# Patient Record
Sex: MALE | Race: WHITE | NOT HISPANIC OR LATINO | Employment: OTHER | URBAN - METROPOLITAN AREA
[De-identification: names, ages, dates, MRNs, and addresses within clinical notes are randomized per-mention and may not be internally consistent; named-entity substitution may affect disease eponyms.]

---

## 2017-01-10 ENCOUNTER — ALLSCRIPTS OFFICE VISIT (OUTPATIENT)
Dept: OTHER | Facility: OTHER | Age: 57
End: 2017-01-10

## 2017-01-10 ENCOUNTER — TRANSCRIBE ORDERS (OUTPATIENT)
Dept: ADMINISTRATIVE | Facility: HOSPITAL | Age: 57
End: 2017-01-10

## 2017-01-10 DIAGNOSIS — C34.01 CANCER OF RIGHT MAIN BRONCHUS (HCC): Primary | ICD-10-CM

## 2017-01-23 ENCOUNTER — APPOINTMENT (OUTPATIENT)
Dept: RADIATION ONCOLOGY | Facility: HOSPITAL | Age: 57
End: 2017-01-23
Attending: RADIOLOGY
Payer: COMMERCIAL

## 2017-01-23 ENCOUNTER — HOSPITAL ENCOUNTER (OUTPATIENT)
Dept: CT IMAGING | Facility: HOSPITAL | Age: 57
Discharge: HOME | End: 2017-01-23
Attending: INTERNAL MEDICINE
Payer: COMMERCIAL

## 2017-01-23 DIAGNOSIS — C34.01 CANCER OF RIGHT MAIN BRONCHUS (HCC): ICD-10-CM

## 2017-01-23 PROCEDURE — 99214 OFFICE O/P EST MOD 30 MIN: CPT | Performed by: RADIOLOGY

## 2017-01-23 PROCEDURE — 71250 CT THORAX DX C-: CPT

## 2017-01-31 ENCOUNTER — HOSPITAL ENCOUNTER (OUTPATIENT)
Dept: INFUSION CENTER | Facility: CLINIC | Age: 57
Discharge: HOME | End: 2017-01-31
Payer: COMMERCIAL

## 2017-01-31 ENCOUNTER — TRANSCRIBE ORDERS (OUTPATIENT)
Dept: ADMINISTRATIVE | Facility: HOSPITAL | Age: 57
End: 2017-01-31

## 2017-01-31 ENCOUNTER — ALLSCRIPTS OFFICE VISIT (OUTPATIENT)
Dept: OTHER | Facility: OTHER | Age: 57
End: 2017-01-31

## 2017-01-31 DIAGNOSIS — C34.01 MALIGNANT NEOPLASM OF RIGHT MAIN BRONCHUS (HCC): ICD-10-CM

## 2017-01-31 DIAGNOSIS — R91.1 LUNG NODULE: Primary | ICD-10-CM

## 2017-02-06 ENCOUNTER — HOSPITAL ENCOUNTER (OUTPATIENT)
Dept: INFUSION CENTER | Facility: CLINIC | Age: 57
Discharge: HOME | End: 2017-02-06
Payer: COMMERCIAL

## 2017-02-06 PROCEDURE — 96523 IRRIG DRUG DELIVERY DEVICE: CPT

## 2017-02-06 RX ORDER — SUCRALFATE 1 G/1
1 TABLET ORAL 4 TIMES DAILY
COMMUNITY
End: 2018-02-19 | Stop reason: ALTCHOICE

## 2017-02-06 RX ORDER — SENNOSIDES 8.6 MG
650 CAPSULE ORAL EVERY 6 HOURS PRN
COMMUNITY

## 2017-02-06 RX ADMIN — HEPARIN 300 UNITS: 100 SYRINGE at 11:43

## 2017-03-06 ENCOUNTER — HOSPITAL ENCOUNTER (OUTPATIENT)
Dept: INFUSION CENTER | Facility: CLINIC | Age: 57
Discharge: HOME | End: 2017-03-06
Payer: COMMERCIAL

## 2017-03-06 PROCEDURE — 96523 IRRIG DRUG DELIVERY DEVICE: CPT

## 2017-03-06 RX ADMIN — HEPARIN 300 UNITS: 100 SYRINGE at 14:44

## 2017-03-20 DIAGNOSIS — R91.1 SOLITARY PULMONARY NODULE: ICD-10-CM

## 2017-03-20 DIAGNOSIS — C34.01 MALIGNANT NEOPLASM OF RIGHT MAIN BRONCHUS (HCC): ICD-10-CM

## 2017-03-31 ENCOUNTER — GENERIC CONVERSION - ENCOUNTER (OUTPATIENT)
Dept: OTHER | Facility: OTHER | Age: 57
End: 2017-03-31

## 2017-04-03 ENCOUNTER — HOSPITAL ENCOUNTER (OUTPATIENT)
Dept: INFUSION CENTER | Facility: CLINIC | Age: 57
Discharge: HOME | End: 2017-04-03
Payer: COMMERCIAL

## 2017-04-03 PROCEDURE — 96523 IRRIG DRUG DELIVERY DEVICE: CPT

## 2017-04-03 RX ADMIN — ALTEPLASE 2 MG: 2.2 INJECTION, POWDER, LYOPHILIZED, FOR SOLUTION INTRAVENOUS at 10:50

## 2017-04-03 RX ADMIN — Medication 300 UNITS: at 11:20

## 2017-04-03 NOTE — PROGRESS NOTES
TPA successful and brisk red blood return noted  Port flushed, heplocked and deaccessed without issue  Declined AVS  Pt aware of next appt

## 2017-04-03 NOTE — PLAN OF CARE
Problem: Potential for Falls  Goal: Patient will remain free of falls  INTERVENTIONS:  - Assess patient frequently for physical needs  - Identify cognitive and physical deficits and behaviors that affect risk of falls    - [place] fall precautions as indicated by assessment   - Educate patient/family on patient safety including physical limitations  - Instruct patient to call for assistance with activity based on assessment  - Modify environment to reduce risk of injury  - Consider OT/PT consult to assist with strengthening/mobility   Outcome: Progressing

## 2017-04-03 NOTE — PROGRESS NOTES
Pt resting with no complaints  Port accessed and flushed but no blood return noted  TPA instilled  Will recheck in 30 mins

## 2017-04-06 ENCOUNTER — HOSPITAL ENCOUNTER (OUTPATIENT)
Dept: CT IMAGING | Facility: HOSPITAL | Age: 57
Discharge: HOME | End: 2017-04-06
Attending: INTERNAL MEDICINE
Payer: COMMERCIAL

## 2017-04-06 DIAGNOSIS — R91.1 SOLITARY PULMONARY NODULE: ICD-10-CM

## 2017-04-06 PROCEDURE — 71250 CT THORAX DX C-: CPT

## 2017-04-10 ENCOUNTER — GENERIC CONVERSION - ENCOUNTER (OUTPATIENT)
Dept: OTHER | Facility: OTHER | Age: 57
End: 2017-04-10

## 2017-04-11 ENCOUNTER — ALLSCRIPTS OFFICE VISIT (OUTPATIENT)
Dept: OTHER | Facility: OTHER | Age: 57
End: 2017-04-11

## 2017-04-12 ENCOUNTER — ALLSCRIPTS OFFICE VISIT (OUTPATIENT)
Dept: OTHER | Facility: OTHER | Age: 57
End: 2017-04-12

## 2017-04-21 ENCOUNTER — HOSPITAL ENCOUNTER (OUTPATIENT)
Dept: RADIOLOGY | Age: 57
Discharge: HOME | End: 2017-04-21
Payer: COMMERCIAL

## 2017-04-21 DIAGNOSIS — C34.01 MALIGNANT NEOPLASM OF RIGHT MAIN BRONCHUS (HCC): ICD-10-CM

## 2017-04-21 DIAGNOSIS — C34.01: ICD-10-CM

## 2017-04-21 LAB — GLUCOSE SERPL-MCNC: 80 MG/DL (ref 65–140)

## 2017-04-21 PROCEDURE — 82948 REAGENT STRIP/BLOOD GLUCOSE: CPT

## 2017-04-21 PROCEDURE — A9552 F18 FDG: HCPCS

## 2017-04-21 PROCEDURE — 78815 PET IMAGE W/CT SKULL-THIGH: CPT

## 2017-04-21 RX ADMIN — HEPARIN SODIUM (PORCINE) LOCK FLUSH IV SOLN 100 UNIT/ML 300 UNITS: 100 SOLUTION at 08:28

## 2017-05-01 ENCOUNTER — GENERIC CONVERSION - ENCOUNTER (OUTPATIENT)
Dept: OTHER | Facility: OTHER | Age: 57
End: 2017-05-01

## 2017-05-09 ENCOUNTER — HOSPITAL ENCOUNTER (OUTPATIENT)
Dept: INFUSION CENTER | Facility: CLINIC | Age: 57
Discharge: HOME | End: 2017-05-09
Payer: COMMERCIAL

## 2017-05-09 PROCEDURE — 96523 IRRIG DRUG DELIVERY DEVICE: CPT

## 2017-05-09 RX ADMIN — HEPARIN 300 UNITS: 100 SYRINGE at 15:00

## 2017-05-09 NOTE — PROGRESS NOTES
Tolerated procedure without incident: No adverse reactions noted: Verified follow up appt with patient

## 2017-05-09 NOTE — PROGRESS NOTES
Patient to [facility] for Catheter maintenance: Offers no complaints at present time: Left PAC accessed without difficulty: Good blood return noted

## 2017-05-16 ENCOUNTER — APPOINTMENT (OUTPATIENT)
Dept: RADIATION ONCOLOGY | Facility: HOSPITAL | Age: 57
End: 2017-05-16
Attending: RADIOLOGY
Payer: COMMERCIAL

## 2017-05-16 ENCOUNTER — GENERIC CONVERSION - ENCOUNTER (OUTPATIENT)
Dept: OTHER | Facility: OTHER | Age: 57
End: 2017-05-16

## 2017-05-16 PROCEDURE — 99214 OFFICE O/P EST MOD 30 MIN: CPT | Performed by: RADIOLOGY

## 2017-11-13 ENCOUNTER — GENERIC CONVERSION - ENCOUNTER (OUTPATIENT)
Dept: OTHER | Facility: OTHER | Age: 57
End: 2017-11-13

## 2017-11-13 ENCOUNTER — APPOINTMENT (OUTPATIENT)
Dept: RADIATION ONCOLOGY | Facility: HOSPITAL | Age: 57
End: 2017-11-13
Attending: RADIOLOGY
Payer: COMMERCIAL

## 2017-11-13 ENCOUNTER — TRANSCRIBE ORDERS (OUTPATIENT)
Dept: ADMINISTRATIVE | Facility: HOSPITAL | Age: 57
End: 2017-11-13

## 2017-11-13 DIAGNOSIS — C34.01 MALIGNANT NEOPLASM OF RIGHT MAIN BRONCHUS (HCC): ICD-10-CM

## 2017-11-13 DIAGNOSIS — C34.01 MALIGNANT NEOPLASM OF RIGHT MAIN BRONCHUS (HCC): Primary | ICD-10-CM

## 2017-11-13 PROCEDURE — 99214 OFFICE O/P EST MOD 30 MIN: CPT | Performed by: RADIOLOGY

## 2017-11-20 ENCOUNTER — HOSPITAL ENCOUNTER (OUTPATIENT)
Dept: CT IMAGING | Facility: HOSPITAL | Age: 57
Discharge: HOME | End: 2017-11-20
Attending: RADIOLOGY
Payer: COMMERCIAL

## 2017-11-20 DIAGNOSIS — C34.01 MALIGNANT NEOPLASM OF RIGHT MAIN BRONCHUS (HCC): ICD-10-CM

## 2017-11-20 PROCEDURE — 71260 CT THORAX DX C+: CPT

## 2017-11-20 PROCEDURE — 74177 CT ABD & PELVIS W/CONTRAST: CPT

## 2017-11-20 RX ADMIN — IOHEXOL 100 ML: 350 INJECTION, SOLUTION INTRAVENOUS at 13:27

## 2017-11-28 ENCOUNTER — TRANSCRIBE ORDERS (OUTPATIENT)
Dept: ADMINISTRATIVE | Facility: HOSPITAL | Age: 57
End: 2017-11-28

## 2017-11-28 DIAGNOSIS — C34.01 MALIGNANT NEOPLASM OF RIGHT MAIN BRONCHUS (HCC): Primary | ICD-10-CM

## 2017-12-18 ENCOUNTER — TRANSCRIBE ORDERS (OUTPATIENT)
Dept: ADMINISTRATIVE | Facility: HOSPITAL | Age: 57
End: 2017-12-18

## 2017-12-18 ENCOUNTER — ALLSCRIPTS OFFICE VISIT (OUTPATIENT)
Dept: OTHER | Facility: OTHER | Age: 57
End: 2017-12-18

## 2017-12-18 DIAGNOSIS — C34.01 MALIGNANT NEOPLASM OF RIGHT MAIN BRONCHUS (HCC): Primary | ICD-10-CM

## 2017-12-18 DIAGNOSIS — C34.01 MALIGNANT NEOPLASM OF RIGHT MAIN BRONCHUS (HCC): ICD-10-CM

## 2017-12-19 ENCOUNTER — HOSPITAL ENCOUNTER (OUTPATIENT)
Dept: INFUSION CENTER | Facility: CLINIC | Age: 57
Discharge: HOME | End: 2017-12-21
Payer: COMMERCIAL

## 2017-12-19 ENCOUNTER — GENERIC CONVERSION - ENCOUNTER (OUTPATIENT)
Dept: OTHER | Facility: OTHER | Age: 57
End: 2017-12-19

## 2017-12-19 PROCEDURE — 36593 DECLOT VASCULAR DEVICE: CPT

## 2017-12-19 RX ADMIN — Medication 300 UNITS: at 14:45

## 2017-12-19 RX ADMIN — ALTEPLASE 2 MG: 2.2 INJECTION, POWDER, LYOPHILIZED, FOR SOLUTION INTRAVENOUS at 14:00

## 2017-12-19 NOTE — PROGRESS NOTES
Patient to [facility] for Catheter maintenance: Offers no complaints at present time: Left PAC accessed without difficulty: No blood return noted

## 2017-12-19 NOTE — PROGRESS NOTES
Assessment  1  Cancer of right main bronchus (162 2) (C34 01)   2  Lung nodule, solitary (793 11) (R91 1)    Plan  Cancer of right main bronchus    · (1) CEA; Status:Active; Requested for:[id number];    Perform:[facility]; Due:[id number]; Ordered; For:Cancer of right main bronchus; Ordered By:ProDarnell forbeshash;   · * NM PET CT SKULL BASE TO MID THIGH; Status:Need Information - FinancialAuthorization; Requested ATE:[id number] 11:00AM;    Perform:[facility]; Order Comments:PET-CT scan soon; GHN:[id number]; Last Updated By:Thomas Adan; 12/18/2017 2:03:05 PM;Ordered; For:Cancer of right main bronchus; Ordered By:Osmar Kuo;   · Follow-up visit in 3 weeks Evaluation and Treatment  Follow-up  Status: Complete  Done:12Jan2018 03:45PM   Ordered; For: Cancer of right main bronchus; Ordered By: Aurora Rockwell Performed:  Due: [id number]; Last Updated By: Deepti Escobedo; 12/18/2017 1:52:34 PM    Discussion/Summary  Discussion Summary:   Follow-up visit for 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  Patient had obstruction of right mainstem bronchus and pleural effusion but pleural effusion was negative for malignancy  Patient was given 7 weekly doses of carboplatin plus Taxol with concurrent radiation  Plan was him 2 more cycles of regular dose Taxol and carboplatin chemotherapy at three-week interval but patient made it very clear that he was done with treatments  His most recent CT scan shows increased fullness in the paramediastinal area  Patient has minimal nonproductive cough and minimal exertional dyspnea  He does not complain of chest pain  No hemoptysis  His appetite is good  He is maintaining his weight  He wants to smoke cigarettes  He has Port-A-Cath and that was supposed to be flushed once every 6â8 weeks but that is not happening  Physical examination and test results are as recorded and discussed  Patient will have PET/CT scan and CEA  Tissue diagnosis if needed but he does not want whole lot done  Arrangements are being made for him to have Port-A-Cath flushed as soon as possible and once a month  Condition and plan discussed with patient and explained  Questions answered  Patient has thoracic aortic aneurysm and he has seen cardiac surgeon  Counseling Documentation With Imm: The patient was counseled regarding diagnostic results,-- patient and family education,-- impressions  total time of encounter was 30 minutes-- and-- 20 minutes was spent counseling  Goals and Barriers: The patient has the current Goals: Prolongation of survival   The patent has the current Barriers: He continues to smoke cigarettes  Patient's Capacity to Self-Care: Patient is able to Self-Care  Medication SE Review and Pt Understands Tx: The treatment plan was reviewed with the patient/guardian  The patient/guardian understands and agrees with the treatment plan   Self Referrals:   Self Referrals: No   Understands and agrees with treatment plan: The treatment plan was reviewed with the patient/guardian  The patient/guardian understands and agrees with the treatment plan      Chief Complaint  Chief Complaint: Chief Complaint:  The patient presents to the office today with Lung cancer  History of Present Illness  HPI: Follow-up visit for 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  Patient had obstruction of right mainstem bronchus and pleural effusion but pleural effusion was negative for malignancy  Patient was given 7 weekly doses of carboplatin plus Taxol with concurrent radiation  Plan was him 2 more cycles of regular dose Taxol and carboplatin chemotherapy at three-week interval but patient made it very clear that he was done with treatments  His most recent CT scan shows increased fullness in the paramediastinal area  Patient has minimal nonproductive cough and minimal exertional dyspnea  He does not complain of chest pain   No hemoptysis  His appetite is good  He is maintaining his weight  He wants to smoke cigarettes  He has Port-A-Cath and that was supposed to be flushed once every 6â8 weeks but that is not happening  Review of Systems        No headaches, seizures, diplopia, dysphagia, hoarseness, angina pain, chest pain, palpitations,  hemoptysis, abdominal pain, melena, or hematuria  No fever, chills, bleeding, bone pains, skin rash, weight loss, nausea, vomiting and no change in bowel habits  Appetite is good  No night sweats  Patient has minor arthritic symptoms  No leg cramps  No swelling of the ankles  No swollen glands  Not unusually sensitive to heat or cold  No history of frequent infections  Reviewed 13 systems  Other symptoms as in history of present illness  ROS Reviewed:   ROS reviewed  Active Problems  1  A-fib (427 31) (I48 91)   2  Alcohol abuse (305 00) (F10 10)   3  Anxiety disorder (300 00) (F41 9)   4  Aortic root aneurysm (441 9) (I71 9)   5  Cancer of right main bronchus (162 2) (C34 01)   6  Cerebrovascular disease (437 9) (I67 9)   7  Collapsed lung (518 0) (J98 19)   8  COPD (chronic obstructive pulmonary disease) (496) (J44 9)   9  Delirium due to known physiological condition (293 0) (F05)   10  Depression (311) (F32 9)   11  Emphysema/COPD (492 8) (J43 9)   12  Epilepsy (345 90) (G40 909)   13  Hypertension (401 9) (I10)   14  Hypo-osmolality and hyponatremia (276 1) (E87 1)   15  Hypothyroid (244 9) (E03 9)   16  Lung nodule, solitary (793 11) (R91 1)   17  Nicotine dependence (305 1) (F17 200)   18  Pleural effusion (511 9) (J90)   19  Pneumonitis (486) (J18 9)   20  Radiation pneumonitis (508 0) (J70 0)   21  Restrictive lung disease (518 89) (J98 4)   22  Syncope and collapse (780 2) (R55)   Cancer of right main bronchus (162 2) (C34 01)     COPD (chronic obstructive pulmonary disease) (496) (J44 9)       Past Medical History  1  History of encephalopathy (V12 49) (Z86 69)   2   History of hypokalemia (V12 29) (Z86 39)   3  History of Lyme disease (V12 09) (Z86 19)      Reviewed   Surgical History  1  History of Bronchoscopy (Diagnostic)   2  History of Hand Surgery   3  History of Treatment Of Pelvic Fracture  Surgical History Reviewed: The surgical history was reviewed and updated today  Family History  Mother    1  Family history of diabetes mellitus (V18 0) (Z83 3)  Father    2  Family history of lung cancer (V16 1) (Z80 1)  Family History Reviewed: The family history was reviewed and updated today  Noncontributory      Social History   · Brother alive   · Brother    · Current smoker (305 1) (F17 200)   · Former consumption of alcohol (V11 3) (Z87 898)   · No drug use   · Sister alive  Social History Reviewed: The social history was reviewed and updated today  Patient continues to smoke cigarettes  Current Meds   1  Acetaminophen 325 MG CAPS; TAKE 2 CAPSULE Every 6 hours PRN; Therapy: (Recorded:2016) to Recorded   2  Advair Diskus 250-50 MCG/DOSE Inhalation Aerosol Powder Breath Activated; INHALE 1 PUFF TWICE DAILY; Therapy: (Recorded:2017) to Recorded   3  Albuterol Sulfate (2 5 MG/3ML) 0 083% Inhalation Nebulization Solution; USE 1 UNIT DOSE IN NEBULIZER EVERY 6 HOURS AS NEEDED; Therapy: (Recorded:2016) to Recorded   4  Bisacodyl 10 MG Rectal Suppository; Therapy: (Recorded:2017) to Recorded   5  Carafate 1 GM/10ML Oral Suspension; TAKE 10 ML 4 TIMES DAILY; Therapy: 12HYD2029 to (Evaluate:2017); Last Rx:[id number] Ordered   6  Daliresp 500 MCG Oral Tablet; Therapy: (Recorded:2017) to Recorded   7  Folic Acid 1 MG Oral Tablet; TAKE 1 TABLET DAILY; Therapy: (Recorded:2016) to Recorded   8  Gabapentin 400 MG Oral Capsule; TAKE 1 CAPSULE TWICE DAILY; Therapy: (Recorded:2016) to Recorded   9  Levothyroxine Sodium 200 MCG Oral Tablet; TAKE 1 TABLET DAILY; Therapy: (Recorded:2016) to Recorded   10   Lidocaine Viscous 2 % Mouth/Throat Solution; 5 ml PO prior to meals as needed; Therapy: 70CGY9150 to (Last Rx:01Vsr9161) Ordered   11  LORazepam 1 MG Oral Tablet; Take 1 tablet twice daily; Therapy: (Recorded:11Oct2016) to Recorded   12  MDL Milk of Magnesia SUSP; SWALLOW 30 ML Bedtime PRN; Therapy: (Recorded:11Oct2016) to Recorded   13  Metoprolol Succinate ER 25 MG Oral Tablet Extended Release 24 Hour; TAKE 1 TABLET  DAILY; Therapy: (Recorded:11Oct2016) to Recorded   14  Mirtazapine 30 MG Oral Tablet; TAKE 1 TABLET AT BEDTIME; Therapy: (Recorded:11Oct2016) to Recorded   15  Phenytoin Sodium Extended CAPS; TAKE 250 MG Bedtime; Therapy: (Recorded:11Oct2016) to Recorded   16  Potassium Chloride Sadia ER 10 MEQ Oral Tablet Extended Release; TAKE 3 TABLET  Daily; Therapy: (Recorded:11Oct2016) to Recorded   17  Sodium Chloride TABS; 4 gm  TID; Therapy: (Recorded:11Oct2016) to Recorded   18  Spiriva HandiHaler 18 MCG Inhalation Capsule; INHALE CONTENTS OF 1 CAPSULE  ONCE DAILY; Therapy: (Recorded:11Apr2017) to Recorded   19  Synthroid 200 MCG Oral Tablet; TAKE 1 TABLET DAILY AS DIRECTED; Therapy: (Recorded:11Apr2017) to Recorded   20  Thiamine HCl - 100 MG Oral Tablet; TAKE 1 TABLET DAILY; Therapy: (Recorded:11Oct2016) to Recorded   21  Tiotropium Bromide Monohydrate 18 MCG CAPS; INHALE CONTENTS OF 1 CAPSULE  ONCE DAILY; Therapy: (Recorded:11Oct2016) to Recorded   22  Vitamin D 1000 UNIT CAPS; TAKE AS DIRECTED; Therapy: (Recorded:11Apr2017) to Recorded    Allergies  1  No Known Drug Allergies      Reviewed  Vitals  Vital Signs    Recorded: 61ETT7291 01:02PM   Temperature 98 3 F   Heart Rate 97   Respiration 16   Systolic 317   Diastolic 62   Height 5 ft 9 in   Weight 150 lb    BMI Calculated 22 15   BSA Calculated 1 83   O2 Saturation 96       Reviewed  Physical Exam          Patient is awake and oriented  He is not in distress  Vital signs are stable  No icterus  No oral thrush  No palpable neck mass   Lung fields are clear to percussion and auscultation  Regular heart rate  Abdomen soft and nontender  No palpable abdominal mass  Liver is not palpable  No ascites  No edema of ankles  No calf tenderness  No focal neurological deficit  No skin rash  No palpable lymphadenopathy  Good arterial pulses  No clubbing  Anxious  Performance status one  ECOG 1       Results/Data  Results Form:   Results  Lab Results Hemoglobin 12 8  WBC 4400  Platelet 320247  Normal chemistry profile  * CT CHEST ABDOMEN PELVIS W CONTRAST 34IGB8929 12:54PM [place]     Test Name Result Flag Reference   CT CHEST ABDOMEN PELVIS W CONTRAST (Report)     CT CHEST, ABDOMEN AND PELVIS WITH IV CONTRAST   INDICATION: Malignant neoplasm of right main bronchus  COMPARISON: PET CT 4/21/2017, CT chest 4/6/2017  TECHNIQUE: CT examination of the chest, abdomen and pelvis was performed  Reformatted images were created in axial, sagittal, and coronal planes  Radiation dose length product (DLP) for this visit: 450 mGy-cm   This examination, like all CT scans performed in the [facility], was performed utilizing techniques to minimize radiation dose exposure, including the use of iterative   reconstruction and automated exposure control  IV Contrast: 100 mL of iohexol (OMNIPAQUE)     Enteric Contrast: Enteric contrast was administered  FINDINGS:   CHEST   LUNGS: Severe emphysematous changes are again noted  Stable 2 mm left upper lobe pulmonary nodule (series 3 image 23)  Stable left upper lobe nodular density measuring 6 x 8 mm (series 3 image 19)  Stable volume loss in the right lung with subpleural  scarring  Soft tissue thickening in the paramediastinal region on the right superiorly appears more prominent on prior exams measuring 2 4 x 3 3 cm (series 3 image 24)  PLEURA: Unremarkable     HEART/GREAT VESSELS: Stable enlargement of the proximal ascending aorta measuring 5 cm and unchanged from prior exam when given differences in technique  The heart is normal in size without pericardial mass or effusion  MEDIASTINUM AND ANDREW: There are scattered subcentimeter mediastinal and hilar lymph nodes similar to prior exam    CHEST WALL AND LOWER NECK:    There is a left-sided chest wall port with catheter tip in the SVC  ABDOMEN   LIVER/BILIARY TREE: One or more stable subcentimeter sharply circumscribed low-density hepatic lesion(s) are noted, too small to accurately characterize, but statistically most likely to represent subcentimeter hepatic cysts  No suspicious solid   hepatic lesion is identified  Hepatic contours are normal  No biliary dilatation  GALLBLADDER: There are gallstone(s) within the gallbladder, without pericholecystic inflammatory changes  SPLEEN: Unremarkable  PANCREAS: Unremarkable  ADRENAL GLANDS: Unremarkable  KIDNEYS/URETERS: 12 mm left midpole renal cyst  No hydronephrosis  STOMACH AND BOWEL: Moderate stool retention throughout the colon  APPENDIX: A normal appendix was visualized  ABDOMINOPELVIC CAVITY: No ascites or free intraperitoneal air  No lymphadenopathy  VESSELS: Unremarkable for patient's age  PELVIS   REPRODUCTIVE ORGANS: Unremarkable for patient's age  URINARY BLADDER: Unremarkable  ABDOMINAL WALL/INGUINAL REGIONS: Small fat-containing periumbilical hernia  OSSEOUS STRUCTURES: No acute fracture or destructive osseous lesion  There are postsurgical changes of the left femur  IMPRESSION:  1  Interval increased soft tissue attenuation material in the right paramediastinal region  While this could represent progressive scarring/post treatment change residual/recurrent mass cannot be excluded  Consider further evaluation with PET/CT to   evaluate for metabolic activity in this region  2  Stable left-sided pulmonary nodules unchanged from prior exam   3  Cholelithiasis without other evidence of acute cholecystitis  4  No evidence of metastatic disease within the abdomen or pelvis  Workstation performed: [id number]   Signed by:  Sofía Chaparro MD  11/25/17     * NM PET CT SKULL BASE TO MID THIGH 21Apr2017 07:23AM Shiela, [id number]     Test Name Result Flag Reference   NM PET CT SKULL BASE TO MID THIGH (Report)     PET/CT SCAN   INDICATION: Lung cancer, restaging postchemotherapy, radiation, abnormal CT, right upper lung groundglass opacity, slight enlargement of left upper lobe nodular density   MODIFIER:   PS    COMPARISON: CT 4/6/2017 and priors, including PET CT 10/10/2016   CELL TYPE: Squamous cell carcinoma, right mainstem bronchus biopsy 10/3/2016   TECHNIQUE:  8 6 mCi F-18-FDG administered IV  Multiplanar attenuation corrected and non attenuation corrected PET images are available for interpretation, and contiguous, low dose, axial CT sections were obtained from the vertex through the femurs  Intravenous contrast material was not utilized  Fasting serum glucose: 80 mg/dl   FINDINGS:    VISUALIZED BRAIN:  Previously seen nodular hypermetabolic focus in the right posterior vertex region is again noted, however no lesion was visualized on recent brain MRI  Consequently this is of doubtful clinical significance  No new hypermetabolic   lesions visualized in the brain  No acute abnormalities are seen  HEAD/NECK:  Interval increased bilateral thyroid activity, SUV 6 8, prior SUV 3 9  This is nonspecific but may be associated with underlying thyroiditis  There is a physiologic distribution of FDG  No FDG avid cervical adenopathy is seen  CT images: Stable  CHEST:    Since the prior PET CT, there has been significant improvement in right hilar and parenchymal hypermetabolic lesions, compatible with response to therapy  Right hilar activity has an SUV of 2 9, prior SUV 25 7  Previously seen focal hypermetabolic lesion in the right lung has resolved  There is significantly improved aeration of the right lung   There remains persistent groundglass and interstitial densities predominantly in the right upper lung, with mild hypermetabolism, SUV 3  This may represent posttreatment inflammatory changes  Previously seen irregular density in the left upper lung along the major fissure is again visualized, but does not demonstrate any significant hypermetabolism, favoring benign etiology such as scarring  Additional hypermetabolic left upper lung nodular   density seen on the prior PET CT has resolved  No new hypermetabolic hilar or mediastinal adenopathy  CT images: Small right pleural effusion, stable from the prior CT of 4/6/2017  Severe emphysema  Increased small pericardial fluid  Stable aneurysmal proximal ascending thoracic aorta measuring 5 cm  ABDOMEN:    No FDG avid soft tissue lesions are seen  CT images: Cholelithiasis  Small fat-containing umbilical hernia  PELVIS:   No FDG avid soft tissue lesions are seen  CT images: Stable  OSSEOUS STRUCTURES:  No FDG avid lesions are seen  CT images: Stable  IMPRESSION:  1  Significantly improved hypermetabolic right hilar and right lung lesions, compatible with response to therapy  Probable posttreatment inflammatory changes predominantly in the right upper lung and perihilar region  Continued follow-up recommended  2  No new hypermetabolic lesions that are concerning for new metastases  3  Increased thyroid FDG activity may be correlated clinically for etiologies such as thyroiditis          Workstation performed: [id number]   Signed by:  Gaston Mobley MD  4/21/17     Future Appointments    Date/Time Provider Specialty Site   01/12/2018 03:45 PM Tyree Kuo MD Hematology Oncology CANCER CARE [facility] MEDICAL ONCOLOGY     Signatures   Electronically signed by : Derk Dakin, MD; Dec 18 2017  4:35PM EST                       (Author)

## 2017-12-19 NOTE — PROGRESS NOTES
Left PAC with blood return: Flushes without difficulty:  Tolerated procedure without incident: No adverse reactions noted: Verified follow up appt with patient: Declined AVS

## 2018-01-03 ENCOUNTER — GENERIC CONVERSION - ENCOUNTER (OUTPATIENT)
Dept: OTHER | Facility: OTHER | Age: 58
End: 2018-01-03

## 2018-01-03 ENCOUNTER — HOSPITAL ENCOUNTER (OUTPATIENT)
Dept: RADIOLOGY | Age: 58
Discharge: HOME | End: 2018-01-03
Payer: COMMERCIAL

## 2018-01-03 DIAGNOSIS — C34.01 MALIGNANT NEOPLASM OF RIGHT MAIN BRONCHUS (HCC): ICD-10-CM

## 2018-01-03 LAB — GLUCOSE SERPL-MCNC: 79 MG/DL (ref 65–140)

## 2018-01-03 PROCEDURE — A9552 F18 FDG: HCPCS

## 2018-01-03 PROCEDURE — 82948 REAGENT STRIP/BLOOD GLUCOSE: CPT

## 2018-01-03 PROCEDURE — 78815 PET IMAGE W/CT SKULL-THIGH: CPT

## 2018-01-03 RX ADMIN — IOHEXOL 5 ML: 240 INJECTION, SOLUTION INTRATHECAL; INTRAVASCULAR; INTRAVENOUS; ORAL at 13:50

## 2018-01-12 ENCOUNTER — TRANSCRIBE ORDERS (OUTPATIENT)
Dept: ADMINISTRATIVE | Facility: HOSPITAL | Age: 58
End: 2018-01-12

## 2018-01-12 ENCOUNTER — GENERIC CONVERSION - ENCOUNTER (OUTPATIENT)
Dept: OTHER | Facility: OTHER | Age: 58
End: 2018-01-12

## 2018-01-12 VITALS
OXYGEN SATURATION: 92 % | WEIGHT: 153.31 LBS | HEIGHT: 69 IN | BODY MASS INDEX: 22.71 KG/M2 | DIASTOLIC BLOOD PRESSURE: 68 MMHG | TEMPERATURE: 98.6 F | RESPIRATION RATE: 16 BRPM | SYSTOLIC BLOOD PRESSURE: 110 MMHG | HEART RATE: 85 BPM

## 2018-01-12 VITALS
TEMPERATURE: 97.6 F | DIASTOLIC BLOOD PRESSURE: 50 MMHG | RESPIRATION RATE: 16 BRPM | SYSTOLIC BLOOD PRESSURE: 110 MMHG | WEIGHT: 147.38 LBS | OXYGEN SATURATION: 96 % | HEIGHT: 69 IN | HEART RATE: 76 BPM | BODY MASS INDEX: 21.83 KG/M2

## 2018-01-12 VITALS
HEART RATE: 70 BPM | HEIGHT: 70 IN | RESPIRATION RATE: 18 BRPM | SYSTOLIC BLOOD PRESSURE: 106 MMHG | TEMPERATURE: 97.3 F | DIASTOLIC BLOOD PRESSURE: 66 MMHG | BODY MASS INDEX: 20.76 KG/M2 | WEIGHT: 145 LBS | OXYGEN SATURATION: 94 %

## 2018-01-12 DIAGNOSIS — C34.01 MALIGNANT NEOPLASM OF RIGHT MAIN BRONCHUS (HCC): Primary | ICD-10-CM

## 2018-01-12 NOTE — MISCELLANEOUS
Message   Recorded as Task   Date: 11/10/2016 11:09 AM, Created By: Addie Keys   Task Name: Call Back   Assigned To: Olga Soto   Regarding Patient: Antony Berg, Status: Active   Comment:    Kimberly Em - 10 Nov 2016 11:09 AM     TASK CREATED  Tsering Zabala from [address] [place] [address] radiation called () pt was seen there today and asked when he was to start chemo  Pt left before they could inform him he is to start today and did not show up for his chemo  Spoke with Jay James in [place]  Pt found and went to chemo  Active Problems    1  A-fib (427 31) (I48 91)   2  Alcohol abuse (305 00) (F10 10)   3  Anxiety disorder (300 00) (F41 9)   4  Aortic root aneurysm (441 9) (I71 9)   5  Cancer of right main bronchus (162 2) (C34 01)   6  Cerebrovascular disease (437 9) (I67 9)   7  Collapsed lung (518 0) (J98 19)   8  COPD (chronic obstructive pulmonary disease) (496) (J44 9)   9  Delirium due to known physiological condition (293 0) (F05)   10  Depression (311) (F32 9)   11  Emphysema/COPD (492 8) (J43 9)   12  Epilepsy (345 90) (G40 909)   13  Hypertension (401 9) (I10)   14  Hypo-osmolality and hyponatremia (276 1) (E87 1)   15  Hypothyroid (244 9) (E03 9)   16  Nicotine dependence (305 1) (F17 200)   17  Pleural effusion (511 9) (J90)   18  Restrictive lung disease (518 89) (J98 4)   19  Syncope and collapse (780 2) (R55)    Current Meds   1  Acetaminophen 325 MG CAPS; TAKE 2 CAPSULE Every 6 hours PRN; Therapy: (Recorded:11Oct2016) to Recorded   2  Albuterol Sulfate (2 5 MG/3ML) 0 083% Inhalation Nebulization Solution; USE 1 UNIT   DOSE IN NEBULIZER EVERY 6 HOURS AS NEEDED; Therapy: (Recorded:11Oct2016) to Recorded   3  Breo Ellipta 200-25 MCG/INH Inhalation Aerosol Powder Breath Activated; INHALE 1   PUFFS Daily; Therapy: (Recorded:07Nov2016) to Recorded   4  Cholecalciferol 1000 UNIT CAPS; TAKE 2 CAPSULE Daily; Therapy: (Recorded:11Oct2016) to Recorded   5   Clarinex-D 24 Hour TB24; TAKE 1 TABLET DAILY; Therapy: (Recorded:07Nov2016) to Recorded   6  Dulcolax SUPP; INSERT 1 SUPP Daily PRN; Therapy: (Recorded:11Oct2016) to Recorded   7  Flonase 50 MCG/ACT SUSP (Fluticasone Propionate); USE 2 SPRAYS IN EACH   NOSTRIL ONCE DAILY; Therapy: (Recorded:07Nov2016) to Recorded   8  Folic Acid 1 MG Oral Tablet; TAKE 1 TABLET DAILY; Therapy: (Recorded:11Oct2016) to Recorded   9  Gabapentin 400 MG Oral Capsule; TAKE 1 CAPSULE TWICE DAILY; Therapy: (Recorded:11Oct2016) to Recorded   10  Levothyroxine Sodium 200 MCG Oral Tablet; TAKE 1 TABLET DAILY; Therapy: (Recorded:11Oct2016) to Recorded   11  LORazepam 1 MG Oral Tablet; Take 1 tablet twice daily; Therapy: (Recorded:11Oct2016) to Recorded   12  MDL Milk of Magnesia SUSP; SWALLOW 30 ML Bedtime PRN; Therapy: (Recorded:11Oct2016) to Recorded   13  Metoprolol Succinate ER 25 MG Oral Tablet Extended Release 24 Hour; TAKE 1 TABLET    DAILY; Therapy: (Recorded:11Oct2016) to Recorded   14  Mirtazapine 30 MG Oral Tablet; TAKE 1 TABLET AT BEDTIME; Therapy: (Recorded:11Oct2016) to Recorded   15  Phenytoin Sodium Extended CAPS; TAKE 250 MG Bedtime; Therapy: (Recorded:11Oct2016) to Recorded   16  Potassium Chloride Sadia ER 10 MEQ Oral Tablet Extended Release; TAKE 3 TABLET    Daily; Therapy: (Recorded:11Oct2016) to Recorded   17  Roflumilast 500 MCG TABS; TAKE 1 TABLET DAILY; Therapy: (Recorded:11Oct2016) to Recorded   18  Sodium Chloride TABS; 4 gm  TID; Therapy: (Recorded:11Oct2016) to Recorded   19  Thiamine HCl - 100 MG Oral Tablet; TAKE 1 TABLET DAILY; Therapy: (Recorded:11Oct2016) to Recorded   20  Tiotropium Bromide Monohydrate 18 MCG CAPS; INHALE CONTENTS OF 1 CAPSULE    ONCE DAILY; Therapy: (Recorded:11Oct2016) to Recorded    Allergies    1   No Known Drug Allergies    Signatures   Electronically signed by : Sulema Issa RN; Nov 10 2016 11:15AM EST                       (Author)

## 2018-01-12 NOTE — MISCELLANEOUS
Provider Comments  Provider Comments:   PT a no show for Dr Ratna Dodge   on 5/1/17      Signatures   Electronically signed by : Quintin Wood, ; May  1 2017 12:29PM EST                       (Author)

## 2018-01-13 VITALS
OXYGEN SATURATION: 94 % | RESPIRATION RATE: 16 BRPM | HEIGHT: 69 IN | BODY MASS INDEX: 21.48 KG/M2 | WEIGHT: 145 LBS | SYSTOLIC BLOOD PRESSURE: 108 MMHG | TEMPERATURE: 97.5 F | HEART RATE: 80 BPM | DIASTOLIC BLOOD PRESSURE: 60 MMHG

## 2018-01-13 VITALS
DIASTOLIC BLOOD PRESSURE: 68 MMHG | WEIGHT: 154 LBS | TEMPERATURE: 98.2 F | HEART RATE: 88 BPM | SYSTOLIC BLOOD PRESSURE: 140 MMHG | BODY MASS INDEX: 22.81 KG/M2 | RESPIRATION RATE: 14 BRPM | HEIGHT: 69 IN | OXYGEN SATURATION: 94 %

## 2018-01-13 NOTE — RESULT NOTES
Message  Pt with medical and radiation oncology follow up  Verified Results  (1) NON-GYNECOLOGIC CYTOLOGY 03Oct2016 04:39PM Ranell Clutter     Test Name Result Flag Reference   LAB AP CASE REPORT (Report)     Non-gynecologic Cytology             Case: [id number]                  Authorizing Provider: Naz Orona DO     Collected:      10/03/2016 1639        Ordering Location:   [facility]    Received:      10/03/2016 [address] [place] [address] Endoscopy                               Pathologist:      Kim Caicedo MD                               Specimens:  A) - Bronchial Wash, right mainstem #1                                 B) - Bronchial Wash, right mainstem #2                                 C) - Bronchial Brush, right mainstem                                  D) - Mass, right endobronchial                                     E) - Bronchial Wash, right mainstem #1, cell block                           F) - Bronchial Wash, right mainstem #2, cell block                           G) - Bronchial Brush, right mainstem, cell block                            H) - Mass, right endobronchial, cell block   LAB AP CYTO FINAL DIAGNOSIS (Report)     A - E  Bronchial Wash, right mainstem #1:(Thin prep and cell block   preparations)  Conclusive evidence of Malignancy  Keratinizing squamous cell carcinoma  Satisfactory for evaluation  B - F  Bronchial Wash, right mainstem #2: (Thin prep and cell block   preparations)  Conclusive evidence of Malignancy  Keratinizing squamous cell carcinoma  Satisfactory for evaluation  C - G  Bronchial Brush, right mainstem: (Thin prep and cell block   preparations)  Conclusive evidence of Malignancy  Keratinizing squamous cell carcinoma  Satisfactory for evaluation  D - H  Mass, right endobronchial: (Thin prep and cell block preparations)  Conclusive evidence of Malignancy  Keratinizing squamous cell carcinoma  Satisfactory for evaluation  Electronically signed by Abisai Carmona MD on 10/5/2016 at 3:53 PM   LAB AP GROSS DESCRIPTION (Report)     A  Bronchial Wash, right mainstem #1: 45ml  Bloody, turbid, received in   CytoLyt    B  Bronchial Wash, right mainstem #2: 20ml  Bloody, cloudy, received in   CytoLyt    C  Bronchial Brush, right mainstem: 20ml  Slightly bloody, cloudy,   received in CytoLyt    D  Mass, right endobronchial: 20ml  Slightly bloody, cloudy, received in   CytoLyt    E  Bronchial Wash, right mainstem #1, cell block: moderate cell button,   red/white    F  Bronchial Wash, right mainstem #2 cell block: moderate cell button, red    G  Bronchial Brush, right mainstem, cell block: minimal cell button, white    H  Mass, right endobronchial, cell block: minimal cell button, white   LAB AP NONGYN ADDITIONAL INFORMATION (Report)     [employer]'s FDA approved ,  and ThinPrep Imaging System are   utilized with strict adherence to the 's instruction manual to   prepare gynecologic and non-gynecologic cytology specimens for the   production of ThinPrep slides as well as for gynecologic ThinPrep imaging  These processes have been validated by our laboratory and/or by the     These tests were developed and their performance characteristics   determined by [employer] [address] [place] [address] or [facility]  They may not be cleared or approved by the [place]  Food and   Drug Administration  The FDA has determined that such clearance or   approval is not necessary  These tests are used for clinical purposes  They should not be regarded as investigational or for research  This   laboratory has been approved by [place] [address], designated as a high-complexity   laboratory and is qualified to perform these tests      Interpretation performed at [facility]   endotrachial mass r/o malignancy       Signatures   Electronically signed by : Mehnaz Taylor DO; Oct 10 2016 11:54AM EST                       (Author)

## 2018-01-13 NOTE — MISCELLANEOUS
Message   Recorded as Task   Date: 10/27/2016 03:49 PM, Created By: Mena Sauceda   Task Name: Hospital Call   Assigned To: Olga Soto   Regarding Patient: Trish Lutz, Status: Active   Comment:    Mira Potts - 27 Oct 2016 3:49 PM     TASK CREATED  Caller: Jessica Pace, Care Coordinator; Hospital Call; [phone]  DAYAN FROM [facility] RADIATION ONCOLOGY CALLED, 1) PT WAS NOT ABLE TO GET PORT, HE IS R/S FOR 11/3/16   2) PER DR HERNANDEZ - HE IS NOT A CANDIDATE FOR SURGERY  3) DR FAUSTIN IS CHECKING WHEN RADIATION SHOULD START - CONCURRENT WITH CHEMO? WHEN IS CHEMO STARTING IF PORT IS 11/3/16? DR URBAN AWARE - HE ASKED THAT A MESSAGE BE LEFT  PLEASE FOLLOW UP  Olga Soto - 28 Oct 2016 8:25 AM     TASK REPLIED TO: Previously Assigned To Olga Soto  Please find out time port is being placed  Ariella Rubio - 28 Oct 2016 10:05 AM     TASK REASSIGNED: Previously Assigned To Mira Potts  Please find out time port is being placed at [facility]  [facility] - 28 Oct 2016 10:28 AM     TASK EDITED   Brandon Angie - 28 Oct 2016 10:29 AM     TASK REPLIED TO: Previously Assigned To Ariella Rubio  Patient is scheduled for his phone consult today for his port and 11:30 arrival on 11/3 for port placement at [facility]  Spoke with [facility], [place] [place] and Gina Quan at [employer] then called [place] where pt is a resident and Spoke with Elsy Zaldivar  New schedule: port 11/3/16, RT start 11/7/16, chemo start 11/10/16  Active Problems    1  A-fib (427 31) (I48 91)   2  Alcohol abuse (305 00) (F10 10)   3  Anxiety disorder (300 00) (F41 9)   4  Aortic root aneurysm (441 9) (I71 9)   5  Cancer of right main bronchus (162 2) (C34 01)   6  Cerebrovascular disease (437 9) (I67 9)   7  Collapsed lung (518 0) (J98 19)   8  COPD (chronic obstructive pulmonary disease) (496) (J44 9)   9  Delirium due to known physiological condition (293 0) (F05)   10  Depression (311) (F32 9)   11   Emphysema/COPD (492 8) (J43 9)   12  Epilepsy (691 90) (G40 909)   13  Hypertension (401 9) (I10)   14  Hypo-osmolality and hyponatremia (276 1) (E87 1)   15  Hypothyroid (244 9) (E03 9)   16  Nicotine dependence (305 1) (F17 200)   17  Pleural effusion (511 9) (J90)   18  Syncope and collapse (780 2) (R55)    Current Meds   1  Acetaminophen 325 MG CAPS; TAKE 2 CAPSULE Every 6 hours PRN; Therapy: (Recorded:11Oct2016) to Recorded   2  Albuterol Sulfate (2 5 MG/3ML) 0 083% Inhalation Nebulization Solution; USE 1 UNIT   DOSE IN NEBULIZER EVERY 6 HOURS AS NEEDED; Therapy: (Recorded:11Oct2016) to Recorded   3  Cholecalciferol 1000 UNIT CAPS; TAKE 2 CAPSULE Daily; Therapy: (Recorded:11Oct2016) to Recorded   4  Dulcolax SUPP; INSERT 1 SUPP Daily PRN; Therapy: (Recorded:11Oct2016) to Recorded   5  Fluticasone Furoate 27 5 MCG/SPRAY SUSP; INHALE 1 PUFF DAILY; Therapy: (Recorded:11Oct2016) to Recorded   6  Folic Acid 1 MG Oral Tablet; TAKE 1 TABLET DAILY; Therapy: (Recorded:11Oct2016) to Recorded   7  Gabapentin 400 MG Oral Capsule; TAKE 1 CAPSULE TWICE DAILY; Therapy: (Recorded:11Oct2016) to Recorded   8  Levothyroxine Sodium 200 MCG Oral Tablet; TAKE 1 TABLET DAILY; Therapy: (Recorded:11Oct2016) to Recorded   9  LORazepam 1 MG Oral Tablet; Take 1 tablet twice daily; Therapy: (Recorded:11Oct2016) to Recorded   10  MDL Milk of Magnesia SUSP; SWALLOW 30 ML Bedtime PRN; Therapy: (Recorded:11Oct2016) to Recorded   11  Metoprolol Succinate ER 25 MG Oral Tablet Extended Release 24 Hour; TAKE 1 TABLET    DAILY; Therapy: (Recorded:11Oct2016) to Recorded   12  Mirtazapine 30 MG Oral Tablet; TAKE 1 TABLET AT BEDTIME; Therapy: (Recorded:11Oct2016) to Recorded   13  Phenytoin Sodium Extended CAPS; TAKE 250 MG Bedtime; Therapy: (Recorded:11Oct2016) to Recorded   14  Potassium Chloride Sadia ER 10 MEQ Oral Tablet Extended Release; TAKE 3 TABLET    Daily; Therapy: (Recorded:11Oct2016) to Recorded   15  Roflumilast 500 MCG TABS; TAKE 1 TABLET DAILY;     Therapy: (Recorded:11Oct2016) to Recorded   16  Sodium Chloride TABS; 4 gm  TID; Therapy: (Recorded:11Oct2016) to Recorded   17  Thiamine HCl - 100 MG Oral Tablet; TAKE 1 TABLET DAILY; Therapy: (Recorded:11Oct2016) to Recorded   18  Tiotropium Bromide Monohydrate 18 MCG CAPS; INHALE CONTENTS OF 1 CAPSULE    ONCE DAILY; Therapy: (Recorded:11Oct2016) to Recorded    Allergies    1   No Known Drug Allergies    Signatures   Electronically signed by : Tylor Jaimes RN; Oct 28 2016 11:20AM EST                       (Author)

## 2018-01-14 NOTE — RESULT NOTES
Message  Tests performed as inpatient, seen as outpatient by Dr Vincent Kayser, oncology, for further lung cancer treatment     Verified Results  (1) NON-GYNECOLOGIC CYTOLOGY 03Oct2016 04:39PM Margaret Wood     Test Name Result Flag Reference   LAB AP CASE REPORT (Report)     Non-gynecologic Cytology             Case: [id number]                  Authorizing Provider: Alexandra Parkinson DO     Collected:      10/03/2016 1639        Ordering Location:   [facility]    Received:      10/03/2016 [id number] Endoscopy                               Pathologist:      Medina Marinelli MD                               Specimens:  A) - Bronchial Wash, right mainstem #1                                 B) - Bronchial Wash, right mainstem #2                                 C) - Bronchial Brush, right mainstem                                  D) - Mass, right endobronchial                                     E) - Bronchial Wash, right mainstem #1, cell block                           F) - Bronchial Wash, right mainstem #2, cell block                           G) - Bronchial Brush, right mainstem, cell block                            H) - Mass, right endobronchial, cell block   LAB AP CYTO FINAL DIAGNOSIS (Report)     A - E  Bronchial Wash, right mainstem #1:(Thin prep and cell block   preparations)  Conclusive evidence of Malignancy  Keratinizing squamous cell carcinoma  Satisfactory for evaluation  B - F  Bronchial Wash, right mainstem #2: (Thin prep and cell block   preparations)  Conclusive evidence of Malignancy  Keratinizing squamous cell carcinoma  Satisfactory for evaluation  C - G  Bronchial Brush, right mainstem: (Thin prep and cell block   preparations)  Conclusive evidence of Malignancy  Keratinizing squamous cell carcinoma  Satisfactory for evaluation  D - H   Mass, right endobronchial: (Thin prep and cell block preparations)  Conclusive evidence of Malignancy  Keratinizing squamous cell carcinoma  Satisfactory for evaluation  Electronically signed by Medina Marinelli MD on 10/5/2016 at 3:53 PM   LAB AP GROSS DESCRIPTION (Report)     A  Bronchial Wash, right mainstem #1: 45ml  Bloody, turbid, received in   CytoLyt    B  Bronchial Wash, right mainstem #2: 20ml  Bloody, cloudy, received in   CytoLyt    C  Bronchial Brush, right mainstem: 20ml  Slightly bloody, cloudy,   received in CytoLyt    D  Mass, right endobronchial: 20ml  Slightly bloody, cloudy, received in   CytoLyt    E  Bronchial Wash, right mainstem #1, cell block: moderate cell button,   red/white    F  Bronchial Wash, right mainstem #2 cell block: moderate cell button, red    G  Bronchial Brush, right mainstem, cell block: minimal cell button, white    H  Mass, right endobronchial, cell block: minimal cell button, white   LAB AP NONGYN ADDITIONAL INFORMATION (Report)     [employer]'s FDA approved ,  and ThinPrep Imaging System are   utilized with strict adherence to the 's instruction manual to   prepare gynecologic and non-gynecologic cytology specimens for the   production of ThinPrep slides as well as for gynecologic ThinPrep imaging  These processes have been validated by our laboratory and/or by the     These tests were developed and their performance characteristics   determined by [facility] [address] [place] [address] Laboratory or [facility]  They may not be cleared or approved by the [place]  Food and   Drug Administration  The FDA has determined that such clearance or   approval is not necessary  These tests are used for clinical purposes  They should not be regarded as investigational or for research  This   laboratory has been approved by [facility], designated as a high-complexity   laboratory and is qualified to perform these tests      Interpretation performed at [facility], [address] [place] [address]   [address]   endotrachial mass r/o malignancy   LAB AP ADDENDUM 1 (Report)     At the request of Dr Shantanu Mendoza, paraffin block E1 containing the  patient's cancer cells was sent to [address] [place] [address] for ALK   mutational analysis utilizing the  FDA-cleared [employer] ALK Break Apart FISH Probe Kit, EGFR mutational analysis   and Ros1 analysis  Upon  completion of testing, [facility]'s laboratory report will be directly sent   to the requesting physician  An  electronic copy of this report will also be kept on file in the Medical   Records Department at [address] [place] [address]  Please note: The [facility]'s analysis and report is performed   independently of [address] [place] [address], and neither the Hospital nor the Pathology Department   screen, review or comment upon  [facility]'s report  Because the role of ALK, EGFR and Ros1 analysis   testing in cancer diagnosis and  management is subject to evolving development, usage and interpretation,   we strongly urge that the test  limitations described in the [address] [place] [address] Lab report be carefully read,   appropriately shared with the patient, and  critically considered when reaching treatment decisions  At the request of Dr Rosalinda Mendoza, unstained slides from paraffin block E1   containing the patient's cancer cells was sent to [facility] for   PD-L1 testing  Upon completion of testing, [facility] report   will be directly sent to the requesting physician  An electronic copy of   this report will also be kept on file in the Medical Records Department at   [address] [place] [address]  Please note: The [facility]'s analysis and report is performed   independently of [address] [place] [address], and neither the   Hospital nor the Pathology Department screen, review or comment upon   [facility]'s report   Because the role of PD-L1 testing in cancer   diagnosis and management is subject to evolving development, usage and   interpretation, we strongly urge that the test limitations described in   the report be carefully read, appropriately shared with the patient, and   critically considered when reaching treatment decisions      Addendum electronically signed by Isaiah Gomez MD on 10/19/2016 at 3:06 PM       Signatures   Electronically signed by : Kayla Camejo DO; Oct 24 2016  9:37AM EST                       (Author)

## 2018-01-15 ENCOUNTER — HOSPITAL ENCOUNTER (OUTPATIENT)
Dept: INFUSION CENTER | Facility: CLINIC | Age: 58
Discharge: HOME | End: 2018-01-15
Payer: COMMERCIAL

## 2018-01-15 PROCEDURE — 96523 IRRIG DRUG DELIVERY DEVICE: CPT

## 2018-01-15 RX ADMIN — HEPARIN 300 UNITS: 100 SYRINGE at 12:57

## 2018-01-15 NOTE — PROCEDURES
Procedures by Elizabeth Whitmore DO at 10/1/2016   2:51 PM      Author:  Elizabeth Whitmore DO Service:  Pulmonology Author Type:  Physician     Filed:  10/1/2016  2:54 PM Date of Service:  10/1/2016  2:51 PM Status:  Signed     :  Elizabeth Whitmore DO (Physician)         Procedure Orders:       1  Thoracentesis [[id number]] ordered by Elizabeth Whitmore DO at 10/01/16 1452                 Post-procedure Diagnoses:       1  Opacity of lung on imaging study [R91 8]                   Thoracentesis  Date/Time: 10/1/2016 2:52 PM  Performed by: Jennifer Vicente by: Yoselin Akbar     Patient location:  Bedside  Consent:     Consent obtained:  Written    Consent given by:  Patient    Risks discussed:  Bleeding and pneumothorax    Alternatives discussed:  No treatment  Universal protocol:     Procedure explained and questions answered to patient or proxy's satisfaction: yes      Relevant documents present and verified: yes      Test results available and properly labeled: yes       Imaging studies available: yes      Site/side marked: yes      Patient identity confirmed:  Verbally with patient  Indications:     Procedure Purpose: diagnostic and therapeutic      Indications: pleural effusion    Anesthesia (see MAR for exact dosages): Anesthesia method:  None  Procedure details:     Preparation: Patient was prepped and draped in usual sterile fashion      Standard thoracentesis cath kit used: Yes      Patient position:  Sitting    Laterality:  Right    Location:  Midscapular line    Intercostal space:  7th    Puncture method:  Over-the-needle catheter    Guidance: ultrasound      Reason for ultrasound: Identify fluid collection and guide cathetar placement  Number of attempts:  1    Drainage characteristics:  Clear  Post-procedure details:     Chest x-ray performed: yes      Patient tolerance of procedure:   Tolerated well, no immediate complications                     Received for:Debo Whittaker DO  Oct  1 2016  2:54PM [facility] Standard Time

## 2018-01-15 NOTE — PROGRESS NOTES
Pt to clinic for port flush on port a cath, pt offers no complaints at this time, aware of next appointment

## 2018-01-15 NOTE — MISCELLANEOUS
Message   Recorded as Task   Date: 12/15/2016 12:20 PM, Created By: Josephine Garcia   Task Name: Call Back   Assigned To: Olga Soto   Regarding Patient: Kemal Howard, Status: Active   Comment:    Lisa Merritt - 15 Dec 2016 12:20 PM     TASK CREATED  Caller: Maverick Carpenter; Other; [phone] (Work)  [facility]ANCHO NEEDS TO KNOW PT'S LAST CHEMO, LAST RADIATION AND WHEN PT SHOULD SEE DR URBAN AS HE MISSED HIS LAST APPT WITH HIM  PLEASE CALL, TXS   Spoke with Matilde Robinson and informed him of last chemo and next OV  Active Problems    1  A-fib (427 31) (I48 91)   2  Alcohol abuse (305 00) (F10 10)   3  Anxiety disorder (300 00) (F41 9)   4  Aortic root aneurysm (441 9) (I71 9)   5  Cancer of right main bronchus (162 2) (C34 01)   6  Cerebrovascular disease (437 9) (I67 9)   7  Collapsed lung (518 0) (J98 19)   8  COPD (chronic obstructive pulmonary disease) (496) (J44 9)   9  Delirium due to known physiological condition (293 0) (F05)   10  Depression (311) (F32 9)   11  Emphysema/COPD (492 8) (J43 9)   12  Epilepsy (345 90) (G40 909)   13  Hypertension (401 9) (I10)   14  Hypo-osmolality and hyponatremia (276 1) (E87 1)   15  Hypothyroid (244 9) (E03 9)   16  Nicotine dependence (305 1) (F17 200)   17  Pleural effusion (511 9) (J90)   18  Restrictive lung disease (518 89) (J98 4)   19  Syncope and collapse (780 2) (R55)    Current Meds   1  Acetaminophen 325 MG CAPS; TAKE 2 CAPSULE Every 6 hours PRN; Therapy: (Recorded:11Oct2016) to Recorded   2  Albuterol Sulfate (2 5 MG/3ML) 0 083% Inhalation Nebulization Solution; USE 1 UNIT   DOSE IN NEBULIZER EVERY 6 HOURS AS NEEDED; Therapy: (Recorded:11Oct2016) to Recorded   3  Breo Ellipta 200-25 MCG/INH Inhalation Aerosol Powder Breath Activated; INHALE 1   PUFFS Daily; Therapy: (Recorded:07Nov2016) to Recorded   4  Carafate 1 GM/10ML Oral Suspension; TAKE 10 ML 4 TIMES DAILY; Therapy: [id number] to (Evaluate:22Jan2017); Last Rx:[id number] Ordered   5   Cholecalciferol 1000 UNIT CAPS; TAKE 2 CAPSULE Daily; Therapy: (Recorded:11Oct2016) to Recorded   6  Clarinex-D 24 Hour TB24; TAKE 1 TABLET DAILY; Therapy: (Recorded:07Nov2016) to Recorded   7  Dulcolax SUPP; INSERT 1 SUPP Daily PRN; Therapy: (Recorded:11Oct2016) to Recorded   8  Flonase 50 MCG/ACT SUSP (Fluticasone Propionate); USE 2 SPRAYS IN EACH   NOSTRIL ONCE DAILY; Therapy: (Recorded:07Nov2016) to Recorded   9  Folic Acid 1 MG Oral Tablet; TAKE 1 TABLET DAILY; Therapy: (Recorded:11Oct2016) to Recorded   10  Gabapentin 400 MG Oral Capsule; TAKE 1 CAPSULE TWICE DAILY; Therapy: (Recorded:11Oct2016) to Recorded   11  Levothyroxine Sodium 200 MCG Oral Tablet; TAKE 1 TABLET DAILY; Therapy: (Recorded:11Oct2016) to Recorded   12  Lidocaine Viscous 2 % Mouth/Throat Solution; 5 ml PO prior to meals as needed; Therapy: [id number] to (Last Rx:[id number]) Ordered   13  LORazepam 1 MG Oral Tablet; Take 1 tablet twice daily; Therapy: (Recorded:11Oct2016) to Recorded   14  MDL Milk of Magnesia SUSP; SWALLOW 30 ML Bedtime PRN; Therapy: (Recorded:11Oct2016) to Recorded   15  Metoprolol Succinate ER 25 MG Oral Tablet Extended Release 24 Hour; TAKE 1 TABLET    DAILY; Therapy: (Recorded:11Oct2016) to Recorded   16  Mirtazapine 30 MG Oral Tablet; TAKE 1 TABLET AT BEDTIME; Therapy: (Recorded:11Oct2016) to Recorded   17  Phenytoin Sodium Extended CAPS; TAKE 250 MG Bedtime; Therapy: (Recorded:11Oct2016) to Recorded   18  Potassium Chloride Sadia ER 10 MEQ Oral Tablet Extended Release; TAKE 3 TABLET    Daily; Therapy: (Recorded:11Oct2016) to Recorded   19  Roflumilast 500 MCG TABS; TAKE 1 TABLET DAILY; Therapy: (Recorded:11Oct2016) to Recorded   20  Sodium Chloride TABS; 4 gm  TID; Therapy: (Recorded:11Oct2016) to Recorded   21  Thiamine HCl - 100 MG Oral Tablet; TAKE 1 TABLET DAILY; Therapy: (Recorded:11Oct2016) to Recorded   22   Tiotropium Bromide Monohydrate 18 MCG CAPS; INHALE CONTENTS OF 1 CAPSULE    ONCE DAILY; Therapy: (Recorded:11Oct2016) to Recorded    Allergies    1   No Known Drug Allergies    Signatures   Electronically signed by : Lang Nation RN; Dec 15 2016  1:18PM EST                       (Author)

## 2018-01-16 NOTE — PROGRESS NOTES
Discussion/Summary  Social Work-Discussion Summary [facility]: Patient is being seen for an ongoing assessment/follow up  LSW emailed pt's legal guardian Lala Andrewszenon on 10/18/2016  LSW contacted  at [facility], [address] [place] [address] phone number [phone] extension [phone]  LSW informed pt's that our office has been unsuccessful in reaching pt's legal guardian Lala Ornelas to consent to pt's oncology treatment  Evelioailin reported she will attempt to reach pt's legal guardian  LSW received follow up call from Lala Ornelas on 10/19/2016  Lena Bailey verified she is pt's legal guardian and medical decision maker  Lena Bailey reports she is available to consent to pt's oncology treatment  Tere's fax number is [phone]  LSW communicated with Gomez Ulrich, pt's RN about the above  LSW also provided Tere's information to rad onc staff          Signatures   Electronically signed by : Naomi Daniels; Oct 19 2016  9:03AM EST                       (Author)    Electronically signed by : FELICITY Daniels; Oct 24 2016 10:01AM EST                       (Author)

## 2018-01-18 NOTE — MISCELLANEOUS
Message  I spoke with patient's nurse at place of residence and according to her patient is not having any new symptom, no fever or shortness of breath  Has chronic mild cough  I called because of abnormal CT scan of chest  He might have radiation pneumonitis  Patient has appointment in our office tomorrow        Signatures   Electronically signed by : Tyesha Quezada MD; Apr 10 2017  5:36PM EST                       (Author)

## 2018-01-22 VITALS
BODY MASS INDEX: 22.7 KG/M2 | OXYGEN SATURATION: 96 % | RESPIRATION RATE: 16 BRPM | TEMPERATURE: 97.7 F | HEIGHT: 69 IN | WEIGHT: 153.25 LBS | DIASTOLIC BLOOD PRESSURE: 60 MMHG | HEART RATE: 76 BPM | SYSTOLIC BLOOD PRESSURE: 122 MMHG

## 2018-01-22 VITALS
HEIGHT: 69 IN | RESPIRATION RATE: 16 BRPM | SYSTOLIC BLOOD PRESSURE: 124 MMHG | BODY MASS INDEX: 22.22 KG/M2 | TEMPERATURE: 98.3 F | DIASTOLIC BLOOD PRESSURE: 62 MMHG | OXYGEN SATURATION: 96 % | HEART RATE: 97 BPM | WEIGHT: 150 LBS

## 2018-01-23 NOTE — MISCELLANEOUS
Message   Recorded as Task   Date: 12/19/2017 08:08 AM, Created By: Haider Whitmore   Task Name: Call Back   Assigned To: Olga Soto   Regarding Patient: Jose Francisco Saini, Status: Active   CommentKandee Many - 19 Dec 2017 8:08 AM     TASK CREATED  mars calling from gardens at [facility] 35 pt, she has questions about pts infusions and a questions about his PET scan      [address] [facility],[address] - 19 Dec 2017 8:42 AM     TASK REASSIGNED: Previously Assigned To Olga Soto  Please call Alfonso to reschedule PET  She can't take him as scheduled  Sb Adan - 19 Dec 2017 9:21 AM     TASK REPLIED TO: Previously Assigned To Jackman American with Alfonso which stated that she will call central scheduling to reschedule  She will try to keep it on the first wk of January  I will follow up on this  Sb Adan - 12 Dec 2017 12:59 PM     TASK REPLIED TO: Previously Assigned To Sb Adan  PT was r/s to 1/3/18  FYI        Active Problems    1  A-fib (427 31) (I48 91)   2  Alcohol abuse (305 00) (F10 10)   3  Anxiety disorder (300 00) (F41 9)   4  Aortic root aneurysm (441 9) (I71 9)   5  Cancer of right main bronchus (162 2) (C34 01)   6  Cerebrovascular disease (437 9) (I67 9)   7  Collapsed lung (518 0) (J98 19)   8  COPD (chronic obstructive pulmonary disease) (496) (J44 9)   9  Delirium due to known physiological condition (293 0) (F05)   10  Depression (311) (F32 9)   11  Emphysema/COPD (492 8) (J43 9)   12  Epilepsy (345 90) (G40 909)   13  Hypertension (401 9) (I10)   14  Hypo-osmolality and hyponatremia (276 1) (E87 1)   15  Hypothyroid (244 9) (E03 9)   16  Lung nodule, solitary (793 11) (R91 1)   17  Nicotine dependence (305 1) (F17 200)   18  Pleural effusion (511 9) (J90)   19  Pneumonitis (486) (J18 9)   20  Radiation pneumonitis (508 0) (J70 0)   21  Restrictive lung disease (518 89) (J98 4)   22   Syncope and collapse (780 2) (R55)    Current Meds   1  Acetaminophen 325 MG CAPS; TAKE 2 CAPSULE Every 6 hours PRN; Therapy: (Recorded:11Oct2016) to Recorded   2  Advair Diskus 250-50 MCG/DOSE Inhalation Aerosol Powder Breath Activated; INHALE   1 PUFF TWICE DAILY; Therapy: (Recorded:11Apr2017) to Recorded   3  Albuterol Sulfate (2 5 MG/3ML) 0 083% Inhalation Nebulization Solution; USE 1 UNIT   DOSE IN NEBULIZER EVERY 6 HOURS AS NEEDED; Therapy: (Recorded:11Oct2016) to Recorded   4  Bisacodyl 10 MG Rectal Suppository; Therapy: (Recorded:10Jan2017) to Recorded   5  Carafate 1 GM/10ML Oral Suspension; TAKE 10 ML 4 TIMES DAILY; Therapy: [id number] to (Evaluate:22Jan2017); Last Rx:[id number] Ordered   6  Daliresp 500 MCG Oral Tablet; Therapy: (Recorded:10Jan2017) to Recorded   7  Folic Acid 1 MG Oral Tablet; TAKE 1 TABLET DAILY; Therapy: (Recorded:11Oct2016) to Recorded   8  Gabapentin 400 MG Oral Capsule; TAKE 1 CAPSULE TWICE DAILY; Therapy: (Recorded:11Oct2016) to Recorded   9  Levothyroxine Sodium 200 MCG Oral Tablet; TAKE 1 TABLET DAILY; Therapy: (Recorded:11Oct2016) to Recorded   10  Lidocaine Viscous 2 % Mouth/Throat Solution; 5 ml PO prior to meals as needed; Therapy: [id number] to (Last Rx:[id number]) Ordered   11  LORazepam 1 MG Oral Tablet; Take 1 tablet twice daily; Therapy: (Recorded:11Oct2016) to Recorded   12  MDL Milk of Magnesia SUSP; SWALLOW 30 ML Bedtime PRN; Therapy: (Recorded:11Oct2016) to Recorded   13  Metoprolol Succinate ER 25 MG Oral Tablet Extended Release 24 Hour; TAKE 1 TABLET    DAILY; Therapy: (Recorded:11Oct2016) to Recorded   14  Mirtazapine 30 MG Oral Tablet; TAKE 1 TABLET AT BEDTIME; Therapy: (Recorded:11Oct2016) to Recorded   15  Phenytoin Sodium Extended CAPS; TAKE 250 MG Bedtime; Therapy: (Recorded:11Oct2016) to Recorded   16  Potassium Chloride Sadia ER 10 MEQ Oral Tablet Extended Release; TAKE 3 TABLET    Daily; Therapy: (Recorded:11Oct2016) to Recorded   17   Sodium Chloride TABS; 4 gm TID;    Therapy: (Recorded:11Oct2016) to Recorded   18  Spiriva HandiHaler 18 MCG Inhalation Capsule; INHALE CONTENTS OF 1 CAPSULE    ONCE DAILY; Therapy: (Recorded:11Apr2017) to Recorded   19  Synthroid 200 MCG Oral Tablet (Levothyroxine Sodium); TAKE 1 TABLET DAILY AS    DIRECTED; Therapy: (Recorded:11Apr2017) to Recorded   20  Thiamine HCl - 100 MG Oral Tablet; TAKE 1 TABLET DAILY; Therapy: (Recorded:11Oct2016) to Recorded   21  Tiotropium Bromide Monohydrate 18 MCG CAPS; INHALE CONTENTS OF 1 CAPSULE    ONCE DAILY; Therapy: (Recorded:11Oct2016) to Recorded   22  Vitamin D 1000 UNIT CAPS; TAKE AS DIRECTED; Therapy: (Recorded:11Apr2017) to Recorded    Allergies    1   No Known Drug Allergies    Signatures   Electronically signed by : Anna Max, ; Dec 19 2017  1:02PM EST                       (Author)

## 2018-01-23 NOTE — MISCELLANEOUS
Message   Recorded as Task   Date: 12/19/2017 08:08 AM, Created By: Judith Mora   Task Name: Call Back   Assigned To: Olga Soto   Regarding Patient: Domenica Calderon, Status: Active   CommentAbdiaziz Lori - 19 Dec 2017 8:08 AM     TASK CREATED  mars calling from gardens at [facility] 35 pt, she has questions about pts infusions and a questions about his PET scan      [address] [facility],[address] - 19 Dec 2017 8:42 AM     TASK REASSIGNED: Previously Assigned To Olga Soto  Please call Maricarmen Sender to reschedule PET  She can't take him as scheduled  Sb Adan - 19 Dec 2017 9:21 AM     TASK REPLIED TO: Previously Assigned To Jackman American with Maricarmen Sender which stated that she will call central scheduling to reschedule  She will try to keep it on the first wk of January  I will follow up on this  Sb Adan - 85 Dec 2017 12:59 PM     TASK REPLIED TO: Previously Assigned To Sb Adan  PT was r/s to 1/3/18  FYI   Noted  Active Problems    1  A-fib (427 31) (I48 91)   2  Alcohol abuse (305 00) (F10 10)   3  Anxiety disorder (300 00) (F41 9)   4  Aortic root aneurysm (441 9) (I71 9)   5  Cancer of right main bronchus (162 2) (C34 01)   6  Cerebrovascular disease (437 9) (I67 9)   7  Collapsed lung (518 0) (J98 19)   8  COPD (chronic obstructive pulmonary disease) (496) (J44 9)   9  Delirium due to known physiological condition (293 0) (F05)   10  Depression (311) (F32 9)   11  Emphysema/COPD (492 8) (J43 9)   12  Epilepsy (345 90) (G40 909)   13  Hypertension (401 9) (I10)   14  Hypo-osmolality and hyponatremia (276 1) (E87 1)   15  Hypothyroid (244 9) (E03 9)   16  Lung nodule, solitary (793 11) (R91 1)   17  Nicotine dependence (305 1) (F17 200)   18  Pleural effusion (511 9) (J90)   19  Pneumonitis (486) (J18 9)   20  Radiation pneumonitis (508 0) (J70 0)   21  Restrictive lung disease (518 89) (J98 4)   22   Syncope and collapse (780 2) (R55)    Current Meds   1  Acetaminophen 325 MG CAPS; TAKE 2 CAPSULE Every 6 hours PRN; Therapy: (Recorded:11Oct2016) to Recorded   2  Advair Diskus 250-50 MCG/DOSE Inhalation Aerosol Powder Breath Activated; INHALE   1 PUFF TWICE DAILY; Therapy: (Recorded:11Apr2017) to Recorded   3  Albuterol Sulfate (2 5 MG/3ML) 0 083% Inhalation Nebulization Solution; USE 1 UNIT   DOSE IN NEBULIZER EVERY 6 HOURS AS NEEDED; Therapy: (Recorded:11Oct2016) to Recorded   4  Bisacodyl 10 MG Rectal Suppository; Therapy: (Recorded:10Jan2017) to Recorded   5  Carafate 1 GM/10ML Oral Suspension; TAKE 10 ML 4 TIMES DAILY; Therapy: [id number] to (Evaluate:22Jan2017); Last Rx:[id number] Ordered   6  Daliresp 500 MCG Oral Tablet; Therapy: (Recorded:10Jan2017) to Recorded   7  Folic Acid 1 MG Oral Tablet; TAKE 1 TABLET DAILY; Therapy: (Recorded:11Oct2016) to Recorded   8  Gabapentin 400 MG Oral Capsule; TAKE 1 CAPSULE TWICE DAILY; Therapy: (Recorded:11Oct2016) to Recorded   9  Levothyroxine Sodium 200 MCG Oral Tablet; TAKE 1 TABLET DAILY; Therapy: (Recorded:11Oct2016) to Recorded   10  Lidocaine Viscous 2 % Mouth/Throat Solution; 5 ml PO prior to meals as needed; Therapy: 97BHM8434 to (Last Rx:[id number]) Ordered   11  LORazepam 1 MG Oral Tablet; Take 1 tablet twice daily; Therapy: (Recorded:11Oct2016) to Recorded   12  MDL Milk of Magnesia SUSP; SWALLOW 30 ML Bedtime PRN; Therapy: (Recorded:11Oct2016) to Recorded   13  Metoprolol Succinate ER 25 MG Oral Tablet Extended Release 24 Hour; TAKE 1 TABLET    DAILY; Therapy: (Recorded:11Oct2016) to Recorded   14  Mirtazapine 30 MG Oral Tablet; TAKE 1 TABLET AT BEDTIME; Therapy: (Recorded:11Oct2016) to Recorded   15  Phenytoin Sodium Extended CAPS; TAKE 250 MG Bedtime; Therapy: (Recorded:11Oct2016) to Recorded   16  Potassium Chloride Sadia ER 10 MEQ Oral Tablet Extended Release; TAKE 3 TABLET    Daily; Therapy: (Recorded:11Oct2016) to Recorded   17   Sodium Chloride TABS; 4 gm  TID; Therapy: (Recorded:11Oct2016) to Recorded   18  Spiriva HandiHaler 18 MCG Inhalation Capsule; INHALE CONTENTS OF 1 CAPSULE    ONCE DAILY; Therapy: (Recorded:11Apr2017) to Recorded   19  Synthroid 200 MCG Oral Tablet (Levothyroxine Sodium); TAKE 1 TABLET DAILY AS    DIRECTED; Therapy: (Recorded:11Apr2017) to Recorded   20  Thiamine HCl - 100 MG Oral Tablet; TAKE 1 TABLET DAILY; Therapy: (Recorded:11Oct2016) to Recorded   21  Tiotropium Bromide Monohydrate 18 MCG CAPS; INHALE CONTENTS OF 1 CAPSULE    ONCE DAILY; Therapy: (Recorded:11Oct2016) to Recorded   22  Vitamin D 1000 UNIT CAPS; TAKE AS DIRECTED; Therapy: (Recorded:11Apr2017) to Recorded    Allergies    1   No Known Drug Allergies    Signatures   Electronically signed by : Tiffanie Philippe RN; Dec 19 2017  1:09PM EST                       (Author)

## 2018-01-23 NOTE — MISCELLANEOUS
Message   Recorded as Task   Date: 12/19/2017 08:08 AM, Created By: Judith Mora   Task Name: Call Back   Assigned To: Olga Soto   Regarding Patient: Domenica Calderon, Status: Active   CommentAbdiaziz Sandoval - 19 Dec 2017 8:08 AM     TASK CREATED  mars calling from gardens at [facility] 35 pt, she has questions about pts infusions and a questions about his PET scan      [phone]   Spoke with Maricarmen Sender and informed pt not receiving an infusion just a port flush  Also needs to reschedule PET scan  Will ask Sb to follow up with Maricarmen Sender  Active Problems    1  A-fib (427 31) (I48 91)   2  Alcohol abuse (305 00) (F10 10)   3  Anxiety disorder (300 00) (F41 9)   4  Aortic root aneurysm (441 9) (I71 9)   5  Cancer of right main bronchus (162 2) (C34 01)   6  Cerebrovascular disease (437 9) (I67 9)   7  Collapsed lung (518 0) (J98 19)   8  COPD (chronic obstructive pulmonary disease) (496) (J44 9)   9  Delirium due to known physiological condition (293 0) (F05)   10  Depression (311) (F32 9)   11  Emphysema/COPD (492 8) (J43 9)   12  Epilepsy (345 90) (G40 909)   13  Hypertension (401 9) (I10)   14  Hypo-osmolality and hyponatremia (276 1) (E87 1)   15  Hypothyroid (244 9) (E03 9)   16  Lung nodule, solitary (793 11) (R91 1)   17  Nicotine dependence (305 1) (F17 200)   18  Pleural effusion (511 9) (J90)   19  Pneumonitis (486) (J18 9)   20  Radiation pneumonitis (508 0) (J70 0)   21  Restrictive lung disease (518 89) (J98 4)   22  Syncope and collapse (780 2) (R55)    Current Meds   1  Acetaminophen 325 MG CAPS; TAKE 2 CAPSULE Every 6 hours PRN; Therapy: (Recorded:11Oct2016) to Recorded   2  Advair Diskus 250-50 MCG/DOSE Inhalation Aerosol Powder Breath Activated; INHALE   1 PUFF TWICE DAILY; Therapy: (Recorded:08Pxx2444) to Recorded   3  Albuterol Sulfate (2 5 MG/3ML) 0 083% Inhalation Nebulization Solution; USE 1 UNIT   DOSE IN NEBULIZER EVERY 6 HOURS AS NEEDED;    Therapy: (Recorded:11Oct2016) to Recorded   4  Bisacodyl 10 MG Rectal Suppository; Therapy: (Recorded:10Jan2017) to Recorded   5  Carafate 1 GM/10ML Oral Suspension; TAKE 10 ML 4 TIMES DAILY; Therapy: 40IOT8211 to (Evaluate:22Jan2017); Last Rx:[id number] Ordered   6  Daliresp 500 MCG Oral Tablet; Therapy: (Recorded:10Jan2017) to Recorded   7  Folic Acid 1 MG Oral Tablet; TAKE 1 TABLET DAILY; Therapy: (Recorded:11Oct2016) to Recorded   8  Gabapentin 400 MG Oral Capsule; TAKE 1 CAPSULE TWICE DAILY; Therapy: (Recorded:11Oct2016) to Recorded   9  Levothyroxine Sodium 200 MCG Oral Tablet; TAKE 1 TABLET DAILY; Therapy: (Recorded:11Oct2016) to Recorded   10  Lidocaine Viscous 2 % Mouth/Throat Solution; 5 ml PO prior to meals as needed; Therapy: 75JXN8697 to (Last Rx:[id number]) Ordered   11  LORazepam 1 MG Oral Tablet; Take 1 tablet twice daily; Therapy: (Recorded:11Oct2016) to Recorded   12  MDL Milk of Magnesia SUSP; SWALLOW 30 ML Bedtime PRN; Therapy: (Recorded:11Oct2016) to Recorded   13  Metoprolol Succinate ER 25 MG Oral Tablet Extended Release 24 Hour; TAKE 1 TABLET    DAILY; Therapy: (Recorded:11Oct2016) to Recorded   14  Mirtazapine 30 MG Oral Tablet; TAKE 1 TABLET AT BEDTIME; Therapy: (Recorded:11Oct2016) to Recorded   15  Phenytoin Sodium Extended CAPS; TAKE 250 MG Bedtime; Therapy: (Recorded:11Oct2016) to Recorded   16  Potassium Chloride Sadia ER 10 MEQ Oral Tablet Extended Release; TAKE 3 TABLET    Daily; Therapy: (Recorded:11Oct2016) to Recorded   17  Sodium Chloride TABS; 4 gm  TID; Therapy: (Recorded:11Oct2016) to Recorded   18  Spiriva HandiHaler 18 MCG Inhalation Capsule; INHALE CONTENTS OF 1 CAPSULE    ONCE DAILY; Therapy: (Recorded:11Apr2017) to Recorded   19  Synthroid 200 MCG Oral Tablet (Levothyroxine Sodium); TAKE 1 TABLET DAILY AS    DIRECTED; Therapy: (Recorded:11Apr2017) to Recorded   20  Thiamine HCl - 100 MG Oral Tablet; TAKE 1 TABLET DAILY;     Therapy: (Recorded:11Oct2016) to Recorded 21  Tiotropium Bromide Monohydrate 18 MCG CAPS; INHALE CONTENTS OF 1 CAPSULE    ONCE DAILY; Therapy: (Recorded:11Oct2016) to Recorded   22  Vitamin D 1000 UNIT CAPS; TAKE AS DIRECTED; Therapy: (Recorded:09Nbk1598) to Recorded    Allergies    1   No Known Drug Allergies    Signatures   Electronically signed by : Tiffanie Philippe RN; Dec 19 2017  8:41AM EST                       (Author)

## 2018-01-24 VITALS
WEIGHT: 155.38 LBS | TEMPERATURE: 98.6 F | OXYGEN SATURATION: 92 % | BODY MASS INDEX: 23.01 KG/M2 | HEIGHT: 69 IN | HEART RATE: 85 BPM | SYSTOLIC BLOOD PRESSURE: 128 MMHG | RESPIRATION RATE: 15 BRPM | DIASTOLIC BLOOD PRESSURE: 54 MMHG

## 2018-02-19 ENCOUNTER — HOSPITAL ENCOUNTER (OUTPATIENT)
Dept: INFUSION CENTER | Facility: CLINIC | Age: 58
Discharge: HOME | End: 2018-02-19
Payer: COMMERCIAL

## 2018-02-19 PROCEDURE — 96523 IRRIG DRUG DELIVERY DEVICE: CPT

## 2018-02-19 RX ADMIN — HEPARIN 300 UNITS: 100 SYRINGE at 09:47

## 2018-02-19 NOTE — PROGRESS NOTES
Patient here for port flush and is doing well  He tolerated procedure without incident  He offers no c/o and will RTO 3/26/18 for next port flush

## 2018-02-19 NOTE — PLAN OF CARE
Problem: PAIN - ADULT  Goal: Verbalizes/displays adequate comfort level or baseline comfort level  Interventions:  - Encourage patient to monitor pain and request assistance  - Assess pain using appropriate pain scale  - Administer analgesics based on type and severity of pain and evaluate response  - Implement non-pharmacological measures as appropriate and evaluate response  - Consider cultural and social influences on pain and pain management  - Notify physician/advanced practitioner if interventions unsuccessful or patient reports new pain  Outcome: Progressing      Problem: INFECTION - ADULT  Goal: Absence or prevention of progression during hospitalization  INTERVENTIONS:  - Assess and monitor for signs and symptoms of infection  - Monitor lab/diagnostic results  - Monitor all insertion sites, i e  indwelling lines, tubes, and drains  - Monitor endotracheal (as able) and nasal secretions for changes in amount and color  - [place] appropriate cooling/warming therapies per order  - Administer medications as ordered  - Instruct and encourage patient and family to use good hand hygiene technique  - Identify and instruct in appropriate isolation precautions for identified infection/condition  Outcome: Progressing      Problem: SAFETY ADULT  Goal: Patient will remain free of falls  INTERVENTIONS:  - Assess patient frequently for physical needs  -  Identify cognitive and physical deficits and behaviors that affect risk of falls    -  [place] fall precautions as indicated by assessment   - Educate patient/family on patient safety including physical limitations  - Instruct patient to call for assistance with activity based on assessment  - Modify environment to reduce risk of injury  - Consider OT/PT consult to assist with strengthening/mobility  Outcome: Progressing

## 2018-03-09 ENCOUNTER — RADIATION ONCOLOGY FOLLOW-UP (OUTPATIENT)
Dept: RADIATION ONCOLOGY | Facility: HOSPITAL | Age: 58
End: 2018-03-09

## 2018-03-09 ENCOUNTER — APPOINTMENT (OUTPATIENT)
Dept: RADIATION ONCOLOGY | Facility: HOSPITAL | Age: 58
End: 2018-03-09
Attending: RADIOLOGY
Payer: COMMERCIAL

## 2018-03-09 VITALS
TEMPERATURE: 98.1 F | OXYGEN SATURATION: 96 % | DIASTOLIC BLOOD PRESSURE: 70 MMHG | HEART RATE: 94 BPM | HEIGHT: 68 IN | SYSTOLIC BLOOD PRESSURE: 120 MMHG | WEIGHT: 155 LBS | RESPIRATION RATE: 16 BRPM | BODY MASS INDEX: 23.49 KG/M2

## 2018-03-09 DIAGNOSIS — C34.01 CANCER OF RIGHT MAIN BRONCHUS (HCC): Primary | ICD-10-CM

## 2018-03-09 PROCEDURE — 99214 OFFICE O/P EST MOD 30 MIN: CPT | Performed by: RADIOLOGY

## 2018-03-09 NOTE — PROGRESS NOTES
Follow-Up - [address] [facility] 1960 62 y o  male [id number]      History of Present Illness   No matching staging information was found for the patient  HPI: Myla Jin returns today for routine follow-up approximately 1 1/2 years status post completion of treatment  Overall he feels well  He does have persistent cough occasionally productive  He denies any fevers, chills, hemoptysis, or new focal musculoskeletal aches or pains  No persistent headache  No difficulty swallowing  He continues to smoke regularly  1/3/18 PET  IMPRESSION:     1  Increased right paramediastinal consolidation but with low level overall FDG uptake not significantly changed  Findings may be related to post treatment changes  2   Mildly increased low level FDG uptake in the right infrahilar region, nonspecific  Recommend continued follow-up  3   Persistent diffuse FDG uptake in the thyroid gland which may be related to thyroiditis  Correlate clinically      Historical Information   Previous Oncology History:      Cancer of right main bronchus ([facility] )    10/3/2016 Initial Diagnosis     Cancer of right main bronchus ([facility] )         10/3/2016 Biopsy     Right endobronchial mass biopsy  Keratinizing squamous cell carcinoma         11/7/2016 - 12/19/2016 Radiation     Right lung/mediastinum to a total dose of 6000 cGy in 30 daily fractions to all gross disease  11/10/2016 - 12/22/2016 Chemotherapy      7 weekly doses of carboplatin plus Taxol with concurrent radiation    Dr Garrison Rashid              Past Medical History:   Diagnosis Date    Alcohol abuse     Anxiety     Atrial fibrillation ([facility] )     COPD (chronic obstructive pulmonary disease) (HCC)     Delirium     Encephalopathy     Epilepsy ([facility] )     History of chemotherapy     History of radiation therapy     Hypo-osmolality and hyponatremia     Hypokalemia     Hypothyroid     Lung cancer ([facility] )     Lyme disease     Major depression Past Surgical History:   Procedure Laterality Date    BRONCHOSCOPY N/A 10/3/2016    Procedure: BRONCHOSCOPY FLEXIBLE;  Surgeon: Chandu Dickey DO;  [facility];   Service:     HAND SURGERY      PELVIC FRACTURE SURGERY         Social History   History   Alcohol Use    14 4 oz/week    24 Cans of beer per week     Comment: former every day drinker     History   Drug Use    Types: Marijuana     Comment: few times a week     History   Smoking Status    Current Every Day Smoker    Types: Cigarettes   Smokeless Tobacco    Never Used     Comment: 3 cigarettes day         Meds/Allergies     Current Outpatient Prescriptions:     acetaminophen (TYLENOL) 650 mg CR tablet, Take 650 mg by mouth every 6 (six) hours as needed for mild pain  , Disp: , Rfl:     albuterol (2 5 mg/3 mL) 0 083 % nebulizer solution, Take 2 5 mg by nebulization every 4 (four) hours as needed for wheezing  , Disp: , Rfl:     bisacodyl (FLEET) 10 MG/30ML ENEM, Insert 10 mg into the rectum as needed for constipation, Disp: , Rfl:     fluticasone-salmeterol (ADVAIR) 250-50 mcg/dose inhaler, Inhale 1 puff every 12 (twelve) hours, Disp: , Rfl:     folic acid (FOLVITE) 1 mg tablet, Take 800 mcg by mouth daily  , Disp: , Rfl:     gabapentin (NEURONTIN) 400 mg capsule, Take 400 mg by mouth 2 (two) times a day  , Disp: , Rfl:     levothyroxine 200 mcg tablet, Take 212 mcg by mouth daily  , Disp: , Rfl:     Linaclotide (LINZESS) 290 MCG CAPS, Take 1 capsule by mouth daily as needed, Disp: , Rfl:     LORazepam (ATIVAN) 1 mg tablet, Take 1 mg by mouth 2 (two) times a day, Disp: , Rfl:     magnesium hydroxide (MILK OF MAGNESIA) 400 mg/5 mL oral suspension, Take 30 mL by mouth daily as needed for constipation  , Disp: , Rfl:     metoprolol succinate (TOPROL-XL) 25 mg 24 hr tablet, Take 25 mg by mouth daily, Disp: , Rfl:     mirtazapine (REMERON) 30 mg tablet, Take 30 mg by mouth daily at bedtime, Disp: , Rfl:     phenytoin extended (DILANTIN) 200 MG ER capsule, Take 100 mg by mouth 2 (two) times a day And 250mg at 5pm , Disp: , Rfl:     potassium chloride (K-DUR,KLOR-CON) 10 mEq tablet, Take 10 mEq by mouth 3 (three) times a day  , Disp: , Rfl:     sodium chloride 1 g tablet, Take 4 g by mouth 3 (three) times a day, Disp: , Rfl:     thiamine 100 MG tablet, Take 100 mg by mouth daily, Disp: , Rfl:     tiotropium (SPIRIVA) 18 mcg inhalation capsule, Place 18 mcg into inhaler and inhale daily, Disp: , Rfl:     Vitamin D, Cholecalciferol, 1000 UNITS CAPS, Take 1,000 Units by mouth daily  , Disp: , Rfl:   No Known Allergies      Review of Systems  Review of Systems   Constitutional: Negative  HENT: Negative  Eyes: Negative  Respiratory: Positive for cough (productive cough for yellow mucous  )  Cardiovascular: Negative  Gastrointestinal: Negative  Endocrine: Negative  Genitourinary:        Nocturia x 1  Musculoskeletal: Negative  Skin: Negative  Allergic/Immunologic: Negative  Neurological: Negative  Hematological: Negative  Psychiatric/Behavioral: Negative  Objective   /70 (BP Location: Left arm, Patient Position: Sitting, Cuff Size: Standard)   Pulse 94   Temp 98 1 °F (36 7 °C) (Temporal)   Resp 16   Ht 5' 8" (1 727 m)   Wt 70 3 kg (155 lb)   SpO2 96%   BMI 23 57 kg/m²    0  Karnofsky: 70 - Cares for self; unable to carry on normal activity or do normal work      Physical Exam  The patient presents today no apparent distress, sclera anicteric  No cervical or supraclavicular lymphadenopathy  Lungs clear to auscultation bilaterally although poor air movement throughout normal S1-S2 regular rate and rhythm  Normal speech  Normal affect  Normal gait  No results found for this or any previous visit (from the past 672 hour(s))  No results found          Assessment/Plan      Monique Ulloa returns today for routine follow-up, approximately 1 and half years status post completion of treatment without any obvious clinical evidence of progressive disease at this time  His most recent PET-CT performed in January continues to show right paramediastinal consolidation with low level FDG activity  Radiographically this appears to be more consistent with post treatment changes rather than persistent disease although continued follow-up is certainly necessary  He will have a repeat CT of the chest at the end of this month and follow up with Medical Oncology shortly thereafter  He will return to our department in 4 months

## 2018-03-09 NOTE — PROGRESS NOTES
Harrison Hernandez  1960   Mr Scar Marie is a 62 y o  male       Chief Complaint   Patient presents with    Follow-up       No matching staging information was found for the patient  Cancer of right main bronchus ([facility] [address] )    10/3/2016 Initial Diagnosis     Cancer of right main bronchus ([address] )         10/3/2016 Biopsy     Right endobronchial mass biopsy  Keratinizing squamous cell carcinoma         11/7/2016 - 12/19/2016 Radiation     Right lung/mediastinum to a total dose of 6000 cGy in 30 daily fractions to all gross disease  11/10/2016 - 12/22/2016 Chemotherapy      7 weekly doses of carboplatin plus Taxol with concurrent radiation  Dr Zelda Weiss            Interval History: Last seen in follow up on 11/13/17 11/20/17 CT chest, abdomen and pelvis  IMPRESSION:  1  Interval increased soft tissue attenuation material in the right paramediastinal region  While this could represent progressive scarring/post treatment change residual/recurrent mass cannot be excluded  Consider further evaluation with PET/CT to evaluate for metabolic activity in this region  2   Stable left-sided pulmonary nodules unchanged from prior exam   3   Cholelithiasis without other evidence of acute cholecystitis  4   No evidence of metastatic disease within the abdomen or pelvis  1/3/18 PET  IMPRESSION:     1  Increased right paramediastinal consolidation but with low level overall FDG uptake not significantly changed  Findings may be related to post treatment changes  2   Mildly increased low level FDG uptake in the right infrahilar region, nonspecific  Recommend continued follow-up  3   Persistent diffuse FDG uptake in the thyroid gland which may be related to thyroiditis  Correlate clinically    1/12/18 Follow up with Dr Zelda Weiss  CT scan and PET-CT scan show some activity in the radiated area not necessarily recurrent or residual cancer but requires follow-up       3/26/18 CT chest scheduled by Dr Zelad Weiss    4/9/18 Returns to see Dr Michelle Way Date Due    HIV SCREENING  1960    Hepatitis C Screening  1960    PNEUMOCOCCAL POLYSACCHARIDE VACCINE X 2 AGE 11-64 YEARS IMMUNOCOMPROMISED (1) 09/04/1971    Depression Screening PHQ-9  09/04/1972    DTaP,Tdap,and Td Vaccines (1 - Tdap) 09/04/1981    INFLUENZA VACCINE  09/01/2017       Patient Active Problem List   Diagnosis    COPD (chronic obstructive pulmonary disease) ([place] [address] )    Epilepsy ([place] [address] )    Hypothyroid    Lyme disease    Major depression    Alcohol abuse    Collapse of right lung    Pleural effusion    Lung mass    Cancer of right main bronchus ([place] [address] )     Past Medical History:   Diagnosis Date    Alcohol abuse     Anxiety     Atrial fibrillation (HCC)     COPD (chronic obstructive pulmonary disease) (HCC)     Delirium     Encephalopathy     Epilepsy ([place] [address] )     History of chemotherapy     History of radiation therapy     Hypo-osmolality and hyponatremia     Hypokalemia     Hypothyroid     Lung cancer ([place] [address] )     Lyme disease     Major depression      Past Surgical History:   Procedure Laterality Date    BRONCHOSCOPY N/A 10/3/2016    Procedure: BRONCHOSCOPY FLEXIBLE;  Surgeon: Galilea Figueroa DO;  [facility]; Service:     HAND SURGERY      PELVIC FRACTURE SURGERY       Family History   Problem Relation Age of Onset    Diabetes Mother     Lung cancer Father      Social History     Social History    Marital status:      Spouse name: N/A    Number of children: N/A    Years of education: N/A     Occupational History    Not on file       Social History Main Topics    Smoking status: Current Every Day Smoker     Types: Cigarettes    Smokeless tobacco: Never Used      Comment: 3 cigarettes day    Alcohol use 14 4 oz/week     24 Cans of beer per week      Comment: former every day drinker    Drug use: Yes     Types: Marijuana      Comment: few times a week    Sexual activity: Not on file Other Topics Concern    Not on file     Social History Narrative    No narrative on file       Current Outpatient Prescriptions:     acetaminophen (TYLENOL) 650 mg CR tablet, Take 650 mg by mouth every 6 (six) hours as needed for mild pain  , Disp: , Rfl:     albuterol (2 5 mg/3 mL) 0 083 % nebulizer solution, Take 2 5 mg by nebulization every 4 (four) hours as needed for wheezing  , Disp: , Rfl:     bisacodyl (FLEET) 10 MG/30ML ENEM, Insert 10 mg into the rectum as needed for constipation, Disp: , Rfl:     fluticasone-salmeterol (ADVAIR) 250-50 mcg/dose inhaler, Inhale 1 puff every 12 (twelve) hours, Disp: , Rfl:     folic acid (FOLVITE) 1 mg tablet, Take 800 mcg by mouth daily  , Disp: , Rfl:     gabapentin (NEURONTIN) 400 mg capsule, Take 400 mg by mouth 2 (two) times a day  , Disp: , Rfl:     levothyroxine 200 mcg tablet, Take 212 mcg by mouth daily  , Disp: , Rfl:     Linaclotide (LINZESS) 290 MCG CAPS, Take 1 capsule by mouth daily as needed, Disp: , Rfl:     LORazepam (ATIVAN) 1 mg tablet, Take 1 mg by mouth 2 (two) times a day, Disp: , Rfl:     magnesium hydroxide (MILK OF MAGNESIA) 400 mg/5 mL oral suspension, Take 30 mL by mouth daily as needed for constipation  , Disp: , Rfl:     metoprolol succinate (TOPROL-XL) 25 mg 24 hr tablet, Take 25 mg by mouth daily, Disp: , Rfl:     mirtazapine (REMERON) 30 mg tablet, Take 30 mg by mouth daily at bedtime, Disp: , Rfl:     phenytoin extended (DILANTIN) 200 MG ER capsule, Take 100 mg by mouth 2 (two) times a day And 250mg at 5pm , Disp: , Rfl:     potassium chloride (K-DUR,KLOR-CON) 10 mEq tablet, Take 10 mEq by mouth 3 (three) times a day  , Disp: , Rfl:     sodium chloride 1 g tablet, Take 4 g by mouth 3 (three) times a day, Disp: , Rfl:     thiamine 100 MG tablet, Take 100 mg by mouth daily, Disp: , Rfl:     tiotropium (SPIRIVA) 18 mcg inhalation capsule, Place 18 mcg into inhaler and inhale daily, Disp: , Rfl:     Vitamin D, Cholecalciferol, 1000 UNITS CAPS, Take 1,000 Units by mouth daily  , Disp: , Rfl:   No Known Allergies    Review of Systems:  Review of Systems   Constitutional: Negative  HENT: Negative  Eyes: Negative  Respiratory: Positive for cough (productive cough for yellow mucous  )  Cardiovascular: Negative  Gastrointestinal: Negative  Endocrine: Negative  Genitourinary:        Nocturia x 1  Musculoskeletal: Negative  Skin: Negative  Allergic/Immunologic: Negative  Neurological: Negative  Hematological: Negative  Psychiatric/Behavioral: Negative  Vitals:    03/09/18 1115   BP: 120/70   BP Location: Left arm   Patient Position: Sitting   Cuff Size: Standard   Pulse: 94   Resp: 16   Temp: 98 1 °F (36 7 °C)   TempSrc: Temporal   SpO2: 96%   Weight: 70 3 kg (155 lb)   Height: 5' 8" (1 727 m)       Imaging:No results found

## 2018-03-26 ENCOUNTER — HOSPITAL ENCOUNTER (OUTPATIENT)
Dept: CT IMAGING | Facility: HOSPITAL | Age: 58
Discharge: HOME | End: 2018-03-26
Attending: INTERNAL MEDICINE
Payer: COMMERCIAL

## 2018-03-26 ENCOUNTER — HOSPITAL ENCOUNTER (OUTPATIENT)
Dept: INFUSION CENTER | Facility: CLINIC | Age: 58
Discharge: HOME | End: 2018-03-26
Payer: COMMERCIAL

## 2018-03-26 DIAGNOSIS — C34.01 MALIGNANT NEOPLASM OF RIGHT MAIN BRONCHUS (HCC): ICD-10-CM

## 2018-03-26 PROCEDURE — 71250 CT THORAX DX C-: CPT

## 2018-03-26 PROCEDURE — 96523 IRRIG DRUG DELIVERY DEVICE: CPT

## 2018-03-26 RX ADMIN — Medication 300 UNITS: at 08:20

## 2018-03-26 NOTE — PROGRESS NOTES
Tolerated procedure without incident: No adverse reactions noted: Verified follow up appt with patient: Declined AVS

## 2018-04-02 DIAGNOSIS — C34.01 MALIGNANT NEOPLASM OF RIGHT MAIN BRONCHUS (HCC): ICD-10-CM

## 2018-04-10 ENCOUNTER — TELEPHONE (OUTPATIENT)
Dept: HEMATOLOGY ONCOLOGY | Facility: CLINIC | Age: 58
End: 2018-04-10

## 2018-04-12 ENCOUNTER — OFFICE VISIT (OUTPATIENT)
Dept: HEMATOLOGY ONCOLOGY | Facility: CLINIC | Age: 58
End: 2018-04-12
Payer: COMMERCIAL

## 2018-04-12 VITALS
BODY MASS INDEX: 24.44 KG/M2 | SYSTOLIC BLOOD PRESSURE: 128 MMHG | DIASTOLIC BLOOD PRESSURE: 56 MMHG | HEART RATE: 69 BPM | HEIGHT: 69 IN | RESPIRATION RATE: 18 BRPM | OXYGEN SATURATION: 94 % | TEMPERATURE: 97.9 F | WEIGHT: 165 LBS

## 2018-04-12 DIAGNOSIS — C34.11 MALIGNANT NEOPLASM OF UPPER LOBE OF RIGHT LUNG (HCC): Primary | ICD-10-CM

## 2018-04-12 DIAGNOSIS — I71.2 THORACIC AORTIC ANEURYSM WITHOUT RUPTURE (HCC): ICD-10-CM

## 2018-04-12 PROBLEM — I71.20 THORACIC AORTIC ANEURYSM WITHOUT RUPTURE: Status: ACTIVE | Noted: 2018-04-12

## 2018-04-12 PROCEDURE — 99214 OFFICE O/P EST MOD 30 MIN: CPT | Performed by: INTERNAL MEDICINE

## 2018-04-12 NOTE — PROGRESS NOTES
HPI: F/U visit for lung cancer   of right main bronchus (162 2) (C34 01)  S/p chemo +rad  Concurrently in 2016 with very good results   Jessica  is here with an attendant from his place of residence  In 2016 he was diagnosed to have 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 and pleural effusion but pleural effusion was negative for malignancy  Patient was given 7 weekly doses of carboplatin plus Taxol with concurrent radiation  Plan was offered 2 more cycles of regular dose Taxol and carboplatin chemotherapy at three-week interval but patient made it very clear that he was done with treatments  He had f/u CT scan and PET-CT scan and a f/u CT scan on 3/26/18  See report  No evidence of recurrent and metastatic disease  Patient has cough with mucoid expectoration and minimal exertional dyspnea  He does not complain of chest pain  No hemoptysis  His appetite is good  He is maintaining his weight  He wants to smoke cigarettes  He has Port-A-Cath and he will get that flushed every 4-6 weeks        Current Outpatient Prescriptions:     acetaminophen (TYLENOL) 650 mg CR tablet, Take 650 mg by mouth every 6 (six) hours as needed for mild pain  , Disp: , Rfl:     albuterol (2 5 mg/3 mL) 0 083 % nebulizer solution, Take 2 5 mg by nebulization every 4 (four) hours as needed for wheezing  , Disp: , Rfl:     bisacodyl (FLEET) 10 MG/30ML ENEM, Insert 10 mg into the rectum as needed for constipation, Disp: , Rfl:     fluticasone-salmeterol (ADVAIR) 250-50 mcg/dose inhaler, Inhale 1 puff every 12 (twelve) hours, Disp: , Rfl:     folic acid (FOLVITE) 1 mg tablet, Take 800 mcg by mouth daily  , Disp: , Rfl:     gabapentin (NEURONTIN) 400 mg capsule, Take 400 mg by mouth 2 (two) times a day  , Disp: , Rfl:     levothyroxine 200 mcg tablet, Take 212 mcg by mouth daily  , Disp: , Rfl:     Linaclotide (LINZESS) 290 MCG CAPS, Take 1 capsule by mouth daily as needed, Disp: , Rfl:   LORazepam (ATIVAN) 1 mg tablet, Take 1 mg by mouth 2 (two) times a day, Disp: , Rfl:     magnesium hydroxide (MILK OF MAGNESIA) 400 mg/5 mL oral suspension, Take 30 mL by mouth daily as needed for constipation  , Disp: , Rfl:     metoprolol succinate (TOPROL-XL) 25 mg 24 hr tablet, Take 25 mg by mouth daily, Disp: , Rfl:     mirtazapine (REMERON) 30 mg tablet, Take 30 mg by mouth daily at bedtime, Disp: , Rfl:     phenytoin extended (DILANTIN) 200 MG ER capsule, Take 100 mg by mouth 2 (two) times a day And 250mg at 5pm , Disp: , Rfl:     potassium chloride (K-DUR,KLOR-CON) 10 mEq tablet, Take 10 mEq by mouth 3 (three) times a day  , Disp: , Rfl:     sodium chloride 1 g tablet, Take 4 g by mouth 3 (three) times a day, Disp: , Rfl:     thiamine 100 MG tablet, Take 100 mg by mouth daily, Disp: , Rfl:     tiotropium (SPIRIVA) 18 mcg inhalation capsule, Place 18 mcg into inhaler and inhale daily, Disp: , Rfl:     Vitamin D, Cholecalciferol, 1000 UNITS CAPS, Take 1,000 Units by mouth daily  , Disp: , Rfl:     No Known Allergies    ROS:  Presently no headaches, seizures, dizziness, diplopia, dysphagia, hoarseness, chest pain, palpitations,  hemoptysis, abdominal pain, nausea, vomiting, change in bowel habits, melena, hematuria, fever, chills, bleeding, bone pains, skin rash, weight loss, arthritic symptoms, tiredness , numbness,  weakness, claudication and gait problem  No frequent infections  No hot flashes  Not unusually sensitive to heat or cold  No swelling of the ankles  No swollen glands  Patient is anxious   Other symptoms are in HPI        /56 (BP Location: Left arm, Cuff Size: Adult)   Pulse 69   Temp 97 9 °F (36 6 °C) (Tympanic)   Resp 18   Ht 5' 9" (1 753 m)   Wt 74 8 kg (165 lb)   SpO2 94%   BMI 24 37 kg/m²     Physical Exam:  Alert, oriented, not in distress, no icterus, no oral thrush, no palpable neck mass, clear lung fields, regular heart rate, abdomen  soft and non tender, no palpable abdominal mass, no ascites, no edema of ankles, no calf tenderness, no focal neurological deficit, no skin rash, no palpable lymphadenopathy, good arterial pulses, no clubbing  Patient is anxious  Performance status 1  IMAGING:  IMPRESSION:     1  Stable appearance of the chest with post treatment changes on the right and nodular densities on the left  No evidence of recurrent or metastatic disease      2  Unchanged severely enlarged aortic root measuring greater than 5 cm               Workstation performed: [id number]      Imaging     CT chest wo contrast (Order #[id number]) on 3/26/2018 - Imaging Information      LABS:  Results for orders placed or performed during the hospital encounter of 01/03/18   Fingerstick Glucose (POCT)   Result Value Ref Range    POC Glucose 79 65 - 140 mg/dl     Normal CBCD, CMP, CEA    Reviewed and discussed with patient  Assessment and plan:  F/U visit for lung cancer   of right main bronchus (162 2) (C34 01)  S/p chemo +rad  Concurrently in 2016 with very good results   Mee Sheppard is here with an attendant from his place of residence  In 2016 he was diagnosed to have 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 and pleural effusion but pleural effusion was negative for malignancy  Patient was given 7 weekly doses of carboplatin plus Taxol with concurrent radiation  Plan was offered 2 more cycles of regular dose Taxol and carboplatin chemotherapy at three-week interval but patient made it very clear that he was done with treatments  He had f/u CT scan and PET-CT scan and a f/u CT scan on 3/26/18  See report  No evidence of recurrent and metastatic disease  Patient has cough with mucoid expectoration and minimal exertional dyspnea  He does not complain of chest pain  No hemoptysis  His appetite is good  He is maintaining his weight  He wants to smoke cigarettes   He has Port-A-Cath and he will get that flushed every 4-6 weeks   P/E and test results are as reported and discussed  Lung cancer is in remission  Goal is cure from lung cancer  Again stressed to please quit smoking cig  He won't  Very abnormal thoracic aorta  Referring back to cardiac surgeon  All discussed in detail  Question answered  1  Malignant neoplasm of upper lobe of right lung (HCC)    - CBC and differential  - CEA  - Comprehensive metabolic panel  - CT chest wo contrast; Future    2  Thoracic aortic aneurysm without rupture [facility])    - Ambulatory referral to Cardiovascular Surgery; Future      Patient voiced understanding and agreement in the discussion  Counseling / Coordination of Care   Greater than 50% of total time was spent with the patient and / or family counseling and / or coordination of care

## 2018-04-12 NOTE — PATIENT INSTRUCTIONS
Please continue to get port flushed every month     Blood tests, CT scan and  Cardiac surgery referral as ordered

## 2018-06-14 ENCOUNTER — DOCUMENTATION (OUTPATIENT)
Dept: HEMATOLOGY ONCOLOGY | Facility: CLINIC | Age: 58
End: 2018-06-14

## 2018-06-14 NOTE — PROGRESS NOTES
Called Medicaid 6/14/2018 @ 1:26 ([phone]) to obtain auth for CT of chest without contrast(94834) dx C34 11  Spoke with Daisy  She took all of the clinical information  Daphney Chawla #[id number] is valid for [address] with a span date of 6/14/2018 through 8/12/2018    Please note that when this is billed the modifier TC must  Be used

## 2018-06-27 ENCOUNTER — HOSPITAL ENCOUNTER (OUTPATIENT)
Dept: CT IMAGING | Facility: HOSPITAL | Age: 58
Discharge: HOME | End: 2018-06-27
Attending: INTERNAL MEDICINE
Payer: COMMERCIAL

## 2018-06-27 DIAGNOSIS — C34.11 MALIGNANT NEOPLASM OF UPPER LOBE OF RIGHT LUNG (HCC): ICD-10-CM

## 2018-06-27 PROCEDURE — 71250 CT THORAX DX C-: CPT

## 2018-07-06 ENCOUNTER — OFFICE VISIT (OUTPATIENT)
Dept: CARDIAC SURGERY | Facility: CLINIC | Age: 58
End: 2018-07-06
Payer: COMMERCIAL

## 2018-07-06 VITALS
TEMPERATURE: 97.8 F | DIASTOLIC BLOOD PRESSURE: 58 MMHG | WEIGHT: 174 LBS | HEART RATE: 94 BPM | HEIGHT: 69 IN | BODY MASS INDEX: 25.77 KG/M2 | OXYGEN SATURATION: 96 % | RESPIRATION RATE: 14 BRPM | SYSTOLIC BLOOD PRESSURE: 106 MMHG

## 2018-07-06 DIAGNOSIS — I71.2 THORACIC AORTIC ANEURYSM WITHOUT RUPTURE (HCC): Primary | ICD-10-CM

## 2018-07-06 PROCEDURE — 99214 OFFICE O/P EST MOD 30 MIN: CPT | Performed by: PHYSICIAN ASSISTANT

## 2018-07-06 NOTE — PROGRESS NOTES
[facility]  Dianne Ochoa 62 y o  male MRN: [id number]    Physician Requesting Consult: Dr Colleen Guillen  Reason for Consult / Principal Problem: Aortic Root Anuerysm    History of Present Illness: Dianne Ochoa is a 62y o  year old male who presents today for ongoing surveillance of their aortic root aneurysm  This was initially identified in 2016, after a chest CT was performed to evaluate a mass in the mainstem bronchus  Mass was identified as squamous cell carcinoma  Dianne Ochoa was most recently evaluated in our office in 10/21/2016   At that time the maximal aortic root diameter was measureed at 50 mm  He was instructed to follow up after completion of lung cancer treatment  Patient received 7 weekly doses of carboplatin plus Taxol with concurrent radiation  Patient refused further treatment  Follow-up CT scan and PET-CT scan on 3/26/18 which showed no evidence of recurrent and metastatic disease  It also showed stable aortic root aneurysm measuring 52mm  Today Monae Lyle is present with a car giver from his nursing home  He denies any chest pain, back pain, SOB, TAYLOR, palpitations, N/V, change in bowel habits, numbness/tingling in extremities, presyncope/syncope  Past Medical History:  Past Medical History:   Diagnosis Date    Alcohol abuse     Anxiety     Atrial fibrillation (HCC)     COPD (chronic obstructive pulmonary disease) (HCC)     Delirium     Encephalopathy     Epilepsy ([facility] [address] )     History of chemotherapy     History of radiation therapy     Hypo-osmolality and hyponatremia     Hypokalemia     Hypothyroid     Lung cancer ([facility] [address] )     Lyme disease     Major depression        Past Surgical History:   Past Surgical History:   Procedure Laterality Date    BRONCHOSCOPY N/A 10/3/2016    Procedure: BRONCHOSCOPY FLEXIBLE;  Surgeon: Jono Malave DO;  [facility];   Service:     HAND SURGERY      PELVIC FRACTURE SURGERY         Family History:  Family History Problem Relation Age of Onset    Diabetes Mother     Lung cancer Father        Social History:  History   Alcohol Use    14 4 oz/week    24 Cans of beer per week     Comment: former every day drinker     History   Drug Use    Types: Marijuana     Comment: few times a week     History   Smoking Status    Current Every Day Smoker    Types: Cigarettes   Smokeless Tobacco    Never Used     Comment: 3 cigarettes day         Home Medications:   Prior to Admission medications    Medication Sig Start Date End Date Taking?  Authorizing Provider   acetaminophen (TYLENOL) 650 mg CR tablet Take 650 mg by mouth every 6 (six) hours as needed for mild pain      Historical Provider, MD   albuterol (2 5 mg/3 mL) 0 083 % nebulizer solution Take 2 5 mg by nebulization every 4 (four) hours as needed for wheezing      Historical Provider, MD   bisacodyl (FLEET) 10 MG/30ML ENEM Insert 10 mg into the rectum as needed for constipation    Historical Provider, MD   fluticasone-salmeterol (ADVAIR) 250-50 mcg/dose inhaler Inhale 1 puff every 12 (twelve) hours    Historical Provider, MD   folic acid (FOLVITE) 1 mg tablet Take 800 mcg by mouth daily      Historical Provider, MD   gabapentin (NEURONTIN) 400 mg capsule Take 400 mg by mouth 2 (two) times a day      Historical Provider, MD   levothyroxine 200 mcg tablet Take 212 mcg by mouth daily      Historical Provider, MD   Linaclotide (LINZESS) 290 MCG CAPS Take 1 capsule by mouth daily as needed    Historical Provider, MD   LORazepam (ATIVAN) 1 mg tablet Take 1 mg by mouth 2 (two) times a day    Historical Provider, MD   magnesium hydroxide (MILK OF MAGNESIA) 400 mg/5 mL oral suspension Take 30 mL by mouth daily as needed for constipation      Historical Provider, MD   metoprolol succinate (TOPROL-XL) 25 mg 24 hr tablet Take 25 mg by mouth daily    Historical Provider, MD   mirtazapine (REMERON) 30 mg tablet Take 30 mg by mouth daily at bedtime    Historical Provider, MD   phenytoin extended (DILANTIN) 200 MG ER capsule Take 100 mg by mouth 2 (two) times a day And 250mg at [facility] Provider, MD   potassium chloride (K-DUR,KLOR-CON) 10 mEq tablet Take 10 mEq by mouth 3 (three) times a day      Historical Provider, MD   sodium chloride 1 g tablet Take 4 g by mouth 3 (three) times a day    Historical Provider, MD   thiamine 100 MG tablet Take 100 mg by mouth daily    Historical Provider, MD   tiotropium (SPIRIVA) 18 mcg inhalation capsule Place 18 mcg into inhaler and inhale daily    Historical Provider, MD   Vitamin D, Cholecalciferol, 1000 UNITS CAPS Take 1,000 Units by mouth daily      Historical Provider, MD       Allergies:  No Known Allergies    Review of Systems:   Review of Systems - General ROS: negative for - chills or fever  Psychological ROS: positive for - concentration difficulties  negative for - disorientation or hallucinations  ENT ROS: negative  Hematological and Lymphatic ROS: negative  Endocrine ROS: negative  Respiratory ROS: positive for - cough  negative for - shortness of breath or wheezing  Cardiovascular ROS: no chest pain or dyspnea on exertion  negative for - edema  Gastrointestinal ROS: no abdominal pain, change in bowel habits, or black or bloody stools  Musculoskeletal ROS: negative  Neurological ROS: no TIA or stroke symptoms  Dermatological ROS: negative    Vital Signs:   Vitals:    07/06/18 0800 07/06/18 0832   BP: 122/56 106/58   BP Location: Left arm Right arm   Cuff Size: Adult    Pulse: 94    Resp: 14    Temp: 97 8 °F (36 6 °C)    TempSrc: Oral    SpO2: 96%    Weight: 78 9 kg (174 lb)    Height: 5' 9" (1 753 m)        Physical Exam:  General: AAOx3,   HEENT/NECK:  PERRLA  No jugular venous distention  Cardiac:Regular rate and rhythm, no Murmur  Carotid arteries: brisk upstroke, no bruits appreciated  Pulmonary:  Crackles in the bases bilateratlly  Abdomen:  Non-tender, Non-distended  Positive bowel sounds     Upper extremities: 2+ radial pulses; <2 second capillary refill  Lower extremities: Extremities warm/dry  PT/DP pulses 2+ bilaterally  No edema B/L  Neuro: Alert and oriented X 3  Sensation is grossly intact  No focal deficits  Musculoskeletal: No gross abnormalities  Skin: Warm/Dry, without rashes or lesions  Imaging Studies:     CT Chest:   IMPRESSION:  Stable right parahilar posttreatment related consolidation  Stable non-FDG avid 9 mm and 2 mm left upper lobe nodules  No definite recurrent, residual or metastatic malignancy within the chest   Stable aortic root aneurysm measuring approximately 5 2 cm  I have personally reviewed pertinent films in PACS    Assessment:  Patient Active Problem List    Diagnosis Date Noted    Malignant neoplasm of upper lobe of right lung ([facility] ) 04/12/2018    Thoracic aortic aneurysm without rupture ([facility] ) 04/12/2018    Cancer of right main bronchus ([facility] ) 10/04/2016    Pleural effusion 10/02/2016    Lung mass 10/02/2016    Collapse of right lung 09/30/2016    [facility]     Epilepsy ([facility] )     Hypothyroid     Lyme disease     Major depression     Alcohol abuse      Aortic root aneurysm; Ongoing ascending aortic surveillance     Plan:    CT imaging performed prior to this visit demonstrates the aortic root measuring 52 mm in size at its greatest diameter  These findings were confirmed and shared with the patient today  As they are asymptomatic, with no family history, follow-up monitoring is the treatment plan  Arrangements have been made for future surveillance to be completed with CTA chest and echocardiogram in 1 year  Omalley Parveen was comfortable with our recommendations, and their questions were answered to their satisfaction  Thank you for allowing us to participate in the care of this patient  Aortic Aneurysm Instructions were provided to the patient as follows:    1  No lifting more than 50 pounds   Regular aerobic exercise permitted and recommended  2  Maintain a controlled blood pressure with a goal of less than 140/80  3  Follow up in Aortic Clinic as recommended with radiology follow up as instructed  4  Report to the ER or call 911 immediately with the following signs / symptoms: sudden onset of back pain, chest pain or shortness of breath  5  Tobacco cessation discussed      SIGNATURE: Luna Crouch PA-C  DATE: July 6, 2018  TIME: 8:26 AM

## 2018-07-06 NOTE — LETTER
July 6, 2018     [facility] [address]  [place] [address]    Patient: Dat Fortune   YOB: 1960   Date of Visit: 7/6/2018       Dear Dr Alberto Ortiz:    Thank you for referring Carrie Salcido to me for evaluation  Below are my notes for this consultation  If you have questions, please do not hesitate to call me  I look forward to following your patient along with you  Sincerely,        Elieser Eddy MD        CC: MD Babar Green PA-C  7/6/2018  9:08 AM  Attested  [facility]  Dat Fortune 62 y o  male MRN: [id number]    Physician Requesting Consult: Dr Alberto Ortiz  Reason for Consult / Principal Problem: Aortic Root Anuerysm    History of Present Illness: Dat Fortune is a 62y o  year old male who presents today for ongoing surveillance of their aortic root aneurysm  This was initially identified in 2016, after a chest CT was performed to evaluate a mass in the mainstem bronchus  Mass was identified as squamous cell carcinoma  Dat Fortune was most recently evaluated in our office in 10/21/2016   At that time the maximal aortic root diameter was measureed at 50 mm  He was instructed to follow up after completion of lung cancer treatment  Patient received 7 weekly doses of carboplatin plus Taxol with concurrent radiation  Patient refused further treatment  Follow-up CT scan and PET-CT scan on 3/26/18 which showed no evidence of recurrent and metastatic disease  It also showed stable aortic root aneurysm measuring 52mm  Today Carrie Salcido is present with a car giver from his nursing home  He denies any chest pain, back pain, SOB, TAYLOR, palpitations, N/V, change in bowel habits, numbness/tingling in extremities, presyncope/syncope      Past Medical History:  Past Medical History:   Diagnosis Date    Alcohol abuse     Anxiety     Atrial fibrillation ([address] )     COPD (chronic obstructive pulmonary disease) (HCC)     Delirium  Encephalopathy     [facility]     History of chemotherapy     History of radiation therapy     Hypo-osmolality and hyponatremia     Hypokalemia     Hypothyroid     Lung cancer [facility]     Lyme disease     Major depression        Past Surgical History:   Past Surgical History:   Procedure Laterality Date    BRONCHOSCOPY N/A 10/3/2016    Procedure: BRONCHOSCOPY FLEXIBLE;  Surgeon: Murphy Herron DO;  [facility]; Service:     HAND SURGERY      PELVIC FRACTURE SURGERY         Family History:  Family History   Problem Relation Age of Onset    Diabetes Mother     Lung cancer Father        Social History:  History   Alcohol Use    14 4 oz/week    24 Cans of beer per week     Comment: former every day drinker     History   Drug Use    Types: Marijuana     Comment: few times a week     History   Smoking Status    Current Every Day Smoker    Types: Cigarettes   Smokeless Tobacco    Never Used     Comment: 3 cigarettes day         Home Medications:   Prior to Admission medications    Medication Sig Start Date End Date Taking?  Authorizing Provider   acetaminophen (TYLENOL) 650 mg CR tablet Take 650 mg by mouth every 6 (six) hours as needed for mild pain      Historical Provider, MD   albuterol (2 5 mg/3 mL) 0 083 % nebulizer solution Take 2 5 mg by nebulization every 4 (four) hours as needed for wheezing      Historical Provider, MD   bisacodyl (FLEET) 10 MG/30ML ENEM Insert 10 mg into the rectum as needed for constipation    Historical Provider, MD   fluticasone-salmeterol (ADVAIR) 250-50 mcg/dose inhaler Inhale 1 puff every 12 (twelve) hours    Historical Provider, MD   folic acid (FOLVITE) 1 mg tablet Take 800 mcg by mouth daily      Historical Provider, MD   gabapentin (NEURONTIN) 400 mg capsule Take 400 mg by mouth 2 (two) times a day      Historical Provider, MD   levothyroxine 200 mcg tablet Take 212 mcg by mouth daily      Historical Provider, MD   Linaclotide (LINZESS) [address] Take 1 capsule by mouth daily as needed    Historical Provider, MD   LORazepam (ATIVAN) 1 mg tablet Take 1 mg by mouth 2 (two) times a day    Historical Provider, MD   magnesium hydroxide (MILK OF MAGNESIA) 400 mg/5 mL oral suspension Take 30 mL by mouth daily as needed for constipation      Historical Provider, MD   metoprolol succinate (TOPROL-XL) 25 mg 24 hr tablet Take 25 mg by mouth daily    Historical Provider, MD   mirtazapine (REMERON) 30 mg tablet Take 30 mg by mouth daily at bedtime    Historical Provider, MD   phenytoin extended (DILANTIN) 200 MG ER capsule Take 100 mg by mouth 2 (two) times a day And 250mg at [facility] Provider, MD   potassium chloride (K-DUR,KLOR-CON) 10 mEq tablet Take 10 mEq by mouth 3 (three) times a day      Historical Provider, MD   sodium chloride 1 g tablet Take 4 g by mouth 3 (three) times a day    Historical Provider, MD   thiamine 100 MG tablet Take 100 mg by mouth daily    Historical Provider, MD   tiotropium (SPIRIVA) 18 mcg inhalation capsule Place 18 mcg into inhaler and inhale daily    Historical Provider, MD   Vitamin D, Cholecalciferol, 1000 UNITS CAPS Take 1,000 Units by mouth daily      Historical Provider, MD       Allergies:  No Known Allergies    Review of Systems:   Review of Systems - General ROS: negative for - chills or fever  Psychological ROS: positive for - concentration difficulties  negative for - disorientation or hallucinations  ENT ROS: negative  Hematological and Lymphatic ROS: negative  Endocrine ROS: negative  Respiratory ROS: positive for - cough  negative for - shortness of breath or wheezing  Cardiovascular ROS: no chest pain or dyspnea on exertion  negative for - edema  Gastrointestinal ROS: no abdominal pain, change in bowel habits, or black or bloody stools  Musculoskeletal ROS: negative  Neurological ROS: no TIA or stroke symptoms  Dermatological ROS: negative    Vital Signs:   Vitals:    07/06/18 0800 07/06/18 0832   BP: 122/56 106/58   BP Location: Left arm Right arm   Cuff Size: Adult    Pulse: 94    Resp: 14    Temp: 97 8 °F (36 6 °C)    TempSrc: Oral    SpO2: 96%    Weight: 78 9 kg (174 lb)    Height: 5' 9" (1 753 m)        Physical Exam:  General: AAOx3,   HEENT/NECK:  PERRLA  No jugular venous distention  Cardiac:Regular rate and rhythm, no Murmur  Carotid arteries: brisk upstroke, no bruits appreciated  Pulmonary:  Crackles in the bases bilateratlly  Abdomen:  Non-tender, Non-distended  Positive bowel sounds  Upper extremities: 2+ radial pulses; <2 second capillary refill  Lower extremities: Extremities warm/dry  PT/DP pulses 2+ bilaterally  No edema B/L  Neuro: Alert and oriented X 3  Sensation is grossly intact  No focal deficits  Musculoskeletal: No gross abnormalities  Skin: Warm/Dry, without rashes or lesions  Imaging Studies:     CT Chest:   IMPRESSION:  Stable right parahilar posttreatment related consolidation  Stable non-FDG avid 9 mm and 2 mm left upper lobe nodules  No definite recurrent, residual or metastatic malignancy within the chest   Stable aortic root aneurysm measuring approximately 5 2 cm  I have personally reviewed pertinent films in PACS    Assessment:  Patient Active Problem List    Diagnosis Date Noted    Malignant ne[facility] of upper lobe of right lung [facility] 04/12/2018    Thoracic aortic aneurysm without rupture [facility] ) 04/12/2018    Cancer of right main bronchus [facility] ) 10/04/2016    Pleural effusion 10/02/2016    Lung mass 10/02/2016    Collapse of right lung 09/30/2016    COPD (chronic obstructive pulmonary disease) ([address] )     Epilepsy ([address] )     Hypothyroid     Lyme disease     Major depression     Alcohol abuse      Aortic root aneurysm; Ongoing ascending aortic surveillance     Plan:    CT imaging performed prior to this visit demonstrates the aortic root measuring 52 mm in size at its greatest diameter  These findings were confirmed and shared with the patient today        As they are asymptomatic, with no family history, follow-up monitoring is the treatment plan  Arrangements have been made for future surveillance to be completed with CTA chest and echocardiogram in 1 year  Chase Grady was comfortable with our recommendations, and their questions were answered to their satisfaction  Thank you for allowing us to participate in the care of this patient  Aortic Aneurysm Instructions were provided to the patient as follows:    1  No lifting more than 50 pounds  Regular aerobic exercise permitted and recommended  2  Maintain a controlled blood pressure with a goal of less than 140/80  3  Follow up in Aortic Clinic as recommended with radiology follow up as instructed  4  Report to the ER or call 911 immediately with the following signs / symptoms: sudden onset of back pain, chest pain or shortness of breath  5  Tobacco cessation discussed  SIGNATURE: Quentin Castillo PA-C  DATE: July 6, 2018  TIME: 8:26 AM  Attestation signed by Kenneth Lake MD at 7/6/2018  9:16 AM:  Pt seen and examined with PA  I agree with the above assessment and plan  Marcin's aorta is stable in size and does not yet appear to meet criteria for replacement  In addition, surgical risk would currently outweigh benefit  I'd likely not recommend operation until his aorta was at least 6 cm due to his increased operative risk    I've therefore recommended 1 year follow up with repeat CTA and TTE     SIGNATURE: Magui Narayanan MD  DATE: July 6, 2018  TIME: 9:14 AM

## 2018-07-13 ENCOUNTER — RADIATION ONCOLOGY FOLLOW-UP (OUTPATIENT)
Dept: RADIATION ONCOLOGY | Facility: HOSPITAL | Age: 58
End: 2018-07-13
Attending: RADIOLOGY
Payer: COMMERCIAL

## 2018-07-13 ENCOUNTER — CLINICAL SUPPORT (OUTPATIENT)
Dept: RADIATION ONCOLOGY | Facility: HOSPITAL | Age: 58
End: 2018-07-13
Payer: COMMERCIAL

## 2018-07-13 VITALS
RESPIRATION RATE: 18 BRPM | HEART RATE: 76 BPM | BODY MASS INDEX: 26.17 KG/M2 | TEMPERATURE: 98.7 F | WEIGHT: 177.2 LBS | SYSTOLIC BLOOD PRESSURE: 110 MMHG | DIASTOLIC BLOOD PRESSURE: 64 MMHG

## 2018-07-13 DIAGNOSIS — C34.11 MALIGNANT NEOPLASM OF UPPER LOBE OF RIGHT LUNG (HCC): Primary | ICD-10-CM

## 2018-07-13 DIAGNOSIS — C34.01 CANCER OF RIGHT MAIN BRONCHUS (HCC): Primary | ICD-10-CM

## 2018-07-13 DIAGNOSIS — C34.11 MALIGNANT NEOPLASM OF UPPER LOBE OF RIGHT LUNG (HCC): ICD-10-CM

## 2018-07-13 PROCEDURE — 99214 OFFICE O/P EST MOD 30 MIN: CPT | Performed by: RADIOLOGY

## 2018-07-13 NOTE — PROGRESS NOTES
Rodrick Lanes  1960   Mr Tomy Leyva is a 62 y o  male       Chief Complaint   Patient presents with    Follow-up     Radiation Oncology       Cancer Staging  No matching staging information was found for the patient  Oncology History    62year old male returns for 4 month follow-up  He completed concurrent chemo/RT for lung cancer on 12/19/16  He was last seen on 3/9/18       3/26/18 CT chest  1  Stable appearance of the chest with post treatment changes on the right and nodular densities on the left  No evidence of recurrent or metastatic disease    2   Unchanged severely enlarged aortic root measuring greater than 5 cm     4/12/18 Dr Freeman Zamora cancer is in remission  Again stressed to please quit smoking cig  He won't  Very abnormal thoracic aorta  Referring back to cardiac surgeon    6/27/18 CT chest  Stable right parahilar posttreatment related consolidation    Stable non-FDG avid 9 mm and 2 mm left upper lobe nodules    No definite recurrent, residual or metastatic malignancy within the chest    Stable aortic root aneurysm measuring approximately 5 2 cm       7/6/18 Cardiothoracic surgeon, Parviz Ornelas MD  aorta is stable in size and does not yet appear to meet criteria for replacement  In addition, surgical risk would currently outweigh benefit  I'd likely not recommend operation until his aorta was at least 6 cm due to his increased operative risk  I've therefore recommended 1 year follow up with repeat CTA and TTE            Cancer of right main bronchus ([address] )    10/3/2016 Initial Diagnosis     Cancer of right main bronchus ([address] )         10/3/2016 Biopsy     Right endobronchial mass biopsy  Keratinizing squamous cell carcinoma         11/7/2016 - 12/19/2016 Radiation     Right lung/mediastinum to a total dose of 6000 cGy in 30 daily fractions to all gross disease  11/10/2016 - 12/22/2016 Chemotherapy      7 weekly doses of carboplatin plus Taxol with concurrent radiation     Proothi            Clinical Trial: No    Screening  Tobacco  Current tobacco user: yes  If yes, brief counseling provided: Yes    Hypertension  Hypertension screening performed: yes  Normotensive:  yes  If no, referred to PCP: no    Depression Screening  Screened for depression using PHQ-2: yes    Screened for depression using PHQ-9:  yes  Screening positive or negative:  negative  If score >4, was any of the following actions taken?    Additional evaluation for depression, suicide risk assesment, referral to PCP or psychiatry, medication started:  no    Advanced Care Planning for Patients >65 years  Advanced Care Planning Discussed:  yes  Patient named surrogate decision maker or care plan in chart: yes      Health Maintenance   Topic Date Due    HIV SCREENING  1960    Hepatitis C Screening  1960    CRC Screening: Colonoscopy  1960    DTaP,Tdap,and Td Vaccines (1 - Tdap) 09/04/1981    INFLUENZA VACCINE  09/01/2018       Patient Active Problem List   Diagnosis    [facility]    [facility]    Hypothyroid    Lyme disease    Major depression    Alcohol abuse    Collapse of right lung    Pleural effusion    Lung mass    Cancer of right main bronchus [facility]    Malignant neoplasm of upper lobe of right lung ([facility] )    Thoracic aortic aneurysm without rupture ([facility] )     Past Medical History:   Diagnosis Date    Alcohol abuse     Anxiety     Atrial fibrillation ([facility]     COPD (chronic obstructive pulmonary disease) ([facility] )     Delirium     Encephalopathy     Epilepsy ([facility]     History of chemotherapy     History of radiation therapy     Hypo-osmolality and hyponatremia     Hypokalemia     Hypothyroid     Lung cancer ([facility]     Lyme disease     Major depression      Past Surgical History:   Procedure Laterality Date    BRONCHOSCOPY N/A 10/3/2016    Procedure: BRONCHOSCOPY FLEXIBLE;  Surgeon: Gloria Christopher DO;  [facility]; Service:     HAND SURGERY      PELVIC FRACTURE SURGERY       Family History   Problem Relation Age of Onset    Diabetes Mother     Lung cancer Father      Social History     Social History    Marital status:      Spouse name: N/A    Number of children: N/A    Years of education: N/A     Occupational History    Not on file       Social History Main Topics    Smoking status: Current Every Day Smoker     Types: Cigarettes    Smokeless tobacco: Never Used      Comment: 3 cigarettes day    Alcohol use 14 4 oz/week     24 Cans of beer per week      Comment: former every day drinker, pt states he hasn't had a drink  since 2014    Drug use: Yes     Types: Marijuana      Comment: few times a week    Sexual activity: Not on file     Other Topics Concern    Not on file     Social History Narrative    No narrative on file       Current Outpatient Prescriptions:     acetaminophen (TYLENOL) 650 mg CR tablet, Take 650 mg by mouth every 6 (six) hours as needed for mild pain  , Disp: , Rfl:     albuterol (2 5 mg/3 mL) 0 083 % nebulizer solution, Take 2 5 mg by nebulization every 4 (four) hours as needed for wheezing  , Disp: , Rfl:     bisacodyl (FLEET) 10 MG/30ML ENEM, Insert 10 mg into the rectum as needed for constipation, Disp: , Rfl:     fluticasone-salmeterol (ADVAIR) 250-50 mcg/dose inhaler, Inhale 1 puff every 12 (twelve) hours, Disp: , Rfl:     folic acid (FOLVITE) 1 mg tablet, Take 800 mcg by mouth daily  , Disp: , Rfl:     gabapentin (NEURONTIN) 400 mg capsule, Take 400 mg by mouth 2 (two) times a day  , Disp: , Rfl:     levothyroxine 200 mcg tablet, Take 212 mcg by mouth daily  , Disp: , Rfl:     Linaclotide (LINZESS) 290 MCG CAPS, Take 1 capsule by mouth daily as needed, Disp: , Rfl:     LORazepam (ATIVAN) 1 mg tablet, Take 1 mg by mouth 2 (two) times a day, Disp: , Rfl:     magnesium hydroxide (MILK OF MAGNESIA) 400 mg/5 mL oral suspension, Take 30 mL by mouth daily as needed for constipation  , Disp: , Rfl:     metoprolol succinate (TOPROL-XL) 25 mg 24 hr tablet, Take 25 mg by mouth daily, Disp: , Rfl:     mirtazapine (REMERON) 30 mg tablet, Take 30 mg by mouth daily at bedtime, Disp: , Rfl:     phenytoin extended (DILANTIN) 200 MG ER capsule, Take 100 mg by mouth 2 (two) times a day And 250mg at 5pm , Disp: , Rfl:     potassium chloride (K-DUR,KLOR-CON) 10 mEq tablet, Take 10 mEq by mouth 3 (three) times a day  , Disp: , Rfl:     sodium chloride 1 g tablet, Take 4 g by mouth 3 (three) times a day, Disp: , Rfl:     thiamine 100 MG tablet, Take 100 mg by mouth daily, Disp: , Rfl:     tiotropium (SPIRIVA) 18 mcg inhalation capsule, Place 18 mcg into inhaler and inhale daily, Disp: , Rfl:     Vitamin D, Cholecalciferol, 1000 UNITS CAPS, Take 1,000 Units by mouth daily  , Disp: , Rfl:   No Known Allergies    Review of Systems:  Review of Systems   Constitutional: Negative  HENT: Negative  Eyes: Negative  Respiratory: Positive for cough (alot of clear/whitish phlegm mostly when lying down and at night) and shortness of breath  Patient smokes 3 cigarettes daily  That's all he's allowed at the home  States he will not quit  Cardiovascular: Negative  Gastrointestinal: Positive for constipation (occasional )  Endocrine: Negative  Genitourinary: Negative  Musculoskeletal: Negative  Skin: Negative  Allergic/Immunologic: Negative  Neurological: Negative  Hematological: Negative  Psychiatric/Behavioral: Negative  Vitals:    07/13/18 0900   BP: 110/64   BP Location: Right arm   Pulse: 76   Resp: 18   Temp: 98 7 °F (37 1 °C)   TempSrc: Temporal   Weight: 80 4 kg (177 lb 3 2 oz)            Imaging:Ct Chest Wo Contrast    Result Date: 6/29/2018  Narrative: CT CHEST WITHOUT IV CONTRAST INDICATION:   C34 11: Malignant neoplasm of upper lobe, right bronchus or lung    Excerpt from oncology note 4/12/2018:"F/U visit for lung cancer of right main bronchus ""S/p chemo +rad  Concurrently in 2016 with very good results""He had f/u CT scan and PET-CT scan and a f/u CT scan on 3/26/18  See report  No evidence of recurrent and metastatic disease" COMPARISON: CT chest 3/26/2018  PET CT 1/3/2018  TECHNIQUE: CT examination of the chest was performed without intravenous contrast   Axial, sagittal, and coronal 2D reformatted images were created from the source data and submitted for interpretation  Radiation dose length product (DLP) for this visit:  263 mGy-cm   This examination, like all CT scans performed in the [facility], was performed utilizing techniques to minimize radiation dose exposure, including the use of iterative reconstruction and automated exposure control  FINDINGS: LUNGS:  Extensive stable right parahilar/consolidation stable from the prior studies likely on the basis of posttreatment changes  Stable 9 mm left upper lobe nodule #3/18 also unchanged from the prior studies and not FDG avid on the most recent PET scan  Previously seen 2 mm left upper lobe nodule #3/21 is stable  Stable emphysematous changes  PLEURA:  Stable areas of right apical posttreatment thickening  HEART/GREAT VESSELS:  Severe dilation of the aortic root measuring approximately 52 mm stable since the prior studies  MEDIASTINUM AND ANDREW:  Unremarkable  CHEST WALL AND LOWER NECK:   Left anterior chest wall vascular port  VISUALIZED STRUCTURES IN THE UPPER ABDOMEN:  Unremarkable  OSSEOUS STRUCTURES:  No acute fracture or destructive osseous lesion  Impression: Stable right parahilar posttreatment related consolidation  Stable non-FDG avid 9 mm and 2 mm left upper lobe nodules  No definite recurrent, residual or metastatic malignancy within the chest  Stable aortic root aneurysm measuring approximately 5 2 cm   Workstation performed: [id number]

## 2018-07-13 NOTE — PROGRESS NOTES
Follow-up - Radiation Oncology   Sylvia Flower 1960 62 y o  male [id number]      History of Present Illness   Cancer Staging  No matching staging information was found for the patient  Sylvia Flower returns today for routine scheduled follow-up visit  Overall he feels well  He is essentially without complaints  He does have a stable nonproductive cough  No hemoptysis  No chest pain  No worsening shortness of breath  No persistent headache or focal musculoskeletal aches or pains  He continues to smoke  62year old male returns for 4 month follow-up  He completed concurrent chemo/RT for lung cancer on 12/19/16  He was last seen on 3/9/18       3/26/18 CT chest  1  Stable appearance of the chest with post treatment changes on the right and nodular densities on the left  No evidence of recurrent or metastatic disease    2   Unchanged severely enlarged aortic root measuring greater than 5 cm     4/12/18 Dr Jeanine Velasco cancer is in remission  Again stressed to please quit smoking cig  He won't  Very abnormal thoracic aorta  Referring back to cardiac surgeon    6/27/18 CT chest  Stable right parahilar posttreatment related consolidation    Stable non-FDG avid 9 mm and 2 mm left upper lobe nodules    No definite recurrent, residual or metastatic malignancy within the chest    Stable aortic root aneurysm measuring approximately 5 2 cm       7/6/18 Cardiothoracic surgeon, Supa Ahmadi MD  aorta is stable in size and does not yet appear to meet criteria for replacement  In addition, surgical risk would currently outweigh benefit  I'd likely not recommend operation until his aorta was at least 6 cm due to his increased operative risk  I've therefore recommended 1 year follow up with repeat CTA and TTE        Historical Information      Cancer of right main bronchus ([facility] [address] )    10/3/2016 Initial Diagnosis     Cancer of right main bronchus ([facility] [address] )         10/3/2016 Biopsy     Right endobronchial mass biopsy  Keratinizing squamous cell carcinoma         11/7/2016 - 12/19/2016 Radiation     Right lung/mediastinum to a total dose of 6000 cGy in 30 daily fractions to all gross disease  11/10/2016 - 12/22/2016 Chemotherapy      7 weekly doses of carboplatin plus Taxol with concurrent radiation  Dr Michelle Sheridan            Past Medical History:   Diagnosis Date    Alcohol abuse     Anxiety     [facility]     COPD (chronic obstructive pulmonary disease) (HCC)     Delirium     Encephalopathy     Epilepsy ([facility] )     History of chemotherapy     History of radiation therapy     Hypo-osmolality and hyponatremia     Hypokalemia     Hypothyroid     [facility]     Lyme disease     Major depression      Past Surgical History:   Procedure Laterality Date    BRONCHOSCOPY N/A 10/3/2016    Procedure: BRONCHOSCOPY FLEXIBLE;  Surgeon: Luzmaria Larios DO;  [facility];   Service:     HAND SURGERY      PELVIC FRACTURE SURGERY         Social History   History   Alcohol Use    14 4 oz/week    24 Cans of beer per week     Comment: former every day drinker, pt states he hasn't had a drink  since 2014     History   Drug Use    Types: Marijuana     Comment: few times a week     History   Smoking Status    Current Every Day Smoker    Types: Cigarettes   Smokeless Tobacco    Never Used     Comment: 3 cigarettes day         Meds/Allergies     Current Outpatient Prescriptions:     acetaminophen (TYLENOL) 650 mg CR tablet, Take 650 mg by mouth every 6 (six) hours as needed for mild pain  , Disp: , Rfl:     albuterol (2 5 mg/3 mL) 0 083 % nebulizer solution, Take 2 5 mg by nebulization every 4 (four) hours as needed for wheezing  , Disp: , Rfl:     bisacodyl (FLEET) 10 MG/30ML ENEM, Insert 10 mg into the rectum as needed for constipation, Disp: , Rfl:     fluticasone-salmeterol (ADVAIR) 250-50 mcg/dose inhaler, Inhale 1 puff every 12 (twelve) hours, Disp: , Rfl:     folic acid (Corinda Codding) 1 mg tablet, Take 800 mcg by mouth daily  , Disp: , Rfl:     gabapentin (NEURONTIN) 400 mg capsule, Take 400 mg by mouth 2 (two) times a day  , Disp: , Rfl:     levothyroxine 200 mcg tablet, Take 212 mcg by mouth daily  , Disp: , Rfl:     Linaclotide (LINZESS) 290 MCG CAPS, Take 1 capsule by mouth daily as needed, Disp: , Rfl:     LORazepam (ATIVAN) 1 mg tablet, Take 1 mg by mouth 2 (two) times a day, Disp: , Rfl:     magnesium hydroxide (MILK OF MAGNESIA) 400 mg/5 mL oral suspension, Take 30 mL by mouth daily as needed for constipation  , Disp: , Rfl:     metoprolol succinate (TOPROL-XL) 25 mg 24 hr tablet, Take 25 mg by mouth daily, Disp: , Rfl:     mirtazapine (REMERON) 30 mg tablet, Take 30 mg by mouth daily at bedtime, Disp: , Rfl:     phenytoin extended (DILANTIN) 200 MG ER capsule, Take 100 mg by mouth 2 (two) times a day And 250mg at 5pm , Disp: , Rfl:     potassium chloride (K-DUR,KLOR-CON) 10 mEq tablet, Take 10 mEq by mouth 3 (three) times a day  , Disp: , Rfl:     sodium chloride 1 g tablet, Take 4 g by mouth 3 (three) times a day, Disp: , Rfl:     thiamine 100 MG tablet, Take 100 mg by mouth daily, Disp: , Rfl:     tiotropium (SPIRIVA) 18 mcg inhalation capsule, Place 18 mcg into inhaler and inhale daily, Disp: , Rfl:     Vitamin D, Cholecalciferol, 1000 UNITS CAPS, Take 1,000 Units by mouth daily  , Disp: , Rfl:   No Known Allergies      Review of Systems   Review of Systems   Constitutional: Negative  HENT: Negative  Eyes: Negative  Respiratory: Positive for cough (alot of clear/whitish phlegm mostly when lying down and at night) and shortness of breath  Patient smokes 3 cigarettes daily  That's all he's allowed at the home  States he will not quit  Cardiovascular: Negative  Gastrointestinal: Positive for constipation (occasional )  Endocrine: Negative  Genitourinary: Negative  Musculoskeletal: Negative  Skin: Negative  Allergic/Immunologic: Negative  Neurological: Negative  Hematological: Negative  Psychiatric/Behavioral: Negative  Screening  Tobacco  Current tobacco user: yes  If yes, brief counseling provided: Yes    Hypertension  Hypertension screening performed: yes  Normotensive:  yes  If no, referred to PCP: no    Depression Screening  Screened for depression using PHQ-2: yes    Screened for depression using PHQ-9:  yes  Screening positive or negative:  negative  If score >4, was any of the following actions taken? Additional evaluation for depression, suicide risk assesment, referral to PCP or psychiatry, medication started:  no    Advanced Care Planning for Patients >65 years  Advanced Care Planning Discussed:  yes  Patient named surrogate decision maker or care plan in chart: yes            OBJECTIVE:   /64 (BP Location: Right arm)   Pulse 76   Temp 98 7 °F (37 1 °C) (Temporal)   Resp 18   Wt 80 4 kg (177 lb 3 2 oz)   BMI 26 17 kg/m²   Pain Assessment:  0  Karnofsky: 90 - Able to carry on normal activity; minor signs or symptoms of disease     Physical Exam   The patient presents today no apparent distress  Sclera anicteric  No cervical supraclavicular lymphadenopathy  Lungs clear to auscultation bilaterally with breath sounds on the right slightly decreased relative to the left  Normal speech  Normal affect  RESULTS    Lab Results: No results found for this or any previous visit (from the past 672 hour(s))  Imaging Studies:Ct Chest Wo Contrast    Result Date: 6/29/2018  Narrative: CT CHEST WITHOUT IV CONTRAST INDICATION:   C34 11: Malignant neoplasm of upper lobe, right bronchus or lung  Excerpt from oncology note 4/12/2018:"F/U visit for lung cancer of right main bronchus ""S/p chemo +rad  Concurrently in 2016 with very good results""He had f/u CT scan and PET-CT scan and a f/u CT scan on 3/26/18  See report  No evidence of recurrent and metastatic disease" COMPARISON: CT chest 3/26/2018  PET CT 1/3/2018  TECHNIQUE: CT examination of the chest was performed without intravenous contrast   Axial, sagittal, and coronal 2D reformatted images were created from the source data and submitted for interpretation  Radiation dose length product (DLP) for this visit:  263 mGy-cm   This examination, like all CT scans performed in the [facility], was performed utilizing techniques to minimize radiation dose exposure, including the use of iterative reconstruction and automated exposure control  FINDINGS: LUNGS:  Extensive stable right parahilar/consolidation stable from the prior studies likely on the basis of posttreatment changes  Stable 9 mm left upper lobe nodule #3/18 also unchanged from the prior studies and not FDG avid on the most recent PET scan  Previously seen 2 mm left upper lobe nodule #3/21 is stable  Stable emphysematous changes  PLEURA:  Stable areas of right apical posttreatment thickening  HEART/GREAT VESSELS:  Severe dilation of the aortic root measuring approximately 52 mm stable since the prior studies  MEDIASTINUM AND ANDREW:  Unremarkable  CHEST WALL AND LOWER NECK:   Left anterior chest wall vascular port  VISUALIZED STRUCTURES IN THE UPPER ABDOMEN:  Unremarkable  OSSEOUS STRUCTURES:  No acute fracture or destructive osseous lesion  Impression: Stable right parahilar posttreatment related consolidation  Stable non-FDG avid 9 mm and 2 mm left upper lobe nodules  No definite recurrent, residual or metastatic malignancy within the chest  Stable aortic root aneurysm measuring approximately 5 2 cm  Workstation performed: [id number]           Assessment/Plan:  No orders of the defined types were placed in this encounter  Dianne Ochoa has done well in follow-up and has no clinical evidence of disease at this time  He does require ongoing close follow-up and will have repeat imaging next month and follow up with Medical Oncology shortly thereafter    He will return to our department in 6 months for routine follow-up  Miryam Reyes MD  7/13/2018,11:23 AM    Portions of the record may have been created with voice recognition software   Occasional wrong word or "sound a like" substitutions may have occurred due to the inherent limitations of voice recognition software   Read the chart carefully and recognize, using context, where substitutions have occurred

## 2018-08-20 ENCOUNTER — TELEPHONE (OUTPATIENT)
Dept: HEMATOLOGY ONCOLOGY | Facility: CLINIC | Age: 58
End: 2018-08-20

## 2018-08-31 ENCOUNTER — OFFICE VISIT (OUTPATIENT)
Dept: HEMATOLOGY ONCOLOGY | Facility: CLINIC | Age: 58
End: 2018-08-31
Payer: COMMERCIAL

## 2018-08-31 VITALS
TEMPERATURE: 97.3 F | HEIGHT: 68 IN | RESPIRATION RATE: 16 BRPM | HEART RATE: 85 BPM | BODY MASS INDEX: 26.52 KG/M2 | OXYGEN SATURATION: 94 % | DIASTOLIC BLOOD PRESSURE: 62 MMHG | SYSTOLIC BLOOD PRESSURE: 126 MMHG | WEIGHT: 175 LBS

## 2018-08-31 DIAGNOSIS — C34.01 CANCER OF RIGHT MAIN BRONCHUS (HCC): Primary | ICD-10-CM

## 2018-08-31 DIAGNOSIS — C34.11 MALIGNANT NEOPLASM OF UPPER LOBE OF RIGHT LUNG (HCC): ICD-10-CM

## 2018-08-31 PROCEDURE — 99214 OFFICE O/P EST MOD 30 MIN: CPT | Performed by: INTERNAL MEDICINE

## 2018-08-31 NOTE — PROGRESS NOTES
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 and the there was obstruction of right mainstem bronchus and also he had nonmalignant pleural effusion  Patient was given 7 weekly doses of carboplatin plus Taxol with concurrent radiation  He was offered 2 more cycles of regular dose Taxol and carboplatin chemotherapy at 3 week interval but patient declined the offer  There has not been documented disease recurrence  He states he is cutting down on smoking cigarettes  He states he has less cough with small mucoid expectoration and less shortness of breath  No chest pain and no hemoptysis  He is maintaining his weight  He has Port-A-Cath but he is not regular with getting that flushed  He wants Port-A-Cath to be removed    He follows with cardiac surgeon for thoracic aortic aneurysm                Current Outpatient Prescriptions:     acetaminophen (TYLENOL) 650 mg CR tablet, Take 650 mg by mouth every 6 (six) hours as needed for mild pain  , Disp: , Rfl:     albuterol (2 5 mg/3 mL) 0 083 % nebulizer solution, Take 2 5 mg by nebulization every 4 (four) hours as needed for wheezing  , Disp: , Rfl:     bisacodyl (FLEET) 10 MG/30ML ENEM, Insert 10 mg into the rectum as needed for constipation, Disp: , Rfl:     fluticasone-salmeterol (ADVAIR) 250-50 mcg/dose inhaler, Inhale 1 puff every 12 (twelve) hours, Disp: , Rfl:     folic acid (FOLVITE) 1 mg tablet, Take 800 mcg by mouth daily  , Disp: , Rfl:     gabapentin (NEURONTIN) 400 mg capsule, Take 400 mg by mouth 2 (two) times a day  , Disp: , Rfl:     levothyroxine 200 mcg tablet, Take 212 mcg by mouth daily  , Disp: , Rfl:     Linaclotide (LINZESS) 290 MCG CAPS, Take 1 capsule by mouth daily as needed, Disp: , Rfl:     LORazepam (ATIVAN) 1 mg tablet, Take 1 mg by mouth 2 (two) times a day, Disp: , Rfl:     magnesium hydroxide (MILK OF MAGNESIA) 400 mg/5 mL oral suspension, Take 30 mL by mouth daily as needed for constipation  , Disp: , Rfl:     metoprolol succinate (TOPROL-XL) 25 mg 24 hr tablet, Take 25 mg by mouth daily, Disp: , Rfl:     mirtazapine (REMERON) 30 mg tablet, Take 30 mg by mouth daily at bedtime, Disp: , Rfl:     phenytoin extended (DILANTIN) 200 MG ER capsule, Take 100 mg by mouth 2 (two) times a day And 250mg at 5pm , Disp: , Rfl:     potassium chloride (K-DUR,KLOR-CON) 10 mEq tablet, Take 10 mEq by mouth 3 (three) times a day  , Disp: , Rfl:     sodium chloride 1 g tablet, Take 4 g by mouth 3 (three) times a day, Disp: , Rfl:     thiamine 100 MG tablet, Take 100 mg by mouth daily, Disp: , Rfl:     tiotropium (SPIRIVA) 18 mcg inhalation capsule, Place 18 mcg into inhaler and inhale daily, Disp: , Rfl:     Vitamin D, Cholecalciferol, 1000 UNITS CAPS, Take 1,000 Units by mouth daily  , Disp: , Rfl:     No Known Allergies       Cancer of right main bronchus ([address] )    10/3/2016 Initial Diagnosis     Cancer of right main bronchus ([facility] )         10/3/2016 Biopsy     Right endobronchial mass biopsy  Keratinizing squamous cell carcinoma         11/7/2016 - 12/19/2016 Radiation     Right lung/mediastinum to a total dose of 6000 cGy in 30 daily fractions to all gross disease  11/10/2016 - 12/22/2016 Chemotherapy      7 weekly doses of carboplatin plus Taxol with concurrent radiation  Dr Alberto Ortiz            ROS:  08/31/18 Reviewed 13 systems:  Presently no headaches, seizures, dizziness, diplopia, dysphagia, hoarseness, chest pain, palpitations,   hemoptysis, abdominal pain, nausea, vomiting, change in bowel habits, melena, hematuria, fever, chills, bleeding, bone pains, skin rash, weight loss, arthritic symptoms, tiredness ,  weakness, numbness,  claudication and gait problem  No frequent infections  Not unusually sensitive to heat or cold  No swelling of the ankles  No swollen glands  Patient is anxious   Other symptoms are in HPI        /62   Pulse 85   Temp (!) 97 3 °F (36 3 °C) (Tympanic)   Resp 16   Ht 5' 8" (1 727 m)   Wt 79 4 kg (175 lb)   SpO2 94%   BMI 26 61 kg/m²     Physical Exam:  Alert, oriented, not in distress, no icterus, no oral thrush, no palpable neck mass, clear lung fields, regular heart rate, abdomen  soft and non tender, no palpable abdominal mass, no ascites, no edema of ankles, no calf tenderness, no focal neurological deficit, no skin rash, no palpable lymphadenopathy, good arterial pulses, no clubbing  Patient is anxious  Performance status 1  He has Port-A-Cath    IMAGING:     IMPRESSION:     Stable right parahilar posttreatment related consolidation      Stable non-FDG avid 9 mm and 2 mm left upper lobe nodules      No definite recurrent, residual or metastatic malignancy within the chest      Stable aortic root aneurysm measuring approximately 5 2 cm         Workstation performed: [id number]      Imaging     CT chest wo contrast (Order #[id number]) on 6/27/2018 - Imaging Information       LABS:  Results for orders placed or performed during the hospital encounter of 01/03/18   Fingerstick Glucose (POCT)   Result Value Ref Range    POC Glucose 79 65 - 140 mg/dl     Labs, Imaging, & Other studies:  Hemoglobin 13 5  WBC 5700  Platelets [id number]  Normal chemistry profile  All pertinent labs and imaging studies were personally reviewed      Reviewed and discussed with patient  Assessment and plan: In 2016 patient was diagnosed to have 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 and the there was obstruction of right mainstem bronchus and also he had nonmalignant pleural effusion  Patient was given 7 weekly doses of carboplatin plus Taxol with concurrent radiation  He was offered 2 more cycles of regular dose Taxol and carboplatin chemotherapy at 3 week interval but patient declined the offer  There has not been documented disease recurrence  He states he is cutting down on smoking cigarettes    He states he has less cough with small mucoid expectoration and less shortness of breath  No chest pain and no hemoptysis  He is maintaining his weight  He has Port-A-Cath but he is not regular with getting that flushed  He wants Port-A-Cath to be removed  He follows with cardiac surgeon for thoracic aortic aneurysm  Sarah Tillman Physical examination and test results are as recorded and discussed  Lung cancer remains in remission  Goal is cure from lung cancer  Once again I have advised him to please quit smoking cigarettes  Condition discussed and explained  Questions answered  He will come back in 6 months with blood tests and CT scan  Arrangements are being made for him to have Port-A-Cath removed  All discussed in detail  Questions answered  Discussed the importance of eating healthy foods, staying active and health screening test   1  Cancer of right main bronchus (HCC)    - CBC and differential; Future  - CEA; Future  - Comprehensive metabolic panel; Future  - IR consult; Future  - CT chest wo contrast; Future    2  Malignant neoplasm of upper lobe of right lung (HCC)    - CBC and differential; Future  - CEA; Future  - Comprehensive metabolic panel; Future  - IR consult; Future            Patient voiced understanding and agreement in the discussion  Counseling / Coordination of Care   Greater than 50% of total time was spent with the patient and / or family counseling and / or coordination of care

## 2018-08-31 NOTE — LETTER
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 and the there was obstruction of right mainstem bronchus and also he had nonmalignant pleural effusion  Patient was given 7 weekly doses of carboplatin plus Taxol with concurrent radiation  He was offered 2 more cycles of regular dose Taxol and carboplatin chemotherapy at 3 week interval but patient declined the offer  There has not been documented disease recurrence  He states he is cutting down on smoking cigarettes  He states he has less cough with small mucoid expectoration and less shortness of breath  No chest pain and no hemoptysis  He is maintaining his weight  He has Port-A-Cath but he is not regular with getting that flushed  He wants Port-A-Cath to be removed    He follows with cardiac surgeon for thoracic aortic aneurysm                Current Outpatient Prescriptions:     acetaminophen (TYLENOL) 650 mg CR tablet, Take 650 mg by mouth every 6 (six) hours as needed for mild pain  , Disp: , Rfl:     albuterol (2 5 mg/3 mL) 0 083 % nebulizer solution, Take 2 5 mg by nebulization every 4 (four) hours as needed for wheezing  , Disp: , Rfl:     bisacodyl (FLEET) 10 MG/30ML ENEM, Insert 10 mg into the rectum as needed for constipation, Disp: , Rfl:     fluticasone-salmeterol (ADVAIR) 250-50 mcg/dose inhaler, Inhale 1 puff every 12 (twelve) hours, Disp: , Rfl:     folic acid (FOLVITE) 1 mg tablet, Take 800 mcg by mouth daily  , Disp: , Rfl:     gabapentin (NEURONTIN) 400 mg capsule, Take 400 mg by mouth 2 (two) times a day  , Disp: , Rfl:     levothyroxine 200 mcg tablet, Take 212 mcg by mouth daily  , Disp: , Rfl:     Linaclotide (LINZESS) 290 MCG CAPS, Take 1 capsule by mouth daily as needed, Disp: , Rfl:     LORazepam (ATIVAN) 1 mg tablet, Take 1 mg by mouth 2 (two) times a day, Disp: , Rfl:     magnesium hydroxide (MILK OF MAGNESIA) 400 mg/5 mL oral suspension, Take 30 mL by mouth daily as needed for constipation  , Disp: , Rfl:     metoprolol succinate (TOPROL-XL) 25 mg 24 hr tablet, Take 25 mg by mouth daily, Disp: , Rfl:     mirtazapine (REMERON) 30 mg tablet, Take 30 mg by mouth daily at bedtime, Disp: , Rfl:     phenytoin extended (DILANTIN) 200 MG ER capsule, Take 100 mg by mouth 2 (two) times a day And 250mg at 5pm , Disp: , Rfl:     potassium chloride (K-DUR,KLOR-CON) 10 mEq tablet, Take 10 mEq by mouth 3 (three) times a day  , Disp: , Rfl:     sodium chloride 1 g tablet, Take 4 g by mouth 3 (three) times a day, Disp: , Rfl:     thiamine 100 MG tablet, Take 100 mg by mouth daily, Disp: , Rfl:     tiotropium (SPIRIVA) 18 mcg inhalation capsule, Place 18 mcg into inhaler and inhale daily, Disp: , Rfl:     Vitamin D, Cholecalciferol, 1000 UNITS CAPS, Take 1,000 Units by mouth daily  , Disp: , Rfl:     No Known Allergies       Cancer of right main bronchus ([address] )    10/3/2016 Initial Diagnosis     Cancer of right main bronchus ([address] )         10/3/2016 Biopsy     Right endobronchial mass biopsy  Keratinizing squamous cell carcinoma         11/7/2016 - 12/19/2016 Radiation     Right lung/mediastinum to a total dose of 6000 cGy in 30 daily fractions to all gross disease  11/10/2016 - 12/22/2016 Chemotherapy      7 weekly doses of carboplatin plus Taxol with concurrent radiation    Dr John Bourne            ROS:  08/31/18 Reviewed 13 systems:  Presently no headaches, seizures, dizziness, diplopia, dysphagia, hoarseness, chest pain, palpitations,   hemoptysis, abdominal pain, nausea, vomiting, change in bowel habits, melena, hematuria, fever, chills, bleeding, bone pains, skin rash, weight loss, arthritic symptoms, tiredness ,  weakness, numbness,  claudication and gait problem  No frequent infections  Not unusually sensitive to heat or cold  No swelling of the ankles  No swollen glands  Patient is anxious  Other symptoms are in HPI        /62   Pulse 85   Temp (!) 97 3 °F (36 3 °C) (Tympanic)   Resp 16   Ht 5' 8" (1 727 m)   Wt 79 4 kg (175 lb)   SpO2 94%   BMI 26 61 kg/m²      Physical Exam:  Alert, oriented, not in distress, no icterus, no oral thrush, no palpable neck mass, clear lung fields, regular heart rate, abdomen  soft and non tender, no palpable abdominal mass, no ascites, no edema of ankles, no calf tenderness, no focal neurological deficit, no skin rash, no palpable lymphadenopathy, good arterial pulses, no clubbing  Patient is anxious  Performance status 1  He has Port-A-Cath    IMAGING:     IMPRESSION:     Stable right parahilar posttreatment related consolidation      Stable non-FDG avid 9 mm and 2 mm left upper lobe nodules      No definite recurrent, residual or metastatic malignancy within the chest      Stable aortic root aneurysm measuring approximately 5 2 cm         Workstation performed: [id number]      Imaging     CT chest wo contrast (Order #[id number]) on 6/27/2018 - Imaging Information       LABS:  Results for orders placed or performed during the hospital encounter of 01/03/18   Fingerstick Glucose (POCT)   Result Value Ref Range    POC Glucose 79 65 - 140 mg/dl     Labs, Imaging, & Other studies:  Hemoglobin 13 5  WBC 5700  Platelets [id number]  Normal chemistry profile  All pertinent labs and imaging studies were personally reviewed      Reviewed and discussed with patient  Assessment and plan:   In 2016 patient was diagnosed to have 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 and the there was obstruction of right mainstem bronchus and also he had nonmalignant pleural effusion  Patient was given 7 weekly doses of carboplatin plus Taxol with concurrent radiation  He was offered 2 more cycles of regular dose Taxol and carboplatin chemotherapy at 3 week interval but patient declined the offer  There has not been documented disease recurrence  He states he is cutting down on smoking cigarettes  He states he has less cough with small mucoid expectoration and less shortness of breath  No chest pain and no hemoptysis  He is maintaining his weight  He has Port-A-Cath but he is not regular with getting that flushed  He wants Port-A-Cath to be removed  He follows with cardiac surgeon for thoracic aortic aneurysm  Mariajose Queen Physical examination and test results are as recorded and discussed  Lung cancer remains in remission  Goal is cure from lung cancer  Once again I have advised him to please quit smoking cigarettes  Condition discussed and explained  Questions answered  He will come back in 6 months with blood tests and CT scan  Arrangements are being made for him to have Port-A-Cath removed  All discussed in detail  Questions answered  Discussed the importance of eating healthy foods, staying active and health screening test   1  Cancer of right main bronchus (HCC)    - CBC and differential; Future  - CEA; Future  - Comprehensive metabolic panel; Future  - IR consult; Future  - CT chest wo contrast; Future    2  Malignant neoplasm of upper lobe of right lung (HCC)    - CBC and differential; Future  - CEA; Future  - Comprehensive metabolic panel; Future  - IR consult; Future            Patient voiced understanding and agreement in the discussion  Counseling / Coordination of Care   Greater than 50% of total time was spent with the patient and / or family counseling and / or coordination of care

## 2018-09-04 ENCOUNTER — ANESTHESIA EVENT (OUTPATIENT)
Dept: PERIOP | Facility: AMBULARY SURGERY CENTER | Age: 58
End: 2018-09-04

## 2018-09-17 ENCOUNTER — TELEPHONE (OUTPATIENT)
Dept: HEMATOLOGY ONCOLOGY | Facility: CLINIC | Age: 58
End: 2018-09-17

## 2018-09-17 NOTE — TELEPHONE ENCOUNTER
Ayaz Frederick from IR called  Pt scheduled for port removal at [facility] tomorrow and looking for authorization  States they want to cancel procedure  Transferred to [facility]

## 2018-09-17 NOTE — TELEPHONE ENCOUNTER
Calls asking if prior auth was done for his port removal tomorrow  Email sent to oncology finance group to please call

## 2018-09-18 ENCOUNTER — HOSPITAL ENCOUNTER (OUTPATIENT)
Facility: AMBULARY SURGERY CENTER | Age: 58
Setting detail: OUTPATIENT SURGERY
Discharge: HOME | End: 2018-09-18
Attending: RADIOLOGY | Admitting: RADIOLOGY
Payer: COMMERCIAL

## 2018-09-18 ENCOUNTER — ANESTHESIA (OUTPATIENT)
Dept: PERIOP | Facility: AMBULARY SURGERY CENTER | Age: 58
End: 2018-09-18

## 2018-09-18 ENCOUNTER — ANESTHESIA EVENT (OUTPATIENT)
Dept: PERIOP | Facility: AMBULARY SURGERY CENTER | Age: 58
End: 2018-09-18
Payer: COMMERCIAL

## 2018-09-18 ENCOUNTER — ANESTHESIA (OUTPATIENT)
Dept: PERIOP | Facility: AMBULARY SURGERY CENTER | Age: 58
End: 2018-09-18
Payer: COMMERCIAL

## 2018-09-18 VITALS
WEIGHT: 172 LBS | DIASTOLIC BLOOD PRESSURE: 59 MMHG | HEIGHT: 69 IN | TEMPERATURE: 98.1 F | RESPIRATION RATE: 16 BRPM | BODY MASS INDEX: 25.48 KG/M2 | OXYGEN SATURATION: 94 % | SYSTOLIC BLOOD PRESSURE: 115 MMHG | HEART RATE: 63 BPM

## 2018-09-18 PROCEDURE — 36590 REMOVAL TUNNELED CV CATH: CPT | Performed by: RADIOLOGY

## 2018-09-18 RX ORDER — MAGNESIUM HYDROXIDE 1200 MG/15ML
LIQUID ORAL AS NEEDED
Status: DISCONTINUED | OUTPATIENT
Start: 2018-09-18 | End: 2018-09-18 | Stop reason: HOSPADM

## 2018-09-18 RX ORDER — MIDAZOLAM HYDROCHLORIDE 1 MG/ML
INJECTION INTRAMUSCULAR; INTRAVENOUS AS NEEDED
Status: DISCONTINUED | OUTPATIENT
Start: 2018-09-18 | End: 2018-09-18 | Stop reason: SURG

## 2018-09-18 RX ORDER — FENTANYL CITRATE 50 UG/ML
INJECTION, SOLUTION INTRAMUSCULAR; INTRAVENOUS AS NEEDED
Status: DISCONTINUED | OUTPATIENT
Start: 2018-09-18 | End: 2018-09-18 | Stop reason: SURG

## 2018-09-18 RX ORDER — LIDOCAINE HYDROCHLORIDE AND EPINEPHRINE 10; 10 MG/ML; UG/ML
INJECTION, SOLUTION INFILTRATION; PERINEURAL AS NEEDED
Status: DISCONTINUED | OUTPATIENT
Start: 2018-09-18 | End: 2018-09-18 | Stop reason: HOSPADM

## 2018-09-18 RX ORDER — SODIUM CHLORIDE 9 MG/ML
INJECTION, SOLUTION INTRAVENOUS CONTINUOUS PRN
Status: DISCONTINUED | OUTPATIENT
Start: 2018-09-18 | End: 2018-09-18 | Stop reason: SURG

## 2018-09-18 RX ADMIN — FENTANYL CITRATE 50 MCG: 50 INJECTION, SOLUTION INTRAMUSCULAR; INTRAVENOUS at 09:34

## 2018-09-18 RX ADMIN — MIDAZOLAM HYDROCHLORIDE 1 MG: 1 INJECTION, SOLUTION INTRAMUSCULAR; INTRAVENOUS at 09:31

## 2018-09-18 RX ADMIN — MIDAZOLAM HYDROCHLORIDE 1 MG: 1 INJECTION, SOLUTION INTRAMUSCULAR; INTRAVENOUS at 09:34

## 2018-09-18 RX ADMIN — FENTANYL CITRATE 50 MCG: 50 INJECTION, SOLUTION INTRAMUSCULAR; INTRAVENOUS at 09:31

## 2018-09-18 RX ADMIN — SODIUM CHLORIDE: 0.9 INJECTION, SOLUTION INTRAVENOUS at 09:31

## 2018-09-18 NOTE — INTERVAL H&P NOTE
H & P reviewed after examining the patient and I find no changes in the patient condition since the H & P has been written  Patient re-evaluated   Accept as history and physical     Inga Ndiaye, DO/September 18, 2018/9:18 AM

## 2018-09-18 NOTE — ANESTHESIA PREPROCEDURE EVALUATION
Review of Systems/Medical History  Patient summary reviewed        Cardiovascular  Exercise tolerance (METS): >4,     Pulmonary  Smoker cigarette smoker  , Tobacco cessation counseling given , COPD moderate- medication dependent ,   Comment: Lung cancer     GI/Hepatic  Negative GI/hepatic ROS          Negative  ROS        Endo/Other  History of thyroid disease , hypothyroidism,      GYN       Hematology  Negative hematology ROS      Musculoskeletal  Negative musculoskeletal ROS        Neurology  Negative neurology ROS      Psychology   Anxiety, Depression ,              Physical Exam    Airway    Mallampati score: II  TM Distance: >3 FB  Neck ROM: full     Dental   Comment: Very poor dental condition with multiple missing, broken teeth ,     Cardiovascular  Rhythm: regular, Rate: normal,     Pulmonary  Breath sounds clear to auscultation,     Other Findings        Anesthesia Plan  ASA Score- 3     Anesthesia Type- IV sedation with anesthesia with ASA Monitors  Additional Monitors:   Airway Plan:         Plan Factors- Patient instructed to abstain from smoking on day of procedure  Patient did not smoke on day of surgery  Induction- intravenous  Postoperative Plan-     Informed Consent- Anesthetic plan and risks discussed with patient  I personally reviewed this patient with the CRNA  Discussed and agreed on the Anesthesia Plan with the CRNA  Adwoa Llanes

## 2018-09-18 NOTE — PROGRESS NOTES
Patient arrived to [facility] for port removal     The procedure and risks were discussed with the patient  All questions were answered  Informed consent was obtained

## 2018-09-18 NOTE — OP NOTE
Left chest port removal 9/18/2018    History-    Lung carcinoma    Procedure-     Following obtaining informed consent, the patient was prepped and   draped in the usual sterile fashion  The region of the left anterior   chest port was anesthetized using 1% lidocaine  A incision was made   over the previously placed chest port with a 15 blade scalpel  Using   blunt and sharp dissection the port was removed in its entirety  The   port pocket was flushed with copious saline  The incision was   approximated using 3-0 Vicryl and Histoacryl  The patient tolerated   the procedure well without apparent immediate complication  The patient   left the IR department in unchanged condition  Dr Tessa Garcia performed and   directly supervised the entire procedure  Findings-       Fluoroscopic spot image of the previously placed left  chest port   demonstrates      The skin overlying the chest wall is nonerythematous  There is no   evidence of purulent exudate  The port was removed in its entirety   without complication  Impression-       Successful removal of left chest port

## 2018-09-18 NOTE — ANESTHESIA POSTPROCEDURE EVALUATION
Post-Op Assessment Note      CV Status:  Stable    Mental Status:  Alert    Hydration Status:  Euvolemic    PONV Controlled:  Controlled    Airway Patency:  Patent    Post Op Vitals Reviewed: Yes          Staff: CRNA           BP      Temp      Pulse     Resp      SpO2

## 2018-09-18 NOTE — DISCHARGE INSTRUCTIONS
Implanted Venous Access Port Removal    WHAT YOU NEED TO KNOW:   An implanted venous access port is a device used to give treatments and take blood  It may also be called a central venous access device (CVAD)  The port is a small container that is placed under your skin, usually in your upper chest  A port can also be placed in your arm or abdomen  The port is attached to a catheter that enters a large vein  DISCHARGE INSTRUCTIONS:   Resume your normal diet  Small sips of flat soda will help with mild nausea  Prevent an infection:     Wash your hands often  Use soap and water  Clean your hands before and  after you care for your incision  Check your skin for infection every day  Look for redness, swelling, or fluid oozing from the incision site  Dressing may come off in 24 hours  Medical glue will peel off on its own in 5 to 10 days  You may shower 24 hours after procedure  Follow up with your healthcare provider as directed  Write down your questions so you remember to ask them during your visits  Activity:  You may return to your daily activities when the area heals  Contact Interventional Radiology at [phone] [place] PATIENTS: Contact Interventional Radiology at [phone]) Keira Barton PATIENTS: Contact Interventional Radiology at [phone]) if:     You have a fever  You have persistent nausea  Your inciscion site is red, swollen, or draining pus  You have questions or concerns about your condition or care  Seek care immediately or call 911 if:  Blood soaks through your bandage  The skin over or around your incision breaks open  Your heart is jumping or fluttering  You have a headache, blurred vision, and feel confused  You have pain in your arm, neck, shoulder, or chest     You have trouble breathing that is getting worse over time      Fluticasone/Salmeterol (By breathing)   Fluticasone Propionate (ksmu-MGN-m-sone RMKO-eml-yh-sherwin), Salmeterol Xinafoate (sal-ME-ter-ol pcx-JMX-qv-ate)  Treats and prevents symptoms of asthma and chronic obstructive pulmonary disease (COPD)  This medicine contains a corticosteroid  Brand Name(s): Advair Diskus 100/50, Advair Diskus 250/50, Advair Diskus 500/50, Advair /21, Advair /21, Advair HFA 45/21   There may be other brand names for this medicine  When This Medicine Should Not Be Used: This medicine is not right for everyone  Do not use it if you had an allergic reaction to salmeterol, fluticasone, or milk proteins  Do not use this medicine to treat an asthma attack or a COPD flare-up  How to Use This Medicine:   Liquid Under Pressure, Powder Under Pressure, Disk  · Take your medicine as directed  Your dose may need to be changed several times to find what works best for you  Never use more medicine than your doctor prescribed  · This medicine should come with a Medication Guide  Ask your pharmacist for a copy if you do not have one  · Advair Diskus®:   ¨ The powder medicine is held in a foil blister package inside the Diskus® inhaler  The Diskus® pokes a hole in each blister one at a time when you push the lever  ¨ Do not use a spacer with the Diskus®  ¨ Keep the Diskus® inhaler closed when you are not using it  Keep the inhaler dry at all times  Do not blow into it  ¨ Before you inhale your dose, breathe out fully, trying to get as much air out of the lungs as possible  Hold the Diskus® level and away from your mouth  ¨ Open your mouth and breathe in quickly and deeply through the Diskus®  Do not breathe in through your nose  ¨ Remove the Diskus® from your mouth  Hold your breath for about 10 seconds or as long as possible, then breathe out slowly  ¨ Throw the inhaler away 1 month after you remove it from the foil pouch or when the dose indicator reaches 0   · Advair® HFA:   ¨ You will use this medicine with a device called a metered-dose inhaler   The inhaler fits on the medicine canister and turns the medicine into a fine spray that you breathe in through your mouth and to your lungs  You may be told to use a spacer, which is a tube that is placed between the inhaler and your mouth  Your caregiver will show you how to use your inhaler and the spacer (if needed)  ¨ Shake the inhaler well just before each use  Avoid spraying this medicine into your eyes  ¨ Prime the inhaler before you use it for the first time  Shake well, then spray into the air, away from your face  Shake and spray a total of 4 times  If the inhaler has been dropped or has not been used for more than 4 weeks, do 2 sprays into the air before use  ¨ To inhale this medicine, breathe out fully, trying to get as much air out of the lungs as possible  Put the mouthpiece just in front of your mouth with the canister upright  ¨ Open your mouth and breathe in slowly and deeply (like yawning), and at the same time firmly press down on the top of the canister once  ¨ Hold your breath for about 5 to 10 seconds, and then breathe out slowly  ¨ If you are supposed to use more than one puff, wait 1 to 2 minutes before inhaling the second puff  Repeat these steps for the next puff, starting with shaking the inhaler  ¨ Throw away the inhaler when the dose counter reaches 000  · Rinse your mouth out with water after you inhale your medicine  Do not swallow the water  · Airduo Respiclick®:   ¨ You will use this medicine with a device called a metered-dose inhaler  The inhaler fits on the medicine canister and turns the medicine into a fine spray that you breathe in through your mouth and to your lungs  You may be told to use a spacer, which is a tube that is placed between the inhaler and your mouth  Your caregiver will show you how to use your inhaler and the spacer (if needed)  ¨ Take the inhaler out of the pouch before you use it for the first time  ¨ Do not use the inhaler for this medicine with any other medicine    ¨ This medicine does not require priming  Do not use it with a spacer or volume holding chamber  ¨ Hold the inhaler upright and open the yellow cap all the way until it clicks  Do not open the yellow cap until you are ready to take a dose of this medicine  ¨ To inhale this medicine, breathe out fully, trying to get as much air out of the lungs as possible  Put the mouthpiece just in front of your mouth with the canister upright  ¨ Open your mouth and breathe in slowly and deeply (like yawning), and at the same time firmly press down on the top of the canister once  ¨ Hold your breath for about 5 to 10 seconds, and then breathe out slowly  ¨ Close the yellow cap after each inhalation  ¨ Rinse your mouth with water without swallowing after each inhalation  ¨ Keep the inhaler dry and clean at all times  Gently wipe the mouthpiece with a dry cloth or tissue as needed  ¨ The inhaler has a window that shows the number of doses remaining  This tells you when you are getting low on medicine  The doses counting down from 20 to 0 will show up in red to remind you to refill your prescription  Throw away the inhaler when the dose counter displays 0, 30 days after opening the pouch  · Missed dose: Take a dose as soon as you remember  If it is almost time for your next dose, wait until then and take a regular dose  Do not take extra medicine to make up for a missed dose  If you miss a dose of Airduo Respiclick®, skip the missed dose and go back to your regular dosing schedule  Do not double doses  · Keep the medicine in the foil pouch until you are ready to use it  Store at room temperature, away from heat and direct light  Do not freeze  · Store the canister at room temperature, away from heat and direct light  Do not freeze  Do not keep this medicine inside a car where it could be exposed to extreme heat or cold  Do not poke holes in the canister or throw it into a fire, even if the canister is empty    · Ask your pharmacist, doctor, or health caregiver about the best way to dispose of the used medicine container and any leftover medicine  You will also need to throw away old medicine after the expiration date has passed  Drugs and Foods to Avoid:   Ask your doctor or pharmacist before using any other medicine, including over-the-counter medicines, vitamins, and herbal products  · Some foods and medicines can affect how fluticasone/salmeterol works  Tell your doctor if you are using any of the following:  ¨ Nefazodone  ¨ Blood pressure medicine  ¨ Diuretic (water pill)  ¨ Medicine to treat depression or an MAO inhibitor within the past 2 weeks  ¨ Medicine to treat HIV or AIDS (including atazanavir, indinavir, nelfinavir, ritonavir, saquinavir)  ¨ Medicine to treat an infection (including clarithromycin, itraconazole, ketoconazole, telithromycin)  ¨ Seizure medicine  Warnings While Using This Medicine:   · Tell your doctor if you are pregnant or breastfeeding, or if you have liver disease, cataracts, diabetes, glaucoma, heart disease, high blood pressure, heart rhythm problems, thyroid problems, seizures, or osteoporosis  Tell your doctor about any immune system problems or infections, including herpes simplex in your eye, tuberculosis, or parasites  Tell your doctor if you have been exposed to chickenpox or measles  · This medicine may cause the following problems:  ¨ Higher risk of asthma-related hospital stays and death  ¨ Increased trouble breathing right after use (paradoxical bronchospasm)  ¨ Higher risk of pneumonia in people who have COPD  ¨ Higher risk of infection, including fungus infection in the mouth (thrush)  ¨ Low bone mineral density, which may lead to osteoporosis  ¨ Cataracts, glaucoma, or other vision problems  ¨ Slow growth in children  ¨ Problems with the adrenal glands  · This medicine will not stop an asthma attack that has already started   You should have another medicine to use in case of an acute asthma attack or for sudden COPD flare-ups  · If any of your asthma medicines do not seem to be working as well as usual, call your doctor right away  Do not change your doses or stop using your medicines without asking your doctor  · Tell any doctor or dentist who treats you that you are using this medicine  · Call your doctor if your symptoms do not improve or if they get worse  · Your doctor will check your progress and the effects of this medicine at regular visits  Keep all appointments  · Keep all medicine out of the reach of children  Never share your medicine with anyone  Possible Side Effects While Using This Medicine:   Call your doctor right away if you notice any of these side effects:  · Allergic reaction: Itching or hives, swelling in your face or hands, swelling or tingling in your mouth or throat, chest tightness, trouble breathing  · Chest pain, trouble breathing  · Dry mouth, increased thirst, muscle cramps, nausea or vomiting, uneven heartbeat  · Eye pain or trouble seeing  · Fast, pounding, or uneven heartbeat  · Fever, chills, cough, runny or stuffy nose, sore throat, body aches  · Tremors, nervousness, or shaking  · Unusual tiredness or weakness  · Worse breathing problems  If you notice these less serious side effects, talk with your doctor:   · Headache  · Hoarseness, voice changes, sore throat  · Runny or stuffy nose  · White patches inside the mouth or throat  If you notice other side effects that you think are caused by this medicine, tell your doctor  Call your doctor for medical advice about side effects  You may report side effects to FDA at 7-506-FDA-7043  © 2017 [employer] Information is for End User's use only and may not be sold, redistributed or otherwise used for commercial purposes  The above information is an  only  It is not intended as medical advice for individual conditions or treatments   Talk to your doctor, nurse or pharmacist before following any medical regimen to see if it is safe and effective for you

## 2018-09-18 NOTE — H&P (VIEW-ONLY)
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 and the there was obstruction of right mainstem bronchus and also he had nonmalignant pleural effusion  Patient was given 7 weekly doses of carboplatin plus Taxol with concurrent radiation  He was offered 2 more cycles of regular dose Taxol and carboplatin chemotherapy at 3 week interval but patient declined the offer  There has not been documented disease recurrence  He states he is cutting down on smoking cigarettes  He states he has less cough with small mucoid expectoration and less shortness of breath  No chest pain and no hemoptysis  He is maintaining his weight  He has Port-A-Cath but he is not regular with getting that flushed  He wants Port-A-Cath to be removed    He follows with cardiac surgeon for thoracic aortic aneurysm                Current Outpatient Prescriptions:     acetaminophen (TYLENOL) 650 mg CR tablet, Take 650 mg by mouth every 6 (six) hours as needed for mild pain  , Disp: , Rfl:     albuterol (2 5 mg/3 mL) 0 083 % nebulizer solution, Take 2 5 mg by nebulization every 4 (four) hours as needed for wheezing  , Disp: , Rfl:     bisacodyl (FLEET) 10 MG/30ML ENEM, Insert 10 mg into the rectum as needed for constipation, Disp: , Rfl:     fluticasone-salmeterol (ADVAIR) 250-50 mcg/dose inhaler, Inhale 1 puff every 12 (twelve) hours, Disp: , Rfl:     folic acid (FOLVITE) 1 mg tablet, Take 800 mcg by mouth daily  , Disp: , Rfl:     gabapentin (NEURONTIN) 400 mg capsule, Take 400 mg by mouth 2 (two) times a day  , Disp: , Rfl:     levothyroxine 200 mcg tablet, Take 212 mcg by mouth daily  , Disp: , Rfl:     Linaclotide (LINZESS) 290 MCG CAPS, Take 1 capsule by mouth daily as needed, Disp: , Rfl:     LORazepam (ATIVAN) 1 mg tablet, Take 1 mg by mouth 2 (two) times a day, Disp: , Rfl:     magnesium hydroxide (MILK OF MAGNESIA) 400 mg/5 mL oral suspension, Take 30 mL by mouth daily as needed for constipation  , Disp: , Rfl:     metoprolol succinate (TOPROL-XL) 25 mg 24 hr tablet, Take 25 mg by mouth daily, Disp: , Rfl:     mirtazapine (REMERON) 30 mg tablet, Take 30 mg by mouth daily at bedtime, Disp: , Rfl:     phenytoin extended (DILANTIN) 200 MG ER capsule, Take 100 mg by mouth 2 (two) times a day And 250mg at 5pm , Disp: , Rfl:     potassium chloride (K-DUR,KLOR-CON) 10 mEq tablet, Take 10 mEq by mouth 3 (three) times a day  , Disp: , Rfl:     sodium chloride 1 g tablet, Take 4 g by mouth 3 (three) times a day, Disp: , Rfl:     thiamine 100 MG tablet, Take 100 mg by mouth daily, Disp: , Rfl:     tiotropium (SPIRIVA) 18 mcg inhalation capsule, Place 18 mcg into inhaler and inhale daily, Disp: , Rfl:     Vitamin D, Cholecalciferol, 1000 UNITS CAPS, Take 1,000 Units by mouth daily  , Disp: , Rfl:     No Known Allergies       Cancer of right main bronchus ([address] )    10/3/2016 Initial Diagnosis     Cancer of right main [facility]us [facility] )         10/3/2016 Biopsy     Right endobronchial mass biopsy  Keratinizing squamous cell carcinoma         11/7/2016 - 12/19/2016 Radiation     Right lung/mediastinum to a total dose of 6000 cGy in 30 daily fractions to all gross disease  11/10/2016 - 12/22/2016 Chemotherapy      7 weekly doses of carboplatin plus Taxol with concurrent radiation  Dr Daphne Tanner            ROS:  08/31/18 Reviewed 13 systems:  Presently no headaches, seizures, dizziness, diplopia, dysphagia, hoarseness, chest pain, palpitations,   hemoptysis, abdominal pain, nausea, vomiting, change in bowel habits, melena, hematuria, fever, chills, bleeding, bone pains, skin rash, weight loss, arthritic symptoms, tiredness ,  weakness, numbness,  claudication and gait problem  No frequent infections  Not unusually sensitive to heat or cold  No swelling of the ankles  No swollen glands  Patient is anxious   Other symptoms are in HPI        /62   Pulse 85   Temp (!) 97 3 °F (36 3 °C) (Tympanic)   Resp 16   Ht 5' 8" (1 727 m)   Wt 79 4 kg (175 lb)   SpO2 94%   BMI 26 61 kg/m²     Physical Exam:  Alert, oriented, not in distress, no icterus, no oral thrush, no palpable neck mass, clear lung fields, regular heart rate, abdomen  soft and non tender, no palpable abdominal mass, no ascites, no edema of ankles, no calf tenderness, no focal neurological deficit, no skin rash, no palpable lymphadenopathy, good arterial pulses, no clubbing  Patient is anxious  Performance status 1  He has Port-A-Cath    IMAGING:     IMPRESSION:     Stable right parahilar posttreatment related consolidation      Stable non-FDG avid 9 mm and 2 mm left upper lobe nodules      No definite recurrent, residual or metastatic malignancy within the chest      Stable aortic root aneurysm measuring approximately 5 2 cm         Workstation performed: [id number]      Imaging     CT chest wo contrast (Order #[id number]) on 6/27/2018 - Imaging Information       LABS:  Results for orders placed or performed during the hospital encounter of 01/03/18   Fingerstick Glucose (POCT)   Result Value Ref Range    POC Glucose 79 65 - 140 mg/dl     Labs, Imaging, & Other studies:  Hemoglobin 13 5  WBC 5700  Platelets 691985  Normal chemistry profile  All pertinent labs and imaging studies were personally reviewed      Reviewed and discussed with patient  Assessment and plan: In 2016 patient was diagnosed to have 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 and the there was obstruction of right mainstem bronchus and also he had nonmalignant pleural effusion  Patient was given 7 weekly doses of carboplatin plus Taxol with concurrent radiation  He was offered 2 more cycles of regular dose Taxol and carboplatin chemotherapy at 3 week interval but patient declined the offer  There has not been documented disease recurrence  He states he is cutting down on smoking cigarettes    He states he has less cough with small mucoid expectoration and less shortness of breath  No chest pain and no hemoptysis  He is maintaining his weight  He has Port-A-Cath but he is not regular with getting that flushed  He wants Port-A-Cath to be removed  He follows with cardiac surgeon for thoracic aortic aneurysm  Fay Llamas Physical examination and test results are as recorded and discussed  Lung cancer remains in remission  Goal is cure from lung cancer  Once again I have advised him to please quit smoking cigarettes  Condition discussed and explained  Questions answered  He will come back in 6 months with blood tests and CT scan  Arrangements are being made for him to have Port-A-Cath removed  All discussed in detail  Questions answered  Discussed the importance of eating healthy foods, staying active and health screening test   1  Cancer of right main bronchus (HCC)    - CBC and differential; Future  - CEA; Future  - Comprehensive metabolic panel; Future  - IR consult; Future  - CT chest wo contrast; Future    2  Malignant neoplasm of upper lobe of right lung (HCC)    - CBC and differential; Future  - CEA; Future  - Comprehensive metabolic panel; Future  - IR consult; Future            Patient voiced understanding and agreement in the discussion  Counseling / Coordination of Care   Greater than 50% of total time was spent with the patient and / or family counseling and / or coordination of care

## 2019-01-07 ENCOUNTER — RADIATION ONCOLOGY FOLLOW-UP (OUTPATIENT)
Dept: RADIATION ONCOLOGY | Facility: HOSPITAL | Age: 59
End: 2019-01-07
Attending: RADIOLOGY
Payer: COMMERCIAL

## 2019-01-07 VITALS
SYSTOLIC BLOOD PRESSURE: 120 MMHG | HEIGHT: 69 IN | WEIGHT: 174.6 LBS | DIASTOLIC BLOOD PRESSURE: 54 MMHG | OXYGEN SATURATION: 91 % | HEART RATE: 68 BPM | BODY MASS INDEX: 25.86 KG/M2 | TEMPERATURE: 97.6 F

## 2019-01-07 DIAGNOSIS — C34.01 CANCER OF RIGHT MAIN BRONCHUS (HCC): Primary | ICD-10-CM

## 2019-01-07 PROCEDURE — 99214 OFFICE O/P EST MOD 30 MIN: CPT | Performed by: RADIOLOGY

## 2019-01-07 NOTE — PROGRESS NOTES
Follow-up - Radiation Oncology   Red Van 1960 62 y o  male [id number]      History of Present Illness   Cancer Staging  No matching staging information was found for the patient  Red Van returns today for routine scheduled follow-up visit approximately 2 years status post completion of treatment  Overall he feels well  He does have mild stable nonproductive cough and continues to smoke  He denies any hemoptysis or chest pain  He denies any persistent headache or new onset focal musculoskeletal aches or pains  Interval History  Last seen on 7/13/18 8/31/18 Dr Favio Xiong cancer remains in remission  9/18/18 Port removed    2/26/19 CT chest scheduled    3/5/19 Dr Kevin Martin of right main bronchus ([facility] )    10/3/2016 Initial Diagnosis     Cancer of right main bronchus ([facility] )         10/3/2016 Biopsy     Right endobronchial mass biopsy  Keratinizing squamous cell carcinoma         11/7/2016 - 12/19/2016 Radiation     Right lung/mediastinum to a total dose of 6000 cGy in 30 daily fractions to all gross disease  11/10/2016 - 12/22/2016 Chemotherapy      7 weekly doses of carboplatin plus Taxol with concurrent radiation  Dr Deondre Ballesteros            Past Medical History:   Diagnosis Date    Alcohol abuse     Anxiety     [facility]     COPD (chronic obstructive pulmonary disease) (HCC)     Delirium     Encephalopathy     Epile[facility]     History of chemotherapy     History of radiation therapy     Hypo-osmolality and hyponatremia     Hypokalemia     Hypothyroid     [facility]     Lyme disease     Major depression      Past Surgical History:   Procedure Laterality Date    BRONCHOSCOPY N/A 10/3/2016    Procedure: BRONCHOSCOPY FLEXIBLE;  Surgeon: Radha Tesfaye DO;  [facility];   Service:     HAND SURGERY      PELVIC FRACTURE SURGERY      REMOVAL VENOUS PORT (PORT-A-CATH) Left 9/18/2018 Procedure: REMOVAL VENOUS PORT (PORT-A-CATH)IR;  Surgeon: Paul Park DO;  [facility];  Service: Interventional Radiology       Social History   History   Alcohol Use No     History   Drug use: Unknown     History   Smoking Status    Current Every Day Smoker    Types: Cigarettes   Smokeless Tobacco    Never Used     Comment: 3 cigarettes day         Meds/Allergies     Current Outpatient Prescriptions:     acetaminophen (TYLENOL) 650 mg CR tablet, Take 650 mg by mouth every 6 (six) hours as needed for mild pain  , Disp: , Rfl:     fluticasone-salmeterol (ADVAIR) 250-50 mcg/dose inhaler, Inhale 1 puff every 12 (twelve) hours, Disp: , Rfl:     gabapentin (NEURONTIN) 400 mg capsule, Take 400 mg by mouth 2 (two) times a day  , Disp: , Rfl:     levothyroxine 200 mcg tablet, Take 212 mcg by mouth daily  , Disp: , Rfl:     magnesium hydroxide (MILK OF MAGNESIA) 400 mg/5 mL oral suspension, Take 30 mL by mouth daily as needed for constipation  , Disp: , Rfl:     metoprolol succinate (TOPROL-XL) 25 mg 24 hr tablet, Take 25 mg by mouth daily, Disp: , Rfl:     phenytoin extended (DILANTIN) 200 MG ER capsule, Take 100 mg by mouth 2 (two) times a day And 250mg at 5pm , Disp: , Rfl:     sodium chloride 1 g tablet, Take 4 g by mouth 3 (three) times a day, Disp: , Rfl:     tiotropium (SPIRIVA) 18 mcg inhalation capsule, Place 18 mcg into inhaler and inhale daily, Disp: , Rfl:     Vitamin D, Cholecalciferol, 1000 UNITS CAPS, Take 1,000 Units by mouth daily  , Disp: , Rfl:     folic acid (FOLVITE) 1 mg tablet, Take 800 mcg by mouth daily  , Disp: , Rfl:   No Known Allergies      Review of Systems   Review of Systems   Constitutional: Negative  HENT: Positive for hearing loss (Left side)  Eyes: Negative  Respiratory: Positive for cough (Chronic, productive for yellow phlegm) and shortness of breath ([place])  Cardiovascular: Negative  Gastrointestinal: Negative  Endocrine: Negative  Genitourinary: Negative  Musculoskeletal: Negative  Skin: Negative  Allergic/Immunologic: Negative  Neurological: Positive for seizures (history of epilepsy)  Hematological: Negative  Psychiatric/Behavioral: The patient is nervous/anxious  Screening  Tobacco  Current tobacco user: yes  If yes, brief counseling provided: No    Hypertension  Hypertension screening performed: yes  Normotensive:  yes  If no, referred to PCP: no    Depression Screening  Screened for depression using PHQ-2: yes    Screened for depression using PHQ-9:  yes, no  Screening positive or negative:  negative  If score >4, was any of the following actions taken? Additional evaluation for depression, suicide risk assesment, referral to PCP or psychiatry, medication started:  no    Advanced Care Planning for Patients >65 years  Advanced Care Planning Discussed:  n/a  Patient named surrogate decision maker or care plan in chart: n/a        OBJECTIVE:   /54 (BP Location: Left arm, Patient Position: Sitting, Cuff Size: Standard)   Pulse 68   Temp 97 6 °F (36 4 °C) (Oral)   Ht 5' 9" (1 753 m)   Wt 79 2 kg (174 lb 9 6 oz)   SpO2 91%   BMI 25 78 kg/m²   Pain Assessment:  0  Karnofsky: 90 - Able to carry on normal activity; minor signs or symptoms of disease     Physical Exam   The patient presents today no apparent distress  Sclera anicteric  No cervical or supraclavicular lymphadenopathy  Lungs clear to auscultation with the exception of decreased breath sounds on the right in the area of known lung fibrosis post radiation  Normal S1-S2 regular rate and rhythm  Normal speech  Normal affect  Normal gait  RESULTS    Lab Results: No results found for this or any previous visit (from the past 672 hour(s))  Imaging Studies:No results found  Assessment/Plan:  No orders of the defined types were placed in this encounter         Anya Fowler has done well in follow-up, now over 2 years out from completion of treatment, with no clinical evidence of progressive or recurrent disease  He will have repeat imaging next month and follow up with Dr Connolly Staff shortly thereafter  He will return to our department in 6 months for routine follow-up  Catarino Carroll MD  1/7/2019,11:41 AM    Portions of the record may have been created with voice recognition software   Occasional wrong word or "sound a like" substitutions may have occurred due to the inherent limitations of voice recognition software   Read the chart carefully and recognize, using context, where substitutions have occurred

## 2019-01-07 NOTE — PROGRESS NOTES
Escobar Steiner  1960   Mr Jordon Zheng is a 62 y o  male       Chief Complaint   Patient presents with    Follow-up   Cancer Staging  No matching staging information was found for the patient  [facility] main [facility] ([facility] )    10/3/2016 Initial Diagnosis     Cancer of right main bronchus ([facility] )         10/3/2016 Biopsy     Right endobronchial mass biopsy  Keratinizing squamous cell carcinoma         11/7/2016 - 12/19/2016 Radiation     Right lung/mediastinum to a total dose of 6000 cGy in 30 daily fractions to all gross disease  11/10/2016 - 12/22/2016 Chemotherapy      7 weekly doses of carboplatin plus Taxol with concurrent radiation  Dr Natividad Pedro Trial: no    Interval History  Last seen on 7/13/18 8/31/18 Dr Halima Acosta cancer remains in remission  9/18/18 Port removed    2/26/19 CT chest scheduled    3/5/19 Dr Brenda Gilmore    Screening  Tobacco  Current tobacco user: yes  If yes, brief counseling provided: No    Hypertension  Hypertension screening performed: yes  Normotensive:  yes  If no, referred to PCP: no    Depression Screening  Screened for depression using PHQ-2: yes    Screened for depression using PHQ-9:  yes, no  Screening positive or negative:  negative  If score >4, was any of the following actions taken?    Additional evaluation for depression, suicide risk assesment, referral to PCP or psychiatry, medication started:  no    Advanced Care Planning for Patients >65 years  Advanced Care Planning Discussed:  n/a  Patient named surrogate decision maker or care plan in chart: n/a      Health Maintenance   Topic Date Due    Hepatitis C Screening  1960    CRC Screening: Colonoscopy  1960    Pneumococcal PPSV23 Highest Risk Adult (1 of 3 - PCV13) 09/04/1979    DTaP,Tdap,and Td Vaccines (1 - Tdap) 09/04/1981    INFLUENZA VACCINE  07/01/2018       Patient Active Problem List   Diagnosis    [facility] disease) ([facility]    Epilepsy ([facility] )    Hypothyroid    Lyme disease    Major depression    Alcohol abuse    Collapse of right lung    Pleural effusion    Lung mass    Cancer of right main bronchus (HCC)    Malignant neoplasm of upper lobe of right lung (HCC)    Thoracic aortic aneurysm without rupture (HCC)     Past Medical History:   Diagnosis Date    Alcohol abuse     Anxiety     Atrial fibrillation (HCC)     COPD (chronic obstructive pulmonary disease) (HCC)     Delirium     Encephalopathy     Epilepsy ([facility] )     History of chemotherapy     History of radiation therapy     Hypo-osmolality and hyponatremia     Hypokalemia     Hypothyroid     Lung cancer ([facility] )     Lyme disease     Major depression      Past Surgical History:   Procedure Laterality Date    BRONCHOSCOPY N/A 10/3/2016    Procedure: BRONCHOSCOPY FLEXIBLE;  Surgeon: Melly Hidalgo DO;  [facility]; Service:     HAND SURGERY      PELVIC FRACTURE SURGERY      REMOVAL VENOUS PORT (PORT-A-CATH) Left 9/18/2018    Procedure: REMOVAL VENOUS PORT (PORT-A-CATH)IR;  Surgeon: Zhen Haskins DO;  [facility];  Service: Interventional Radiology     Family History   Problem Relation Age of Onset    Diabetes Mother     Lung cancer Father      Social History     Social History    Marital status:      Spouse name: N/A    Number of children: N/A    Years of education: N/A     Occupational History    Not on file       Social History Main Topics    Smoking status: Current Every Day Smoker     Types: Cigarettes    Smokeless tobacco: Never Used      Comment: 3 cigarettes day    Alcohol use No    Drug use: Unknown    Sexual activity: Not on file     Other Topics Concern    Not on file     Social History Narrative    No narrative on file       Current Outpatient Prescriptions:     acetaminophen (TYLENOL) 650 mg CR tablet, Take 650 mg by mouth every 6 (six) hours as needed for mild pain  , Disp: , Rfl:     fluticasone-salmeterol (ADVAIR) 250-50 mcg/dose inhaler, Inhale 1 puff every 12 (twelve) hours, Disp: , Rfl:     gabapentin (NEURONTIN) 400 mg capsule, Take 400 mg by mouth 2 (two) times a day  , Disp: , Rfl:     levothyroxine 200 mcg tablet, Take 212 mcg by mouth daily  , Disp: , Rfl:     magnesium hydroxide (MILK OF MAGNESIA) 400 mg/5 mL oral suspension, Take 30 mL by mouth daily as needed for constipation  , Disp: , Rfl:     metoprolol succinate (TOPROL-XL) 25 mg 24 hr tablet, Take 25 mg by mouth daily, Disp: , Rfl:     phenytoin extended (DILANTIN) 200 MG ER capsule, Take 100 mg by mouth 2 (two) times a day And 250mg at 5pm , Disp: , Rfl:     sodium chloride 1 g tablet, Take 4 g by mouth 3 (three) times a day, Disp: , Rfl:     tiotropium (SPIRIVA) 18 mcg inhalation capsule, Place 18 mcg into inhaler and inhale daily, Disp: , Rfl:     Vitamin D, Cholecalciferol, 1000 UNITS CAPS, Take 1,000 Units by mouth daily  , Disp: , Rfl:     folic acid (FOLVITE) 1 mg tablet, Take 800 mcg by mouth daily  , Disp: , Rfl:   No Known Allergies    Review of Systems:  Review of Systems   Constitutional: Negative  HENT: Positive for hearing loss (Left side)  Eyes: Negative  Respiratory: Positive for cough (Chronic, productive for yellow phlegm) and shortness of breath ([place])  Cardiovascular: Negative  Gastrointestinal: Negative  Endocrine: Negative  Genitourinary: Negative  Musculoskeletal: Negative  Skin: Negative  Allergic/Immunologic: Negative  Neurological: Positive for seizures (history of epilepsy)  Hematological: Negative  Psychiatric/Behavioral: The patient is nervous/anxious  Vitals:    01/07/19 1101   BP: 120/54   BP Location: Left arm   Patient Position: Sitting   Cuff Size: Standard   Pulse: 68   Temp: 97 6 °F (36 4 °C)   TempSrc: Oral   SpO2: 91%   Weight: 79 2 kg (174 lb 9 6 oz)   Height: 5' 9" (1 753 m)            Imaging:No results found

## 2019-02-21 ENCOUNTER — TELEPHONE (OUTPATIENT)
Dept: HEMATOLOGY ONCOLOGY | Facility: CLINIC | Age: 59
End: 2019-02-21

## 2019-02-21 NOTE — TELEPHONE ENCOUNTER
Left a message with Nisha Espinoza and informed her that Marcin's appt for 3/5/19 needs to be R/S  Nisha Espinoza stated she will give the message to the  and they will contact us to R/S  Patients appt has been cancelled for now

## 2019-02-26 ENCOUNTER — HOSPITAL ENCOUNTER (OUTPATIENT)
Dept: CT IMAGING | Facility: HOSPITAL | Age: 59
Discharge: HOME | End: 2019-02-26
Attending: INTERNAL MEDICINE
Payer: COMMERCIAL

## 2019-02-26 DIAGNOSIS — C34.01 CANCER OF RIGHT MAIN BRONCHUS (HCC): ICD-10-CM

## 2019-02-26 PROCEDURE — 71250 CT THORAX DX C-: CPT

## 2019-03-01 ENCOUNTER — TELEPHONE (OUTPATIENT)
Dept: HEMATOLOGY ONCOLOGY | Facility: MEDICAL CENTER | Age: 59
End: 2019-03-01

## 2019-03-12 ENCOUNTER — OFFICE VISIT (OUTPATIENT)
Dept: HEMATOLOGY ONCOLOGY | Facility: CLINIC | Age: 59
End: 2019-03-12
Payer: COMMERCIAL

## 2019-03-12 VITALS
TEMPERATURE: 98.3 F | BODY MASS INDEX: 26.51 KG/M2 | DIASTOLIC BLOOD PRESSURE: 50 MMHG | WEIGHT: 179 LBS | RESPIRATION RATE: 18 BRPM | SYSTOLIC BLOOD PRESSURE: 116 MMHG | HEIGHT: 69 IN | HEART RATE: 91 BPM

## 2019-03-12 DIAGNOSIS — C34.11 MALIGNANT NEOPLASM OF UPPER LOBE OF RIGHT LUNG (HCC): ICD-10-CM

## 2019-03-12 DIAGNOSIS — C34.01 CANCER OF RIGHT MAIN BRONCHUS (HCC): Primary | ICD-10-CM

## 2019-03-12 PROCEDURE — 99214 OFFICE O/P EST MOD 30 MIN: CPT | Performed by: PHYSICIAN ASSISTANT

## 2019-03-12 NOTE — PROGRESS NOTES
Hematology/Oncology Outpatient Follow-up  Anya Fowler 62 y o  male 1960 [id number]    Date:  3/12/2019      Assessment and Plan:   1  Cancer of right main bronchus ([facility] [address] ), 2  Malignant neoplasm of upper lobe of right lung [facility])  49-year-old male presents for follow-up regarding history of squamous cell carcinoma of the lung  He completed treatment with concurrent chemotherapy and radiation in December 2016  Patient had repeat imaging of the CT chest on 02/26/2019  This showed increased right parahilar/right upper lobe consolidation/scarring  This was noted to be possibly related to post treatment changes however recurrent malignancy was also suggested  PET/CT was  recommended  This has been arranged  CMP on 02/18/2019:  Sodium 136, potassium 3 9, BUN 12, creatinine 0 54, corrected calcium 8 9, total bilirubin 0 4, AST 17, ALT 13, alkaline phosphatase 74, albumin 3 7    CBC:  WBC 5 3, RBC 4 32, hemoglobin 13 5, MCV 94 0, platelet count 112,550    CBC and CMP are normal     Follow-up in 1 month to review  - NM PET CT skull base to mid thigh; Future    HPI:       Cancer of right main bronchus ([address] )    10/3/2016 Initial Diagnosis     Cancer of right main bronchus ([facility] [place] [address] )         10/3/2016 Biopsy     Right endobronchial mass biopsy  Keratinizing squamous cell carcinoma         11/7/2016 - 12/19/2016 Radiation     Right lung/mediastinum to a total dose of 6000 cGy in 30 daily fractions to all gross disease  11/10/2016 - 12/22/2016 Chemotherapy      7 weekly doses of carboplatin plus Taxol with concurrent radiation  Dr Sandy Raza          49-year-old male presents for follow-up regarding history of squamous cell carcinoma of the lung  In 2016 he was diagnosed with squamous cell carcinoma of the right mainstem bronchus  At this time there is obstruction of the right mainstem bronchus as well as non malignant pleural effusion    Patient was given 7 weekly doses of carboplatin and Taxol with concurrent radiation  He was offered 2 additional cycles of regular dose Taxol and carboplatin chemotherapy a week 3 interval but patient declined  Patient had Port-A-Cath removed  He states that he is presently smoking 3 cigarettes per day  ROS: Review of Systems   Constitutional: Negative for activity change, appetite change, chills, fatigue, fever and unexpected weight change  HENT: Negative for mouth sores and nosebleeds  Respiratory: Positive for cough (Occasional)  Negative for shortness of breath  Cardiovascular: Negative for chest pain, palpitations and leg swelling  Gastrointestinal: Negative for abdominal pain, blood in stool, constipation, diarrhea, nausea and vomiting  Genitourinary: Negative for difficulty urinating and dysuria  Musculoskeletal: Negative for arthralgias and back pain  Skin: Negative  Neurological: Negative for dizziness, weakness, light-headedness, numbness and headaches  Hematological: Negative  Psychiatric/Behavioral: Negative  Past Medical History:   Diagnosis Date    Alcohol abuse     Anxiety     Atrial fibrillation (HCC)     COPD (chronic obstructive pulmonary disease) (HCC)     Delirium     Encephalopathy     Epilepsy ([facility] [address] )     History of chemotherapy     History of radiation therapy     Hypo-osmolality and hyponatremia     Hypokalemia     Hypothyroid     Lung cancer ([facility] [address] )     Lyme disease     Major depression        Past Surgical History:   Procedure Laterality Date    BRONCHOSCOPY N/A 10/3/2016    Procedure: BRONCHOSCOPY FLEXIBLE;  Surgeon: Bertin Braxton DO;  [facility];   Service:     HAND SURGERY      PELVIC FRACTURE SURGERY      REMOVAL VENOUS PORT (PORT-A-CATH) Left 9/18/2018    Procedure: REMOVAL VENOUS PORT (PORT-A-CATH)IR;  Surgeon: Maddie Nicole DO;  [facility]: [facility];  Service: Interventional Radiology       Social History     Socioeconomic History    Marital status:      Spouse name: Not on file    Number of children: Not on file    Years of education: Not on file    Highest education level: Not on file   Occupational History    Not on file   Social Needs    Financial resource strain: Not on file    Food insecurity:     Worry: Not on file     Inability: Not on file    Transportation needs:     Medical: Not on file     Non-medical: Not on file   Tobacco Use    Smoking status: Current Every Day Smoker     Types: Cigarettes    Smokeless tobacco: Never Used    Tobacco comment: 3 cigarettes day   Substance and Sexual Activity    Alcohol use: No    Drug use: Not on file    Sexual activity: Not on file   Lifestyle    Physical activity:     Days per week: Not on file     Minutes per session: Not on file    Stress: Not on file   Relationships    Social connections:     Talks on phone: Not on file     Gets together: Not on file     Attends Congregational service: Not on file     Active member of club or organization: Not on file     Attends meetings of clubs or organizations: Not on file     Relationship status: Not on file    Intimate partner violence:     Fear of current or ex partner: Not on file     Emotionally abused: Not on file     Physically abused: Not on file     Forced sexual activity: Not on file   Other Topics Concern    Not on file   Social History Narrative    Not on file       Family History   Problem Relation Age of Onset    Diabetes Mother     Lung cancer Father        No Known Allergies      Current Outpatient Medications:     acetaminophen (TYLENOL) 650 mg CR tablet, Take 650 mg by mouth every 6 (six) hours as needed for mild pain  , Disp: , Rfl:     fluticasone-salmeterol (ADVAIR) 250-50 mcg/dose inhaler, Inhale 1 puff every 12 (twelve) hours, Disp: , Rfl:     folic acid (FOLVITE) 1 mg tablet, Take 800 mcg by mouth daily  , Disp: , Rfl:     gabapentin (NEURONTIN) 400 mg capsule, Take 400 mg by mouth 2 (two) times a day  , Disp: , Rfl:     levothyroxine 200 mcg tablet, Take 212 mcg by mouth daily  , Disp: , Rfl:     magnesium hydroxide (MILK OF MAGNESIA) 400 mg/5 mL oral suspension, Take 30 mL by mouth daily as needed for constipation  , Disp: , Rfl:     metoprolol succinate (TOPROL-XL) 25 mg 24 hr tablet, Take 25 mg by mouth daily, Disp: , Rfl:     phenytoin extended (DILANTIN) 200 MG ER capsule, Take 100 mg by mouth 2 (two) times a day And 250mg at 5pm , Disp: , Rfl:     sodium chloride 1 g tablet, Take 4 g by mouth 3 (three) times a day, Disp: , Rfl:     tiotropium (SPIRIVA) 18 mcg inhalation capsule, Place 18 mcg into inhaler and inhale daily, Disp: , Rfl:     Vitamin D, Cholecalciferol, 1000 UNITS CAPS, Take 1,000 Units by mouth daily  , Disp: , Rfl:       Physical Exam:  /50 (BP Location: Left arm)   Pulse 91   Temp 98 3 °F (36 8 °C) (Oral)   Resp 18   Ht 5' 9" (1 753 m)   Wt 81 2 kg (179 lb)   BMI 26 43 kg/m²     Physical Exam   Constitutional: He is oriented to person, place, and time  He appears well-developed and well-nourished  No distress  Appears older than stated age   HENT:   Head: Normocephalic and atraumatic  Eyes: Conjunctivae are normal  No scleral icterus  Neck: Normal range of motion  Neck supple  Cardiovascular: Normal rate, regular rhythm and normal heart sounds  No murmur heard  Pulmonary/Chest: Effort normal and breath sounds normal  No respiratory distress  Abdominal: Soft  There is no tenderness  Musculoskeletal: Normal range of motion  He exhibits no edema or tenderness  Lymphadenopathy:     He has no cervical adenopathy  He has no axillary adenopathy  Right: No supraclavicular adenopathy present  Left: No supraclavicular adenopathy present  Neurological: He is alert and oriented to person, place, and time  No cranial nerve deficit  Skin: Skin is warm and dry  Psychiatric: He has a normal mood and affect  Patient voiced understanding and agreement in the above discussion   Aware to contact our office with questions/symptoms in the interim

## 2019-04-08 ENCOUNTER — HOSPITAL ENCOUNTER (OUTPATIENT)
Dept: RADIOLOGY | Age: 59
Discharge: HOME | End: 2019-04-08
Payer: COMMERCIAL

## 2019-04-08 DIAGNOSIS — C34.01 CANCER OF RIGHT MAIN BRONCHUS (HCC): ICD-10-CM

## 2019-04-08 DIAGNOSIS — C34.11 MALIGNANT NEOPLASM OF UPPER LOBE OF RIGHT LUNG (HCC): ICD-10-CM

## 2019-04-08 LAB — GLUCOSE SERPL-MCNC: 94 MG/DL (ref 65–140)

## 2019-04-08 PROCEDURE — 78815 PET IMAGE W/CT SKULL-THIGH: CPT

## 2019-04-08 PROCEDURE — 82948 REAGENT STRIP/BLOOD GLUCOSE: CPT

## 2019-04-08 PROCEDURE — A9552 F18 FDG: HCPCS

## 2019-04-15 ENCOUNTER — TELEPHONE (OUTPATIENT)
Dept: HEMATOLOGY ONCOLOGY | Facility: CLINIC | Age: 59
End: 2019-04-15

## 2019-04-16 ENCOUNTER — OFFICE VISIT (OUTPATIENT)
Dept: HEMATOLOGY ONCOLOGY | Facility: CLINIC | Age: 59
End: 2019-04-16
Payer: COMMERCIAL

## 2019-04-16 VITALS
WEIGHT: 175 LBS | RESPIRATION RATE: 14 BRPM | HEART RATE: 91 BPM | BODY MASS INDEX: 25.92 KG/M2 | DIASTOLIC BLOOD PRESSURE: 60 MMHG | HEIGHT: 69 IN | TEMPERATURE: 98.7 F | SYSTOLIC BLOOD PRESSURE: 150 MMHG

## 2019-04-16 DIAGNOSIS — C34.11 MALIGNANT NEOPLASM OF UPPER LOBE OF RIGHT LUNG (HCC): ICD-10-CM

## 2019-04-16 DIAGNOSIS — C34.01 CANCER OF RIGHT MAIN BRONCHUS (HCC): Primary | ICD-10-CM

## 2019-04-16 PROCEDURE — 99214 OFFICE O/P EST MOD 30 MIN: CPT | Performed by: PHYSICIAN ASSISTANT

## 2019-07-08 ENCOUNTER — RADIATION ONCOLOGY FOLLOW-UP (OUTPATIENT)
Dept: RADIATION ONCOLOGY | Facility: HOSPITAL | Age: 59
End: 2019-07-08
Attending: RADIOLOGY
Payer: COMMERCIAL

## 2019-07-08 VITALS
RESPIRATION RATE: 16 BRPM | SYSTOLIC BLOOD PRESSURE: 110 MMHG | OXYGEN SATURATION: 93 % | BODY MASS INDEX: 26.13 KG/M2 | HEIGHT: 69 IN | DIASTOLIC BLOOD PRESSURE: 50 MMHG | TEMPERATURE: 97.9 F | HEART RATE: 84 BPM | WEIGHT: 176.4 LBS

## 2019-07-08 DIAGNOSIS — C34.11 MALIGNANT NEOPLASM OF UPPER LOBE OF RIGHT LUNG (HCC): Primary | ICD-10-CM

## 2019-07-08 DIAGNOSIS — C34.01 CANCER OF RIGHT MAIN BRONCHUS (HCC): Primary | ICD-10-CM

## 2019-07-08 PROCEDURE — 99211 OFF/OP EST MAY X REQ PHY/QHP: CPT | Performed by: RADIOLOGY

## 2019-07-08 NOTE — PROGRESS NOTES
Follow-up - Radiation Oncology   Lance Cook 1960 62 y o  male [id number]      History of Present Illness   Cancer Staging  No matching staging information was found for the patient  Lance Cook returns today for a routine scheduled follow-up visit approximately 2 and half years status post completion of treatment  Overall he feels well  He does have a chronic cough, unchanged, typically with clear phlegm  He continues to smoke  He denies any hemoptysis, chest pain, or persistent headache  Follow up visit   Patient returns today for routine scheduled follow-up after completing a course of radiation therapy to the right lung/mediastinum on 12/19/16  Last seen 1/7/19 2/26/19 CT of chest showed increased right parahilar/right upper lobe consolidation/scarring  4/8/19 PET/CT showed Increased right paramediastinal consolidation with mild diffuse FDG uptake  Overall level of FDG activity is stable  This is non-specific and may be related to post treatment changes, residual disease is not entirely excluded  Stable mild focus of FDG uptake in the right infrahilar region  Diffuse FDG uptake in the thyroid gland which again may be related to thyroiditis  4/16/19 F/U with JAMAL Gonzalez:  PET/CT showed stable disease  Repeat CT chest in 6 months with labs and follow-up  10/29/19 F/U with Dr Trent Stanton  Historical Information      Cancer of right main bronchus ([address] )    10/3/2016 Initial Diagnosis     Cancer of right main bronchus ([facility] [address] )      10/3/2016 Biopsy     Right endobronchial mass biopsy  Keratinizing squamous cell carcinoma      11/7/2016 - 12/19/2016 Radiation     Right lung/mediastinum to a total dose of 6000 cGy in 30 daily fractions to all gross disease  11/10/2016 - 12/22/2016 Chemotherapy      7 weekly doses of carboplatin plus Taxol with concurrent radiation    Dr Trent Stanton         Past Medical History:   Diagnosis Date    Alcohol abuse     Anxiety  Atrial fibrillation (HCC)     COPD (chronic obstructive pulmonary disease) (HCC)     Delirium     Encephalopathy     Epilepsy ([facility] [address] )     History of chemotherapy     History of radiation therapy     Hypo-osmolality and hyponatremia     Hypokalemia     Hypothyroid     Lung cancer ([facility] [address] )     Lyme disease     Major depression      Past Surgical History:   Procedure Laterality Date    BRONCHOSCOPY N/A 10/3/2016    Procedure: BRONCHOSCOPY FLEXIBLE;  Surgeon: Terry Hernandez DO;  [facility];   Service:     HAND SURGERY      PELVIC FRACTURE SURGERY      REMOVAL VENOUS PORT (PORT-A-CATH) Left 9/18/2018    Procedure: REMOVAL VENOUS PORT (PORT-A-CATH)IR;  Surgeon: Rowan Cameron DO;  [facility];  Service: Interventional Radiology       Social History   Social History     Substance and Sexual Activity   Alcohol Use No     Social History     Substance and Sexual Activity   Drug Use Not on file     Social History     Tobacco Use   Smoking Status Current Every Day Smoker    Types: Cigarettes   Smokeless Tobacco Never Used   Tobacco Comment    3 cigarettes day         Meds/Allergies     Current Outpatient Medications:     acetaminophen (TYLENOL) 650 mg CR tablet, Take 650 mg by mouth every 6 (six) hours as needed for mild pain  , Disp: , Rfl:     fluticasone-salmeterol (ADVAIR) 250-50 mcg/dose inhaler, Inhale 1 puff every 12 (twelve) hours, Disp: , Rfl:     gabapentin (NEURONTIN) 400 mg capsule, Take 400 mg by mouth 2 (two) times a day  , Disp: , Rfl:     levothyroxine 100 mcg tablet, Take 212 mcg by mouth daily , Disp: , Rfl:     magnesium hydroxide (MILK OF MAGNESIA) 400 mg/5 mL oral suspension, Take 30 mL by mouth daily as needed for constipation  , Disp: , Rfl:     metoprolol succinate (TOPROL-XL) 25 mg 24 hr tablet, Take 25 mg by mouth daily, Disp: , Rfl:     phenytoin (DILANTIN) 100 mg ER capsule, Take 100 mg by mouth 2 (two) times a day And 250mg at 5pm, Disp: , Rfl:     sodium chloride 1 g tablet, Take 4 g by mouth 3 (three) times a day, Disp: , Rfl:     tiotropium (SPIRIVA) 18 mcg inhalation capsule, Place 18 mcg into inhaler and inhale daily, Disp: , Rfl:     Vitamin D, Cholecalciferol, 1000 UNITS CAPS, Take 2 capsules by mouth daily , Disp: , Rfl:   No Known Allergies      Review of Systems   Review of Systems   Constitutional: Negative  HENT: Negative  Eyes: Negative  Respiratory: Positive for cough (Productive for yellow phlegm) and shortness of breath (TAYLOR)  Cardiovascular: Negative  Gastrointestinal: Negative  Endocrine: Negative  Genitourinary: Positive for difficulty urinating  Nocturia 2-3x/night   Musculoskeletal: Negative  Skin: Negative  Allergic/Immunologic: Negative  Neurological: Negative  Hematological: Negative  Psychiatric/Behavioral: Negative  OBJECTIVE:   /50 (BP Location: Left arm, Patient Position: Sitting, Cuff Size: Standard)   Pulse 84   Temp 97 9 °F (36 6 °C) (Oral)   Resp 16   Ht 5' 9" (1 753 m)   Wt 80 kg (176 lb 6 4 oz)   SpO2 93%   BMI 26 05 kg/m²   Pain Assessment:  0  Karnofsky: 90 - Able to carry on normal activity; minor signs or symptoms of disease     Physical Exam   The patient presents today no apparent distress  Sclera anicteric  No cervical or supraclavicular lymphadenopathy  Lungs clear to auscultation bilaterally  Normal S1-S2 regular rate and rhythm  Normal speech  Normal affect  RESULTS    Lab Results: No results found for this or any previous visit (from the past 672 hour(s))  Imaging Studies:No results found  Assessment/Plan:  No orders of the defined types were placed in this encounter  Rodrick Lanes has done well in follow-up and has no evidence of recurrent disease  He will continue to follow closely with Medical Oncology and will return to our department 6 months for routine follow-up    He was again advised regarding smoking cessation  Milli Cueto MD  7/8/2019,10:30 AM    Portions of the record may have been created with voice recognition software   Occasional wrong word or "sound a like" substitutions may have occurred due to the inherent limitations of voice recognition software   Read the chart carefully and recognize, using context, where substitutions have occurred

## 2019-07-08 NOTE — PROGRESS NOTES
Mariela Saini 1960 is a 62 y o  male     Follow up visit   Patient returns today for routine scheduled follow-up after completing a course of radiation therapy to the right lung/mediastinum on 12/19/16  Last seen 1/7/19 2/26/19 CT of chest showed increased right parahilar/right upper lobe consolidation/scarring  4/8/19 PET/CT showed Increased right paramediastinal consolidation with mild diffuse FDG uptake  Overall level of FDG activity is stable  This is non-specific and may be related to post treatment changes, residual disease is not entirely excluded  Stable mild focus of FDG uptake in the right infrahilar region  Diffuse FDG uptake in the thyroid gland which again may be related to thyroiditis  4/16/19 F/U with eJri Mera PA:  PET/CT showed stable disease  Repeat CT chest in 6 months with labs and follow-up  10/29/19 F/U with Dr Tarik Young  Cancer of right main bronchus ([facility] [address] )    10/3/2016 Initial Diagnosis     Cancer of right main bronchus ([facility] [address] )      10/3/2016 Biopsy     Right endobronchial mass biopsy  Keratinizing squamous cell carcinoma      11/7/2016 - 12/19/2016 Radiation     Right lung/mediastinum to a total dose of 6000 cGy in 30 daily fractions to all gross disease  11/10/2016 - 12/22/2016 Chemotherapy      7 weekly doses of carboplatin plus Taxol with concurrent radiation    Dr Lorenza Pike Trial: no      Imaging    Health Maintenance   Topic Date Due    Hepatitis C Screening  1960    CRC Screening: Colonoscopy  1960    Pneumococcal Vaccine: Pediatrics (0 to 5 Years) and At-Risk Patients (6 to 59 Years) (1 of 3 - PCV13) 09/04/1966    BMI: Followup Plan  09/04/1978    DTaP,Tdap,and Td Vaccines (1 - Tdap) 09/04/1981    INFLUENZA VACCINE  07/01/2019    BMI: Adult  04/16/2020    Pneumococcal Vaccine: 65+ Years (1 of 2 - PCV13) 09/04/2025    HEPATITIS B VACCINES  Aged Out       Patient Active Problem List   Diagnosis    COPD (chronic obstructive pulmonary disease) (HCC)    [facility]ile[facility] )    Hypothyroid    Lyme disease    Major depression    Alcohol abuse    Collapse of right lung    Pleural effusion    Lung mass    Cancer of right main bronchus (HCC)    Malignant neoplasm of upper lobe of right lung ([facility] )    Thoracic aortic aneurysm without rupture (HCC)     Past Medical History:   Diagnosis Date    Alcohol abuse     Anxiety     Atrial fibrillation (HCC)     COPD (chronic obstructive pulmonary disease) (HCC)     Delirium     Encephalopathy     Epilepsy ([facility] )     History of chemotherapy     History of radiation therapy     Hypo-osmolality and hyponatremia     Hypokalemia     Hypothyroid     Lung cancer ([facility] )     Lyme disease     Major depression      Past Surgical History:   Procedure Laterality Date    BRONCHOSCOPY N/A 10/3/2016    Procedure: BRONCHOSCOPY FLEXIBLE;  Surgeon: Luzmaria Larios DO;  [facility];   Service:     HAND SURGERY      PELVIC FRACTURE SURGERY      REMOVAL VENOUS PORT (PORT-A-CATH) Left 9/18/2018    Procedure: REMOVAL VENOUS PORT (PORT-A-CATH)IR;  Surgeon: Amado Prabhakar DO;  [facility];  Service: Interventional Radiology     Family History   Problem Relation Age of Onset    Diabetes Mother     Lung cancer Father      Social History     Socioeconomic History    Marital status:      Spouse name: Not on file    Number of children: Not on file    Years of education: Not on file    Highest education level: Not on file   Occupational History    Not on file   Social Needs    Financial resource strain: Not on file    Food insecurity:     Worry: Not on file     Inability: Not on file    Transportation needs:     Medical: Not on file     Non-medical: Not on file   Tobacco Use    Smoking status: Current Every Day Smoker     Types: Cigarettes    Smokeless tobacco: Never Used    Tobacco comment: 3 cigarettes day   Substance and Sexual Activity    Alcohol use: No    Drug use: Not on file    Sexual activity: Not on file   Lifestyle    Physical activity:     Days per week: Not on file     Minutes per session: Not on file    Stress: Not on file   Relationships    Social connections:     Talks on phone: Not on file     Gets together: Not on file     Attends Muslim service: Not on file     Active member of club or organization: Not on file     Attends meetings of clubs or organizations: Not on file     Relationship status: Not on file    Intimate partner violence:     Fear of current or ex partner: Not on file     Emotionally abused: Not on file     Physically abused: Not on file     Forced sexual activity: Not on file   Other Topics Concern    Not on file   Social History Narrative    Not on file       Current Outpatient Medications:     acetaminophen (TYLENOL) 650 mg CR tablet, Take 650 mg by mouth every 6 (six) hours as needed for mild pain  , Disp: , Rfl:     fluticasone-salmeterol (ADVAIR) 250-50 mcg/dose inhaler, Inhale 1 puff every 12 (twelve) hours, Disp: , Rfl:     gabapentin (NEURONTIN) 400 mg capsule, Take 400 mg by mouth 2 (two) times a day  , Disp: , Rfl:     levothyroxine 100 mcg tablet, Take 212 mcg by mouth daily , Disp: , Rfl:     magnesium hydroxide (MILK OF MAGNESIA) 400 mg/5 mL oral suspension, Take 30 mL by mouth daily as needed for constipation  , Disp: , Rfl:     metoprolol succinate (TOPROL-XL) 25 mg 24 hr tablet, Take 25 mg by mouth daily, Disp: , Rfl:     phenytoin (DILANTIN) 100 mg ER capsule, Take 100 mg by mouth 2 (two) times a day And 250mg at 5pm, Disp: , Rfl:     sodium chloride 1 g tablet, Take 4 g by mouth 3 (three) times a day, Disp: , Rfl:     tiotropium (SPIRIVA) 18 mcg inhalation capsule, Place 18 mcg into inhaler and inhale daily, Disp: , Rfl:     Vitamin D, Cholecalciferol, 1000 UNITS CAPS, Take 2 capsules by mouth daily , Disp: , Rfl:   No Known Allergies    Review of Systems:  Review of Systems   Constitutional: Negative  HENT: Negative  Eyes: Negative  Respiratory: Positive for cough (Productive for yellow phlegm) and shortness of breath (TAYLOR)  Cardiovascular: Negative  Gastrointestinal: Negative  Endocrine: Negative  Genitourinary: Positive for difficulty urinating  Nocturia 2-3x/night   Musculoskeletal: Negative  Skin: Negative  Allergic/Immunologic: Negative  Neurological: Negative  Hematological: Negative  Psychiatric/Behavioral: Negative  Vitals:    07/08/19 0913   BP: 110/50   BP Location: Left arm   Patient Position: Sitting   Cuff Size: Standard   Pulse: 84   Resp: 16   Temp: 97 9 °F (36 6 °C)   TempSrc: Oral   SpO2: 93%   Weight: 80 kg (176 lb 6 4 oz)   Height: 5' 9" (1 753 m)       Pain Score: 0-No pain    Imaging:No results found

## 2019-10-04 ENCOUNTER — TELEPHONE (OUTPATIENT)
Dept: HEMATOLOGY ONCOLOGY | Facility: CLINIC | Age: 59
End: 2019-10-04

## 2019-10-04 NOTE — TELEPHONE ENCOUNTER
Deepthi Hawkins from [address] [place] [address] called to confirm patient's 10/29 appt with Dr Tre Arthur  She also wanted to confirm the 10/14 CT chest wo contrast, I did advise that the scan was canceled 6/14 and the reason is noted as provider  She stated that the facility would like to know why  Deepthi Hawkins will be living at 11:00 am but, Bertin Hernandez can be provided with that info [phone]

## 2019-10-04 NOTE — TELEPHONE ENCOUNTER
Called and spoke to Deisy and informed her that there is no note in the chart as to why the appt was cancelled  I informed her that if they want to R/S they can call central scheduling  She voiced understanding

## 2019-10-22 ENCOUNTER — TELEPHONE (OUTPATIENT)
Dept: HEMATOLOGY ONCOLOGY | Facility: CLINIC | Age: 59
End: 2019-10-22

## 2019-10-22 NOTE — TELEPHONE ENCOUNTER
Called [facility] [place] and informed them that the patients appt on 10/29/19 needed to be R/S  Patient new appt is 11/5/19 at the [facility] location with JAMAL Holley at 2:00 pm  The schedule will call back to R/S if this appt time does not work for there schedule

## 2019-11-05 ENCOUNTER — TELEPHONE (OUTPATIENT)
Dept: HEMATOLOGY ONCOLOGY | Facility: CLINIC | Age: 59
End: 2019-11-05

## 2019-11-05 ENCOUNTER — OFFICE VISIT (OUTPATIENT)
Dept: HEMATOLOGY ONCOLOGY | Facility: CLINIC | Age: 59
End: 2019-11-05
Payer: COMMERCIAL

## 2019-11-05 VITALS
BODY MASS INDEX: 26.51 KG/M2 | SYSTOLIC BLOOD PRESSURE: 122 MMHG | TEMPERATURE: 97.8 F | DIASTOLIC BLOOD PRESSURE: 58 MMHG | HEIGHT: 69 IN | OXYGEN SATURATION: 94 % | WEIGHT: 179 LBS | RESPIRATION RATE: 18 BRPM | HEART RATE: 78 BPM

## 2019-11-05 DIAGNOSIS — C34.11 MALIGNANT NEOPLASM OF UPPER LOBE OF RIGHT LUNG (HCC): ICD-10-CM

## 2019-11-05 DIAGNOSIS — C34.01 CANCER OF RIGHT MAIN BRONCHUS (HCC): Primary | ICD-10-CM

## 2019-11-05 PROCEDURE — 99214 OFFICE O/P EST MOD 30 MIN: CPT | Performed by: PHYSICIAN ASSISTANT

## 2019-11-05 NOTE — PROGRESS NOTES
Hematology/Oncology Outpatient Follow-up  Tripp Ulloa 61 y o  male 1960 [id number]    Date:  11/5/2019      Assessment and Plan:  1  Cancer of right main bronchus ([facility] ), 2  Malignant neoplasm of upper lobe of right lung [facility])  44-year-old male presents for follow-up regarding history of lung cancer as below  A CT of the chest was were requested prior to today's visit was not yet completed  This will be completed within the next 1 month  CMP was completed which was unremarkable  CEA was normal, 2 7  CBC was not provided from patient's facility  Overall patient feels the same  His appetite and weight are the same  He continues to have chronic cough that is unchanged but also continues to smoke daily  He only smoking 3 cigarettes, as this is the maximum his facility will allow  Follow-up in 3 months with repeat labs and review CT scan  - CBC and differential; Future  - Comprehensive metabolic panel; Future  - CEA; Future    HPI:     Cancer of right main bronchus ([facility] )    10/3/2016 Initial Diagnosis     Cancer of right main bronchus ([facility] )      10/3/2016 Biopsy     Right endobronchial mass biopsy  Keratinizing squamous cell carcinoma      11/7/2016 - 12/19/2016 Radiation     Right lung/mediastinum to a total dose of 6000 cGy in 30 daily fractions to all gross disease  11/10/2016 - 12/22/2016 Chemotherapy      7 weekly doses of carboplatin plus Taxol with concurrent radiation    Dr Lisa Alston       44-year-old male presents for follow-up regarding history of squamous cell carcinoma of the lung   In 2016 he was diagnosed with squamous cell carcinoma of the right mainstem bronchus   At this time there is obstruction of the right mainstem bronchus as well as non malignant pleural effusion   Patient was given 7 weekly doses of carboplatin and Taxol with concurrent radiation   He was offered 2 additional cycles of regular dose Taxol and carboplatin chemotherapy a week 3 interval but patient declined      Patient had Port-A-Cath removed  PET/CT in April 2019 showed stable disease  Recommended repeat CT chest prior to today's visit but this was not completed  Interval history: no new complaints  Persistent cough x years  Continues to smoke 3 cigarettes per day  ROS: Review of Systems   Constitutional: Negative for appetite change, chills, fever and unexpected weight change  HENT: Negative for mouth sores and nosebleeds  Respiratory: Positive for cough (productive, denies blood, chronic/stable x years ) and shortness of breath (with exertion "when I walk outside to smoke")  Cardiovascular: Negative for chest pain, palpitations and leg swelling  Gastrointestinal: Negative for abdominal pain, blood in stool, constipation, diarrhea, nausea and vomiting  Genitourinary: Negative for difficulty urinating, dysuria and hematuria  Musculoskeletal: Negative for arthralgias  Skin: Negative  Neurological: Negative for dizziness, weakness, light-headedness, numbness and headaches  Hematological: Negative  Psychiatric/Behavioral: Negative  Past Medical History:   Diagnosis Date    Alcohol abuse     Anxiety     Atrial fibrillation (HCC)     COPD (chronic obstructive pulmonary disease) (HCC)     Delirium     Encephalopathy     [facility]     History of chemotherapy     History of radiation therapy     Hypo-osmolality and hyponatremia     Hypokalemia     Hypothyroid     Lung cancer [facility] )     Lyme disease     Major depression        Past Surgical History:   Procedure Laterality Date    BRONCHOSCOPY N/A 10/3/2016    Procedure: BRONCHOSCOPY FLEXIBLE;  Surgeon: Marianna Gilliland DO;  [facility];   Service:     HAND SURGERY      PELVIC FRACTURE SURGERY      REMOVAL VENOUS PORT (PORT-A-CATH) Left 9/18/2018    Procedure: REMOVAL VENOUS PORT (PORT-A-CATH)IR;  Surgeon: Sunny Roy DO;  [facility];  Service: Interventional Radiology       Social History     Socioeconomic History    Marital status:      Spouse name: None    Number of children: None    Years of education: None    Highest education level: None   Occupational History    None   Social Needs    Financial resource strain: None    Food insecurity:     Worry: None     Inability: None    Transportation needs:     Medical: None     Non-medical: None   Tobacco Use    Smoking status: Current Every Day Smoker     Types: Cigarettes    Smokeless tobacco: Never Used    Tobacco comment: 3 cigarettes day   Substance and Sexual Activity    Alcohol use: No    Drug use: None    Sexual activity: None   Lifestyle    Physical activity:     Days per week: None     Minutes per session: None    Stress: None   Relationships    Social connections:     Talks on phone: None     Gets together: None     Attends Anabaptist service: None     Active member of club or organization: None     Attends meetings of clubs or organizations: None     Relationship status: None    Intimate partner violence:     Fear of current or ex partner: None     Emotionally abused: None     Physically abused: None     Forced sexual activity: None   Other Topics Concern    None   Social History Narrative    None       Family History   Problem Relation Age of Onset    Diabetes Mother     Lung cancer Father        No Known Allergies      Current Outpatient Medications:     acetaminophen (TYLENOL) 650 mg CR tablet, Take 650 mg by mouth every 6 (six) hours as needed for mild pain  , Disp: , Rfl:     fluticasone-salmeterol (ADVAIR) 250-50 mcg/dose inhaler, Inhale 1 puff every 12 (twelve) hours, Disp: , Rfl:     gabapentin (NEURONTIN) 400 mg capsule, Take 400 mg by mouth 2 (two) times a day  , Disp: , Rfl:     levothyroxine 100 mcg tablet, Take 212 mcg by mouth daily , Disp: , Rfl:     magnesium hydroxide (MILK OF MAGNESIA) 400 mg/5 mL oral suspension, Take 30 mL by mouth daily as needed for constipation  , Disp: , Rfl:   metoprolol succinate (TOPROL-XL) 25 mg 24 hr tablet, Take 25 mg by mouth daily, Disp: , Rfl:     phenytoin (DILANTIN) 100 mg ER capsule, Take 100 mg by mouth 2 (two) times a day And 250mg at 5pm, Disp: , Rfl:     sodium chloride 1 g tablet, Take 4 g by mouth 3 (three) times a day, Disp: , Rfl:     tiotropium (SPIRIVA) 18 mcg inhalation capsule, Place 18 mcg into inhaler and inhale daily, Disp: , Rfl:     Vitamin D, Cholecalciferol, 1000 UNITS CAPS, Take 2 capsules by mouth daily , Disp: , Rfl:       Physical Exam:  /58 (BP Location: Right arm, Cuff Size: Adult)   Pulse 78   Temp 97 8 °F (36 6 °C) (Oral)   Resp 18   Ht 5' 9" (1 753 m)   Wt 81 2 kg (179 lb)   SpO2 94%   BMI 26 43 kg/m²     Physical Exam   Constitutional: He is oriented to person, place, and time  He appears well-developed and well-nourished  No distress  HENT:   Head: Normocephalic and atraumatic  Eyes: Conjunctivae are normal  No scleral icterus  Neck: Normal range of motion  Neck supple  Cardiovascular: Normal rate, regular rhythm and normal heart sounds  No murmur heard  Pulmonary/Chest: Effort normal  No respiratory distress  Diminished breath sounds throughout    Abdominal: Soft  There is no tenderness  Musculoskeletal: Normal range of motion  He exhibits no edema or tenderness  Lymphadenopathy:     He has no cervical adenopathy  Neurological: He is alert and oriented to person, place, and time  No cranial nerve deficit  Skin: Skin is warm and dry  Psychiatric: He has a normal mood and affect           Labs:  Lab Results   Component Value Date    WBC 8 63 10/02/2016    HGB 12 3 10/02/2016    HCT 37 1 10/02/2016    MCV 88 10/02/2016     10/02/2016     Lab Results   Component Value Date    K 3 9 10/02/2016    CL 98 (L) 10/02/2016    CO2 30 10/02/2016    BUN 4 (L) 10/11/2016    CREATININE 0 53 (L) 10/11/2016    CALCIUM 8 5 10/02/2016    EGFR >60 0 10/11/2016     Patient voiced understanding and agreement in the above discussion  Aware to contact our office with questions/symptoms in the interim  This note has been generated by voice recognition software system  Therefore, there may be spelling, grammar, and or syntax errors  Please contact if questions arise

## 2019-11-06 ENCOUNTER — TELEPHONE (OUTPATIENT)
Dept: HEMATOLOGY ONCOLOGY | Facility: CLINIC | Age: 59
End: 2019-11-06

## 2019-11-06 NOTE — TELEPHONE ENCOUNTER
Cecilia Warren from [place] [facility] called about patients cat scan as to when it is, said she would like a call back from provider office as to where the mix up was

## 2019-11-06 NOTE — TELEPHONE ENCOUNTER
Called and spoke to CLAIRE and went over the patient past appt dates and information  CLAIRE voiced understating of appt changes in the past and was instructed to call our office if she has any other questions

## 2020-01-06 ENCOUNTER — CLINICAL SUPPORT (OUTPATIENT)
Dept: RADIATION ONCOLOGY | Facility: HOSPITAL | Age: 60
End: 2020-01-06
Attending: RADIOLOGY
Payer: COMMERCIAL

## 2020-01-06 VITALS
BODY MASS INDEX: 26.31 KG/M2 | HEIGHT: 69 IN | SYSTOLIC BLOOD PRESSURE: 115 MMHG | DIASTOLIC BLOOD PRESSURE: 45 MMHG | WEIGHT: 177.6 LBS | TEMPERATURE: 98.2 F | HEART RATE: 75 BPM | RESPIRATION RATE: 22 BRPM | OXYGEN SATURATION: 92 %

## 2020-01-06 DIAGNOSIS — C34.01 CANCER OF RIGHT MAIN BRONCHUS (HCC): Primary | ICD-10-CM

## 2020-01-06 PROCEDURE — 99211 OFF/OP EST MAY X REQ PHY/QHP: CPT | Performed by: RADIOLOGY

## 2020-01-06 NOTE — PROGRESS NOTES
Axel Gregory 1960 is a 61 y o  male       Follow up visit    Patient presents for 6 month follow up for cancer of the right main bronchus  Completed radiation to the right lung and mediastinum on 12/19/16  Last seen on 7/8/19 11/5/19 Proothi/HemOnc: stable respiratory symptoms  CT not done prior to visit  1/28/19 CT scheduled    2/11/19 Proothi          Cancer of right main bronchus ([facility] )    10/3/2016 Initial Diagnosis     Cancer of right main bronchus ([facility] )      10/3/2016 Biopsy     Right endobronchial mass biopsy  Keratinizing squamous cell carcinoma      11/7/2016 - 12/19/2016 Radiation     Right lung/mediastinum to a total dose of 6000 cGy in 30 daily fractions to all gross disease  11/10/2016 - 12/22/2016 Chemotherapy      7 weekly doses of carboplatin plus Taxol with concurrent radiation    Dr Sophia [place] Trial: no      Imaging    Health Maintenance   Topic Date Due    Hepatitis C Screening  1960    CRC Screening: Colonoscopy  1960    Pneumococcal Vaccine: Pediatrics (0 to 5 Years) and At-Risk Patients (6 to 59 Years) (1 of 1 - PPSV23) 09/04/1966    DTaP,Tdap,and Td Vaccines (1 - Tdap) 09/04/1971    HIV Screening  09/04/1975    BMI: Followup Plan  09/04/1978    Influenza Vaccine  07/01/2019    BMI: Adult  11/05/2020    Pneumococcal Vaccine: 65+ Years (1 of 2 - PCV13) 09/04/2025    HIB Vaccine  Aged Out    Hepatitis B Vaccine  Aged Out    IPV Vaccine  Aged Out    Hepatitis A Vaccine  Aged Out    Meningococcal ACWY Vaccine  Aged Out    HPV Vaccine  Aged Out       Patient Active Problem List   Diagnosis    COPD [facility]chronic obstructive pulmonary disease) ([facility]    Epilepsy ([facility] )    Hypothyroid    Lyme disease    Major depression    Alcohol abuse    Collapse of right lung    Pleural effusion    Lung mass    Cancer of right main bronchus ([facility] )    Malignant neoplasm of upper lobe of right lung ([facility] )    Thoracic aortic aneurysm without rupture ([facility] ) Past Medical History:   Diagnosis Date    Alcohol abuse     Anxiety     Atrial fibrillation (HCC)     COPD (chronic obstructive pulmonary disease) (HCC)     Delirium     Encephalopathy     [facility]     History of chemotherapy     History of radiation therapy     Hypo-osmolality and hyponatremia     Hypokalemia     Hypothyroid     Lung cancer ([facility] )     Lyme disease     Major depression      Past Surgical History:   Procedure Laterality Date    BRONCHOSCOPY N/A 10/3/2016    Procedure: BRONCHOSCOPY FLEXIBLE;  Surgeon: Susi Valladares DO;  [facility];   Service:     HAND SURGERY      PELVIC FRACTURE SURGERY      REMOVAL VENOUS PORT (PORT-A-CATH) Left 9/18/2018    Procedure: REMOVAL VENOUS PORT (PORT-A-CATH)IR;  Surgeon: Mahsa Gallardo DO;  [facility];  Service: Interventional Radiology     Family History   Problem Relation Age of Onset    Diabetes Mother     Lung cancer Father      Social History     Socioeconomic History    Marital status:      Spouse name: Not on file    Number of children: Not on file    Years of education: Not on file    Highest education level: Not on file   Occupational History    Not on file   Social Needs    Financial resource strain: Not on file    Food insecurity:     Worry: Not on file     Inability: Not on file    Transportation needs:     Medical: Not on file     Non-medical: Not on file   Tobacco Use    Smoking status: Current Every Day Smoker     Types: Cigarettes    Smokeless tobacco: Never Used    Tobacco comment: 3 cigarettes day   Substance and Sexual Activity    Alcohol use: No    Drug use: Not on file    Sexual activity: Not on file   Lifestyle    Physical activity:     Days per week: Not on file     Minutes per session: Not on file    Stress: Not on file   Relationships    Social connections:     Talks on phone: Not on file     Gets together: Not on file     Attends Anabaptism service: Not on file     Active member of club or organization: Not on file     Attends meetings of clubs or organizations: Not on file     Relationship status: Not on file    Intimate partner violence:     Fear of current or ex partner: Not on file     Emotionally abused: Not on file     Physically abused: Not on file     Forced sexual activity: Not on file   Other Topics Concern    Not on file   Social History Narrative    Not on file       Current Outpatient Medications:     acetaminophen (TYLENOL) 650 mg CR tablet, Take 650 mg by mouth every 6 (six) hours as needed for mild pain  , Disp: , Rfl:     fluticasone-salmeterol (ADVAIR) 250-50 mcg/dose inhaler, Inhale 1 puff every 12 (twelve) hours, Disp: , Rfl:     gabapentin (NEURONTIN) 400 mg capsule, Take 400 mg by mouth 2 (two) times a day  , Disp: , Rfl:     levothyroxine 100 mcg tablet, Take 212 mcg by mouth daily , Disp: , Rfl:     magnesium hydroxide (MILK OF MAGNESIA) 400 mg/5 mL oral suspension, Take 30 mL by mouth daily as needed for constipation  , Disp: , Rfl:     metoprolol succinate (TOPROL-XL) 25 mg 24 hr tablet, Take 25 mg by mouth daily, Disp: , Rfl:     phenytoin (DILANTIN) 100 mg ER capsule, Take 100 mg by mouth 2 (two) times a day And 250mg at 5pm, Disp: , Rfl:     sodium chloride 1 g tablet, Take 4 g by mouth 3 (three) times a day, Disp: , Rfl:     tiotropium (SPIRIVA) 18 mcg inhalation capsule, Place 18 mcg into inhaler and inhale daily, Disp: , Rfl:     Vitamin D, Cholecalciferol, 1000 UNITS CAPS, Take 2 capsules by mouth daily , Disp: , Rfl:   No Known Allergies    Review of Systems:  Review of Systems   Constitutional: Positive for fatigue  Negative for chills and fever  HENT: Positive for hearing loss  Negative for congestion and sore throat  Eyes: Negative  Respiratory: Positive for cough and shortness of breath  Productive cough, usually yellow  States he coughed up blood Sunday and Monday mornings   Cardiovascular: Negative for chest pain  Gastrointestinal: Negative  Negative for nausea  Endocrine: Negative  Genitourinary: Negative  Musculoskeletal: Negative  Skin: Positive for pallor  Allergic/Immunologic: Negative  Neurological: Negative  Negative for dizziness and headaches  Hematological: Negative  Psychiatric/Behavioral: Negative  Vitals:    01/06/20 0831   BP: (!) 115/45   BP Location: Right arm   Pulse: 75   Resp: 22   Temp: 98 2 °F (36 8 °C)   TempSrc: Temporal   SpO2: 92%   Weight: 80 6 kg (177 lb 9 6 oz)   Height: 5' 9" (1 753 m)        Pain assessment:0           Imaging:No results found      Teachin

## 2020-01-06 NOTE — PROGRESS NOTES
Follow-up - Radiation Oncology   Dylan Talley 1960 61 y o  male [id number]      History of Present Illness   Cancer Staging  No matching staging information was found for the patient  Dylan Talley returns today for routine scheduled follow-up visit approximately 3 years status post completion of definitive chemoradiation therapy for his right mainstem non-small cell squamous cell carcinoma of the lung  He has done well in follow-up with no evidence of recurrent disease  His last imaging was approximately 8 months ago and was negative for obvious recurrence  He currently feels well and denies any worsening shortness of breath, cough, or dysphagia  However, he reports that both yesterday and this morning, 1st thing upon waking, he coughed up a small amount of clotted dark red blood  This is the 1st time this has occurred since diagnosis  Otherwise no new symptoms  He denies any fevers or chills  Patient presents for 6 month follow up for cancer of the right main bronchus  Completed radiation to the right lung and mediastinum on 12/19/16  Last seen on 7/8/19 11/5/19 Proothi/HemOnc: stable respiratory symptoms  CT not done prior to visit  1/28/19 CT scheduled    2/11/19 Proothi         Historical Information      Cancer of right main bronchus ([facility] )    10/3/2016 Initial Diagnosis     Cancer of right main bronchus ([facility] )      10/3/2016 Biopsy     Right endobronchial mass biopsy  Keratinizing squamous cell carcinoma      11/7/2016 - 12/19/2016 Radiation     Right lung/mediastinum to a total dose of 6000 cGy in 30 daily fractions to all gross disease  11/10/2016 - 12/22/2016 Chemotherapy      7 weekly doses of carboplatin plus Taxol with concurrent radiation    Dr Tiara Coombs         Past Medical History:   Diagnosis Date    Alcohol abuse     Anxiety     Atrial fibrillation ([facility]     COPD (chronic obstructive pulmonary disease) ([facility]     Delirium     Encephalopathy     Epilepsy ([facility] )  History of chemotherapy     History of radiation therapy     Hypo-osmolality and hyponatremia     Hypokalemia     Hypothyroid     [facility]     Lyme disease     Major depression      Past Surgical History:   Procedure Laterality Date    BRONCHOSCOPY N/A 10/3/2016    Procedure: BRONCHOSCOPY FLEXIBLE;  Surgeon: Jaime Plunkett DO;  [facility];   Service:     HAND SURGERY      PELVIC FRACTURE SURGERY      REMOVAL VENOUS PORT (PORT-A-CATH) Left 9/18/2018    Procedure: REMOVAL VENOUS PORT (PORT-A-CATH)IR;  Surgeon: Gordon Amaya DO;  [facility];  Service: Interventional Radiology       Social History   Social History     Substance and Sexual Activity   Alcohol Use No     Social History     Substance and Sexual Activity   Drug Use Not on file     Social History     Tobacco Use   Smoking Status Current Every Day Smoker    Types: Cigarettes   Smokeless Tobacco Never Used   Tobacco Comment    3 cigarettes day         Meds/Allergies     Current Outpatient Medications:     acetaminophen (TYLENOL) 650 mg CR tablet, Take 650 mg by mouth every 6 (six) hours as needed for mild pain  , Disp: , Rfl:     fluticasone-salmeterol (ADVAIR) 250-50 mcg/dose inhaler, Inhale 1 puff every 12 (twelve) hours, Disp: , Rfl:     gabapentin (NEURONTIN) 400 mg capsule, Take 400 mg by mouth 2 (two) times a day  , Disp: , Rfl:     levothyroxine 100 mcg tablet, Take 212 mcg by mouth daily , Disp: , Rfl:     magnesium hydroxide (MILK OF MAGNESIA) 400 mg/5 mL oral suspension, Take 30 mL by mouth daily as needed for constipation  , Disp: , Rfl:     metoprolol succinate (TOPROL-XL) 25 mg 24 hr tablet, Take 25 mg by mouth daily, Disp: , Rfl:     phenytoin (DILANTIN) 100 mg ER capsule, Take 100 mg by mouth 2 (two) times a day And 250mg at 5pm, Disp: , Rfl:     sodium chloride 1 g tablet, Take 4 g by mouth 3 (three) times a day, Disp: , Rfl:     tiotropium (SPIRIVA) 18 mcg inhalation capsule, Place 18 mcg into inhaler and inhale daily, Disp: , Rfl:     Vitamin D, Cholecalciferol, 1000 UNITS CAPS, Take 2 capsules by mouth daily , Disp: , Rfl:   No Known Allergies      Review of Systems   Review of Systems   Constitutional: Positive for fatigue  Negative for chills and fever  HENT: Positive for hearing loss  Negative for congestion and sore throat  Eyes: Negative  Respiratory: Positive for cough and shortness of breath  Productive cough, usually yellow  States he coughed up blood Sunday and Monday mornings   Cardiovascular: Negative for chest pain  Gastrointestinal: Negative  Negative for nausea  Endocrine: Negative  Genitourinary: Negative  Musculoskeletal: Negative  Skin: Positive for pallor  Allergic/Immunologic: Negative  Neurological: Negative  Negative for dizziness and headaches  Hematological: Negative  Psychiatric/Behavioral: Negative  OBJECTIVE:   BP (!) 115/45 (BP Location: Right arm)   Pulse 75   Temp 98 2 °F (36 8 °C) (Temporal)   Resp 22   Ht 5' 9" (1 753 m)   Wt 80 6 kg (177 lb 9 6 oz)   SpO2 92%   BMI 26 23 kg/m²   Pain Assessment:  0  Karnofsky: 70    Physical Exam   The patient presents today no apparent distress  Sclera anicteric  No palpable cervical or supraclavicular lymphadenopathy  Poor air movement throughout with relatively decreased breath sounds in the lower right lung  Normal speech  Normal affect  Responds appropriately to all questions  RESULTS    Lab Results: No results found for this or any previous visit (from the past 672 hour(s))  Imaging Studies:No results found  Assessment/Plan:  Orders Placed This Encounter   Procedures    Ambulatory referral to Pulmonology        Jaimee Evans returns today for routine follow-up approximately 3 years status post completion of chemoradiation therapy    He has been doing well in follow-up with no evidence of recurrent disease, but it has been 8 months since his last imaging  He presents today overall feeling well but reports 2 episodes of hemoptysis over the past 2 days, 1st thing upon waking in the morning  He describes the episodes as a small amounts of dark red clotted blood  No further episodes throughout the course of the day  No changes in his breathing  These are the 1st episodes of hemoptysis he has experienced since his diagnosis  No signs or symptoms of pneumonia  Of note, his primary disease did involve the right mainstem bronchus  He is currently scheduled for repeat CT of the chest on 1/28/20  Given the new onset hemoptysis, we will move the CT up to 1st available and also refer him back for to pulmonology for possible eventual bronchoscopy to inspect the airways further  He will follow up with Medical Oncology in February  We will follow-up on the results of his chest CT and pulmonology consultation and proceed accordingly  Otherwise he will return to our department in 6 months for routine follow-up  Lia Guzman MD  1/6/2020,9:04 AM    Portions of the record may have been created with voice recognition software   Occasional wrong word or "sound a like" substitutions may have occurred due to the inherent limitations of voice recognition software   Read the chart carefully and recognize, using context, where substitutions have occurred

## 2020-01-10 ENCOUNTER — HOSPITAL ENCOUNTER (OUTPATIENT)
Dept: RADIOLOGY | Age: 60
Discharge: HOME | End: 2020-01-10
Payer: COMMERCIAL

## 2020-01-10 DIAGNOSIS — C34.01 CANCER OF RIGHT MAIN BRONCHUS (HCC): ICD-10-CM

## 2020-01-10 DIAGNOSIS — C34.11 MALIGNANT NEOPLASM OF UPPER LOBE OF RIGHT LUNG (HCC): ICD-10-CM

## 2020-01-10 PROCEDURE — 71250 CT THORAX DX C-: CPT

## 2020-02-10 ENCOUNTER — TELEPHONE (OUTPATIENT)
Dept: HEMATOLOGY ONCOLOGY | Facility: CLINIC | Age: 60
End: 2020-02-10

## 2020-02-10 NOTE — TELEPHONE ENCOUNTER
Spoke to [employer] and she informed me that the patient has had his labs completed a copy of these labs will be sent with him to his appt on 2/11/20 with JAMAL Fragoso at the [facility] location

## 2020-02-11 ENCOUNTER — OFFICE VISIT (OUTPATIENT)
Dept: HEMATOLOGY ONCOLOGY | Facility: CLINIC | Age: 60
End: 2020-02-11
Payer: COMMERCIAL

## 2020-02-11 VITALS
HEART RATE: 76 BPM | OXYGEN SATURATION: 92 % | DIASTOLIC BLOOD PRESSURE: 78 MMHG | TEMPERATURE: 97.6 F | SYSTOLIC BLOOD PRESSURE: 166 MMHG | BODY MASS INDEX: 29.31 KG/M2 | WEIGHT: 198.5 LBS | RESPIRATION RATE: 20 BRPM

## 2020-02-11 DIAGNOSIS — C34.11 MALIGNANT NEOPLASM OF UPPER LOBE OF RIGHT LUNG (HCC): ICD-10-CM

## 2020-02-11 DIAGNOSIS — C34.01 CANCER OF RIGHT MAIN BRONCHUS (HCC): Primary | ICD-10-CM

## 2020-02-11 PROCEDURE — 99214 OFFICE O/P EST MOD 30 MIN: CPT | Performed by: PHYSICIAN ASSISTANT

## 2020-02-11 NOTE — PROGRESS NOTES
Hematology/Oncology Outpatient Follow-up  Pilar Delgado 61 y o  male 1960 [id number]    Date:  2/11/2020      Assessment and Plan:  1  Cancer of right main bronchus ([facility] [address] ), 2  Malignant neoplasm of upper lobe of right lung [facility])  61year old male presents for follow up for history of lung cancer as outlined below  He had repeat CT chest in Jan 2020 which was negative for recurrent disease  CBC, CMP, CEA  are all acceptable  Patient is stable in regards to his symptoms  He did have 1 day episode of coughing up a piece of dried blood  This was prior to his CT scan  It has not happened more than this 1 day  This facility will let us know if this were to reoccur  Follow-up in 6 months with labs and scan  - CT chest wo contrast; Future  - CBC and differential; Future  - Comprehensive metabolic panel; Future  - CEA; Future      HPI:     Cancer of right main bronchus ([facility] [address] )    10/3/2016 Initial Diagnosis     Cancer of right main bronchus ([facility] [address] )      10/3/2016 Biopsy     Right endobronchial mass biopsy  Keratinizing squamous cell carcinoma      11/7/2016 - 12/19/2016 Radiation     Right lung/mediastinum to a total dose of 6000 cGy in 30 daily fractions to all gross disease  11/10/2016 - 12/22/2016 Chemotherapy      7 weekly doses of carboplatin plus Taxol with concurrent radiation  Dr Orlena Litten       51-year-old male presents for follow-up regarding history of squamous cell carcinoma of the lung   In 2016 he was diagnosed with squamous cell carcinoma of the right mainstem bronchus   At this time there is obstruction of the right mainstem bronchus as well as non malignant pleural effusion   Patient was given 7 weekly doses of carboplatin and Taxol with concurrent radiation  Barb Dec was offered 2 additional cycles of regular dose Taxol and carboplatin chemotherapy a week 3 interval but patient declined      Patient had Port-A-Cath removed      PET/CT in April 2019 showed stable disease       Interval history:  In Dec 2019 or early Jan 2020 he had episode of one day of coughing up   Dried blood  This was 1 time in the morning  This has not recurred since  ROS: Review of Systems   Constitutional: Negative for appetite change, chills, fatigue, fever and unexpected weight change  Respiratory: Positive for cough (chronic, unchanged ) and shortness of breath (with exertion )  Cardiovascular: Negative for chest pain, palpitations and leg swelling  Gastrointestinal: Negative for abdominal pain, constipation, diarrhea, nausea and vomiting  Genitourinary: Negative for difficulty urinating and dysuria  Musculoskeletal: Negative for arthralgias  Skin: Negative  Neurological: Negative for dizziness, weakness, light-headedness, numbness and headaches  Hematological: Negative  Psychiatric/Behavioral: Negative  Past Medical History:   Diagnosis Date    Alcohol abuse     Anxiety     Atrial fibrillation (HCC)     COPD (chronic obstructive pulmonary disease) (HCC)     Delirium     Encephalopathy     Epilepsy ([facility] [address] )     History of chemotherapy     History of radiation therapy     Hypo-osmolality and hyponatremia     Hypokalemia     Hypothyroid     Lung cancer ([facility] [address] )     Lyme disease     Major depression        Past Surgical History:   Procedure Laterality Date    BRONCHOSCOPY N/A 10/3/2016    Procedure: BRONCHOSCOPY FLEXIBLE;  Surgeon: Annmarie Purvis DO;  [facility]: [facility];   Service:     HAND SURGERY      PELVIC FRACTURE SURGERY      REMOVAL VENOUS PORT (PORT-A-CATH) Left 9/18/2018    Procedure: REMOVAL VENOUS PORT (PORT-A-CATH)IR;  Surgeon: Zonia Ordoñez DO;  Location: [facility];  Service: Interventional Radiology       Social History     Socioeconomic History    Marital status:      Spouse name: None    Number of children: None    Years of education: None    Highest education level: None   Occupational History    None   Social Needs    Financial resource strain: None    Food insecurity:     Worry: None     Inability: None    Transportation needs:     Medical: None     Non-medical: None   Tobacco Use    Smoking status: Current Every Day Smoker     Types: Cigarettes    Smokeless tobacco: Never Used    Tobacco comment: 3 cigarettes day   Substance and Sexual Activity    Alcohol use: No    Drug use: None    Sexual activity: None   Lifestyle    Physical activity:     Days per week: None     Minutes per session: None    Stress: None   Relationships    Social connections:     Talks on phone: None     Gets together: None     Attends Taoism service: None     Active member of club or organization: None     Attends meetings of clubs or organizations: None     Relationship status: None    Intimate partner violence:     Fear of current or ex partner: None     Emotionally abused: None     Physically abused: None     Forced sexual activity: None   Other Topics Concern    None   Social History Narrative    None       Family History   Problem Relation Age of Onset    Diabetes Mother     Lung cancer Father        No Known Allergies      Current Outpatient Medications:     acetaminophen (TYLENOL) 650 mg CR tablet, Take 650 mg by mouth every 6 (six) hours as needed for mild pain  , Disp: , Rfl:     fluticasone-salmeterol (ADVAIR) 250-50 mcg/dose inhaler, Inhale 1 puff every 12 (twelve) hours, Disp: , Rfl:     gabapentin (NEURONTIN) 400 mg capsule, Take 400 mg by mouth 2 (two) times a day  , Disp: , Rfl:     levothyroxine 100 mcg tablet, Take 212 mcg by mouth daily , Disp: , Rfl:     magnesium hydroxide (MILK OF MAGNESIA) 400 mg/5 mL oral suspension, Take 30 mL by mouth daily as needed for constipation  , Disp: , Rfl:     metoprolol succinate (TOPROL-XL) 25 mg 24 hr tablet, Take 25 mg by mouth daily, Disp: , Rfl:     phenytoin (DILANTIN) 100 mg ER capsule, Take 100 mg by mouth 2 (two) times a day And 250mg at 5pm, Disp: , Rfl:     sodium chloride 1 g tablet, Take 4 g by mouth 3 (three) times a day, Disp: , Rfl:     tiotropium (SPIRIVA) 18 mcg inhalation capsule, Place 18 mcg into inhaler and inhale daily, Disp: , Rfl:     Vitamin D, Cholecalciferol, 1000 UNITS CAPS, Take 2 capsules by mouth daily , Disp: , Rfl:       Physical Exam:  /78 (BP Location: Left arm, Patient Position: Sitting, Cuff Size: Adult)   Pulse 76   Temp 97 6 °F (36 4 °C)   Resp 20   Wt 90 kg (198 lb 8 oz)   SpO2 92%   BMI 29 31 kg/m²     Physical Exam   Constitutional: He is oriented to person, place, and time  He appears well-developed and well-nourished  No distress  HENT:   Head: Normocephalic and atraumatic  Eyes: Conjunctivae are normal  No scleral icterus  Neck: Normal range of motion  Neck supple  Cardiovascular: Normal rate and regular rhythm  Murmur heard  Pulmonary/Chest: Effort normal and breath sounds normal  No respiratory distress  Abdominal: Soft  There is no tenderness  Musculoskeletal: Normal range of motion  He exhibits no edema or tenderness  Lymphadenopathy:     He has no cervical adenopathy  Neurological: He is alert and oriented to person, place, and time  No cranial nerve deficit  Skin: Skin is warm and dry  Psychiatric: He has a normal mood and affect  Labs:  CMP:  Sodium 136, potassium 3 9, BUN 10, total bilirubin 0 4, AST 19, ALT 19, alk-phos 69, albumin 3 7   CBC:  WBC 5 2, hemoglobin 14 0, MCV 93 7, platelet 519, normal differential   CEA 2 8      Patient voiced understanding and agreement in the above discussion  Aware to contact our office with questions/symptoms in the interim  This note has been generated by voice recognition software system  Therefore, there may be spelling, grammar, and or syntax errors  Please contact if questions arise

## 2020-02-25 ENCOUNTER — OFFICE VISIT (OUTPATIENT)
Dept: PULMONOLOGY | Facility: CLINIC | Age: 60
End: 2020-02-25
Payer: COMMERCIAL

## 2020-02-25 VITALS
BODY MASS INDEX: 26.36 KG/M2 | OXYGEN SATURATION: 99 % | HEIGHT: 69 IN | TEMPERATURE: 98.3 F | DIASTOLIC BLOOD PRESSURE: 74 MMHG | SYSTOLIC BLOOD PRESSURE: 164 MMHG | WEIGHT: 178 LBS | HEART RATE: 66 BPM

## 2020-02-25 DIAGNOSIS — C34.01 CANCER OF RIGHT MAIN BRONCHUS (HCC): ICD-10-CM

## 2020-02-25 DIAGNOSIS — J44.9 CHRONIC OBSTRUCTIVE PULMONARY DISEASE, UNSPECIFIED COPD TYPE (HCC): Primary | ICD-10-CM

## 2020-02-25 PROCEDURE — 99214 OFFICE O/P EST MOD 30 MIN: CPT | Performed by: INTERNAL MEDICINE

## 2020-02-25 RX ORDER — ALBUTEROL SULFATE 90 UG/1
2 AEROSOL, METERED RESPIRATORY (INHALATION) EVERY 6 HOURS PRN
Qty: 1 INHALER | Refills: 3 | Status: SHIPPED | OUTPATIENT
Start: 2020-02-25

## 2020-08-11 ENCOUNTER — HOSPITAL ENCOUNTER (OUTPATIENT)
Dept: CT IMAGING | Facility: HOSPITAL | Age: 60
Discharge: HOME | End: 2020-08-11
Payer: COMMERCIAL

## 2020-08-11 DIAGNOSIS — C34.01 CANCER OF RIGHT MAIN BRONCHUS (HCC): ICD-10-CM

## 2020-08-11 DIAGNOSIS — C34.11 MALIGNANT NEOPLASM OF UPPER LOBE OF RIGHT LUNG (HCC): ICD-10-CM

## 2020-08-11 PROCEDURE — 71250 CT THORAX DX C-: CPT

## 2020-08-21 ENCOUNTER — TELEPHONE (OUTPATIENT)
Dept: HEMATOLOGY ONCOLOGY | Facility: MEDICAL CENTER | Age: 60
End: 2020-08-21

## 2020-08-21 ENCOUNTER — OFFICE VISIT (OUTPATIENT)
Dept: HEMATOLOGY ONCOLOGY | Facility: CLINIC | Age: 60
End: 2020-08-21
Payer: COMMERCIAL

## 2020-08-21 VITALS
SYSTOLIC BLOOD PRESSURE: 146 MMHG | BODY MASS INDEX: 25.92 KG/M2 | DIASTOLIC BLOOD PRESSURE: 68 MMHG | TEMPERATURE: 98.4 F | OXYGEN SATURATION: 96 % | RESPIRATION RATE: 16 BRPM | WEIGHT: 175 LBS | HEART RATE: 66 BPM | HEIGHT: 69 IN

## 2020-08-21 DIAGNOSIS — C34.11 MALIGNANT NEOPLASM OF UPPER LOBE OF RIGHT LUNG (HCC): Primary | ICD-10-CM

## 2020-08-21 DIAGNOSIS — E03.9 ACQUIRED HYPOTHYROIDISM: ICD-10-CM

## 2020-08-21 DIAGNOSIS — J44.9 CHRONIC OBSTRUCTIVE PULMONARY DISEASE, UNSPECIFIED COPD TYPE (HCC): ICD-10-CM

## 2020-08-21 DIAGNOSIS — I71.2 THORACIC AORTIC ANEURYSM WITHOUT RUPTURE (HCC): ICD-10-CM

## 2020-08-21 PROCEDURE — 99214 OFFICE O/P EST MOD 30 MIN: CPT | Performed by: INTERNAL MEDICINE

## 2020-08-21 NOTE — TELEPHONE ENCOUNTER
Trent from [address] called in a copy of the CT scan of the chest fax [phone] a good call back number [phone]

## 2020-08-21 NOTE — PROGRESS NOTES
HPI:    Follow-up visit for previously treated squamous cell cancer of right lung and right mainstem bronchus in 2016  Right main stem bronchus was occluded and also there was non malignant pleural effusion   Patient was given 7 weekly doses of carboplatin and Taxol with concurrent radiation   He was offered 2 additional cycles of regular dose Taxol and carboplatin chemotherapy at  3 week interval but patient declined  He has remained under surveillance  He continues to smoke cigarettes and he states he will not quit  He has chronic mild nonproductive cough    Has some tiredness        Current Outpatient Medications:     acetaminophen (TYLENOL) 650 mg CR tablet, Take 650 mg by mouth every 6 (six) hours as needed for mild pain  , Disp: , Rfl:     albuterol (PROVENTIL HFA,VENTOLIN HFA) 90 mcg/act inhaler, Inhale 2 puffs every 6 (six) hours as needed for wheezing, Disp: 1 Inhaler, Rfl: 3    fluticasone-salmeterol (ADVAIR) 250-50 mcg/dose inhaler, Inhale 1 puff every 12 (twelve) hours, Disp: , Rfl:     gabapentin (NEURONTIN) 400 mg capsule, Take 400 mg by mouth 2 (two) times a day  , Disp: , Rfl:     levothyroxine 100 mcg tablet, Take 212 mcg by mouth daily , Disp: , Rfl:     magnesium hydroxide (MILK OF MAGNESIA) 400 mg/5 mL oral suspension, Take 30 mL by mouth daily as needed for constipation  , Disp: , Rfl:     metoprolol succinate (TOPROL-XL) 25 mg 24 hr tablet, Take 25 mg by mouth daily, Disp: , Rfl:     phenytoin (DILANTIN) 100 mg ER capsule, Take 100 mg by mouth 2 (two) times a day And 250mg at 5pm, Disp: , Rfl:     sodium chloride 1 g tablet, Take 4 g by mouth 3 (three) times a day, Disp: , Rfl:     tiotropium (SPIRIVA) 18 mcg inhalation capsule, Place 18 mcg into inhaler and inhale daily, Disp: , Rfl:     Vitamin D, Cholecalciferol, 1000 UNITS CAPS, Take 2 capsules by mouth daily , Disp: , Rfl:     No Known Allergies    Oncology History   Essie Santillan presents today for a routine follow up for cancer of the main bronchus  He completed Radiation treatments on 12/19/16  Last seen in our office on 1/6/20 2/11/20- Dr Nilay Arcos  Patient is stable in regards to his symptoms  He did have 1 day episode of coughing up a piece of dried blood  F/U w/ Ct scan in 6 months     2/25/20-Dr Suha Valdivia Pulmonary   -C/O  SOB with exertion but does not perform any exercise   -Continues to smoke daily   -F/U in 12 months     1/10/20- CT SCAN   IMPRESSIONS:   Minimal progressive gaseous resorption of small amount of trapped lung and scarring in the right pulmonary apex  Otherwise, stable chronic right lung scarring most severe in the right upper lobe and additional chronic findings as above  No other significant interval change  8/11/20 CT chest  IMPRESSION:  Appearance of predominantly right upper lobe apical and posterior paramediastinal consolidation, presumed to represent scarring and less likely underlying residual tumor, is unchanged in appearance from 1/10/2020  Findings associated with volume loss and rightward mediastinal shift  Extensive micronodularity throughout the left lung is similar in appearance to CT chest examinations dating to 6/27/2018  Findings are unlikely to represent disease spread given stability  Aneurysmal dilatation of ascending aorta up to 5 2 cm stable  8/21/20- Dr Link Lefty of right main bronchus ([facility] [address] )   10/3/2016 Initial Diagnosis    Cancer of right main bronchus ([facility] [address] )     10/3/2016 Biopsy    Right endobronchial mass biopsy  Keratinizing squamous cell carcinoma     11/7/2016 - 12/19/2016 Radiation    Right lung/mediastinum to a total dose of 6000 cGy in 30 daily fractions to all gross disease  11/10/2016 - 12/22/2016 Chemotherapy     7 weekly doses of carboplatin plus Taxol with concurrent radiation  Dr Nilay Arcos         ROS:  08/21/20 Reviewed 13 systems: See symptoms in HPI:  Cough    Tiredness  Presently no headaches, seizures, dizziness, diplopia, dysphagia, hoarseness, chest pain, palpitations, shortness of breath,  hemoptysis, abdominal pain, nausea, vomiting, change in bowel habits, melena, hematuria, fever, chills, bleeding, bone pains, skin rash, weight loss, arthritic symptoms, weakness, numbness,  claudication and gait problem  No frequent infections  Not unusually sensitive to heat or cold  No swelling of the ankles  No swollen glands  Patient is anxious  /68 (BP Location: Right arm, Patient Position: Sitting, Cuff Size: Adult)   Pulse 66   Temp 98 4 °F (36 9 °C)   Resp 16   Ht 5' 9" (1 753 m)   Wt 79 4 kg (175 lb)   SpO2 96%   BMI 25 84 kg/m²     Physical Exam:  Alert, oriented, not in distress, no icterus, no oral thrush, no palpable neck mass, clear lung fields, regular heart rate, abdomen  soft and non tender, no palpable abdominal mass, no ascites, no edema of ankles, no calf tenderness, no focal neurological deficit, no skin rash, no palpable lymphadenopathy, good arterial pulses, no clubbing  Patient is anxious  Performance status 1  IMAGING:  IMPRESSION:     Appearance of predominantly right upper lobe apical and posterior paramediastinal consolidation, presumed to represent scarring and less likely underlying residual tumor, is unchanged in appearance from 1/10/2020  Findings associated with volume loss and   rightward mediastinal shift      Extensive micronodularity throughout the left lung is similar in appearance to CT chest examinations dating to 6/27/2018   Findings are unlikely to represent disease spread given stability      Aneurysmal dilatation of ascending aorta up to 5 2 cm stable               Workstation performed: [id number]      Imaging     CT chest wo contrast (Order: [id number]) - 8/11/2020       LABS:  Results for orders placed or performed during the hospital encounter of 04/08/19   Fingerstick Glucose (POCT)   Result Value Ref Range    POC Glucose 94 65 - 140 mg/dl     Labs, Imaging, & Other studies:   All pertinent labs and imaging studies were personally reviewed    Lab Results   Component Value Date    K 3 9 10/02/2016    CL 98 (L) 10/02/2016    CO2 30 10/02/2016    BUN 4 (L) 10/11/2016    CREATININE 0 53 (L) 10/11/2016    CALCIUM 8 5 10/02/2016    EGFR >60 0 10/11/2016     Lab Results   Component Value Date    WBC 8 63 10/02/2016    HGB 12 3 10/02/2016    HCT 37 1 10/02/2016    MCV 88 10/02/2016     10/02/2016       Reviewed and discussed with patient  Assessment and plan: Follow-up visit for previously treated squamous cell cancer of right lung and right mainstem bronchus in 2016  Right main stem bronchus was occluded and also there was non malignant pleural effusion   Patient was given 7 weekly doses of carboplatin and Taxol with concurrent radiation   He was offered 2 additional cycles of regular dose Taxol and carboplatin chemotherapy at  3 week interval but patient declined  He has remained under surveillance  He continues to smoke cigarettes and he states he will not quit  He has chronic mild nonproductive cough  Has tiredness    Physical examination and test results are as recorded and discussed in detail  No change in CT scan  He has 5 2 centimeter thoracic aortic aneurysm and he is being referred back to cardiac thoracic Surgical  Department  I explained to him that he  must follow  with them and if that ruptures person could die  He will come back to our office in 6 months with blood tests  Once again I advised him to please quit smoking cigarettes  All discussed in detail  Questions answered  Discussed the importance of eating healthy foods, activities as tolerated and health screening tests   Patient is capable of self-care  Discussed precautions against coronavirus  Plan is surveillance  1  Malignant neoplasm of upper lobe of right lung (HCC)    - CBC and differential; Future  - Comprehensive metabolic panel; Future  - CEA; Future    2   Thoracic aortic aneurysm without rupture [facility])    - Ambulatory referral to Thoracic Surgery; Future    3  Acquired hypothyroidism      4  Chronic obstructive pulmonary disease, unspecified COPD type [facility])          Patient voiced understanding and agreement in the discussion  Counseling / Coordination of Care   Greater than 50% of total time was spent with the patient and / or family counseling and / or coordination of care

## 2020-08-27 ENCOUNTER — TELEPHONE (OUTPATIENT)
Dept: HEMATOLOGY ONCOLOGY | Facility: CLINIC | Age: 60
End: 2020-08-27

## 2020-08-27 NOTE — TELEPHONE ENCOUNTER
Earline Lomeli from [address] [facility] called requesting the 8/11/2020 & 1/10/2020 ct [facility] to them at Fax [phone]  Phone [phone]

## 2020-08-27 NOTE — TELEPHONE ENCOUNTER
Results faxed over to the fax number provided with instructions for the facility to call our office if they need any additional information

## 2020-09-08 DIAGNOSIS — I71.2 THORACIC AORTIC ANEURYSM WITHOUT RUPTURE (HCC): Primary | ICD-10-CM

## 2020-11-05 ENCOUNTER — HOSPITAL ENCOUNTER (OUTPATIENT)
Dept: CT IMAGING | Facility: HOSPITAL | Age: 60
Discharge: HOME | End: 2020-11-05
Payer: COMMERCIAL

## 2020-11-05 ENCOUNTER — HOSPITAL ENCOUNTER (OUTPATIENT)
Dept: NON INVASIVE DIAGNOSTICS | Facility: CLINIC | Age: 60
Discharge: HOME | End: 2020-11-05
Payer: COMMERCIAL

## 2020-11-05 DIAGNOSIS — I71.2 THORACIC AORTIC ANEURYSM WITHOUT RUPTURE (HCC): ICD-10-CM

## 2020-11-05 PROCEDURE — 93306 TTE W/DOPPLER COMPLETE: CPT | Performed by: INTERNAL MEDICINE

## 2020-11-05 PROCEDURE — G1004 CDSM NDSC: HCPCS

## 2020-11-05 PROCEDURE — 93306 TTE W/DOPPLER COMPLETE: CPT

## 2020-11-05 PROCEDURE — 71275 CT ANGIOGRAPHY CHEST: CPT

## 2020-11-05 RX ADMIN — IOHEXOL 85 ML: 350 INJECTION, SOLUTION INTRAVENOUS at 14:42

## 2020-12-18 ENCOUNTER — OFFICE VISIT (OUTPATIENT)
Dept: CARDIAC SURGERY | Facility: CLINIC | Age: 60
End: 2020-12-18
Payer: COMMERCIAL

## 2020-12-18 DIAGNOSIS — I71.2 THORACIC AORTIC ANEURYSM WITHOUT RUPTURE (HCC): ICD-10-CM

## 2020-12-18 DIAGNOSIS — J44.9 CHRONIC OBSTRUCTIVE PULMONARY DISEASE, UNSPECIFIED COPD TYPE (HCC): ICD-10-CM

## 2020-12-18 DIAGNOSIS — F10.10 ALCOHOL ABUSE: ICD-10-CM

## 2020-12-18 DIAGNOSIS — J98.11 COLLAPSE OF RIGHT LUNG: Primary | ICD-10-CM

## 2020-12-18 DIAGNOSIS — Z72.0 TOBACCO ABUSE: ICD-10-CM

## 2020-12-18 DIAGNOSIS — C34.11 MALIGNANT NEOPLASM OF UPPER LOBE OF RIGHT LUNG (HCC): ICD-10-CM

## 2020-12-18 PROBLEM — I35.1 AORTIC INSUFFICIENCY: Status: ACTIVE | Noted: 2020-12-18

## 2020-12-18 PROCEDURE — 99212 OFFICE O/P EST SF 10 MIN: CPT | Performed by: PHYSICIAN ASSISTANT

## 2021-02-22 ENCOUNTER — TELEPHONE (OUTPATIENT)
Dept: HEMATOLOGY ONCOLOGY | Facility: CLINIC | Age: 61
End: 2021-02-22

## 2021-02-22 NOTE — TELEPHONE ENCOUNTER
Appointment Confirmation     Appointment with  Kamila [id number]    Appointment date & time  2-26-21 @ 11:40am    Location [place]    Patient verbilized Understanding

## 2021-02-26 ENCOUNTER — OFFICE VISIT (OUTPATIENT)
Dept: HEMATOLOGY ONCOLOGY | Facility: CLINIC | Age: 61
End: 2021-02-26
Payer: COMMERCIAL

## 2021-02-26 VITALS
SYSTOLIC BLOOD PRESSURE: 128 MMHG | OXYGEN SATURATION: 94 % | DIASTOLIC BLOOD PRESSURE: 74 MMHG | BODY MASS INDEX: 25.03 KG/M2 | HEIGHT: 69 IN | HEART RATE: 76 BPM | RESPIRATION RATE: 18 BRPM | TEMPERATURE: 98.2 F | WEIGHT: 169 LBS

## 2021-02-26 DIAGNOSIS — I71.2 THORACIC AORTIC ANEURYSM WITHOUT RUPTURE (HCC): ICD-10-CM

## 2021-02-26 DIAGNOSIS — Z72.0 TOBACCO ABUSE: ICD-10-CM

## 2021-02-26 DIAGNOSIS — E03.9 ACQUIRED HYPOTHYROIDISM: ICD-10-CM

## 2021-02-26 DIAGNOSIS — J44.9 CHRONIC OBSTRUCTIVE PULMONARY DISEASE, UNSPECIFIED COPD TYPE (HCC): ICD-10-CM

## 2021-02-26 DIAGNOSIS — C34.11 MALIGNANT NEOPLASM OF UPPER LOBE OF RIGHT LUNG (HCC): Primary | ICD-10-CM

## 2021-02-26 PROCEDURE — 99214 OFFICE O/P EST MOD 30 MIN: CPT | Performed by: INTERNAL MEDICINE

## 2021-02-26 RX ORDER — LEVOTHYROXINE SODIUM 112 UG/1
112 TABLET ORAL DAILY
COMMUNITY

## 2021-02-26 NOTE — PROGRESS NOTES
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 that was occluded and patient also  there was non malignant right pleural effusion  Patient was treated with 7 weekly doses of carboplatin and Taxol  and concurrent radiation  Sera Velez was offered 2 additional cycles of regular doses of Taxol and carboplatin chemotherapy at  3 week interval but patient declined  He has remained under surveillance  He continues to smoke cigarettes and he states he will not quit  Audrea Ramming He states he smokes 3 cigarettes a day  He has chronic mild productive cough  with clear white phlegm and has exertional dyspnea  No chest pain and no hemoptysis  Has some tiredness  Is trying to maintain his weight    Patient has thoracic aneurysm and he follows with cardio thoracic surgery  He states he does not want surgery for that        Current Outpatient Medications:     albuterol (PROVENTIL HFA,VENTOLIN HFA) 90 mcg/act inhaler, Inhale 2 puffs every 6 (six) hours as needed for wheezing, Disp: 1 Inhaler, Rfl: 3    fluticasone-salmeterol (ADVAIR) 250-50 mcg/dose inhaler, Inhale 1 puff every 12 (twelve) hours, Disp: , Rfl:     gabapentin (NEURONTIN) 400 mg capsule, Take 400 mg by mouth 2 (two) times a day  , Disp: , Rfl:     levothyroxine (Synthroid) 112 mcg tablet, Take 112 mcg by mouth daily, Disp: , Rfl:     levothyroxine 100 mcg tablet, Take 212 mcg by mouth daily , Disp: , Rfl:     magnesium hydroxide (MILK OF MAGNESIA) 400 mg/5 mL oral suspension, Take 30 mL by mouth daily as needed for constipation  , Disp: , Rfl:     metoprolol succinate (TOPROL-XL) 25 mg 24 hr tablet, Take 25 mg by mouth daily, Disp: , Rfl:     phenytoin (DILANTIN) 100 mg ER capsule, Take 100 mg by mouth 2 (two) times a day And 250mg at 5pm, Disp: , Rfl:     sodium chloride 1 g tablet, Take 4 g by mouth 3 (three) times a day, Disp: , Rfl:     tiotropium (SPIRIVA) 18 mcg inhalation capsule, Place 18 mcg into inhaler and inhale daily, Disp: , Rfl:     Vitamin D, Cholecalciferol, 1000 UNITS CAPS, Take 2 capsules by mouth daily , Disp: , Rfl:     acetaminophen (TYLENOL) 650 mg CR tablet, Take 650 mg by mouth every 6 (six) hours as needed for mild pain  , Disp: , Rfl:     No Known Allergies    Oncology History Overview Note   Marco Ramírez presents today for a routine follow up for cancer of the main bronchus  He completed Radiation treatments on 12/19/16  Last seen in our office on 1/6/20 2/11/20- Dr Tre Arthur  Patient is stable in regards to his symptoms  He did have 1 day episode of coughing up a piece of dried blood  F/U w/ Ct scan in 6 months     2/25/20-Dr Becki Ivan Pulmonary   -C/O  SOB with exertion but does not perform any exercise   -Continues to smoke daily   -F/U in 12 months     1/10/20- CT SCAN   IMPRESSIONS:   Minimal progressive gaseous resorption of small amount of trapped lung and scarring in the right pulmonary apex  Otherwise, stable chronic right lung scarring most severe in the right upper lobe and additional chronic findings as above  No other significant interval change  8/11/20 CT chest  IMPRESSION:  Appearance of predominantly right upper lobe apical and posterior paramediastinal consolidation, presumed to represent scarring and less likely underlying residual tumor, is unchanged in appearance from 1/10/2020  Findings associated with volume loss and rightward mediastinal shift  Extensive micronodularity throughout the left lung is similar in appearance to CT chest examinations dating to 6/27/2018  Findings are unlikely to represent disease spread given stability  Aneurysmal dilatation of ascending aorta up to 5 2 cm stable        8/21/20- Dr Huy Guillen of right main bronchus ([facility] [address] )   10/3/2016 Initial Diagnosis    Cancer of right main bronchus ([facility] [address] )     10/3/2016 Biopsy    Right endobronchial mass biopsy  Keratinizing squamous cell carcinoma     11/7/2016 - 12/19/2016 Radiation    Right lung/mediastinum to a total dose of 6000 cGy in 30 daily fractions to all gross disease  11/10/2016 - 12/22/2016 Chemotherapy     7 weekly doses of carboplatin plus Taxol with concurrent radiation  Dr John Schultz:  02/26/21 Reviewed 13 systems: See symptoms in HPI:    Presently no other neurological, cardiac, pulmonary, GI and  symptoms other than listed in HPI  Other symptoms are in HPI  No  fever, chills, bleeding, bone pains, skin rash, weight loss, arthritic symptoms, weakness, numbness,  claudication and gait problem  No frequent infections  Not unusually sensitive to heat or cold  No swelling of the ankles  No swollen glands  Patient is anxious  /74 (BP Location: Left arm, Patient Position: Sitting, Cuff Size: Adult)   Pulse 76   Temp 98 2 °F (36 8 °C)   Resp 18   Ht 5' 9" (1 753 m)   Wt 76 7 kg (169 lb)   SpO2 94%   BMI 24 96 kg/m²     Physical Exam:  Alert, oriented, not in distress, stable vital signs, no icterus, no oral thrush, no palpable neck mass, decreased breath sounds right lung compared to left, no dullness,  regular heart rate, abdomen  soft and non tender, no palpable abdominal mass, no ascites, no edema of ankles, no calf tenderness, no focal neurological deficit, no skin rash, no palpable lymphadenopathy, has clubbing  Patient is anxious  Performance status 1  IMAGING:  IMPRESSION:     Appearance of predominantly right upper lobe apical and posterior paramediastinal consolidation, presumed to represent scarring and less likely underlying residual tumor, is unchanged in appearance from 1/10/2020  Findings associated with volume loss and   rightward mediastinal shift      Extensive micronodularity throughout the left lung is similar in appearance to CT chest examinations dating to 6/27/2018   Findings are unlikely to represent disease spread given stability      Aneurysmal dilatation of ascending aorta up to 5 2 cm stable               Workstation performed: [id number]      Imaging     CT chest wo contrast (Order: [id number]) - 8/11/2020   IMPRESSION:     1  Ascending thoracic aortic fusiform aneurysm measuring 5 2 cm involving the root is unchanged      2  Focal contained dissection along the posterior wall is unchanged from 11/20/2017 study  No thrombus      3  Chronic right upper lung changes with decreased right lung volume unchanged      The examination demonstrates a significant  finding and was documented as such in [facility] for liaison and referring practitioner immediate notification       Workstation performed: [id number]      Imaging    CTA chest wo w contrast (Order: [id number]) - 11/5/2020      LABS:    Results for orders placed or performed during the hospital encounter of 04/08/19   Fingerstick Glucose (POCT)   Result Value Ref Range    POC Glucose 94 65 - 140 mg/dl     Labs, Imaging, & Other studies:   All pertinent labs and imaging studies were personally reviewed    Lab Results   Component Value Date    K 3 9 10/02/2016    CL 98 (L) 10/02/2016    CO2 30 10/02/2016    BUN 4 (L) 10/11/2016    CREATININE 0 53 (L) 10/11/2016    CALCIUM 8 5 10/02/2016    EGFR >60 0 10/11/2016     Lab Results   Component Value Date    WBC 8 63 10/02/2016    HGB 12 3 10/02/2016    HCT 37 1 10/02/2016    MCV 88 10/02/2016     10/02/2016        On 02/23/2021 normal blood counts and hemoglobin 13 4, normal CMP and CEA    Reviewed CBC, CMP, CEA and CT chest and discussed with patient  Assessment and plan: In 2016 patient was diagnosed to have 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 that was occluded and patient also  there was non malignant right pleural effusion  Patient was treated with 7 weekly doses of carboplatin and Taxol  and concurrent radiation  Taryaneth Tanner was offered 2 additional cycles of regular doses of Taxol and carboplatin chemotherapy at  3 week interval but patient declined    He has remained under surveillance  He continues to smoke cigarettes and he states he will not quit  Irl Pizza He states he smokes 3 cigarettes a day  He has chronic mild productive cough  with clear white phlegm and has exertional dyspnea  No chest pain and no hemoptysis  Has some tiredness  Is trying to maintain his weight    Patient has thoracic aneurysm and he follows with cardio thoracic surgery  He states he does not want surgery for that     Physical examination and test results are as recorded and discussed in detail  Reviewed CBC, CMP, CEA and CT chest and discussed with patient  No change in CT scan  He has 5 2 centimeter thoracic aortic aneurysm and he  Follows with cardiac thoracic Surgical  Department     As mentioned above he does not want surgery  He understands that could rupture and he could die  He will come back to our office in 6 months with blood tests  And CT chest   Once again I advised him to please quit smoking cigarettes  All discussed in detail  Questions answered  Discussed the importance of eating healthy foods, activities as tolerated and health screening tests   Patient is capable of self-care  Discussed precautions against coronavirus  Plan is surveillance  1  Malignant neoplasm of upper lobe of right lung (HCC)    - CBC and differential; Future  - Comprehensive metabolic panel; Future  - CEA; Future  - CT chest wo contrast; Future    2  Chronic obstructive pulmonary disease, unspecified COPD type ([facility] [address] )    - CT chest wo contrast; Future    3  Thoracic aortic aneurysm without rupture ([facility] [address] )    - CT chest wo contrast; Future    4  Tobacco abuse      5  Acquired hypothyroidism    Blood tests and CT chest prior to next visit in 6 months      Counseling / Coordination of Care      Provided counseling and support

## 2021-07-02 ENCOUNTER — TELEPHONE (OUTPATIENT)
Dept: HEMATOLOGY ONCOLOGY | Facility: MEDICAL CENTER | Age: 61
End: 2021-07-02

## 2021-07-02 NOTE — TELEPHONE ENCOUNTER
Spoke with Paul Brandon r/s patient appt at the [facility] office   New appt is 8/27/21 at 1:20pm in [place] office with Yolande Leyva

## 2021-08-18 ENCOUNTER — HOSPITAL ENCOUNTER (OUTPATIENT)
Dept: CT IMAGING | Facility: HOSPITAL | Age: 61
Discharge: HOME | End: 2021-08-18
Attending: INTERNAL MEDICINE
Payer: COMMERCIAL

## 2021-08-18 DIAGNOSIS — C34.11 MALIGNANT NEOPLASM OF UPPER LOBE OF RIGHT LUNG (HCC): ICD-10-CM

## 2021-08-18 DIAGNOSIS — J44.9 CHRONIC OBSTRUCTIVE PULMONARY DISEASE, UNSPECIFIED COPD TYPE (HCC): ICD-10-CM

## 2021-08-18 DIAGNOSIS — I71.2 THORACIC AORTIC ANEURYSM WITHOUT RUPTURE (HCC): ICD-10-CM

## 2021-08-18 PROCEDURE — G1004 CDSM NDSC: HCPCS

## 2021-08-18 PROCEDURE — 71250 CT THORAX DX C-: CPT

## 2021-08-27 ENCOUNTER — OFFICE VISIT (OUTPATIENT)
Dept: HEMATOLOGY ONCOLOGY | Facility: CLINIC | Age: 61
End: 2021-08-27
Payer: COMMERCIAL

## 2021-08-27 VITALS
TEMPERATURE: 98.1 F | OXYGEN SATURATION: 97 % | RESPIRATION RATE: 18 BRPM | WEIGHT: 153 LBS | HEART RATE: 66 BPM | SYSTOLIC BLOOD PRESSURE: 128 MMHG | BODY MASS INDEX: 22.66 KG/M2 | HEIGHT: 69 IN | DIASTOLIC BLOOD PRESSURE: 70 MMHG

## 2021-08-27 DIAGNOSIS — Z72.0 TOBACCO ABUSE: ICD-10-CM

## 2021-08-27 DIAGNOSIS — J44.9 CHRONIC OBSTRUCTIVE PULMONARY DISEASE, UNSPECIFIED COPD TYPE (HCC): ICD-10-CM

## 2021-08-27 DIAGNOSIS — C34.11 MALIGNANT NEOPLASM OF UPPER LOBE OF RIGHT LUNG (HCC): Primary | ICD-10-CM

## 2021-08-27 DIAGNOSIS — R91.8 MULTIPLE LUNG NODULES ON CT: ICD-10-CM

## 2021-08-27 DIAGNOSIS — E03.9 ACQUIRED HYPOTHYROIDISM: ICD-10-CM

## 2021-08-27 DIAGNOSIS — I71.2 THORACIC AORTIC ANEURYSM WITHOUT RUPTURE (HCC): ICD-10-CM

## 2021-08-27 PROCEDURE — 99214 OFFICE O/P EST MOD 30 MIN: CPT | Performed by: INTERNAL MEDICINE

## 2021-08-27 NOTE — PROGRESS NOTES
HPI:   Follow-up visit for history of squamous cell cancer right main stem bronchus in  2016  Right mainstem bronchus was occluded  Patient was treated with 7 weekly doses of carboplatin and Taxol  and concurrent radiation  [facility] was offered 2 additional cycles of regular doses of Taxol and carboplatin chemotherapy at  3 week interval but patient declined  He has remained under surveillance  He continues to smoke cigarettes and he states he will not quit  Rodo Kayleigh He states he smokes 3 cigarettes a day  He has chronic mild productive cough  with clear white phlegm and has exertional dyspnea  No chest pain and no hemoptysis  Has some tiredness  Rodo Kayleigh He is trying to maintain his weight    Patient has thoracic aneurysm and he follows with cardio thoracic surgery  He states he does not want surgery for that     He has multiple lung nodules and 2 of the nodules are new    See CT scan report      Current Outpatient Medications:     acetaminophen (TYLENOL) 650 mg CR tablet, Take 650 mg by mouth every 6 (six) hours as needed for mild pain  , Disp: , Rfl:     albuterol (PROVENTIL HFA,VENTOLIN HFA) 90 mcg/act inhaler, Inhale 2 puffs every 6 (six) hours as needed for wheezing, Disp: 1 Inhaler, Rfl: 3    fluticasone-salmeterol (ADVAIR) 250-50 mcg/dose inhaler, Inhale 1 puff every 12 (twelve) hours, Disp: , Rfl:     gabapentin (NEURONTIN) 400 mg capsule, Take 400 mg by mouth 2 (two) times a day  , Disp: , Rfl:     levothyroxine (Synthroid) 112 mcg tablet, Take 112 mcg by mouth daily, Disp: , Rfl:     levothyroxine 100 mcg tablet, Take 212 mcg by mouth daily , Disp: , Rfl:     magnesium hydroxide (MILK OF MAGNESIA) 400 mg/5 mL oral suspension, Take 30 mL by mouth daily as needed for constipation  , Disp: , Rfl:     metoprolol succinate (TOPROL-XL) 25 mg 24 hr tablet, Take 25 mg by mouth daily, Disp: , Rfl:     phenytoin (DILANTIN) 100 mg ER capsule, Take 100 mg by mouth 2 (two) times a day And 250mg at 5pm, Disp: , Rfl:     sodium chloride 1 g tablet, Take 4 g by mouth 3 (three) times a day, Disp: , Rfl:     tiotropium (SPIRIVA) 18 mcg inhalation capsule, Place 18 mcg into inhaler and inhale daily, Disp: , Rfl:     Vitamin D, Cholecalciferol, 1000 UNITS CAPS, Take 2 capsules by mouth daily , Disp: , Rfl:     No Known Allergies    Oncology History Overview Note   Veronica Stovall presents today for a routine follow up for cancer of the main bronchus  He completed Radiation treatments on 12/19/16  Last seen in our office on 1/6/20 2/11/20- Dr George Tam  Patient is stable in regards to his symptoms  He did have 1 day episode of coughing up a piece of dried blood  F/U w/ Ct scan in 6 months     2/25/20-Dr Mindy Mcfadden Pulmonary   -C/O  SOB with exertion but does not perform any exercise   -Continues to smoke daily   -F/U in 12 months     1/10/20- CT SCAN   IMPRESSIONS:   Minimal progressive gaseous resorption of small amount of trapped lung and scarring in the right pulmonary apex  Otherwise, stable chronic right lung scarring most severe in the right upper lobe and additional chronic findings as above  No other significant interval change  8/11/20 CT chest  IMPRESSION:  Appearance of predominantly right upper lobe apical and posterior paramediastinal consolidation, presumed to represent scarring and less likely underlying residual tumor, is unchanged in appearance from 1/10/2020  Findings associated with volume loss and rightward mediastinal shift  Extensive micronodularity throughout the left lung is similar in appearance to CT chest examinations dating to 6/27/2018  Findings are unlikely to represent disease spread given stability  Aneurysmal dilatation of ascending aorta up to 5 2 cm stable        8/21/20- Dr Quentin Lincoln of right main bronchus ([facility] [place] [address] )   10/3/2016 Initial Diagnosis    Cancer of right main bronchus ([facility] [place] [address] )     10/3/2016 Biopsy    Right endobronchial mass biopsy  Keratinizing squamous cell carcinoma     11/7/2016 - 12/19/2016 Radiation    Right lung/mediastinum to a total dose of 6000 cGy in 30 daily fractions to all gross disease  11/10/2016 - 12/22/2016 Chemotherapy     7 weekly doses of carboplatin plus Taxol with concurrent radiation  Dr Josr Rodriguez         ROS:  08/27/21 Reviewed 12 systems: See symptoms in HPI:    Presently no other neurological, cardiac, pulmonary, GI and  symptoms other than listed in HPI  Other symptoms are in HPI  No  fever, chills, bleeding, bone pains, skin rash, weight loss, arthritic symptoms, weakness, numbness,  claudication and gait problem  No frequent infections  Not unusually sensitive to heat or cold  No swelling of the ankles  No swollen glands  Patient is anxious     No new symptoms since last visit       /70 (BP Location: Left arm, Patient Position: Sitting, Cuff Size: Adult)   Pulse 66   Temp 98 1 °F (36 7 °C)   Resp 18   Ht 5' 9" (1 753 m)   Wt 69 4 kg (153 lb)   SpO2 97%   BMI 22 59 kg/m²     Physical Exam:    Patient is alert and oriented  Patient is not in distress  Vital signs are as above  There is no icterus, no oral thrush, no palpable neck mass, decreased breath sounds right lung compared to left, no dullness,  regular heart rate, abdomen  soft and non tender, no palpable abdominal mass, no ascites, no edema of ankles, no calf tenderness, no focal neurological deficit, no skin rash, no palpable lymphadenopathy, has clubbing  Patient is anxious  Performance status 1  IMAGING:  IMPRESSION:     1   Paramediastinal fibrotic changes in the right upper lobe and to a much lesser extent in the medial right middle lobe and superior segment of the right lower lobe  Overall, this has been stable compared to the prior study from November 2020   2   Bilateral 3 mm to 9 mm lung nodules, 2 of which are new since the prior study and favoring inflammatory /infectious in etiology  Recommend continue follow-up in 3-6 months    3  Grossly stable (since November 2020) appearing 5 2 cm ascending thoracic aortic aneurysm  4   Partially visualized cholelithiasis              Workstation performed: [id number]      Imaging    CT chest wo contrast (Order: [id number]) - 8/18/2021        LABS:    Results for orders placed or performed during the hospital encounter of 04/08/19   Fingerstick Glucose (POCT)   Result Value Ref Range    POC Glucose 94 65 - 140 mg/dl     Labs, Imaging, & Other studies:   All pertinent labs and imaging studies were personally reviewed   CEA 4 with upper normal 6  Normal CBC D  Normal chemistry profile except alkaline phosphatase 127      Reviewed CBC, CMP, CEA and CT chest and discussed with patient  Assessment and plan: Follow-up visit for history of squamous cell cancer right main stem bronchus in  2016  Right mainstem bronchus was occluded  Patient was treated with 7 weekly doses of carboplatin and Taxol  and concurrent radiation  Evetta Pouch was offered 2 additional cycles of regular doses of Taxol and carboplatin chemotherapy at  3 week interval but patient declined  He has remained under surveillance  He continues to smoke cigarettes and he states he will not quit  Georgette Ormond He states he smokes 3 cigarettes a day  He has chronic mild productive cough  with clear white phlegm and has exertional dyspnea  No chest pain and no hemoptysis  Has some tiredness  Georgette Ormond He is trying to maintain his weight    Patient has thoracic aneurysm and he follows with cardio thoracic surgery  He states he does not want surgery for that     He has multiple lung nodules and 2 of the nodules are new  See CT scan report   Physical examination and test results are as recorded and discussed in detail  Patient is being referred to thoracic surgery for 2 new lung lesions  He does not want surgery for thoracic aneurysm  He understands that he could die if aneurysm ruptured   Once again I advised him to please quit smoking cigarettes    All discussed in detail  Questions answered  Discussed the importance of eating healthy foods, activities as tolerated and health screening tests   Patient is capable of self-care  Discussed precautions against coronavirus  Plan is to refer him to thoracic surgery for evaluation and monitoring of these lung nodules  Patient will continue to follow with his primary physician and other consultants       See diagnoses, orders and instructions    1  Malignant neoplasm of upper lobe of right lung [facility])    - Ambulatory referral to Thoracic Surgery; Future    2  Multiple lung nodules on CT    - Ambulatory referral to Thoracic Surgery; Future    3  Tobacco abuse      4  Chronic obstructive pulmonary disease, unspecified COPD type ([address] )      5  Acquired hypothyroidism      6  Thoracic aortic aneurysm without rupture [facility])    Referral to thoracic surgery  Follow-up in our office in 3 months  Counseling / Coordination of Care      Provided counseling and support

## 2021-09-30 ENCOUNTER — OFFICE VISIT (OUTPATIENT)
Dept: CARDIAC SURGERY | Facility: CLINIC | Age: 61
End: 2021-09-30
Payer: COMMERCIAL

## 2021-09-30 VITALS
WEIGHT: 151.9 LBS | RESPIRATION RATE: 16 BRPM | DIASTOLIC BLOOD PRESSURE: 53 MMHG | HEIGHT: 69 IN | TEMPERATURE: 97.1 F | HEART RATE: 70 BPM | OXYGEN SATURATION: 96 % | BODY MASS INDEX: 22.5 KG/M2 | SYSTOLIC BLOOD PRESSURE: 100 MMHG

## 2021-09-30 DIAGNOSIS — C34.11 MALIGNANT NEOPLASM OF UPPER LOBE OF RIGHT LUNG (HCC): ICD-10-CM

## 2021-09-30 DIAGNOSIS — R91.8 MULTIPLE LUNG NODULES ON CT: Primary | ICD-10-CM

## 2021-09-30 DIAGNOSIS — Z72.0 TOBACCO ABUSE: ICD-10-CM

## 2021-09-30 PROCEDURE — 99245 OFF/OP CONSLTJ NEW/EST HI 55: CPT | Performed by: PHYSICIAN ASSISTANT

## 2021-09-30 NOTE — PROGRESS NOTES
Thoracic Consult  Assessment/Plan:    Multiple lung nodules on CT  We had a discussion with Mr Bonita Dee regarding his most recent imaging  He has two sub-centimeter nodules, which are new from his November 2020 CT scan  At this point, their characteristics point to an infectious/ inflammatory etiology  We will have him return in 3 months with a new CT scan  He is in agreement with the plan  Tobacco abuse  We discussed tobacco cessation and resources if he is ever interested in quitting  Diagnoses and all orders for this visit:    Multiple lung nodules on CT  -     CT chest wo contrast; Future    Malignant neoplasm of upper lobe of right lung [facility])  -     Ambulatory referral to Thoracic Surgery    Tobacco abuse             Thoracic History   Diagnosis: Right sided new lung nodules   Procedures/Surgeries:    Pathology:    Adjuvant Therapy:           Patient ID: Sindhu Ryder is a 64 y o  male  ECOG 2     HPI     Mr Bonita Dee is a 63 yo gentleman with a history of tobacco abuse, atrial fibrillation, epilepsy, COPD, thoracic aortic aneurysm, aortic insufficiency, depression/anxiety, who was referred to our office for a right upper lobe lung nodule  He has a history of 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, which was occluded  He underwent chemotherapy with Carboplatin and Taxol with concurrent radiation therapy  He has been under surveillance since then  CT scan of the chest from 8/18/21 revealed multiple pulmonary nodules measuring 3-9 mm  The 6 mm nodule in the right middle lobe has surrounding tree-in-bud opacities is new from 11/20  There is also a new 3 mm right upper lobe nodule  His thoracic aortic aneurysm measures 5 2 cm, stable from 11/20  At this point, he was referred to our office for further evaluation  On discussion, he lives in a nursing home  He has a history of alcohol abuse   He was seen by cardiac surgery and is not a surgical candidate for repair of his thoracic aortic aneurysm secondary to his NSCLC recurrence, technically challenging anatomy and ongoing smoking and severe lung disease  He has lost 18 lbs since February  He is a current 79 ppy smoker, smoking 1 - 1 5 ppd  He had two episodes of red hemoptysis over the past week  The following portions of the patient's history were reviewed and updated as appropriate: allergies, current medications, past family history, past medical history, past social history, past surgical history and problem list     Past Medical History:   Diagnosis Date    Alcohol abuse     Anxiety     Aortic insufficiency 12/18/2020    [facility] fibrillation ([facility] )     Cancer ([facility] )     COPD (chronic obstructive pulmonary disease) ([facility] )     Delirium     Encephalopathy     Epilepsy ([place] [address] )     History of chemotherapy     History of radiation therapy     Hypo-osmolality and hyponatremia     Hypokalemia     Hypothyroid     Lung cancer ([place] [address] )     Lyme disease     Major depression       Past Surgical History:   Procedure Laterality Date    BRONCHOSCOPY N/A 10/3/2016    Procedure: BRONCHOSCOPY FLEXIBLE;  Surgeon: Rosalia Monge DO;  [facility]: [facility];   Service:     HAND SURGERY      PELVIC FRACTURE SURGERY      REMOVAL VENOUS PORT (PORT-A-CATH) Left 9/18/2018    Procedure: REMOVAL VENOUS PORT (PORT-A-CATH)IR;  Surgeon: Robert Cabrera DO;  Location: [facility];  Service: Interventional Radiology      Family History   Problem Relation Age of Onset    Diabetes Mother     Lung cancer Father       Social History     Socioeconomic History    Marital status:      Spouse name: Not on file    Number of children: Not on file    Years of education: Not on file    Highest education level: Not on file   Occupational History    Not on file   Tobacco Use    Smoking status: Current Every Day Smoker     Packs/day: 1 50     Years: 47 00     Pack years: 70 50     Types: Cigarettes    Smokeless tobacco: Never Used    Tobacco comment: smoking more than 1 ppd   Vaping Use    Vaping Use: Never used   Substance and Sexual Activity    Alcohol use: No    Drug use: Never    Sexual activity: Not on file   Other Topics Concern    Not on file   Social History Narrative    Not on file     Social Determinants of Health     Financial Resource Strain:     Difficulty of Paying Living Expenses:    Food Insecurity:     Worried About Running Out of Food in the Last Year:     [address] in the Last Year:    Transportation Needs:     Lack of Transportation (Medical):      Lack of Transportation (Non-Medical):    Physical Activity:     Days of Exercise per Week:     Minutes of Exercise per Session:    Stress:     Feeling of Stress :    Social Connections:     Frequency of Communication with Friends and Family:     Frequency of Social Gatherings with Friends and Family:     Attends [employer] Services:     Active Member of Clubs or Organizations:     Attends Club or Organization Meetings:     Marital Status:    Intimate Partner Violence:     Fear of Current or Ex-Partner:     Emotionally Abused:     Physically Abused:     Sexually Abused:         No Known Allergies  Current Outpatient Medications on File Prior to Visit   Medication Sig Dispense Refill    acetaminophen (TYLENOL) 650 mg CR tablet Take 650 mg by mouth every 6 (six) hours as needed for mild pain        albuterol (PROVENTIL HFA,VENTOLIN HFA) 90 mcg/act inhaler Inhale 2 puffs every 6 (six) hours as needed for wheezing 1 Inhaler 3    fluticasone-salmeterol (ADVAIR) 250-50 mcg/dose inhaler Inhale 1 puff every 12 (twelve) hours      gabapentin (NEURONTIN) 400 mg capsule Take 400 mg by mouth 2 (two) times a day        levothyroxine (Synthroid) 112 mcg tablet Take 112 mcg by mouth daily      levothyroxine 100 mcg tablet Take 212 mcg by mouth daily       magnesium hydroxide (MILK OF MAGNESIA) 400 mg/5 mL oral suspension Take 30 mL by mouth daily as needed for constipation        metoprolol succinate (TOPROL-XL) 25 mg 24 hr tablet Take 25 mg by mouth daily      phenytoin (DILANTIN) 100 mg ER capsule Take 100 mg by mouth 2 (two) times a day And 250mg at 5pm      sodium chloride 1 g tablet Take 4 g by mouth 3 (three) times a day      tiotropium (SPIRIVA) 18 mcg inhalation capsule Place 18 mcg into inhaler and inhale daily      Vitamin D, Cholecalciferol, 1000 UNITS CAPS Take 2 capsules by mouth daily        No current facility-administered medications on file prior to visit  Review of Systems   Constitutional: Positive for fatigue and unexpected weight change  Negative for appetite change, chills and fever  HENT: Negative for congestion, postnasal drip, sore throat, trouble swallowing and voice change  Respiratory: Positive for cough (white/yellow sputum production  )  Negative for shortness of breath  Cardiovascular: Negative for chest pain and palpitations  Gastrointestinal: Negative for abdominal pain, nausea and vomiting  Musculoskeletal: Negative for back pain and neck pain  Neurological: Positive for seizures (h/o seizures) and headaches  Negative for dizziness, syncope and light-headedness  Psychiatric/Behavioral: Negative for agitation, behavioral problems and confusion  All other systems reviewed and are negative  Objective:   Physical Exam  Vitals reviewed  Constitutional:       General: He is not in acute distress  Appearance: Normal appearance  HENT:      Head:      Comments: Hat on      Mouth/Throat:      Comments: Masked   Eyes:      Extraocular Movements: Extraocular movements intact  Cardiovascular:      Rate and Rhythm: Normal rate and regular rhythm  Heart sounds: Normal heart sounds  No murmur heard  Pulmonary:      Effort: Pulmonary effort is normal  No respiratory distress  Breath sounds: Normal breath sounds  Abdominal:      General: Abdomen is flat   Bowel sounds are normal  There is no distension  Palpations: Abdomen is soft  Musculoskeletal:      Cervical back: Normal range of motion and neck supple  Right lower leg: No edema  Left lower leg: No edema  Lymphadenopathy:      Cervical: No cervical adenopathy  Skin:     General: Skin is warm and dry  Neurological:      Mental Status: He is alert and oriented to person, place, and time  Gait: Gait normal    Psychiatric:         Mood and Affect: Mood normal          Behavior: Behavior normal      /53 (BP Location: Right arm, Patient Position: Sitting, Cuff Size: Standard)   Pulse 70   Temp (!) 97 1 °F (36 2 °C) (Temporal)   Resp 16   Ht 5' 9" (1 753 m)   Wt 68 9 kg (151 lb 14 4 oz)   SpO2 96%   BMI 22 43 kg/m²     CT chest wo contrast    Result Date: 8/25/2021  Narrative CT CHEST WITHOUT IV CONTRAST INDICATION:   C34 11: Malignant neoplasm of upper lobe, right bronchus or lung J44 9: Chronic obstructive pulmonary disease, unspecified I71 2: Thoracic aortic aneurysm, without rupture  COMPARISON:  CT dated chest dated 11/5/2020 and CT chest dated 1/10/2020 TECHNIQUE: CT examination of the chest was performed without intravenous contrast   Axial, sagittal, and coronal 2D reformatted images were created from the source data and submitted for interpretation  Radiation dose length product (DLP) for this visit:  180 mGy-cm   This examination, like all CT scans performed in the [facility], was performed utilizing techniques to minimize radiation dose exposure, including the use of iterative reconstruction and automated exposure control  FINDINGS: LUNGS:  Volume loss in the right upper lobe with air bronchogram   Central airways are otherwise patent  Lung nodules as follows (series 3): 6 mm right middle lobe nodule with surrounding tree-in-bud opacities, which is new since the previous study from November 2020 and favoring infectious/inflammatory etiology   3 mm right upper lobe nodule on image #58, new  9 mm nodular area along the wall of cystic/emphysematous changes in the posterior left upper lobe on image #33, stable in appearance compared to the previous study  Again seen is paramediastinal fibrotic changes in the right upper lobe and to a much lesser extent in the right middle lobe and the superior segment of the right lower lobe  Mild to moderate background emphysema  Biapical pleural parenchymal scarring  Additional subpleural fibrotic changes/scarring in the right lower lobe (image #91)  PLEURA:  Unremarkable  HEART/GREAT VESSELS:  Again seen is ascending thoracic aortic aneurysm measuring 5 2 cm, grossly stable compared to the previous study from November 2020  Stable cardiac size  No significant coronary artery calcification  MEDIASTINUM AND ANDREW:  Unremarkable  CHEST WALL AND LOWER NECK:   Unremarkable  VISUALIZED STRUCTURES IN THE UPPER ABDOMEN:  Partially visualized cholelithiasis  OSSEOUS STRUCTURES:  No acute fracture or destructive osseous lesion  Impression 1  Paramediastinal fibrotic changes in the right upper lobe and to a much lesser extent in the medial right middle lobe and superior segment of the right lower lobe  Overall, this has been stable compared to the prior study from November 2020  2   Bilateral 3 mm to 9 mm lung nodules, 2 of which are new since the prior study and favoring inflammatory /infectious in etiology  Recommend continue follow-up in 3-6 months  3   Grossly stable (since November 2020) appearing 5 2 cm ascending thoracic aortic aneurysm  4   Partially visualized cholelithiasis   Workstation performed: [id number]

## 2021-09-30 NOTE — ASSESSMENT & PLAN NOTE
We had a discussion with Mr Rosalina Nguyen regarding his most recent imaging  He has two sub-centimeter nodules, which are new from his November 2020 CT scan  At this point, their characteristics point to an infectious/ inflammatory etiology  We will have him return in 3 months with a new CT scan  He is in agreement with the plan

## 2021-12-03 ENCOUNTER — OFFICE VISIT (OUTPATIENT)
Dept: HEMATOLOGY ONCOLOGY | Facility: CLINIC | Age: 61
End: 2021-12-03
Payer: COMMERCIAL

## 2021-12-03 VITALS
SYSTOLIC BLOOD PRESSURE: 124 MMHG | WEIGHT: 152 LBS | TEMPERATURE: 97.2 F | HEART RATE: 76 BPM | BODY MASS INDEX: 22.51 KG/M2 | RESPIRATION RATE: 16 BRPM | OXYGEN SATURATION: 93 % | DIASTOLIC BLOOD PRESSURE: 68 MMHG | HEIGHT: 69 IN

## 2021-12-03 DIAGNOSIS — J44.9 CHRONIC OBSTRUCTIVE PULMONARY DISEASE, UNSPECIFIED COPD TYPE (HCC): ICD-10-CM

## 2021-12-03 DIAGNOSIS — E03.9 ACQUIRED HYPOTHYROIDISM: ICD-10-CM

## 2021-12-03 DIAGNOSIS — C34.11 MALIGNANT NEOPLASM OF UPPER LOBE OF RIGHT LUNG (HCC): Primary | ICD-10-CM

## 2021-12-03 DIAGNOSIS — I71.2 THORACIC AORTIC ANEURYSM WITHOUT RUPTURE (HCC): ICD-10-CM

## 2021-12-03 DIAGNOSIS — R91.8 MULTIPLE LUNG NODULES ON CT: ICD-10-CM

## 2021-12-03 PROCEDURE — 99214 OFFICE O/P EST MOD 30 MIN: CPT | Performed by: INTERNAL MEDICINE

## 2021-12-29 ENCOUNTER — HOSPITAL ENCOUNTER (OUTPATIENT)
Dept: CT IMAGING | Facility: HOSPITAL | Age: 61
Discharge: HOME | End: 2021-12-29
Payer: COMMERCIAL

## 2021-12-29 DIAGNOSIS — R91.8 MULTIPLE LUNG NODULES ON CT: ICD-10-CM

## 2021-12-29 PROCEDURE — G1004 CDSM NDSC: HCPCS

## 2021-12-29 PROCEDURE — 71250 CT THORAX DX C-: CPT

## 2022-01-06 ENCOUNTER — OFFICE VISIT (OUTPATIENT)
Dept: CARDIAC SURGERY | Facility: CLINIC | Age: 62
End: 2022-01-06
Payer: COMMERCIAL

## 2022-01-06 ENCOUNTER — TELEPHONE (OUTPATIENT)
Dept: HEMATOLOGY ONCOLOGY | Facility: MEDICAL CENTER | Age: 62
End: 2022-01-06

## 2022-01-06 VITALS
TEMPERATURE: 97.3 F | DIASTOLIC BLOOD PRESSURE: 55 MMHG | WEIGHT: 156.97 LBS | RESPIRATION RATE: 16 BRPM | HEIGHT: 69 IN | BODY MASS INDEX: 23.25 KG/M2 | SYSTOLIC BLOOD PRESSURE: 109 MMHG | HEART RATE: 74 BPM | OXYGEN SATURATION: 98 %

## 2022-01-06 DIAGNOSIS — R91.8 MULTIPLE LUNG NODULES ON CT: Primary | ICD-10-CM

## 2022-01-06 PROCEDURE — 99213 OFFICE O/P EST LOW 20 MIN: CPT | Performed by: PHYSICIAN ASSISTANT

## 2022-01-06 NOTE — ASSESSMENT & PLAN NOTE
Mr Delma Zazueta most recent CT scan is stable from a thoracic surgery standpoint  There has been no change in size or appearance of his bilateral pulmonary nodules  There are no new nodules  Therefore, we will extend follow up to 6 months with a new CT scan  He is in agreement with the plan

## 2022-01-06 NOTE — TELEPHONE ENCOUNTER
Patient's care facility called to inform the office that they have made several attempts to bathe the patient but he refused  They wanted to inform the office as he is being seen today by Adenike Wilkinson

## 2022-01-06 NOTE — PROGRESS NOTES
Thoracic Follow-Up  Assessment/Plan:    Multiple lung nodules on CT  Mr Ivan Miller most recent CT scan is stable from a thoracic surgery standpoint  There has been no change in size or appearance of his bilateral pulmonary nodules  There are no new nodules  Therefore, we will extend follow up to 6 months with a new CT scan  He is in agreement with the plan  Diagnoses and all orders for this visit:    Multiple lung nodules on CT  -     CT chest wo contrast; Future             Thoracic History      Diagnosis: Right pulmonary nodules         Patient ID: Josie Pan is a 64 y o  male  ECOG 2     HPI    Mr Isaias Gonsalez is a 63 yo gentleman with a history of tobacco abuse, atrial fibrillation, epilepsy, COPD, thoracic aortic aneurysm, AI, and depression/anxiety who was referred to our office in September for new right sided lung nodules  He has a history of right mainstem bronchus squamous cell carcinoma in 2016 s/p concurrent chemoradiation therapy  He has been under surveillance and a CT from 8/18/21 revealed multiple pulmonary nodules measuring 3-9 mm  Dr Jaxon Newman recommended a 3 month follow up CT scan and smoking cessation  He returns today with a CT from 12/29/21  This revealed a 6 mm right middle lobe nodule with tree-in-bud opacities and an unchanged 9 mm left upper lobe nodule along the major fissure  There are multiple 1-2 mm stable nodules bilaterally, stable  Also, mucoid debris in right lower lobe superior segment bronchus  He had COVID 19 in 12/20  On discussion, he is smoking 1/2 - 1ppd  He has a cough with excessive mucous, yellow/white in color  He denies hemoptysis, fever, chills, weight loss, headaches, or shortness of breath  His weight fluctuates  He has been COVID vaccinated       The following portions of the patient's history were reviewed and updated as appropriate: allergies, current medications, past family history, past medical history, past social history, past surgical history and problem list     Review of Systems      Objective:   Physical Exam  Vitals reviewed  Constitutional:       General: He is not in acute distress  Appearance: Normal appearance  HENT:      Head:      Comments: Hat on      Mouth/Throat:      Comments: Masked   Eyes:      Extraocular Movements: Extraocular movements intact  Cardiovascular:      Rate and Rhythm: Normal rate and regular rhythm  Pulmonary:      Effort: Pulmonary effort is normal  No respiratory distress  Breath sounds: No wheezing  Comments: Some minimal course breath sounds bilaterally  No wheezes  Musculoskeletal:      Cervical back: Normal range of motion and neck supple  Right lower leg: No edema  Left lower leg: No edema  Skin:     General: Skin is warm and dry  Neurological:      Mental Status: He is alert and oriented to person, place, and time  Psychiatric:         Mood and Affect: Mood normal          Behavior: Behavior normal      /55 (BP Location: Right arm, Patient Position: Sitting, Cuff Size: Standard)   Pulse 74   Temp (!) 97 3 °F (36 3 °C) (Temporal)   Resp 16   Ht 5' 9" (1 753 m)   Wt 71 2 kg (156 lb 15 5 oz)   SpO2 98%   BMI 23 18 kg/m²       CT chest wo contrast    Result Date: 1/3/2022  Narrative CT CHEST WITHOUT IV CONTRAST INDICATION:   R91 8: Other nonspecific abnormal finding of lung field  History of lung cancer status post chemotherapy and radiation therapy  COMPARISON:  August 18, 2021 TECHNIQUE: CT examination of the chest was performed without intravenous contrast   Axial, sagittal, and coronal 2D reformatted images were created from the source data and submitted for interpretation  Radiation dose length product (DLP) for this visit:  181 mGy-cm     This examination, like all CT scans performed in the [facility], was performed utilizing techniques to minimize radiation dose exposure, including the use of iterative reconstruction and automated exposure control  FINDINGS: LUNGS:  Post radiation changes to the medial right upper lobe with partial right upper lobe scarring and collapse  There is volume loss on the right with hyper aeration of the left lung and shift of the mediastinum towards the right  Moderate to severe  emphysema  The 6 mm right middle lobe nodule with tree-in-bud opacities process  The 9 mm nodularity along the left upper major fissure (series 3 image 39) is unchanged  Multiple 1-2 mm nodules are stable throughout the left lung and scattered throughout the right lung  No tracheal or endobronchial lesion  Mucoid debris is noted in the right lower lobe superior segment bronchus  PLEURA:  Unremarkable  HEART/GREAT VESSELS: Heart is unremarkable for patient's age  There is fusiform aneurysmal enlargement of the ascending thoracic aorta measuring up to 53 mm  Recommendation is for cardiothoracic surgery consultation  MEDIASTINUM AND ANDREW:  Unremarkable  CHEST WALL AND LOWER NECK:   Unremarkable  VISUALIZED STRUCTURES IN THE UPPER ABDOMEN:  Unremarkable  OSSEOUS STRUCTURES:  No acute fracture or destructive osseous lesion  Impression Right upper lobe paramediastinal postradiation changes with volume loss, hyperaeration of the left lung, and mediastinal shift unchanged from previous studies  Bilateral pulmonary nodules which are stable in appearance  53 mm stable ascending aortic aneurysm   Workstation performed: [id number]

## 2022-02-28 NOTE — LETTER
April 13, 2018     Zhen Herrmann, [address] [address]    Patient: Yaneli Martinez   YOB: 1960   Date of Visit: 4/12/2018       Dear Dr Rosalia Montana: Thank you for referring Maicol Plata to me for evaluation  Below are my notes for this consultation  If you have questions, please do not hesitate to call me  I look forward to following your patient along with you  Sincerely,        Marin Rock MD        CC: No Recipients  Marin Rock MD  4/13/2018 12:31 AM  Sign at close encounter    HPI: F/U visit for lung cancer   of right main bronchus (162 2) (C34 01)  S/p chemo +rad  Concurrently in 2016 with very good results   Shelby Lobo is here with an attendant from his place of residence  In 2016 he was diagnosed to have 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 and pleural effusion but pleural effusion was negative for malignancy  Patient was given 7 weekly doses of carboplatin plus Taxol with concurrent radiation  Plan was offered 2 more cycles of regular dose Taxol and carboplatin chemotherapy at three-week interval but patient made it very clear that he was done with treatments  He had f/u CT scan and PET-CT scan and a f/u CT scan on 3/26/18  See report  No evidence of recurrent and metastatic disease  Patient has cough with mucoid expectoration and minimal exertional dyspnea  He does not complain of chest pain  No hemoptysis  His appetite is good  He is maintaining his weight  He wants to smoke cigarettes  He has Port-A-Cath and he will get that flushed every 4-6 weeks        Current Outpatient Prescriptions:     acetaminophen (TYLENOL) 650 mg CR tablet, Take 650 mg by mouth every 6 (six) hours as needed for mild pain  , Disp: , Rfl:     albuterol (2 5 mg/3 mL) 0 083 % nebulizer solution, Take 2 5 mg by nebulization every 4 (four) hours as needed for wheezing  , Disp: , Rfl:     bisacodyl (FLEET) 10 MG/30ML ENEM, [FreeTextEntry1] : Patient with above hx \par \par atypical chest pain : possible breast pain  ( mammogram injury ? )  , possible due to GERD, doubt cardiac origin , negative troponin ,   advised the patient to take omeprazole 20 mg pod aily ,  will obtain echo , exercise stress test to rule out ischemia \par \par Abnormal ekg : sinus tachycardia , patient does have high normal range resting heart as per patient , will obtain holter monitor to assess heart rate variability , obtain TSH level \par \par HTN  controlled continue diet restriction . Patient was not taking prescribed medication  \par \par DM : encourage patient to follow diet restriction , follow up blood work   patient reluctant to take medication \par \par Hyperlipidemia Minimal elevated total cholesterol encourage patient to loose weight  , diet restriction , follow up repeat blood work  Insert 10 mg into the rectum as needed for constipation, Disp: , Rfl:     fluticasone-salmeterol (ADVAIR) 250-50 mcg/dose inhaler, Inhale 1 puff every 12 (twelve) hours, Disp: , Rfl:     folic acid (FOLVITE) 1 mg tablet, Take 800 mcg by mouth daily  , Disp: , Rfl:     gabapentin (NEURONTIN) 400 mg capsule, Take 400 mg by mouth 2 (two) times a day  , Disp: , Rfl:     levothyroxine 200 mcg tablet, Take 212 mcg by mouth daily  , Disp: , Rfl:     Linaclotide (LINZESS) 290 MCG CAPS, Take 1 capsule by mouth daily as needed, Disp: , Rfl:     LORazepam (ATIVAN) 1 mg tablet, Take 1 mg by mouth 2 (two) times a day, Disp: , Rfl:     magnesium hydroxide (MILK OF MAGNESIA) 400 mg/5 mL oral suspension, Take 30 mL by mouth daily as needed for constipation  , Disp: , Rfl:     metoprolol succinate (TOPROL-XL) 25 mg 24 hr tablet, Take 25 mg by mouth daily, Disp: , Rfl:     mirtazapine (REMERON) 30 mg tablet, Take 30 mg by mouth daily at bedtime, Disp: , Rfl:     phenytoin extended (DILANTIN) 200 MG ER capsule, Take 100 mg by mouth 2 (two) times a day And 250mg at 5pm , Disp: , Rfl:     potassium chloride (K-DUR,KLOR-CON) 10 mEq tablet, Take 10 mEq by mouth 3 (three) times a day  , Disp: , Rfl:     sodium chloride 1 g tablet, Take 4 g by mouth 3 (three) times a day, Disp: , Rfl:     thiamine 100 MG tablet, Take 100 mg by mouth daily, Disp: , Rfl:     tiotropium (SPIRIVA) 18 mcg inhalation capsule, Place 18 mcg into inhaler and inhale daily, Disp: , Rfl:     Vitamin D, Cholecalciferol, 1000 UNITS CAPS, Take 1,000 Units by mouth daily  , Disp: , Rfl:     No Known Allergies    ROS:  Presently no headaches, seizures, dizziness, diplopia, dysphagia, hoarseness, chest pain, palpitations,  hemoptysis, abdominal pain, nausea, vomiting, change in bowel habits, melena, hematuria, fever, chills, bleeding, bone pains, skin rash, weight loss, arthritic symptoms, tiredness , numbness,  weakness, claudication and gait problem   No frequent infections  No hot flashes  Not unusually sensitive to heat or cold  No swelling of the ankles  No swollen glands  Patient is anxious  Other symptoms are in HPI        /56 (BP Location: Left arm, Cuff Size: Adult)   Pulse 69   Temp 97 9 °F (36 6 °C) (Tympanic)   Resp 18   Ht 5' 9" (1 753 m)   Wt 74 8 kg (165 lb)   SpO2 94%   BMI 24 37 kg/m²      Physical Exam:  Alert, oriented, not in distress, no icterus, no oral thrush, no palpable neck mass, clear lung fields, regular heart rate, abdomen  soft and non tender, no palpable abdominal mass, no ascites, no edema of ankles, no calf tenderness, no focal neurological deficit, no skin rash, no palpable lymphadenopathy, good arterial pulses, no clubbing  Patient is anxious  Performance status 1  IMAGING:  IMPRESSION:     1  Stable appearance of the chest with post treatment changes on the right and nodular densities on the left  No evidence of recurrent or metastatic disease      2  Unchanged severely enlarged aortic root measuring greater than 5 cm               Workstation performed: [id number]      Imaging     CT chest wo contrast (Order #[id number]) on 3/26/2018 - Imaging Information      LABS:  Results for orders placed or performed during the hospital encounter of 01/03/18   Fingerstick Glucose (POCT)   Result Value Ref Range    POC Glucose 79 65 - 140 mg/dl     Normal CBCD, CMP, CEA    Reviewed and discussed with patient  Assessment and plan:  F/U visit for lung cancer   of right main bronchus (162 2) (C34 01)  S/p chemo +rad  Concurrently in 2016 with very good results   Kimani Lobo is here with an attendant from his place of residence  In 2016 he was diagnosed to have 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 and pleural effusion but pleural effusion was negative for malignancy  Patient was given 7 weekly doses of carboplatin plus Taxol with concurrent radiation   Plan was offered 2 more cycles of regular dose Taxol and carboplatin chemotherapy at three-week interval but patient made it very clear that he was done with treatments  He had f/u CT scan and PET-CT scan and a f/u CT scan on 3/26/18  See report  No evidence of recurrent and metastatic disease  Patient has cough with mucoid expectoration and minimal exertional dyspnea  He does not complain of chest pain  No hemoptysis  His appetite is good  He is maintaining his weight  He wants to smoke cigarettes  He has Port-A-Cath and he will get that flushed every 4-6 weeks  P/E and test results are as reported and discussed  Lung cancer is in remission  Goal is cure from lung cancer  Again stressed to please quit smoking cig  He won't  Very abnormal thoracic aorta  Referring back to cardiac surgeon  All discussed in detail  Question answered  1  Malignant neoplasm of upper lobe of right lung (HCC)    - CBC and differential  - CEA  - Comprehensive metabolic panel  - CT chest wo contrast; Future    2  Thoracic aortic aneurysm without rupture [facility])    - Ambulatory referral to Cardiovascular Surgery; Future      Patient voiced understanding and agreement in the discussion  Counseling / Coordination of Care   Greater than 50% of total time was spent with the patient and / or family counseling and / or coordination of care

## 2022-03-07 ENCOUNTER — OFFICE VISIT (OUTPATIENT)
Dept: HEMATOLOGY ONCOLOGY | Facility: CLINIC | Age: 62
End: 2022-03-07
Payer: COMMERCIAL

## 2022-03-07 VITALS
DIASTOLIC BLOOD PRESSURE: 72 MMHG | BODY MASS INDEX: 22.51 KG/M2 | SYSTOLIC BLOOD PRESSURE: 122 MMHG | HEIGHT: 69 IN | OXYGEN SATURATION: 93 % | WEIGHT: 152 LBS | HEART RATE: 62 BPM | TEMPERATURE: 97 F | RESPIRATION RATE: 16 BRPM

## 2022-03-07 DIAGNOSIS — R91.8 MULTIPLE LUNG NODULES ON CT: ICD-10-CM

## 2022-03-07 DIAGNOSIS — J44.9 CHRONIC OBSTRUCTIVE PULMONARY DISEASE, UNSPECIFIED COPD TYPE (HCC): ICD-10-CM

## 2022-03-07 DIAGNOSIS — E03.9 ACQUIRED HYPOTHYROIDISM: ICD-10-CM

## 2022-03-07 DIAGNOSIS — C34.11 MALIGNANT NEOPLASM OF UPPER LOBE OF RIGHT LUNG (HCC): Primary | ICD-10-CM

## 2022-03-07 DIAGNOSIS — I71.2 THORACIC AORTIC ANEURYSM WITHOUT RUPTURE (HCC): ICD-10-CM

## 2022-03-07 DIAGNOSIS — Z72.0 TOBACCO ABUSE: ICD-10-CM

## 2022-03-07 PROCEDURE — 99214 OFFICE O/P EST MOD 30 MIN: CPT | Performed by: INTERNAL MEDICINE

## 2022-03-07 NOTE — PROGRESS NOTES
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 in 2016 and bronchus was occluded  Patient received  concurrent chemotherapy and radiation, 7 weekly doses of carboplatin and Taxol  and concurrent radiation  Lilli Leary was offered 2 additional cycles of regular doses of Taxol and carboplatin chemotherapy at  3 week interval but patient declined  Patient's condition is being monitored  He also follows with thoracic surgery for lung nodules  He has not quit smoking cigarettes and says that he is not going to quit  Occasionally mild dry hacking cough and minimal exertional dyspnea  No chest pain and no hemoptysis  Has some tiredness  Kerry Drumright He is trying to maintain his weight    Patient has thoracic aneurysm and he follows with cardio thoracic surgery  He states he does not want surgery for that              Current Outpatient Medications:     acetaminophen (TYLENOL) 650 mg CR tablet, Take 650 mg by mouth every 6 (six) hours as needed for mild pain  , Disp: , Rfl:     albuterol (PROVENTIL HFA,VENTOLIN HFA) 90 mcg/act inhaler, Inhale 2 puffs every 6 (six) hours as needed for wheezing, Disp: 1 Inhaler, Rfl: 3    fluticasone-salmeterol (ADVAIR) 250-50 mcg/dose inhaler, Inhale 1 puff every 12 (twelve) hours, Disp: , Rfl:     gabapentin (NEURONTIN) 400 mg capsule, Take 400 mg by mouth 2 (two) times a day  , Disp: , Rfl:     levothyroxine (Synthroid) 112 mcg tablet, Take 112 mcg by mouth daily, Disp: , Rfl:     levothyroxine 100 mcg tablet, Take 212 mcg by mouth daily , Disp: , Rfl:     magnesium hydroxide (MILK OF MAGNESIA) 400 mg/5 mL oral suspension, Take 30 mL by mouth daily as needed for constipation  , Disp: , Rfl:     metoprolol succinate (TOPROL-XL) 25 mg 24 hr tablet, Take 25 mg by mouth daily, Disp: , Rfl:     phenytoin (DILANTIN) 100 mg ER capsule, Take 100 mg by mouth 2 (two) times a day And 250mg at 5pm, Disp: , Rfl:     sodium chloride 1 g tablet, Take 4 g by mouth 3 (three) times a day, Disp: , Rfl:     tiotropium (SPIRIVA) 18 mcg inhalation capsule, Place 18 mcg into inhaler and inhale daily, Disp: , Rfl:     Vitamin D, Cholecalciferol, 1000 UNITS CAPS, Take 2 capsules by mouth daily , Disp: , Rfl:     No Known Allergies    Oncology History Overview Note   Ryley Lindsay presents today for a routine follow up for cancer of the main bronchus  He completed Radiation treatments on 12/19/16  Last seen in our office on 1/6/20 2/11/20- Dr Minda Bautista  Patient is stable in regards to his symptoms  He did have 1 day episode of coughing up a piece of dried blood  F/U w/ Ct scan in 6 months     2/25/20-Dr [facility]   -C/O  SOB with exertion but does not perform any exercise   -Continues to smoke daily   -F/U in 12 months     1/10/20- CT SCAN   IMPRESSIONS:   Minimal progressive gaseous resorption of small amount of trapped lung and scarring in the right pulmonary apex  Otherwise, stable chronic right lung scarring most severe in the right upper lobe and additional chronic findings as above  No other significant interval change  8/11/20 CT chest  IMPRESSION:  Appearance of predominantly right upper lobe apical and posterior paramediastinal consolidation, presumed to represent scarring and less likely underlying residual tumor, is unchanged in appearance from 1/10/2020  Findings associated with volume loss and rightward mediastinal shift  Extensive micronodularity throughout the left lung is similar in appearance to CT chest examinations dating to 6/27/2018  Findings are unlikely to represent disease spread given stability  Aneurysmal dilatation of ascending aorta up to 5 2 cm stable        8/21/20- Dr Humberto Land of right main bronchus ([facility] [address] )   10/3/2016 Initial Diagnosis    Cancer of right main bronchus ([facility] [address] )     10/3/2016 Biopsy    Right endobronchial mass biopsy  Keratinizing squamous cell carcinoma     11/7/2016 - 12/19/2016 Radiation    Right lung/mediastinum to a total dose of 6000 cGy in 30 daily fractions to all gross disease  11/10/2016 - 12/22/2016 Chemotherapy     7 weekly doses of carboplatin plus Taxol with concurrent radiation  Dr Michael Nunez         ROS:  03/07/22 Reviewed 12 systems: See symptoms in HPI:    Presently no other neurological, cardiac, pulmonary, GI and  symptoms other than listed in HPI  Other symptoms are in HPI  No symptoms of  fever, chills, bleeding, bone pains, skin rash, weight loss, arthritic symptoms, weakness, numbness,  claudication and gait problem  No frequent infections  Not unusually sensitive to heat or cold  No swelling of the ankles  No swollen glands  Patient is anxious     No new symptoms since last visit      /72 (BP Location: Left arm, Patient Position: Sitting, Cuff Size: Adult)   Pulse 62   Temp (!) 97 °F (36 1 °C)   Resp 16   Ht 5' 9" (1 753 m)   Wt 68 9 kg (152 lb)   SpO2 93%   BMI 22 45 kg/m²     Physical Exam:    Patient is alert and oriented  Patient is not in distress  Vital signs are above  There is no icterus , no oral thrush, no palpable neck mass, decreased breath sounds right lung, no dullness , heart rate is regular, no palpable abdominal mass , no palpable abdominal mass, no ascites, no edema of ankles, no calf tenderness, no focal neurological deficit, no skin rash, no palpable lymphadenopathy, has clubbing  Patient is anxious  Performance status 1      IMAGING:        IMPRESSION:     Right upper lobe paramediastinal postradiation changes with volume loss, hyperaeration of the left lung, and mediastinal shift unchanged from previous studies      Bilateral pulmonary nodules which are stable in appearance      53 mm stable ascending aortic aneurysm               Workstation performed: [id number]          Imaging    CT chest wo contrast (Order: [id number]) - 12/29/2021      LABS:    Results for orders placed or performed during the hospital encounter of 04/08/19   Fingerstick Glucose (POCT)   Result Value Ref Range    POC Glucose 94 65 - 140 mg/dl   Hemoglobin 13 6  WBC 6400  Platelets [id number]  Normal chemistry profile  Normal TSH  Reviewed  Test results and discussed with patient  Assessment and plan:   Patient is here with Heron Esther from the place of his long-term residence     Follow-up visit for 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 in 2016 and bronchus was occluded  Patient received  concurrent chemotherapy and radiation, 7 weekly doses of carboplatin and Taxol  and concurrent radiation  Lobito Alegria was offered 2 additional cycles of regular doses of Taxol and carboplatin chemotherapy at  3 week interval but patient declined  Patient's condition is being monitored  He also follows with thoracic surgery for lung nodules  He has not quit smoking cigarettes and says that he is not going to quit  Occasionally mild dry hacking cough and minimal exertional dyspnea  No chest pain and no hemoptysis  Has some tiredness  Yadi Wilson He is trying to maintain his weight    Patient has thoracic aneurysm and he follows with cardio thoracic surgery  He states he does not want surgery for that  Physical examination and test results are as recorded and discussed in detail     Plan is as above and surveillance  Once again I advised him to please quit smoking cigarettes  All discussed in detail  Questions answered  Discussed the importance of eating healthy foods, activities as tolerated and health screening tests   Patient is capable of self-care  Discussed precautions against coronavirus  Patient will continue to follow with his primary physician and other consultants       See diagnoses, orders and instructions    1  Malignant neoplasm of upper lobe of right lung (HCC)    - CBC and differential; Future  - Comprehensive metabolic panel; Future    2  Multiple lung nodules on CT      3   Chronic obstructive pulmonary disease, unspecified COPD type ([facility] [address] )      4  Acquired hypothyroidism      5  Thoracic [facility] without rupture ([facility] )      6  Tobacco abuse          Blood work prior to next visit in 6 months            Counseling / Coordination of Care      Provided counseling and support

## 2022-07-01 ENCOUNTER — HOSPITAL ENCOUNTER (OUTPATIENT)
Dept: CT IMAGING | Facility: HOSPITAL | Age: 62
Discharge: HOME | End: 2022-07-01
Payer: COMMERCIAL

## 2022-07-01 DIAGNOSIS — R91.8 MULTIPLE LUNG NODULES ON CT: ICD-10-CM

## 2022-07-01 PROCEDURE — G1004 CDSM NDSC: HCPCS

## 2022-07-01 PROCEDURE — 71250 CT THORAX DX C-: CPT

## 2022-07-07 ENCOUNTER — OFFICE VISIT (OUTPATIENT)
Dept: CARDIAC SURGERY | Facility: CLINIC | Age: 62
End: 2022-07-07
Payer: COMMERCIAL

## 2022-07-07 VITALS
HEART RATE: 65 BPM | BODY MASS INDEX: 24.16 KG/M2 | WEIGHT: 163.14 LBS | SYSTOLIC BLOOD PRESSURE: 120 MMHG | HEIGHT: 69 IN | DIASTOLIC BLOOD PRESSURE: 62 MMHG | OXYGEN SATURATION: 94 % | TEMPERATURE: 97.3 F | RESPIRATION RATE: 17 BRPM

## 2022-07-07 DIAGNOSIS — C34.11 MALIGNANT NEOPLASM OF UPPER LOBE OF RIGHT LUNG (HCC): ICD-10-CM

## 2022-07-07 DIAGNOSIS — R59.0 MEDIASTINAL LYMPHADENOPATHY: Primary | ICD-10-CM

## 2022-07-07 PROCEDURE — 99213 OFFICE O/P EST LOW 20 MIN: CPT | Performed by: THORACIC SURGERY (CARDIOTHORACIC VASCULAR SURGERY)

## 2022-07-07 NOTE — ASSESSMENT & PLAN NOTE
Mr Adolfo Hernandez is a 77-year-old male who follows with me for pulmonary nodules  His last CT scan was 6 months ago  I have personally reviewed all of his imaging in PACS  His pulmonary nodules are stable in the setting of a previous right mainstem bronchus squamous cell carcinoma in 2016 status post concurrent chemoradiation therapy  However, on this most recent CT scan, he has a paratracheal right-sided mediastinal lymph node that possibly was not present on the previous CT scan  As per the radiologist, it is difficult to assess whether this lymph node was previously there  Today in the office, we discussed his most recent CT scan  I explained the pulmonary nodules are all stable however, he does have a right paratracheal mediastinal lymph node  At this time, I would like to repeat his CT scan in 3 months to assess the stability of this lymph node  The patient understands and he agrees with the plan  I have ordered the CT scan today he will see me in 3 months      Suresh Kelley MD  Thoracic Surgery  (Available on [employer])  Office: [phone]

## 2022-07-07 NOTE — PROGRESS NOTES
Thoracic Follow-Up  Assessment/Plan:    Malignant neoplasm of upper lobe of right lung [facility])  Mr Karl Olvera is a 80-year-old male who follows with me for pulmonary nodules  His last CT scan was 6 months ago  I have personally reviewed all of his imaging in PACS  His pulmonary nodules are stable in the setting of a previous right mainstem bronchus squamous cell carcinoma in 2016 status post concurrent chemoradiation therapy  However, on this most recent CT scan, he has a paratracheal right-sided mediastinal lymph node that possibly was not present on the previous CT scan  As per the radiologist, it is difficult to assess whether this lymph node was previously there  Today in the office, we discussed his most recent CT scan  I explained the pulmonary nodules are all stable however, he does have a right paratracheal mediastinal lymph node  At this time, I would like to repeat his CT scan in 3 months to assess the stability of this lymph node  The patient understands and he agrees with the plan  I have ordered the CT scan today he will see me in 3 months  Oswaldo Chavarria MD  Thoracic Surgery  (Available on [employer])  Office: [phone]         Diagnoses and all orders for this visit:    Mediastinal lymphadenopathy  -     CT chest wo contrast; Future    Malignant neoplasm of upper lobe of right lung [facility])          Thoracic History          Subjective:    Patient ID: Anya Fowler is a 64 y o  male  HPI  Mr Karl Olvera is a 80-year-old male who follows with me for pulmonary nodules  His last CT scan was 6 months ago  I have personally reviewed all of his imaging in PACS  His pulmonary nodules are stable in the setting of a previous right mainstem bronchus squamous cell carcinoma in 2016 status post concurrent chemoradiation therapy  However, on this most recent CT scan, he has a paratracheal right-sided mediastinal lymph node that possibly was not present on the previous CT scan    As per the radiologist, it is difficult to assess whether this lymph node was previously there  Today in the office, patient is doing okay  He denies any medical complaints or changes in his health  He is still smoking  [employer] He denies any significant shortness of breath or chest pain  He does report that he has a cough  This is stable and not getting worse  From previous HNP by Alin Espinoza PA-C on 1/6/22:  Mr Yamilet Houston is a 63 yo gentleman with a history of tobacco abuse, atrial fibrillation, epilepsy, COPD, thoracic aortic aneurysm, AI, and depression/anxiety who was referred to our office in September for new right sided lung nodules  He has a history of right mainstem bronchus squamous cell carcinoma in 2016 s/p concurrent chemoradiation therapy  He has been under surveillance and a CT from 8/18/21 revealed multiple pulmonary nodules measuring 3-9 mm  Dr Shilpi Marin recommended a 3 month follow up CT scan and smoking cessation       He returns today with a CT from 12/29/21  This revealed a 6 mm right middle lobe nodule with tree-in-bud opacities and an unchanged 9 mm left upper lobe nodule along the major fissure  There are multiple 1-2 mm stable nodules bilaterally, stable  Also, mucoid debris in right lower lobe superior segment bronchus  He had COVID 19 in 12/20       On discussion, he is smoking 1/2 - 1ppd  He has a cough with excessive mucous, yellow/white in color  He denies hemoptysis, fever, chills, weight loss, headaches, or shortness of breath  His weight fluctuates  He has been COVID vaccinated  The following portions of the patient's history were reviewed and updated as appropriate: allergies, current medications, past family history, past medical history, past social history, past surgical history and problem list     Review of Systems   Constitutional: Negative  Negative for activity change, chills, fatigue, fever and unexpected weight change     HENT: Negative for sore throat, trouble swallowing and voice change  Eyes: Negative  Negative for visual disturbance  Respiratory: Negative for cough, chest tightness, shortness of breath, wheezing and stridor  Cardiovascular: Negative for chest pain and leg swelling  Gastrointestinal: Negative  Endocrine: Negative  Genitourinary: Negative  Musculoskeletal: Negative  Skin: Negative  Allergic/Immunologic: Negative  Neurological: Negative for seizures, syncope, speech difficulty, weakness, light-headedness and headaches  Hematological: Negative for adenopathy  Psychiatric/Behavioral: Negative  Objective:   Physical Exam  Vitals and nursing note reviewed  Constitutional:       Appearance: He is well-developed  He is not diaphoretic  HENT:      Head: Normocephalic and atraumatic  Nose: Nose normal  No congestion  Mouth/Throat:      Mouth: Mucous membranes are moist       Pharynx: Oropharynx is clear  Eyes:      Extraocular Movements: Extraocular movements intact  Pupils: Pupils are equal, round, and reactive to light  Neck:      Trachea: No tracheal deviation  Cardiovascular:      Rate and Rhythm: Normal rate and regular rhythm  Heart sounds: Normal heart sounds  No murmur heard  Pulmonary:      Effort: Pulmonary effort is normal  No respiratory distress  Breath sounds: Normal breath sounds  No stridor  No wheezing or rales  Chest:      Chest wall: No tenderness  Abdominal:      General: Bowel sounds are normal  There is no distension  Palpations: Abdomen is soft  Tenderness: There is no abdominal tenderness  Musculoskeletal:         General: Normal range of motion  Cervical back: Normal range of motion  Lymphadenopathy:      Cervical: No cervical adenopathy  Skin:     General: Skin is warm and dry  Coloration: Skin is not pale  Findings: No erythema or rash  Neurological:      Mental Status: He is alert and oriented to person, place, and time  Psychiatric:         Behavior: Behavior normal          Thought Content: Thought content normal          Judgment: Judgment normal      /62   Pulse 65   Temp (!) 97 3 °F (36 3 °C)   Resp 17   Ht 5' 9" (1 753 m)   Wt 74 kg (163 lb 2 3 oz)   SpO2 94%   BMI 24 09 kg/m²     No Chest XR results available for this patient  CT chest wo contrast    Result Date: 7/6/2022  Narrative CT CHEST WITHOUT IV CONTRAST INDICATION:   R91 8: Other nonspecific abnormal finding of lung field  COMPARISON:  12/29/2021, 8/18/2021 TECHNIQUE: CT examination of the chest was performed without intravenous contrast  This examination was performed without intravenous contrast in the context of the critical nationwide Omnipaque shortage  Axial, sagittal, and coronal 2D reformatted images were created from the source data and submitted for interpretation  Radiation dose length product (DLP) for this visit:  195 mGy-cm   This examination, like all CT scans performed in the [facility], was performed utilizing techniques to minimize radiation dose exposure, including the use of iterative reconstruction and automated exposure control  FINDINGS: LUNGS:  Loss of volume and consolidative changes in the right upper lobe with few air bronchograms  Similar appearance as compared to the prior study  Shift of the mediastinum to the right  Emphysematous changes bilaterally  Hyperaeration of the left  lung  Subpleural bands in the right lower lobe with similar appearance to the previous study  Subpleural right middle lobe noncalcified nodule measuring 5 mm without significant change, 3/76  Nodularity along the left major fissure on 3/31 subtending  9 mm, not significantly changed  No tracheal or endobronchial lesion  Multiple scattered 1 to 2 mm pulmonary nodules bilaterally  PLEURA:  Unremarkable  HEART/GREAT VESSELS: Heart is unremarkable for patient's age   There is fusiform aneurysmal enlargement of the ascending thoracic aorta measuring up to 54 mm  Recommendation is for cardiothoracic surgery consultation  Mild pericardial effusion measuring up to 10 mm  MEDIASTINUM AND ANDREW:  Numerous lymph nodes are present, largest in the right peritracheal location measuring 1 2 x 1 6 cm, 2/15  CHEST WALL AND LOWER NECK:  Right axillary calcification may be due to a calcified lymph node  VISUALIZED STRUCTURES IN THE UPPER ABDOMEN:  Unremarkable  OSSEOUS STRUCTURES:  Spinal degenerative changes are noted  No acute fracture or destructive osseous lesion  Impression 1  Indeterminate right paratracheal/upper lobe lymph node measuring 1 2 x 1 6 cm  May have been present but less clearly defined on prior images without significant change  2   Loss of volume and consolidation predominantly within the right upper lobe felt to be compatible with post radiation changes  3   Marked emphysematous changes bilaterally  4   Numerous 1 to 2 mm pulmonary nodules and stable nodularity along the apical region of the left major fissure measuring 9 mm  Stable appearing 5 mm nodule the right middle lobe  5   Aneurysmal dilatation of the ascending aorta measuring 54 mm  Workstation performed: [id number]     CT chest wo contrast    Result Date: 1/3/2022  Narrative CT CHEST WITHOUT IV CONTRAST INDICATION:   R91 8: Other nonspecific abnormal finding of lung field  History of lung cancer status post chemotherapy and radiation therapy  COMPARISON:  August 18, 2021 TECHNIQUE: CT examination of the chest was performed without intravenous contrast   Axial, sagittal, and coronal 2D reformatted images were created from the source data and submitted for interpretation  Radiation dose length product (DLP) for this visit:  181 mGy-cm     This examination, like all CT scans performed in the [facility], was performed utilizing techniques to minimize radiation dose exposure, including the use of iterative reconstruction and automated exposure control  FINDINGS: LUNGS:  Post radiation changes to the medial right upper lobe with partial right upper lobe scarring and collapse  There is volume loss on the right with hyper aeration of the left lung and shift of the mediastinum towards the right  Moderate to severe  emphysema  The 6 mm right middle lobe nodule with tree-in-bud opacities process  The 9 mm nodularity along the left upper major fissure (series 3 image 39) is unchanged  Multiple 1-2 mm nodules are stable throughout the left lung and scattered throughout the right lung  No tracheal or endobronchial lesion  Mucoid debris is noted in the right lower lobe superior segment bronchus  PLEURA:  Unremarkable  HEART/GREAT VESSELS: Heart is unremarkable for patient's age  There is fusiform aneurysmal enlargement of the ascending thoracic aorta measuring up to 53 mm  Recommendation is for cardiothoracic surgery consultation  MEDIASTINUM AND ANDREW:  Unremarkable  CHEST WALL AND LOWER NECK:   Unremarkable  VISUALIZED STRUCTURES IN THE UPPER ABDOMEN:  Unremarkable  OSSEOUS STRUCTURES:  No acute fracture or destructive osseous lesion  Impression Right upper lobe paramediastinal postradiation changes with volume loss, hyperaeration of the left lung, and mediastinal shift unchanged from previous studies  Bilateral pulmonary nodules which are stable in appearance  53 mm stable ascending aortic aneurysm  Workstation performed: [id number]     CT chest wo contrast    Result Date: 8/25/2021  Narrative CT CHEST WITHOUT IV CONTRAST INDICATION:   C34 11: Malignant neoplasm of upper lobe, right bronchus or lung J44 9: Chronic obstructive pulmonary disease, unspecified I71 2: Thoracic aortic aneurysm, without rupture   COMPARISON:  CT dated chest dated 11/5/2020 and CT chest dated 1/10/2020 TECHNIQUE: CT examination of the chest was performed without intravenous contrast   Axial, sagittal, and coronal 2D reformatted images were created from the source data and submitted for interpretation  Radiation dose length product (DLP) for this visit:  180 mGy-cm   This examination, like all CT scans performed in the [facility], was performed utilizing techniques to minimize radiation dose exposure, including the use of iterative reconstruction and automated exposure control  FINDINGS: LUNGS:  Volume loss in the right upper lobe with air bronchogram   Central airways are otherwise patent  Lung nodules as follows (series 3): 6 mm right middle lobe nodule with surrounding tree-in-bud opacities, which is new since the previous study from November 2020 and favoring infectious/inflammatory etiology  3 mm right upper lobe nodule on image #58, new  9 mm nodular area along the wall of cystic/emphysematous changes in the posterior left upper lobe on image #33, stable in appearance compared to the previous study  Again seen is paramediastinal fibrotic changes in the right upper lobe and to a much lesser extent in the right middle lobe and the superior segment of the right lower lobe  Mild to moderate background emphysema  Biapical pleural parenchymal scarring  Additional subpleural fibrotic changes/scarring in the right lower lobe (image #91)  PLEURA:  Unremarkable  HEART/GREAT VESSELS:  Again seen is ascending thoracic aortic aneurysm measuring 5 2 cm, grossly stable compared to the previous study from November 2020  Stable cardiac size  No significant coronary artery calcification  MEDIASTINUM AND ANDREW:  Unremarkable  CHEST WALL AND LOWER NECK:   Unremarkable  VISUALIZED STRUCTURES IN THE UPPER ABDOMEN:  Partially visualized cholelithiasis  OSSEOUS STRUCTURES:  No acute fracture or destructive osseous lesion  Impression 1  Paramediastinal fibrotic changes in the right upper lobe and to a much lesser extent in the medial right middle lobe and superior segment of the right lower lobe  Overall, this has been stable compared to the prior study from November 2020   2  Bilateral 3 mm to 9 mm lung nodules, 2 of which are new since the prior study and favoring inflammatory /infectious in etiology  Recommend continue follow-up in 3-6 months  3   Grossly stable (since November 2020) appearing 5 2 cm ascending thoracic aortic aneurysm  4   Partially visualized cholelithiasis  Workstation performed: [id number]     No CT Chest,Abdomen,Pelvis results available for this patient  No NM PET CT results available for this patient  No Barium Swallow results available for this patient

## 2022-09-08 ENCOUNTER — TELEPHONE (OUTPATIENT)
Dept: SURGICAL ONCOLOGY | Facility: CLINIC | Age: 62
End: 2022-09-08

## 2022-09-08 NOTE — TELEPHONE ENCOUNTER
I spoke with Janeen today regarding Davidace Pi appointment today with Dr Mary Grace Bose  She stated she did not have the appointment written down in her appointment book  I reschedule his appointment to 9/29/2022

## 2022-09-29 ENCOUNTER — OFFICE VISIT (OUTPATIENT)
Dept: HEMATOLOGY ONCOLOGY | Facility: CLINIC | Age: 62
End: 2022-09-29
Payer: COMMERCIAL

## 2022-09-29 VITALS
RESPIRATION RATE: 17 BRPM | DIASTOLIC BLOOD PRESSURE: 82 MMHG | HEART RATE: 78 BPM | SYSTOLIC BLOOD PRESSURE: 140 MMHG | HEIGHT: 69 IN | BODY MASS INDEX: 23.4 KG/M2 | WEIGHT: 158 LBS | OXYGEN SATURATION: 95 %

## 2022-09-29 DIAGNOSIS — I71.20 THORACIC AORTIC ANEURYSM WITHOUT RUPTURE: ICD-10-CM

## 2022-09-29 DIAGNOSIS — J44.9 CHRONIC OBSTRUCTIVE PULMONARY DISEASE, UNSPECIFIED COPD TYPE (HCC): ICD-10-CM

## 2022-09-29 DIAGNOSIS — E03.9 ACQUIRED HYPOTHYROIDISM: ICD-10-CM

## 2022-09-29 DIAGNOSIS — C34.11 MALIGNANT NEOPLASM OF UPPER LOBE OF RIGHT LUNG (HCC): Primary | ICD-10-CM

## 2022-09-29 DIAGNOSIS — R91.8 MULTIPLE LUNG NODULES ON CT: ICD-10-CM

## 2022-09-29 DIAGNOSIS — Z72.0 TOBACCO ABUSE: ICD-10-CM

## 2022-09-29 PROBLEM — R60.0 EDEMA OF BOTH LEGS: Status: ACTIVE | Noted: 2022-09-29

## 2022-09-29 PROCEDURE — 99214 OFFICE O/P EST MOD 30 MIN: CPT | Performed by: INTERNAL MEDICINE

## 2022-09-29 RX ORDER — POTASSIUM CHLORIDE 20 MEQ/1
TABLET, EXTENDED RELEASE ORAL
COMMUNITY
Start: 2022-09-22

## 2022-09-29 RX ORDER — LEVOTHYROXINE SODIUM 0.2 MG/1
TABLET ORAL
COMMUNITY
Start: 2022-09-20

## 2022-09-29 RX ORDER — FUROSEMIDE 40 MG/1
TABLET ORAL
COMMUNITY
Start: 2022-09-22

## 2022-09-29 NOTE — PROGRESS NOTES
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  and bronchus was occluded  Patient received  concurrent chemotherapy and radiation, 7 weekly doses of carboplatin and Taxol  and concurrent radiation  Uday Mchugh was offered 2 additional cycles of regular doses of Taxol and carboplatin chemotherapy at  3 week interval but patient declined  Patient's condition is being monitored  Patient continues to smoke cigarettes at least 1 pack a day and he does not want to quit and he is sure about that  He states he knows smoking is bad for him but he does not want to quit  He has occasional dry cough and minimal exertional dyspnea  He has lung nodules on CT scan and he follows   with thoracic surgeon   He has not quit smoking cigarettes and says that he is not going to quit  No chest pain and no hemoptysis  Has some tiredness  Dara Soulier He is trying to maintain his weight    Patient has thoracic aneurysm and he follows with cardio thoracic surgery  He states he does not want surgery for that  He has swelling of lower legs            Current Outpatient Medications:     acetaminophen (TYLENOL) 650 mg CR tablet, Take 650 mg by mouth every 6 (six) hours as needed for mild pain  , Disp: , Rfl:     albuterol (PROVENTIL HFA,VENTOLIN HFA) 90 mcg/act inhaler, Inhale 2 puffs every 6 (six) hours as needed for wheezing, Disp: 1 Inhaler, Rfl: 3    fluticasone-salmeterol (ADVAIR) 250-50 mcg/dose inhaler, Inhale 1 puff every 12 (twelve) hours, Disp: , Rfl:     furosemide (LASIX) 40 mg tablet, , Disp: , Rfl:     gabapentin (NEURONTIN) 400 mg capsule, Take 400 mg by mouth 2 (two) times a day  , Disp: , Rfl:     levothyroxine 200 mcg tablet, , Disp: , Rfl:     metoprolol succinate (TOPROL-XL) 25 mg 24 hr tablet, Take 25 mg by mouth daily, Disp: , Rfl:     phenytoin (DILANTIN) 100 mg ER capsule, Take 100 mg by mouth 2 (two) times a day And 250mg at 5pm, Disp: , Rfl:     potassium chloride (K-DUR,KLOR-CON) 20 mEq tablet, , Disp: , Rfl:     sodium chloride 1 g tablet, Take 4 g by mouth 3 (three) times a day, Disp: , Rfl:     tiotropium (SPIRIVA) 18 mcg inhalation capsule, Place 18 mcg into inhaler and inhale daily, Disp: , Rfl:     Vitamin D, Cholecalciferol, 1000 UNITS CAPS, Take 2 capsules by mouth daily , Disp: , Rfl:     levothyroxine 100 mcg tablet, Take 212 mcg by mouth daily  (Patient not taking: Reported on 9/29/2022), Disp: , Rfl:     levothyroxine 112 mcg tablet, Take 112 mcg by mouth daily (Patient not taking: Reported on 9/29/2022), Disp: , Rfl:     magnesium hydroxide (MILK OF MAGNESIA) 400 mg/5 mL oral suspension, Take 30 mL by mouth daily as needed for constipation  , Disp: , Rfl:     No Known Allergies    Oncology History Overview Note   Escobar Steiner presents today for a routine follow up for cancer of the main bronchus  He completed Radiation treatments on 12/19/16  Last seen in our office on 1/6/20 2/11/20- Dr Brenda Gilmore  Patient is stable in regards to his symptoms  He did have 1 day episode of coughing up a piece of dried blood  F/U w/ Ct scan in 6 months     2/25/20-Dr Norma Machado Pulmonary   -C/O  SOB with exertion but does not perform any exercise   -Continues to smoke daily   -F/U in 12 months     1/10/20- CT SCAN   IMPRESSIONS:   Minimal progressive gaseous resorption of small amount of trapped lung and scarring in the right pulmonary apex  Otherwise, stable chronic right lung scarring most severe in the right upper lobe and additional chronic findings as above  No other significant interval change  8/11/20 CT chest  IMPRESSION:  Appearance of predominantly right upper lobe apical and posterior paramediastinal consolidation, presumed to represent scarring and less likely underlying residual tumor, is unchanged in appearance from 1/10/2020   Findings associated with volume loss and rightward mediastinal shift  Extensive micronodularity throughout the left lung is similar in appearance to CT chest examinations dating to 6/27/2018  Findings are unlikely to represent disease spread given stability  Aneurysmal dilatation of ascending aorta up to 5 2 cm stable  8/21/20- Dr Jennifer Marques of right main bronchus ([facility] [address] )   10/3/2016 Initial Diagnosis    Cancer of right main bronchus ([facility] )     10/3/2016 Biopsy    Right endobronchial mass biopsy  Keratinizing squamous cell carcinoma     11/7/2016 - 12/19/2016 Radiation    Right lung/mediastinum to a total dose of 6000 cGy in 30 daily fractions to all gross disease  11/10/2016 - 12/22/2016 Chemotherapy     7 weekly doses of carboplatin plus Taxol with concurrent radiation  Dr Alyssia Sal         ROS:  09/29/22 Reviewed 12 systems: See symptoms in HPI:    Presently no other neurological, cardiac, pulmonary, GI and  symptoms other than listed in HPI  Other symptoms are in HPI  No  fever, chills, bleeding, bone pains, skin rash, weight loss, arthritic symptoms, weakness, numbness,  claudication and gait problem  No frequent infections  Not unusually sensitive to heat or cold  No swollen glands  Patient is anxious  /82 (BP Location: Left arm, Patient Position: Sitting, Cuff Size: Adult)   Pulse 78   Resp 17   Ht 5' 9" (1 753 m)   Wt 71 7 kg (158 lb)   SpO2 95%   BMI 23 33 kg/m²     Physical Exam:  Alert and oriented and not in distress  Stable vitals  No icterus , no oral thrush, no palpable neck mass, decreased breath sounds right lung, no dullness , heart rate is regular, abdomen soft and non tender, no palpable abdominal mass ,  no ascites, 2+ edema of ankles, no calf tenderness, no focal neurological deficit, no skin rash, no palpable lymphadenopathy, has clubbing  Patient is anxious  Performance status 2      IMAGING:        IMPRESSION:     Right upper lobe paramediastinal postradiation changes with volume loss, hyperaeration of the left lung, and mediastinal shift unchanged from previous studies      Bilateral pulmonary nodules which are stable in appearance      53 mm stable ascending aortic aneurysm               Workstation performed: [id number]          Imaging    CT chest wo contrast (Order: [id number]) - 12/29/2021    IMPRESSION:     1  Indeterminate right paratracheal/upper lobe lymph node measuring 1 2 x 1 6 cm  May have been present but less clearly defined on prior images without significant change  2   Loss of volume and consolidation predominantly within the right upper lobe felt to be compatible with post radiation changes  3   Marked emphysematous changes bilaterally  4   Numerous 1 to 2 mm pulmonary nodules and stable nodularity along the apical region of the left major fissure measuring 9 mm  Stable appearing 5 mm nodule the right middle lobe  5   Aneurysmal dilatation of the ascending aorta measuring 54 mm               Workstation performed: [id number]          Imaging    CT chest wo contrast (Order: [id number]) - 7/1/2022      LABS:    Results for orders placed or performed during the hospital encounter of 04/08/19   Fingerstick Glucose (POCT)   Result Value Ref Range    POC Glucose 94 65 - 140 mg/dl     Normal chemistry profile except sodium 130 and alkaline phosphatase 132  Hemoglobin 15  WBC 5600  Platelets [id number]  Normal differential count  No immature cell  Reviewed  Test results and discussed with patient  Assessment and plan:     Patient is here with an attendant from [place] in [employer], patient's  long-term residence  In 2016 patient was diagnosed to have  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  and bronchus was occluded    Patient received  concurrent chemotherapy and radiation, 7 weekly doses of carboplatin and Taxol  and concurrent radiation  Uchesilverio Simeon was offered 2 additional cycles of regular doses of Taxol and carboplatin chemotherapy at  3 week interval but patient declined  Patient's condition is being monitored  Patient continues to smoke cigarettes at least 1 pack a day and he does not want to quit and he is sure about that  He states he knows smoking is bad for him but he does not want to quit  He has occasional dry cough and minimal exertional dyspnea  He has lung nodules on CT scan and he follows   with thoracic surgeon   He has not quit smoking cigarettes and says that he is not going to quit  No chest pain and no hemoptysis  Has some tiredness  Reshma Urbina He is trying to maintain his weight    Patient has thoracic aneurysm and he follows with cardio thoracic surgery  He states he does not want surgery for that  He has swelling of lower legs       Physical examination and test results are as recorded and discussed in detail     Plan is surveillance for lung cancer and he will follow with thoracic surgeon for abnormal CT chest and with cardiac surgeon for aneurysm as above  Once again I advised him to please quit smoking cigarettes but I could not convince him  Patient has swollen legs  I noticed that he has been taking 1 g of sodium tablet 4 times a day  He is also on Lasix and potassium  Attendant will bring the after summary Visit Sheet to the attention of nurse so that nurse could discuss with patient's primary physician to manage edema of legs  All discussed in detail  Questions answered  Discussed the importance of eating healthy foods, activities as tolerated and health screening tests   Patient is capable of self-care  Discussed precautions against coronavirus  Patient will continue to follow with his primary physician and other consultants       See diagnoses, orders and instructions    1  Malignant neoplasm of upper lobe of right lung (HCC)    - CBC and differential; Future  - Comprehensive metabolic panel; Future    2  Multiple lung nodules on CT      3   Chronic obstructive pulmonary disease, unspecified COPD type ([facility] [address] )      4  Acquired hypothyroidism      5  Thoracic aortic aneurysm without rupture ([facility] [address] )      6  Tobacco abuse      Blood work prior to next visit in 6 months  Please discuss edema legs, salt tablets and diuretic with patient's physician at the [facility]  Counseling / Coordination of Care      Provided counseling and support

## 2022-09-29 NOTE — PATIENT INSTRUCTIONS
Blood work prior to next visit in 6 months  Please discuss edema legs, salt tablets and diuretic with patient's physician at the [facility]

## 2022-10-04 ENCOUNTER — HOSPITAL ENCOUNTER (OUTPATIENT)
Dept: CT IMAGING | Facility: HOSPITAL | Age: 62
Discharge: HOME | End: 2022-10-04
Attending: THORACIC SURGERY (CARDIOTHORACIC VASCULAR SURGERY)
Payer: COMMERCIAL

## 2022-10-04 DIAGNOSIS — R59.0 MEDIASTINAL LYMPHADENOPATHY: ICD-10-CM

## 2022-10-04 PROCEDURE — G1004 CDSM NDSC: HCPCS

## 2022-10-04 PROCEDURE — 71250 CT THORAX DX C-: CPT

## 2022-10-10 ENCOUNTER — OFFICE VISIT (OUTPATIENT)
Dept: CARDIAC SURGERY | Facility: CLINIC | Age: 62
End: 2022-10-10
Payer: COMMERCIAL

## 2022-10-10 VITALS
HEART RATE: 62 BPM | BODY MASS INDEX: 23.4 KG/M2 | SYSTOLIC BLOOD PRESSURE: 102 MMHG | TEMPERATURE: 98.2 F | DIASTOLIC BLOOD PRESSURE: 71 MMHG | HEIGHT: 69 IN | WEIGHT: 158 LBS | OXYGEN SATURATION: 96 % | RESPIRATION RATE: 16 BRPM

## 2022-10-10 DIAGNOSIS — C34.11 MALIGNANT NEOPLASM OF UPPER LOBE OF RIGHT LUNG (HCC): Primary | ICD-10-CM

## 2022-10-10 PROCEDURE — 99213 OFFICE O/P EST LOW 20 MIN: CPT | Performed by: PHYSICIAN ASSISTANT

## 2022-10-10 NOTE — PROGRESS NOTES
Assessment/Plan:    Malignant neoplasm of upper lobe of right lung [facility])  Mr Mauro Gillis most recent CT scan was personally reviewed and he has some stable tiny nodules and some new left sided 7 mm nodules  Also, his right paratracheal node has enlarged somewhat over the past several years  Therefore, we will have him return in 3 months with a new CT Scan  He is in agreement with the plan  Diagnoses and all orders for this visit:    Malignant neoplasm of upper lobe of right lung ([address] )  -     CT chest wo contrast; Future          Thoracic History      Diagnosis: right endobronchial mass   Procedure: Bronchoscopy by Dr Kenny Marte 10/3/16  Pathology: squamous epithelial lined mucosa with severe dysplasia/in -situ squamous cell carcinoma  Separate freelying detached fragments of severely dysplastic squamous epithelium and fragments containing necrotic and dysplastic keratinizing squamous epithelial cells  Cancer Staging  No matching staging information was found for the patient  Oncology History Overview Note   Escobar Steiner presents today for a routine follow up for cancer of the main bronchus  He completed Radiation treatments on 12/19/16  Last seen in our office on 1/6/20 2/11/20- Dr Brenda Gilmore  Patient is stable in regards to his symptoms  He did have 1 day episode of coughing up a piece of dried blood  F/U w/ Ct scan in 6 months     2/25/20-Dr Norma Machado Pulmonary   -C/O  SOB with exertion but does not perform any exercise   -Continues to smoke daily   -F/U in 12 months     1/10/20- CT SCAN   IMPRESSIONS:   Minimal progressive gaseous resorption of small amount of trapped lung and scarring in the right pulmonary apex  Otherwise, stable chronic right lung scarring most severe in the right upper lobe and additional chronic findings as above  No other significant interval change      8/11/20 CT chest  IMPRESSION:  Appearance of predominantly right upper lobe apical and posterior paramediastinal consolidation, presumed to represent scarring and less likely underlying residual tumor, is unchanged in appearance from 1/10/2020  Findings associated with volume loss and rightward mediastinal shift  Extensive micronodularity throughout the left lung is similar in appearance to CT chest examinations dating to 6/27/2018  Findings are unlikely to represent disease spread given stability  Aneurysmal dilatation of ascending aorta up to 5 2 cm stable  8/21/20- Dr Ryan Irwin of right main bronchus ([facility] [address] )   10/3/2016 Initial Diagnosis    Cancer of right main bronchus ([facility] [address] )     10/3/2016 Biopsy    Right endobronchial mass biopsy  Keratinizing squamous cell carcinoma     11/7/2016 - 12/19/2016 Radiation    Right lung/mediastinum to a total dose of 6000 cGy in 30 daily fractions to all gross disease  11/10/2016 - 12/22/2016 Chemotherapy     7 weekly doses of carboplatin plus Taxol with concurrent radiation  Dr Carl Plunkett          Patient ID: Lance Cartagena is a 58 y o  male  ECOG 1    HPI     Mr Yamilet Houston is a patient of Dr So Ac who follows with her for pulmonary nodules  He was last seen on 7/7/22 at which point his nodules were stable, in the setting of a previous right mainstem bronchus squamous cell carcinoma in 2016 s/p concurrent chemoradiation therapy  Also, there was a right paratracheal node that was possibly not identifiable on the previous scan  Dr Shilpi Marin recommended a repeat CT scan in 3 months  He returns today with a scan from 10/4/22  This revealed a slight increase in size of this right paratracheal LN, now measuring 1 6 cm (previously 1 3 cm)  There are also several new left lung nodules, left 7 mm and smaller and multiple 1-2 mm b/l nodules, stable  On discussion, he smokes 1/2 ppd  He denies fever, chills, weight loss, hemoptysis, new bone pains, or shortness of breath  He does have some dyspnea on exertion  He is no longer homeless, has a room to stay in  The following portions of the patient's history were reviewed and updated as appropriate: allergies, current medications, past family history, past medical history, past social history, past surgical history and problem list     Review of Systems      Objective:   Physical Exam  Vitals reviewed  Constitutional:       General: He is not in acute distress  Appearance: Normal appearance  He is not toxic-appearing  HENT:      Head:      Comments: Hat on      Mouth/Throat:      Comments:    Eyes:      General: No scleral icterus  Extraocular Movements: Extraocular movements intact  Cardiovascular:      Rate and Rhythm: Normal rate and regular rhythm  Heart sounds: Normal heart sounds  No murmur heard  Pulmonary:      Effort: Pulmonary effort is normal  No respiratory distress  Breath sounds: No wheezing  Comments: Some coarse b/s bilaterally  No wheezes  Musculoskeletal:      Cervical back: Normal range of motion and neck supple  Right lower leg: Edema present  Left lower leg: Edema present  Skin:     General: Skin is warm and dry  Neurological:      Mental Status: He is alert and oriented to person, place, and time  Psychiatric:         Mood and Affect: Mood normal          Behavior: Behavior normal      /71   Pulse 62   Temp 98 2 °F (36 8 °C)   Resp 16   Ht 5' 9" (1 753 m)   Wt 71 7 kg (158 lb)   SpO2 96%   BMI 23 33 kg/m²     CT chest wo contrast    Result Date: 10/7/2022  Narrative CT CHEST WITHOUT IV CONTRAST INDICATION:   R59 0: Localized enlarged lymph nodes  History of right main bronchus squamous cell carcinoma in 2016 status post concurrent chemoradiation  Follow-up right paratracheal node possibly not present on previous CT  Follow-up lung nodules  COMPARISON:  Chest CT 7/1/2022, 7/29/2021, 1/11/2020, and 2/26/2019   TECHNIQUE: CT examination of the chest was performed without intravenous contrast  Axial, sagittal, and coronal 2D reformatted images were created from the source data and submitted for interpretation  Radiation dose length product (DLP) for this visit:  298 mGy-cm   This examination, like all CT scans performed in the [facility], was performed utilizing techniques to minimize radiation dose exposure, including the use of iterative reconstruction and automated exposure control  FINDINGS: LUNGS:  Several new left lung nodules, 7 mm and smaller  Multiple stable bilateral 1 to 2 mm lung nodules  Redemonstration of right paramediastinal radiation fibrosis with progressive consolidation over serial exams dating back to 2018  Cystic changes in the right apex are similar to July 2022, with increased aeration from December 2021, and decreased since August 2020 likely due to superimposed inflammatory changes  Moderate emphysema  PLEURA:  Unremarkable  HEART/GREAT VESSELS: Normal heart size  Persistent trace pericardial effusion  Redemonstration of 5 4 cm ascending aortic aneurysm  No thoracic aortic aneurysm  MEDIASTINUM AND ANDREW:  1 6 cm right paratracheal node is not new, but is slightly increased from 1 3 cm in January 2020 and February 2019  CHEST WALL AND LOWER NECK:  Unremarkable  UPPER ABDOMEN:  Unremarkable  OSSEOUS STRUCTURES:  Mild degenerative disease in the spine  Old right rib fractures  Impression Slightly enlarged right paratracheal nodule at 1 6 cm, not new, but slowly enlarging since 2019, question reactive or metastatic  Several new 7 mm and smaller left lung nodules  The rapid development over 3 months  suggests that they are inflammatory  22 Progressive consolidation in the right upper lung since 2018  As radiation fibrosis typically stabilizes one year after completion of therapy, tumor recurrence cannot be excluded  Recommend further evaluation with PET CT if clinically warranted  Redemonstration of 5 4 cm ascending aortic aneurysm   Workstation performed: [id number]

## 2022-10-10 NOTE — ASSESSMENT & PLAN NOTE
Mr Jasper Hylton most recent CT scan was personally reviewed and he has some stable tiny nodules and some new left sided 7 mm nodules  Also, his right paratracheal node has enlarged somewhat over the past several years  Therefore, we will have him return in 3 months with a new CT Scan  He is in agreement with the plan

## 2023-01-10 ENCOUNTER — HOSPITAL ENCOUNTER (OUTPATIENT)
Dept: CT IMAGING | Facility: HOSPITAL | Age: 63
Discharge: HOME | End: 2023-01-10

## 2023-01-10 DIAGNOSIS — C34.11 MALIGNANT NEOPLASM OF UPPER LOBE OF RIGHT LUNG (HCC): ICD-10-CM

## 2023-01-16 ENCOUNTER — OFFICE VISIT (OUTPATIENT)
Dept: CARDIAC SURGERY | Facility: CLINIC | Age: 63
End: 2023-01-16

## 2023-01-16 ENCOUNTER — TELEPHONE (OUTPATIENT)
Dept: GYNECOLOGIC ONCOLOGY | Facility: CLINIC | Age: 63
End: 2023-01-16

## 2023-01-16 VITALS
WEIGHT: 162.04 LBS | HEART RATE: 63 BPM | DIASTOLIC BLOOD PRESSURE: 68 MMHG | BODY MASS INDEX: 24 KG/M2 | SYSTOLIC BLOOD PRESSURE: 110 MMHG | OXYGEN SATURATION: 90 % | HEIGHT: 69 IN

## 2023-01-16 DIAGNOSIS — R91.8 MULTIPLE LUNG NODULES ON CT: ICD-10-CM

## 2023-01-16 DIAGNOSIS — C34.11 MALIGNANT NEOPLASM OF UPPER LOBE OF RIGHT LUNG (HCC): Primary | ICD-10-CM

## 2023-01-16 DIAGNOSIS — Z72.0 TOBACCO ABUSE: ICD-10-CM

## 2023-01-16 RX ORDER — FLUTICASONE PROPIONATE AND SALMETEROL XINAFOATE 230; 21 UG/1; UG/1
AEROSOL, METERED RESPIRATORY (INHALATION)
COMMUNITY
Start: 2022-12-19

## 2023-01-16 NOTE — TELEPHONE ENCOUNTER
Yolette Carrasco from the [facility] called into the office in need of clarification on the patient's AVS  Yolette Carrasco states that on the AVS it states to change how you take the following medication  Yolette Carrasco would like to know if the patient needs to take something else and if so if there are any instructions  Yolette Carrasco can be reached back @ [phone] or can be faxed instructions regarding this change   The fax number is [phone]    The medication/ prescription in question   Advair -21 MCG/ACT inhaler

## 2023-01-16 NOTE — ASSESSMENT & PLAN NOTE
Mr Krystal Paiz presents for follow up of a few small pulmonary nodules and an enlarged paratracheal node  Recent CT chest was personally reviewed in PACs and this demonstrates no new nodules or further enlargement of the lymph node  I will have him return in 6 months with a repeat chest CT for close observation of these  He is in agreement with this and all of his questions were addressed

## 2023-01-16 NOTE — PROGRESS NOTES
Assessment/Plan:    Tobacco abuse  Discussed nicotine replacement therapy and offered smoking cessation referral which he declined  Multiple lung nodules on CT  Mr Nai Marinelli presents for follow up of a few small pulmonary nodules and an enlarged paratracheal node  Recent CT chest was personally reviewed in PACs and this demonstrates no new nodules or further enlargement of the lymph node  I will have him return in 6 months with a repeat chest CT for close observation of these  He is in agreement with this and all of his questions were addressed  Diagnoses and all orders for this visit:    Malignant neoplasm of upper lobe of right lung ([address] )  -     CT chest wo contrast; Future    Tobacco abuse  -     CT chest wo contrast; Future    Multiple lung nodules on CT  -     CT chest wo contrast; Future    Other orders  -     Advair -21 MCG/ACT inhaler          Thoracic History   Cancer Staging   Malignant neoplasm of upper lobe of right lung (HCC)  Staging form: Lung, AJCC 8th Edition  - Clinical: No stage assigned - Unsigned    Oncology History Overview Note   Ricardo Hoang presents today for a routine follow up for cancer of the main bronchus  He completed Radiation treatments on 12/19/16  Last seen in our office on 1/6/20 2/11/20- Dr Garret Goff  Patient is stable in regards to his symptoms  He did have 1 day episode of coughing up a piece of dried blood  F/U w/ Ct scan in 6 months     2/25/20-Dr Nagi Duarte Pulmonary   -C/O  SOB with exertion but does not perform any exercise   -Continues to smoke daily   -F/U in 12 months     1/10/20- CT SCAN   IMPRESSIONS:   Minimal progressive gaseous resorption of small amount of trapped lung and scarring in the right pulmonary apex  Otherwise, stable chronic right lung scarring most severe in the right upper lobe and additional chronic findings as above  No other significant interval change      8/11/20 CT chest  IMPRESSION:  Appearance of predominantly right upper lobe apical and posterior paramediastinal consolidation, presumed to represent scarring and less likely underlying residual tumor, is unchanged in appearance from 1/10/2020  Findings associated with volume loss and rightward mediastinal shift  Extensive micronodularity throughout the left lung is similar in appearance to CT chest examinations dating to 6/27/2018  Findings are unlikely to represent disease spread given stability  Aneurysmal dilatation of ascending aorta up to 5 2 cm stable  8/21/20- Dr Citlali Garcia of right main bronchus ([facility] [address] )   10/3/2016 Initial Diagnosis    Cancer of right main bronchus ([facility] [address] )     10/3/2016 Biopsy    Right endobronchial mass biopsy  Keratinizing squamous cell carcinoma     11/7/2016 - 12/19/2016 Radiation    Right lung/mediastinum to a total dose of 6000 cGy in 30 daily fractions to all gross disease  11/10/2016 - 12/22/2016 Chemotherapy     7 weekly doses of carboplatin plus Taxol with concurrent radiation  Dr Thomas Morales              Subjective:    Patient ID: Alyssa Reddy is a 58 y o  male  HPI   Mr Albin Burr is a 58year old man who we follow for pulmonary nodules  He has a history of right squamous cell carcinoma and completed chemoradiation in 2016  Recent chest CT 1/10/2023 demonstrates chronic bronchiolitis changes in the bilateral upper lobes and left lower lobe with 4mm and smaller nodules  There are radiation fibrosis changes to the riht lung  There is a stable 1 2x1 6cm right upper paratracheal lymph node, since July 2022 but enlarged compared to earlier studies  On discussion, he is smoking 1ppd and has no interest in quitting  He has stable dyspnea on exertion  He denies fevers, weight loss, chest pain  He does have some bilateral lower extremity edema improved with diuretics  He denies lower leg pain or color changes       The following portions of the patient's history were reviewed and updated as appropriate: allergies, current medications, past family history, past medical history, past social history, past surgical history and problem list     Review of Systems   Constitutional: Negative  Eyes: Negative  Respiratory: Positive for cough (clear phlegm) and shortness of breath (TAYLOR)  Cardiovascular: Positive for leg swelling (bilateral)  Negative for chest pain  Gastrointestinal: Negative  Musculoskeletal: Negative  Skin: Negative  Neurological: Negative  Hematological: Negative  Psychiatric/Behavioral: Negative  Objective:   Physical Exam  Constitutional:       Appearance: Normal appearance  HENT:      Head: Normocephalic and atraumatic  Eyes:      General: No scleral icterus  Conjunctiva/sclera: Conjunctivae normal    Cardiovascular:      Rate and Rhythm: Normal rate and regular rhythm  Heart sounds: No murmur heard  Pulmonary:      Effort: Pulmonary effort is normal       Breath sounds: No wheezing, rhonchi or rales  Comments: Reduced breath sounds  Abdominal:      General: There is no distension  Palpations: Abdomen is soft  Musculoskeletal:      Cervical back: Neck supple  Right lower leg: Edema (+1pitting) present  Left lower leg: Edema (1pitting) present  Comments: Negative Ashlyn's sign, no tenderness to palpation of lower extremities, no skin changes other than some dry skin   Lymphadenopathy:      Cervical: No cervical adenopathy  Skin:     General: Skin is warm and dry  Neurological:      General: No focal deficit present  Mental Status: He is alert and oriented to person, place, and time  /68 (BP Location: Left arm, Patient Position: Sitting, Cuff Size: Adult)   Pulse 63   Ht 5' 9" (1 753 m)   Wt 73 5 kg (162 lb 0 6 oz)   SpO2 90%   BMI 23 93 kg/m²       CT chest wo contrast    Result Date: 1/13/2023  Narrative CT CHEST WITHOUT IV CONTRAST INDICATION:   C34 11: Malignant neoplasm of upper lobe, right bronchus or lung   COMPARISON: Multiple prior CTs recently from 10/4/2022 TECHNIQUE: CT examination of the chest was performed without intravenous contrast  Axial, sagittal, and coronal 2D reformatted images were created from the source data and submitted for interpretation  Radiation dose length product (DLP) for this visit:  179 mGy-cm   This examination, like all CT scans performed in the [facility], was performed utilizing techniques to minimize radiation dose exposure, including the use of iterative reconstruction and automated exposure control  FINDINGS: LUNGS:  There are multiple tubular and centrilobular nodules throughout the lungs bilaterally, particularly within the upper lobes and left lower lobe compatible with chronic bronchiolitis related changes  Again seen are chronic changes related to right upper perihilar radiation fibrosis, which is manifested by volume loss, lung distortion, and traction bronchiectasis  There are 2 stable apical bulla, as well as blebs and small bulla in the left apex  There is stable moderate background emphysema with upper lung zone predominance  PLEURA:  Unremarkable  HEART/GREAT VESSELS: Normal heart size  Stable trace pericardial effusion  Stable 5 4 cm aneurysm of the proximal ascending thoracic aorta  Atherosclerotic changes in the aorta  MEDIASTINUM AND ANDREW:  Stable 1 2 x 1 6 cm right upper paratracheal lymph node  CHEST WALL AND LOWER NECK:  Benign small calcified lymph node in the right axilla  VISUALIZED STRUCTURES IN THE UPPER ABDOMEN:  Unremarkable  OSSEOUS STRUCTURES:  No acute fracture or destructive osseous lesion  Multiple healing right anterior rib fractures  There are additional old bilateral rib fractures  Impression 1  Stable 5 4 cm ascending thoracic aortic aneurysm  2   Stable 1 2 x 1 6 cm right upper paratracheal lymph node, similar to studies dating back to 7/1/2022 but slightly enlarged from earlier studies    Recommend subsequent follow-up in 6 months to assess for interval change  3   Stable right upper perihilar radiation fibrosis  Scattered areas of bronchiolitis bilaterally with stable scattered sub-4 mm tubular and centrilobular nodules  No new or suspicious pulmonary nodules  Moderate emphysema with upper lung zone predominance  4   Multiple healing right anterior rib fractures  The study was marked for follow-up in Epic  Workstation performed: [id number]     CT chest wo contrast    Result Date: 10/7/2022  Narrative CT CHEST WITHOUT IV CONTRAST INDICATION:   R59 0: Localized enlarged lymph nodes  History of right main bronchus squamous cell carcinoma in 2016 status post concurrent chemoradiation  Follow-up right paratracheal node possibly not present on previous CT  Follow-up lung nodules  COMPARISON:  Chest CT 7/1/2022, 7/29/2021, 1/11/2020, and 2/26/2019  TECHNIQUE: CT examination of the chest was performed without intravenous contrast  Axial, sagittal, and coronal 2D reformatted images were created from the source data and submitted for interpretation  Radiation dose length product (DLP) for this visit:  298 mGy-cm   This examination, like all CT scans performed in the [facility], was performed utilizing techniques to minimize radiation dose exposure, including the use of iterative reconstruction and automated exposure control  FINDINGS: LUNGS:  Several new left lung nodules, 7 mm and smaller  Multiple stable bilateral 1 to 2 mm lung nodules  Redemonstration of right paramediastinal radiation fibrosis with progressive consolidation over serial exams dating back to 2018  Cystic changes in the right apex are similar to July 2022, with increased aeration from December 2021, and decreased since August 2020 likely due to superimposed inflammatory changes  Moderate emphysema  PLEURA:  Unremarkable  HEART/GREAT VESSELS: Normal heart size  Persistent trace pericardial effusion  Redemonstration of 5 4 cm ascending aortic aneurysm    No thoracic aortic aneurysm  MEDIASTINUM AND ANDREW:  1 6 cm right paratracheal node is not new, but is slightly increased from 1 3 cm in January 2020 and February 2019  CHEST WALL AND LOWER NECK:  Unremarkable  UPPER ABDOMEN:  Unremarkable  OSSEOUS STRUCTURES:  Mild degenerative disease in the spine  Old right rib fractures  Impression Slightly enlarged right paratracheal nodule at 1 6 cm, not new, but slowly enlarging since 2019, question reactive or metastatic  Several new 7 mm and smaller left lung nodules  The rapid development over 3 months  suggests that they are inflammatory  22 Progressive consolidation in the right upper lung since 2018  As radiation fibrosis typically stabilizes one year after completion of therapy, tumor recurrence cannot be excluded  Recommend further evaluation with PET CT if clinically warranted  Redemonstration of 5 4 cm ascending aortic aneurysm  Workstation performed: [id number]     CT chest wo contrast    Result Date: 7/6/2022  Narrative CT CHEST WITHOUT IV CONTRAST INDICATION:   R91 8: Other nonspecific abnormal finding of lung field  COMPARISON:  12/29/2021, 8/18/2021 TECHNIQUE: CT examination of the chest was performed without intravenous contrast  This examination was performed without intravenous contrast in the context of the critical nationwide Omnipaque shortage  Axial, sagittal, and coronal 2D reformatted images were created from the source data and submitted for interpretation  Radiation dose length product (DLP) for this visit:  195 mGy-cm   This examination, like all CT scans performed in the [facility], was performed utilizing techniques to minimize radiation dose exposure, including the use of iterative reconstruction and automated exposure control  FINDINGS: LUNGS:  Loss of volume and consolidative changes in the right upper lobe with few air bronchograms  Similar appearance as compared to the prior study  Shift of the mediastinum to the right  Emphysematous changes bilaterally  Hyperaeration of the left  lung  Subpleural bands in the right lower lobe with similar appearance to the previous study  Subpleural right middle lobe noncalcified nodule measuring 5 mm without significant change, 3/76  Nodularity along the left major fissure on 3/31 subtending  9 mm, not significantly changed  No tracheal or endobronchial lesion  Multiple scattered 1 to 2 mm pulmonary nodules bilaterally  PLEURA:  Unremarkable  HEART/GREAT VESSELS: Heart is unremarkable for patient's age  There is fusiform aneurysmal enlargement of the ascending thoracic aorta measuring up to 54 mm  Recommendation is for cardiothoracic surgery consultation  Mild pericardial effusion measuring up to 10 mm  MEDIASTINUM AND ANDREW:  Numerous lymph nodes are present, largest in the right peritracheal location measuring 1 2 x 1 6 cm, 2/15  CHEST WALL AND LOWER NECK:  Right axillary calcification may be due to a calcified lymph node  VISUALIZED STRUCTURES IN THE UPPER ABDOMEN:  Unremarkable  OSSEOUS STRUCTURES:  Spinal degenerative changes are noted  No acute fracture or destructive osseous lesion  Impression 1  Indeterminate right paratracheal/upper lobe lymph node measuring 1 2 x 1 6 cm  May have been present but less clearly defined on prior images without significant change  2   Loss of volume and consolidation predominantly within the right upper lobe felt to be compatible with post radiation changes  3   Marked emphysematous changes bilaterally  4   Numerous 1 to 2 mm pulmonary nodules and stable nodularity along the apical region of the left major fissure measuring 9 mm  Stable appearing 5 mm nodule the right middle lobe  5   Aneurysmal dilatation of the ascending aorta measuring 54 mm   Workstation performed: [id number]

## 2023-03-31 ENCOUNTER — OFFICE VISIT (OUTPATIENT)
Dept: HEMATOLOGY ONCOLOGY | Facility: CLINIC | Age: 63
End: 2023-03-31

## 2023-03-31 VITALS
HEART RATE: 68 BPM | WEIGHT: 155 LBS | SYSTOLIC BLOOD PRESSURE: 120 MMHG | HEIGHT: 69 IN | BODY MASS INDEX: 22.96 KG/M2 | OXYGEN SATURATION: 97 % | RESPIRATION RATE: 17 BRPM | DIASTOLIC BLOOD PRESSURE: 72 MMHG

## 2023-03-31 DIAGNOSIS — R91.8 MULTIPLE LUNG NODULES ON CT: ICD-10-CM

## 2023-03-31 DIAGNOSIS — I71.21 ANEURYSM OF ASCENDING AORTA WITHOUT RUPTURE (HCC): ICD-10-CM

## 2023-03-31 DIAGNOSIS — Z72.0 TOBACCO ABUSE: ICD-10-CM

## 2023-03-31 DIAGNOSIS — C34.11 MALIGNANT NEOPLASM OF UPPER LOBE OF RIGHT LUNG (HCC): Primary | ICD-10-CM

## 2023-03-31 DIAGNOSIS — E03.9 ACQUIRED HYPOTHYROIDISM: ICD-10-CM

## 2023-03-31 DIAGNOSIS — J44.9 CHRONIC OBSTRUCTIVE PULMONARY DISEASE, UNSPECIFIED COPD TYPE (HCC): ICD-10-CM

## 2023-03-31 NOTE — PROGRESS NOTES
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  The mainstem bronchus was occluded  Patient received  concurrent chemotherapy and radiation, 7 weekly doses of carboplatin and Taxol with radiation  Clara Ziegler was offered 2 additional cycles of regular doses of Taxol and carboplatin chemotherapy at  3 week interval but patient declined  There has not been documented recurrent or metastatic disease from lung cancer  Patient is at high risk for lung cancer or other smoking-related cancers because he continues to smoke cigarettes and does not want to quit  He knows the health hazards of smoking cigarettes that I explained to him once again  He has  dry cough and minimal exertional dyspnea  He has lung nodules on CT scan and he follows  with thoracic surgeon   No chest pain and no hemoptysis  Has some tiredness  Flonnie Drilling He is trying to maintain his weight    Patient has thoracic aneurysm and he follows with cardio thoracic surgery  He states he does not want surgery for that  He has less swelling of lower legs            Current Outpatient Medications:   •  acetaminophen (TYLENOL) 650 mg CR tablet, Take 650 mg by mouth every 6 (six) hours as needed for mild pain  , Disp: , Rfl:   •  Advair -21 MCG/ACT inhaler, , Disp: , Rfl:   •  albuterol (PROVENTIL HFA,VENTOLIN HFA) 90 mcg/act inhaler, Inhale 2 puffs every 6 (six) hours as needed for wheezing, Disp: 1 Inhaler, Rfl: 3  •  Ammonium Lactate 10 % CREA, Apply topically as needed for dry skin, Disp: , Rfl:   •  furosemide (LASIX) 40 mg tablet, , Disp: , Rfl:   •  gabapentin (NEURONTIN) 400 mg capsule, Take 400 mg by mouth 2 (two) times a day  , Disp: , Rfl:   •  levothyroxine 200 mcg tablet, 250 mcg, Disp: , Rfl:   •  magnesium hydroxide (MILK OF MAGNESIA) 400 mg/5 mL oral suspension, Take 30 mL by mouth daily as needed for constipation  , Disp: , Rfl:   •  metoprolol succinate (TOPROL-XL) 25 mg 24 hr tablet, Take 25 mg by mouth daily, Disp: , Rfl:   •  phenytoin (DILANTIN) 100 mg ER capsule, Take 100 mg by mouth 2 (two) times a day And 250mg at 5pm, Disp: , Rfl:   •  potassium chloride (K-DUR,KLOR-CON) 20 mEq tablet, , Disp: , Rfl:   •  sodium chloride 1 g tablet, Take 4 g by mouth 3 (three) times a day, Disp: , Rfl:   •  tiotropium (SPIRIVA) 18 mcg inhalation capsule, Place 18 mcg into inhaler and inhale daily, Disp: , Rfl:   •  Vitamin D, Cholecalciferol, 1000 UNITS CAPS, Take 2 capsules by mouth daily , Disp: , Rfl:   •  levothyroxine 100 mcg tablet, Take 212 mcg by mouth daily (Patient not taking: Reported on 3/31/2023), Disp: , Rfl:   •  levothyroxine 112 mcg tablet, Take 112 mcg by mouth daily (Patient not taking: Reported on 3/31/2023), Disp: , Rfl:     No Known Allergies    Oncology History Overview Note   Loren Appiah presents today for a routine follow up for cancer of the main bronchus  He completed Radiation treatments on 12/19/16  Last seen in our office on 1/6/20 2/11/20- Dr Rissa Dee  Patient is stable in regards to his symptoms  He did have 1 day episode of coughing up a piece of dried blood  F/U w/ Ct scan in 6 months     2/25/20-Dr Areli Tony Pulmonary   -C/O  SOB with exertion but does not perform any exercise   -Continues to smoke daily   -F/U in 12 months     1/10/20- CT SCAN   IMPRESSIONS:   Minimal progressive gaseous resorption of small amount of trapped lung and scarring in the right pulmonary apex  Otherwise, stable chronic right lung scarring most severe in the right upper lobe and additional chronic findings as above  No other significant interval change      8/11/20 CT chest  IMPRESSION:  Appearance of predominantly right upper lobe apical and posterior paramediastinal consolidation, presumed to represent scarring and less likely underlying residual tumor, is unchanged in appearance "from 1/10/2020  Findings associated with volume loss and rightward mediastinal shift  Extensive micronodularity throughout the left lung is similar in appearance to CT chest examinations dating to 6/27/2018  Findings are unlikely to represent disease spread given stability  Aneurysmal dilatation of ascending aorta up to 5 2 cm stable  8/21/20- Dr Jules Perales of right main bronchus ([facility] [address] )   10/3/2016 Initial Diagnosis    Cancer of right main bronchus ([facility] [address] )     10/3/2016 Biopsy    Right endobronchial mass biopsy  Keratinizing squamous cell carcinoma     11/7/2016 - 12/19/2016 Radiation    Right lung/mediastinum to a total dose of 6000 cGy in 30 daily fractions to all gross disease  11/10/2016 - 12/22/2016 Chemotherapy     7 weekly doses of carboplatin plus Taxol with concurrent radiation  Dr Jackee Dubin         ROS:  03/31/23 Reviewed 12 systems:     Presently no other neurological, cardiac, pulmonary, GI and  symptoms other than listed in HPI  Other symptoms are in HPI  No symptoms like fever, chills, bleeding, bone pains, skin rash, weight loss, night sweats, arthritic symptoms, weakness, numbness,  claudication and gait problem  No frequent infections  Not unusually sensitive to heat or cold  No swollen glands  Patient is anxious  /72 (BP Location: Left arm, Patient Position: Sitting, Cuff Size: Adult)   Pulse 68   Resp 17   Ht 5' 9\" (1 753 m)   Wt 70 3 kg (155 lb)   SpO2 97%   BMI 22 89 kg/m²     Physical Exam:    Patient is alert and oriented  Patient is not in distress  Vital signs as above  There is no icterus  No oral thrush  No palpable neck mass  He has decreased breath sounds in right lung, no dullness , regular heart rate , abdomen is soft and non tender, there is no palpable abdominal mass ,  no ascites, trace edema of ankles, no calf tenderness, no focal neurological deficit, no skin rash, no palpable lymphadenopathy, has clubbing      Patient is " anxious  Performance status 2  IMAGING:        IMPRESSION:        1  Stable 5 4 cm ascending thoracic aortic aneurysm  2   Stable 1 2 x 1 6 cm right upper paratracheal lymph node, similar to studies dating back to 7/1/2022 but slightly enlarged from earlier studies  Recommend subsequent follow-up in 6 months to assess for interval change  3   Stable right upper perihilar radiation fibrosis  Scattered areas of bronchiolitis bilaterally with stable scattered sub-4 mm tubular and centrilobular nodules  No new or suspicious pulmonary nodules  Moderate emphysema with upper lung zone   predominance  4   Multiple healing right anterior rib fractures      The study was marked for follow-up in Epic         Workstation performed: [id number]        Imaging    CT chest wo contrast (Order: [id number]) - 1/10/2023          LABS:    Results for orders placed or performed during the hospital encounter of 04/08/19   Fingerstick Glucose (POCT)   Result Value Ref Range    POC Glucose 94 65 - 140 mg/dl     On 2/8/2022 patient had normal CBCD, CMP and TSH      Reviewed  Test results and discussed with patient  Assessment and plan:    Patient is here with an attendant from [facility] in [place]  Patient is a  long-term resident at that place  Follow-up visit for 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  The mainstem bronchus was occluded  Patient received  concurrent chemotherapy and radiation, 7 weekly doses of carboplatin and Taxol with radiation  Mike Thomason was offered 2 additional cycles of regular doses of Taxol and carboplatin chemotherapy at  3 week interval but patient declined  There has not been documented recurrent or metastatic disease from lung cancer  Patient is at high risk for lung cancer or other smoking-related cancers because he continues to smoke cigarettes and does not want to quit  He knows the health hazards of smoking cigarettes that I explained to him once again   He has dry cough and minimal exertional dyspnea  He has lung nodules on CT scan and he follows  with thoracic surgeon   No chest pain and no hemoptysis  Has some tiredness  Ekaterina Khoury He is trying to maintain his weight    Patient has thoracic aneurysm and he follows with cardio thoracic surgery  He states he does not want surgery for that  He has less swelling of lower legs    Physical examination and test results are as recorded and discussed in detail     Plan is surveillance for lung cancer and he will follow with thoracic surgeon for abnormal CT chest and with cardiac surgeon for aneurysm as above  Once again I advised him to please quit smoking cigarettes but I could not convince him  No recent blood work  Attendant will bring the after summary Visit Sheet to the attention of nurse so that patient could have blood work prior to next visit  All discussed in detail  Questions answered  Discussed the importance of eating healthy foods, activities as tolerated and health screening tests   Patient is capable of self-care  Discussed precautions against coronavirus and other infections  Patient will continue to follow with his primary physician and other consultants  See diagnoses, orders and instructions    1  Malignant neoplasm of upper lobe of right lung (HCC)    - CBC and differential; Future  - Comprehensive metabolic panel; Future    2  Multiple lung nodules on CT      3  Chronic obstructive pulmonary disease, unspecified COPD type ([facility] [address] )      4  Aneurysm of ascending aorta without rupture      5  Acquired hypothyroidism      6  Tobacco abuse      Blood work prior to next visit in 6 months          Counseling / Coordination of Care      Provided counseling and support

## 2023-07-26 ENCOUNTER — HOSPITAL ENCOUNTER (OUTPATIENT)
Dept: CT IMAGING | Facility: HOSPITAL | Age: 63
Discharge: HOME | End: 2023-07-26
Payer: COMMERCIAL

## 2023-07-26 DIAGNOSIS — R91.8 MULTIPLE LUNG NODULES ON CT: ICD-10-CM

## 2023-07-26 DIAGNOSIS — Z72.0 TOBACCO ABUSE: ICD-10-CM

## 2023-07-26 DIAGNOSIS — C34.11 MALIGNANT NEOPLASM OF UPPER LOBE OF RIGHT LUNG (HCC): ICD-10-CM

## 2023-07-26 PROCEDURE — G1004 CDSM NDSC: HCPCS

## 2023-07-26 PROCEDURE — 71250 CT THORAX DX C-: CPT

## 2023-07-31 ENCOUNTER — OFFICE VISIT (OUTPATIENT)
Dept: CARDIAC SURGERY | Facility: CLINIC | Age: 63
End: 2023-07-31
Payer: COMMERCIAL

## 2023-07-31 VITALS
OXYGEN SATURATION: 93 % | DIASTOLIC BLOOD PRESSURE: 62 MMHG | SYSTOLIC BLOOD PRESSURE: 138 MMHG | BODY MASS INDEX: 22.2 KG/M2 | HEIGHT: 69 IN | WEIGHT: 149.91 LBS | TEMPERATURE: 98.4 F | RESPIRATION RATE: 16 BRPM | HEART RATE: 82 BPM

## 2023-07-31 DIAGNOSIS — C34.01 CANCER OF RIGHT MAIN BRONCHUS (HCC): Primary | ICD-10-CM

## 2023-07-31 DIAGNOSIS — R91.8 MULTIPLE LUNG NODULES ON CT: ICD-10-CM

## 2023-07-31 PROCEDURE — 99213 OFFICE O/P EST LOW 20 MIN: CPT | Performed by: PHYSICIAN ASSISTANT

## 2023-07-31 RX ORDER — LEVOTHYROXINE SODIUM 0.12 MG/1
TABLET ORAL
COMMUNITY
Start: 2023-07-14

## 2023-07-31 NOTE — ASSESSMENT & PLAN NOTE
Mr. Heather Reilly has a history of squamous cell carcinoma of the right mainstem status post chemoradiation in 2016. He has multiple small lung nodules and an enlarged right paratracheal node. These have been stable for one year. He is feeling mostly well with a persistent productive cough and TAYLOR. He is smoking and not interested in quitting. I offered counseling and nicotine replacment. He declined. He will return in 6 months with a repeat CT scan for continued surveillance. He is in agreement.

## 2023-07-31 NOTE — PROGRESS NOTES
Assessment/Plan:    Multiple lung nodules on CT  Mr. 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 He has multiple small lung nodules and an enlarged right paratracheal node. These have been stable for one year. He is feeling mostly well with a persistent productive cough and TAYLOR. He is smoking and not interested in quitting. I offered counseling and nicotine replacment. He declined. He will return in 6 months with a repeat CT scan for continued surveillance. He is in agreement. Diagnoses and all orders for this visit:    Cancer of right main bronchus ([address])  -     CT chest wo contrast; Future    Multiple lung nodules on CT    Other orders  -     levothyroxine 125 mcg tablet          Thoracic History   Cancer Staging   Malignant neoplasm of upper lobe of right lung (HCC)  Staging form: Lung, AJCC 8th Edition  - Clinical: No stage assigned - Unsigned    Oncology History Overview Note   Kendall Ash presents today for a routine follow up for cancer of the main bronchus. He completed Radiation treatments on 12/19/16. Last seen in our office on 1/6/20.     2/11/20- Dr Deborah Mahmood  Patient is stable in regards to his symptoms. He did have 1 day episode of coughing up a piece of dried blood. F/U w/ Ct scan in 6 months     2/25/20-Dr. [facility]   -C/O  SOB with exertion but does not perform any exercise.  -Continues to smoke daily   -F/U in 12 months     1/10/20- CT SCAN   IMPRESSIONS:   Minimal progressive gaseous resorption of small amount of trapped lung and scarring in the right pulmonary apex. Otherwise, stable chronic right lung scarring most severe in the right upper lobe and additional chronic findings as above. No other significant interval change.     8/11/20 CT chest  IMPRESSION:  Appearance of predominantly right upper lobe apical and posterior paramediastinal consolidation, presumed to represent scarring and less likely underlying residual tumor, is unchanged in appearance from 1/10/2020. Findings associated with volume loss and rightward mediastinal shift. Extensive micronodularity throughout the left lung is similar in appearance to CT chest examinations dating to 6/27/2018. Findings are unlikely to represent disease spread given stability. Aneurysmal dilatation of ascending aorta up to 5.2 cm stable. 8/21/20- Dr. Ade Cotton of right main bronchus ([address])   10/3/2016 Initial Diagnosis    Cancer of right main bronchus ([address])     10/3/2016 Biopsy    Right endobronchial mass biopsy  Keratinizing squamous cell carcinoma     11/7/2016 - 12/19/2016 Radiation    Right lung/mediastinum to a total dose of 6000 cGy in 30 daily fractions to all gross disease. 11/10/2016 - 12/22/2016 Chemotherapy     7 weekly doses of carboplatin plus Taxol with concurrent radiation. Dr Krystal Godfrey     Diagnosis: right endobronchial mass   Procedure: Bronchoscopy by Dr. Marlys Leahy 10/3/16  Pathology: squamous epithelial lined mucosa with severe dysplasia/in -situ squamous cell carcinoma. Separate freelying detached fragments of severely dysplastic squamous epithelium and fragments containing necrotic and dysplastic keratinizing squamous epithelial cells. Subjective:    Patient ID: Kassidy Baires is a 58 y.o. male. ecog 1    HPI   Mr. Shara Romero is a 58year old man with a history who presents for surveillance of pulmonary nodules. He has a history of squaous cell carcinoma of the right mainstem in 2016 and completed concurrent chemoradiation. He was last seen in January at which time a paratracheal node remained enlarged but with no further enlargement. A returns after undergoing a 6 month interval CT scan. This was performed on 7/26/23 and personally reviewed in PACs. It demonstrates stab;e pulmonary nodules throughout the lungs. The right paratracheal node 1.2x2. 1x1.5cm remains stable back to July 2022.    On discussion, he is feeling fine. He does have SOB on exertion that is stable and a cough for phlegm. No hemoptysis, fever or chills. He is smoking 1ppd and is not interested in quitting. The following portions of the patient's history were reviewed and updated as appropriate: allergies, current medications, past family history, past medical history, past social history, past surgical history and problem list.    Review of Systems   Respiratory: Positive for cough and shortness of breath. All other systems reviewed and are negative. Objective:   Physical Exam  Vitals reviewed. Constitutional:       General: He is not in acute distress. Appearance: Normal appearance. He is well-developed. He is not diaphoretic. HENT:      Head: Normocephalic and atraumatic. Nose: Nose normal.   Eyes:      General: No scleral icterus. Extraocular Movements: Extraocular movements intact. Neck:      Trachea: No tracheal deviation. Cardiovascular:      Rate and Rhythm: Normal rate and regular rhythm. Pulses: Normal pulses. Heart sounds: Normal heart sounds. No murmur heard. Pulmonary:      Effort: Pulmonary effort is normal. No respiratory distress. Breath sounds: Normal breath sounds. No wheezing. Abdominal:      General: Bowel sounds are normal. There is no distension. Palpations: Abdomen is soft. Musculoskeletal:         General: Normal range of motion. Cervical back: Normal range of motion and neck supple. Right lower leg: No edema. Left lower leg: No edema. Lymphadenopathy:      Cervical: No cervical adenopathy. Skin:     General: Skin is warm and dry. Neurological:      Mental Status: He is alert and oriented to person, place, and time. Cranial Nerves: No cranial nerve deficit. Psychiatric:         Mood and Affect: Mood normal.         Behavior: Behavior normal.         Thought Content:  Thought content normal.     /62 (BP Location: Left arm, Patient Position: Sitting, Cuff Size: Standard)   Pulse 82   Temp 98.4 °F (36.9 °C) (Temporal)   Resp 16   Ht 5' 9" (1.753 m)   Wt 68 kg (149 lb 14.6 oz)   SpO2 93%   BMI 22.14 kg/m²       CT chest wo contrast    Result Date: 7/27/2023  Narrative CT CHEST WITHOUT IV CONTRAST INDICATION:   C34.11: Malignant neoplasm of upper lobe, right bronchus or lung Z72.0: Tobacco use R91.8: Other nonspecific abnormal finding of lung field. COMPARISON: Many priors, recently CT chest dated January 10, 2023. TECHNIQUE: CT examination of the chest was performed without intravenous contrast. Multiplanar 2D reformatted images were created from the source data. This examination, like all CT scans performed in the [facility], was performed utilizing techniques to minimize radiation dose exposure, including the use of iterative reconstruction and automated exposure control. Radiation dose length product (DLP) for this visit:  205.6 mGy-cm FINDINGS: LUNGS: Diffuse bronchial wall thickening. Stable scattered sub-4 mm ovoid/tubular nodules, in keeping with chronic bronchiolitis. No new or enlarging pulmonary nodules. No suspicious tracheal or endobronchial lesion. There are few areas of mucus plugging  in the right lower lobe. Stable right upper perihilar fibrosis manifested by volume loss, lung distortion, and traction bronchiectasis. Stable curvilinear scarring in the periphery of the right lower lobe with mild traction bronchiolectasis. No honeycombing. Moderate background emphysema with upper lung zone predominance. PLEURA:  Unremarkable. HEART/GREAT VESSELS: Heart is unremarkable for patient's age. There is stable fusiform aneurysmal enlargement of the proximal ascending thoracic aorta measuring up to 54 mm; if not previously obtained in this regard, cardiothoracic surgery consultation is recommended.  MEDIASTINUM AND ANDREW: 1.2 x 2.1 x 1.5 cm (AP, TRV, CC) right upper paratracheal lymph node when remeasured using multiplanar reformats (series 203, image 102; series 201, image 38). This is unchanged from multiple studies dating back to July 2022 when remeasured in a similar fashion. The remainder of the mediastinal lymph nodes measure less than 1 cm in short axis and are similar to the prior study. CHEST WALL AND LOWER NECK: Stable small calcified lymph node in the right axilla. VISUALIZED STRUCTURES IN THE UPPER ABDOMEN:  Unremarkable. OSSEOUS STRUCTURES:  No acute fracture or destructive osseous lesion. Old healed sternal and rib fracture deformities. Impression 1. Stable right upper paratracheal lymph node compared to multiple studies dating back to July 2022. No new or enlarging lymph nodes. 2. Stable right upper perihilar radiation fibrosis, and findings of chronic bronchitis/bronchiolitis. 3. Stable 5.4 cm ascending thoracic aortic aneurysm. For all of these findings, continued follow-up is recommended. Workstation performed: [id number]     CT chest wo contrast    Result Date: 1/13/2023  Narrative CT CHEST WITHOUT IV CONTRAST INDICATION:   C34.11: Malignant neoplasm of upper lobe, right bronchus or lung. COMPARISON:  Multiple prior CTs recently from 10/4/2022 TECHNIQUE: CT examination of the chest was performed without intravenous contrast. Axial, sagittal, and coronal 2D reformatted images were created from the source data and submitted for interpretation. Radiation dose length product (DLP) for this visit:  179 mGy-cm . This examination, like all CT scans performed in the [facility], was performed utilizing techniques to minimize radiation dose exposure, including the use of iterative reconstruction and automated exposure control. FINDINGS: LUNGS:  There are multiple tubular and centrilobular nodules throughout the lungs bilaterally, particularly within the upper lobes and left lower lobe compatible with chronic bronchiolitis related changes.  Again seen are chronic changes related to right upper perihilar radiation fibrosis, which is manifested by volume loss, lung distortion, and traction bronchiectasis. There are 2 stable apical bulla, as well as blebs and small bulla in the left apex. There is stable moderate background emphysema with upper lung zone predominance. PLEURA:  Unremarkable. HEART/GREAT VESSELS: Normal heart size. Stable trace pericardial effusion. Stable 5.4 cm aneurysm of the proximal ascending thoracic aorta. Atherosclerotic changes in the aorta. MEDIASTINUM AND ANDREW:  Stable 1.2 x 1.6 cm right upper paratracheal lymph node. CHEST WALL AND LOWER NECK:  Benign small calcified lymph node in the right axilla. VISUALIZED STRUCTURES IN THE UPPER ABDOMEN:  Unremarkable. OSSEOUS STRUCTURES:  No acute fracture or destructive osseous lesion. Multiple healing right anterior rib fractures. There are additional old bilateral rib fractures. Impression 1. Stable 5.4 cm ascending thoracic aortic aneurysm. 2.  Stable 1.2 x 1.6 cm right upper paratracheal lymph node, similar to studies dating back to 7/1/2022 but slightly enlarged from earlier studies. Recommend subsequent follow-up in 6 months to assess for interval change. 3.  Stable right upper perihilar radiation fibrosis. Scattered areas of bronchiolitis bilaterally with stable scattered sub-4 mm tubular and centrilobular nodules. No new or suspicious pulmonary nodules. Moderate emphysema with upper lung zone predominance. 4.  Multiple healing right anterior rib fractures. The study was marked for follow-up in Epic. Workstation performed: [id number]     CT chest wo contrast    Result Date: 10/7/2022  Narrative CT CHEST WITHOUT IV CONTRAST INDICATION:   R59.0: Localized enlarged lymph nodes. History of right main bronchus squamous cell carcinoma in 2016 status post concurrent chemoradiation. Follow-up right paratracheal node possibly not present on previous CT. Follow-up lung nodules.  COMPARISON:  Chest CT 7/1/2022, 7/29/2021, 1/11/2020, and 2/26/2019. TECHNIQUE: CT examination of the chest was performed without intravenous contrast. Axial, sagittal, and coronal 2D reformatted images were created from the source data and submitted for interpretation. Radiation dose length product (DLP) for this visit:  298 mGy-cm . This examination, like all CT scans performed in the [facility], was performed utilizing techniques to minimize radiation dose exposure, including the use of iterative reconstruction and automated exposure control. FINDINGS: LUNGS:  Several new left lung nodules, 7 mm and smaller. Multiple stable bilateral 1 to 2 mm lung nodules. Redemonstration of right paramediastinal radiation fibrosis with progressive consolidation over serial exams dating back to 2018. Cystic changes in the right apex are similar to July 2022, with increased aeration from December 2021, and decreased since August 2020 likely due to superimposed inflammatory changes. Moderate emphysema. PLEURA:  Unremarkable. HEART/GREAT VESSELS: Normal heart size. Persistent trace pericardial effusion. Redemonstration of 5.4 cm ascending aortic aneurysm. No thoracic aortic aneurysm. MEDIASTINUM AND ANDREW:  1.6 cm right paratracheal node is not new, but is slightly increased from 1.3 cm in January 2020 and February 2019. CHEST WALL AND LOWER NECK:  Unremarkable. UPPER ABDOMEN:  Unremarkable. OSSEOUS STRUCTURES:  Mild degenerative disease in the spine. Old right rib fractures. Impression Slightly enlarged right paratracheal nodule at 1.6 cm, not new, but slowly enlarging since 2019, question reactive or metastatic. Several new 7 mm and smaller left lung nodules. The rapid development over 3 months  suggests that they are inflammatory. 22 Progressive consolidation in the right upper lung since 2018. As radiation fibrosis typically stabilizes one year after completion of therapy, tumor recurrence cannot be excluded.   Recommend further evaluation with PET CT if clinically warranted. Redemonstration of 5.4 cm ascending aortic aneurysm.  Workstation performed: [id number]

## 2023-09-28 ENCOUNTER — TELEPHONE (OUTPATIENT)
Dept: HEMATOLOGY ONCOLOGY | Facility: CLINIC | Age: 63
End: 2023-09-28

## 2023-10-02 ENCOUNTER — OFFICE VISIT (OUTPATIENT)
Dept: HEMATOLOGY ONCOLOGY | Facility: CLINIC | Age: 63
End: 2023-10-02
Payer: COMMERCIAL

## 2023-10-02 VITALS
HEIGHT: 69 IN | SYSTOLIC BLOOD PRESSURE: 114 MMHG | OXYGEN SATURATION: 94 % | RESPIRATION RATE: 17 BRPM | TEMPERATURE: 97.9 F | DIASTOLIC BLOOD PRESSURE: 50 MMHG | BODY MASS INDEX: 21.84 KG/M2 | HEART RATE: 72 BPM | WEIGHT: 147.5 LBS

## 2023-10-02 DIAGNOSIS — C34.11 MALIGNANT NEOPLASM OF UPPER LOBE OF RIGHT LUNG (HCC): Primary | ICD-10-CM

## 2023-10-02 PROCEDURE — 99214 OFFICE O/P EST MOD 30 MIN: CPT | Performed by: INTERNAL MEDICINE

## 2023-10-02 NOTE — PROGRESS NOTES
Hematology/Oncology Outpatient Follow- up Note  Ignacio Kurtz 61 y.o. male MRN: @ Encounter: [id number]        Date:  10/1/2023      HPI:    Ignacio Kurtz is 60 yo M, resident at [facility] in [place] ([place]), presents to f/u on his Squamous Cell Cancer of Rt mainstem bronchus diagnosed 2016     S/p Chemoradiation with Carbo/Taxol x 7 weekly doses but refused additional 2 cycles. Continues smoking, in precontemplation despite hx of malignancy and high risk of recurrence. Interval History:    7/26/23 CT Chest: Stable Rt upper tracheal LN & multiple sub-4 mm nodules, since 7/2022 & Stable Ascending Thx Aorta Aneurysm 5.4 cm    Continues to follow up with thoracic surgery, last seen by Dr. Elodia Huff team on 7/31/23    Subjective  Presents today for follow-up as above, denies any new symptoms or concern, continues to have chronic/stable intermittent cough, breath sounds relatively decreased but otherwise clear to auscultation bilaterally, patient denies any B symptoms, still smokes and in precontemplation refusing and not considering cessation noting he would not give up smoking despite understanding the high risk of recurrence and other health complications/respiratory problems. Assessment / Plan:    1. Malignant neoplasm of upper lobe of right lung ([address])  2. Multiple sub cm lung nodules   3. Enlarged Rt upper tracheal LN  4. Currently everyday smoker in precontemplation   5. Ascending Thx Aorta Aneurysm      Has CT chest scheduled end of January per thoracic surgery, repeat blood work and reevaluate in 6 months .  - CBC and differential; Future  - Comprehensive metabolic panel;  Future        Cancer Staging:  Cancer Staging   Malignant neoplasm of upper lobe of right lung ([address])  Staging form: Lung, AJCC 8th Edition  - Clinical: No stage assigned - Unsigned      Molecular Testing:     Previous Hematologic/ Oncologic History:    Oncology History Overview Note   Ignacio Kurtz presents today for a routine follow up for cancer of the main bronchus. He completed Radiation treatments on 12/19/16. Last seen in our office on 1/6/20.     2/11/20- Dr Moy Pradhan  Patient is stable in regards to his symptoms. He did have 1 day episode of coughing up a piece of dried blood. F/U w/ Ct scan in 6 months     2/25/20-Dr. A[facility]   -C/O  SOB with exertion but does not perform any exercise.  -Continues to smoke daily   -F/U in 12 months     1/10/20- CT SCAN   IMPRESSIONS:   Minimal progressive gaseous resorption of small amount of trapped lung and scarring in the right pulmonary apex. Otherwise, stable chronic right lung scarring most severe in the right upper lobe and additional chronic findings as above. No other significant interval change. 8/11/20 CT chest  IMPRESSION:  Appearance of predominantly right upper lobe apical and posterior paramediastinal consolidation, presumed to represent scarring and less likely underlying residual tumor, is unchanged in appearance from 1/10/2020. Findings associated with volume loss and rightward mediastinal shift. Extensive micronodularity throughout the left lung is similar in appearance to CT chest examinations dating to 6/27/2018. Findings are unlikely to represent disease spread given stability. Aneurysmal dilatation of ascending aorta up to 5.2 cm stable. 8/21/20- Dr. Faith Saunders of right main bronchus ([address])   10/3/2016 Initial Diagnosis    Cancer of right main bronchus ([address])     10/3/2016 Biopsy    Right endobronchial mass biopsy  Keratinizing squamous cell carcinoma     11/7/2016 - 12/19/2016 Radiation    Right lung/mediastinum to a total dose of 6000 cGy in 30 daily fractions to all gross disease. 11/10/2016 - 12/22/2016 Chemotherapy     7 weekly doses of carboplatin plus Taxol with concurrent radiation. Dr Moy Pradhan         Current Hematologic/ Oncologic Treatment:       Cycle 1         Test Results:    Imaging: No results found.           Labs: Lab Results   Component Value Date    WBC 8.63 10/02/2016    HGB 12.3 10/02/2016    HCT 37.1 10/02/2016    MCV 88 10/02/2016     10/02/2016     Lab Results   Component Value Date    K 3.9 10/02/2016    CL 98 (L) 10/02/2016    CO2 30 10/02/2016    BUN 4 (L) 10/11/2016    CREATININE 0.53 (L) 10/11/2016    CALCIUM 8.5 10/02/2016    EGFR >60.0 10/11/2016         No results found for: "SPEP", "UPEP"    No results found for: "PSA"    No results found for: "CEA"    No results found for: ""    No results found for: "AFP"    No results found for: "IRON", "TIBC", "FERRITIN"    No results found for: "[id number]"      ROS: Review of Systems  - GENERAL: Negative for any nausea, vomiting, fevers, chills, or weight loss. - HEENT: Negative for any head/Neck trauma, pain, double/blurry vision, sinusitis, rhinitis, nose bleeding.  - CARDIAC: Negative for any chest pain, palpitation, Dyspnea on exertion, peripheral edema. - PULMONARY: Chronic/baseline intermittent dry cough, negative for any SOB or wheezing.   - GASTROINTESTINAL: Negative for any abdominal pain, N/V/D/C, blood in stool.   - GENITOURINARY: Negative for any dysuria, hematuria, incontinence.  - NEUROLOGIC: Negative for any muscle weakness, numbness/tingling, memory changes. - MUSCULOSKELETAL: Negative for any joint pains/swelling, limited ROM. - INTEGUMENTARY: Negative for any rashes, cuts/ lesions.  - HEMATOLOGIC: Negative for any abnormal bruising, frequent infections or bleeding. Current Medications: Reviewed  Allergies: Reviewed  PMH/FH/SH:  Reviewed      Physical Exam:    There is no height or weight on file to calculate BSA.     Wt Readings from Last 3 Encounters:   07/31/23 68 kg (149 lb 14.6 oz)   03/31/23 70.3 kg (155 lb)   01/16/23 73.5 kg (162 lb 0.6 oz)        Temp Readings from Last 3 Encounters:   07/31/23 98.4 °F (36.9 °C) (Temporal)   10/10/22 98.2 °F (36.8 °C)   07/07/22 (!) 97.3 °F (36.3 °C)        BP Readings from Last 3 Encounters:   07/31/23 138/62   03/31/23 120/72   01/16/23 110/68         Pulse Readings from Last 3 Encounters:   07/31/23 82   03/31/23 68   01/16/23 63     @LASTSAO2(3)@      Physical Exam  - GEN: Appears well, alert and oriented x 3, pleasant and cooperative, in no acute distress  - HEENT: Anicteric, mucous membranes moist, PERRL and EOMI   - NECK: No lymphadenopathy, JVD or carotid bruits   - HEART: RRR, normal S1 and S2, no murmurs, clicks, gallops or rubs   - LUNGS: Bilaterally decreased breath sounds but otherwise clear to auscultation ; no wheezes, rales, or rhonchi  - ABDOMEN: Normal bowel sounds, soft, no tenderness, no distention, no organomegaly or masses felt on exam.   - EXTREMITIES: Peripheral pulses normal; no clubbing, cyanosis, or edema  - NEURO: No focal findings, CN II-XII are grossly intact. - Musculoskeletal: 5/5 strength, normal ROM, no swollen or erythematous joints. - SKIN: Normal without suspicious lesions on exposed skin      Goals and Barriers:  Current Goal: Prolong Survival from Cancer. Barriers: None. Patient's Capacity to Self Care:  Patient is able to self care.     Grady Saleem, DO   Hematology and Medical Oncology - PGY [place]

## 2024-01-13 ENCOUNTER — HOSPITAL ENCOUNTER (EMERGENCY)
Facility: HOSPITAL | Age: 64
Discharge: HOME | End: 2024-01-13
Attending: EMERGENCY MEDICINE
Payer: COMMERCIAL

## 2024-01-13 ENCOUNTER — APPOINTMENT (EMERGENCY)
Dept: RADIOLOGY | Facility: HOSPITAL | Age: 64
End: 2024-01-13
Payer: COMMERCIAL

## 2024-01-13 ENCOUNTER — APPOINTMENT (EMERGENCY)
Dept: CT IMAGING | Facility: HOSPITAL | Age: 64
End: 2024-01-13
Payer: COMMERCIAL

## 2024-01-13 VITALS
HEART RATE: 66 BPM | SYSTOLIC BLOOD PRESSURE: 131 MMHG | RESPIRATION RATE: 16 BRPM | OXYGEN SATURATION: 91 % | TEMPERATURE: 97.9 F | DIASTOLIC BLOOD PRESSURE: 60 MMHG

## 2024-01-13 DIAGNOSIS — E83.42 HYPOMAGNESEMIA: ICD-10-CM
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
## 2024-01-13 LAB
2HR DELTA HS TROPONIN: -1 NG/L
ALBUMIN SERPL BCP-MCNC: 3.8 G/DL (ref 3.5–5)
ALP SERPL-CCNC: 108 U/L (ref 34–104)
ALT SERPL W P-5'-P-CCNC: 14 U/L (ref 7–52)
AMMONIA PLAS-SCNC: 18 UMOL/L (ref 18–72)
ANION GAP SERPL CALCULATED.3IONS-SCNC: 6 MMOL/L
AST SERPL W P-5'-P-CCNC: 19 U/L (ref 13–39)
ATRIAL RATE: 66 BPM
BASE EX.OXY STD BLDV CALC-SCNC: 40.4 % (ref 60–80)
BASE EXCESS BLDV CALC-SCNC: 3.2 MMOL/L
BASOPHILS # BLD AUTO: 0.09 THOUSANDS/ÂΜL (ref 0–0.1)
BASOPHILS NFR BLD AUTO: 1 % (ref 0–1)
BILIRUB SERPL-MCNC: 0.5 MG/DL (ref 0.2–1)
BILIRUB UR QL STRIP: NEGATIVE
BUN SERPL-MCNC: 19 MG/DL (ref 5–25)
CALCIUM SERPL-MCNC: 9.1 MG/DL (ref 8.4–10.2)
CARDIAC TROPONIN I PNL SERPL HS: 8 NG/L
CARDIAC TROPONIN I PNL SERPL HS: 9 NG/L
CHLORIDE SERPL-SCNC: 96 MMOL/L (ref 96–108)
CK SERPL-CCNC: 94 U/L (ref 39–308)
CLARITY UR: CLEAR
CO2 SERPL-SCNC: 32 MMOL/L (ref 21–32)
COLOR UR: YELLOW
CREAT SERPL-MCNC: 0.61 MG/DL (ref 0.6–1.3)
D DIMER PPP FEU-MCNC: 0.32 UG/ML FEU
EOSINOPHIL # BLD AUTO: 0.2 THOUSAND/ÂΜL (ref 0–0.61)
EOSINOPHIL NFR BLD AUTO: 3 % (ref 0–6)
ERYTHROCYTE [DISTWIDTH] IN BLOOD BY AUTOMATED COUNT: 12.9 % (ref 11.6–15.1)
FLUAV RNA RESP QL NAA+PROBE: NEGATIVE
FLUBV RNA RESP QL NAA+PROBE: NEGATIVE
GFR SERPL CREATININE-BSD FRML MDRD: 106 ML/MIN/1.73SQ M
GLUCOSE SERPL-MCNC: 88 MG/DL (ref 65–140)
GLUCOSE SERPL-MCNC: 90 MG/DL (ref 65–140)
GLUCOSE UR STRIP-MCNC: NEGATIVE MG/DL
HCO3 BLDV-SCNC: 30.3 MMOL/L (ref 24–30)
HCT VFR BLD AUTO: 38.9 % (ref 36.5–49.3)
HGB BLD-MCNC: 13 G/DL (ref 12–17)
HGB UR QL STRIP.AUTO: NEGATIVE
IMM GRANULOCYTES # BLD AUTO: 0.02 THOUSAND/UL (ref 0–0.2)
IMM GRANULOCYTES NFR BLD AUTO: 0 % (ref 0–2)
KETONES UR STRIP-MCNC: NEGATIVE MG/DL
LACTATE SERPL-SCNC: 0.7 MMOL/L (ref 0.5–2)
LEUKOCYTE ESTERASE UR QL STRIP: NEGATIVE
LYMPHOCYTES # BLD AUTO: 1.01 THOUSANDS/ÂΜL (ref 0.6–4.47)
LYMPHOCYTES NFR BLD AUTO: 15 % (ref 14–44)
MAGNESIUM SERPL-MCNC: 1.7 MG/DL (ref 1.9–2.7)
MCH RBC QN AUTO: 31.9 PG (ref 26.8–34.3)
MCHC RBC AUTO-ENTMCNC: 33.4 G/DL (ref 31.4–37.4)
MCV RBC AUTO: 95 FL (ref 82–98)
MONOCYTES # BLD AUTO: 0.91 THOUSAND/ÂΜL (ref 0.17–1.22)
MONOCYTES NFR BLD AUTO: 13 % (ref 4–12)
NEUTROPHILS # BLD AUTO: 4.69 THOUSANDS/ÂΜL (ref 1.85–7.62)
NEUTS SEG NFR BLD AUTO: 68 % (ref 43–75)
NITRITE UR QL STRIP: NEGATIVE
NRBC BLD AUTO-RTO: 0 /100 WBCS
O2 CT BLDV-SCNC: 7.8 ML/DL
P AXIS: 75 DEGREES
PCO2 BLDV: 57 MM HG (ref 42–50)
PH BLDV: 7.34 [PH] (ref 7.3–7.4)
PH UR STRIP.AUTO: 6.5 [PH]
PHENYTOIN SERPL-MCNC: 19.4 UG/ML (ref 10–20)
PLATELET # BLD AUTO: 222 THOUSANDS/UL (ref 149–390)
PMV BLD AUTO: 8.5 FL (ref 8.9–12.7)
PO2 BLDV: 21.9 MM HG (ref 35–45)
POTASSIUM SERPL-SCNC: 4.2 MMOL/L (ref 3.5–5.3)
PR INTERVAL: 238 MS
PROT SERPL-MCNC: 7.1 G/DL (ref 6.4–8.4)
PROT UR STRIP-MCNC: NEGATIVE MG/DL
QRS AXIS: -60 DEGREES
QRSD INTERVAL: 116 MS
QT INTERVAL: 432 MS
QTC INTERVAL: 452 MS
RBC # BLD AUTO: 4.08 MILLION/UL (ref 3.88–5.62)
RSV RNA RESP QL NAA+PROBE: NEGATIVE
SARS-COV-2 RNA RESP QL NAA+PROBE: NEGATIVE
SODIUM SERPL-SCNC: 134 MMOL/L (ref 135–147)
SP GR UR STRIP.AUTO: 1.02 (ref 1–1.03)
T WAVE AXIS: -19 DEGREES
UROBILINOGEN UR STRIP-ACNC: <2 MG/DL
VENTRICULAR RATE: 66 BPM
WBC # BLD AUTO: 6.92 THOUSAND/UL (ref 4.31–10.16)

## 2024-01-13 PROCEDURE — 83605 ASSAY OF LACTIC ACID: CPT

## 2024-01-13 PROCEDURE — 82140 ASSAY OF AMMONIA: CPT

## 2024-01-13 PROCEDURE — 82948 REAGENT STRIP/BLOOD GLUCOSE: CPT

## 2024-01-13 PROCEDURE — 12002 RPR S/N/AX/GEN/TRNK2.6-7.5CM: CPT | Performed by: EMERGENCY MEDICINE

## 2024-01-13 PROCEDURE — G1004 CDSM NDSC: HCPCS

## 2024-01-13 PROCEDURE — 96365 THER/PROPH/DIAG IV INF INIT: CPT

## 2024-01-13 PROCEDURE — 36415 COLL VENOUS BLD VENIPUNCTURE: CPT

## 2024-01-13 PROCEDURE — 82550 ASSAY OF CK (CPK): CPT

## 2024-01-13 PROCEDURE — 81003 URINALYSIS AUTO W/O SCOPE: CPT

## 2024-01-13 PROCEDURE — 90471 IMMUNIZATION ADMIN: CPT

## 2024-01-13 PROCEDURE — 82805 BLOOD GASES W/O2 SATURATION: CPT

## 2024-01-13 PROCEDURE — 71045 X-RAY EXAM CHEST 1 VIEW: CPT

## 2024-01-13 PROCEDURE — 99284 EMERGENCY DEPT VISIT MOD MDM: CPT | Performed by: EMERGENCY MEDICINE

## 2024-01-13 PROCEDURE — 85379 FIBRIN DEGRADATION QUANT: CPT

## 2024-01-13 PROCEDURE — 83735 ASSAY OF MAGNESIUM: CPT

## 2024-01-13 PROCEDURE — 80185 ASSAY OF PHENYTOIN TOTAL: CPT

## 2024-01-13 PROCEDURE — 99285 EMERGENCY DEPT VISIT HI MDM: CPT

## 2024-01-13 PROCEDURE — 90715 TDAP VACCINE 7 YRS/> IM: CPT

## 2024-01-13 PROCEDURE — 94640 AIRWAY INHALATION TREATMENT: CPT

## 2024-01-13 PROCEDURE — 93005 ELECTROCARDIOGRAM TRACING: CPT

## 2024-01-13 PROCEDURE — 72125 CT NECK SPINE W/O DYE: CPT

## 2024-01-13 PROCEDURE — 80053 COMPREHEN METABOLIC PANEL: CPT

## 2024-01-13 PROCEDURE — 0241U HB NFCT DS VIR RESP RNA 4 TRGT: CPT

## 2024-01-13 PROCEDURE — 84484 ASSAY OF TROPONIN QUANT: CPT

## 2024-01-13 PROCEDURE — 70450 CT HEAD/BRAIN W/O DYE: CPT

## 2024-01-13 PROCEDURE — 85025 COMPLETE CBC W/AUTO DIFF WBC: CPT

## 2024-01-13 RX ORDER — LIDOCAINE HYDROCHLORIDE 10 MG/ML
5 INJECTION, SOLUTION EPIDURAL; INFILTRATION; INTRACAUDAL; PERINEURAL ONCE
Status: COMPLETED | OUTPATIENT
Start: 2024-01-13 | End: 2024-01-13

## 2024-01-13 RX ORDER — MAGNESIUM SULFATE HEPTAHYDRATE 40 MG/ML
2 INJECTION, SOLUTION INTRAVENOUS ONCE
Status: COMPLETED | OUTPATIENT
Start: 2024-01-13 | End: 2024-01-13

## 2024-01-13 RX ORDER — IPRATROPIUM BROMIDE AND ALBUTEROL SULFATE 2.5; .5 MG/3ML; MG/3ML
3 SOLUTION RESPIRATORY (INHALATION) ONCE
Status: COMPLETED | OUTPATIENT
Start: 2024-01-13 | End: 2024-01-13

## 2024-01-13 RX ADMIN — TETANUS TOXOID, REDUCED DIPHTHERIA TOXOID AND ACELLULAR PERTUSSIS VACCINE, ADSORBED 0.5 ML: 5; 2.5; 8; 8; 2.5 SUSPENSION INTRAMUSCULAR at 04:38

## 2024-01-13 RX ADMIN — LIDOCAINE HYDROCHLORIDE 5 ML: 10 INJECTION, SOLUTION EPIDURAL; INFILTRATION; INTRACAUDAL at 04:38

## 2024-01-13 RX ADMIN — IPRATROPIUM BROMIDE AND ALBUTEROL SULFATE 3 ML: .5; 3 SOLUTION RESPIRATORY (INHALATION) at 02:40

## 2024-01-13 RX ADMIN — MAGNESIUM SULFATE HEPTAHYDRATE 2 G: 40 INJECTION, SOLUTION INTRAVENOUS at 06:44

## 2024-01-13 NOTE — ED PROVIDER NOTES
Emergency Department Trauma Note  Marcin Sánchez 63 y.o. male MRN: [id number]  Unit/Bed#: ED-35/ED-35 Encounter: [id number]      Trauma Alert: Trauma Acuity: C  Model of Arrival:   via    Trauma Team: Current Providers  Attending Provider: Jenn Sanchez MD  Resident: Chris Maya MD  Registered Nurse: Afua Hampton RN  ED Technician: Alfonso Mai  Consultants:     None      History of Present Illness     Chief Complaint:   Chief Complaint   Patient presents with    Fall     Pt from SNF, fell and hit head on a table leg, GCS 14 (baseline) +ASA     HPI:  Marcin Sánchez is a 63 y.o. male who presents with fall.  Mechanism:Details of Incident: fall on aspirin +HS          62yo M w/ h/o COPD, epilepsy, and Afib presenting for fall. Earlier this evening while Pt was asleep in his chair he states he fell out of the chair and struck his head against a table leg. Denies HA, LOC, CP, SOB, N/V, confusion, seizure, neck pain. Is currently taking ASA.      History provided by:  Patient    Review of Systems   Constitutional: Negative.    HENT: Negative.     Eyes: Negative.    Respiratory: Negative.     Cardiovascular: Negative.    Gastrointestinal: Negative.    Genitourinary: Negative.    Musculoskeletal: Negative.    Skin:  Positive for wound.   Neurological: Negative.    Psychiatric/Behavioral: Negative.         Historical Information     Immunizations:   Immunization History   Administered Date(s) Administered    Influenza, seasonal, injectable 01/01/2016    Tdap 01/13/2024       Past Medical History:   Diagnosis Date    Alcohol abuse     Anxiety     Aortic insufficiency 12/18/2020    Atrial fibrillation (HCC)     Cancer (HCC)     COPD (chronic obstructive pulmonary disease) (HCC)     Delirium     Encephalopathy     Epilepsy (HCC)     History of chemotherapy     History of radiation therapy     Hypo-osmolality and hyponatremia     Hypokalemia     Hypothyroid     Lung cancer (HCC)     Lyme disease     Major depression         Family History   Problem Relation Age of Onset    Diabetes Mother     Lung cancer Father      Past Surgical History:   Procedure Laterality Date    BRONCHOSCOPY N/A 10/3/2016    Procedure: BRONCHOSCOPY FLEXIBLE;  Surgeon: John Brunner DO;  Location: [facility];  Service:     HAND SURGERY      PELVIC FRACTURE SURGERY      REMOVAL VENOUS PORT (PORT-A-CATH) Left 9/18/2018    Procedure: REMOVAL VENOUS PORT (PORT-A-CATH)IR;  Surgeon: Randy Lopez DO;  Location: [facility];  Service: Interventional Radiology     Social History     Tobacco Use    Smoking status: Every Day     Current packs/day: 1.50     Average packs/day: 1.5 packs/day for 47.0 years (70.5 ttl pk-yrs)     Types: Cigarettes    Smokeless tobacco: Never    Tobacco comments:     smoking more than 1 ppd   Vaping Use    Vaping status: Never Used   Substance Use Topics    Alcohol use: No    Drug use: Never     E-Cigarette/Vaping    E-Cigarette Use Never User      E-Cigarette/Vaping Substances    Nicotine No     THC No     CBD No     Flavoring No     Other No     Unknown No        Family History: non-contributory    Meds/Allergies   Prior to Admission Medications   Prescriptions Last Dose Informant Patient Reported? Taking?   Advair -21 MCG/ACT inhaler   Yes No   Ammonium Lactate 10 % CREA   Yes No   Sig: Apply topically as needed for dry skin   Vitamin D, Cholecalciferol, 1000 UNITS CAPS  Outside Facility (Specify) Yes No   Sig: Take 2 capsules by mouth daily    acetaminophen (TYLENOL) 650 mg CR tablet  Outside Facility (Specify) Yes No   Sig: Take 650 mg by mouth every 6 (six) hours as needed for mild pain     albuterol (PROVENTIL HFA,VENTOLIN HFA) 90 mcg/act inhaler  Outside Facility (Specify) No No   Sig: Inhale 2 puffs every 6 (six) hours as needed for wheezing   furosemide (LASIX) 40 mg tablet   Yes No   gabapentin (NEURONTIN) 400 mg capsule  Outside Facility (Specify) Yes No   Sig: Take 400 mg by mouth 2 (two) times a day      levothyroxine 100 mcg tablet   Yes No   Sig: Take 212 mcg by mouth daily   levothyroxine 112 mcg tablet   Yes No   Sig: Take 112 mcg by mouth daily   levothyroxine 125 mcg tablet   Yes No   Patient not taking: Reported on 10/2/2023   levothyroxine 200 mcg tablet   Yes No   Si mcg   magnesium hydroxide (MILK OF MAGNESIA) 400 mg/5 mL oral suspension  Outside Facility (Specify) Yes No   Sig: Take 30 mL by mouth daily as needed for constipation     Patient not taking: Reported on 10/2/2023   metoprolol succinate (TOPROL-XL) 25 mg 24 hr tablet  Outside Facility (Specify) Yes No   Sig: Take 25 mg by mouth daily   phenytoin (DILANTIN) 100 mg ER capsule  Outside Facility (Specify) Yes No   Sig: Take 100 mg by mouth 2 (two) times a day And 250mg at 5pm   potassium chloride (K-DUR,KLOR-CON) 20 mEq tablet   Yes No   sodium chloride 1 g tablet  Outside Facility (Specify) Yes No   Sig: Take 4 g by mouth 3 (three) times a day   tiotropium (SPIRIVA) 18 mcg inhalation capsule  Outside Facility (Specify) Yes No   Sig: Place 18 mcg into inhaler and inhale daily      Facility-Administered Medications: None       No Known Allergies    PHYSICAL EXAM    PE limited by: N/A    Objective   Vitals:   First set: Temperature: 97.9 °F (36.6 °C) (24)  Pulse: 68 (24)  Respirations: 18 (24)  Blood Pressure: 119/51 (24)  SpO2: 92 % (24)    Primary Survey:   (A) Airway: Intact  (B) Breathing: CTAB  (C) Circulation: Pulses:   pedal  4/4 and radial  4/4  (D) Disabliity:  GCS Total:  14  (E) Expose:  Completed    Secondary Survey: (Click on Physical Exam tab above)  Physical Exam  Constitutional:       Appearance: Normal appearance.   HENT:      Head: Normocephalic and atraumatic.      Comments: 3cm scalp laceration     Right Ear: External ear normal.      Left Ear: External ear normal.      Nose: Nose normal. No rhinorrhea.      Mouth/Throat:      Mouth: Mucous membranes are moist.       Pharynx: Oropharynx is clear.   Eyes:      Extraocular Movements: Extraocular movements intact.      Conjunctiva/sclera: Conjunctivae normal.      Pupils: Pupils are equal, round, and reactive to light.   Cardiovascular:      Rate and Rhythm: Normal rate and regular rhythm.      Pulses: Normal pulses.      Heart sounds: Normal heart sounds.   Pulmonary:      Effort: Pulmonary effort is normal.      Breath sounds: Normal breath sounds. Decreased air movement present.   Abdominal:      General: Abdomen is flat.      Palpations: Abdomen is soft.   Skin:     General: Skin is warm and dry.      Capillary Refill: Capillary refill takes less than 2 seconds.   Neurological:      General: No focal deficit present.      Mental Status: He is alert and oriented to person, place, and time.      Cranial Nerves: No cranial nerve deficit.      Sensory: No sensory deficit.      Motor: No weakness.      Coordination: Coordination normal.   Psychiatric:         Mood and Affect: Mood normal.         Behavior: Behavior normal.         Cervical spine cleared by clinical criteria? No (imaging required)      Invasive Devices       Peripheral Intravenous Line  Duration             Peripheral IV 01/13/24 Left Antecubital <1 day    Peripheral IV 01/13/24 Left;Ventral (anterior) Forearm <1 day                    Lab Results:   Results Reviewed       Procedure Component Value Units Date/Time    HS Troponin I 2hr [[id number]]  (Normal) Collected: 01/13/24 0721    Lab Status: Final result Specimen: Blood from Arm, Left Updated: 01/13/24 0755     hs TnI 2hr 8 ng/L      Delta 2hr hsTnI -1 ng/L     UA w Reflex to Microscopic w Reflex to Culture [[id number]] Collected: 01/13/24 0645    Lab Status: Final result Specimen: Urine, Clean Catch Updated: 01/13/24 0653     Color, UA Yellow     Clarity, UA Clear     Specific Gravity, UA 1.016     pH, UA 6.5     Leukocytes, UA Negative     Nitrite, UA Negative     Protein, UA Negative mg/dl      Glucose, UA  Negative mg/dl      Ketones, UA Negative mg/dl      Urobilinogen, UA <2.0 mg/dl      Bilirubin, UA Negative     Occult Blood, UA Negative    HS Troponin I 4hr [[id number]]     Lab Status: No result Specimen: Blood     Magnesium [[id number]]  (Abnormal) Collected: 01/13/24 0240    Lab Status: Final result Specimen: Blood from Arm, Left Updated: 01/13/24 0535     Magnesium 1.7 mg/dL     FLU/RSV/COVID - if FLU/RSV clinically relevant [[id number]]  (Normal) Collected: 01/13/24 0447    Lab Status: Final result Specimen: Nares from Nose Updated: 01/13/24 0534     SARS-CoV-2 Negative     INFLUENZA A PCR Negative     INFLUENZA B PCR Negative     RSV PCR Negative    Narrative:      FOR PEDIATRIC PATIENTS - copy/paste COVID Guidelines URL to browser: [email or web address]    SARS-CoV-2 assay is a Nucleic Acid Amplification assay intended for the  qualitative detection of nucleic acid from SARS-CoV-2 in nasopharyngeal  swabs. Results are for the presumptive identification of SARS-CoV-2 RNA.    Positive results are indicative of infection with SARS-CoV-2, the virus  causing COVID-19, but do not rule out bacterial infection or co-infection  with other viruses. Laboratories within the [place] and its  territories are required to report all positive results to the appropriate  public health authorities. Negative results do not preclude SARS-CoV-2  infection and should not be used as the sole basis for treatment or other  patient management decisions. Negative results must be combined with  clinical observations, patient history, and epidemiological information.  This test has not been FDA cleared or approved.    This test has been authorized by FDA under an Emergency Use Authorization  (EUA). This test is only authorized for the duration of time the  declaration that circumstances exist justifying the authorization of the  emergency use of an in vitro diagnostic tests for  detection of SARS-CoV-2  virus and/or diagnosis of COVID-19 infection under section 564(b)(1) of  the Act, 21 U.S.C. 360bbb-3(b)(1), unless the authorization is terminated  or revoked sooner. The test has been validated but independent review by FDA  and CLIA is pending.    Test performed using [employer] GeneXpert: This RT-PCR assay targets N2,  a region unique to SARS-CoV-2. A conserved region in the E-gene was chosen  for pan-Sarbecovirus detection which includes SARS-CoV-2.    According to CMS-2020-01-R, this platform meets the definition of high-throughput technology.    HS Troponin 0hr (reflex protocol) [[id number]]  (Normal) Collected: 01/13/24 0447    Lab Status: Final result Specimen: Blood from Arm, Left Updated: 01/13/24 0530     hs TnI 0hr 9 ng/L     Ammonia [[id number]]  (Normal) Collected: 01/13/24 0447    Lab Status: Final result Specimen: Blood from Arm, Left Updated: 01/13/24 0524     Ammonia 18 umol/L     CK [[id number]]  (Normal) Collected: 01/13/24 0447    Lab Status: Final result Specimen: Blood from Arm, Left Updated: 01/13/24 0523     Total CK 94 U/L     Lactic acid, plasma (w/reflex if result > 2.0) [[id number]]  (Normal) Collected: 01/13/24 0447    Lab Status: Final result Specimen: Blood from Arm, Left Updated: 01/13/24 0522     LACTIC ACID 0.7 mmol/L     Narrative:      Result may be elevated if tourniquet was used during collection.    Comprehensive metabolic panel [[id number]]  (Abnormal) Collected: 01/13/24 0240    Lab Status: Final result Specimen: Blood from Arm, Left Updated: 01/13/24 0517     Sodium 134 mmol/L      Potassium 4.2 mmol/L      Chloride 96 mmol/L      CO2 32 mmol/L      ANION GAP 6 mmol/L      BUN 19 mg/dL      Creatinine 0.61 mg/dL      Glucose 88 mg/dL      Calcium 9.1 mg/dL      AST 19 U/L      ALT 14 U/L      Alkaline Phosphatase 108 U/L      Total Protein 7.1 g/dL      Albumin 3.8 g/dL      Total Bilirubin 0.50 mg/dL      eGFR 106 ml/min/1.73sq m     Narrative:       National Kidney Disease Foundation guidelines for Chronic Kidney Disease (CKD):     Stage 1 with normal or high GFR (GFR > 90 mL/min/1.73 square meters)    Stage 2 Mild CKD (GFR = 60-89 mL/min/1.73 square meters)    Stage 3A Moderate CKD (GFR = 45-59 mL/min/1.73 square meters)    Stage 3B Moderate CKD (GFR = 30-44 mL/min/1.73 square meters)    Stage 4 Severe CKD (GFR = 15-29 mL/min/1.73 square meters)    Stage 5 End Stage CKD (GFR <15 mL/min/1.73 square meters)  Note: GFR calculation is accurate only with a steady state creatinine    D-Dimer [[id number]]  (Normal) Collected: 01/13/24 0447    Lab Status: Final result Specimen: Blood from Arm, Left Updated: 01/13/24 0516     D-Dimer, Quant 0.32 ug/ml FEU     Narrative:      In the evaluation for possible pulmonary embolism, in the appropriate (Well's Score of 4 or less) patient, the age adjusted d-dimer cutoff for this patient can be calculated as:    Age x 0.01 (in ug/mL) for Age-adjusted D-dimer exclusion threshold for a patient over 50 years.    Blood gas, venous [[id number]]  (Abnormal) Collected: 01/13/24 0447    Lab Status: Final result Specimen: Blood from Arm, Left Updated: 01/13/24 0456     pH, Bryan 7.343     pCO2, Bryan 57.0 mm Hg      pO2, Bryan 21.9 mm Hg      HCO3, Bryan 30.3 mmol/L      Base Excess, Bryan 3.2 mmol/L      O2 Content, Bryan 7.8 ml/dL      O2 HGB, VENOUS 40.4 %     CBC and differential [[id number]]  (Abnormal) Collected: 01/13/24 0447    Lab Status: Final result Specimen: Blood from Arm, Left Updated: 01/13/24 0456     WBC 6.92 Thousand/uL      RBC 4.08 Million/uL      Hemoglobin 13.0 g/dL      Hematocrit 38.9 %      MCV 95 fL      MCH 31.9 pg      MCHC 33.4 g/dL      RDW 12.9 %      MPV 8.5 fL      Platelets 222 Thousands/uL      nRBC 0 /100 WBCs      Neutrophils Relative 68 %      Immat GRANS % 0 %      Lymphocytes Relative 15 %      Monocytes Relative 13 %      Eosinophils Relative 3 %      Basophils Relative 1 %      Neutrophils Absolute 4.69  Thousands/µL      Immature Grans Absolute 0.02 Thousand/uL      Lymphocytes Absolute 1.01 Thousands/µL      Monocytes Absolute 0.91 Thousand/µL      Eosinophils Absolute 0.20 Thousand/µL      Basophils Absolute 0.09 Thousands/µL     Fingerstick Glucose (POCT) [[id number]]  (Normal) Collected: 01/13/24 0435    Lab Status: Final result Updated: 01/13/24 0436     POC Glucose 90 mg/dl     Phenytoin level, total [[id number]]  (Normal) Collected: 01/13/24 0240    Lab Status: Final result Specimen: Blood from Arm, Left Updated: 01/13/24 0310     Phenytoin Lvl 19.4 ug/mL                    Imaging Studies:   Direct to CT: Yes  CT head without contrast   Final Result by Raphael Mcgregor MD (01/13 0322)      No acute intracranial abnormality.                  Workstation performed: [id number]         CT cervical spine without contrast   Final Result by Raphael Mcgregor MD (01/13 0326)      No cervical spine fracture or traumatic malalignment.                  Workstation performed: [id number]         XR chest 1 view portable   ED Interpretation by Chris Maya MD (01/13 0314)   No evidence of acute cardiopulmonary pathology.      Final Result by Juan Lamb MD (01/13 0647)      No acute cardiopulmonary disease.                  Workstation performed: [id number]               Procedures  ECG 12 Lead Documentation Only    Date/Time: 1/13/2024 3:20 AM    Performed by: Chris Maya MD  Authorized by: Chris Maya MD    ECG reviewed by me, the ED Provider: yes    Patient location:  ED  Interpretation:     Interpretation: abnormal    Rate:     ECG rate:  66    ECG rate assessment: normal    Rhythm:     Rhythm: sinus rhythm and A-V block    Ectopy:     Ectopy: none    QRS:     QRS axis:  Left    QRS intervals:  Wide  Conduction:     Conduction: abnormal      Abnormal conduction: 1st degree    ST segments:     ST segments:  Normal  T waves:     T waves: inverted      Inverted:  V5, V6, II, III and aVF  Universal  "Protocol:  Consent: Verbal consent obtained.  Risks and benefits: risks, benefits and alternatives were discussed  Consent given by: patient  Time out: Immediately prior to procedure a \"time out\" was called to verify the correct patient, procedure, equipment, support staff and site/side marked as required.  Patient understanding: patient states understanding of the procedure being performed  Patient consent: the patient's understanding of the procedure matches consent given  Procedure consent: procedure consent matches procedure scheduled  Relevant documents: relevant documents present and verified  Test results: test results available and properly labeled  Site marked: the operative site was marked  Radiology Images displayed and confirmed. If images not available, report reviewed: imaging studies available  Required items: required blood products, implants, devices, and special equipment available  Patient identity confirmed: verbally with patient and arm band  Laceration repair    Date/Time: 1/13/2024 5:48 AM    Performed by: Chris Maya MD  Authorized by: Chris Maya MD  Body area: head/neck  Location details: scalp  Laceration length: 4 cm  Foreign bodies: no foreign bodies  Tendon involvement: none  Nerve involvement: none  Vascular damage: no  Anesthesia: local infiltration    Anesthesia:  Local Anesthetic: lidocaine 1% without epinephrine  Anesthetic total: 1 mL    Wound Dehiscence:  Superficial Wound Dehiscence: simple closure      Procedure Details:  Preparation: Patient was prepped and draped in the usual sterile fashion.  Irrigation solution: saline  Wound skin closure material used: Staples.  Number of sutures: 5 staples.  Approximation: close               ED Course  ED Course as of 01/13/24 0755   Sat Jan 13, 2024   0328 CT cervical spine without contrast     IMPRESSION:     No cervical spine fracture or traumatic malalignment.        0328 CT head without contrast     IMPRESSION:     No acute " intracranial abnormality.     0634 Ammonia: 18   0634 Total CK: 94           Medical Decision Making  Pt presenting s/p fall likely mechanical. However, given his history of seizure and his questionable history, Pt was worked up for medical cause of his fall. Labs unremarkable, phenytoin level therapeutic. Imaging without evidence of acute traumatic injury. Hypomagnesemia was addressed with IV Mg supplementation. Pt was able to ambulate safely while in the department. Strict return precautions discussed.    Amount and/or Complexity of Data Reviewed  Labs: ordered. Decision-making details documented in ED Course.  Radiology: ordered and independent interpretation performed. Decision-making details documented in ED Course.    Risk  Prescription drug management.                Disposition  [facility] Transfer: No  Final diagnoses:   Fall, initial encounter   Hypomagnesemia     Time reflects when diagnosis was documented in both MDM as applicable and the Disposition within this note       Time User Action Codes Description Comment    1/13/2024  4:01 AM Chris Maya [W19.XXXA] Fall, initial encounter     1/13/2024  6:20 AM Chris Maya [E83.42] Hypomagnesemia           ED Disposition       ED Disposition   Discharge    Condition   Stable    Date/Time   Sat Jan 13, 2024  3:14 AM    Comment   Marcin Sánchez discharge to home/self care.                   Follow-up Information       Follow up With Specialties Details Why Contact Info Additional Information    [facility] Emergency Medicine Go to  If symptoms worsen [address] [facility] [address] [phone] [facility], [facility] [place], [place], [address]          Patient's Medications   Discharge Prescriptions    No medications on file     No discharge procedures on file.    PDMP Review       None            ED Provider  Electronically  Signed by           Chris Maya MD  01/13/24 0757

## 2024-01-13 NOTE — DISCHARGE INSTRUCTIONS
You may have your staples removed in 7 days (1/20)    Return to the ER if you develop:    Severe headache, nausea/vomiting, confusion, weakness, numbness.

## 2024-01-15 NOTE — ED ATTENDING ATTESTATION
1/13/2024  I, Jenn Sanchez MD, saw and evaluated the patient. I have discussed the patient with the resident/non-physician practitioner and agree with the resident's/non-physician practitioner's findings, Plan of Care, and MDM as documented in the resident's/non-physician practitioner's note, except where noted. All available labs and Radiology studies were reviewed.  I was present for key portions of any procedure(s) performed by the resident/non-physician practitioner and I was immediately available to provide assistance.       At this point I agree with the current assessment done in the Emergency Department.  I have conducted an independent evaluation of this patient a history and physical is as follows:    ED Course  ED Course as of 01/15/24 1245   Sat Jan 13, 2024   [id number] Marcin is a 63-year-old gentleman presenting to the emergency department from [facility] after a fall.  His past medical history is significant for COPD, seizure disorder on phenytoin, atrial fibrillation, currently on aspirin.  The patient reports that he was asleep in his chair when he fell out and struck his head against a table leg.  He remembers the entire event and denies headache, loss of consciousness, chest pain, shortness of breath, nausea, vomiting, dizziness, weakness, neck pain, abdominal pain, urinary symptoms, changes in stool, leg pain or leg swelling.    His vital signs signs on arrival are within normal limits.    On the resident's evaluation, reportedly, patient was completely normal, alert and oriented, GCS 15 with no focal deficits.  Approximately    On my evaluation 30 to 45 minutes later, on his physical exam, patient is sleeping, mumbling, difficult to arouse, nods yes and no to some questions, does not engage with examiner.  Unclear if organic or behavioral.  No respiratory distress.  Heart, lungs, abdomen otherwise unremarkable.    Altered mental status workup was pursued.    Clinical presentation puts  patient at risk for the following differential diagnoses:    Intracranial bleed, intracranial mass, seizure, electrolyte abnormality, metabolic disturbance, infection/sepsis.  Lab work and imaging was ordered.    CT cervical spine without contrast  No cervical spine fracture or traumatic malalignment.   0419 CT head without contrast  No acute intracranial abnormality.     On multiple reevaluations, patient appears to have improved and his mentation and is back to baseline.  Patient has no new complaints on these reevaluations.    Upon re-assessment, patient feels improved.  Patient remained hemodynamically and clinically stable in the ED.  Strict return precautions given.  Patient verbalized understanding and will follow up as outpatient with PMD.  Upon discharge, patient was AO4, GCS15, and without further complaints.      Results Reviewed       Procedure Component Value Units Date/Time    HS Troponin I 2hr [[id number]]  (Normal) Collected: 01/13/24 0721    Lab Status: Final result Specimen: Blood from Arm, Left Updated: 01/13/24 0755     hs TnI 2hr 8 ng/L      Delta 2hr hsTnI -1 ng/L     UA w Reflex to Microscopic w Reflex to Culture [[id number]] Collected: 01/13/24 0645    Lab Status: Final result Specimen: Urine, Clean Catch Updated: 01/13/24 0653     Color, UA Yellow     Clarity, UA Clear     Specific Gravity, UA 1.016     pH, UA 6.5     Leukocytes, UA Negative     Nitrite, UA Negative     Protein, UA Negative mg/dl      Glucose, UA Negative mg/dl      Ketones, UA Negative mg/dl      Urobilinogen, UA <2.0 mg/dl      Bilirubin, UA Negative     Occult Blood, UA Negative    Magnesium [[id number]]  (Abnormal) Collected: 01/13/24 0240    Lab Status: Final result Specimen: Blood from Arm, Left Updated: 01/13/24 0535     Magnesium 1.7 mg/dL     FLU/RSV/COVID - if FLU/RSV clinically relevant [[id number]]  (Normal) Collected: 01/13/24 0447    Lab Status: Final result Specimen: Nares from Nose Updated: 01/13/24 0573      SARS-CoV-2 Negative     INFLUENZA A PCR Negative     INFLUENZA B PCR Negative     RSV PCR Negative    Narrative:      FOR PEDIATRIC PATIENTS - copy/paste COVID Guidelines URL to browser: [email or web address]    SARS-CoV-2 assay is a Nucleic Acid Amplification assay intended for the  qualitative detection of nucleic acid from SARS-CoV-2 in nasopharyngeal  swabs. Results are for the presumptive identification of SARS-CoV-2 RNA.    Positive results are indicative of infection with SARS-CoV-2, the virus  causing COVID-19, but do not rule out bacterial infection or co-infection  with other viruses. Laboratories within the [place] and its  territories are required to report all positive results to the appropriate  public health authorities. Negative results do not preclude SARS-CoV-2  infection and should not be used as the sole basis for treatment or other  patient management decisions. Negative results must be combined with  clinical observations, patient history, and epidemiological information.  This test has not been FDA cleared or approved.    This test has been authorized by FDA under an Emergency Use Authorization  (EUA). This test is only authorized for the duration of time the  declaration that circumstances exist justifying the authorization of the  emergency use of an in vitro diagnostic tests for detection of SARS-CoV-2  virus and/or diagnosis of COVID-19 infection under section 564(b)(1) of  the Act, 21 U.S.C. 360bbb-3(b)(1), unless the authorization is terminated  or revoked sooner. The test has been validated but independent review by FDA  and CLIA is pending.    Test performed using [employer]: This RT-PCR assay targets N2,  a region unique to SARS-CoV-2. A conserved region in the E-gene was chosen  for pan-Sarbecovirus detection which includes SARS-CoV-2.    According to CMS-2020-01-R, this platform meets the definition of high-throughput  technology.    HS Troponin 0hr (reflex protocol) [[id number]]  (Normal) Collected: 01/13/24 0447    Lab Status: Final result Specimen: Blood from Arm, Left Updated: 01/13/24 0530     hs TnI 0hr 9 ng/L     Ammonia [[id number]]  (Normal) Collected: 01/13/24 0447    Lab Status: Final result Specimen: Blood from Arm, Left Updated: 01/13/24 0524     Ammonia 18 umol/L     CK [[id number]]  (Normal) Collected: 01/13/24 0447    Lab Status: Final result Specimen: Blood from Arm, Left Updated: 01/13/24 0523     Total CK 94 U/L     Lactic acid, plasma (w/reflex if result > 2.0) [[id number]]  (Normal) Collected: 01/13/24 0447    Lab Status: Final result Specimen: Blood from Arm, Left Updated: 01/13/24 0522     LACTIC ACID 0.7 mmol/L     Narrative:      Result may be elevated if tourniquet was used during collection.    Comprehensive metabolic panel [[id number]]  (Abnormal) Collected: 01/13/24 0240    Lab Status: Final result Specimen: Blood from Arm, Left Updated: 01/13/24 0517     Sodium 134 mmol/L      Potassium 4.2 mmol/L      Chloride 96 mmol/L      CO2 32 mmol/L      ANION GAP 6 mmol/L      BUN 19 mg/dL      Creatinine 0.61 mg/dL      Glucose 88 mg/dL      Calcium 9.1 mg/dL      AST 19 U/L      ALT 14 U/L      Alkaline Phosphatase 108 U/L      Total Protein 7.1 g/dL      Albumin 3.8 g/dL      Total Bilirubin 0.50 mg/dL      eGFR 106 ml/min/1.73sq m     Narrative:      National Kidney Disease Foundation guidelines for Chronic Kidney Disease (CKD):     Stage 1 with normal or high GFR (GFR > 90 mL/min/1.73 square meters)    Stage 2 Mild CKD (GFR = 60-89 mL/min/1.73 square meters)    Stage 3A Moderate CKD (GFR = 45-59 mL/min/1.73 square meters)    Stage 3B Moderate CKD (GFR = 30-44 mL/min/1.73 square meters)    Stage 4 Severe CKD (GFR = 15-29 mL/min/1.73 square meters)    Stage 5 End Stage CKD (GFR <15 mL/min/1.73 square meters)  Note: GFR calculation is accurate only with a steady state creatinine    D-Dimer [[id number]]  (Normal)  Collected: 01/13/24 0447    Lab Status: Final result Specimen: Blood from Arm, Left Updated: 01/13/24 0516     D-Dimer, Quant 0.32 ug/ml FEU     Narrative:      In the evaluation for possible pulmonary embolism, in the appropriate (Well's Score of 4 or less) patient, the age adjusted d-dimer cutoff for this patient can be calculated as:    Age x 0.01 (in ug/mL) for Age-adjusted D-dimer exclusion threshold for a patient over 50 years.    Blood gas, venous [[id number]]  (Abnormal) Collected: 01/13/24 0447    Lab Status: Final result Specimen: Blood from Arm, Left Updated: 01/13/24 0456     pH, Bryan 7.343     pCO2, Bryan 57.0 mm Hg      pO2, Bryan 21.9 mm Hg      HCO3, Bryan 30.3 mmol/L      Base Excess, Bryan 3.2 mmol/L      O2 Content, Bryan 7.8 ml/dL      O2 HGB, VENOUS 40.4 %     CBC and differential [[id number]]  (Abnormal) Collected: 01/13/24 0447    Lab Status: Final result Specimen: Blood from Arm, Left Updated: 01/13/24 0456     WBC 6.92 Thousand/uL      RBC 4.08 Million/uL      Hemoglobin 13.0 g/dL      Hematocrit 38.9 %      MCV 95 fL      MCH 31.9 pg      MCHC 33.4 g/dL      RDW 12.9 %      MPV 8.5 fL      Platelets 222 Thousands/uL      nRBC 0 /100 WBCs      Neutrophils Relative 68 %      Immat GRANS % 0 %      Lymphocytes Relative 15 %      Monocytes Relative 13 %      Eosinophils Relative 3 %      Basophils Relative 1 %      Neutrophils Absolute 4.69 Thousands/µL      Immature Grans Absolute 0.02 Thousand/uL      Lymphocytes Absolute 1.01 Thousands/µL      Monocytes Absolute 0.91 Thousand/µL      Eosinophils Absolute 0.20 Thousand/µL      Basophils Absolute 0.09 Thousands/µL     Fingerstick Glucose (POCT) [[id number]]  (Normal) Collected: 01/13/24 0435    Lab Status: Final result Updated: 01/13/24 0436     POC Glucose 90 mg/dl     Phenytoin level, total [[id number]]  (Normal) Collected: 01/13/24 0240    Lab Status: Final result Specimen: Blood from Arm, Left Updated: 01/13/24 0310     Phenytoin Lvl 19.4 ug/mL            Lab work is grossly unremarkable aside from slight hyponatremia.  Phenytoin appears to be within therapeutic level.  No infection.    Patient was monitored for 6 hours and 36 minutes in the department without any recurrence of his one-time episode of sleepiness.  Reportedly, patient was evaluated by the resident immediately afterward and patient was normal at that time.    Upon re-assessment, patient feels improved.  Patient remained hemodynamically and clinically stable in the ED.  Strict return precautions given.  Patient verbalized understanding and will follow up as outpatient with PMD.  Upon discharge, patient was AO4, GCS15, and fully ambulatory.      Critical Care Time  Procedures

## 2024-02-09 ENCOUNTER — HOSPITAL ENCOUNTER (OUTPATIENT)
Dept: CT IMAGING | Facility: HOSPITAL | Age: 64
End: 2024-02-09
Payer: COMMERCIAL

## 2024-02-09 DIAGNOSIS — C34.01 CANCER OF RIGHT MAIN BRONCHUS (HCC): ICD-10-CM

## 2024-02-09 PROCEDURE — G1004 CDSM NDSC: HCPCS

## 2024-02-09 PROCEDURE — 71250 CT THORAX DX C-: CPT

## 2024-02-15 ENCOUNTER — OFFICE VISIT (OUTPATIENT)
Dept: CARDIAC SURGERY | Facility: CLINIC | Age: 64
End: 2024-02-15
Payer: COMMERCIAL

## 2024-02-15 ENCOUNTER — DOCUMENTATION (OUTPATIENT)
Dept: CARDIAC SURGERY | Facility: CLINIC | Age: 64
End: 2024-02-15

## 2024-02-15 VITALS
TEMPERATURE: 98.6 F | HEART RATE: 68 BPM | BODY MASS INDEX: 21.78 KG/M2 | SYSTOLIC BLOOD PRESSURE: 140 MMHG | HEIGHT: 69 IN | OXYGEN SATURATION: 93 % | DIASTOLIC BLOOD PRESSURE: 60 MMHG | RESPIRATION RATE: 14 BRPM

## 2024-02-15 DIAGNOSIS — C34.11 MALIGNANT NEOPLASM OF UPPER LOBE OF RIGHT LUNG (HCC): Primary | ICD-10-CM

## 2024-02-15 DIAGNOSIS — R91.8 MULTIPLE LUNG NODULES ON CT: ICD-10-CM

## 2024-02-15 PROCEDURE — 99213 OFFICE O/P EST LOW 20 MIN: CPT | Performed by: THORACIC SURGERY (CARDIOTHORACIC VASCULAR SURGERY)

## 2024-02-15 NOTE — ASSESSMENT & PLAN NOTE
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 We are following him for an enlarged paratracheal lymph node. ON most recent CT chest this showed stability and there are no new or enlarging lung nodules. He does have a new tree-in-bud opacities in the left lower lobe consistent with endobronchial infection/impaction. I will follow this with a repeat CT chest in 6 months. He also has a stable 5.4cm ascending thoracic aortic aneurysm, this was seen by CT surgery and not recommended to discuss surgical intervention until it reaches 6cm. We will see him back in 6 months for continued follow-up.

## 2024-02-15 NOTE — PROGRESS NOTES
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 We are following him for an enlarged paratracheal lymph node. ON most recent CT chest this showed stability and there are no new or enlarging lung nodules. He does have a new tree-in-bud opacities in the left lower lobe consistent with endobronchial infection/impaction. I will follow this with a repeat CT chest in 6 months. He also has a stable 5.4cm ascending thoracic aortic aneurysm, this was seen by CT surgery and not recommended to discuss surgical intervention until it reaches 6cm. We will see him back in 6 months for continued follow-up.        Diagnoses and all orders for this visit:    Malignant neoplasm of upper lobe of right lung (HCC)  -     CT chest wo contrast; Future    Multiple lung nodules on CT  -     CT chest wo contrast; Future          Thoracic History      Cancer Staging   Cancer of right main bronchus (HCC)  Staging form: Lung, AJCC 8th Edition  - Clinical stage from 10/3/2023: Stage Unknown (cT3, cNX, cM0) - Signed by Osmar Kuo MD on 10/3/2023  Stage prefix: Initial diagnosis  Histologic grade (G): GX  Histologic grading system: 4 grade system    Malignant neoplasm of upper lobe of right lung (HCC)  Staging form: Lung, AJCC 8th Edition  - Clinical: No stage assigned - Unsigned  - Pathologic stage from 10/3/2023: Stage Unknown (pT3, pNX, cM0) - Signed by Osmar Kuo MD on 10/3/2023  Stage prefix: Initial diagnosis  Histologic grade (G): GX  Histologic grading system: 4 grade system  Oncology History Overview Note   Marcin Sánchez presents today for a routine follow up for cancer of the main bronchus. He completed Radiation treatments on 12/19/16. Last seen in our office on 1/6/20.     2/11/20- Dr Kuo  Patient is stable in regards to his symptoms.  He did have 1 day episode of coughing up a  piece of dried blood.  F/U w/ Ct scan in 6 months     2/25/20-Dr. Lee [facility]   -C/O  SOB with exertion but does not perform any exercise.  -Continues to smoke daily   -F/U in 12 months     1/10/20- CT SCAN   IMPRESSIONS:   Minimal progressive gaseous resorption of small amount of trapped lung and scarring in the right pulmonary apex.  Otherwise, stable chronic right lung scarring most severe in the right upper lobe and additional chronic findings as above.  No other significant interval change.    8/11/20 CT chest  IMPRESSION:  Appearance of predominantly right upper lobe apical and posterior paramediastinal consolidation, presumed to represent scarring and less likely underlying residual tumor, is unchanged in appearance from 1/10/2020. Findings associated with volume loss and rightward mediastinal shift.     Extensive micronodularity throughout the left lung is similar in appearance to CT chest examinations dating to 6/27/2018. Findings are unlikely to represent disease spread given stability.     Aneurysmal dilatation of ascending aorta up to 5.2 cm stable.      8/21/20- Dr. Kuo           Cancer of right main bronchus (HCC)   10/3/2016 Initial Diagnosis    Cancer of right main bronchus (HCC)     10/3/2016 Biopsy    Right endobronchial mass biopsy  Keratinizing squamous cell carcinoma     11/7/2016 - 12/19/2016 Radiation    Right lung/mediastinum to a total dose of 6000 cGy in 30 daily fractions to all gross disease.      11/10/2016 - 12/22/2016 Chemotherapy     7 weekly doses of carboplatin plus Taxol with concurrent radiation.  Dr Kuo     10/3/2023 -  Cancer Staged    Staging form: Lung, AJCC 8th Edition  - Clinical stage from 10/3/2023: Stage Unknown (cT3, cNX, cM0) - Signed by Osmar Kuo MD on 10/3/2023  Stage prefix: Initial diagnosis  Histologic grade (G): GX  Histologic grading system: 4 grade system       Malignant neoplasm of upper lobe of right lung (HCC)   4/12/2018 Initial Diagnosis     Malignant neoplasm of upper lobe of right lung (HCC)     10/3/2023 -  Cancer Staged    Staging form: Lung, AJCC 8th Edition  - Pathologic stage from 10/3/2023: Stage Unknown (pT3, pNX, cM0) - Signed by Osmar Kuo MD on 10/3/2023  Stage prefix: Initial diagnosis  Histologic grade (G): GX  Histologic grading system: 4 grade system              Subjective:    Patient ID: Marcin Sánchez is a 63 y.o. male.    HPI  Mr Sánchez is a 63 ye old male with histoyr of squamous cell carcinoma of the right mainstem in 2016 s/p chemoradiation. We are following for an enalrged paratracheal lymph node/     CT chest on 2/9/24 was personally reviewed by myself in PACS and showed no new or enlarging pulmonary mass. Stable right upper paratracheal lymph node measuring 1.6cm. New tree-in-bud opacitties in the left lower lobe consistent with endobronchial impaction/infection. Stable 5.4cm ascending thoracic aortic aneurysm.     On discussion he continued to smoke daily and is not interested in cessation. He has chronic SOB and cough, but no progression. Denies hemoptysis. Denies fevers/chills or recent illness.     The following portions of the patient's history were reviewed and updated as appropriate: allergies, current medications, past family history, past medical history, past social history, past surgical history, and problem list.    Review of Systems   Constitutional:  Negative for chills, diaphoresis and unexpected weight change.   HENT: Negative.     Eyes: Negative.    Respiratory:  Negative for cough, shortness of breath and wheezing.    Cardiovascular:  Negative for chest pain and leg swelling.   Gastrointestinal:  Negative for abdominal pain, diarrhea and nausea.   Endocrine: Negative.    Genitourinary: Negative.    Musculoskeletal: Negative.    Skin: Negative.    Allergic/Immunologic: Negative.    Neurological: Negative.    Hematological:  Negative for adenopathy. Does not bruise/bleed easily.  "  Psychiatric/Behavioral: Negative.     All other systems reviewed and are negative.        Objective:   Physical Exam  Vitals reviewed.   Constitutional:       General: He is not in acute distress.     Appearance: Normal appearance. He is not ill-appearing.      Comments: Disheveled   In wheel chair  frail   HENT:      Head: Normocephalic.      Nose: Nose normal.      Mouth/Throat:      Mouth: Mucous membranes are moist.   Eyes:      Pupils: Pupils are equal, round, and reactive to light.   Cardiovascular:      Rate and Rhythm: Normal rate and regular rhythm.      Heart sounds: Normal heart sounds.   Pulmonary:      Effort: Pulmonary effort is normal.      Breath sounds: Normal breath sounds. No wheezing, rhonchi or rales.   Abdominal:      Palpations: Abdomen is soft.   Musculoskeletal:         General: No swelling. Normal range of motion.   Lymphadenopathy:      Cervical: No cervical adenopathy.   Skin:     General: Skin is warm.   Neurological:      General: No focal deficit present.      Mental Status: He is alert and oriented to person, place, and time.   Psychiatric:         Mood and Affect: Mood normal.     /60 (BP Location: Left arm, Patient Position: Sitting, Cuff Size: Standard)   Pulse 68   Temp 98.6 °F (37 °C) (Temporal)   Resp 14   Ht 5' 9\" (1.753 m)   SpO2 93%   BMI 21.78 kg/m²     No Chest XR results available for this patient.   CT chest wo contrast    Result Date: 2/13/2024  Narrative CT CHEST WITHOUT IV CONTRAST INDICATION: C34.01: Malignant neoplasm of right main bronchus. COMPARISON: CT chest dated 7/26/2023. TECHNIQUE: CT examination of the chest was performed without intravenous contrast. Multiplanar 2D reformatted images were created from the source data. This examination, like all CT scans performed in the [facility], was performed utilizing techniques to minimize radiation dose exposure, including the use of iterative reconstruction and automated exposure " control. Radiation dose length product (DLP) for this visit: 234.95 mGy-cm FINDINGS: LUNGS: Moderate to severe paraseptal and centrilobular emphysematous changes. There are new tree-in-bud opacities in the left lower lobe consistent with endobronchial impaction/infection. There is no new or enlarging pulmonary mass. Diffuse bronchial wall thickening is redemonstrated. Again demonstrated are chronic changes related to right upper perihilar radiation fibrosis as evidenced by volume loss, lung distortion, and traction bronchiectasis. There is no tracheal or endobronchial lesion. PLEURA: Unremarkable. HEART/GREAT VESSELS: Heart is unremarkable for patient's age. Stable 5.4 cm ascending thoracic aortic aneurysm. MEDIASTINUM AND ANDREW: A 1.6 x 1.5 cm right upper paratracheal lymph node (image 28, series 301) is not significantly changed. Other smaller mediastinal lymph nodes are also not significantly changed. CHEST WALL AND LOWER NECK: Unremarkable. VISUALIZED STRUCTURES IN THE UPPER ABDOMEN: Unremarkable. OSSEOUS STRUCTURES: No acute fracture or destructive osseous lesion.     Impression No new or enlarging pulmonary mass identified. Stable right upper perihilar radiation fibrosis. Stable right upper paratracheal lymph node measuring up to 1.6 cm. New tree-in-bud opacities in the left lower lobe consistent with endobronchial impaction/infection. Stable 5.4 cm ascending thoracic aortic aneurysm. Workstation performed: [id number]     CT chest wo contrast    Result Date: 7/27/2023  Narrative CT CHEST WITHOUT IV CONTRAST INDICATION:   C34.11: Malignant neoplasm of upper lobe, right bronchus or lung Z72.0: Tobacco use R91.8: Other nonspecific abnormal finding of lung field. COMPARISON: Many priors, recently CT chest dated January 10, 2023. TECHNIQUE: CT examination of the chest was performed without intravenous contrast. Multiplanar 2D reformatted images were created from the source data. This examination, like all CT scans  performed in the [facility], was performed utilizing techniques to minimize radiation dose exposure, including the use of iterative reconstruction and automated exposure control. Radiation dose length product (DLP) for this visit:  205.6 mGy-cm FINDINGS: LUNGS: Diffuse bronchial wall thickening. Stable scattered sub-4 mm ovoid/tubular nodules, in keeping with chronic bronchiolitis. No new or enlarging pulmonary nodules. No suspicious tracheal or endobronchial lesion. There are few areas of mucus plugging  in the right lower lobe. Stable right upper perihilar fibrosis manifested by volume loss, lung distortion, and traction bronchiectasis. Stable curvilinear scarring in the periphery of the right lower lobe with mild traction bronchiolectasis. No honeycombing. Moderate background emphysema with upper lung zone predominance. PLEURA:  Unremarkable. HEART/GREAT VESSELS: Heart is unremarkable for patient's age. There is stable fusiform aneurysmal enlargement of the proximal ascending thoracic aorta measuring up to 54 mm; if not previously obtained in this regard, cardiothoracic surgery consultation is recommended. MEDIASTINUM AND ANDREW: 1.2 x 2.1 x 1.5 cm (AP, TRV, CC) right upper paratracheal lymph node when remeasured using multiplanar reformats (series 203, image 102; series 201, image 38). This is unchanged from multiple studies dating back to July 2022 when remeasured in a similar fashion. The remainder of the mediastinal lymph nodes measure less than 1 cm in short axis and are similar to the prior study. CHEST WALL AND LOWER NECK: Stable small calcified lymph node in the right axilla. VISUALIZED STRUCTURES IN THE UPPER ABDOMEN:  Unremarkable. OSSEOUS STRUCTURES:  No acute fracture or destructive osseous lesion. Old healed sternal and rib fracture deformities.     Impression 1. Stable right upper paratracheal lymph node compared to multiple studies dating back to July 2022. No new or enlarging lymph  nodes. 2. Stable right upper perihilar radiation fibrosis, and findings of chronic bronchitis/bronchiolitis. 3. Stable 5.4 cm ascending thoracic aortic aneurysm. For all of these findings, continued follow-up is recommended. Workstation performed: [id number]     No CT Chest,Abdomen,Pelvis results available for this patient.   No NM PET CT results available for this patient.   No Barium Swallow results available for this patient.

## 2024-04-01 ENCOUNTER — OFFICE VISIT (OUTPATIENT)
Dept: HEMATOLOGY ONCOLOGY | Facility: CLINIC | Age: 64
End: 2024-04-01
Payer: COMMERCIAL

## 2024-04-01 VITALS
BODY MASS INDEX: 21.03 KG/M2 | SYSTOLIC BLOOD PRESSURE: 116 MMHG | DIASTOLIC BLOOD PRESSURE: 58 MMHG | WEIGHT: 142 LBS | TEMPERATURE: 97.8 F | HEART RATE: 64 BPM | HEIGHT: 69 IN | RESPIRATION RATE: 18 BRPM

## 2024-04-01 DIAGNOSIS — C34.11 MALIGNANT NEOPLASM OF UPPER LOBE OF RIGHT LUNG (HCC): Primary | ICD-10-CM

## 2024-04-01 DIAGNOSIS — I71.21 ANEURYSM OF ASCENDING AORTA WITHOUT RUPTURE (HCC): ICD-10-CM

## 2024-04-01 DIAGNOSIS — J44.9 CHRONIC OBSTRUCTIVE PULMONARY DISEASE, UNSPECIFIED COPD TYPE (HCC): ICD-10-CM

## 2024-04-01 DIAGNOSIS — R91.8 MULTIPLE LUNG NODULES ON CT: ICD-10-CM

## 2024-04-01 DIAGNOSIS — E03.9 ACQUIRED HYPOTHYROIDISM: ICD-10-CM

## 2024-04-01 DIAGNOSIS — Z72.0 TOBACCO ABUSE: ICD-10-CM

## 2024-04-01 PROCEDURE — 99214 OFFICE O/P EST MOD 30 MIN: CPT | Performed by: INTERNAL MEDICINE

## 2024-04-01 NOTE — PROGRESS NOTES
HPI: Continuation of care.  Patient is here with an attendant from [facility].  Patient is a  long-term resident at that place.  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 The mainstem bronchus was occluded.  Patient received  concurrent chemotherapy and radiation, 7 weekly doses of carboplatin and Taxol with radiation.  He was offered 2 additional cycles of regular doses of Taxol and carboplatin chemotherapy at  3 week interval but patient declined.  There has not been documented recurrent or metastatic disease from lung cancer.  Patient is at high risk for lung cancer or other smoking-related cancers because he continues to smoke cigarettes and does not want to quit.  He knows the health hazards of smoking cigarettes that I explained to him once again.   He has  dry cough and minimal exertional dyspnea.  He has lung nodules on CT scan and he follows  with thoracic surgeon . No chest pain and no hemoptysis.  Has some tiredness..  He is trying to maintain his weight  .  Patient has thoracic aneurysm and he follows with cardio thoracic surgery.  He states he does not want surgery for that.    No more swelling of the legs    Current Outpatient Medications:   •  Advair -21 MCG/ACT inhaler, , Disp: , Rfl:   •  albuterol (PROVENTIL HFA,VENTOLIN HFA) 90 mcg/act inhaler, Inhale 2 puffs every 6 (six) hours as needed for wheezing, Disp: 1 Inhaler, Rfl: 3  •  Ammonium Lactate 10 % CREA, Apply topically as needed for dry skin, Disp: , Rfl:   •  furosemide (LASIX) 40 mg tablet, , Disp: , Rfl:   •  gabapentin (NEURONTIN) 400 mg capsule, Take 400 mg by mouth 2 (two) times a day  , Disp: , Rfl:   •  levothyroxine 200 mcg tablet, 250 mcg, Disp: , Rfl:   •  metoprolol succinate (TOPROL-XL) 25 mg 24 hr tablet, Take 25 mg by mouth daily, Disp: , Rfl:   •  phenytoin (DILANTIN) 100 mg ER capsule, Take 100 mg by mouth 2 (two) times a day And 250mg at 5pm, Disp: , Rfl:   •  potassium  chloride (K-DUR,KLOR-CON) 20 mEq tablet, , Disp: , Rfl:   •  sodium chloride 1 g tablet, Take 4 g by mouth 3 (three) times a day, Disp: , Rfl:   •  tiotropium (SPIRIVA) 18 mcg inhalation capsule, Place 18 mcg into inhaler and inhale daily, Disp: , Rfl:   •  Vitamin D, Cholecalciferol, 1000 UNITS CAPS, Take 2 capsules by mouth daily , Disp: , Rfl:   •  acetaminophen (TYLENOL) 650 mg CR tablet, Take 650 mg by mouth every 6 (six) hours as needed for mild pain  , Disp: , Rfl:   •  levothyroxine 100 mcg tablet, Take 212 mcg by mouth daily (Patient not taking: Reported on 4/1/2024), Disp: , Rfl:   •  levothyroxine 112 mcg tablet, Take 112 mcg by mouth daily (Patient not taking: Reported on 4/1/2024), Disp: , Rfl:   •  levothyroxine 125 mcg tablet, , Disp: , Rfl:   •  magnesium hydroxide (MILK OF MAGNESIA) 400 mg/5 mL oral suspension, Take 30 mL by mouth daily as needed for constipation   (Patient not taking: Reported on 10/2/2023), Disp: , Rfl:     No Known Allergies    Oncology History Overview Note   Marcin Sánchez presents today for a routine follow up for cancer of the main bronchus. He completed Radiation treatments on 12/19/16. Last seen in our office on 1/6/20.     2/11/20- Dr Kuo  Patient is stable in regards to his symptoms.  He did have 1 day episode of coughing up a piece of dried blood.  F/U w/ Ct scan in 6 months     2/25/20-Dr. Lee [facility]   -C/O  SOB with exertion but does not perform any exercise.  -Continues to smoke daily   -F/U in 12 months     1/10/20- CT SCAN   IMPRESSIONS:   Minimal progressive gaseous resorption of small amount of trapped lung and scarring in the right pulmonary apex.  Otherwise, stable chronic right lung scarring most severe in the right upper lobe and additional chronic findings as above.  No other significant interval change.    8/11/20 CT chest  IMPRESSION:  Appearance of predominantly right upper lobe apical and posterior paramediastinal consolidation, presumed to  represent scarring and less likely underlying residual tumor, is unchanged in appearance from 1/10/2020. Findings associated with volume loss and rightward mediastinal shift.     Extensive micronodularity throughout the left lung is similar in appearance to CT chest examinations dating to 6/27/2018. Findings are unlikely to represent disease spread given stability.     Aneurysmal dilatation of ascending aorta up to 5.2 cm stable.      8/21/20- Dr. Kuo           Cancer of right main bronchus (HCC)   10/3/2016 Initial Diagnosis    Cancer of right main bronchus (HCC)     10/3/2016 Biopsy    Right endobronchial mass biopsy  Keratinizing squamous cell carcinoma     11/7/2016 - 12/19/2016 Radiation    Right lung/mediastinum to a total dose of 6000 cGy in 30 daily fractions to all gross disease.      11/10/2016 - 12/22/2016 Chemotherapy     7 weekly doses of carboplatin plus Taxol with concurrent radiation.  Dr Kuo     10/3/2023 -  Cancer Staged    Staging form: Lung, AJCC 8th Edition  - Clinical stage from 10/3/2023: Stage Unknown (cT3, cNX, cM0) - Signed by Osmar Kuo MD on 10/3/2023  Stage prefix: Initial diagnosis  Histologic grade (G): GX  Histologic grading system: 4 grade system       Malignant neoplasm of upper lobe of right lung (HCC)   4/12/2018 Initial Diagnosis    Malignant neoplasm of upper lobe of right lung (HCC)     10/3/2023 -  Cancer Staged    Staging form: Lung, AJCC 8th Edition  - Pathologic stage from 10/3/2023: Stage Unknown (pT3, pNX, cM0) - Signed by Osmar Kuo MD on 10/3/2023  Stage prefix: Initial diagnosis  Histologic grade (G): GX  Histologic grading system: 4 grade system           ROS:  04/01/24 Reviewed 12 systems:     Presently no other neurological, cardiac, pulmonary, GI and  symptoms other than listed in HPI.  Other symptoms are in HPI.  No  fever, chills, bleeding, bone pains, skin rash, weight loss, night sweats, arthritic symptoms, weakness, numbness,   "claudication and gait problem. No frequent infections.  Not unusually sensitive to heat or cold.  No swollen glands.  Patient is anxious.      /58 (BP Location: Left arm, Patient Position: Sitting, Cuff Size: Adult)   Pulse 64   Temp 97.8 °F (36.6 °C) (Temporal)   Resp 18   Ht 5' 9\" (1.753 m)   Wt 64.4 kg (142 lb)   BMI 20.97 kg/m²     Physical Exam:  Alert and oriented and not in distress.  Vitals are above.  No icterus.  There is no oral thrush.  There is no palpable neck mass.  Decreased breath sounds right lung.  No dullness.  Heart rate is regular.  Soft and nontender abdomen.  There is no palpable abdominal mass.  There is no ascites.  There is no edema of the ankles.  There is no calf tenderness.  There is no focal neurological deficit, no skin rash, no palpable lymphadenopathy, has clubbing.    Patient is anxious.  Performance status 2.    IMAGING:        IMPRESSION:        1.  Stable 5.4 cm ascending thoracic aortic aneurysm.  2.  Stable 1.2 x 1.6 cm right upper paratracheal lymph node, similar to studies dating back to 7/1/2022 but slightly enlarged from earlier studies.  Recommend subsequent follow-up in 6 months to assess for interval change.  3.  Stable right upper perihilar radiation fibrosis.  Scattered areas of bronchiolitis bilaterally with stable scattered sub-4 mm tubular and centrilobular nodules.  No new or suspicious pulmonary nodules.  Moderate emphysema with upper lung zone   predominance.  4.  Multiple healing right anterior rib fractures.     The study was marked for follow-up in Epic.        Workstation performed: [id number]        Imaging    CT chest wo contrast (Order: [id number]) - 1/10/2023    MPRESSION:     No new or enlarging pulmonary mass identified.     Stable right upper perihilar radiation fibrosis.     Stable right upper paratracheal lymph node measuring up to 1.6 cm.     New tree-in-bud opacities in the left lower lobe consistent with endobronchial " impaction/infection.     Stable 5.4 cm ascending thoracic aortic aneurysm.              Workstation performed: [id number]        Imaging    CT chest wo contrast (Order: [id number]) - 2/9/2024      LABS:    3/29/2024:      Hemoglobin 12.8.  WBC 5100 with normal differential and platelet 263,000.  Normal chemistry profile except sodium 133.  Results for orders placed or performed during the hospital encounter of 01/13/24   FLU/RSV/COVID - if FLU/RSV clinically relevant    Specimen: Nose; Nares   Result Value Ref Range    SARS-CoV-2 Negative Negative    INFLUENZA A PCR Negative Negative    INFLUENZA B PCR Negative Negative    RSV PCR Negative Negative   Phenytoin level, total   Result Value Ref Range    Phenytoin Lvl 19.4 10.0 - 20.0 ug/mL   Comprehensive metabolic panel   Result Value Ref Range    Sodium 134 (L) 135 - 147 mmol/L    Potassium 4.2 3.5 - 5.3 mmol/L    Chloride 96 96 - 108 mmol/L    CO2 32 21 - 32 mmol/L    ANION GAP 6 mmol/L    BUN 19 5 - 25 mg/dL    Creatinine 0.61 0.60 - 1.30 mg/dL    Glucose 88 65 - 140 mg/dL    Calcium 9.1 8.4 - 10.2 mg/dL    AST 19 13 - 39 U/L    ALT 14 7 - 52 U/L    Alkaline Phosphatase 108 (H) 34 - 104 U/L    Total Protein 7.1 6.4 - 8.4 g/dL    Albumin 3.8 3.5 - 5.0 g/dL    Total Bilirubin 0.50 0.20 - 1.00 mg/dL    eGFR 106 ml/min/1.73sq m   CBC and differential   Result Value Ref Range    WBC 6.92 4.31 - 10.16 Thousand/uL    RBC 4.08 3.88 - 5.62 Million/uL    Hemoglobin 13.0 12.0 - 17.0 g/dL    Hematocrit 38.9 36.5 - 49.3 %    MCV 95 82 - 98 fL    MCH 31.9 26.8 - 34.3 pg    MCHC 33.4 31.4 - 37.4 g/dL    RDW 12.9 11.6 - 15.1 %    MPV 8.5 (L) 8.9 - 12.7 fL    Platelets 222 149 - 390 Thousands/uL    nRBC 0 /100 WBCs    Neutrophils Relative 68 43 - 75 %    Immature Grans % 0 0 - 2 %    Lymphocytes Relative 15 14 - 44 %    Monocytes Relative 13 (H) 4 - 12 %    Eosinophils Relative 3 0 - 6 %    Basophils Relative 1 0 - 1 %    Neutrophils Absolute 4.69 1.85 - 7.62 Thousands/µL    Absolute  "Immature Grans 0.02 0.00 - 0.20 Thousand/uL    Absolute Lymphocytes 1.01 0.60 - 4.47 Thousands/µL    Absolute Monocytes 0.91 0.17 - 1.22 Thousand/µL    Eosinophils Absolute 0.20 0.00 - 0.61 Thousand/µL    Basophils Absolute 0.09 0.00 - 0.10 Thousands/µL   UA w Reflex to Microscopic w Reflex to Culture    Specimen: Urine, Clean Catch   Result Value Ref Range    Color, UA Yellow     Clarity, UA Clear     Specific Gravity, UA 1.016 1.003 - 1.030    pH, UA 6.5 4.5, 5.0, 5.5, 6.0, 6.5, 7.0, 7.5, 8.0    Leukocytes, UA Negative Negative    Nitrite, UA Negative Negative    Protein, UA Negative Negative mg/dl    Glucose, UA Negative Negative mg/dl    Ketones, UA Negative Negative mg/dl    Urobilinogen, UA <2.0 <2.0 mg/dl mg/dl    Bilirubin, UA Negative Negative    Occult Blood, UA Negative Negative   Magnesium   Result Value Ref Range    Magnesium 1.7 (L) 1.9 - 2.7 mg/dL   HS Troponin 0hr (reflex protocol)   Result Value Ref Range    hs TnI 0hr 9 \"Refer to ACS Flowchart\"- see link ng/L   D-Dimer   Result Value Ref Range    D-Dimer, Quant 0.32 <0.50 ug/ml FEU   Ammonia   Result Value Ref Range    Ammonia 18 18 - 72 umol/L   CK   Result Value Ref Range    Total CK 94 39 - 308 U/L   Lactic acid, plasma (w/reflex if result > 2.0)   Result Value Ref Range    LACTIC ACID 0.7 0.5 - 2.0 mmol/L   Blood gas, venous   Result Value Ref Range    pH, Bryan 7.343 7.300 - 7.400    pCO2, Bryan 57.0 (H) 42.0 - 50.0 mm Hg    pO2, Bryan 21.9 (L) 35.0 - 45.0 mm Hg    HCO3, Bryan 30.3 (H) 24 - 30 mmol/L    Base Excess, Bryan 3.2 mmol/L    O2 Content, Bryan 7.8 ml/dL    O2 HGB, VENOUS 40.4 (L) 60.0 - 80.0 %   HS Troponin I 2hr   Result Value Ref Range    hs TnI 2hr 8 \"Refer to ACS Flowchart\"- see link ng/L    Delta 2hr hsTnI -1 <20 ng/L   ECG 12 lead   Result Value Ref Range    Ventricular Rate 66 BPM    Atrial Rate 66 BPM    NV Interval 238 ms    QRSD Interval 116 ms    QT Interval 432 ms    QTC Interval 452 ms    P Axis 75 degrees    QRS Axis -60 degrees    " T Wave Axis -19 degrees   Fingerstick Glucose (POCT)   Result Value Ref Range    POC Glucose 90 65 - 140 mg/dl     On 2/8/2022 patient had normal CBCD, CMP and TSH      Reviewed  Test results and discussed with patient.    Assessment and plan:    Continuation of care.  Patient is here with an attendant from [facility].  Patient is a  long-term resident at that place.  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 The mainstem bronchus was occluded.  Patient received  concurrent chemotherapy and radiation, 7 weekly doses of carboplatin and Taxol with radiation.  He was offered 2 additional cycles of regular doses of Taxol and carboplatin chemotherapy at  3 week interval but patient declined.  There has not been documented recurrent or metastatic disease from lung cancer.  Patient is at high risk for lung cancer or other smoking-related cancers because he continues to smoke cigarettes and does not want to quit.  He knows the health hazards of smoking cigarettes that I explained to him once again.   He has  dry cough and minimal exertional dyspnea.  He has lung nodules on CT scan and he follows  with thoracic surgeon . No chest pain and no hemoptysis.  Has some tiredness..  He is trying to maintain his weight  .  Patient has thoracic aneurysm and he follows with cardio thoracic surgery.  He states he does not want surgery for that.    No more swelling of the legs  Physical examination and test results are as recorded and discussed in detail..  Plan is surveillance for lung cancer and he will follow with thoracic surgeon for abnormal CT chest and with cardiac surgeon for aneurysm as above. Once again I advised him to please quit smoking cigarettes but I could not convince him.  No recent blood work.  Attendant will bring the after summary Visit Sheet to the attention of nurse . All discussed in detail.  Questions answered.  Discussed the importance of eating healthy foods,  activities as tolerated and health screening tests .  Patient is capable of self-care.  Discussed precautions against coronavirus and other infections.    Patient will continue to follow with his primary physician and other consultants.     See diagnoses, orders and instructions    1. Malignant neoplasm of upper lobe of right lung (HCC)    - CBC and differential; Future  - Comprehensive metabolic panel; Future    2. Multiple lung nodules on CT    - CBC and differential; Future  - Comprehensive metabolic panel; Future    3. Chronic obstructive pulmonary disease, unspecified COPD type (HCC)      4. Aneurysm of ascending aorta without rupture (HCC)      5. Acquired hypothyroidism      6. Tobacco abuse      Blood work prior to next visit in 6 months.  I have noticed that patient is already scheduled for CT chest in August 2024.  Once again I have advised patient to please quit smoking cigarettes        Counseling / Coordination of Care  .  Provided counseling and support

## 2024-04-01 NOTE — PATIENT INSTRUCTIONS
Blood work prior to next visit in 6 months.  I have noticed that patient is already scheduled for CT chest in August 2024.  Once again I have advised patient to please quit smoking cigarettes

## 2024-04-24 NOTE — ED NOTES
Breakfast ordered. Roundtrip scheduled.      Afua Hampton RN  01/13/24 0831     Patient stopped in office and is requesting refills for meds    Last Appt. 12/20/2023   Next Appt. Visit date not found   RX Pended

## 2024-08-08 ENCOUNTER — HOSPITAL ENCOUNTER (OUTPATIENT)
Dept: CT IMAGING | Facility: HOSPITAL | Age: 64
End: 2024-08-08
Payer: COMMERCIAL

## 2024-08-08 DIAGNOSIS — R91.8 MULTIPLE LUNG NODULES ON CT: ICD-10-CM

## 2024-08-08 DIAGNOSIS — C34.11 MALIGNANT NEOPLASM OF UPPER LOBE OF RIGHT LUNG (HCC): ICD-10-CM

## 2024-08-08 PROCEDURE — 71250 CT THORAX DX C-: CPT

## 2024-08-08 PROCEDURE — G1004 CDSM NDSC: HCPCS

## 2024-08-28 ENCOUNTER — APPOINTMENT (EMERGENCY)
Dept: CT IMAGING | Facility: HOSPITAL | Age: 64
DRG: 201 | End: 2024-08-28
Payer: COMMERCIAL

## 2024-08-28 ENCOUNTER — APPOINTMENT (EMERGENCY)
Dept: RADIOLOGY | Facility: HOSPITAL | Age: 64
DRG: 201 | End: 2024-08-28
Payer: COMMERCIAL

## 2024-08-28 ENCOUNTER — HOSPITAL ENCOUNTER (INPATIENT)
Facility: HOSPITAL | Age: 64
LOS: 2 days | Discharge: NURSING, REHAB OR LONG-TERM CARE | DRG: 201 | End: 2024-08-30
Attending: EMERGENCY MEDICINE | Admitting: INTERNAL MEDICINE
Payer: COMMERCIAL

## 2024-08-28 DIAGNOSIS — I48.91 ATRIAL FIBRILLATION WITH RVR (HCC): Primary | ICD-10-CM

## 2024-08-28 DIAGNOSIS — R06.02 SHORTNESS OF BREATH: ICD-10-CM

## 2024-08-28 DIAGNOSIS — I50.42 CHRONIC COMBINED SYSTOLIC AND DIASTOLIC CHF (CONGESTIVE HEART FAILURE) (HCC): ICD-10-CM

## 2024-08-28 DIAGNOSIS — E87.1 HYPONATREMIA: ICD-10-CM

## 2024-08-28 DIAGNOSIS — I27.82 CHRONIC PULMONARY EMBOLISM (HCC): ICD-10-CM

## 2024-08-28 DIAGNOSIS — J44.9 CHRONIC OBSTRUCTIVE PULMONARY DISEASE, UNSPECIFIED COPD TYPE (HCC): ICD-10-CM

## 2024-08-28 DIAGNOSIS — C34.01 CANCER OF RIGHT MAIN BRONCHUS (HCC): ICD-10-CM

## 2024-08-28 PROBLEM — E43 SEVERE PROTEIN-CALORIE MALNUTRITION (HCC): Status: ACTIVE | Noted: 2024-08-28

## 2024-08-28 PROBLEM — J84.10 PULMONARY FIBROSIS (HCC): Status: ACTIVE | Noted: 2024-08-28

## 2024-08-28 PROBLEM — C34.90 SQUAMOUS CELL LUNG CANCER (HCC): Status: ACTIVE | Noted: 2024-08-28

## 2024-08-28 LAB
2HR DELTA HS TROPONIN: 1 NG/L
4HR DELTA HS TROPONIN: 2 NG/L
ALBUMIN SERPL BCG-MCNC: 3.7 G/DL (ref 3.5–5)
ALP SERPL-CCNC: 98 U/L (ref 34–104)
ALT SERPL W P-5'-P-CCNC: 14 U/L (ref 7–52)
ANION GAP SERPL CALCULATED.3IONS-SCNC: 7 MMOL/L (ref 4–13)
APTT PPP: 40 SECONDS (ref 23–34)
APTT PPP: 61 SECONDS (ref 23–34)
AST SERPL W P-5'-P-CCNC: 19 U/L (ref 13–39)
BASOPHILS # BLD AUTO: 0.07 THOUSANDS/ÂΜL (ref 0–0.1)
BASOPHILS NFR BLD AUTO: 1 % (ref 0–1)
BILIRUB SERPL-MCNC: 0.59 MG/DL (ref 0.2–1)
BNP SERPL-MCNC: 388 PG/ML (ref 0–100)
BUN SERPL-MCNC: 11 MG/DL (ref 5–25)
CALCIUM SERPL-MCNC: 9.1 MG/DL (ref 8.4–10.2)
CARDIAC TROPONIN I PNL SERPL HS: 10 NG/L
CARDIAC TROPONIN I PNL SERPL HS: 8 NG/L
CARDIAC TROPONIN I PNL SERPL HS: 9 NG/L
CHLORIDE SERPL-SCNC: 89 MMOL/L (ref 96–108)
CO2 SERPL-SCNC: 33 MMOL/L (ref 21–32)
CREAT SERPL-MCNC: 0.58 MG/DL (ref 0.6–1.3)
D DIMER PPP FEU-MCNC: 1.85 UG/ML FEU
EOSINOPHIL # BLD AUTO: 0.06 THOUSAND/ÂΜL (ref 0–0.61)
EOSINOPHIL NFR BLD AUTO: 1 % (ref 0–6)
ERYTHROCYTE [DISTWIDTH] IN BLOOD BY AUTOMATED COUNT: 12.1 % (ref 11.6–15.1)
FLUAV RNA RESP QL NAA+PROBE: NEGATIVE
FLUBV RNA RESP QL NAA+PROBE: NEGATIVE
GFR SERPL CREATININE-BSD FRML MDRD: 108 ML/MIN/1.73SQ M
GLUCOSE SERPL-MCNC: 126 MG/DL (ref 65–140)
HCT VFR BLD AUTO: 38.3 % (ref 36.5–49.3)
HGB BLD-MCNC: 13 G/DL (ref 12–17)
IMM GRANULOCYTES # BLD AUTO: 0.02 THOUSAND/UL (ref 0–0.2)
IMM GRANULOCYTES NFR BLD AUTO: 0 % (ref 0–2)
INR PPP: 1.17 (ref 0.85–1.19)
LYMPHOCYTES # BLD AUTO: 0.68 THOUSANDS/ÂΜL (ref 0.6–4.47)
LYMPHOCYTES NFR BLD AUTO: 12 % (ref 14–44)
MCH RBC QN AUTO: 32.1 PG (ref 26.8–34.3)
MCHC RBC AUTO-ENTMCNC: 33.9 G/DL (ref 31.4–37.4)
MCV RBC AUTO: 95 FL (ref 82–98)
MONOCYTES # BLD AUTO: 1.07 THOUSAND/ÂΜL (ref 0.17–1.22)
MONOCYTES NFR BLD AUTO: 20 % (ref 4–12)
NEUTROPHILS # BLD AUTO: 3.6 THOUSANDS/ÂΜL (ref 1.85–7.62)
NEUTS SEG NFR BLD AUTO: 66 % (ref 43–75)
NRBC BLD AUTO-RTO: 0 /100 WBCS
PLATELET # BLD AUTO: 242 THOUSANDS/UL (ref 149–390)
PMV BLD AUTO: 8.6 FL (ref 8.9–12.7)
POTASSIUM SERPL-SCNC: 3.6 MMOL/L (ref 3.5–5.3)
PROT SERPL-MCNC: 7.4 G/DL (ref 6.4–8.4)
PROTHROMBIN TIME: 15.3 SECONDS (ref 12.3–15)
RBC # BLD AUTO: 4.05 MILLION/UL (ref 3.88–5.62)
RSV RNA RESP QL NAA+PROBE: NEGATIVE
SARS-COV-2 RNA RESP QL NAA+PROBE: NEGATIVE
SODIUM SERPL-SCNC: 129 MMOL/L (ref 135–147)
TSH SERPL DL<=0.05 MIU/L-ACNC: 0.49 UIU/ML (ref 0.45–4.5)
WBC # BLD AUTO: 5.5 THOUSAND/UL (ref 4.31–10.16)

## 2024-08-28 PROCEDURE — 87081 CULTURE SCREEN ONLY: CPT | Performed by: INTERNAL MEDICINE

## 2024-08-28 PROCEDURE — 84443 ASSAY THYROID STIM HORMONE: CPT | Performed by: INTERNAL MEDICINE

## 2024-08-28 PROCEDURE — 94664 DEMO&/EVAL PT USE INHALER: CPT

## 2024-08-28 PROCEDURE — 99291 CRITICAL CARE FIRST HOUR: CPT

## 2024-08-28 PROCEDURE — 85730 THROMBOPLASTIN TIME PARTIAL: CPT | Performed by: EMERGENCY MEDICINE

## 2024-08-28 PROCEDURE — 99223 1ST HOSP IP/OBS HIGH 75: CPT | Performed by: INTERNAL MEDICINE

## 2024-08-28 PROCEDURE — 80053 COMPREHEN METABOLIC PANEL: CPT

## 2024-08-28 PROCEDURE — 96366 THER/PROPH/DIAG IV INF ADDON: CPT

## 2024-08-28 PROCEDURE — 85025 COMPLETE CBC W/AUTO DIFF WBC: CPT

## 2024-08-28 PROCEDURE — 96365 THER/PROPH/DIAG IV INF INIT: CPT

## 2024-08-28 PROCEDURE — 99291 CRITICAL CARE FIRST HOUR: CPT | Performed by: EMERGENCY MEDICINE

## 2024-08-28 PROCEDURE — 85610 PROTHROMBIN TIME: CPT | Performed by: EMERGENCY MEDICINE

## 2024-08-28 PROCEDURE — 84484 ASSAY OF TROPONIN QUANT: CPT

## 2024-08-28 PROCEDURE — 93005 ELECTROCARDIOGRAM TRACING: CPT

## 2024-08-28 PROCEDURE — 85730 THROMBOPLASTIN TIME PARTIAL: CPT | Performed by: INTERNAL MEDICINE

## 2024-08-28 PROCEDURE — 83880 ASSAY OF NATRIURETIC PEPTIDE: CPT | Performed by: EMERGENCY MEDICINE

## 2024-08-28 PROCEDURE — 71045 X-RAY EXAM CHEST 1 VIEW: CPT

## 2024-08-28 PROCEDURE — 94760 N-INVAS EAR/PLS OXIMETRY 1: CPT

## 2024-08-28 PROCEDURE — 85379 FIBRIN DEGRADATION QUANT: CPT | Performed by: EMERGENCY MEDICINE

## 2024-08-28 PROCEDURE — 36415 COLL VENOUS BLD VENIPUNCTURE: CPT

## 2024-08-28 PROCEDURE — 71275 CT ANGIOGRAPHY CHEST: CPT

## 2024-08-28 PROCEDURE — 0241U HB NFCT DS VIR RESP RNA 4 TRGT: CPT | Performed by: EMERGENCY MEDICINE

## 2024-08-28 RX ORDER — HEPARIN SODIUM 1000 [USP'U]/ML
3900 INJECTION, SOLUTION INTRAVENOUS; SUBCUTANEOUS ONCE
Status: COMPLETED | OUTPATIENT
Start: 2024-08-28 | End: 2024-08-28

## 2024-08-28 RX ORDER — SODIUM CHLORIDE 1 G/1
2 TABLET ORAL 3 TIMES DAILY
Status: DISCONTINUED | OUTPATIENT
Start: 2024-08-28 | End: 2024-08-28

## 2024-08-28 RX ORDER — HEPARIN SODIUM 1000 [USP'U]/ML
3900 INJECTION, SOLUTION INTRAVENOUS; SUBCUTANEOUS EVERY 6 HOURS PRN
Status: DISCONTINUED | OUTPATIENT
Start: 2024-08-28 | End: 2024-08-30

## 2024-08-28 RX ORDER — LEVALBUTEROL INHALATION SOLUTION 0.63 MG/3ML
0.63 SOLUTION RESPIRATORY (INHALATION) EVERY 8 HOURS PRN
Status: DISCONTINUED | OUTPATIENT
Start: 2024-08-28 | End: 2024-08-30 | Stop reason: HOSPADM

## 2024-08-28 RX ORDER — FLUTICASONE FUROATE AND VILANTEROL 100; 25 UG/1; UG/1
1 POWDER RESPIRATORY (INHALATION)
Status: DISCONTINUED | OUTPATIENT
Start: 2024-08-29 | End: 2024-08-30 | Stop reason: HOSPADM

## 2024-08-28 RX ORDER — GABAPENTIN 400 MG/1
400 CAPSULE ORAL 2 TIMES DAILY
Status: DISCONTINUED | OUTPATIENT
Start: 2024-08-28 | End: 2024-08-30 | Stop reason: HOSPADM

## 2024-08-28 RX ORDER — POTASSIUM CHLORIDE 1500 MG/1
20 TABLET, EXTENDED RELEASE ORAL DAILY
Status: DISCONTINUED | OUTPATIENT
Start: 2024-08-29 | End: 2024-08-29

## 2024-08-28 RX ORDER — METOPROLOL SUCCINATE 25 MG/1
25 TABLET, EXTENDED RELEASE ORAL DAILY
Status: DISCONTINUED | OUTPATIENT
Start: 2024-08-29 | End: 2024-08-29

## 2024-08-28 RX ORDER — HEPARIN SODIUM 1000 [USP'U]/ML
1950 INJECTION, SOLUTION INTRAVENOUS; SUBCUTANEOUS EVERY 6 HOURS PRN
Status: DISCONTINUED | OUTPATIENT
Start: 2024-08-28 | End: 2024-08-30

## 2024-08-28 RX ORDER — FUROSEMIDE 40 MG
40 TABLET ORAL DAILY
Status: DISCONTINUED | OUTPATIENT
Start: 2024-08-29 | End: 2024-08-29

## 2024-08-28 RX ORDER — SODIUM CHLORIDE 1 G/1
1 TABLET ORAL 3 TIMES DAILY
Status: DISCONTINUED | OUTPATIENT
Start: 2024-08-28 | End: 2024-08-28

## 2024-08-28 RX ORDER — DILTIAZEM HYDROCHLORIDE 5 MG/ML
0.25 INJECTION INTRAVENOUS ONCE
Status: COMPLETED | OUTPATIENT
Start: 2024-08-28 | End: 2024-08-28

## 2024-08-28 RX ORDER — SODIUM CHLORIDE 1 G/1
1 TABLET ORAL 2 TIMES DAILY WITH MEALS
Status: DISCONTINUED | OUTPATIENT
Start: 2024-08-28 | End: 2024-08-30 | Stop reason: HOSPADM

## 2024-08-28 RX ORDER — GUAIFENESIN/DEXTROMETHORPHAN 100-10MG/5
10 SYRUP ORAL EVERY 4 HOURS PRN
Status: DISCONTINUED | OUTPATIENT
Start: 2024-08-28 | End: 2024-08-30 | Stop reason: HOSPADM

## 2024-08-28 RX ORDER — HEPARIN SODIUM 10000 [USP'U]/100ML
3-20 INJECTION, SOLUTION INTRAVENOUS
Status: DISCONTINUED | OUTPATIENT
Start: 2024-08-28 | End: 2024-08-30

## 2024-08-28 RX ORDER — NICOTINE 21 MG/24HR
14 PATCH, TRANSDERMAL 24 HOURS TRANSDERMAL DAILY
Status: DISCONTINUED | OUTPATIENT
Start: 2024-08-29 | End: 2024-08-30 | Stop reason: HOSPADM

## 2024-08-28 RX ORDER — PHENYTOIN SODIUM 100 MG/1
100 CAPSULE, EXTENDED RELEASE ORAL 2 TIMES DAILY
Status: DISCONTINUED | OUTPATIENT
Start: 2024-08-28 | End: 2024-08-30 | Stop reason: HOSPADM

## 2024-08-28 RX ADMIN — GABAPENTIN 400 MG: 400 CAPSULE ORAL at 17:51

## 2024-08-28 RX ADMIN — HEPARIN SODIUM 3900 UNITS: 1000 INJECTION INTRAVENOUS; SUBCUTANEOUS at 15:50

## 2024-08-28 RX ADMIN — DILTIAZEM HYDROCHLORIDE 15 MG: 5 INJECTION, SOLUTION INTRAVENOUS at 12:13

## 2024-08-28 RX ADMIN — Medication 1 G: at 17:51

## 2024-08-28 RX ADMIN — HEPARIN SODIUM 12 UNITS/KG/HR: 10000 INJECTION, SOLUTION INTRAVENOUS at 15:51

## 2024-08-28 RX ADMIN — DILTIAZEM HYDROCHLORIDE 7.5 MG/HR: 5 INJECTION INTRAVENOUS at 12:15

## 2024-08-28 RX ADMIN — PHENYTOIN SODIUM 100 MG: 100 CAPSULE ORAL at 17:51

## 2024-08-28 RX ADMIN — DILTIAZEM HYDROCHLORIDE 5 MG/HR: 5 INJECTION INTRAVENOUS at 12:03

## 2024-08-28 RX ADMIN — IOHEXOL 75 ML: 350 INJECTION, SOLUTION INTRAVENOUS at 12:31

## 2024-08-28 NOTE — ED NOTES
Patient assisted to stand and use urinal. Patient with labored breathing and assisted back to bed. Pulse ox 85% on RA. Oxygen applied.      Yesika Rogers RN  08/28/24 [id number]

## 2024-08-28 NOTE — ASSESSMENT & PLAN NOTE
Presenting heart rates in the 140s to 160s -> initiated on a Cardizem drip with better control, currently in the 70s to 80s  Initiated on a heparin drip for anticoagulation  Check echocardiogram  Monitor/replete electrolyte deficiencies of present  Check updated thyroid panel  Further recommendations per cardiology

## 2024-08-28 NOTE — ASSESSMENT & PLAN NOTE
CT imaging suggestive of a possible small left upper lobe chronic pulmonary embolism  Discussed with pulmonology who is unsure of the validity -> D-dimer elevated, hence, will proceed with lower extremity venous duplex study  Currently on a heparin drip for rapid fibrillation

## 2024-08-28 NOTE — ED PROVIDER NOTES
History  Chief Complaint   Patient presents with    Shortness of Breath     Pt presents with SOB, hx of emphysema. Breathing treatment given at [facility] prior to EMS arrival. Concern for possible AFIB d/t elevated HR.      This is a 63-year-old male presents to the emergency department complaining of shortness of breath.  The patient states today he began having shortness of breath.  As per EMS the patient was found tachycardic and tachypneic.  He denies fevers or chills.  He states he has a chronic cough that is different than his usual.  No new sputum production compared to prior.  He denies chest pain.  He denies current difficulty breathing.  He states he did feel his heart racing earlier this morning.        Prior to Admission Medications   Prescriptions Last Dose Informant Patient Reported? Taking?   Advair -21 MCG/ACT inhaler   Yes No   Ammonium Lactate 10 % CREA   Yes No   Sig: Apply topically as needed for dry skin   Vitamin D, Cholecalciferol, 1000 UNITS CAPS  Outside Facility (Specify) Yes No   Sig: Take 2 capsules by mouth daily    acetaminophen (TYLENOL) 650 mg CR tablet  Outside Facility (Specify) Yes No   Sig: Take 650 mg by mouth every 6 (six) hours as needed for mild pain     albuterol (PROVENTIL HFA,VENTOLIN HFA) 90 mcg/act inhaler  Outside Facility (Specify) No No   Sig: Inhale 2 puffs every 6 (six) hours as needed for wheezing   furosemide (LASIX) 40 mg tablet   Yes No   gabapentin (NEURONTIN) 400 mg capsule  Outside Facility (Specify) Yes No   Sig: Take 400 mg by mouth 2 (two) times a day     levothyroxine 100 mcg tablet   Yes No   Sig: Take 212 mcg by mouth daily   Patient not taking: Reported on 2024   levothyroxine 112 mcg tablet   Yes No   Sig: Take 112 mcg by mouth daily   Patient not taking: Reported on 2024   levothyroxine 125 mcg tablet   Yes No   Patient not taking: Reported on 10/2/2023   levothyroxine 200 mcg tablet   Yes No   Si mcg   magnesium hydroxide (MILK OF  MAGNESIA) 400 mg/5 mL oral suspension  Outside Facility (Specify) Yes No   Sig: Take 30 mL by mouth daily as needed for constipation     Patient not taking: Reported on 10/2/2023   metoprolol succinate (TOPROL-XL) 25 mg 24 hr tablet  Outside Facility (Specify) Yes No   Sig: Take 25 mg by mouth daily   phenytoin (DILANTIN) 100 mg ER capsule  Outside Facility (Specify) Yes No   Sig: Take 100 mg by mouth 2 (two) times a day And 250mg at 5pm   potassium chloride (K-DUR,KLOR-CON) 20 mEq tablet   Yes No   sodium chloride 1 g tablet  Outside Facility (Specify) Yes No   Sig: Take 4 g by mouth 3 (three) times a day   tiotropium (SPIRIVA) 18 mcg inhalation capsule  Outside Facility (Specify) Yes No   Sig: Place 18 mcg into inhaler and inhale daily      Facility-Administered Medications: None       Past Medical History:   Diagnosis Date    Alcohol abuse     Anxiety     Aortic insufficiency 12/18/2020    Atrial fibrillation (HCC)     Cancer (HCC)     COPD (chronic obstructive pulmonary disease) (HCC)     Delirium     Encephalopathy     Epilepsy (HCC)     History of chemotherapy     History of radiation therapy     Hypo-osmolality and hyponatremia     Hypokalemia     Hypothyroid     Lung cancer (HCC)     Lyme disease     Major depression        Past Surgical History:   Procedure Laterality Date    BRONCHOSCOPY N/A 10/3/2016    Procedure: BRONCHOSCOPY FLEXIBLE;  Surgeon: John Brunner DO;  Location: [facility];  Service:     HAND SURGERY      PELVIC FRACTURE SURGERY      REMOVAL VENOUS PORT (PORT-A-CATH) Left 9/18/2018    Procedure: REMOVAL VENOUS PORT (PORT-A-CATH)IR;  Surgeon: Randy Lopez DO;  Location: [facility];  Service: Interventional Radiology       Family History   Problem Relation Age of Onset    Diabetes Mother     Lung cancer Father      I have reviewed and agree with the history as documented.    E-Cigarette/Vaping    E-Cigarette Use Never User      E-Cigarette/Vaping Substances    Nicotine No     THC No      CBD No     Flavoring No     Other No     Unknown No      Social History     Tobacco Use    Smoking status: Every Day     Current packs/day: 1.50     Average packs/day: 1.5 packs/day for 47.0 years (70.5 ttl pk-yrs)     Types: Cigarettes    Smokeless tobacco: Never    Tobacco comments:     smoking more than 1 ppd   Vaping Use    Vaping status: Never Used   Substance Use Topics    Alcohol use: No    Drug use: Never       Review of Systems   All other systems reviewed and are negative.      Physical Exam  Physical Exam  Constitutional:  Vital signs reviewed, patient appears nontoxic, appears uncomfortable  Eyes: Pupils equal round reactive to light and accommodation, extraocular muscles intact  HEENT: trachea midline, no JVD, moist mucous membranes  Respiratory: lung sounds decreased throughout, rhonchorous throughout mild 1+  Cardiovascular: Irregular rate, regular rhythm, no murmurs or rubs  Abdomen: soft, nontender, nondistended, no rebound or guarding  Back: no CVA tenderness, normal inspection  Extremities: Mild 1+ lower extremity edema, pulses equal in all 4 extremities  Neuro: awake, alert, oriented, no focal weakness  Skin: warm, dry, intact, no rashes noted    Vital Signs  ED Triage Vitals [08/28/24 1129]   Temperature Pulse Respirations Blood Pressure SpO2   97.6 °F (36.4 °C) (!) 148 22 95/61 95 %      Temp Source Heart Rate Source Patient Position - Orthostatic VS BP Location FiO2 (%)   Oral Monitor Lying Right arm --      Pain Score       No Pain           Vitals:    08/28/24 1400 08/28/24 1430 08/28/24 1500 08/28/24 1610   BP: 112/69 113/54 110/56 130/54   Pulse: 87 82 81 67   Patient Position - Orthostatic VS:    Lying         Visual Acuity      ED Medications  Medications   diltiazem (CARDIZEM) 125 mg in sodium chloride 0.9 % 125 mL infusion (7.5 mg/hr Intravenous New Bag 8/28/24 1215)   heparin (porcine) 25,000 units in 0.45% NaCl 250 mL infusion (premix) (12 Units/kg/hr × 65 kg (Order-Specific)  Intravenous New Bag 8/28/24 1551)   heparin (porcine) injection 3,900 Units (has no administration in time range)   heparin (porcine) injection 1,950 Units (has no administration in time range)   diltiazem (CARDIZEM) injection 15 mg (15 mg Intravenous Given 8/28/24 1213)   iohexol (OMNIPAQUE) 350 MG/ML injection (SINGLE-DOSE) 100 mL (75 mL Intravenous Given 8/28/24 1231)   heparin (porcine) injection 3,900 Units (3,900 Units Intravenous Given 8/28/24 1550)       Diagnostic Studies  Results Reviewed       Procedure Component Value Units Date/Time    APTT six (6) hours after Heparin bolus/drip initiation or dosing change [[id number]]  (Abnormal) Collected: 08/28/24 1546    Lab Status: Final result Specimen: Blood from Arm, Left Updated: 08/28/24 1610     PTT 40 seconds     Protime-INR [[id number]]  (Abnormal) Collected: 08/28/24 1546    Lab Status: Final result Specimen: Blood from Arm, Left Updated: 08/28/24 1610     Protime 15.3 seconds      INR 1.17    Narrative:      INR Therapeutic Range    Indication                                             INR Range      Atrial Fibrillation                                               2.0-3.0  Hypercoagulable State                                    2.0.2.3  Left Ventricular Asist Device                            2.0-3.0  Mechanical Heart Valve                                  -    Aortic(with afib, MI, embolism, HF, LA enlargement,    and/or coagulopathy)                                     2.0-3.0 (2.5-3.5)     Mitral                                                             2.5-3.5  Prosthetic/Bioprosthetic Heart Valve               2.0-3.0  Venous thromboembolism (VTE: VT, PE        2.0-3.0    HS Troponin I 2hr [[id number]]  (Normal) Collected: 08/28/24 1350    Lab Status: Final result Specimen: Blood from Arm, Left Updated: 08/28/24 1419     hs TnI 2hr 9 ng/L      Delta 2hr hsTnI 1 ng/L     B-Type Natriuretic Peptide(BNP) [[id number]]  (Abnormal) Collected: 08/28/24  1142    Lab Status: Final result Specimen: Blood from Arm, Left Updated: 08/28/24 1356      pg/mL     FLU/RSV/COVID - if FLU/RSV clinically relevant [[id number]]  (Normal) Collected: 08/28/24 1149    Lab Status: Final result Specimen: Nares from Nose Updated: 08/28/24 1259     SARS-CoV-2 Negative     INFLUENZA A PCR Negative     INFLUENZA B PCR Negative     RSV PCR Negative    Narrative:      This test has been performed using the CoV-2/Flu/RSV plus assay on the [employer] platform. This test has been validated by the  and verified by the performing laboratory.     This test is designed to amplify and detect the following: nucleocapsid (N), envelope (E), and RNA-dependent RNA polymerase (RdRP) genes of the SARS-CoV-2 genome; matrix (M), basic polymerase (PB2), and acidic protein (PA) segments of the influenza A genome; matrix (M) and non-structural protein (NS) segments of the influenza B genome, and the nucleocapsid genes of RSV A and RSV B.     Positive results are indicative of the presence of Flu A, Flu B, RSV, and/or SARS-CoV-2 RNA. Positive results for SARS-CoV-2 or suspected novel influenza should be reported to state, local, or federal health departments according to local reporting requirements.      All results should be assessed in conjunction with clinical presentation and other laboratory markers for clinical management.     FOR PEDIATRIC PATIENTS - copy/paste COVID Guidelines URL to browser: [email or web address]       HS Troponin 0hr (reflex protocol) [[id number]]  (Normal) Collected: 08/28/24 1142    Lab Status: Final result Specimen: Blood from Arm, Left Updated: 08/28/24 1224     hs TnI 0hr 8 ng/L     HS Troponin I 4hr [[id number]]     Lab Status: No result Specimen: Blood     Comprehensive metabolic panel [[id number]]  (Abnormal) Collected: 08/28/24 1142    Lab Status: Final result Specimen: Blood from Arm, Left Updated:  08/28/24 1220     Sodium 129 mmol/L      Potassium 3.6 mmol/L      Chloride 89 mmol/L      CO2 33 mmol/L      ANION GAP 7 mmol/L      BUN 11 mg/dL      Creatinine 0.58 mg/dL      Glucose 126 mg/dL      Calcium 9.1 mg/dL      AST 19 U/L      ALT 14 U/L      Alkaline Phosphatase 98 U/L      Total Protein 7.4 g/dL      Albumin 3.7 g/dL      Total Bilirubin 0.59 mg/dL      eGFR 108 ml/min/1.73sq m     Narrative:      National Kidney Disease Foundation guidelines for Chronic Kidney Disease (CKD):     Stage 1 with normal or high GFR (GFR > 90 mL/min/1.73 square meters)    Stage 2 Mild CKD (GFR = 60-89 mL/min/1.73 square meters)    Stage 3A Moderate CKD (GFR = 45-59 mL/min/1.73 square meters)    Stage 3B Moderate CKD (GFR = 30-44 mL/min/1.73 square meters)    Stage 4 Severe CKD (GFR = 15-29 mL/min/1.73 square meters)    Stage 5 End Stage CKD (GFR <15 mL/min/1.73 square meters)  Note: GFR calculation is accurate only with a steady state creatinine    D-Dimer [[id number]]  (Abnormal) Collected: 08/28/24 1146    Lab Status: Final result Specimen: Blood from Arm, Left Updated: 08/28/24 1210     D-Dimer, Quant 1.85 ug/ml FEU     Narrative:      In the evaluation for possible pulmonary embolism, in the appropriate (Well's Score of 4 or less) patient, the age adjusted d-dimer cutoff for this patient can be calculated as:    Age x 0.01 (in ug/mL) for Age-adjusted D-dimer exclusion threshold for a patient over 50 years.    CBC and differential [[id number]]  (Abnormal) Collected: 08/28/24 1142    Lab Status: Final result Specimen: Blood from Arm, Left Updated: 08/28/24 1150     WBC 5.50 Thousand/uL      RBC 4.05 Million/uL      Hemoglobin 13.0 g/dL      Hematocrit 38.3 %      MCV 95 fL      MCH 32.1 pg      MCHC 33.9 g/dL      RDW 12.1 %      MPV 8.6 fL      Platelets 242 Thousands/uL      nRBC 0 /100 WBCs      Segmented % 66 %      Immature Grans % 0 %      Lymphocytes % 12 %      Monocytes % 20 %      Eosinophils Relative 1 %       Basophils Relative 1 %      Absolute Neutrophils 3.60 Thousands/µL      Absolute Immature Grans 0.02 Thousand/uL      Absolute Lymphocytes 0.68 Thousands/µL      Absolute Monocytes 1.07 Thousand/µL      Eosinophils Absolute 0.06 Thousand/µL      Basophils Absolute 0.07 Thousands/µL                    CTA ED chest PE study   Final Result by Rodrigo Wilder DO (08/28 0849)      1. No definite acute pulmonary emboli.      2. New onset occlusion or markedly diminished flow of the right upper lobe pulmonary artery compared to contrast-enhanced study from November 5, 2020. This is probably related to the chronic right upper lobe pulmonary fibrosis. Small chronic left upper    lobe pulmonary embolus suggested.      3. New, moderate pericardial effusion.      4. Stable appearance of RUL/right lung paramediastinal fibrosis. Apparent occlusion of the right middle lobe bronchi near their origin compared to the recent study. Follow-up CT chest in 3 months recommended to reassess this finding and exclude subtle    neoplasm recurrence.      5. Unchanged appearance of 5.4 cm ascending aortic aneurysm.      The study was marked in EPIC for follow-up.      Workstation performed: [id number]         XR chest 1 view portable   Final Result by Lester Conner MD (08/28 0392)      Pericardial effusion is better evident on concurrent CT.   Small right pleural effusion.   Unchanged posttreatment change in the right upper lung.         Workstation performed: [id number]                    Procedures  ECG 12 Lead Documentation Only    Date/Time: 8/28/2024 11:48 AM    Performed by: Checo Arthur DO  Authorized by: Checo Arthur DO    ECG reviewed by me, the ED Provider: yes    Patient location:  ED  Comments:      A-fib with RVR, rate 146, irregular [place], normal QTc, no STEMI, acute change compared to EKG dated 1/13/2024, EKG independently interpreted by me  CriticalCare Time    Date/Time: 8/28/2024 11:48 AM    Performed by:  Checo Arthur DO  Authorized by: Checo Arthur DO    Critical care provider statement:     Critical care time (minutes):  30    Critical care time was exclusive of:  Separately billable procedures and treating other patients and teaching time    Critical care was necessary to treat or prevent imminent or life-threatening deterioration of the following conditions:  Cardiac failure    Critical care was time spent personally by me on the following activities:  Obtaining history from patient or surrogate, development of treatment plan with patient or surrogate, discussions with consultants, evaluation of patient's response to treatment, examination of patient, ordering and performing treatments and interventions, ordering and review of laboratory studies, ordering and review of radiographic studies and re-evaluation of patient's condition  Comments:      A-fib with RVR requiring IV Cardizem bolus followed by IV drip.  ECG 12 Lead Documentation Only    Date/Time: 8/28/2024 4:25 PM    Performed by: Checo Arthur DO  Authorized by: Checo Arthur DO    ECG reviewed by me, the ED Provider: yes    Patient location:  ED  Comments:      EKG #2, sinus rhythm, rate of 84, normal [place], normal QTc, no STEMI, acute change compared to A-fib with RVR with a rate of 146 done earlier in the day.           ED Course  ED Course as of 08/28/24 1626   Wed Aug 28, 2024   1241 The patient is now in a rate controled rhythm.  The patient had a chest x-ray that was independently interpreted by me that shows no pneumonia or pneumothorax, compared to previous x-ray that shows right upper lobe white out     1352 The patient had lab work that is significant for a elevated d dimer. He had a trop of 8 and a negative viral panel. He has a  CTA of the chest that shows a pericardial effusion, chronic PE of the right, no acute PE, and other chronic changes.  I discussed the patient with pulmonary and cardiology.  They both feel that heparin is  appropriate for anticoagulation for A-fib with RVR.  They request an echo and Dopplers.  The patient will be admitted for further care and evaluation.                                 SBIRT 20yo+      Flowsheet Row Most Recent Value   Initial Alcohol Screen: US AUDIT-C     1. How often do you have a drink containing alcohol? 0 Filed at: 08/28/2024 1133   2. How many drinks containing alcohol do you have on a typical day you are drinking?  0 Filed at: 08/28/2024 1133   3a. Male UNDER 65: How often do you have five or more drinks on one occasion? 0 Filed at: 08/28/2024 1133   Audit-C Score 0 Filed at: 08/28/2024 1133   SHALA: How many times in the past year have you...    Used an illegal drug or used a prescription medication for non-medical reasons? Never Filed at: 08/28/2024 1133            Wells' Criteria for PE      Flowsheet Row Most Recent Value   Wells' Criteria for PE    Clinical signs and symptoms of DVT 0 Filed at: 08/28/2024 1213   PE is primary diagnosis or equally likely 3 Filed at: 08/28/2024 1213   HR >100 1.5 Filed at: 08/28/2024 1213   Immobilization at least 3 days or Surgery in the previous 4 weeks 0 Filed at: 08/28/2024 1213   Previous, objectively diagnosed PE or DVT 0 Filed at: 08/28/2024 1213   Hemoptysis 0 Filed at: 08/28/2024 1213   Malignancy with treatment within 6 months or palliative 1 Filed at: 08/28/2024 1213   Wells' Criteria Total 5.5 Filed at: 08/28/2024 1213                  Medical Decision Making  This is a 63-year-old male who presents to the emergency department complaining of shortness of breath.  I considered ACS, pneumonia, pneumothorax, A-fib with RVR, viral illness. These and other diagnoses were considered.         Amount and/or Complexity of Data Reviewed  Labs: ordered. Decision-making details documented in ED Course.  Radiology: ordered.    Risk  Prescription drug management.  Decision regarding hospitalization.                 Disposition  Final diagnoses:   Atrial  fibrillation with RVR (HCC)   Shortness of breath     Time reflects when diagnosis was documented in both MDM as applicable and the Disposition within this note       Time User Action Codes Description Comment    8/28/2024  3:27 PM Checo Arthur Add [I48.91] Atrial fibrillation with RVR (HCC)     8/28/2024  3:27 PM Checo Arthur Add [R06.02] Shortness of breath           ED Disposition       ED Disposition   Admit    Condition   Stable    Date/Time   Wed Aug 28, 2024 6237    Comment   Case was discussed with Dr Donis and the patient's admission status was agreed to be Admission Status: inpatient status to the service of Dr. Donis .               Follow-up Information    None         Current Discharge Medication List        CONTINUE these medications which have NOT CHANGED    Details   acetaminophen (TYLENOL) 650 mg CR tablet Take 650 mg by mouth every 6 (six) hours as needed for mild pain        Advair -21 MCG/ACT inhaler       albuterol (PROVENTIL HFA,VENTOLIN HFA) 90 mcg/act inhaler Inhale 2 puffs every 6 (six) hours as needed for wheezing  Qty: 1 Inhaler, Refills: 3    Associated Diagnoses: Chronic obstructive pulmonary disease, unspecified COPD type ([facility])      Ammonium Lactate 10 % CREA Apply topically as needed for dry skin      furosemide (LASIX) 40 mg tablet       gabapentin (NEURONTIN) 400 mg capsule Take 400 mg by mouth 2 (two) times a day        !! levothyroxine 100 mcg tablet Take 212 mcg by mouth daily      !! levothyroxine 112 mcg tablet Take 112 mcg by mouth daily      !! levothyroxine 125 mcg tablet       !! levothyroxine 200 mcg tablet 250 mcg      magnesium hydroxide (MILK OF MAGNESIA) 400 mg/5 mL oral suspension Take 30 mL by mouth daily as needed for constipation        metoprolol succinate (TOPROL-XL) 25 mg 24 hr tablet Take 25 mg by mouth daily      phenytoin (DILANTIN) 100 mg ER capsule Take 100 mg by mouth 2 (two) times a day And 250mg at 5pm      potassium chloride  (K-DARRON,KLOR-CON) 20 mEq tablet       sodium chloride 1 g tablet Take 4 g by mouth 3 (three) times a day      tiotropium (SPIRIVA) 18 mcg inhalation capsule Place 18 mcg into inhaler and inhale daily      Vitamin D, Cholecalciferol, 1000 UNITS CAPS Take 2 capsules by mouth daily        !! - Potential duplicate medications found. Please discuss with provider.          No discharge procedures on file.    PDMP Review       None            ED Provider  Electronically Signed by             Checo Arthur DO  08/28/24 9085

## 2024-08-28 NOTE — RESPIRATORY THERAPY NOTE
RT Protocol Note  Marcin Sánchez 63 y.o. male MRN: [id number]  Unit/Bed#: -01 Encounter: [id number]    Assessment    Principal Problem:    Rapid atrial fibrillation (HCC)  Active Problems:    COPD (chronic obstructive pulmonary disease) (HCC)    Epilepsy (HCC)    Tobacco abuse    Pulmonary fibrosis (HCC)    Suspected chronic pulmonary embolism (HCC)    Squamous cell lung cancer (HCC)    Hyponatremia    Severe protein-calorie malnutrition (HCC)      Home Pulmonary Medications:  Albuterol    Home Devices/Therapy: Home O2    Past Medical History:   Diagnosis Date    Alcohol abuse     Anxiety     Aortic insufficiency 12/18/2020    Atrial fibrillation (HCC)     Cancer (HCC)     COPD (chronic obstructive pulmonary disease) (HCC)     Delirium     Encephalopathy     Epilepsy (HCC)     History of chemotherapy     History of radiation therapy     Hypo-osmolality and hyponatremia     Hypokalemia     Hypothyroid     Lung cancer (HCC)     Lyme disease     Major depression      Social History     Socioeconomic History    Marital status:      Spouse name: None    Number of children: None    Years of education: None    Highest education level: None   Occupational History    None   Tobacco Use    Smoking status: Every Day     Current packs/day: 1.50     Average packs/day: 1.5 packs/day for 47.0 years (70.5 ttl pk-yrs)     Types: Cigarettes    Smokeless tobacco: Never    Tobacco comments:     smoking more than 1 ppd   Vaping Use    Vaping status: Never Used   Substance and Sexual Activity    Alcohol use: Never    Drug use: Never    Sexual activity: None   Other Topics Concern    None   Social History Narrative    None     Social Determinants of Health     Financial Resource Strain: Not on file   Food Insecurity: Not on file   Transportation Needs: Not on file   Physical Activity: Not on file   Stress: Not on file   Social Connections: Not on file   Intimate Partner Violence: Not on file   Housing Stability: Not on  "file       Subjective         Objective    Physical Exam:   Assessment Type: Assess only  General Appearance: Alert, Awake  Respiratory Pattern: Dyspnea at rest  Chest Assessment: Chest expansion symmetrical  Bilateral Breath Sounds: Coarse, Inspiratory wheezes    Vitals:  Blood pressure 130/54, pulse 88, temperature 97.7 °F (36.5 °C), temperature source Oral, resp. rate 16, height 5' 9\" (1.753 m), weight 67.8 kg (149 lb 7.6 oz), SpO2 99%.          Imaging and other studies:           Plan    Respiratory Plan: Home Bronchodilator Patient pathway        Resp Comments: Assessed pt per protocol. Pt has lung cancer. Pt states it's hard for him to breathe. Pt has coughing fits which increases his WOB. Pt takes inhalers as needed. Currently on 3 liters and saturating well. Will keep him on PRN treatments and daily DPI treatments. Respiratory to follow.   "

## 2024-08-28 NOTE — ASSESSMENT & PLAN NOTE
BMI of 22.07 in the setting of chronic tobacco abuse coupled with malignancy (lung cancer), decreased appetite/oral intake, and evidence of muscle wasting/atrophy and exam  Initiate nutritional supplementation with meals

## 2024-08-28 NOTE — ASSESSMENT & PLAN NOTE
Progressive COPD unfortunately secondary to ongoing tobacco abuse  Continue Trelegy equivalent w.  Additional as needed nebulization  Maintain oxygenation as necessary  Tobacco smoking cessation counseling, although unlikely to comply

## 2024-08-28 NOTE — H&P
[facility]  H&P  Name: Marcin Sánchez 63 y.o. male I MRN: [id number]  Unit/Bed#: -01 I Date of Admission: 8/28/2024   Date of Service: 8/28/2024 I Hospital Day: 0      Assessment & Plan:    * Rapid atrial fibrillation (HCC)  Assessment & Plan  Presenting heart rates in the 140s to 160s -> initiated on a Cardizem drip with better control, currently in the 70s to 80s  Initiated on a heparin drip for anticoagulation  Check echocardiogram  Monitor/replete electrolyte deficiencies of present  Check updated thyroid panel  Further recommendations per cardiology    Pulmonary fibrosis (HCC)  Assessment & Plan  As suspected on CT imaging in the setting of progressive COPD, ongoing tobacco abuse, and lung cancer history  Maintain oxygenation as necessary currently requiring 1-2 L  Long-term prognosis poor due to comorbidities  Pulmonology to follow    Suspected chronic pulmonary embolism (HCC)  Assessment & Plan  CT imaging suggestive of a possible small left upper lobe chronic pulmonary embolism  Discussed with pulmonology who is unsure of the validity -> D-dimer elevated, hence, will proceed with lower extremity venous duplex study  Currently on a heparin drip for rapid fibrillation    Squamous cell lung cancer (HCC)  Assessment & Plan  Status post prior chemotherapy and radiation  Follows outpatient oncology (Dr. Kuo)    Tobacco abuse  Assessment & Plan  Cessation counseling, although unlikely to comply  Transdermal nicotine patch on board    Hyponatremia  Assessment & Plan  Suspect a SIADH type etiology in the setting of malignancy  Trial fluid restriction  Monitor serum sodium    Severe protein-calorie malnutrition (HCC)  Assessment & Plan  BMI of 22.07 in the setting of chronic tobacco abuse coupled with malignancy (lung cancer), decreased appetite/oral intake, and evidence of muscle wasting/atrophy and exam  Initiate nutritional supplementation with meals    COPD (chronic obstructive  pulmonary disease) ([facility])  Assessment & Plan  Progressive COPD unfortunately secondary to ongoing tobacco abuse  Continue Trelegy equivalent w.  Additional as needed nebulization  Maintain oxygenation as necessary  Tobacco smoking cessation counseling, although unlikely to comply    Epilepsy ([facility])  Assessment & Plan  Continue Dilantin/Neurontin      DVT Prophylaxis:  Heparin drip    Code Status:  Full code    Discussion with:  Patient at bedside    Anticipated Length of Stay:  Patient will be admitted on an Inpatient basis with an anticipated length of stay of greater than 2 midnights.   Justification for Hospital Stay: Rapid atrial fibrillation requiring intravenous rate control along with IV heparin for anticoagulation, along with chronic shortness of breath due to a multitude of pulmonary issues as above.    Total Time for Visit, including Counseling / Coordination of Care: 75 minutes. More than 50% of total time spent on counseling and coordination of care, on one or more of the following: performing physical exam; counseling and coordination of care; obtaining or reviewing history; documenting in the medical record; reviewing/ordering tests, medications or procedures; communicating with other healthcare professionals and discussing with patient's family/caregivers.      Chief Complaint: Shortness of breath      History of Present Illness:    Marcin Sánchez is a 63 y.o. male who presents with complaints of progressive worsening shortness of breath, found to be in rapid fibrillation in the ED.  He was initiated on a Cardizem infusion for rate control with improvement of heart rates from the 1  range to the 70-80 range.  He is also initiated on a heparin drip for anticoagulation.  Of note he has an underlying history of squamous lung cancer status post chemotherapy and radiation, and pulmonary fibrosis as noted on CT imaging.  There was also notation of a possible chronic left sided pulmonary embolism on  imaging as well.  At the time of my encounter, he sitting upright in bed still complaining of some shortness of breath on supplemental oxygen.  Continues to acknowledge ongoing tobacco smoking.  He complains of some associated weakness/fatigue but otherwise denies new complaints.      Review of Systems:    Review of Systems - A thorough 12 point review systems was conducted.  Pertinent positives and negatives are mentioned in the history of present illness.      Past Medical and Surgical History:     Past Medical History:   Diagnosis Date    Alcohol abuse     Anxiety     Aortic insufficiency 12/18/2020    Atrial fibrillation (HCC)     Cancer (HCC)     COPD (chronic obstructive pulmonary disease) (HCC)     Delirium     Encephalopathy     Epilepsy (HCC)     History of chemotherapy     History of radiation therapy     Hypo-osmolality and hyponatremia     Hypokalemia     Hypothyroid     Lung cancer (HCC)     Lyme disease     Major depression        Past Surgical History:   Procedure Laterality Date    BRONCHOSCOPY N/A 10/3/2016    Procedure: BRONCHOSCOPY FLEXIBLE;  Surgeon: John Brunner DO;  Location: [facility];  Service:     HAND SURGERY      PELVIC FRACTURE SURGERY      REMOVAL VENOUS PORT (PORT-A-CATH) Left 9/18/2018    Procedure: REMOVAL VENOUS PORT (PORT-A-CATH)IR;  Surgeon: Randy Lopez DO;  Location: [facility];  Service: Interventional Radiology         Medications & Allergies:    Prior to Admission medications    Medication Sig Start Date End Date Taking? Authorizing Provider   acetaminophen (TYLENOL) 650 mg CR tablet Take 650 mg by mouth every 6 (six) hours as needed for mild pain      Historical Provider, MD   Advair -21 MCG/ACT inhaler  12/19/22   Historical Provider, MD   albuterol (PROVENTIL HFA,VENTOLIN HFA) 90 mcg/act inhaler Inhale 2 puffs every 6 (six) hours as needed for wheezing 2/25/20   Bienvenido Lee MD   Ammonium Lactate 10 % CREA Apply topically as needed for dry skin     Historical Provider, MD   furosemide (LASIX) 40 mg tablet  9/22/22   Historical Provider, MD   gabapentin (NEURONTIN) 400 mg capsule Take 400 mg by mouth 2 (two) times a day      Historical Provider, MD   levothyroxine 100 mcg tablet Take 212 mcg by mouth daily  Patient not taking: Reported on 4/1/2024    Historical Provider, MD   levothyroxine 112 mcg tablet Take 112 mcg by mouth daily  Patient not taking: Reported on 4/1/2024    Historical Provider, MD   levothyroxine 125 mcg tablet  7/14/23   Historical Provider, MD   levothyroxine 200 mcg tablet 250 mcg 9/20/22   Historical Provider, MD   magnesium hydroxide (MILK OF MAGNESIA) 400 mg/5 mL oral suspension Take 30 mL by mouth daily as needed for constipation    Patient not taking: Reported on 10/2/2023    Historical Provider, MD   metoprolol succinate (TOPROL-XL) 25 mg 24 hr tablet Take 25 mg by mouth daily    Historical Provider, MD   phenytoin (DILANTIN) 100 mg ER capsule Take 100 mg by mouth 2 (two) times a day And 250mg at 5pm    Historical Provider, MD   potassium chloride (K-DUR,KLOR-CON) 20 mEq tablet  9/22/22   Historical Provider, MD   sodium chloride 1 g tablet Take 4 g by mouth 3 (three) times a day    Historical Provider, MD   tiotropium (SPIRIVA) 18 mcg inhalation capsule Place 18 mcg into inhaler and inhale daily    Historical Provider, MD   Vitamin D, Cholecalciferol, 1000 UNITS CAPS Take 2 capsules by mouth daily     Historical Provider, MD         Allergies: No Known Allergies      Social History:    Substance Use History:   Social History     Substance and Sexual Activity   Alcohol Use Never     Social History     Tobacco Use   Smoking Status Every Day    Current packs/day: 1.50    Average packs/day: 1.5 packs/day for 47.0 years (70.5 ttl pk-yrs)    Types: Cigarettes   Smokeless Tobacco Never   Tobacco Comments    smoking more than 1 ppd     Social History     Substance and Sexual Activity   Drug Use Never         Family History:    Family  "History   Problem Relation Age of Onset    Diabetes Mother     Lung cancer Father          Physical Exam:     Vitals:   Blood Pressure: 130/54 (08/28/24 1610)  Pulse: 67 (08/28/24 1610)  Temperature: 97.7 °F (36.5 °C) (08/28/24 1610)  Temp Source: Oral (08/28/24 1610)  Respirations: 16 (08/28/24 1610)  Height: 5' 9\" (175.3 cm) (08/28/24 1610)  Weight - Scale: 67.8 kg (149 lb 7.6 oz) (08/28/24 1610)  SpO2: 96 % (08/28/24 1610)      GENERAL Frail/cachectic   HEAD   Normocephalic - atraumatic - temporal wasting present   EYES   PERRL - EOMI    MOUTH   Mucosa moist   NECK   Supple - full range of motion - clavicular wasting present   CARDIAC Rate controlled   PULMONARY    Diminished bilateral breath sounds with diminished air movement   ABDOMEN Nontender/nondistended   MUSCULOSKELETAL   Motor strength/range of motion intact   NEUROLOGIC   Alert/oriented at baseline   SKIN   Chronic wrinkles/blemishes    PSYCHIATRIC   Mood/affect mildly anxious         Additional Data:     Labs & Recent Cultures:    Results from last 7 days   Lab Units 08/28/24  1142   WBC Thousand/uL 5.50   HEMOGLOBIN g/dL 13.0   HEMATOCRIT % 38.3   PLATELETS Thousands/uL 242   SEGS PCT % 66   LYMPHO PCT % 12*   MONO PCT % 20*   EOS PCT % 1     Results from last 7 days   Lab Units 08/28/24  1142   SODIUM mmol/L 129*   POTASSIUM mmol/L 3.6   CHLORIDE mmol/L 89*   CO2 mmol/L 33*   BUN mg/dL 11   CREATININE mg/dL 0.58*   ANION GAP mmol/L 7   CALCIUM mg/dL 9.1   ALBUMIN g/dL 3.7   TOTAL BILIRUBIN mg/dL 0.59   ALK PHOS U/L 98   ALT U/L 14   AST U/L 19   GLUCOSE RANDOM mg/dL 126     Results from last 7 days   Lab Units 08/28/24  1546   INR  1.17                             Lines/Drains:  Invasive Devices       Peripheral Intravenous Line  Duration             Peripheral IV 08/28/24 Left Antecubital <1 day    Peripheral IV 08/28/24 Right Antecubital <1 day                      Imaging:     XR chest 1 view portable    Result Date: 8/28/2024  Narrative: XR CHEST " PORTABLE INDICATION: short of breath. COMPARISON: Chest radiograph January 13, 2024 FINDINGS: Enlarged cardiomediastinal silhouette. Unchanged opacification and volume loss of the right upper lung. Severe emphysema. Unchanged blunting of the right costophrenic angle.     Impression: Pericardial effusion is better evident on concurrent CT. Small right pleural effusion. Unchanged posttreatment change in the right upper lung. Workstation performed: [id number]     CTA ED chest PE study    Result Date: 8/28/2024  Narrative: CTA - CHEST WITH IV CONTRAST - PULMONARY ANGIOGRAM INDICATION: Shortness of breath. History of lung cancer. COMPARISON: CT chest 8/8/2024, CTA chest 11/5/2020 TECHNIQUE: CTA examination of the chest was performed using angiographic technique according to a protocol specifically tailored to evaluate for pulmonary embolism. Multiplanar 2D reformatted images were created from the source data. In addition, coronal  3D MIP postprocessing was performed on the acquisition scanner. Radiation dose length product (DLP) for this visit: 222.6 mGy-cm. This examination, like all CT scans performed in the [facility], was performed utilizing techniques to minimize radiation dose exposure, including the use of iterative reconstruction and automated exposure control. IV Contrast: 75 mL of iohexol (OMNIPAQUE) FINDINGS: PULMONARY ARTERIAL TREE: There is new onset occlusion or markedly diminished flow of the right upper lobe pulmonary artery compared to contrast-enhanced study from November 5, 2020. This is probably related to the ongoing right upper lobe pulmonary fibrosis. There is a small filling defect within the left upper lobe segmental artery (series 501/77) which appears peripherally located and linear on coronal imaging, favoring a small chronic embolus. No definite acute pulmonary embolus. LUNGS: Stable right lung perihilar/paramediastinal radiation fibrosis mostly involving the right upper  lobe. Volume loss, consolidation and traction bronchiectasis again noted. Severe emphysematous changes. Mild left apical pleural-parenchymal scarring and mild irregularity noted along the medial left upper major fissure. Improved but mild persistent consolidative opacity noted in the posterior left upper lobe (501/102). 2 mm left upper lobe nodule (501/70), stable. 5 mm left lower lobe nodule (501/80), stable. There is a small amount of layering mucoid debris noted within the trachea. There is occlusion of the right middle lobe bronchi proximally compared to the previous exam. The corresponding corresponding RML bronchi are segmentally visualized just distal to their origin in the andrew. Mild thickening of the right lower lobe bronchi again evident. PLEURA: Trace pleural effusions. HEART/GREAT VESSELS: Heart is unremarkable for patient's age. Unchanged 5.4 cm ascending thoracic aortic aneurysm. Moderate pericardial effusion. MEDIASTINUM AND ANDREW: Previously described right upper paratracheal lymph node may be obscured by dense contrast within the SVC. Mediastinum and andrew appear grossly stable accounting for differences in study technique. CHEST WALL AND LOWER NECK: Unremarkable. VISUALIZED STRUCTURES IN THE UPPER ABDOMEN: Unremarkable. OSSEOUS STRUCTURES: No acute fracture or destructive osseous lesion. Old, healed bilateral rib fractures.     Impression: 1. No definite acute pulmonary emboli. 2. New onset occlusion or markedly diminished flow of the right upper lobe pulmonary artery compared to contrast-enhanced study from November 5, 2020. This is probably related to the chronic right upper lobe pulmonary fibrosis. Small chronic left upper lobe pulmonary embolus suggested. 3. New, moderate pericardial effusion. 4. Stable appearance of RUL/right lung paramediastinal fibrosis. Apparent occlusion of the right middle lobe bronchi near their origin compared to the recent study. Follow-up CT chest in 3 months  recommended to reassess this finding and exclude subtle neoplasm recurrence. 5. Unchanged appearance of 5.4 cm ascending aortic aneurysm.                 CASI DEL ROSARIO MD   Hospitalist - [facility]          ** Please Note: This note is constructed using a voice recognition dictation system.  An occasional wrong word/phrase or “sound-a-like” substitution may have been picked up by dictation device due to the inherent limitations of voice recognition software.  Read the chart carefully and recognize, using reasonable context, where substitutions may have occurred.**

## 2024-08-28 NOTE — ASSESSMENT & PLAN NOTE
Suspect a SIADH type etiology in the setting of malignancy  Trial fluid restriction  Monitor serum sodium

## 2024-08-28 NOTE — ED NOTES
Patient is resting, appears to be sleeping      Yesika Rogers RN  08/28/24 [id number]       Yesika Rogers RN  08/28/24 [id number]

## 2024-08-28 NOTE — PLAN OF CARE
Problem: PAIN - ADULT  Goal: Verbalizes/displays adequate comfort level or baseline comfort level  Description: Interventions:  - Encourage patient to monitor pain and request assistance  - Assess pain using appropriate pain scale  - Administer analgesics based on type and severity of pain and evaluate response  - Implement non-pharmacological measures as appropriate and evaluate response  - Consider cultural and social influences on pain and pain management  - Notify physician/advanced practitioner if interventions unsuccessful or patient reports new pain  Outcome: Progressing     Problem: INFECTION - ADULT  Goal: Absence or prevention of progression during hospitalization  Description: INTERVENTIONS:  - Assess and monitor for signs and symptoms of infection  - Monitor lab/diagnostic results  - Monitor all insertion sites, i.e. indwelling lines, tubes, and drains  - Monitor endotracheal if appropriate and nasal secretions for changes in amount and color  - [place] appropriate cooling/warming therapies per order  - Administer medications as ordered  - Instruct and encourage patient and family to use good hand hygiene technique  - Identify and instruct in appropriate isolation precautions for identified infection/condition  Outcome: Progressing  Goal: Absence of fever/infection during neutropenic period  Description: INTERVENTIONS:  - Monitor WBC    Outcome: Progressing     Problem: SAFETY ADULT  Goal: Patient will remain free of falls  Description: INTERVENTIONS:  - Educate patient/family on patient safety including physical limitations  - Instruct patient to call for assistance with activity   - Consult OT/PT to assist with strengthening/mobility   - Keep Call bell within reach  - Keep bed low and locked with side rails adjusted as appropriate  - Keep care items and personal belongings within reach  - Initiate and maintain comfort rounds  - Make Fall Risk Sign visible to staff  - Offer Toileting every  Hours,  in advance of need  - Initiate/Maintain alarm  - Obtain necessary fall risk management equipment:   - Apply yellow socks and bracelet for high fall risk patients  - Consider moving patient to room near nurses station  Outcome: Progressing  Goal: Maintain or return to baseline ADL function  Description: INTERVENTIONS:  -  Assess patient's ability to carry out ADLs; assess patient's baseline for ADL function and identify physical deficits which impact ability to perform ADLs (bathing, care of mouth/teeth, toileting, grooming, dressing, etc.)  - Assess/evaluate cause of self-care deficits   - Assess range of motion  - Assess patient's mobility; develop plan if impaired  - Assess patient's need for assistive devices and provide as appropriate  - Encourage maximum independence but intervene and supervise when necessary  - Involve family in performance of ADLs  - Assess for home care needs following discharge   - Consider OT consult to assist with ADL evaluation and planning for discharge  - Provide patient education as appropriate  Outcome: Progressing  Goal: Maintains/Returns to pre admission functional level  Description: INTERVENTIONS:  - Perform AM-PAC 6 Click Basic Mobility/ Daily Activity assessment daily.  - Set and communicate daily mobility goal to care team and patient/family/caregiver.   - Collaborate with rehabilitation services on mobility goals if consulted  - Perform Range of Motion  times a day.  - Reposition patient every hours.  - Dangle patient times a day  - Stand patient  times a day  - Ambulate patient  times a day  - Out of bed to chair  times a day   - Out of bed for meals  times a day  - Out of bed for toileting  - Record patient progress and toleration of activity level   Outcome: Progressing     Problem: DISCHARGE PLANNING  Goal: Discharge to home or other facility with appropriate resources  Description: INTERVENTIONS:  - Identify barriers to discharge w/patient and caregiver  - Arrange for  needed discharge resources and transportation as appropriate  - Identify discharge learning needs (meds, wound care, etc.)  - Arrange for interpretive services to assist at discharge as needed  - Refer to Case Management Department for coordinating discharge planning if the patient needs post-hospital services based on physician/advanced practitioner order or complex needs related to functional status, cognitive ability, or social support system  Outcome: Progressing     Problem: HEMATOLOGIC - ADULT  Goal: Maintains hematologic stability  Description: INTERVENTIONS  - Assess for signs and symptoms of bleeding or hemorrhage  - Monitor labs  - Administer supportive blood products/factors as ordered and appropriate  Outcome: Progressing     Problem: MUSCULOSKELETAL - ADULT  Goal: Maintain or return mobility to safest level of function  Description: INTERVENTIONS:  - Assess patient's ability to carry out ADLs; assess patient's baseline for ADL function and identify physical deficits which impact ability to perform ADLs (bathing, care of mouth/teeth, toileting, grooming, dressing, etc.)  - Assess/evaluate cause of self-care deficits   - Assess range of motion  - Assess patient's mobility  - Assess patient's need for assistive devices and provide as appropriate  - Encourage maximum independence but intervene and supervise when necessary  - Involve family in performance of ADLs  - Assess for home care needs following discharge   - Consider OT consult to assist with ADL evaluation and planning for discharge  - Provide patient education as appropriate  Outcome: Progressing  Goal: Maintain proper alignment of affected body part  Description: INTERVENTIONS:  - Support, maintain and protect limb and body alignment  - Provide patient/ family with appropriate education  Outcome: Progressing

## 2024-08-28 NOTE — ASSESSMENT & PLAN NOTE
As suspected on CT imaging in the setting of progressive COPD, ongoing tobacco abuse, and lung cancer history  Maintain oxygenation as necessary currently requiring 1-2 L  Long-term prognosis poor due to comorbidities  Pulmonology to follow

## 2024-08-29 ENCOUNTER — APPOINTMENT (INPATIENT)
Dept: VASCULAR ULTRASOUND | Facility: HOSPITAL | Age: 64
DRG: 201 | End: 2024-08-29
Payer: COMMERCIAL

## 2024-08-29 ENCOUNTER — APPOINTMENT (INPATIENT)
Dept: NON INVASIVE DIAGNOSTICS | Facility: HOSPITAL | Age: 64
DRG: 201 | End: 2024-08-29
Payer: COMMERCIAL

## 2024-08-29 PROBLEM — I50.42 CHRONIC COMBINED SYSTOLIC AND DIASTOLIC CHF (CONGESTIVE HEART FAILURE) (HCC): Status: ACTIVE | Noted: 2024-08-29

## 2024-08-29 LAB
ANION GAP SERPL CALCULATED.3IONS-SCNC: 7 MMOL/L (ref 4–13)
AORTIC ROOT: 4.1 CM
APICAL FOUR CHAMBER EJECTION FRACTION: 37 %
APTT PPP: 59 SECONDS (ref 23–34)
APTT PPP: 59 SECONDS (ref 23–34)
APTT PPP: 65 SECONDS (ref 23–34)
ASCENDING AORTA: 5.4 CM
ATRIAL RATE: 144 BPM
ATRIAL RATE: 84 BPM
AV REGURGITATION PRESSURE HALF TIME: 321 MS
BASOPHILS # BLD AUTO: 0.08 THOUSANDS/ÂΜL (ref 0–0.1)
BASOPHILS NFR BLD AUTO: 2 % (ref 0–1)
BSA FOR ECHO PROCEDURE: 1.82 M2
BUN SERPL-MCNC: 12 MG/DL (ref 5–25)
CALCIUM SERPL-MCNC: 9.1 MG/DL (ref 8.4–10.2)
CHLORIDE SERPL-SCNC: 93 MMOL/L (ref 96–108)
CO2 SERPL-SCNC: 31 MMOL/L (ref 21–32)
CREAT SERPL-MCNC: 0.51 MG/DL (ref 0.6–1.3)
DOP CALC LVOT AREA: 4.15 CM2
DOP CALC LVOT DIAMETER: 2.3 CM
E WAVE DECELERATION TIME: 254 MS
E/A RATIO: 1.32
EOSINOPHIL # BLD AUTO: 0.08 THOUSAND/ÂΜL (ref 0–0.61)
EOSINOPHIL NFR BLD AUTO: 2 % (ref 0–6)
ERYTHROCYTE [DISTWIDTH] IN BLOOD BY AUTOMATED COUNT: 12.3 % (ref 11.6–15.1)
FRACTIONAL SHORTENING: 13 (ref 28–44)
GFR SERPL CREATININE-BSD FRML MDRD: 114 ML/MIN/1.73SQ M
GLUCOSE SERPL-MCNC: 99 MG/DL (ref 65–140)
HCT VFR BLD AUTO: 35.8 % (ref 36.5–49.3)
HGB BLD-MCNC: 12 G/DL (ref 12–17)
IMM GRANULOCYTES # BLD AUTO: 0.01 THOUSAND/UL (ref 0–0.2)
IMM GRANULOCYTES NFR BLD AUTO: 0 % (ref 0–2)
INTERVENTRICULAR SEPTUM IN DIASTOLE (PARASTERNAL SHORT AXIS VIEW): 1.4 CM
INTERVENTRICULAR SEPTUM: 1.4 CM (ref 0.6–1.1)
LAAS-AP2: 23.8 CM2
LAAS-AP4: 21.3 CM2
LEFT ATRIUM SIZE: 3.6 CM
LEFT ATRIUM VOLUME (MOD BIPLANE): 75 ML
LEFT ATRIUM VOLUME INDEX (MOD BIPLANE): 41 ML/M2
LEFT INTERNAL DIMENSION IN SYSTOLE: 4.8 CM (ref 2.1–4)
LEFT VENTRICULAR INTERNAL DIMENSION IN DIASTOLE: 5.5 CM (ref 3.5–6)
LEFT VENTRICULAR POSTERIOR WALL IN END DIASTOLE: 1.4 CM
LEFT VENTRICULAR STROKE VOLUME: 41 ML
LVSV (TEICH): 41 ML
LYMPHOCYTES # BLD AUTO: 0.82 THOUSANDS/ÂΜL (ref 0.6–4.47)
LYMPHOCYTES NFR BLD AUTO: 15 % (ref 14–44)
MAGNESIUM SERPL-MCNC: 2.1 MG/DL (ref 1.9–2.7)
MCH RBC QN AUTO: 32.3 PG (ref 26.8–34.3)
MCHC RBC AUTO-ENTMCNC: 33.5 G/DL (ref 31.4–37.4)
MCV RBC AUTO: 97 FL (ref 82–98)
MONOCYTES # BLD AUTO: 0.98 THOUSAND/ÂΜL (ref 0.17–1.22)
MONOCYTES NFR BLD AUTO: 18 % (ref 4–12)
MV PEAK A VEL: 0.53 M/S
MV PEAK E VEL: 70 CM/S
MV STENOSIS PRESSURE HALF TIME: 74 MS
MV VALVE AREA P 1/2 METHOD: 2.97
NEUTROPHILS # BLD AUTO: 3.42 THOUSANDS/ÂΜL (ref 1.85–7.62)
NEUTS SEG NFR BLD AUTO: 63 % (ref 43–75)
NRBC BLD AUTO-RTO: 0 /100 WBCS
P AXIS: 90 DEGREES
PLATELET # BLD AUTO: 237 THOUSANDS/UL (ref 149–390)
PMV BLD AUTO: 8.6 FL (ref 8.9–12.7)
POTASSIUM SERPL-SCNC: 3.9 MMOL/L (ref 3.5–5.3)
PR INTERVAL: 190 MS
QRS AXIS: -55 DEGREES
QRS AXIS: 0 DEGREES
QRSD INTERVAL: 110 MS
QRSD INTERVAL: 112 MS
QT INTERVAL: 304 MS
QT INTERVAL: 380 MS
QTC INTERVAL: 449 MS
QTC INTERVAL: 473 MS
RBC # BLD AUTO: 3.71 MILLION/UL (ref 3.88–5.62)
RIGHT ATRIAL 2D VOLUME: 41 ML
RIGHT ATRIUM AREA SYSTOLE A4C: 15.7 CM2
RIGHT VENTRICLE ID DIMENSION: 3.6 CM
SINOTUBULAR JUNCTION: 4.3 CM
SL CV AV DECELERATION TIME RETROGRADE: 1107 MS
SL CV AV PEAK GRADIENT RETROGRADE: 70 MMHG
SL CV LEFT ATRIUM LENGTH A2C: 5.6 CM
SL CV LV EF: 35
SL CV PED ECHO LEFT VENTRICLE DIASTOLIC VOLUME (MOD BIPLANE) 2D: 150 ML
SL CV PED ECHO LEFT VENTRICLE SYSTOLIC VOLUME (MOD BIPLANE) 2D: 108 ML
SL CV SINUS OF VALSALVA 2D: 4.2 CM
SODIUM SERPL-SCNC: 131 MMOL/L (ref 135–147)
STJ: 4.3 CM
T WAVE AXIS: 102 DEGREES
T WAVE AXIS: 116 DEGREES
TR MAX PG: 20 MMHG
TR PEAK VELOCITY: 2.2 M/S
TRICUSPID ANNULAR PLANE SYSTOLIC EXCURSION: 2 CM
TRICUSPID VALVE PEAK REGURGITATION VELOCITY: 2.22 M/S
TSH SERPL DL<=0.05 MIU/L-ACNC: 0.51 UIU/ML (ref 0.45–4.5)
VENTRICULAR RATE: 146 BPM
VENTRICULAR RATE: 84 BPM
WBC # BLD AUTO: 5.39 THOUSAND/UL (ref 4.31–10.16)

## 2024-08-29 PROCEDURE — 84443 ASSAY THYROID STIM HORMONE: CPT | Performed by: PHYSICIAN ASSISTANT

## 2024-08-29 PROCEDURE — 85730 THROMBOPLASTIN TIME PARTIAL: CPT | Performed by: INTERNAL MEDICINE

## 2024-08-29 PROCEDURE — 80048 BASIC METABOLIC PNL TOTAL CA: CPT | Performed by: PHYSICIAN ASSISTANT

## 2024-08-29 PROCEDURE — 99255 IP/OBS CONSLTJ NEW/EST HI 80: CPT | Performed by: INTERNAL MEDICINE

## 2024-08-29 PROCEDURE — 83735 ASSAY OF MAGNESIUM: CPT | Performed by: PHYSICIAN ASSISTANT

## 2024-08-29 PROCEDURE — 94640 AIRWAY INHALATION TREATMENT: CPT

## 2024-08-29 PROCEDURE — 99232 SBSQ HOSP IP/OBS MODERATE 35: CPT | Performed by: INTERNAL MEDICINE

## 2024-08-29 PROCEDURE — 85025 COMPLETE CBC W/AUTO DIFF WBC: CPT | Performed by: PHYSICIAN ASSISTANT

## 2024-08-29 PROCEDURE — 93306 TTE W/DOPPLER COMPLETE: CPT

## 2024-08-29 PROCEDURE — 93306 TTE W/DOPPLER COMPLETE: CPT | Performed by: INTERNAL MEDICINE

## 2024-08-29 PROCEDURE — 94760 N-INVAS EAR/PLS OXIMETRY 1: CPT

## 2024-08-29 PROCEDURE — 99223 1ST HOSP IP/OBS HIGH 75: CPT | Performed by: INTERNAL MEDICINE

## 2024-08-29 PROCEDURE — 93010 ELECTROCARDIOGRAM REPORT: CPT | Performed by: INTERNAL MEDICINE

## 2024-08-29 RX ORDER — METOPROLOL SUCCINATE 25 MG/1
25 TABLET, EXTENDED RELEASE ORAL 2 TIMES DAILY
Status: DISCONTINUED | OUTPATIENT
Start: 2024-08-29 | End: 2024-08-30 | Stop reason: HOSPADM

## 2024-08-29 RX ORDER — POTASSIUM CHLORIDE 20MEQ/15ML
40 LIQUID (ML) ORAL ONCE
Status: DISCONTINUED | OUTPATIENT
Start: 2024-08-29 | End: 2024-08-30 | Stop reason: HOSPADM

## 2024-08-29 RX ORDER — FUROSEMIDE 10 MG/ML
40 INJECTION INTRAMUSCULAR; INTRAVENOUS ONCE
Status: COMPLETED | OUTPATIENT
Start: 2024-08-29 | End: 2024-08-29

## 2024-08-29 RX ADMIN — GUAIFENESIN AND DEXTROMETHORPHAN 10 ML: 100; 10 SYRUP ORAL at 08:48

## 2024-08-29 RX ADMIN — POTASSIUM CHLORIDE 20 MEQ: 1500 TABLET, EXTENDED RELEASE ORAL at 08:42

## 2024-08-29 RX ADMIN — METOPROLOL SUCCINATE 25 MG: 25 TABLET, EXTENDED RELEASE ORAL at 17:31

## 2024-08-29 RX ADMIN — FLUTICASONE FUROATE AND VILANTEROL TRIFENATATE 1 PUFF: 100; 25 POWDER RESPIRATORY (INHALATION) at 08:27

## 2024-08-29 RX ADMIN — PHENYTOIN SODIUM 100 MG: 100 CAPSULE ORAL at 08:41

## 2024-08-29 RX ADMIN — UMECLIDINIUM 1 PUFF: 62.5 AEROSOL, POWDER ORAL at 08:27

## 2024-08-29 RX ADMIN — PHENYTOIN SODIUM 100 MG: 100 CAPSULE ORAL at 17:31

## 2024-08-29 RX ADMIN — DILTIAZEM HYDROCHLORIDE 7.5 MG/HR: 5 INJECTION INTRAVENOUS at 01:05

## 2024-08-29 RX ADMIN — Medication 1 G: at 17:31

## 2024-08-29 RX ADMIN — HEPARIN SODIUM 1950 UNITS: 1000 INJECTION INTRAVENOUS; SUBCUTANEOUS at 05:12

## 2024-08-29 RX ADMIN — HEPARIN SODIUM 16 UNITS/KG/HR: 10000 INJECTION, SOLUTION INTRAVENOUS at 18:31

## 2024-08-29 RX ADMIN — METOPROLOL SUCCINATE 25 MG: 25 TABLET, EXTENDED RELEASE ORAL at 08:42

## 2024-08-29 RX ADMIN — FUROSEMIDE 40 MG: 10 INJECTION, SOLUTION INTRAMUSCULAR; INTRAVENOUS at 12:31

## 2024-08-29 RX ADMIN — FUROSEMIDE 40 MG: 40 TABLET ORAL at 08:42

## 2024-08-29 RX ADMIN — GUAIFENESIN AND DEXTROMETHORPHAN 10 ML: 100; 10 SYRUP ORAL at 01:11

## 2024-08-29 RX ADMIN — Medication 1 G: at 08:41

## 2024-08-29 RX ADMIN — Medication 1000 UNITS: at 08:41

## 2024-08-29 RX ADMIN — GABAPENTIN 400 MG: 400 CAPSULE ORAL at 08:41

## 2024-08-29 RX ADMIN — HEPARIN SODIUM 1950 UNITS: 1000 INJECTION INTRAVENOUS; SUBCUTANEOUS at 18:29

## 2024-08-29 RX ADMIN — GABAPENTIN 400 MG: 400 CAPSULE ORAL at 17:31

## 2024-08-29 NOTE — CONSULTS
Consultation - Pulmonary Medicine   Marcin ERNIE Sánchez 63 y.o. male MRN: [id number]  Unit/Bed#: -[facility] Encounter: [id number]      Assessment and Plan:   1.  Acute hypoxic respiratory failure: Currently he is needing up to 3 L/min of oxygen supplementation.  He is saturating well on this level of supplementation.  Continue to titrate down FiO2 as tolerated.  Home O2 evaluation prior to discharge.    2.  Suspected pulmonary embolism: His CT angiogram did not show any obvious pulmonary embolism.  However the radiologist raised the possibility of small chronic embolism in the left segmental artery.  It is not very convincing.  His D-dimer has been elevated in the setting of cancer.  Venous Doppler is pending at this time.  He is on IV heparin and is tolerating this well so far.    3. New onset atrial fibrillation with rapid ventricular rate: He has new onset atrial fibrillation and was on Cardizem infusion.  He is also on systemic anticoagulation with IV heparin.  The echocardiogram has been ordered and cardiology has been consulted.    4.  COPD: He has history of COPD emphysema from his smoking.  He is currently on treatment with Breo 100 and Incruse.  He is also on levalbuterol as needed.  No recent PFT.  His CT scan today evidence of emphysema.  He is not in acute exacerbation.    5.  Tobacco dependence: He has been a smoker for a long time and continues to smoke 1.5 packs/day.  I counseled him to quit smoking.  He is currently on nicotine patch.    6.  History of lung cancer: He has history of previous left lung cancer and is status post chemoradiation.  He is CT scan showed evidence of postradiation fibrosis.    I had a long discussion with him and have answered all his questions.    I discussed the case with Dr. Donis the patient's hospitalist..    Thank you for allowing me to participate in the care of the patient.      History of Present Illness     Physician Requesting Consult: Malia Donis MD    Reason for  Consult / Principal Problem: Suspected pulmonary embolism; COPD  Hx and PE limited by: None    HPI: Marcin Sánchez is a 63 y.o. 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  He has been started on Cardizem infusion and systemic anticoagulation.  He had a CT angiogram and this raised the possibility of a small chronic thromboembolism in the segmental artery in the left upper lobe.  The filling defect did not look very convincing to me.  His D-dimer subsequently checked was elevated and a venous Doppler has been ordered.  He is already on systemic anticoagulation with IV heparin.  He states that he has had COPD for some time and uses some inhalers which he cannot remember.  According to his list of medications he is using albuterol 4-5 times a day.  He used to smoke 1-1/2 packs a day.  He had squamous cell carcinoma of the lung on the left side and received radiation as well as chemotherapy.  He denied any hemoptysis or anorexia.  No fever or chills.  He denied any chest pain or palpitations.  He also has pulmonary fibrosis following radiation therapy.  He also has history of seizure disorder and has been on phenytoin.  His usual exercise tolerance is less than half a block.  He is not on any oxygen supplementation at home.  Currently is on 3 L of nasal cannula oxygen and is saturating well.    Inpatient consult to Pulmonology  Consult performed by: Pablo Link MD  Consult ordered by: Malia Donis MD          Review of Systems   Constitutional:  Positive for activity change. Negative for appetite change, chills, fever and unexpected weight change.   HENT:  Negative for hearing loss, rhinorrhea, sneezing, sore throat and trouble swallowing.    Respiratory:  Positive for  cough and shortness of breath. Negative for chest tightness and wheezing.    Cardiovascular:  Positive for leg swelling. Negative for chest pain and palpitations.   Gastrointestinal:  Negative for abdominal pain, constipation, diarrhea, nausea and vomiting.   Genitourinary:  Negative for dysuria, frequency and urgency.   Musculoskeletal:  Negative for arthralgias and back pain.   Skin:  Negative for rash.   Allergic/Immunologic: Positive for environmental allergies.   Neurological:  Positive for seizures. Negative for dizziness, syncope, light-headedness and headaches.   Psychiatric/Behavioral:  Positive for sleep disturbance. Negative for agitation and confusion. The patient is nervous/anxious.        Historical Information   Past Medical History:   Diagnosis Date    Alcohol abuse     Anxiety     Aortic insufficiency 12/18/2020    Atrial fibrillation (HCC)     Cancer (HCC)     COPD (chronic obstructive pulmonary disease) (HCC)     Delirium     Encephalopathy     Epilepsy (HCC)     History of chemotherapy     History of radiation therapy     Hypo-osmolality and hyponatremia     Hypokalemia     Hypothyroid     Lung cancer (HCC)     Lyme disease     Major depression      Past Surgical History:   Procedure Laterality Date    BRONCHOSCOPY N/A 10/3/2016    Procedure: BRONCHOSCOPY FLEXIBLE;  Surgeon: John Brunner DO;  Location: [facility];  Service:     HAND SURGERY      PELVIC FRACTURE SURGERY      REMOVAL VENOUS PORT (PORT-A-CATH) Left 9/18/2018    Procedure: REMOVAL VENOUS PORT (PORT-A-CATH)IR;  Surgeon: Randy Lopez DO;  Location: [facility];  Service: Interventional Radiology     Social History   Social History     Substance and Sexual Activity   Alcohol Use Never     Social History     Substance and Sexual Activity   Drug Use Never     E-Cigarette/Vaping    E-Cigarette Use Never User      E-Cigarette/Vaping Substances    Nicotine No     THC No     CBD No     Flavoring No     Other No     Unknown No      Social  "History     Tobacco Use   Smoking Status Every Day    Current packs/day: 1.50    Average packs/day: 1.5 packs/day for 47.0 years (70.5 ttl pk-yrs)    Types: Cigarettes   Smokeless Tobacco Never   Tobacco Comments    smoking more than 1 ppd     Occupational History: Noncontributory.  No history of exposure to asbestos or chemicals    Family History: non-contributory    Meds/Allergies   all current active meds have been reviewed    No Known Allergies    Objective   Vitals: Blood pressure (!) 127/46, pulse 69, temperature 98 °F (36.7 °C), temperature source Tympanic, resp. rate 22, height 5' 9\" (1.753 m), weight 67.6 kg (149 lb), SpO2 97%.,Body mass index is 22 kg/m².    Intake/Output Summary (Last 24 hours) at 8/29/2024 1050  Last data filed at 8/29/2024 0601  Gross per 24 hour   Intake 10 ml   Output 800 ml   Net -790 ml     Invasive Devices       Peripheral Intravenous Line  Duration             Peripheral IV 08/28/24 Left Antecubital <1 day    Peripheral IV 08/28/24 Right Antecubital <1 day                    Physical Exam  Vitals reviewed.   Constitutional:       General: He is not in acute distress.     Appearance: He is ill-appearing. He is not toxic-appearing or diaphoretic.      Interventions: He is not intubated.  HENT:      Head: Normocephalic.      Mouth/Throat:      Mouth: Mucous membranes are moist.   Neck:      Vascular: No JVD.   Cardiovascular:      Rate and Rhythm: Normal rate.      Heart sounds: Normal heart sounds. No murmur heard.  Pulmonary:      Effort: Pulmonary effort is normal. No accessory muscle usage or respiratory distress. He is not intubated.      Breath sounds: No stridor. Examination of the right-middle field reveals decreased breath sounds and rhonchi. Examination of the left-middle field reveals decreased breath sounds and rhonchi. Examination of the right-lower field reveals rales. Examination of the left-lower field reveals rales. Decreased breath sounds, rhonchi and rales present. " No wheezing.   Chest:      Chest wall: No tenderness.   Abdominal:      General: Bowel sounds are normal.      Palpations: Abdomen is soft.      Tenderness: There is no abdominal tenderness. There is no guarding.   Musculoskeletal:      Right lower leg: Edema present.      Left lower leg: Edema present.   Lymphadenopathy:      Cervical: No cervical adenopathy.   Skin:     Coloration: Skin is not cyanotic or pale.      Findings: No rash.      Nails: There is no clubbing.   Neurological:      Mental Status: He is alert and oriented to person, place, and time.   Psychiatric:         Mood and Affect: Mood is anxious.         Behavior: Behavior normal. Behavior is not agitated.         Lab Results: I have personally reviewed pertinent lab results. white cell count 5.39 hemoglobin 12 platelet 237 sodium 131 potassium 3.9 chloride 93 bicarbonate 31 creatinine 0.51 magnesium 2.1  D-dimer 1.85.  COVID flu and RSV negative.    Imaging Studies: I have personally reviewed pertinent reports.   and I have personally reviewed pertinent films in PACS no convincing acute pulmonary embolism.  A small filling defect was reported in the left upper lobe segmental artery which is not convincing.  Evidence of fibrosis.  Lung nodules up to 5 mm in size.  Severe emphysema.    EKG, Pathology, and Other Studies: I have personally reviewed pertinent reports.    VTE Prophylaxis: Heparin    Code Status: Level 1 - Full Code  Advance Directive and Living Will: Yes    Power of :    POLST:      None

## 2024-08-29 NOTE — ASSESSMENT & PLAN NOTE
Presenting heart rates in the 140s to 160s -> initiated on a Cardizem drip with better control, currently in the 60s to 80s -> Cardizem drip now discontinued per cardiology -> transition to oral beta-blockade (Toprol-XL)   Initiated on a heparin drip for anticoagulation -> can transition to oral anticoagulation on discharge  See plan for CHF below regarding echocardiogram findings  Monitor/replete electrolyte deficiencies of present  TSH normal  Further recommendations per cardiology

## 2024-08-29 NOTE — ASSESSMENT & PLAN NOTE
Suspect a SIADH type etiology in the setting of malignancy  Trial fluid restriction - c/w salt tablet supplementation (suspect noncompliance)  Monitor serum sodium

## 2024-08-29 NOTE — UTILIZATION REVIEW
NOTIFICATION OF INPATIENT ADMISSION   AUTHORIZATION REQUEST   SERVICING FACILITY:   [place] [address]  Tax ID: [id number]  NPI: [id number] ATTENDING PROVIDER:  Attending Name and NPI#: Malia Donis Md [[id number]]  Address: [address] [place] [address]  Phone:  [phone]     ADMISSION INFORMATION:  Place of Service: Inpatient [facility]  Place of Service Code: 21  Inpatient Admission Date/Time: 8/28/24  3:28 PM  Discharge Date/Time: No discharge date for patient encounter.  Admitting Diagnosis Code/Description:  Shortness of breath [R06.02]  SOB (shortness of breath) [R06.02]  Atrial fibrillation with RVR (HCC) [I48.91]     UTILIZATION REVIEW CONTACT:  Shanice Crane, Utilization   Network Utilization Review Department  Phone: [phone]  Fax: [phone]  Email: [email or web address]  Contact for approvals/pending authorizations, clinical reviews, and discharge.     PHYSICIAN ADVISORY SERVICES:  Medical Necessity Denial & Dqgg-rw-Jupk Review  Phone: [phone]  Fax: [phone]  Email: [email or web address]     DISCHARGE SUPPORT TEAM:  For Patients Discharge Needs & Updates  Phone: [phone] opt. 2 Fax: [phone]  Email: [email or web address]

## 2024-08-29 NOTE — UTILIZATION REVIEW
Initial Clinical Review    Admission: Date/Time/Statement:   Admission Orders (From admission, onward)       Ordered        08/28/24 1528  INPATIENT ADMISSION  Once                          Orders Placed This Encounter   Procedures    INPATIENT ADMISSION     Standing Status:   Standing     Number of Occurrences:   1     Order Specific Question:   Level of Care     Answer:   Level 2 Stepdown / HOT [14]     Order Specific Question:   Estimated length of stay     Answer:   More than 2 Midnights     Order Specific Question:   Certification     Answer:   I certify that inpatient services are medically necessary for this patient for a duration of greater than two midnights. See H&P and MD Progress Notes for additional information about the patient's course of treatment.     ED Arrival Information       Expected   -    Arrival   8/28/2024 11:28    Acuity   Emergent              Means of arrival   Ambulance    Escorted by   [facility]    Service   Hospitalist    Admission type   Emergency              Arrival complaint   -             Chief Complaint   Patient presents with    Shortness of Breath     Pt presents with SOB, hx of emphysema. Breathing treatment given at SNF prior to EMS arrival. Concern for possible AFIB d/t elevated HR.        Initial Presentation: 63 y.o. male who presented by EMS to [facility] ED. Admitted as Inpatient for evaluation and treatment of Rapid AFIB, Suspected chronic PE.     PMHx:  has a past medical history of Alcohol abuse, Anxiety, Aortic insufficiency (12/18/2020), Atrial fibrillation (HCC), Cancer (HCC), COPD (chronic obstructive pulmonary disease) (HCC), Delirium, Encephalopathy, Epilepsy (HCC), History of chemotherapy, History of radiation therapy, Hypo-osmolality and hyponatremia, Hypokalemia, Hypothyroid, Lung cancer (HCC), Lyme disease, and Major depression.      Presented w/ worsening shortness of breath, found to be in rapid fibrillation in the ED. He was  initiated on a Cardizem infusion for rate control with improvement of heart rates from the 140-160 range to the 70-80 range. He is also initiated on a heparin drip for anticoagulation. . On exam, Mild 1+ edema to LE B/L.  Labs D-dimer 1.85, , Creat 0.58 Na 129. Imaging CT Chest:  New onset occlusion or markedly diminished flow of the right upper lobe pulmonary artery compared to contrast-enhanced study from November 5, 2020. This is probably related to the chronic right upper lobe pulmonary fibrosis. Small chronic left upper lobe pulmonary embolus suggested. New moderate pericardial effusion.    Plan: Continue Heparin and Cardizem gtt, Echo,Thyroid panel and Doppler LE. Pulmonary and Cardiology consulted.      Anticipated Length of Stay/Certification Statement: Patient will be admitted on an Inpatient basis with an anticipated length of stay of greater than 2 midnights.   Justification for Hospital Stay: Rapid atrial fibrillation requiring intravenous rate control along with IV heparin for anticoagulation, along with chronic shortness of breath due to a multitude of pulmonary issues as above.     Date: 8/29/24   Day 2:     Pulmonary Consult:Acute hypoxic resp failure. Suspected PE.  His CT angiogram did not show any obvious pulmonary embolism.  However the radiologist raised the possibility of small chronic embolism in the left segmental artery.  It is not very convincing.  His D-dimer has been elevated in the setting of cancer. Venous doppler LE pending. Pt does not use oxygen at home and currently on O2 2L.  Plan: Continue to tritrate O2. Home O2 eval prior to discharge. Continue IV heparin.     Cardiology Consult: Rapid AFIB. Moderate Pericardial effusion. Presents with shortness of breath and found to be in rapid atrial fibrillation. CT imaging also showed moderate pericardial effusion which is confirmed on an echocardiogram. Also found to have moderate to severe aortic regurg which was also noted on Echo  in 2020 but LVEF has now decreased to 35-40%. Pt is back in sinus rhythm.  Reports feeling better and wants to leave so he can smoke. Plan: Discontinue Cardizem gtt. Increase Metoprolol to BID. Heparin gtt transition to oral anticoagulation. Give dose of IV lasix today, re-evaluate in am.     ED Triage Vitals [08/28/24 1129]   Temperature Pulse Respirations Blood Pressure SpO2 Pain Score   97.6 °F (36.4 °C) (!) 148 22 95/61 95 % No Pain     Weight (last 2 days)       Date/Time Weight    08/28/24 1610 67.8 (149.47)    08/28/24 1518 67.8 (149.47)            Vital Signs (last 3 days)       Date/Time Temp Pulse Resp BP MAP (mmHg) SpO2 Calculated FIO2 (%) - Nasal Cannula Nasal Cannula O2 Flow Rate (L/min) O2 Device Patient Position - Orthostatic VS Pain    08/29/24 0834 -- -- -- -- -- 97 % 32 3 L/min Nasal cannula -- --    08/29/24 0830 -- -- -- -- -- 95 % 32 3 L/min Nasal cannula -- --    08/29/24 0734 -- -- 22 127/46 -- -- -- -- -- Sitting --    08/29/24 0731 98 °F (36.7 °C) -- -- -- -- 94 % 32 3 L/min Nasal cannula -- --    08/29/24 0302 -- 69 20 116/69 83 99 % 32 3 L/min Nasal cannula Sitting --    08/29/24 0009 -- 81 20 108/71 80 95 % 32 3 L/min Nasal cannula Sitting --    08/28/24 2255 97.6 °F (36.4 °C) 78 20 124/57 -- 99 % 32 3 L/min Nasal cannula Sitting --    08/28/24 2000 -- -- -- -- -- -- 32 3 L/min Nasal cannula -- No Pain    08/28/24 1854 97.7 °F (36.5 °C) 73 16 130/61 -- 94 % 32 3 L/min Nasal cannula Sitting --    08/28/24 1831 -- 88 -- -- -- 99 % 32 3 L/min Nasal cannula -- --    08/28/24 1637 -- -- -- -- -- -- -- -- -- -- No Pain    08/28/24 1610 97.7 °F (36.5 °C) 67 16 130/54 75 96 % 28 2 L/min Nasal cannula Lying --    08/28/24 1500 -- 81 22 110/56 80 97 % -- -- -- -- --    08/28/24 1430 -- 82 20 113/54 78 98 % -- -- -- -- --    08/28/24 1411 -- -- -- -- -- 98 % 24 1 L/min Nasal cannula -- --    08/28/24 1400 -- 87 26 112/69 86 92 % -- -- -- -- --    08/28/24 1330 -- 76 21 106/53 77 93 % -- -- -- -- No  Pain    08/28/24 1300 -- 79 21 126/59 85 93 % -- -- None (Room air) -- --    08/28/24 1245 -- 85 22 109/59 80 92 % -- -- None (Room air) -- --    08/28/24 1230 -- 93 21 125/56 80 94 % -- -- None (Room air) Lying --    08/28/24 1215 -- 160 -- 111/67 -- -- -- -- -- -- --    08/28/24 1203 -- 156 -- 122/73 -- -- -- -- -- -- --    08/28/24 1145 -- -- -- -- -- -- -- -- None (Room air) -- --    08/28/24 1135 -- 150 22 107/65 -- 95 % -- -- None (Room air) Sitting No Pain    08/28/24 1129 97.6 °F (36.4 °C) 148 22 95/61 -- 95 % -- -- None (Room air) Lying No Pain              Pertinent Labs/Diagnostic Test Results:   Radiology:  CTA ED chest PE study   Final Interpretation by Rodrigo Wilder DO (08/28 1424)      1. No definite acute pulmonary emboli.      2. New onset occlusion or markedly diminished flow of the right upper lobe pulmonary artery compared to contrast-enhanced study from November 5, 2020. This is probably related to the chronic right upper lobe pulmonary fibrosis. Small chronic left upper    lobe pulmonary embolus suggested.      3. New, moderate pericardial effusion.      4. Stable appearance of RUL/right lung paramediastinal fibrosis. Apparent occlusion of the right middle lobe bronchi near their origin compared to the recent study. Follow-up CT chest in 3 months recommended to reassess this finding and exclude subtle    neoplasm recurrence.      5. Unchanged appearance of 5.4 cm ascending aortic aneurysm.      The study was marked in EPIC for follow-up.      Workstation performed: [id number]         XR chest 1 view portable   Final Interpretation by Lester Conner MD (08/28 1752)      Pericardial effusion is better evident on concurrent CT.   Small right pleural effusion.   Unchanged posttreatment change in the right upper lung.         Workstation performed: [id number]          VAS VENOUS DUPLEX - LOWER LIMB BILATERAL    (Results Pending)     Cardiology:  ECG 12 lead   Final Result by Roshan Hutson,  MD (08/29 0757)   Sinus rhythm with Fusion complexes and Premature atrial complexes   Minimal voltage criteria for LVH, may be normal variant   T wave abnormality, consider lateral ischemia   Abnormal ECG      Confirmed by Roshan Hutson ([id number]) on 8/29/2024 7:57:26 AM      ECG 12 lead   Final Result by Roshan Hutson MD (08/29 0757)   Atrial fibrillation with rapid ventricular response with premature    ventricular or aberrantly conducted complexes   Left axis deviation   Incomplete left bundle branch block   Minimal voltage criteria for LVH, may be normal variant   ST & T wave abnormality, consider lateral ischemia   Abnormal ECG      Confirmed by Roshan Hutson ([id number]) on 8/29/2024 7:57:12 AM            Results from last 7 days   Lab Units 08/28/24  1149   SARS-COV-2  Negative     Results from last 7 days   Lab Units 08/29/24  0412 08/28/24  1142   WBC Thousand/uL 5.39 5.50   HEMOGLOBIN g/dL 12.0 13.0   HEMATOCRIT % 35.8* 38.3   PLATELETS Thousands/uL 237 242   TOTAL NEUT ABS Thousands/µL 3.42 3.60         Results from last 7 days   Lab Units 08/29/24  0412 08/28/24  1142   SODIUM mmol/L 131* 129*   POTASSIUM mmol/L 3.9 3.6   CHLORIDE mmol/L 93* 89*   CO2 mmol/L 31 33*   ANION GAP mmol/L 7 7   BUN mg/dL 12 11   CREATININE mg/dL 0.51* 0.58*   EGFR ml/min/1.73sq m 114 108   CALCIUM mg/dL 9.1 9.1   MAGNESIUM mg/dL 2.1  --      Results from last 7 days   Lab Units 08/28/24  1142   AST U/L 19   ALT U/L 14   ALK PHOS U/L 98   TOTAL PROTEIN g/dL 7.4   ALBUMIN g/dL 3.7   TOTAL BILIRUBIN mg/dL 0.59         Results from last 7 days   Lab Units 08/29/24  0412 08/28/24  1142   GLUCOSE RANDOM mg/dL 99 126     Results from last 7 days   Lab Units 08/28/24  1635 08/28/24  1350 08/28/24  1142   HS TNI 0HR ng/L  --   --  8   HS TNI 2HR ng/L  --  9  --    HSTNI D2 ng/L  --  1  --    HS TNI 4HR ng/L 10  --   --    HSTNI D4 ng/L 2  --   --      Results from last 7 days   Lab Units 08/28/24  1146   D-DIMER QUANTITATIVE ug/ml FEU 1.85*      Results from last 7 days   Lab Units 08/29/24  0412 08/28/24  2123 08/28/24  1546   PROTIME seconds  --   --  15.3*   INR   --   --  1.17   PTT seconds 59* 61* 40*     Results from last 7 days   Lab Units 08/29/24  0412 08/28/24  1142   TSH 3RD GENERATON uIU/mL 0.506 0.495         Results from last 7 days   Lab Units 08/28/24  1142   BNP pg/mL 388*     Results from last 7 days   Lab Units 08/28/24  1149   INFLUENZA A PCR  Negative   INFLUENZA B PCR  Negative   RSV PCR  Negative     ED Treatment-Medication Administration from 08/28/2024 1128 to 08/28/2024 1604         Date/Time Order Dose Route Action     08/28/2024 1203 diltiazem (CARDIZEM) 125 mg in sodium chloride 0.9 % 125 mL infusion 5 mg/hr Intravenous New Bag     08/28/2024 1215 diltiazem (CARDIZEM) 125 mg in sodium chloride 0.9 % 125 mL infusion 7.5 mg/hr Intravenous New Bag     08/28/2024 1213 diltiazem (CARDIZEM) injection 15 mg 15 mg Intravenous Given     08/28/2024 1231 iohexol (OMNIPAQUE) 350 MG/ML injection (SINGLE-DOSE) 100 mL 75 mL Intravenous Given     08/28/2024 1550 heparin (porcine) injection 3,900 Units 3,900 Units Intravenous Given     08/28/2024 1551 heparin (porcine) 25,000 units in 0.45% NaCl 250 mL infusion (premix) 12 Units/kg/hr Intravenous New Bag            Past Medical History:   Diagnosis Date    Alcohol abuse     Anxiety     Aortic insufficiency 12/18/2020    Atrial fibrillation (HCC)     Cancer (HCC)     COPD (chronic obstructive pulmonary disease) (HCC)     Delirium     Encephalopathy     Epilepsy (HCC)     History of chemotherapy     History of radiation therapy     Hypo-osmolality and hyponatremia     Hypokalemia     Hypothyroid     Lung cancer (HCC)     Lyme disease     Major depression      Present on Admission:   Tobacco abuse   COPD (chronic obstructive pulmonary disease) (HCC)   Epilepsy (HCC)      Admitting Diagnosis: Shortness of breath [R06.02]  SOB (shortness of breath) [R06.02]  Atrial fibrillation with RVR (HCC)  [I48.91]  Age/Sex: 63 y.o. male  Admission Orders:  Scheduled Medications:  cholecalciferol, 1,000 Units, Oral, Daily  Fluticasone Furoate-Vilanterol, 1 puff, Inhalation, Daily  furosemide, 40 mg, Oral, Daily  gabapentin, 400 mg, Oral, BID  metoprolol succinate, 25 mg, Oral, BID  nicotine, 14 mg, Transdermal, Daily  phenytoin, 100 mg, Oral, BID  sodium chloride, 1 g, Oral, BID With Meals  umeclidinium, 1 puff, Inhalation, Daily  potassium chloride (Klor-Con M20) CR tablet 20 mEq  Dose: 20 mEq  Freq: Daily Route: PO  Start: 08/29/24 0900 End: 08/29/24 0856 - given 8/29 x1   Furosemide (LASIX) injection 40 mg  Dose: 40 mg  Freq: Once Route: IV  Start: 08/29/24 1130 End: 08/29/24 1231 given X1 8/29@ 1231.  furosemide (LASIX) tablet 40 mg  Dose: 40 mg  Freq: Daily Route: PO  Start: 08/29/24 0900 End: 08/29/24 1122 givenX1 8/29 @ 0842.   Admin Instructions:     Continuous IV Infusions:    heparin (porcine), 3-20 Units/kg/hr (Order-Specific), Intravenous, Titrated    diltiazem (CARDIZEM) 125 mg in sodium chloride 0.9 % 125 mL infusion  Rate: 1-15 mL/hr Dose: 1-15 mg/hr  Freq: Titrated Route: IV  Last Dose: 7.5 mg/hr (08/29/24 0105)  Start: 08/28/24 1200 End: 08/29/24 0857    PRN Meds:  dextromethorphan-guaiFENesin, 10 mL, Oral, Q4H PRN - given x2 8/29  heparin (porcine), 1,950 Units, Intravenous, Q6H PRN - given x1 8/29  heparin (porcine), 3,900 Units, Intravenous, Q6H PRN  levalbuterol, 0.63 mg, Nebulization, Q8H PRN  trimethobenzamide, 200 mg, Intramuscular, Q6H PRN        IP CONSULT TO CARDIOLOGY  IP CONSULT TO PULMONOLOGY    Network Utilization Review Department  ATTENTION: Please call with any questions or concerns to [phone] and carefully listen to the prompts so that you are directed to the right person. All voicemails are confidential.   For Discharge needs, contact Care Management DC Support Team at [phone] opt. [phone]  Send all requests for admission clinical reviews, approved or denied determinations and  any other requests to dedicated fax number below belonging to the campus where the patient is receiving treatment. List of dedicated fax numbers for the Facilities:  FACILITY NAME UR FAX NUMBER   ADMISSION DENIALS (Administrative/Medical Necessity) [phone]   DISCHARGE SUPPORT TEAM (NETWORK) [phone]   PARENT CHILD HEALTH (Maternity/NICU/Pediatrics) [phone]   [facility] [phone]   [facility] [phone]   [facility] [phone]   [facility] [phone]   [facility] [phone]   [facility] [phone]   [facility] [phone]   [facility] [phone]   [facility] [phone]   [facility] [phone]   [facility] [phone]   [facility] [phone]

## 2024-08-29 NOTE — PROGRESS NOTES
Pt extremely irritable all night, Supervisor Christine made aware and she intervened at times, pt refusing care, refused skin check, refused to lay in bed, refused to sleep in recliner, sat in regular chair all night, refused to sign refusal of care form, pt at times taking his oxygen off and refusing to put it back on, on-call [facility]lim Provider made aware. Agreed to blood work after a lengthy talk with Sup.

## 2024-08-29 NOTE — CASE MANAGEMENT
Case Management Assessment & Discharge Planning Note    Patient name Marcin Sánchez  Location /-01 MRN [id number]  : 1960 Date 2024       Current Admission Date: 2024  Current Admission Diagnosis:Rapid atrial fibrillation (HCC)   Patient Active Problem List    Diagnosis Date Noted Date Diagnosed    Chronic combined systolic and diastolic CHF (congestive heart failure) (HCC) 2024     Rapid atrial fibrillation (HCC) 2024     Pulmonary fibrosis (HCC) 2024     Suspected chronic pulmonary embolism (HCC) 2024     Squamous cell lung cancer (HCC) 2024     Hyponatremia 2024     Severe protein-calorie malnutrition (HCC) 2024     Edema of both legs 2022     Tobacco abuse 2020     Nonrheumatic aortic valve insufficiency 2020     Malignant neoplasm of upper lobe of right lung (HCC) 2018     Thoracic aortic aneurysm without rupture (HCC) 2018     Cancer of right main bronchus (HCC) 10/04/2016     Pleural effusion 10/02/2016     Multiple lung nodules on CT 10/02/2016     Collapse of right lung 2016     COPD (chronic obstructive pulmonary disease) (HCC)      Epilepsy (HCC)      Lyme disease      Major depression      Alcohol abuse        LOS (days): 1  Geometric Mean LOS (GMLOS) (days):   Days to GMLOS:     OBJECTIVE:    Risk of Unplanned Readmission Score: 13.55     Current admission status: Inpatient    Preferred Pharmacy:   [address],  [address] [place] [address]  Phone: [phone] Fax: [phone]    [address] [address]  Phone: [phone] Fax: [phone]    Primary Care Provider: Brandt Godinez MD    Primary Insurance: [employer] [facility]  Secondary Insurance:     ASSESSMENT:  Active Health Care Proxies    There are no active Health Care  Proxies on file.       Readmission Root Cause  30 Day Readmission: No    Patient Information  Admitted from:: Facility  Mental Status: Alert  During Assessment patient was accompanied by: Not accompanied during assessment  Assessment information provided by:: Patient  Primary Caregiver: Self  Support Systems: Family members (Facility Staff)  County of Residence: [place]  What [facility] do you live in?: [place]  Home entry access options. Select all that apply.: No steps to enter home  Type of Current Residence: Facility  Upon entering residence, is there a bedroom on the main floor (no further steps)?: Yes  Upon entering residence, is there a bathroom on the main floor (no further steps)?: Yes  Living Arrangements: Other (Comment) ([facility]ives at [facility])    Activities of Daily Living Prior to Admission  Functional Status: Assistance  Completes ADLs independently?: Yes  Ambulates independently?: No  Level of ambulatory dependence: Assistance  Does patient use assisted devices?: Yes  Does patient currently own DME?: Yes  Does patient have a history of Outpatient Therapy (PT/OT)?: No  Does the patient have a history of Short-Term Rehab?: Yes ([facility] acute hospital)  Does patient have a history of HHC?: No  Does patient currently have HHC?: No    Patient Information Continued  Income Source: SSI/SSD  Does patient have prescription coverage?: Yes  Does patient receive dialysis treatments?: No  Does patient have a history of substance abuse?: Yes  Historical substance use preference: Alcohol/ETOH  History of Withdrawal Symptoms: Denies past symptoms  Is patient currently in treatment for substance abuse?: N/A - sober  Does patient have a history of Mental Health Diagnosis?: No    Means of Transportation  Means of Transport to Appts:: Other (Comment) (Facility/Staff transport)      Social Determinants of Health (SDOH)      Flowsheet Row Most Recent Value   Housing Stability    In the last 12 months, was there a time when  you were not able to pay the mortgage or rent on time? N   In the past 12 months, how many times have you moved where you were living? 0   At any time in the past 12 months, were you homeless or living in a shelter (including now)? N   Transportation Needs    In the past 12 months, has lack of transportation kept you from medical appointments or from getting medications? no   In the past 12 months, has lack of transportation kept you from meetings, work, or from getting things needed for daily living? No   Food Insecurity    Within the past 12 months, you worried that your food would run out before you got the money to buy more. Never true   Within the past 12 months, the food you bought just didn't last and you didn't have money to get more. Never true   Utilities    In the past 12 months has the electric, gas, oil, or water company threatened to shut off services in your home? No          DISCHARGE DETAILS:    Discharge planning discussed with:: Patient  Freedom of Choice: Yes     CM contacted family/caregiver?: No- see comments (Patient declined)  Were Treatment Team discharge recommendations reviewed with patient/caregiver?: Yes  Did patient/caregiver verbalize understanding of patient care needs?: Yes  Were patient/caregiver advised of the risks associated with not following Treatment Team discharge recommendations?: Yes    Contacts  Patient Contacts: Patient declined    Requested Home Health Care         Is the patient interested in HHC at discharge?: No    DME Referral Provided  Referral made for DME?: No    Other Referral/Resources/Interventions Provided:  Interventions: None Indicated    Would you like to participate in our [facility] service program?  : No - Declined    Treatment Team Recommendation: Facility Return  Discharge Destination Plan:: Facility Return  Transport at Discharge : Wheelchair van     Accepting Facility Name, City & State : [facility]; [address] [place] [address]  [place], [id number]  Receiving Facility/Agency Phone Number: [phone]  Facility/Agency Fax Number: [phone]    LORIE met with the patient bedside. Patient irritable, asking to leave right away and return to the [facility]. CM sent message to [facility] asking for patient return once medically cleared.

## 2024-08-29 NOTE — ASSESSMENT & PLAN NOTE
As suspected on CT imaging in the setting of progressive COPD, ongoing tobacco abuse, and lung cancer history  Maintain oxygenation as necessary currently requiring up to 2-3 L via nasal cannula  Long-term prognosis poor due to comorbidities  Pulmonology input appreciated

## 2024-08-29 NOTE — PROGRESS NOTES
[facility]  Progress Note  Name: aMrcin Sánchez I  MRN: [id number]  Unit/Bed#: -01 I Date of Admission: 8/28/2024   Date of Service: 8/29/2024 I Hospital Day: 1      Assessment & Plan:    * Rapid atrial fibrillation (HCC)  Assessment & Plan  Presenting heart rates in the 140s to 160s -> initiated on a Cardizem drip with better control, currently in the 60s to 80s -> Cardizem drip now discontinued per cardiology -> transition to oral beta-blockade (Toprol-XL)   Initiated on a heparin drip for anticoagulation -> can transition to oral anticoagulation on discharge  See plan for CHF below regarding echocardiogram findings  Monitor/replete electrolyte deficiencies of present  TSH normal  Further recommendations per cardiology    Pulmonary fibrosis (HCC)  Assessment & Plan  As suspected on CT imaging in the setting of progressive COPD, ongoing tobacco abuse, and lung cancer history  Maintain oxygenation as necessary currently requiring up to 2-3 L via nasal cannula  Long-term prognosis poor due to comorbidities  Pulmonology input appreciated    Suspected chronic pulmonary embolism (HCC)  Assessment & Plan  CT imaging suggestive of a possible small left upper lobe chronic pulmonary embolism  Discussed with pulmonology who is unsure of the validity -> D-dimer elevated, hence, will proceed with lower extremity venous duplex study  Currently on a heparin drip for rapid fibrillation    Squamous cell lung cancer (HCC)  Assessment & Plan  Status post prior chemotherapy and radiation  Follows outpatient oncology (Dr. Kuo)    Tobacco abuse  Assessment & Plan  Cessation counseling, although unlikely to comply  Transdermal nicotine patch on board    Hyponatremia  Assessment & Plan  Suspect a SIADH type etiology in the setting of malignancy  Trial fluid restriction - c/w salt tablet supplementation (suspect noncompliance)  Monitor serum sodium    Severe protein-calorie malnutrition  ([facility])  Assessment & Plan  BMI of 22.00 in the setting of chronic tobacco abuse coupled with malignancy (lung cancer), decreased appetite/oral intake, and evidence of muscle wasting/atrophy and exam  Initiate nutritional supplementation with meals    COPD (chronic obstructive pulmonary disease) ([facility])  Assessment & Plan  Progressive COPD unfortunately secondary to ongoing tobacco abuse  Continue Trelegy equivalent w/ additional as needed nebulization  Maintain oxygenation as necessary  Tobacco smoking cessation counseling, although unlikely to comply    Epilepsy ([facility])  Assessment & Plan  Continue Dilantin/Neurontin    Chronic combined systolic and diastolic CHF (congestive heart failure) ([facility])  Assessment & Plan  Updated echocardiogram noting an EF of 35-40% with moderate global LV hypokinesis, mild LA dilatation, moderate-severe AR, and a moderate pericardial effusion without evidence of cardiac tamponade  Cardiology following -> administered a dose of IV Lasix today with plan for reassessment tomorrow - continue beta-blockade Toprol-XL  Monitor/replete electrolyte deficiencies of present  Sodium/fluid restriction      DVT Prophylaxis:  Heparin drip       AM-PAC Basic Mobility:  Basic Mobility Inpatient Raw Score: 23  JH-HLM Achieved: 6: Walk 10 steps or more  JH-HLM Goal: 7: Walk 25 feet or more    Patient Centered Rounds:  I have performed bedside rounds and discussed plan of care with nursing today.  Discussions with Specialists or Other Care Team Provider:  see above assessments if applicable    Education and Discussions with Family / Patient:  Patient at bedside, who denied need to update other contacts    Time Spent for Care:  35 minutes. More than 50% of total time spent on counseling and coordination of care, on one or more of the following: performing physical exam; counseling and coordination of care, obtaining or reviewing history, documenting in the medical record, reviewing/ordering  "tests/medications/procedures, and communicating with other healthcare professionals.    Current Length of Stay: 1 day(s)  Current Patient Status: Inpatient   Certification Statement:  Patient will continue to require additional hospital stay due to assessments as noted above.    Code Status: Level 1 - Full Code        Subjective:     Encountered earlier in the day.  Remains weak and fatigued.  Honestly states that he \"wants to smoke\".  Notes that his shortness of breath has somewhat improved today.        Objective:     Vitals:   Temp (24hrs), Av.7 °F (36.5 °C), Min:97.6 °F (36.4 °C), Max:98 °F (36.7 °C)    Temp:  [97.6 °F (36.4 °C)-98 °F (36.7 °C)] 97.6 °F (36.4 °C)  HR:  [67-88] 70  Resp:  [16-22] 20  BP: (104-130)/(46-71) 107/58  SpO2:  [94 %-100 %] 100 %  Body mass index is 22 kg/m².     Input and Output Summary (last 24 hours):       Intake/Output Summary (Last 24 hours) at 2024 1535  Last data filed at 2024 1301  Gross per 24 hour   Intake 130 ml   Output 950 ml   Net -820 ml       Physical Exam:     GENERAL Weak/fatigued and frail/cachectic   HEAD   Normocephalic - atraumatic   EYES Nonicteric   MOUTH   Mucosa moist   NECK   Supple - full range of motion   CARDIAC Rate controlled   PULMONARY Diminished more prominently at the bases   ABDOMEN Nontender/nondistended   MUSCULOSKELETAL   Motor strength/range of motion intact   NEUROLOGIC   Alert/oriented at baseline   SKIN   Chronic wrinkles/blemishes    PSYCHIATRIC   Mood/affect less anxious         Additional Data:     Labs & Recent Cultures:    Results from last 7 days   Lab Units 24  0412   WBC Thousand/uL 5.39   HEMOGLOBIN g/dL 12.0   HEMATOCRIT % 35.8*   PLATELETS Thousands/uL 237   SEGS PCT % 63   LYMPHO PCT % 15   MONO PCT % 18*   EOS PCT % 2     Results from last 7 days   Lab Units 24  0412 24  1142   POTASSIUM mmol/L 3.9 3.6   CHLORIDE mmol/L 93* 89*   CO2 mmol/L 31 33*   BUN mg/dL 12 11   CREATININE mg/dL 0.51* 0.58* "   CALCIUM mg/dL 9.1 9.1   ALK PHOS U/L  --  98   ALT U/L  --  14   AST U/L  --  19     Results from last 7 days   Lab Units 08/28/24  1546   INR  1.17                             Lines/Drains:  Invasive Devices       Peripheral Intravenous Line  Duration             Peripheral IV 08/28/24 Left Antecubital 1 day    Peripheral IV 08/28/24 Right Antecubital 1 day                      Last 24 Hours Medication List:   Current Facility-Administered Medications   Medication Dose Route Frequency Provider Last Rate    cholecalciferol  1,000 Units Oral Daily Malia Donis MD      dextromethorphan-guaiFENesin  10 mL Oral Q4H PRN Malia Donis MD      Fluticasone Furoate-Vilanterol  1 puff Inhalation Daily Malia Donis MD      gabapentin  400 mg Oral BID Malia Donis MD      heparin (porcine)  3-20 Units/kg/hr (Order-Specific) Intravenous Titrated Checo Arthur DO 14 Units/kg/hr (08/29/24 0701)    heparin (porcine)  1,950 Units Intravenous Q6H PRN Checo Arthur, DO      heparin (porcine)  3,900 Units Intravenous Q6H PRN Checo Arthur,       levalbuterol  0.63 mg Nebulization Q8H PRN Malia Donis MD      metoprolol succinate  25 mg Oral BID Roshan Hutson MD      nicotine  14 mg Transdermal Daily Malia Donis MD      phenytoin  100 mg Oral BID Malia Donis MD      potassium chloride  40 mEq Oral Once Malia Donis MD      sodium chloride  1 g Oral BID With Meals Malia Donis MD      trimethobenzamide  200 mg Intramuscular Q6H PRN Malia Donis MD      umeclidinium  1 puff Inhalation Daily MD MALIA Diaz MD   Hospitalist - [facility]        ** Please Note: This note is constructed using a voice recognition dictation system.  An occasional wrong word/phrase or “sound-a-like” substitution may have been picked up by dictation device due to the inherent limitations of voice recognition software.  Read the chart carefully and recognize, using reasonable context, where  substitutions may have occurred.**

## 2024-08-29 NOTE — CONSULTS
Consultation - Cardiology   Marcin ERNIE Sánchez 63 y.o. male MRN: [id number]  Unit/Bed#: -01 Encounter: [id number]    Assessment:  Principal Problem:    Rapid atrial fibrillation (HCC)  Active Problems:    COPD (chronic obstructive pulmonary disease) (HCC)    Epilepsy (HCC)    Tobacco abuse    Nonrheumatic aortic valve insufficiency    Pulmonary fibrosis (HCC)    Suspected chronic pulmonary embolism (HCC)    Squamous cell lung cancer (HCC)    Hyponatremia    Severe protein-calorie malnutrition (HCC)    Chronic combined systolic and diastolic CHF (congestive heart failure) (HCC)    63-year-old man with known large ascending thoracic aortic aneurysm, COPD, history of lung cancer, pulmonary fibrosis. Presents with shortness of breath and found to be in rapid atrial fibrillation. CT imaging also showed moderate pericardial effusion which is confirmed on an echocardiogram.    He is also found to have moderate to severe aortic regurgitation which was also noted on echo in 2020, but LVEF has now decreased to 35-40%.    Plan:  Paroxysmal atrial fibrillation:  He is back in sinus rhythm. Discontinue diltiazem. Increase metoprolol. Currently on heparin infusion. Transition to oral anticoagulation per primary team.    Aortic regurgitation:  Previously noted in 2020. Has not followed with cardiology before. Has been maintained on 40 mg of Lasix daily and 25 mg of Toprol-XL. Given the hypoxia, we will give him a dose of IV Lasix today and reevaluate tomorrow.    Combined CHF:  New finding of a drop in ejection fraction to the range of 35 to 40%. Likely secondary to the AI, although the LV is not significantly dilated. Volume management as above. Increase beta-blocker as noted above.    Pericardial effusion:  Moderate effusion confirmed on echocardiogram. No evidence of tamponade. Caution with diuresis as I recommended above. No indication currently for any drainage      History of Present Illness   Physician Requesting  Consult: Malia Donis MD  Reason for Consult / Principal Problem: Afib  HPI: Marcin Sánchez is a 63 y.o. year old male who presents with complaints of shortness of breath.    [facility] He has COPD, is an active smoker. He has a history of lung cancer status post chemo and radiation. He has pulmonary fibrosis noted on his imaging. The patient denies any cardiac history to me, but he does have a known large thoracic aortic aneurysm and he has seen cardiothoracic surgery in the past, last in 2020 at which time he was deemed not an operative candidate.    Patient currently tells me he feels better and wants to leave, particularly so that he can smoke.    He was in rapid atrial fibrillation in the emergency room. He was put on Cardizem and did convert back to sinus rhythm. He is also found to have a pericardial effusion on his CT scan. There was a question of a pulmonary embolism. He would need anticoagulation, regardless.    Overall he is denying any symptoms to me currently.      His echocardiogram was performed this morning and I reviewed it. His EF is decreased in the range of 35 to 40%. He has moderate to severe aortic regurgitation secondary to the dilated aorta. The moderate pericardial effusion is confirmed but there is no evidence of tamponade.    Last echocardiogram in the system is from November 2020 at which time he had preserved EF, but did have moderate to severe AI at that time, also.    Inpatient consult to Cardiology  Consult performed by: Roshan Hutson MD  Consult ordered by: Malia Donis MD          Review of Systems:  Shortness of breath. Palpitations,  Otherwise, patient denies    Historical Information   Past Medical History:   Diagnosis Date    Alcohol abuse     Anxiety     Aortic insufficiency 12/18/2020    Atrial fibrillation (HCC)     Cancer (HCC)     COPD (chronic obstructive pulmonary disease) (HCC)     Delirium     Encephalopathy     Epilepsy (HCC)     History of  chemotherapy     History of radiation therapy     Hypo-osmolality and hyponatremia     Hypokalemia     Hypothyroid     Lung cancer (HCC)     Lyme disease     Major depression      Past Surgical History:   Procedure Laterality Date    BRONCHOSCOPY N/A 10/3/2016    Procedure: BRONCHOSCOPY FLEXIBLE;  Surgeon: John Brunner DO;  Location: [facility];  Service:     HAND SURGERY      PELVIC FRACTURE SURGERY      REMOVAL VENOUS PORT (PORT-A-CATH) Left 9/18/2018    Procedure: REMOVAL VENOUS PORT (PORT-A-CATH)IR;  Surgeon: Randy Lopez DO;  Location: [facility];  Service: Interventional Radiology     Social History     Substance and Sexual Activity   Alcohol Use Never     Social History     Substance and Sexual Activity   Drug Use Never     Social History     Tobacco Use   Smoking Status Every Day    Current packs/day: 1.50    Average packs/day: 1.5 packs/day for 47.0 years (70.5 ttl pk-yrs)    Types: Cigarettes   Smokeless Tobacco Never   Tobacco Comments    smoking more than 1 ppd     Family History: non-contributory    Meds/Allergies     Current Facility-Administered Medications:     Cholecalciferol (VITAMIN D3) tablet 1,000 Units, 1,000 Units, Oral, Daily, Malia Donis MD, 1,000 Units at 08/29/24 0841    dextromethorphan-guaiFENesin (ROBITUSSIN DM) oral syrup 10 mL, 10 mL, Oral, Q4H PRN, Malia Donis MD, 10 mL at 08/29/24 0848    Fluticasone Furoate-Vilanterol 100-25 mcg/actuation 1 puff, 1 puff, Inhalation, Daily, Malia Donis MD, 1 puff at 08/29/24 0827    furosemide (LASIX) tablet 40 mg, 40 mg, Oral, Daily, Malia Donis MD, 40 mg at 08/29/24 0842    gabapentin (NEURONTIN) capsule 400 mg, 400 mg, Oral, BID, Malia Donis MD, 400 mg at 08/29/24 0841    heparin (porcine) 25,000 units in 0.45% NaCl 250 mL infusion (premix), 3-20 Units/kg/hr (Order-Specific), Intravenous, Titrated, Checo Arthur DO, Last Rate: 9.1 mL/hr at 08/29/24 0701, 14 Units/kg/hr at 08/29/24 0701    heparin (porcine) injection 1,950  "Units, 1,950 Units, Intravenous, Q6H PRN, Checo Arthur DO, 1,950 Units at 08/29/24 0512    heparin (porcine) injection 3,900 Units, 3,900 Units, Intravenous, Q6H PRN, Checo Arthur DO    levalbuterol (XOPENEX) inhalation solution 0.63 mg, 0.63 mg, Nebulization, Q8H PRN, Malia Donis MD    metoprolol succinate (TOPROL-XL) 24 hr tablet 25 mg, 25 mg, Oral, BID, Roshan Hutson MD    nicotine (NICODERM CQ) 14 mg/24hr TD 24 hr patch 14 mg, 14 mg, Transdermal, Daily, Malia Donis MD    phenytoin (DILANTIN) ER capsule 100 mg, 100 mg, Oral, BID, Malia Donis MD, 100 mg at 08/29/24 0841    potassium chloride oral solution 40 mEq, 40 mEq, Oral, Once, Malia Donis MD    sodium chloride tablet 1 g, 1 g, Oral, BID With Meals, Malia Donis MD, 1 g at 08/29/24 0841    trimethobenzamide (TIGAN) IM injection 200 mg, 200 mg, Intramuscular, Q6H PRN, Malia Donis MD    umeclidinium 62.5 mcg/actuation inhaler AEPB 1 puff, 1 puff, Inhalation, Daily, Malia Donis MD, 1 puff at 08/29/24 0827  No Known Allergies    Objective   Vitals: Blood pressure (!) 127/46, pulse 69, temperature 98 °F (36.7 °C), temperature source Tympanic, resp. rate 22, height 5' 9\" (1.753 m), weight 67.6 kg (149 lb), SpO2 97%., Body mass index is 22 kg/m².,   Orthostatic Blood Pressures      Flowsheet Row Most Recent Value   Blood Pressure 127/46 filed at 08/29/2024 1011   Patient Position - Orthostatic VS Sitting filed at 08/29/2024 0734              Intake/Output Summary (Last 24 hours) at 8/29/2024 1118  Last data filed at 8/29/2024 0601  Gross per 24 hour   Intake 10 ml   Output 800 ml   Net -790 ml       Invasive Devices       Peripheral Intravenous Line  Duration             Peripheral IV 08/28/24 Left Antecubital <1 day    Peripheral IV 08/28/24 Right Antecubital <1 day                    Physical Exam:  GEN: Marcin Sánchez chronically ill appearing man.  HEENT: pupils equal, round, and reactive to light; extraocular muscles intact  NECK: supple, " no carotid bruits   HEART: regular Soft heart sounds.  LUNGS: decreased air entry  ABDOMEN: normal bowel sounds, soft, no tenderness, no distention  EXTREMITIES: no edema  NEURO: no focal findings   SKIN: normal without suspicious lesions on exposed skin    Lab Results:         Results from last 7 days   Lab Units 08/29/24  0412 08/28/24  1142   WBC Thousand/uL 5.39 5.50   HEMOGLOBIN g/dL 12.0 13.0   HEMATOCRIT % 35.8* 38.3   PLATELETS Thousands/uL 237 242         Results from last 7 days   Lab Units 08/29/24  0412 08/28/24  1142   POTASSIUM mmol/L 3.9 3.6   CHLORIDE mmol/L 93* 89*   CO2 mmol/L 31 33*   BUN mg/dL 12 11   CREATININE mg/dL 0.51* 0.58*   CALCIUM mg/dL 9.1 9.1   ALK PHOS U/L  --  98   ALT U/L  --  14   AST U/L  --  19     Results from last 7 days   Lab Units 08/29/24  0412 08/28/24  2123 08/28/24  1546   INR   --   --  1.17   PTT seconds 59* 61* 40*     Results from last 7 days   Lab Units 08/29/24  0412   MAGNESIUM mg/dL 2.1       Imaging: I have personally reviewed pertinent films in PACS  XR chest 1 view portable    Result Date: 8/28/2024  Narrative: XR CHEST PORTABLE INDICATION: short of breath. COMPARISON: Chest radiograph January 13, 2024 FINDINGS: Enlarged cardiomediastinal silhouette. Unchanged opacification and volume loss of the right upper lung. Severe emphysema. Unchanged blunting of the right costophrenic angle.     Impression: Pericardial effusion is better evident on concurrent CT. Small right pleural effusion. Unchanged posttreatment change in the right upper lung. Workstation performed: [id number]     CTA ED chest PE study    Result Date: 8/28/2024  Narrative: CTA - CHEST WITH IV CONTRAST - PULMONARY ANGIOGRAM INDICATION: Shortness of breath. History of lung cancer. COMPARISON: CT chest 8/8/2024, CTA chest 11/5/2020 TECHNIQUE: CTA examination of the chest was performed using angiographic technique according to a protocol specifically tailored to evaluate for pulmonary embolism.  Multiplanar 2D reformatted images were created from the source data. In addition, coronal  3D MIP postprocessing was performed on the acquisition scanner. Radiation dose length product (DLP) for this visit: 222.6 mGy-cm. This examination, like all CT scans performed in the [facility], was performed utilizing techniques to minimize radiation dose exposure, including the use of iterative reconstruction and automated exposure control. IV Contrast: 75 mL of iohexol (OMNIPAQUE) FINDINGS: PULMONARY ARTERIAL TREE: There is new onset occlusion or markedly diminished flow of the right upper lobe pulmonary artery compared to contrast-enhanced study from November 5, 2020. This is probably related to the ongoing right upper lobe pulmonary fibrosis. There is a small filling defect within the left upper lobe segmental artery (series 501/77) which appears peripherally located and linear on coronal imaging, favoring a small chronic embolus. No definite acute pulmonary embolus. LUNGS: Stable right lung perihilar/paramediastinal radiation fibrosis mostly involving the right upper lobe. Volume loss, consolidation and traction bronchiectasis again noted. Severe emphysematous changes. Mild left apical pleural-parenchymal scarring and mild irregularity noted along the medial left upper major fissure. Improved but mild persistent consolidative opacity noted in the posterior left upper lobe (501/102). 2 mm left upper lobe nodule (501/70), stable. 5 mm left lower lobe nodule (501/80), stable. There is a small amount of layering mucoid debris noted within the trachea. There is occlusion of the right middle lobe bronchi proximally compared to the previous exam. The corresponding corresponding RML bronchi are segmentally visualized just distal to their origin in the jeimy. Mild thickening of the right lower lobe bronchi again evident. PLEURA: Trace pleural effusions. HEART/GREAT VESSELS: Heart is unremarkable for patient's age.  Unchanged 5.4 cm ascending thoracic aortic aneurysm. Moderate pericardial effusion. MEDIASTINUM AND ANDREW: Previously described right upper paratracheal lymph node may be obscured by dense contrast within the SVC. Mediastinum and andrew appear grossly stable accounting for differences in study technique. CHEST WALL AND LOWER NECK: Unremarkable. VISUALIZED STRUCTURES IN THE UPPER ABDOMEN: Unremarkable. OSSEOUS STRUCTURES: No acute fracture or destructive osseous lesion. Old, healed bilateral rib fractures.     Impression: 1. No definite acute pulmonary emboli. 2. New onset occlusion or markedly diminished flow of the right upper lobe pulmonary artery compared to contrast-enhanced study from November 5, 2020. This is probably related to the chronic right upper lobe pulmonary fibrosis. Small chronic left upper lobe pulmonary embolus suggested. 3. New, moderate pericardial effusion. 4. Stable appearance of RUL/right lung paramediastinal fibrosis. Apparent occlusion of the right middle lobe bronchi near their origin compared to the recent study. Follow-up CT chest in 3 months recommended to reassess this finding and exclude subtle neoplasm recurrence. 5. Unchanged appearance of 5.4 cm ascending aortic aneurysm. The study was marked in EPIC for follow-up. Workstation performed: [id number]     CT chest wo contrast    Result Date: 8/10/2024  Narrative: CT CHEST WITHOUT IV CONTRAST INDICATION: C34.11: Malignant neoplasm of upper lobe, right bronchus or lung R91.8: Other nonspecific abnormal finding of lung field. COMPARISON: CT chest 2/9/2024, 3/4/2022 TECHNIQUE: CT examination of the chest was performed without intravenous contrast. Multiplanar 2D reformatted images were created from the source data. This examination, like all CT scans performed in the [facility], was performed utilizing techniques to minimize radiation dose exposure, including the use of iterative reconstruction and automated exposure  control. Radiation dose length product (DLP) for this visit: 217.6 mGy-cm FINDINGS: LUNGS: Stable chronic changes of right upper perihilar radiation fibrosis with volume loss, consolidation, and traction bronchiectasis. Left apical pleural-parenchymal scarring. New tree-in-bud nodular opacities of right middle lobe (series 302 image 179). New tree-in-bud nodular opacities and consolidations of basilar left upper lobe. Interval resolution of previously seen tree-in-bud opacities of left lower lobe. Emphysematous changes of lungs. There is no tracheal or endobronchial lesion. PLEURA: Unremarkable. HEART/GREAT VESSELS: Heart is unremarkable for patient's age. Ascending thoracic aorta measures 5.4 cm. MEDIASTINUM AND ANDREW: Stable 1.6 x 1.5 cm right upper paratracheal lymph node (series 301 image 22). CHEST WALL AND LOWER NECK: Unremarkable. VISUALIZED STRUCTURES IN THE UPPER ABDOMEN: Unremarkable. OSSEOUS STRUCTURES: No acute fracture or destructive osseous lesion. Old healed bilateral rib fractures.     Impression: 1. New tree-in-bud nodular opacities of right middle lobe and consolidations in the basilar left upper lobe likely represent infectious/inflammatory process. 2. Interval resolution of previously seen tree-in-bud opacities of left lower lobe. 3. Stable right upper lung perihilar radiation fibrosis. 4. Stable 5.4 cm ascending thoracic aortic aneurysm. Workstation performed: [id number]       EKG:   A-fib with RVR has converted to sinus rhythm with PVC, PAC, LVH.    Telemetry: In sinus rhythm. PSVT.

## 2024-08-29 NOTE — PLAN OF CARE
Problem: PAIN - ADULT  Goal: Verbalizes/displays adequate comfort level or baseline comfort level  Description: Interventions:  - Encourage patient to monitor pain and request assistance  - Assess pain using appropriate pain scale  - Administer analgesics based on type and severity of pain and evaluate response  - Implement non-pharmacological measures as appropriate and evaluate response  - Consider cultural and social influences on pain and pain management  - Notify physician/advanced practitioner if interventions unsuccessful or patient reports new pain  Outcome: Progressing     Problem: INFECTION - ADULT  Goal: Absence or prevention of progression during hospitalization  Description: INTERVENTIONS:  - Assess and monitor for signs and symptoms of infection  - Monitor lab/diagnostic results  - Monitor all insertion sites, i.e. indwelling lines, tubes, and drains  - Monitor endotracheal if appropriate and nasal secretions for changes in amount and color  - [place] appropriate cooling/warming therapies per order  - Administer medications as ordered  - Instruct and encourage patient and family to use good hand hygiene technique  - Identify and instruct in appropriate isolation precautions for identified infection/condition  Outcome: Progressing     Problem: SAFETY ADULT  Goal: Patient will remain free of falls  Description: INTERVENTIONS:  - Educate patient/family on patient safety including physical limitations  - Instruct patient to call for assistance with activity   - Consult OT/PT to assist with strengthening/mobility   - Keep Call bell within reach  - Keep bed low and locked with side rails adjusted as appropriate  - Keep care items and personal belongings within reach  - Initiate and maintain comfort rounds  - Make Fall Risk Sign visible to staff  - Offer Toileting every 2 Hours, in advance of need  - Apply yellow socks and bracelet for high fall risk patients  - Consider moving patient to room near nurses  station  Outcome: Progressing

## 2024-08-29 NOTE — ASSESSMENT & PLAN NOTE
Updated echocardiogram noting an EF of 35-40% with moderate global LV hypokinesis, mild LA dilatation, moderate-severe AR, and a moderate pericardial effusion without evidence of cardiac tamponade  Cardiology following -> administered a dose of IV Lasix today with plan for reassessment tomorrow - continue beta-blockade Toprol-XL  Monitor/replete electrolyte deficiencies of present  Sodium/fluid restriction

## 2024-08-29 NOTE — PLAN OF CARE
Problem: RESPIRATORY - ADULT  Goal: Achieves optimal ventilation and oxygenation  Description: INTERVENTIONS:  - Assess for changes in respiratory status  - Assess for changes in mentation and behavior  - Position to facilitate oxygenation and minimize respiratory effort  - Oxygen administered by appropriate delivery if ordered  - Initiate smoking cessation education as indicated  - Encourage broncho-pulmonary hygiene including cough, deep breathe, Incentive Spirometry  - Assess the need for suctioning and aspirate as needed  - Assess and instruct to report SOB or any respiratory difficulty  - Respiratory Therapy support as indicated  Outcome: Progressing     Problem: CARDIOVASCULAR - ADULT  Goal: Maintains optimal cardiac output and hemodynamic stability  Description: INTERVENTIONS:  - Monitor I/O, vital signs and rhythm  - Monitor for S/S and trends of decreased cardiac output  - Administer and titrate ordered vasoactive medications to optimize hemodynamic stability  - Assess quality of pulses, skin color and temperature  - Assess for signs of decreased coronary artery perfusion  - Instruct patient to report change in severity of symptoms  Outcome: Progressing

## 2024-08-29 NOTE — ASSESSMENT & PLAN NOTE
Progressive COPD unfortunately secondary to ongoing tobacco abuse  Continue Trelegy equivalent w/ additional as needed nebulization  Maintain oxygenation as necessary  Tobacco smoking cessation counseling, although unlikely to comply

## 2024-08-29 NOTE — ASSESSMENT & PLAN NOTE
BMI of 22.00 in the setting of chronic tobacco abuse coupled with malignancy (lung cancer), decreased appetite/oral intake, and evidence of muscle wasting/atrophy and exam  Initiate nutritional supplementation with meals

## 2024-08-30 ENCOUNTER — APPOINTMENT (INPATIENT)
Dept: VASCULAR ULTRASOUND | Facility: HOSPITAL | Age: 64
DRG: 201 | End: 2024-08-30
Payer: COMMERCIAL

## 2024-08-30 VITALS
SYSTOLIC BLOOD PRESSURE: 103 MMHG | DIASTOLIC BLOOD PRESSURE: 62 MMHG | BODY MASS INDEX: 22.07 KG/M2 | HEIGHT: 69 IN | TEMPERATURE: 97.9 F | HEART RATE: 81 BPM | OXYGEN SATURATION: 97 % | RESPIRATION RATE: 16 BRPM | WEIGHT: 149 LBS

## 2024-08-30 LAB
ANION GAP SERPL CALCULATED.3IONS-SCNC: 7 MMOL/L (ref 4–13)
APTT PPP: 77 SECONDS (ref 23–34)
APTT PPP: 83 SECONDS (ref 23–34)
BASOPHILS # BLD AUTO: 0.08 THOUSANDS/ÂΜL (ref 0–0.1)
BASOPHILS NFR BLD AUTO: 2 % (ref 0–1)
BUN SERPL-MCNC: 18 MG/DL (ref 5–25)
CALCIUM SERPL-MCNC: 8.9 MG/DL (ref 8.4–10.2)
CHLORIDE SERPL-SCNC: 93 MMOL/L (ref 96–108)
CO2 SERPL-SCNC: 32 MMOL/L (ref 21–32)
CREAT SERPL-MCNC: 0.54 MG/DL (ref 0.6–1.3)
EOSINOPHIL # BLD AUTO: 0.14 THOUSAND/ÂΜL (ref 0–0.61)
EOSINOPHIL NFR BLD AUTO: 3 % (ref 0–6)
ERYTHROCYTE [DISTWIDTH] IN BLOOD BY AUTOMATED COUNT: 12.3 % (ref 11.6–15.1)
GFR SERPL CREATININE-BSD FRML MDRD: 111 ML/MIN/1.73SQ M
GLUCOSE SERPL-MCNC: 88 MG/DL (ref 65–140)
HCT VFR BLD AUTO: 34.8 % (ref 36.5–49.3)
HGB BLD-MCNC: 11.2 G/DL (ref 12–17)
IMM GRANULOCYTES # BLD AUTO: 0.01 THOUSAND/UL (ref 0–0.2)
IMM GRANULOCYTES NFR BLD AUTO: 0 % (ref 0–2)
LYMPHOCYTES # BLD AUTO: 0.71 THOUSANDS/ÂΜL (ref 0.6–4.47)
LYMPHOCYTES NFR BLD AUTO: 15 % (ref 14–44)
MAGNESIUM SERPL-MCNC: 1.9 MG/DL (ref 1.9–2.7)
MCH RBC QN AUTO: 32.2 PG (ref 26.8–34.3)
MCHC RBC AUTO-ENTMCNC: 32.2 G/DL (ref 31.4–37.4)
MCV RBC AUTO: 100 FL (ref 82–98)
MONOCYTES # BLD AUTO: 0.76 THOUSAND/ÂΜL (ref 0.17–1.22)
MONOCYTES NFR BLD AUTO: 16 % (ref 4–12)
MRSA NOSE QL CULT: NORMAL
NEUTROPHILS # BLD AUTO: 3.16 THOUSANDS/ÂΜL (ref 1.85–7.62)
NEUTS SEG NFR BLD AUTO: 64 % (ref 43–75)
NRBC BLD AUTO-RTO: 0 /100 WBCS
PLATELET # BLD AUTO: 237 THOUSANDS/UL (ref 149–390)
PMV BLD AUTO: 8.8 FL (ref 8.9–12.7)
POTASSIUM SERPL-SCNC: 4.4 MMOL/L (ref 3.5–5.3)
RBC # BLD AUTO: 3.48 MILLION/UL (ref 3.88–5.62)
SODIUM SERPL-SCNC: 132 MMOL/L (ref 135–147)
WBC # BLD AUTO: 4.86 THOUSAND/UL (ref 4.31–10.16)

## 2024-08-30 PROCEDURE — 99232 SBSQ HOSP IP/OBS MODERATE 35: CPT | Performed by: INTERNAL MEDICINE

## 2024-08-30 PROCEDURE — 85025 COMPLETE CBC W/AUTO DIFF WBC: CPT | Performed by: PHYSICIAN ASSISTANT

## 2024-08-30 PROCEDURE — 99239 HOSP IP/OBS DSCHRG MGMT >30: CPT | Performed by: INTERNAL MEDICINE

## 2024-08-30 PROCEDURE — 94640 AIRWAY INHALATION TREATMENT: CPT

## 2024-08-30 PROCEDURE — 85730 THROMBOPLASTIN TIME PARTIAL: CPT | Performed by: INTERNAL MEDICINE

## 2024-08-30 PROCEDURE — 94760 N-INVAS EAR/PLS OXIMETRY 1: CPT

## 2024-08-30 PROCEDURE — 83735 ASSAY OF MAGNESIUM: CPT | Performed by: PHYSICIAN ASSISTANT

## 2024-08-30 PROCEDURE — 80048 BASIC METABOLIC PNL TOTAL CA: CPT | Performed by: PHYSICIAN ASSISTANT

## 2024-08-30 RX ORDER — FUROSEMIDE 40 MG
40 TABLET ORAL DAILY
Status: DISCONTINUED | OUTPATIENT
Start: 2024-08-30 | End: 2024-08-30 | Stop reason: HOSPADM

## 2024-08-30 RX ORDER — SODIUM CHLORIDE 1 G/1
1 TABLET ORAL 2 TIMES DAILY WITH MEALS
Start: 2024-08-30

## 2024-08-30 RX ORDER — LEVALBUTEROL INHALATION SOLUTION 0.63 MG/3ML
0.63 SOLUTION RESPIRATORY (INHALATION) EVERY 8 HOURS PRN
Start: 2024-08-30

## 2024-08-30 RX ORDER — FUROSEMIDE 40 MG
40 TABLET ORAL DAILY
Start: 2024-08-30

## 2024-08-30 RX ORDER — POTASSIUM CHLORIDE 1500 MG/1
20 TABLET, EXTENDED RELEASE ORAL DAILY
Start: 2024-08-30

## 2024-08-30 RX ADMIN — PHENYTOIN SODIUM 100 MG: 100 CAPSULE ORAL at 10:00

## 2024-08-30 RX ADMIN — Medication 1000 UNITS: at 10:00

## 2024-08-30 RX ADMIN — FLUTICASONE FUROATE AND VILANTEROL TRIFENATATE 1 PUFF: 100; 25 POWDER RESPIRATORY (INHALATION) at 07:45

## 2024-08-30 RX ADMIN — GABAPENTIN 400 MG: 400 CAPSULE ORAL at 10:00

## 2024-08-30 RX ADMIN — Medication 1 G: at 10:00

## 2024-08-30 RX ADMIN — METOPROLOL SUCCINATE 25 MG: 25 TABLET, EXTENDED RELEASE ORAL at 10:00

## 2024-08-30 RX ADMIN — APIXABAN 5 MG: 5 TABLET, FILM COATED ORAL at 11:58

## 2024-08-30 RX ADMIN — UMECLIDINIUM 1 PUFF: 62.5 AEROSOL, POWDER ORAL at 07:45

## 2024-08-30 NOTE — CASE MANAGEMENT
Case Management Discharge Planning Note    Patient name Marcin Sánchez  Location /-01 MRN [id number]  : 1960 Date 2024       Current Admission Date: 2024  Current Admission Diagnosis:Rapid atrial fibrillation (HCC)   Patient Active Problem List    Diagnosis Date Noted Date Diagnosed    Chronic combined systolic and diastolic CHF (congestive heart failure) (HCC) 2024     Rapid atrial fibrillation (HCC) 2024     Pulmonary fibrosis (HCC) 2024     Suspected chronic pulmonary embolism (HCC) 2024     Squamous cell lung cancer (HCC) 2024     Hyponatremia 2024     Severe protein-calorie malnutrition (HCC) 2024     Edema of both legs 2022     Tobacco abuse 2020     Nonrheumatic aortic valve insufficiency 2020     Malignant neoplasm of upper lobe of right lung (HCC) 2018     Thoracic aortic aneurysm without rupture (HCC) 2018     Cancer of right main bronchus (HCC) 10/04/2016     Pleural effusion 10/02/2016     Multiple lung nodules on CT 10/02/2016     Collapse of right lung 2016     COPD (chronic obstructive pulmonary disease) (HCC)      Epilepsy (HCC)      Lyme disease      Major depression      Alcohol abuse        LOS (days): 2  Geometric Mean LOS (GMLOS) (days): 3.5  Days to GMLOS:1.7     OBJECTIVE:  Risk of Unplanned Readmission Score: 15         Current admission status: Inpatient   Preferred Pharmacy:   [facility] [address],  [address] [place] [address]  Phone: [phone] Fax: [phone]    [address] [address]  Phone: [phone] Fax: [phone]    Primary Care Provider: Brandt Godinez MD    Primary Insurance: [facility]  Secondary Insurance:     DISCHARGE DETAILS:    Discharge planning discussed with:: Patient  Freedom of Choice:  Yes  Comments - Freedom of Choice: Confirmed choice is to return to [facility] at discharge.  CM contacted family/caregiver?: Yes (VM left for sister and for brother - no answer at either number)  Were Treatment Team discharge recommendations reviewed with patient/caregiver?: Yes  Did patient/caregiver verbalize understanding of patient care needs?: N/A- going to facility  Were patient/caregiver advised of the risks associated with not following Treatment Team discharge recommendations?: Yes    Requested Home Health Care         Is the patient interested in HHC at discharge?: No    DME Referral Provided  Referral made for DME?: No    Other Referral/Resources/Interventions Provided:  Interventions: Facility Return    Would you like to participate in our [facility] service program?  : No - Declined    Treatment Team Recommendation: Facility Return, SNF  Discharge Destination Plan:: Facility Return, SNF  Transport at Discharge : [facility]  Dispatcher Contacted: Yes  Number/Name of Dispatcher: ROUNDTRIP  Transported by (Company and Unit #): PENDING  ETA of Transport (Date): 08/30/24  ETA of Transport (Time):  (PENDING)    Accepting Facility Name, City & State : [place], [place]  Receiving Facility/Agency Phone Number: [phone]  Facility/Agency Fax Number: [phone]  Facility Insurance Auth Number: [id number]

## 2024-08-30 NOTE — PLAN OF CARE
Problem: PAIN - ADULT  Goal: Verbalizes/displays adequate comfort level or baseline comfort level  Description: Interventions:  - Encourage patient to monitor pain and request assistance  - Assess pain using appropriate pain scale  - Administer analgesics based on type and severity of pain and evaluate response  - Implement non-pharmacological measures as appropriate and evaluate response  - Consider cultural and social influences on pain and pain management  - Notify physician/advanced practitioner if interventions unsuccessful or patient reports new pain  Outcome: Progressing     Problem: INFECTION - ADULT  Goal: Absence or prevention of progression during hospitalization  Description: INTERVENTIONS:  - Assess and monitor for signs and symptoms of infection  - Monitor lab/diagnostic results  - Monitor all insertion sites, i.e. indwelling lines, tubes, and drains  - Monitor endotracheal if appropriate and nasal secretions for changes in amount and color  - [place] appropriate cooling/warming therapies per order  - Administer medications as ordered  - Instruct and encourage patient and family to use good hand hygiene technique  - Identify and instruct in appropriate isolation precautions for identified infection/condition  Outcome: Progressing     Problem: SAFETY ADULT  Goal: Patient will remain free of falls  Description: INTERVENTIONS:  - Educate patient/family on patient safety including physical limitations  - Instruct patient to call for assistance with activity   - Consult OT/PT to assist with strengthening/mobility   - Keep Call bell within reach  - Keep bed low and locked with side rails adjusted as appropriate  - Keep care items and personal belongings within reach  - Initiate and maintain comfort rounds  - Make Fall Risk Sign visible to staff  - Offer Toileting every 2 Hours, in advance of need  - Initiate/Maintain alarm  - Obtain necessary fall risk management equipment:   - Apply yellow socks and  bracelet for high fall risk patients  - Consider moving patient to room near nurses station  Outcome: Progressing  Goal: Maintain or return to baseline ADL function  Description: INTERVENTIONS:  -  Assess patient's ability to carry out ADLs; assess patient's baseline for ADL function and identify physical deficits which impact ability to perform ADLs (bathing, care of mouth/teeth, toileting, grooming, dressing, etc.)  - Assess/evaluate cause of self-care deficits   - Assess range of motion  - Assess patient's mobility; develop plan if impaired  - Assess patient's need for assistive devices and provide as appropriate  - Encourage maximum independence but intervene and supervise when necessary  - Involve family in performance of ADLs  - Assess for home care needs following discharge   - Consider OT consult to assist with ADL evaluation and planning for discharge  - Provide patient education as appropriate  Outcome: Progressing  Goal: Maintains/Returns to pre admission functional level  Description: INTERVENTIONS:  - Perform AM-PAC 6 Click Basic Mobility/ Daily Activity assessment daily.  - Set and communicate daily mobility goal to care team and patient/family/caregiver.   - Collaborate with rehabilitation services on mobility goals if consulted  - Perform Range of Motion 4 times a day.  - Reposition patient every 2 hours.  - Dangle patient 3 times a day  - Stand patient 3 times a day  - Ambulate patient 3 times a day  - Out of bed to chair 3 times a day   - Out of bed for meals 3 times a day  - Out of bed for toileting  - Record patient progress and toleration of activity level   Outcome: Progressing     Problem: DISCHARGE PLANNING  Goal: Discharge to home or other facility with appropriate resources  Description: INTERVENTIONS:  - Identify barriers to discharge w/patient and caregiver  - Arrange for needed discharge resources and transportation as appropriate  - Identify discharge learning needs (meds, wound care,  etc.)  - Arrange for interpretive services to assist at discharge as needed  - Refer to Case Management Department for coordinating discharge planning if the patient needs post-hospital services based on physician/advanced practitioner order or complex needs related to functional status, cognitive ability, or social support system  Outcome: Progressing     Problem: Knowledge Deficit  Goal: Patient/family/caregiver demonstrates understanding of disease process, treatment plan, medications, and discharge instructions  Description: Complete learning assessment and assess knowledge base.  Interventions:  - Provide teaching at level of understanding  - Provide teaching via preferred learning methods  Outcome: Progressing     Problem: RESPIRATORY - ADULT  Goal: Achieves optimal ventilation and oxygenation  Description: INTERVENTIONS:  - Assess for changes in respiratory status  - Assess for changes in mentation and behavior  - Position to facilitate oxygenation and minimize respiratory effort  - Oxygen administered by appropriate delivery if ordered  - Initiate smoking cessation education as indicated  - Encourage broncho-pulmonary hygiene including cough, deep breathe, Incentive Spirometry  - Assess the need for suctioning and aspirate as needed  - Assess and instruct to report SOB or any respiratory difficulty  - Respiratory Therapy support as indicated  Outcome: Progressing     Problem: HEMATOLOGIC - ADULT  Goal: Maintains hematologic stability  Description: INTERVENTIONS  - Assess for signs and symptoms of bleeding or hemorrhage  - Monitor labs  - Administer supportive blood products/factors as ordered and appropriate  Outcome: Progressing     Problem: MUSCULOSKELETAL - ADULT  Goal: Maintain or return mobility to safest level of function  Description: INTERVENTIONS:  - Assess patient's ability to carry out ADLs; assess patient's baseline for ADL function and identify physical deficits which impact ability to perform  ADLs (bathing, care of mouth/teeth, toileting, grooming, dressing, etc.)  - Assess/evaluate cause of self-care deficits   - Assess range of motion  - Assess patient's mobility  - Assess patient's need for assistive devices and provide as appropriate  - Encourage maximum independence but intervene and supervise when necessary  - Involve family in performance of ADLs  - Assess for home care needs following discharge   - Consider OT consult to assist with ADL evaluation and planning for discharge  - Provide patient education as appropriate  Outcome: Progressing  Goal: Maintain proper alignment of affected body part  Description: INTERVENTIONS:  - Support, maintain and protect limb and body alignment  - Provide patient/ family with appropriate education  Outcome: Progressing     Problem: Prexisting or High Potential for Compromised Skin Integrity  Goal: Skin integrity is maintained or improved  Description: INTERVENTIONS:  - Identify patients at risk for skin breakdown  - Assess and monitor skin integrity  - Assess and monitor nutrition and hydration status  - Monitor labs   - Assess for incontinence   - Turn and reposition patient  - Assist with mobility/ambulation  - Relieve pressure over bony prominences  - Avoid friction and shearing  - Provide appropriate hygiene as needed including keeping skin clean and dry  - Evaluate need for skin moisturizer/barrier cream  - Collaborate with interdisciplinary team   - Patient/family teaching  - Consider wound care consult   Outcome: Progressing     Problem: CARDIOVASCULAR - ADULT  Goal: Maintains optimal cardiac output and hemodynamic stability  Description: INTERVENTIONS:  - Monitor I/O, vital signs and rhythm  - Monitor for S/S and trends of decreased cardiac output  - Administer and titrate ordered vasoactive medications to optimize hemodynamic stability  - Assess quality of pulses, skin color and temperature  - Assess for signs of decreased coronary artery perfusion  -  Instruct patient to report change in severity of symptoms  Outcome: Progressing  Goal: Absence of cardiac dysrhythmias or at baseline rhythm  Description: INTERVENTIONS:  - Continuous cardiac monitoring, vital signs, obtain 12 lead EKG if ordered  - Administer antiarrhythmic and heart rate control medications as ordered  - Monitor electrolytes and administer replacement therapy as ordered  Outcome: Progressing

## 2024-08-30 NOTE — PROGRESS NOTES
Progress Note - Cardiology   Marcin Sánchez 63 y.o. male MRN: [id number]  Unit/Bed#: -01 Encounter: [id number]    Assessment:  Principal Problem:    Rapid atrial fibrillation (HCC)  Active Problems:    COPD (chronic obstructive pulmonary disease) (HCC)    Epilepsy (HCC)    Tobacco abuse    Nonrheumatic aortic valve insufficiency    Pulmonary fibrosis (HCC)    Suspected chronic pulmonary embolism (HCC)    Squamous cell lung cancer (HCC)    Hyponatremia    Severe protein-calorie malnutrition (HCC)    Chronic combined systolic and diastolic CHF (congestive heart failure) (HCC)    63-year-old man with known large ascending thoracic aortic aneurysm, COPD, history of lung cancer, pulmonary fibrosis. Presents with shortness of breath and found to be in rapid atrial fibrillation. CT imaging also showed moderate pericardial effusion which is confirmed on an echocardiogram.     He is also found to have moderate to severe aortic regurgitation which was also noted on echo in 2020, but LVEF has now decreased to 35-40%.     Plan:  Paroxysmal atrial fibrillation:  He is back in sinus rhythm. Continue metoprolol at the higher dose. Transition to oral anticoagulation, he is to start Eliquis today.     Aortic regurgitation:  Previously noted in 2020. Has not followed with cardiology before. Has been maintained on 40 mg of Lasix daily and 25 mg of Toprol-XL. Received 1 dose of IV Lasix yesterday. Transition back to his 40 mg p.o. daily dose at home. I discussed with him, he is not interested nor a candidate for valve replacement     Combined CHF:  New finding of a drop in ejection fraction to the range of 35 to 40%. Likely secondary to the AI, although the LV is not significantly dilated. Volume management as above. Increase beta-blocker as noted above. Blood pressure is borderline today. I will hold off on adding a ARB, but can follow-up with him in the office and consider addition of that in the future.     Pericardial  "effusion:  Moderate effusion confirmed on echocardiogram. No evidence of tamponade. No indication for any drainage. Likely chronic effusion. He is compensating well from a hemodynamic standpoint. Eventual surveillance in the future with an echo few months down the line.      Okay for discharge from cardiac standpoint. Can follow-up in the office in a few weeks. Etiology for hospitalization was not acute heart failure      Subjective/Objective     Subjective:  Reviewed his echo findings with him.   Acknowledges, but I am not sure he is completely understanding. He did recall seeing the cardiothoracic surgeon previously and noting that he was not a candidate for, nor was he interested in surgery for his aorta.    Overall, he denies any complaints and is [facility].    Objective:    Vitals: BP (!) 112/48 (BP Location: Right arm)   Pulse 79   Temp 98.5 °F (36.9 °C) (Tympanic)   Resp 15   Ht 5' 9\" (1.753 m)   Wt 67.6 kg (149 lb)   SpO2 92%   BMI 22.00 kg/m²   Vitals:    08/28/24 1610 08/29/24 1011   Weight: 67.8 kg (149 lb 7.6 oz) 67.6 kg (149 lb)     Orthostatic Blood Pressures      Flowsheet Row Most Recent Value   Blood Pressure 112/48 filed at 08/30/2024 0745   Patient Position - Orthostatic VS Sitting filed at 08/30/2024 0745              Intake/Output Summary (Last 24 hours) at 8/30/2024 1037  Last data filed at 8/30/2024 0601  Gross per 24 hour   Intake 295 ml   Output 1375 ml   Net -1080 ml     Physical Exam:   General appearance: disheveled  Head: Normocephalic, without obvious abnormality, atraumatic  Neck: no carotid bruit, no JVD and supple, symmetrical, trachea midline  Lungs: decreased air entry..  Heart: S1, S2 regular  Abdomen: soft, non-tender; bowel sounds normal; no masses,  no organomegaly  Extremities: trace edema  Pulses: symmetric bilaterally  Skin: Skin color, texture, turgor normal. No rashes or lesions  Neurologic: Grossly normal. Alert and " oriented.    Medications:    Current Facility-Administered Medications:     apixaban (ELIQUIS) tablet 5 mg, 5 mg, Oral, BID, Malia Donis MD    Cholecalciferol (VITAMIN D3) tablet 1,000 Units, 1,000 Units, Oral, Daily, Malia Donis MD, 1,000 Units at 08/30/24 1000    dextromethorphan-guaiFENesin (ROBITUSSIN DM) oral syrup 10 mL, 10 mL, Oral, Q4H PRN, Malia Donis MD, 10 mL at 08/29/24 0848    Fluticasone Furoate-Vilanterol 100-25 mcg/actuation 1 puff, 1 puff, Inhalation, Daily, Malia Donis MD, 1 puff at 08/30/24 0745    gabapentin (NEURONTIN) capsule 400 mg, 400 mg, Oral, BID, Malia Donis MD, 400 mg at 08/30/24 1000    levalbuterol (XOPENEX) inhalation solution 0.63 mg, 0.63 mg, Nebulization, Q8H PRN, Malia Donis MD    metoprolol succinate (TOPROL-XL) 24 hr tablet 25 mg, 25 mg, Oral, BID, Roshan Hutson MD, 25 mg at 08/30/24 1000    nicotine (NICODERM CQ) 14 mg/24hr TD 24 hr patch 14 mg, 14 mg, Transdermal, Daily, Malia Donis MD    phenytoin (DILANTIN) ER capsule 100 mg, 100 mg, Oral, BID, Malia Donis MD, 100 mg at 08/30/24 1000    potassium chloride oral solution 40 mEq, 40 mEq, Oral, Once, Malia Donis MD    sodium chloride tablet 1 g, 1 g, Oral, BID With Meals, Malia Donis MD, 1 g at 08/30/24 1000    trimethobenzamide (TIGAN) IM injection 200 mg, 200 mg, Intramuscular, Q6H PRN, Malia Donis MD    umeclidinium 62.5 mcg/actuation inhaler AEPB 1 puff, 1 puff, Inhalation, Daily, Malia Donis MD, 1 puff at 08/30/24 0745    Lab Results:      Results from last 7 days   Lab Units 08/30/24  0637 08/29/24  0412 08/28/24  1142   WBC Thousand/uL 4.86 5.39 5.50   HEMOGLOBIN g/dL 11.2* 12.0 13.0   HEMATOCRIT % 34.8* 35.8* 38.3   PLATELETS Thousands/uL 237 237 242         Results from last 7 days   Lab Units 08/30/24  0637 08/29/24  0412 08/28/24  1142   SODIUM mmol/L 132* 131* 129*   POTASSIUM mmol/L 4.4 3.9 3.6   CHLORIDE mmol/L 93* 93* 89*   CO2 mmol/L 32 31 33*   BUN mg/dL 18 12 11   CREATININE mg/dL 0.54*  0.51* 0.58*   CALCIUM mg/dL 8.9 9.1 9.1   ALK PHOS U/L  --   --  98   ALT U/L  --   --  14   AST U/L  --   --  19     Results from last 7 days   Lab Units 08/30/24  0637 08/29/24  2353 08/29/24  1738 08/28/24  2123 08/28/24  1546   INR   --   --   --   --  1.17   PTT seconds 77* 83* 59*   < > 40*    < > = values in this interval not displayed.     Results from last 7 days   Lab Units 08/30/24  0637 08/29/24  0412   MAGNESIUM mg/dL 1.9 2.1       Telemetry: No significant arrhythmias    Echo:  Left Ventricle: Left ventricular cavity size is normal. Wall thickness is normal. The left ventricular ejection fraction is 35-40%. Systolic function is moderately reduced. There is moderate global hypokinesis.    Left Atrium: The atrium is mildly dilated.    Aortic Valve: There is moderate to severe regurgitation.    Aorta: The aortic root is mildly dilated. The ascending aorta is severely dilated. The aortic root is 4.10 cm. The ascending aorta is 5.4 cm.    Pericardium: There is a moderate pericardial effusion circumferential to the heart. The fluid exhibits no internal echoes. There is no echocardiographic evidence of tamponade.

## 2024-08-30 NOTE — PLAN OF CARE
Problem: PAIN - ADULT  Goal: Verbalizes/displays adequate comfort level or baseline comfort level  Description: Interventions:  - Encourage patient to monitor pain and request assistance  - Assess pain using appropriate pain scale  - Administer analgesics based on type and severity of pain and evaluate response  - Implement non-pharmacological measures as appropriate and evaluate response  - Consider cultural and social influences on pain and pain management  - Notify physician/advanced practitioner if interventions unsuccessful or patient reports new pain  Outcome: Progressing     Problem: INFECTION - ADULT  Goal: Absence or prevention of progression during hospitalization  Description: INTERVENTIONS:  - Assess and monitor for signs and symptoms of infection  - Monitor lab/diagnostic results  - Monitor all insertion sites, i.e. indwelling lines, tubes, and drains  - Monitor endotracheal if appropriate and nasal secretions for changes in amount and color  - [place] appropriate cooling/warming therapies per order  - Administer medications as ordered  - Instruct and encourage patient and family to use good hand hygiene technique  - Identify and instruct in appropriate isolation precautions for identified infection/condition  Outcome: Progressing     Problem: DISCHARGE PLANNING  Goal: Discharge to home or other facility with appropriate resources  Description: INTERVENTIONS:  - Identify barriers to discharge w/patient and caregiver  - Arrange for needed discharge resources and transportation as appropriate  - Identify discharge learning needs (meds, wound care, etc.)  - Arrange for interpretive services to assist at discharge as needed  - Refer to Case Management Department for coordinating discharge planning if the patient needs post-hospital services based on physician/advanced practitioner order or complex needs related to functional status, cognitive ability, or social support system  Outcome: Progressing      Problem: Knowledge Deficit  Goal: Patient/family/caregiver demonstrates understanding of disease process, treatment plan, medications, and discharge instructions  Description: Complete learning assessment and assess knowledge base.  Interventions:  - Provide teaching at level of understanding  - Provide teaching via preferred learning methods  Outcome: Progressing     Problem: RESPIRATORY - ADULT  Goal: Achieves optimal ventilation and oxygenation  Description: INTERVENTIONS:  - Assess for changes in respiratory status  - Assess for changes in mentation and behavior  - Position to facilitate oxygenation and minimize respiratory effort  - Oxygen administered by appropriate delivery if ordered  - Initiate smoking cessation education as indicated  - Encourage broncho-pulmonary hygiene including cough, deep breathe, Incentive Spirometry  - Assess the need for suctioning and aspirate as needed  - Assess and instruct to report SOB or any respiratory difficulty  - Respiratory Therapy support as indicated  Outcome: Progressing     Problem: HEMATOLOGIC - ADULT  Goal: Maintains hematologic stability  Description: INTERVENTIONS  - Assess for signs and symptoms of bleeding or hemorrhage  - Monitor labs  - Administer supportive blood products/factors as ordered and appropriate  Outcome: Progressing     Problem: MUSCULOSKELETAL - ADULT  Goal: Maintain or return mobility to safest level of function  Description: INTERVENTIONS:  - Assess patient's ability to carry out ADLs; assess patient's baseline for ADL function and identify physical deficits which impact ability to perform ADLs (bathing, care of mouth/teeth, toileting, grooming, dressing, etc.)  - Assess/evaluate cause of self-care deficits   - Assess range of motion  - Assess patient's mobility  - Assess patient's need for assistive devices and provide as appropriate  - Encourage maximum independence but intervene and supervise when necessary  - Involve family in  performance of ADLs  - Assess for home care needs following discharge   - Consider OT consult to assist with ADL evaluation and planning for discharge  - Provide patient education as appropriate  Outcome: Progressing     Problem: CARDIOVASCULAR - ADULT  Goal: Maintains optimal cardiac output and hemodynamic stability  Description: INTERVENTIONS:  - Monitor I/O, vital signs and rhythm  - Monitor for S/S and trends of decreased cardiac output  - Administer and titrate ordered vasoactive medications to optimize hemodynamic stability  - Assess quality of pulses, skin color and temperature  - Assess for signs of decreased coronary artery perfusion  - Instruct patient to report change in severity of symptoms  Outcome: Progressing

## 2024-08-30 NOTE — CASE MANAGEMENT
Case Management Progress Note    Patient name Marcin Sánchez  Location /-01 MRN [id number]  : 1960 Date 2024       LOS (days): 2  Geometric Mean LOS (GMLOS) (days): 3.5  Days to GMLOS:1.7        OBJECTIVE:        Current admission status: Inpatient  [facility]erred Pharmacy:   [facility] [place] [place] [facility] [address] [facility] [facility],  [address] [place] [id number]  Phone: [phone] Fax: [phone]    [address] [address]  Phone: [phone] Fax: [phone]    Primary Care Provider: Brandt Godinez MD    [facility] [employer] [facility]  Secondary Insurance:     PROGRESS NOTE:    1230 transport confirmed with [facility].

## 2024-08-30 NOTE — DISCHARGE SUMMARY
Discharge Summary - [facility] Internal Medicine  Patient: Marcin Sánchez 63 y.o. male   MRN: [id number]  PCP: Brandt Godinez MD  Unit/Bed#: -[place] Encounter: [id number]            Discharging Physician / Practitioner: Malia Donis MD  PCP: Brandt Godinez MD  Admission Date:   Admission Orders (From admission, onward)       Ordered        08/28/24 1528  INPATIENT ADMISSION  Once                          Discharge Date: 08/30/24      Reason for Admission:  Shortness of breath      Discharge Diagnoses:     Principal Problem:    Rapid atrial fibrillation (HCC)    Active Problems:    Pulmonary fibrosis (HCC)    Suspected chronic pulmonary embolism (HCC)    Squamous cell lung cancer (HCC)    Tobacco abuse    Hyponatremia    Severe protein-calorie malnutrition (HCC)    COPD (chronic obstructive pulmonary disease) (HCC)    Epilepsy (HCC)    Chronic combined systolic and diastolic CHF (congestive heart failure) (HCC)      Consultations During Hospital Stay:  Cardiology  Pulmonology      Hospital Course:     * Rapid atrial fibrillation (HCC)  Assessment & Plan  Presenting heart rates in the 140s to 160s -> initiated on a Cardizem drip with better control, currently in the 60s to 80s -> Cardizem drip now discontinued per cardiology -> transition to oral beta-blockade (Toprol-XL)   Initiated on a heparin drip for anticoagulation -> transition to oral anticoagulation (Eliquis) on discharge  See plan for CHF below regarding echocardiogram findings  Monitored/repleted electrolyte deficiencies of present  TSH normal  Further recommendations per cardiology     Pulmonary fibrosis (HCC)  Assessment & Plan  As suspected on CT imaging in the setting of progressive COPD, ongoing tobacco abuse, and lung cancer history  Maintain oxygenation as necessary currently requiring up to 2-3 L via nasal cannula  Long-term prognosis poor due to comorbidities  Pulmonology input appreciated     Suspected chronic pulmonary  embolism ([facility])  Assessment & Plan  CT imaging suggestive of a possible small left upper lobe chronic pulmonary embolism  Discussed with pulmonology who is unsure of the validity -> D-dimer elevated, hence, ordered a lower extremity venous duplex study, however, patient refused  Heparin drip transitioned to Eliquis on discharge     Squamous cell lung cancer (HCC)  Assessment & Plan  Status post prior chemotherapy and radiation  Follows outpatient oncology (Dr. Kuo)     Tobacco abuse  Assessment & Plan  Cessation counseling, although unlikely to comply  Transdermal nicotine patch on board during hospital course    Hyponatremia  Assessment & Plan  Suspect a SIADH type etiology in the setting of malignancy  Trialed fluid restriction - c/w salt tablet supplementation (suspect noncompliance)  Serum sodium stable at 132 on day of discharge     Severe protein-calorie malnutrition ([facility])  Assessment & Plan  BMI of 22.00 in the setting of chronic tobacco abuse coupled with malignancy (lung cancer), decreased appetite/oral intake, and evidence of muscle wasting/atrophy and exam  Initiated nutritional supplementation with meals     COPD (chronic obstructive pulmonary disease) ([facility])  Assessment & Plan  Progressive COPD unfortunately secondary to ongoing tobacco abuse  Continue Trelegy equivalent w/ additional as needed nebulization  Maintain oxygenation as necessary  Tobacco smoking cessation counseling, although unlikely to comply     Epilepsy ([facility])  Assessment & Plan  Continue Dilantin/Neurontin     Chronic combined systolic and diastolic CHF (congestive heart failure) ([facility])  Assessment & Plan  Updated echocardiogram noting an EF of 35-40% with moderate global LV hypokinesis, mild LA dilatation, moderate-severe AR, and a moderate pericardial effusion without evidence of cardiac tamponade  Cardiology following -> administered a dose of IV Lasix yesterday with plan to resume oral daily Lasix from today onwards - continue  "beta-blockade Toprol-XL  Monitored/repleted electrolyte deficiencies if present  Sodium/fluid restriction encouraged       Condition at Discharge:  Fair      Discharge Day Visit / Exam:     Vitals:  Blood Pressure: 103/62 (08/30/24 1103)  Pulse: 81 (08/30/24 1103)  Temperature: 97.9 °F (36.6 °C) (08/30/24 1103)  Temp Source: Tympanic (08/30/24 1103)  Respirations: 16 (08/30/24 1103)  Height: 5' 9\" (175.3 cm) (08/29/24 1011)  Weight - Scale: 67.6 kg (149 lb) (08/29/24 1011)  SpO2: 97 % (08/30/24 1103)      Physical exam:  I had a face-to-face encounter with the patient on day of discharge.      Discussion with Patient and/or Family:  The patient has been advised to return to the ER immediately if any symptoms recur or worsen.       Discharge instructions/Information to Patient and/or Family:   See after visit summary for information provided to patient and/or family.        Provisions for Follow-Up Care:  See after visit summary for information related to follow-up care and any pertinent home health orders.        Disposition:   Skilled rehab facility      Discharge Medications:  See after visit summary for reconciled discharge medications provided to patient and/or family.        Discharge Statement:  I spent 38 minutes discharging the patient. This time was spent on the day of discharge. I had direct contact with the patient on the day of discharge. Greater than 50% of the total time was spent examining patient, answering all patient questions, arranging and discussing plan of care with patient as well as directly providing post-discharge instructions.  Additional time then spent on discharge activities.           CASI DEL ROSARIO MD   Hospitalist - [facility] Internal Medicine        ** Please Note: This note is constructed using a voice recognition dictation system.  An occasional wrong word/phrase or “sound-a-like” substitution may have been picked up by dictation device due to the inherent limitations of voice " recognition software.  Read the chart carefully and recognize, using reasonable context, where substitutions may have occurred.**

## 2024-09-03 NOTE — UTILIZATION REVIEW
NOTIFICATION OF ADMISSION DISCHARGE   This is a Notification of Discharge from [facility]. Please be advised that this patient has been discharge from our facility. Below you will find the admission and discharge date and time including the patient’s disposition.   UTILIZATION REVIEW CONTACT:  Shanice Crane  Utilization   Network Utilization Review Department  Phone: [phone] x carefully listen to the prompts. All voicemails are confidential.  Email: [email or web address]     ADMISSION INFORMATION  PRESENTATION DATE: 8/28/2024 11:28 AM  OBERVATION ADMISSION DATE: N/A  INPATIENT ADMISSION DATE: 8/28/24  3:28 PM   DISCHARGE DATE: 8/30/2024  2:22 PM   DISPOSITION:Discharged/Transferred to Long Term Care/Personal Care Home/Assisted Living    Network Utilization Review Department  ATTENTION: Please call with any questions or concerns to [phone] and carefully listen to the prompts so that you are directed to the right person. All voicemails are confidential.   For Discharge needs, contact Care Management DC Support Team at [phone] opt. 2  Send all requests for admission clinical reviews, approved or denied determinations and any other requests to dedicated fax number below belonging to the campus where the patient is receiving treatment. List of dedicated fax numbers for the Facilities:  FACILITY NAME UR FAX NUMBER   ADMISSION DENIALS (Administrative/Medical Necessity) [phone]   DISCHARGE SUPPORT TEAM ([facility]) [phone]   PARENT CHILD HEALTH (Maternity/NICU/Pediatrics) [phone]   [facility] [phone]   [facility] [phone]   [facility] [phone]   [facility] [phone]   [facility] [phone]   [facility] [phone]   [facility]  [phone]   [facility] [phone]   [facility] [phone]   [facility] [phone]   [facility] [phone]   [facility] [phone]

## 2024-09-05 ENCOUNTER — HOSPITAL ENCOUNTER (EMERGENCY)
Facility: HOSPITAL | Age: 64
End: 2024-09-06
Attending: EMERGENCY MEDICINE
Payer: COMMERCIAL

## 2024-09-05 ENCOUNTER — APPOINTMENT (EMERGENCY)
Dept: CT IMAGING | Facility: HOSPITAL | Age: 64
End: 2024-09-05
Payer: COMMERCIAL

## 2024-09-05 ENCOUNTER — APPOINTMENT (EMERGENCY)
Dept: RADIOLOGY | Facility: HOSPITAL | Age: 64
End: 2024-09-05
Payer: COMMERCIAL

## 2024-09-05 DIAGNOSIS — J44.1 COPD EXACERBATION (HCC): ICD-10-CM

## 2024-09-05 DIAGNOSIS — J96.01 ACUTE RESPIRATORY FAILURE WITH HYPOXIA AND HYPERCARBIA (HCC): Primary | ICD-10-CM

## 2024-09-05 DIAGNOSIS — R09.02 HYPOXIA: ICD-10-CM

## 2024-09-05 DIAGNOSIS — R06.00 DYSPNEA: ICD-10-CM

## 2024-09-05 DIAGNOSIS — J96.02 ACUTE RESPIRATORY FAILURE WITH HYPOXIA AND HYPERCARBIA (HCC): Primary | ICD-10-CM

## 2024-09-05 DIAGNOSIS — R41.82 ALTERED MENTAL STATUS: ICD-10-CM

## 2024-09-05 LAB
ALBUMIN SERPL BCG-MCNC: 3.7 G/DL (ref 3.5–5)
ALP SERPL-CCNC: 91 U/L (ref 34–104)
ALT SERPL W P-5'-P-CCNC: 13 U/L (ref 7–52)
AMMONIA PLAS-SCNC: 46 UMOL/L (ref 18–72)
ANION GAP SERPL CALCULATED.3IONS-SCNC: 5 MMOL/L (ref 4–13)
APAP SERPL-MCNC: <2 UG/ML (ref 10–20)
APTT PPP: 46 SECONDS (ref 23–34)
AST SERPL W P-5'-P-CCNC: 20 U/L (ref 13–39)
BASE EX.OXY STD BLDV CALC-SCNC: 33.6 % (ref 60–80)
BASE EX.OXY STD BLDV CALC-SCNC: 87.6 % (ref 60–80)
BASE EXCESS BLDV CALC-SCNC: 8.5 MMOL/L
BASE EXCESS BLDV CALC-SCNC: 9.5 MMOL/L
BASOPHILS # BLD AUTO: 0.07 THOUSANDS/ÂΜL (ref 0–0.1)
BASOPHILS NFR BLD AUTO: 1 % (ref 0–1)
BILIRUB SERPL-MCNC: 0.75 MG/DL (ref 0.2–1)
BNP SERPL-MCNC: 404 PG/ML (ref 0–100)
BUN SERPL-MCNC: 22 MG/DL (ref 5–25)
CALCIUM SERPL-MCNC: 9.1 MG/DL (ref 8.4–10.2)
CHLORIDE SERPL-SCNC: 94 MMOL/L (ref 96–108)
CO2 SERPL-SCNC: 39 MMOL/L (ref 21–32)
CREAT SERPL-MCNC: 0.59 MG/DL (ref 0.6–1.3)
EOSINOPHIL # BLD AUTO: 0.07 THOUSAND/ÂΜL (ref 0–0.61)
EOSINOPHIL NFR BLD AUTO: 1 % (ref 0–6)
ERYTHROCYTE [DISTWIDTH] IN BLOOD BY AUTOMATED COUNT: 12.2 % (ref 11.6–15.1)
ETHANOL SERPL-MCNC: <10 MG/DL
FLUAV RNA RESP QL NAA+PROBE: NEGATIVE
FLUBV RNA RESP QL NAA+PROBE: NEGATIVE
GFR SERPL CREATININE-BSD FRML MDRD: 106 ML/MIN/1.73SQ M
GLUCOSE SERPL-MCNC: 164 MG/DL (ref 65–140)
HCO3 BLDV-SCNC: 37.8 MMOL/L (ref 24–30)
HCO3 BLDV-SCNC: 39.5 MMOL/L (ref 24–30)
HCT VFR BLD AUTO: 35.7 % (ref 36.5–49.3)
HGB BLD-MCNC: 11.5 G/DL (ref 12–17)
IMM GRANULOCYTES # BLD AUTO: 0.02 THOUSAND/UL (ref 0–0.2)
IMM GRANULOCYTES NFR BLD AUTO: 0 % (ref 0–2)
INR PPP: 1.38 (ref 0.85–1.19)
LACTATE SERPL-SCNC: 1.1 MMOL/L (ref 0.5–2)
LYMPHOCYTES # BLD AUTO: 0.86 THOUSANDS/ÂΜL (ref 0.6–4.47)
LYMPHOCYTES NFR BLD AUTO: 14 % (ref 14–44)
MAGNESIUM SERPL-MCNC: 1.9 MG/DL (ref 1.9–2.7)
MCH RBC QN AUTO: 32 PG (ref 26.8–34.3)
MCHC RBC AUTO-ENTMCNC: 32.2 G/DL (ref 31.4–37.4)
MCV RBC AUTO: 99 FL (ref 82–98)
MONOCYTES # BLD AUTO: 1.13 THOUSAND/ÂΜL (ref 0.17–1.22)
MONOCYTES NFR BLD AUTO: 18 % (ref 4–12)
NEUTROPHILS # BLD AUTO: 4.21 THOUSANDS/ÂΜL (ref 1.85–7.62)
NEUTS SEG NFR BLD AUTO: 66 % (ref 43–75)
NRBC BLD AUTO-RTO: 0 /100 WBCS
O2 CT BLDV-SCNC: 15.8 ML/DL
O2 CT BLDV-SCNC: 5.4 ML/DL
PCO2 BLDV: 82.3 MM HG (ref 42–50)
PCO2 BLDV: 85.6 MM HG (ref 42–50)
PH BLDV: 7.28 [PH] (ref 7.3–7.4)
PH BLDV: 7.28 [PH] (ref 7.3–7.4)
PLATELET # BLD AUTO: 307 THOUSANDS/UL (ref 149–390)
PMV BLD AUTO: 8.8 FL (ref 8.9–12.7)
PO2 BLDV: 22.1 MM HG (ref 35–45)
PO2 BLDV: 62 MM HG (ref 35–45)
POTASSIUM SERPL-SCNC: 3.6 MMOL/L (ref 3.5–5.3)
PROCALCITONIN SERPL-MCNC: <0.05 NG/ML
PROT SERPL-MCNC: 7.6 G/DL (ref 6.4–8.4)
PROTHROMBIN TIME: 17.3 SECONDS (ref 12.3–15)
RBC # BLD AUTO: 3.59 MILLION/UL (ref 3.88–5.62)
RSV RNA RESP QL NAA+PROBE: NEGATIVE
SALICYLATES SERPL-MCNC: <5 MG/DL (ref 3–20)
SARS-COV-2 RNA RESP QL NAA+PROBE: NEGATIVE
SODIUM SERPL-SCNC: 138 MMOL/L (ref 135–147)
TSH SERPL DL<=0.05 MIU/L-ACNC: 0.59 UIU/ML (ref 0.45–4.5)
WBC # BLD AUTO: 6.36 THOUSAND/UL (ref 4.31–10.16)

## 2024-09-05 PROCEDURE — 93005 ELECTROCARDIOGRAM TRACING: CPT

## 2024-09-05 PROCEDURE — 82077 ASSAY SPEC XCP UR&BREATH IA: CPT | Performed by: EMERGENCY MEDICINE

## 2024-09-05 PROCEDURE — 87040 BLOOD CULTURE FOR BACTERIA: CPT | Performed by: EMERGENCY MEDICINE

## 2024-09-05 PROCEDURE — 80053 COMPREHEN METABOLIC PANEL: CPT | Performed by: EMERGENCY MEDICINE

## 2024-09-05 PROCEDURE — 71045 X-RAY EXAM CHEST 1 VIEW: CPT

## 2024-09-05 PROCEDURE — 82805 BLOOD GASES W/O2 SATURATION: CPT | Performed by: EMERGENCY MEDICINE

## 2024-09-05 PROCEDURE — 0241U HB NFCT DS VIR RESP RNA 4 TRGT: CPT | Performed by: EMERGENCY MEDICINE

## 2024-09-05 PROCEDURE — 85730 THROMBOPLASTIN TIME PARTIAL: CPT | Performed by: EMERGENCY MEDICINE

## 2024-09-05 PROCEDURE — 83880 ASSAY OF NATRIURETIC PEPTIDE: CPT | Performed by: EMERGENCY MEDICINE

## 2024-09-05 PROCEDURE — 36415 COLL VENOUS BLD VENIPUNCTURE: CPT | Performed by: EMERGENCY MEDICINE

## 2024-09-05 PROCEDURE — 83605 ASSAY OF LACTIC ACID: CPT | Performed by: EMERGENCY MEDICINE

## 2024-09-05 PROCEDURE — 99291 CRITICAL CARE FIRST HOUR: CPT | Performed by: EMERGENCY MEDICINE

## 2024-09-05 PROCEDURE — 85610 PROTHROMBIN TIME: CPT | Performed by: EMERGENCY MEDICINE

## 2024-09-05 PROCEDURE — 96375 TX/PRO/DX INJ NEW DRUG ADDON: CPT

## 2024-09-05 PROCEDURE — 82140 ASSAY OF AMMONIA: CPT | Performed by: EMERGENCY MEDICINE

## 2024-09-05 PROCEDURE — 94002 VENT MGMT INPAT INIT DAY: CPT

## 2024-09-05 PROCEDURE — 85025 COMPLETE CBC W/AUTO DIFF WBC: CPT | Performed by: EMERGENCY MEDICINE

## 2024-09-05 PROCEDURE — 80179 DRUG ASSAY SALICYLATE: CPT | Performed by: EMERGENCY MEDICINE

## 2024-09-05 PROCEDURE — 84443 ASSAY THYROID STIM HORMONE: CPT | Performed by: EMERGENCY MEDICINE

## 2024-09-05 PROCEDURE — 94760 N-INVAS EAR/PLS OXIMETRY 1: CPT

## 2024-09-05 PROCEDURE — 80143 DRUG ASSAY ACETAMINOPHEN: CPT | Performed by: EMERGENCY MEDICINE

## 2024-09-05 PROCEDURE — 99285 EMERGENCY DEPT VISIT HI MDM: CPT

## 2024-09-05 PROCEDURE — 94640 AIRWAY INHALATION TREATMENT: CPT

## 2024-09-05 PROCEDURE — 83735 ASSAY OF MAGNESIUM: CPT | Performed by: EMERGENCY MEDICINE

## 2024-09-05 PROCEDURE — 70450 CT HEAD/BRAIN W/O DYE: CPT

## 2024-09-05 PROCEDURE — 84145 PROCALCITONIN (PCT): CPT | Performed by: EMERGENCY MEDICINE

## 2024-09-05 PROCEDURE — 96365 THER/PROPH/DIAG IV INF INIT: CPT

## 2024-09-05 RX ORDER — ALBUTEROL SULFATE 2.5 MG/3ML
1 SOLUTION RESPIRATORY (INHALATION) ONCE
Status: COMPLETED | OUTPATIENT
Start: 2024-09-05 | End: 2024-09-05

## 2024-09-05 RX ORDER — MAGNESIUM SULFATE HEPTAHYDRATE 40 MG/ML
2 INJECTION, SOLUTION INTRAVENOUS ONCE
Status: COMPLETED | OUTPATIENT
Start: 2024-09-05 | End: 2024-09-05

## 2024-09-05 RX ORDER — METHYLPREDNISOLONE SODIUM SUCCINATE 125 MG/2ML
125 INJECTION, POWDER, LYOPHILIZED, FOR SOLUTION INTRAMUSCULAR; INTRAVENOUS ONCE
Status: COMPLETED | OUTPATIENT
Start: 2024-09-05 | End: 2024-09-05

## 2024-09-05 RX ORDER — IPRATROPIUM BROMIDE AND ALBUTEROL SULFATE 2.5; .5 MG/3ML; MG/3ML
3 SOLUTION RESPIRATORY (INHALATION) ONCE
Status: COMPLETED | OUTPATIENT
Start: 2024-09-05 | End: 2024-09-05

## 2024-09-05 RX ADMIN — METHYLPREDNISOLONE SODIUM SUCCINATE 125 MG: 125 INJECTION, POWDER, FOR SOLUTION INTRAMUSCULAR; INTRAVENOUS at 21:47

## 2024-09-05 RX ADMIN — MAGNESIUM SULFATE HEPTAHYDRATE 2 G: 40 INJECTION, SOLUTION INTRAVENOUS at 21:46

## 2024-09-05 RX ADMIN — IPRATROPIUM BROMIDE AND ALBUTEROL SULFATE 3 ML: 2.5; .5 SOLUTION RESPIRATORY (INHALATION) at 21:45

## 2024-09-06 ENCOUNTER — HOSPITAL ENCOUNTER (INPATIENT)
Facility: HOSPITAL | Age: 64
LOS: 2 days | DRG: 133 | End: 2024-09-08
Attending: INTERNAL MEDICINE | Admitting: INTERNAL MEDICINE
Payer: COMMERCIAL

## 2024-09-06 VITALS
HEART RATE: 72 BPM | SYSTOLIC BLOOD PRESSURE: 128 MMHG | RESPIRATION RATE: 22 BRPM | TEMPERATURE: 99 F | DIASTOLIC BLOOD PRESSURE: 62 MMHG | OXYGEN SATURATION: 97 %

## 2024-09-06 DIAGNOSIS — J44.1 COPD WITH ACUTE EXACERBATION (HCC): ICD-10-CM

## 2024-09-06 DIAGNOSIS — I31.39 PERICARDIAL EFFUSION: ICD-10-CM

## 2024-09-06 DIAGNOSIS — G93.41 ACUTE METABOLIC ENCEPHALOPATHY: ICD-10-CM

## 2024-09-06 DIAGNOSIS — I46.9 CARDIAC ARREST (HCC): ICD-10-CM

## 2024-09-06 DIAGNOSIS — J96.21 ACUTE ON CHRONIC RESPIRATORY FAILURE WITH HYPOXIA AND HYPERCAPNIA (HCC): Primary | ICD-10-CM

## 2024-09-06 DIAGNOSIS — I50.42 CHRONIC COMBINED SYSTOLIC AND DIASTOLIC CHF (CONGESTIVE HEART FAILURE) (HCC): ICD-10-CM

## 2024-09-06 DIAGNOSIS — J96.22 ACUTE ON CHRONIC RESPIRATORY FAILURE WITH HYPOXIA AND HYPERCAPNIA (HCC): Primary | ICD-10-CM

## 2024-09-06 LAB
ALBUMIN SERPL BCG-MCNC: 3.7 G/DL (ref 3.5–5)
ALP SERPL-CCNC: 99 U/L (ref 34–104)
ALT SERPL W P-5'-P-CCNC: 9 U/L (ref 7–52)
ANION GAP SERPL CALCULATED.3IONS-SCNC: 3 MMOL/L (ref 4–13)
AST SERPL W P-5'-P-CCNC: 25 U/L (ref 13–39)
ATRIAL RATE: 84 BPM
BASE EX.OXY STD BLDV CALC-SCNC: 70.7 % (ref 60–80)
BASE EX.OXY STD BLDV CALC-SCNC: 90.1 % (ref 60–80)
BASE EXCESS BLDA CALC-SCNC: 11 MMOL/L (ref -2–3)
BASE EXCESS BLDV CALC-SCNC: 11.3 MMOL/L
BASE EXCESS BLDV CALC-SCNC: 3.5 MMOL/L
BASOPHILS # BLD AUTO: 0.05 THOUSANDS/ÂΜL (ref 0–0.1)
BASOPHILS NFR BLD AUTO: 1 % (ref 0–1)
BILIRUB DIRECT SERPL-MCNC: 0.23 MG/DL (ref 0–0.2)
BILIRUB SERPL-MCNC: 0.91 MG/DL (ref 0.2–1)
BUN SERPL-MCNC: 23 MG/DL (ref 5–25)
CA-I BLD-SCNC: 1.29 MMOL/L (ref 1.12–1.32)
CALCIUM SERPL-MCNC: 9 MG/DL (ref 8.4–10.2)
CHLORIDE SERPL-SCNC: 95 MMOL/L (ref 96–108)
CO2 SERPL-SCNC: 41 MMOL/L (ref 21–32)
CREAT SERPL-MCNC: 0.59 MG/DL (ref 0.6–1.3)
EOSINOPHIL # BLD AUTO: 0 THOUSAND/ÂΜL (ref 0–0.61)
EOSINOPHIL NFR BLD AUTO: 0 % (ref 0–6)
ERYTHROCYTE [DISTWIDTH] IN BLOOD BY AUTOMATED COUNT: 12.4 % (ref 11.6–15.1)
GFR SERPL CREATININE-BSD FRML MDRD: 106 ML/MIN/1.73SQ M
GLUCOSE SERPL-MCNC: 114 MG/DL (ref 65–140)
GLUCOSE SERPL-MCNC: 133 MG/DL (ref 65–140)
GLUCOSE SERPL-MCNC: 150 MG/DL (ref 65–140)
HCO3 BLDA-SCNC: 42.3 MMOL/L (ref 22–28)
HCO3 BLDV-SCNC: 35 MMOL/L (ref 24–30)
HCO3 BLDV-SCNC: 39.9 MMOL/L (ref 24–30)
HCT VFR BLD AUTO: 33.2 % (ref 36.5–49.3)
HCT VFR BLD CALC: 36 % (ref 36.5–49.3)
HGB BLD-MCNC: 10.8 G/DL (ref 12–17)
HGB BLDA-MCNC: 12.2 G/DL (ref 12–17)
IMM GRANULOCYTES # BLD AUTO: 0.03 THOUSAND/UL (ref 0–0.2)
IMM GRANULOCYTES NFR BLD AUTO: 1 % (ref 0–2)
LYMPHOCYTES # BLD AUTO: 0.62 THOUSANDS/ÂΜL (ref 0.6–4.47)
LYMPHOCYTES NFR BLD AUTO: 10 % (ref 14–44)
MAGNESIUM SERPL-MCNC: 2.3 MG/DL (ref 1.9–2.7)
MCH RBC QN AUTO: 32 PG (ref 26.8–34.3)
MCHC RBC AUTO-ENTMCNC: 32.5 G/DL (ref 31.4–37.4)
MCV RBC AUTO: 99 FL (ref 82–98)
MONOCYTES # BLD AUTO: 0.6 THOUSAND/ÂΜL (ref 0.17–1.22)
MONOCYTES NFR BLD AUTO: 10 % (ref 4–12)
NEUTROPHILS # BLD AUTO: 4.74 THOUSANDS/ÂΜL (ref 1.85–7.62)
NEUTS SEG NFR BLD AUTO: 78 % (ref 43–75)
NRBC BLD AUTO-RTO: 0 /100 WBCS
O2 CT BLDV-SCNC: 11.7 ML/DL
O2 CT BLDV-SCNC: 16.5 ML/DL
P AXIS: 73 DEGREES
PCO2 BLD: 100.5 MM HG (ref 36–44)
PCO2 BLD: >45 MMOL/L (ref 21–32)
PCO2 BLDV: 76.5 MM HG (ref 42–50)
PCO2 BLDV: 97.2 MM HG (ref 42–50)
PH BLD: 7.23 [PH] (ref 7.35–7.45)
PH BLDV: 7.17 [PH] (ref 7.3–7.4)
PH BLDV: 7.33 [PH] (ref 7.3–7.4)
PHENYTOIN SERPL-MCNC: 34.5 UG/ML (ref 10–20)
PLATELET # BLD AUTO: 242 THOUSANDS/UL (ref 149–390)
PMV BLD AUTO: 9.1 FL (ref 8.9–12.7)
PO2 BLD: 109 MM HG (ref 75–129)
PO2 BLDV: 38.9 MM HG (ref 35–45)
PO2 BLDV: 75 MM HG (ref 35–45)
POTASSIUM BLD-SCNC: 4.4 MMOL/L (ref 3.5–5.3)
POTASSIUM SERPL-SCNC: 4.2 MMOL/L (ref 3.5–5.3)
PR INTERVAL: 266 MS
PROT SERPL-MCNC: 7.6 G/DL (ref 6.4–8.4)
QRS AXIS: 37 DEGREES
QRSD INTERVAL: 110 MS
QT INTERVAL: 364 MS
QTC INTERVAL: 430 MS
RBC # BLD AUTO: 3.37 MILLION/UL (ref 3.88–5.62)
SAO2 % BLD FROM PO2: 97 % (ref 60–85)
SODIUM BLD-SCNC: 138 MMOL/L (ref 136–145)
SODIUM SERPL-SCNC: 139 MMOL/L (ref 135–147)
SPECIMEN SOURCE: ABNORMAL
T WAVE AXIS: 100 DEGREES
VENTRICULAR RATE: 84 BPM
WBC # BLD AUTO: 6.04 THOUSAND/UL (ref 4.31–10.16)

## 2024-09-06 PROCEDURE — 87205 SMEAR GRAM STAIN: CPT | Performed by: NURSE PRACTITIONER

## 2024-09-06 PROCEDURE — 94760 N-INVAS EAR/PLS OXIMETRY 1: CPT

## 2024-09-06 PROCEDURE — 94002 VENT MGMT INPAT INIT DAY: CPT

## 2024-09-06 PROCEDURE — 87077 CULTURE AEROBIC IDENTIFY: CPT | Performed by: NURSE PRACTITIONER

## 2024-09-06 PROCEDURE — 83735 ASSAY OF MAGNESIUM: CPT

## 2024-09-06 PROCEDURE — 84295 ASSAY OF SERUM SODIUM: CPT

## 2024-09-06 PROCEDURE — 82947 ASSAY GLUCOSE BLOOD QUANT: CPT

## 2024-09-06 PROCEDURE — 82330 ASSAY OF CALCIUM: CPT

## 2024-09-06 PROCEDURE — 82805 BLOOD GASES W/O2 SATURATION: CPT | Performed by: PHYSICIAN ASSISTANT

## 2024-09-06 PROCEDURE — 85025 COMPLETE CBC W/AUTO DIFF WBC: CPT

## 2024-09-06 PROCEDURE — NC001 PR NO CHARGE

## 2024-09-06 PROCEDURE — 87186 SC STD MICRODIL/AGAR DIL: CPT | Performed by: NURSE PRACTITIONER

## 2024-09-06 PROCEDURE — 80048 BASIC METABOLIC PNL TOTAL CA: CPT

## 2024-09-06 PROCEDURE — 84132 ASSAY OF SERUM POTASSIUM: CPT

## 2024-09-06 PROCEDURE — 82803 BLOOD GASES ANY COMBINATION: CPT

## 2024-09-06 PROCEDURE — 87185 SC STD ENZYME DETCJ PER NZM: CPT | Performed by: NURSE PRACTITIONER

## 2024-09-06 PROCEDURE — 80076 HEPATIC FUNCTION PANEL: CPT

## 2024-09-06 PROCEDURE — 82805 BLOOD GASES W/O2 SATURATION: CPT

## 2024-09-06 PROCEDURE — 94640 AIRWAY INHALATION TREATMENT: CPT

## 2024-09-06 PROCEDURE — 85014 HEMATOCRIT: CPT

## 2024-09-06 PROCEDURE — 93010 ELECTROCARDIOGRAM REPORT: CPT | Performed by: INTERNAL MEDICINE

## 2024-09-06 PROCEDURE — 94664 DEMO&/EVAL PT USE INHALER: CPT

## 2024-09-06 PROCEDURE — 99223 1ST HOSP IP/OBS HIGH 75: CPT | Performed by: INTERNAL MEDICINE

## 2024-09-06 PROCEDURE — 99255 IP/OBS CONSLTJ NEW/EST HI 80: CPT | Performed by: INTERNAL MEDICINE

## 2024-09-06 PROCEDURE — 87070 CULTURE OTHR SPECIMN AEROBIC: CPT | Performed by: NURSE PRACTITIONER

## 2024-09-06 PROCEDURE — 82948 REAGENT STRIP/BLOOD GLUCOSE: CPT

## 2024-09-06 PROCEDURE — 80185 ASSAY OF PHENYTOIN TOTAL: CPT | Performed by: PHYSICIAN ASSISTANT

## 2024-09-06 RX ORDER — AZITHROMYCIN 250 MG/1
500 TABLET, FILM COATED ORAL EVERY 24 HOURS
Status: DISCONTINUED | OUTPATIENT
Start: 2024-09-06 | End: 2024-09-07

## 2024-09-06 RX ORDER — SODIUM CHLORIDE FOR INHALATION 3 %
4 VIAL, NEBULIZER (ML) INHALATION
Status: DISCONTINUED | OUTPATIENT
Start: 2024-09-06 | End: 2024-09-08 | Stop reason: HOSPADM

## 2024-09-06 RX ORDER — FUROSEMIDE 40 MG
40 TABLET ORAL DAILY
Status: DISCONTINUED | OUTPATIENT
Start: 2024-09-06 | End: 2024-09-08

## 2024-09-06 RX ORDER — GUAIFENESIN/DEXTROMETHORPHAN 100-10MG/5
10 SYRUP ORAL EVERY 4 HOURS PRN
Status: DISCONTINUED | OUTPATIENT
Start: 2024-09-06 | End: 2024-09-08 | Stop reason: HOSPADM

## 2024-09-06 RX ORDER — SODIUM CHLORIDE 1 G/1
1 TABLET ORAL 2 TIMES DAILY WITH MEALS
Status: DISCONTINUED | OUTPATIENT
Start: 2024-09-06 | End: 2024-09-08

## 2024-09-06 RX ORDER — GABAPENTIN 400 MG/1
400 CAPSULE ORAL 2 TIMES DAILY
Status: DISCONTINUED | OUTPATIENT
Start: 2024-09-06 | End: 2024-09-07

## 2024-09-06 RX ORDER — ACETAMINOPHEN 325 MG/1
650 TABLET ORAL EVERY 6 HOURS PRN
Status: DISCONTINUED | OUTPATIENT
Start: 2024-09-06 | End: 2024-09-08 | Stop reason: HOSPADM

## 2024-09-06 RX ORDER — BUDESONIDE 0.5 MG/2ML
0.5 INHALANT ORAL
Status: DISCONTINUED | OUTPATIENT
Start: 2024-09-06 | End: 2024-09-08 | Stop reason: HOSPADM

## 2024-09-06 RX ORDER — PHENYTOIN SODIUM 100 MG/1
100 CAPSULE, EXTENDED RELEASE ORAL 2 TIMES DAILY
Status: DISCONTINUED | OUTPATIENT
Start: 2024-09-06 | End: 2024-09-06

## 2024-09-06 RX ORDER — SODIUM CHLORIDE FOR INHALATION 0.9 %
3 VIAL, NEBULIZER (ML) INHALATION
Status: DISCONTINUED | OUTPATIENT
Start: 2024-09-06 | End: 2024-09-06

## 2024-09-06 RX ORDER — METHYLPREDNISOLONE SODIUM SUCCINATE 40 MG/ML
40 INJECTION, POWDER, LYOPHILIZED, FOR SOLUTION INTRAMUSCULAR; INTRAVENOUS EVERY 8 HOURS
Status: DISCONTINUED | OUTPATIENT
Start: 2024-09-06 | End: 2024-09-06

## 2024-09-06 RX ORDER — NICOTINE 21 MG/24HR
1 PATCH, TRANSDERMAL 24 HOURS TRANSDERMAL DAILY
Status: DISCONTINUED | OUTPATIENT
Start: 2024-09-06 | End: 2024-09-08 | Stop reason: HOSPADM

## 2024-09-06 RX ORDER — METOPROLOL SUCCINATE 25 MG/1
25 TABLET, EXTENDED RELEASE ORAL DAILY
Status: DISCONTINUED | OUTPATIENT
Start: 2024-09-06 | End: 2024-09-08

## 2024-09-06 RX ORDER — SODIUM CHLORIDE FOR INHALATION 3 %
4 VIAL, NEBULIZER (ML) INHALATION 3 TIMES DAILY
Status: DISCONTINUED | OUTPATIENT
Start: 2024-09-06 | End: 2024-09-06

## 2024-09-06 RX ORDER — LORAZEPAM 2 MG/ML
INJECTION INTRAMUSCULAR
Status: DISCONTINUED
Start: 2024-09-06 | End: 2024-09-06 | Stop reason: WASHOUT

## 2024-09-06 RX ORDER — PHENYTOIN SODIUM 100 MG/1
100 CAPSULE, EXTENDED RELEASE ORAL
Status: DISCONTINUED | OUTPATIENT
Start: 2024-09-06 | End: 2024-09-07

## 2024-09-06 RX ORDER — LEVALBUTEROL INHALATION SOLUTION 1.25 MG/3ML
1.25 SOLUTION RESPIRATORY (INHALATION)
Status: DISCONTINUED | OUTPATIENT
Start: 2024-09-06 | End: 2024-09-06

## 2024-09-06 RX ORDER — IPRATROPIUM BROMIDE AND ALBUTEROL SULFATE 2.5; .5 MG/3ML; MG/3ML
3 SOLUTION RESPIRATORY (INHALATION)
Status: DISCONTINUED | OUTPATIENT
Start: 2024-09-06 | End: 2024-09-08 | Stop reason: HOSPADM

## 2024-09-06 RX ORDER — METHYLPREDNISOLONE SODIUM SUCCINATE 40 MG/ML
40 INJECTION, POWDER, LYOPHILIZED, FOR SOLUTION INTRAMUSCULAR; INTRAVENOUS EVERY 12 HOURS SCHEDULED
Status: DISCONTINUED | OUTPATIENT
Start: 2024-09-06 | End: 2024-09-07

## 2024-09-06 RX ADMIN — APIXABAN 5 MG: 5 TABLET, FILM COATED ORAL at 09:23

## 2024-09-06 RX ADMIN — METHYLPREDNISOLONE SODIUM SUCCINATE 40 MG: 40 INJECTION, POWDER, FOR SOLUTION INTRAMUSCULAR; INTRAVENOUS at 05:25

## 2024-09-06 RX ADMIN — IPRATROPIUM BROMIDE AND ALBUTEROL SULFATE 3 ML: 2.5; .5 SOLUTION RESPIRATORY (INHALATION) at 13:50

## 2024-09-06 RX ADMIN — UMECLIDINIUM 1 PUFF: 62.5 AEROSOL, POWDER ORAL at 09:26

## 2024-09-06 RX ADMIN — SODIUM CHLORIDE SOLN NEBU 3% 4 ML: 3 NEBU SOLN at 19:48

## 2024-09-06 RX ADMIN — GABAPENTIN 400 MG: 400 CAPSULE ORAL at 17:06

## 2024-09-06 RX ADMIN — METOPROLOL SUCCINATE 25 MG: 25 TABLET, EXTENDED RELEASE ORAL at 09:23

## 2024-09-06 RX ADMIN — AZITHROMYCIN DIHYDRATE 500 MG: 250 TABLET ORAL at 10:52

## 2024-09-06 RX ADMIN — IPRATROPIUM BROMIDE AND ALBUTEROL SULFATE 3 ML: 2.5; .5 SOLUTION RESPIRATORY (INHALATION) at 19:48

## 2024-09-06 RX ADMIN — GABAPENTIN 400 MG: 400 CAPSULE ORAL at 09:23

## 2024-09-06 RX ADMIN — PHENYTOIN SODIUM 100 MG: 100 CAPSULE, EXTENDED RELEASE ORAL at 13:27

## 2024-09-06 RX ADMIN — APIXABAN 5 MG: 5 TABLET, FILM COATED ORAL at 17:06

## 2024-09-06 RX ADMIN — METHYLPREDNISOLONE SODIUM SUCCINATE 40 MG: 40 INJECTION, POWDER, FOR SOLUTION INTRAMUSCULAR; INTRAVENOUS at 21:35

## 2024-09-06 RX ADMIN — FUROSEMIDE 40 MG: 40 TABLET ORAL at 09:23

## 2024-09-06 RX ADMIN — LEVALBUTEROL HYDROCHLORIDE 1.25 MG: 1.25 SOLUTION RESPIRATORY (INHALATION) at 07:26

## 2024-09-06 RX ADMIN — SODIUM CHLORIDE SOLN NEBU 3% 4 ML: 3 NEBU SOLN at 13:50

## 2024-09-06 RX ADMIN — GUAIFENESIN AND DEXTROMETHORPHAN 10 ML: 20; 200 SYRUP ORAL at 11:08

## 2024-09-06 RX ADMIN — BUDESONIDE 0.5 MG: 0.5 INHALANT ORAL at 19:48

## 2024-09-06 RX ADMIN — PHENYTOIN SODIUM 100 MG: 100 CAPSULE, EXTENDED RELEASE ORAL at 09:23

## 2024-09-06 RX ADMIN — Medication 1 PATCH: at 09:23

## 2024-09-06 RX ADMIN — IPRATROPIUM BROMIDE 0.5 MG: 0.5 SOLUTION RESPIRATORY (INHALATION) at 07:26

## 2024-09-06 RX ADMIN — SODIUM CHLORIDE 1 G: 1 TABLET ORAL at 09:23

## 2024-09-06 RX ADMIN — PHENYTOIN SODIUM 250 MG: 100 CAPSULE, EXTENDED RELEASE ORAL at 17:07

## 2024-09-06 NOTE — ED NOTES
Pickup time:0130  Transport: [facility].  Room: [place] [address].   Number for report: [phone].   Accepting: Dr. Yung accepting     Lester Nuñez RN  09/06/24 0131       Lester Nuñez RN  09/06/24 0142

## 2024-09-06 NOTE — ASSESSMENT & PLAN NOTE
In ED, did question whether patient may be postictal   No reported seizure-activity from facility, no evidence of trauma or tongue bite  Given history, suspect confusion more likely to be secondary to hypercarbia  Neuro checks, seizure precautions for now   Continue 100mg phenytoin bid and 250mg phenytoin at 5pm with 400mg gabapentin bid

## 2024-09-06 NOTE — ASSESSMENT & PLAN NOTE
Pt presented to the [place] ED in respiratory distress with wheezing after standing from smoking a cigarette  Pt typically maintained on 2-3L secondary to COPD, lung cancer, tobacco abuse, and pulm fibrosis  Now requiring BiPAP therapy  Suspect COPD exacerbation  Plan as above

## 2024-09-06 NOTE — ASSESSMENT & PLAN NOTE
"Wt Readings from Last 3 Encounters:   08/29/24 67.6 kg (149 lb)   04/01/24 64.4 kg (142 lb)   10/02/23 66.9 kg (147 lb 8 oz)     Pt appears euvolemic on exam    Last ECHO 8/2024 demonstrating \"The left ventricular ejection fraction is 35-40%. Systolic function is moderately reduced. There is moderate global hypokinesis.\"  Daily weights, I/O  Continue 40 mg daily p.o. lasix        "

## 2024-09-06 NOTE — ASSESSMENT & PLAN NOTE
In the setting of progressive COPD, ongoing tobacco abuse, and lung cancer history  Maintain oxygenation as necessary currently requiring up to 2-3 L via nasal cannula  Long-term prognosis poor due to comorbidities

## 2024-09-06 NOTE — H&P
[facility]  H&P  Name: Marcin Sánchez 64 y.o. male I MRN: [id number]  Unit/Bed#: E4 -01 I Date of Admission: 9/6/2024   Date of Service: 9/6/2024 I Hospital Day: 0      Assessment & Plan   * Acute on chronic respiratory failure with hypoxia and hypercapnia (HCC)  Assessment & Plan  Pt with PMHx of pulm fibrosis, COPD, lung cancer, tobacco abuse, epilepsy, afib anticoagulated on eliquis, suspected chronic PE, and CHF presented to the [facility] ED from his facility [facility] after developing respiratory distress  Facility staff noted patient appeared confused after standing to go inside after smoking a cigarette. They then noted him to be hypoxic at that time as well.   Pt typically maintained on 2-3 L NC. Currently receiving BiPAP therapy  Case initially discussed with CC who deemed patient appropriate for the SLIM service  CXR appears similar to previous, no acute abnormalities noted, official read pending  VBG with hypercarbia pCO2 85.6->82.3, pH 7.282->7.280  Continue to trend  WBC 6.36, afebrile,   Procalc <0.05, lactic 1.1  , appears to be baseline, pt appears euvolemic  Suspect acute resp failure likely secondary to COPD exacerbation  Resp protocol, BiPAP for now hs and prn, ween oxygen as tolerated  Scheduled nebs with home inhaler regimen  IV solumedrol  Pulm consult pending    COPD with acute exacerbation (HCC)  Assessment & Plan  Pt presented to the [facility] ED in respiratory distress with wheezing after standing from smoking a cigarette  Pt typically maintained on 2-3L secondary to COPD, lung cancer, tobacco abuse, and pulm fibrosis  Now requiring BiPAP therapy  Suspect COPD exacerbation  Plan as above    Acute metabolic encephalopathy  Assessment & Plan  Pt noted to be confused at his facility and initially confused/ lethargic while in the ED  Pt has improved while on the BiPAP, now able to participate in conversation, talking over the BiPAP  AAOx1 to  "person, but not place, time, or situation  CT head demonstrating \"No acute intracranial abnormality. Old left basal ganglia lacunar infarct.\"  [place] ED initially questioned whether patient post-ictal on presentation due to history of epilepsy, however not witness seizure activity/trauma  Suspect AMS likely secondary to hypercarbia  Neuro checks  Monitor for return to baseline mental status    Epilepsy (HCC)  Assessment & Plan  In ED, did question whether patient may be postictal   No reported seizure-activity from facility, no evidence of trauma or tongue bite  Given history, suspect confusion more likely to be secondary to hypercarbia  Neuro checks, seizure precautions for now   Continue 100mg phenytoin bid and 250mg phenytoin at 5pm with 400mg gabapentin bid    Pulmonary fibrosis (HCC)  Assessment & Plan  In the setting of progressive COPD, ongoing tobacco abuse, and lung cancer history  Maintain oxygenation as necessary currently requiring up to 2-3 L via nasal cannula  Long-term prognosis poor due to comorbidities    Chronic combined systolic and diastolic CHF (congestive heart failure) (HCC)  Assessment & Plan  Wt Readings from Last 3 Encounters:   08/29/24 67.6 kg (149 lb)   04/01/24 64.4 kg (142 lb)   10/02/23 66.9 kg (147 lb 8 oz)     Pt appears euvolemic on exam    Last ECHO 8/2024 demonstrating \"The left ventricular ejection fraction is 35-40%. Systolic function is moderately reduced. There is moderate global hypokinesis.\"  Daily weights, I/O  Continue 40 mg daily p.o. lasix          Squamous cell lung cancer (HCC)  Assessment & Plan  S/p chemo/radiation  Followed outpatient by oncology, last seen 4/2024    Atrial fibrillation (HCC)  Assessment & Plan  Anticoagulated on Eliquis  Continue metoprolol    Tobacco abuse  Assessment & Plan  Patient smokes 1.5 packs of cigarettes a day  Nicotine patch supplementation  Continue to encourage cessation           VTE Pharmacologic Prophylaxis: VTE Score: 5 " "High Risk (Score >/= 5) - Pharmacological DVT Prophylaxis Ordered: apixaban (Eliquis). Sequential Compression Devices Ordered.  Code Status: Level 1 - Full Code   Discussion with family: Update in AM    Anticipated Length of Stay: Patient will be admitted on an inpatient basis with an anticipated length of stay of greater than 2 midnights secondary to acute resp failure with hypercarbia and hypoxia, secondary to COPD exacerbation.    Total Time Spent on Date of Encounter in care of patient: 75 mins. This time was spent on one or more of the following: performing physical exam; counseling and coordination of care; obtaining or reviewing history; documenting in the medical record; reviewing/ordering tests, medications or procedures; communicating with other healthcare professionals and discussing with patient's family/caregivers.    Chief Complaint: \"I hate this mask\"    History of Present Illness:  Marcin Sánchez is a 64 y.o. male who presents with acute resp failure secondary to COPD exacerbation. Pt presented to the [place] ED secondary to respiratory distress from his facility [facility]. Pt was noted to be smoking a cigarette outside, when he stood up, pt appeared confused. Staff noted he appeared SOB and found him to be hypoxic prompting them to call EMS. Upon arrival to the ED, pt lethargic and confused. Initially there was concern patient may have been postictal given history of epilepsy. However, pt found to be hypercarbic and patient mentation began to improve on BiPAP therapy and breathing treatments in the ED. No witnessed seizure-like activity, no tongue bite. Pt mentation improved further, he now talks over the BiPAP to have conversation, does not appear lethargic. He currently is only oriented to person, not place, time or situation.     Review of Systems:  Review of Systems   Unable to perform ROS: Mental status change       Past Medical and Surgical History:   Past Medical History: "   Diagnosis Date    Alcohol abuse     Anxiety     Aortic insufficiency 12/18/2020    Atrial fibrillation (HCC)     Cancer (HCC)     COPD (chronic obstructive pulmonary disease) (HCC)     Delirium     Encephalopathy     Epilepsy (HCC)     History of chemotherapy     History of radiation therapy     Hypo-osmolality and hyponatremia     Hypokalemia     Hypothyroid     Lung cancer (HCC)     Lyme disease     Major depression        Past Surgical History:   Procedure Laterality Date    BRONCHOSCOPY N/A 10/3/2016    Procedure: BRONCHOSCOPY FLEXIBLE;  Surgeon: John Brunner DO;  Location: [facility];  Service:     HAND SURGERY      PELVIC FRACTURE SURGERY      REMOVAL VENOUS PORT (PORT-A-CATH) Left 9/18/2018    Procedure: REMOVAL VENOUS PORT (PORT-A-CATH)IR;  Surgeon: Randy Lopez DO;  Location: [facility];  Service: Interventional Radiology       Meds/Allergies:  Prior to Admission medications    Medication Sig Start Date End Date Taking? Authorizing Provider   Advair -21 MCG/ACT inhaler  12/19/22   Historical Provider, MD   albuterol (PROVENTIL HFA,VENTOLIN HFA) 90 mcg/act inhaler Inhale 2 puffs every 6 (six) hours as needed for wheezing 2/25/20   Bienvenido Lee MD   apixaban (ELIQUIS) 5 mg Take 1 tablet (5 mg total) by mouth 2 (two) times a day 8/30/24   Malia Donis MD   furosemide (LASIX) 40 mg tablet Take 1 tablet (40 mg total) by mouth daily 8/30/24   Malia Donis MD   gabapentin (NEURONTIN) 400 mg capsule Take 400 mg by mouth 2 (two) times a day      Historical Provider, MD   levalbuterol (XOPENEX) 0.63 mg/3 mL nebulizer solution Take 3 mL (0.63 mg total) by nebulization every 8 (eight) hours as needed for wheezing 8/30/24   Malia Donis MD   metoprolol succinate (TOPROL-XL) 25 mg 24 hr tablet Take 25 mg by mouth daily    Historical Provider, MD   phenytoin (DILANTIN) 100 mg ER capsule Take 100 mg by mouth 2 (two) times a day And 250mg at 5pm    Historical Provider, MD   potassium chloride  (Klor-Con M20) 20 mEq tablet Take 1 tablet (20 mEq total) by mouth daily 8/30/24   Malia Donis MD   sodium chloride 1 g tablet Take 1 tablet (1 g total) by mouth 2 (two) times a day with meals 8/30/24   Malia Donis MD   tiotropium (SPIRIVA) 18 mcg inhalation capsule Place 18 mcg into inhaler and inhale daily    Historical Provider, MD   Vitamin D, Cholecalciferol, 1000 UNITS CAPS Take 2 capsules by mouth daily     Historical Provider, MD     I have reviewed home medications using recent Epic encounter.    Allergies: No Known Allergies    Social History:  Marital Status:    Patient Pre-hospital Living Situation: Skilled Nursing Facility: [facility]  Patient Pre-hospital Level of Mobility: walks  Patient Pre-hospital Diet Restrictions: cardiac  Substance Use History:   Social History     Substance and Sexual Activity   Alcohol Use Never     Social History     Tobacco Use   Smoking Status Every Day    Current packs/day: 1.50    Average packs/day: 1.5 packs/day for 47.0 years (70.5 ttl pk-yrs)    Types: Cigarettes   Smokeless Tobacco Never   Tobacco Comments    smoking more than 1 ppd     Social History     Substance and Sexual Activity   Drug Use Never       Family History:  Family History   Problem Relation Age of Onset    Diabetes Mother     Lung cancer Father        Physical Exam:     Vitals:   Blood Pressure: 132/60 (09/06/24 0230)  Pulse: 76 (09/06/24 0230)  Temperature: 97.8 °F (36.6 °C) (09/06/24 0230)  Temp Source: Temporal (09/06/24 0230)  Respirations: 22 (09/06/24 0230)  SpO2: 98 % (09/06/24 0232)    Physical Exam  Vitals and nursing note reviewed.   Constitutional:       General: He is not in acute distress.     Appearance: He is well-developed. He is ill-appearing.   HENT:      Head: Normocephalic and atraumatic.      Nose: Nose normal. No congestion.      Mouth/Throat:      Mouth: Mucous membranes are moist.      Pharynx: Oropharynx is clear.   Eyes:      Conjunctiva/sclera:  "Conjunctivae normal.   Cardiovascular:      Rate and Rhythm: Normal rate and regular rhythm.      Heart sounds: Normal heart sounds. No murmur heard.     No friction rub. No gallop.   Pulmonary:      Effort: Pulmonary effort is normal. No respiratory distress.      Breath sounds: Wheezing (diffuse expiratory wheezing) present. No rhonchi or rales.      Comments: On BiPAP  Abdominal:      General: Bowel sounds are normal. There is no distension.      Palpations: Abdomen is soft.      Tenderness: There is no abdominal tenderness.   Musculoskeletal:      Cervical back: Neck supple.      Right lower leg: No edema.      Left lower leg: Edema (trace) present.   Skin:     General: Skin is warm and dry.      Capillary Refill: Capillary refill takes less than 2 seconds.   Neurological:      General: No focal deficit present.      Mental Status: He is alert and oriented to person, place, and time. Mental status is at baseline.   Psychiatric:         Mood and Affect: Mood normal.         Behavior: Behavior normal.         Additional Data:     Lab Results:  Results from last 7 days   Lab Units 09/05/24 2139   WBC Thousand/uL 6.36   HEMOGLOBIN g/dL 11.5*   HEMATOCRIT % 35.7*   PLATELETS Thousands/uL 307   SEGS PCT % 66   LYMPHO PCT % 14   MONO PCT % 18*   EOS PCT % 1     Results from last 7 days   Lab Units 09/05/24  2139   SODIUM mmol/L 138   POTASSIUM mmol/L 3.6   CHLORIDE mmol/L 94*   CO2 mmol/L 39*   BUN mg/dL 22   CREATININE mg/dL 0.59*   ANION GAP mmol/L 5   CALCIUM mg/dL 9.1   ALBUMIN g/dL 3.7   TOTAL BILIRUBIN mg/dL 0.75   ALK PHOS U/L 91   ALT U/L 13   AST U/L 20   GLUCOSE RANDOM mg/dL 164*     Results from last 7 days   Lab Units 09/05/24  2139   INR  1.38*         No results found for: \"HGBA1C\"  Results from last 7 days   Lab Units 09/05/24 2139   LACTIC ACID mmol/L 1.1   PROCALCITONIN ng/ml <0.05       Lines/Drains:  Invasive Devices       Peripheral Intravenous Line  Duration             Peripheral IV 09/05/24 " Right Antecubital <1 day                        Imaging: Reviewed radiology reports from this admission including: CT head and Personally reviewed the following imaging: chest xray  No orders to display       EKG and Other Studies Reviewed on Admission:   EKG: NSR. HR 84.    ** Please Note: This note has been constructed using a voice recognition system. **

## 2024-09-06 NOTE — RAPID RESPONSE
Rapid Response Note    Marcin Sánchez 64 y.o. male MRN: [id number]  Unit/Bed#: E4 -01 Encounter: [id number]    Rapid Response Notification(s):   Response called date/time:  9/6/2024 7:35 PM  Response team arrival date/time:  9/6/2024 7:37 PM  Level of care:  Stepdown 2  Rapid response location:  Stepdown unit  Primary reason for rapid response call:  Acute change in neuro status    Rapid Response Intervention(s):        Assessment:   Rapid response was called for altered mental status  Patient is admitted with metabolic encephalopathy, but was conversational during the day, patient was awoken from a nap and was found to be altered, nonverbal, jerking extremities  Upon my arrival to the room, patient would spontaneously open eyes, was moving all 4 extremities, jerking but nonrhythmic, not appear to be seizure-like in nature, patient also had tongue protrusion  Patient's blood sugar was normal, normal vital signs  I-STAT revealing pH of 7.2, hypercarbic  Patient placed back on BiPAP    Plan:   Continue BiPAP, repeat VBG in approximately an hour, patient metabolic derangements possibly causing his encephalopathy  Change BiPAP ordered to at night and with naps, patient likely got hypercarbic while napping and was altered due to this  Some of the patient's movements appeared to be tardive dyskinesia, send Dilantin level  Closely monitor the patient's neurostatus, if any focal deficits, or if patient's metabolic indices improved but his mental status does not, consider EEG, head CT    Slim at bedside during rapid response, plan discussed, please reach out to critical care again if needed     Rapid Response Outcome:   Transfer:  Remain on floor  Code Status: Level 1 (Full Code)           Background/Situation:     Marcin Sánchez is a 64 y.o. male who is admitted with acute on chronic hypoxic and hypercapnic respiratory failure, COPD exacerbation, acute metabolic encephalopathy.  Patient also has a history of  epilepsy, pulmonary fibrosis, squamous cell lung cancer, A-fib.    Review of Systems   Unable to perform ROS: Mental status change       Objective:   Vitals:    09/06/24 1350 09/06/24 1527 09/06/24 1930 09/06/24 1949   BP:  132/58 (!) 197/85    BP Location:  Left arm Left arm    Pulse:  77 92    Resp:  20 22    Temp:  97.5 °F (36.4 °C)     TempSrc:  Temporal     SpO2: 99% 100% 99% 97%     Physical Exam  Vitals reviewed.   Constitutional:       General: He is awake.      Appearance: He is not toxic-appearing or diaphoretic.   HENT:      Head: Normocephalic and atraumatic.   Eyes:      General: Gaze aligned appropriately.      Extraocular Movements:      Right eye: No nystagmus.      Left eye: No nystagmus.      Comments: Equal Pupils   Cardiovascular:      Rate and Rhythm: Normal rate.   Pulmonary:      Effort: No tachypnea or respiratory distress.      Breath sounds: No wheezing.   Abdominal:      General: There is no distension.   Musculoskeletal:      Cervical back: Normal range of motion.   Neurological:      Comments: Patient appears to open eyes to voice, also opens eyes spontaneously, gaze aligned, no nystagmus noted    Patient not speaking, occasional tongue protrusion    Patient moving all 4 extremities, patient withdraws in all 4 extremities

## 2024-09-06 NOTE — ASSESSMENT & PLAN NOTE
Patient smokes 1.5 packs of cigarettes a day  Nicotine patch supplementation  Continue to encourage cessation

## 2024-09-06 NOTE — EMTALA/ACUTE CARE TRANSFER
[facility] EMERGENCY DEPARTMENT  [address] [place] [id number]  Dept: [phone]      EMTALA TRANSFER CONSENT    NAME Marcin Sánchez                                         1960                              MRN [id number]    I have been informed of my rights regarding examination, treatment, and transfer   by Dr. Stormy Sylvester DO    Benefits: Specialized equipment and/or services available at the receiving facility (Include comment)________________________    Risks: Potential for delay in receiving treatment      Consent for Transfer:  I acknowledge that my medical condition has been evaluated and explained to me by the emergency department physician or other qualified medical person and/or my attending physician, who has recommended that I be transferred to the service of  Accepting Physician: felicia at Accepting Facility Name, City & State : [place]. The above potential benefits of such transfer, the potential risks associated with such transfer, and the probable risks of not being transferred have been explained to me, and I fully understand them.  The doctor has explained that, in my case, the benefits of transfer outweigh the risks.  I agree to be transferred.    I authorize the performance of emergency medical procedures and treatments upon me in both transit and upon arrival at the receiving facility.  Additionally, I authorize the release of any and all medical records to the receiving facility and request they be transported with me, if possible.  I understand that the safest mode of transportation during a medical emergency is an ambulance and that the Hospital advocates the use of this mode of transport. Risks of traveling to the receiving facility by car, including absence of medical control, life sustaining equipment, such as oxygen, and medical personnel has been explained to me and I fully understand them.    (OBED CORRECT BOX BELOW)  [  ]  I consent to  the stated transfer and to be transported by ambulance/helicopter.  [  ]  I consent to the stated transfer, but refuse transportation by ambulance and accept full responsibility for my transportation by car.  I understand the risks of non-ambulance transfers and I exonerate the Hospital and its staff from any deterioration in my condition that results from this refusal.    X___________________________________________    DATE  24  TIME________  Signature of patient or legally responsible individual signing on patient behalf           RELATIONSHIP TO PATIENT_________________________          Provider Certification    NAME Marcin Sánchez                                         1960                              MRN [id number]    A medical screening exam was performed on the above named patient.  Based on the examination:    Condition Necessitating Transfer The primary encounter diagnosis was Acute respiratory failure with hypoxia and hypercarbia (HCC). Diagnoses of Dyspnea, Hypoxia, and COPD exacerbation (HCC) were also pertinent to this visit.    Patient Condition: The patient has been stabilized such that within reasonable medical probability, no material deterioration of the patient condition or the condition of the unborn child(cindy) is likely to result from the transfer    Reason for Transfer: Level of Care needed not available at this facility    Transfer Requirements: Facility [facility]   Space available and qualified personnel available for treatment as acknowledged by lori  Agreed to accept transfer and to provide appropriate medical treatment as acknowledged by       felicia  Appropriate medical records of the examination and treatment of the patient are provided at the time of transfer   STAFF INITIAL WHEN COMPLETED _______  Transfer will be performed by qualified personnel from    and appropriate transfer equipment as required, including the use of necessary and appropriate life support  measures.    Provider Certification: I have examined the patient and explained the following risks and benefits of being transferred/refusing transfer to the patient/family:  General risk, such as traffic hazards, adverse weather conditions, rough terrain or turbulence, possible failure of equipment (including vehicle or aircraft), or consequences of actions of persons outside the control of the transport personnel, Unanticipated needs of medical equipment and personnel during transport, Risk of worsening condition      Based on these reasonable risks and benefits to the patient and/or the unborn child(cindy), and based upon the information available at the time of the patient’s examination, I certify that the medical benefits reasonably to be expected from the provision of appropriate medical treatments at another medical facility outweigh the increasing risks, if any, to the individual’s medical condition, and in the case of labor to the unborn child, from effecting the transfer.    X____________________________________________ DATE 09/06/24        TIME_______      ORIGINAL - SEND TO MEDICAL RECORDS   COPY - SEND WITH PATIENT DURING TRANSFER

## 2024-09-06 NOTE — CONSULTS
Consultation - Pulmonary Medicine   Marcin KLEIN Gonzalo 64 y.o. male MRN: [id number]  Unit/Bed#: E4 -01 Encounter: [id number]      Assessment/Plan:    1.  Acute hypoxic/hypercapnic respiratory failure likely multifaceted as listed below        -Currently on 8 L 94%, does not wear home O2        -Maintain saturations greater than 88%        -Pulmonary toileting: Deep breathing cough out of bed as tolerated incentive spirometry        -Will likely need BiPAP upon discharge    2.  Likely severe COPD with acute exacerbation        -2017-FEV1 51%        -Inpatient: Decrease IV Solu-Medrol 40 mg Q 8 times daily, budesonide twice daily, Xopenex/3% tid        -Home regimen: Advair -21 mcg 2 puffs twice daily, Spiriva 18 mcg 1 puff daily, Proventil 2 puffs every 6 as needed, Xopenex nebulizer every 6 as needed        -Will start azithromycin    3.  Pulmonary fibrosis        -Diminished RUL PA arterial blood flow        -Right upper lobe scarring likely secondary to chemotherapy/radiation        -Imaging appears to be chronic and stable        -No indication for intervention at this time    4.  New moderate pericardial effusion w/ PAF and severe AS        -8/29/2024-EF 35 to 40%   BNP  404        -Noted moderate pericardial effusion also on this echo w/ no tamponade        -Continued on Eliquis, metoprolol, and Lasix    5.  Squamous cell carcinoma- 2016        -s/p chemo radiation therapy        -Monitored by oncology    6.  Active tobacco abuse        -5-10 cigarettes a day        -Appears in precontemplation stage        -Encourage and educated on tobacco cessation        -NRT per primary team    7.  Chronic  RUL PE w/ multiple pulmonary nodule       -Left upper lobe pulmonary nodule       -Continue annual surveillance       -Will continue Eliquis    8.  Altered mental status        -Initially thought hypercapnia however patient is now compensated this a.m.        -Will continue supportive care        -Etiology  "somewhat unknown at this time        History of Present Illness   Physician Requesting Consult: Kelli Mahmood MD  Reason for Consult / Principal Problem: COPD  Hx and PE limited by: Mental status  Chief Complaint: \" I have a bad heart\"  HPI: Marcin Sánchez is a 64 y.o.  male who presented to [facility] with complaints of shortness of breath.  Patient has past medical history of alcohol abuse, anxiety, COPD, delirium, and Lyme disease.  Patient presented to [place] ED secondary to respiratory distress from this facility the [facility].  Patient reports he was smoking a cigarette outside and when he stood up he was significantly confused.  Seem to be more short of breath and nursing facility noted the patient to be hypoxic.  Upon ED admission patient was noted to be hypercapnic with mental status change and required BiPAP.     Pulmonary was consulted for COPD exacerbation.  Today upon examination patient was disoriented 2-3.  Patient had significant cough with audible rhonchi and wheezing.  Patient was unable to tell me the year or president.  He had difficulty identifying specific objects.     From a pulmonary standpoint, patient follows Dr. Lee for his moderate COPD.  Patient is still an active every day half a pack a day smoker.  Patient reports he started smoking around the age of 18.  Patient had an office spirometry in 2017 which showed moderate obstruction with an FEV1 of 51%.  Patient is currently maintained on Home regimen: Advair -21 mcg 2 puffs twice daily, Spiriva 18 mcg 1 puff daily, Proventil 2 puffs every 6 as needed, Xopenex nebulizer every 6 as needed.  Patient denies any symptoms of SONY seasonal allergies postnasal drip or GERD.    Inpatient consult to Pulmonology  Consult performed by: LANDNO Fernandez  Consult ordered by: Spike Gore PA-C          Review of Systems   Constitutional:  Negative for chills and fever.   HENT:  Negative for ear pain and sore " throat.    Eyes:  Negative for pain and visual disturbance.   Respiratory:  Positive for cough and shortness of breath. Negative for apnea, choking, chest tightness, wheezing and stridor.    Cardiovascular:  Negative for chest pain and palpitations.   Gastrointestinal:  Negative for abdominal pain and vomiting.   Genitourinary:  Negative for dysuria and hematuria.   Musculoskeletal:  Negative for arthralgias and back pain.   Skin:  Negative for color change and rash.   Neurological:  Negative for seizures and syncope.   Psychiatric/Behavioral:  Negative for agitation and behavioral problems.    All other systems reviewed and are negative.      Historical Information   Past Medical History:   Diagnosis Date    Alcohol abuse     Anxiety     Aortic insufficiency 12/18/2020    Atrial fibrillation (HCC)     Cancer (HCC)     COPD (chronic obstructive pulmonary disease) (HCC)     Delirium     Encephalopathy     Epilepsy (HCC)     History of chemotherapy     History of radiation therapy     Hypo-osmolality and hyponatremia     Hypokalemia     Hypothyroid     Lung cancer (HCC)     Lyme disease     Major depression      Past Surgical History:   Procedure Laterality Date    BRONCHOSCOPY N/A 10/3/2016    Procedure: BRONCHOSCOPY FLEXIBLE;  Surgeon: John Brunner DO;  Location: [facility];  Service:     HAND SURGERY      PELVIC FRACTURE SURGERY      REMOVAL VENOUS PORT (PORT-A-CATH) Left 9/18/2018    Procedure: REMOVAL VENOUS PORT (PORT-A-CATH)IR;  Surgeon: Randy Lopez DO;  Location: [facility];  Service: Interventional Radiology     Social History   Social History     Substance and Sexual Activity   Alcohol Use Never     Social History     Substance and Sexual Activity   Drug Use Never     Social History     Tobacco Use   Smoking Status Every Day    Current packs/day: 1.50    Average packs/day: 1.5 packs/day for 47.0 years (70.5 ttl pk-yrs)    Types: Cigarettes   Smokeless Tobacco Never   Tobacco Comments    smoking more  than 1 ppd     E-Cigarette/Vaping    E-Cigarette Use Never User      E-Cigarette/Vaping Substances    Nicotine No     THC No     CBD No     Flavoring No     Other No     Unknown No      Occupational History: na    Family History:   Family History   Problem Relation Age of Onset    Diabetes Mother     Lung cancer Father        Meds/Allergies   pertinent pulmonary meds have been reviewed    No Known Allergies    Objective   Vitals: Blood pressure 119/57, pulse 68, temperature (!) 97.3 °F (36.3 °C), temperature source Temporal, resp. rate 22, SpO2 94%.,There is no height or weight on file to calculate BMI.    Intake/Output Summary (Last 24 hours) at 9/6/2024 0946  Last data filed at 9/6/2024 0900  Gross per 24 hour   Intake 180 ml   Output 100 ml   Net 80 ml     Invasive Devices       Peripheral Intravenous Line  Duration             Peripheral IV 09/05/24 Right Antecubital <1 day    Peripheral IV 09/06/24 Right;Ventral (anterior) Forearm <1 day                    Physical Exam  Constitutional:       General: He is not in acute distress.     Appearance: Normal appearance. He is normal weight. He is not ill-appearing.   HENT:      Head: Normocephalic and atraumatic.      Nose: Nose normal. No congestion or rhinorrhea.      Mouth/Throat:      Mouth: Mucous membranes are dry.      Pharynx: Oropharynx is clear. No oropharyngeal exudate or posterior oropharyngeal erythema.   Cardiovascular:      Rate and Rhythm: Normal rate and regular rhythm.      Pulses: Normal pulses.      Heart sounds: Normal heart sounds.   Pulmonary:      Effort: Pulmonary effort is normal. No respiratory distress.      Breath sounds: No stridor. Wheezing and rhonchi present. No rales.      Comments: Scattered wheezing and rhonchi  Chest:      Chest wall: No tenderness.   Abdominal:      General: Abdomen is flat. Bowel sounds are normal. There is no distension.      Palpations: Abdomen is soft. There is no mass.   Musculoskeletal:         General: No  "swelling or tenderness. Normal range of motion.      Cervical back: Normal range of motion. No rigidity or tenderness.   Skin:     General: Skin is warm and dry.      Coloration: Skin is not jaundiced or pale.   Neurological:      General: No focal deficit present.      Mental Status: He is alert. Mental status is at baseline. He is disoriented.   Psychiatric:         Mood and Affect: Mood normal.         Behavior: Behavior normal.         Lab Results: I have personally reviewed pertinent lab results., ABG: No results found for: \"PHART\", \"GUZ6YVO\", \"PO2ART\", \"QGF0TXY\", \"Z8AYIXTE\", \"BEART\", \"SOURCE\", BNP:   Lab Results   Component Value Date     (H) 09/05/2024   , CBC:   Lab Results   Component Value Date    WBC 6.04 09/06/2024    HGB 10.8 (L) 09/06/2024    HCT 33.2 (L) 09/06/2024    MCV 99 (H) 09/06/2024     09/06/2024    RBC 3.37 (L) 09/06/2024    MCH 32.0 09/06/2024    MCHC 32.5 09/06/2024    RDW 12.4 09/06/2024    MPV 9.1 09/06/2024    NRBC 0 09/06/2024   , CMP:   Lab Results   Component Value Date    SODIUM 139 09/06/2024    K 4.2 09/06/2024    CL 95 (L) 09/06/2024    CO2 41 (H) 09/06/2024    BUN 23 09/06/2024    CREATININE 0.59 (L) 09/06/2024    CALCIUM 9.0 09/06/2024    AST 20 09/05/2024    ALT 13 09/05/2024    ALKPHOS 91 09/05/2024    EGFR 106 09/06/2024           Imaging Studies: I have personally reviewed pertinent films in PACS     Chest CT-occluded right upper lobe pulmonary artery-moderate pericardial effusion-stable right upper lobe paramediastinal fibrosis    EKG, Pathology, and Other Studies: I have personally reviewed pertinent films in PACS       Left Ventricle: Left ventricular cavity size is normal. Wall thickness is normal. The left ventricular ejection fraction is 35-40%. Systolic function is moderately reduced. There is moderate global hypokinesis.    Left Atrium: The atrium is mildly dilated.    Aortic Valve: There is moderate to severe regurgitation.    Aorta: The aortic root is " mildly dilated. The ascending aorta is severely dilated. The aortic root is 4.10 cm. The ascending aorta is 5.4 cm.    Pericardium: There is a moderate pericardial effusion circumferential to the heart. The fluid exhibits no internal echoes. There is no echocardiographic evidence of tamponade.     Findings    Left Ventricle Left ventricular cavity size is normal. Wall thickness is normal. The left ventricular ejection fraction is 35-40%. Systolic function is moderately reduced. There is moderate global hypokinesis. Unable to assess diastolic function.   Right Ventricle Right ventricular cavity size is normal. Systolic function is normal.   Left Atrium The atrium is mildly dilated.   Right Atrium The atrium is normal in size.   Aortic Valve The aortic valve is trileaflet. The leaflets are not thickened. The leaflets exhibit normal mobility. There is moderate to severe regurgitation. The aortic valve has no significant stenosis.   Mitral Valve Mitral valve structure is normal.  There is trace regurgitation. There is no evidence of stenosis.   Tricuspid Valve Tricuspid valve structure is normal. There is trace regurgitation. There is no evidence of stenosis. The right ventricular systolic pressure is normal.   Pulmonic Valve Pulmonic valve structure is normal. There is mild regurgitation. There is no evidence of stenosis.   Ascending Aorta The aortic root is mildly dilated. The ascending aorta is severely dilated. The aortic root is 4.10 cm. The ascending aorta is 5.4 cm.   IVC/SVC The inferior vena cava is dilated. Respirophasic changes were normal.   Pericardium There is a moderate pericardial effusion circumferential to the heart. The fluid exhibits no internal echoes. There is no echocardiographic evidence of tamponade       Pulmonary Results (PFTs, PSG): I have personally reviewed pertinent films in PACS       No PFTs recorded    Code Status: Level 1 - Full Code    Portions of the record may have been created with  "voice recognition software.  Occasional wrong word or \"sound a like\" substitutions may have occurred due to the inherent limitations of voice recognition software.  Read the chart carefully and recognize, using context, where substitutions have occurred.  "

## 2024-09-06 NOTE — ASSESSMENT & PLAN NOTE
Pt with PMHx of pulm fibrosis, COPD, lung cancer, tobacco abuse, epilepsy, afib anticoagulated on eliquis, suspected chronic PE, and CHF presented to the [place] ED from his facility [facility] after developing respiratory distress  Facility staff noted patient appeared confused after standing to go inside after smoking a cigarette. They then noted him to be hypoxic at that time as well.   Pt typically maintained on 2-3 L NC. Currently receiving BiPAP therapy  Case initially discussed with CC who deemed patient appropriate for the SLIM service  CXR appears similar to previous, no acute abnormalities noted, official read pending  VBG with hypercarbia pCO2 85.6->82.3, pH 7.282->7.280  Continue to trend  WBC 6.36, afebrile,   Procalc <0.05, lactic 1.1  , appears to be baseline, pt appears euvolemic  Suspect acute resp failure likely secondary to COPD exacerbation  Resp protocol, BiPAP for now hs and prn, ween oxygen as tolerated  Scheduled nebs with home inhaler regimen  IV solumedrol  Pulm consult pending

## 2024-09-06 NOTE — ED NOTES
Spoke with sister Katalina to provide update on patient transfer status, acknowledged understanding and will notfiy patient's brother as well.     Angela Emerson RN  09/06/24 0135

## 2024-09-06 NOTE — CASE MANAGEMENT
Case Management Assessment & Discharge Planning Note    Patient name Marcin Sánchez  Location [facility] /E4 -* MRN [id number]  : 1960 Date 2024       Current Admission Date: 2024  Current Admission Diagnosis:Acute on chronic respiratory failure with hypoxia and hypercapnia (HCC)   Patient Active Problem List    Diagnosis Date Noted Date Diagnosed    Acute on chronic respiratory failure with hypoxia and hypercapnia (HCC) 2024     Acute metabolic encephalopathy 2024     Chronic combined systolic and diastolic CHF (congestive heart failure) (HCC) 2024     Atrial fibrillation (HCC) 2024     Pulmonary fibrosis (HCC) 2024     Suspected chronic pulmonary embolism (HCC) 2024     Squamous cell lung cancer (HCC) 2024     Hyponatremia 2024     Severe protein-calorie malnutrition (HCC) 2024     Edema of both legs 2022     Tobacco abuse 2020     Nonrheumatic aortic valve insufficiency 2020     Malignant neoplasm of upper lobe of right lung (HCC) 2018     Thoracic aortic aneurysm without rupture (HCC) 2018     Cancer of right main bronchus (HCC) 10/04/2016     Pleural effusion 10/02/2016     Multiple lung nodules on CT 10/02/2016     Collapse of right lung 2016     COPD with acute exacerbation (HCC)      Epilepsy (HCC)      Lyme disease      Major depression      Alcohol abuse        LOS (days): 0  Geometric Mean LOS (GMLOS) (days): 3.6  Days to GMLOS:3.1     OBJECTIVE:  PATIENT READMITTED TO HOSPITAL  Risk of Unplanned Readmission Score: 21.11         Current admission status: Inpatient       Preferred Pharmacy:   [facility]  [facility] [place] - [address] [facility],  [address] [place] [address]  Phone: [phone] Fax: [phone]    [place], [place] - [address] [place] [address]  Phone: [phone] Fax: [phone]    Primary Care  Provider: Brandt Godinez MD    [facility] [employer] [facility]  Secondary Insurance:     ASSESSMENT:  Active Health Care Proxies    There are no active Health Care Proxies on file.       Readmission Root Cause  30 Day Readmission: Yes  Who directed you to return to the hospital?: Other (comment) (SNF STAFF)  Did you understand whom to contact if you had questions or problems?: Yes  Did you get your prescriptions before you left the hospital?: No  Reason::  (discharged to LTC facility)  Were you able to get your prescriptions filled when you left the hospital?: Yes  Did you take your medications as prescribed?: Yes  During previous admission, was a post-acute recommendation made?: Yes  What post-acute resources were offered?: Other (LTC in SNF)  Patient was readmitted due to: acute on chronic respiratory failure w/ hypoxia and hypercapnia  Action Plan: pulmonology consult    Patient Information  Admitted from:: Facility (LTC at [facility])  Mental Status: Alert  During Assessment patient was accompanied by: Not accompanied during assessment  Assessment information provided by:: Other - please comment, Patient (Chart Review)  Primary Caregiver: Other (Comment)  Caregiver's Name:: [facility] Care Team  Caregiver's Relationship to Patient:: Other (Specify) (Facility Staff)  Caregiver's Telephone Number:: [phone] (Main)  Support Systems: /, Family members, Other (Comment) (Facility Staff/Care Team)  County of Residence: [place]  What city do you live in?: [facility] entry access options. Select all that apply.: No steps to enter home  Type of Current Residence: Facility  Living Arrangements: Other (Comment) (Lives with other residents and 24 hr rotating staff)    Activities of Daily Living Prior to Admission  Functional Status: Assistance  Completes ADLs independently?: Yes  Ambulates independently?: No  Level of ambulatory dependence:  Assistance  Does patient use assisted devices?: Yes  Does patient currently own DME?: Yes  Does patient have a history of Outpatient Therapy (PT/OT)?: No  Does the patient have a history of Short-Term Rehab?: Yes ([facility])  Does patient have a history of HHC?: No  Does patient currently have HHC?: No    Patient Information Continued  Income Source: SSI/SSD  Does patient have prescription coverage?: Yes  Does patient receive dialysis treatments?: No  Does patient have a history of substance abuse?: Yes  Historical substance use preference: Alcohol/ETOH  History of Withdrawal Symptoms: Denies past symptoms  Is patient currently in treatment for substance abuse?: N/A - sober  Does patient have a history of Mental Health Diagnosis?: No    Means of Transportation  Means of Transport to Appts:: Other (Comment) ([facility])      Social Determinants of Health (SDOH)      Flowsheet Row Most Recent Value   Housing Stability    In the last 12 months, was there a time when you were not able to pay the mortgage or rent on time? N   In the past 12 months, how many times have you moved where you were living? 0   At any time in the past 12 months, were you homeless or living in a shelter (including now)? N   Transportation Needs    In the past 12 months, has lack of transportation kept you from medical appointments or from getting medications? no   In the past 12 months, has lack of transportation kept you from meetings, work, or from getting things needed for daily living? No   Food Insecurity    Within the past 12 months, you worried that your food would run out before you got the money to buy more. Never true   Within the past 12 months, the food you bought just didn't last and you didn't have money to get more. Never true   Utilities    In the past 12 months has the electric, gas, oil, or water company threatened to shut off services in your home? No            DISCHARGE DETAILS:    Discharge  planning discussed with:: Patient  Freedom of Choice: Yes  Comments - Freedom of Choice: Pt will return to [facility] for [facility]TC    Were Treatment Team discharge recommendations reviewed with patient/caregiver?: Yes  Did patient/caregiver verbalize understanding of patient care needs?: Yes  Were patient/caregiver advised of the risks associated with not following Treatment Team discharge recommendations?: Yes    Contacts  Patient Contacts: Katalina Blake (Sister) [phone]  Relationship to Patient:: Family  Contact Method: Phone  Phone Number: [phone]  Reason/Outcome: Emergency Contact    Requested Home Health Care         Is the patient interested in HHC at discharge?: No  DME Referral Provided  Referral made for DME?: Yes  DME referral completed for the following items:: BiPAP  DME Supplier Name:: Other (Add supplier name in comments) (Facility will order)    Treatment Team Recommendation: Facility Return, SNF  Discharge Destination Plan:: Facility Return, SNF    Additional Comments: CM completed assessment and discussed discharge with Pt and providers. Pt will return to [facility] to resume LTC. Facility made aware that Pt will likely discharge with a Bipap. Faciltiy will order (if they do not have one available in-house). CM to provide facility with settings and script from Pul. No other identified needs. CM following through discharge.

## 2024-09-06 NOTE — ASSESSMENT & PLAN NOTE
"Pt noted to be confused at his facility and initially confused/ lethargic while in the ED  Pt has improved while on the BiPAP, now able to participate in conversation, talking over the BiPAP  AAOx1 to person, but not place, time, or situation  CT head demonstrating \"No acute intracranial abnormality. Old left basal ganglia lacunar infarct.\"  [place] ED initially questioned whether patient post-ictal on presentation due to history of epilepsy, however not witness seizure activity/trauma  Suspect AMS likely secondary to hypercarbia  Neuro checks  Monitor for return to baseline mental status  "

## 2024-09-06 NOTE — RESTORATIVE TECHNICIAN NOTE
Restorative Technician Note      Patient Name: Marcin Sánchez     Restorative Tech Visit Date: 09/06/24  Note Type: Mobility  Patient Position Upon Consult: Supine  Activity Performed: Ambulated  Assistive Device: -- (hand hold)  Patient Position at End of Consult: Bedside chair; All needs within reach; Bed/Chair alarm activated            ns

## 2024-09-06 NOTE — PLAN OF CARE
Problem: INFECTION - ADULT  Goal: Absence or prevention of progression during hospitalization  Description: INTERVENTIONS:  - Assess and monitor for signs and symptoms of infection  - Monitor lab/diagnostic results  - Monitor all insertion sites, i.e. indwelling lines, tubes, and drains  - Monitor endotracheal if appropriate and nasal secretions for changes in amount and color  - Eleva appropriate cooling/warming therapies per order  - Administer medications as ordered  - Instruct and encourage patient and family to use good hand hygiene technique  - Identify and instruct in appropriate isolation precautions for identified infection/condition  Outcome: Progressing     Problem: SAFETY ADULT  Goal: Patient will remain free of falls  Description: INTERVENTIONS:  - Educate patient/family on patient safety including physical limitations  - Instruct patient to call for assistance with activity   - Consult OT/PT to assist with strengthening/mobility   - Keep Call bell within reach  - Keep bed low and locked with side rails adjusted as appropriate  - Keep care items and personal belongings within reach  - Initiate and maintain comfort rounds  - Make Fall Risk Sign visible to staff  - Offer Toileting every 3 Hours, in advance of need  - Initiate/Maintain bed alarm  - Obtain necessary fall risk management equipment:   - Apply yellow socks and bracelet for high fall risk patients  - Consider moving patient to room near nurses station  Outcome: Progressing  Goal: Maintain or return to baseline ADL function  Description: INTERVENTIONS:  -  Assess patient's ability to carry out ADLs; assess patient's baseline for ADL function and identify physical deficits which impact ability to perform ADLs (bathing, care of mouth/teeth, toileting, grooming, dressing, etc.)  - Assess/evaluate cause of self-care deficits   - Assess range of motion  - Assess patient's mobility; develop plan if impaired  - Assess patient's need for assistive  devices and provide as appropriate  - Encourage maximum independence but intervene and supervise when necessary  - Involve family in performance of ADLs  - Assess for home care needs following discharge   - Consider OT consult to assist with ADL evaluation and planning for discharge  - Provide patient education as appropriate  Outcome: Progressing  Goal: Maintains/Returns to pre admission functional level  Description: INTERVENTIONS:  - Perform AM-PAC 6 Click Basic Mobility/ Daily Activity assessment daily.  - Set and communicate daily mobility goal to care team and patient/family/caregiver.   - Collaborate with rehabilitation services on mobility goals if consulted  - Perform Range of Motion 3 times a day.  - Reposition patient every 2 hours.  - Dangle patient 3 times a day  - Stand patient 3 times a day  - Ambulate patient 3 times a day  - Out of bed to chair 3 times a day   - Out of bed for meals 3 times a day  - Out of bed for toileting  - Record patient progress and toleration of activity level   Outcome: Progressing     Problem: DISCHARGE PLANNING  Goal: Discharge to home or other facility with appropriate resources  Description: INTERVENTIONS:  - Identify barriers to discharge w/patient and caregiver  - Arrange for needed discharge resources and transportation as appropriate  - Identify discharge learning needs (meds, wound care, etc.)  - Arrange for interpretive services to assist at discharge as needed  - Refer to Case Management Department for coordinating discharge planning if the patient needs post-hospital services based on physician/advanced practitioner order or complex needs related to functional status, cognitive ability, or social support system  Outcome: Progressing     Problem: Knowledge Deficit  Goal: Patient/family/caregiver demonstrates understanding of disease process, treatment plan, medications, and discharge instructions  Description: Complete learning assessment and assess knowledge  base.  Interventions:  - Provide teaching at level of understanding  - Provide teaching via preferred learning methods  Outcome: Progressing     Problem: RESPIRATORY - ADULT  Goal: Achieves optimal ventilation and oxygenation  Description: INTERVENTIONS:  - Assess for changes in respiratory status  - Assess for changes in mentation and behavior  - Position to facilitate oxygenation and minimize respiratory effort  - Oxygen administered by appropriate delivery if ordered  - Initiate smoking cessation education as indicated  - Encourage broncho-pulmonary hygiene including cough, deep breathe, Incentive Spirometry  - Assess the need for suctioning and aspirate as needed  - Assess and instruct to report SOB or any respiratory difficulty  - Respiratory Therapy support as indicated  Outcome: Progressing     Problem: SKIN/TISSUE INTEGRITY - ADULT  Goal: Skin Integrity remains intact(Skin Breakdown Prevention)  Description: Assess:  -Perform Barber assessment every shift  -Clean and moisturize skin   -Inspect skin when repositioning, toileting, and assisting with ADLS  -Assess under medical devices   -Assess extremities for adequate circulation and sensation     Bed Management:  -Have minimal linens on bed & keep smooth, unwrinkled  -Change linens as needed when moist or perspiring  -Avoid sitting or lying in one position for more than 2 hours while in bed  -Keep HOB at 45 degrees     Toileting:  -Offer bedside commode  -Assess for incontinence   -Use incontinent care products after each incontinent episode     Activity:  -Mobilize patient 3 times a day  -Encourage activity and walks on unit  -Encourage or provide ROM exercises   -Turn and reposition patient every 2 Hours  -Use appropriate equipment to lift or move patient in bed  -Instruct/ Assist with weight shifting  when out of bed in chair  -Consider limitation of chair time 3 hour intervals    Skin Care:  -Avoid use of baby powder, tape, friction and shearing, hot  water or constrictive clothing  -Relieve pressure over bony prominences   -Do not massage red bony areas    Next Steps:  -Teach patient strategies to minimize risks    -Consider consults to  interdisciplinary teams  Outcome: Progressing  Goal: Incision(s), wounds(s) or drain site(s) healing without S/S of infection  Description: INTERVENTIONS  - Assess and document dressing, incision, wound bed, drain sites and surrounding tissue  - Provide patient and family education  - Perform skin care/dressing changes   Outcome: Progressing  Goal: Pressure injury heals and does not worsen  Description: Interventions:  - Implement low air loss mattress or specialty surface (Criteria met)  - Apply silicone foam dressing  - Instruct/assist with weight shifting every 30 minutes when in chair   - Limit chair time to 3 hour intervals  - Use special pressure reducing interventions when in chair   - Apply fecal or urinary incontinence containment device   - Perform passive or active ROM   - Turn and reposition patient & offload bony prominences every 2 hours   - Utilize friction reducing device or surface for transfers   - Consider consults to  interdisciplinary teams   - Use incontinent care products after each incontinent episode  - Consider nutrition services referral as needed  Outcome: Progressing     Problem: Prexisting or High Potential for Compromised Skin Integrity  Goal: Skin integrity is maintained or improved  Description: INTERVENTIONS:  - Identify patients at risk for skin breakdown  - Assess and monitor skin integrity  - Assess and monitor nutrition and hydration status  - Monitor labs   - Assess for incontinence   - Turn and reposition patient  - Assist with mobility/ambulation  - Relieve pressure over bony prominences  - Avoid friction and shearing  - Provide appropriate hygiene as needed including keeping skin clean and dry  - Evaluate need for skin moisturizer/barrier cream  - Collaborate with interdisciplinary team    - Patient/family teaching  - Consider wound care consult   Outcome: Progressing

## 2024-09-06 NOTE — ED PROVIDER NOTES
"History  Chief Complaint   Patient presents with    Shortness of Breath     Room air sat 80s, shortness of breath, significant lung history, cough, given 1 albuterol PH     64-year-old male brought in for altered mental status and shortness of breath.  Patient unable to give me any history.  EMS reports that they were called for shortness of breath and that he was hypoxic on arrival and unable to give him any history.  Patient placed on a nonrebreather sats are now in the high 90s but still cannot give much history.  Review of patient notes shows recent admission on 828 for rapid A-fib.  From that chart \"CT imaging in the setting of progressive COPD, ongoing tobacco abuse, and lung cancer history Maintain oxygenation as necessary currently requiring up to 2-3 L via nasal cannula Long-term prognosis poor due to comorbidities\" also \" EF of 35-40% with moderate global LV hypokinesis, mild LA dilatation, moderate-severe AR, and a moderate pericardial effusion without evidence of cardiac tamponade\".  Review of patient's chart shows the patient is often noncompliant with meds.  There is concern for possible PE however he refused a DVT study and also may not be taking his Eliquis.  Patient is able to follow simple commands.  Patient had a seizure disorder he may also have had a seizure and is postictal.  There is no no outward signs of trauma and no report of trauma      History provided by:  EMS personnel and medical records  History limited by:  Mental status change   used: No    Shortness of Breath  Severity:  Severe  Onset quality:  Unable to specify  Timing:  Unable to specify  Progression:  Improving  Relieved by:  Oxygen  Associated symptoms: no fever and no vomiting    Risk factors: tobacco use        Prior to Admission Medications   Prescriptions Last Dose Informant Patient Reported? Taking?   Advair -21 MCG/ACT inhaler   Yes No   Vitamin D, Cholecalciferol, 1000 UNITS CAPS  Outside " Facility (Specify) Yes No   Sig: Take 2 capsules by mouth daily    albuterol (PROVENTIL HFA,VENTOLIN HFA) 90 mcg/act inhaler  Outside Facility (Specify) No No   Sig: Inhale 2 puffs every 6 (six) hours as needed for wheezing   apixaban (ELIQUIS) 5 mg   No No   Sig: Take 1 tablet (5 mg total) by mouth 2 (two) times a day   furosemide (LASIX) 40 mg tablet   No No   Sig: Take 1 tablet (40 mg total) by mouth daily   gabapentin (NEURONTIN) 400 mg capsule  Outside Facility (Specify) Yes No   Sig: Take 400 mg by mouth 2 (two) times a day     levalbuterol (XOPENEX) 0.63 mg/3 mL nebulizer solution   No No   Sig: Take 3 mL (0.63 mg total) by nebulization every 8 (eight) hours as needed for wheezing   metoprolol succinate (TOPROL-XL) 25 mg 24 hr tablet  Outside Facility (Specify) Yes No   Sig: Take 25 mg by mouth daily   phenytoin (DILANTIN) 100 mg ER capsule  Outside Facility (Specify) Yes No   Sig: Take 100 mg by mouth 2 (two) times a day And 250mg at 5pm   potassium chloride (Klor-Con M20) 20 mEq tablet   No No   Sig: Take 1 tablet (20 mEq total) by mouth daily   sodium chloride 1 g tablet   No No   Sig: Take 1 tablet (1 g total) by mouth 2 (two) times a day with meals   tiotropium (SPIRIVA) 18 mcg inhalation capsule  Outside Facility (Specify) Yes No   Sig: Place 18 mcg into inhaler and inhale daily      Facility-Administered Medications: None       Past Medical History:   Diagnosis Date    Alcohol abuse     Anxiety     Aortic insufficiency 12/18/2020    Atrial fibrillation (HCC)     Cancer (HCC)     COPD (chronic obstructive pulmonary disease) (HCC)     Delirium     Encephalopathy     Epilepsy (HCC)     History of chemotherapy     History of radiation therapy     Hypo-osmolality and hyponatremia     Hypokalemia     Hypothyroid     Lung cancer (HCC)     Lyme disease     Major depression        Past Surgical History:   Procedure Laterality Date    BRONCHOSCOPY N/A 10/3/2016    Procedure: BRONCHOSCOPY FLEXIBLE;  Surgeon:  John Brunner DO;  Location: [facility];  Service:     HAND SURGERY      PELVIC FRACTURE SURGERY      REMOVAL VENOUS PORT (PORT-A-CATH) Left 9/18/2018    Procedure: REMOVAL VENOUS PORT (PORT-A-CATH)IR;  Surgeon: Randy Lopez DO;  Location: [facility];  Service: Interventional Radiology       Family History   Problem Relation Age of Onset    Diabetes Mother     Lung cancer Father      I have reviewed and agree with the history as documented.    E-Cigarette/Vaping    E-Cigarette Use Never User      E-Cigarette/Vaping Substances    Nicotine No     THC No     CBD No     Flavoring No     Other No     Unknown No      Social History     Tobacco Use    Smoking status: Every Day     Current packs/day: 1.50     Average packs/day: 1.5 packs/day for 47.0 years (70.5 ttl pk-yrs)     Types: Cigarettes    Smokeless tobacco: Never    Tobacco comments:     smoking more than 1 ppd   Vaping Use    Vaping status: Never Used   Substance Use Topics    Alcohol use: Never    Drug use: Never       Review of Systems   Unable to perform ROS: Mental status change   Constitutional:  Negative for fever.   Respiratory:  Positive for shortness of breath.    Gastrointestinal:  Negative for vomiting.       Physical Exam  Physical Exam  Constitutional:       General: He is in acute distress.      Appearance: He is ill-appearing.   HENT:      Head: Normocephalic and atraumatic.   Eyes:      Pupils: Pupils are equal, round, and reactive to light.   Cardiovascular:      Rate and Rhythm: Normal rate.      Pulses: Normal pulses.   Pulmonary:      Effort: Tachypnea, accessory muscle usage and respiratory distress present.      Breath sounds: Wheezing present.   Abdominal:      General: Abdomen is flat. Bowel sounds are normal.      Palpations: Abdomen is soft.   Musculoskeletal:      Cervical back: Normal range of motion and neck supple.   Neurological:      Mental Status: He is disoriented.      GCS: GCS eye subscore is 4. GCS verbal subscore is 4.  GCS motor subscore is 5.      Cranial Nerves: No cranial nerve deficit.      Motor: Motor function is intact.         Vital Signs  ED Triage Vitals   Temperature Pulse Respirations Blood Pressure SpO2   09/05/24 2137 09/05/24 2129 09/05/24 2129 09/05/24 2131 09/05/24 2129   99 °F (37.2 °C) 93 (!) 30 142/65 100 %      Temp Source Heart Rate Source Patient Position - Orthostatic VS BP Location FiO2 (%)   09/05/24 2137 09/05/24 2129 09/06/24 0128 09/06/24 0128 09/05/24 2325   Tympanic Monitor Lying Left arm 40      Pain Score       --                  Vitals:    09/05/24 2129 09/05/24 2131 09/06/24 0128   BP:  142/65 128/62   Pulse: 93  72   Patient Position - Orthostatic VS:   Lying         Visual Acuity      ED Medications  Medications   ipratropium-albuterol (DUO-NEB) 0.5-2.5 mg/3 mL inhalation solution 3 mL (3 mL Nebulization Given 9/5/24 2145)   magnesium sulfate 2 g/50 mL IVPB (premix) 2 g (0 g Intravenous Stopped 9/5/24 2250)   methylPREDNISolone sodium succinate (Solu-MEDROL) injection 125 mg (125 mg Intravenous Given 9/5/24 2147)   albuterol (FOR EMS ONLY) (2.5 mg/3 mL) 0.083 % inhalation solution 2.5 mg (0 mg Does not apply Given to EMS 9/5/24 2147)       Diagnostic Studies  Results Reviewed       Procedure Component Value Units Date/Time    Blood culture #1 [[id number]] Collected: 09/05/24 2139    Lab Status: Final result Specimen: Blood from Arm, Right Updated: 09/11/24 1001     Blood Culture No Growth After 5 Days.    Blood culture #2 [[id number]] Collected: 09/05/24 2139    Lab Status: Final result Specimen: Blood from Arm, Right Updated: 09/11/24 1001     Blood Culture No Growth After 5 Days.    Blood gas, venous [[id number]]  (Abnormal) Collected: 09/05/24 2333    Lab Status: Final result Specimen: Blood from Arm, Right Updated: 09/05/24 2343     pH, Bryan 7.280     pCO2, Bryan 82.3 mm Hg      pO2, Bryan 22.1 mm Hg      HCO3, Bryan 37.8 mmol/L      Base Excess, Bryan 8.5 mmol/L      O2 Content, Bryan 5.4 ml/dL      O2  HGB, VENOUS 33.6 %     Ammonia [[id number]]  (Normal) Collected: 09/05/24 2247    Lab Status: Final result Specimen: Blood from Arm, Right Updated: 09/05/24 2307     Ammonia 46 umol/L     FLU/RSV/COVID - if FLU/RSV clinically relevant [[id number]]  (Normal) Collected: 09/05/24 2139    Lab Status: Final result Specimen: Nares from Nose Updated: 09/05/24 2240     SARS-CoV-2 Negative     INFLUENZA A PCR Negative     INFLUENZA B PCR Negative     RSV PCR Negative    Narrative:      This test has been performed using the CoV-2/Flu/RSV plus assay on the [employer] GeneXpert platform. This test has been validated by the  and verified by the performing laboratory.     This test is designed to amplify and detect the following: nucleocapsid (N), envelope (E), and RNA-dependent RNA polymerase (RdRP) genes of the SARS-CoV-2 genome; matrix (M), basic polymerase (PB2), and acidic protein (PA) segments of the influenza A genome; matrix (M) and non-structural protein (NS) segments of the influenza B genome, and the nucleocapsid genes of RSV A and RSV B.     Positive results are indicative of the presence of Flu A, Flu B, RSV, and/or SARS-CoV-2 RNA. Positive results for SARS-CoV-2 or suspected novel influenza should be reported to state, local, or federal health departments according to local reporting requirements.      All results should be assessed in conjunction with clinical presentation and other laboratory markers for clinical management.     FOR PEDIATRIC PATIENTS - copy/paste COVID Guidelines URL to browser: [email or web address]       TSH, 3rd generation with Free T4 reflex [[id number]]  (Normal) Collected: 09/05/24 2139    Lab Status: Final result Specimen: Blood from Arm, Right Updated: 09/05/24 2226     TSH 3RD GENERATON 0.591 uIU/mL     B-Type Natriuretic Peptide(BNP) [[id number]]  (Abnormal) Collected: 09/05/24 2139    Lab Status: Final result Specimen: Blood  from Arm, Right Updated: 09/05/24 2223      pg/mL     Blood gas, Venous [[id number]]  (Abnormal) Collected: 09/05/24 2139    Lab Status: Final result Specimen: Blood from Arm, Right Updated: 09/05/24 2221     pH, Bryan 7.282     pCO2, Bryan 85.6 mm Hg      pO2, Bryan 62.0 mm Hg      HCO3, Bryan 39.5 mmol/L      Base Excess, Bryan 9.5 mmol/L      O2 Content, Bryan 15.8 ml/dL      O2 HGB, VENOUS 87.6 %     Procalcitonin [[id number]]  (Normal) Collected: 09/05/24 2139    Lab Status: Final result Specimen: Blood from Arm, Right Updated: 09/05/24 2220     Procalcitonin <0.05 ng/ml     Protime-INR [[id number]]  (Abnormal) Collected: 09/05/24 2139    Lab Status: Final result Specimen: Blood from Arm, Right Updated: 09/05/24 2214     Protime 17.3 seconds      INR 1.38    Narrative:      INR Therapeutic Range    Indication                                             INR Range      Atrial Fibrillation                                               2.0-3.0  Hypercoagulable State                                    2.0.2.3  Left Ventricular Asist Device                            2.0-3.0  Mechanical Heart Valve                                  -    Aortic(with afib, MI, embolism, HF, LA enlargement,    and/or coagulopathy)                                     2.0-3.0 (2.5-3.5)     Mitral                                                             2.5-3.5  Prosthetic/Bioprosthetic Heart Valve               2.0-3.0  Venous thromboembolism (VTE: VT, PE        2.0-3.0    APTT [[id number]]  (Abnormal) Collected: 09/05/24 2139    Lab Status: Final result Specimen: Blood from Arm, Right Updated: 09/05/24 2214     PTT 46 seconds     Salicylate level [[id number]]  (Normal) Collected: 09/05/24 2139    Lab Status: Final result Specimen: Blood from Arm, Right Updated: 09/05/24 2212     Salicylate Lvl <5 mg/dL     Acetaminophen level-If concentration is detectable, please discuss with medical  on call. [[id number]]  (Abnormal) Collected:  09/05/24 2139    Lab Status: Final result Specimen: Blood from Arm, Right Updated: 09/05/24 2212     Acetaminophen Level <2 ug/mL     Lactic acid [[id number]]  (Normal) Collected: 09/05/24 2139    Lab Status: Final result Specimen: Blood from Arm, Right Updated: 09/05/24 2212     LACTIC ACID 1.1 mmol/L     Narrative:      Result may be elevated if tourniquet was used during collection.    Ethanol [[id number]]  (Normal) Collected: 09/05/24 2139    Lab Status: Final result Specimen: Blood from Arm, Right Updated: 09/05/24 2212     Ethanol Lvl <10 mg/dL     Comprehensive metabolic panel [[id number]]  (Abnormal) Collected: 09/05/24 2139    Lab Status: Final result Specimen: Blood from Arm, Right Updated: 09/05/24 2212     Sodium 138 mmol/L      Potassium 3.6 mmol/L      Chloride 94 mmol/L      CO2 39 mmol/L      ANION GAP 5 mmol/L      BUN 22 mg/dL      Creatinine 0.59 mg/dL      Glucose 164 mg/dL      Calcium 9.1 mg/dL      AST 20 U/L      ALT 13 U/L      Alkaline Phosphatase 91 U/L      Total Protein 7.6 g/dL      Albumin 3.7 g/dL      Total Bilirubin 0.75 mg/dL      eGFR 106 ml/min/1.73sq m     Narrative:      National Kidney Disease Foundation guidelines for Chronic Kidney Disease (CKD):     Stage 1 with normal or high GFR (GFR > 90 mL/min/1.73 square meters)    Stage 2 Mild CKD (GFR = 60-89 mL/min/1.73 square meters)    Stage 3A Moderate CKD (GFR = 45-59 mL/min/1.73 square meters)    Stage 3B Moderate CKD (GFR = 30-44 mL/min/1.73 square meters)    Stage 4 Severe CKD (GFR = 15-29 mL/min/1.73 square meters)    Stage 5 End Stage CKD (GFR <15 mL/min/1.73 square meters)  Note: GFR calculation is accurate only with a steady state creatinine    Magnesium [[id number]]  (Normal) Collected: 09/05/24 2139    Lab Status: Final result Specimen: Blood from Arm, Right Updated: 09/05/24 2212     Magnesium 1.9 mg/dL     CBC and differential [[id number]]  (Abnormal) Collected: 09/05/24 2139    Lab Status: Final result Specimen: Blood  from Arm, Right Updated: 09/05/24 2151     WBC 6.36 Thousand/uL      RBC 3.59 Million/uL      Hemoglobin 11.5 g/dL      Hematocrit 35.7 %      MCV 99 fL      MCH 32.0 pg      MCHC 32.2 g/dL      RDW 12.2 %      MPV 8.8 fL      Platelets 307 Thousands/uL      nRBC 0 /100 WBCs      Segmented % 66 %      Immature Grans % 0 %      Lymphocytes % 14 %      Monocytes % 18 %      Eosinophils Relative 1 %      Basophils Relative 1 %      Absolute Neutrophils 4.21 Thousands/µL      Absolute Immature Grans 0.02 Thousand/uL      Absolute Lymphocytes 0.86 Thousands/µL      Absolute Monocytes 1.13 Thousand/µL      Eosinophils Absolute 0.07 Thousand/µL      Basophils Absolute 0.07 Thousands/µL                    CT head without contrast   Final Result by Martin Bautista MD (09/05 2255)      No acute intracranial abnormality.      Old left basal ganglia lacunar infarct.            Workstation performed: [id number]         XR chest 1 view portable   Final Result by Acosta Cortez MD (09/06 0804)   Stable chest with persistent right apex pleural thickening and opacification. Right basilar small pleural effusion also noted. Prominent interstitial changes both lung fields. Also refer to recent CT chest.            Workstation performed: [id number]                    Procedures  ECG 12 Lead Documentation Only    Date/Time: 9/5/2024 9:44 PM    Performed by: Stormy Sylvester DO  Authorized by: Stormy Sylvester DO    Rate:     ECG rate:  84  Rhythm:     Rhythm: sinus rhythm    CriticalCare Time    Date/Time: 9/8/2024 7:26 AM    Performed by: Stormy Sylvester DO  Authorized by: Stormy Sylvester DO    Critical care provider statement:     Critical care time (minutes):  45    Critical care time was exclusive of:  Separately billable procedures and treating other patients and teaching time    Critical care was necessary to treat or prevent imminent or life-threatening deterioration of the following conditions:  Respiratory  failure    Critical care was time spent personally by me on the following activities:  Blood draw for specimens, obtaining history from patient or surrogate, development of treatment plan with patient or surrogate, discussions with consultants, evaluation of patient's response to treatment, examination of patient, review of old charts, re-evaluation of patient's condition, ordering and review of radiographic studies, ordering and review of laboratory studies, ordering and performing treatments and interventions and interpretation of cardiac output measurements           ED Course  ED Course as of 09/18/24 1455   Thu Sep 05, 2024   2310 Patient now more awake and able to have simple conversation.  He is on BiPAP mask.  Brother and sister are here as well.  Patient's carbon dioxide is elevated he seems to be on proving on the BiPAP mask.   9626 Discussed with internal medicine at this campus.  They are uncomfortable keeping patient here because of his respiratory issues.  They would prefer he be transferred out to a [facility] with stepdown level 1 ICU and pulmonary family specifically requesting [facility] for transfer.                                               Medical Decision Making  Differential diagnosis includes but is not limited to COPD exacerbation, acute respiratory failure with hypoxia and hypercarbia, dyspnea, hypoxia, pneumonia, volume overload, anemia, delirium, electrolyte abnormality, ACS, sepsis,    Problems Addressed:  Acute respiratory failure with hypoxia and hypercarbia (HCC): acute illness or injury that poses a threat to life or bodily functions  COPD exacerbation (HCC): acute illness or injury  Hypoxia: acute illness or injury    Amount and/or Complexity of Data Reviewed  Labs: ordered.  Radiology: ordered and independent interpretation performed. Decision-making details documented in ED Course.     Details: No acute findings in chest however chronic changes including small pleural  effusion on the right as well as right apex pleural thickening  ECG/medicine tests: ordered and independent interpretation performed. Decision-making details documented in ED Course.    Risk  Prescription drug management.  Decision regarding hospitalization.  Risk Details: Discussed with internal medicine at this [facility] they felt because patient was continuously on BiPAP they were uncomfortable keeping him in at this facility.  I then discussed with critical care at [facility].  They understood the need for him having access to higher level of care but did not think he had criteria for stepdown or ICU therefore he was sent to [facility] to the [facility] for further evaluation and treatment.                 Disposition  Final diagnoses:   Dyspnea   Hypoxia   COPD exacerbation (HCC)   Acute respiratory failure with hypoxia and hypercarbia (HCC)   Altered mental status     Time reflects when diagnosis was documented in both MDM as applicable and the Disposition within this note       Time User Action Codes Description Comment    9/6/2024 12:24 AM Stormy Sylvester Add [R06.00] Dyspnea     9/6/2024 12:24 AM Stormy Sylvester Add [R09.02] Hypoxia     9/6/2024 12:24 AM Stomry Sylvester Add [J44.1] COPD exacerbation (HCC)     9/6/2024 12:24 AM Stormy Sylvester Add [J96.01,  J96.02] Acute respiratory failure with hypoxia and hypercarbia (HCC)     9/6/2024 12:24 AM Stormy Sylvester Modify [R06.00] Dyspnea     9/6/2024 12:24 AM Stormy Sylvester Modify [J96.01,  J96.02] Acute respiratory failure with hypoxia and hypercarbia (HCC)     9/8/2024  7:35 AM Stormy Sylvester Add [R41.82] Altered mental status           ED Disposition       ED Disposition   Transfer to Another Facility-In Network    Condition   --    Date/Time   Fri Sep 6, 2024 12:00 AM    Comment   Marcin Sánchez should be transferred out to .               MD Documentation      Flowsheet Row Most Recent Value   Patient Condition The patient  has been stabilized such that within reasonable medical probability, no material deterioration of the patient condition or the condition of the unborn child(cindy) is likely to result from the transfer   Reason for Transfer Level of Care needed not available at this facility   Benefits of Transfer Specialized equipment and/or services available at the receiving facility (Include comment)________________________   Risks of Transfer Potential for delay in receiving treatment   Accepting Physician [email or web address]   Accepting Facility Name, [facility]    (Name & Tel number) lori   Sending MD sabillon   Provider Certification General risk, such as traffic hazards, adverse weather conditions, rough terrain or turbulence, possible failure of equipment (including vehicle or aircraft), or consequences of actions of persons outside the control of the transport personnel, Unanticipated needs of medical equipment and personnel during transport, Risk of worsening condition          RN Documentation      Flowsheet Row Most Recent Value   Accepting Facility Name, [facility]    (Name & Tel number) lori          Follow-up Information    None         Discharge Medication List as of 9/6/2024  1:37 AM        CONTINUE these medications which have NOT CHANGED    Details   Advair -21 MCG/ACT inhaler Starting Mon 12/19/2022, Historical Med      albuterol (PROVENTIL HFA,VENTOLIN HFA) 90 mcg/act inhaler Inhale 2 puffs every 6 (six) hours as needed for wheezing, Starting Tue 2/25/2020, Print      apixaban (ELIQUIS) 5 mg Take 1 tablet (5 mg total) by mouth 2 (two) times a day, Starting Fri 8/30/2024, No Print      furosemide (LASIX) 40 mg tablet Take 1 tablet (40 mg total) by mouth daily, Starting Fri 8/30/2024, No Print      gabapentin (NEURONTIN) 400 mg capsule Take 400 mg by mouth 2 (two) times a day  , Historical Med      levalbuterol (XOPENEX) 0.63 mg/3 mL nebulizer solution  Take 3 mL (0.63 mg total) by nebulization every 8 (eight) hours as needed for wheezing, Starting Fri 8/30/2024, No Print      metoprolol succinate (TOPROL-XL) 25 mg 24 hr tablet Take 25 mg by mouth daily, Historical Med      phenytoin (DILANTIN) 100 mg ER capsule Take 100 mg by mouth 2 (two) times a day And 250mg at 5pm, Historical Med      potassium chloride (Klor-Con M20) 20 mEq tablet Take 1 tablet (20 mEq total) by mouth daily, Starting Fri 8/30/2024, No Print      sodium chloride 1 g tablet Take 1 tablet (1 g total) by mouth 2 (two) times a day with meals, Starting Fri 8/30/2024, No Print      tiotropium (SPIRIVA) 18 mcg inhalation capsule Place 18 mcg into inhaler and inhale daily, Historical Med      Vitamin D, Cholecalciferol, 1000 UNITS CAPS Take 2 capsules by mouth daily , Historical Med             No discharge procedures on file.    PDMP Review       None            ED Provider  Electronically Signed by             Stormy Sylvester DO  09/08/24 1251       Stormy Sylvester,   09/18/24 6161

## 2024-09-07 ENCOUNTER — APPOINTMENT (INPATIENT)
Dept: RADIOLOGY | Facility: HOSPITAL | Age: 64
DRG: 133 | End: 2024-09-07
Payer: COMMERCIAL

## 2024-09-07 LAB
AMMONIA PLAS-SCNC: 44 UMOL/L (ref 18–72)
ANION GAP SERPL CALCULATED.3IONS-SCNC: 5 MMOL/L (ref 4–13)
ANION GAP SERPL CALCULATED.3IONS-SCNC: 7 MMOL/L (ref 4–13)
ARTERIAL PATENCY WRIST A: YES
BASE EX.OXY STD BLDV CALC-SCNC: 81.4 % (ref 60–80)
BASE EX.OXY STD BLDV CALC-SCNC: 81.7 % (ref 60–80)
BASE EX.OXY STD BLDV CALC-SCNC: 96.3 % (ref 60–80)
BASE EXCESS BLDV CALC-SCNC: 4.7 MMOL/L
BASE EXCESS BLDV CALC-SCNC: 5.6 MMOL/L
BASE EXCESS BLDV CALC-SCNC: 7.3 MMOL/L
BASOPHILS # BLD AUTO: 0.02 THOUSANDS/ÂΜL (ref 0–0.1)
BASOPHILS NFR BLD AUTO: 0 % (ref 0–1)
BUN SERPL-MCNC: 27 MG/DL (ref 5–25)
BUN SERPL-MCNC: 32 MG/DL (ref 5–25)
CALCIUM SERPL-MCNC: 9 MG/DL (ref 8.4–10.2)
CALCIUM SERPL-MCNC: 9.3 MG/DL (ref 8.4–10.2)
CHLORIDE SERPL-SCNC: 95 MMOL/L (ref 96–108)
CHLORIDE SERPL-SCNC: 96 MMOL/L (ref 96–108)
CO2 SERPL-SCNC: 35 MMOL/L (ref 21–32)
CO2 SERPL-SCNC: 39 MMOL/L (ref 21–32)
CREAT SERPL-MCNC: 0.9 MG/DL (ref 0.6–1.3)
CREAT SERPL-MCNC: 0.99 MG/DL (ref 0.6–1.3)
EOSINOPHIL # BLD AUTO: 0 THOUSAND/ÂΜL (ref 0–0.61)
EOSINOPHIL NFR BLD AUTO: 0 % (ref 0–6)
ERYTHROCYTE [DISTWIDTH] IN BLOOD BY AUTOMATED COUNT: 12.5 % (ref 11.6–15.1)
GFR SERPL CREATININE-BSD FRML MDRD: 80 ML/MIN/1.73SQ M
GFR SERPL CREATININE-BSD FRML MDRD: 89 ML/MIN/1.73SQ M
GLUCOSE SERPL-MCNC: 136 MG/DL (ref 65–140)
GLUCOSE SERPL-MCNC: 141 MG/DL (ref 65–140)
GLUCOSE SERPL-MCNC: 166 MG/DL (ref 65–140)
HCO3 BLDV-SCNC: 33.6 MMOL/L (ref 24–30)
HCO3 BLDV-SCNC: 35.5 MMOL/L (ref 24–30)
HCO3 BLDV-SCNC: 35.8 MMOL/L (ref 24–30)
HCT VFR BLD AUTO: 35.1 % (ref 36.5–49.3)
HGB BLD-MCNC: 11.2 G/DL (ref 12–17)
IMM GRANULOCYTES # BLD AUTO: 0.04 THOUSAND/UL (ref 0–0.2)
IMM GRANULOCYTES NFR BLD AUTO: 0 % (ref 0–2)
IPAP: 20
LACTATE SERPL-SCNC: 2.2 MMOL/L (ref 0.5–2)
LACTATE SERPL-SCNC: 2.4 MMOL/L (ref 0.5–2)
LYMPHOCYTES # BLD AUTO: 0.42 THOUSANDS/ÂΜL (ref 0.6–4.47)
LYMPHOCYTES NFR BLD AUTO: 4 % (ref 14–44)
MCH RBC QN AUTO: 32.7 PG (ref 26.8–34.3)
MCHC RBC AUTO-ENTMCNC: 31.9 G/DL (ref 31.4–37.4)
MCV RBC AUTO: 103 FL (ref 82–98)
MONOCYTES # BLD AUTO: 1.33 THOUSAND/ÂΜL (ref 0.17–1.22)
MONOCYTES NFR BLD AUTO: 11 % (ref 4–12)
NEUTROPHILS # BLD AUTO: 9.83 THOUSANDS/ÂΜL (ref 1.85–7.62)
NEUTS SEG NFR BLD AUTO: 85 % (ref 43–75)
NON VENT- BIPAP: ABNORMAL
NRBC BLD AUTO-RTO: 0 /100 WBCS
O2 CT BLDV-SCNC: 14 ML/DL
O2 CT BLDV-SCNC: 14.8 ML/DL
O2 CT BLDV-SCNC: 18.2 ML/DL
PCO2 BLDV: 70 MM HG (ref 42–50)
PCO2 BLDV: 72.6 MM HG (ref 42–50)
PCO2 BLDV: 84.3 MM HG (ref 42–50)
PEEP MAX SETTING VENT: 6 CM[H2O]
PH BLDV: 7.25 [PH] (ref 7.3–7.4)
PH BLDV: 7.28 [PH] (ref 7.3–7.4)
PH BLDV: 7.32 [PH] (ref 7.3–7.4)
PHOSPHATE SERPL-MCNC: 6.1 MG/DL (ref 2.3–4.1)
PLATELET # BLD AUTO: 261 THOUSANDS/UL (ref 149–390)
PMV BLD AUTO: 9.4 FL (ref 8.9–12.7)
PO2 BLDV: 157.5 MM HG (ref 35–45)
PO2 BLDV: 47.4 MM HG (ref 35–45)
PO2 BLDV: 51.5 MM HG (ref 35–45)
POTASSIUM SERPL-SCNC: 4.6 MMOL/L (ref 3.5–5.3)
POTASSIUM SERPL-SCNC: 4.9 MMOL/L (ref 3.5–5.3)
PROCALCITONIN SERPL-MCNC: 0.75 NG/ML
RBC # BLD AUTO: 3.42 MILLION/UL (ref 3.88–5.62)
SODIUM SERPL-SCNC: 138 MMOL/L (ref 135–147)
SODIUM SERPL-SCNC: 139 MMOL/L (ref 135–147)
VENT BIPAP FIO2: 50 %
WBC # BLD AUTO: 11.64 THOUSAND/UL (ref 4.31–10.16)

## 2024-09-07 PROCEDURE — 94760 N-INVAS EAR/PLS OXIMETRY 1: CPT

## 2024-09-07 PROCEDURE — NC001 PR NO CHARGE

## 2024-09-07 PROCEDURE — 82805 BLOOD GASES W/O2 SATURATION: CPT | Performed by: INTERNAL MEDICINE

## 2024-09-07 PROCEDURE — 94002 VENT MGMT INPAT INIT DAY: CPT

## 2024-09-07 PROCEDURE — NC001 PR NO CHARGE: Performed by: PHYSICIAN ASSISTANT

## 2024-09-07 PROCEDURE — 71045 X-RAY EXAM CHEST 1 VIEW: CPT

## 2024-09-07 PROCEDURE — 5A1935Z RESPIRATORY VENTILATION, LESS THAN 24 CONSECUTIVE HOURS: ICD-10-PCS | Performed by: INTERNAL MEDICINE

## 2024-09-07 PROCEDURE — 99233 SBSQ HOSP IP/OBS HIGH 50: CPT | Performed by: INTERNAL MEDICINE

## 2024-09-07 PROCEDURE — 80048 BASIC METABOLIC PNL TOTAL CA: CPT | Performed by: INTERNAL MEDICINE

## 2024-09-07 PROCEDURE — 99223 1ST HOSP IP/OBS HIGH 75: CPT | Performed by: INTERNAL MEDICINE

## 2024-09-07 PROCEDURE — 31500 INSERT EMERGENCY AIRWAY: CPT

## 2024-09-07 PROCEDURE — 94640 AIRWAY INHALATION TREATMENT: CPT

## 2024-09-07 PROCEDURE — 83605 ASSAY OF LACTIC ACID: CPT | Performed by: INTERNAL MEDICINE

## 2024-09-07 PROCEDURE — NC001 PR NO CHARGE: Performed by: INTERNAL MEDICINE

## 2024-09-07 PROCEDURE — 85025 COMPLETE CBC W/AUTO DIFF WBC: CPT | Performed by: INTERNAL MEDICINE

## 2024-09-07 PROCEDURE — 82948 REAGENT STRIP/BLOOD GLUCOSE: CPT

## 2024-09-07 PROCEDURE — 84146 ASSAY OF PROLACTIN: CPT | Performed by: INTERNAL MEDICINE

## 2024-09-07 PROCEDURE — 82805 BLOOD GASES W/O2 SATURATION: CPT | Performed by: PHYSICIAN ASSISTANT

## 2024-09-07 PROCEDURE — 84100 ASSAY OF PHOSPHORUS: CPT | Performed by: INTERNAL MEDICINE

## 2024-09-07 PROCEDURE — 80048 BASIC METABOLIC PNL TOTAL CA: CPT | Performed by: PHYSICIAN ASSISTANT

## 2024-09-07 PROCEDURE — 94669 MECHANICAL CHEST WALL OSCILL: CPT

## 2024-09-07 PROCEDURE — 84145 PROCALCITONIN (PCT): CPT

## 2024-09-07 PROCEDURE — 0BH17EZ INSERTION OF ENDOTRACHEAL AIRWAY INTO TRACHEA, VIA NATURAL OR ARTIFICIAL OPENING: ICD-10-PCS | Performed by: INTERNAL MEDICINE

## 2024-09-07 PROCEDURE — 94762 N-INVAS EAR/PLS OXIMTRY CONT: CPT

## 2024-09-07 PROCEDURE — 5A12012 PERFORMANCE OF CARDIAC OUTPUT, SINGLE, MANUAL: ICD-10-PCS | Performed by: INTERNAL MEDICINE

## 2024-09-07 PROCEDURE — 82140 ASSAY OF AMMONIA: CPT | Performed by: INTERNAL MEDICINE

## 2024-09-07 PROCEDURE — 5A2204Z RESTORATION OF CARDIAC RHYTHM, SINGLE: ICD-10-PCS | Performed by: INTERNAL MEDICINE

## 2024-09-07 RX ORDER — METHYLPREDNISOLONE SODIUM SUCCINATE 40 MG/ML
20 INJECTION, POWDER, LYOPHILIZED, FOR SOLUTION INTRAMUSCULAR; INTRAVENOUS EVERY 12 HOURS SCHEDULED
Status: DISCONTINUED | OUTPATIENT
Start: 2024-09-07 | End: 2024-09-08

## 2024-09-07 RX ORDER — DEXMEDETOMIDINE HYDROCHLORIDE 4 UG/ML
.1-1.2 INJECTION, SOLUTION INTRAVENOUS
Status: DISCONTINUED | OUTPATIENT
Start: 2024-09-07 | End: 2024-09-08

## 2024-09-07 RX ORDER — OLANZAPINE 10 MG/2ML
5 INJECTION, POWDER, FOR SOLUTION INTRAMUSCULAR ONCE
Status: DISCONTINUED | OUTPATIENT
Start: 2024-09-07 | End: 2024-09-07

## 2024-09-07 RX ORDER — PHENYTOIN SODIUM 50 MG/ML
100 INJECTION INTRAMUSCULAR; INTRAVENOUS EVERY 12 HOURS SCHEDULED
Status: DISCONTINUED | OUTPATIENT
Start: 2024-09-07 | End: 2024-09-07

## 2024-09-07 RX ORDER — ALBUTEROL SULFATE 0.83 MG/ML
SOLUTION RESPIRATORY (INHALATION)
Status: COMPLETED
Start: 2024-09-07 | End: 2024-09-08

## 2024-09-07 RX ORDER — LORAZEPAM 2 MG/ML
0.5 INJECTION INTRAMUSCULAR ONCE
Status: COMPLETED | OUTPATIENT
Start: 2024-09-07 | End: 2024-09-07

## 2024-09-07 RX ORDER — PHENYTOIN SODIUM 50 MG/ML
100 INJECTION INTRAMUSCULAR; INTRAVENOUS EVERY 8 HOURS SCHEDULED
Status: CANCELLED | OUTPATIENT
Start: 2024-09-07

## 2024-09-07 RX ORDER — PHENYTOIN SODIUM 50 MG/ML
250 INJECTION INTRAMUSCULAR; INTRAVENOUS DAILY
Status: DISCONTINUED | OUTPATIENT
Start: 2024-09-07 | End: 2024-09-07

## 2024-09-07 RX ORDER — LEVALBUTEROL INHALATION SOLUTION 1.25 MG/3ML
1.25 SOLUTION RESPIRATORY (INHALATION) EVERY 4 HOURS PRN
Status: DISCONTINUED | OUTPATIENT
Start: 2024-09-07 | End: 2024-09-08 | Stop reason: HOSPADM

## 2024-09-07 RX ORDER — LANOLIN ALCOHOL/MO/W.PET/CERES
3 CREAM (GRAM) TOPICAL
Status: DISCONTINUED | OUTPATIENT
Start: 2024-09-07 | End: 2024-09-07

## 2024-09-07 RX ORDER — PHENYTOIN SODIUM 50 MG/ML
100 INJECTION INTRAMUSCULAR; INTRAVENOUS EVERY 12 HOURS SCHEDULED
Status: DISCONTINUED | OUTPATIENT
Start: 2024-09-08 | End: 2024-09-07

## 2024-09-07 RX ADMIN — DEXMEDETOMIDINE HYDROCHLORIDE 0.9 MCG/KG/HR: 4 INJECTION, SOLUTION INTRAVENOUS at 23:36

## 2024-09-07 RX ADMIN — IPRATROPIUM BROMIDE AND ALBUTEROL SULFATE 3 ML: 2.5; .5 SOLUTION RESPIRATORY (INHALATION) at 13:07

## 2024-09-07 RX ADMIN — METHYLPREDNISOLONE SODIUM SUCCINATE 40 MG: 40 INJECTION, POWDER, FOR SOLUTION INTRAMUSCULAR; INTRAVENOUS at 08:42

## 2024-09-07 RX ADMIN — METOPROLOL SUCCINATE 25 MG: 25 TABLET, EXTENDED RELEASE ORAL at 11:40

## 2024-09-07 RX ADMIN — BUDESONIDE 0.5 MG: 0.5 INHALANT ORAL at 07:13

## 2024-09-07 RX ADMIN — ACETAMINOPHEN 650 MG: 325 TABLET ORAL at 11:40

## 2024-09-07 RX ADMIN — FUROSEMIDE 40 MG: 40 TABLET ORAL at 11:44

## 2024-09-07 RX ADMIN — BUDESONIDE 0.5 MG: 0.5 INHALANT ORAL at 19:36

## 2024-09-07 RX ADMIN — IPRATROPIUM BROMIDE AND ALBUTEROL SULFATE 3 ML: 2.5; .5 SOLUTION RESPIRATORY (INHALATION) at 19:36

## 2024-09-07 RX ADMIN — DEXMEDETOMIDINE HYDROCHLORIDE 0.1 MCG/KG/HR: 4 INJECTION, SOLUTION INTRAVENOUS at 14:43

## 2024-09-07 RX ADMIN — EPINEPHRINE 1 MG: 0.1 INJECTION INTRAVENOUS at 23:51

## 2024-09-07 RX ADMIN — LORAZEPAM 0.5 MG: 2 INJECTION INTRAMUSCULAR; INTRAVENOUS at 16:26

## 2024-09-07 RX ADMIN — EPINEPHRINE 1 MG: 0.1 INJECTION INTRAVENOUS at 23:48

## 2024-09-07 RX ADMIN — APIXABAN 5 MG: 5 TABLET, FILM COATED ORAL at 11:44

## 2024-09-07 RX ADMIN — SODIUM CHLORIDE SOLN NEBU 3% 4 ML: 3 NEBU SOLN at 19:36

## 2024-09-07 RX ADMIN — AZITHROMYCIN 500 MG: 500 INJECTION, POWDER, LYOPHILIZED, FOR SOLUTION INTRAVENOUS at 13:36

## 2024-09-07 RX ADMIN — SODIUM BICARBONATE 50 MEQ: 84 INJECTION INTRAVENOUS at 23:49

## 2024-09-07 RX ADMIN — METHYLPREDNISOLONE SODIUM SUCCINATE 20 MG: 40 INJECTION, POWDER, FOR SOLUTION INTRAMUSCULAR; INTRAVENOUS at 20:42

## 2024-09-07 RX ADMIN — CALCIUM CHLORIDE 1 G: 100 INJECTION INTRAVENOUS; INTRAVENTRICULAR at 23:50

## 2024-09-07 RX ADMIN — Medication 1 PATCH: at 08:43

## 2024-09-07 RX ADMIN — SODIUM CHLORIDE SOLN NEBU 3% 4 ML: 3 NEBU SOLN at 07:13

## 2024-09-07 RX ADMIN — IPRATROPIUM BROMIDE AND ALBUTEROL SULFATE 3 ML: 2.5; .5 SOLUTION RESPIRATORY (INHALATION) at 07:12

## 2024-09-07 RX ADMIN — SODIUM CHLORIDE SOLN NEBU 3% 4 ML: 3 NEBU SOLN at 13:07

## 2024-09-07 NOTE — ASSESSMENT & PLAN NOTE
"Pt noted to be confused at his facility and initially confused/ lethargic while in the ED  Pt has improved while on the BiPAP, now able to participate in conversation, talking over the BiPAP  AAOx1 to person, but not place, time, or situation  CT head demonstrating \"No acute intracranial abnormality. Old left basal ganglia lacunar infarct.\"  [facility] initially questioned whether patient post-ictal on presentation due to history of epilepsy, however not witness seizure activity/trauma  Suspect AMS likely secondary to hypercarbia  Neuro checks  Monitor for return to baseline mental status  "

## 2024-09-07 NOTE — PROGRESS NOTES
"[facility]  Progress Note  Name: Marcin Sánchez I  MRN: [id number]  Unit/Bed#: E4 -01 I Date of Admission: 9/6/2024   Date of Service: 9/7/2024 I Hospital Day: 1    Assessment & Plan   * Acute on chronic respiratory failure with hypoxia and hypercapnia (HCC)  Assessment & Plan  Pt with PMHx of pulm fibrosis, COPD, lung cancer, tobacco abuse, epilepsy, afib anticoagulated on eliquis, suspected chronic PE, and CHF presented to the [place] ED from his facility [facility] after developing respiratory distress  Facility staff noted patient appeared confused after standing to go inside after smoking a cigarette. They then noted him to be hypoxic at that time as well.   Pt typically maintained on 2-3 L NC. Currently receiving BiPAP therapy    CXR on admission revealed stable with persistent right apex pleural thickening and opacification, right basilar small pleural effusion  , appears to be baseline, pt appears euvolemic  Suspect acute resp failure likely secondary to COPD exacerbation  Scheduled nebs with home inhaler regimen  IV solumedrol  Azithromycin  Pulm input appreciated  Patient had rapid response for worsening respiratory status and lethargy  Repeat chest x-ray, patient placed back on BiPAP  Patient made to transfer patient to ICU for closer monitoring    Acute metabolic encephalopathy  Assessment & Plan    CT head demonstrating \"No acute intracranial abnormality. Old left basal ganglia lacunar infarct.\"  Patient is lethargic likely combination of hypercarbia possible postictal??  Versus and/or combination of delirium  Monitor neurologic exam    Chronic combined systolic and diastolic CHF (congestive heart failure) (HCC)  Assessment & Plan  Wt Readings from Last 3 Encounters:   08/29/24 67.6 kg (149 lb)   04/01/24 64.4 kg (142 lb)   10/02/23 66.9 kg (147 lb 8 oz)     Pt appears euvolemic on exam    Last ECHO 8/2024 demonstrating \"The left ventricular " "ejection fraction is 35-40%. Systolic function is moderately reduced. There is moderate global hypokinesis.\"  Daily weights, I/O  Continue 40 mg daily p.o. lasix      Squamous cell lung cancer (HCC)  Assessment & Plan  S/p chemo/radiation  Followed outpatient by oncology, last seen 4/2024    Pulmonary fibrosis (HCC)  Assessment & Plan  In the setting of progressive COPD, ongoing tobacco abuse, and lung cancer history  Maintain oxygenation as necessary currently requiring up to 2-3 L via nasal cannula  Long-term prognosis poor due to comorbidities    Atrial fibrillation (HCC)  Assessment & Plan  Anticoagulated on Eliquis  Continue metoprolol    Tobacco abuse  Assessment & Plan  Patient smokes 1.5 packs of cigarettes a day  Nicotine patch supplementation  Continue to encourage cessation    Epilepsy (HCC)  Assessment & Plan  In ED, did question whether patient may be postictal   No reported seizure-activity from facility, no evidence of trauma or tongue bite  Given history, suspect confusion more likely to be secondary to hypercarbia    Prior to discharge patient is on 100mg phenytoin bid and 250mg phenytoin at 5pm   Given patient is lethargic we will discuss with pharmacy and change medication to IV form  Phenytoin level elevated yesterday at 34.5    COPD with acute exacerbation (HCC)  Assessment & Plan  Pt presented to the [place] ED in respiratory distress with wheezing after standing from smoking a cigarette  Pt typically maintained on 2-3L secondary to COPD, lung cancer, tobacco abuse, and pulm fibrosis  Now requiring BiPAP therapy continuous  Pulmonary input appreciated         Mobility:  Basic Mobility Inpatient Raw Score: 22  JH-HLM Goal: 7: Walk 25 feet or more  JH-HLM Achieved: 6: Walk 10 steps or more  JH-HLM Goal NOT achieved. Continue with multidisciplinary rounding and encourage appropriate mobility to improve upon JH-HLM goals.    VTE Pharmacologic Prophylaxis:   Pharmacologic: eliquis     Patient " Centered Rounds: I have performed bedside rounds with nursing staff today.    Discussions with Specialists or Other Care Team Provider: crtitical care    Education and Discussions with Family / Patient: updated sister    Time Spent for Care:   More than 50% of total time spent on counseling and coordination of care as described above.    Current Length of Stay: 1 day(s)    Current Patient Status: Inpatient   Certification Statement: The patient will continue to require additional inpatient hospital stay due to acute on chronic hypoxic and hypercapnic respiratory failure, altered mental status    Discharge Plan / Estimated Discharge Date: TBD    Code Status: Level 1 - Full Code      Subjective:   Patient seen and examined at bedside, lethargic, minimally arousable    Objective:     Vitals:   Temp (24hrs), Av.3 °F (36.3 °C), Min:96.6 °F (35.9 °C), Max:97.8 °F (36.6 °C)    Temp:  [96.6 °F (35.9 °C)-97.8 °F (36.6 °C)] 97.8 °F (36.6 °C)  HR:  [61-92] 69  Resp:  [16-22] 18  BP: (105-197)/(55-85) 113/55  SpO2:  [90 %-100 %] 99 %  There is no height or weight on file to calculate BMI.     Input and Output Summary (last 24 hours):       Intake/Output Summary (Last 24 hours) at 2024 0833  Last data filed at 2024 1801  Gross per 24 hour   Intake 298 ml   Output 325 ml   Net -27 ml       Physical Exam:    Constitutional: Patient is in moderate respiratory distress requiring BiPAP  HEENT:  Normocephalic, atraumatic  Cardiovascular: Normal S1S2, RRR, No murmurs/rubs/gallops appreciated.  Pulmonary:  Bilateral air entry, scattered rhonchi   Abdominal: Soft, Bowel sounds present, Non-tender, Non-distended  Extremities:  No cyanosis, clubbing or edema.   Neurological: Lethargic, very minimally arousable does open eyes, not following commands  Skin:  Warm, dry    Additional Data:     Labs:    Results from last 7 days   Lab Units 24  1938 24  0559   WBC Thousand/uL  --  6.04   HEMOGLOBIN g/dL  --  10.8*   I  STAT HEMOGLOBIN g/dl 12.2  --    HEMATOCRIT %  --  33.2*   HEMATOCRIT, ISTAT % 36*  --    PLATELETS Thousands/uL  --  242   SEGS PCT %  --  78*   LYMPHO PCT %  --  10*   MONO PCT %  --  10   EOS PCT %  --  0     Results from last 7 days   Lab Units 09/07/24  0040 09/06/24  2138 09/06/24  1938   POTASSIUM mmol/L 4.6  --   --    CHLORIDE mmol/L 96  --   --    CO2 mmol/L 35*  --   --    CO2, I-STAT mmol/L  --   --  >45*   BUN mg/dL 27*  --   --    CREATININE mg/dL 0.90  --   --    CALCIUM mg/dL 9.3  --   --    ALK PHOS U/L  --  99  --    ALT U/L  --  9  --    AST U/L  --  25  --    GLUCOSE, ISTAT mg/dl  --   --  133     Results from last 7 days   Lab Units 09/05/24  2139   INR  1.38*        I Have Reviewed All Lab Data Listed Above.    Invasive Devices       Peripheral Intravenous Line  Duration             Peripheral IV 09/06/24 Right;Ventral (anterior) Forearm 1 day                         Recent Cultures (last 7 days):     Results from last 7 days   Lab Units 09/05/24 2139   BLOOD CULTURE  Received in Microbiology Lab. Culture in Progress.  Received in Microbiology Lab. Culture in Progress.       Last 24 Hours Medication List:   Current Facility-Administered Medications   Medication Dose Route Frequency Provider Last Rate    acetaminophen  650 mg Oral Q6H PRN Spike Gore PA-C      apixaban  5 mg Oral BID Spike Gore PA-C      azithromycin  500 mg Oral Q24H LANDON Fernandez      budesonide  0.5 mg Nebulization Q12H LANDON Fernandez      dextromethorphan-guaiFENesin  10 mL Oral Q4H PRN Kelli Mahmood MD      furosemide  40 mg Oral Daily Spike Gore PA-C      gabapentin  400 mg Oral BID Spike Gore PA-C      ipratropium-albuterol  3 mL Nebulization TID Kelli Mahmood MD      methylPREDNISolone sodium succinate  40 mg Intravenous Q12H [facility] LANDON Fernandez      metoprolol succinate  25 mg Oral Daily Spike Gore PA-C      nicotine  1 patch Transdermal Daily Spike Hernandez  [facility]      phenytoin  100 mg Oral 2 times per day Spike oGre PA-C      phenytoin  250 mg Oral Daily Spike Gore PA-C      sodium chloride  4 mL Nebulization TID Kelli Mahmood MD      sodium chloride  1 g Oral BID With Meals Spike Gore PA-C          Today, Patient Was Seen By: Kelli Mahmood MD

## 2024-09-07 NOTE — PLAN OF CARE
Problem: INFECTION - ADULT  Goal: Absence or prevention of progression during hospitalization  Description: INTERVENTIONS:  - Assess and monitor for signs and symptoms of infection  - Monitor lab/diagnostic results  - Monitor all insertion sites, i.e. indwelling lines, tubes, and drains  - Monitor endotracheal if appropriate and nasal secretions for changes in amount and color  - [place] appropriate cooling/warming therapies per order  - Administer medications as ordered  - Instruct and encourage patient and family to use good hand hygiene technique  - Identify and instruct in appropriate isolation precautions for identified infection/condition  Outcome: Progressing     Problem: SAFETY ADULT  Goal: Patient will remain free of falls  Description: INTERVENTIONS:  - Educate patient/family on patient safety including physical limitations  - Instruct patient to call for assistance with activity   - Consult OT/PT to assist with strengthening/mobility   - Keep Call bell within reach  - Keep bed low and locked with side rails adjusted as appropriate  - Keep care items and personal belongings within reach  - Initiate and maintain comfort rounds  - Make Fall Risk Sign visible to staff  - Offer Toileting every  Hours, in advance of need  - Initiate/Maintain alarm  - Obtain necessary fall risk management equipment:   - Apply yellow socks and bracelet for high fall risk patients  - Consider moving patient to room near nurses station  Outcome: Progressing  Goal: Maintain or return to baseline ADL function  Description: INTERVENTIONS:  -  Assess patient's ability to carry out ADLs; assess patient's baseline for ADL function and identify physical deficits which impact ability to perform ADLs (bathing, care of mouth/teeth, toileting, grooming, dressing, etc.)  - Assess/evaluate cause of self-care deficits   - Assess range of motion  - Assess patient's mobility; develop plan if impaired  - Assess patient's need for assistive  devices and provide as appropriate  - Encourage maximum independence but intervene and supervise when necessary  - Involve family in performance of ADLs  - Assess for home care needs following discharge   - Consider OT consult to assist with ADL evaluation and planning for discharge  - Provide patient education as appropriate  Outcome: Progressing  Goal: Maintains/Returns to pre admission functional level  Description: INTERVENTIONS:  - Perform AM-PAC 6 Click Basic Mobility/ Daily Activity assessment daily.  - Set and communicate daily mobility goal to care team and patient/family/caregiver.   - Collaborate with rehabilitation services on mobility goals if consulted  - Perform Range of Motion  times a day.  - Reposition patient every  hours.  - Dangle patient  times a day  - Stand patient  times a day  - Ambulate patient  times a day  - Out of bed to chair  times a day   - Out of bed for meals  times a day  - Out of bed for toileting  - Record patient progress and toleration of activity level   Outcome: Progressing     Problem: DISCHARGE PLANNING  Goal: Discharge to home or other facility with appropriate resources  Description: INTERVENTIONS:  - Identify barriers to discharge w/patient and caregiver  - Arrange for needed discharge resources and transportation as appropriate  - Identify discharge learning needs (meds, wound care, etc.)  - Arrange for interpretive services to assist at discharge as needed  - Refer to Case Management Department for coordinating discharge planning if the patient needs post-hospital services based on physician/advanced practitioner order or complex needs related to functional status, cognitive ability, or social support system  Outcome: Progressing     Problem: Knowledge Deficit  Goal: Patient/family/caregiver demonstrates understanding of disease process, treatment plan, medications, and discharge instructions  Description: Complete learning assessment and assess knowledge  base.  Interventions:  - Provide teaching at level of understanding  - Provide teaching via preferred learning methods  Outcome: Progressing     Problem: RESPIRATORY - ADULT  Goal: Achieves optimal ventilation and oxygenation  Description: INTERVENTIONS:  - Assess for changes in respiratory status  - Assess for changes in mentation and behavior  - Position to facilitate oxygenation and minimize respiratory effort  - Oxygen administered by appropriate delivery if ordered  - Initiate smoking cessation education as indicated  - Encourage broncho-pulmonary hygiene including cough, deep breathe, Incentive Spirometry  - Assess the need for suctioning and aspirate as needed  - Assess and instruct to report SOB or any respiratory difficulty  - Respiratory Therapy support as indicated  Outcome: Progressing     Problem: SKIN/TISSUE INTEGRITY - ADULT  Goal: Skin Integrity remains intact(Skin Breakdown Prevention)  Description: Assess:  -Perform Barber assessment every   -Clean and moisturize skin every   -Inspect skin when repositioning, toileting, and assisting with ADLS  -Assess under medical devices such as  every   -Assess extremities for adequate circulation and sensation     Bed Management:  -Have minimal linens on bed & keep smooth, unwrinkled  -Change linens as needed when moist or perspiring  -Avoid sitting or lying in one position for more than  hours while in bed  -Keep HOB at degrees     Toileting:  -Offer bedside commode  -Assess for incontinence every   -Use incontinent care products after each incontinent episode such as     Activity:  -Mobilize patient  times a day  -Encourage activity and walks on unit  -Encourage or provide ROM exercises   -Turn and reposition patient every  Hours  -Use appropriate equipment to lift or move patient in bed  -Instruct/ Assist with weight shifting every  when out of bed in chair  -Consider limitation of chair time  hour intervals    Skin Care:  -Avoid use of baby powder, tape,  friction and shearing, hot water or constrictive clothing  -Relieve pressure over bony prominences using   -Do not massage red bony areas    Next Steps:  -Teach patient strategies to minimize risks such as    -Consider consults to  interdisciplinary teams such as   Outcome: Progressing  Goal: Incision(s), wounds(s) or drain site(s) healing without S/S of infection  Description: INTERVENTIONS  - Assess and document dressing, incision, wound bed, drain sites and surrounding tissue  - Provide patient and family education  - Perform skin care/dressing changes every   Outcome: Progressing  Goal: Pressure injury heals and does not worsen  Description: Interventions:  - Implement low air loss mattress or specialty surface (Criteria met)  - Apply silicone foam dressing  - Instruct/assist with weight shifting every  minutes when in chair   - Limit chair time to  hour intervals  - Use special pressure reducing interventions such as  when in chair   - Apply fecal or urinary incontinence containment device   - Perform passive or active ROM every   - Turn and reposition patient & offload bony prominences every  hours   - Utilize friction reducing device or surface for transfers   - Consider consults to  interdisciplinary teams such as   - Use incontinent care products after each incontinent episode such as   - Consider nutrition services referral as needed  Outcome: Progressing     Problem: Prexisting or High Potential for Compromised Skin Integrity  Goal: Skin integrity is maintained or improved  Description: INTERVENTIONS:  - Identify patients at risk for skin breakdown  - Assess and monitor skin integrity  - Assess and monitor nutrition and hydration status  - Monitor labs   - Assess for incontinence   - Turn and reposition patient  - Assist with mobility/ambulation  - Relieve pressure over bony prominences  - Avoid friction and shearing  - Provide appropriate hygiene as needed including keeping skin clean and dry  - Evaluate  need for skin moisturizer/barrier cream  - Collaborate with interdisciplinary team   - Patient/family teaching  - Consider wound care consult   Outcome: Progressing

## 2024-09-07 NOTE — ASSESSMENT & PLAN NOTE
Recent Labs     09/07/24  0040 09/07/24  0946 09/07/24  1314   PHVEN 7.283* 7.323 7.246*   LFZ3NOB 72.6* 70.0* 84.3*   PO2VEN 51.5* 47.4* 157.5*   HYR2CBU 33.6* 35.5* 35.8*     Recent Labs     09/05/24  2139 09/07/24  0616   PROCALCITONI <0.05 0.75*     Pt with PMHx of pulm fibrosis secondary to radiation, COPD, lung cancer, tobacco abuse, epilepsy, afib anticoagulated on eliquis, suspected chronic PE, and CHF presented to the [place] ED from his facility [facility] after developing respiratory distress  Facility staff noted patient appeared confused after standing to go inside after smoking a cigarette. They then noted him to be hypoxic at that time as well.   Pt typically maintained on 2-3 L NC. Currently receiving BiPAP therapy  Case initially discussed with CC who deemed patient appropriate for the SLIM service  CXR appears similar to previous, no acute abnormalities noted, official read pending  VBG with hypercarbia pCO2 70->84, pH 7.323->7.246  Continue to trend  , appears to be baseline, pt appears euvolemic  Suspect acute resp failure likely secondary to COPD exacerbation  Continue BiPAP, wean off to oxygen as tolerated  Scheduled nebs with Pulmicort  IV solumedrol 20 mg every 12H

## 2024-09-07 NOTE — NURSING NOTE
"Patient moaning holding his head \"my head hurts.\" Bipap removed, 10 L high flow 100%. Tylenol given. Patient asking \"what am I doing here, what is happening.\"  I was able to orient the patient and explain what is going on. Patient relaxed.  "

## 2024-09-07 NOTE — ASSESSMENT & PLAN NOTE
Pt presented to the [place] ED in respiratory distress with wheezing after standing from smoking a cigarette  Pt typically maintained on 2-3L secondary to COPD, lung cancer, tobacco abuse, and pulm fibrosis  Now requiring BiPAP therapy  Suspect COPD exacerbation  Plan as above

## 2024-09-07 NOTE — UTILIZATION REVIEW
Initial Clinical Review    Admission: Date/Time/Statement:   Admission Orders (From admission, onward)       Ordered        09/06/24 0220  INPATIENT ADMISSION  Once                          Orders Placed This Encounter   Procedures    INPATIENT ADMISSION     Standing Status:   Standing     Number of Occurrences:   1     Order Specific Question:   Level of Care     Answer:   Level 2 Stepdown / HOT [14]     Order Specific Question:   Estimated length of stay     Answer:   More than 2 Midnights     Order Specific Question:   Certification     Answer:   I certify that inpatient services are medically necessary for this patient for a duration of greater than two midnights. See H&P and MD Progress Notes for additional information about the patient's course of treatment.       Initial Presentation: 64 y.o. male who presented as a transfer from [facility] ED to [facility] as a direct admission. Admitted as Inpatient  Stepdown Level of Care for evaluation and treatment of Acute on chronic hypoxic and hypercarpnic resp failure, COPD exacerbation, Acute Metabolic Encephalopathy.      PMHx:  has a past medical history of Alcohol abuse, Anxiety, Aortic insufficiency (12/18/2020), Atrial fibrillation (HCC), Cancer (HCC), COPD (chronic obstructive pulmonary disease) (HCC), Delirium, Encephalopathy, Epilepsy (HCC), History of chemotherapy, History of radiation therapy, Hypo-osmolality and hyponatremia, Hypokalemia, Hypothyroid, Lung cancer (HCC), Lyme disease, and Major depression.      Presented w/ respiratory distress from his facility [facility]. Pt was noted to be smoking a cigarette outside, when he stood up, pt appeared confused. Staff noted he appeared SOB and found him to be hypoxic prompting them to call EMS. Pt's baseline O2 2-3L. Upon arrival to the ED at [facility], pt lethargic and confused. Initially there was concern patient may have been postictal given history of epilepsy. However, pt found to be  hypercarbic and patient mentation began to improve on BiPAP therapy and breathing treatments in the ED. No witnessed seizure-like activity, no tongue bite. Pt was transferred to [facility] for higher level of care.  On exam, at [facility], pt with diffuse expiratory wheezing. Still on Bipap but able to talk and does not appear lethargic. Labs PH caitlin: PO2 76.5, HCO3 39.9, Creat 0.59, H/H 10.8/33.2.  (appears at pt's baseline)  Change in mental status likely due to hypercarbia.     Plan: IV solumedrol, Scheduled nebs with home inhaler regimen, Wean O2 as tolerated, Bipap QHS and PRN, Continue to trend VBG. 3 days of azithromycin . Neuro checks. Seizure precautions. Continue Phenytoin 100 mg BID and 250 mg Phenytoin at 5pm, Gabapentin BID. Lasix 40 mg PO daily, Daily weights.  Pulm consulted.    Pulmonary Consult: Acute hypoxic and hypercapnic respiratory failure probably due to COPD exacerbation.  Although still hypercapnic, pH is normalizing and unlikely to be the sole cause of his altered mental status. Plan Continue Bipap nightly, repeat VBG in am. Weant O2 for SpO2 greater than 88%. Decrease steroids in the setting of delirium, Continue Pulmicort BID, Duonebs TID. Stop Incruse to avoid duplication of anticholinergics.     Anticipated Length of Stay/Certification Statement: : Patient will be admitted on an inpatient basis with an anticipated length of stay of greater than 2 midnights secondary to acute resp failure with hypercarbia and hypoxia, secondary to COPD exacerbation.     Date: 9/7/24   Day 2: A rapid response was called last night on pt for altered mental status, was nonverbal with jerking extremities. This morning, pt lethargic on Bipap. Lungs, with diffuse wheezing and mild rhonchi, not using accessory muscles. Acute metabolic encephalopathy likely due to hypercapnia/delirium. VBG shows improvement in PH and pCO2. Labs: Venous BG PH 7.32, PCO2 70.0, pO2 47.4, HCO3 35.5, O2 HCG 81.7.   Plan: Keep pt on Bipap whenever sleeping due to poor respiratory reserve. Maintain steroids at same dosing. Continue Pulmicort BID, Duonebs TID. Azithromycin.  Neuro checks. Seizure precautions. Continue Phenytoin 100 mg BID and 250 mg Phenytoin at 5pm, Gabapentin BID. Lasix 40 mg PO daily, Daily weights. Continue trending Venous BG.    ED Triage Vitals [09/06/24 0230]   Temperature Pulse Respirations Blood Pressure SpO2 Pain Score   97.8 °F (36.6 °C) 76 22 132/60 97 % No Pain     Weight (last 2 days)       None            Vital Signs (last 3 days)       Date/Time Temp Pulse Resp BP MAP (mmHg) SpO2 O2 Flow Rate (L/min) O2 Device O2 Interface Device Patient Position - Orthostatic VS Alecia Coma Scale Score Pain    09/07/24 0722 97.8 °F (36.6 °C) 69 18 113/55 -- 99 % -- BiPAP -- Lying -- --    09/07/24 0716 -- -- -- -- -- 100 % -- -- Full face mask -- -- --    09/07/24 0600 -- -- 18 -- -- 100 % -- BiPAP -- -- 11 --    09/07/24 0250 96.6 °F (35.9 °C) 74 16 105/55 79 100 % -- BiPAP -- Lying 11 --    09/07/24 0212 -- -- -- -- -- -- -- BiPAP -- -- -- --    09/07/24 0134 -- -- -- -- -- 97 % 8 L/min Mid flow nasal cannula -- -- 12 --    09/07/24 0045 -- -- -- -- -- 100 % -- BiPAP -- -- 8 --    09/06/24 2100 96.8 °F (36 °C) 76 18 131/65 94 90 % -- BiPAP -- Lying -- --    09/06/24 1957 -- 61 22 163/78 112 96 % -- BiPAP -- Lying 8 --    09/06/24 1949 -- -- -- -- -- 97 % -- BiPAP -- -- -- --    09/06/24 1946 -- -- -- -- -- 92 % -- -- Full face mask -- -- --    09/06/24 1930 -- 92 22 197/85 122 99 % 8 L/min Mid flow nasal cannula -- Lying -- --    09/06/24 1645 -- -- -- -- -- -- -- -- -- -- 14 No Pain    09/06/24 1527 97.5 °F (36.4 °C) 77 20 132/58 83 100 % 4.5 L/min Mid flow nasal cannula -- Sitting -- --    09/06/24 1350 -- -- -- -- -- 99 % 7 L/min Mid flow nasal cannula -- -- -- --    09/06/24 1245 -- -- -- -- -- -- -- -- -- -- 15 --    09/06/24 1045 97.7 °F (36.5 °C) 77 20 113/56 74 98 % 7 L/min Mid flow nasal cannula --  Sitting -- --    09/06/24 0845 -- -- -- -- -- -- -- -- -- -- 15 No Pain    09/06/24 0811 -- -- -- -- -- 94 % 8 L/min Mid flow nasal cannula -- -- -- --    09/06/24 0739 97.3 °F (36.3 °C) 68 22 119/57 82 100 % -- BiPAP -- Lying -- --    09/06/24 0726 -- -- -- -- -- 100 % -- -- Full face mask -- -- --    09/06/24 0630 -- -- -- -- -- -- -- -- -- -- 14 --    09/06/24 0232 -- -- -- -- -- 98 % -- -- Full face mask -- -- --    09/06/24 0230 97.8 °F (36.6 °C) 76 22 132/60 87 97 % -- -- -- Lying 14 No Pain              Pertinent Labs/Diagnostic Test Results:   Radiology:  No orders to display   CTA chest:     IMPRESSION:     1. No definite acute pulmonary emboli.     2. New onset occlusion or markedly diminished flow of the right upper lobe pulmonary artery compared to contrast-enhanced study from November 5, 2020. This is probably related to the chronic right upper lobe pulmonary fibrosis. Small chronic left upper   lobe pulmonary embolus suggested.     3. New, moderate pericardial effusion.     4. Stable appearance of RUL/right lung paramediastinal fibrosis. Apparent occlusion of the right middle lobe bronchi near their origin compared to the recent study. Follow-up CT chest in 3 months recommended to reassess this finding and exclude subtle   neoplasm recurrence.     5. Unchanged appearance of 5.4 cm ascending aortic aneurysm.       Results from last 7 days   Lab Units 09/05/24 2139   SARS-COV-2  Negative     Results from last 7 days   Lab Units 09/07/24  0616 09/06/24  1938 09/06/24  0559 09/05/24 2139   WBC Thousand/uL 11.64*  --  6.04 6.36   HEMOGLOBIN g/dL 11.2*  --  10.8* 11.5*   I STAT HEMOGLOBIN g/dl  --  12.2  --   --    HEMATOCRIT % 35.1*  --  33.2* 35.7*   HEMATOCRIT, ISTAT %  --  36*  --   --    PLATELETS Thousands/uL 261  --  242 307   TOTAL NEUT ABS Thousands/µL 9.83*  --  4.74 4.21         Results from last 7 days   Lab Units 09/07/24  0040 09/06/24  1938 09/06/24  0559 09/05/24  2139   SODIUM mmol/L 138   --  139 138   POTASSIUM mmol/L 4.6  --  4.2 3.6   CHLORIDE mmol/L 96  --  95* 94*   CO2 mmol/L 35*  --  41* 39*   CO2, I-STAT mmol/L  --  >45*  --   --    ANION GAP mmol/L 7  --  3* 5   BUN mg/dL 27*  --  23 22   CREATININE mg/dL 0.90  --  0.59* 0.59*   EGFR ml/min/1.73sq m 89  --  106 106   CALCIUM mg/dL 9.3  --  9.0 9.1   CALCIUM, IONIZED, ISTAT mmol/L  --  1.29  --   --    MAGNESIUM mg/dL  --   --  2.3 1.9     Results from last 7 days   Lab Units 09/06/24 2138 09/05/24 2247 09/05/24 2139   AST U/L 25  --  20   ALT U/L 9  --  13   ALK PHOS U/L 99  --  91   TOTAL PROTEIN g/dL 7.6  --  7.6   ALBUMIN g/dL 3.7  --  3.7   TOTAL BILIRUBIN mg/dL 0.91  --  0.75   BILIRUBIN DIRECT mg/dL 0.23*  --   --    AMMONIA umol/L  --  46  --      Results from last 7 days   Lab Units 09/06/24 1933   POC GLUCOSE mg/dl 150*     Results from last 7 days   Lab Units 09/07/24 0040 09/06/24  0559 09/05/24  2139   GLUCOSE RANDOM mg/dL 166* 114 164*     Results from last 7 days   Lab Units 09/07/24 0040 09/06/24 2230 09/06/24  0804   PH RADHA  7.283* 7.174* 7.335   PCO2 RADHA mm Hg 72.6* 97.2* 76.5*   PO2 RADHA mm Hg 51.5* 75.0* 38.9   HCO3 RADHA mmol/L 33.6* 35.0* 39.9*   BASE EXC RADHA mmol/L 4.7 3.5 11.3   O2 CONTENT RADHA ml/dL 14.8 16.5 11.7   O2 HGB, VENOUS % 81.4* 90.1* 70.7     Results from last 7 days   Lab Units 09/06/24 1938   I STAT BASE EXC mmol/L 11*   I STAT O2 SAT % 97*   ISTAT PH ART  7.232*   I STAT ART PCO2 mm .5*   I STAT ART PO2 mm .0   I STAT ART HCO3 mmol/L 42.3*       Results from last 7 days   Lab Units 09/05/24  2139   PROTIME seconds 17.3*   INR  1.38*   PTT seconds 46*     Results from last 7 days   Lab Units 09/05/24  2139   TSH 3RD GENERATON uIU/mL 0.591     Results from last 7 days   Lab Units 09/07/24  0616 09/05/24  2139   PROCALCITONIN ng/ml 0.75* <0.05     Results from last 7 days   Lab Units 09/05/24  2139   LACTIC ACID mmol/L 1.1     Results from last 7 days   Lab Units 09/05/24  2139   BNP pg/mL  404*       Results from last 7 days   Lab Units 09/05/24 2139   INFLUENZA A PCR  Negative   INFLUENZA B PCR  Negative   RSV PCR  Negative     Results from last 7 days   Lab Units 09/05/24 2139   ETHANOL LVL mg/dL <10   ACETAMINOPHEN LVL ug/mL <2*   SALICYLATE LVL mg/dL <5     Results from last 7 days   Lab Units 09/05/24 2139   BLOOD CULTURE  Received in Microbiology Lab. Culture in Progress.  Received in Microbiology Lab. Culture in Progress.       ED Treatment-Medication Administration - No Administrations Displayed (No Start Event Found)       None            Past Medical History:   Diagnosis Date    Alcohol abuse     Anxiety     Aortic insufficiency 12/18/2020    Atrial fibrillation (HCC)     Cancer (HCC)     COPD (chronic obstructive pulmonary disease) (HCC)     Delirium     Encephalopathy     Epilepsy (HCC)     History of chemotherapy     History of radiation therapy     Hypo-osmolality and hyponatremia     Hypokalemia     Hypothyroid     Lung cancer (HCC)     Lyme disease     Major depression      Present on Admission:   Tobacco abuse   Squamous cell lung cancer (HCC)   Atrial fibrillation (HCC)   Epilepsy (HCC)   Pulmonary fibrosis (HCC)   Chronic combined systolic and diastolic CHF (congestive heart failure) (HCC)   COPD with acute exacerbation (HCC)      Admitting Diagnosis: Altered mental status [R41.82]  Age/Sex: 64 y.o. male  Admission Orders:  Scheduled Medications:  apixaban, 5 mg, Oral, BID  azithromycin, 500 mg, Oral, Q24H  budesonide, 0.5 mg, Nebulization, Q12H  furosemide, 40 mg, Oral, Daily  gabapentin, 400 mg, Oral, BID  ipratropium-albuterol, 3 mL, Nebulization, TID  methylPREDNISolone sodium succinate, 40 mg, Intravenous, Q12H AMERICA  metoprolol succinate, 25 mg, Oral, Daily  nicotine, 1 patch, Transdermal, Daily  phenytoin, 100 mg, Oral, 2 times per day  phenytoin, 250 mg, Oral, Daily  sodium chloride, 4 mL, Nebulization, TID  sodium chloride, 1 g, Oral, BID With Meals      Continuous IV  Infusions:NONE      PRN Meds:  acetaminophen, 650 mg, Oral, Q6H PRN  dextromethorphan-guaiFENesin, 10 mL, Oral, Q4H PRN        IP CONSULT TO PULMONOLOGY    Network Utilization Review Department  ATTENTION: Please call with any questions or concerns to [phone] and carefully listen to the prompts so that you are directed to the right person. All voicemails are confidential.   For Discharge needs, contact Care Management DC Support Team at [phone] opt. [phone]  Send all requests for admission clinical reviews, approved or denied determinations and any other requests to dedicated fax number below belonging to the campus where the patient is receiving treatment. List of dedicated fax numbers for the Facilities:  FACILITY NAME UR FAX NUMBER   ADMISSION DENIALS (Administrative/Medical Necessity) [phone]   DISCHARGE SUPPORT TEAM (NETWORK) [phone]   PARENT CHILD HEALTH (Maternity/NICU/Pediatrics) [phone]   [facility] [phone]   [facility] [phone]   [facility] [phone]   [facility] [phone]   [facility] [phone]   [facility] [phone]   [facility] [phone]   [facility] [phone]   [facility] [phone]   [facility] [phone]   [facility] [phone]   [facility] [phone]

## 2024-09-07 NOTE — ASSESSMENT & PLAN NOTE
Maintained on Dilantin, per pharmacy discrepancy in dose charted in epic versus filled by patient at pharmacy  Rapid response was called secondary to AMS earlier this morning, patient was having twitching/tongue fasciculation suspecting tardive dyskinesia  Phenytoin level: 34(elevated)  Likely due to hypercarbia versus due to elevated phenytoin level(less likely but cannot rule out)  Currently hold phenytoin  Seizure precaution  Scheduled neurochecks

## 2024-09-07 NOTE — NURSING NOTE
Patient remains of bipap, lethargic, opened his eyes to his name called. Unable to follow commands. PO meds on hold. Received message from lab for redraw of VBG because sent by tube system. Redraw pending.

## 2024-09-07 NOTE — ASSESSMENT & PLAN NOTE
In the setting of progressive COPD, ongoing tobacco abuse, and lung cancer history s/p chemoradiation  Maintain oxygenation as necessary currently requiring up to 2-3 L via nasal cannula  Long-term prognosis poor due to comorbidities

## 2024-09-07 NOTE — NURSING NOTE
Patient becoming more lethargic as bipap off, breathing shallow. Bipap reapplied, RRT,  at bedside. Patient transferred to ICU.

## 2024-09-07 NOTE — H&P
"[facility] ICU Accept Note  H&P  Name: Marcin Sánchez 64 y.o. male I MRN: [id number]  Unit/Bed#: ICU 06 I Date of Admission: 9/6/2024   Date of Service: 9/7/2024 I Hospital Day: 1      Assessment & Plan   Acute metabolic encephalopathy  Assessment & Plan  Pt noted to be confused at his facility and initially confused/ lethargic while in the ED, Pt has improved while on the BiPAP, now able to participate in conversation, talking over the BiPAP, AAOx1 to person, but not place, time, or situation, Initially suspect AMS likely secondary to hypercarbia  CT head demonstrating \"No acute intracranial abnormality. Old left basal ganglia lacunar infarct.\"  Rapid response was called on 9/7 for acute change in mental status, upon evaluation at bedside patient was alert and opening eyes on verbal command but was significantly confused and unable to respond to any commands and withdraw to pain, was put on BiPAP again and transferred to the ICU with drastic improvement in his mental status  Etiology: Most likely due to hypercapnia since VBG showed pCO2 of 84 worse from last recent, but cannot rule out possible seizure/postictal considering his history    Plan:  Continue BiPAP  Neurochecks   Follow-up on labs including Pro-Mirza/lactic acid/phosphorus/magnesium  Monitor mental status    * Acute on chronic respiratory failure with hypoxia and hypercapnia (HCC)  Assessment & Plan  Recent Labs     09/07/24  0040 09/07/24  0946 09/07/24  1314   PHVEN 7.283* 7.323 7.246*   YVC1FUZ 72.6* 70.0* 84.3*   PO2VEN 51.5* 47.4* 157.5*   HDX7UXV 33.6* 35.5* 35.8*     Recent Labs     09/05/24  2139 09/07/24  0616   PROCALCITONI <0.05 0.75*     Pt with PMHx of 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 They then noted him to be hypoxic at that time as well.   Pt typically maintained on 2-3 L NC. Currently receiving BiPAP therapy  Case initially discussed with CC who deemed patient appropriate for the SLIM service  CXR appears similar to previous, no acute abnormalities noted, official read pending  VBG with hypercarbia pCO2 70->84, pH 7.323->7.246  Continue to trend  , appears to be baseline, pt appears euvolemic  Suspect acute resp failure likely secondary to COPD exacerbation  Continue BiPAP, wean off to oxygen as tolerated  Scheduled nebs with Pulmicort  IV solumedrol 20 mg every 12H    COPD with acute exacerbation (HCC)  Assessment & Plan  Pt presented to the [facility] ED in respiratory distress with wheezing after standing from smoking a cigarette  Pt typically maintained on 2-3L secondary to COPD, lung cancer, tobacco abuse, and pulm fibrosis  Now requiring BiPAP therapy  Suspect COPD exacerbation  Plan as above    Chronic combined systolic and diastolic CHF (congestive heart failure) (HCC)  Assessment & Plan  Wt Readings from Last 3 Encounters:   09/07/24 62.9 kg (138 lb 9.6 oz)   08/29/24 67.6 kg (149 lb)   04/01/24 64.4 kg (142 lb)     Pt appears euvolemic on exam    Last ECHO 8/2024 demonstrating \"The left ventricular ejection fraction is 35-40%. Systolic function is moderately reduced. There is moderate global hypokinesis.\"  Daily weights, I/O  Continue 40 mg daily p.o. lasix        Squamous cell lung cancer (HCC)  Assessment & Plan  S/p chemo/radiation  Followed outpatient by oncology, last seen 4/2024    Pulmonary fibrosis (HCC)  Assessment & Plan  In the setting of progressive COPD, ongoing tobacco abuse, and lung cancer history s/p chemoradiation  Maintain oxygenation as necessary currently requiring up to 2-3 L via nasal cannula  Long-term prognosis poor due to comorbidities    Atrial fibrillation " ([facility])  Assessment & Plan  Anticoagulated on Eliquis  Continue metoprolol    Tobacco abuse  Assessment & Plan  Patient smokes 1.5 packs of cigarettes a day  Nicotine patch supplementation  Continue to encourage cessation    Epilepsy (HCC)  Assessment & Plan  Maintained on Dilantin, per pharmacy discrepancy in dose charted in epic versus filled by patient at pharmacy  Rapid response was called secondary to AMS earlier this morning, patient was having twitching/tongue fasciculation suspecting tardive dyskinesia  Phenytoin level: 34(elevated)  Likely due to hypercarbia versus due to elevated phenytoin level(less likely but cannot rule out)  Currently hold phenytoin  Seizure precaution  Scheduled neurochecks           History of Present Illness     HPI: Marcin Sánchez is a 64 y.o. w/ a history of history of squamous cell carcinoma of the right lung status post chemo and radiation, right upper lobe fibrosis from radiation, moderate COPD, nicotine dependence, epilepsy, combined systolic and diastolic heart failure EF 35%, atrial fibrillation, possible pulmonary embolism on Eliquis     Recent admission for rapid A-fib discharged on 8/30/2024    He was at [facility], his nursing home, was smoking a cigarette outside and patient appeared delirious when he stood up.  He was short of breath and hypoxic and was brought to the ED.  Patient continued to be confused.  He was hypoxic and required up to 8 L of oxygen and had severe shortness of breath requiring BiPAP.  We are consulted for shortness of breath.  He has been treated for COPD exacerbation     He was disoriented on admission.  Admitted to the floors initially and was placed on BiPAP.  On blood gases appear to have compensated respiratory acidosis.  On evaluation by pulmonary on 9/6 he was following commands but alert and disoriented appearing.  He was tolerating BiPAP.    RRT was called on 9/6 at 1935 where patient was nonverbal after waking up from a nap.   "I-STAT showed pH of 7.2 with hypercapnia and patient was placed back on BiPAP.  Patient had improving blood gases and was awake this morning.  The nurse documents at noon that the patient reported a headache and asked \"what am I doing here what is happening\".  Patient was apparently reoriented and relaxed.    Shortly after a rapid response was called because the patient was unresponsive.  He was placed back on BiPAP and brought to the ICU.  He woke up after admission to the ICU and was removed off of BiPAP and appeared to be alert but disoriented    History obtained from chart review and unobtainable from patient due to mental status.  Review of Systems: See HPI for Review of Systems  Disposition: Stepdown Level 1  Historical Information   Past Medical History:  No date: Alcohol abuse  No date: Anxiety  12/18/2020: Aortic insufficiency  No date: Atrial fibrillation (HCC)  No date: Cancer (HCC)  No date: COPD (chronic obstructive pulmonary disease) (HCC)  No date: Delirium  No date: Encephalopathy  No date: Epilepsy (HCC)  No date: History of chemotherapy  No date: History of radiation therapy  No date: Hypo-osmolality and hyponatremia  No date: Hypokalemia  No date: Hypothyroid  No date: Lung cancer (HCC)  No date: Lyme disease  No date: Major depression Past Surgical History:  10/3/2016: BRONCHOSCOPY; N/A      Comment:  Procedure: BRONCHOSCOPY FLEXIBLE;  Surgeon: John Brunner DO;  Location: [facility];  Service:   No date: HAND SURGERY  No date: PELVIC FRACTURE SURGERY  9/18/2018: REMOVAL VENOUS PORT (PORT-A-CATH); Left      Comment:  Procedure: REMOVAL VENOUS PORT (PORT-A-CATH)IR;                 Surgeon: Randy Lopez DO;  Location: [facility];                 Service: Interventional Radiology   Current Outpatient Medications   Medication Instructions    Advair -21 MCG/ACT inhaler No dose, route, or frequency recorded.    albuterol (PROVENTIL HFA,VENTOLIN HFA) 90 mcg/act inhaler 2 " puffs, Inhalation, Every 6 hours PRN    apixaban (ELIQUIS) 5 mg, Oral, 2 times daily    furosemide (LASIX) 40 mg, Oral, Daily    gabapentin (NEURONTIN) 400 mg, Oral, 2 times daily    levalbuterol (XOPENEX) 0.63 mg, Nebulization, Every 8 hours PRN    metoprolol succinate (TOPROL-XL) 25 mg, Oral, Daily    phenytoin (DILANTIN) 100 mg, Oral, 2 times daily, And 250mg at 5pm    potassium chloride (Klor-Con M20) 20 mEq tablet 20 mEq, Oral, Daily    sodium chloride 1 g, Oral, 2 times daily with meals    tiotropium (SPIRIVA) 18 mcg, Inhalation, Daily    Vitamin D, Cholecalciferol, 1000 UNITS CAPS 2 capsules, Oral, Daily    No Known Allergies   Social History     Tobacco Use    Smoking status: Every Day     Current packs/day: 1.50     Average packs/day: 1.5 packs/day for 47.0 years (70.5 ttl pk-yrs)     Types: Cigarettes    Smokeless tobacco: Never    Tobacco comments:     smoking more than 1 ppd   Vaping Use    Vaping status: Never Used   Substance Use Topics    Alcohol use: Never    Drug use: Never    Family History   Problem Relation Age of Onset    Diabetes Mother     Lung cancer Father           Objective                            Vitals I/O      Most Recent Min/Max in 24hrs   Temp 97.5 °F (36.4 °C) Temp  Min: 96.6 °F (35.9 °C)  Max: 97.8 °F (36.6 °C)   Pulse 85 Pulse  Min: 61  Max: 92   Resp 22 Resp  Min: 16  Max: 22   /67 BP  Min: 105/55  Max: 197/85   O2 Sat 96 % SpO2  Min: 90 %  Max: 100 %      Intake/Output Summary (Last 24 hours) at 9/7/2024 1422  Last data filed at 9/7/2024 1138  Gross per 24 hour   Intake 118 ml   Output 50 ml   Net 68 ml       Diet Cardiovascular; Cardiac; Sodium 2 GM, Fluid Restriction 1800 ML    Invasive Monitoring   none        Physical Exam   Physical Exam  Eyes:      General: No scleral icterus.     Extraocular Movements: Extraocular movements intact.      Pupils: Pupils are equal, round, and reactive to light.   Skin:     General: Skin is warm.   HENT:      Head: Normocephalic and  atraumatic.      Mouth/Throat:      Mouth: Mucous membranes are dry.   Cardiovascular:      Rate and Rhythm: Normal rate and regular rhythm.   Musculoskeletal:         General: No swelling. Normal range of motion.      Right lower leg: No edema.      Left lower leg: No edema.   Abdominal:      Palpations: Abdomen is soft.      Tenderness: There is no abdominal tenderness. There is no guarding.   Constitutional:       Comments: Disheveled appearing   Pulmonary:      Effort: Pulmonary effort is normal.      Breath sounds: Normal breath sounds.      Comments: Diminished breath sounds throughout  Neurological:      Mental Status: He is alert. He is calm, disoriented to place, disoriented to time and disoriented to situation.      Motor: Strength full and intact in all extremities.      Comments: Appears disoriented.  Nonsensical speech.  He does follow commands            Diagnostic Studies      EK/5 EKG -QTc 430.  Sinus rhythm with first-degree AV block.  UT interval 266.  .  Imaging:  I have personally reviewed pertinent films in PACS   chest x-ray-stable chronic findings.  Right upper lobe fibrosis.  Hyperinflated lungs.  Emphysema   CT head without contrast-no acute intracranial abnormality.  Left basal ganglia lacunar infarct     Medications:  Scheduled PRN   apixaban, 5 mg, BID  azithromycin, 500 mg, Q24H  budesonide, 0.5 mg, Q12H  furosemide, 40 mg, Daily  ipratropium-albuterol, 3 mL, TID  melatonin, 3 mg, HS  methylPREDNISolone sodium succinate, 20 mg, Q12H AMERICA  metoprolol succinate, 25 mg, Daily  nicotine, 1 patch, Daily  sodium chloride, 4 mL, TID  sodium chloride, 1 g, BID With Meals      acetaminophen, 650 mg, Q6H PRN  dextromethorphan-guaiFENesin, 10 mL, Q4H PRN       Continuous          Labs:    CBC    Recent Labs     24  0559 24  1938 24  0616   WBC 6.04  --  11.64*   HGB 10.8* 12.2 11.2*   HCT 33.2* 36* 35.1*     --  261     BMP    Recent Labs     24  0040  09/07/24  0616   SODIUM 138 139   K 4.6 4.9   CL 96 95*   CO2 35* 39*   AGAP 7 5   BUN 27* 32*   CREATININE 0.90 0.99   CALCIUM 9.3 9.0       Coags    Recent Labs     09/05/24 2139   INR 1.38*   PTT 46*        Additional Electrolytes  Recent Labs     09/05/24 2139 09/06/24 0559 09/06/24  1938   MG 1.9 2.3  --    CAIONIZED  --   --  1.29          Blood Gas    No recent results  Recent Labs     09/07/24  1314   PHVEN 7.246*   [id number] 84.3*   PO2VEN 157.5*   [id number] 35.8*   BEVEN 5.6   [id number] 96.3*    LFTs  Recent Labs     09/05/24 2139 09/06/24 2138   ALT 13 9   AST 20 25   ALKPHOS 91 99   ALB 3.7 3.7   TBILI 0.75 0.91       Infectious  Recent Labs     09/05/24 2139 09/07/24  0616   PROCALCITONI <0.05 0.75*     Glucose  Recent Labs     09/05/24 2139 09/06/24 0559 09/07/24  0040 09/07/24  0616   GLUC 164* 114 166* 136             Anticipated Length of Stay is > 2 midnights  Sd Guerra MD

## 2024-09-07 NOTE — PLAN OF CARE
Problem: INFECTION - ADULT  Goal: Absence or prevention of progression during hospitalization  Description: INTERVENTIONS:  - Assess and monitor for signs and symptoms of infection  - Monitor lab/diagnostic results  - Monitor all insertion sites, i.e. indwelling lines, tubes, and drains  - Monitor endotracheal if appropriate and nasal secretions for changes in amount and color  - [place] appropriate cooling/warming therapies per order  - Administer medications as ordered  - Instruct and encourage patient and family to use good hand hygiene technique  - Identify and instruct in appropriate isolation precautions for identified infection/condition  9/7/2024 1933 by Adrienne Gibson RN  Outcome: Progressing  9/7/2024 1932 by Adrienne Gibson RN  Outcome: Progressing     Problem: SAFETY ADULT  Goal: Patient will remain free of falls  Description: INTERVENTIONS:  - Educate patient/family on patient safety including physical limitations  - Instruct patient to call for assistance with activity   - Consult OT/PT to assist with strengthening/mobility   - Keep Call bell within reach  - Keep bed low and locked with side rails adjusted as appropriate  - Keep care items and personal belongings within reach  - Initiate and maintain comfort rounds  - Make Fall Risk Sign visible to staff  - Offer Toileting every 2 Hours, in advance of need  - Initiate/Maintain bed/chairalarm  - Obtain necessary fall risk management equipment:   - Apply yellow socks and bracelet for high fall risk patients  - Consider moving patient to room near nurses station  9/7/2024 1933 by Adrienne Gibson RN  Outcome: Progressing  9/7/2024 1932 by Adrienne Gibson RN  Outcome: Progressing  Goal: Maintain or return to baseline ADL function  Description: INTERVENTIONS:  -  Assess patient's ability to carry out ADLs; assess patient's baseline for ADL function and identify physical deficits which impact ability to perform ADLs  (bathing, care of mouth/teeth, toileting, grooming, dressing, etc.)  - Assess/evaluate cause of self-care deficits   - Assess range of motion  - Assess patient's mobility; develop plan if impaired  - Assess patient's need for assistive devices and provide as appropriate  - Encourage maximum independence but intervene and supervise when necessary  - Involve family in performance of ADLs  - Assess for home care needs following discharge   - Consider OT consult to assist with ADL evaluation and planning for discharge  - Provide patient education as appropriate  9/7/2024 1933 by Adrienne Gibson RN  Outcome: Progressing  9/7/2024 1932 by Adrienne Gibson RN  Outcome: Progressing  Goal: Maintains/Returns to pre admission functional level  Description: INTERVENTIONS:  - Perform AM-PAC 6 Click Basic Mobility/ Daily Activity assessment daily.  - Set and communicate daily mobility goal to care team and patient/family/caregiver.   - Collaborate with rehabilitation services on mobility goals if consulted  - Out of bed for toileting  - Record patient progress and toleration of activity level   9/7/2024 1933 by Adrienne Gibson RN  Outcome: Progressing  9/7/2024 1932 by Adrienne Gibson RN  Outcome: Progressing     Problem: DISCHARGE PLANNING  Goal: Discharge to home or other facility with appropriate resources  Description: INTERVENTIONS:  - Identify barriers to discharge w/patient and caregiver  - Arrange for needed discharge resources and transportation as appropriate  - Identify discharge learning needs (meds, wound care, etc.)  - Arrange for interpretive services to assist at discharge as needed  - Refer to Case Management Department for coordinating discharge planning if the patient needs post-hospital services based on physician/advanced practitioner order or complex needs related to functional status, cognitive ability, or social support system  9/7/2024 1933 by Adrienne Gibson  RN  Outcome: Progressing  9/7/2024 1932 by Adrienne Gibson RN  Outcome: Progressing     Problem: Knowledge Deficit  Goal: Patient/family/caregiver demonstrates understanding of disease process, treatment plan, medications, and discharge instructions  Description: Complete learning assessment and assess knowledge base.  Interventions:  - Provide teaching at level of understanding  - Provide teaching via preferred learning methods  9/7/2024 1933 by Adrienne Gibson RN  Outcome: Progressing  9/7/2024 1932 by Adrienne Gibson RN  Outcome: Progressing     Problem: RESPIRATORY - ADULT  Goal: Achieves optimal ventilation and oxygenation  Description: INTERVENTIONS:  - Assess for changes in respiratory status  - Assess for changes in mentation and behavior  - Position to facilitate oxygenation and minimize respiratory effort  - Oxygen administered by appropriate delivery if ordered  - Initiate smoking cessation education as indicated  - Encourage broncho-pulmonary hygiene including cough, deep breathe, Incentive Spirometry  - Assess the need for suctioning and aspirate as needed  - Assess and instruct to report SOB or any respiratory difficulty  - Respiratory Therapy support as indicated  9/7/2024 1933 by Adrienne Gibson RN  Outcome: Progressing  9/7/2024 1932 by Adrienne Gibson RN  Outcome: Progressing     Problem: SKIN/TISSUE INTEGRITY - ADULT  Goal: Skin Integrity remains intact(Skin Breakdown Prevention)  Description: Assess:  -Perform Barber assessment every shift  -Clean and moisturize skin every day  -Inspect skin when repositioning, toileting, and assisting with ADLS  -Assess under medical devices -Assess extremities for adequate circulation and sensation     Bed Management:  -Have minimal linens on bed & keep smooth, unwrinkled  -Change linens as needed when moist or perspiring  -Keep HOB at 45 degrees     Toileting:  -Offer bedside commode  -Assess for incontinence every 2  hours  -Use incontinent care products after each incontinent episode   Activity:  -Mobilize patient  as tolerated-Encourage activity and walks on unit  -Encourage or provide ROM exercises   -Turn and reposition patient every 2 Hours  -Use appropriate equipment to lift or move patient in bed  -Instruct/ Assist with weight shifting    -Consider limitation of chair time  Skin Care:  -Avoid use of baby powder, tape, friction and shearing, hot water or constrictive clothing  -Relieve pressure over bony prominences pillows/wedges  -Do not massage red bony areas    Next Steps:  -Teach patient strategies to minimize risks   -Consider consults to  interdisciplinary teams   Outcome: Progressing  Goal: Incision(s), wounds(s) or drain site(s) healing without S/S of infection  Description: INTERVENTIONS  - Assess and document dressing, incision, wound bed, drain sites and surrounding tissue  - Provide patient and family education  - Perform skin care/dressing changes every   Outcome: Progressing  Goal: Pressure injury heals and does not worsen  Description: Interventions:  - Implement low air loss mattress or specialty surface (Criteria met)  - Apply silicone foam dressing  - Instruct/assist with weight shifting every 15-30 minutes when in chair   - Consider nutrition services referral as needed  Outcome: Progressing     Problem: NEUROSENSORY - ADULT  Goal: Achieves stable or improved neurological status  Description: INTERVENTIONS  - Monitor and report changes in neurological status  - Monitor vital signs such as temperature, blood pressure, glucose, and any other labs ordered   - Initiate measures to prevent increased intracranial pressure  - Monitor for seizure activity and implement precautions if appropriate      Outcome: Progressing  Goal: Achieves maximal functionality and self care  Description: INTERVENTIONS  - Monitor swallowing and airway patency with patient fatigue and changes in neurological status  - Encourage and  assist patient to increase activity and self care.   - Encourage visually impaired, hearing impaired and aphasic patients to use assistive/communication devices  Outcome: Progressing     Problem: CARDIOVASCULAR - ADULT  Goal: Maintains optimal cardiac output and hemodynamic stability  Description: INTERVENTIONS:  - Monitor I/O, vital signs and rhythm  - Monitor for S/S and trends of decreased cardiac output  - Administer and titrate ordered vasoactive medications to optimize hemodynamic stability  - Assess quality of pulses, skin color and temperature  - Assess for signs of decreased coronary artery perfusion  - Instruct patient to report change in severity of symptoms  Outcome: Progressing  Goal: Absence of cardiac dysrhythmias or at baseline rhythm  Description: INTERVENTIONS:  - Continuous cardiac monitoring, vital signs, obtain 12 lead EKG if ordered  - Administer antiarrhythmic and heart rate control medications as ordered  - Monitor electrolytes and administer replacement therapy as ordered  Outcome: Progressing     Problem: GASTROINTESTINAL - ADULT  Goal: Minimal or absence of nausea and/or vomiting  Description: INTERVENTIONS:  - Administer IV fluids if ordered to ensure adequate hydration  - Maintain NPO status until nausea and vomiting are resolved  - Nasogastric tube if ordered  - Administer ordered antiemetic medications as needed  - Provide nonpharmacologic comfort measures as appropriate  - Advance diet as tolerated, if ordered  - Consider nutrition services referral to assist patient with adequate nutrition and appropriate food choices  Outcome: Progressing  Goal: Maintains or returns to baseline bowel function  Description: INTERVENTIONS:  - Assess bowel function  - Encourage oral fluids to ensure adequate hydration  - Administer IV fluids if ordered to ensure adequate hydration  - Administer ordered medications as needed  - Encourage mobilization and activity  - Consider nutritional services  referral to assist patient with adequate nutrition and appropriate food choices  Outcome: Progressing  Goal: Maintains adequate nutritional intake  Description: INTERVENTIONS:  - Monitor percentage of each meal consumed  - Identify factors contributing to decreased intake, treat as appropriate  - Assist with meals as needed  - Monitor I&O, weight, and lab values if indicated  - Obtain nutrition services referral as needed  Outcome: Progressing  Goal: Oral mucous membranes remain intact  Description: INTERVENTIONS  - Assess oral mucosa and hygiene practices  - Implement preventative oral hygiene regimen  - Implement oral medicated treatments as ordered  - Initiate Nutrition services referral as needed  Outcome: Progressing     Problem: GENITOURINARY - ADULT  Goal: Maintains or returns to baseline urinary function  Description: INTERVENTIONS:  - Assess urinary function  - Encourage oral fluids to ensure adequate hydration if ordered  - Administer IV fluids as ordered to ensure adequate hydration  - Administer ordered medications as needed  - Offer frequent toileting  - Follow urinary retention protocol if ordered  Outcome: Progressing  Goal: Absence of urinary retention  Description: INTERVENTIONS:  - Assess patient’s ability to void and empty bladder  - Monitor I/O  - Bladder scan as needed  - Discuss with physician/AP medications to alleviate retention as needed  - Discuss catheterization for long term situations as appropriate  Outcome: Progressing     Problem: METABOLIC, FLUID AND ELECTROLYTES - ADULT  Goal: Electrolytes maintained within normal limits  Description: INTERVENTIONS:  - Monitor labs and assess patient for signs and symptoms of electrolyte imbalances  - Administer electrolyte replacement as ordered  - Monitor response to electrolyte replacements, including repeat lab results as appropriate  - Instruct patient on fluid and nutrition as appropriate  Outcome: Progressing  Goal: Fluid balance  maintained  Description: INTERVENTIONS:  - Monitor labs   - Monitor I/O and WT  - Instruct patient on fluid and nutrition as appropriate  - Assess for signs & symptoms of volume excess or deficit  Outcome: Progressing     Problem: HEMATOLOGIC - ADULT  Goal: Maintains hematologic stability  Description: INTERVENTIONS  - Assess for signs and symptoms of bleeding or hemorrhage  - Monitor labs  - Administer supportive blood products/factors as ordered and appropriate  Outcome: Progressing     Problem: MUSCULOSKELETAL - ADULT  Goal: Maintain or return mobility to safest level of function  Description: INTERVENTIONS:  - Assess patient's ability to carry out ADLs; assess patient's baseline for ADL function and identify physical deficits which impact ability to perform ADLs (bathing, care of mouth/teeth, toileting, grooming, dressing, etc.)  - Assess/evaluate cause of self-care deficits   - Assess range of motion  - Assess patient's mobility  - Assess patient's need for assistive devices and provide as appropriate  - Encourage maximum independence but intervene and supervise when necessary  - Involve family in performance of ADLs  - Assess for home care needs following discharge   - Consider OT consult to assist with ADL evaluation and planning for discharge  - Provide patient education as appropriate  Outcome: Progressing  Goal: Maintain proper alignment of affected body part  Description: INTERVENTIONS:  - Support, maintain and protect limb and body alignment  - Provide patient/ family with appropriate education  Outcome: Progressing     Problem: Prexisting or High Potential for Compromised Skin Integrity  Goal: Skin integrity is maintained or improved  Description: INTERVENTIONS:  - Identify patients at risk for skin breakdown  - Assess and monitor skin integrity  - Assess and monitor nutrition and hydration status  - Monitor labs   - Assess for incontinence   - Turn and reposition patient  - Assist with  mobility/ambulation  - Relieve pressure over bony prominences  - Avoid friction and shearing  - Provide appropriate hygiene as needed including keeping skin clean and dry  - Evaluate need for skin moisturizer/barrier cream  - Collaborate with interdisciplinary team   - Patient/family teaching  - Consider wound care consult   Outcome: Progressing

## 2024-09-07 NOTE — ASSESSMENT & PLAN NOTE
Pt with PMHx of pulm fibrosis, COPD, lung cancer, tobacco abuse, epilepsy, afib anticoagulated on eliquis, suspected chronic PE, and CHF presented to the [place] [facility] from his facility [facility] after developing respiratory distress  Facility staff noted patient appeared confused after standing to go inside after smoking a cigarette. They then noted him to be hypoxic at that time as well.   Pt typically maintained on 2-3 L NC. Currently receiving BiPAP therapy  Case initially discussed with CC who deemed patient appropriate for the SLIM service  CXR appears similar to previous, no acute abnormalities noted, official read pending  VBG with hypercarbia pCO2 85.6->82.3, pH 7.282->7.280  Continue to trend  WBC 6.36, afebrile,   Procalc <0.05, lactic 1.1  , appears to be baseline, pt appears euvolemic  Suspect acute resp failure likely secondary to COPD exacerbation  Resp protocol, BiPAP for now hs and prn, ween oxygen as tolerated  Scheduled nebs with home inhaler regimen  IV solumedrol  Pulm consult pending

## 2024-09-07 NOTE — ASSESSMENT & PLAN NOTE
"Wt Readings from Last 3 Encounters:   09/07/24 62.9 kg (138 lb 9.6 oz)   08/29/24 67.6 kg (149 lb)   04/01/24 64.4 kg (142 lb)     Pt appears euvolemic on exam    Last ECHO 8/2024 demonstrating \"The left ventricular ejection fraction is 35-40%. Systolic function is moderately reduced. There is moderate global hypokinesis.\"  Daily weights, I/O  Continue 40 mg daily p.o. lasix      "

## 2024-09-07 NOTE — ASSESSMENT & PLAN NOTE
"Pt noted to be confused at his facility and initially confused/ lethargic while in the ED, Pt has improved while on the BiPAP, now able to participate in conversation, talking over the BiPAP, AAOx1 to person, but not place, time, or situation, Initially suspect AMS likely secondary to hypercarbia  CT head demonstrating \"No acute intracranial abnormality. Old left basal ganglia lacunar infarct.\"  Rapid response was called on 9/7 for acute change in mental status, upon evaluation at bedside patient was alert and opening eyes on verbal command but was significantly confused and unable to respond to any commands and withdraw to pain, was put on BiPAP again and transferred to the ICU with drastic improvement in his mental status  Etiology: Most likely due to hypercapnia since VBG showed pCO2 of 84 worse from last recent, but cannot rule out possible seizure/postictal considering his history    Plan:  Continue BiPAP  Neurochecks   Follow-up on labs including Pro-Mirza/lactic acid/phosphorus/magnesium  Monitor mental status  "

## 2024-09-07 NOTE — RAPID RESPONSE
Rapid Response Note  Marcin Sánchez 64 y.o. male MRN: [id number]  Unit/Bed#: ICU 06 Encounter: [id number]    Rapid Response Notification(s):   Response called date/time:  9/7/2024 12:40 PM  Response team arrival date/time:  9/7/2024 12:41 PM  Response end date/time:  9/7/2024 1:00 PM  Level of care:  Stepdown 2  Rapid response location:  Stepdown unit  Primary reason for rapid response call:  Acute change in neuro status    Rapid Response Intervention(s):   Airway:  None  Breathing:  Other (comment) (bipap applied)  Circulation:  None  Fluids administered:  None  Medications administered:  None       Assessment:   Acute hypoxic/hypercapnic resp failure   Severe copd with acute exacerbation  Acute metabolic encephalopathy  PAF  Severe AS  Right upper lobe pulm fibrosis and PE hx   Hx of seizures  Elevated dilantin level with possibly tardive dyskinesia    Plan:   Restart bipap  Transfer to SD1  Hold dilantin - cont seizure precautions  Recheck labs/vbg  T/c ct head if no ms improvement with bipap           -no focal deficits  Cont steroids  Cont eloquis  Cont neuro checks  Cont tele  Gabapentin on hold       Rapid Response Outcome:   [facility]  Primary service notified of transfer: Yes    Code Status: Level 1 (Full Code)      Family notified: Primary team to notify Family Member       Background/Situation:   Marcin Sánchez is a 64 y.o. male who was admitted for confusing/lethargy and noted to be hypoxica and hypercarbic and placed on bipap for copd exacerbation. Pt was a rapid response last pm for decreased mentation and hypercapniea. Pt improved. Pt was taken off bipap 1 hr prior to this rapid response. Pt with minimal responsiveness and placed back on bipap. Vbg showed worsening hypercarbia and pt improved quickly upon arrival to icu. Pt able to state bday and name and follows commands but still confused. Labs and cont w/u pending.     Review of Systems   Unable to perform ROS: Mental  status change       Objective:   Vitals:    09/07/24 0722 09/07/24 1138 09/07/24 1218 09/07/24 1307   BP: 113/55 124/54     BP Location: Left arm Left arm     Pulse: 69 85     Resp: 18 22     Temp: 97.8 °F (36.6 °C) 97.5 °F (36.4 °C)     TempSrc: Tympanic Temporal     SpO2: 99% 100% 96% 94%   Weight:         Physical Exam  Vitals and nursing note reviewed.   Constitutional:       General: He is not in acute distress.     Appearance: He is ill-appearing.   HENT:      Head: Normocephalic and atraumatic.      Mouth/Throat:      Mouth: Mucous membranes are dry.   Eyes:      Pupils: Pupils are equal, round, and reactive to light.   Cardiovascular:      Rate and Rhythm: Normal rate and regular rhythm.      Pulses: Normal pulses.   Pulmonary:      Comments: Decreased t/o. No breath sound right upper lobe. Bipap applied. Distant t/o.  Abdominal:      General: Abdomen is flat.      Palpations: Abdomen is soft.   Musculoskeletal:         General: Normal range of motion.   Skin:     General: Skin is warm and dry.   Neurological:      General: No focal deficit present.      Mental Status: He is disoriented.

## 2024-09-07 NOTE — NURSING NOTE
During change of shift at about 1920, went in Pt room, Pt seem to be sleeping in recliner on 8L midflow NC; SpO2 99%. Woke Pt to introduce self, however Pt observed to be unable to follow command, agitated. Per report he was confused earlier during the day but became more alert/wake and followed commands.  Pt observed to sound congested, unable to cough on request. Oral suction attempted, Pt was noncompliance, with small output. Pt assisted to bed. RRT called at 1935 and team arrived at bedside. Order for blood gas and continue BIPAP given. Pt was put on BIPAP, he became more lethargy. SLIM and critical care NP made aware of condition. At about 0130 Pt became awake, agitated and removed BIPAP and tele, and tried to get out of bed. This writer help Pt back to bed, try to put BIPAP back on but he was fighting and removing BIPAP. 8L midflow NC applied, SpO2 97%. At 0212 BIPAP reapplied. Continue to monitor.

## 2024-09-07 NOTE — PROGRESS NOTES
Pulmonary Progress Note  Marcin Sánchez 64 y.o. 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  I would keep him on BIPAP whenever he is sleeping.  He has poor respiratory reserve.   -Continue to wean supplemental oxygen for SpO2 goal greater than 88%  -Maintain steroids at same dosing  -Continue Pulmicort twice daily  -Okay with 3 days of azithromycin.  Pro-Mirza negative  -Okay to continue DuoNebs 3 times daily.  Will stop Incruse to avoid duplication of anticholinergics  -We will reassess him later    Subjective: RRT last night for altered mental status and was nonverbal and jerking extremities.  This morning lethargic on BIPAP  BIPAP tidal volumes 500s and he is tolerating it well.      Objective:  Vitals: Vitals personally reviewed  Vitals:    09/07/24 0250 09/07/24 0600 09/07/24 0716 09/07/24 0722   BP: 105/55   113/55   BP Location: Left arm   Left arm   Pulse: 74   69   Resp: 16 18  18   Temp: (!) 96.6 °F (35.9 °C)   97.8 °F (36.6 °C)   TempSrc: Temporal   Tympanic   SpO2: 100% 100% 100% 99%      There is no height or weight on file to calculate BMI.    Intake/Output Summary (Last 24 hours) at 9/7/2024 0917  Last data filed at 9/6/2024 1801  Gross per 24 hour   Intake 118 ml   Output 225 ml   Net -107 ml     Invasive Devices       Peripheral Intravenous Line  Duration             Peripheral IV 09/06/24 Right;Ventral (anterior) Forearm 1 day                     Physical Exam  General:  lethargic, wearing BIPAP, sleeping  HEET: head NC/AT, neck supple    Cardiovascular:  Regular rhythm, +S1 S2  Respiratory: Diffuse wheezing and mild rhonchi, not using accessory muscles  Abdomen: Soft, non distended  Extremities: No cyanosis.  no edema  Neuro: lethargic, arouse able with stimuli.  Move extremities  Skin: Warm, dry      Labs: I have personally reviewed pertinent lab results.  Laboratory and Diagnostics  Results from last 7 days   Lab Units 09/07/24  0616 09/06/24 1938 09/06/24 0559 09/05/24 2139   WBC Thousand/uL 11.64*  --  6.04 6.36   HEMOGLOBIN g/dL 11.2*  --  10.8* 11.5*   I STAT HEMOGLOBIN g/dl  --  12.2  --   --    HEMATOCRIT % 35.1*  --  33.2* 35.7*   HEMATOCRIT, ISTAT %  --  36*  --   --    PLATELETS Thousands/uL 261  --  242 307   SEGS PCT % 85*  --  78* 66   MONO PCT % 11  --  10 18*   EOS PCT % 0  --  0 1     Results from last 7 days   Lab Units 09/07/24  0040 09/06/24 2138 09/06/24 1938 09/06/24 0559 09/05/24 2139   SODIUM mmol/L 138  --   --  139 138   POTASSIUM mmol/L 4.6  --   --  4.2 3.6   CHLORIDE mmol/L 96  --   --  95* 94*   CO2 mmol/L 35*  --   --  41* 39*   CO2, I-STAT mmol/L  --   --  >45*  --   --    ANION GAP mmol/L 7  --   --  3* 5   BUN mg/dL 27*  --   --  23 22   CREATININE mg/dL 0.90  --   --  0.59* 0.59*   CALCIUM mg/dL 9.3  --   --  9.0 9.1   GLUCOSE RANDOM mg/dL 166*  --   --  114 164*   ALT U/L  --  9  --   --  13   AST U/L  --  25  --   --  20   ALK PHOS U/L  --  99  --   --  91   ALBUMIN g/dL  --  3.7  --   --  3.7   TOTAL BILIRUBIN mg/dL  --  0.91  --   --  0.75     Results from last 7 days   Lab Units 09/06/24  0559 09/05/24  2139   MAGNESIUM mg/dL 2.3 1.9      Results from last 7 days   Lab Units 09/05/24  2139   LACTIC ACID mmol/L 1.1             Results from last 7 days   Lab Units 09/07/24  0616 09/05/24  2139   PROCALCITONIN ng/ml 0.75* <0.05       Imaging and other studies: I have personally reviewed pertinent films  "in PACS  CT head 9/5  No acute intracranial abnormality.  Old left basal ganglia lacunar infarct.      Echocardiogram:   8/29/24    Left Ventricle: Left ventricular cavity size is normal. Wall thickness is normal. The left ventricular ejection fraction is 35-40%. Systolic function is moderately reduced. There is moderate global hypokinesis.    Left Atrium: The atrium is mildly dilated.    Aortic Valve: There is moderate to severe regurgitation.    Aorta: The aortic root is mildly dilated. The ascending aorta is severely dilated. The aortic root is 4.10 cm. The ascending aorta is 5.4 cm.    Pericardium: There is a moderate pericardial effusion circumferential to the heart. The fluid exhibits no internal echoes. There is no echocardiographic evidence of tamponade.    Code Status: Level 1 - Full Code    Sd Guerra MD  Pulmonary, Critical Care and Sleep Medicine  [facility]     Portions of the record may have been created with voice recognition software. Occasional wrong word or \"sound a like\" substitutions may have occurred due to the inherent limitations of voice recognition software. Please read the chart carefully and recognize, using context, where substitutions have occurred.    "

## 2024-09-07 NOTE — ASSESSMENT & PLAN NOTE
Pt presented to the [facility] ED in respiratory distress with wheezing after standing from smoking a cigarette  Pt typically maintained on 2-3L secondary to COPD, lung cancer, tobacco abuse, and pulm fibrosis  Now requiring BiPAP therapy  Suspect COPD exacerbation  Plan as above

## 2024-09-08 ENCOUNTER — APPOINTMENT (INPATIENT)
Dept: RADIOLOGY | Facility: HOSPITAL | Age: 64
DRG: 133 | End: 2024-09-08
Payer: COMMERCIAL

## 2024-09-08 ENCOUNTER — APPOINTMENT (INPATIENT)
Dept: NON INVASIVE DIAGNOSTICS | Facility: HOSPITAL | Age: 64
DRG: 133 | End: 2024-09-08
Payer: COMMERCIAL

## 2024-09-08 ENCOUNTER — APPOINTMENT (INPATIENT)
Dept: CT IMAGING | Facility: HOSPITAL | Age: 64
DRG: 133 | End: 2024-09-08
Payer: COMMERCIAL

## 2024-09-08 ENCOUNTER — HOSPITAL ENCOUNTER (INPATIENT)
Facility: HOSPITAL | Age: 64
DRG: 005 | End: 2024-09-08
Attending: INTERNAL MEDICINE | Admitting: INTERNAL MEDICINE
Payer: COMMERCIAL

## 2024-09-08 ENCOUNTER — APPOINTMENT (INPATIENT)
Dept: RADIOLOGY | Facility: HOSPITAL | Age: 64
DRG: 005 | End: 2024-09-08
Payer: COMMERCIAL

## 2024-09-08 VITALS
HEIGHT: 69 IN | TEMPERATURE: 98.5 F | SYSTOLIC BLOOD PRESSURE: 161 MMHG | HEART RATE: 100 BPM | WEIGHT: 134 LBS | BODY MASS INDEX: 19.85 KG/M2 | RESPIRATION RATE: 48 BRPM | DIASTOLIC BLOOD PRESSURE: 71 MMHG | OXYGEN SATURATION: 98 %

## 2024-09-08 DIAGNOSIS — I31.39 PERICARDIAL EFFUSION: ICD-10-CM

## 2024-09-08 DIAGNOSIS — T17.500A MUCUS PLUGGING OF BRONCHI: ICD-10-CM

## 2024-09-08 DIAGNOSIS — J96.21 ACUTE ON CHRONIC RESPIRATORY FAILURE WITH HYPOXIA AND HYPERCAPNIA (HCC): Primary | ICD-10-CM

## 2024-09-08 DIAGNOSIS — I50.42 CHRONIC COMBINED SYSTOLIC AND DIASTOLIC CHF (CONGESTIVE HEART FAILURE) (HCC): ICD-10-CM

## 2024-09-08 DIAGNOSIS — J96.01 ACUTE HYPOXIC RESPIRATORY FAILURE (HCC): ICD-10-CM

## 2024-09-08 DIAGNOSIS — R57.9 SHOCK (HCC): ICD-10-CM

## 2024-09-08 DIAGNOSIS — Z93.0 TRACHEOSTOMY DEPENDENCE (HCC): ICD-10-CM

## 2024-09-08 DIAGNOSIS — I46.9 CARDIAC ARREST (HCC): ICD-10-CM

## 2024-09-08 DIAGNOSIS — J18.9 PNEUMONIA: ICD-10-CM

## 2024-09-08 DIAGNOSIS — J96.22 ACUTE ON CHRONIC RESPIRATORY FAILURE WITH HYPOXIA AND HYPERCAPNIA (HCC): Primary | ICD-10-CM

## 2024-09-08 DIAGNOSIS — R56.9 SEIZURE (HCC): ICD-10-CM

## 2024-09-08 DIAGNOSIS — I48.91 ATRIAL FIBRILLATION WITH RVR (HCC): ICD-10-CM

## 2024-09-08 PROBLEM — R74.01 TRANSAMINITIS: Status: ACTIVE | Noted: 2024-09-08

## 2024-09-08 LAB
ALBUMIN SERPL BCG-MCNC: 2.9 G/DL (ref 3.5–5)
ALBUMIN SERPL BCG-MCNC: 3.3 G/DL (ref 3.5–5)
ALBUMIN SERPL BCG-MCNC: 3.5 G/DL (ref 3.5–5)
ALP SERPL-CCNC: 80 U/L (ref 34–104)
ALP SERPL-CCNC: 82 U/L (ref 34–104)
ALP SERPL-CCNC: 92 U/L (ref 34–104)
ALT SERPL W P-5'-P-CCNC: 428 U/L (ref 7–52)
ALT SERPL W P-5'-P-CCNC: 430 U/L (ref 7–52)
ALT SERPL W P-5'-P-CCNC: 438 U/L (ref 7–52)
ANION GAP SERPL CALCULATED.3IONS-SCNC: 10 MMOL/L (ref 4–13)
ANION GAP SERPL CALCULATED.3IONS-SCNC: 8 MMOL/L (ref 4–13)
ANION GAP SERPL CALCULATED.3IONS-SCNC: 8 MMOL/L (ref 4–13)
AORTIC ROOT: 4.2 CM
AORTIC VALVE MEAN VELOCITY: 13.1 M/S
APICAL FOUR CHAMBER EJECTION FRACTION: 63 %
APTT PPP: 45 SECONDS (ref 23–34)
APTT PPP: 47 SECONDS (ref 23–34)
ARTERIAL PATENCY WRIST A: YES
ARTERIAL PATENCY WRIST A: YES
ASCENDING AORTA: 5.6 CM
AST SERPL W P-5'-P-CCNC: 613 U/L (ref 13–39)
AST SERPL W P-5'-P-CCNC: 864 U/L (ref 13–39)
AST SERPL W P-5'-P-CCNC: 946 U/L (ref 13–39)
AV AREA BY CONTINUOUS VTI: 2.6 CM2
AV AREA PEAK VELOCITY: 2.9 CM2
AV LVOT MEAN GRADIENT: 4 MMHG
AV LVOT PEAK GRADIENT: 7 MMHG
AV MEAN GRADIENT: 8 MMHG
AV PEAK GRADIENT: 15 MMHG
AV REGURGITATION PRESSURE HALF TIME: 333 MS
AV VALVE AREA: 2.57 CM2
AV VELOCITY RATIO: 0.7
BASE EX.OXY STD BLDV CALC-SCNC: 30 % (ref 60–80)
BASE EX.OXY STD BLDV CALC-SCNC: 46.7 % (ref 60–80)
BASE EX.OXY STD BLDV CALC-SCNC: 76.7 % (ref 60–80)
BASE EXCESS BLDA CALC-SCNC: 10 MMOL/L (ref -2–3)
BASE EXCESS BLDA CALC-SCNC: 6.2 MMOL/L
BASE EXCESS BLDA CALC-SCNC: 6.3 MMOL/L
BASE EXCESS BLDA CALC-SCNC: 9.5 MMOL/L
BASE EXCESS BLDV CALC-SCNC: -0.2 MMOL/L
BASE EXCESS BLDV CALC-SCNC: 6.4 MMOL/L
BASE EXCESS BLDV CALC-SCNC: 6.7 MMOL/L
BASOPHILS # BLD AUTO: 0.03 THOUSANDS/ÂΜL (ref 0–0.1)
BASOPHILS NFR BLD AUTO: 0 % (ref 0–1)
BILIRUB DIRECT SERPL-MCNC: 0.37 MG/DL (ref 0–0.2)
BILIRUB SERPL-MCNC: 0.98 MG/DL (ref 0.2–1)
BILIRUB SERPL-MCNC: 1 MG/DL (ref 0.2–1)
BILIRUB SERPL-MCNC: 1.04 MG/DL (ref 0.2–1)
BSA FOR ECHO PROCEDURE: 1.74 M2
BUN SERPL-MCNC: 44 MG/DL (ref 5–25)
BUN SERPL-MCNC: 45 MG/DL (ref 5–25)
BUN SERPL-MCNC: 45 MG/DL (ref 5–25)
CA-I BLD-SCNC: 1.05 MMOL/L (ref 1.12–1.32)
CA-I BLD-SCNC: 1.45 MMOL/L (ref 1.12–1.32)
CALCIUM ALBUM COR SERPL-MCNC: 10.2 MG/DL (ref 8.3–10.1)
CALCIUM ALBUM COR SERPL-MCNC: 9.4 MG/DL (ref 8.3–10.1)
CALCIUM SERPL-MCNC: 10.1 MG/DL (ref 8.4–10.2)
CALCIUM SERPL-MCNC: 8.5 MG/DL (ref 8.4–10.2)
CALCIUM SERPL-MCNC: 9.6 MG/DL (ref 8.4–10.2)
CHLORIDE SERPL-SCNC: 96 MMOL/L (ref 96–108)
CHLORIDE SERPL-SCNC: 97 MMOL/L (ref 96–108)
CHLORIDE SERPL-SCNC: 98 MMOL/L (ref 96–108)
CO2 SERPL-SCNC: 35 MMOL/L (ref 21–32)
CO2 SERPL-SCNC: 36 MMOL/L (ref 21–32)
CO2 SERPL-SCNC: 37 MMOL/L (ref 21–32)
CREAT SERPL-MCNC: 0.96 MG/DL (ref 0.6–1.3)
CREAT SERPL-MCNC: 1.13 MG/DL (ref 0.6–1.3)
CREAT SERPL-MCNC: 1.2 MG/DL (ref 0.6–1.3)
DOP CALC AO PEAK VEL: 1.93 M/S
DOP CALC AO VTI: 35.24 CM
DOP CALC LVOT AREA: 4.15 CM2
DOP CALC LVOT CARDIAC INDEX: 4.04 L/MIN/M2
DOP CALC LVOT CARDIAC OUTPUT: 7.02 L/MIN
DOP CALC LVOT DIAMETER: 2.3 CM
DOP CALC LVOT PEAK VEL VTI: 21.82 CM
DOP CALC LVOT PEAK VEL: 1.36 M/S
DOP CALC LVOT STROKE INDEX: 50 ML/M2
DOP CALC LVOT STROKE VOLUME: 90.61
EOSINOPHIL # BLD AUTO: 0 THOUSAND/ÂΜL (ref 0–0.61)
EOSINOPHIL NFR BLD AUTO: 0 % (ref 0–6)
ERYTHROCYTE [DISTWIDTH] IN BLOOD BY AUTOMATED COUNT: 12.4 % (ref 11.6–15.1)
ERYTHROCYTE [DISTWIDTH] IN BLOOD BY AUTOMATED COUNT: 12.6 % (ref 11.6–15.1)
ERYTHROCYTE [DISTWIDTH] IN BLOOD BY AUTOMATED COUNT: 12.9 % (ref 11.6–15.1)
ERYTHROCYTE [DISTWIDTH] IN BLOOD BY AUTOMATED COUNT: 13.2 % (ref 11.6–15.1)
FIO2 GAS DIL.REBREATH: 100 L
GFR SERPL CREATININE-BSD FRML MDRD: 63 ML/MIN/1.73SQ M
GFR SERPL CREATININE-BSD FRML MDRD: 68 ML/MIN/1.73SQ M
GFR SERPL CREATININE-BSD FRML MDRD: 83 ML/MIN/1.73SQ M
GLUCOSE SERPL-MCNC: 137 MG/DL (ref 65–140)
GLUCOSE SERPL-MCNC: 142 MG/DL (ref 65–140)
GLUCOSE SERPL-MCNC: 148 MG/DL (ref 65–140)
GLUCOSE SERPL-MCNC: 160 MG/DL (ref 65–140)
GLUCOSE SERPL-MCNC: 161 MG/DL (ref 65–140)
GLUCOSE SERPL-MCNC: 177 MG/DL (ref 65–140)
HCO3 BLDA-SCNC: 31.2 MMOL/L (ref 22–28)
HCO3 BLDA-SCNC: 32 MMOL/L (ref 22–28)
HCO3 BLDA-SCNC: 33.5 MMOL/L (ref 22–28)
HCO3 BLDA-SCNC: 40.3 MMOL/L (ref 22–28)
HCO3 BLDV-SCNC: 28.8 MMOL/L (ref 24–30)
HCO3 BLDV-SCNC: 30 MMOL/L (ref 24–30)
HCO3 BLDV-SCNC: 33.9 MMOL/L (ref 24–30)
HCT VFR BLD AUTO: 31.1 % (ref 36.5–49.3)
HCT VFR BLD AUTO: 33.5 % (ref 36.5–49.3)
HCT VFR BLD AUTO: 36.2 % (ref 36.5–49.3)
HCT VFR BLD AUTO: 36.6 % (ref 36.5–49.3)
HCT VFR BLD CALC: 35 % (ref 36.5–49.3)
HGB BLD-MCNC: 10.6 G/DL (ref 12–17)
HGB BLD-MCNC: 11.2 G/DL (ref 12–17)
HGB BLD-MCNC: 11.6 G/DL (ref 12–17)
HGB BLD-MCNC: 12 G/DL (ref 12–17)
HGB BLDA-MCNC: 11.9 G/DL (ref 12–17)
IMM GRANULOCYTES # BLD AUTO: 0.09 THOUSAND/UL (ref 0–0.2)
IMM GRANULOCYTES NFR BLD AUTO: 1 % (ref 0–2)
INR PPP: 2.14 (ref 0.85–1.19)
LACTATE SERPL-SCNC: 1.6 MMOL/L (ref 0.5–2)
LACTATE SERPL-SCNC: 2.9 MMOL/L (ref 0.5–2)
LACTATE SERPL-SCNC: 3.6 MMOL/L (ref 0.5–2)
LACTATE SERPL-SCNC: 4 MMOL/L (ref 0.5–2)
LEFT VENTRICLE DIASTOLIC VOLUME (MOD BIPLANE): 204 ML
LEFT VENTRICLE DIASTOLIC VOLUME INDEX (MOD BIPLANE): 117.2 ML/M2
LEFT VENTRICLE SYSTOLIC VOLUME (MOD BIPLANE): 73 ML
LEFT VENTRICLE SYSTOLIC VOLUME INDEX (MOD BIPLANE): 42 ML/M2
LV EF: 64 %
LYMPHOCYTES # BLD AUTO: 0.67 THOUSANDS/ÂΜL (ref 0.6–4.47)
LYMPHOCYTES NFR BLD AUTO: 6 % (ref 14–44)
MAGNESIUM SERPL-MCNC: 2 MG/DL (ref 1.9–2.7)
MAGNESIUM SERPL-MCNC: 2.2 MG/DL (ref 1.9–2.7)
MAGNESIUM SERPL-MCNC: 2.2 MG/DL (ref 1.9–2.7)
MCH RBC QN AUTO: 31.9 PG (ref 26.8–34.3)
MCH RBC QN AUTO: 31.9 PG (ref 26.8–34.3)
MCH RBC QN AUTO: 32.3 PG (ref 26.8–34.3)
MCH RBC QN AUTO: 32.6 PG (ref 26.8–34.3)
MCHC RBC AUTO-ENTMCNC: 32 G/DL (ref 31.4–37.4)
MCHC RBC AUTO-ENTMCNC: 32.8 G/DL (ref 31.4–37.4)
MCHC RBC AUTO-ENTMCNC: 33.4 G/DL (ref 31.4–37.4)
MCHC RBC AUTO-ENTMCNC: 34.1 G/DL (ref 31.4–37.4)
MCV RBC AUTO: 101 FL (ref 82–98)
MCV RBC AUTO: 94 FL (ref 82–98)
MCV RBC AUTO: 97 FL (ref 82–98)
MCV RBC AUTO: 97 FL (ref 82–98)
MONOCYTES # BLD AUTO: 1.09 THOUSAND/ÂΜL (ref 0.17–1.22)
MONOCYTES NFR BLD AUTO: 9 % (ref 4–12)
NEUTROPHILS # BLD AUTO: 9.72 THOUSANDS/ÂΜL (ref 1.85–7.62)
NEUTS SEG NFR BLD AUTO: 84 % (ref 43–75)
NRBC BLD AUTO-RTO: 0 /100 WBCS
O2 CT BLDA-SCNC: 16.2 ML/DL (ref 16–23)
O2 CT BLDA-SCNC: 16.2 ML/DL (ref 16–23)
O2 CT BLDA-SCNC: 16.9 ML/DL (ref 16–23)
O2 CT BLDV-SCNC: 13.8 ML/DL
O2 CT BLDV-SCNC: 4.1 ML/DL
O2 CT BLDV-SCNC: 8.4 ML/DL
OXYHGB MFR BLDA: 97.3 % (ref 94–97)
OXYHGB MFR BLDA: 97.5 % (ref 94–97)
OXYHGB MFR BLDA: 97.9 % (ref 94–97)
PCO2 BLD: 43 MMOL/L (ref 21–32)
PCO2 BLD: 89.8 MM HG (ref 36–44)
PCO2 BLDA: 43.1 MM HG (ref 36–44)
PCO2 BLDA: 46.5 MM HG (ref 36–44)
PCO2 BLDA: 51 MM HG (ref 36–44)
PCO2 BLDV: 38.4 MM HG (ref 42–50)
PCO2 BLDV: 63.1 MM HG (ref 42–50)
PCO2 BLDV: 74.1 MM HG (ref 42–50)
PH BLD: 7.26 [PH] (ref 7.35–7.45)
PH BLDA: 7.42 [PH] (ref 7.35–7.45)
PH BLDA: 7.44 [PH] (ref 7.35–7.45)
PH BLDA: 7.51 [PH] (ref 7.35–7.45)
PH BLDV: 7.21 [PH] (ref 7.3–7.4)
PH BLDV: 7.35 [PH] (ref 7.3–7.4)
PH BLDV: 7.51 [PH] (ref 7.3–7.4)
PHENYTOIN SERPL-MCNC: 35.8 UG/ML (ref 10–20)
PHENYTOIN SERPL-MCNC: 39.1 UG/ML (ref 10–20)
PHOSPHATE SERPL-MCNC: 3 MG/DL (ref 2.3–4.1)
PHOSPHATE SERPL-MCNC: 3.4 MG/DL (ref 2.3–4.1)
PHOSPHATE SERPL-MCNC: 5.7 MG/DL (ref 2.3–4.1)
PLATELET # BLD AUTO: 247 THOUSANDS/UL (ref 149–390)
PLATELET # BLD AUTO: 272 THOUSANDS/UL (ref 149–390)
PLATELET # BLD AUTO: 284 THOUSANDS/UL (ref 149–390)
PLATELET # BLD AUTO: 323 THOUSANDS/UL (ref 149–390)
PMV BLD AUTO: 8.8 FL (ref 8.9–12.7)
PMV BLD AUTO: 9.2 FL (ref 8.9–12.7)
PMV BLD AUTO: 9.3 FL (ref 8.9–12.7)
PMV BLD AUTO: 9.4 FL (ref 8.9–12.7)
PO2 BLD: 285 MM HG (ref 75–129)
PO2 BLDA: 112.3 MM HG (ref 75–129)
PO2 BLDA: 122.5 MM HG (ref 75–129)
PO2 BLDA: 134.5 MM HG (ref 75–129)
PO2 BLDV: 23.6 MM HG (ref 35–45)
PO2 BLDV: 27.9 MM HG (ref 35–45)
PO2 BLDV: 39.5 MM HG (ref 35–45)
POTASSIUM BLD-SCNC: 4.8 MMOL/L (ref 3.5–5.3)
POTASSIUM SERPL-SCNC: 3.7 MMOL/L (ref 3.5–5.3)
POTASSIUM SERPL-SCNC: 4.2 MMOL/L (ref 3.5–5.3)
POTASSIUM SERPL-SCNC: 4.7 MMOL/L (ref 3.5–5.3)
PROCALCITONIN SERPL-MCNC: 1.56 NG/ML
PROLACTIN SERPL-MCNC: 9.52 NG/ML
PROT SERPL-MCNC: 6 G/DL (ref 6.4–8.4)
PROT SERPL-MCNC: 6.9 G/DL (ref 6.4–8.4)
PROT SERPL-MCNC: 7.2 G/DL (ref 6.4–8.4)
PROTHROMBIN TIME: 23.8 SECONDS (ref 12.3–15)
RBC # BLD AUTO: 3.32 MILLION/UL (ref 3.88–5.62)
RBC # BLD AUTO: 3.44 MILLION/UL (ref 3.88–5.62)
RBC # BLD AUTO: 3.59 MILLION/UL (ref 3.88–5.62)
RBC # BLD AUTO: 3.76 MILLION/UL (ref 3.88–5.62)
SAO2 % BLD FROM PO2: 100 % (ref 60–85)
SIMV VENT INSPIRED AIR FIO2: 50
SIMV VENT INSPIRED AIR FIO2: 50
SIMV VENT PEEP: 6
SIMV VENT PEEP: 6
SIMV VENT TIDAL VOLUME: 350
SIMV VENT TIDAL VOLUME: 450
SIMV VENT: ABNORMAL
SIMV VENT: ABNORMAL
SINOTUBULAR JUNCTION: 4.1 CM
SL CV AV DECELERATION TIME RETROGRADE: 1148 MS
SL CV AV PEAK GRADIENT RETROGRADE: 88 MMHG
SL CV LV EF: 55
SL CV SINUS OF VALSALVA 2D: 4 CM
SODIUM BLD-SCNC: 139 MMOL/L (ref 136–145)
SODIUM SERPL-SCNC: 141 MMOL/L (ref 135–147)
SODIUM SERPL-SCNC: 142 MMOL/L (ref 135–147)
SODIUM SERPL-SCNC: 142 MMOL/L (ref 135–147)
SPECIMEN SOURCE: ABNORMAL
STJ: 4.1 CM
VENT SIMV: 14
VENT SIMV: 14
WBC # BLD AUTO: 11.6 THOUSAND/UL (ref 4.31–10.16)
WBC # BLD AUTO: 14.35 THOUSAND/UL (ref 4.31–10.16)
WBC # BLD AUTO: 14.77 THOUSAND/UL (ref 4.31–10.16)
WBC # BLD AUTO: 15.08 THOUSAND/UL (ref 4.31–10.16)

## 2024-09-08 PROCEDURE — 84100 ASSAY OF PHOSPHORUS: CPT

## 2024-09-08 PROCEDURE — 85027 COMPLETE CBC AUTOMATED: CPT | Performed by: INTERNAL MEDICINE

## 2024-09-08 PROCEDURE — 31500 INSERT EMERGENCY AIRWAY: CPT

## 2024-09-08 PROCEDURE — 83735 ASSAY OF MAGNESIUM: CPT | Performed by: INTERNAL MEDICINE

## 2024-09-08 PROCEDURE — 85025 COMPLETE CBC W/AUTO DIFF WBC: CPT

## 2024-09-08 PROCEDURE — 85730 THROMBOPLASTIN TIME PARTIAL: CPT

## 2024-09-08 PROCEDURE — 71045 X-RAY EXAM CHEST 1 VIEW: CPT

## 2024-09-08 PROCEDURE — 71250 CT THORAX DX C-: CPT

## 2024-09-08 PROCEDURE — 80185 ASSAY OF PHENYTOIN TOTAL: CPT | Performed by: INTERNAL MEDICINE

## 2024-09-08 PROCEDURE — 82947 ASSAY GLUCOSE BLOOD QUANT: CPT

## 2024-09-08 PROCEDURE — 93325 DOPPLER ECHO COLOR FLOW MAPG: CPT

## 2024-09-08 PROCEDURE — 84132 ASSAY OF SERUM POTASSIUM: CPT

## 2024-09-08 PROCEDURE — 74176 CT ABD & PELVIS W/O CONTRAST: CPT

## 2024-09-08 PROCEDURE — 84100 ASSAY OF PHOSPHORUS: CPT | Performed by: INTERNAL MEDICINE

## 2024-09-08 PROCEDURE — 82803 BLOOD GASES ANY COMBINATION: CPT

## 2024-09-08 PROCEDURE — 94640 AIRWAY INHALATION TREATMENT: CPT

## 2024-09-08 PROCEDURE — 82805 BLOOD GASES W/O2 SATURATION: CPT

## 2024-09-08 PROCEDURE — 82948 REAGENT STRIP/BLOOD GLUCOSE: CPT

## 2024-09-08 PROCEDURE — 94669 MECHANICAL CHEST WALL OSCILL: CPT

## 2024-09-08 PROCEDURE — 85610 PROTHROMBIN TIME: CPT

## 2024-09-08 PROCEDURE — 93321 DOPPLER ECHO F-UP/LMTD STD: CPT

## 2024-09-08 PROCEDURE — 82805 BLOOD GASES W/O2 SATURATION: CPT | Performed by: INTERNAL MEDICINE

## 2024-09-08 PROCEDURE — 92950 HEART/LUNG RESUSCITATION CPR: CPT

## 2024-09-08 PROCEDURE — 80048 BASIC METABOLIC PNL TOTAL CA: CPT

## 2024-09-08 PROCEDURE — 85027 COMPLETE CBC AUTOMATED: CPT

## 2024-09-08 PROCEDURE — 5A1955Z RESPIRATORY VENTILATION, GREATER THAN 96 CONSECUTIVE HOURS: ICD-10-PCS | Performed by: ANESTHESIOLOGY

## 2024-09-08 PROCEDURE — 99291 CRITICAL CARE FIRST HOUR: CPT | Performed by: INTERNAL MEDICINE

## 2024-09-08 PROCEDURE — 84295 ASSAY OF SERUM SODIUM: CPT

## 2024-09-08 PROCEDURE — 87081 CULTURE SCREEN ONLY: CPT

## 2024-09-08 PROCEDURE — 93325 DOPPLER ECHO COLOR FLOW MAPG: CPT | Performed by: INTERNAL MEDICINE

## 2024-09-08 PROCEDURE — 80076 HEPATIC FUNCTION PANEL: CPT

## 2024-09-08 PROCEDURE — 83605 ASSAY OF LACTIC ACID: CPT

## 2024-09-08 PROCEDURE — NC001 PR NO CHARGE: Performed by: INTERNAL MEDICINE

## 2024-09-08 PROCEDURE — NC001 PR NO CHARGE

## 2024-09-08 PROCEDURE — 83735 ASSAY OF MAGNESIUM: CPT

## 2024-09-08 PROCEDURE — 85014 HEMATOCRIT: CPT

## 2024-09-08 PROCEDURE — 70450 CT HEAD/BRAIN W/O DYE: CPT

## 2024-09-08 PROCEDURE — 94760 N-INVAS EAR/PLS OXIMETRY 1: CPT

## 2024-09-08 PROCEDURE — 36620 INSERTION CATHETER ARTERY: CPT | Performed by: INTERNAL MEDICINE

## 2024-09-08 PROCEDURE — 82330 ASSAY OF CALCIUM: CPT

## 2024-09-08 PROCEDURE — 93321 DOPPLER ECHO F-UP/LMTD STD: CPT | Performed by: INTERNAL MEDICINE

## 2024-09-08 PROCEDURE — 99291 CRITICAL CARE FIRST HOUR: CPT | Performed by: ANESTHESIOLOGY

## 2024-09-08 PROCEDURE — 36600 WITHDRAWAL OF ARTERIAL BLOOD: CPT

## 2024-09-08 PROCEDURE — 02HV33Z INSERTION OF INFUSION DEVICE INTO SUPERIOR VENA CAVA, PERCUTANEOUS APPROACH: ICD-10-PCS | Performed by: INTERNAL MEDICINE

## 2024-09-08 PROCEDURE — 84145 PROCALCITONIN (PCT): CPT | Performed by: INTERNAL MEDICINE

## 2024-09-08 PROCEDURE — 93308 TTE F-UP OR LMTD: CPT | Performed by: INTERNAL MEDICINE

## 2024-09-08 PROCEDURE — 4A133J1 MONITORING OF ARTERIAL PULSE, PERIPHERAL, PERCUTANEOUS APPROACH: ICD-10-PCS | Performed by: INTERNAL MEDICINE

## 2024-09-08 PROCEDURE — 94150 VITAL CAPACITY TEST: CPT

## 2024-09-08 PROCEDURE — 4A133B1 MONITORING OF ARTERIAL PRESSURE, PERIPHERAL, PERCUTANEOUS APPROACH: ICD-10-PCS | Performed by: INTERNAL MEDICINE

## 2024-09-08 PROCEDURE — 80185 ASSAY OF PHENYTOIN TOTAL: CPT

## 2024-09-08 PROCEDURE — 93005 ELECTROCARDIOGRAM TRACING: CPT

## 2024-09-08 PROCEDURE — 80053 COMPREHEN METABOLIC PANEL: CPT | Performed by: INTERNAL MEDICINE

## 2024-09-08 PROCEDURE — 93308 TTE F-UP OR LMTD: CPT

## 2024-09-08 PROCEDURE — 94002 VENT MGMT INPAT INIT DAY: CPT

## 2024-09-08 PROCEDURE — 82805 BLOOD GASES W/O2 SATURATION: CPT | Performed by: NURSE PRACTITIONER

## 2024-09-08 PROCEDURE — 03HY32Z INSERTION OF MONITORING DEVICE INTO UPPER ARTERY, PERCUTANEOUS APPROACH: ICD-10-PCS | Performed by: INTERNAL MEDICINE

## 2024-09-08 PROCEDURE — 36556 INSERT NON-TUNNEL CV CATH: CPT | Performed by: INTERNAL MEDICINE

## 2024-09-08 RX ORDER — MIDAZOLAM HYDROCHLORIDE 2 MG/2ML
2 INJECTION, SOLUTION INTRAMUSCULAR; INTRAVENOUS ONCE
Status: COMPLETED | OUTPATIENT
Start: 2024-09-08 | End: 2024-09-08

## 2024-09-08 RX ORDER — LEVALBUTEROL INHALATION SOLUTION 1.25 MG/3ML
1.25 SOLUTION RESPIRATORY (INHALATION) EVERY 4 HOURS PRN
Status: DISCONTINUED | OUTPATIENT
Start: 2024-09-08 | End: 2024-09-08

## 2024-09-08 RX ORDER — PANTOPRAZOLE SODIUM 40 MG/10ML
40 INJECTION, POWDER, LYOPHILIZED, FOR SOLUTION INTRAVENOUS
Status: DISCONTINUED | OUTPATIENT
Start: 2024-09-08 | End: 2024-09-08 | Stop reason: HOSPADM

## 2024-09-08 RX ORDER — SODIUM CHLORIDE, SODIUM GLUCONATE, SODIUM ACETATE, POTASSIUM CHLORIDE, MAGNESIUM CHLORIDE, SODIUM PHOSPHATE, DIBASIC, AND POTASSIUM PHOSPHATE .53; .5; .37; .037; .03; .012; .00082 G/100ML; G/100ML; G/100ML; G/100ML; G/100ML; G/100ML; G/100ML
500 INJECTION, SOLUTION INTRAVENOUS ONCE
Status: COMPLETED | OUTPATIENT
Start: 2024-09-08 | End: 2024-09-08

## 2024-09-08 RX ORDER — FENTANYL CITRATE 50 UG/ML
50 INJECTION, SOLUTION INTRAMUSCULAR; INTRAVENOUS EVERY 2 HOUR PRN
Status: DISCONTINUED | OUTPATIENT
Start: 2024-09-08 | End: 2024-09-08 | Stop reason: HOSPADM

## 2024-09-08 RX ORDER — SODIUM CHLORIDE, SODIUM GLUCONATE, SODIUM ACETATE, POTASSIUM CHLORIDE, MAGNESIUM CHLORIDE, SODIUM PHOSPHATE, DIBASIC, AND POTASSIUM PHOSPHATE .53; .5; .37; .037; .03; .012; .00082 G/100ML; G/100ML; G/100ML; G/100ML; G/100ML; G/100ML; G/100ML
250 INJECTION, SOLUTION INTRAVENOUS ONCE
Status: COMPLETED | OUTPATIENT
Start: 2024-09-08 | End: 2024-09-08

## 2024-09-08 RX ORDER — SODIUM BICARBONATE 84 MG/ML
INJECTION, SOLUTION INTRAVENOUS CODE/TRAUMA/SEDATION MEDICATION
Status: COMPLETED | OUTPATIENT
Start: 2024-09-07 | End: 2024-09-07

## 2024-09-08 RX ORDER — SODIUM CHLORIDE FOR INHALATION 3 %
4 VIAL, NEBULIZER (ML) INHALATION
Status: DISCONTINUED | OUTPATIENT
Start: 2024-09-08 | End: 2024-09-09

## 2024-09-08 RX ORDER — CHLORHEXIDINE GLUCONATE ORAL RINSE 1.2 MG/ML
15 SOLUTION DENTAL EVERY 12 HOURS SCHEDULED
Status: DISCONTINUED | OUTPATIENT
Start: 2024-09-08 | End: 2024-09-08 | Stop reason: HOSPADM

## 2024-09-08 RX ORDER — HEPARIN SODIUM 1000 [USP'U]/ML
3600 INJECTION, SOLUTION INTRAVENOUS; SUBCUTANEOUS EVERY 6 HOURS PRN
Status: DISCONTINUED | OUTPATIENT
Start: 2024-09-08 | End: 2024-09-09

## 2024-09-08 RX ORDER — ACETAMINOPHEN 325 MG/1
650 TABLET ORAL EVERY 6 HOURS PRN
Status: CANCELLED | OUTPATIENT
Start: 2024-09-08

## 2024-09-08 RX ORDER — PROPOFOL 10 MG/ML
5-50 INJECTION, EMULSION INTRAVENOUS
Status: DISCONTINUED | OUTPATIENT
Start: 2024-09-08 | End: 2024-09-09

## 2024-09-08 RX ORDER — CALCIUM CHLORIDE 100 MG/ML
SYRINGE (ML) INTRAVENOUS CODE/TRAUMA/SEDATION MEDICATION
Status: COMPLETED | OUTPATIENT
Start: 2024-09-07 | End: 2024-09-07

## 2024-09-08 RX ORDER — SODIUM CHLORIDE FOR INHALATION 3 %
4 VIAL, NEBULIZER (ML) INHALATION
Status: CANCELLED | OUTPATIENT
Start: 2024-09-08

## 2024-09-08 RX ORDER — PROPOFOL 10 MG/ML
5-50 INJECTION, EMULSION INTRAVENOUS
Status: DISCONTINUED | OUTPATIENT
Start: 2024-09-08 | End: 2024-09-08 | Stop reason: HOSPADM

## 2024-09-08 RX ORDER — BUSPIRONE HYDROCHLORIDE 10 MG/1
10 TABLET ORAL 2 TIMES DAILY
Status: DISCONTINUED | OUTPATIENT
Start: 2024-09-08 | End: 2024-09-08

## 2024-09-08 RX ORDER — IPRATROPIUM BROMIDE AND ALBUTEROL SULFATE 2.5; .5 MG/3ML; MG/3ML
3 SOLUTION RESPIRATORY (INHALATION)
Status: CANCELLED | OUTPATIENT
Start: 2024-09-08

## 2024-09-08 RX ORDER — EPINEPHRINE 0.1 MG/ML
INJECTION INTRAVENOUS CODE/TRAUMA/SEDATION MEDICATION
Status: COMPLETED | OUTPATIENT
Start: 2024-09-07 | End: 2024-09-07

## 2024-09-08 RX ORDER — PROPOFOL 10 MG/ML
INJECTION, EMULSION INTRAVENOUS
Status: COMPLETED
Start: 2024-09-08 | End: 2024-09-08

## 2024-09-08 RX ORDER — ATROPINE SULFATE 1 MG/ML
1 INJECTION, SOLUTION INTRAVENOUS ONCE
Status: COMPLETED | OUTPATIENT
Start: 2024-09-08 | End: 2024-09-08

## 2024-09-08 RX ORDER — FENTANYL CITRATE 50 UG/ML
INJECTION, SOLUTION INTRAMUSCULAR; INTRAVENOUS
Status: COMPLETED
Start: 2024-09-08 | End: 2024-09-08

## 2024-09-08 RX ORDER — NICOTINE 21 MG/24HR
1 PATCH, TRANSDERMAL 24 HOURS TRANSDERMAL DAILY
Status: CANCELLED | OUTPATIENT
Start: 2024-09-09

## 2024-09-08 RX ORDER — FENTANYL CITRATE 50 UG/ML
50 INJECTION, SOLUTION INTRAMUSCULAR; INTRAVENOUS EVERY 2 HOUR PRN
Status: CANCELLED | OUTPATIENT
Start: 2024-09-08

## 2024-09-08 RX ORDER — CHLORHEXIDINE GLUCONATE ORAL RINSE 1.2 MG/ML
15 SOLUTION DENTAL EVERY 12 HOURS SCHEDULED
Status: DISCONTINUED | OUTPATIENT
Start: 2024-09-08 | End: 2024-09-09

## 2024-09-08 RX ORDER — BUDESONIDE 0.5 MG/2ML
0.5 INHALANT ORAL
Status: CANCELLED | OUTPATIENT
Start: 2024-09-08

## 2024-09-08 RX ORDER — PROPOFOL 10 MG/ML
5-50 INJECTION, EMULSION INTRAVENOUS
Status: CANCELLED | OUTPATIENT
Start: 2024-09-08

## 2024-09-08 RX ORDER — FENTANYL CITRATE 50 UG/ML
50 INJECTION, SOLUTION INTRAMUSCULAR; INTRAVENOUS ONCE
Status: COMPLETED | OUTPATIENT
Start: 2024-09-08 | End: 2024-09-08

## 2024-09-08 RX ORDER — HEPARIN SODIUM 10000 [USP'U]/100ML
3-20 INJECTION, SOLUTION INTRAVENOUS
Status: CANCELLED | OUTPATIENT
Start: 2024-09-08

## 2024-09-08 RX ORDER — ACETAMINOPHEN 325 MG/1
650 TABLET ORAL EVERY 6 HOURS PRN
Status: DISCONTINUED | OUTPATIENT
Start: 2024-09-08 | End: 2024-09-25

## 2024-09-08 RX ORDER — IPRATROPIUM BROMIDE AND ALBUTEROL SULFATE 2.5; .5 MG/3ML; MG/3ML
3 SOLUTION RESPIRATORY (INHALATION) ONCE
Status: COMPLETED | OUTPATIENT
Start: 2024-09-08 | End: 2024-09-08

## 2024-09-08 RX ORDER — DEXMEDETOMIDINE HYDROCHLORIDE 4 UG/ML
.1-1.2 INJECTION, SOLUTION INTRAVENOUS
Status: DISCONTINUED | OUTPATIENT
Start: 2024-09-08 | End: 2024-09-21

## 2024-09-08 RX ORDER — ALBUTEROL SULFATE 0.83 MG/ML
2.5 SOLUTION RESPIRATORY (INHALATION) EVERY 6 HOURS PRN
Status: DISCONTINUED | OUTPATIENT
Start: 2024-09-08 | End: 2024-10-11

## 2024-09-08 RX ORDER — HEPARIN SODIUM 1000 [USP'U]/ML
1800 INJECTION, SOLUTION INTRAVENOUS; SUBCUTANEOUS EVERY 6 HOURS PRN
Status: DISCONTINUED | OUTPATIENT
Start: 2024-09-08 | End: 2024-09-09

## 2024-09-08 RX ORDER — LEVALBUTEROL INHALATION SOLUTION 1.25 MG/3ML
1.25 SOLUTION RESPIRATORY (INHALATION) EVERY 4 HOURS PRN
Status: CANCELLED | OUTPATIENT
Start: 2024-09-08

## 2024-09-08 RX ORDER — METHYLPREDNISOLONE SODIUM SUCCINATE 40 MG/ML
40 INJECTION, POWDER, LYOPHILIZED, FOR SOLUTION INTRAMUSCULAR; INTRAVENOUS EVERY 12 HOURS SCHEDULED
Status: DISCONTINUED | OUTPATIENT
Start: 2024-09-08 | End: 2024-09-12

## 2024-09-08 RX ORDER — IPRATROPIUM BROMIDE AND ALBUTEROL SULFATE 2.5; .5 MG/3ML; MG/3ML
3 SOLUTION RESPIRATORY (INHALATION)
Status: DISCONTINUED | OUTPATIENT
Start: 2024-09-08 | End: 2024-09-08

## 2024-09-08 RX ORDER — HEPARIN SODIUM 10000 [USP'U]/100ML
3-20 INJECTION, SOLUTION INTRAVENOUS
Status: DISCONTINUED | OUTPATIENT
Start: 2024-09-08 | End: 2024-09-09

## 2024-09-08 RX ORDER — LEVALBUTEROL INHALATION SOLUTION 1.25 MG/3ML
1.25 SOLUTION RESPIRATORY (INHALATION)
Status: DISCONTINUED | OUTPATIENT
Start: 2024-09-08 | End: 2024-09-16

## 2024-09-08 RX ORDER — FENTANYL CITRATE 50 UG/ML
50 INJECTION, SOLUTION INTRAMUSCULAR; INTRAVENOUS EVERY 2 HOUR PRN
Status: DISCONTINUED | OUTPATIENT
Start: 2024-09-08 | End: 2024-09-13

## 2024-09-08 RX ORDER — HEPARIN SODIUM 10000 [USP'U]/100ML
3-20 INJECTION, SOLUTION INTRAVENOUS
Status: DISCONTINUED | OUTPATIENT
Start: 2024-09-08 | End: 2024-09-08 | Stop reason: HOSPADM

## 2024-09-08 RX ORDER — SODIUM CHLORIDE, SODIUM GLUCONATE, SODIUM ACETATE, POTASSIUM CHLORIDE, MAGNESIUM CHLORIDE, SODIUM PHOSPHATE, DIBASIC, AND POTASSIUM PHOSPHATE .53; .5; .37; .037; .03; .012; .00082 G/100ML; G/100ML; G/100ML; G/100ML; G/100ML; G/100ML; G/100ML
75 INJECTION, SOLUTION INTRAVENOUS CONTINUOUS
Status: DISCONTINUED | OUTPATIENT
Start: 2024-09-08 | End: 2024-09-08

## 2024-09-08 RX ORDER — ALBUTEROL SULFATE 0.83 MG/ML
7.5 SOLUTION RESPIRATORY (INHALATION) ONCE
Status: COMPLETED | OUTPATIENT
Start: 2024-09-08 | End: 2024-09-08

## 2024-09-08 RX ORDER — HEPARIN SODIUM 1000 [USP'U]/ML
1800 INJECTION, SOLUTION INTRAVENOUS; SUBCUTANEOUS EVERY 6 HOURS PRN
Status: CANCELLED | OUTPATIENT
Start: 2024-09-08

## 2024-09-08 RX ORDER — HEPARIN SODIUM 1000 [USP'U]/ML
3600 INJECTION, SOLUTION INTRAVENOUS; SUBCUTANEOUS EVERY 6 HOURS PRN
Status: DISCONTINUED | OUTPATIENT
Start: 2024-09-08 | End: 2024-09-08 | Stop reason: HOSPADM

## 2024-09-08 RX ORDER — MIDAZOLAM HYDROCHLORIDE 2 MG/2ML
INJECTION, SOLUTION INTRAMUSCULAR; INTRAVENOUS
Status: COMPLETED
Start: 2024-09-08 | End: 2024-09-08

## 2024-09-08 RX ORDER — PANTOPRAZOLE SODIUM 40 MG/10ML
40 INJECTION, POWDER, LYOPHILIZED, FOR SOLUTION INTRAVENOUS
Status: CANCELLED | OUTPATIENT
Start: 2024-09-09

## 2024-09-08 RX ORDER — BUDESONIDE 0.5 MG/2ML
0.5 INHALANT ORAL
Status: DISCONTINUED | OUTPATIENT
Start: 2024-09-08 | End: 2024-10-15

## 2024-09-08 RX ORDER — SODIUM CHLORIDE, SODIUM GLUCONATE, SODIUM ACETATE, POTASSIUM CHLORIDE, MAGNESIUM CHLORIDE, SODIUM PHOSPHATE, DIBASIC, AND POTASSIUM PHOSPHATE .53; .5; .37; .037; .03; .012; .00082 G/100ML; G/100ML; G/100ML; G/100ML; G/100ML; G/100ML; G/100ML
75 INJECTION, SOLUTION INTRAVENOUS CONTINUOUS
Status: DISCONTINUED | OUTPATIENT
Start: 2024-09-08 | End: 2024-09-08 | Stop reason: HOSPADM

## 2024-09-08 RX ORDER — HEPARIN SODIUM 1000 [USP'U]/ML
3600 INJECTION, SOLUTION INTRAVENOUS; SUBCUTANEOUS EVERY 6 HOURS PRN
Status: CANCELLED | OUTPATIENT
Start: 2024-09-08

## 2024-09-08 RX ORDER — HEPARIN SODIUM 1000 [USP'U]/ML
1800 INJECTION, SOLUTION INTRAVENOUS; SUBCUTANEOUS EVERY 6 HOURS PRN
Status: DISCONTINUED | OUTPATIENT
Start: 2024-09-08 | End: 2024-09-08 | Stop reason: HOSPADM

## 2024-09-08 RX ORDER — SODIUM CHLORIDE, SODIUM GLUCONATE, SODIUM ACETATE, POTASSIUM CHLORIDE, MAGNESIUM CHLORIDE, SODIUM PHOSPHATE, DIBASIC, AND POTASSIUM PHOSPHATE .53; .5; .37; .037; .03; .012; .00082 G/100ML; G/100ML; G/100ML; G/100ML; G/100ML; G/100ML; G/100ML
75 INJECTION, SOLUTION INTRAVENOUS CONTINUOUS
Status: CANCELLED | OUTPATIENT
Start: 2024-09-08

## 2024-09-08 RX ORDER — FENTANYL CITRATE-0.9 % NACL/PF 10 MCG/ML
25 PLASTIC BAG, INJECTION (ML) INTRAVENOUS CONTINUOUS
Status: DISCONTINUED | OUTPATIENT
Start: 2024-09-08 | End: 2024-09-10

## 2024-09-08 RX ORDER — CHLORHEXIDINE GLUCONATE ORAL RINSE 1.2 MG/ML
15 SOLUTION DENTAL EVERY 12 HOURS SCHEDULED
Status: CANCELLED | OUTPATIENT
Start: 2024-09-08

## 2024-09-08 RX ORDER — PANTOPRAZOLE SODIUM 40 MG/10ML
40 INJECTION, POWDER, LYOPHILIZED, FOR SOLUTION INTRAVENOUS
Status: DISCONTINUED | OUTPATIENT
Start: 2024-09-09 | End: 2024-09-16

## 2024-09-08 RX ORDER — METHYLPREDNISOLONE SODIUM SUCCINATE 40 MG/ML
40 INJECTION, POWDER, LYOPHILIZED, FOR SOLUTION INTRAMUSCULAR; INTRAVENOUS EVERY 12 HOURS SCHEDULED
Status: DISCONTINUED | OUTPATIENT
Start: 2024-09-08 | End: 2024-09-08 | Stop reason: HOSPADM

## 2024-09-08 RX ORDER — NICOTINE 21 MG/24HR
1 PATCH, TRANSDERMAL 24 HOURS TRANSDERMAL DAILY
Status: DISCONTINUED | OUTPATIENT
Start: 2024-09-09 | End: 2024-10-18 | Stop reason: HOSPADM

## 2024-09-08 RX ORDER — METHYLPREDNISOLONE SODIUM SUCCINATE 40 MG/ML
40 INJECTION, POWDER, LYOPHILIZED, FOR SOLUTION INTRAMUSCULAR; INTRAVENOUS EVERY 12 HOURS SCHEDULED
Status: CANCELLED | OUTPATIENT
Start: 2024-09-08

## 2024-09-08 RX ADMIN — ATROPINE SULFATE 1 MG: 1 INJECTION, SOLUTION INTRAVENOUS at 00:47

## 2024-09-08 RX ADMIN — MIDAZOLAM 2 MG: 1 INJECTION INTRAMUSCULAR; INTRAVENOUS at 00:45

## 2024-09-08 RX ADMIN — DEXMEDETOMIDINE HYDROCHLORIDE 0.1 MCG/KG/HR: 4 INJECTION, SOLUTION INTRAVENOUS at 22:49

## 2024-09-08 RX ADMIN — MIDAZOLAM 2 MG: 1 INJECTION INTRAMUSCULAR; INTRAVENOUS at 00:30

## 2024-09-08 RX ADMIN — LEVALBUTEROL HYDROCHLORIDE 1.25 MG: 1.25 SOLUTION RESPIRATORY (INHALATION) at 20:38

## 2024-09-08 RX ADMIN — IPRATROPIUM BROMIDE 0.5 MG: 0.5 SOLUTION RESPIRATORY (INHALATION) at 20:38

## 2024-09-08 RX ADMIN — CHLORHEXIDINE GLUCONATE 15 ML: 1.2 RINSE ORAL at 09:18

## 2024-09-08 RX ADMIN — METHYLPREDNISOLONE SODIUM SUCCINATE 40 MG: 40 INJECTION, POWDER, FOR SOLUTION INTRAMUSCULAR; INTRAVENOUS at 21:18

## 2024-09-08 RX ADMIN — FENTANYL CITRATE 50 MCG: 50 INJECTION INTRAMUSCULAR; INTRAVENOUS at 01:42

## 2024-09-08 RX ADMIN — FENTANYL CITRATE 50 MCG: 50 INJECTION INTRAMUSCULAR; INTRAVENOUS at 19:09

## 2024-09-08 RX ADMIN — SODIUM CHLORIDE, SODIUM GLUCONATE, SODIUM ACETATE, POTASSIUM CHLORIDE, MAGNESIUM CHLORIDE, SODIUM PHOSPHATE, DIBASIC, AND POTASSIUM PHOSPHATE 250 ML: .53; .5; .37; .037; .03; .012; .00082 INJECTION, SOLUTION INTRAVENOUS at 22:43

## 2024-09-08 RX ADMIN — NOREPINEPHRINE BITARTRATE 9 MCG/MIN: 1 INJECTION, SOLUTION, CONCENTRATE INTRAVENOUS at 20:41

## 2024-09-08 RX ADMIN — SODIUM CHLORIDE, SODIUM GLUCONATE, SODIUM ACETATE, POTASSIUM CHLORIDE, MAGNESIUM CHLORIDE, SODIUM PHOSPHATE, DIBASIC, AND POTASSIUM PHOSPHATE 500 ML: .53; .5; .37; .037; .03; .012; .00082 INJECTION, SOLUTION INTRAVENOUS at 04:09

## 2024-09-08 RX ADMIN — PROPOFOL 20 MCG/KG/MIN: 10 INJECTION, EMULSION INTRAVENOUS at 01:23

## 2024-09-08 RX ADMIN — FENTANYL CITRATE 50 MCG: 50 INJECTION INTRAMUSCULAR; INTRAVENOUS at 20:38

## 2024-09-08 RX ADMIN — HEPARIN SODIUM 14 UNITS/KG/HR: 10000 INJECTION, SOLUTION INTRAVENOUS at 22:02

## 2024-09-08 RX ADMIN — SODIUM CHLORIDE SOLN NEBU 3% 4 ML: 3 NEBU SOLN at 07:49

## 2024-09-08 RX ADMIN — SODIUM CHLORIDE, SODIUM GLUCONATE, SODIUM ACETATE, POTASSIUM CHLORIDE, MAGNESIUM CHLORIDE, SODIUM PHOSPHATE, DIBASIC, AND POTASSIUM PHOSPHATE 75 ML/HR: .53; .5; .37; .037; .03; .012; .00082 INJECTION, SOLUTION INTRAVENOUS at 06:19

## 2024-09-08 RX ADMIN — APIXABAN 5 MG: 5 TABLET, FILM COATED ORAL at 08:16

## 2024-09-08 RX ADMIN — BUDESONIDE 0.5 MG: 0.5 INHALANT RESPIRATORY (INHALATION) at 20:38

## 2024-09-08 RX ADMIN — IPRATROPIUM BROMIDE AND ALBUTEROL SULFATE 3 ML: 2.5; .5 SOLUTION RESPIRATORY (INHALATION) at 13:49

## 2024-09-08 RX ADMIN — SODIUM CHLORIDE SOLN NEBU 3% 4 ML: 3 NEBU SOLN at 20:44

## 2024-09-08 RX ADMIN — IPRATROPIUM BROMIDE AND ALBUTEROL SULFATE 3 ML: 2.5; .5 SOLUTION RESPIRATORY (INHALATION) at 07:49

## 2024-09-08 RX ADMIN — PROPOFOL 40 MCG/KG/MIN: 10 INJECTION, EMULSION INTRAVENOUS at 19:07

## 2024-09-08 RX ADMIN — NOREPINEPHRINE BITARTRATE 10 MCG/MIN: 1 INJECTION INTRAVENOUS at 05:47

## 2024-09-08 RX ADMIN — METHYLPREDNISOLONE SODIUM SUCCINATE 20 MG: 40 INJECTION, POWDER, FOR SOLUTION INTRAMUSCULAR; INTRAVENOUS at 08:02

## 2024-09-08 RX ADMIN — FENTANYL CITRATE 50 MCG: 50 INJECTION INTRAMUSCULAR; INTRAVENOUS at 00:09

## 2024-09-08 RX ADMIN — IPRATROPIUM BROMIDE AND ALBUTEROL SULFATE 3 ML: 2.5; .5 SOLUTION RESPIRATORY (INHALATION) at 00:13

## 2024-09-08 RX ADMIN — PROPOFOL 20 MCG/KG/MIN: 10 INJECTION, EMULSION INTRAVENOUS at 08:13

## 2024-09-08 RX ADMIN — SODIUM CHLORIDE SOLN NEBU 3% 4 ML: 3 NEBU SOLN at 13:49

## 2024-09-08 RX ADMIN — Medication 50 MCG/HR: at 21:19

## 2024-09-08 RX ADMIN — FENTANYL CITRATE 50 MCG: 50 INJECTION INTRAMUSCULAR; INTRAVENOUS at 23:36

## 2024-09-08 RX ADMIN — HEPARIN SODIUM 12 UNITS/KG/HR: 10000 INJECTION, SOLUTION INTRAVENOUS at 14:00

## 2024-09-08 RX ADMIN — CHLORHEXIDINE GLUCONATE 0.12% ORAL RINSE 15 ML: 1.2 LIQUID ORAL at 21:18

## 2024-09-08 RX ADMIN — AZITHROMYCIN 500 MG: 500 INJECTION, POWDER, LYOPHILIZED, FOR SOLUTION INTRAVENOUS at 13:30

## 2024-09-08 RX ADMIN — DEXTROSE 1000 MG: 50 INJECTION, SOLUTION INTRAVENOUS at 09:18

## 2024-09-08 RX ADMIN — NOREPINEPHRINE BITARTRATE 6 MCG/MIN: 1 INJECTION INTRAVENOUS at 13:04

## 2024-09-08 RX ADMIN — ALBUTEROL SULFATE 7.5 MG: 0.83 SOLUTION RESPIRATORY (INHALATION) at 00:13

## 2024-09-08 RX ADMIN — HEPARIN SODIUM 1800 UNITS: 1000 INJECTION INTRAVENOUS; SUBCUTANEOUS at 22:19

## 2024-09-08 RX ADMIN — NOREPINEPHRINE BITARTRATE 10 MCG/MIN: 1 INJECTION INTRAVENOUS at 01:17

## 2024-09-08 RX ADMIN — PANTOPRAZOLE SODIUM 40 MG: 40 INJECTION, POWDER, FOR SOLUTION INTRAVENOUS at 15:32

## 2024-09-08 RX ADMIN — DEXMEDETOMIDINE HYDROCHLORIDE 0.7 MCG/KG/HR: 4 INJECTION, SOLUTION INTRAVENOUS at 08:07

## 2024-09-08 RX ADMIN — MIDAZOLAM HYDROCHLORIDE 2 MG: 2 INJECTION, SOLUTION INTRAMUSCULAR; INTRAVENOUS at 00:30

## 2024-09-08 RX ADMIN — BUDESONIDE 0.5 MG: 0.5 INHALANT ORAL at 07:49

## 2024-09-08 RX ADMIN — ALBUTEROL SULFATE 7.5 MG: 2.5 SOLUTION RESPIRATORY (INHALATION) at 00:13

## 2024-09-08 RX ADMIN — FENTANYL CITRATE 50 MCG: 50 INJECTION INTRAMUSCULAR; INTRAVENOUS at 00:30

## 2024-09-08 RX ADMIN — SODIUM CHLORIDE, SODIUM GLUCONATE, SODIUM ACETATE, POTASSIUM CHLORIDE, MAGNESIUM CHLORIDE, SODIUM PHOSPHATE, DIBASIC, AND POTASSIUM PHOSPHATE 500 ML: .53; .5; .37; .037; .03; .012; .00082 INJECTION, SOLUTION INTRAVENOUS at 01:25

## 2024-09-08 RX ADMIN — PROPOFOL 45 MCG/KG/MIN: 10 INJECTION, EMULSION INTRAVENOUS at 20:52

## 2024-09-08 RX ADMIN — Medication 1 PATCH: at 08:05

## 2024-09-08 RX ADMIN — FENTANYL CITRATE 50 MCG: 50 INJECTION INTRAMUSCULAR; INTRAVENOUS at 02:56

## 2024-09-08 RX ADMIN — DEXMEDETOMIDINE HYDROCHLORIDE 0.5 MCG/KG/HR: 4 INJECTION, SOLUTION INTRAVENOUS at 16:04

## 2024-09-08 RX ADMIN — SODIUM CHLORIDE, SODIUM GLUCONATE, SODIUM ACETATE, POTASSIUM CHLORIDE, MAGNESIUM CHLORIDE, SODIUM PHOSPHATE, DIBASIC, AND POTASSIUM PHOSPHATE 500 ML: .53; .5; .37; .037; .03; .012; .00082 INJECTION, SOLUTION INTRAVENOUS at 13:03

## 2024-09-08 NOTE — ASSESSMENT & PLAN NOTE
"Wt Readings from Last 3 Encounters:   09/08/24 60.9 kg (134 lb 4.2 oz)   08/29/24 67.6 kg (149 lb)   04/01/24 64.4 kg (142 lb)     Pt appears euvolemic on exam    Last ECHO 8/2024 demonstrating \"The left ventricular ejection fraction is 35-40%. Systolic function is moderately reduced. There is moderate global hypokinesis.\"  Daily weights, I/O  hold 40 mg daily p.o. lasix  ECHO pending      "

## 2024-09-08 NOTE — ASSESSMENT & PLAN NOTE
"Pt noted to be confused at his facility and initially confused/ lethargic while in the ED, Pt has improved while on the BiPAP, now able to participate in conversation, talking over the BiPAP, AAOx1 to person, but not place, time, or situation, Initially suspect AMS likely secondary to hypercarbia  CT head demonstrating \"No acute intracranial abnormality. Old left basal ganglia lacunar infarct.\"  Rapid response was called on 9/7 for acute change in mental status, upon evaluation at bedside patient was alert and opening eyes on verbal command but was significantly confused and unable to respond to any commands and withdraw to pain, was put on BiPAP again and transferred to the ICU with drastic improvement in his mental status  Etiology: Most likely due to hypercapnia since VBG showed pCO2 of 84 worse from last recent, but cannot rule out possible seizure/postictal considering his history  This morning VBG shows pH 7.511, pCO2 38.4 with mechanical ventilator    Plan:  Continue mechanical ventilation started at 9/7/2024  Neurochecks   Follow-up on labs including Pro-Mirza/lactic acid/phosphorus/magnesium as needed  Monitor mental status  Started ceftriaxone 1 g daily  Azythromycin 500 mg day 2  CT head to rule out acute change    "

## 2024-09-08 NOTE — ASSESSMENT & PLAN NOTE
Pt presented to the [facility] ED in respiratory distress with wheezing after standing from smoking a cigarette  Pt typically maintained on 2-3L secondary to COPD, lung cancer, tobacco abuse, and pulm fibrosis  Now requiring BiPAP therapy  Suspect COPD exacerbation  Plan as above

## 2024-09-08 NOTE — PROCEDURES
Central Line Insertion    Date/Time: 9/8/2024 3:05 PM    Performed by: Elizabeth Swanson MD  Authorized by: Elizabeth Swanson MD    Patient location:  Bedside and ICU  Other Assisting Provider: Yes (comment)    Consent:     Consent obtained:  Emergent situation    Risks discussed:  Bleeding, infection, pneumothorax and arterial puncture  Universal protocol:     Relevant documents present and verified: yes      Radiology Images displayed and confirmed.  If images not available, report reviewed: yes      Site/side marked: yes      Patient identity confirmed:  Hospital-assigned identification number and arm band  Pre-procedure details:     Hand hygiene: Hand hygiene performed prior to insertion      Sterile barrier technique: All elements of maximal sterile technique followed      Skin preparation:  2% chlorhexidine    Skin preparation agent: Skin preparation agent completely dried prior to procedure    Indications:     Central line indications: medications requiring central line and hemodynamic monitoring    Sedation:     Sedation type: deeply sedated.  Anesthesia (see MAR for exact dosages):     Anesthesia method:  Local infiltration    Local anesthetic:  Lidocaine 1% w/o epi  Procedure details:     Location:  Right internal jugular    Vessel type: vein      Laterality:  Right    Approach: percutaneous technique used      Patient position:  Trendelenburg    Catheter type:  Triple lumen    Landmarks identified: yes      Ultrasound guidance: yes      Ultrasound image availability:  Not saved    Sterile ultrasound techniques: Sterile gel and sterile probe covers were used      Manometry confirmation: yes      Number of attempts:  1    Successful placement: yes      Catheter tip vessel location: atriocaval junction    Post-procedure details:     Post-procedure:  Line sutured    Assessment:  Blood return through all ports, no pneumothorax on x-ray and placement verified by x-ray    Post-procedure complications: none      Patient  tolerance of procedure:  Tolerated with difficulty    Observer: Yes      Observer name:  LANDON Gutierrez

## 2024-09-08 NOTE — ASSESSMENT & PLAN NOTE
"Wt Readings from Last 3 Encounters:   09/08/24 60.8 kg (134 lb)   08/29/24 67.6 kg (149 lb)   04/01/24 64.4 kg (142 lb)     Pt appears euvolemic on exam    Last ECHO 8/2024 demonstrating \"The left ventricular ejection fraction is 35-40%. Systolic function is moderately reduced. There is moderate global hypokinesis.\"  Daily weights, I/O  hold 40 mg daily p.o. lasix  ECHO shows LVEF 55% with normal systolic function. There is a moderate pericardial effusion circumferential to the heart, appearing large adjacent to the apical segments of the left ventricle and right ventricular free wall. Unable to rule out early tamponade, as the right ventricle appears underfilled in some images, although off-axis imaging and patient immobility (on mechanical ventilator) limit this study. There is a left pleural effusion.       "

## 2024-09-08 NOTE — ASSESSMENT & PLAN NOTE
"Pt noted to be confused at his facility and initially confused/ lethargic while in the ED, Pt has improved while on the BiPAP, now able to participate in conversation, talking over the BiPAP, AAOx1 to person, but not place, time, or situation, Initially suspect AMS likely secondary to hypercarbia  CT head demonstrating \"No acute intracranial abnormality. Old left basal ganglia lacunar infarct.\"  Rapid response was called on 9/7 for acute change in mental status, upon evaluation at bedside patient was alert and opening eyes on verbal command but was significantly confused and unable to respond to any commands and withdraw to pain, was put on BiPAP again and transferred to the ICU with drastic improvement in his mental status  Etiology: Most likely due to hypercapnia since VBG showed pCO2 of 84 worse from last recent, but cannot rule out possible seizure/postictal considering his history  This morning VBG shows pH 7.511, pCO2 38.4 with mechanical ventilator    Plan:  Continue mechanical ventilation started at 9/7/2024  Neurochecks   Follow-up on labs including Pro-Mirza/lactic acid/phosphorus/magnesium as needed  Monitor mental status  Started ceftriaxone 1 g daily  Azythromycin 500 mg day 2  "

## 2024-09-08 NOTE — PROGRESS NOTES
[facility]  Interval Progress Note: Critical Care    Name: Marcin Sánchez I  MRN: [id number]  Unit/Bed#: ICU 06 I Date of Admission: 9/6/2024   Date of Service: 9/8/2024 I Hospital Day: 2    Interval Events:    Around 1130 p.m., patient had acute hypoxic event.  Prior to this, patient was tolerating his BiPAP well.  Patient desatted to the 80s and quickly progressed down to a saturation of 24.  I immediately increased his pressure on the BiPAP and turned his FiO2 up to 100%, while preparing to intubate the patient.  Patient was unresponsive, gray, being bagged.    No RSI was needed as patient was already unresponsive.  Patient was intubated with 8 oh tube, was bradycardic, given atropine, no pulse was felt and CPR was started.  Please see CPR documentation for further information.    2 rounds of CPR was given, patient was in V. tach on second pulse check was shocked with 200 J.  Shortly after, pulse was palpated and CPR was stopped.  Patient quickly began to move all 4 extremities.  Patient was tachycardic, SVT.  Patient was given sedation with Versed, fentanyl, propofol.  Patient was subsequently needed to start on Levophed likely secondary to sedation.    Patient had increased peak pressures on vent, given heart neb.  With improvement in air movement, tidal volume.     Intubation chest x-ray at baseline.    Postcardiac arrest labs pending.    Patient likely bronchospasm versus mucous plug.  Upon my initial bedside assessment when he was hypoxic, patient had minimal breath sounds.  This likely caused his significant hypoxia and subsequent cardiac arrest.    Billing Level:  Critical Care Time Statement: Upon my evaluation, this patient had a high probability of imminent or life-threatening deterioration due to respiratory arrest, which required my direct attention, intervention, and personal management.  I spent a total of 23 minutes directly providing critical care services, including  evaluating for the presence of life-threatening injuries or illnesses, complex medical decision making (to support/prevent further life-threatening deterioration)., interpretation of hemodynamic data, and ventilator management. This time is exclusive of procedures, teaching, family meetings, and any prior time recorded by providers other than myself.      SIGNATURE: LANDON Desai

## 2024-09-08 NOTE — PROGRESS NOTES
"Formerly [facility]  Progress Note  Name: Marcin Sánchez I  MRN: [id number]  Unit/Bed#: ICU 06 I Date of Admission: 9/6/2024   Date of Service: 9/8/2024 I Hospital Day: 2    Assessment & Plan   Cardiac arrest (HCC)  Assessment & Plan  9/7/24 night he was hypoxic with saturation within 80s and then decreased to 24%  He was intubated at that time  Then found to be pulseless and required 2 rounds of CPR  Then developed VT and given 200 joule shock and regained consciousness with movement of 4 extremities\  Cardiac arrest most likely 2/2 to hypoxia from acute hypoxic respiratory failure due to COPD exacerbation and pneumonia  This morning he was sedated deeply  Has some muscle twitching on his right arm  On levophed will titrate and wean off as able to maintain MAP >65  Treatment of Acute hypoxic respiratory failure  Maintain normothermia  Monitor VBG to avoid acid base disorder    Transaminitis  Assessment & Plan  > 864, > 438, ALP 82> 92  Bilirubin 0.98> 1.04  Ammonia 44  Most likely secondary to phenytoin  Will hold phenytoin  Trend LFTs    Acute metabolic encephalopathy  Assessment & Plan  Pt noted to be confused at his facility and initially confused/ lethargic while in the ED, Pt has improved while on the BiPAP, now able to participate in conversation, talking over the BiPAP, AAOx1 to person, but not place, time, or situation, Initially suspect AMS likely secondary to hypercarbia  CT head demonstrating \"No acute intracranial abnormality. Old left basal ganglia lacunar infarct.\"  Rapid response was called on 9/7 for acute change in mental status, upon evaluation at bedside patient was alert and opening eyes on verbal command but was significantly confused and unable to respond to any commands and withdraw to pain, was put on BiPAP again and transferred to the ICU with drastic improvement in his mental status  Etiology: Most likely due to hypercapnia since VBG showed pCO2 of 84 " "worse from last recent, but cannot rule out possible seizure/postictal considering his history  This morning VBG shows pH 7.511, pCO2 38.4 with mechanical ventilator    Plan:  Continue mechanical ventilation started at 9/7/2024  Neurochecks   Follow-up on labs including Pro-Mirza/lactic acid/phosphorus/magnesium as needed  Monitor mental status  Started ceftriaxone 1 g daily  Azythromycin 500 mg day 2    Chronic combined systolic and diastolic CHF (congestive heart failure) (HCC)  Assessment & Plan  Wt Readings from Last 3 Encounters:   09/08/24 60.9 kg (134 lb 4.2 oz)   08/29/24 67.6 kg (149 lb)   04/01/24 64.4 kg (142 lb)     Pt appears euvolemic on exam    Last ECHO 8/2024 demonstrating \"The left ventricular ejection fraction is 35-40%. Systolic function is moderately reduced. There is moderate global hypokinesis.\"  Daily weights, I/O  hold 40 mg daily p.o. lasix  ECHO pending        Squamous cell lung cancer (HCC)  Assessment & Plan  S/p chemo/radiation  Followed outpatient by oncology, last seen 4/2024    Pulmonary fibrosis (HCC)  Assessment & Plan  In the setting of progressive COPD, ongoing tobacco abuse, and lung cancer history s/p chemoradiation  Maintain oxygenation as necessary currently requiring up to 2-3 L via nasal cannula  Long-term prognosis poor due to comorbidities    Atrial fibrillation (HCC)  Assessment & Plan  Anticoagulated on Eliquis  Hold metoprolol    Tobacco abuse  Assessment & Plan  Patient smokes 1.5 packs of cigarettes a day  Nicotine patch supplementation  Continue to encourage cessation    Epilepsy (HCC)  Assessment & Plan  Maintained on Dilantin, per pharmacy discrepancy in dose charted in epic versus filled by patient at pharmacy  Rapid response was called secondary to AMS earlier this morning, patient was having twitching/tongue fasciculation suspecting tardive dyskinesia  Phenytoin level: 34(elevated)  Likely due to hypercarbia versus due to elevated phenytoin level(less " likely but cannot rule out)  Currently hold phenytoin  Seizure precaution  Scheduled neurochecks    COPD with acute exacerbation (HCC)  Assessment & Plan  Pt presented to the [place] ED in respiratory distress with wheezing after standing from smoking a cigarette  Pt typically maintained on 2-3L secondary to COPD, lung cancer, tobacco abuse, and pulm fibrosis  Now requiring BiPAP therapy  Suspect COPD exacerbation  Plan as above    * Acute on chronic respiratory failure with hypoxia and hypercapnia (HCC)  Assessment & Plan  Recent Labs     09/08/24  0057 09/08/24  0309 09/08/24  0638   PHVEN 7.207* 7.348 7.511*   QTY2LMM 74.1* 63.1* 38.4*   PO2VEN 23.6* 27.9* 39.5   PCX8PXJ 28.8 33.9* 30.0       Recent Labs     09/05/24  2139 09/07/24  0616 09/08/24  0609   PROCALCITONI <0.05 0.75* 1.56*       Pt with PMHx of pulm fibrosis secondary to radiation, COPD, lung cancer, tobacco abuse, epilepsy, afib anticoagulated on eliquis, suspected chronic PE, and CHF presented to the [place] ED from his facility [facility] after developing respiratory distress  Facility staff noted patient appeared confused after standing to go inside after smoking a cigarette. They then noted him to be hypoxic at that time as well.   Pt typically maintained on 2-3 L NC. Currently receiving BiPAP therapy  Case initially discussed with CC who deemed patient appropriate for the SLIM service  CXR appears similar to previous, no acute abnormalities noted, official read pending  VBG with hypercarbia pCO2 70->84, pH 7.323->7.246  Continue to trend  , appears to be baseline, pt appears euvolemic  Suspect acute resp failure likely secondary to COPD exacerbation  Continue mechanical ventilator   Monitor VBG  Scheduled nebs with Pulmicort  IV solumedrol 20 mg every 12H  Motning VBG shows PH 7.511, pco2 38.4  Changed mechanical ventilator setting 14/450/6/50% will adjust according to VBG             Disposition: Critical care    ICU Core  Measures     Vented Patient  VAP Bundle  VAP bundle ordered     A: Assess, Prevent, and Manage Pain Has pain been assessed? Yes  Need for changes to pain regimen? No   B: Both Spontaneous Awakening Trials (SATs) and Spontaneous Breathing Trials (SBTs) Plan to perform spontaneous awakening trial today? Yes   Plan to perform spontaneous breathing trial today? No secondary to severe hypoxia/hypocapnia   Obvious barriers to extubation? Yes   C: Choice of Sedation RASS Goal: -1 Drowsy or 0 Alert and Calm  Need for changes to sedation or analgesia regimen? No   D: Delirium CAM-ICU: Negative   E: Early Mobility  Plan for early mobility? Yes   F: Family Engagement Plan for family engagement today? Yes       Antibiotic Review: Awaiting culture results.     Review of Invasive Devices:    [place] Plan: Continue for accurate I/O monitoring for 48 hours        Prophylaxis:  VTE VTE covered by:  apixaban, Oral, 5 mg at 09/08/24 0816       Stress Ulcer  not ordered         Significant 24hr Events     24hr events: Patient had hypoxic episode with saturation 24% and got intubated. After that he developed cardiac arrest and recovered with 2 round of CPR and required 200 Joule shock for VT. Started on levophed for hypotension.     Subjective   This morning patient was seen and examined at bedside. He is deeply sedated and intubated.    Review of Systems: Review of Systems   Unable to perform ROS: Intubated        Objective                            Vitals I/O      Most Recent Min/Max in 24hrs   Temp 98.1 °F (36.7 °C) Temp  Min: 97.5 °F (36.4 °C)  Max: 98.5 °F (36.9 °C)   Pulse 77 Pulse  Min: 60  Max: 133   Resp (!) 7 Resp  Min: 7  Max: 110   /60 BP  Min: 79/42  Max: 173/79   O2 Sat 100 % SpO2  Min: 45 %  Max: 100 %      Intake/Output Summary (Last 24 hours) at 9/8/2024 0933  Last data filed at 9/8/2024 0615  Gross per 24 hour   Intake 1465.91 ml   Output 1210 ml   Net 255.91 ml       Diet Enteral/Parenteral; Tube Feeding No Oral  Diet; Jevity 1.2 Mirza; Continuous; 60; Prosource Protein Liquid - One Packet; BID; 100; Water; Every 6 hours    Invasive Monitoring           Physical Exam   Physical Exam  Vitals and nursing note reviewed.   Eyes:      General: No scleral icterus.     Extraocular Movements: Extraocular movements intact.      Pupils: Pupils are equal, round, and reactive to light.   Skin:     General: Skin is warm.      Capillary Refill: Capillary refill takes less than 2 seconds.   HENT:      Head: Normocephalic and atraumatic.      Nose: Nasogastric tube present.      Mouth/Throat:      Mouth: Mucous membranes are dry.   Cardiovascular:      Rate and Rhythm: Normal rate and regular rhythm.      Comments: Distant heart sound  Musculoskeletal:         General: No swelling. Normal range of motion.      Right lower leg: No edema.      Left lower leg: No edema.      Comments: Muscle twitching   Abdominal:      Palpations: Abdomen is soft.      Tenderness: There is no abdominal tenderness. There is no guarding.   Constitutional:       Appearance: He is ill-appearing.      Interventions: He is sedated, intubated and restrained.      Comments: Disheveled appearing   Pulmonary:      Effort: Pulmonary effort is normal. He is intubated.      Breath sounds: Wheezing and rhonchi present.      Comments: Diminished breath sounds throughout  Neurological:      Mental Status: He is unresponsive.        Cough reflex.      Comments: Appeared disoriented, now in deep sedation and Muscle twitches in right upper arm   Genitourinary/Anorectal:  Haynes present.          Diagnostic Studies      EKG: on 9/5/24 84 rate sinus rhythm wuth 1st degree A-V block  Imaging:   XR chest portable ICU   Final Result by Nieves Burks MD (09/08 0806)      ET tube 5 cm above the chery.      Chronic volume loss in the right hemithorax with persistent opacity in the upper right hemithorax and small right effusion.      Moderate pulmonary edema. Pneumonia not excluded  in the appropriate clinical setting.            Workstation performed: [id number]         XR chest portable   Final Result by Nieves Burks MD (09/08 0803)      Moderate pulmonary edema.      Persistent small right pleural effusion.            Workstation performed: [id number]                 Medications:  Scheduled PRN   apixaban, 5 mg, BID  azithromycin, 500 mg, Q24H  budesonide, 0.5 mg, Q12H  cefTRIAXone, 1,000 mg, Q24H  chlorhexidine, 15 mL, Q12H AMERICA  ipratropium-albuterol, 3 mL, TID  methylPREDNISolone sodium succinate, 40 mg, Q12H AMERICA  nicotine, 1 patch, Daily  sodium chloride, 4 mL, TID  sodium chloride, 1 g, BID With Meals      acetaminophen, 650 mg, Q6H PRN  dextromethorphan-guaiFENesin, 10 mL, Q4H PRN  fentaNYL, 50 mcg, Q2H PRN  levalbuterol, 1.25 mg, Q4H PRN       Continuous    dexmedetomidine, 0.1-1.2 mcg/kg/hr, Last Rate: Stopped (09/08/24 2345)  multi-electrolyte, 75 mL/hr, Last Rate: 75 mL/hr (09/08/24 0619)  norepinephrine, 1-30 mcg/min, Last Rate: 8 mcg/min (09/08/24 0822)  propofol, 5-50 mcg/kg/min, Last Rate: 20 mcg/kg/min (09/08/24 0813)         Labs:    CBC    Recent Labs     09/08/24  0049 09/08/24  0545   WBC 11.60* 15.08*   HGB 11.6* 12.0   HCT 36.2* 36.6    323     BMP    Recent Labs     09/08/24  0049 09/08/24  0545   SODIUM 142 142   K 4.2 4.7   CL 97 96   CO2 37* 36*   AGAP 8 10   BUN 44* 45*   CREATININE 1.20 1.13   CALCIUM 10.1 9.6       Coags    No recent results     Additional Electrolytes  Recent Labs     09/08/24  0008 09/08/24  0049 09/08/24  0545   MG  --  2.2 2.2   PHOS  --  5.7* 3.4   CAIONIZED 1.45* 1.05*  --           Blood Gas    No recent results  Recent Labs     09/08/24  0638   PHVEN 7.511*   EVB5PZI 38.4*   PO2VEN 39.5   WKO6TSY 30.0   BEVEN 6.7   [id number] 76.7    LFTs  Recent Labs     09/08/24  0049 09/08/24  0545   * 438*   * 864*   ALKPHOS 82 92   ALB 3.5 3.3*   TBILI 0.98 1.04*       Infectious  Recent Labs     09/07/24  0616 09/08/24  0609    PROCALCITONI 0.75* 1.56*     Glucose  Recent Labs     09/07/24  0040 09/07/24  0616 09/08/24  0049 09/08/24  0545   GLUC 166* 136 137 142*               Elizabeth Swanson MD

## 2024-09-08 NOTE — ASSESSMENT & PLAN NOTE
Hold eliquis  Hold metoprolol  Started Heparin as patient will be transferred to [facility] for management of pericardial effusion

## 2024-09-08 NOTE — RESPIRATORY THERAPY NOTE
09/08/24 0803   Respiratory Assessment   Resp Comments Patient recieved on current settings   ETT  Oral 8 mm   Placement Date/Time: 09/08/24 0000   Mask Ventilation: Ventilated by mask (1)  Preoxygenated: Yes  Type: Oral  Tube Size: 8 mm  Location: Oral  Insertion attempts: 1  Placement Verification: Auscultation;Chest x-ray  Secured at (cm): 25  Placed By: Ad...   Secured at (cm) 25   Measured from Lips   Secured Location Right   Repositioned Center to Right   Secured by Commercial tube marinelli   Site Condition Dry   Cuff Pressure (cm H2O)   (MLT)   HI-LO Suction  Intermittent suction   HI-LO Secretions Moderate   HI-LO Intervention Patent   Additional Assessments   SpO2 98 %   $ Vital Capacity Mech/Peak Flow Yes     RT Ventilator Management Note  Marcin Sánchez 64 y.o. male MRN: [id number]  Unit/Bed#: ICU 06 Encounter: [id number]      Daily Screen         9/8/2024  0000             Patient safety screen outcome:: Failed    Not Ready for Weaning due to:: Underline problem not resolved              Physical Exam:   Assessment Type: Assess only  General Appearance: Sedated  Respiratory Pattern: Assisted  Chest Assessment: Chest expansion symmetrical      Resp Comments: Patient recieved on current settings

## 2024-09-08 NOTE — ASSESSMENT & PLAN NOTE
> 864, > 438, ALP 82> 92  Bilirubin 0.98> 1.04  Ammonia 44  Most likely secondary to phenytoin  Will hold phenytoin  Trend LFTs

## 2024-09-08 NOTE — ASSESSMENT & PLAN NOTE
9/7/24 night he was hypoxic with saturation within 80s and then decreased to 24%  He was intubated at that time  Then found to be pulseless and required 2 rounds of CPR  Then developed VT and given 200 joule shock and regained consciousness with movement of 4 extremities\  Cardiac arrest most likely 2/2 to hypoxia from acute hypoxic respiratory failure due to COPD exacerbation and pneumonia  This morning he was sedated deeply  Has some muscle twitching on his right arm  On levophed will titrate and wean off as able to maintain MAP >65  Treatment of Acute hypoxic respiratory failure  Maintain normothermia  Monitor VBG to avoid acid base disorder

## 2024-09-08 NOTE — ASSESSMENT & PLAN NOTE
9/7/24 night he was hypoxic with saturation within 80s and then decreased to 24%  He was intubated at that time  Then found to be pulseless and required 2 rounds of CPR  Then developed VT and given 200 joule shock and regained consciousness with movement of 4 extremities\  Cardiac arrest most likely 2/2 to pericardial effusion which impairing perfusion vs hypoxia from acute hypoxic respiratory failure due to COPD exacerbation and pneumonia  This morning he was sedated deeply  Has some muscle twitching on his right arm  On levophed will titrate and wean off as able to maintain MAP >65  Treatment of Acute hypoxic respiratory failure  Maintain normothermia  Monitor VBG to avoid acid base disorder  Will be transferred to [facility] for the management of pericardial effusion

## 2024-09-08 NOTE — RESPIRATORY THERAPY NOTE
09/08/24 0803   Respiratory Assessment   Resp Comments Patient recieved on current settings   Vent Information   Vent ID [id number]   Vent type Morris G5   Morris Vent Mode (S)CMV   $ Vital Capacity Mech/Peak Flow Yes   $ Pulse Oximetry Spot Check Charge Completed   SpO2 98 %   (S)CMV Settings   Resp Rate (BPM) (S)  14 BPM   VT (mL) 450 mL   FIO2 (%) 50 %   PEEP (cmH2O) (S)  6 cmH2O   I:E Ratio 1:3.3   Insp Time (%) 1 %   Flow Trigger (LPM) 2   Humidification Heater   Heater Temperature (Set) 98.6 °F (37 °C)   Pause Time (%) 0 %   (S)CMV Actuals   Resp Rate (BPM) 14 BPM   VT (mL) 443   MV 6.3   MAP (cmH2O) 11 cmH2O   Peak Pressure (cmH2O) 29 cmH2O   I:E Ratio (Obs) 1:3.3   Insp Resistance 24   Heater Temperature (Obs) 98.6 °F (37 °C)   Static Compliance (mL/cmH20) 24.8 mL/cmH2O   Plateau Pressure (cm H2O) 21.5 cm H2O   (S)CMV Alarms   High Peak Pressure (cmH2O) 50   Low Pressure (cmH2O) 5 cm H2O   High Resp Rate (BPM) 50 BPM   Low Resp Rate (BPM) 5 BPM   High MV (L/min) 18 L/min   Low MV (L/min) 3 L/min   High VT (mL) 1000 mL   Low VT (mL) 200 mL   Leak (%) 0 %   Apnea Time (s) 20 S   Maintenance   Alarm (pink) cable attached No   Resuscitation bag with peep valve at bedside Yes   Water bag changed No   Circuit changed No   IHI Ventilator Associated Pneumonia Bundle   Daily Assessment of Readiness to Extubate Yes   ETT  Oral 8 mm   Placement Date/Time: 09/08/24 0000   Mask Ventilation: Ventilated by mask (1)  Preoxygenated: Yes  Type: Oral  Tube Size: 8 mm  Location: Oral  Insertion attempts: 1  Placement Verification: Auscultation;Chest x-ray  Secured at (cm): 25  Placed By: Ad...   Secured at (cm) 25   Measured from Lips   Secured Location Right   Repositioned Center to Right   Secured by Commercial tube marinelli   Site Condition Dry   Cuff Pressure (cm H2O)   (MLT)   HI-LO Suction  Intermittent suction   HI-LO Secretions Moderate   HI-LO Intervention Patent     RT Ventilator Management Note  Marcin Sánchez  64 y.o. male MRN: [id number]  Unit/Bed#: ICU 06 Encounter: [id number]      Daily Screen         9/8/2024  0000             Patient safety screen outcome:: Failed    Not Ready for Weaning due to:: Underline problem not resolved              Physical Exam:   Assessment Type: Assess only  General Appearance: Sedated  Respiratory Pattern: Assisted  Chest Assessment: Chest expansion symmetrical      Resp Comments: Patient recieved on current settings

## 2024-09-08 NOTE — PROCEDURES
Arterial Line Insertion    Date/Time: 9/8/2024 1:47 PM    Performed by: Sd Guerra MD  Authorized by: Sd Guerra MD    Patient location:  ICU  Consent:     Consent obtained:  Verbal    Consent given by:  Healthcare agent (sister Katalina)    Risks discussed:  Bleeding, ischemia and pain  Universal protocol:     Patient identity confirmed:  Arm band  Indications:     Indications: hemodynamic monitoring and multiple ABGs    Pre-procedure details:     Skin preparation:  Chlorhexidine    Preparation: Patient was prepped and draped in sterile fashion    Procedure details:     Location / Tip of Catheter:  Radial    Laterality:  Left    Abner's test performed: yes      Abner's test abnormal: yes      Placement technique:  Ultrasound guided    Ultrasound image availability:  Not obtained due to urgency    Number of attempts:  2    Successful placement: yes      Transducer: waveform confirmed    Post-procedure details:     Post-procedure:  Biopatch applied    Patient tolerance of procedure:  Tolerated well, no immediate complications

## 2024-09-08 NOTE — RESPIRATORY THERAPY NOTE
"Pt s/p cardiac arrest. Pt placed on documented vent settings per AP. Stat CXR confirmed placement and ET tube was advanced 1 cm per AP. Stat hour long aerosol treatment ordered by AP and given. Scheduled aerosol treatments ordered.  Will continue current vent settings as ordered and will continue to claudette pt.          09/08/24 0000   Respiratory Assessment   Assessment Type Assess only   General Appearance Sedated   Respiratory Pattern Assisted   Chest Assessment Chest expansion symmetrical   Vent Information   Vent ID [id number]   Vent type Morris G5   Morris Vent Mode (S)CMV   $ Vent Charge-INITIAL Yes   Ventilator Start Yes   $ Pulse Oximetry Spot Check Charge Completed   SpO2 100 %   (S)CMV Settings   Resp Rate (BPM) 20 BPM   VT (mL) 400 mL   FIO2 (%) 100 %   PEEP (cmH2O) 10 cmH2O   Insp Time (%) 1 %   Flow Trigger (LPM) 2   Humidification Heater   Heater Temperature (Set) 98.6 °F (37 °C)   Pause Time (%) 0 %   (S)CMV Actuals   Resp Rate (BPM) 20 BPM   VT (mL) 393   MV 7.7   MAP (cmH2O) 18 cmH2O   Peak Pressure (cmH2O) 34 cmH2O   I:E Ratio (Obs) 1:2.0   Insp Resistance 26   Heater Temperature (Obs) 98.6 °F (37 °C)   Static Compliance (mL/cmH20) 19.7 mL/cmH2O   (S)CMV Alarms   High Peak Pressure (cmH2O) 55   Low Pressure (cmH2O) 5 cm H2O   High Resp Rate (BPM) 40 BPM   Low Resp Rate (BPM) 5 BPM   High MV (L/min) 18 L/min   Low MV (L/min) 3 L/min   High VT (mL) 1000 mL   Low VT (mL) 200 mL   Leak (%) 0 %   Apnea Time (s) 20 S   Maintenance   Alarm (pink) cable attached No   Resuscitation bag with peep valve at bedside Yes   Water bag changed No   Circuit changed No   Daily Screen   Patient safety screen outcome: Failed   Not Ready for Weaning due to: Underline problem not resolved   IHI Ventilator Associated Pneumonia Bundle   Daily Assessment of Readiness to Extubate Not applicable (Comment)   Head of Bed Elevated HOB 30   Adult IBW/VT Calculations   Height 5' 9.02\" (1.753 m)   IBW (Ideal Body Weight) 70.74 " kg   Range VT 6 ML/Kg 424.44 mL/kg   Range VT 8 ML/Kg 565.92 mL/kg   ETT  Oral 8 mm   Placement Date/Time: 09/08/24 0000   Mask Ventilation: Ventilated by mask (1)  Preoxygenated: Yes  Type: Oral  Tube Size: 8 mm  Location: Oral  Insertion attempts: 1  Placement Verification: Auscultation;Chest x-ray  Secured at (cm): 25  Placed By: Ad...   Secured at (cm) 25   Measured from Lips   Secured Location Right   Secured by Commercial tube marinelli   Site Condition Dry   Cuff Pressure (cm H2O)   (MLT)   HI-LO Suction  Capped   Respiratory Charges    $ Arterial Puncture ABG  Yes   $ Intubation/Intubation Assist Yes   $ Resuscitation CPR Yes

## 2024-09-08 NOTE — ASSESSMENT & PLAN NOTE
Recent Labs     09/08/24  0057 09/08/24  0309 09/08/24  0638   PHVEN 7.207* 7.348 7.511*   MTV1BPY 74.1* 63.1* 38.4*   PO2VEN 23.6* 27.9* 39.5   VAQ1CVJ 28.8 33.9* 30.0       Recent Labs     09/05/24  2139 09/07/24  0616 09/08/24  0609   PROCALCITONI <0.05 0.75* 1.56*       Pt with PMHx of pulm fibrosis secondary to radiation, COPD, lung cancer, tobacco abuse, epilepsy, afib anticoagulated on eliquis, suspected chronic PE, and CHF presented to the [place] ED from his facility [facility] after developing respiratory distress  Facility staff noted patient appeared confused after standing to go inside after smoking a cigarette. They then noted him to be hypoxic at that time as well.   Pt typically maintained on 2-3 L NC. Currently receiving BiPAP therapy  Case initially discussed with CC who deemed patient appropriate for the SLIM service  CXR appears similar to previous, no acute abnormalities noted, official read pending  VBG with hypercarbia pCO2 70->84, pH 7.323->7.246  Continue to trend  , appears to be baseline, pt appears euvolemic  Suspect acute resp failure likely secondary to COPD exacerbation  Continue mechanical ventilator   Monitor VBG  Scheduled nebs with Pulmicort  IV solumedrol 20 mg every 12H  Motning VBG shows PH 7.511, pco2 38.4  Changed mechanical ventilator setting 14/450/6/50% will adjust according to VBG

## 2024-09-08 NOTE — DISCHARGE SUMMARY
[facility]  Discharge- Marcin Sánchez 1960, 64 y.o. male MRN: [id number]  Unit/Bed#: ICU 06 Encounter: [id number]  Primary Care Provider: Brandt Godinez MD   Date and time admitted to hospital: 9/6/2024  2:08 AM    Pericardial effusion  Assessment & Plan  ECHO shows LVEF 55% with normal systolic function. There is a moderate pericardial effusion circumferential to the heart, appearing large adjacent to the apical segments of the left ventricle and right ventricular free wall. Unable to rule out early tamponade, as the right ventricle appears underfilled in some images, although off-axis imaging and patient immobility (on mechanical ventilator) limit this study. There is a left pleural effusion.   Has H/O Pericardial effusion previously but right sided heart function was not compromised.  In the setting of cardiac arrest most likely 2/2 to moderate pericardial effusion needs urgent evaluation and intervention.  Contacted with [facility] team for transferring him there for next step of management of his pericardial effusion  Transferring to B for further management         Cardiac arrest (HCC)  Assessment & Plan  9/7/24 night he was hypoxic with saturation within 80s and then decreased to 24%  He was intubated at that time  Then found to be pulseless and required 2 rounds of CPR  Then developed VT and given 200 joule shock and regained consciousness with movement of 4 extremities\  Cardiac arrest most likely 2/2 to pericardial effusion which impairing perfusion vs hypoxia from acute hypoxic respiratory failure due to COPD exacerbation and pneumonia  This morning he was sedated deeply  Has some muscle twitching on his right arm  On levophed will titrate and wean off as able to maintain MAP >65  Treatment of Acute hypoxic respiratory failure  Maintain normothermia  Monitor VBG to avoid acid base disorder  Will be transferred to B for the management of pericardial  "effusion    Transaminitis  Assessment & Plan  > 864, > 438, ALP 82> 92  Bilirubin 0.98> 1.04  Ammonia 44  Most likely secondary to cardiac arrest  Will hold phenytoin  Trend LFTs    Acute metabolic encephalopathy  Assessment & Plan  Pt noted to be confused at his facility and initially confused/ lethargic while in the ED, Pt has improved while on the BiPAP, now able to participate in conversation, talking over the BiPAP, AAOx1 to person, but not place, time, or situation, Initially suspect AMS likely secondary to hypercarbia  CT head demonstrating \"No acute intracranial abnormality. Old left basal ganglia lacunar infarct.\"  Rapid response was called on 9/7 for acute change in mental status, upon evaluation at bedside patient was alert and opening eyes on verbal command but was significantly confused and unable to respond to any commands and withdraw to pain, was put on BiPAP again and transferred to the ICU with drastic improvement in his mental status  Etiology: Most likely due to hypercapnia since VBG showed pCO2 of 84 worse from last recent, but cannot rule out possible seizure/postictal considering his history  This morning VBG shows pH 7.511, pCO2 38.4 with mechanical ventilator    Plan:  Continue mechanical ventilation started at 9/7/2024  Neurochecks   Follow-up on labs including Pro-Mirza/lactic acid/phosphorus/magnesium as needed  Monitor mental status  Started ceftriaxone 1 g daily  Azythromycin 500 mg day 2  CT head to rule out acute change      Chronic combined systolic and diastolic CHF (congestive heart failure) (HCC)  Assessment & Plan  Wt Readings from Last 3 Encounters:   09/08/24 60.8 kg (134 lb)   08/29/24 67.6 kg (149 lb)   04/01/24 64.4 kg (142 lb)     Pt appears euvolemic on exam    Last ECHO 8/2024 demonstrating \"The left ventricular ejection fraction is 35-40%. Systolic function is moderately reduced. There is moderate global hypokinesis.\"  Daily weights, I/O  hold 40 mg " daily p.o. lasix  ECHO shows LVEF 55% with normal systolic function. There is a moderate pericardial effusion circumferential to the heart, appearing large adjacent to the apical segments of the left ventricle and right ventricular free wall. Unable to rule out early tamponade, as the right ventricle appears underfilled in some images, although off-axis imaging and patient immobility (on mechanical ventilator) limit this study. There is a left pleural effusion.         Squamous cell lung cancer (HCC)  Assessment & Plan  S/p chemo/radiation  Followed outpatient by oncology, last seen 4/2024    Pulmonary fibrosis (HCC)  Assessment & Plan  In the setting of progressive COPD, ongoing tobacco abuse, and lung cancer history s/p chemoradiation  Maintain oxygenation as necessary currently requiring up to 2-3 L via nasal cannula  Long-term prognosis poor due to comorbidities    Atrial fibrillation (HCC)  Assessment & Plan  Hold eliquis  Hold metoprolol  Started Heparin as patient will be transferred to [facility] for management of pericardial effusion    Tobacco abuse  Assessment & Plan  Patient smokes 1.5 packs of cigarettes a day  Nicotine patch supplementation  Continue to encourage cessation    Epilepsy (HCC)  Assessment & Plan  Maintained on Dilantin, per pharmacy discrepancy in dose charted in epic versus filled by patient at pharmacy  Rapid response was called secondary to AMS earlier this morning, patient was having twitching/tongue fasciculation suspecting tardive dyskinesia  Phenytoin level: 34(elevated)  Likely due to hypercarbia versus due to elevated phenytoin level(less likely but cannot rule out)  Currently hold phenytoin  Seizure precaution  Scheduled neurochecks    COPD with acute exacerbation (HCC)  Assessment & Plan  Pt presented to the [place] ED in respiratory distress with wheezing after standing from smoking a cigarette  Pt typically maintained on 2-3L secondary to COPD, lung cancer, tobacco abuse, and pulm  fibrosis  Now requiring BiPAP therapy  Suspect COPD exacerbation  Plan as above    * Acute on chronic respiratory failure with hypoxia and hypercapnia (HCC)  Assessment & Plan  Recent Labs     09/08/24  0057 09/08/24  0309 09/08/24  0638   PHVEN 7.207* 7.348 7.511*   [id number] 74.1* 63.1* 38.4*   PO2VEN 23.6* 27.9* 39.5   [id number] 28.8 33.9* 30.0       Recent Labs     09/05/24  2139 09/07/24  0616 09/08/24  0609   PROCALCITONI <0.05 0.75* 1.56*       Pt with PMHx of pulm fibrosis secondary to radiation, COPD, lung cancer, tobacco abuse, epilepsy, afib anticoagulated on eliquis, suspected chronic PE, and CHF presented to the [place] ED from his facility [facility] after developing respiratory distress  Facility staff noted patient appeared confused after standing to go inside after smoking a cigarette. They then noted him to be hypoxic at that time as well.   Pt typically maintained on 2-3 L NC. Currently receiving BiPAP therapy  Case initially discussed with CC who deemed patient appropriate for the [facility] service  CXR appears similar to previous, no acute abnormalities noted, official read pending  VBG with hypercarbia pCO2 70->84, pH 7.323->7.246  Continue to trend  , appears to be baseline, pt appears euvolemic  Suspect acute resp failure likely secondary to COPD exacerbation  Continue mechanical ventilator   Monitor VBG  Scheduled nebs with Pulmicort  IV solumedrol 20 mg every 12H  Motning VBG shows PH 7.511, pco2 38.4  Changed mechanical ventilator setting 14/450/6/50% will adjust according to VBG              Medical Problems       Resolved Problems  Date Reviewed: 9/8/2024   None         Admission Date:   Admission Orders (From admission, onward)       Ordered        09/06/24 0220  INPATIENT ADMISSION  Once                            Admitting Diagnosis: Altered mental status [R41.82]    HPI: Marcin Sánchez is a 64 y.o. male who presents with acute resp failure secondary to COPD exacerbation. Pt  presented to the [place] ED secondary to respiratory distress from his facility [facility]. Pt was noted to be smoking a cigarette outside, when he stood up, pt appeared confused. Staff noted he appeared SOB and found him to be hypoxic prompting them to call EMS. Upon arrival to the ED, pt lethargic and confused. Initially there was concern patient may have been postictal given history of epilepsy. However, pt found to be hypercarbic and patient mentation began to improve on BiPAP therapy and breathing treatments in the ED. No witnessed seizure-like activity, no tongue bite. Pt mentation improved further, he now talks over the BiPAP to have conversation, does not appear lethargic. He currently is only oriented to person, not place, time or situation.     Procedures Performed:   Orders Placed This Encounter   Procedures    Central Line    Intubation       Summary of Hospital Course:   During this admission he was hypoxic and required upto 8 L of oxygen and had severe shortness of breath requiring BiPAP. He has been treated for COPD exacerbation.He was disoriented on admission and placed on BiPAP. On blood gas appear to have compensated respiratory acidosis. He was following commands but disoriented appearing. RRT was called on 9/6/24 where patient was nonverbal after waking up from a nap. I-STAT showed pH of 7.2 with hypercapnia and patient was placed back on BiPAP. Patient had improving blood gases. Shortly after RRT was called as patient was unresponsive. He was placed back on BiPAP and brought to the ICU. He woke up after admission to the ICU and was removed off BiPAP and  appeared to be alert but disoriented. After admission to the ICU at night he was hypoxic at 80s and then 24% saturation. He was on high pressure in BiPAP with 100% FiO2 but still was unresponsive. Then he got intubated. After intubation he was pulseless and given 2 rounds of CPR. Then he developed VT required 200 Joule of shock.  Started limbs movement. His morning VBG shows pH 7.511, PCO2 38.4 and adjusted ventilator settings as per VBG. His CT head has no acute changes, CT CAP shows bilateral pleural effusion with increased bibasilar consolidation. Echo shows moderate pericardial effusion with right heart function compromise. Reached out to [facility] if they can take him for further management of pericardial effusion. He is now being transferred to [facility].    Significant Findings, Care, Treatment and Services Provided:   CT chest abdomen pelvis wo contrast   Final Result by E. Alec Schoenberger, MD (09/08 1334)   Enlarging small bilateral pleural effusions with increased bibasilar consolidation that may be due to atelectasis post-rest correlation for pneumonia. Stable pericardial effusion   Stable right perihilar postradiation fibrosis. Stable enlarged right paratracheal lymph node.   Cholelithiasis with nonspecific pericholecystic edema. Recommend clinical correlation for cholecystitis biliary scintigraphy if warranted.      Workstation performed: [id number]         CT head wo contrast   Final Result by E. Alec Schoenberger, MD (09/08 1308)      No acute intracranial abnormality. Stable chronic microangiopathy and chronic left basal ganglia lacunar infarct.                  Workstation performed: [id number]         XR chest portable ICU   Final Result by Nieves Burks MD (09/08 0806)      ET tube 5 cm above the chery.      Chronic volume loss in the right hemithorax with persistent opacity in the upper right hemithorax and small right effusion.      Moderate pulmonary edema. Pneumonia not excluded in the appropriate clinical setting.            Workstation performed: [id number]         XR chest portable   Final Result by Nieves Burks MD (09/08 0803)      Moderate pulmonary edema.      Persistent small right pleural effusion.            Workstation performed: [id number]         XR chest portable ICU    (Results Pending)          Complications: Cardiac arrest    Condition at Discharge: critical         Discharge instructions/Information to patient and family:   See after visit summary for information provided to patient and family.      Provisions for Follow-Up Care:  See after visit summary for information related to follow-up care and any pertinent home health orders.      PCP: Brandt Godinez MD    Disposition:  [facility]    Planned Readmission: No    Discharge Statement   I spent 30 minutes discharging the patient. This time was spent on the day of discharge. I had direct contact with the patient on the day of discharge. Additional documentation is required if more than 30 minutes were spent on discharge.     Discharge Medications:  See after visit summary for reconciled discharge medications provided to patient and family.

## 2024-09-08 NOTE — ASSESSMENT & PLAN NOTE
> 864, > 438, ALP 82> 92  Bilirubin 0.98> 1.04  Ammonia 44  Most likely secondary to cardiac arrest  Will hold phenytoin  Trend LFTs

## 2024-09-08 NOTE — PROCEDURES
Arterial Line Insertion    Date/Time: 9/8/2024 1:50 PM    Performed by: Elizabeth Swanson MD  Authorized by: Elizabeth Swanson MD    Patient location:  ICU and bedside  Other Assisting Provider: Yes (comment)    Consent:     Consent obtained:  Emergent situation  Universal protocol:     Relevant documents present and verified: yes      Patient identity confirmed:  Hospital-assigned identification number  Indications:     Indications: hemodynamic monitoring, multiple ABGs, continuous blood pressure monitoring and frequent labs / infusion    Pre-procedure details:     Skin preparation:  Chlorhexidine    Preparation: Patient was prepped and draped in sterile fashion    Sedation:     Sedation type: deeply sedated.  Anesthesia (see MAR for exact dosages):     Anesthesia method:  None  Procedure details:     Location / Tip of Catheter:  Radial    Laterality:  Left    Abner's test performed: no      Placement technique:  Percutaneous and ultrasound guided    Ultrasound image availability:  Not saved    Sterile ultrasound techniques: Sterile gel and sterile probe covers were used      Number of attempts:  3    Successful placement: no    Comments:      Unsuccessful attempts without any complications to place A-line

## 2024-09-08 NOTE — QUICK NOTE
Called and updated patients sister on his status with intuabtion and cardiac arrest. All questions answered.

## 2024-09-08 NOTE — PROCEDURES
Intubation    Date/Time: 9/7/2024 11:45 PM    Performed by: LANDON Desai  Authorized by: LANDON Desai    Patient location:  Bedside  Consent:     Consent obtained:  Emergent situation  Pre-procedure details:     Patient status:  Unresponsive    Pretreatment medications:  None    Paralytics:  None  Indications:     Indications for intubation: respiratory distress, respiratory failure, airway protection, hypoxemia and hypercapnia    Procedure details:     Preoxygenation:  Bag valve mask (BiPAP)    CPR in progress: no      Intubation method:  Oral    Oral intubation technique:  Direct    Laryngoscope blade:  Mac 3    Tube size (mm):  8.0    Tube type:  Cuffed    Number of attempts:  1  Placement assessment:     ETT to lip:  25    Tube secured with:  ETT marinelli    Breath sounds:  Equal    Placement verification: chest rise      CXR findings:  ETT in proper place  Post-procedure details:     Patient tolerance of procedure:  Tolerated well, no immediate complications

## 2024-09-08 NOTE — ASSESSMENT & PLAN NOTE
ECHO shows LVEF 55% with normal systolic function. There is a moderate pericardial effusion circumferential to the heart, appearing large adjacent to the apical segments of the left ventricle and right ventricular free wall. Unable to rule out early tamponade, as the right ventricle appears underfilled in some images, although off-axis imaging and patient immobility (on mechanical ventilator) limit this study. There is a left pleural effusion.   Has H/O Pericardial effusion previously but right sided heart function was not compromised.  In the setting of cardiac arrest most likely 2/2 to moderate pericardial effusion needs urgent evaluation and intervention.  Contacted with [facility] team for transferring him there for next step of management of his pericardial effusion  Transferring to [facility] for further management

## 2024-09-08 NOTE — ASSESSMENT & PLAN NOTE
Recent Labs     09/08/24  0057 09/08/24  0309 09/08/24  0638   PHVEN 7.207* 7.348 7.511*   [id number] 74.1* 63.1* 38.4*   PO2VEN 23.6* 27.9* 39.5   [id number] 28.8 33.9* 30.0       Recent Labs     09/05/24  2139 09/07/24  0616 09/08/24  0609   PROCALCITONI <0.05 0.75* 1.56*       Pt with PMHx of pulm fibrosis secondary to radiation, COPD, lung cancer, tobacco abuse, epilepsy, afib anticoagulated on eliquis, suspected chronic PE, and CHF presented to the [place] ED from his facility [facility] after developing respiratory distress  Facility staff noted patient appeared confused after standing to go inside after smoking a cigarette. They then noted him to be hypoxic at that time as well.   Pt typically maintained on 2-3 L NC. Currently receiving BiPAP therapy  Case initially discussed with CC who deemed patient appropriate for the [facility] service  CXR appears similar to previous, no acute abnormalities noted, official read pending  VBG with hypercarbia pCO2 70->84, pH 7.323->7.246  Continue to trend  , appears to be baseline, pt appears euvolemic  Suspect acute resp failure likely secondary to COPD exacerbation  Continue mechanical ventilator   Monitor VBG  Scheduled nebs with Pulmicort  IV solumedrol 20 mg every 12H  Motning VBG shows PH 7.511, pco2 38.4  Changed mechanical ventilator setting 14/450/6/50% will adjust according to VBG

## 2024-09-08 NOTE — ASSESSMENT & PLAN NOTE
Pt presented to the [place] ED in respiratory distress with wheezing after standing from smoking a cigarette  Pt typically maintained on 2-3L secondary to COPD, lung cancer, tobacco abuse, and pulm fibrosis  Now requiring BiPAP therapy  Suspect COPD exacerbation  Plan as above

## 2024-09-09 ENCOUNTER — APPOINTMENT (INPATIENT)
Dept: NON INVASIVE DIAGNOSTICS | Facility: HOSPITAL | Age: 64
DRG: 005 | End: 2024-09-09
Payer: COMMERCIAL

## 2024-09-09 LAB
ALBUMIN SERPL BCG-MCNC: 2.6 G/DL (ref 3.5–5)
ALP SERPL-CCNC: 70 U/L (ref 34–104)
ALT SERPL W P-5'-P-CCNC: 359 U/L (ref 7–52)
ANION GAP SERPL CALCULATED.3IONS-SCNC: 8 MMOL/L (ref 4–13)
AORTIC VALVE MEAN VELOCITY: 10.1 M/S
APICAL FOUR CHAMBER EJECTION FRACTION: 47 %
APTT PPP: 40 SECONDS (ref 23–34)
APTT PPP: 46 SECONDS (ref 23–34)
APTT PPP: 63 SECONDS (ref 23–34)
AST SERPL W P-5'-P-CCNC: 412 U/L (ref 13–39)
ATRIAL RATE: 91 BPM
AV LVOT MEAN GRADIENT: 4 MMHG
AV LVOT PEAK GRADIENT: 8 MMHG
AV MEAN GRADIENT: 5 MMHG
AV PEAK GRADIENT: 10 MMHG
AV REGURGITATION PRESSURE HALF TIME: 331 MS
AV VELOCITY RATIO: 0.9
BASE EXCESS BLDA CALC-SCNC: 7.9 MMOL/L
BILIRUB SERPL-MCNC: 0.94 MG/DL (ref 0.2–1)
BSA FOR ECHO PROCEDURE: 1.79 M2
BUN SERPL-MCNC: 41 MG/DL (ref 5–25)
CA-I BLD-SCNC: 1.08 MMOL/L (ref 1.12–1.32)
CALCIUM ALBUM COR SERPL-MCNC: 9.2 MG/DL (ref 8.3–10.1)
CALCIUM SERPL-MCNC: 8.1 MG/DL (ref 8.4–10.2)
CHLORIDE SERPL-SCNC: 100 MMOL/L (ref 96–108)
CO2 SERPL-SCNC: 35 MMOL/L (ref 21–32)
CREAT SERPL-MCNC: 0.79 MG/DL (ref 0.6–1.3)
DOP CALC AO PEAK VEL: 1.59 M/S
DOP CALC AO VTI: 28.1 CM
DOP CALC LVOT PEAK VEL VTI: 27.68 CM
DOP CALC LVOT PEAK VEL: 1.43 M/S
E WAVE DECELERATION TIME: 177 MS
E/A RATIO: 1.36
ERYTHROCYTE [DISTWIDTH] IN BLOOD BY AUTOMATED COUNT: 13.3 % (ref 11.6–15.1)
GFR SERPL CREATININE-BSD FRML MDRD: 94 ML/MIN/1.73SQ M
GLUCOSE SERPL-MCNC: 109 MG/DL (ref 65–140)
GLUCOSE SERPL-MCNC: 130 MG/DL (ref 65–140)
GLUCOSE SERPL-MCNC: 131 MG/DL (ref 65–140)
GLUCOSE SERPL-MCNC: 144 MG/DL (ref 65–140)
GLUCOSE SERPL-MCNC: 156 MG/DL (ref 65–140)
HAV IGM SER QL: ABNORMAL
HBV CORE IGM SER QL: ABNORMAL
HBV SURFACE AG SER QL: ABNORMAL
HCO3 BLDA-SCNC: 33.9 MMOL/L (ref 22–28)
HCT VFR BLD AUTO: 31.1 % (ref 36.5–49.3)
HCV AB SER QL: REACTIVE
HGB BLD-MCNC: 10.1 G/DL (ref 12–17)
HOROWITZ INDEX BLDA+IHG-RTO: 40 MM[HG]
MAGNESIUM SERPL-MCNC: 2.1 MG/DL (ref 1.9–2.7)
MCH RBC QN AUTO: 32.1 PG (ref 26.8–34.3)
MCHC RBC AUTO-ENTMCNC: 32.5 G/DL (ref 31.4–37.4)
MCV RBC AUTO: 99 FL (ref 82–98)
MV E'TISSUE VEL-SEP: 8 CM/S
MV PEAK A VEL: 0.7 M/S
MV PEAK E VEL: 95 CM/S
MV STENOSIS PRESSURE HALF TIME: 51 MS
MV VALVE AREA P 1/2 METHOD: 4.31
O2 CT BLDA-SCNC: 14.7 ML/DL (ref 16–23)
OXYHGB MFR BLDA: 94.6 % (ref 94–97)
P AXIS: 79 DEGREES
PCO2 BLDA: 54.7 MM HG (ref 36–44)
PEEP RESPIRATORY: 6 CM[H2O]
PH BLDA: 7.41 [PH] (ref 7.35–7.45)
PHENYTOIN SERPL-MCNC: 29.4 UG/ML (ref 10–20)
PLATELET # BLD AUTO: 225 THOUSANDS/UL (ref 149–390)
PLATELET # BLD AUTO: 235 THOUSANDS/UL (ref 149–390)
PMV BLD AUTO: 9 FL (ref 8.9–12.7)
PMV BLD AUTO: 9.2 FL (ref 8.9–12.7)
PO2 BLDA: 82.9 MM HG (ref 75–129)
POTASSIUM SERPL-SCNC: 3.8 MMOL/L (ref 3.5–5.3)
PR INTERVAL: 220 MS
PRESSURE CONTROL: 12
PROT SERPL-MCNC: 5.5 G/DL (ref 6.4–8.4)
QRS AXIS: 36 DEGREES
QRSD INTERVAL: 106 MS
QT INTERVAL: 338 MS
QTC INTERVAL: 415 MS
RBC # BLD AUTO: 3.15 MILLION/UL (ref 3.88–5.62)
SL CV AV DECELERATION TIME RETROGRADE: 1140 MS
SL CV AV PEAK GRADIENT RETROGRADE: 65 MMHG
SL CV LV EF: 50
SODIUM SERPL-SCNC: 143 MMOL/L (ref 135–147)
SPECIMEN SOURCE: ABNORMAL
T WAVE AXIS: 260 DEGREES
TRICUSPID ANNULAR PLANE SYSTOLIC EXCURSION: 1.7 CM
VENT AC: 14
VENT- AC: AC
VENTRICULAR RATE: 91 BPM
WBC # BLD AUTO: 15.01 THOUSAND/UL (ref 4.31–10.16)

## 2024-09-09 PROCEDURE — 93321 DOPPLER ECHO F-UP/LMTD STD: CPT

## 2024-09-09 PROCEDURE — 82948 REAGENT STRIP/BLOOD GLUCOSE: CPT

## 2024-09-09 PROCEDURE — 80053 COMPREHEN METABOLIC PANEL: CPT

## 2024-09-09 PROCEDURE — 93010 ELECTROCARDIOGRAM REPORT: CPT | Performed by: INTERNAL MEDICINE

## 2024-09-09 PROCEDURE — 93325 DOPPLER ECHO COLOR FLOW MAPG: CPT

## 2024-09-09 PROCEDURE — 94669 MECHANICAL CHEST WALL OSCILL: CPT

## 2024-09-09 PROCEDURE — 85730 THROMBOPLASTIN TIME PARTIAL: CPT

## 2024-09-09 PROCEDURE — 94003 VENT MGMT INPAT SUBQ DAY: CPT

## 2024-09-09 PROCEDURE — 99223 1ST HOSP IP/OBS HIGH 75: CPT | Performed by: INTERNAL MEDICINE

## 2024-09-09 PROCEDURE — 85049 AUTOMATED PLATELET COUNT: CPT

## 2024-09-09 PROCEDURE — 80074 ACUTE HEPATITIS PANEL: CPT

## 2024-09-09 PROCEDURE — NC001 PR NO CHARGE: Performed by: ANESTHESIOLOGY

## 2024-09-09 PROCEDURE — 85730 THROMBOPLASTIN TIME PARTIAL: CPT | Performed by: INTERNAL MEDICINE

## 2024-09-09 PROCEDURE — 94664 DEMO&/EVAL PT USE INHALER: CPT

## 2024-09-09 PROCEDURE — 99255 IP/OBS CONSLTJ NEW/EST HI 80: CPT | Performed by: THORACIC SURGERY (CARDIOTHORACIC VASCULAR SURGERY)

## 2024-09-09 PROCEDURE — 99291 CRITICAL CARE FIRST HOUR: CPT | Performed by: INTERNAL MEDICINE

## 2024-09-09 PROCEDURE — 94760 N-INVAS EAR/PLS OXIMETRY 1: CPT

## 2024-09-09 PROCEDURE — 82330 ASSAY OF CALCIUM: CPT

## 2024-09-09 PROCEDURE — 94640 AIRWAY INHALATION TREATMENT: CPT

## 2024-09-09 PROCEDURE — 85027 COMPLETE CBC AUTOMATED: CPT

## 2024-09-09 PROCEDURE — 93308 TTE F-UP OR LMTD: CPT

## 2024-09-09 PROCEDURE — 93321 DOPPLER ECHO F-UP/LMTD STD: CPT | Performed by: INTERNAL MEDICINE

## 2024-09-09 PROCEDURE — 80185 ASSAY OF PHENYTOIN TOTAL: CPT

## 2024-09-09 PROCEDURE — 83735 ASSAY OF MAGNESIUM: CPT

## 2024-09-09 PROCEDURE — 93325 DOPPLER ECHO COLOR FLOW MAPG: CPT | Performed by: INTERNAL MEDICINE

## 2024-09-09 PROCEDURE — 82805 BLOOD GASES W/O2 SATURATION: CPT

## 2024-09-09 PROCEDURE — 93308 TTE F-UP OR LMTD: CPT | Performed by: INTERNAL MEDICINE

## 2024-09-09 RX ORDER — POLYETHYLENE GLYCOL 3350 17 G/17G
17 POWDER, FOR SOLUTION ORAL DAILY
Status: DISCONTINUED | OUTPATIENT
Start: 2024-09-09 | End: 2024-09-13

## 2024-09-09 RX ORDER — MIDAZOLAM HYDROCHLORIDE 2 MG/2ML
1 INJECTION, SOLUTION INTRAMUSCULAR; INTRAVENOUS EVERY 4 HOURS PRN
Status: DISCONTINUED | OUTPATIENT
Start: 2024-09-09 | End: 2024-09-13

## 2024-09-09 RX ORDER — HEPARIN SODIUM 10000 [USP'U]/100ML
3-22 INJECTION, SOLUTION INTRAVENOUS
Status: DISPENSED | OUTPATIENT
Start: 2024-09-09 | End: 2024-09-17

## 2024-09-09 RX ORDER — FOLIC ACID 1 MG/1
1 TABLET ORAL DAILY
Status: DISCONTINUED | OUTPATIENT
Start: 2024-09-09 | End: 2024-10-06

## 2024-09-09 RX ORDER — BISACODYL 10 MG
10 SUPPOSITORY, RECTAL RECTAL DAILY PRN
Status: DISCONTINUED | OUTPATIENT
Start: 2024-09-09 | End: 2024-10-15

## 2024-09-09 RX ORDER — SENNOSIDES 8.6 MG
1 TABLET ORAL
Status: DISCONTINUED | OUTPATIENT
Start: 2024-09-09 | End: 2024-09-13

## 2024-09-09 RX ORDER — FORMOTEROL FUMARATE DIHYDRATE 20 UG/2ML
20 SOLUTION RESPIRATORY (INHALATION)
Status: DISCONTINUED | OUTPATIENT
Start: 2024-09-09 | End: 2024-09-16

## 2024-09-09 RX ORDER — HEPARIN SODIUM 1000 [USP'U]/ML
3600 INJECTION, SOLUTION INTRAVENOUS; SUBCUTANEOUS EVERY 6 HOURS PRN
Status: DISCONTINUED | OUTPATIENT
Start: 2024-09-09 | End: 2024-09-20

## 2024-09-09 RX ORDER — HEPARIN SODIUM 1000 [USP'U]/ML
1800 INJECTION, SOLUTION INTRAVENOUS; SUBCUTANEOUS EVERY 6 HOURS PRN
Status: DISCONTINUED | OUTPATIENT
Start: 2024-09-09 | End: 2024-09-20

## 2024-09-09 RX ORDER — FORMOTEROL FUMARATE DIHYDRATE 20 UG/2ML
20 SOLUTION RESPIRATORY (INHALATION)
Status: CANCELLED | OUTPATIENT
Start: 2024-09-09

## 2024-09-09 RX ORDER — CHLORHEXIDINE GLUCONATE ORAL RINSE 1.2 MG/ML
15 SOLUTION DENTAL EVERY 12 HOURS SCHEDULED
Status: DISCONTINUED | OUTPATIENT
Start: 2024-09-09 | End: 2024-09-10

## 2024-09-09 RX ADMIN — BUDESONIDE 0.5 MG: 0.5 INHALANT RESPIRATORY (INHALATION) at 19:21

## 2024-09-09 RX ADMIN — Medication 100 MCG: at 16:25

## 2024-09-09 RX ADMIN — NICOTINE 1 PATCH: 21 PATCH, EXTENDED RELEASE TRANSDERMAL at 08:18

## 2024-09-09 RX ADMIN — CHLORHEXIDINE GLUCONATE 0.12% ORAL RINSE 15 ML: 1.2 LIQUID ORAL at 21:12

## 2024-09-09 RX ADMIN — LEVALBUTEROL HYDROCHLORIDE 1.25 MG: 1.25 SOLUTION RESPIRATORY (INHALATION) at 19:21

## 2024-09-09 RX ADMIN — FORMOTEROL FUMARATE DIHYDRATE 20 MCG: 20 SOLUTION RESPIRATORY (INHALATION) at 19:22

## 2024-09-09 RX ADMIN — Medication 50 MCG/HR: at 13:21

## 2024-09-09 RX ADMIN — POLYETHYLENE GLYCOL 3350 17 G: 17 POWDER, FOR SOLUTION ORAL at 12:09

## 2024-09-09 RX ADMIN — NOREPINEPHRINE BITARTRATE 7 MCG/MIN: 1 INJECTION, SOLUTION, CONCENTRATE INTRAVENOUS at 16:04

## 2024-09-09 RX ADMIN — FENTANYL CITRATE 50 MCG: 50 INJECTION INTRAMUSCULAR; INTRAVENOUS at 18:18

## 2024-09-09 RX ADMIN — PANTOPRAZOLE SODIUM 40 MG: 40 INJECTION, POWDER, FOR SOLUTION INTRAVENOUS at 08:17

## 2024-09-09 RX ADMIN — FOLIC ACID 1 MG: 1 TABLET ORAL at 16:25

## 2024-09-09 RX ADMIN — AZITHROMYCIN 500 MG: 500 INJECTION, POWDER, LYOPHILIZED, FOR SOLUTION INTRAVENOUS at 11:08

## 2024-09-09 RX ADMIN — METHYLPREDNISOLONE SODIUM SUCCINATE 40 MG: 40 INJECTION, POWDER, FOR SOLUTION INTRAMUSCULAR; INTRAVENOUS at 21:12

## 2024-09-09 RX ADMIN — LEVALBUTEROL HYDROCHLORIDE 1.25 MG: 1.25 SOLUTION RESPIRATORY (INHALATION) at 13:18

## 2024-09-09 RX ADMIN — IPRATROPIUM BROMIDE 0.5 MG: 0.5 SOLUTION RESPIRATORY (INHALATION) at 13:18

## 2024-09-09 RX ADMIN — CEFEPIME 2000 MG: 2 INJECTION, POWDER, FOR SOLUTION INTRAVENOUS at 12:35

## 2024-09-09 RX ADMIN — NOREPINEPHRINE BITARTRATE 7 MCG/MIN: 1 INJECTION, SOLUTION, CONCENTRATE INTRAVENOUS at 04:23

## 2024-09-09 RX ADMIN — CEFEPIME 2000 MG: 2 INJECTION, POWDER, FOR SOLUTION INTRAVENOUS at 19:55

## 2024-09-09 RX ADMIN — HEPARIN SODIUM 1800 UNITS: 1000 INJECTION INTRAVENOUS; SUBCUTANEOUS at 19:21

## 2024-09-09 RX ADMIN — IPRATROPIUM BROMIDE 0.5 MG: 0.5 SOLUTION RESPIRATORY (INHALATION) at 07:32

## 2024-09-09 RX ADMIN — LEVALBUTEROL HYDROCHLORIDE 1.25 MG: 1.25 SOLUTION RESPIRATORY (INHALATION) at 07:32

## 2024-09-09 RX ADMIN — CEFTRIAXONE SODIUM 1000 MG: 10 INJECTION, POWDER, FOR SOLUTION INTRAVENOUS at 08:18

## 2024-09-09 RX ADMIN — IPRATROPIUM BROMIDE 0.5 MG: 0.5 SOLUTION RESPIRATORY (INHALATION) at 19:21

## 2024-09-09 RX ADMIN — BUDESONIDE 0.5 MG: 0.5 INHALANT RESPIRATORY (INHALATION) at 07:32

## 2024-09-09 RX ADMIN — FENTANYL CITRATE 50 MCG: 50 INJECTION INTRAMUSCULAR; INTRAVENOUS at 09:34

## 2024-09-09 RX ADMIN — FENTANYL CITRATE 50 MCG: 50 INJECTION INTRAMUSCULAR; INTRAVENOUS at 13:39

## 2024-09-09 RX ADMIN — SODIUM CHLORIDE SOLN NEBU 3% 4 ML: 3 NEBU SOLN at 07:32

## 2024-09-09 RX ADMIN — SENNOSIDES 8.6 MG: 8.6 TABLET, FILM COATED ORAL at 21:12

## 2024-09-09 RX ADMIN — FENTANYL CITRATE 50 MCG: 50 INJECTION INTRAMUSCULAR; INTRAVENOUS at 22:09

## 2024-09-09 RX ADMIN — PROPOFOL 45 MCG/KG/MIN: 10 INJECTION, EMULSION INTRAVENOUS at 01:31

## 2024-09-09 RX ADMIN — DEXMEDETOMIDINE HYDROCHLORIDE 0.5 MCG/KG/HR: 4 INJECTION, SOLUTION INTRAVENOUS at 09:30

## 2024-09-09 RX ADMIN — METHYLPREDNISOLONE SODIUM SUCCINATE 40 MG: 40 INJECTION, POWDER, FOR SOLUTION INTRAMUSCULAR; INTRAVENOUS at 08:17

## 2024-09-09 RX ADMIN — DEXMEDETOMIDINE HYDROCHLORIDE 0.7 MCG/KG/HR: 4 INJECTION, SOLUTION INTRAVENOUS at 16:56

## 2024-09-09 RX ADMIN — CHLORHEXIDINE GLUCONATE 0.12% ORAL RINSE 15 ML: 1.2 LIQUID ORAL at 08:18

## 2024-09-09 NOTE — PROGRESS NOTES
[facility]  Progress Note: Critical Care  Name: Marcin Sánchez 64 y.o. male I MRN: [id number]  Unit/Bed#: MICU 10 I Date of Admission: 9/8/2024   Date of Service: 9/9/2024 I Hospital Day: 1    Assessment & Plan   Neuro:   Diagnosis:Epilepsy  Phenytoin held for hypercarbia  Dilantin lvl : 35.8  Seizure precautions  Scheduled Neuro checks   Transition to precedex and fentanyl gtt. Wean off propofol   Diagnosis: Acute Metabolic encephalopathy  CT head: No acute intracranial abnormality with chronic old left basal ganglia lacunar infarct  Hypercapnia during icu admission during rapid response 9/7  CO2 84 -> bipap -> intubation     CV:   Diagnosis: Pericardial Effusion  Previous echo EF55%  Transferred to [facility] for repeat echo today  Possible pericardial window   Diagnosis: Cardiac Arrest 9/7  Following intubation for copd/ hypercarbia  Pea with rosc after cpr  VT following rosc -> 200 joule shock -> sinus rhythm  Diagnosis: A Fib  Hold eliquis, heparin gtt  Hold metoprolol  Diagnosis: Chronic CHF  Plan: hold lasix  Last bnp 404  Remain intubated  Repeat echo in the morning     Pulm:  Diagnosis: Squamous Cell Lung Cancer 2016  Active tobacco user 10 cigarettes daily  S/p chemo radiation   Last seen oncology April 2024  Diagnosis: ARDS  In setting of pulmonary fibrosis/ scc, copd, and chronic PE  Continue mechanical ventilation  Scheduled nebs  Iv solumedrol   Daily P/F ratio   Diagnosis: COPD  Admitted to hospital for copd exacerbation  Home oxygen 2-3L   Plan: maintain intubation  Nicotine patch  Duoneb TID  Solumedrol 40mg iv BID  Levalbuterol 1.25 prn q4     GI:   Diagnosis: Transaminitis  Plan: trend AST/ALT Biliruibin and Ammonia  Suspected to be due to cardiac arrest  Hold phenytoin     :   No active issues     F/E/N:    F: isolyte 250 bolus  E: replete as needed  N: NPO     Heme/Onc:   Diagnosis: chronic PE  Plan: Eliquis held. Heparin gtt      Endo:   No active issues      ID:   Diagnosis: Pneumonia  Plan: ceftriaxone 1g daily. Azithromycin 500mg bid     MSK/Skin:   No active issues     Disposition: Critical care    ICU Core Measures     Vented Patient  VAP Bundle  VAP bundle ordered     A: Assess, Prevent, and Manage Pain Has pain been assessed? Yes  Need for changes to pain regimen? No   B: Both Spontaneous Awakening Trials (SATs) and Spontaneous Breathing Trials (SBTs) Plan to perform spontaneous awakening trial today? Yes   Plan to perform spontaneous breathing trial today? Yes   Obvious barriers to extubation? NA   C: Choice of Sedation RASS Goal: -1 Drowsy or 0 Alert and Calm  Need for changes to sedation or analgesia regimen? No   D: Delirium CAM-ICU: Negative   E: Early Mobility  Plan for early mobility? NA   F: Family Engagement Plan for family engagement today? Yes       Antibiotic Review: Patient on appropriate coverage based on culture data.     Review of Invasive Devices:    [place] Plan:   Central access plan:   [place] Plan: Keep arterial line for hemodynamic monitoring    Prophylaxis:  VTE VTE covered by:  heparin (porcine), Intravenous, 14 Units/kg/hr at 09/08/24 2202  heparin (porcine), Intravenous, 1,800 Units at 09/08/24 2219  heparin (porcine), Intravenous       Stress Ulcer  covered bypantoprazole (PROTONIX) injection 40 mg [[id number]]        Significant 24hr Events     24hr events: no acute events overnight. Patient sedation medications changed to fentanyl and precedex      Subjective     Review of Systems: Review of Systems   Reason unable to perform ROS: intubated.        Objective                            Vitals I/O      Most Recent Min/Max in 24hrs   Temp   Temp  Min: 98.1 °F (36.7 °C)  Max: 98.5 °F (36.9 °C)   Pulse   Pulse  Min: 66  Max: 100   Resp   Resp  Min: 4  Max: 48   BP   BP  Min: 98/52  Max: 161/71   O2 Sat   SpO2  Min: 90 %  Max: 100 %    No intake or output data in the 24 hours ending 09/09/24 0144    Diet NPO    Invasive Monitoring            Physical Exam   Physical Exam  Eyes:      General: Neglect  Skin:     General: Skin is cool.      Coloration: Skin is not jaundiced or pale.   Cardiovascular:      Rate and Rhythm: Normal rate. Rhythm irregular.      Comments: Muffled heart sounds  Abdominal:      Palpations: Abdomen is soft.      Tenderness: There is no abdominal tenderness.   Constitutional:       Interventions: He is sedated and intubated.   Pulmonary:      Effort: He is intubated.      Breath sounds: No stridor. Wheezing present.   Psychiatric:         Speech: Speech is not no receptive aphasia or no expressive aphasia.   Neurological:      Mental Status: He is calm.      Cranial Nerves: No dysarthria or facial asymmetry.      Sensory: No sensory deficit.      Comments: Fasciculations               Diagnostic Studies      EKG: no change  Imaging:   XR chest portable    (Results Pending)           Medications:  Scheduled PRN   azithromycin, 500 mg, Q24H  budesonide, 0.5 mg, Q12H  cefTRIAXone, 1,000 mg, Q24H  chlorhexidine, 15 mL, Q12H [place]  ipratropium, 0.5 mg, TID  levalbuterol, 1.25 mg, TID  methylPREDNISolone sodium succinate, 40 mg, Q12H [place]  nicotine, 1 patch, Daily  pantoprazole, 40 mg, Q24H [place]  sodium chloride, 4 mL, TID      acetaminophen, 650 mg, Q6H PRN  albuterol, 2.5 mg, Q6H PRN  fentaNYL, 50 mcg, Q2H PRN  heparin (porcine), 1,800 Units, Q6H PRN  heparin (porcine), 3,600 Units, Q6H PRN       Continuous    dexmedetomidine, 0.1-0.7 mcg/kg/hr, Last Rate: 0.1 mcg/kg/hr (09/08/24 2249)  fentaNYL, 50 mcg/hr, Last Rate: 50 mcg/hr (09/08/24 2119)  heparin (porcine), 3-20 Units/kg/hr (Order-Specific), Last Rate: 14 Units/kg/hr (09/08/24 2202)  norepinephrine, 1-30 mcg/min, Last Rate: 8 mcg/min (09/09/24 0012)  propofol, 5-50 mcg/kg/min, Last Rate: 45 mcg/kg/min (09/09/24 0131)         Labs:    CBC    Recent Labs     09/08/24  1348 09/08/24 2021   WBC 14.35* 14.77*   HGB 10.6* 11.2*   HCT 31.1* 33.5*    272     BMP    Recent Labs      09/08/24  0545 09/08/24 2021   SODIUM 142 141   K 4.7 3.7   CL 96 98   CO2 36* 35*   AGAP 10 8   BUN 45* 45*   CREATININE 1.13 0.96   CALCIUM 9.6 8.5       Coags    Recent Labs     09/08/24  1348 09/08/24 2021   INR 2.14*  --    PTT 45* 47*        Additional Electrolytes  Recent Labs     09/07/24  1350 09/08/24  0008 09/08/24  0049 09/08/24  0545 09/08/24 2021   MG   < >  --  2.2 2.2 2.0   PHOS  --   --  5.7* 3.4 3.0   CAIONIZED  --  1.45* 1.05*  --   --     < > = values in this interval not displayed.          Blood Gas    Recent Labs     09/08/24 2030   PHART 7.416   BFQ4CQV 51.0*   PO2ART 112.3   XSC0FPE 32.0*   BEART 6.3   SOURCE Line, Arterial     Recent Labs     09/08/24  0638 09/08/24  1536 09/08/24 2030   PHVEN 7.511*  --   --    [id number] 38.4*  --   --    PO2VEN 39.5  --   --    RPJ0BMU 30.0  --   --    BEVEN 6.7  --   --    A0OTTHO 76.7  --   --    SOURCE  --    < > Line, Arterial    < > = values in this interval not displayed.    LFTs  Recent Labs     09/08/24  0545 09/08/24 2021   * 430*   * 613*   ALKPHOS 92 80   ALB 3.3* 2.9*   TBILI 1.04* 1.00       Infectious  Recent Labs     09/07/24  0616 09/08/24  0609   PROCALCITONI 0.75* 1.56*     Glucose  Recent Labs     09/07/24  0616 09/08/24  0049 09/08/24  0545 09/08/24 2021   GLUC 136 137 142* 160*               Enrique Salas MD

## 2024-09-09 NOTE — PROGRESS NOTES
Critical Care Attending Note    64 year old man with ETOH abuse, NSCLCa - Squamous post chemo/XRT, prior PE, seizure disorder, afib on eliquis, COPD, tobacco abuse, hypothyroidism, mixed systolic/diastolic CHF.  Presented [facility] with CHF/COPD exacerbation - failed biPAP and required intubation.  Had VT cardiac arrest with ROSC x 2 rounds CPR and shock x 1.   Transferred to [facility] for pericardial effusion evaluation.      24hr events - remained intubated, changed sedation to fentanyl and precedex, remained on NE - now weaning    VS AF, HR 60-80's, MAPs 's, SpO2 97% 0.4/6P, I/Os +350cc  Exam  GEN older man in bed, intubated, on sedation but arouses, follows commands  HEENT Temporal wasting - mild, MMM, no thrush  NECK no accessory muscle use, JVD not elevated, RIJ TLC w/o purulence  CV reg, single s1/2, no m/r. MMM, no thrush  Pulm mechanical BS, no wheeze, mild rhonchi improved with suction, tan secretions  ABD +BS soft NTND, no rebound  EXT warm, brisk cap refill, no sig edema   wong in place, yellow urine    Laboratory and Diagnostics  Results from last 7 days   Lab Units 09/09/24  0802 09/09/24  0459 09/08/24  2021 09/08/24  1348 09/08/24  0545 09/08/24  0049 09/08/24  0008 09/07/24  0616 09/06/24  1938 09/06/24  0559 09/05/24  2139   WBC Thousand/uL  --  15.01* 14.77* 14.35* 15.08* 11.60*  --  11.64*  --  6.04 6.36   HEMOGLOBIN g/dL  --  10.1* 11.2* 10.6* 12.0 11.6*  --  11.2*  --  10.8* 11.5*   I STAT HEMOGLOBIN g/dl  --   --   --   --   --   --  11.9*  --    < >  --   --    HEMATOCRIT %  --  31.1* 33.5* 31.1* 36.6 36.2*  --  35.1*  --  33.2* 35.7*   HEMATOCRIT, ISTAT %  --   --   --   --   --   --  35*  --    < >  --   --    PLATELETS Thousands/uL 225 235 272 247 323 284  --  261  --  242 307   SEGS PCT %  --   --   --   --   --  84*  --  85*  --  78* 66   MONO PCT %  --   --   --   --   --  9  --  11  --  10 18*   EOS PCT %  --   --   --   --   --  0  --  0  --  0 1    < > = values in this interval not  displayed.     Results from last 7 days   Lab Units 09/09/24  0459 09/08/24 2021 09/08/24  0545 09/08/24  0049 09/08/24  0008 09/07/24  0616 09/07/24  0040 09/06/24 2138 09/06/24  1938 09/06/24 0559 09/05/24 2139   SODIUM mmol/L 143 141 142 142  --  139 138  --   --  139 138   POTASSIUM mmol/L 3.8 3.7 4.7 4.2  --  4.9 4.6  --   --  4.2 3.6   CHLORIDE mmol/L 100 98 96 97  --  95* 96  --   --  95* 94*   CO2 mmol/L 35* 35* 36* 37*  --  39* 35*  --   --  41* 39*   CO2, I-STAT mmol/L  --   --   --   --  43*  --   --   --    < >  --   --    ANION GAP mmol/L 8 8 10 8  --  5 7  --   --  3* 5   BUN mg/dL 41* 45* 45* 44*  --  32* 27*  --   --  23 22   CREATININE mg/dL 0.79 0.96 1.13 1.20  --  0.99 0.90  --   --  0.59* 0.59*   CALCIUM mg/dL 8.1* 8.5 9.6 10.1  --  9.0 9.3  --   --  9.0 9.1   GLUCOSE RANDOM mg/dL 144* 160* 142* 137  --  136 166*  --   --  114 164*   ALT U/L 359* 430* 438* 428*  --   --   --  9  --   --  13   AST U/L 412* 613* 864* 946*  --   --   --  25  --   --  20   ALK PHOS U/L 70 80 92 82  --   --   --  99  --   --  91   ALBUMIN g/dL 2.6* 2.9* 3.3* 3.5  --   --   --  3.7  --   --  3.7   TOTAL BILIRUBIN mg/dL 0.94 1.00 1.04* 0.98  --   --   --  0.91  --   --  0.75    < > = values in this interval not displayed.     Results from last 7 days   Lab Units 09/09/24  0459 09/08/24 2021 09/08/24  0545 09/08/24  0049 09/07/24  1350 09/06/24  0559 09/05/24  2139   MAGNESIUM mg/dL 2.1 2.0 2.2 2.2  --  2.3 1.9   PHOSPHORUS mg/dL  --  3.0 3.4 5.7* 6.1*  --   --       Results from last 7 days   Lab Units 09/09/24  0459 09/08/24 2021 09/08/24  1348 09/05/24  2139   INR   --   --  2.14* 1.38*   PTT seconds 63* 47* 45* 46*          Results from last 7 days   Lab Units 09/08/24  0933 09/08/24  0545 09/08/24  0309 09/08/24  0049 09/07/24  1658 09/07/24  1347 09/05/24  2139   LACTIC ACID mmol/L 1.6 3.6* 4.0* 2.9* 2.2* 2.4* 1.1                     Results from last 7 days   Lab Units 09/08/24  0609 09/07/24  0616  09/05/24  2139   PROCALCITONIN ng/ml 1.56* 0.75* <0.05       ABG:   Results from last 7 days   Lab Units 09/09/24  0459   PH ART  7.410   PCO2 ART mm Hg 54.7*   PO2 ART mm Hg 82.9   HCO3 ART mmol/L 33.9*   BASE EXC ART mmol/L 7.9   ABG SOURCE  Line, Arterial       MICRO  MRSA pending  Sputum 9/6 - 4+ Acinetobacter, few Moraxella   Bld Cx 9/5 NGTD  FLU/RSV/COVID neg    RADIOGRAPHS - New images and reports personally reviewed  CXR 9/8 - T.L good position, RUL mass lesion noted, right sided volume loss, chronic, blunted CP angles, no dense left sided consolidation    CT C/A/P 9/8 post RUL XRT changes, emphysema, scattered pulnoary nodules, basilar atelectasis vs infiltrates, small effusions    TTE 9/9 - pending review    TTE 9/8  - EF 55%, moderate pericardial effusion, RV underfilled but unable to r/o tamponade physiology, severe AR    Assessment  Shock - suspected vasodilatory vs obstructive shock   Large pericardial effusion - unclear tamponade component  VT cardiac arrest post intubation  Acute on chronic hypoxic respiratory failure  Suspected Severe COPD with possible exacerbation  Acute/chronic CHF with severe aortic regurgitation  Abnormal CT chest possible pneumonia/aspiration event - Acinetobacter on culture  Abnormal LFT suspected hepatic congestion/shock liver  Atrial fibrillation on eliquis  History of PE  NSLCa post chemo/XRT  ETOH and Tobacco abuse  Seizure disorder on dilantin with elevated level     PLAN  NEURO - goal RASS -2 to 0 - continue precedex and fentanyl for now, attempt avoid propofol given mild HD instability, prn versed if needed, monitor for ETOH w/d, add thiamine/folate, holding dilantin for now, repeat level today and readjust with pharmacy.    CARDIAC - goal MAP >65, continue NE for now, repeat TTE pending - d/w thoracic surgery window if needed, holding UFH gtt for now  PULM - remains intubated, continue trial of PSV 15/6, continue as tolerated, continue xopenex/atrovent TID, pulmicort  BID, add perforomist BID, stop 3% saline nebs, continue solumedrol 40mg q12hrs for now  GI - NPO for now, trend LFTs, bowel regimn  NEPHRO - follow UOP, replete electrlytes  ID - Adjust abx - start cefepime, stop CTX, continue azithromycin, follow up further cultures  HEME - off UFH gtt for now   ENDO - no active issues  MISC - SCDS, resume UFH once surgical plan establish, CHG, HOB >30DEG, PPI  CODE STATUS - Full Code  CC team to update family later today    Upon my evaluation, this patient has a high probability of imminent or life-threatening deterioration due to shock state, possible cardiac tamponade, respiratory failure with COPD exacerbation and acinetobacter pneumonia which required my direct attention, intervention, and personal management.     I have personally provided 41 minutes of critical care time, exclusive of procedures, teaching, and any prior time recorded by providers other than myself. Time includes review of laboratory data, radiology results, discussion with consultants, and monitoring for potential decompensation.    John Brunner, DO

## 2024-09-09 NOTE — UTILIZATION REVIEW
NOTIFICATION OF INPATIENT ADMISSION   AUTHORIZATION REQUEST   SERVICING FACILITY:   [place] [address]  Tax ID: [id number]  NPI: [id number] ATTENDING PROVIDER:  Attending Name and NPI#: Sd Guerra Md [[id number]]  Address: [address] [place] [address]  Phone: [phone]     ADMISSION INFORMATION:  Place of Service: Inpatient [facility]  Place of Service Code: 21  Inpatient Admission Date/Time: 9/6/24  2:08 AM  Discharge Date/Time: 9/8/2024  7:14 PM  Admitting Diagnosis Code/Description:  Altered mental status [R41.82]     UTILIZATION REVIEW CONTACT:  Shruti Brandt Utilization   Network Utilization Review Department  Phone: [phone]  Fax [phone]  Email: [email or web address]  Contact for approvals/pending authorizations, clinical reviews, and discharge.     PHYSICIAN ADVISORY SERVICES:  Medical Necessity Denial & [place] Review  Phone: [phone]  Fax: [phone]  Email: [email or web address]     DISCHARGE SUPPORT TEAM:  For Patients Discharge Needs & Updates  Phone: [phone] opt. 2 Fax: [phone]  Email: [email or web address]

## 2024-09-09 NOTE — CASE MANAGEMENT
Case Management Assessment & Discharge Planning Note    Patient name Marcin Sánchez  [facility] [id number]  : 1960 Date 2024       Current Admission Date: 2024  Current Admission Diagnosis:Pericardial effusion  Patient Active Problem List    Diagnosis Date Noted Date Diagnosed    Transaminitis 2024     Cardiac arrest (HCC) 2024     Pericardial effusion 2024     Acute on chronic respiratory failure with hypoxia and hypercapnia (HCC) 2024     Acute metabolic encephalopathy 2024     Chronic combined systolic and diastolic CHF (congestive heart failure) (HCC) 2024     Atrial fibrillation (HCC) 2024     Pulmonary fibrosis (HCC) 2024     Suspected chronic pulmonary embolism (HCC) 2024     Squamous cell lung cancer (HCC) 2024     Hyponatremia 2024     Severe protein-calorie malnutrition (HCC) 2024     Edema of both legs 2022     Tobacco abuse 2020     Nonrheumatic aortic valve insufficiency 2020     Malignant neoplasm of upper lobe of right lung (HCC) 2018     Thoracic aortic aneurysm without rupture (HCC) 2018     Cancer of right main bronchus (HCC) 10/04/2016     Pleural effusion 10/02/2016     Multiple lung nodules on CT 10/02/2016     Collapse of right lung 2016     COPD with acute exacerbation (HCC)      Epilepsy (HCC)      Lyme disease      Major depression      Alcohol abuse        LOS (days): 1  Geometric Mean LOS (GMLOS) (days):   Days to GMLOS:     OBJECTIVE:  PATIENT READMITTED TO HOSPITAL  Risk of Unplanned Readmission Score: 34.38         Current admission status: Inpatient       Preferred Pharmacy:   [facility] -   [facility]AL Clifford - [address] [facility],  [address] [place] [address]  Phone: [phone] Fax: [phone]    [address] [address]  Phone: [phone] Fax:  [phone]    Primary Care Provider: Brandt Godinez MD    Primary Insurance: [facility]  Secondary Insurance:     ASSESSMENT:  Active Health Care Proxies    There are no active Health Care Proxies on file.                      Patient Information  Mental Status: Intubated                DISCHARGE DETAILS:                Other Referral/Resources/Interventions Provided:  Referral Comments: Per chart review: pt LTC resident at [facility]. Pt is a 15 day bed hold.  Pt transfer from [facility] for OR for pericardial window.  Pt intubated/sedated.  See open completed 9/6/2024 @ 5247 by MAULIK Hughes.  Message sent in [facility] to [facility] notifying of transfer to [facility].

## 2024-09-09 NOTE — RESPIRATORY THERAPY NOTE
RT Ventilator Management Note  Marcin Sánchez 64 y.o. male MRN: [id number]  Unit/Bed#: [facility] 10 Encounter: [id number]      Daily Screen         9/8/2024  1116 9/9/2024  0732          Patient safety screen outcome:: Failed Passed      Not Ready for Weaning due to:: Underline problem not resolved --                Physical Exam:   Assessment Type: Assess only  General Appearance: Sleeping  Respiratory Pattern: Assisted  Chest Assessment: Chest expansion symmetrical  Bilateral Breath Sounds: Diminished, Clear      Resp Comments: (P) Pt placed on SPONT 10/6 per protocol at this time. Pt appear to be comfortable on the current vent setting with no issues, will continue to monitor.

## 2024-09-09 NOTE — CONSULTS
Consultation - General Cardiology Team 2  Marcin Sánchez 64 y.o. male MRN: [id number]  Unit/Bed#: MICU 10 Encounter: [id number]        History of Present Illness   Physician Requesting Consult: John Brunner DO  Reason for Consult / Principal Problem: Pericardial effusion    HPI: Marcin Sánchez is a 64 y.o. year old male who presented to [facility] 9/5 with acute respiratory failure secondary COPD exacerbation.  He has past medical history significant for atrial fibrillation, COPD, chronic combined systolic and diastolic CHF, epilepsy, pulmonary fibrosis.  Radiation, history of squamous cell lung cancer, current tobacco user.  Smoking outside his living facility (the [facility]) when he was noted to become acutely confused.  He also.  Short of breath was noted to be hypoxemic prompting call to EMS.  Upon arrival he was lethargic and confused which is thought to be secondary to hypercarbic respiratory failure in the setting of COPD exacerbation and was initiated on BiPAP.  On 9 7 patient had acute hypoxic event resulting in bradycardia and subsequently cardiac arrest 2 rounds of CPR and VT noted during a pulse check.  Toradol shot was administered after which ROSC was achieved and he was moving all extremities.  She was subsequently intubated.  Echocardiogram rest showed LVEF 55% with normal systolic function with moderate pleural effusion.  He was transferred to [facility] for thoracic surgery oncology evaluation.      Review of Systems  Review of system was conducted and was negative except for as stated in the HPI.      Historical Information   Past Medical History:   Diagnosis Date    Alcohol abuse     Anxiety     Aortic insufficiency 12/18/2020    Atrial fibrillation (HCC)     Cancer (HCC)     COPD (chronic obstructive pulmonary disease) (HCC)     Delirium     Encephalopathy     Epilepsy (HCC)     History of chemotherapy     History of radiation therapy     Hypo-osmolality and hyponatremia      Hypokalemia     Hypothyroid     Lung cancer (HCC)     Lyme disease     Major depression      Past Surgical History:   Procedure Laterality Date    BRONCHOSCOPY N/A 10/3/2016    Procedure: BRONCHOSCOPY FLEXIBLE;  Surgeon: John Brunner DO;  Location: [facility];  Service:     HAND SURGERY      PELVIC FRACTURE SURGERY      REMOVAL VENOUS PORT (PORT-A-CATH) Left 9/18/2018    Procedure: REMOVAL VENOUS PORT (PORT-A-CATH)IR;  Surgeon: Randy Lopez DO;  Location: [facility];  Service: Interventional Radiology     Social History     Substance and Sexual Activity   Alcohol Use Never     Social History     Substance and Sexual Activity   Drug Use Never     Social History     Tobacco Use   Smoking Status Every Day    Current packs/day: 1.50    Average packs/day: 1.5 packs/day for 47.0 years (70.5 ttl pk-yrs)    Types: Cigarettes   Smokeless Tobacco Never   Tobacco Comments    smoking more than 1 ppd     Family History: non-contributory    Meds/Allergies   Hospital Medications:   Current Facility-Administered Medications   Medication Dose Route Frequency    acetaminophen (TYLENOL) tablet 650 mg  650 mg Oral Q6H PRN    albuterol inhalation solution 2.5 mg  2.5 mg Nebulization Q6H PRN    bisacodyl (DULCOLAX) rectal suppository 10 mg  10 mg Rectal Daily PRN    budesonide (PULMICORT) inhalation solution 0.5 mg  0.5 mg Nebulization Q12H    ceFEPime (MAXIPIME) 2,000 mg in dextrose 5 % 50 mL IVPB  2,000 mg Intravenous Q8H    chlorhexidine (PERIDEX) 0.12 % oral rinse 15 mL  15 mL Mouth/Throat Q12H AMERICA    cyanocobalamin (VITAMIN B-12) tablet 100 mcg  100 mcg Oral Daily    dexmedeTOMIDine (Precedex) 400 mcg in sodium chloride 0.9% 100 mL  0.1-0.7 mcg/kg/hr Intravenous Titrated    fentaNYL 1000 mcg in sodium chloride 0.9% 100mL infusion  50 mcg/hr Intravenous Continuous    fentaNYL injection 50 mcg  50 mcg Intravenous Q2H PRN    folic acid (FOLVITE) tablet 1 mg  1 mg Oral Daily    formoterol (PERFOROMIST) nebulizer solution 20 mcg   "20 mcg Nebulization Q12H    heparin (porcine) 25,000 units in 0.45% NaCl 250 mL infusion (premix)  3-20 Units/kg/hr (Order-Specific) Intravenous Titrated    heparin (porcine) injection 1,800 Units  1,800 Units Intravenous Q6H PRN    heparin (porcine) injection 3,600 Units  3,600 Units Intravenous Q6H PRN    ipratropium (ATROVENT) 0.02 % inhalation solution 0.5 mg  0.5 mg Nebulization TID    levalbuterol (XOPENEX) inhalation solution 1.25 mg  1.25 mg Nebulization TID    methylPREDNISolone sodium succinate (Solu-MEDROL) injection 40 mg  40 mg Intravenous Q12H AMERICA    midazolam (VERSED) injection 1 mg  1 mg Intravenous Q4H PRN    nicotine (NICODERM CQ) 21 mg/24 hr TD 24 hr patch 1 patch  1 patch Transdermal Daily    norepinephrine (LEVOPHED) 4 mg (STANDARD CONCENTRATION) IV in sodium chloride 0.9% 250 mL  1-30 mcg/min Intravenous Titrated    pantoprazole (PROTONIX) injection 40 mg  40 mg Intravenous Q24H AMERICA    polyethylene glycol (MIRALAX) packet 17 g  17 g Oral Daily    senna (SENOKOT) tablet 8.6 mg  1 tablet Oral HS     Home Medications:   Medications Prior to Admission:     Advair -21 MCG/ACT inhaler    albuterol (PROVENTIL HFA,VENTOLIN HFA) 90 mcg/act inhaler    apixaban (ELIQUIS) 5 mg    furosemide (LASIX) 40 mg tablet    gabapentin (NEURONTIN) 400 mg capsule    levalbuterol (XOPENEX) 0.63 mg/3 mL nebulizer solution    metoprolol succinate (TOPROL-XL) 25 mg 24 hr tablet    phenytoin (DILANTIN) 100 mg ER capsule    potassium chloride (Klor-Con M20) 20 mEq tablet    sodium chloride 1 g tablet    tiotropium (SPIRIVA) 18 mcg inhalation capsule    Vitamin D, Cholecalciferol, 1000 UNITS CAPS    No Known Allergies    Objective   Vitals: Blood pressure 136/61, pulse 79, temperature 98.5 °F (36.9 °C), temperature source Axillary, resp. rate (!) 23, height 5' 9\" (1.753 m), weight 64.4 kg (142 lb), SpO2 96%.  Orthostatic Blood Pressures      Flowsheet Row Most Recent Value   Blood Pressure 136/61 filed at 09/09/2024 " 1450              Invasive Devices       Central Venous Catheter Line  Duration             CVC Central Lines 09/08/24 1 day              Peripheral Intravenous Line  Duration             Peripheral IV 09/08/24 Distal;Right;Upper;Ventral (anterior) Arm 1 day    Peripheral IV 09/08/24 Left;Proximal;Ventral (anterior) Forearm 1 day              Arterial Line  Duration             Arterial Line 09/08/24 Radial 1 day              Drain  Duration             NG/OG/Enteral Tube Orogastric Center mouth 1 day    Urethral Catheter 16 Fr. 1 day              Airway  Duration             ETT  Oral 8 mm 1 day                    Physical Exam  GEN: Marcin Sánchez appears and sedated  HEENT:  Normocephalic, atraumatic, anicteric, moist mucous membranes  NECK: No JVD or carotid bruits   HEART: Regular rhythm, regular rate, normal S1 and S2, no murmurs, clicks, gallops or rubs   LUNGS: Clear ventilated, wheezing bilaterally, no rales, or rhonchi  ABDOMEN:  Normoactive bowel sounds, soft, no tenderness, no distention  EXTREMITIES: peripheral pulses palpable; no edema  NEURO: no gross focal findings; cranial nerves grossly intact   SKIN:  Dry, intact, warm to touch    Lab Results: I have personally reviewed pertinent lab results.            Results from last 7 days   Lab Units 09/09/24  0459 09/08/24 2021 09/08/24  0545   POTASSIUM mmol/L 3.8 3.7 4.7   CO2 mmol/L 35* 35* 36*   CHLORIDE mmol/L 100 98 96   BUN mg/dL 41* 45* 45*   CREATININE mg/dL 0.79 0.96 1.13     Results from last 7 days   Lab Units 09/09/24  0802 09/09/24  0459 09/08/24 2021 09/08/24  1348   HEMOGLOBIN g/dL  --  10.1* 11.2* 10.6*   HEMATOCRIT %  --  31.1* 33.5* 31.1*   PLATELETS Thousands/uL 225 235 272 247             Imaging: I have personally reviewed pertinent reports.        Telemetry:   Normal sinus rhythm heart rate in 80s.    EKG:   Date: 9/8/2024  Interpretation: Normal sinus rhythm    ECHO:  Results for orders placed during the hospital encounter of  20    Echo complete with contrast if indicated    [facility]  [address], [place] [address] [phone]    Transthoracic Echocardiogram  2D, M-mode, Doppler, and Color Doppler    Study date:  2020    Patient: NILSA FOY  MR number: [id number]  Account number: [id number]  : 1960  Age: 60 years  Gender: Male  Status: Outpatient  Location: [facility] [place]  Height: 69 in  Weight: 174.7 lb  BP: 149/ 68 mmHg    Indications: Thoracic Aortic Aneurysm    Diagnoses: I71.9 - Aortic aneurysm of unspecified site, without rupture    Sonographer:  RED Duggan  Primary Physician:  Brandt Godinez MD  Referring Physician:  LANDNO Poe  Group:  [facility]  Interpreting Physician:  Jatinder Ag MD    IMPRESSIONS:  The examination was technically difficult.  This study is not of adequate qulaity to accurately assess the ascending aortic aneurysm size.  Recommend CT chest.    SUMMARY    LEFT VENTRICLE:  Systolic function was normal. Ejection fraction was estimated to be 60 %.  Although no diagnostic regional wall motion abnormality was identified, this possibility cannot be completely excluded on the basis of this study.    MITRAL VALVE:  There was mild regurgitation.    AORTIC VALVE:  There was moderate to severe regurgitation.  The aortic regurgitant diastolic pressure half-time was 314 ms.  The valve was not well visualized.    AORTA:  There was at least moderate dilatation of the ascending aorta, but measured in the subcostal view as parasternal views were technically difficult. (48mm)    HISTORY: PRIOR HISTORY: COPD, collapse of R lung, pleural effusion, alcohol abuse, lung cancer    PROCEDURE: The study was performed in the [facility] [place]. This was a routine study. The transthoracic approach was used. The study included complete 2D imaging, M-mode, complete spectral Doppler, and color  Doppler. The  heart rate was 64 bpm, at the start of the study. Images were obtained from the parasternal, apical, subcostal, and suprasternal notch acoustic windows. Echocardiographic views were limited due to poor patient compliance, poor acoustic  window availability, decreased penetration, and lung interference. This was a technically difficult study.    LEFT VENTRICLE: Size was normal. Systolic function was normal. Ejection fraction was estimated to be 60 %. Although no diagnostic regional wall motion abnormality was identified, this possibility cannot be completely excluded on the basis  of this study. Wall thickness was normal. DOPPLER: Left ventricular diastolic function parameters were normal.    RIGHT VENTRICLE: The size was normal. Systolic function was normal. Wall thickness was normal.    LEFT ATRIUM: Size was normal.    RIGHT ATRIUM: Size was normal.    MITRAL VALVE: Valve structure was normal. There was normal leaflet separation. DOPPLER: The transmitral velocity was within the normal range. There was no evidence for stenosis. There was mild regurgitation.    AORTIC VALVE: The valve was not well visualized. DOPPLER: Transaortic velocity was within the normal range. There was no evidence for stenosis. There was moderate to severe regurgitation.    TRICUSPID VALVE: The valve structure was normal. There was normal leaflet separation. DOPPLER: The transtricuspid velocity was within the normal range. There was no evidence for stenosis. There was no significant regurgitation.    PULMONIC VALVE: Leaflets exhibited normal thickness, no calcification, and normal cuspal separation. DOPPLER: The transpulmonic velocity was within the normal range. There was no significant regurgitation.    PERICARDIUM: There was no pericardial effusion. The pericardium was normal in appearance.    AORTA: The root exhibited normal size. There was at least moderate dilatation of the ascending aorta, but measured in the subcostal  view as parasternal views were technically difficult. (48mm)    SYSTEMIC VEINS: IVC: The inferior vena cava was normal in size.    MEASUREMENT TABLES    DOPPLER MEASUREMENTS  Aortic valve   (Reference normals)  Regurg PHT   314 ms   (--)    SYSTEM MEASUREMENT TABLES    2D  LA Area: 14.49 cm2  LVEDV MOD A4C: 115.18 ml  LVEF MOD A4C: 66.25 %  LVESV MOD A4C: 38.87 ml  LVLd A4C: 8.27 cm  LVLs A4C: 7.17 cm  RA Area: 12.63 cm2  RVIDd: 4.3 cm  SV MOD A4C: 76.31 ml    CW  AR Dec Loving: 3.71 m/s2  AR Dec Time: 1188.05 ms  AR PHT: 344.53 ms  AR Vmax: 4.38 m/s  AR maxP.7 mmHg  AV MaxP.15 mmHg  AV Vmax: 1.67 m/s    MM  TAPSE: 2.29 cm    PW  E' Sept: 0.05 m/s  E/E' Sept: 19.13  MV A Tello: 0.6 m/s  MV Dec Loving: 4.08 m/s2  MV DecT: 231.54 ms  MV E Tello: 0.95 m/s  MV E/A Ratio: 1.59  MV PHT: 67.15 ms  MVA By PHT: 3.28 cm2    [facility] Accredited Echocardiography Laboratory    Prepared and electronically signed by    Jatinder Ag MD  Signed 2020 14:48:25    Results for orders placed during the hospital encounter of 24    Echo complete w/ contrast if indicated    Interpretation Summary    Left Ventricle: Left ventricular cavity size is normal. Wall thickness is normal. The left ventricular ejection fraction is 35-40%. Systolic function is moderately reduced. There is moderate global hypokinesis.    Left Atrium: The atrium is mildly dilated.    Aortic Valve: There is moderate to severe regurgitation.    Aorta: The aortic root is mildly dilated. The ascending aorta is severely dilated. The aortic root is 4.10 cm. The ascending aorta is 5.4 cm.    Pericardium: There is a moderate pericardial effusion circumferential to the heart. The fluid exhibits no internal echoes. There is no echocardiographic evidence of tamponade.      Assessment & Plan     Pericardial effusion  Moderate Sized pericardial effusion noted on echocardiogram. Currenlt yon exma no clinical or echocardiographic signs of tamponade. He  is intubated and sedated for Acute respiratry failure. Requiring low dose pressors since code which have been downtrending. Interval echo without increase in side of echolucent effusion. May be more chronic effusion.  Plan:  - no evidence of tamponade, no indication at this time for pericardiocentesis  - Interventional cardiology made aware, discussed however not a good target or candidate for pericardiocentesis given location of effusion.  - If there is acute decompensation recommend fluids and repeat echo to evaluate effusion, if evidence of tamponade then would proceed with pericardial window vs pericardiocentesis  - recommend interval echocardiogram on 9/11/24    Cardiac arrest  Cardiac arrest appears to be in the setting of hypoxemia. Initial rhythm reported as PEA with ROSC then VT arrest. Possibly transient ischemia during PEA leating to VT. After intubation does not appear to have any further ectopy. Echocardiogram with LVEF 50-55%.  - continue treatment for COPD exacerbation as per primary team  - still requiring low dose pressors post cardiac arrest, weaning quickly    Chronic combined systolic and diastolic heart failure  Appears euvolemic on exam. Currently requireing pressors. Chronically on Lasix 40mg daily and toprol 25mg daily/  - holding BB after cardiac arrest and with effusion  - will need to restart lasix soon for volume maintenace, appears euvolemic today, consider restart tomorrow    Paroxysmal atrial fibrillation  Current yin sinus rhythm. Rates are controlled.  - holding betablocker while requiring pressors  - continue AC with heparin currently, once no furhter procedures required transition back to [facility].    Acute Hypoxemic Respiratory failure- intubets and sedated, wean as able as per primary team  COPD- steroids and inhalers as per primary.  Pulmonary fibrosis  Current Tobacco use- tobacco cessation recomended      Case discussed and reviewed with Dr. Silva who agrees with my assessment  and plan.    Thank you for involving us in the care of your patient.      Adam Nunez, DO  Cardiology Fellow   PGY-5    ==========================================================================================    Epic/ Allscripts/Care Everywhere records reviewed    ** Please Note: Fluency DirectDictation voice to text software may have been used in the creation of this document. **

## 2024-09-09 NOTE — DISCHARGE INSTR - OTHER ORDERS
Skin Care Plan:  1-Mid Sacro-Buttocks Wound: Cleanse sacro-buttocks with soap and water. Apply melgisorb ag to wound bed and cover with Silicone Bordered Foam Dressing(Mepilex) to area. Ramírez with T for Treatment. Change every other day or PRN.  2-Turn/reposition q2h or when medically stable for pressure re-distribution on skin .  3-Elevate heels to offload pressure.  4-Moisturize skin daily with skin nourishing cream  5-Ehob cushion in chair when out of bed.  6-Cleanse B/L Heels with soap and water. Pat dry. Apply Silicone Border Foam (Mepilex) to areas. Ramírez with P for Prevention and change every 3 days or PRN soilage/displacement. Peel back and inspect Q-shift.

## 2024-09-09 NOTE — CONSULTS
Consultation - Thoracic Surgery  Marcin Sánchez 64 y.o. male MRN: [id number]  Unit/Bed#: MICU 10 Encounter: [id number]        Assessment & Plan     Assessment:  63 yo male with pericardial effusion concerning for early tamponade    Plan:  Keep NPO  Hold heparin drip  Last dose of Eliquis 9/8-continue to hold Eliquis  Continue ABX, solumedrol, protonix  PRN pain   PRN anti nausea meds  Appreciate pulm recs  Rest of care per ICU  Possible OR today for pericardial window pending final results of today's ECHO  Also consider possible thoracentesis    History of Present Illness     HPI:  Marcin Sánchez is a 64 y.o. male who presents as a transfer from [place] for further evaluation of pericardial effusion, concerning for cardiac tamponade. History obtained from the patient's chart and from his sister as patient is currently intubated, sedated. The patient was at his living facility, went to smoke a cigarette, become short of breath and unconscious. Patient was brought to [place] ED for further evaluation. Concern for mixed picture of CHF exacerbation/COPD exacerbation/septic shock. 9/7 CT CAP with contrast concerning for: Small bilateral pleural effusions. Increased bibasilar consolidation. Right perihilar postradiation fibrosis. Patient underwent echo yesterday which was concerning for EF 55%. Moderate pericardial effusion. RV underfilled. Unable to rule out early tamponade.    PMHX is notable for alcohol abuse, anxiety, squamous cell lung cancer s/p chemorads, last seen by oncology on 4/2024, Afib on Eliquis which was last given 9/8, COPD, hypothyroidism, depression. No significant past surgical history. Patient transferred to [place] for further evaluation by thoracic surgery.     Patient is currently afebrile with stable vitals. On SPONT settings pressure support of 10, Peep of 6, FiO2 of 40%.  cc. OG output 0 cc. Drips include propofol at 20, levo at 7, heparin drip which is now held, fentanyl at 50,  "and precedex. WBC 15 from 14.7. Hgb 10.1 from 11.2. Cr 0.79 from 0.96. LFTS mildly elevated. ABG 7.41/54/33/+7.9. See above for most recent imaging. See above for assessment and plan:    Review of Systems  See above, otherwise negative.     Historical Information   Past Medical History:   Diagnosis Date    Alcohol abuse     Anxiety     Aortic insufficiency 12/18/2020    Atrial fibrillation (HCC)     Cancer (HCC)     COPD (chronic obstructive pulmonary disease) (HCC)     Delirium     Encephalopathy     Epilepsy (HCC)     History of chemotherapy     History of radiation therapy     Hypo-osmolality and hyponatremia     Hypokalemia     Hypothyroid     Lung cancer (HCC)     Lyme disease     Major depression      Past Surgical History:   Procedure Laterality Date    BRONCHOSCOPY N/A 10/3/2016    Procedure: BRONCHOSCOPY FLEXIBLE;  Surgeon: John Brunner DO;  Location: [facility];  Service:     HAND SURGERY      PELVIC FRACTURE SURGERY      REMOVAL VENOUS PORT (PORT-A-CATH) Left 9/18/2018    Procedure: REMOVAL VENOUS PORT (PORT-A-CATH)IR;  Surgeon: Randy Lopez DO;  Location: [facility];  Service: Interventional Radiology     Social History   Social History     Substance and Sexual Activity   Alcohol Use Never     Social History     Substance and Sexual Activity   Drug Use Never     Social History     Tobacco Use   Smoking Status Every Day    Current packs/day: 1.50    Average packs/day: 1.5 packs/day for 47.0 years (70.5 ttl pk-yrs)    Types: Cigarettes   Smokeless Tobacco Never   Tobacco Comments    smoking more than 1 ppd     Family History: non-contributory    Meds/Allergies   all medications and allergies reviewed  No Known Allergies    Objective   First Vitals:   Blood Pressure: 150/67 (09/08/24 2200)  Pulse: 96 (09/08/24 2000)  Temperature: 97.9 °F (36.6 °C) (09/08/24 2100)  Temp Source: Axillary (09/08/24 2100)  Respirations: 20 (09/08/24 2000)  Height: 5' 9\" (175.3 cm) (09/09/24 0800)  Weight - Scale: 64.5 kg " "(142 lb 3.2 oz) (09/09/24 0540)  SpO2: 97 % (09/08/24 2000)    Current Vitals:   Blood Pressure: 111/53 (09/09/24 0800)  Pulse: 69 (09/09/24 0800)  Temperature: 97.7 °F (36.5 °C) (09/09/24 0800)  Temp Source: Axillary (09/09/24 0800)  Respirations: (!) 23 (09/09/24 0800)  Height: 5' 9\" (175.3 cm) (09/09/24 0800)  Weight - Scale: 64.4 kg (142 lb) (09/09/24 0800)  SpO2: 100 % (09/09/24 0800)      Intake/Output Summary (Last 24 hours) at 9/9/2024 0843  Last data filed at 9/9/2024 0600  Gross per 24 hour   Intake 807.66 ml   Output 450 ml   Net 357.66 ml       Invasive Devices       Central Venous Catheter Line  Duration             CVC Central Lines 09/08/24 <1 day              Peripheral Intravenous Line  Duration             Peripheral IV 09/08/24 Distal;Right;Upper;Ventral (anterior) Arm 1 day    Peripheral IV 09/08/24 Left;Proximal;Ventral (anterior) Forearm 1 day              Arterial Line  Duration             Arterial Line 09/08/24 Radial <1 day              Drain  Duration             NG/OG/Enteral Tube Orogastric Center mouth 1 day    Urethral Catheter 16 Fr. 1 day              Airway  Duration             ETT  Oral 8 mm 1 day                    Physical Exam:  General: No acute distress. Comfortable on ventilator  Neuro: Intubated and sedated, could not assess  Eyes: Extraocular movements intact  CV: Well perfused, regular rate and rhythm  Lungs: Normal work of breathing, no increased respiratory effort  Abdomen: Soft, non-tender, non-distended.  Extremities: No edema, clubbing or cyanosis  Skin: Warm, dry  Lymph: No palpable lymph nodes  Psych: Sedated      Lab Results: I have personally reviewed pertinent lab results.    Imaging: I have personally reviewed pertinent reports.    EKG, Pathology, and Other Studies: I have personally reviewed pertinent reports.      Code Status: Level 1 - Full Code  Advance Directive and Living Will: Yes    Power of :    POLST:      Elio Goncalves MD  General Surgery " Resident

## 2024-09-09 NOTE — SEPSIS NOTE
Sepsis Note   Marcin Sánchez 64 y.o. male MRN: [id number]  Unit/Bed#: MICU 10 Encounter: [id number]       Initial Sepsis Screening       Row Name 09/09/24 0827                Is the patient's history suggestive of a new or worsening infection? No  -GG                  User Key  (r) = Recorded By, (t) = Taken By, (c) = Cosigned By      Initials Name Provider Type    GG Tobi Hook MD Fellow                        Body mass index is 21 kg/m².  Wt Readings from Last 1 Encounters:   09/09/24 64.5 kg (142 lb 3.2 oz)     IBW (Ideal Body Weight): 70.7 kg    Ideal body weight: 70.7 kg (155 lb 13.8 oz)

## 2024-09-09 NOTE — RESPIRATORY THERAPY NOTE
RT Ventilator Management Note  Marcin Sánchez 64 y.o. male MRN: [id number]  Unit/Bed#: [facility] 10 Encounter: [id number]      Daily Screen         9/8/2024  1116 9/9/2024  0732          Patient safety screen outcome:: Failed Passed      Not Ready for Weaning due to:: Underline problem not resolved --                Physical Exam:   Assessment Type: Assess only  General Appearance: Sleeping  Respiratory Pattern: Assisted  Chest Assessment: Chest expansion symmetrical  Bilateral Breath Sounds: Diminished, Clear      Resp Comments: (P) Pt transitiioned from SPONT to control mode at this time per protocol. Pt is resting on PC12/14/40%/+6. will continue to monitor.

## 2024-09-09 NOTE — H&P
[facility]  H&P: Critical Care  Name: Marcin Sánchez 64 y.o. male I MRN: [id number]  Unit/Bed#: MICU 10 I Date of Admission: 9/8/2024   Date of Service: 9/8/2024 I Hospital Day: 0      Assessment & Plan   Neuro:   Diagnosis:Epilepsy  Phenytoin held for hypercarbia  Dilantin held: get level  Seizure precautions  Scheduled Neuro checks   Diagnosis: Acute Metabolic encephalopathy  CT head: No acute intracranial abnormality with chronic old left basal ganglia lacunar infarct  Hypercapnia during icu admission during rapid response 9/7  CO2 84 -> bipap -> intubation    CV:   Diagnosis: Pericardial Effusion  Previous echo EF55%  Transferred to [facility] for repeat echo tomorrow  Possible pericardial window   Diagnosis: Cardiac Arrest 9/7  Following intubation for copd/ hypercarbia  Pea with rosc after cpr  VT following rosc -> 200 joule shock -> sinus rhythm  Diagnosis: A Fib  Hold eliquis, heparin gtt  Hold metoprolol  Diagnosis: Chronic CHF  Plan: hold lasix  Last bnp 404  Remain intubated  Repeat echo in the morning    Pulm:  Diagnosis: Squamous Cell Lung Cancer 2016  Active tobacco user 10 cigarettes daily  S/p chemo radiation   Last seen oncology April 2024  Diagnosis: ARDS  In setting of pulmonary fibrosis/ scc, copd, and chronic PE  Continue mechanical ventilation  Scheduled nebs  Iv solumedrol   Daily P/F ratio   Diagnosis: COPD  Admitted to hospital for copd exacerbation  Home oxygen 2-3L   Plan: maintain intubation  Duoneb TID  Solumedrol 40mg iv BID  Levalbuterol 1.25 prn q4    GI:   Diagnosis: Transaminitis  Plan: trend AST/ALT Biliruibin and Ammonia  Suspected to be due to cardiac arrest  Hold phenytoin    :   No active issues    F/E/N:    F: replete as needed  E: replete as needed  N: NPO    Heme/Onc:   Diagnosis: chronic PE  Plan: Eliquis held. Heparin gtt     Endo:   No active issues    ID:   Diagnosis: Pneumonia  Plan: ceftriaxone 1g daily. Azithromycin 500mg  bid    MSK/Skin:   No active issues    Disposition: Critical care       History of Present Illness     HPI: Marcin Sánchez is a 64 y.o. who presents with past medical history for acute respiratory failure following COPD exacerbation who required BiPAP and intubation for hypercarbia resulting in PEA and spontaneous ROSC after CPR.  The patient was found to have a chronic pericardial effusion which appeared to be larger than previously reported.  The patient had possible tamponade physiology and was sent over to [facility] for monitoring, repeat echo and possible pericardial window placement    History obtained from chart review.  Review of Systems: Review of Systems   Unable to perform ROS: Intubated       Historical Information   Past Medical History:  No date: Alcohol abuse  No date: Anxiety  12/18/2020: Aortic insufficiency  No date: Atrial fibrillation (HCC)  No date: Cancer (HCC)  No date: COPD (chronic obstructive pulmonary disease) (HCC)  No date: Delirium  No date: Encephalopathy  No date: Epilepsy (HCC)  No date: History of chemotherapy  No date: History of radiation therapy  No date: Hypo-osmolality and hyponatremia  No date: Hypokalemia  No date: Hypothyroid  No date: Lung cancer (HCC)  No date: Lyme disease  No date: Major depression Past Surgical History:  10/3/2016: BRONCHOSCOPY; N/A      Comment:  Procedure: BRONCHOSCOPY FLEXIBLE;  Surgeon: John Brunner DO;  Location: [facility];  Service:   No date: HAND SURGERY  No date: PELVIC FRACTURE SURGERY  9/18/2018: REMOVAL VENOUS PORT (PORT-A-CATH); Left      Comment:  Procedure: REMOVAL VENOUS PORT (PORT-A-CATH)IR;                 Surgeon: Randy Lopez DO;  Location: [facility];                 Service: Interventional Radiology   Current Outpatient Medications   Medication Instructions    Advair -21 MCG/ACT inhaler No dose, route, or frequency recorded.    albuterol (PROVENTIL HFA,VENTOLIN HFA) 90 mcg/act inhaler 2  puffs, Inhalation, Every 6 hours PRN    apixaban (ELIQUIS) 5 mg, Oral, 2 times daily    furosemide (LASIX) 40 mg, Oral, Daily    gabapentin (NEURONTIN) 400 mg, Oral, 2 times daily    levalbuterol (XOPENEX) 0.63 mg, Nebulization, Every 8 hours PRN    metoprolol succinate (TOPROL-XL) 25 mg, Oral, Daily    phenytoin (DILANTIN) 100 mg, Oral, 2 times daily, And 250mg at 5pm    potassium chloride (Klor-Con M20) 20 mEq tablet 20 mEq, Oral, Daily    sodium chloride 1 g, Oral, 2 times daily with meals    tiotropium (SPIRIVA) 18 mcg, Inhalation, Daily    Vitamin D, Cholecalciferol, 1000 UNITS CAPS 2 capsules, Oral, Daily    No Known Allergies   Social History     Tobacco Use    Smoking status: Every Day     Current packs/day: 1.50     Average packs/day: 1.5 packs/day for 47.0 years (70.5 ttl pk-yrs)     Types: Cigarettes    Smokeless tobacco: Never    Tobacco comments:     smoking more than 1 ppd   Vaping Use    Vaping status: Never Used   Substance Use Topics    Alcohol use: Never    Drug use: Never    Family History   Problem Relation Age of Onset    Diabetes Mother     Lung cancer Father           Objective                            Vitals I/O      Most Recent Min/Max in 24hrs   Temp   Temp  Min: 98.1 °F (36.7 °C)  Max: 98.5 °F (36.9 °C)   Pulse   Pulse  Min: 60  Max: 133   Resp   Resp  Min: 4  Max: 110   BP   BP  Min: 93/53  Max: 173/79   O2 Sat   SpO2  Min: 45 %  Max: 100 %    No intake or output data in the 24 hours ending 09/08/24 2039    Diet NPO    Invasive Monitoring           Physical Exam   Physical Exam  Eyes:      Extraocular Movements: Extraocular movements intact.      Pupils: Pupils are equal, round, and reactive to light.   Skin:     General: Skin is cool.      Coloration: Skin is not jaundiced.      Findings: No wound.   Cardiovascular:      Rate and Rhythm: Normal rate.      Pulses: Normal pulses.      Comments: Muffled heart sounds  Abdominal:      Palpations: Abdomen is soft.      Tenderness: There is  no abdominal tenderness.   Constitutional:       Appearance: He is well-developed.      Interventions: He is sedated, intubated and restrained.   Pulmonary:      Effort: No accessory muscle usage or accessory muscle usage. He is intubated.      Breath sounds: No stridor. Wheezing present.   Secretions are normal.         Diagnostic Studies      EKG: Sinus with 1st degree AV block  Imaging:   XR chest portable    (Results Pending)           Medications:  Scheduled PRN   [START ON 9/9/2024] azithromycin, 500 mg, Q24H  budesonide, 0.5 mg, Q12H  [START ON 9/9/2024] cefTRIAXone, 1,000 mg, Q24H  chlorhexidine, 15 mL, Q12H [place]  ipratropium, 0.5 mg, TID  levalbuterol, 1.25 mg, TID  methylPREDNISolone sodium succinate, 40 mg, Q12H [place]  [START ON 9/9/2024] nicotine, 1 patch, Daily  [START ON 9/9/2024] pantoprazole, 40 mg, Q24H [place]  sodium chloride, 4 mL, TID      acetaminophen, 650 mg, Q6H PRN  albuterol, 2.5 mg, Q6H PRN  fentaNYL, 50 mcg, Q2H PRN  heparin (porcine), 1,800 Units, Q6H PRN  heparin (porcine), 3,600 Units, Q6H PRN       Continuous    fentaNYL, 50 mcg/hr  heparin (porcine), 3-20 Units/kg/hr (Order-Specific)  norepinephrine, 1-30 mcg/min  propofol, 5-50 mcg/kg/min         Labs:    CBC    Recent Labs     09/08/24  1348 09/08/24 2021   WBC 14.35* 14.77*   HGB 10.6* 11.2*   HCT 31.1* 33.5*    272     BMP    Recent Labs     09/08/24  0049 09/08/24  0545   SODIUM 142 142   K 4.2 4.7   CL 97 96   CO2 37* 36*   AGAP 8 10   BUN 44* 45*   CREATININE 1.20 1.13   CALCIUM 10.1 9.6       Coags    Recent Labs     09/08/24  1348   INR 2.14*   PTT 45*        Additional Electrolytes  Recent Labs     09/08/24  0008 09/08/24  0049 09/08/24  0545   MG  --  2.2 2.2   PHOS  --  5.7* 3.4   CAIONIZED 1.45* 1.05*  --           Blood Gas    Recent Labs     09/08/24 2030   PHART 7.416   LEL9ZYA 51.0*   PO2ART 112.3   DPK8ROY 32.0*   BEART 6.3   SOURCE Line, Arterial     Recent Labs     09/08/24  0638 09/08/24  1536 09/08/24 2030    PHVEN 7.511*  --   --    DPK0VTC 38.4*  --   --    PO2VEN 39.5  --   --    YWJ4REO 30.0  --   --    BEVEN 6.7  --   --    I7VQIFD 76.7  --   --    SOURCE  --    < > Line, Arterial    < > = values in this interval not displayed.    LFTs  Recent Labs     09/08/24 0049 09/08/24  0545   * 438*   * 864*   ALKPHOS 82 92   ALB 3.5 3.3*   TBILI 0.98 1.04*       Infectious  Recent Labs     09/07/24  0616 09/08/24  0609   PROCALCITONI 0.75* 1.56*     Glucose  Recent Labs     09/07/24  0040 09/07/24  0616 09/08/24  0049 09/08/24  0545   GLUC 166* 136 137 142*               Enrique Salas MD

## 2024-09-09 NOTE — RESPIRATORY THERAPY NOTE
RT Protocol Note  Marcin Sánchez 64 y.o. male MRN: [id number]  Unit/Bed#: [facility] 10 Encounter: [id number]    Assessment    Active Problems:  There are no active Hospital Problems.      Home Pulmonary Medications:         Past Medical History:   Diagnosis Date    Alcohol abuse     Anxiety     Aortic insufficiency 12/18/2020    Atrial fibrillation (HCC)     Cancer (HCC)     COPD (chronic obstructive pulmonary disease) (HCC)     Delirium     Encephalopathy     Epilepsy (HCC)     History of chemotherapy     History of radiation therapy     Hypo-osmolality and hyponatremia     Hypokalemia     Hypothyroid     Lung cancer (HCC)     Lyme disease     Major depression      Social History     Socioeconomic History    Marital status:      Spouse name: Not on file    Number of children: Not on file    Years of education: Not on file    Highest education level: Not on file   Occupational History    Not on file   Tobacco Use    Smoking status: Every Day     Current packs/day: 1.50     Average packs/day: 1.5 packs/day for 47.0 years (70.5 ttl pk-yrs)     Types: Cigarettes    Smokeless tobacco: Never    Tobacco comments:     smoking more than 1 ppd   Vaping Use    Vaping status: Never Used   Substance and Sexual Activity    Alcohol use: Never    Drug use: Never    Sexual activity: Not on file   Other Topics Concern    Not on file   Social History Narrative    Not on file     Social Determinants of Health     Financial Resource Strain: Not on file   Food Insecurity: No Food Insecurity (9/6/2024)    Hunger Vital Sign     Worried About Running Out of Food in the Last Year: Never true     Ran Out of Food in the Last Year: Never true   Transportation Needs: No Transportation Needs (9/6/2024)    PRAPARE - Transportation     Lack of Transportation (Medical): No     Lack of Transportation (Non-Medical): No   Physical Activity: Not on file   Stress: Not on file   Social Connections: Not on file   Intimate Partner Violence: Not on  file   Housing Stability: Low Risk  (9/6/2024)    Housing Stability Vital Sign     Unable to Pay for Housing in the Last Year: No     Number of Times Moved in the Last Year: 0     Homeless in the Last Year: No       Subjective         Objective    Physical Exam:   Assessment Type: Assess only  General Appearance: Sedated  Respiratory Pattern: Assisted  Chest Assessment: Chest expansion symmetrical  Bilateral Breath Sounds: Coarse    Vitals:  There were no vitals taken for this visit.    Results from last 7 days   Lab Units 09/08/24 2030 09/08/24  1828   PH ART  7.416 7.444   PCO2 ART mm Hg 51.0* 46.5*   PO2 ART mm Hg 112.3 134.5*   HCO3 ART mmol/L 32.0* 31.2*   BASE EXC ART mmol/L 6.3 6.2   O2 CONTENT ART mL/dL 16.9 16.2   O2 HGB, ARTERIAL % 97.3* 97.9*   ABG SOURCE  Line, Arterial Line, Arterial   AUGUST TEST   --  Yes       Imaging and other studies: I have personally reviewed pertinent reports.            Plan    Respiratory Plan: Vent/NIV/HFNC        Resp Comments: (P) Pt received from [facility] on vent and placed on listed settings 12/6 14 50% and tolerating well at this time. Pt had reported to [place] ED in COPD exacerbation, while admitted pt had a cardiac arrest. Pt is here for possible pericardial window. Pt takes is ordered pulmicort, xopenex atovent tid, albuterol PRN at this time. Pt also ordered vest therapy and hypertonic for increased sputum production. Will continue to monitor per protocol.        09/08/24 2020   Respiratory Assessment   Assessment Type Assess only   General Appearance Sedated   Respiratory Pattern Assisted   Chest Assessment Chest expansion symmetrical   Bilateral Breath Sounds Coarse   Resp Comments Pt received from [facility] on vent and placed on listed settings 12/6 14 50% and tolerating well at this time. Pt had reported to [place] ED in COPD exacerbation, while admitted pt had a cardiac arrest. Pt is here for possible pericardial window. Pt takes is ordered  pulmicort, xopenex atovent tid, albuterol PRN at this time. Pt also ordered vest therapy and hypertonic for increased sputum production. Will continue to monitor per protocol.   Vent Information   Vent ID [id number]   Vent type Morris G5   Morris Vent Mode P-CMV/PCV+   $ Vent Charge-INITIAL Yes   Ventilator Start Yes   $ Pulse Oximetry Spot Check Charge Completed   P-CMV/PCV+ Settings   Resp Rate (BPM) 14 BPM   Pressure Control/Pinsp (cmH20) 12 cmH20   Insp Time (S) 1 sec   FiO2 (%) 50 %   PEEP (cmH2O) 6 cmH2O   I:E Ratio 1/3.3   Insp Resistance 12   P-ramp (ms) 50 (ms)   Flow Trigger (LPM) 5 LPM   Humidification Heater   Heater Temp 87.8 °F (31 °C)   P-CMV/PCV+ Actuals   Resp Rate (BPM) 17 BPM   VT (mL) 367 mL   MV 6.2   MAP (cmH2O) 9.3 cmH2O   Peak Pressure (cmH2O) 19 cmH2O   I:E Ratio (Obs) 1/3.2   Static Compliance (mL/cmH2O) 19.4 mL/cmH2O   Plateau Pressure (cmH2O) 19 cmH2O   Heater Temperature (Obs) 88 °F (31.1 °C)   P-CMV/PCV+ Alarms    High Peak Pressure (cmH2O) 40 cmH2O   Low Pressure (cmH2O) 5 cm H2O   High Resp Rate (BPM) 40 BPM   Low Resp Rate (BPM) 8 BPM   High MV (L/min) 20 L/min   Low MV (L/min) 4 L/min   High Spont VTE (mL) 1000 mL   Low Spont VTE (mL) 250 mL   Maintenance   Alarm (pink) cable attached No   Resuscitation bag with peep valve at bedside Yes   Water bag changed No   Circuit changed No   IHI Ventilator Associated Pneumonia Bundle   Head of Bed Elevated HOB 30

## 2024-09-09 NOTE — UTILIZATION REVIEW
Initial Clinical Review    Admission: Date/Time/Statement:   Admission Orders (From admission, onward)       Ordered        09/06/24 0220  INPATIENT ADMISSION  Once                          Orders Placed This Encounter   Procedures    INPATIENT ADMISSION     Standing Status:   Standing     Number of Occurrences:   1     Order Specific Question:   Level of Care     Answer:   Level 2 Stepdown / HOT [14]     Order Specific Question:   Estimated length of stay     Answer:   More than 2 Midnights     Order Specific Question:   Certification     Answer:   I certify that inpatient services are medically necessary for this patient for a duration of greater than two midnights. See H&P and MD Progress Notes for additional information about the patient's course of treatment.       Initial Presentation: 64 y.o. male who presented as a transfer from [facility] ED to [facility] as a direct admission. Admitted as Inpatient  Stepdown Level of Care for evaluation and treatment of Acute on chronic hypoxic and hypercarpnic resp failure, COPD exacerbation, Acute Metabolic Encephalopathy.      PMHx:  has a past medical history of Alcohol abuse, Anxiety, Aortic insufficiency (12/18/2020), Atrial fibrillation (HCC), Cancer (HCC), COPD (chronic obstructive pulmonary disease) (HCC), Delirium, Encephalopathy, Epilepsy (HCC), History of chemotherapy, History of radiation therapy, Hypo-osmolality and hyponatremia, Hypokalemia, Hypothyroid, Lung cancer (HCC), Lyme disease, and Major depression.      Presented w/ respiratory distress from his facility [facility]. Pt was noted to be smoking a cigarette outside, when he stood up, pt appeared confused. Staff noted he appeared SOB and found him to be hypoxic prompting them to call EMS. Pt's baseline O2 2-3L. Upon arrival to the ED at [facility]), pt lethargic and confused. Initially there was concern patient may have been postictal given history of epilepsy. However, pt found to be  hypercarbic and patient mentation began to improve on BiPAP therapy and breathing treatments in the ED. No witnessed seizure-like activity, no tongue bite. Pt was transferred to [facility] for higher level of care.  On exam, at [facility], pt with diffuse expiratory wheezing. Still on Bipap but able to talk and does not appear lethargic. Labs PH caitlin: PO2 76.5, HCO3 39.9, Creat 0.59, H/H 10.8/33.2.  (appears at pt's baseline)  Change in mental status likely due to hypercarbia.     Plan: IV solumedrol, Scheduled nebs with home inhaler regimen, Wean O2 as tolerated, Bipap QHS and PRN, Continue to trend VBG. 3 days of azithromycin . Neuro checks. Seizure precautions. Continue Phenytoin 100 mg BID and 250 mg Phenytoin at 5pm, Gabapentin BID. Lasix 40 mg PO daily, Daily weights.  Pulm consulted.    Pulmonary Consult: Acute hypoxic and hypercapnic respiratory failure probably due to COPD exacerbation.  Although still hypercapnic, pH is normalizing and unlikely to be the sole cause of his altered mental status. Plan Continue Bipap nightly, repeat VBG in am. Weant O2 for SpO2 greater than 88%. Decrease steroids in the setting of delirium, Continue Pulmicort BID, Duonebs TID. Stop Incruse to avoid duplication of anticholinergics.     Anticipated Length of Stay/Certification Statement: : Patient will be admitted on an inpatient basis with an anticipated length of stay of greater than 2 midnights secondary to acute resp failure with hypercarbia and hypoxia, secondary to COPD exacerbation.     Date: 9/7/24   Day 2: A rapid response was called last night on pt for altered mental status, was nonverbal with jerking extremities. This morning, pt lethargic on Bipap. Lungs, with diffuse wheezing and mild rhonchi, not using accessory muscles. Acute metabolic encephalopathy likely due to hypercapnia/delirium. VBG shows improvement in PH and pCO2. Labs: Venous BG PH 7.32, PCO2 70.0, pO2 47.4, HCO3 35.5, O2 HCG 81.7.   Plan: Keep pt on Bipap whenever sleeping due to poor respiratory reserve. Maintain steroids at same dosing. Continue Pulmicort BID, Duonebs TID. Azithromycin.  Neuro checks. Seizure precautions. Continue Phenytoin 100 mg BID and 250 mg Phenytoin at 5pm, Gabapentin BID. Lasix 40 mg PO daily, Daily weights. Continue trending Venous BG.    ED Triage Vitals [09/06/24 0230]   Temperature Pulse Respirations Blood Pressure SpO2 Pain Score   97.8 °F (36.6 °C) 76 22 132/60 97 % No Pain     Weight (last 2 days) before discharge       Date/Time Weight    09/08/24 1000 60.8 (134)    09/08/24 0458 60.9 (134.26)    09/07/24 0600 62.9 (138.6)            Vital Signs (last 3 days) before discharge       Date/Time Temp Pulse Resp BP MAP (mmHg) Arterial Line BP MAP SpO2 FiO2 (%) O2 Flow Rate (L/min) O2 Device O2 Interface Device Patient Position - Orthostatic VS Alecia Coma Scale Score Pain    09/08/24 1909 -- -- -- -- -- -- -- -- -- -- -- -- -- -- Med Not Given for Pain - for MAR use only    09/08/24 1825 -- 100 48 161/71 102 139/48 79 mmHg 98 % -- -- -- -- -- -- --    09/08/24 1815 -- 94 44 -- -- 125/42 70 mmHg 98 % -- -- -- -- -- -- --    09/08/24 1727 -- 83 16 -- -- 114/35 61 mmHg 98 % -- -- -- -- -- -- --    09/08/24 1718 -- 82 16 127/59 85 111/35 60 mmHg 98 % -- -- -- -- -- -- --    09/08/24 1623 -- 83 17 127/61 88 115/36 62 mmHg 99 % -- -- -- -- -- -- --    09/08/24 1615 -- -- -- -- -- -- -- -- -- -- -- -- -- 7 --    09/08/24 1600 -- 82 14 -- -- 118/37 64 mmHg 99 % -- -- -- -- -- -- --    09/08/24 1537 -- 82 14 -- -- 122/39 68 mmHg 100 % -- -- -- -- -- -- --    09/08/24 1528 -- 82 14 113/55 79 113/39 65 mmHg 100 % -- -- -- -- -- -- --    09/08/24 1510 -- 82 18 -- -- 105/43 65 mmHg 99 % -- -- -- -- -- -- --    09/08/24 1445 -- 79 11 -- -- 105/48 68 mmHg 98 % -- -- -- -- -- -- --    09/08/24 1430 -- 78 14 -- -- 102/46 65 mmHg 99 % -- -- -- -- -- -- --    09/08/24 1349 -- -- -- -- -- -- -- 97 % -- -- -- -- -- -- --    09/08/24  1345 -- 74 14 -- -- 125/54 78 mmHg 98 % -- -- -- -- -- -- --    09/08/24 1300 -- 77 8 118/57 82 -- -- 97 % -- -- -- -- -- -- --    09/08/24 1245 -- 79 13 98/52 70 -- -- 99 % -- -- -- -- -- -- --    09/08/24 1230 -- 95 33 111/74 87 -- -- 90 % -- -- -- -- -- 7 --    09/08/24 1200 98.5 °F (36.9 °C) 78 11 113/59 82 -- -- 98 % -- -- -- -- -- -- --    09/08/24 1145 -- 79 8 111/57 82 -- -- 98 % -- -- -- -- -- -- --    09/08/24 1130 -- 95 27 122/59 85 -- -- 96 % -- -- -- -- -- -- --    09/08/24 1116 -- -- -- -- -- -- -- 98 % -- -- -- -- -- -- --    09/08/24 1115 -- 77 11 109/56 79 -- -- 98 % -- -- -- -- -- -- --    09/08/24 1100 -- 78 14 110/57 80 -- -- 98 % -- -- -- -- -- -- --    09/08/24 1045 -- 78 9 124/56 81 -- -- 97 % -- -- -- -- -- -- --    09/08/24 1030 -- 78 9 99/58 76 -- -- 97 % -- -- -- -- -- -- --    09/08/24 1015 -- 80 7 98/57 75 -- -- 97 % -- -- -- -- -- -- --    09/08/24 1000 -- 80 13 100/57 73 -- -- 97 % -- -- Ventilator -- -- -- --    09/08/24 0945 -- 81 11 99/58 75 -- -- 96 % -- -- -- -- -- -- --    09/08/24 0930 -- 81 4 114/59 83 -- -- 96 % -- -- -- -- -- -- --    09/08/24 0915 -- 80 -- 111/56 81 -- -- 100 % -- -- -- -- -- -- --    09/08/24 0900 -- 80 -- 110/53 77 -- -- 100 % -- -- -- -- -- 7 --    09/08/24 0830 -- 79 7 131/54 78 -- -- 100 % -- -- -- -- -- -- --    09/08/24 0815 -- 77 7 128/60 86 -- -- 100 % -- -- -- -- -- -- --    09/08/24 0803 -- -- -- -- -- -- -- 98 % -- -- -- -- -- -- --    09/08/24 0802 -- -- -- -- -- -- -- 91 % -- -- -- -- -- -- --    09/08/24 0749 -- 77 10 106/46 66 -- -- 99 % -- -- -- -- -- -- --    09/08/24 0744 98.1 °F (36.7 °C) -- -- -- -- -- -- -- -- -- -- -- -- -- --    09/08/24 0736 -- 77 -- 128/60 86 -- -- -- -- -- -- -- -- -- --    09/08/24 0700 -- 75 10 134/58 84 -- -- 100 % -- -- Ventilator -- -- -- --    09/08/24 0630 -- 75 20 125/60 86 -- -- 99 % -- -- -- -- -- -- --    09/08/24 0530 -- 75 22 150/63 91 -- -- 99 % -- -- Ventilator -- -- -- --    09/08/24 0500 -- 75 18  106/62 75 -- -- 97 % -- -- -- -- -- 8 --    09/08/24 0446 -- -- -- -- -- -- -- 100 % -- -- -- -- -- -- --    09/08/24 0400 98.5 °F (36.9 °C) 72 22 100/51 73 -- -- 98 % -- -- Ventilator -- -- -- --    09/08/24 0310 -- -- -- -- -- -- -- 93 % -- -- -- -- -- -- --    09/08/24 0300 -- 69 22 113/59 79 -- -- 99 % -- -- -- -- -- -- --    09/08/24 0256 -- -- -- -- -- -- -- -- -- -- -- -- -- -- Med Not Given for Pain - for MAR use only    09/08/24 0255 -- -- -- -- -- -- -- -- -- -- -- -- -- -- Med Not Given for Pain - for MAR use only    09/08/24 0200 -- 66 22 115/57 82 -- -- 100 % -- -- -- -- -- -- --    09/08/24 0142 -- -- -- -- -- -- -- -- -- -- -- -- -- -- Med Not Given for Pain - for MAR use only    09/08/24 0100 -- 133 20 137/73 99 -- -- -- -- -- -- -- -- 8 --    09/08/24 0030 -- -- -- -- -- -- -- -- -- -- -- -- -- -- Med Not Given for Pain - for MAR use only    09/08/24 0009 -- -- -- -- -- -- -- -- -- -- -- -- -- -- Med Not Given for Pain - for MAR use only    09/08/24 0000 -- -- -- -- -- -- -- 100 % -- -- Ventilator -- -- -- --    09/07/24 2354 -- 119 25 173/79 -- -- -- 77 % -- -- -- -- -- -- --    09/07/24 2352 -- -- -- 136/88 -- -- -- -- -- -- -- -- -- -- --    09/07/24 2351 -- 118 107 -- -- -- -- 64 % -- -- -- -- -- -- --    09/07/24 2348 -- 65 110 -- -- -- -- 45 % -- -- -- -- -- -- --    09/07/24 2300 -- 60 26 93/53 71 -- -- 97 % 50 -- BiPAP -- -- -- --    09/07/24 2242 -- -- -- -- -- -- -- 95 % -- -- -- Full face mask -- -- --    09/07/24 2200 -- 72 36 148/64 92 -- -- 96 % 50 -- BiPAP -- -- -- --    09/07/24 2100 -- 75 37 157/71 102 -- -- 95 % -- -- -- -- -- -- --    09/07/24 2059 -- -- -- -- -- -- -- -- -- -- -- -- -- 13 --    09/07/24 2041 -- 74 20 140/67 75 -- -- 97 % -- -- BiPAP -- Lying -- --    09/07/24 2002 -- 68 23 79/42 55 -- -- 100 % -- -- -- -- -- -- --    09/07/24 2000 -- 69 22 80/42 59 -- -- 100 % -- -- -- -- -- -- --    09/07/24 1936 -- -- -- -- -- -- -- 95 % -- -- BiPAP Full face mask -- -- --     09/07/24 1900 -- 77 42 161/71 102 -- -- 94 % -- -- BiPAP -- -- -- --    09/07/24 1846 97.9 °F (36.6 °C) -- -- -- -- -- -- -- -- -- -- -- -- -- --    09/07/24 1827 -- 76 35 96/50 71 -- -- 98 % -- -- BiPAP -- -- -- --    09/07/24 1800 -- 72 23 92/49 68 -- -- 100 % -- -- -- -- -- -- --    09/07/24 1752 -- -- -- -- -- -- -- 99 % -- -- -- Full face mask -- -- --    09/07/24 1700 -- 77 44 156/66 95 -- -- 89 % -- -- -- -- -- -- --    09/07/24 1636 -- -- -- -- -- -- -- -- 50 35 L/min Other (comment) -- -- -- --    09/07/24 1600 -- 82 44 109/69 86 -- -- 97 % -- -- BiPAP LILIANNC katinags -- 13 --    09/07/24 1532 -- -- -- -- -- -- -- -- -- -- -- -- -- -- No Pain    09/07/24 1516 97.7 °F (36.5 °C) 75 26 -- -- -- -- 100 % -- -- -- -- -- -- --    09/07/24 1500 -- 79 30 103/54 71 -- -- 100 % -- -- BiPAP -- -- -- --    09/07/24 1445 -- 76 30 105/56 75 -- -- 92 % -- -- -- -- -- -- --    09/07/24 1422 -- -- -- -- -- -- -- 96 % -- -- -- Full face mask -- -- --    09/07/24 1414 -- -- -- 151/67 -- -- -- -- -- -- -- -- -- -- --    09/07/24 1307 -- -- -- -- -- -- -- 94 % -- -- -- -- -- -- --    09/07/24 1300 -- -- -- -- -- -- -- -- -- -- -- -- -- 11 No Pain    09/07/24 1218 -- -- -- -- -- -- -- 96 % -- 10 L/min High flow nasal cannula -- -- -- --    09/07/24 1138 97.5 °F (36.4 °C) 85 22 124/54 -- -- -- 100 % -- -- BiPAP -- Lying -- --    09/07/24 1100 -- -- -- -- -- -- -- -- -- -- -- -- -- 11 No Pain    09/07/24 0722 97.8 °F (36.6 °C) 69 18 113/55 -- -- -- 99 % -- -- BiPAP -- Lying 11 --    09/07/24 0716 -- -- -- -- -- -- -- 100 % -- -- -- Full face mask -- -- --    09/07/24 0600 -- -- 18 -- -- -- -- 100 % -- -- BiPAP -- -- 11 --    09/07/24 0250 96.6 °F (35.9 °C) 74 16 105/55 79 -- -- 100 % -- -- BiPAP -- Lying 11 --    09/07/24 0212 -- -- -- -- -- -- -- -- -- -- BiPAP -- -- -- --    09/07/24 0134 -- -- -- -- -- -- -- 97 % -- 8 L/min Mid flow nasal cannula -- -- 12 --    09/07/24 0045 -- -- -- -- -- -- -- 100 % -- -- BiPAP -- -- 8 --     09/06/24 2100 96.8 °F (36 °C) 76 18 131/65 94 -- -- 90 % -- -- BiPAP -- Lying -- --    09/06/24 1957 -- 61 22 163/78 112 -- -- 96 % -- -- BiPAP -- Lying 8 --    09/06/24 1949 -- -- -- -- -- -- -- 97 % -- -- BiPAP -- -- -- --    09/06/24 1946 -- -- -- -- -- -- -- 92 % -- -- -- Full face mask -- -- --    09/06/24 1930 -- 92 22 197/85 122 -- -- 99 % -- 8 L/min Mid flow nasal cannula -- Lying -- --    09/06/24 1645 -- -- -- -- -- -- -- -- -- -- -- -- -- 14 No Pain    09/06/24 1527 97.5 °F (36.4 °C) 77 20 132/58 83 -- -- 100 % -- 4.5 L/min Mid flow nasal cannula -- Sitting -- --    09/06/24 1350 -- -- -- -- -- -- -- 99 % -- 7 L/min Mid flow nasal cannula -- -- -- --    09/06/24 1245 -- -- -- -- -- -- -- -- -- -- -- -- -- 15 --    09/06/24 1045 97.7 °F (36.5 °C) 77 20 113/56 74 -- -- 98 % -- 7 L/min Mid flow nasal cannula -- Sitting -- --    09/06/24 0845 -- -- -- -- -- -- -- -- -- -- -- -- -- 15 No Pain    09/06/24 0811 -- -- -- -- -- -- -- 94 % -- 8 L/min Mid flow nasal cannula -- -- -- --    09/06/24 0739 97.3 °F (36.3 °C) 68 22 119/57 82 -- -- 100 % -- -- BiPAP -- Lying -- --    09/06/24 0726 -- -- -- -- -- -- -- 100 % -- -- -- Full face mask -- -- --    09/06/24 0630 -- -- -- -- -- -- -- -- -- -- -- -- -- 14 --    09/06/24 0232 -- -- -- -- -- -- -- 98 % -- -- -- Full face mask -- -- --    09/06/24 0230 97.8 °F (36.6 °C) 76 22 132/60 87 -- -- 97 % -- -- -- -- Lying 14 No Pain              Pertinent Labs/Diagnostic Test Results:   Radiology:  CT chest abdomen pelvis wo contrast   Final Interpretation by E. Alec Schoenberger, MD (09/08 4116)   Enlarging small bilateral pleural effusions with increased bibasilar consolidation that may be due to atelectasis post-rest correlation for pneumonia. Stable pericardial effusion   Stable right perihilar postradiation fibrosis. Stable enlarged right paratracheal lymph node.   Cholelithiasis with nonspecific pericholecystic edema. Recommend clinical correlation for cholecystitis  biliary scintigraphy if warranted.      Workstation performed: [id number]         CT head wo contrast   Final Interpretation by E. Alec Schoenberger, MD (09/08 1308)      No acute intracranial abnormality. Stable chronic microangiopathy and chronic left basal ganglia lacunar infarct.                  Workstation performed: [id number]         XR chest portable ICU   Final Interpretation by Nieves Burks MD (09/08 0806)      ET tube 5 cm above the chery.      Chronic volume loss in the right hemithorax with persistent opacity in the upper right hemithorax and small right effusion.      Moderate pulmonary edema. Pneumonia not excluded in the appropriate clinical setting.            Workstation performed: [id number]         XR chest portable   Final Interpretation by Nieves Burks MD (09/08 0803)      Moderate pulmonary edema.      Persistent small right pleural effusion.            Workstation performed: [id number]         XR chest portable ICU    (Results Pending)   CTA chest:     IMPRESSION:     1. No definite acute pulmonary emboli.     2. New onset occlusion or markedly diminished flow of the right upper lobe pulmonary artery compared to contrast-enhanced study from November 5, 2020. This is probably related to the chronic right upper lobe pulmonary fibrosis. Small chronic left upper   lobe pulmonary embolus suggested.     3. New, moderate pericardial effusion.     4. Stable appearance of RUL/right lung paramediastinal fibrosis. Apparent occlusion of the right middle lobe bronchi near their origin compared to the recent study. Follow-up CT chest in 3 months recommended to reassess this finding and exclude subtle   neoplasm recurrence.     5. Unchanged appearance of 5.4 cm ascending aortic aneurysm.       Results from last 7 days   Lab Units 09/05/24  2139   SARS-COV-2  Negative     Results from last 7 days   Lab Units 09/09/24  0459 09/08/24  2021 09/08/24  1348 09/08/24  0545 09/08/24  0049  09/08/24  0008 09/07/24 0616 09/06/24 1938 09/06/24  0559   WBC Thousand/uL 15.01* 14.77* 14.35* 15.08* 11.60*  --  11.64*  --  6.04   HEMOGLOBIN g/dL 10.1* 11.2* 10.6* 12.0 11.6*  --  11.2*  --  10.8*   I STAT HEMOGLOBIN   --   --   --   --   --    < >  --    < >  --    HEMATOCRIT % 31.1* 33.5* 31.1* 36.6 36.2*  --  35.1*  --  33.2*   HEMATOCRIT, ISTAT   --   --   --   --   --    < >  --    < >  --    PLATELETS Thousands/uL 235 272 247 323 284  --  261  --  242   TOTAL NEUT ABS Thousands/µL  --   --   --   --  9.72*  --  9.83*  --  4.74    < > = values in this interval not displayed.         Results from last 7 days   Lab Units 09/09/24  0459 09/08/24 2021 09/08/24  0545 09/08/24  0049 09/08/24  0008 09/07/24  1350 09/07/24 0616 09/07/24 0040 09/06/24 1938 09/06/24  0559   0000   SODIUM mmol/L 143 141 142 142  --   --  139   < >  --  139  --    POTASSIUM mmol/L 3.8 3.7 4.7 4.2  --   --  4.9   < >  --  4.2  --    CHLORIDE mmol/L 100 98 96 97  --   --  95*   < >  --  95*  --    CO2 mmol/L 35* 35* 36* 37*  --   --  39*   < >  --  41*  --    CO2, I-STAT mmol/L  --   --   --   --  43*  --   --   --  >45*  --    < >   ANION GAP mmol/L 8 8 10 8  --   --  5   < >  --  3*  --    BUN mg/dL 41* 45* 45* 44*  --   --  32*   < >  --  23  --    CREATININE mg/dL 0.79 0.96 1.13 1.20  --   --  0.99   < >  --  0.59*  --    EGFR ml/min/1.73sq m 94 83 68 63  --   --  80   < >  --  106  --    CALCIUM mg/dL 8.1* 8.5 9.6 10.1  --   --  9.0   < >  --  9.0  --    CALCIUM, IONIZED mmol/L 1.08*  --   --  1.05*  --   --   --   --   --   --   --    CALCIUM, IONIZED, ISTAT mmol/L  --   --   --   --  1.45*  --   --   --  1.29  --   --    MAGNESIUM mg/dL 2.1 2.0 2.2 2.2  --   --   --   --   --  2.3  --    PHOSPHORUS mg/dL  --  3.0 3.4 5.7*  --  6.1*  --   --   --   --   --     < > = values in this interval not displayed.     Results from last 7 days   Lab Units 09/09/24  0459 09/08/24 2021 09/08/24  0545 09/08/24  0049 09/07/24  1347  09/06/24 2138 09/05/24  2247   AST U/L 412* 613* 864* 946*  --  25  --    ALT U/L 359* 430* 438* 428*  --  9  --    ALK PHOS U/L 70 80 92 82  --  99  --    TOTAL PROTEIN g/dL 5.5* 6.0* 6.9 7.2  --  7.6  --    ALBUMIN g/dL 2.6* 2.9* 3.3* 3.5  --  3.7  --    TOTAL BILIRUBIN mg/dL 0.94 1.00 1.04* 0.98  --  0.91  --    BILIRUBIN DIRECT mg/dL  --   --   --  0.37*  --  0.23*  --    AMMONIA umol/L  --   --   --   --  44  --  46     Results from last 7 days   Lab Units 09/09/24  0459 09/09/24  0007 09/08/24  1750 09/08/24  1243 09/07/24  1238 09/06/24  1933   POC GLUCOSE mg/dl 131 109 177* 161* 141* 150*     Results from last 7 days   Lab Units 09/09/24  0459 09/08/24  2021 09/08/24  0545 09/08/24  0049 09/07/24  0616 09/07/24  0040 09/06/24  0559 09/05/24  2139   GLUCOSE RANDOM mg/dL 144* 160* 142* 137 136 166* 114 164*     Results from last 7 days   Lab Units 09/08/24  0638 09/08/24  0309 09/08/24  0057   PH RADHA  7.511* 7.348 7.207*   PCO2 RADHA mm Hg 38.4* 63.1* 74.1*   PO2 RADHA mm Hg 39.5 27.9* 23.6*   HCO3 RADHA mmol/L 30.0 33.9* 28.8   BASE EXC RADHA mmol/L 6.7 6.4 -0.2   O2 CONTENT RADHA ml/dL 13.8 8.4 4.1   O2 HGB, VENOUS % 76.7 46.7* 30.0*     Results from last 7 days   Lab Units 09/08/24  0008 09/06/24  1938   I STAT BASE EXC mmol/L 10* 11*   I STAT O2 SAT % 100* 97*   ISTAT PH ART  7.260* 7.232*   I STAT ART PCO2 mm HG 89.8* 100.5*   I STAT ART PO2 mm .0* 109.0   I STAT ART HCO3 mmol/L 40.3* 42.3*       Results from last 7 days   Lab Units 09/09/24  0459 09/08/24  2021 09/08/24  1348 09/05/24  2139   PROTIME seconds  --   --  23.8* 17.3*   INR   --   --  2.14* 1.38*   PTT seconds 63* 47* 45* 46*     Results from last 7 days   Lab Units 09/05/24  2139   TSH 3RD GENERATON uIU/mL 0.591     Results from last 7 days   Lab Units 09/08/24  0609 09/07/24  0616 09/05/24  2139   PROCALCITONIN ng/ml 1.56* 0.75* <0.05     Results from last 7 days   Lab Units 09/08/24  0933 09/08/24  0545 09/08/24  0309 09/08/24  0049  09/07/24  1658 09/07/24  1347 09/05/24 2139   LACTIC ACID mmol/L 1.6 3.6* 4.0* 2.9* 2.2* 2.4* 1.1     Results from last 7 days   Lab Units 09/05/24 2139   BNP pg/mL 404*       Results from last 7 days   Lab Units 09/05/24 2139   INFLUENZA A PCR  Negative   INFLUENZA B PCR  Negative   RSV PCR  Negative     Results from last 7 days   Lab Units 09/05/24 2139   ETHANOL LVL mg/dL <10   ACETAMINOPHEN LVL ug/mL <2*   SALICYLATE LVL mg/dL <5     Results from last 7 days   Lab Units 09/06/24  1054 09/05/24 2139   BLOOD CULTURE   --  No Growth at 48 hrs.  No Growth at 48 hrs.   SPUTUM CULTURE  4+ Growth of Acinetobacter baumannii*  --    GRAM STAIN RESULT  2+ Polys*  1+ Gram negative rods*  1+ Gram positive cocci in pairs and chains*  No Epithelial cells seen*  --        ED Treatment-Medication Administration - No Administrations Displayed (No Start Event Found)       None            Past Medical History:   Diagnosis Date    Alcohol abuse     Anxiety     Aortic insufficiency 12/18/2020    Atrial fibrillation (HCC)     Cancer (HCC)     COPD (chronic obstructive pulmonary disease) (HCC)     Delirium     Encephalopathy     Epilepsy (HCC)     History of chemotherapy     History of radiation therapy     Hypo-osmolality and hyponatremia     Hypokalemia     Hypothyroid     Lung cancer (HCC)     Lyme disease     Major depression      Present on Admission:   Tobacco abuse   Squamous cell lung cancer (HCC)   Atrial fibrillation (HCC)   Epilepsy (HCC)   Pulmonary fibrosis (HCC)   Chronic combined systolic and diastolic CHF (congestive heart failure) (HCC)   COPD with acute exacerbation (HCC)      Admitting Diagnosis: Altered mental status [R41.82]  Age/Sex: 64 y.o. male  Admission Orders:  Scheduled Medications:  apixaban, 5 mg, Oral, BID  azithromycin, 500 mg, Oral, Q24H  budesonide, 0.5 mg, Nebulization, Q12H  furosemide, 40 mg, Oral, Daily  gabapentin, 400 mg, Oral, BID  ipratropium-albuterol, 3 mL, Nebulization,  TID  methylPREDNISolone sodium succinate, 40 mg, Intravenous, Q12H [place]  metoprolol succinate, 25 mg, Oral, Daily  nicotine, 1 patch, Transdermal, Daily  phenytoin, 100 mg, Oral, 2 times per day  phenytoin, 250 mg, Oral, Daily  sodium chloride, 4 mL, Nebulization, TID  sodium chloride, 1 g, Oral, BID With Meals      Continuous IV Infusions:NONE   No current facility-administered medications for this encounter.    PRN Meds:  acetaminophen, 650 mg, Oral, Q6H PRN  dextromethorphan-guaiFENesin, 10 mL, Oral, Q4H PRN        IP CONSULT TO PULMONOLOGY    Network Utilization Review Department  ATTENTION: Please call with any questions or concerns to [phone] and carefully listen to the prompts so that you are directed to the right person. All voicemails are confidential.   For Discharge needs, contact Care Management DC Support Team at [phone] opt. [phone]  Send all requests for admission clinical reviews, approved or denied determinations and any other requests to dedicated fax number below belonging to the [place] where the patient is receiving treatment. List of dedicated fax numbers for the Facilities:  FACILITY NAME UR FAX NUMBER   ADMISSION DENIALS (Administrative/Medical Necessity) [phone]   DISCHARGE SUPPORT TEAM (NETWORK) [phone]   PARENT CHILD HEALTH (Maternity/NICU/Pediatrics) [phone]   [facility] [phone]   [facility] [phone]   [facility] [phone]   [facility] [phone]   Critical access hospital [phone]   [facility] [phone]   [facility] [phone]   [facility] [phone]   [facility] [phone]   [facility] [phone]   [facility] [phone]   [facility] [phone]

## 2024-09-09 NOTE — WOUND OSTOMY CARE
Consult Note - Wound   Marcin Sánchez 64 y.o. male MRN: [id number]  Unit/Bed#: MICU 10 Encounter: [id number]        History and Present Illness:  Patient is a 65 yo male that was admitted to [facility] for treatment of pericardial effusion. Patient has a PMH of alcohol abuse, cancer, COPD, A-fib. Patient is a mod assist for turning and repositioning. Patient is incontinent of bowel and bladder is managed via internal urinary catheter. Patient is bony in appearance. On assessment, patient is seen lying on critical care low air loss mattress. B/l wrists in soft wrist restraints.      Wound Care was consulted for sacral wound     Assessment Findings:   B/L heels are dry intact and sohan with no skin loss or wounds present. Recommend preventative foam dressings and proper offloading/ repositioning.        POA Mid Sacro-Buttocks DTPI: 2 irregular in shape areas of intact dark purple non-blanchable tissue. No skin loss or drainage noted. Oksana-wound is intact. Recommend foam dressing to area due to patient being bony. Deep Tissue Pressure Injury wounds have the potential to evolve into unstageable, stage III or stage IV wounds.      No induration, fluctuance, odor, warmth/temperature differences, redness, or purulence noted to the above noted wounds and skin areas assessed. New dressings applied per orders listed below. Patient tolerated well- no s/s of non-verbal pain or discomfort observed during the encounter. Bedside nurse aware of plan of care. See flow sheets for more detailed assessment findings.      Orders listed below and wound care will continue to follow, call or Secure Chat Message with questions.     Skin Care Plan:  1-Mid Sacro-Buttocks Wound: Cleanse sacro-buttocks with soap and water. Apply a Silicone Bordered Foam Dressing(Mepilex) to area. Ramírez with T for Treatment. Change every other day or PRN.   2-Turn/reposition q2h or when medically stable for pressure re-distribution on skin .  3-Elevate heels  to offload pressure.  4-Moisturize skin daily with skin nourishing cream  5-Ehob cushion in chair when out of bed.  6-Cleanse B/L Heels with soap and water. Pat dry. Apply Silicone Border Foam (Mepilex) to areas. Ramírez with P for Prevention and change every 3 days or PRN soilage/displacement. Peel back and inspect Q-shift.      Wounds:  Wound 09/09/24 Pressure Injury Buttocks (Active)   Wound Image   09/09/24 1127   Wound Description Non-blanchable erythema;Light purple 09/09/24 1127   Pressure Injury Stage DTPI 09/09/24 1127   Oksana-wound Assessment Intact 09/09/24 1127   Wound Length (cm) 4 cm 09/09/24 1127   Wound Width (cm) 4.5 cm 09/09/24 1127   Wound Depth (cm) 0 cm 09/09/24 1127   Wound Surface Area (cm^2) 18 cm^2 09/09/24 1127   Wound Volume (cm^3) 0 cm^3 09/09/24 1127   Calculated Wound Volume (cm^3) 0 cm^3 09/09/24 1127   Drainage Amount None 09/09/24 1127   Non-staged Wound Description Not applicable 09/09/24 1127   Treatments Cleansed;Site care 09/09/24 1127   Dressing Foam, Silicon (eg. Allevyn, etc) 09/09/24 1127   Dressing Changed New 09/09/24 1127   Patient Tolerance Tolerated well 09/09/24 1127   Dressing Status Intact;Dry;Clean 09/09/24 1127               Barb Rowan RN, BSN, CWOCN

## 2024-09-10 ENCOUNTER — APPOINTMENT (INPATIENT)
Dept: RADIOLOGY | Facility: HOSPITAL | Age: 64
DRG: 005 | End: 2024-09-10
Payer: COMMERCIAL

## 2024-09-10 LAB
ALBUMIN SERPL BCG-MCNC: 2.8 G/DL (ref 3.5–5)
ALP SERPL-CCNC: 68 U/L (ref 34–104)
ALT SERPL W P-5'-P-CCNC: 249 U/L (ref 7–52)
ANION GAP SERPL CALCULATED.3IONS-SCNC: 6 MMOL/L (ref 4–13)
ANISOCYTOSIS BLD QL SMEAR: PRESENT
APTT PPP: 55 SECONDS (ref 23–34)
APTT PPP: 59 SECONDS (ref 23–34)
APTT PPP: 71 SECONDS (ref 23–34)
APTT PPP: 73 SECONDS (ref 23–34)
ARTERIAL PATENCY WRIST A: YES
AST SERPL W P-5'-P-CCNC: 166 U/L (ref 13–39)
ATRIAL RATE: 90 BPM
BACTERIA SPT RESP CULT: ABNORMAL
BASE EXCESS BLDA CALC-SCNC: 1.5 MMOL/L
BASE EXCESS BLDA CALC-SCNC: 2.7 MMOL/L
BASE EXCESS BLDA CALC-SCNC: 3.6 MMOL/L
BASE EXCESS BLDA CALC-SCNC: 5 MMOL/L
BASOPHILS # BLD MANUAL: 0 THOUSAND/UL (ref 0–0.1)
BASOPHILS NFR MAR MANUAL: 0 % (ref 0–1)
BILIRUB DIRECT SERPL-MCNC: 0.68 MG/DL (ref 0–0.2)
BILIRUB SERPL-MCNC: 1.21 MG/DL (ref 0.2–1)
BODY TEMPERATURE: 99.5 DEGREES FEHRENHEIT
BUN SERPL-MCNC: 39 MG/DL (ref 5–25)
CA-I BLD-SCNC: 1.11 MMOL/L (ref 1.12–1.32)
CALCIUM SERPL-MCNC: 8.4 MG/DL (ref 8.4–10.2)
CHLORIDE SERPL-SCNC: 102 MMOL/L (ref 96–108)
CO2 SERPL-SCNC: 34 MMOL/L (ref 21–32)
CREAT SERPL-MCNC: 0.75 MG/DL (ref 0.6–1.3)
EOSINOPHIL # BLD MANUAL: 0 THOUSAND/UL (ref 0–0.4)
EOSINOPHIL NFR BLD MANUAL: 0 % (ref 0–6)
ERYTHROCYTE [DISTWIDTH] IN BLOOD BY AUTOMATED COUNT: 13.5 % (ref 11.6–15.1)
GFR SERPL CREATININE-BSD FRML MDRD: 96 ML/MIN/1.73SQ M
GLUCOSE SERPL-MCNC: 115 MG/DL (ref 65–140)
GLUCOSE SERPL-MCNC: 126 MG/DL (ref 65–140)
GLUCOSE SERPL-MCNC: 128 MG/DL (ref 65–140)
GLUCOSE SERPL-MCNC: 131 MG/DL (ref 65–140)
GLUCOSE SERPL-MCNC: 131 MG/DL (ref 65–140)
GLUCOSE SERPL-MCNC: 144 MG/DL (ref 65–140)
GRAM STN SPEC: ABNORMAL
HCO3 BLDA-SCNC: 27.5 MMOL/L (ref 22–28)
HCO3 BLDA-SCNC: 29.9 MMOL/L (ref 22–28)
HCO3 BLDA-SCNC: 32.2 MMOL/L (ref 22–28)
HCO3 BLDA-SCNC: 32.7 MMOL/L (ref 22–28)
HCT VFR BLD AUTO: 31 % (ref 36.5–49.3)
HGB BLD-MCNC: 10.1 G/DL (ref 12–17)
HOROWITZ INDEX BLDA+IHG-RTO: 40 MM[HG]
IPAP: 12
LYMPHOCYTES # BLD AUTO: 0.24 THOUSAND/UL (ref 0.6–4.47)
LYMPHOCYTES # BLD AUTO: 2 % (ref 14–44)
MACROCYTES BLD QL AUTO: PRESENT
MAGNESIUM SERPL-MCNC: 2.1 MG/DL (ref 1.9–2.7)
MCH RBC QN AUTO: 32.1 PG (ref 26.8–34.3)
MCHC RBC AUTO-ENTMCNC: 32.6 G/DL (ref 31.4–37.4)
MCV RBC AUTO: 98 FL (ref 82–98)
MONOCYTES # BLD AUTO: 0 THOUSAND/UL (ref 0–1.22)
MONOCYTES NFR BLD: 0 % (ref 4–12)
MRSA NOSE QL CULT: NORMAL
NEUTROPHILS # BLD MANUAL: 11.95 THOUSAND/UL (ref 1.85–7.62)
NEUTS SEG NFR BLD AUTO: 98 % (ref 43–75)
NON VENT- BIPAP: ABNORMAL
NON VENT- BIPAP: ABNORMAL
O2 CT BLDA-SCNC: 12.6 ML/DL (ref 16–23)
O2 CT BLDA-SCNC: 14.4 ML/DL (ref 16–23)
O2 CT BLDA-SCNC: 14.7 ML/DL (ref 16–23)
O2 CT BLDA-SCNC: 15.2 ML/DL (ref 16–23)
OXYHGB MFR BLDA: 89.2 % (ref 94–97)
OXYHGB MFR BLDA: 89.6 % (ref 94–97)
OXYHGB MFR BLDA: 94.2 % (ref 94–97)
OXYHGB MFR BLDA: 95.2 % (ref 94–97)
P AXIS: 73 DEGREES
PCO2 BLDA: 43.3 MM HG (ref 36–44)
PCO2 BLDA: 61.6 MM HG (ref 36–44)
PCO2 BLDA: 67.7 MM HG (ref 36–44)
PCO2 BLDA: 75 MM HG (ref 36–44)
PCO2 TEMP ADJ BLDA: 44.3 MM HG (ref 36–44)
PEEP MAX SETTING VENT: 6 CM[H2O]
PEEP RESPIRATORY: 6 CM[H2O]
PH BLD: 7.41 [PH] (ref 7.35–7.45)
PH BLDA: 7.26 [PH] (ref 7.35–7.45)
PH BLDA: 7.26 [PH] (ref 7.35–7.45)
PH BLDA: 7.34 [PH] (ref 7.35–7.45)
PH BLDA: 7.42 [PH] (ref 7.35–7.45)
PHENYTOIN SERPL-MCNC: 32.2 UG/ML (ref 10–20)
PHENYTOIN SERPL-MCNC: 32.3 UG/ML (ref 10–20)
PHOSPHATE SERPL-MCNC: 3.9 MG/DL (ref 2.3–4.1)
PLATELET # BLD AUTO: 209 THOUSANDS/UL (ref 149–390)
PLATELET BLD QL SMEAR: ADEQUATE
PMV BLD AUTO: 9.6 FL (ref 8.9–12.7)
PO2 BLD: 84.1 MM HG (ref 75–129)
PO2 BLDA: 70.5 MM HG (ref 75–129)
PO2 BLDA: 70.5 MM HG (ref 75–129)
PO2 BLDA: 81.4 MM HG (ref 75–129)
PO2 BLDA: 92.1 MM HG (ref 75–129)
POTASSIUM SERPL-SCNC: 4.1 MMOL/L (ref 3.5–5.3)
PR INTERVAL: 226 MS
PROT SERPL-MCNC: 6 G/DL (ref 6.4–8.4)
QRS AXIS: 39 DEGREES
QRSD INTERVAL: 114 MS
QT INTERVAL: 358 MS
QTC INTERVAL: 437 MS
RBC # BLD AUTO: 3.15 MILLION/UL (ref 3.88–5.62)
RBC MORPH BLD: PRESENT
SODIUM SERPL-SCNC: 142 MMOL/L (ref 135–147)
SPECIMEN SOURCE: ABNORMAL
T WAVE AXIS: 153 DEGREES
VENT AC: 18
VENT BIPAP FIO2: 40 %
VENT- AC: AC
VENTRICULAR RATE: 90 BPM
VT SETTING VENT: 400 ML
WBC # BLD AUTO: 12.19 THOUSAND/UL (ref 4.31–10.16)

## 2024-09-10 PROCEDURE — 82805 BLOOD GASES W/O2 SATURATION: CPT

## 2024-09-10 PROCEDURE — 82330 ASSAY OF CALCIUM: CPT

## 2024-09-10 PROCEDURE — 94002 VENT MGMT INPAT INIT DAY: CPT

## 2024-09-10 PROCEDURE — 80076 HEPATIC FUNCTION PANEL: CPT

## 2024-09-10 PROCEDURE — 99291 CRITICAL CARE FIRST HOUR: CPT | Performed by: INTERNAL MEDICINE

## 2024-09-10 PROCEDURE — 94664 DEMO&/EVAL PT USE INHALER: CPT

## 2024-09-10 PROCEDURE — 85027 COMPLETE CBC AUTOMATED: CPT

## 2024-09-10 PROCEDURE — 31500 INSERT EMERGENCY AIRWAY: CPT | Performed by: INTERNAL MEDICINE

## 2024-09-10 PROCEDURE — 85730 THROMBOPLASTIN TIME PARTIAL: CPT | Performed by: INTERNAL MEDICINE

## 2024-09-10 PROCEDURE — 94760 N-INVAS EAR/PLS OXIMETRY 1: CPT

## 2024-09-10 PROCEDURE — 80185 ASSAY OF PHENYTOIN TOTAL: CPT

## 2024-09-10 PROCEDURE — 85007 BL SMEAR W/DIFF WBC COUNT: CPT

## 2024-09-10 PROCEDURE — 82948 REAGENT STRIP/BLOOD GLUCOSE: CPT

## 2024-09-10 PROCEDURE — 71045 X-RAY EXAM CHEST 1 VIEW: CPT

## 2024-09-10 PROCEDURE — 94003 VENT MGMT INPAT SUBQ DAY: CPT

## 2024-09-10 PROCEDURE — 94640 AIRWAY INHALATION TREATMENT: CPT

## 2024-09-10 PROCEDURE — NC001 PR NO CHARGE: Performed by: INTERNAL MEDICINE

## 2024-09-10 PROCEDURE — 83735 ASSAY OF MAGNESIUM: CPT

## 2024-09-10 PROCEDURE — 93005 ELECTROCARDIOGRAM TRACING: CPT

## 2024-09-10 PROCEDURE — 99233 SBSQ HOSP IP/OBS HIGH 50: CPT | Performed by: INTERNAL MEDICINE

## 2024-09-10 PROCEDURE — 93010 ELECTROCARDIOGRAM REPORT: CPT | Performed by: STUDENT IN AN ORGANIZED HEALTH CARE EDUCATION/TRAINING PROGRAM

## 2024-09-10 PROCEDURE — 80048 BASIC METABOLIC PNL TOTAL CA: CPT

## 2024-09-10 PROCEDURE — 94669 MECHANICAL CHEST WALL OSCILL: CPT

## 2024-09-10 PROCEDURE — 31500 INSERT EMERGENCY AIRWAY: CPT

## 2024-09-10 PROCEDURE — 84100 ASSAY OF PHOSPHORUS: CPT

## 2024-09-10 RX ORDER — PROPOFOL 10 MG/ML
5-50 INJECTION, EMULSION INTRAVENOUS
Status: DISCONTINUED | OUTPATIENT
Start: 2024-09-10 | End: 2024-09-11

## 2024-09-10 RX ORDER — LORAZEPAM 2 MG/ML
INJECTION INTRAMUSCULAR
Status: COMPLETED
Start: 2024-09-10 | End: 2024-09-10

## 2024-09-10 RX ORDER — MAGNESIUM SULFATE HEPTAHYDRATE 40 MG/ML
4 INJECTION, SOLUTION INTRAVENOUS ONCE
Status: COMPLETED | OUTPATIENT
Start: 2024-09-10 | End: 2024-09-10

## 2024-09-10 RX ORDER — CALCIUM GLUCONATE 20 MG/ML
2 INJECTION, SOLUTION INTRAVENOUS ONCE
Status: COMPLETED | OUTPATIENT
Start: 2024-09-10 | End: 2024-09-10

## 2024-09-10 RX ORDER — FENTANYL CITRATE-0.9 % NACL/PF 10 MCG/ML
75 PLASTIC BAG, INJECTION (ML) INTRAVENOUS CONTINUOUS
Status: DISCONTINUED | OUTPATIENT
Start: 2024-09-10 | End: 2024-09-13

## 2024-09-10 RX ORDER — WATER 10 ML/10ML
INJECTION INTRAMUSCULAR; INTRAVENOUS; SUBCUTANEOUS
Status: DISPENSED
Start: 2024-09-10 | End: 2024-09-11

## 2024-09-10 RX ORDER — ALBUMIN, HUMAN INJ 5% 5 %
12.5 SOLUTION INTRAVENOUS ONCE
Status: COMPLETED | OUTPATIENT
Start: 2024-09-10 | End: 2024-09-10

## 2024-09-10 RX ORDER — LORAZEPAM 2 MG/ML
1 INJECTION INTRAMUSCULAR ONCE
Status: COMPLETED | OUTPATIENT
Start: 2024-09-10 | End: 2024-09-10

## 2024-09-10 RX ORDER — CHLORHEXIDINE GLUCONATE ORAL RINSE 1.2 MG/ML
15 SOLUTION DENTAL EVERY 12 HOURS SCHEDULED
Status: DISCONTINUED | OUTPATIENT
Start: 2024-09-10 | End: 2024-10-18 | Stop reason: HOSPADM

## 2024-09-10 RX ADMIN — POLYETHYLENE GLYCOL 3350 17 G: 17 POWDER, FOR SOLUTION ORAL at 08:32

## 2024-09-10 RX ADMIN — CEFEPIME 2000 MG: 2 INJECTION, POWDER, FOR SOLUTION INTRAVENOUS at 03:57

## 2024-09-10 RX ADMIN — FENTANYL CITRATE 50 MCG: 50 INJECTION INTRAMUSCULAR; INTRAVENOUS at 06:19

## 2024-09-10 RX ADMIN — CHLORHEXIDINE GLUCONATE 0.12% ORAL RINSE 15 ML: 1.2 LIQUID ORAL at 08:32

## 2024-09-10 RX ADMIN — Medication 50 MCG/HR: at 15:54

## 2024-09-10 RX ADMIN — DEXMEDETOMIDINE HYDROCHLORIDE 0.7 MCG/KG/HR: 4 INJECTION, SOLUTION INTRAVENOUS at 03:09

## 2024-09-10 RX ADMIN — ALBUTEROL SULFATE 2.5 MG: 2.5 SOLUTION RESPIRATORY (INHALATION) at 12:06

## 2024-09-10 RX ADMIN — PROPOFOL 10 MCG/KG/MIN: 10 INJECTION, EMULSION INTRAVENOUS at 15:43

## 2024-09-10 RX ADMIN — LEVALBUTEROL HYDROCHLORIDE 1.25 MG: 1.25 SOLUTION RESPIRATORY (INHALATION) at 20:17

## 2024-09-10 RX ADMIN — AZITHROMYCIN 500 MG: 500 INJECTION, POWDER, LYOPHILIZED, FOR SOLUTION INTRAVENOUS at 13:45

## 2024-09-10 RX ADMIN — METHYLPREDNISOLONE SODIUM SUCCINATE 40 MG: 40 INJECTION, POWDER, FOR SOLUTION INTRAMUSCULAR; INTRAVENOUS at 08:32

## 2024-09-10 RX ADMIN — LEVALBUTEROL HYDROCHLORIDE 1.25 MG: 1.25 SOLUTION RESPIRATORY (INHALATION) at 07:09

## 2024-09-10 RX ADMIN — IPRATROPIUM BROMIDE 0.5 MG: 0.5 SOLUTION RESPIRATORY (INHALATION) at 20:17

## 2024-09-10 RX ADMIN — DEXMEDETOMIDINE HYDROCHLORIDE 0.7 MCG/KG/HR: 4 INJECTION, SOLUTION INTRAVENOUS at 22:26

## 2024-09-10 RX ADMIN — Medication 100 MCG: at 08:33

## 2024-09-10 RX ADMIN — BUDESONIDE 0.5 MG: 0.5 INHALANT RESPIRATORY (INHALATION) at 07:09

## 2024-09-10 RX ADMIN — FOLIC ACID 1 MG: 1 TABLET ORAL at 08:32

## 2024-09-10 RX ADMIN — LEVALBUTEROL HYDROCHLORIDE 1.25 MG: 1.25 SOLUTION RESPIRATORY (INHALATION) at 13:25

## 2024-09-10 RX ADMIN — SENNOSIDES 8.6 MG: 8.6 TABLET, FILM COATED ORAL at 21:32

## 2024-09-10 RX ADMIN — ALBUMIN (HUMAN) 12.5 G: 12.5 INJECTION, SOLUTION INTRAVENOUS at 18:08

## 2024-09-10 RX ADMIN — FORMOTEROL FUMARATE DIHYDRATE 20 MCG: 20 SOLUTION RESPIRATORY (INHALATION) at 20:17

## 2024-09-10 RX ADMIN — FORMOTEROL FUMARATE DIHYDRATE 20 MCG: 20 SOLUTION RESPIRATORY (INHALATION) at 07:09

## 2024-09-10 RX ADMIN — ALBUMIN (HUMAN) 12.5 G: 12.5 INJECTION, SOLUTION INTRAVENOUS at 15:47

## 2024-09-10 RX ADMIN — FENTANYL CITRATE 50 MCG: 50 INJECTION INTRAMUSCULAR; INTRAVENOUS at 22:03

## 2024-09-10 RX ADMIN — PANTOPRAZOLE SODIUM 40 MG: 40 INJECTION, POWDER, FOR SOLUTION INTRAVENOUS at 08:32

## 2024-09-10 RX ADMIN — MAGNESIUM SULFATE HEPTAHYDRATE 4 G: 40 INJECTION, SOLUTION INTRAVENOUS at 12:20

## 2024-09-10 RX ADMIN — FENTANYL CITRATE 50 MCG: 50 INJECTION INTRAMUSCULAR; INTRAVENOUS at 09:39

## 2024-09-10 RX ADMIN — HEPARIN SODIUM 1800 UNITS: 1000 INJECTION INTRAVENOUS; SUBCUTANEOUS at 02:01

## 2024-09-10 RX ADMIN — LORAZEPAM 1 MG: 2 INJECTION INTRAMUSCULAR; INTRAVENOUS at 12:20

## 2024-09-10 RX ADMIN — IPRATROPIUM BROMIDE 0.5 MG: 0.5 SOLUTION RESPIRATORY (INHALATION) at 07:09

## 2024-09-10 RX ADMIN — IPRATROPIUM BROMIDE 0.5 MG: 0.5 SOLUTION RESPIRATORY (INHALATION) at 13:45

## 2024-09-10 RX ADMIN — CALCIUM GLUCONATE 2 G: 20 INJECTION, SOLUTION INTRAVENOUS at 11:40

## 2024-09-10 RX ADMIN — CHLORHEXIDINE GLUCONATE 0.12% ORAL RINSE 15 ML: 1.2 LIQUID ORAL at 21:32

## 2024-09-10 RX ADMIN — BUDESONIDE 0.5 MG: 0.5 INHALANT RESPIRATORY (INHALATION) at 20:17

## 2024-09-10 RX ADMIN — HEPARIN SODIUM 1800 UNITS: 1000 INJECTION INTRAVENOUS; SUBCUTANEOUS at 15:47

## 2024-09-10 RX ADMIN — NOREPINEPHRINE BITARTRATE 4 MCG/MIN: 1 INJECTION, SOLUTION, CONCENTRATE INTRAVENOUS at 15:20

## 2024-09-10 RX ADMIN — NICOTINE 1 PATCH: 21 PATCH, EXTENDED RELEASE TRANSDERMAL at 08:32

## 2024-09-10 RX ADMIN — HEPARIN SODIUM 18 UNITS/KG/HR: 10000 INJECTION, SOLUTION INTRAVENOUS at 05:52

## 2024-09-10 RX ADMIN — FENTANYL CITRATE 50 MCG: 50 INJECTION INTRAMUSCULAR; INTRAVENOUS at 01:59

## 2024-09-10 RX ADMIN — METHYLPREDNISOLONE SODIUM SUCCINATE 40 MG: 40 INJECTION, POWDER, FOR SOLUTION INTRAMUSCULAR; INTRAVENOUS at 21:32

## 2024-09-10 RX ADMIN — LORAZEPAM 1 MG: 2 INJECTION INTRAMUSCULAR at 12:20

## 2024-09-10 RX ADMIN — Medication 75 MCG/HR: at 03:57

## 2024-09-10 RX ADMIN — NOREPINEPHRINE BITARTRATE 2 MCG/MIN: 1 INJECTION, SOLUTION, CONCENTRATE INTRAVENOUS at 22:56

## 2024-09-10 NOTE — PROGRESS NOTES
Progress Note - Critical Care/ICU   Name: Marcin Sánchez 64 y.o. male I MRN: [id number]  Unit/Bed#: MICU 10 I Date of Admission: 9/8/2024   Date of Service: 9/10/2024 I Hospital Day: 2       Critical Care Attending Note    64 year old man with ETOH abuse, NSCLCa - Squamous post chemo/XRT, prior PE, seizure disorder, afib on eliquis, COPD, tobacco abuse, hypothyroidism, mixed systolic/diastolic CHF.  Presented [facility] with CHF/COPD exacerbation - failed biPAP and required intubation.  Had VT cardiac arrest with ROSC x 2 rounds CPR and shock x 1.   Transferred to hospitals for pericardial effusion evaluation.       24hr events - TTE not consistent with tamponade.   Remained intubated - weaned to PSV, vasopressors weaned off.      VS AF, HR 70-90's, MAPS 60-90's, SpO2 99% 0.4/6P, I/Os +720cc  Exam  GEN older man in bed, awake, follows simple commands all extremities  HEENT AT, MMM, no thrush, temporal wasting  NECK RIJ TLC in place w/o purulence, no accessory muscle use  CV reg, single s1/2, no m/r, good clear HS  Pulm mechanical BS, no wheeze or rales, some generalized diminished BS  ABD +BS soft NTND, no rebound  EXT warm, brisk cap refill, no sig LE edema, trace upper ext hand edema, restraints in place   wong in place yellow urine    Laboratory and Diagnostics  Results from last 7 days   Lab Units 09/10/24  0432 09/09/24  0802 09/09/24  0459 09/08/24  2021 09/08/24  1348 09/08/24  0545 09/08/24  0049 09/08/24  0008 09/07/24  0616 09/06/24  1938 09/06/24  0559 09/05/24  2139   WBC Thousand/uL 12.19*  --  15.01* 14.77* 14.35* 15.08* 11.60*  --  11.64*  --  6.04 6.36   HEMOGLOBIN g/dL 10.1*  --  10.1* 11.2* 10.6* 12.0 11.6*  --  11.2*  --  10.8* 11.5*   I STAT HEMOGLOBIN g/dl  --   --   --   --   --   --   --  11.9*  --    < >  --   --    HEMATOCRIT % 31.0*  --  31.1* 33.5* 31.1* 36.6 36.2*  --  35.1*  --  33.2* 35.7*   HEMATOCRIT, ISTAT %  --   --   --   --   --   --   --  35*  --    < >  --   --    PLATELETS  Thousands/uL 209 225 235 272 247 323 284  --  261  --  242 307   SEGS PCT %  --   --   --   --   --   --  84*  --  85*  --  78* 66   MONO PCT % 0*  --   --   --   --   --  9  --  11  --  10 18*   EOS PCT % 0  --   --   --   --   --  0  --  0  --  0 1    < > = values in this interval not displayed.     Results from last 7 days   Lab Units 09/10/24  0432 09/09/24  0459 09/08/24 2021 09/08/24  0545 09/08/24  0049 09/08/24  0008 09/07/24  0616 09/07/24  0040 09/06/24 2138 09/06/24 0559 09/05/24 2139   SODIUM mmol/L 142 143 141 142 142  --  139 138  --    < > 138   POTASSIUM mmol/L 4.1 3.8 3.7 4.7 4.2  --  4.9 4.6  --    < > 3.6   CHLORIDE mmol/L 102 100 98 96 97  --  95* 96  --    < > 94*   CO2 mmol/L 34* 35* 35* 36* 37*  --  39* 35*  --    < > 39*   CO2, I-STAT mmol/L  --   --   --   --   --  43*  --   --   --    < >  --    ANION GAP mmol/L 6 8 8 10 8  --  5 7  --    < > 5   BUN mg/dL 39* 41* 45* 45* 44*  --  32* 27*  --    < > 22   CREATININE mg/dL 0.75 0.79 0.96 1.13 1.20  --  0.99 0.90  --    < > 0.59*   CALCIUM mg/dL 8.4 8.1* 8.5 9.6 10.1  --  9.0 9.3  --    < > 9.1   GLUCOSE RANDOM mg/dL 144* 144* 160* 142* 137  --  136 166*  --    < > 164*   ALT U/L  --  359* 430* 438* 428*  --   --   --  9  --  13   AST U/L  --  412* 613* 864* 946*  --   --   --  25  --  20   ALK PHOS U/L  --  70 80 92 82  --   --   --  99  --  91   ALBUMIN g/dL  --  2.6* 2.9* 3.3* 3.5  --   --   --  3.7  --  3.7   TOTAL BILIRUBIN mg/dL  --  0.94 1.00 1.04* 0.98  --   --   --  0.91  --  0.75    < > = values in this interval not displayed.     Results from last 7 days   Lab Units 09/10/24  0432 09/09/24  0459 09/08/24 2021 09/08/24  0545 09/08/24  0049 09/07/24  1350 09/06/24  0559 09/05/24  2139   MAGNESIUM mg/dL 2.1 2.1 2.0 2.2 2.2  --  2.3 1.9   PHOSPHORUS mg/dL 3.9  --  3.0 3.4 5.7* 6.1*  --   --       Results from last 7 days   Lab Units 09/10/24  0130 09/09/24  1835 09/09/24  1318 09/09/24  0459 09/08/24 2021 09/08/24  1348  09/05/24  2139   INR   --   --   --   --   --  2.14* 1.38*   PTT seconds 55* 46* 40* 63* 47* 45* 46*          Results from last 7 days   Lab Units 09/08/24  0933 09/08/24  0545 09/08/24  0309 09/08/24  0049 09/07/24  1658 09/07/24  1347 09/05/24  2139   LACTIC ACID mmol/L 1.6 3.6* 4.0* 2.9* 2.2* 2.4* 1.1                     Results from last 7 days   Lab Units 09/08/24  0609 09/07/24  0616 09/05/24  2139   PROCALCITONIN ng/ml 1.56* 0.75* <0.05       ABG:   Results from last 7 days   Lab Units 09/09/24  0459   PH ART  7.410   PCO2 ART mm Hg 54.7*   PO2 ART mm Hg 82.9   HCO3 ART mmol/L 33.9*   BASE EXC ART mmol/L 7.9   ABG SOURCE  Line, Arterial     Phenytoin Level  9/9 - 29--->9/10 - 32    MICRO  MRSA pending  Sputum 9/6 - 4+ Acinetobacter, few Moraxella   Bld Cx 9/5 NGTD  FLU/RSV/COVID neg     RADIOGRAPHS - New images and reports personally reviewed  CXR 9/8 - T.L good position, RUL mass lesion noted, right sided volume loss, chronic, blunted CP angles, no dense left sided consolidation     CT C/A/P 9/8 post RUL XRT changes, emphysema, scattered pulnoary nodules, basilar atelectasis vs infiltrates, small effusions     TTE 9/9 - EF 50%, normal RV size/function, mod-sev AR, no echo evidence of tamponade - mod-large pericardial effusion     TTE 9/8  - EF 55%, moderate pericardial effusion, RV underfilled but unable to r/o tamponade physiology, severe AR     Assessment  Shock - suspected vasodilatory - now improved  Large pericardial effusion - without tamponade component per TTE  VT cardiac arrest post intubation  Acute on chronic hypoxic respiratory failure  Suspected Severe COPD with possible exacerbation  Acute/chronic CHF with severe aortic regurgitation  Abnormal CT chest possible pneumonia/aspiration event - Acinetobacter on culture - suspected colonizer  Abnormal LFT suspected hepatic congestion/shock liver  Atrial fibrillation on eliquis  History of PE  NSLCa post chemo/XRT  ETOH and Tobacco abuse  Seizure  disorder on dilantin with elevated level      PLAN  NEURO - reduce sedation - off fentanyl gtt, continue precedex gtt for RASS 0 for now for SBT, cont to hold dilantin, recheck level and albumin in AM, recheck LFTs  CARDIAC - goal MAP >65, off vasopressors for now, no plans for emergent pericardial drainage, continue UFH gtt  PULM - Currently on SBT 5/6cmH2O - RSBI  currently will plan trial of extubation to BiPAP support with close monitoring, continue xopenex/atrovent TID, pulmicort BID, perforomist BID, continue solumedrol 40mg q12hrs for now  GI - NPO for now pending extubation, bowel regimen  NEPHRO - follow UOP, replete electrlytes, d/c wong catheter   ID - Adjust abx - complete azithro today, stop cefepime given resistance patterns and monitor  HEME - cont  UFH gtt for now resume DOAC when taking PO  ENDO - no active issues  MISC - SCDS, resume UFH once surgical plan establish, CHG, HOB >30DEG, PPI  D/C jessica, D/C wong, continue CVC pending stability post extubation  CODE STATUS - Full Code  CC team to update family later today    Addendum  Patient extubated to BiPAP and tolerated well for approximately 35-40 minutes but became increasingly agitated.  Exam w/o stridor but noted tachypnea and diffuse wheeze. Given added albuterol neb and increased precedex.  Some improvement in tachypnea and tachycardia with 1mg ativan - Vt on BiPAP remained 450-500cc.  Serial ABGs with noted increased hypercarbia and approximately 2 hours later progressive respiratory distress - decision made for reintubation.  See procedure note.  Post intubation noted hypotension and resumed NE gtt now weaning down and responding to albumin bolus.  Sedation initially with propofol/fentanyl/precedex, now wean off propofol and continue fentanyl/precedex.  Repeat ABG with improved pH, weaning RR, weaning FiO2.  Remainder of plan as above.    CC team made multiple attempts to update sister today via phone.    Upon my evaluation, this  patient has a high probability of imminent or life-threatening deterioration due to recurrent hypercarbic/hypoxic respiratory failure, COPD with AE, failed extubation trial, pericardial effusion, and hypotension which required my direct attention, intervention, and personal management.     I have personally provided 80 minutes of critical care time, exclusive of procedures, teaching, and any prior time recorded by providers other than myself. Time includes review of laboratory data, radiology results, discussion with consultants, and monitoring for potential decompensation.    John Brunner, DO

## 2024-09-10 NOTE — RESPIRATORY THERAPY NOTE
RT Ventilator Management Note  Marcin Sánchez 64 y.o. male MRN: [id number]  Unit/Bed#: [facility] 10 Encounter: [id number]      Daily Screen         9/10/2024  0709 9/10/2024  1539          Patient safety screen outcome:: Passed --      Not Ready for Weaning due to:: -- Underline problem not resolved (P)                 Physical Exam:   Assessment Type: (P) Assess only  General Appearance: (P) Sedated  Respiratory Pattern: (P) Assisted  Chest Assessment: (P) Chest expansion symmetrical  Bilateral Breath Sounds: (P) Coarse      Resp Comments: (P) Pt reintubated at this time with no complications and placed on the vent with AC/VC settings. ETT secured at the 26 from the lip, bilateral BS heard, possitive color change noted on ETCO2. Pt tolerating vent settings, SpO2 WNL. Will cont to monitor per resp protocol.

## 2024-09-10 NOTE — RESPIRATORY THERAPY NOTE
RT Ventilator Management Note  Marcin Sánchez 64 y.o. male MRN: [id number]  [facility] En[facility]: [id number]      Daily Screen         9/8/2024  1116 9/9/2024  0732          Patient safety screen outcome:: Failed Passed      Not Ready for Weaning due to:: Underline problem not resolved --                Physical Exam:   O2 Device: vent      Resp Comments: pt remained on listed settings through the night with no changes     09/10/24 0446   Respiratory Assessment   Resp Comments pt remained on listed settings through the night with no changes   Vent Information   Vent type Morris G5   Morris Vent Mode P-CMV/PCV+   $ Vent Daily Charge-Subsequent Yes   $ Pulse Oximetry Spot Check Charge Completed   P-CMV/PCV+ Settings   Resp Rate (BPM) 14 BPM   Pressure Control/Pinsp (cmH20) 12 cmH20   Insp Time (S) 1 sec   FiO2 (%) 40 %   PEEP (cmH2O) 6 cmH2O   I:E Ratio 1/3.3   P-ramp (ms) 50 (ms)   Flow Trigger (LPM) 5 LPM   Humidification Heater   Heater Temp 95 °F (35 °C)   P-CMV/PCV+ Actuals   Resp Rate (BPM) 19 BPM   VT (mL) 388 mL   MV 7   MAP (cmH2O) 10 cmH2O   Peak Pressure (cmH2O) 19 cmH2O   I:E Ratio (Obs) 1/2.7   Heater Temperature (Obs) 95 °F (35 °C)   P-CMV/PCV+ Alarms    High Peak Pressure (cmH2O) 40 cmH2O   Low Pressure (cmH2O) 5 cm H2O   High Resp Rate (BPM) 40 BPM   Low Resp Rate (BPM) 8 BPM   High MV (L/min) 20 L/min   Low MV (L/min) 4 L/min   High Spont VTE (mL) 1000 mL   Low Spont VTE (mL) 250 mL   ETT  Oral 8 mm   Placement Date/Time: 09/08/24 0000   Mask Ventilation: Ventilated by mask (1)  Preoxygenated: Yes  Type: Oral  Tube Size: 8 mm  Location: Oral  Insertion attempts: 1  Placement Verification: Auscultation;Chest x-ray  Secured at (cm): 25  Placed By: Ad...   Secured at (cm) 25   Measured from Lips   Secured Location Right

## 2024-09-10 NOTE — PROGRESS NOTES
"[facility]  Progress Note: Critical Care  Name: Marcin Sánchez 64 y.o. male I MRN: [id number]  Unit/Bed#: MICU 10 I Date of Admission: 9/8/2024   Date of Service: 9/10/2024 I Hospital Day: 2    Problems:  Shock - suspected vasodilatory vs. Obstructive shock  Large pericardial effusion - less likely tamponade   VT cardiac arrest post intubation  Acute on chronic hypoxic respiratory failure  Severe COPD  Acute/Chronic CHF with severe Aortic insufficiency  Abnormal CT chest with possible pneumonia - Acinetobacter seen on culture  Transaminitis 2/2 shock liver due to ischemia?  Afib on eliquis  History of pulmonary embolism  Squamous cell lung carcinoma s/p chemo/radiation  Alcohol / Tobacco abuse  Seizure disorder on dilantin     Assessment & Plan   Neuro:   Diagnosis: encephalopathy  Goal RASS 0   Patient was weaned off sedation this AM   Continues to be disoriented, only responding to commands at times  Epilepsy:  Patient continues to have supratherapeutic levels of dilantin (today 32.9)  Holding dilantin  Ordered hepatic panel      CV:   Diagnosis: Pericardial effusion  Echocardiogram on 9/9 read as \"moderate to large pericardial effusion circumferential to the heart measuring largest along the RV free wall at 2.3 cm. The fluid exhibits no internal echoes. There is no echocardiographic evidence of tamponade\"  Plan:   Continue to monitor clinical exam for signs of tamponade.    Pulm:  Diagnosis: Intubated  Plan:   Patient currently on pressure support  Attempt SBT and extubation today  Continue xopenex atrovent TID, Pulmicort BID, perforomist BID  Continue solumedrol 40 q12h  Will not change during day of extubation      GI:   Diagnosis: Transaminitis  Plan: Continue to trend   Likely secondary to shock liver    :   No active issues    F/E/N:   F: No fluids at this time  E: Replete lytes  N: NPO    Heme/Onc:   No active issues  restart heparin drip at this time     Endo: "   No active issues    ID:   Diagnosis: Abnormal CT concerning for potential pneumonia  Sputum cultures grew moraxella and acinetobacter  Plan: Continue Cefepime, Azithromycin  Trend WBCs, fever curve    MSK/Skin:   No active issues    Disposition: Critical care    ICU Core Measures     Vented Patient  VAP Bundle  VAP bundle ordered     A: Assess, Prevent, and Manage Pain Has pain been assessed? Yes  Need for changes to pain regimen? No   B: Both Spontaneous Awakening Trials (SATs) and Spontaneous Breathing Trials (SBTs) Plan to perform spontaneous awakening trial today? Yes   Plan to perform spontaneous breathing trial today? Yes   Obvious barriers to extubation? No   C: Choice of Sedation RASS Goal: -2 Light Sedation, -1 Drowsy, or 0 Alert and Calm  Need for changes to sedation or analgesia regimen? No   D: Delirium CAM-ICU: Negative   E: Early Mobility  Plan for early mobility? Yes   F: Family Engagement Plan for family engagement today? Yes       Antibiotic Review: Patient on appropriate coverage based on culture data.     Review of Invasive Devices:    [place] Plan: Continue for accurate I/O monitoring for 48 hours  Central access plan: Medications requiring central line Hemodynamic monitoring  [place] Plan: Keep arterial line for hemodynamic monitoring    Prophylaxis:  VTE VTE covered by:  heparin (porcine), Intravenous, 18 Units/kg/hr at 09/10/24 0552  heparin (porcine), Intravenous, 1,800 Units at 09/10/24 0201  heparin (porcine), Intravenous       Stress Ulcer  covered bypantoprazole (PROTONIX) injection 40 mg [[id number]]        Significant 24hr Events     24hr events: The patient was weaned off sedation and has been on pressure support ventilation. There is a goal for extubation today.      Subjective   Patient was seen and evaluated at bedside. The patient was in no acute distress and is currently on ventilator at pressure support. The patient is alert but only responds to commands at times, concern that the  patient is globally weak.   Review of Systems: See HPI for Review of Systems     Objective                            Vitals I/O      Most Recent Min/Max in 24hrs   Temp 98.2 °F (36.8 °C) Temp  Min: 97.7 °F (36.5 °C)  Max: 99 °F (37.2 °C)   Pulse 77 Pulse  Min: 64  Max: 98   Resp 22 Resp  Min: 10  Max: 30   /61 BP  Min: 93/44  Max: 195/79   O2 Sat 99 % SpO2  Min: 96 %  Max: 100 %      Intake/Output Summary (Last 24 hours) at 9/10/2024 0709  Last data filed at 9/10/2024 0601  Gross per 24 hour   Intake 1595.07 ml   Output 875 ml   Net 720.07 ml       Diet NPO    Invasive Monitoring   Arterial Line  [place] /36  Arterial Line BP  Min: 69/24  Max: 159/53   MAP (!) 64 mmHg  Arterial Line MAP (mmHg)  Min: 41 mmHg  Max: 98 mmHg           Physical Exam   Physical Exam  Cardiovascular:      Rate and Rhythm: Normal rate and regular rhythm.      Pulses: Normal pulses.      Comments: No friction rub or pericardial knock appreciated  Abdominal: General: Bowel sounds are normal.      Palpations: Abdomen is soft.      Tenderness: There is no abdominal tenderness. There is no guarding.   Pulmonary:      Effort: Pulmonary effort is normal. No respiratory distress.      Breath sounds: Normal breath sounds. No wheezing or rales.      Comments: Mechanical breath sounds  Neurological:      Mental Status: He is alert.      Comments: Patient is alert and responsive to some commands, and is able to nod his head to answer yes and no questions.             Diagnostic Studies      EKG: reviewed  Imaging:  I have personally reviewed pertinent reports.       Medications:  Scheduled PRN   budesonide, 0.5 mg, Q12H  cefepime, 2,000 mg, Q8H  chlorhexidine, 15 mL, Q12H [place]  vitamin B-12, 100 mcg, Daily  folic acid, 1 mg, Daily  formoterol, 20 mcg, Q12H  ipratropium, 0.5 mg, TID  levalbuterol, 1.25 mg, TID  methylPREDNISolone sodium succinate, 40 mg, Q12H [place]  nicotine, 1 patch, Daily  pantoprazole, 40 mg, Q24H [place]  polyethylene glycol, 17  g, Daily  senna, 1 tablet, HS      acetaminophen, 650 mg, Q6H PRN  albuterol, 2.5 mg, Q6H PRN  bisacodyl, 10 mg, Daily PRN  fentaNYL, 50 mcg, Q2H PRN  heparin (porcine), 1,800 Units, Q6H PRN  heparin (porcine), 3,600 Units, Q6H PRN  midazolam, 1 mg, Q4H PRN       Continuous    dexmedetomidine, 0.1-0.7 mcg/kg/hr, Last Rate: 0.7 mcg/kg/hr (09/10/24 0309)  fentaNYL, 75 mcg/hr, Last Rate: 75 mcg/hr (09/10/24 0357)  heparin (porcine), 3-20 Units/kg/hr (Order-Specific), Last Rate: 18 Units/kg/hr (09/10/24 0552)  norepinephrine, 1-30 mcg/min, Last Rate: Stopped (09/10/24 0534)         Labs:    CBC    Recent Labs     09/09/24  0459 09/09/24  0802 09/10/24  0432   WBC 15.01*  --  12.19*   HGB 10.1*  --  10.1*   HCT 31.1*  --  31.0*    225 209     BMP    Recent Labs     09/09/24  0459 09/10/24  0432   SODIUM 143 142   K 3.8 4.1    102   CO2 35* 34*   AGAP 8 6   BUN 41* 39*   CREATININE 0.79 0.75   CALCIUM 8.1* 8.4       Coags    Recent Labs     09/08/24  1348 09/08/24 2021 09/09/24  1835 09/10/24  0130   INR 2.14*  --   --   --    PTT 45*   < > 46* 55*    < > = values in this interval not displayed.        Additional Electrolytes  Recent Labs     09/08/24  2021 09/09/24  0459 09/10/24  0432   MG 2.0 2.1 2.1   PHOS 3.0  --  3.9   CAIONIZED  --  1.08* 1.11*          Blood Gas    Recent Labs     09/09/24 0459   PHART 7.410   [id number] 54.7*   PO2ART 82.9   [id number] 33.9*   BEART 7.9   SOURCE Line, Arterial     Recent Labs     09/09/24  0459   SOURCE Line, Arterial    LFTs  Recent Labs     09/08/24 2021 09/09/24  0459   * 359*   * 412*   ALKPHOS 80 70   ALB 2.9* 2.6*   TBILI 1.00 0.94       Infectious  No recent results  Glucose  Recent Labs     09/08/24 2021 09/09/24  0459 09/10/24  0432   GLUC 160* 144* 144*               Fariba Del Rio

## 2024-09-10 NOTE — QUICK NOTE
Spoke over the phone with the patient's sister and provided an update regarding the hospital course thusfar. She will likely come to visit the patient tomorrow. I answered all questions for her at this time.

## 2024-09-10 NOTE — UTILIZATION REVIEW
Initial Clinical Review    Admission: Date/Time/Statement:   Admission Orders (From admission, onward)       Ordered        09/09/24 0745  Inpatient Admission  Once                          Orders Placed This Encounter   Procedures    Inpatient Admission     Standing Status:   Standing     Number of Occurrences:   1     Order Specific Question:   Level of Care     Answer:   Critical Care [15]     Order Specific Question:   Estimated length of stay     Answer:   More than 2 Midnights     Order Specific Question:   Certification     Answer:   I certify that inpatient services are medically necessary for this patient for a duration of greater than two midnights. See H&P and MD Progress Notes for additional information about the patient's course of treatment.     ED Arrival Information       Patient not seen in ED                       No chief complaint on file.      Initial Presentation: 64 y.o. 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   Pt initially [facility] to [facility] [place] [facility]. He was smoking outside, noted confused, SOB, hypoxic, EMS was called and sent to the ED. Upon arrival to the ED, pt lethargic and confused, found hypercarbic, placed on BIPAP w/ improvement in breathing and mentation, suspect COPD exacerbation. Transferred to [facility] for a higher level of care. On 9/06 RR called d/t AMS. BIPAP was placed again and transferred to ICU. BIPAP was removed w/ improvement in breathing and mentation.The night after ICU admission, he became hypoxic, on BIPAP w/ 100% FiO2, unresponsive. Intubated, became pulseless, CPR x 2, ROSC achieved. Developed VT following rosc -> 200 joule shock -> sinus rhythm. Started on vasopressors. CT CAP shows bilateral pleural effusion with increased bibasilar consolidation.  Echo shows moderate pericardial effusion with right heart function compromise. Transferred to [facility] on 09/08 for management of Pericardial Effusion.   On arrival to [facility], pt intubated, moving all extremities and following simple commands despite sedation. Lungs diminished throughout. Abdomen soft. Upper ext cooler to touch.     Admit as Inpatient for evaluation and treatment of Pericardial effusion, acute metabolic encephalopathy, cardiac arrest, ARDS, COPD exacerbation, PNA.  Plan: repeat echo tomorrow. Possible pericardial window. Scheduled Neuro checks. Continue mechanical ventilation. Scheduled nebs. Iv solumedrol. Daily P/F ratio. ceftriaxone 1g daily. Azithromycin 500mg bid. MAP goal >65. Hep gtt for afib.     09/09  CC Notes: No acute events overnight. Sedation medications changed to fentanyl and precedex. Wean off Propofol. Maintain intubation. Neuro checks. F/u rpt echo. Cont IV Solu Medrol. Cont abx. Nebs. IVFs.     Thoracic Surg Consult: Pt intubated sedated with a moderate pericardial effusion and small right-sided pleural effusion. There is no sign of tamponade on echo. He has a moderate effusion but no right heart collapse. Based on this I do not recommend urgent surgical decompression. Cont to hold heparin. Consult Cardiology for elective pericardiocentesis.     Cardiology Consult: Moderate to large pericardial effusion with no echocardiographic evidence of tamponade, serial echocardiograms from 8/29/2024 and last 2 days reviewed. Continue close clinical follow-up. No indication for urgent pericardiocentesis. Continue close hemodynamic monitoring and serial echocardiograms. start diuretics soon, once weaned off pressors.      Date:  09/10  Day 2:   Cardiology Notes: Pt remains intubated this morning. Sedation decreased and he is awake and following commands. off pressors this morning with adequate BP and no events on telemetry overnight. Hold diuretics, re-eval tomorrow. May start BB in the next 24 hrs.      CC Notes: Pt was weaned off sedation, on PS vent. Goal for extubation today. Continues to be disoriented, only responding to commands at times. Continued to have supratherapeutic levels of dilantin (today 32.9).  Abnormal CT concerning for potential pneumonia. Sputum cultures grew moraxella and acinetobacter.  Holding dilantin. Ordered hepatic panel. Continue to monitor clinical exam for signs of tamponade. Continue xopenex atrovent TID, Pulmicort BID, perforomist BID. Continue solumedrol 40 q12h. SBT and extubation today. NPO. Restart hep gtt. Continue Cefepime, Azithromycin     ED Triage Vitals   Temperature Pulse Respirations Blood Pressure SpO2 Pain Score   09/08/24 2100 09/08/24 2000 09/08/24 2000 09/08/24 2200 09/08/24 2000 09/08/24 2038   97.9 °F (36.6 °C) 96 20 150/67 97 % Med Not Given for Pain - for MAR use only     Weight (last 2 days)       Date/Time Weight    09/10/24 0619 --     Weight: bed broken at 09/10/24 0619    09/09/24 0800 64.4 (142)    09/09/24 0540 64.5 (142.2)            Vital Signs (last 3 days)       Date/Time Temp Pulse Resp BP MAP (mmHg) Arterial Line BP MAP SpO2 FiO2 (%) O2 Device Patient Position - Orthostatic VS [place] Coma Scale Score Pain    09/10/24 0800 98.5 °F (36.9 °C) 75 22 117/59 85 114/33 61 mmHg 100 % -- -- -- 11 No Pain    09/10/24 0709 -- -- -- -- -- -- -- 99 % -- -- -- -- --    09/10/24 0700 -- 77 22 132/61 88 118/36 64 mmHg 99 % -- -- -- -- --    09/10/24 0619 -- -- -- -- -- -- -- -- -- -- -- -- Med Not Given for Pain - for MAR use only    09/10/24 0600 -- 75 18 118/58 84 -- -- 97 % 40 Ventilator Lying -- --    09/10/24 0500 -- 68 11 118/58 83 -- -- 97 % -- -- -- -- --    09/10/24 0400 98.2 °F (36.8 °C) 91 26 151/69 99 -- -- 98 % 40 Ventilator Lying 9 --    09/10/24 0357 -- -- -- -- -- -- -- -- -- -- -- -- Med Not Given for Pain - for MAR use only    09/10/24 0300 -- 64 12 101/50 72 -- -- 100 % -- -- -- -- --    09/10/24 0200 -- 78 19 133/59 93 100/94 98 mmHg 98 %  40 Ventilator Lying -- --    09/10/24 0159 -- -- -- -- -- -- -- -- -- -- -- -- Med Not Given for Pain - for MAR use only    09/10/24 0100 -- 69 12 114/57 82 -- -- 98 % -- -- Lying -- --    09/10/24 0000 99 °F (37.2 °C) 71 13 112/56 80 -- -- 98 % 40 Ventilator Lying 9 --    09/09/24 2315 -- 72 12 93/44 64 -- -- 98 % -- -- -- -- --    09/09/24 2300 -- 77 10 113/56 80 -- -- 98 % 40 Ventilator Lying -- --    09/09/24 2209 -- -- -- -- -- -- -- -- -- -- -- -- Med Not Given for Pain - for MAR use only    09/09/24 2200 -- 72 14 103/52 75 -- -- 99 % 40 Ventilator Lying -- --    09/09/24 2100 98.9 °F (37.2 °C) 83 19 123/60 86 -- -- 99 % -- -- Lying -- --    09/09/24 2000 -- 89 22 169/69 99 136/41 74 mmHg 99 % 40 Ventilator Lying 9 --    09/09/24 1900 -- 75 14 101/49 70 -- -- 99 % 40 Ventilator Lying -- --    09/09/24 1818 -- -- -- 195/79 114 159/53 92 mmHg -- -- -- -- -- Med Not Given for Pain - for MAR use only    09/09/24 1800 -- 85 18 135/56 88 112/33 60 mmHg 97 % -- -- -- -- --    09/09/24 1742 -- 85 -- 138/64 92 -- -- -- -- -- -- -- --    09/09/24 1700 -- 92 20 158/70 100 131/42 76 mmHg 98 % -- -- -- -- --    09/09/24 1600 98.9 °F (37.2 °C) 76 15 124/58 84 105/31 56 mmHg 98 % -- -- -- 9 --    09/09/24 1500 -- 78 11 126/62 89 100/30 54 mmHg 97 % -- -- -- -- --    09/09/24 1450 -- 79 -- 136/61 88 -- -- -- -- -- -- -- --    09/09/24 1441 -- 78 -- 138/65 93 -- 63 mmHg -- -- -- -- -- --    09/09/24 1417 -- -- -- -- -- -- -- 96 % -- -- -- -- --    09/09/24 1400 -- 73 15 151/65 94 119/35 63 mmHg 96 % -- -- -- -- --    09/09/24 1345 -- -- -- 98/48 69 -- -- -- -- -- -- -- --    09/09/24 1344 -- -- -- -- -- 69/24 41 mmHg -- -- -- -- -- --    09/09/24 1339 -- -- -- -- -- -- -- -- -- -- -- -- Med Not Given for Pain - for MAR use only    09/09/24 1321 -- -- -- -- -- -- -- -- -- -- -- -- Med Not Given for Pain - for MAR use only    09/09/24 1318 -- -- -- -- -- -- -- 97 % -- -- -- -- --    09/09/24 1300 -- 80 21 129/60 87 110/34 62  mmHg 98 % -- -- -- -- --    09/09/24 1200 98.5 °F (36.9 °C) 85 23 134/63 91 119/42 71 mmHg 98 % -- -- -- -- --    09/09/24 1150 -- -- -- -- -- -- -- -- -- -- -- 10 --    09/09/24 1110 -- -- -- -- -- -- -- 98 % -- -- -- -- --    09/09/24 1100 -- 74 17 116/58 84 96/28 52 mmHg 98 % -- -- -- -- --    09/09/24 1000 -- 78 22 114/54 78 94/28 52 mmHg 97 % -- -- -- -- --    09/09/24 0934 -- -- -- -- -- -- -- -- -- -- -- -- Med Not Given for Pain - for MAR use only    09/09/24 0904 -- -- -- 169/72 -- -- 74 mmHg -- -- -- -- -- --    09/09/24 0900 -- 98 30 169/72 103 135/49 83 mmHg 96 % -- -- -- -- --    09/09/24 0847 -- -- -- 170/65 -- -- 71 mmHg -- -- -- -- -- --    09/09/24 0800 97.7 °F (36.5 °C) 69 23 111/53 76 98/30 54 mmHg 100 % 40 Ventilator -- 7 --    09/09/24 0732 -- -- -- -- -- -- -- 100 % -- -- -- -- --    09/09/24 0713 -- 67 -- 109/53 76 -- -- -- -- -- -- -- --    09/09/24 0600 -- 69 17 106/52 75 88/27 49 mmHg 100 % -- -- -- -- --    09/09/24 0500 -- 70 16 104/51 74 83/27 47 mmHg 100 % -- -- -- -- --    09/09/24 0400 98.4 °F (36.9 °C) 73 15 107/53 77 81/26 46 mmHg 100 % -- -- -- -- --    09/09/24 0352 -- -- -- -- -- -- -- -- -- -- -- 9 --    09/09/24 0300 -- 76 17 112/54 78 94/28 51 mmHg 100 % -- -- -- -- --    09/09/24 0200 -- 80 16 122/58 84 102/30 55 mmHg 100 % -- -- -- -- --    09/09/24 0100 -- 81 17 129/63 90 115/32 61 mmHg 99 % -- -- -- -- --    09/09/24 0000 98.6 °F (37 °C) 84 19 133/63 90 123/33 62 mmHg 99 % -- -- -- 9 --    09/08/24 2300 -- 82 16 130/60 86 124/33 62 mmHg 99 % -- -- -- -- --    09/08/24 2200 -- 88 19 150/67 97 143/34 70 mmHg 98 % -- -- -- -- --    09/08/24 2100 97.9 °F (36.6 °C) 85 25 -- -- 129/32 64 mmHg 99 % -- -- -- 9 --    09/08/24 2038 -- -- -- -- -- -- -- -- -- -- -- -- Med Not Given for Pain - for MAR use only    09/08/24 2000 -- 96 20 -- -- 140/41 74 mmHg 97 % -- -- -- -- --              Pertinent Labs/Diagnostic Test Results:   Radiology:  XR chest portable   Final Interpretation by  Matt Russ MD (09/09 0949)      Tubes and lines in satisfactory position. No pneumothorax.      Unchanged interstitial edema and small right pleural effusion.      Resident: John Blake I, the attending radiologist, have reviewed the images and agree with the final report above.      Workstation performed: [id number]           Cardiology:  Echo follow up/limited w/ contrast if indicated   Final Result by Aura Dimas DO (09/09 1212)        Left Ventricle: Left ventricular cavity size is normal. Wall thickness    is normal. The left ventricular ejection fraction is 50%. Systolic    function is low normal. There is global hypokinesis with regional    variation.     Right Ventricle: Right ventricular cavity size is normal. Systolic    function is normal.     Aortic Valve: There is moderate to severe regurgitation.     Pericardium: There is a moderate to large pericardial effusion    circumferential to the heart measuring largest along the RV free wall at    2.3 cm. The fluid exhibits no internal echoes. There is no    echocardiographic evidence of tamponade. The evidence against tamponade    includes: no right ventricular diastolic collapse, no right atrial    inversion and no respiratory variation. There is a left pleural effusion.         ECG 12 lead   Final Result by Effie Davis MD (09/09 0103)   Normal sinus rhythm   Technically poor tracing      Poor data quality, interpretation may be adversely affected      Confirmed by Effie Davis ([id number]) on 9/9/2024 1:03:05 AM        GI:  No orders to display       Results from last 7 days   Lab Units 09/05/24  2139   SARS-COV-2  Negative     Results from last 7 days   Lab Units 09/10/24  0432 09/09/24  0802 09/09/24  0459 09/08/24 2021 09/08/24  1348 09/08/24  0545 09/08/24  0049 09/08/24  0008 09/07/24  0616 09/06/24  1938 09/06/24  0559   WBC Thousand/uL 12.19*  --  15.01* 14.77* 14.35* 15.08* 11.60*  --  11.64*  --  6.04   HEMOGLOBIN g/dL 10.1*   --  10.1* 11.2* 10.6* 12.0 11.6*  --  11.2*  --  10.8*   I STAT HEMOGLOBIN   --   --   --   --   --   --   --    < >  --    < >  --    HEMATOCRIT % 31.0*  --  31.1* 33.5* 31.1* 36.6 36.2*  --  35.1*  --  33.2*   HEMATOCRIT, ISTAT   --   --   --   --   --   --   --    < >  --    < >  --    PLATELETS Thousands/uL 209 225 235 272 247 323 284  --  261  --  242   TOTAL NEUT ABS Thousands/µL  --   --   --   --   --   --  9.72*  --  9.83*  --  4.74    < > = values in this interval not displayed.         Results from last 7 days   Lab Units 09/10/24  0432 09/09/24  0459 09/08/24  2021 09/08/24  0545 09/08/24  0049 09/08/24  0008 09/07/24  1350 09/07/24  0040 09/06/24  1938   SODIUM mmol/L 142 143 141 142 142  --   --    < >  --    POTASSIUM mmol/L 4.1 3.8 3.7 4.7 4.2  --   --    < >  --    CHLORIDE mmol/L 102 100 98 96 97  --   --    < >  --    CO2 mmol/L 34* 35* 35* 36* 37*  --   --    < >  --    CO2, I-STAT mmol/L  --   --   --   --   --  43*  --   --  >45*   ANION GAP mmol/L 6 8 8 10 8  --   --    < >  --    BUN mg/dL 39* 41* 45* 45* 44*  --   --    < >  --    CREATININE mg/dL 0.75 0.79 0.96 1.13 1.20  --   --    < >  --    EGFR ml/min/1.73sq m 96 94 83 68 63  --   --    < >  --    CALCIUM mg/dL 8.4 8.1* 8.5 9.6 10.1  --   --    < >  --    CALCIUM, IONIZED mmol/L 1.11* 1.08*  --   --  1.05*  --   --   --   --    CALCIUM, IONIZED, ISTAT mmol/L  --   --   --   --   --  1.45*  --   --  1.29   MAGNESIUM mg/dL 2.1 2.1 2.0 2.2 2.2  --   --   --   --    PHOSPHORUS mg/dL 3.9  --  3.0 3.4 5.7*  --  6.1*  --   --     < > = values in this interval not displayed.     Results from last 7 days   Lab Units 09/09/24  0459 09/08/24 2021 09/08/24  0545 09/08/24  0049 09/07/24  1347 09/06/24  2138 09/05/24  2247   AST U/L 412* 613* 864* 946*  --  25  --    ALT U/L 359* 430* 438* 428*  --  9  --    ALK PHOS U/L 70 80 92 82  --  99  --    TOTAL PROTEIN g/dL 5.5* 6.0* 6.9 7.2  --  7.6  --    ALBUMIN g/dL 2.6* 2.9* 3.3* 3.5  --  3.7  --   "  TOTAL BILIRUBIN mg/dL 0.94 1.00 1.04* 0.98  --  0.91  --    BILIRUBIN DIRECT mg/dL  --   --   --  0.37*  --  0.23*  --    AMMONIA umol/L  --   --   --   --  44  --  46     Results from last 7 days   Lab Units 09/10/24  0548 09/10/24  0021 09/09/24  1727 09/09/24  1216 09/09/24  0459 09/09/24  0007 09/08/24  1750 09/08/24  1243 09/07/24  1238 09/06/24  1933   POC GLUCOSE mg/dl 131 131 156* 130 131 109 177* 161* 141* 150*     Results from last 7 days   Lab Units 09/10/24  0432 09/09/24  0459 09/08/24  2021 09/08/24  0545 09/08/24  0049 09/07/24  0616 09/07/24  0040 09/06/24  0559 09/05/24  2139   GLUCOSE RANDOM mg/dL 144* 144* 160* 142* 137 136 166* 114 164*             No results found for: \"BETA-HYDROXYBUTYRATE\"   Results from last 7 days   Lab Units 09/09/24  0459 09/08/24  2030 09/08/24  1828   PH ART  7.410 7.416 7.444   PCO2 ART mm Hg 54.7* 51.0* 46.5*   PO2 ART mm Hg 82.9 112.3 134.5*   HCO3 ART mmol/L 33.9* 32.0* 31.2*   BASE EXC ART mmol/L 7.9 6.3 6.2   O2 CONTENT ART mL/dL 14.7* 16.9 16.2   O2 HGB, ARTERIAL % 94.6 97.3* 97.9*   ABG SOURCE  Line, Arterial Line, Arterial Line, Arterial     Results from last 7 days   Lab Units 09/08/24  0638 09/08/24  0309 09/08/24  0057   PH RADHA  7.511* 7.348 7.207*   PCO2 RADHA mm Hg 38.4* 63.1* 74.1*   PO2 RADHA mm Hg 39.5 27.9* 23.6*   HCO3 RADHA mmol/L 30.0 33.9* 28.8   BASE EXC RADHA mmol/L 6.7 6.4 -0.2   O2 CONTENT RADHA ml/dL 13.8 8.4 4.1   O2 HGB, VENOUS % 76.7 46.7* 30.0*     Results from last 7 days   Lab Units 09/08/24  0008 09/06/24 1938   I STAT BASE EXC mmol/L 10* 11*   I STAT O2 SAT % 100* 97*   ISTAT PH ART  7.260* 7.232*   I STAT ART PCO2 mm HG 89.8* 100.5*   I STAT ART PO2 mm .0* 109.0   I STAT ART HCO3 mmol/L 40.3* 42.3*                 Results from last 7 days   Lab Units 09/10/24  0841 09/10/24  0130 09/09/24  1835 09/08/24 2021 09/08/24  1348 09/05/24  2139   PROTIME seconds  --   --   --   --  23.8* 17.3*   INR   --   --   --   --  2.14* 1.38*   PTT " seconds 71* 55* 46*   < > 45* 46*    < > = values in this interval not displayed.     Results from last 7 days   Lab Units 09/05/24  2139   TSH 3RD GENERATON uIU/mL 0.591     Results from last 7 days   Lab Units 09/08/24  0609 09/07/24  0616 09/05/24  2139   PROCALCITONIN ng/ml 1.56* 0.75* <0.05     Results from last 7 days   Lab Units 09/08/24  0933 09/08/24  0545 09/08/24  0309 09/08/24  0049 09/07/24  1658 09/07/24  1347 09/05/24  2139   LACTIC ACID mmol/L 1.6 3.6* 4.0* 2.9* 2.2* 2.4* 1.1     Results from last 7 days   Lab Units 09/07/24  1350   PROLACTIN ng/mL 9.52         Results from last 7 days   Lab Units 09/05/24  2139   BNP pg/mL 404*                 Results from last 7 days   Lab Units 09/09/24  0459   HEP B S AG  Non-reactive   HEP C AB  Reactive*   HEP B C IGM  Non-reactive                         Results from last 7 days   Lab Units 09/05/24  2139   INFLUENZA A PCR  Negative   INFLUENZA B PCR  Negative   RSV PCR  Negative             Results from last 7 days   Lab Units 09/05/24  2139   ETHANOL LVL mg/dL <10   ACETAMINOPHEN LVL ug/mL <2*   SALICYLATE LVL mg/dL <5                 Results from last 7 days   Lab Units 09/06/24  1054 09/05/24 2139   BLOOD CULTURE   --  No Growth After 4 Days.  No Growth After 4 Days.   SPUTUM CULTURE  4+ Growth of Acinetobacter baumannii*  Few Colonies of Moraxella catarrhalis*  4+ Growth of  --    GRAM STAIN RESULT  2+ Polys*  1+ Gram negative rods*  1+ Gram positive cocci in pairs and chains*  No Epithelial cells seen*  --                    ED Treatment-Medication Administration - No Administrations Displayed (No Start Event Found)       None            Past Medical History:   Diagnosis Date    Alcohol abuse     Anxiety     Aortic insufficiency 12/18/2020    Atrial fibrillation (HCC)     Cancer (HCC)     COPD (chronic obstructive pulmonary disease) (HCC)     Delirium     Encephalopathy     Epilepsy (HCC)     History of chemotherapy     History of radiation  therapy     Hypo-osmolality and hyponatremia     Hypokalemia     Hypothyroid     Lung cancer (HCC)     Lyme disease     Major depression      Present on Admission:   Pericardial effusion      Admitting Diagnosis: Acute on chronic respiratory failure with hypoxia and hypercapnia (HCC) [J96.21, J96.22]  Age/Sex: 64 y.o. male  Admission Orders:  SCD  Cardio-Pulmonary Monitoring, Neuro Checks, Nursing dysphagia screen, I/O, Daily weights, Vital signs      Scheduled Medications:  budesonide, 0.5 mg, Nebulization, Q12H  calcium gluconate, 2 g, Intravenous, Once  cefepime, 2,000 mg, Intravenous, Q8H  chlorhexidine, 15 mL, Mouth/Throat, Q12H [place]  vitamin B-12, 100 mcg, Oral, Daily  folic acid, 1 mg, Oral, Daily  formoterol, 20 mcg, Nebulization, Q12H  ipratropium, 0.5 mg, Nebulization, TID  levalbuterol, 1.25 mg, Nebulization, TID  methylPREDNISolone sodium succinate, 40 mg, Intravenous, Q12H [place]  nicotine, 1 patch, Transdermal, Daily  pantoprazole, 40 mg, Intravenous, Q24H [place]  polyethylene glycol, 17 g, Oral, Daily  senna, 1 tablet, Oral, HS      Continuous IV Infusions:  dexmedetomidine, 0.1-0.7 mcg/kg/hr, Intravenous, Titrated  heparin (porcine), 3-20 Units/kg/hr (Order-Specific), Intravenous, Titrated  propofol (DIPRIVAN) 1000 mg in 100 mL infusion (premix)  Rate: 1.9-18.9 mL/hr Dose: 5-50 mcg/kg/min  Weight Dosing Info: 62.9 kg  Freq: Titrated Route: IV  Last Dose: Stopped (09/09/24 0805)  Start: 09/08/24 2015 End: 09/09/24 1130  norepinephrine (LEVOPHED) 4 mg (STANDARD CONCENTRATION) IV in sodium chloride 0.9% 250 mL  Rate: 3.8-112.5 mL/hr Dose: 1-30 mcg/min  Freq: Titrated Route: IV  Last Dose: Stopped (09/10/24 0534)  Start: 09/08/24 2015 End: 09/10/24 1101  fentaNYL 1000 mcg in sodium chloride 0.9% 100mL infusion  Rate: 2.5 mL/hr Dose: 25 mcg/hr  Freq: Continuous Route: IV  Last Dose: Stopped (09/10/24 1047)  Start: 09/08/24 2045 End: 09/10/24 1103    PRN Meds:  acetaminophen, 650 mg, Oral, Q6H PRN  albuterol, 2.5  mg, Nebulization, Q6H PRN  bisacodyl, 10 mg, Rectal, Daily PRN  fentaNYL, 50 mcg, Intravenous, Q2H PRN 09/08 x 2, 09/09 x 4, 09/10 x 3  heparin (porcine), 1,800 Units, Intravenous, Q6H PRN 09/09 x 1, 09/10 x 1  heparin (porcine), 3,600 Units, Intravenous, Q6H PRN  midazolam, 1 mg, Intravenous, Q4H PRN        IP CONSULT TO THORACIC SURGERY  IP CONSULT TO CASE MANAGEMENT  IP CONSULT TO CARDIOLOGY    Network Utilization Review Department  ATTENTION: Please call with any questions or concerns to [phone] and carefully listen to the prompts so that you are directed to the right person. All voicemails are confidential.   For Discharge needs, contact Care Management DC Support Team at [phone] opt. 2  Send all requests for admission clinical reviews, approved or denied determinations and any other requests to dedicated fax number below belonging to the campus where the patient is receiving treatment. List of dedicated fax numbers for the Facilities:  FACILITY NAME UR FAX NUMBER   ADMISSION DENIALS (Administrative/Medical Necessity) [phone]   DISCHARGE SUPPORT TEAM (NETWORK) [phone]   PARENT CHILD HEALTH (Maternity/NICU/Pediatrics) [phone]   [facility] [phone]   [facility] [phone]   [facility] [phone]   [facility] [phone]   [facility] [phone]   [facility] [phone]   [facility] [phone]   [facility] [phone]   [facility] [phone]   [facility] [phone]   [facility] [phone]   [facility] [phone]

## 2024-09-10 NOTE — PROCEDURES
Intubation    Date/Time: 9/10/2024 3:48 PM    Performed by: Tobi Hook MD  Authorized by: Tobi Hook MD    Patient location:  Bedside  Consent:     Consent obtained:  Emergent situation  Universal protocol:     Relevant documents present and verified: yes      Test results available and properly labeled: yes      Radiology Images displayed and confirmed.  If images not available, report reviewed: yes      Required blood products, implants, devices, and special equipment available: yes      Site/side marked: yes      Immediately prior to procedure, a time out was called: yes      Patient identity confirmed:  Arm band, provided demographic data and hospital-assigned identification number  Pre-procedure details:     Patient status:  Altered mental status    Pretreatment medications:  Etomidate    Paralytics:  Vecuronium  Indications:     Indications for intubation: respiratory distress, respiratory failure and hypercapnia    Procedure details:     Preoxygenation:  BiPAP    CPR in progress: no      Intubation method:  Oral    Oral intubation technique:  Glidescope    Tube size (mm):  8.0    Tube type:  Cuffed    Number of attempts:  1  Placement assessment:     ETT to lip:  26    Tube secured with:  ETT marinelli    Breath sounds:  Equal    Placement verification: chest rise, colorimetric ETCO2 device, CXR verification, direct visualization and equal breath sounds      CXR findings:  ETT in proper place  Post-procedure details:     Patient tolerance of procedure:  Tolerated well, no immediate complications

## 2024-09-10 NOTE — PROGRESS NOTES
"Cardiology Team 2 Progress Note - Marcin Sánchez 64 y.o. male MRN: [id number]    [facility]: [id number]          Subjective:   Patient seen and examined.  No significant events overnight. Remains intubated this morning. Sedation decreased and he is awake and following commands but looks comfortable.    Hospital Course:   Marcin Sánchez is a 64 y.o. year old male who presented to [facility] 9/5 with acute respiratory failure secondary COPD exacerbation.  He has past medical history significant for atrial fibrillation, COPD, chronic combined systolic and diastolic CHF, epilepsy, pulmonary fibrosis.  Radiation, history of squamous cell lung cancer, current tobacco user.  Smoking outside his living facility [facility]) when he was noted to become acutely confused.  He also.  Short of breath was noted to be hypoxemic prompting call to EMS.  Upon arrival he was lethargic and confused which is thought to be secondary to hypercarbic respiratory failure in the setting of COPD exacerbation and was initiated on BiPAP.  On 9/7 patient had acute hypoxic event resulting in bradycardia and subsequently PEA cardiac arrest 2 rounds of CPR with VT noted during a pulse check. Shock was administered and ROSC was achieved and he was moving all extremities. She was subsequently intubated.  Echocardiogram rest showed LVEF 55% with normal systolic function with moderate pericardial effusion.  He was transferred to [facility] for thoracic surgery oncology evaluation. There were no clinical signs of tamponade and therefore window and pericardiocentesis were not performed.    Vitals: Blood pressure 132/61, pulse 77, temperature 98.2 °F (36.8 °C), temperature source Axillary, resp. rate 22, height 5' 9\" (1.753 m), weight 64.4 kg (142 lb), SpO2 99%., Body mass index is 20.97 kg/m².,   Orthostatic Blood Pressures      Flowsheet Row Most Recent Value   Blood Pressure 132/61 filed at 09/10/2024 0700   Patient " Position - Orthostatic VS Lying filed at 09/10/2024 0600              Intake/Output Summary (Last 24 hours) at 9/10/2024 0758  Last data filed at 9/10/2024 0601  Gross per 24 hour   Intake 1595.07 ml   Output 875 ml   Net 720.07 ml       Review of System:  Review of system was conducted and was negative except for as stated in the subjective course.    Physical Exam:    GEN: Marcin Sánchez appears well, alert and oriented x 3, pleasant and cooperative   HEENT:  Normocephalic, atraumatic, anicteric, moist mucous membranes  NECK: No JVD or carotid bruits   HEART: regular rhythm, regular rate, normal S1 and S2, no murmurs, clicks, gallops or rubs   LUNGS: Intubated this morning. Significant expiratory wheezing b/l.  ABDOMEN:  Normoactive bowel sounds, soft, no tenderness, no distention  EXTREMITIES: peripheral pulses palpable; no edema  NEURO: no gross focal findings; cranial nerves grossly intact   SKIN:  Dry, intact, warm to touch      Current Facility-Administered Medications:     acetaminophen (TYLENOL) tablet 650 mg, 650 mg, Oral, Q6H PRN, LANDON Desai    albuterol inhalation solution 2.5 mg, 2.5 mg, Nebulization, Q6H PRN, Brian Poe MD    bisacodyl (DULCOLAX) rectal suppository 10 mg, 10 mg, Rectal, Daily PRN, Axel Smith PA-C    budesonide (PULMICORT) inhalation solution 0.5 mg, 0.5 mg, Nebulization, Q12H, LANDON Desai, 0.5 mg at 09/10/24 0709    ceFEPime (MAXIPIME) 2,000 mg in dextrose 5 % 50 mL IVPB, 2,000 mg, Intravenous, Q8H, Axel Smith PA-C, Last Rate: 100 mL/hr at 09/10/24 0357, 2,000 mg at 09/10/24 0357    chlorhexidine (PERIDEX) 0.12 % oral rinse 15 mL, 15 mL, Mouth/Throat, Q12H AMERICA, Emerson Langston MD, 15 mL at 09/09/24 2112    cyanocobalamin (VITAMIN B-12) tablet 100 mcg, 100 mcg, Oral, Daily, Axel Smith PA-C, 100 mcg at 09/09/24 1625    dexmedeTOMIDine (Precedex) 400 mcg in sodium chloride 0.9% 100 mL, 0.1-0.7 mcg/kg/hr, Intravenous,  Titrated, Enrique Kent MD, Last Rate: 10.6 mL/hr at 09/10/24 0309, 0.7 mcg/kg/hr at 09/10/24 0309    fentaNYL 1000 mcg in sodium chloride 0.9% 100mL infusion, 75 mcg/hr, Intravenous, Continuous, Tobi Hook MD, Last Rate: 7.5 mL/hr at 09/10/24 0357, 75 mcg/hr at 09/10/24 0357    fentaNYL injection 50 mcg, 50 mcg, Intravenous, Q2H PRN, LANDON Desai, 50 mcg at 09/10/24 0619    folic acid (FOLVITE) tablet 1 mg, 1 mg, Oral, Daily, Axel Smith PA-C, 1 mg at 09/09/24 1625    formoterol (PERFOROMIST) nebulizer solution 20 mcg, 20 mcg, Nebulization, Q12H, Tobi Hook MD, 20 mcg at 09/10/24 0709    heparin (porcine) 25,000 units in 0.45% NaCl 250 mL infusion (premix), 3-20 Units/kg/hr (Order-Specific), Intravenous, Titrated, Axel Smiht PA-C, Last Rate: 10.8 mL/hr at 09/10/24 0552, 18 Units/kg/hr at 09/10/24 0552    heparin (porcine) injection 1,800 Units, 1,800 Units, Intravenous, Q6H PRN, Axel Smith PA-C, 1,800 Units at 09/10/24 0201    heparin (porcine) injection 3,600 Units, 3,600 Units, Intravenous, Q6H PRN, Axel Smith PA-C    ipratropium (ATROVENT) 0.02 % inhalation solution 0.5 mg, 0.5 mg, Nebulization, TID, Brian Poe MD, 0.5 mg at 09/10/24 0709    levalbuterol (XOPENEX) inhalation solution 1.25 mg, 1.25 mg, Nebulization, TID, Brian Poe MD, 1.25 mg at 09/10/24 0709    methylPREDNISolone sodium succinate (Solu-MEDROL) injection 40 mg, 40 mg, Intravenous, Q12H AMERICA, LANDON Desai, 40 mg at 09/09/24 2112    midazolam (VERSED) injection 1 mg, 1 mg, Intravenous, Q4H PRN, Axel Smith PA-C    nicotine (NICODERM CQ) 21 mg/24 hr TD 24 hr patch 1 patch, 1 patch, Transdermal, Daily, LANDON Desai, 1 patch at 09/09/24 0818    norepinephrine (LEVOPHED) 4 mg (STANDARD CONCENTRATION) IV in sodium chloride 0.9% 250 mL, 1-30 mcg/min, Intravenous, Titrated, LANDON Desai, Stopped at 09/10/24 0532     pantoprazole (PROTONIX) injection 40 mg, 40 mg, Intravenous, Q24H AMERICA, Sue ENMA Jc, LANDON, 40 mg at 09/09/24 0817    polyethylene glycol (MIRALAX) packet 17 g, 17 g, Oral, Daily, Axel Smith PA-C, 17 g at 09/09/24 1209    senna (SENOKOT) tablet 8.6 mg, 1 tablet, Oral, HS, Axel Smith PA-C, 8.6 mg at 09/09/24 2112    Labs & Results:          Results from last 7 days   Lab Units 09/10/24  0432 09/09/24  0459 09/08/24 2021   POTASSIUM mmol/L 4.1 3.8 3.7   CO2 mmol/L 34* 35* 35*   CHLORIDE mmol/L 102 100 98   BUN mg/dL 39* 41* 45*   CREATININE mg/dL 0.75 0.79 0.96     Results from last 7 days   Lab Units 09/10/24  0432 09/09/24  0802 09/09/24  0459 09/08/24 2021   HEMOGLOBIN g/dL 10.1*  --  10.1* 11.2*   HEMATOCRIT % 31.0*  --  31.1* 33.5*   PLATELETS Thousands/uL 209 225 235 272           Telemetry:   Personally reviewed by Adam Nunez, DO: normal sinus rhythm with periods of tachycardia      Assessment:  Active Problems:    Pericardial effusion      Pericardial effusion  Moderate Sized pericardial effusion noted on  the last 3 echocardiograms 8/29, 9/8, and 9/9. Effusion predominantly around RV and RA free wall with smaller portion tracking around the apex. Does not appear to have changes much since 8/29. Currenlty no clinical or echocardiographic signs of tamponade. He is intubated and sedated for acute respiratry failure form COPD. Off pressors.  Plan:  - no evidence of tamponade, no urgent indication at this time for pericardiocentesis  - Interventional cardiology made aware, discussed however not a good target or candidate for pericardiocentesis given location of effusion.  - If there is acute decompensation recommend fluids and repeat echo to evaluate effusion, if evidence of tamponade then would proceed with pericardial window vs pericardiocentesis  - recommend interval echocardiogram on 9/11/24  - Hold diuretic without evidence of volume overload and to prevent decreasing preload      Cardiac arrest  Cardiac arrest appears to be in the setting of hypoxemia. Initial rhythm reported as PEA with ROSC then VT arrest. Possibly transient ischemia during PEA leating to VT. After intubation does not appear to have any further ectopy. Echocardiogram with LVEF 50-55%.  - off pressors this morning with adequate BP and no events on telemetry overnight  - continue treatment for COPD exacerbation as per primary team     Chronic combined systolic and diastolic heart failure  Appears euvolemic on exam. Currently requireing pressors. Chronically on Lasix 40mg daily and toprol 25mg daily.  - holding BB after cardiac arrest and with effusion, may restart in the next 24 hours  - appears euvolemic today, continue holding diuretic, re-evaluate tomorrow     Paroxysmal atrial fibrillation  Current yin sinus rhythm. Rates are controlled.  - restart metoprolol in the next 24 hours  - continue AC with heparin currently, once no furhter [facility]     Acute Hypoxemic Respiratory failure- intubated and sedated, wean as able as per primary team  COPD- steroids and inhalers as per primary.  Pulmonary fibrosis  Current Tobacco use- tobacco cessation recomended    Case discussed and reviewed with Dr. Silva who agrees with my assessment and plan.    Thank you for involving us in the care of your patient.      Adam Nunez DO  Cardiology Fellow   PGY-5      ** Please Note: Fluency DirectDictation voice to text software may have been used in the creation of this document. **

## 2024-09-10 NOTE — PLAN OF CARE
Problem: Prexisting or High Potential for Compromised Skin Integrity  Goal: Skin integrity is maintained or improved  Description: INTERVENTIONS:  - Identify patients at risk for skin breakdown  - Assess and monitor skin integrity  - Assess and monitor nutrition and hydration status  - Monitor labs   - Assess for incontinence   - Turn and reposition patient  - Assist with mobility/ambulation  - Relieve pressure over bony prominences  - Avoid friction and shearing  - Provide appropriate hygiene as needed including keeping skin clean and dry  - Evaluate need for skin moisturizer/barrier cream  - Collaborate with interdisciplinary team   - Patient/family teaching  - Consider wound care consult   Outcome: Progressing     Problem: SAFETY,RESTRAINT: NV/NON-SELF DESTRUCTIVE BEHAVIOR  Goal: Remains free of harm/injury (restraint for non violent/non self-detsructive behavior)  Description: INTERVENTIONS:  - Instruct patient/family regarding restraint use   - Assess and monitor physiologic and psychological status   - Provide interventions and comfort measures to meet assessed patient needs   - Identify and implement measures to help patient regain control  - Assess readiness for release of restraint   Outcome: Progressing  Goal: Returns to optimal restraint-free functioning  Description: INTERVENTIONS:  - Assess the patient's behavior and symptoms that indicate continued need for restraint  - Identify and implement measures to help patient regain control  - Assess readiness for release of restraint   Outcome: Progressing     Problem: PAIN - ADULT  Goal: Verbalizes/displays adequate comfort level or baseline comfort level  Description: Interventions:  - Encourage patient to monitor pain and request assistance  - Assess pain using appropriate pain scale  - Administer analgesics based on type and severity of pain and evaluate response  - Implement non-pharmacological measures as appropriate and evaluate response  - Consider  cultural and social influences on pain and pain management  - Notify physician/advanced practitioner if interventions unsuccessful or patient reports new pain  Outcome: Progressing     Problem: INFECTION - ADULT  Goal: Absence or prevention of progression during hospitalization  Description: INTERVENTIONS:  - Assess and monitor for signs and symptoms of infection  - Monitor lab/diagnostic results  - Monitor all insertion sites, i.e. indwelling lines, tubes, and drains  - Monitor endotracheal if appropriate and nasal secretions for changes in amount and color  - [place] appropriate cooling/warming therapies per order  - Administer medications as ordered  - Instruct and encourage patient and family to use good hand hygiene technique  - Identify and instruct in appropriate isolation precautions for identified infection/condition  Outcome: Progressing  Goal: Absence of fever/infection during neutropenic period  Description: INTERVENTIONS:  - Monitor WBC    Outcome: Progressing     Problem: SAFETY ADULT  Goal: Patient will remain free of falls  Description: INTERVENTIONS:  - Educate patient/family on patient safety including physical limitations  - Instruct patient to call for assistance with activity   - Consult OT/PT to assist with strengthening/mobility   - Keep Call bell within reach  - Keep bed low and locked with side rails adjusted as appropriate  - Keep care items and personal belongings within reach  - Initiate and maintain comfort rounds  - Make Fall Risk Sign visible to staff  - Offer Toileting every 2 Hours, in advance of need  - Initiate/Maintain bed alarm  - Obtain necessary fall risk management equipment  - Apply yellow socks and bracelet for high fall risk patients  - Consider moving patient to room near nurses station  Outcome: Progressing  Goal: Maintain or return to baseline ADL function  Description: INTERVENTIONS:  -  Assess patient's ability to carry out ADLs; assess patient's baseline for ADL  function and identify physical deficits which impact ability to perform ADLs (bathing, care of mouth/teeth, toileting, grooming, dressing, etc.)  - Assess/evaluate cause of self-care deficits   - Assess range of motion  - Assess patient's mobility; develop plan if impaired  - Assess patient's need for assistive devices and provide as appropriate  - Encourage maximum independence but intervene and supervise when necessary  - Involve family in performance of ADLs  - Assess for home care needs following discharge   - Consider OT consult to assist with ADL evaluation and planning for discharge  - Provide patient education as appropriate  Outcome: Progressing  Goal: Maintains/Returns to pre admission functional level  Description: INTERVENTIONS:  - Perform AM-PAC 6 Click Basic Mobility/ Daily Activity assessment daily.  - Set and communicate daily mobility goal to care team and patient/family/caregiver.   - Collaborate with rehabilitation services on mobility goals if consulted  - Perform Range of Motion 3 times a day.  - Reposition patient every 2 hours.  - Dangle patient 3 times a day  - Stand patient 3 times a day  - Ambulate patient 3 times a day  - Out of bed to chair 3 times a day   - Out of bed for meals 3 times a day  - Out of bed for toileting  - Record patient progress and toleration of activity level   Outcome: Progressing     Problem: DISCHARGE PLANNING  Goal: Discharge to home or other facility with appropriate resources  Description: INTERVENTIONS:  - Identify barriers to discharge w/patient and caregiver  - Arrange for needed discharge resources and transportation as appropriate  - Identify discharge learning needs (meds, wound care, etc.)  - Arrange for interpretive services to assist at discharge as needed  - Refer to Case Management Department for coordinating discharge planning if the patient needs post-hospital services based on physician/advanced practitioner order or complex needs related to  functional status, cognitive ability, or social support system  Outcome: Progressing     Problem: Knowledge Deficit  Goal: Patient/family/caregiver demonstrates understanding of disease process, treatment plan, medications, and discharge instructions  Description: Complete learning assessment and assess knowledge base.  Interventions:  - Provide teaching at level of understanding  - Provide teaching via preferred learning methods  Outcome: Progressing

## 2024-09-11 ENCOUNTER — APPOINTMENT (INPATIENT)
Dept: NON INVASIVE DIAGNOSTICS | Facility: HOSPITAL | Age: 64
DRG: 005 | End: 2024-09-11
Payer: COMMERCIAL

## 2024-09-11 ENCOUNTER — APPOINTMENT (INPATIENT)
Dept: GASTROENTEROLOGY | Facility: HOSPITAL | Age: 64
DRG: 005 | End: 2024-09-11
Payer: COMMERCIAL

## 2024-09-11 PROBLEM — J96.01 ACUTE HYPOXIC RESPIRATORY FAILURE (HCC): Status: ACTIVE | Noted: 2024-09-11

## 2024-09-11 PROBLEM — T17.500A MUCUS PLUGGING OF BRONCHI: Status: ACTIVE | Noted: 2024-09-11

## 2024-09-11 PROBLEM — R57.9 SHOCK (HCC): Status: ACTIVE | Noted: 2024-09-11

## 2024-09-11 LAB
ANION GAP SERPL CALCULATED.3IONS-SCNC: 9 MMOL/L (ref 4–13)
AORTIC ROOT: 4.3 CM
APICAL FOUR CHAMBER EJECTION FRACTION: 57 %
APTT PPP: 55 SECONDS (ref 23–34)
APTT PPP: 81 SECONDS (ref 23–34)
APTT PPP: 86 SECONDS (ref 23–34)
ARTERIAL PATENCY WRIST A: YES
ASCENDING AORTA: 5.4 CM
ATRIAL RATE: 70 BPM
AV LVOT MEAN GRADIENT: 6 MMHG
AV LVOT PEAK GRADIENT: 10 MMHG
AV REGURGITATION PRESSURE HALF TIME: 317 MS
BACTERIA BLD CULT: NORMAL
BACTERIA BLD CULT: NORMAL
BASE EXCESS BLDA CALC-SCNC: 4.6 MMOL/L
BASE EXCESS BLDA CALC-SCNC: 5.4 MMOL/L
BODY TEMPERATURE: 99 DEGREES FEHRENHEIT
BSA FOR ECHO PROCEDURE: 1.81 M2
BUN SERPL-MCNC: 38 MG/DL (ref 5–25)
CA-I BLD-SCNC: 1.16 MMOL/L (ref 1.12–1.32)
CALCIUM SERPL-MCNC: 8.9 MG/DL (ref 8.4–10.2)
CHLORIDE SERPL-SCNC: 104 MMOL/L (ref 96–108)
CO2 SERPL-SCNC: 31 MMOL/L (ref 21–32)
CREAT SERPL-MCNC: 0.75 MG/DL (ref 0.6–1.3)
DOP CALC LVOT PEAK VEL VTI: 32.31 CM
DOP CALC LVOT PEAK VEL: 1.55 M/S
ERYTHROCYTE [DISTWIDTH] IN BLOOD BY AUTOMATED COUNT: 13.4 % (ref 11.6–15.1)
FRACTIONAL SHORTENING: 38 (ref 28–44)
GFR SERPL CREATININE-BSD FRML MDRD: 96 ML/MIN/1.73SQ M
GLUCOSE SERPL-MCNC: 121 MG/DL (ref 65–140)
GLUCOSE SERPL-MCNC: 127 MG/DL (ref 65–140)
GLUCOSE SERPL-MCNC: 134 MG/DL (ref 65–140)
HCO3 BLDA-SCNC: 28.5 MMOL/L (ref 22–28)
HCO3 BLDA-SCNC: 28.8 MMOL/L (ref 22–28)
HCT VFR BLD AUTO: 29 % (ref 36.5–49.3)
HGB BLD-MCNC: 9.5 G/DL (ref 12–17)
HOROWITZ INDEX BLDA+IHG-RTO: 40 MM[HG]
HOROWITZ INDEX BLDA+IHG-RTO: 40 MM[HG]
INTERVENTRICULAR SEPTUM IN DIASTOLE (PARASTERNAL SHORT AXIS VIEW): 1.1 CM
INTERVENTRICULAR SEPTUM: 1.1 CM (ref 0.6–1.1)
LEFT ATRIUM SIZE: 3.9 CM
LEFT INTERNAL DIMENSION IN SYSTOLE: 3.3 CM (ref 2.1–4)
LEFT VENTRICULAR INTERNAL DIMENSION IN DIASTOLE: 5.3 CM (ref 3.5–6)
LEFT VENTRICULAR POSTERIOR WALL IN END DIASTOLE: 1.1 CM
LEFT VENTRICULAR STROKE VOLUME: 88 ML
LVSV (TEICH): 88 ML
MAGNESIUM SERPL-MCNC: 2.4 MG/DL (ref 1.9–2.7)
MCH RBC QN AUTO: 32.2 PG (ref 26.8–34.3)
MCHC RBC AUTO-ENTMCNC: 32.8 G/DL (ref 31.4–37.4)
MCV RBC AUTO: 98 FL (ref 82–98)
O2 CT BLDA-SCNC: 13.9 ML/DL (ref 16–23)
O2 CT BLDA-SCNC: 14.4 ML/DL (ref 16–23)
OXYHGB MFR BLDA: 94.4 % (ref 94–97)
OXYHGB MFR BLDA: 97.4 % (ref 94–97)
P AXIS: 6 DEGREES
PCO2 BLDA: 37.6 MM HG (ref 36–44)
PCO2 BLDA: 39.9 MM HG (ref 36–44)
PCO2 TEMP ADJ BLDA: 37.9 MM HG (ref 36–44)
PEEP RESPIRATORY: 6 CM[H2O]
PEEP RESPIRATORY: 6 CM[H2O]
PH BLD: 7.5 [PH] (ref 7.35–7.45)
PH BLDA: 7.47 [PH] (ref 7.35–7.45)
PH BLDA: 7.5 [PH] (ref 7.35–7.45)
PHENYTOIN SERPL-MCNC: 28.8 UG/ML (ref 10–20)
PHOSPHATE SERPL-MCNC: 3.1 MG/DL (ref 2.3–4.1)
PLATELET # BLD AUTO: 187 THOUSANDS/UL (ref 149–390)
PMV BLD AUTO: 9.4 FL (ref 8.9–12.7)
PO2 BLD: 78 MM HG (ref 75–129)
PO2 BLDA: 112.3 MM HG (ref 75–129)
PO2 BLDA: 77 MM HG (ref 75–129)
POTASSIUM SERPL-SCNC: 4.2 MMOL/L (ref 3.5–5.3)
PR INTERVAL: 194 MS
QRS AXIS: 57 DEGREES
QRSD INTERVAL: 110 MS
QT INTERVAL: 410 MS
QTC INTERVAL: 442 MS
RBC # BLD AUTO: 2.95 MILLION/UL (ref 3.88–5.62)
SL CV AV DECELERATION TIME RETROGRADE: 1093 MS
SL CV AV PEAK GRADIENT RETROGRADE: 67 MMHG
SL CV LV EF: 55
SL CV PED ECHO LEFT VENTRICLE DIASTOLIC VOLUME (MOD BIPLANE) 2D: 133 ML
SL CV PED ECHO LEFT VENTRICLE SYSTOLIC VOLUME (MOD BIPLANE) 2D: 45 ML
SODIUM SERPL-SCNC: 144 MMOL/L (ref 135–147)
SPECIMEN SOURCE: ABNORMAL
SPECIMEN SOURCE: ABNORMAL
T WAVE AXIS: 260 DEGREES
TRICUSPID ANNULAR PLANE SYSTOLIC EXCURSION: 1.7 CM
VENT AC: 14
VENT AC: 14
VENT- AC: AC
VENT- AC: AC
VENTRICULAR RATE: 70 BPM
VT SETTING VENT: 40 ML
VT SETTING VENT: 400 ML
WBC # BLD AUTO: 10.93 THOUSAND/UL (ref 4.31–10.16)

## 2024-09-11 PROCEDURE — 93321 DOPPLER ECHO F-UP/LMTD STD: CPT | Performed by: INTERNAL MEDICINE

## 2024-09-11 PROCEDURE — 0B918ZZ DRAINAGE OF TRACHEA, VIA NATURAL OR ARTIFICIAL OPENING ENDOSCOPIC: ICD-10-PCS | Performed by: INTERNAL MEDICINE

## 2024-09-11 PROCEDURE — 93308 TTE F-UP OR LMTD: CPT | Performed by: INTERNAL MEDICINE

## 2024-09-11 PROCEDURE — 85730 THROMBOPLASTIN TIME PARTIAL: CPT | Performed by: INTERNAL MEDICINE

## 2024-09-11 PROCEDURE — 84100 ASSAY OF PHOSPHORUS: CPT

## 2024-09-11 PROCEDURE — 82948 REAGENT STRIP/BLOOD GLUCOSE: CPT

## 2024-09-11 PROCEDURE — 99233 SBSQ HOSP IP/OBS HIGH 50: CPT | Performed by: INTERNAL MEDICINE

## 2024-09-11 PROCEDURE — 87070 CULTURE OTHR SPECIMN AEROBIC: CPT

## 2024-09-11 PROCEDURE — 94760 N-INVAS EAR/PLS OXIMETRY 1: CPT

## 2024-09-11 PROCEDURE — 93325 DOPPLER ECHO COLOR FLOW MAPG: CPT | Performed by: INTERNAL MEDICINE

## 2024-09-11 PROCEDURE — 82330 ASSAY OF CALCIUM: CPT

## 2024-09-11 PROCEDURE — 83735 ASSAY OF MAGNESIUM: CPT

## 2024-09-11 PROCEDURE — 0B928ZZ DRAINAGE OF CARINA, VIA NATURAL OR ARTIFICIAL OPENING ENDOSCOPIC: ICD-10-PCS | Performed by: INTERNAL MEDICINE

## 2024-09-11 PROCEDURE — 88305 TISSUE EXAM BY PATHOLOGIST: CPT | Performed by: PATHOLOGY

## 2024-09-11 PROCEDURE — 94003 VENT MGMT INPAT SUBQ DAY: CPT

## 2024-09-11 PROCEDURE — 0B938ZZ DRAINAGE OF RIGHT MAIN BRONCHUS, VIA NATURAL OR ARTIFICIAL OPENING ENDOSCOPIC: ICD-10-PCS | Performed by: INTERNAL MEDICINE

## 2024-09-11 PROCEDURE — 87186 SC STD MICRODIL/AGAR DIL: CPT

## 2024-09-11 PROCEDURE — 99255 IP/OBS CONSLTJ NEW/EST HI 80: CPT | Performed by: INTERNAL MEDICINE

## 2024-09-11 PROCEDURE — 94664 DEMO&/EVAL PT USE INHALER: CPT

## 2024-09-11 PROCEDURE — 93005 ELECTROCARDIOGRAM TRACING: CPT

## 2024-09-11 PROCEDURE — 31622 DX BRONCHOSCOPE/WASH: CPT | Performed by: INTERNAL MEDICINE

## 2024-09-11 PROCEDURE — 87077 CULTURE AEROBIC IDENTIFY: CPT

## 2024-09-11 PROCEDURE — 93010 ELECTROCARDIOGRAM REPORT: CPT | Performed by: INTERNAL MEDICINE

## 2024-09-11 PROCEDURE — 94640 AIRWAY INHALATION TREATMENT: CPT

## 2024-09-11 PROCEDURE — 87205 SMEAR GRAM STAIN: CPT

## 2024-09-11 PROCEDURE — 80048 BASIC METABOLIC PNL TOTAL CA: CPT

## 2024-09-11 PROCEDURE — 88112 CYTOPATH CELL ENHANCE TECH: CPT | Performed by: PATHOLOGY

## 2024-09-11 PROCEDURE — 93325 DOPPLER ECHO COLOR FLOW MAPG: CPT

## 2024-09-11 PROCEDURE — 93321 DOPPLER ECHO F-UP/LMTD STD: CPT

## 2024-09-11 PROCEDURE — 0B978ZZ DRAINAGE OF LEFT MAIN BRONCHUS, VIA NATURAL OR ARTIFICIAL OPENING ENDOSCOPIC: ICD-10-PCS | Performed by: INTERNAL MEDICINE

## 2024-09-11 PROCEDURE — 85027 COMPLETE CBC AUTOMATED: CPT

## 2024-09-11 PROCEDURE — 93308 TTE F-UP OR LMTD: CPT

## 2024-09-11 PROCEDURE — 80185 ASSAY OF PHENYTOIN TOTAL: CPT

## 2024-09-11 PROCEDURE — 82805 BLOOD GASES W/O2 SATURATION: CPT

## 2024-09-11 PROCEDURE — 94669 MECHANICAL CHEST WALL OSCILL: CPT

## 2024-09-11 RX ORDER — LIDOCAINE HYDROCHLORIDE 10 MG/ML
INJECTION, SOLUTION EPIDURAL; INFILTRATION; INTRACAUDAL; PERINEURAL
Status: DISPENSED
Start: 2024-09-11 | End: 2024-09-12

## 2024-09-11 RX ORDER — FENTANYL CITRATE 50 UG/ML
100 INJECTION, SOLUTION INTRAMUSCULAR; INTRAVENOUS ONCE
Status: COMPLETED | OUTPATIENT
Start: 2024-09-11 | End: 2024-09-11

## 2024-09-11 RX ORDER — MIDAZOLAM HYDROCHLORIDE 2 MG/2ML
4 INJECTION, SOLUTION INTRAMUSCULAR; INTRAVENOUS ONCE
Status: COMPLETED | OUTPATIENT
Start: 2024-09-11 | End: 2024-09-11

## 2024-09-11 RX ADMIN — BUDESONIDE 0.5 MG: 0.5 INHALANT RESPIRATORY (INHALATION) at 19:44

## 2024-09-11 RX ADMIN — LEVALBUTEROL HYDROCHLORIDE 1.25 MG: 1.25 SOLUTION RESPIRATORY (INHALATION) at 19:43

## 2024-09-11 RX ADMIN — SENNOSIDES 8.6 MG: 8.6 TABLET, FILM COATED ORAL at 21:36

## 2024-09-11 RX ADMIN — MIDAZOLAM HYDROCHLORIDE 4 MG: 1 INJECTION, SOLUTION INTRAMUSCULAR; INTRAVENOUS at 13:21

## 2024-09-11 RX ADMIN — METHYLPREDNISOLONE SODIUM SUCCINATE 40 MG: 40 INJECTION, POWDER, FOR SOLUTION INTRAMUSCULAR; INTRAVENOUS at 08:18

## 2024-09-11 RX ADMIN — Medication 50 MCG/HR: at 02:32

## 2024-09-11 RX ADMIN — FOLIC ACID 1 MG: 1 TABLET ORAL at 08:17

## 2024-09-11 RX ADMIN — CHLORHEXIDINE GLUCONATE 0.12% ORAL RINSE 15 ML: 1.2 LIQUID ORAL at 08:17

## 2024-09-11 RX ADMIN — POLYETHYLENE GLYCOL 3350 17 G: 17 POWDER, FOR SOLUTION ORAL at 08:17

## 2024-09-11 RX ADMIN — FORMOTEROL FUMARATE DIHYDRATE 20 MCG: 20 SOLUTION RESPIRATORY (INHALATION) at 19:44

## 2024-09-11 RX ADMIN — METHYLPREDNISOLONE SODIUM SUCCINATE 40 MG: 40 INJECTION, POWDER, FOR SOLUTION INTRAMUSCULAR; INTRAVENOUS at 20:08

## 2024-09-11 RX ADMIN — DEXMEDETOMIDINE HYDROCHLORIDE 0.8 MCG/KG/HR: 4 INJECTION, SOLUTION INTRAVENOUS at 15:31

## 2024-09-11 RX ADMIN — FENTANYL CITRATE 50 MCG: 50 INJECTION INTRAMUSCULAR; INTRAVENOUS at 00:50

## 2024-09-11 RX ADMIN — DEXMEDETOMIDINE HYDROCHLORIDE 1 MCG/KG/HR: 4 INJECTION, SOLUTION INTRAVENOUS at 22:18

## 2024-09-11 RX ADMIN — FENTANYL CITRATE 50 MCG: 50 INJECTION INTRAMUSCULAR; INTRAVENOUS at 03:54

## 2024-09-11 RX ADMIN — HEPARIN SODIUM 20 UNITS/KG/HR: 10000 INJECTION, SOLUTION INTRAVENOUS at 02:57

## 2024-09-11 RX ADMIN — Medication 75 MCG/HR: at 17:13

## 2024-09-11 RX ADMIN — BUDESONIDE 0.5 MG: 0.5 INHALANT RESPIRATORY (INHALATION) at 07:46

## 2024-09-11 RX ADMIN — DEXMEDETOMIDINE HYDROCHLORIDE 1 MCG/KG/HR: 4 INJECTION, SOLUTION INTRAVENOUS at 08:18

## 2024-09-11 RX ADMIN — CHLORHEXIDINE GLUCONATE 0.12% ORAL RINSE 15 ML: 1.2 LIQUID ORAL at 20:08

## 2024-09-11 RX ADMIN — LEVALBUTEROL HYDROCHLORIDE 1.25 MG: 1.25 SOLUTION RESPIRATORY (INHALATION) at 14:29

## 2024-09-11 RX ADMIN — ACETAMINOPHEN 650 MG: 325 TABLET ORAL at 04:41

## 2024-09-11 RX ADMIN — IPRATROPIUM BROMIDE 0.5 MG: 0.5 SOLUTION RESPIRATORY (INHALATION) at 19:43

## 2024-09-11 RX ADMIN — HEPARIN SODIUM 22 UNITS/KG/HR: 10000 INJECTION, SOLUTION INTRAVENOUS at 22:18

## 2024-09-11 RX ADMIN — FENTANYL CITRATE 50 MCG: 50 INJECTION INTRAMUSCULAR; INTRAVENOUS at 07:54

## 2024-09-11 RX ADMIN — HEPARIN SODIUM 1800 UNITS: 1000 INJECTION INTRAVENOUS; SUBCUTANEOUS at 03:42

## 2024-09-11 RX ADMIN — FENTANYL CITRATE 100 MCG: 50 INJECTION INTRAMUSCULAR; INTRAVENOUS at 13:21

## 2024-09-11 RX ADMIN — PANTOPRAZOLE SODIUM 40 MG: 40 INJECTION, POWDER, FOR SOLUTION INTRAVENOUS at 08:17

## 2024-09-11 RX ADMIN — LEVALBUTEROL HYDROCHLORIDE 1.25 MG: 1.25 SOLUTION RESPIRATORY (INHALATION) at 07:46

## 2024-09-11 RX ADMIN — FENTANYL CITRATE 50 MCG: 50 INJECTION INTRAMUSCULAR; INTRAVENOUS at 21:36

## 2024-09-11 RX ADMIN — IPRATROPIUM BROMIDE 0.5 MG: 0.5 SOLUTION RESPIRATORY (INHALATION) at 14:29

## 2024-09-11 RX ADMIN — Medication 100 MCG: at 08:18

## 2024-09-11 RX ADMIN — IPRATROPIUM BROMIDE 0.5 MG: 0.5 SOLUTION RESPIRATORY (INHALATION) at 07:46

## 2024-09-11 RX ADMIN — MIDAZOLAM 1 MG: 1 INJECTION INTRAMUSCULAR; INTRAVENOUS at 05:57

## 2024-09-11 RX ADMIN — MIDAZOLAM 1 MG: 1 INJECTION INTRAMUSCULAR; INTRAVENOUS at 09:59

## 2024-09-11 RX ADMIN — NICOTINE 1 PATCH: 21 PATCH, EXTENDED RELEASE TRANSDERMAL at 08:18

## 2024-09-11 RX ADMIN — FORMOTEROL FUMARATE DIHYDRATE 20 MCG: 20 SOLUTION RESPIRATORY (INHALATION) at 07:46

## 2024-09-11 RX ADMIN — MIDAZOLAM 1 MG: 1 INJECTION INTRAMUSCULAR; INTRAVENOUS at 20:08

## 2024-09-11 NOTE — PROGRESS NOTES
"Progress Note - Critical Care/ICU   Name: Marcin Sánchez 64 y.o. male I MRN: [id number]  Unit/Bed#: [facility] 10 I Date of Admission: 9/8/2024   Date of Service: 9/11/2024 I Hospital Day: 3     Problems:  Shock - suspected vasodilatory vs. Obstructive shock  Large pericardial effusion - less likely tamponade   VT cardiac arrest post intubation  Acute on chronic hypoxic respiratory failure  Severe COPD  Acute/Chronic CHF with severe Aortic insufficiency  Abnormal CT chest with possible pneumonia - Acinetobacter seen on culture  Transaminitis 2/2 shock liver due to ischemia?  Afib on eliquis  History of pulmonary embolism  Squamous cell lung carcinoma s/p chemo/radiation  Alcohol / Tobacco abuse  Seizure disorder on dilantin  Assessment & Plan   Neuro:   Diagnosis: encephalopathy  Goal RASS 0   Patient currently on Precedex and fentanyl  Responds to commands but is globally weak  Epilepsy:  Holding dilantin, levels have been supratherapeutic  Hepatic panel reveals trending down transaminitis       CV:   Diagnosis: Pericardial effusion  Echocardiogram on 9/9 read as \"moderate to large pericardial effusion circumferential to the heart measuring largest along the RV free wall at 2.3 cm. The fluid exhibits no internal echoes. There is no echocardiographic evidence of tamponade\"  Plan:   Continue to monitor clinical exam for signs of tamponade.     Pulm:  Diagnosis: Intubated  Plan:   Patient currently on pressure control  Patient failed trial of BIPAP after extubation yesterday, re-intubated  Continue xopenex atrovent TID, Pulmicort BID, perforomist BID  Continue solumedrol 40 q12h    GI:   Diagnosis: Transaminitis  Plan: Continue to trend   Likely secondary to shock liver    :   No active issues    F/E/N:   F: no fluids  E: replete lytes  N: npo      Heme/Onc:   No active issues  On heparin drip   Endo:   No active issues    ID:   Diagnosis: Abnormal CT concerning for potential pneumonia  Sputum cultures grew moraxella " and acinetobacter  Plan: Azithromycin  Trend WBCs, fever curve    MSK/Skin:   No active issues  Disposition: Critical care    ICU Core Measures     Vented Patient  VAP Bundle  VAP bundle ordered     A: Assess, Prevent, and Manage Pain Has pain been assessed? Yes  Need for changes to pain regimen? No   B: Both Spontaneous Awakening Trials (SATs) and Spontaneous Breathing Trials (SBTs) Plan to perform spontaneous awakening trial today? Yes   Plan to perform spontaneous breathing trial today? Yes   Obvious barriers to extubation? No   C: Choice of Sedation RASS Goal: 0 Alert and Calm  Need for changes to sedation or analgesia regimen? No   D: Delirium CAM-ICU: Negative   E: Early Mobility  Plan for early mobility? No   F: Family Engagement Plan for family engagement today? Yes       Review of Invasive Devices:    [place] Plan: Continue for accurate I/O monitoring for 48 hours  Central access plan: Hemodynamic monitoring  [place] Plan: Keep arterial line for hemodynamic monitoring    Prophylaxis:  VTE VTE covered by:  heparin (porcine), Intravenous, 22 Units/kg/hr at 09/11/24 0342  heparin (porcine), Intravenous, 1,800 Units at 09/11/24 0342  heparin (porcine), Intravenous       Stress Ulcer  covered bypantoprazole (PROTONIX) injection 40 mg [[id number]]         24 Hour Events   24hr events: Patient remains intubated overnight. Patient currently on Fentanyl Norepi and Precedex.   Subjective   Patient was seen and evaluated at the bedside. The patient is awake and responds to commands, but is globally weak.    Review of Systems: See HPI for Review of Systems    Objective                          Vitals I/O      Most Recent Min/Max in 24hrs   Temp 99.8 °F (37.7 °C) Temp  Min: 98.5 °F (36.9 °C)  Max: 99.8 °F (37.7 °C)   Pulse 59 Pulse  Min: 59  Max: 104   Resp 14 Resp  Min: 12  Max: 42   /53 BP  Min: 103/51  Max: 216/88   O2 Sat 98 % SpO2  Min: 94 %  Max: 100 %      Intake/Output Summary (Last 24 hours) at 9/11/2024  0652  Last data filed at 9/11/2024 0611  Gross per 24 hour   Intake 1772.4 ml   Output 840 ml   Net 932.4 ml       Diet NPO    Invasive Monitoring           Physical Exam   Physical Exam  Eyes:      Pupils: Pupils are equal, round, and reactive to light.   Cardiovascular:      Rate and Rhythm: Normal rate and regular rhythm.      Pulses: Normal pulses.   Abdominal: General: Bowel sounds are normal.      Palpations: Abdomen is soft.      Tenderness: There is no abdominal tenderness. There is no guarding.   Constitutional:       General: He is not in acute distress.     Appearance: He is ill-appearing.      Interventions: He is intubated. He is not sedated.  Pulmonary:      Effort: He is intubated.      Comments: Mechanical breath sounds  Neurological:      Mental Status: He is alert.      Comments: Neuro exam limited, patient opens eyes to verbal stimuli and responds to commands. Globally weak          Diagnostic Studies        Lab Results: I have reviewed the following results:      Medications:  Scheduled PRN   budesonide, 0.5 mg, Q12H  chlorhexidine, 15 mL, Q12H AMERICA  vitamin B-12, 100 mcg, Daily  folic acid, 1 mg, Daily  formoterol, 20 mcg, Q12H  ipratropium, 0.5 mg, TID  levalbuterol, 1.25 mg, TID  methylPREDNISolone sodium succinate, 40 mg, Q12H AMERICA  nicotine, 1 patch, Daily  pantoprazole, 40 mg, Q24H AMERICA  polyethylene glycol, 17 g, Daily  senna, 1 tablet, HS      acetaminophen, 650 mg, Q6H PRN  albuterol, 2.5 mg, Q6H PRN  bisacodyl, 10 mg, Daily PRN  fentaNYL, 50 mcg, Q2H PRN  heparin (porcine), 1,800 Units, Q6H PRN  heparin (porcine), 3,600 Units, Q6H PRN  midazolam, 1 mg, Q4H PRN       Continuous    dexmedetomidine, 0.1-1 mcg/kg/hr, Last Rate: 0.6 mcg/kg/hr (09/11/24 0523)  fentaNYL, 75 mcg/hr, Last Rate: 75 mcg/hr (09/11/24 0523)  heparin (porcine), 3-20 Units/kg/hr (Order-Specific), Last Rate: 22 Units/kg/hr (09/11/24 0342)  norepinephrine, 1-30 mcg/min, Last Rate: 1 mcg/min (09/11/24 0537)  propofol, 5-50  mcg/kg/min, Last Rate: Stopped (09/10/24 1703)         Labs:   CBC    Recent Labs     09/10/24  0432 09/11/24  0431   WBC 12.19* 10.93*   HGB 10.1* 9.5*   HCT 31.0* 29.0*    187     BMP    Recent Labs     09/10/24  0432 09/11/24  0431   SODIUM 142 144   K 4.1 4.2    104   CO2 34* 31   AGAP 6 9   BUN 39* 38*   CREATININE 0.75 0.75   CALCIUM 8.4 8.9       Coags    Recent Labs     09/10/24  1941 09/11/24  0224   PTT 73* 55*        Additional Electrolytes  Recent Labs     09/10/24  0432 09/11/24  0431   MG 2.1 2.4   PHOS 3.9 3.1   CAIONIZED 1.11* 1.16          Blood Gas    Recent Labs     09/11/24 0431   PHART 7.472*   FPY3FHR 39.9   PO2ART 112.3   YGV6OSC 28.5*   BEART 4.6   SOURCE Line, Arterial     Recent Labs     09/11/24 0431   SOURCE Line, Arterial    LFTs  Recent Labs     09/10/24  0432   *   *   ALKPHOS 68   ALB 2.8*   TBILI 1.21*       Infectious  No recent results  Glucose  Recent Labs     09/10/24  0432 09/11/24  0431   GLUC 144* 121

## 2024-09-11 NOTE — PROGRESS NOTES
"Cardiology Team 2 Progress Note - Marcin Sánchez 64 y.o. male MRN: [id number]    [facility] En[facility]: [id number]          Subjective:   Patient seen and examined.  No significant events overnight. Remains intubated this morning. Sedation decreased and he is awake and following commands but looks comfortable.    Hospital Course:   Marcin Sánchez is a 64 y.o. year old male who presented to [facility] 9/5 with acute respiratory failure secondary COPD exacerbation.  He has past medical history significant for atrial fibrillation, COPD, chronic combined systolic and diastolic CHF, epilepsy, pulmonary fibrosis.  Radiation, history of squamous cell lung cancer, current tobacco user.  Smoking outside his living facility (the [facility]) when he was noted to become acutely confused.  He also.  Short of breath was noted to be hypoxemic prompting call to EMS.  Upon arrival he was lethargic and confused which is thought to be secondary to hypercarbic respiratory failure in the setting of COPD exacerbation and was initiated on BiPAP.  On 9/7 patient had acute hypoxic event resulting in bradycardia and subsequently PEA cardiac arrest 2 rounds of CPR with VT noted during a pulse check. Shock was administered and ROSC was achieved and he was moving all extremities. She was subsequently intubated.  Echocardiogram rest showed LVEF 55% with normal systolic function with moderate pericardial effusion.  He was transferred to [facility] for thoracic surgery oncology evaluation. There were no clinical signs of tamponade and therefore window and pericardiocentesis were not performed.    Vitals: Blood pressure 127/60, pulse 71, temperature 98.9 °F (37.2 °C), temperature source Rectal, resp. rate (!) 29, height 5' 9\" (1.753 m), weight 66.6 kg (146 lb 13.2 oz), SpO2 97%., Body mass index is 21.68 kg/m².,   Orthostatic Blood Pressures      Flowsheet Row Most Recent Value   Blood Pressure 127/60 filed at 09/11/2024 0800 "   Patient Position - Orthostatic VS Lying filed at 09/11/2024 0400              Intake/Output Summary (Last 24 hours) at 9/11/2024 1018  Last data filed at 9/11/2024 0800  Gross per 24 hour   Intake 1715.08 ml   Output 770 ml   Net 945.08 ml       Review of System:  Review of system was conducted and was negative except for as stated in the subjective course.    Physical Exam:    GEN: Marcin Sánchez appears well, alert and oriented x 3, pleasant and cooperative   HEENT:  Normocephalic, atraumatic, anicteric, moist mucous membranes  NECK: No JVD or carotid bruits   HEART: regular rhythm, regular rate, normal S1 and S2, no murmurs, clicks, gallops or rubs   LUNGS: Intubated this morning. Significant expiratory wheezing b/l.  ABDOMEN:  Normoactive bowel sounds, soft, no tenderness, no distention  EXTREMITIES: peripheral pulses palpable; no edema  NEURO: no gross focal findings; cranial nerves grossly intact   SKIN:  Dry, intact, warm to touch      Current Facility-Administered Medications:     acetaminophen (TYLENOL) tablet 650 mg, 650 mg, Oral, Q6H PRN, LANDON Desai, 650 mg at 09/11/24 0441    albuterol inhalation solution 2.5 mg, 2.5 mg, Nebulization, Q6H PRN, Brian Poe MD, 2.5 mg at 09/10/24 1206    bisacodyl (DULCOLAX) rectal suppository 10 mg, 10 mg, Rectal, Daily PRN, Axel Smith PA-C    budesonide (PULMICORT) inhalation solution 0.5 mg, 0.5 mg, Nebulization, Q12H, LANDON Desai, 0.5 mg at 09/11/24 0746    chlorhexidine (PERIDEX) 0.12 % oral rinse 15 mL, 15 mL, Mouth/Throat, Q12H AMERICA, Tobi Hook MD, 15 mL at 09/11/24 0817    cyanocobalamin (VITAMIN B-12) tablet 100 mcg, 100 mcg, Oral, Daily, Axel Smith PA-C, 100 mcg at 09/11/24 0818    dexmedeTOMIDine (Precedex) 400 mcg in sodium chloride 0.9% 100 mL, 0.1-1.2 mcg/kg/hr, Intravenous, Titrated, Axel Smith PA-C, Last Rate: 15.2 mL/hr at 09/11/24 0818, 1 mcg/kg/hr at 09/11/24 0818    fentaNYL  1000 mcg in sodium chloride 0.9% 100mL infusion, 75 mcg/hr, Intravenous, Continuous, Angela Cannon MD, Last Rate: 7.5 mL/hr at 09/11/24 0523, 75 mcg/hr at 09/11/24 0523    fentaNYL injection 50 mcg, 50 mcg, Intravenous, Q2H PRN, LANDON Desai, 50 mcg at 09/11/24 0754    folic acid (FOLVITE) tablet 1 mg, 1 mg, Oral, Daily, Axel Smith PA-C, 1 mg at 09/11/24 0817    formoterol (PERFOROMIST) nebulizer solution 20 mcg, 20 mcg, Nebulization, Q12H, Tobi Hook MD, 20 mcg at 09/11/24 0746    heparin (porcine) 25,000 units in 0.45% NaCl 250 mL infusion (premix), 3-20 Units/kg/hr (Order-Specific), Intravenous, Titrated, Axel Smith PA-C, Last Rate: 13.2 mL/hr at 09/11/24 0342, 22 Units/kg/hr at 09/11/24 0342    heparin (porcine) injection 1,800 Units, 1,800 Units, Intravenous, Q6H PRN, Axel Smith PA-C, 1,800 Units at 09/11/24 0342    heparin (porcine) injection 3,600 Units, 3,600 Units, Intravenous, Q6H PRN, Axel Smith PA-C    ipratropium (ATROVENT) 0.02 % inhalation solution 0.5 mg, 0.5 mg, Nebulization, TID, Brian Poe MD, 0.5 mg at 09/11/24 0746    levalbuterol (XOPENEX) inhalation solution 1.25 mg, 1.25 mg, Nebulization, TID, Brian Poe MD, 1.25 mg at 09/11/24 0746    methylPREDNISolone sodium succinate (Solu-MEDROL) injection 40 mg, 40 mg, Intravenous, Q12H AMERICA, LANDON Desai, 40 mg at 09/11/24 0818    midazolam (VERSED) injection 1 mg, 1 mg, Intravenous, Q4H PRN, Axel Smith PA-C, 1 mg at 09/11/24 0959    nicotine (NICODERM CQ) 21 mg/24 hr TD 24 hr patch 1 patch, 1 patch, Transdermal, Daily, LANDON Desai, 1 patch at 09/11/24 0818    norepinephrine (LEVOPHED) 4 mg (STANDARD CONCENTRATION) IV in sodium chloride 0.9% 250 mL, 1-30 mcg/min, Intravenous, Titrated, Tobi Hook MD, Last Rate: 3.8 mL/hr at 09/11/24 0537, 1 mcg/min at 09/11/24 0537    pantoprazole (PROTONIX) injection 40 mg, 40 mg,  Intravenous, Q24H AMERICA, LANDON Desai, 40 mg at 09/11/24 0817    polyethylene glycol (MIRALAX) packet 17 g, 17 g, Oral, Daily, Axel Smith PA-C, 17 g at 09/11/24 0817    propofol (DIPRIVAN) 1000 mg in 100 mL infusion (premix), 5-50 mcg/kg/min, Intravenous, Titrated, Tobi Hook MD, Stopped at 09/10/24 1703    senna (SENOKOT) tablet 8.6 mg, 1 tablet, Oral, HS, Axel Smith PA-C, 8.6 mg at 09/10/24 2132    Labs & Results:          Results from last 7 days   Lab Units 09/11/24  0431 09/10/24  0432 09/09/24  0459   POTASSIUM mmol/L 4.2 4.1 3.8   CO2 mmol/L 31 34* 35*   CHLORIDE mmol/L 104 102 100   BUN mg/dL 38* 39* 41*   CREATININE mg/dL 0.75 0.75 0.79     Results from last 7 days   Lab Units 09/11/24  0431 09/10/24  0432 09/09/24  0802 09/09/24  0459   HEMOGLOBIN g/dL 9.5* 10.1*  --  10.1*   HEMATOCRIT % 29.0* 31.0*  --  31.1*   PLATELETS Thousands/uL 187 209 225 235           Telemetry:   Personally reviewed by Adam Nunez, DO: normal sinus rhythm with periods of tachycardia      Assessment:  Active Problems:    Pericardial effusion      Pericardial effusion  Moderate Sized pericardial effusion noted on  the last 3 echocardiograms 8/29, 9/8, and 9/9. Effusion predominantly around RV and RA free wall with smaller portion tracking around the apex. Does not appear to have changes much since 8/29. Currenlty no clinical or echocardiographic signs of tamponade. He is intubated and sedated for acute respiratry failure form COPD. On and off low dose pressors this morning.  Plan:  - no evidence of tamponade, no urgent indication at this time for pericardiocentesis  - Interventional cardiology made aware, discussed however not a good target or candidate for pericardiocentesis given location of effusion.  - If there is acute decompensation recommend fluids and repeat echo to evaluate effusion, if evidence of tamponade then would proceed with pericardial window vs pericardiocentesis  -  interval echocardiogram today, if effusion looks stable and remains off pressors, will consider a dose of diuretic to optimize volume status for extubation     Cardiac arrest  Cardiac arrest appears to be in the setting of hypoxemia. Initial rhythm reported as PEA with ROSC then VT arrest. Possibly transient ischemia during PEA leating to VT. After intubation does not appear to have any further ectopy. Echocardiogram with LVEF 50-55%.  - off pressors this morning with adequate BP and no events on telemetry overnight  - continue treatment for COPD exacerbation as per primary team     Chronic combined systolic and diastolic heart failure  Appears euvolemic on exam. Currently requireing pressors. Chronically on Lasix 40mg daily and toprol 25mg daily.  - holding BB after cardiac arrest and with effusion, may restart when off pressors  - appears euvolemic today however difficult intravascular volume exam.     Paroxysmal atrial fibrillation  Current yin sinus rhythm. Rates are controlled.  - restart metoprolol in the next 24 hours  - continue AC with heparin currently, once no furhter procedures required transition back to [facility].     Acute Hypoxemic Respiratory failure- intubated and sedated, wean as able as per primary team  COPD- steroids and inhalers as per primary.  Pulmonary fibrosis  Current Tobacco use- tobacco cessation recomended    Case discussed and reviewed with Dr. Silva who agrees with my assessment and plan.    Thank you for involving us in the care of your patient.      Adam Nunez,   Cardiology Fellow   PGY-5      ** Please Note: Fluency DirectDictation voice to text software may have been used in the creation of this document. **

## 2024-09-11 NOTE — CONSULTS
Nutrition Recommendations:    Advance Jevity 1.2 by 10mL/hr q4 hrs as tolerated to goal rate of 55mL/hr continuous, add one packet Prosource Liquid Protein daily.   At goal, will provide 1644 kcals, 88g protein, 1125mL water including water for Prosource administration.     Additional free water flushes per critical care; suggest at least 30mL q4 hrs to help maintain tube patency.     Per chart review, pt usually on dilantin but is currently on hold d/t elevated level - if dilantin is resumed, will need to adjust TF to cyclic administration to account for hold time for medication

## 2024-09-11 NOTE — ASSESSMENT & PLAN NOTE
Present on admission.  Suspect multifactorial due to severe COPD with possible exacerbation, mucus plug.  Unclear if actually pneumonia with minimal infiltrate versus atelectasis seen on CT chest.  Clinically stable without fever and mild WBC may be due to steroids.  Sputum culture with MDR-Acinetobacter and Moraxella which may represent contaminants versus colonizers.  Remained stable without effective Acinetobacter treatment.  Was on BiPAP.  Now status post intubation x2    Continue to follow closely off antibiotics  Follow up bronch cultures  Follow respiratory status closely  Pulmonary toilet  Follow temperatures closely  Recheck CBC in a.m.  Supportive care as per the primary service  If were to clinically deteriorate could start Minocycline

## 2024-09-11 NOTE — ASSESSMENT & PLAN NOTE
Wt Readings from Last 3 Encounters:   09/11/24 66.2 kg (146 lb)   09/08/24 60.8 kg (134 lb)   08/29/24 67.6 kg (149 lb)   With severe aortic insufficiency.  Undergoing diuresis

## 2024-09-11 NOTE — CONSULTS
Consultation - Infectious Disease   Name: Marcin Sánchez 64 y.o. male I MRN: [id number]  Unit/Bed#: [facility] 10 I Date of Admission: 9/8/2024   Date of Service: 9/11/2024 I Hospital Day: 3   Inpatient consult to Infectious Diseases  Consult performed by: Jodie Rogers MD  Consult ordered by: Tegan Wilkinson DO        Physician Requesting Evaluation: Romy Hill MD   Reason for Evaluation / Principal Problem: Pneumonia    Assessment & Plan  Acute hypoxic respiratory failure (HCC)  Present on admission.  Suspect multifactorial due to severe COPD with possible exacerbation, mucus plug.  Unclear if actually pneumonia with minimal infiltrate versus atelectasis seen on CT chest.  Clinically stable without fever and mild WBC may be due to steroids.  Sputum culture with MDR-Acinetobacter and Moraxella which may represent contaminants versus colonizers.  Remained stable without effective Acinetobacter treatment.  Was on BiPAP.  Now status post intubation x2    Continue to follow closely off antibiotics  Follow up bronch cultures  Follow respiratory status closely  Pulmonary toilet  Follow temperatures closely  Recheck CBC in a.m.  Supportive care as per the primary service  If were to clinically deteriorate could start Minocycline  Acute exacerbation of chronic obstructive pulmonary disease (COPD) (HCC)  On steroids.  Status post 5 days of azithromycin  Shock (HCC)  Developed in setting of cardiac arrest, intubation, sedation.  Low clinical suspicion for sepsis.  Has remained afebrile with mild WBC which is likely attributable to steroids.  Recent blood cultures negative.    Continue to follow closely off antibiotics  Follow temperatures closely  Recheck CBC in a.m.  Pericardial effusion  Without tamponade.  Cardiology follow-up ongoing.  Mucus plugging of bronchi  Status post bronchoscopy  Cardiac arrest (HCC)  Initially PEA then with VT.  In setting of hypoxia, intubation  Chronic combined systolic and diastolic CHF  (congestive heart failure) (HCC)  Wt Readings from Last 3 Encounters:   09/11/24 66.2 kg (146 lb)   09/08/24 60.8 kg (134 lb)   08/29/24 67.6 kg (149 lb)   With severe aortic insufficiency.  Undergoing diuresis  Cancer of right main bronchus (HCC)  Diagnosed in 2016.  Status postchemotherapy, radiation.  Appears stable.    I discussed with Dr. Hill the above plan to follow closely off antibiotics.  He agrees with the plan.    History of Present Illness   Marcin Sánchez is a 64 y.o. 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  He also has progressive COPD with ongoing tobacco use.  He was recently hospitalized in late August for c worsening shortness of breath.  Found to have PE and started on Eliquis.  He returned to the ED 9/5 with respiratory distress.  He was found to have acute hypoxic and hypercapnic respiratory failure requiring BiPAP.  His initial chest x-ray did not show pneumonia.  He was started on azithromycin and Solu-Medrol for possible COPD exacerbation.  A sputum culture was sent and grew Acinetobacter and Moraxella.  On HD #2 he developed worsening hypoxia requiring intubation which was complicated by cardiac arrest requiring CPR.  He was started on pressors.  A CT C/A/P showed bilateral effusions with increased bibasilar consolidation possibly representing atelectasis as well as a pericardial effusion.  Ceftriaxone was added and later changed to cefepime.  He was evaluated by thoracic surgery who recommended conservative management given the absence of any tamponade signs.  He was extubated yesterday but then required reintubation.  He underwent bronchoscopy for airway clearance which was notable for excessive brown secretions which were removed as well as bloody mucous plugs.  Given his prior positive sputum culture we are asked to comment on further evaluation and management.    Review of Systems  Patient's prior history (PMH, PSH, SH, FH,  etc) not obtainable due to Clinical Condition    Objective      Temp:  [98.5 °F (36.9 °C)-99.8 °F (37.7 °C)] 98.5 °F (36.9 °C)  HR:  [59-80] 63  Resp:  [12-41] 37  BP: ()/(45-67) 147/59  Arterial Line BP: ()/(26-53) 126/35  FiO2 (%):  [40] 40  O2 Device: Ventilator          I/O last 24 hours:  In: 2148.7 [P.O.:30; I.V.:1128.7; NG/GT:90; IV Piggyback:900]  Out: 1110 [Urine:1110]  Lines/Drains/Airways       Active Status       Name Placement date Placement time Site Days    CVC Central Lines 09/08/24 09/08/24  1505  --  3    Arterial Line 09/08/24 Radial 09/08/24  1347  Radial  3    ETT  Cuffed 8 mm 09/10/24  1548  -- less than 1    NG/OG/Enteral Tube Orogastric Center mouth 09/08/24  0100  Center mouth  3    Urethral Catheter 16 Fr. 09/08/24  0049  --  3                  Physical Exam   Intubated and sedated  Ventilated respiratory excursion  Abdomen soft  No rashes    Lab Results: I have reviewed the following results:   Recent Labs     09/10/24  0432 09/10/24  0841 09/11/24  0431 09/11/24  0952   WBC 12.19*  --  10.93*  --    HGB 10.1*  --  9.5*  --    HCT 31.0*  --  29.0*  --      --  187  --    SODIUM 142  --  144  --    K 4.1  --  4.2  --      --  104  --    CO2 34*  --  31  --    BUN 39*  --  38*  --    CREATININE 0.75  --  0.75  --    GLUC 144*  --  121  --    CAIONIZED 1.11*  --  1.16  --    MG 2.1  --  2.4  --    PHOS 3.9  --  3.1  --    *  --   --   --    *  --   --   --    ALB 2.8*  --   --   --    TBILI 1.21*  --   --   --    ALKPHOS 68  --   --   --    PTT  --    < >  --  81*    < > = values in this interval not displayed.     Micro:  Lab Results   Component Value Date    BLOODCX No Growth After 5 Days. 09/05/2024    BLOODCX No Growth After 5 Days. 09/05/2024    SPUTUMCULTUR 4+ Growth of Acinetobacter baumannii (A) 09/06/2024    SPUTUMCULTUR Few Colonies of Moraxella catarrhalis (A) 09/06/2024    SPUTUMCULTUR 4+ Growth of 09/06/2024     Imaging Review: Personally  reviewed the following image studies in PACS and associated radiology reports: CT chest. My interpretation of the radiology images/reports is: chronic fibrosis with minimal RLL infiltrate.  Other Studies: No additional pertinent studies reviewed.

## 2024-09-11 NOTE — ASSESSMENT & PLAN NOTE
Developed in setting of cardiac arrest, intubation, sedation.  Low clinical suspicion for sepsis.  Has remained afebrile with mild WBC which is likely attributable to steroids.  Recent blood cultures negative.    Continue to follow closely off antibiotics  Follow temperatures closely  Recheck CBC in a.m.

## 2024-09-11 NOTE — PLAN OF CARE
Problem: Prexisting or High Potential for Compromised Skin Integrity  Goal: Skin integrity is maintained or improved  Description: INTERVENTIONS:  - Identify patients at risk for skin breakdown  - Assess and monitor skin integrity  - Assess and monitor nutrition and hydration status  - Monitor labs   - Assess for incontinence   - Turn and reposition patient  - Assist with mobility/ambulation  - Relieve pressure over bony prominences  - Avoid friction and shearing  - Provide appropriate hygiene as needed including keeping skin clean and dry  - Evaluate need for skin moisturizer/barrier cream  - Collaborate with interdisciplinary team   - Patient/family teaching  - Consider wound care consult   Outcome: Progressing     Problem: SAFETY,RESTRAINT: NV/NON-SELF DESTRUCTIVE BEHAVIOR  Goal: Remains free of harm/injury (restraint for non violent/non self-detsructive behavior)  Description: INTERVENTIONS:  - Instruct patient/family regarding restraint use   - Assess and monitor physiologic and psychological status   - Provide interventions and comfort measures to meet assessed patient needs   - Identify and implement measures to help patient regain control  - Assess readiness for release of restraint   Outcome: Progressing  Goal: Returns to optimal restraint-free functioning  Description: INTERVENTIONS:  - Assess the patient's behavior and symptoms that indicate continued need for restraint  - Identify and implement measures to help patient regain control  - Assess readiness for release of restraint   Outcome: Progressing     Problem: PAIN - ADULT  Goal: Verbalizes/displays adequate comfort level or baseline comfort level  Description: Interventions:  - Encourage patient to monitor pain and request assistance  - Assess pain using appropriate pain scale  - Administer analgesics based on type and severity of pain and evaluate response  - Implement non-pharmacological measures as appropriate and evaluate response  - Consider  cultural and social influences on pain and pain management  - Notify physician/advanced practitioner if interventions unsuccessful or patient reports new pain  Outcome: Progressing     Problem: INFECTION - ADULT  Goal: Absence or prevention of progression during hospitalization  Description: INTERVENTIONS:  - Assess and monitor for signs and symptoms of infection  - Monitor lab/diagnostic results  - Monitor all insertion sites, i.e. indwelling lines, tubes, and drains  - Monitor endotracheal if appropriate and nasal secretions for changes in amount and color  - [place] appropriate cooling/warming therapies per order  - Administer medications as ordered  - Instruct and encourage patient and family to use good hand hygiene technique  - Identify and instruct in appropriate isolation precautions for identified infection/condition  Outcome: Progressing  Goal: Absence of fever/infection during neutropenic period  Description: INTERVENTIONS:  - Monitor WBC    Outcome: Progressing     Problem: SAFETY ADULT  Goal: Patient will remain free of falls  Description: INTERVENTIONS:  - Educate patient/family on patient safety including physical limitations  - Instruct patient to call for assistance with activity   - Consult OT/PT to assist with strengthening/mobility   - Keep Call bell within reach  - Keep bed low and locked with side rails adjusted as appropriate  - Keep care items and personal belongings within reach  - Initiate and maintain comfort rounds  - Make Fall Risk Sign visible to staff    Problem: DISCHARGE PLANNING  Goal: Discharge to home or other facility with appropriate resources  Description: INTERVENTIONS:  - Identify barriers to discharge w/patient and caregiver  - Arrange for needed discharge resources and transportation as appropriate  - Identify discharge learning needs (meds, wound care, etc.)  - Arrange for interpretive services to assist at discharge as needed  - Refer to Case Management Department for  coordinating discharge planning if the patient needs post-hospital services based on physician/advanced practitioner order or complex needs related to functional status, cognitive ability, or social support system  Outcome: Progressing     Problem: Knowledge Deficit  Goal: Patient/family/caregiver demonstrates understanding of disease process, treatment plan, medications, and discharge instructions  Description: Complete learning assessment and assess knowledge base.  Interventions:  - Provide teaching at level of understanding  - Provide teaching via preferred learning methods  Outcome: Progressing     - Apply yellow socks and bracelet for high fall risk patients  - Consider moving patient to room near nurses station  Outcome: Progressing

## 2024-09-11 NOTE — UTILIZATION REVIEW
"NOTIFICATION OF INPATIENT ADMISSION   AUTHORIZATION REQUEST   SERVICING FACILITY:   [facility]  Address: [address] [place] [address]  Tax ID: [id number]  NPI: [id number] ATTENDING PROVIDER:  Attending Name and NPI#: Romy Hill Md [[id number]]  Address: [address] [place] [address]  Phone: [phone]   ADMISSION INFORMATION:  Place of Service: Inpatient [facility]  Place of Service Code: 21  Inpatient Admission Date/Time: 9/8/24  7:49 PM  Discharge Date/Time: No discharge date for patient encounter.  Admitting Diagnosis Code/Description:  Acute on chronic respiratory failure with hypoxia and hypercapnia (HCC) [J96.21, J96.22]     UTILIZATION REVIEW CONTACT:  Angelina \"Arminda\" Memo Utilization   Network Utilization Review Department  Phone: [phone]  Fax: [phone]  Email: [email or web address]  Contact for approvals/pending authorizations, clinical reviews, and discharge.     PHYSICIAN ADVISORY SERVICES:  Medical Necessity Denial & Fkfs-ag-Zxlh Review  Phone: [phone]  Fax: [phone]  Email: [email or web address]     DISCHARGE SUPPORT TEAM:  For Patients Discharge Needs & Updates  Phone: [phone] opt. 2 Fax: [phone]  Email: [email or web address]      "

## 2024-09-12 LAB
ANION GAP SERPL CALCULATED.3IONS-SCNC: 6 MMOL/L (ref 4–13)
ANION GAP SERPL CALCULATED.3IONS-SCNC: 7 MMOL/L (ref 4–13)
APTT PPP: 58 SECONDS (ref 23–34)
APTT PPP: 67 SECONDS (ref 23–34)
APTT PPP: 76 SECONDS (ref 23–34)
BASOPHILS # BLD AUTO: 0.01 THOUSANDS/ΜL (ref 0–0.1)
BASOPHILS NFR BLD AUTO: 0 % (ref 0–1)
BUN SERPL-MCNC: 27 MG/DL (ref 5–25)
BUN SERPL-MCNC: 33 MG/DL (ref 5–25)
CA-I BLD-SCNC: 1.15 MMOL/L (ref 1.12–1.32)
CALCIUM SERPL-MCNC: 8.6 MG/DL (ref 8.4–10.2)
CALCIUM SERPL-MCNC: 8.7 MG/DL (ref 8.4–10.2)
CHLORIDE SERPL-SCNC: 106 MMOL/L (ref 96–108)
CHLORIDE SERPL-SCNC: 107 MMOL/L (ref 96–108)
CO2 SERPL-SCNC: 32 MMOL/L (ref 21–32)
CO2 SERPL-SCNC: 33 MMOL/L (ref 21–32)
CREAT SERPL-MCNC: 0.63 MG/DL (ref 0.6–1.3)
CREAT SERPL-MCNC: 0.64 MG/DL (ref 0.6–1.3)
EOSINOPHIL # BLD AUTO: 0 THOUSAND/ΜL (ref 0–0.61)
EOSINOPHIL NFR BLD AUTO: 0 % (ref 0–6)
ERYTHROCYTE [DISTWIDTH] IN BLOOD BY AUTOMATED COUNT: 13.5 % (ref 11.6–15.1)
EST. AVERAGE GLUCOSE BLD GHB EST-MCNC: 114 MG/DL
GFR SERPL CREATININE-BSD FRML MDRD: 103 ML/MIN/1.73SQ M
GFR SERPL CREATININE-BSD FRML MDRD: 104 ML/MIN/1.73SQ M
GLUCOSE SERPL-MCNC: 111 MG/DL (ref 65–140)
GLUCOSE SERPL-MCNC: 111 MG/DL (ref 65–140)
GLUCOSE SERPL-MCNC: 130 MG/DL (ref 65–140)
GLUCOSE SERPL-MCNC: 135 MG/DL (ref 65–140)
GLUCOSE SERPL-MCNC: 135 MG/DL (ref 65–140)
GLUCOSE SERPL-MCNC: 151 MG/DL (ref 65–140)
GLUCOSE SERPL-MCNC: 170 MG/DL (ref 65–140)
GLUCOSE SERPL-MCNC: 178 MG/DL (ref 65–140)
GLUCOSE SERPL-MCNC: 217 MG/DL (ref 65–140)
HBA1C MFR BLD: 5.6 %
HCT VFR BLD AUTO: 29.4 % (ref 36.5–49.3)
HGB BLD-MCNC: 9.4 G/DL (ref 12–17)
IMM GRANULOCYTES # BLD AUTO: 0.05 THOUSAND/UL (ref 0–0.2)
IMM GRANULOCYTES NFR BLD AUTO: 1 % (ref 0–2)
LYMPHOCYTES # BLD AUTO: 0.49 THOUSANDS/ΜL (ref 0.6–4.47)
LYMPHOCYTES NFR BLD AUTO: 6 % (ref 14–44)
MAGNESIUM SERPL-MCNC: 1.6 MG/DL (ref 1.9–2.7)
MAGNESIUM SERPL-MCNC: 1.9 MG/DL (ref 1.9–2.7)
MCH RBC QN AUTO: 31.4 PG (ref 26.8–34.3)
MCHC RBC AUTO-ENTMCNC: 32 G/DL (ref 31.4–37.4)
MCV RBC AUTO: 98 FL (ref 82–98)
MONOCYTES # BLD AUTO: 0.78 THOUSAND/ΜL (ref 0.17–1.22)
MONOCYTES NFR BLD AUTO: 10 % (ref 4–12)
NEUTROPHILS # BLD AUTO: 6.58 THOUSANDS/ΜL (ref 1.85–7.62)
NEUTS SEG NFR BLD AUTO: 83 % (ref 43–75)
NRBC BLD AUTO-RTO: 0 /100 WBCS
PHENYTOIN SERPL-MCNC: 26.5 UG/ML (ref 10–20)
PHOSPHATE SERPL-MCNC: 2 MG/DL (ref 2.3–4.1)
PHOSPHATE SERPL-MCNC: 2.1 MG/DL (ref 2.3–4.1)
PLATELET # BLD AUTO: 184 THOUSANDS/UL (ref 149–390)
PMV BLD AUTO: 9.5 FL (ref 8.9–12.7)
POTASSIUM SERPL-SCNC: 3.6 MMOL/L (ref 3.5–5.3)
POTASSIUM SERPL-SCNC: 3.9 MMOL/L (ref 3.5–5.3)
RBC # BLD AUTO: 2.99 MILLION/UL (ref 3.88–5.62)
SODIUM SERPL-SCNC: 145 MMOL/L (ref 135–147)
SODIUM SERPL-SCNC: 146 MMOL/L (ref 135–147)
WBC # BLD AUTO: 7.91 THOUSAND/UL (ref 4.31–10.16)

## 2024-09-12 PROCEDURE — 94664 DEMO&/EVAL PT USE INHALER: CPT

## 2024-09-12 PROCEDURE — 85730 THROMBOPLASTIN TIME PARTIAL: CPT | Performed by: INTERNAL MEDICINE

## 2024-09-12 PROCEDURE — 80185 ASSAY OF PHENYTOIN TOTAL: CPT

## 2024-09-12 PROCEDURE — 99233 SBSQ HOSP IP/OBS HIGH 50: CPT | Performed by: INTERNAL MEDICINE

## 2024-09-12 PROCEDURE — 80048 BASIC METABOLIC PNL TOTAL CA: CPT | Performed by: INTERNAL MEDICINE

## 2024-09-12 PROCEDURE — 99232 SBSQ HOSP IP/OBS MODERATE 35: CPT | Performed by: INTERNAL MEDICINE

## 2024-09-12 PROCEDURE — 84100 ASSAY OF PHOSPHORUS: CPT | Performed by: INTERNAL MEDICINE

## 2024-09-12 PROCEDURE — 93005 ELECTROCARDIOGRAM TRACING: CPT

## 2024-09-12 PROCEDURE — 83735 ASSAY OF MAGNESIUM: CPT

## 2024-09-12 PROCEDURE — 94003 VENT MGMT INPAT SUBQ DAY: CPT

## 2024-09-12 PROCEDURE — 85730 THROMBOPLASTIN TIME PARTIAL: CPT

## 2024-09-12 PROCEDURE — 82330 ASSAY OF CALCIUM: CPT

## 2024-09-12 PROCEDURE — 94669 MECHANICAL CHEST WALL OSCILL: CPT

## 2024-09-12 PROCEDURE — 85025 COMPLETE CBC W/AUTO DIFF WBC: CPT

## 2024-09-12 PROCEDURE — 80048 BASIC METABOLIC PNL TOTAL CA: CPT

## 2024-09-12 PROCEDURE — 83036 HEMOGLOBIN GLYCOSYLATED A1C: CPT

## 2024-09-12 PROCEDURE — 94760 N-INVAS EAR/PLS OXIMETRY 1: CPT

## 2024-09-12 PROCEDURE — 83735 ASSAY OF MAGNESIUM: CPT | Performed by: INTERNAL MEDICINE

## 2024-09-12 PROCEDURE — 82948 REAGENT STRIP/BLOOD GLUCOSE: CPT

## 2024-09-12 PROCEDURE — 84100 ASSAY OF PHOSPHORUS: CPT

## 2024-09-12 PROCEDURE — 94640 AIRWAY INHALATION TREATMENT: CPT

## 2024-09-12 RX ORDER — INSULIN LISPRO 100 [IU]/ML
1-5 INJECTION, SOLUTION INTRAVENOUS; SUBCUTANEOUS
Status: DISCONTINUED | OUTPATIENT
Start: 2024-09-12 | End: 2024-09-16

## 2024-09-12 RX ORDER — FUROSEMIDE 10 MG/ML
20 INJECTION INTRAMUSCULAR; INTRAVENOUS ONCE
Status: COMPLETED | OUTPATIENT
Start: 2024-09-12 | End: 2024-09-12

## 2024-09-12 RX ORDER — MAGNESIUM SULFATE HEPTAHYDRATE 40 MG/ML
2 INJECTION, SOLUTION INTRAVENOUS ONCE
Status: COMPLETED | OUTPATIENT
Start: 2024-09-12 | End: 2024-09-13

## 2024-09-12 RX ORDER — METHYLPREDNISOLONE SODIUM SUCCINATE 40 MG/ML
40 INJECTION, POWDER, LYOPHILIZED, FOR SOLUTION INTRAMUSCULAR; INTRAVENOUS DAILY
Status: DISCONTINUED | OUTPATIENT
Start: 2024-09-13 | End: 2024-09-13

## 2024-09-12 RX ADMIN — LEVALBUTEROL HYDROCHLORIDE 1.25 MG: 1.25 SOLUTION RESPIRATORY (INHALATION) at 07:38

## 2024-09-12 RX ADMIN — LEVALBUTEROL HYDROCHLORIDE 1.25 MG: 1.25 SOLUTION RESPIRATORY (INHALATION) at 13:56

## 2024-09-12 RX ADMIN — MAGNESIUM SULFATE HEPTAHYDRATE 2 G: 40 INJECTION, SOLUTION INTRAVENOUS at 22:47

## 2024-09-12 RX ADMIN — FENTANYL CITRATE 50 MCG: 50 INJECTION INTRAMUSCULAR; INTRAVENOUS at 23:25

## 2024-09-12 RX ADMIN — FENTANYL CITRATE 50 MCG: 50 INJECTION INTRAMUSCULAR; INTRAVENOUS at 03:01

## 2024-09-12 RX ADMIN — POTASSIUM PHOSPHATE, MONOBASIC POTASSIUM PHOSPHATE, DIBASIC 30 MMOL: 224; 236 INJECTION, SOLUTION, CONCENTRATE INTRAVENOUS at 23:15

## 2024-09-12 RX ADMIN — CHLORHEXIDINE GLUCONATE 0.12% ORAL RINSE 15 ML: 1.2 LIQUID ORAL at 21:30

## 2024-09-12 RX ADMIN — MIDAZOLAM 1 MG: 1 INJECTION INTRAMUSCULAR; INTRAVENOUS at 04:07

## 2024-09-12 RX ADMIN — NICOTINE 1 PATCH: 21 PATCH, EXTENDED RELEASE TRANSDERMAL at 08:24

## 2024-09-12 RX ADMIN — SENNOSIDES 8.6 MG: 8.6 TABLET, FILM COATED ORAL at 21:30

## 2024-09-12 RX ADMIN — PANTOPRAZOLE SODIUM 40 MG: 40 INJECTION, POWDER, FOR SOLUTION INTRAVENOUS at 08:25

## 2024-09-12 RX ADMIN — DEXMEDETOMIDINE HYDROCHLORIDE 1.2 MCG/KG/HR: 4 INJECTION, SOLUTION INTRAVENOUS at 12:00

## 2024-09-12 RX ADMIN — INSULIN LISPRO 1 UNITS: 100 INJECTION, SOLUTION INTRAVENOUS; SUBCUTANEOUS at 14:38

## 2024-09-12 RX ADMIN — POLYETHYLENE GLYCOL 3350 17 G: 17 POWDER, FOR SOLUTION ORAL at 08:24

## 2024-09-12 RX ADMIN — FORMOTEROL FUMARATE DIHYDRATE 20 MCG: 20 SOLUTION RESPIRATORY (INHALATION) at 07:37

## 2024-09-12 RX ADMIN — DEXMEDETOMIDINE HYDROCHLORIDE 1.2 MCG/KG/HR: 4 INJECTION, SOLUTION INTRAVENOUS at 06:01

## 2024-09-12 RX ADMIN — IPRATROPIUM BROMIDE 0.5 MG: 0.5 SOLUTION RESPIRATORY (INHALATION) at 13:56

## 2024-09-12 RX ADMIN — CHLORHEXIDINE GLUCONATE 0.12% ORAL RINSE 15 ML: 1.2 LIQUID ORAL at 08:25

## 2024-09-12 RX ADMIN — BUDESONIDE 0.5 MG: 0.5 INHALANT RESPIRATORY (INHALATION) at 19:30

## 2024-09-12 RX ADMIN — MIDAZOLAM 1 MG: 1 INJECTION INTRAMUSCULAR; INTRAVENOUS at 21:30

## 2024-09-12 RX ADMIN — MIDAZOLAM 1 MG: 1 INJECTION INTRAMUSCULAR; INTRAVENOUS at 07:37

## 2024-09-12 RX ADMIN — Medication 75 MCG/HR: at 21:30

## 2024-09-12 RX ADMIN — DEXMEDETOMIDINE HYDROCHLORIDE 1.2 MCG/KG/HR: 4 INJECTION, SOLUTION INTRAVENOUS at 16:24

## 2024-09-12 RX ADMIN — HEPARIN SODIUM 22 UNITS/KG/HR: 10000 INJECTION, SOLUTION INTRAVENOUS at 16:38

## 2024-09-12 RX ADMIN — Medication 100 MCG: at 08:26

## 2024-09-12 RX ADMIN — MIDAZOLAM 1 MG: 1 INJECTION INTRAMUSCULAR; INTRAVENOUS at 00:23

## 2024-09-12 RX ADMIN — FENTANYL CITRATE 50 MCG: 50 INJECTION INTRAMUSCULAR; INTRAVENOUS at 06:25

## 2024-09-12 RX ADMIN — LEVALBUTEROL HYDROCHLORIDE 1.25 MG: 1.25 SOLUTION RESPIRATORY (INHALATION) at 19:30

## 2024-09-12 RX ADMIN — FUROSEMIDE 20 MG: 10 INJECTION, SOLUTION INTRAMUSCULAR; INTRAVENOUS at 10:20

## 2024-09-12 RX ADMIN — IPRATROPIUM BROMIDE 0.5 MG: 0.5 SOLUTION RESPIRATORY (INHALATION) at 19:30

## 2024-09-12 RX ADMIN — Medication 75 MCG/HR: at 06:01

## 2024-09-12 RX ADMIN — FOLIC ACID 1 MG: 1 TABLET ORAL at 08:25

## 2024-09-12 RX ADMIN — FORMOTEROL FUMARATE DIHYDRATE 20 MCG: 20 SOLUTION RESPIRATORY (INHALATION) at 19:30

## 2024-09-12 RX ADMIN — METHYLPREDNISOLONE SODIUM SUCCINATE 40 MG: 40 INJECTION, POWDER, FOR SOLUTION INTRAMUSCULAR; INTRAVENOUS at 08:25

## 2024-09-12 RX ADMIN — IPRATROPIUM BROMIDE 0.5 MG: 0.5 SOLUTION RESPIRATORY (INHALATION) at 07:37

## 2024-09-12 RX ADMIN — DEXMEDETOMIDINE HYDROCHLORIDE 1.2 MCG/KG/HR: 4 INJECTION, SOLUTION INTRAVENOUS at 22:06

## 2024-09-12 RX ADMIN — BUDESONIDE 0.5 MG: 0.5 INHALANT RESPIRATORY (INHALATION) at 07:38

## 2024-09-12 NOTE — ASSESSMENT & PLAN NOTE
Developed in setting of cardiac arrest, intubation, sedation.  Low clinical suspicion for sepsis.  Has remained afebrile with mild WBC which is likely attributable to steroids.  Recent blood cultures negative.  Now off pressors    Continue to follow closely off antibiotics  Follow temperatures closely  Recheck CBC in a.m.

## 2024-09-12 NOTE — ASSESSMENT & PLAN NOTE
Present on admission.  Suspect multifactorial due to severe COPD with possible exacerbation, mucus plug.  Unclear if actually pneumonia with minimal infiltrate versus atelectasis seen on CT chest.  Sputum culture with  Moraxella, status post 5 days azithromycin.  Also with MDR-Acinetobacter which may represent contaminants versus colonizers, as has remained stable without effective Acinetobacter treatment.  Was on BiPAP.  Now status post intubation x2.  Now weaning on vent after therapeutic bronch.  Clinically stable without fever, mild WBC may be due to steroids.      Continue to follow closely off antibiotics  Follow up bronch cultures  Follow respiratory status closely  Pulmonary toilet  Follow temperatures closely  Recheck CBC in a.m.  Supportive care as per the primary service

## 2024-09-12 NOTE — PROGRESS NOTES
"Cardiology Team 2 Progress Note - Marcin Sánchez 64 y.o. male MRN: [id number]    Unit/Bed#: MICU 10 Encounter: [id number]          Subjective:   Patient seen and examined.  No significant events overnight. Remains intubated this morning. Sedation decreased and he is awake and following commands but looks comfortable.    Hospital Course:   Marcin Sánchez is a 64 y.o. year old male who presented to [facility] 9/5 with acute respiratory failure secondary COPD exacerbation.  He has past medical history significant for atrial fibrillation, COPD, chronic combined systolic and diastolic CHF, epilepsy, pulmonary fibrosis.  Radiation, history of squamous cell lung cancer, current tobacco user.  Smoking outside his living facility (the [facility]) when he was noted to become acutely confused.  He also.  Short of breath was noted to be hypoxemic prompting call to EMS.  Upon arrival he was lethargic and confused which is thought to be secondary to hypercarbic respiratory failure in the setting of COPD exacerbation and was initiated on BiPAP.  On 9/7 patient had acute hypoxic event resulting in bradycardia and subsequently PEA cardiac arrest 2 rounds of CPR with VT noted during a pulse check. Shock was administered and ROSC was achieved and he was moving all extremities. She was subsequently intubated.  Echocardiogram rest showed LVEF 55% with normal systolic function with moderate pericardial effusion.  He was transferred to [facility] for thoracic surgery oncology evaluation. There were no clinical signs of tamponade and therefore window and pericardiocentesis were not performed. Attempted extubation to BiPAP 9/10 however failed and required re-intubation due to hypercarbia. He underwent bronchoscopy 9/11 with large amount of mucus removed and mucus plugging removed.    Vitals: Blood pressure 119/57, pulse 88, temperature 99.7 °F (37.6 °C), temperature source Oral, resp. rate (!) 30, height 5' 9\" (1.753 m), weight " 66.7 kg (147 lb 0.8 oz), SpO2 90%., Body mass index is 21.72 kg/m².,   Orthostatic Blood Pressures      Flowsheet Row Most Recent Value   Blood Pressure 119/57 filed at 09/12/2024 0735   Patient Position - Orthostatic VS Lying filed at 09/11/2024 0400              Intake/Output Summary (Last 24 hours) at 9/12/2024 0824  Last data filed at 9/12/2024 0800  Gross per 24 hour   Intake 1103.07 ml   Output 980 ml   Net 123.07 ml       Review of System:  Review of system was conducted and was negative except for as stated in the subjective course.    Physical Exam:    GEN: Marcin Sánchez appears well, alert and oriented x 3, pleasant and cooperative   HEENT:  Normocephalic, atraumatic, anicteric, moist mucous membranes  NECK: No JVD or carotid bruits   HEART: regular rhythm, regular rate, normal S1 and S2, no murmurs, clicks, gallops or rubs   LUNGS: Intubated this morning. Significant expiratory wheezing b/l.  ABDOMEN:  Normoactive bowel sounds, soft, no tenderness, no distention  EXTREMITIES: peripheral pulses palpable; no edema  NEURO: no gross focal findings; cranial nerves grossly intact   SKIN:  Dry, intact, warm to touch      Current Facility-Administered Medications:     acetaminophen (TYLENOL) tablet 650 mg, 650 mg, Oral, Q6H PRN, LANDON Desai, 650 mg at 09/11/24 0441    albuterol inhalation solution 2.5 mg, 2.5 mg, Nebulization, Q6H PRN, Brian Poe MD, 2.5 mg at 09/10/24 1206    bisacodyl (DULCOLAX) rectal suppository 10 mg, 10 mg, Rectal, Daily PRN, Axel Smith PA-C    budesonide (PULMICORT) inhalation solution 0.5 mg, 0.5 mg, Nebulization, Q12H, LANDON Desai, 0.5 mg at 09/12/24 0738    chlorhexidine (PERIDEX) 0.12 % oral rinse 15 mL, 15 mL, Mouth/Throat, Q12H AMERICA, Tobi Hook MD, 15 mL at 09/11/24 2008    cyanocobalamin (VITAMIN B-12) tablet 100 mcg, 100 mcg, Oral, Daily, Axel Smith PA-C, 100 mcg at 09/11/24 0818    dexmedeTOMIDine (Precedex) 400  mcg in sodium chloride 0.9% 100 mL, 0.1-1.2 mcg/kg/hr, Intravenous, Titrated, Axel Smith PA-C, Last Rate: 18.2 mL/hr at 09/12/24 0601, 1.2 mcg/kg/hr at 09/12/24 0601    fentaNYL 1000 mcg in sodium chloride 0.9% 100mL infusion, 75 mcg/hr, Intravenous, Continuous, Angela Cannon MD, Last Rate: 7.5 mL/hr at 09/12/24 0601, 75 mcg/hr at 09/12/24 0601    fentaNYL injection 50 mcg, 50 mcg, Intravenous, Q2H PRN, LANDON Desai, 50 mcg at 09/12/24 0625    folic acid (FOLVITE) tablet 1 mg, 1 mg, Oral, Daily, Axel Smith PA-C, 1 mg at 09/11/24 0817    formoterol (PERFOROMIST) nebulizer solution 20 mcg, 20 mcg, Nebulization, Q12H, Tobi Hook MD, 20 mcg at 09/12/24 0737    heparin (porcine) 25,000 units in 0.45% NaCl 250 mL infusion (premix), 3-20 Units/kg/hr (Order-Specific), Intravenous, Titrated, Axel Smith PA-C, Last Rate: 13.2 mL/hr at 09/11/24 2218, 22 Units/kg/hr at 09/11/24 2218    heparin (porcine) injection 1,800 Units, 1,800 Units, Intravenous, Q6H PRN, Axel Smith PA-C, 1,800 Units at 09/11/24 0342    heparin (porcine) injection 3,600 Units, 3,600 Units, Intravenous, Q6H PRN, Axel Smith PA-C    ipratropium (ATROVENT) 0.02 % inhalation solution 0.5 mg, 0.5 mg, Nebulization, TID, Brian Poe MD, 0.5 mg at 09/12/24 0737    levalbuterol (XOPENEX) inhalation solution 1.25 mg, 1.25 mg, Nebulization, TID, Brian Poe MD, 1.25 mg at 09/12/24 0738    methylPREDNISolone sodium succinate (Solu-MEDROL) injection 40 mg, 40 mg, Intravenous, Q12H AMERICA, LANDON Desai, 40 mg at 09/11/24 2008    midazolam (VERSED) injection 1 mg, 1 mg, Intravenous, Q4H PRN, Axel Smith PA-C, 1 mg at 09/12/24 0737    nicotine (NICODERM CQ) 21 mg/24 hr TD 24 hr patch 1 patch, 1 patch, Transdermal, Daily, LANDON Desai, 1 patch at 09/11/24 0818    norepinephrine (LEVOPHED) 4 mg (STANDARD CONCENTRATION) IV in sodium chloride 0.9% 250 mL,  1-30 mcg/min, Intravenous, Titrated, Tobi Hook MD, Stopped at 09/11/24 1820    pantoprazole (PROTONIX) injection 40 mg, 40 mg, Intravenous, Q24H AMERICA, LANDON Desai, 40 mg at 09/11/24 0817    polyethylene glycol (MIRALAX) packet 17 g, 17 g, Oral, Daily, Axel Smith PA-C, 17 g at 09/11/24 0817    senna (SENOKOT) tablet 8.6 mg, 1 tablet, Oral, HS, Axel Smith PA-C, 8.6 mg at 09/11/24 2136    Labs & Results:          Results from last 7 days   Lab Units 09/12/24  0600 09/11/24  0431 09/10/24  0432   POTASSIUM mmol/L 3.9 4.2 4.1   CO2 mmol/L 33* 31 34*   CHLORIDE mmol/L 106 104 102   BUN mg/dL 33* 38* 39*   CREATININE mg/dL 0.63 0.75 0.75     Results from last 7 days   Lab Units 09/12/24  0600 09/11/24  0431 09/10/24  0432   HEMOGLOBIN g/dL 9.4* 9.5* 10.1*   HEMATOCRIT % 29.4* 29.0* 31.0*   PLATELETS Thousands/uL 184 187 209           Telemetry:   Personally reviewed by Adam Nunez, DO: normal sinus rhythm with periods of tachycardia      Assessment:  Active Problems:    Acute exacerbation of chronic obstructive pulmonary disease (COPD) (HCC)    Cancer of right main bronchus (HCC)    Chronic combined systolic and diastolic CHF (congestive heart failure) ([facility])    Cardiac arrest (HCC)    Pericardial effusion    Acute hypoxic respiratory failure (HCC)    Shock (HCC)    Mucus plugging of bronchi      Pericardial effusion  Moderate Sized pericardial effusion noted on  the last 3 echocardiograms 8/29, 9/8, and 9/9. Effusion predominantly around RV and RA free wall with smaller portion tracking around the apex. Does not appear to have changes much since 8/29. Currenlty no clinical or echocardiographic signs of tamponade. He is intubated and sedated for acute respiratry failure form COPD and mucus plugs.  - interval echocardiogram 9/11 with stable effusion and no evidence of tamponade  Plan:  - no evidence of tamponade, no urgent indication at this time for pericardiocentesis  - If  there is acute decompensation recommend fluids and repeat echo to evaluate effusion, if evidence of tamponade then would proceed with pericardial window vs pericardiocentesis  - plan for low dose lasix 20mg x1 today for goal net negative 500cc fluid balance     Cardiac arrest  Cardiac arrest appears to be in the setting of hypoxemia. Initial rhythm reported as PEA with ROSC then VT arrest. Possibly transient ischemia during PEA leating to VT. After intubation does not appear to have any further ectopy. Echocardiogram with LVEF 50-55%.  - off pressors this morning with adequate BP and no events on telemetry overnight  - continue treatment for COPD exacerbation as per primary team     Chronic combined systolic and diastolic heart failure  Appears euvolemic on exam. Currently requireing pressors. Chronically on Lasix 40mg daily and toprol 25mg daily.  - holding BB after cardiac arrest and with effusion, may restart when off pressors  - appears euvolemic today however difficult intravascular volume exam.     Paroxysmal atrial fibrillation  Current yin sinus rhythm. Rates are controlled.  - restart metoprolol in the next 24 hours  - continue AC with heparin currently, once no furhter procedures required transition back to [facility].     Acute Hypoxemic Respiratory failure- intubated and sedated, wean as able as per primary team  COPD- steroids and inhalers as per primary.  Pulmonary fibrosis  Current Tobacco use- tobacco cessation recomended    Case discussed and reviewed with Dr. Silva who agrees with my assessment and plan.    Thank you for involving us in the care of your patient.      Adam Nunez DO  Cardiology Fellow   PGY-5      ** Please Note: Fluency DirectDictation voice to text software may have been used in the creation of this document. **

## 2024-09-12 NOTE — PROGRESS NOTES
Progress Note - Infectious Disease   Name: Marcin Sánchez 64 y.o. male I MRN: [id number]  Unit/Bed#: MICU 10 I Date of Admission: 9/8/2024   Date of Service: 9/12/2024 I Hospital Day: 4     Assessment & Plan  Acute hypoxic respiratory failure (HCC)  Present on admission.  Suspect multifactorial due to severe COPD with possible exacerbation, mucus plug.  Unclear if actually pneumonia with minimal infiltrate versus atelectasis seen on CT chest.  Sputum culture with  Moraxella, status post 5 days azithromycin.  Also with MDR-Acinetobacter which may represent contaminants versus colonizers, as has remained stable without effective Acinetobacter treatment.  Was on BiPAP.  Now status post intubation x2.  Now weaning on vent after therapeutic bronch.  Clinically stable without fever, mild WBC may be due to steroids.      Continue to follow closely off antibiotics  Follow up bronch cultures  Follow respiratory status closely  Pulmonary toilet  Follow temperatures closely  Recheck CBC in a.m.  Supportive care as per the primary service  Acute exacerbation of chronic obstructive pulmonary disease (COPD) (HCC)  On steroids.  Status post 5 days of azithromycin  Shock (HCC)  Developed in setting of cardiac arrest, intubation, sedation.  Low clinical suspicion for sepsis.  Has remained afebrile with mild WBC which is likely attributable to steroids.  Recent blood cultures negative.  Now off pressors    Continue to follow closely off antibiotics  Follow temperatures closely  Recheck CBC in a.m.  Pericardial effusion  Without tamponade.  Cardiology follow-up ongoing.  Mucus plugging of bronchi  Status post therapeutic bronchoscopy 9/11.  Cardiac arrest (HCC)  Initially PEA then with VT.  In setting of hypoxia, intubation  Chronic combined systolic and diastolic CHF (congestive heart failure) (HCC)  Wt Readings from Last 3 Encounters:   09/12/24 66.7 kg (147 lb 0.8 oz)   09/08/24 60.8 kg (134 lb)   08/29/24 67.6 kg (149 lb)   With  severe aortic insufficiency.  Undergoing diuresis  Cancer of right main bronchus (HCC)  Diagnosed in 2016.  Status postchemotherapy, radiation.  Appears stable.        History of Present Illness   Seen on AM rounds.  RT suctioned minimal amount and about to place on SBT.  Off pressors.  No fevers.  WBC normal.    Objective      Temp:  [97.9 °F (36.6 °C)-99.7 °F (37.6 °C)] 99.7 °F (37.6 °C)  HR:  [60-93] 77  Resp:  [13-42] 42  BP: ()/(45-72) 122/59  Arterial Line BP: ()/() 98/45  FiO2 (%):  [40] 40  O2 Device: Ventilator          I/O last 24 hours:  In: 1224.1 [I.V.:951.1; NG/GT:120; Feedings:153]  Out: 1080 [Urine:1080]  Lines/Drains/Airways       Active Status       Name Placement date Placement time Site Days    CVC Central Lines 09/08/24 09/08/24  1505  --  3    ETT  Cuffed 8 mm 09/10/24  1548  -- 1    NG/OG/Enteral Tube Orogastric Center mouth 09/08/24  0100  Center mouth  4    Urethral Catheter 16 Fr. 09/08/24  0049  --  4                  Physical Exam   Awake, makes eye contact  ETT in place  Abdomen soft, nontender  RUE edema    Lab Results: I have reviewed the following results:   Recent Labs     09/10/24  0432 09/10/24  0841 09/12/24  0600 09/12/24  0818   WBC 12.19*   < > 7.91  --    HGB 10.1*   < > 9.4*  --    HCT 31.0*   < > 29.4*  --       < > 184  --    SODIUM 142   < > 146  --    K 4.1   < > 3.9  --       < > 106  --    CO2 34*   < > 33*  --    BUN 39*   < > 33*  --    CREATININE 0.75   < > 0.63  --    GLUC 144*   < > 151*  --    CAIONIZED 1.11*   < > 1.15  --    MG 2.1   < > 1.9  --    PHOS 3.9   < > 2.1*  --    *  --   --   --    *  --   --   --    ALB 2.8*  --   --   --    TBILI 1.21*  --   --   --    ALKPHOS 68  --   --   --    PTT  --    < > 58* 76*    < > = values in this interval not displayed.     Micro:  Lab Results   Component Value Date    BLOODCX No Growth After 5 Days. 09/05/2024    BLOODCX No Growth After 5 Days. 09/05/2024    SPUTUMCULTUR 4+  Growth of Acinetobacter baumannii (A) 09/06/2024    SPUTUMCULTUR Few Colonies of Moraxella catarrhalis (A) 09/06/2024    SPUTUMCULTUR 4+ Growth of 09/06/2024     Imaging Review: CXR images reviewed right pleural effusion  Other Studies: No additional pertinent studies reviewed.

## 2024-09-12 NOTE — ASSESSMENT & PLAN NOTE
Wt Readings from Last 3 Encounters:   09/12/24 66.7 kg (147 lb 0.8 oz)   09/08/24 60.8 kg (134 lb)   08/29/24 67.6 kg (149 lb)   With severe aortic insufficiency.  Undergoing diuresis

## 2024-09-12 NOTE — CASE MANAGEMENT
Case Management Discharge Planning Note    Patient name Marcin Sánchez  Location MICU 10/MICU 10 MRN [id number]  : 1960 Date 2024       Current Admission Date: 2024  Current Admission Diagnosis:Pericardial effusion   Patient Active Problem List    Diagnosis Date Noted Date Diagnosed    Acute hypoxic respiratory failure (HCC) 2024     Shock (HCC) 2024     Mucus plugging of bronchi 2024     Transaminitis 2024     Cardiac arrest (HCC) 2024     Pericardial effusion 2024     Acute on chronic respiratory failure with hypoxia and hypercapnia (HCC) 2024     Acute metabolic encephalopathy 2024     Chronic combined systolic and diastolic CHF (congestive heart failure) (HCC) 2024     Atrial fibrillation (HCC) 2024     Pulmonary fibrosis (HCC) 2024     Suspected chronic pulmonary embolism (HCC) 2024     Squamous cell lung cancer (HCC) 2024     Hyponatremia 2024     Severe protein-calorie malnutrition (HCC) 2024     Edema of both legs 2022     Tobacco abuse 2020     Nonrheumatic aortic valve insufficiency 2020     Malignant neoplasm of upper lobe of right lung (HCC) 2018     Thoracic aortic aneurysm without rupture (HCC) 2018     Cancer of right main bronchus (HCC) 10/04/2016     Pleural effusion 10/02/2016     Multiple lung nodules on CT 10/02/2016     Collapse of right lung 2016     Acute exacerbation of chronic obstructive pulmonary disease (COPD) (HCC)      Epilepsy (HCC)      Lyme disease      Major depression      Alcohol abuse        LOS (days): 4  Geometric Mean LOS (GMLOS) (days): 5.1  Days to GMLOS:1.3     OBJECTIVE:  Risk of Unplanned Readmission Score: 30.91         Current admission status: Inpatient   Preferred Pharmacy:   [facility] - [facility],  [address] [place] [id number]  Phone: [phone] Fax:  [phone]    [address] [address]  Phone: [phone] Fax: [phone]    Primary Care Provider: Brandt Godinez MD    Primary Insurance: [facility]  Secondary Insurance:     DISCHARGE DETAILS:         Other Referral/Resources/Interventions Provided:  Referral Comments: Per chart review:  pt extubated 9/10 & reintubated shortly after being extubated.  ID following.  Failed SBT today. Plan for repeat bronchoscopy today. CM to follow for dcp pending medical course.

## 2024-09-12 NOTE — PROGRESS NOTES
"Progress Note - Critical Care/ICU   Name: Marcin Sánchez 64 y.o. male I MRN: [id number]  Unit/Bed#: MICU 10 I Date of Admission: 9/8/2024   Date of Service: 9/12/2024 I Hospital Day: 4     Problems:  Shock - suspected vasodilatory vs. Obstructive shock  Large pericardial effusion - less likely tamponade   VT cardiac arrest post intubation  Acute on chronic hypoxic respiratory failure  Severe COPD  Acute/Chronic CHF with severe Aortic insufficiency  Abnormal CT chest with possible pneumonia - Acinetobacter seen on culture  Transaminitis 2/2 shock liver due to ischemia?  Afib on eliquis  History of pulmonary embolism  Squamous cell lung carcinoma s/p chemo/radiation  Alcohol / Tobacco abuse  Seizure disorder on dilantin    Assessment & Plan   Neuro:   Diagnosis: encephalopathy  Goal RASS 0   Patient currently on Precedex and fentanyl  Responds to commands but is still weak  Epilepsy:  Holding dilantin, levels have been supratherapeutic  Transaminitis trending down    CV:   Diagnosis: Pericardial effusion  Echocardiogram on 9/9 read as \"moderate to large pericardial effusion circumferential to the heart measuring largest along the RV free wall at 2.3 cm. The fluid exhibits no internal echoes. There is no echocardiographic evidence of tamponade\"  Plan:   Continue to monitor clinical exam for signs of tamponade.         Pulm:  Diagnosis: Intubated  Plan:   Patient currently on pressure support  Patient failed trial of BIPAP after extubation on 9/9, re-intubated  Continue xopenex atrovent TID, Pulmicort BID, perforomist BID  Solumedrol 40 q12  Attempt SBT today    GI:   Diagnosis: transaminitis  Plan: continue to trend      :   No active issues    F/E/N:   F: No fluids  E: Replete lytes  N: Receiving tube feeds      Heme/Onc:   No active issues    Endo:   No active issues    ID:   Diagnosis: Abnormal CT concerning for potential pneumonia  Sputum cultures grew moraxella and acinetobacter  Plan:   ID saw patient " yesterday, recommended to continue to follow closely off antibiotics as patient is clinically stable without fever and mild WBC 2/2 to steroids.  If were to clinically deteriorate could start Minocycline  Trend WBCs, fever curve    MSK/Skin:   No active issues  Disposition: Critical care    ICU Core Measures     Vented Patient  VAP Bundle  VAP bundle ordered     A: Assess, Prevent, and Manage Pain Has pain been assessed? Yes  Need for changes to pain regimen? No   B: Both Spontaneous Awakening Trials (SATs) and Spontaneous Breathing Trials (SBTs) Plan to perform spontaneous awakening trial today? Yes   Plan to perform spontaneous breathing trial today? Yes   Obvious barriers to extubation? No   C: Choice of Sedation RASS Goal: 0 Alert and Calm  Need for changes to sedation or analgesia regimen? No   D: Delirium CAM-ICU: Negative   E: Early Mobility  Plan for early mobility? No   F: Family Engagement Plan for family engagement today? Yes       Review of Invasive Devices:    [place] Plan: Continue for accurate I/O monitoring for 48 hours  Central access plan: Hemodynamic monitoring      Prophylaxis:  VTE VTE covered by:  heparin (porcine), Intravenous, 22 Units/kg/hr at 09/11/24 2218  heparin (porcine), Intravenous, 1,800 Units at 09/11/24 0342  heparin (porcine), Intravenous       Stress Ulcer  covered bypantoprazole (PROTONIX) injection 40 mg [[id number]]         24 Hour Events   24hr events: Patient remains intubated overnight, continues to respond to commands but is still globally very weak.   Subjective   Review of Systems: Review of Systems not obtainable due to patient is intubate    Objective                          Vitals I/O      Most Recent Min/Max in 24hrs   Temp 99.7 °F (37.6 °C) Temp  Min: 97.9 °F (36.6 °C)  Max: 99.7 °F (37.6 °C)   Pulse 77 Pulse  Min: 60  Max: 93   Resp (!) 42 Resp  Min: 13  Max: 42   /59 BP  Min: 88/45  Max: 175/72   O2 Sat 94 % SpO2  Min: 90 %  Max: 100 %      Intake/Output  Summary (Last 24 hours) at 9/12/2024 0920  Last data filed at 9/12/2024 0800  Gross per 24 hour   Intake 1103.07 ml   Output 1015 ml   Net 88.07 ml       Diet Enteral/Parenteral; Tube Feeding No Oral Diet; Jevity 1.2 Mirza; Continuous; 55; Prosource Protein Liquid - One Packet; 30; Water; Every 4 hours    Invasive Monitoring   Arterial Line  Priti BP 98/45  Arterial Line BP  Min: 85/26  Max: 147/145   MAP 67 mmHg  Arterial Line MAP (mmHg)  Min: 46 mmHg  Max: 146 mmHg           Physical Exam   Physical Exam  Eyes:      Pupils: Pupils are equal, round, and reactive to light.   Cardiovascular:      Rate and Rhythm: Normal rate and regular rhythm.      Pulses: Normal pulses.   Abdominal: General: Bowel sounds are normal.      Palpations: Abdomen is soft.      Tenderness: There is no abdominal tenderness. There is no guarding.   Pulmonary:      Effort: Pulmonary effort is normal.      Breath sounds: Normal breath sounds.      Comments: Mech breath sounds  Neurological:      Mental Status: He is alert.          Diagnostic Studies        Lab Results: I have reviewed the following results:      Medications:  Scheduled PRN   budesonide, 0.5 mg, Q12H  chlorhexidine, 15 mL, Q12H AMERICA  vitamin B-12, 100 mcg, Daily  folic acid, 1 mg, Daily  formoterol, 20 mcg, Q12H  ipratropium, 0.5 mg, TID  levalbuterol, 1.25 mg, TID  methylPREDNISolone sodium succinate, 40 mg, Q12H AMERICA  nicotine, 1 patch, Daily  pantoprazole, 40 mg, Q24H AMERICA  polyethylene glycol, 17 g, Daily  senna, 1 tablet, HS      acetaminophen, 650 mg, Q6H PRN  albuterol, 2.5 mg, Q6H PRN  bisacodyl, 10 mg, Daily PRN  fentaNYL, 50 mcg, Q2H PRN  heparin (porcine), 1,800 Units, Q6H PRN  heparin (porcine), 3,600 Units, Q6H PRN  midazolam, 1 mg, Q4H PRN       Continuous    dexmedetomidine, 0.1-1.2 mcg/kg/hr, Last Rate: 1.2 mcg/kg/hr (09/12/24 0601)  fentaNYL, 75 mcg/hr, Last Rate: 75 mcg/hr (09/12/24 0601)  heparin (porcine), 3-20 Units/kg/hr (Order-Specific), Last Rate: 22  Units/kg/hr (09/11/24 2218)  norepinephrine, 1-30 mcg/min, Last Rate: Stopped (09/11/24 1820)         Labs:   CBC    Recent Labs     09/11/24 0431 09/12/24  0600   WBC 10.93* 7.91   HGB 9.5* 9.4*   HCT 29.0* 29.4*    184     BMP    Recent Labs     09/11/24 0431 09/12/24  0600   SODIUM 144 146   K 4.2 3.9    106   CO2 31 33*   AGAP 9 7   BUN 38* 33*   CREATININE 0.75 0.63   CALCIUM 8.9 8.7       Coags    Recent Labs     09/12/24  0600 09/12/24  0818   PTT 58* 76*        Additional Electrolytes  Recent Labs     09/11/24 0431 09/12/24  0600   MG 2.4 1.9   PHOS 3.1 2.1*   CAIONIZED 1.16 1.15          Blood Gas    Recent Labs     09/11/24  1155   PHART 7.502*   [id number] 37.6   PO2ART 77.0   [id number] 28.8*   BEART 5.4   SOURCE Line, Arterial     Recent Labs     09/11/24  1155   SOURCE Line, Arterial    LFTs  No recent results    Infectious  No recent results  Glucose  Recent Labs     09/11/24 0431 09/12/24  0600   GLUC 121 151*

## 2024-09-12 NOTE — RESPIRATORY THERAPY NOTE
RT Ventilator Management Note  Marcin Sánchez 64 y.o. male MRN: [id number]  Unit/Bed#: [facility] 10 Encounter: [id number]      Daily Screen         9/11/2024  0749 9/12/2024  0740          Patient safety screen outcome:: Passed Passed (P)                 Physical Exam:   Assessment Type: Assess only  General Appearance: Sleeping  Respiratory Pattern: Assisted  Chest Assessment: Chest expansion symmetrical  Bilateral Breath Sounds: Expiratory wheezes, Diminished      Resp Comments: (P) Pt received on VC settings, tolerating well. No changes at this time. RT will cont to monitor.     09/12/24 0740   Respiratory Assessment   Resp Comments Pt received on VC settings, tolerating well. No changes at this time. RT will cont to monitor.   Vent Information   Vent ID [id number]   Vent type     Vent Mode AC/VC   $ Pulse Oximetry Spot Check Charge Completed   AC/VC Settings   Resp Rate (BPM) 14 BPM   Vt (mL) 400 mL   FIO2 (%) 40 %   PEEP (cmH2O) 6 cmH2O   Flow Pattern (LPM) 45 L/min   Trigger Sensitivity Flow (lpm) 3 %   Humidification Heater   Heater Temperature (Set) 98.6 °F (37 °C)   AC/VC Actuals   Resp Rate (BPM) 14 BPM   VT (mL) 416   MV 5.8   MAP (cmH2O) 9.4 cmH2O   Peak Pressure (cmH2O) 22 cmH2O   I/E Ratio (Obs) 1/3.5   Heater Temperature (Obs) 98.6 °F (37 °C)   Static Compliance (mL/cmH20) 42 mL/cmH2O   Plateau Pressure (cm H2O) 16 cm H2O   AC/VC Alarms   High Peak Pressure (cmH2O) 40   High Resp Rate (BPM) 40 BPM   High MV (L/min) 18 L/min   Low MV (L/min) 4 L/min   Vt High (mL) 1000 mL   Vt Low (mL) 250 mL   AC/VC Apnea Settings   Resp Rate (BPM) 14 BPM   VT (mL) 400 mL   FIO2 (%) 100 %   Apnea Time (s) 20 S   Apnea Flow (L/min) 45 L/min   Maintenance   Alarm (pink) cable attached No   Resuscitation bag with peep valve at bedside Yes   Water bag changed No   Circuit changed No   Daily Screen   Patient safety screen outcome: Passed   IHI Ventilator Associated Pneumonia Bundle   Daily Assessment of Readiness to  Extubate Yes   ETT  Cuffed 8 mm   Placement Date/Time: 09/10/24 (c) 1547   Type: Cuffed  Tube Size: 8 mm  Location: Oral  Insertion attempts: 1  Placement Verification: Chest x-ray;Symmetrical chest wall movement  Secured at (cm): 26   Secured at (cm) 25   Measured from Lips   Secured Location Center   Repositioned Right to Center   Secured by Commercial tube marinelli   Site Condition Dry   Cuff Pressure (color) Green   HI-LO Suction  Intermittent suction   HI-LO Secretions Scant   HI-LO Intervention Patent

## 2024-09-13 ENCOUNTER — APPOINTMENT (INPATIENT)
Dept: GASTROENTEROLOGY | Facility: HOSPITAL | Age: 64
DRG: 005 | End: 2024-09-13
Payer: COMMERCIAL

## 2024-09-13 ENCOUNTER — APPOINTMENT (INPATIENT)
Dept: RADIOLOGY | Facility: HOSPITAL | Age: 64
DRG: 005 | End: 2024-09-13
Payer: COMMERCIAL

## 2024-09-13 LAB
ALBUMIN SERPL BCG-MCNC: 3.3 G/DL (ref 3.5–5)
ALP SERPL-CCNC: 86 U/L (ref 34–104)
ALT SERPL W P-5'-P-CCNC: 77 U/L (ref 7–52)
ANION GAP SERPL CALCULATED.3IONS-SCNC: 4 MMOL/L (ref 4–13)
APTT PPP: 69 SECONDS (ref 23–34)
ARTERIAL PATENCY WRIST A: YES
AST SERPL W P-5'-P-CCNC: 29 U/L (ref 13–39)
ATRIAL RATE: 85 BPM
BACTERIA BRONCH AEROBE CULT: ABNORMAL
BASE EXCESS BLDA CALC-SCNC: 7.4 MMOL/L
BILIRUB SERPL-MCNC: 1.24 MG/DL (ref 0.2–1)
BUN SERPL-MCNC: 26 MG/DL (ref 5–25)
CALCIUM ALBUM COR SERPL-MCNC: 9.4 MG/DL (ref 8.3–10.1)
CALCIUM SERPL-MCNC: 8.8 MG/DL (ref 8.4–10.2)
CHLORIDE SERPL-SCNC: 104 MMOL/L (ref 96–108)
CO2 SERPL-SCNC: 36 MMOL/L (ref 21–32)
CREAT SERPL-MCNC: 0.63 MG/DL (ref 0.6–1.3)
GFR SERPL CREATININE-BSD FRML MDRD: 104 ML/MIN/1.73SQ M
GLUCOSE SERPL-MCNC: 102 MG/DL (ref 65–140)
GLUCOSE SERPL-MCNC: 110 MG/DL (ref 65–140)
GLUCOSE SERPL-MCNC: 114 MG/DL (ref 65–140)
GLUCOSE SERPL-MCNC: 118 MG/DL (ref 65–140)
GLUCOSE SERPL-MCNC: 201 MG/DL (ref 65–140)
GRAM STN SPEC: ABNORMAL
GRAM STN SPEC: ABNORMAL
HCO3 BLDA-SCNC: 32.5 MMOL/L (ref 22–28)
HFNC FLOW LPM: 50
MAGNESIUM SERPL-MCNC: 2 MG/DL (ref 1.9–2.7)
NON VENT HFNC FIO2: 80
NON VENT TYPE HFNC: ABNORMAL
O2 CT BLDA-SCNC: 16.2 ML/DL (ref 16–23)
OXYHGB MFR BLDA: 98.6 % (ref 94–97)
P AXIS: 2 DEGREES
PCO2 BLDA: 48 MM HG (ref 36–44)
PH BLDA: 7.45 [PH] (ref 7.35–7.45)
PHOSPHATE SERPL-MCNC: 5.3 MG/DL (ref 2.3–4.1)
PO2 BLDA: 220.6 MM HG (ref 75–129)
POTASSIUM SERPL-SCNC: 4.3 MMOL/L (ref 3.5–5.3)
PR INTERVAL: 176 MS
PROT SERPL-MCNC: 6.9 G/DL (ref 6.4–8.4)
QRS AXIS: 50 DEGREES
QRSD INTERVAL: 102 MS
QT INTERVAL: 374 MS
QTC INTERVAL: 445 MS
SODIUM SERPL-SCNC: 144 MMOL/L (ref 135–147)
SPECIMEN SOURCE: ABNORMAL
T WAVE AXIS: 258 DEGREES
VENTRICULAR RATE: 85 BPM

## 2024-09-13 PROCEDURE — 99232 SBSQ HOSP IP/OBS MODERATE 35: CPT | Performed by: INTERNAL MEDICINE

## 2024-09-13 PROCEDURE — 0B968ZZ DRAINAGE OF RIGHT LOWER LOBE BRONCHUS, VIA NATURAL OR ARTIFICIAL OPENING ENDOSCOPIC: ICD-10-PCS | Performed by: EMERGENCY MEDICINE

## 2024-09-13 PROCEDURE — 82805 BLOOD GASES W/O2 SATURATION: CPT

## 2024-09-13 PROCEDURE — 88305 TISSUE EXAM BY PATHOLOGIST: CPT | Performed by: PATHOLOGY

## 2024-09-13 PROCEDURE — 31622 DX BRONCHOSCOPE/WASH: CPT | Performed by: EMERGENCY MEDICINE

## 2024-09-13 PROCEDURE — 94760 N-INVAS EAR/PLS OXIMETRY 1: CPT

## 2024-09-13 PROCEDURE — 36600 WITHDRAWAL OF ARTERIAL BLOOD: CPT

## 2024-09-13 PROCEDURE — 85730 THROMBOPLASTIN TIME PARTIAL: CPT | Performed by: INTERNAL MEDICINE

## 2024-09-13 PROCEDURE — 94003 VENT MGMT INPAT SUBQ DAY: CPT

## 2024-09-13 PROCEDURE — 0B968ZZ DRAINAGE OF RIGHT LOWER LOBE BRONCHUS, VIA NATURAL OR ARTIFICIAL OPENING ENDOSCOPIC: ICD-10-PCS | Performed by: INTERNAL MEDICINE

## 2024-09-13 PROCEDURE — 80053 COMPREHEN METABOLIC PANEL: CPT

## 2024-09-13 PROCEDURE — 0B958ZZ DRAINAGE OF RIGHT MIDDLE LOBE BRONCHUS, VIA NATURAL OR ARTIFICIAL OPENING ENDOSCOPIC: ICD-10-PCS | Performed by: EMERGENCY MEDICINE

## 2024-09-13 PROCEDURE — 31622 DX BRONCHOSCOPE/WASH: CPT | Performed by: INTERNAL MEDICINE

## 2024-09-13 PROCEDURE — 94640 AIRWAY INHALATION TREATMENT: CPT

## 2024-09-13 PROCEDURE — 88112 CYTOPATH CELL ENHANCE TECH: CPT | Performed by: PATHOLOGY

## 2024-09-13 PROCEDURE — 94664 DEMO&/EVAL PT USE INHALER: CPT

## 2024-09-13 PROCEDURE — 31500 INSERT EMERGENCY AIRWAY: CPT

## 2024-09-13 PROCEDURE — 31500 INSERT EMERGENCY AIRWAY: CPT | Performed by: EMERGENCY MEDICINE

## 2024-09-13 PROCEDURE — 0B998ZZ DRAINAGE OF LINGULA BRONCHUS, VIA NATURAL OR ARTIFICIAL OPENING ENDOSCOPIC: ICD-10-PCS | Performed by: INTERNAL MEDICINE

## 2024-09-13 PROCEDURE — 82948 REAGENT STRIP/BLOOD GLUCOSE: CPT

## 2024-09-13 PROCEDURE — 94669 MECHANICAL CHEST WALL OSCILL: CPT

## 2024-09-13 PROCEDURE — NC001 PR NO CHARGE: Performed by: EMERGENCY MEDICINE

## 2024-09-13 PROCEDURE — 94002 VENT MGMT INPAT INIT DAY: CPT

## 2024-09-13 PROCEDURE — 83735 ASSAY OF MAGNESIUM: CPT

## 2024-09-13 PROCEDURE — 93010 ELECTROCARDIOGRAM REPORT: CPT | Performed by: INTERNAL MEDICINE

## 2024-09-13 PROCEDURE — 99233 SBSQ HOSP IP/OBS HIGH 50: CPT | Performed by: INTERNAL MEDICINE

## 2024-09-13 PROCEDURE — 71045 X-RAY EXAM CHEST 1 VIEW: CPT

## 2024-09-13 PROCEDURE — 84100 ASSAY OF PHOSPHORUS: CPT

## 2024-09-13 PROCEDURE — 0B948ZZ DRAINAGE OF RIGHT UPPER LOBE BRONCHUS, VIA NATURAL OR ARTIFICIAL OPENING ENDOSCOPIC: ICD-10-PCS | Performed by: EMERGENCY MEDICINE

## 2024-09-13 PROCEDURE — 0B938ZZ DRAINAGE OF RIGHT MAIN BRONCHUS, VIA NATURAL OR ARTIFICIAL OPENING ENDOSCOPIC: ICD-10-PCS | Performed by: EMERGENCY MEDICINE

## 2024-09-13 RX ORDER — KETAMINE HCL IN NACL, ISO-OSM 100MG/10ML
50 SYRINGE (ML) INJECTION ONCE
Status: COMPLETED | OUTPATIENT
Start: 2024-09-13 | End: 2024-09-13

## 2024-09-13 RX ORDER — LIDOCAINE HYDROCHLORIDE 10 MG/ML
INJECTION, SOLUTION EPIDURAL; INFILTRATION; INTRACAUDAL; PERINEURAL
Status: DISPENSED
Start: 2024-09-13 | End: 2024-09-13

## 2024-09-13 RX ORDER — MIDAZOLAM HYDROCHLORIDE 2 MG/2ML
2 INJECTION, SOLUTION INTRAMUSCULAR; INTRAVENOUS ONCE
Status: COMPLETED | OUTPATIENT
Start: 2024-09-13 | End: 2024-09-13

## 2024-09-13 RX ORDER — KETAMINE HCL IN NACL, ISO-OSM 100MG/10ML
50 SYRINGE (ML) INJECTION ONCE AS NEEDED
Status: DISCONTINUED | OUTPATIENT
Start: 2024-09-13 | End: 2024-09-15

## 2024-09-13 RX ORDER — POLYETHYLENE GLYCOL 3350 17 G/17G
17 POWDER, FOR SOLUTION ORAL 2 TIMES DAILY
Status: DISCONTINUED | OUTPATIENT
Start: 2024-09-13 | End: 2024-10-06

## 2024-09-13 RX ORDER — KETAMINE HCL IN NACL, ISO-OSM 100MG/10ML
SYRINGE (ML) INJECTION
Status: COMPLETED
Start: 2024-09-13 | End: 2024-09-13

## 2024-09-13 RX ORDER — KETAMINE HCL IN NACL, ISO-OSM 100MG/10ML
SYRINGE (ML) INJECTION
Status: DISCONTINUED
Start: 2024-09-13 | End: 2024-09-13 | Stop reason: WASHOUT

## 2024-09-13 RX ORDER — MAGNESIUM SULFATE HEPTAHYDRATE 40 MG/ML
2 INJECTION, SOLUTION INTRAVENOUS ONCE
Status: COMPLETED | OUTPATIENT
Start: 2024-09-13 | End: 2024-09-14

## 2024-09-13 RX ORDER — AMOXICILLIN 250 MG
1 CAPSULE ORAL 2 TIMES DAILY
Status: DISCONTINUED | OUTPATIENT
Start: 2024-09-13 | End: 2024-09-25

## 2024-09-13 RX ORDER — LIDOCAINE HYDROCHLORIDE 10 MG/ML
3 INJECTION, SOLUTION EPIDURAL; INFILTRATION; INTRACAUDAL; PERINEURAL ONCE
Status: COMPLETED | OUTPATIENT
Start: 2024-09-13 | End: 2024-09-13

## 2024-09-13 RX ORDER — LIDOCAINE HYDROCHLORIDE AND EPINEPHRINE 10; 10 MG/ML; UG/ML
1 INJECTION, SOLUTION INFILTRATION; PERINEURAL ONCE
Status: CANCELLED | OUTPATIENT
Start: 2024-09-13 | End: 2024-09-13

## 2024-09-13 RX ORDER — LORAZEPAM 2 MG/ML
0.25 INJECTION INTRAMUSCULAR ONCE
Status: DISCONTINUED | OUTPATIENT
Start: 2024-09-13 | End: 2024-09-16

## 2024-09-13 RX ORDER — FUROSEMIDE 40 MG/1
40 TABLET ORAL DAILY
Status: DISCONTINUED | OUTPATIENT
Start: 2024-09-14 | End: 2024-09-18

## 2024-09-13 RX ORDER — FUROSEMIDE 10 MG/ML
20 INJECTION INTRAMUSCULAR; INTRAVENOUS DAILY
Status: DISCONTINUED | OUTPATIENT
Start: 2024-09-13 | End: 2024-09-13

## 2024-09-13 RX ORDER — FENTANYL CITRATE 50 UG/ML
100 INJECTION, SOLUTION INTRAMUSCULAR; INTRAVENOUS ONCE
Status: COMPLETED | OUTPATIENT
Start: 2024-09-13 | End: 2024-09-13

## 2024-09-13 RX ORDER — POLYETHYLENE GLYCOL 3350 17 G/17G
17 POWDER, FOR SOLUTION ORAL 2 TIMES DAILY
Status: DISCONTINUED | OUTPATIENT
Start: 2024-09-13 | End: 2024-09-13

## 2024-09-13 RX ORDER — NOREPINEPHRINE BITARTRATE 1 MG/ML
INJECTION, SOLUTION INTRAVENOUS
Status: COMPLETED
Start: 2024-09-13 | End: 2024-09-13

## 2024-09-13 RX ORDER — FENTANYL CITRATE 50 UG/ML
50 INJECTION, SOLUTION INTRAMUSCULAR; INTRAVENOUS EVERY 2 HOUR PRN
Status: DISCONTINUED | OUTPATIENT
Start: 2024-09-13 | End: 2024-09-28

## 2024-09-13 RX ORDER — MIDAZOLAM HYDROCHLORIDE 2 MG/2ML
0.5 INJECTION, SOLUTION INTRAMUSCULAR; INTRAVENOUS EVERY 4 HOURS PRN
Status: DISCONTINUED | OUTPATIENT
Start: 2024-09-13 | End: 2024-09-16

## 2024-09-13 RX ORDER — SUCCINYLCHOLINE/SOD CL,ISO/PF 100 MG/5ML
100 SYRINGE (ML) INTRAVENOUS ONCE
Status: DISCONTINUED | OUTPATIENT
Start: 2024-09-13 | End: 2024-09-16

## 2024-09-13 RX ADMIN — FENTANYL CITRATE 50 MCG: 50 INJECTION INTRAMUSCULAR; INTRAVENOUS at 04:24

## 2024-09-13 RX ADMIN — LIDOCAINE HYDROCHLORIDE 3 ML: 10 INJECTION, SOLUTION EPIDURAL; INFILTRATION; INTRACAUDAL; PERINEURAL at 12:49

## 2024-09-13 RX ADMIN — NOREPINEPHRINE BITARTRATE: 1 INJECTION, SOLUTION, CONCENTRATE INTRAVENOUS at 23:42

## 2024-09-13 RX ADMIN — Medication 75 MCG/HR: at 06:51

## 2024-09-13 RX ADMIN — NOREPINEPHRINE BITARTRATE 2 MCG/MIN: 1 INJECTION, SOLUTION, CONCENTRATE INTRAVENOUS at 23:00

## 2024-09-13 RX ADMIN — BUDESONIDE 0.5 MG: 0.5 INHALANT RESPIRATORY (INHALATION) at 20:44

## 2024-09-13 RX ADMIN — HEPARIN SODIUM 22 UNITS/KG/HR: 10000 INJECTION, SOLUTION INTRAVENOUS at 08:14

## 2024-09-13 RX ADMIN — MIDAZOLAM 1 MG: 1 INJECTION INTRAMUSCULAR; INTRAVENOUS at 01:26

## 2024-09-13 RX ADMIN — MIDAZOLAM 2 MG: 1 INJECTION INTRAMUSCULAR; INTRAVENOUS at 23:41

## 2024-09-13 RX ADMIN — Medication 50 MG: at 23:41

## 2024-09-13 RX ADMIN — FORMOTEROL FUMARATE DIHYDRATE 20 MCG: 20 SOLUTION RESPIRATORY (INHALATION) at 08:39

## 2024-09-13 RX ADMIN — MAGNESIUM SULFATE HEPTAHYDRATE 2 G: 40 INJECTION, SOLUTION INTRAVENOUS at 21:56

## 2024-09-13 RX ADMIN — NICOTINE 1 PATCH: 21 PATCH, EXTENDED RELEASE TRANSDERMAL at 08:20

## 2024-09-13 RX ADMIN — FOLIC ACID 1 MG: 1 TABLET ORAL at 08:19

## 2024-09-13 RX ADMIN — POLYETHYLENE GLYCOL 3350 17 G: 17 POWDER, FOR SOLUTION ORAL at 08:19

## 2024-09-13 RX ADMIN — LEVALBUTEROL HYDROCHLORIDE 1.25 MG: 1.25 SOLUTION RESPIRATORY (INHALATION) at 08:39

## 2024-09-13 RX ADMIN — SENNOSIDES AND DOCUSATE SODIUM 1 TABLET: 50; 8.6 TABLET ORAL at 11:06

## 2024-09-13 RX ADMIN — DEXMEDETOMIDINE HYDROCHLORIDE 1.2 MCG/KG/HR: 4 INJECTION, SOLUTION INTRAVENOUS at 04:24

## 2024-09-13 RX ADMIN — PANTOPRAZOLE SODIUM 40 MG: 40 INJECTION, POWDER, FOR SOLUTION INTRAVENOUS at 08:19

## 2024-09-13 RX ADMIN — IPRATROPIUM BROMIDE 0.5 MG: 0.5 SOLUTION RESPIRATORY (INHALATION) at 08:39

## 2024-09-13 RX ADMIN — LEVALBUTEROL HYDROCHLORIDE 1.25 MG: 1.25 SOLUTION RESPIRATORY (INHALATION) at 20:44

## 2024-09-13 RX ADMIN — Medication 100 MCG: at 08:44

## 2024-09-13 RX ADMIN — DEXMEDETOMIDINE HYDROCHLORIDE 1.2 MCG/KG/HR: 4 INJECTION, SOLUTION INTRAVENOUS at 14:00

## 2024-09-13 RX ADMIN — BISACODYL 10 MG: 10 SUPPOSITORY RECTAL at 08:03

## 2024-09-13 RX ADMIN — MIDAZOLAM 1 MG: 1 INJECTION INTRAMUSCULAR; INTRAVENOUS at 05:44

## 2024-09-13 RX ADMIN — DEXMEDETOMIDINE HYDROCHLORIDE 1.2 MCG/KG/HR: 4 INJECTION, SOLUTION INTRAVENOUS at 08:14

## 2024-09-13 RX ADMIN — CHLORHEXIDINE GLUCONATE 0.12% ORAL RINSE 15 ML: 1.2 LIQUID ORAL at 08:18

## 2024-09-13 RX ADMIN — LEVALBUTEROL HYDROCHLORIDE 1.25 MG: 1.25 SOLUTION RESPIRATORY (INHALATION) at 13:21

## 2024-09-13 RX ADMIN — IPRATROPIUM BROMIDE 0.5 MG: 0.5 SOLUTION RESPIRATORY (INHALATION) at 13:21

## 2024-09-13 RX ADMIN — INSULIN LISPRO 1 UNITS: 100 INJECTION, SOLUTION INTRAVENOUS; SUBCUTANEOUS at 06:20

## 2024-09-13 RX ADMIN — CHLORHEXIDINE GLUCONATE 0.12% ORAL RINSE 15 ML: 1.2 LIQUID ORAL at 22:00

## 2024-09-13 RX ADMIN — BUDESONIDE 0.5 MG: 0.5 INHALANT RESPIRATORY (INHALATION) at 08:39

## 2024-09-13 RX ADMIN — METHYLPREDNISOLONE SODIUM SUCCINATE 40 MG: 40 INJECTION, POWDER, FOR SOLUTION INTRAMUSCULAR; INTRAVENOUS at 08:19

## 2024-09-13 RX ADMIN — DEXMEDETOMIDINE HYDROCHLORIDE 1.2 MCG/KG/HR: 4 INJECTION, SOLUTION INTRAVENOUS at 19:15

## 2024-09-13 RX ADMIN — IPRATROPIUM BROMIDE 0.5 MG: 0.5 SOLUTION RESPIRATORY (INHALATION) at 20:44

## 2024-09-13 RX ADMIN — FENTANYL CITRATE 100 MCG: 50 INJECTION INTRAMUSCULAR; INTRAVENOUS at 23:41

## 2024-09-13 RX ADMIN — FUROSEMIDE 20 MG: 10 INJECTION, SOLUTION INTRAMUSCULAR; INTRAVENOUS at 11:47

## 2024-09-13 NOTE — PROGRESS NOTES
"Cardiology Team 2 Progress Note - Marcin Sánchez 64 y.o. male MRN: [id number]    Unit/Bed#: MICU 10 Encounter: [id number]          Subjective:   Patient seen and examined.  No significant events overnight. Remains intubated this morning. Sedation is low and he is awake and following commands, motioning to tueb that he wants it out.      Hospital Course:   Marcin Sánchez is a 64 y.o. year old male who presented to [facility] 9/5 with acute respiratory failure secondary COPD exacerbation.  He has past medical history significant for atrial fibrillation, COPD, chronic combined systolic and diastolic CHF, epilepsy, pulmonary fibrosis.  Radiation, history of squamous cell lung cancer, current tobacco user.  Smoking outside his living facility (the [facility]) when he was noted to become acutely confused.  He also.  Short of breath was noted to be hypoxemic prompting call to EMS.  Upon arrival he was lethargic and confused which is thought to be secondary to hypercarbic respiratory failure in the setting of COPD exacerbation and was initiated on BiPAP.  On 9/7 patient had acute hypoxic event resulting in bradycardia and subsequently PEA cardiac arrest 2 rounds of CPR with VT noted during a pulse check. Shock was administered and ROSC was achieved and he was moving all extremities. She was subsequently intubated.  Echocardiogram rest showed LVEF 55% with normal systolic function with moderate pericardial effusion.  He was transferred to [facility] for thoracic surgery oncology evaluation. There were no clinical signs of tamponade and therefore window and pericardiocentesis were not performed. Attempted extubation to BiPAP 9/10 however failed and required re-intubation due to hypercarbia. He underwent bronchoscopy 9/11 with large amount of mucus removed and mucus plugging removed.    Vitals: Blood pressure 115/55, pulse 77, temperature 98.6 °F (37 °C), temperature source Oral, resp. rate 21, height 5' 9\" (1.753 m), " weight 66.7 kg (147 lb 0.8 oz), SpO2 93%., Body mass index is 21.72 kg/m².,   Orthostatic Blood Pressures      Flowsheet Row Most Recent Value   Blood Pressure 115/55 filed at 09/13/2024 1100   Patient Position - Orthostatic VS Lying filed at 09/12/2024 1150              Intake/Output Summary (Last 24 hours) at 9/13/2024 1126  Last data filed at 9/13/2024 1117  Gross per 24 hour   Intake 1826.03 ml   Output 1565 ml   Net 261.03 ml       Review of System:  Review of system was conducted and was negative except for as stated in the subjective course.    Physical Exam:    GEN: Marcin Sánchez appears well, alert and oriented x 3, pleasant and cooperative   HEENT:  Normocephalic, atraumatic, anicteric, moist mucous membranes  NECK: No JVD or carotid bruits   HEART: regular rhythm, regular rate, normal S1 and S2, no murmurs, clicks, gallops or rubs   LUNGS: Intubated this morning. Significant expiratory wheezing b/l.  ABDOMEN:  Normoactive bowel sounds, soft, no tenderness, no distention  EXTREMITIES: peripheral pulses palpable; no edema  NEURO: no gross focal findings; cranial nerves grossly intact   SKIN:  Dry, intact, warm to touch      Current Facility-Administered Medications:     acetaminophen (TYLENOL) tablet 650 mg, 650 mg, Oral, Q6H PRN, LANDON Desai, 650 mg at 09/11/24 0441    albuterol inhalation solution 2.5 mg, 2.5 mg, Nebulization, Q6H PRN, Brian Poe MD, 2.5 mg at 09/10/24 1206    bisacodyl (DULCOLAX) rectal suppository 10 mg, 10 mg, Rectal, Daily PRN, Axel Smith PA-C, 10 mg at 09/13/24 0803    budesonide (PULMICORT) inhalation solution 0.5 mg, 0.5 mg, Nebulization, Q12H, LANDON Desai, 0.5 mg at 09/13/24 0839    chlorhexidine (PERIDEX) 0.12 % oral rinse 15 mL, 15 mL, Mouth/Throat, Q12H AMERICA, Tobi Hook MD, 15 mL at 09/13/24 0818    cyanocobalamin (VITAMIN B-12) tablet 100 mcg, 100 mcg, Oral, Daily, Axel Smith PA-C, 100 mcg at 09/13/24 0878     dexmedeTOMIDine (Precedex) 400 mcg in sodium chloride 0.9% 100 mL, 0.1-1.2 mcg/kg/hr, Intravenous, Titrated, Axel Smith PA-C, Last Rate: 18.2 mL/hr at 09/13/24 0814, 1.2 mcg/kg/hr at 09/13/24 0814    fentaNYL 1000 mcg in sodium chloride 0.9% 100mL infusion, 75 mcg/hr, Intravenous, Continuous, Tobi Hook MD, Last Rate: 7.5 mL/hr at 09/13/24 0651, 75 mcg/hr at 09/13/24 0651    fentaNYL injection 50 mcg, 50 mcg, Intravenous, Q2H PRN, LANDON Desai, 50 mcg at 09/13/24 0424    folic acid (FOLVITE) tablet 1 mg, 1 mg, Oral, Daily, Axel Smith PA-C, 1 mg at 09/13/24 0819    formoterol (PERFOROMIST) nebulizer solution 20 mcg, 20 mcg, Nebulization, Q12H, Tobi Hook MD, 20 mcg at 09/13/24 0839    furosemide (LASIX) injection 20 mg, 20 mg, Intravenous, Daily, Enrique Medina DO    heparin (porcine) 25,000 units in 0.45% NaCl 250 mL infusion (premix), 3-22 Units/kg/hr (Order-Specific), Intravenous, Titrated, Emerson Langston MD, Last Rate: 13.2 mL/hr at 09/13/24 0814, 22 Units/kg/hr at 09/13/24 0814    heparin (porcine) injection 1,800 Units, 1,800 Units, Intravenous, Q6H PRN, Axel Smith PA-C, 1,800 Units at 09/11/24 0342    heparin (porcine) injection 3,600 Units, 3,600 Units, Intravenous, Q6H PRN, Axel Smith PA-C    insulin lispro (HumALOG/ADMELOG) 100 units/mL subcutaneous injection 1-5 Units, 1-5 Units, Subcutaneous, TID AC, Tobi Hook MD, 1 Units at 09/13/24 0620    ipratropium (ATROVENT) 0.02 % inhalation solution 0.5 mg, 0.5 mg, Nebulization, TID, Brian Poe MD, 0.5 mg at 09/13/24 0839    levalbuterol (XOPENEX) inhalation solution 1.25 mg, 1.25 mg, Nebulization, TID, Brian Poe MD, 1.25 mg at 09/13/24 0839    lidocaine (PF) (XYLOCAINE-MPF) 1 % injection **ADS Override Pull**, , , ,     midazolam (VERSED) injection 1 mg, 1 mg, Intravenous, Q4H PRN, Axel Smith PA-C, 1 mg at 09/13/24 0556    nicotine  (NICODERM CQ) 21 mg/24 hr TD 24 hr patch 1 patch, 1 patch, Transdermal, Daily, LANDON Desai, 1 patch at 09/13/24 0820    norepinephrine (LEVOPHED) 4 mg (STANDARD CONCENTRATION) IV in sodium chloride 0.9% 250 mL, 1-30 mcg/min, Intravenous, Titrated, Tobi Hook MD, Stopped at 09/11/24 1820    pantoprazole (PROTONIX) injection 40 mg, 40 mg, Intravenous, Q24H AMERICA, LANDON Desai, 40 mg at 09/13/24 0819    polyethylene glycol (MIRALAX) packet 17 g, 17 g, Oral, BID, Emerson Langston MD    senna-docusate sodium (SENOKOT S) 8.6-50 mg per tablet 1 tablet, 1 tablet, Oral, BID, Emerson Langston MD, 1 tablet at 09/13/24 1106    Labs & Results:          Results from last 7 days   Lab Units 09/12/24  2148 09/12/24  0600 09/11/24  0431   POTASSIUM mmol/L 3.6 3.9 4.2   CO2 mmol/L 32 33* 31   CHLORIDE mmol/L 107 106 104   BUN mg/dL 27* 33* 38*   CREATININE mg/dL 0.64 0.63 0.75     Results from last 7 days   Lab Units 09/12/24  0600 09/11/24  0431 09/10/24  0432   HEMOGLOBIN g/dL 9.4* 9.5* 10.1*   HEMATOCRIT % 29.4* 29.0* 31.0*   PLATELETS Thousands/uL 184 187 209     Results from last 7 days   Lab Units 09/12/24  0600   HEMOGLOBIN A1C % 5.6       Telemetry:   Personally reviewed by Adam Nunez, DO: normal sinus rhythm with periods of tachycardia      Assessment:  Principal Problem:    Acute hypoxic respiratory failure (HCC)  Active Problems:    Acute exacerbation of chronic obstructive pulmonary disease (COPD) (HCC)    Cancer of right main bronchus (HCC)    Chronic combined systolic and diastolic CHF (congestive heart failure) (HCC)    Cardiac arrest (HCC)    Pericardial effusion    Shock (HCC)    Mucus plugging of bronchi      Pericardial effusion  Moderate Sized pericardial effusion noted on  the last 3 echocardiograms 8/29, 9/8, and 9/9. Effusion predominantly around RV and RA free wall with smaller portion tracking around the apex. Does not appear to have changes much since 8/29. Fernando no  clinical or echocardiographic signs of tamponade. He is intubated and sedated for acute respiratry failure form COPD and mucus plugs.  - interval echocardiogram 9/11 with stable effusion and no evidence of tamponade  Plan:  - no evidence of tamponade, no urgent indication at this time for pericardiocentesis  - If there is acute decompensation recommend fluids and repeat echo to evaluate effusion, if evidence of tamponade then would proceed with pericardial window vs pericardiocentesis  - plan for low dose lasix 20mg IV daily to keep fluid balance optomized until extubated than can transition back to PO 40 mg daily.     Cardiac arrest  Cardiac arrest appears to be in the setting of hypoxemia. Initial rhythm reported as PEA with ROSC then VT arrest. Possibly transient ischemia during PEA leating to VT. After intubation does not appear to have any further ectopy. Echocardiogram with LVEF 50-55%.  - off pressors with adequate BP and no events on telemetry overnight  - continue treatment for COPD exacerbation as per primary team     Chronic combined systolic and diastolic heart failure  Appears euvolemic on exam. Currently requireing pressors. Chronically on Lasix 40mg daily and toprol 25mg daily.  - holding BB after cardiac arrest and with effusion, may restart when off pressors  - appears euvolemic today however difficult intravascular volume exam.     Paroxysmal atrial fibrillation  Currenty in sinus rhythm. Rates are controlled.  - restart metoprolol in the next 24 hours  - continue AC with heparin currently, once no furhter procedures required transition back to Eliquis.     Acute Hypoxemic Respiratory failure- intubated and sedated, wean as able as per primary team  COPD- steroids and inhalers as per primary.  Pulmonary fibrosis  Current Tobacco use- tobacco cessation recomended    Case discussed and reviewed with Dr. Silva who agrees with my assessment and plan.    Thank you for involving us in the care of your  patient.      Adam Nunez DO  Cardiology Fellow   PGY-5      ** Please Note: Fluency DirectDictation voice to text software may have been used in the creation of this document. **

## 2024-09-13 NOTE — CASE MANAGEMENT
Case Management Discharge Planning Note    Patient name Marcin Sánchez  Location MICU 10/MICU 10 MRN [id number]  : 1960 Date 2024       Current Admission Date: 2024  Current Admission Diagnosis:Acute hypoxic respiratory failure (HCC)   Patient Active Problem List    Diagnosis Date Noted Date Diagnosed    Acute hypoxic respiratory failure (HCC) 2024     Shock (HCC) 2024     Mucus plugging of bronchi 2024     Transaminitis 2024     Cardiac arrest (HCC) 2024     Pericardial effusion 2024     Acute on chronic respiratory failure with hypoxia and hypercapnia (HCC) 2024     Acute metabolic encephalopathy 2024     Chronic combined systolic and diastolic CHF (congestive heart failure) (HCC) 2024     Atrial fibrillation (HCC) 2024     Pulmonary fibrosis (HCC) 2024     Suspected chronic pulmonary embolism (HCC) 2024     Squamous cell lung cancer (HCC) 2024     Hyponatremia 2024     Severe protein-calorie malnutrition (HCC) 2024     Edema of both legs 2022     Tobacco abuse 2020     Nonrheumatic aortic valve insufficiency 2020     Malignant neoplasm of upper lobe of right lung (HCC) 2018     Thoracic aortic aneurysm without rupture (HCC) 2018     Cancer of right main bronchus (HCC) 10/04/2016     Pleural effusion 10/02/2016     Multiple lung nodules on CT 10/02/2016     Collapse of right lung 2016     Acute exacerbation of chronic obstructive pulmonary disease (COPD) (HCC)      Epilepsy (HCC)      Lyme disease      Major depression      Alcohol abuse        LOS (days): 5  Geometric Mean LOS (GMLOS) (days): 5.1  Days to GMLOS:0.2     OBJECTIVE:  Risk of Unplanned Readmission Score: 29.94         Current admission status: Inpatient   Preferred Pharmacy:   [facility] - [address] [facility],  [address] [place] [id number]  Phone: [phone]  Fax: [phone]    [place] [facility]  [address] [facility] [address]  Phone: [phone] Fax: [phone]    Primary Care Provider: Brandt Godinez MD    Primary Insurance: [facility]  Secondary Insurance:     DISCHARGE DETAILS:     CM called Tere at [facility] phone number [phone] extension [phone].  CM was transferred to the 2nd floor phone where the patient resides.  Staff confirmed that Tere is the Guardian. He said the number we are calling is correct.  CM will continue to reach out to guardian for consents.

## 2024-09-13 NOTE — PROGRESS NOTES
"Progress Note - Critical Care/ICU   Name: Marcin Sánchez 64 y.o. male I MRN: [id number]  Unit/Bed#: MICU 10 I Date of Admission: 9/8/2024   Date of Service: 9/13/2024 I Hospital Day: 5       Assessment & Plan   Neuro:   Diagnosis: encephalopathy  Goal RASS 0  Responding to commands    Plan: Patient currently on precedex and fentanyl  Awaiting dilantin level this AM      CV:   Diagnosis: Pericardial effusion  Echocardiogram 9/9 read as moderate to large pericardial effusion circumferential to the heart measuring largest along the RV free wall at 2.3 cm. The fluid exhibits no internal echoes. There is no echocardiographic evidence of tamponade\"   Plan:   No signs of tamponade at this time on exam     Pulm:  Diagnosis: Intubated  Plan:   Patient currently on pressure support  Patient failed trial of BIPAP after extubation on 9/9, re-intubated  Continue xopenex atrovent TID, Pulmicort BID, perforomist BID  Solumedrol 40 daily, consider 1/2ing dose?  Repeat bronchoscopy today     GI:   No active issues    :   No active issues    F/E/N:   F: No fluids  E: K>4, Phos>3, Mg>2   N: Receiving tube feeds, hold before bronch today     Heme/Onc:   No active issues  On heparin gtt    Endo:   No active issues    ID:   Diagnosis: Abnormal CT concerning for potential pneumonia  Sputum cultures grew moraxella and acinetobacter  Plan:   ID saw patient yesterday, recommended to continue to follow closely off antibiotics as patient is clinically stable without fever and mild WBC 2/2 to steroids.  If were to clinically deteriorate could start Minocycline  Trend WBCs, fever curve    MSK/Skin:   No active issues  Disposition: Critical care    ICU Core Measures     Vented Patient  VAP Bundle  VAP bundle ordered     A: Assess, Prevent, and Manage Pain Has pain been assessed? Yes  Need for changes to pain regimen? No   B: Both Spontaneous Awakening Trials (SATs) and Spontaneous Breathing Trials (SBTs) Plan to perform spontaneous awakening " trial today? Yes   Plan to perform spontaneous breathing trial today? Yes   Obvious barriers to extubation? No   C: Choice of Sedation RASS Goal: 0 Alert and Calm  Need for changes to sedation or analgesia regimen? No   D: Delirium CAM-ICU: Negative   E: Early Mobility  Plan for early mobility? No   F: Family Engagement Plan for family engagement today? Yes       Review of Invasive Devices:    Haynes Plan: Continue for accurate I/O monitoring for 48 hours  Central access plan: Hemodynamic monitoring      Prophylaxis:  VTE VTE covered by:  heparin (porcine), Intravenous, 22 Units/kg/hr at 09/13/24 0814  heparin (porcine), Intravenous, 1,800 Units at 09/11/24 0342  heparin (porcine), Intravenous       Stress Ulcer  covered bypantoprazole (PROTONIX) injection 40 mg [[id number]]         24 Hour Events   24hr events: Patient remains intubated, overnight patient was noted to have PVCs and electrolytes were repleted. Palliative care consulted.   Subjective   Review of Systems: See HPI for Review of Systems    Objective                          Vitals I/O      Most Recent Min/Max in 24hrs   Temp 98.6 °F (37 °C) Temp  Min: 98.6 °F (37 °C)  Max: 100.1 °F (37.8 °C)   Pulse 83 Pulse  Min: 65  Max: 93   Resp 22 Resp  Min: 14  Max: 34   /58 BP  Min: 92/49  Max: 172/72   O2 Sat 94 % SpO2  Min: 89 %  Max: 98 %      Intake/Output Summary (Last 24 hours) at 9/13/2024 0829  Last data filed at 9/13/2024 0814  Gross per 24 hour   Intake 1598.3 ml   Output 1505 ml   Net 93.3 ml       Diet Enteral/Parenteral; Tube Feeding No Oral Diet; Jevity 1.2 Mirza; Continuous; 55; Prosource Protein Liquid - One Packet; 30; Water; Every 4 hours    Invasive Monitoring   Arterial Line  Priti BP 98/45  No data recorded   MAP 67 mmHg  No data recorded           Physical Exam   Physical Exam  Cardiovascular:      Rate and Rhythm: Normal rate and regular rhythm.      Pulses: Normal pulses.      Heart sounds: Normal heart sounds.   Abdominal:       Palpations: Abdomen is soft.      Tenderness: There is no abdominal tenderness. There is no guarding.   Constitutional:       General: He is not in acute distress.     Interventions: He is intubated. He is not sedated.  Pulmonary:      Effort: Pulmonary effort is normal. He is intubated.      Breath sounds: Normal breath sounds. No rales.      Comments: Mechanical breath sounds, less diminished today  Neurological:      Mental Status: He is alert.          Diagnostic Studies        Lab Results: I have reviewed the following results:      Medications:  Scheduled PRN   budesonide, 0.5 mg, Q12H  chlorhexidine, 15 mL, Q12H AMERICA  vitamin B-12, 100 mcg, Daily  folic acid, 1 mg, Daily  formoterol, 20 mcg, Q12H  insulin lispro, 1-5 Units, TID AC  ipratropium, 0.5 mg, TID  levalbuterol, 1.25 mg, TID  methylPREDNISolone sodium succinate, 40 mg, Daily  nicotine, 1 patch, Daily  pantoprazole, 40 mg, Q24H AMERICA  polyethylene glycol, 17 g, Daily  senna, 1 tablet, HS      acetaminophen, 650 mg, Q6H PRN  albuterol, 2.5 mg, Q6H PRN  bisacodyl, 10 mg, Daily PRN  fentaNYL, 50 mcg, Q2H PRN  heparin (porcine), 1,800 Units, Q6H PRN  heparin (porcine), 3,600 Units, Q6H PRN  midazolam, 1 mg, Q4H PRN       Continuous    dexmedetomidine, 0.1-1.2 mcg/kg/hr, Last Rate: 1.2 mcg/kg/hr (09/13/24 0814)  fentaNYL, 75 mcg/hr, Last Rate: 75 mcg/hr (09/13/24 0651)  heparin (porcine), 3-22 Units/kg/hr (Order-Specific), Last Rate: 22 Units/kg/hr (09/13/24 0814)  norepinephrine, 1-30 mcg/min, Last Rate: Stopped (09/11/24 1820)         Labs:   CBC    Recent Labs     09/12/24  0600   WBC 7.91   HGB 9.4*   HCT 29.4*        BMP    Recent Labs     09/12/24  0600 09/12/24  2148   SODIUM 146 145   K 3.9 3.6    107   CO2 33* 32   AGAP 7 6   BUN 33* 27*   CREATININE 0.63 0.64   CALCIUM 8.7 8.6       Coags    Recent Labs     09/12/24  0818 09/12/24  1343   PTT 76* 67*        Additional Electrolytes  Recent Labs     09/12/24  0600 09/12/24  2148   MG 1.9  1.6*   PHOS 2.1* 2.0*   CAIONIZED 1.15  --           Blood Gas    Recent Labs     09/11/24  1155   PHART 7.502*   SFD9IYP 37.6   PO2ART 77.0   JXA1ZAL 28.8*   BEART 5.4   SOURCE Line, Arterial     Recent Labs     09/11/24  1155   SOURCE Line, Arterial    LFTs  No recent results    Infectious  No recent results  Glucose  Recent Labs     09/12/24  0600 09/12/24  2148   GLUC 151* 130

## 2024-09-13 NOTE — WOUND OSTOMY CARE
Progress Note - Wound   Marcin Sánchez 64 y.o. male MRN: [id number]  Unit/Bed#: MICU 10 Encounter: [id number]      Assessment Findings:   Patient seen today for wound care follow up assessment/consult. Patient is in MICU on critical care low air loss mattress, ATR in place with green foam wedges for repositioning. Patient is intubated at this time, plan for extubation later today. Patient has indwelling wong catheter and is incontinent of bowel. Patient is dependent for all care.     B/L heels intact and blanching, preventative skin care orders placed.     POA DTI sacral/buttocks- purple intact nonblanchable erythema, periwound skin is fragile, no drainage present. DTI's have the potential to evolve into full thickness unstageable, stage III, or stage IV wounds.     No induration, fluctuance, odor, warmth/temperature differences, redness, or purulence noted to the above noted wounds and skin areas assessed. New dressings applied per orders listed below. Patient tolerated well- no s/s of non-verbal pain or discomfort observed during the encounter. Bedside nurse aware of plan of care. See flow sheets for more detailed assessment findings.  Wound care will continue to follow, call or Secure Chat Message with questions.     Skin Care Plan:  1-Mid Sacro-Buttocks Wound: Cleanse sacro-buttocks with soap and water. Apply a Silicone Bordered Foam Dressing(Mepilex) to area. Ramírez with T for Treatment. Change every other day or PRN.   2-Turn/reposition q2h or when medically stable for pressure re-distribution on skin .  3-Elevate heels to offload pressure.  4-Moisturize skin daily with skin nourishing cream  5-Ehob cushion in chair when out of bed.  6-Cleanse B/L Heels with soap and water. Pat dry. Apply Silicone Border Foam (Mepilex) to areas. Ramírez with P for Prevention and change every 3 days or PRN soilage/displacement. Peel back and inspect Q-shift.  Wounds:  Wound 09/09/24 Pressure Injury Buttocks (Active)   Wound Image    09/13/24 1155   Wound Description Non-blanchable erythema 09/13/24 1155   Pressure Injury Stage DTPI 09/13/24 1155   Oksana-wound Assessment Fragile 09/13/24 1155   Wound Length (cm) 5 cm 09/13/24 1155   Wound Width (cm) 2.5 cm 09/13/24 1155   Wound Depth (cm) 0 cm 09/13/24 1155   Wound Surface Area (cm^2) 12.5 cm^2 09/13/24 1155   Wound Volume (cm^3) 0 cm^3 09/13/24 1155   Calculated Wound Volume (cm^3) 0 cm^3 09/13/24 1155   Wound Site Closure BECKY 09/13/24 1155   Drainage Amount None 09/13/24 1155   Non-staged Wound Description Not applicable 09/13/24 1155   Treatments Cleansed 09/13/24 1155   Dressing Foam, Silicon (eg. Allevyn, etc) 09/13/24 1155   Wound packed? No 09/13/24 1155   Packing- # removed 0 09/13/24 1155   Packing- # inserted 0 09/13/24 1155   Dressing Changed New 09/13/24 1155   Patient Tolerance Tolerated well 09/13/24 1155   Dressing Status Clean;Dry;Intact 09/13/24 1155                 Moon Espinoza BSN, RN, CWOCN

## 2024-09-13 NOTE — RESPIRATORY THERAPY NOTE
Resp care   09/13/24 1401   Respiratory Assessment   Resp Comments Pt placed on bipap at this time due to increased work of breathing. ABG deawn results pending   Non-Invasive Information   O2 Interface Device Face mask   Non-Invasive Ventilation Mode BiPAP   $ Continous NIV Initial   $ Pulse Oximetry Spot Check Charge Completed   Non-Invasive Settings   IPAP (cm) 16 cm   EPAP (cm) 6 cm   Rate (Set) 12   FiO2 (%) 80   Inspiratory Time (Set) 0.8   Temperature (Set) 31   Non-Invasive Readings   Total Rate 37   Vt (mL) (Mech) 522   MV (Mech) 18.1   Peak Pressure (Obs) 16   Leak (lpm) 29   Skin Intervention Skin intact   Non-Invasive Alarms   Insp Pressure High (cm H20) 40   Insp Pressure Low (cm H20) 5   Low Insp Pressure Time (sec) 20 sec   MV Low (L/min) 3   Vt High (mL) 1200   Vt Low (mL) 200   High Resp Rate (BPM) 45 BPM   Low Resp Rate (BPM) 8 BPM

## 2024-09-13 NOTE — CASE MANAGEMENT
Case Management Discharge Planning Note    Patient name Marcin Sánchez  Location MICU 10/MICU 10 MRN [id number]  : 1960 Date 2024       Current Admission Date: 2024  Current Admission Diagnosis:Acute hypoxic respiratory failure (HCC)   Patient Active Problem List    Diagnosis Date Noted Date Diagnosed    Acute hypoxic respiratory failure (HCC) 2024     Shock (HCC) 2024     Mucus plugging of bronchi 2024     Transaminitis 2024     Cardiac arrest (HCC) 2024     Pericardial effusion 2024     Acute on chronic respiratory failure with hypoxia and hypercapnia (HCC) 2024     Acute metabolic encephalopathy 2024     Chronic combined systolic and diastolic CHF (congestive heart failure) (HCC) 2024     Atrial fibrillation (HCC) 2024     Pulmonary fibrosis (HCC) 2024     Suspected chronic pulmonary embolism (HCC) 2024     Squamous cell lung cancer (HCC) 2024     Hyponatremia 2024     Severe protein-calorie malnutrition (HCC) 2024     Edema of both legs 2022     Tobacco abuse 2020     Nonrheumatic aortic valve insufficiency 2020     Malignant neoplasm of upper lobe of right lung (HCC) 2018     Thoracic aortic aneurysm without rupture (HCC) 2018     Cancer of right main bronchus (HCC) 10/04/2016     Pleural effusion 10/02/2016     Multiple lung nodules on CT 10/02/2016     Collapse of right lung 2016     Acute exacerbation of chronic obstructive pulmonary disease (COPD) (HCC)      Epilepsy (HCC)      Lyme disease      Major depression      Alcohol abuse        LOS (days): 5  Geometric Mean LOS (GMLOS) (days): 5.1  Days to GMLOS:0.2     OBJECTIVE:  Risk of Unplanned Readmission Score: 29.94         Current admission status: Inpatient   Preferred Pharmacy:   [facility] - [address] [facility],  [address] [place] [address]  Phone: [phone]  Fax: [phone]    [facility] [place] - [address] [facility]  [address] [facility] [address]  Phone: [phone] Fax: [phone]    Primary Care Provider: Brandt Godinez MD    Primary Insurance: [facility]  Secondary Insurance:     DISCHARGE DETAILS:  CM called Tere again at [phone] , received VM again.  CM left message for her to call back and let us know if she is no longer guardian or to call back to discuss care/consents with the providers.  CM following.

## 2024-09-13 NOTE — RESPIRATORY THERAPY NOTE
Resp care   09/13/24 0839   Respiratory Assessment   Assessment Type During-treatment   General Appearance Awake   Respiratory Pattern Assisted   Chest Assessment Chest expansion symmetrical   Bilateral Breath Sounds Coarse;Expiratory wheezes   Cough Strong   Resp Comments Received pt on A/C with settings per flow sheet. Plan is for bronchoscopy today.   ETT  Cuffed 8 mm   Placement Date/Time: 09/10/24 (c) 7805   Type: Cuffed  Tube Size: 8 mm  Location: Oral  Insertion attempts: 1  Placement Verification: Chest x-ray;Symmetrical chest wall movement  Secured at (cm): 26   Secured at (cm) 25   Measured from Lips   Secured Location Center   Repositioned Left to Center   Secured by Commercial tube marinelli   Site Condition Dry   Cuff Pressure (color) Green   HI-LO Suction  Intermittent suction   HI-LO Intervention Patent

## 2024-09-13 NOTE — QUICK NOTE
Attempted to call patient's legal guardian (Tere Williamson) however she was unavailable to be reached over the phone at this time. Left a brief voicemail with call back information. Will attempt to call back again at a late time.   ----------------------------------------------------  Elle Ortiz DO, PGY-3  Internal Medicine Residency   [facility] [place] [place]

## 2024-09-13 NOTE — PLAN OF CARE
PT remains intubated, at TF goal, replete Mg and P. Will continue to monitor tolerance, no BM documented, will continue to monitor for any TF need changes.       Problem: Nutrition/Hydration-ADULT  Goal: Nutrient/Hydration intake appropriate for improving, restoring or maintaining nutritional needs  Description: Monitor and assess patient's nutrition/hydration status for malnutrition. Collaborate with interdisciplinary team and initiate plan and interventions as ordered.  Monitor patient's weight and dietary intake as ordered or per policy. Utilize nutrition screening tool and intervene as necessary. Determine patient's food preferences and provide high-protein, high-caloric foods as appropriate.     INTERVENTIONS:  - Monitor oral intake, urinary output, labs, and treatment plans  - Assess nutrition and hydration status and recommend course of action  - Evaluate amount of meals eaten  - Assist patient with eating if necessary   - Allow adequate time for meals  - Recommend/ encourage appropriate diets, oral nutritional supplements, and vitamin/mineral supplements  - Order, calculate, and assess calorie counts as needed  - Recommend, monitor, and adjust tube feedings and TPN/PPN based on assessed needs  - Assess need for intravenous fluids  - Provide specific nutrition/hydration education as appropriate  - Include patient/family/caregiver in decisions related to nutrition  Outcome: Progressing

## 2024-09-13 NOTE — PROGRESS NOTES
Progress Note - Infectious Disease   Name: Marcin áSnchez 64 y.o. male I MRN: [id number]  Unit/Bed#: MICU 10 I Date of Admission: 9/8/2024   Date of Service: 9/13/2024 I Hospital Day: 5    Assessment & Plan  Acute hypoxic respiratory failure (HCC)  Present on admission.  Suspect multifactorial due to severe COPD with possible exacerbation, mucus plug.  Status post therapeutic bronch x2.  Unclear if actual pneumonia with minimal infiltrate versus atelectasis seen on CT chest.  Sputum culture with Moraxella, status post 5 days azithromycin.  Also with MDR-Acinetobacter which may represent contaminants versus colonizers, as has remained stable without effective Acinetobacter treatment.  Status post intubation x2.  Now extubated but on BiPAP.  Clinically stable without sepsis.      Continue to follow closely off antibiotics  Follow respiratory status closely  Pulmonary toilet  Follow temperatures closely  Recheck CBC in a.m.  Supportive care as per the primary service    Antibiotics:  Off antibiotics #3  Acute exacerbation of chronic obstructive pulmonary disease (COPD) (HCC)  On steroids.  Status post 5 days of azithromycin  Mucus plugging of bronchi  Status post therapeutic bronchoscopy 9/11.  Shock (HCC)  Developed in setting of cardiac arrest, intubation, sedation.  Low clinical suspicion for sepsis.  Has remained afebrile with mild WBC which is likely attributable to steroids.  Recent blood cultures negative.  Now off pressors  Pericardial effusion  Without tamponade.  Cardiology follow-up ongoing.  Cardiac arrest (HCC)  Initially PEA then with VT.  In setting of hypoxia, intubation  Chronic combined systolic and diastolic CHF (congestive heart failure) (HCC)  Wt Readings from Last 3 Encounters:   09/12/24 66.7 kg (147 lb 0.8 oz)   09/08/24 60.8 kg (134 lb)   08/29/24 67.6 kg (149 lb)   With severe aortic insufficiency.  Undergoing diuresis  Cancer of right main bronchus (HCC)  Diagnosed in 2016.  Status  postchemotherapy, radiation.  Appears stable.        History of Present Illness   Extubated, now on BiPAP.  Remains off pressors.      Objective      Temp:  [98.6 °F (37 °C)-100.1 °F (37.8 °C)] 99 °F (37.2 °C)  HR:  [] 80  Resp:  [14-39] 30  BP: ()/(49-69) 123/59  FiO2 (%):  [35-90] 50  O2 Device: BiPAP          I/O last 24 hours:  In: 2666.1 [I.V.:1204.1; NG/GT:670; IV Piggyback:300; Feedings:492]  Out: 2410 [Urine:2410]  Lines/Drains/Airways       Active Status       Name Placement date Placement time Site Days    CVC Central Lines 09/08/24 09/08/24  1505  --  5    NG/OG/Enteral Tube Orogastric Center mouth 09/08/24  0100  Center mouth  5    Urethral Catheter 16 Fr. 09/08/24  0049  --  5                  Physical Exam   Lethargic  On BiPAP  No rashes or edema    Lab Results: I have reviewed the following results:   Recent Labs     09/12/24  0600 09/12/24  0818 09/12/24  2148 09/13/24  0842   WBC 7.91  --   --   --    HGB 9.4*  --   --   --    HCT 29.4*  --   --   --      --   --   --    SODIUM 146  --  145  --    K 3.9  --  3.6  --      --  107  --    CO2 33*  --  32  --    BUN 33*  --  27*  --    CREATININE 0.63  --  0.64  --    GLUC 151*  --  130  --    CAIONIZED 1.15  --   --   --    MG 1.9  --  1.6*  --    PHOS 2.1*  --  2.0*  --    PTT 58*   < >  --  69*    < > = values in this interval not displayed.     Micro:  Lab Results   Component Value Date    BLOODCX No Growth After 5 Days. 09/05/2024    BLOODCX No Growth After 5 Days. 09/05/2024    SPUTUMCULTUR 4+ Growth of Acinetobacter baumannii (A) 09/06/2024    SPUTUMCULTUR Few Colonies of Moraxella catarrhalis (A) 09/06/2024    SPUTUMCULTUR 4+ Growth of 09/06/2024     Imaging Review: No pertinent imaging studies reviewed.  Other Studies: No additional pertinent studies reviewed.

## 2024-09-13 NOTE — ASSESSMENT & PLAN NOTE
Developed in setting of cardiac arrest, intubation, sedation.  Low clinical suspicion for sepsis.  Has remained afebrile with mild WBC which is likely attributable to steroids.  Recent blood cultures negative.  Now off pressors

## 2024-09-13 NOTE — ASSESSMENT & PLAN NOTE
Present on admission.  Suspect multifactorial due to severe COPD with possible exacerbation, mucus plug.  Status post therapeutic bronch x2.  Unclear if actual pneumonia with minimal infiltrate versus atelectasis seen on CT chest.  Sputum culture with Moraxella, status post 5 days azithromycin.  Also with MDR-Acinetobacter which may represent contaminants versus colonizers, as has remained stable without effective Acinetobacter treatment.  Status post intubation x2.  Now extubated but on BiPAP.  Clinically stable without sepsis.      Continue to follow closely off antibiotics  Follow respiratory status closely  Pulmonary toilet  Follow temperatures closely  Recheck CBC in a.m.  Supportive care as per the primary service    Antibiotics:  Off antibiotics #3

## 2024-09-13 NOTE — CASE MANAGEMENT
Case Management Discharge Planning Note    Patient name Marcin Sánchez  Location MICU 10/MICU 10 MRN [id number]  : 1960 Date 2024       Current Admission Date: 2024  Current Admission Diagnosis:Acute hypoxic respiratory failure (HCC)   Patient Active Problem List    Diagnosis Date Noted Date Diagnosed    Acute hypoxic respiratory failure (HCC) 2024     Shock (HCC) 2024     Mucus plugging of bronchi 2024     Transaminitis 2024     Cardiac arrest (HCC) 2024     Pericardial effusion 2024     Acute on chronic respiratory failure with hypoxia and hypercapnia (HCC) 2024     Acute metabolic encephalopathy 2024     Chronic combined systolic and diastolic CHF (congestive heart failure) (HCC) 2024     Atrial fibrillation (HCC) 2024     Pulmonary fibrosis (HCC) 2024     Suspected chronic pulmonary embolism (HCC) 2024     Squamous cell lung cancer (HCC) 2024     Hyponatremia 2024     Severe protein-calorie malnutrition (HCC) 2024     Edema of both legs 2022     Tobacco abuse 2020     Nonrheumatic aortic valve insufficiency 2020     Malignant neoplasm of upper lobe of right lung (HCC) 2018     Thoracic aortic aneurysm without rupture (HCC) 2018     Cancer of right main bronchus (HCC) 10/04/2016     Pleural effusion 10/02/2016     Multiple lung nodules on CT 10/02/2016     Collapse of right lung 2016     Acute exacerbation of chronic obstructive pulmonary disease (COPD) (HCC)      Epilepsy (HCC)      Lyme disease      Major depression      Alcohol abuse        LOS (days): 5  Geometric Mean LOS (GMLOS) (days): 5.1  Days to GMLOS:0.2     OBJECTIVE:  Risk of Unplanned Readmission Score: 29.94         Current admission status: Inpatient   Preferred Pharmacy:   [facility] - [address] [facility],  [address] [place] [address]  Phone: [phone]  Fax: [phone]    [place], [place] - [address] [facility]  [address] [facility] [address]  Phone: [phone] Fax: [phone]    Primary Care Provider: Brandt Godinez MD    Primary Insurance: [facility]  Secondary Insurance:     DISCHARGE DETAILS:    CM received message from provider stating they are unable to get a hold of the guardian for consents.  CM called Tere at [phone], received her VM with her name, left message for call back.

## 2024-09-14 ENCOUNTER — APPOINTMENT (INPATIENT)
Dept: GASTROENTEROLOGY | Facility: HOSPITAL | Age: 64
DRG: 005 | End: 2024-09-14
Payer: COMMERCIAL

## 2024-09-14 LAB
ANION GAP SERPL CALCULATED.3IONS-SCNC: 5 MMOL/L (ref 4–13)
APTT PPP: 131 SECONDS (ref 23–34)
APTT PPP: 84 SECONDS (ref 23–34)
APTT PPP: 95 SECONDS (ref 23–34)
BASE EXCESS BLDA CALC-SCNC: 6.8 MMOL/L
BASOPHILS # BLD AUTO: 0.02 THOUSANDS/ΜL (ref 0–0.1)
BASOPHILS NFR BLD AUTO: 0 % (ref 0–1)
BLD SMEAR INTERP: NORMAL
BUN SERPL-MCNC: 29 MG/DL (ref 5–25)
CA-I BLD-SCNC: 1.14 MMOL/L (ref 1.12–1.32)
CALCIUM SERPL-MCNC: 8.3 MG/DL (ref 8.4–10.2)
CHLORIDE SERPL-SCNC: 104 MMOL/L (ref 96–108)
CO2 SERPL-SCNC: 36 MMOL/L (ref 21–32)
CREAT SERPL-MCNC: 0.63 MG/DL (ref 0.6–1.3)
EOSINOPHIL # BLD AUTO: 0.04 THOUSAND/ΜL (ref 0–0.61)
EOSINOPHIL NFR BLD AUTO: 1 % (ref 0–6)
ERYTHROCYTE [DISTWIDTH] IN BLOOD BY AUTOMATED COUNT: 13.5 % (ref 11.6–15.1)
FERRITIN SERPL-MCNC: 363 NG/ML (ref 24–336)
GFR SERPL CREATININE-BSD FRML MDRD: 104 ML/MIN/1.73SQ M
GLUCOSE SERPL-MCNC: 105 MG/DL (ref 65–140)
GLUCOSE SERPL-MCNC: 105 MG/DL (ref 65–140)
GLUCOSE SERPL-MCNC: 142 MG/DL (ref 65–140)
GLUCOSE SERPL-MCNC: 56 MG/DL (ref 65–140)
GLUCOSE SERPL-MCNC: 72 MG/DL (ref 65–140)
GLUCOSE SERPL-MCNC: 80 MG/DL (ref 65–140)
GLUCOSE SERPL-MCNC: 88 MG/DL (ref 65–140)
HCO3 BLDA-SCNC: 32.4 MMOL/L (ref 22–28)
HCT VFR BLD AUTO: 28.4 % (ref 36.5–49.3)
HGB BLD-MCNC: 9.2 G/DL (ref 12–17)
IMM GRANULOCYTES # BLD AUTO: 0.04 THOUSAND/UL (ref 0–0.2)
IMM GRANULOCYTES NFR BLD AUTO: 1 % (ref 0–2)
IRON SATN MFR SERPL: 32 % (ref 15–50)
IRON SERPL-MCNC: 33 UG/DL (ref 50–212)
LDH SERPL-CCNC: 196 U/L (ref 140–271)
LYMPHOCYTES # BLD AUTO: 0.07 THOUSAND/UL (ref 0.6–4.47)
LYMPHOCYTES # BLD AUTO: 0.6 THOUSANDS/ΜL (ref 0.6–4.47)
LYMPHOCYTES # BLD AUTO: 1 %
LYMPHOCYTES NFR BLD AUTO: 8 % (ref 14–44)
MACROCYTES BLD QL AUTO: PRESENT
MAGNESIUM SERPL-MCNC: 2.3 MG/DL (ref 1.9–2.7)
MCH RBC QN AUTO: 32.4 PG (ref 26.8–34.3)
MCHC RBC AUTO-ENTMCNC: 32.4 G/DL (ref 31.4–37.4)
MCV RBC AUTO: 100 FL (ref 82–98)
MONOCYTES # BLD AUTO: 0.44 THOUSAND/UL (ref 0–1.22)
MONOCYTES # BLD AUTO: 0.86 THOUSAND/ΜL (ref 0.17–1.22)
MONOCYTES NFR BLD AUTO: 12 % (ref 4–12)
MONOCYTES NFR BLD AUTO: 6 % (ref 4–12)
NEUTROPHILS # BLD AUTO: 5.83 THOUSANDS/ΜL (ref 1.85–7.62)
NEUTS BAND NFR BLD MANUAL: 4 % (ref 0–8)
NEUTS SEG # BLD: 6.87 THOUSAND/UL (ref 1.81–6.82)
NEUTS SEG NFR BLD AUTO: 78 % (ref 43–75)
NEUTS SEG NFR BLD AUTO: 89 %
NRBC BLD AUTO-RTO: 0 /100 WBCS
O2 CT BLDA-SCNC: 13.8 ML/DL (ref 16–23)
OXYHGB MFR BLDA: 98.1 % (ref 94–97)
PCO2 BLDA: 52 MM HG (ref 36–44)
PH BLDA: 7.41 [PH] (ref 7.35–7.45)
PHENYTOIN SERPL-MCNC: 19 UG/ML (ref 10–20)
PHOSPHATE SERPL-MCNC: 4.3 MG/DL (ref 2.3–4.1)
PLATELET # BLD AUTO: 154 THOUSANDS/UL (ref 149–390)
PLATELET BLD QL SMEAR: ADEQUATE
PMV BLD AUTO: 9.1 FL (ref 8.9–12.7)
PO2 BLDA: 149.1 MM HG (ref 75–129)
POTASSIUM SERPL-SCNC: 4.1 MMOL/L (ref 3.5–5.3)
RBC # BLD AUTO: 2.84 MILLION/UL (ref 3.88–5.62)
RBC MORPH BLD: PRESENT
RETICS # AUTO: NORMAL 10*3/UL (ref 14356–105094)
RETICS # CALC: 0.94 % (ref 0.37–1.87)
SIMV VENT INSPIRED AIR FIO2: 60
SIMV VENT PEEP: 6
SIMV VENT: ABNORMAL
SODIUM SERPL-SCNC: 145 MMOL/L (ref 135–147)
SPECIMEN SOURCE: ABNORMAL
TIBC SERPL-MCNC: 102 UG/DL (ref 250–450)
TOTAL CELLS COUNTED SPEC: 100
UIBC SERPL-MCNC: 69 UG/DL (ref 155–355)
VENT SIMV: 14
WBC # BLD AUTO: 7.39 THOUSAND/UL (ref 4.31–10.16)

## 2024-09-14 PROCEDURE — 94003 VENT MGMT INPAT SUBQ DAY: CPT | Performed by: SOCIAL WORKER

## 2024-09-14 PROCEDURE — 94640 AIRWAY INHALATION TREATMENT: CPT | Performed by: SOCIAL WORKER

## 2024-09-14 PROCEDURE — 94669 MECHANICAL CHEST WALL OSCILL: CPT

## 2024-09-14 PROCEDURE — 83550 IRON BINDING TEST: CPT

## 2024-09-14 PROCEDURE — 80185 ASSAY OF PHENYTOIN TOTAL: CPT

## 2024-09-14 PROCEDURE — 99233 SBSQ HOSP IP/OBS HIGH 50: CPT | Performed by: INTERNAL MEDICINE

## 2024-09-14 PROCEDURE — 99291 CRITICAL CARE FIRST HOUR: CPT | Performed by: INTERNAL MEDICINE

## 2024-09-14 PROCEDURE — 94669 MECHANICAL CHEST WALL OSCILL: CPT | Performed by: SOCIAL WORKER

## 2024-09-14 PROCEDURE — 83735 ASSAY OF MAGNESIUM: CPT

## 2024-09-14 PROCEDURE — 80048 BASIC METABOLIC PNL TOTAL CA: CPT

## 2024-09-14 PROCEDURE — 84100 ASSAY OF PHOSPHORUS: CPT

## 2024-09-14 PROCEDURE — 99232 SBSQ HOSP IP/OBS MODERATE 35: CPT | Performed by: INTERNAL MEDICINE

## 2024-09-14 PROCEDURE — 94760 N-INVAS EAR/PLS OXIMETRY 1: CPT | Performed by: SOCIAL WORKER

## 2024-09-14 PROCEDURE — 82805 BLOOD GASES W/O2 SATURATION: CPT | Performed by: STUDENT IN AN ORGANIZED HEALTH CARE EDUCATION/TRAINING PROGRAM

## 2024-09-14 PROCEDURE — 85730 THROMBOPLASTIN TIME PARTIAL: CPT | Performed by: INTERNAL MEDICINE

## 2024-09-14 PROCEDURE — 85045 AUTOMATED RETICULOCYTE COUNT: CPT

## 2024-09-14 PROCEDURE — 83540 ASSAY OF IRON: CPT

## 2024-09-14 PROCEDURE — 94760 N-INVAS EAR/PLS OXIMETRY 1: CPT

## 2024-09-14 PROCEDURE — 94640 AIRWAY INHALATION TREATMENT: CPT

## 2024-09-14 PROCEDURE — 85007 BL SMEAR W/DIFF WBC COUNT: CPT

## 2024-09-14 PROCEDURE — 83010 ASSAY OF HAPTOGLOBIN QUANT: CPT

## 2024-09-14 PROCEDURE — 82728 ASSAY OF FERRITIN: CPT

## 2024-09-14 PROCEDURE — 85025 COMPLETE CBC W/AUTO DIFF WBC: CPT

## 2024-09-14 PROCEDURE — 36600 WITHDRAWAL OF ARTERIAL BLOOD: CPT

## 2024-09-14 PROCEDURE — 82330 ASSAY OF CALCIUM: CPT

## 2024-09-14 PROCEDURE — 94664 DEMO&/EVAL PT USE INHALER: CPT | Performed by: SOCIAL WORKER

## 2024-09-14 PROCEDURE — 83615 LACTATE (LD) (LDH) ENZYME: CPT

## 2024-09-14 PROCEDURE — 82948 REAGENT STRIP/BLOOD GLUCOSE: CPT

## 2024-09-14 RX ORDER — MIDAZOLAM HYDROCHLORIDE 2 MG/2ML
1 INJECTION, SOLUTION INTRAMUSCULAR; INTRAVENOUS ONCE
Status: DISCONTINUED | OUTPATIENT
Start: 2024-09-15 | End: 2024-09-14

## 2024-09-14 RX ORDER — FENTANYL CITRATE-0.9 % NACL/PF 10 MCG/ML
75 PLASTIC BAG, INJECTION (ML) INTRAVENOUS CONTINUOUS
Status: DISCONTINUED | OUTPATIENT
Start: 2024-09-14 | End: 2024-09-16

## 2024-09-14 RX ORDER — MIDAZOLAM HYDROCHLORIDE 2 MG/2ML
0.5 INJECTION, SOLUTION INTRAMUSCULAR; INTRAVENOUS ONCE
Status: COMPLETED | OUTPATIENT
Start: 2024-09-15 | End: 2024-09-14

## 2024-09-14 RX ADMIN — CHLORHEXIDINE GLUCONATE 0.12% ORAL RINSE 15 ML: 1.2 LIQUID ORAL at 08:50

## 2024-09-14 RX ADMIN — DEXMEDETOMIDINE HYDROCHLORIDE 0.6 MCG/KG/HR: 4 INJECTION, SOLUTION INTRAVENOUS at 08:39

## 2024-09-14 RX ADMIN — NICOTINE 1 PATCH: 21 PATCH, EXTENDED RELEASE TRANSDERMAL at 08:57

## 2024-09-14 RX ADMIN — MIDAZOLAM 0.5 MG: 1 INJECTION INTRAMUSCULAR; INTRAVENOUS at 23:54

## 2024-09-14 RX ADMIN — FUROSEMIDE 40 MG: 40 TABLET ORAL at 08:54

## 2024-09-14 RX ADMIN — LEVALBUTEROL HYDROCHLORIDE 1.25 MG: 1.25 SOLUTION RESPIRATORY (INHALATION) at 13:14

## 2024-09-14 RX ADMIN — POLYETHYLENE GLYCOL 3350 17 G: 17 POWDER, FOR SOLUTION ORAL at 08:53

## 2024-09-14 RX ADMIN — DEXMEDETOMIDINE HYDROCHLORIDE 0.6 MCG/KG/HR: 4 INJECTION, SOLUTION INTRAVENOUS at 18:18

## 2024-09-14 RX ADMIN — SENNOSIDES AND DOCUSATE SODIUM 1 TABLET: 50; 8.6 TABLET ORAL at 18:19

## 2024-09-14 RX ADMIN — FOLIC ACID 1 MG: 1 TABLET ORAL at 08:53

## 2024-09-14 RX ADMIN — IPRATROPIUM BROMIDE 0.5 MG: 0.5 SOLUTION RESPIRATORY (INHALATION) at 13:14

## 2024-09-14 RX ADMIN — LEVALBUTEROL HYDROCHLORIDE 1.25 MG: 1.25 SOLUTION RESPIRATORY (INHALATION) at 19:40

## 2024-09-14 RX ADMIN — BUDESONIDE 0.5 MG: 0.5 INHALANT RESPIRATORY (INHALATION) at 07:44

## 2024-09-14 RX ADMIN — PANTOPRAZOLE SODIUM 40 MG: 40 INJECTION, POWDER, FOR SOLUTION INTRAVENOUS at 08:57

## 2024-09-14 RX ADMIN — MIDAZOLAM 0.5 MG: 1 INJECTION INTRAMUSCULAR; INTRAVENOUS at 06:04

## 2024-09-14 RX ADMIN — BUDESONIDE 0.5 MG: 0.5 INHALANT RESPIRATORY (INHALATION) at 19:40

## 2024-09-14 RX ADMIN — MIDAZOLAM 0.5 MG: 1 INJECTION INTRAMUSCULAR; INTRAVENOUS at 21:32

## 2024-09-14 RX ADMIN — HEPARIN SODIUM 22 UNITS/KG/HR: 10000 INJECTION, SOLUTION INTRAVENOUS at 04:04

## 2024-09-14 RX ADMIN — DEXMEDETOMIDINE HYDROCHLORIDE 0.8 MCG/KG/HR: 4 INJECTION, SOLUTION INTRAVENOUS at 02:25

## 2024-09-14 RX ADMIN — FENTANYL CITRATE 50 MCG: 50 INJECTION INTRAMUSCULAR; INTRAVENOUS at 02:50

## 2024-09-14 RX ADMIN — Medication 100 MCG: at 09:05

## 2024-09-14 RX ADMIN — FORMOTEROL FUMARATE DIHYDRATE 20 MCG: 20 SOLUTION RESPIRATORY (INHALATION) at 19:40

## 2024-09-14 RX ADMIN — LEVALBUTEROL HYDROCHLORIDE 1.25 MG: 1.25 SOLUTION RESPIRATORY (INHALATION) at 07:44

## 2024-09-14 RX ADMIN — SENNOSIDES AND DOCUSATE SODIUM 1 TABLET: 50; 8.6 TABLET ORAL at 08:53

## 2024-09-14 RX ADMIN — CHLORHEXIDINE GLUCONATE 0.12% ORAL RINSE 15 ML: 1.2 LIQUID ORAL at 21:32

## 2024-09-14 RX ADMIN — FENTANYL CITRATE 50 MCG: 50 INJECTION INTRAMUSCULAR; INTRAVENOUS at 23:02

## 2024-09-14 RX ADMIN — FORMOTEROL FUMARATE DIHYDRATE 20 MCG: 20 SOLUTION RESPIRATORY (INHALATION) at 07:46

## 2024-09-14 RX ADMIN — IPRATROPIUM BROMIDE 0.5 MG: 0.5 SOLUTION RESPIRATORY (INHALATION) at 07:44

## 2024-09-14 RX ADMIN — Medication 75 MCG/HR: at 14:40

## 2024-09-14 RX ADMIN — MIDAZOLAM 0.5 MG: 1 INJECTION INTRAMUSCULAR; INTRAVENOUS at 00:24

## 2024-09-14 RX ADMIN — IPRATROPIUM BROMIDE 0.5 MG: 0.5 SOLUTION RESPIRATORY (INHALATION) at 19:40

## 2024-09-14 RX ADMIN — Medication 75 MCG/HR: at 04:26

## 2024-09-14 NOTE — ASSESSMENT & PLAN NOTE
Present on admission.  Suspect multifactorial due to severe COPD with possible exacerbation, mucus plug.  Status post therapeutic bronch x3.  Unclear if actual pneumonia with minimal infiltrate versus atelectasis seen on CT chest.  Sputum culture with Moraxella, status post 5 days azithromycin.  Also with MDR-Acinetobacter which may represent contaminants versus colonizers, as has remained stable without effective Acinetobacter treatment.  Status post intubation now x3.  Suspect worsening over past 24 hours due to recurrent mucus plud as suggested by CXR.  Low clinical suspicion for infection or pneumonia..  Clinically stable without sepsis.      Continue to follow closely off antibiotics  Follow respiratory status closely  Pulmonary toilet  May need trach versus transition to comfort care  Follow temperatures closely  Recheck CBC in a.m.  Supportive care as per the primary service    Antibiotics:  Off antibiotics #3

## 2024-09-14 NOTE — PROGRESS NOTES
Progress Note - Infectious Disease   Name: Marcin Sánchez 64 y.o. male I MRN: [id number]  Unit/Bed#: MICU 10 I Date of Admission: 9/8/2024   Date of Service: 9/14/2024 I Hospital Day: 6    Assessment & Plan  Acute hypoxic respiratory failure (HCC)  Present on admission.  Suspect multifactorial due to severe COPD with possible exacerbation, mucus plug.  Status post therapeutic bronch x3.  Unclear if actual pneumonia with minimal infiltrate versus atelectasis seen on CT chest.  Sputum culture with Moraxella, status post 5 days azithromycin.  Also with MDR-Acinetobacter which may represent contaminants versus colonizers, as has remained stable without effective Acinetobacter treatment.  Status post intubation now x3.  Suspect worsening over past 24 hours due to recurrent mucus plud as suggested by CXR.  Low clinical suspicion for infection or pneumonia..  Clinically stable without sepsis.      Continue to follow closely off antibiotics  Follow respiratory status closely  Pulmonary toilet  May need trach versus transition to comfort care  Follow temperatures closely  Recheck CBC in a.m.  Supportive care as per the primary service    Antibiotics:  Off antibiotics #3  Acute exacerbation of chronic obstructive pulmonary disease (COPD) (HCC)  On steroids.  Status post 5 days of azithromycin  Mucus plugging of bronchi  Status post therapeutic bronchoscopy x3.  Recurrent issue.  Shock (HCC)  Developed in setting of cardiac arrest, intubation, sedation.  Low clinical suspicion for sepsis.  Has remained afebrile with mild WBC which is likely attributable to steroids.  Recent blood cultures negative.  Now off pressors  Pericardial effusion  Without tamponade.  Cardiology follow-up ongoing.  Cardiac arrest (HCC)  Initially PEA then with VT.  In setting of hypoxia, intubation  Chronic combined systolic and diastolic CHF (congestive heart failure) (HCC)  Wt Readings from Last 3 Encounters:   09/14/24 66.2 kg (145 lb 15.1 oz)    09/08/24 60.8 kg (134 lb)   08/29/24 67.6 kg (149 lb)   With severe aortic insufficiency.  Undergoing diuresis  Cancer of right main bronchus (HCC)  Diagnosed in 2016.  Status postchemotherapy, radiation.  Appears stable.    I discussed with [facility] the above plan to continue to follow closely off antibiotics.  They agrees with the plan.        History of Present Illness   Intubated again.  Had another bronch with revealed mucus plug, white mucus.  On pressors in setting of sedation.  No fevers.  WBC normal.    Objective      Temp:  [99 °F (37.2 °C)-99.7 °F (37.6 °C)] 99.7 °F (37.6 °C)  HR:  [] 61  Resp:  [10-39] 10  BP: ()/(39-69) 109/53  FiO2 (%):  [50-90] 50  O2 Device: Ventilator          I/O last 24 hours:  In: 1847.6 [I.V.:1008.6; NG/GT:530; IV Piggyback:50; Feedings:259]  Out: 2035 [Urine:2035]  Lines/Drains/Airways       Active Status       Name Placement date Placement time Site Days    CVC Central Lines 09/08/24 09/08/24  1505  --  5    ETT  Cuffed 8 mm 09/13/24 2326  -- less than 1    NG/OG/Enteral Tube Orogastric Center mouth 09/14/24  0050  Center mouth  less than 1    Urethral Catheter 16 Fr. 09/08/24  0049  --  6                  Physical Exam   Awake and alert  Intubated with ETT  Abdomen soft  No peripheral edema    Lab Results: I have reviewed the following results:   Recent Labs     09/13/24  2154 09/14/24  0540 09/14/24  0542 09/14/24  0834   WBC  --  7.39  --   --    HGB  --  9.2*  --   --    HCT  --  28.4*  --   --    PLT  --  154  --   --    BANDSPCT  --  4  --   --    SODIUM 144 145  --   --    K 4.3 4.1  --   --     104  --   --    CO2 36* 36*  --   --    BUN 26* 29*  --   --    CREATININE 0.63 0.63  --   --    GLUC 118 88  --   --    CAIONIZED  --  1.14  --   --    MG 2.0 2.3  --   --    PHOS 5.3* 4.3*  --   --    AST 29  --   --   --    ALT 77*  --   --   --    ALB 3.3*  --   --   --    TBILI 1.24*  --   --   --    ALKPHOS 86  --   --   --    PTT  --   --    < > 95*     < > = values in this interval not displayed.     Micro:  Lab Results   Component Value Date    BLOODCX No Growth After 5 Days. 09/05/2024    BLOODCX No Growth After 5 Days. 09/05/2024    SPUTUMCULTUR 4+ Growth of Acinetobacter baumannii (A) 09/06/2024    SPUTUMCULTUR Few Colonies of Moraxella catarrhalis (A) 09/06/2024    SPUTUMCULTUR 4+ Growth of 09/06/2024     Imaging Review: Personally reviewed the following image studies in PACS and associated radiology reports: chest xray. My interpretation of the radiology images/reports is: increased white out right lung.  Other Studies: No additional pertinent studies reviewed.

## 2024-09-14 NOTE — RESPIRATORY THERAPY NOTE
RT Ventilator Management Note  Marcin Sánchez 64 y.o. male MRN: [id number]  Unit/Bed#: [facility] 10 Encounter: [id number]      Daily Screen         9/13/2024 2248 9/14/2024 0819          Patient safety screen outcome:: Failed Failed      Not Ready for Weaning due to:: Underline problem not resolved --                Physical Exam:   Assessment Type: Assess only  General Appearance: Sedated  Respiratory Pattern: Assisted  Chest Assessment: Chest expansion symmetrical  Bilateral Breath Sounds: Coarse, Diminished  Cough: None  Suction: ET Tube  O2 Device: 840      Resp Comments: Pt offers no resp c/o. bs coarse. sx for mod amt thick bloody tan secretions.Pt started on psv wean.

## 2024-09-14 NOTE — ASSESSMENT & PLAN NOTE
Wt Readings from Last 3 Encounters:   09/14/24 66.2 kg (145 lb 15.1 oz)   09/08/24 60.8 kg (134 lb)   08/29/24 67.6 kg (149 lb)   With severe aortic insufficiency.  Undergoing diuresis

## 2024-09-14 NOTE — PROCEDURES
Intubation    Date/Time: 9/13/2024 11:26 PM    Performed by: Tank Dawson MD  Authorized by: Tank Dawson MD    Patient location:  Bedside  Consent:     Consent obtained:  Verbal    Consent given by:  Patient    Risks discussed:  Aspiration, bleeding, hypoxia, dental trauma, laryngeal injury and death  Pre-procedure details:     Patient status:  Altered mental status    Pretreatment medications:  Ketamine    Paralytics:  None  Indications:     Indications for intubation: respiratory failure and hypoxemia    Procedure details:     Preoxygenation:  BiPAP    CPR in progress: no      Intubation method:  Oral    Oral intubation technique:  Glidescope    Laryngoscope blade:  Mac 3    Tube size (mm):  8.0    Tube type:  Cuffed    Number of attempts:  1    Ventilation between attempts: no      Cricoid pressure: no      Tube visualized through cords: yes    Placement assessment:     ETT to lip:  23    Tube secured with:  Adhesive tape and ETT marinelli    Breath sounds:  Equal and absent over the epigastrium    Placement verification: chest rise    Post-procedure details:     Patient tolerance of procedure:  Tolerated well, no immediate complications

## 2024-09-14 NOTE — PROGRESS NOTES
Cardiology Progress Note - Team 2  Marcin ERNIE Sánchez 64 y.o. male MRN: [id number]  [facility]: [id number]    Current Problem List   Principal Problem:    Acute hypoxic respiratory failure (HCC)  Active Problems:    Acute exacerbation of chronic obstructive pulmonary disease (COPD) (HCC)    Cancer of right main bronchus (HCC)    Chronic combined systolic and diastolic CHF (congestive heart failure) (HCC)    Cardiac arrest (HCC)    Pericardial effusion    Shock (HCC)    Mucus plugging of bronchi      Assessment:    Pericardial effusion  Moderate Sized pericardial effusion noted on  the last 3 echocardiograms 8/29, 9/8, and 9/9. Effusion predominantly around RV and RA free wall with smaller portion tracking around the apex. Does not appear to have changes much since 8/29. Currenlty no clinical or echocardiographic signs of tamponade. He is intubated and sedated for acute respiratry failure form COPD and mucus plugs.  - interval echocardiogram 9/11 with stable effusion and no evidence of tamponade     Cardiac arrest  Cardiac arrest appears to be in the setting of hypoxemia. Initial rhythm reported as PEA with ROSC then VT arrest. Possibly transient ischemia during PEA leating to VT. After intubation does not appear to have any further ectopy. Echocardiogram with LVEF 50-55%.  - no events on telemetry overnight  - continue treatment for COPD exacerbation as per primary team     Chronic combined systolic and diastolic heart failure  Appears euvolemic on exam. Currently requireing pressors. Chronically on Lasix 40mg daily and toprol 25mg daily.  - on pressors  - appears euvolemic today however difficult intravascular volume exam.     Paroxysmal atrial fibrillation  Currenty in sinus rhythm. Rates are controlled.    Acute Hypoxemic Respiratory failure- intubated and sedated, wean as able as per primary team  COPD- steroids and inhalers as per primary.  Pulmonary fibrosis  Current Tobacco use- tobacco  "cessation recomended     Plan:  - no evidence of tamponade, no urgent indication at this time for pericardiocentesis  - If there is acute decompensation recommend fluids and repeat echo to evaluate effusion, if evidence of tamponade then would proceed with pericardial window vs pericardiocentesis  - continue PO lasix 40 mg daily. If patient is not able to take po, can switch to lasix 20 mg IV daily  - currently in NSR, rate is controlled. Off BB due to low BP and on pressors.     Subjective:   Patient seen and examined. Patient is intubated on vent with spontaneous breathing. Patient is awake and  follows commands.     Objective:     Vitals: Blood pressure (!) 93/45, pulse 67, temperature 99.7 °F (37.6 °C), temperature source Oral, resp. rate (!) 33, height 5' 9\" (1.753 m), weight 66.2 kg (145 lb 15.1 oz), SpO2 97%., Body mass index is 21.55 kg/m².,   Orthostatic Blood Pressures      Flowsheet Row Most Recent Value   Blood Pressure 93/45 filed at 09/14/2024 0800   Patient Position - Orthostatic VS Lying filed at 09/12/2024 1150              Intake/Output Summary (Last 24 hours) at 9/14/2024 0837  Last data filed at 9/14/2024 0635  Gross per 24 hour   Intake 1186.11 ml   Output 1880 ml   Net -693.89 ml       Physical Exam  Constitutional:       General: He is not in acute distress.     Appearance: He is not ill-appearing.      Interventions: He is intubated.   HENT:      Mouth/Throat:      Mouth: Mucous membranes are moist.   Eyes:      Pupils: Pupils are equal, round, and reactive to light.   Cardiovascular:      Rate and Rhythm: Normal rate and regular rhythm.      Heart sounds: No murmur heard.  Pulmonary:      Effort: Pulmonary effort is normal. He is intubated.      Breath sounds: No rales.   Abdominal:      General: There is no distension.      Tenderness: There is no abdominal tenderness.   Musculoskeletal:      Right lower leg: No edema.      Left lower leg: No edema.   Skin:     General: Skin is warm. "   Neurological:      Mental Status: He is alert and oriented to person, place, and time.   Psychiatric:         Mood and Affect: Mood normal.          Medications:      Current Facility-Administered Medications:     acetaminophen (TYLENOL) tablet 650 mg, 650 mg, Oral, Q6H PRN, LANDON Desai, 650 mg at 09/11/24 0441    albuterol inhalation solution 2.5 mg, 2.5 mg, Nebulization, Q6H PRN, Brian Poe MD, 2.5 mg at 09/10/24 1206    bisacodyl (DULCOLAX) rectal suppository 10 mg, 10 mg, Rectal, Daily PRN, Axel Smith PA-C, 10 mg at 09/13/24 0803    budesonide (PULMICORT) inhalation solution 0.5 mg, 0.5 mg, Nebulization, Q12H, LANDON Desai, 0.5 mg at 09/14/24 0744    chlorhexidine (PERIDEX) 0.12 % oral rinse 15 mL, 15 mL, Mouth/Throat, Q12H AMERICA, Tobi Hook MD, 15 mL at 09/13/24 2200    cyanocobalamin (VITAMIN B-12) tablet 100 mcg, 100 mcg, Oral, Daily, Axel mSith PA-C, 100 mcg at 09/13/24 0844    dexmedeTOMIDine (Precedex) 400 mcg in sodium chloride 0.9% 100 mL, 0.1-1.2 mcg/kg/hr, Intravenous, Titrated, Axel Smith PA-C, Last Rate: 9.1 mL/hr at 09/14/24 0430, 0.6 mcg/kg/hr at 09/14/24 0430    fentaNYL 1000 mcg in sodium chloride 0.9% 100mL infusion, 75 mcg/hr, Intravenous, Continuous, Tank Dawson MD, Last Rate: 7.5 mL/hr at 09/14/24 0425, 75 mcg/hr at 09/14/24 0425    fentaNYL injection 50 mcg, 50 mcg, Intravenous, Q2H PRN, Kentrell Hughes MD, 50 mcg at 09/14/24 0250    folic acid (FOLVITE) tablet 1 mg, 1 mg, Oral, Daily, Axel Smith PA-C, 1 mg at 09/13/24 0819    formoterol (PERFOROMIST) nebulizer solution 20 mcg, 20 mcg, Nebulization, Q12H, Tobi Hook MD, 20 mcg at 09/14/24 0746    furosemide (LASIX) tablet 40 mg, 40 mg, Oral, Daily, Enrique Medina DO    heparin (porcine) 25,000 units in 0.45% NaCl 250 mL infusion (premix), 3-22 Units/kg/hr (Order-Specific), Intravenous, Titrated, Emerson Langston MD, Last Rate: 13.2 mL/hr at  09/14/24 0404, 22 Units/kg/hr at 09/14/24 0404    heparin (porcine) injection 1,800 Units, 1,800 Units, Intravenous, Q6H PRN, Axel Smith PA-C, 1,800 Units at 09/11/24 0342    heparin (porcine) injection 3,600 Units, 3,600 Units, Intravenous, Q6H PRN, Axel Smith PA-C    insulin lispro (HumALOG/ADMELOG) 100 units/mL subcutaneous injection 1-5 Units, 1-5 Units, Subcutaneous, TID AC, Tobi Hook MD, 1 Units at 09/13/24 0620    ipratropium (ATROVENT) 0.02 % inhalation solution 0.5 mg, 0.5 mg, Nebulization, TID, Brian Poe MD, 0.5 mg at 09/14/24 0744    Ketamine HCl 50 mg, 50 mg, Intravenous, Once PRN, Kentrell Hughes MD    levalbuterol (XOPENEX) inhalation solution 1.25 mg, 1.25 mg, Nebulization, TID, Brian Poe MD, 1.25 mg at 09/14/24 0744    LORazepam (ATIVAN) injection 0.25 mg, 0.25 mg, Intravenous, Once, Tobi Hook MD    midazolam (VERSED) injection 0.5 mg, 0.5 mg, Intravenous, Q4H PRN, Kentrell Hughes MD, 0.5 mg at 09/14/24 0604    nicotine (NICODERM CQ) 21 mg/24 hr TD 24 hr patch 1 patch, 1 patch, Transdermal, Daily, LANDON Desai, 1 patch at 09/13/24 0820    norepinephrine (LEVOPHED) 4 mg (STANDARD CONCENTRATION) IV in sodium chloride 0.9% 250 mL, 1-30 mcg/min, Intravenous, Titrated, Takn Dawson MD, Stopped at 09/14/24 0635    pantoprazole (PROTONIX) injection 40 mg, 40 mg, Intravenous, Q24H AMERICA, LANDON Desai, 40 mg at 09/13/24 0819    polyethylene glycol (MIRALAX) packet 17 g, 17 g, Oral, BID, Emerson Eluri, MD    senna-docusate sodium (SENOKOT S) 8.6-50 mg per tablet 1 tablet, 1 tablet, Oral, BID, Emerson Langston MD, 1 tablet at 09/13/24 1106    Succinylcholine Chloride 100 mg/5 mL syringe 100 mg, 100 mg, Intravenous, Once, Kentrell Hughes MD     Labs & Results:      Results from last 7 days   Lab Units 09/14/24  0540 09/12/24  0600 09/11/24  0431   WBC Thousand/uL 7.39 7.91 10.93*   HEMOGLOBIN g/dL 9.2* 9.4* 9.5*    HEMATOCRIT % 28.4* 29.4* 29.0*   PLATELETS Thousands/uL 154 184 187         Results from last 7 days   Lab Units 09/14/24  0540 09/13/24 2154 09/12/24 2148 09/11/24  0431 09/10/24  0432 09/09/24  0459 09/08/24  0049 09/08/24  0008   POTASSIUM mmol/L 4.1 4.3 3.6   < > 4.1 3.8   < >  --    CHLORIDE mmol/L 104 104 107   < > 102 100   < >  --    CO2 mmol/L 36* 36* 32   < > 34* 35*   < >  --    CO2, I-STAT mmol/L  --   --   --   --   --   --   --  43*   BUN mg/dL 29* 26* 27*   < > 39* 41*   < >  --    CREATININE mg/dL 0.63 0.63 0.64   < > 0.75 0.79   < >  --    CALCIUM mg/dL 8.3* 8.8 8.6   < > 8.4 8.1*   < >  --    ALK PHOS U/L  --  86  --   --  68 70   < >  --    ALT U/L  --  77*  --   --  249* 359*   < >  --    AST U/L  --  29  --   --  166* 412*   < >  --    GLUCOSE, ISTAT mg/dl  --   --   --   --   --   --   --  148*    < > = values in this interval not displayed.     Results from last 7 days   Lab Units 09/14/24  0542 09/13/24  0842 09/12/24  1343 09/08/24 2021 09/08/24  1348   INR   --   --   --   --  2.14*   PTT seconds 131* 69* 67*   < > 45*    < > = values in this interval not displayed.     Results from last 7 days   Lab Units 09/14/24  0540 09/13/24 2154 09/12/24 2148   MAGNESIUM mg/dL 2.3 2.0 1.6*         Flip Yan MD  Cardiology Fellow   PGY-4

## 2024-09-15 VITALS
DIASTOLIC BLOOD PRESSURE: 58 MMHG | SYSTOLIC BLOOD PRESSURE: 121 MMHG | RESPIRATION RATE: 28 BRPM | BODY MASS INDEX: 23.31 KG/M2 | HEIGHT: 69 IN | OXYGEN SATURATION: 97 % | HEART RATE: 93 BPM | WEIGHT: 157.41 LBS | TEMPERATURE: 98.9 F

## 2024-09-15 LAB
ANION GAP SERPL CALCULATED.3IONS-SCNC: 3 MMOL/L (ref 4–13)
APTT PPP: 75 SECONDS (ref 23–34)
APTT PPP: 77 SECONDS (ref 23–34)
BASOPHILS # BLD AUTO: 0.03 THOUSANDS/ΜL (ref 0–0.1)
BASOPHILS NFR BLD AUTO: 0 % (ref 0–1)
BUN SERPL-MCNC: 29 MG/DL (ref 5–25)
CALCIUM SERPL-MCNC: 8.2 MG/DL (ref 8.4–10.2)
CHLORIDE SERPL-SCNC: 104 MMOL/L (ref 96–108)
CO2 SERPL-SCNC: 36 MMOL/L (ref 21–32)
CREAT SERPL-MCNC: 0.62 MG/DL (ref 0.6–1.3)
EOSINOPHIL # BLD AUTO: 0.17 THOUSAND/ΜL (ref 0–0.61)
EOSINOPHIL NFR BLD AUTO: 2 % (ref 0–6)
ERYTHROCYTE [DISTWIDTH] IN BLOOD BY AUTOMATED COUNT: 13.5 % (ref 11.6–15.1)
GFR SERPL CREATININE-BSD FRML MDRD: 104 ML/MIN/1.73SQ M
GLUCOSE SERPL-MCNC: 118 MG/DL (ref 65–140)
GLUCOSE SERPL-MCNC: 123 MG/DL (ref 65–140)
GLUCOSE SERPL-MCNC: 140 MG/DL (ref 65–140)
GLUCOSE SERPL-MCNC: 155 MG/DL (ref 65–140)
GLUCOSE SERPL-MCNC: 157 MG/DL (ref 65–140)
GLUCOSE SERPL-MCNC: 164 MG/DL (ref 65–140)
HAPTOGLOB SERPL-MCNC: 118 MG/DL (ref 32–363)
HCT VFR BLD AUTO: 30.3 % (ref 36.5–49.3)
HGB BLD-MCNC: 9.7 G/DL (ref 12–17)
IMM GRANULOCYTES # BLD AUTO: 0.1 THOUSAND/UL (ref 0–0.2)
IMM GRANULOCYTES NFR BLD AUTO: 1 % (ref 0–2)
LYMPHOCYTES # BLD AUTO: 0.51 THOUSANDS/ΜL (ref 0.6–4.47)
LYMPHOCYTES NFR BLD AUTO: 5 % (ref 14–44)
MAGNESIUM SERPL-MCNC: 1.9 MG/DL (ref 1.9–2.7)
MCH RBC QN AUTO: 31.9 PG (ref 26.8–34.3)
MCHC RBC AUTO-ENTMCNC: 32 G/DL (ref 31.4–37.4)
MCV RBC AUTO: 100 FL (ref 82–98)
MONOCYTES # BLD AUTO: 0.96 THOUSAND/ΜL (ref 0.17–1.22)
MONOCYTES NFR BLD AUTO: 9 % (ref 4–12)
NEUTROPHILS # BLD AUTO: 8.93 THOUSANDS/ΜL (ref 1.85–7.62)
NEUTS SEG NFR BLD AUTO: 83 % (ref 43–75)
NRBC BLD AUTO-RTO: 0 /100 WBCS
PHENYTOIN SERPL-MCNC: 14.6 UG/ML (ref 10–20)
PHOSPHATE SERPL-MCNC: 2.8 MG/DL (ref 2.3–4.1)
PLATELET # BLD AUTO: 180 THOUSANDS/UL (ref 149–390)
PMV BLD AUTO: 9.2 FL (ref 8.9–12.7)
POTASSIUM SERPL-SCNC: 4.1 MMOL/L (ref 3.5–5.3)
RBC # BLD AUTO: 3.04 MILLION/UL (ref 3.88–5.62)
SODIUM SERPL-SCNC: 143 MMOL/L (ref 135–147)
WBC # BLD AUTO: 10.7 THOUSAND/UL (ref 4.31–10.16)

## 2024-09-15 PROCEDURE — 94669 MECHANICAL CHEST WALL OSCILL: CPT

## 2024-09-15 PROCEDURE — 99232 SBSQ HOSP IP/OBS MODERATE 35: CPT | Performed by: INTERNAL MEDICINE

## 2024-09-15 PROCEDURE — 82948 REAGENT STRIP/BLOOD GLUCOSE: CPT

## 2024-09-15 PROCEDURE — 80048 BASIC METABOLIC PNL TOTAL CA: CPT

## 2024-09-15 PROCEDURE — 83735 ASSAY OF MAGNESIUM: CPT

## 2024-09-15 PROCEDURE — 84100 ASSAY OF PHOSPHORUS: CPT

## 2024-09-15 PROCEDURE — 94760 N-INVAS EAR/PLS OXIMETRY 1: CPT

## 2024-09-15 PROCEDURE — 99233 SBSQ HOSP IP/OBS HIGH 50: CPT | Performed by: INTERNAL MEDICINE

## 2024-09-15 PROCEDURE — 85730 THROMBOPLASTIN TIME PARTIAL: CPT | Performed by: INTERNAL MEDICINE

## 2024-09-15 PROCEDURE — 94640 AIRWAY INHALATION TREATMENT: CPT

## 2024-09-15 PROCEDURE — 80185 ASSAY OF PHENYTOIN TOTAL: CPT

## 2024-09-15 PROCEDURE — 94664 DEMO&/EVAL PT USE INHALER: CPT

## 2024-09-15 PROCEDURE — 85025 COMPLETE CBC W/AUTO DIFF WBC: CPT

## 2024-09-15 PROCEDURE — 94003 VENT MGMT INPAT SUBQ DAY: CPT

## 2024-09-15 RX ORDER — FENTANYL CITRATE 50 UG/ML
50 INJECTION, SOLUTION INTRAMUSCULAR; INTRAVENOUS ONCE
Status: COMPLETED | OUTPATIENT
Start: 2024-09-15 | End: 2024-09-15

## 2024-09-15 RX ORDER — MIDAZOLAM HYDROCHLORIDE 2 MG/2ML
0.5 INJECTION, SOLUTION INTRAMUSCULAR; INTRAVENOUS ONCE
Status: COMPLETED | OUTPATIENT
Start: 2024-09-15 | End: 2024-09-15

## 2024-09-15 RX ADMIN — HEPARIN SODIUM 20 UNITS/KG/HR: 10000 INJECTION, SOLUTION INTRAVENOUS at 00:56

## 2024-09-15 RX ADMIN — MIDAZOLAM 0.5 MG: 1 INJECTION INTRAMUSCULAR; INTRAVENOUS at 13:15

## 2024-09-15 RX ADMIN — IPRATROPIUM BROMIDE 0.5 MG: 0.5 SOLUTION RESPIRATORY (INHALATION) at 13:29

## 2024-09-15 RX ADMIN — BUDESONIDE 0.5 MG: 0.5 INHALANT RESPIRATORY (INHALATION) at 07:29

## 2024-09-15 RX ADMIN — MIDAZOLAM 0.5 MG: 1 INJECTION INTRAMUSCULAR; INTRAVENOUS at 14:07

## 2024-09-15 RX ADMIN — PANTOPRAZOLE SODIUM 40 MG: 40 INJECTION, POWDER, FOR SOLUTION INTRAVENOUS at 08:54

## 2024-09-15 RX ADMIN — Medication 75 MCG/HR: at 13:28

## 2024-09-15 RX ADMIN — FOLIC ACID 1 MG: 1 TABLET ORAL at 08:54

## 2024-09-15 RX ADMIN — BUDESONIDE 0.5 MG: 0.5 INHALANT RESPIRATORY (INHALATION) at 19:10

## 2024-09-15 RX ADMIN — INSULIN LISPRO 1 UNITS: 100 INJECTION, SOLUTION INTRAVENOUS; SUBCUTANEOUS at 16:21

## 2024-09-15 RX ADMIN — FORMOTEROL FUMARATE DIHYDRATE 20 MCG: 20 SOLUTION RESPIRATORY (INHALATION) at 19:10

## 2024-09-15 RX ADMIN — LEVALBUTEROL HYDROCHLORIDE 1.25 MG: 1.25 SOLUTION RESPIRATORY (INHALATION) at 13:29

## 2024-09-15 RX ADMIN — MIDAZOLAM 0.5 MG: 1 INJECTION INTRAMUSCULAR; INTRAVENOUS at 22:27

## 2024-09-15 RX ADMIN — MIDAZOLAM 0.5 MG: 1 INJECTION INTRAMUSCULAR; INTRAVENOUS at 06:21

## 2024-09-15 RX ADMIN — POLYETHYLENE GLYCOL 3350 17 G: 17 POWDER, FOR SOLUTION ORAL at 08:54

## 2024-09-15 RX ADMIN — NICOTINE 1 PATCH: 21 PATCH, EXTENDED RELEASE TRANSDERMAL at 09:04

## 2024-09-15 RX ADMIN — IPRATROPIUM BROMIDE 0.5 MG: 0.5 SOLUTION RESPIRATORY (INHALATION) at 19:10

## 2024-09-15 RX ADMIN — DEXMEDETOMIDINE HYDROCHLORIDE 1 MCG/KG/HR: 4 INJECTION, SOLUTION INTRAVENOUS at 21:08

## 2024-09-15 RX ADMIN — FUROSEMIDE 40 MG: 40 TABLET ORAL at 08:54

## 2024-09-15 RX ADMIN — SENNOSIDES AND DOCUSATE SODIUM 1 TABLET: 50; 8.6 TABLET ORAL at 08:54

## 2024-09-15 RX ADMIN — Medication 100 MCG: at 08:54

## 2024-09-15 RX ADMIN — Medication 75 MCG/HR: at 00:17

## 2024-09-15 RX ADMIN — FENTANYL CITRATE 50 MCG: 50 INJECTION INTRAMUSCULAR; INTRAVENOUS at 13:25

## 2024-09-15 RX ADMIN — DEXMEDETOMIDINE HYDROCHLORIDE 0.8 MCG/KG/HR: 4 INJECTION, SOLUTION INTRAVENOUS at 04:09

## 2024-09-15 RX ADMIN — HEPARIN SODIUM 20 UNITS/KG/HR: 10000 INJECTION, SOLUTION INTRAVENOUS at 21:05

## 2024-09-15 RX ADMIN — FORMOTEROL FUMARATE DIHYDRATE 20 MCG: 20 SOLUTION RESPIRATORY (INHALATION) at 07:29

## 2024-09-15 RX ADMIN — IPRATROPIUM BROMIDE 0.5 MG: 0.5 SOLUTION RESPIRATORY (INHALATION) at 07:29

## 2024-09-15 RX ADMIN — LEVALBUTEROL HYDROCHLORIDE 1.25 MG: 1.25 SOLUTION RESPIRATORY (INHALATION) at 19:11

## 2024-09-15 RX ADMIN — FENTANYL CITRATE 50 MCG: 50 INJECTION INTRAMUSCULAR; INTRAVENOUS at 14:07

## 2024-09-15 RX ADMIN — LEVALBUTEROL HYDROCHLORIDE 1.25 MG: 1.25 SOLUTION RESPIRATORY (INHALATION) at 07:29

## 2024-09-15 RX ADMIN — DEXMEDETOMIDINE HYDROCHLORIDE 1 MCG/KG/HR: 4 INJECTION, SOLUTION INTRAVENOUS at 15:06

## 2024-09-15 RX ADMIN — CHLORHEXIDINE GLUCONATE 0.12% ORAL RINSE 15 ML: 1.2 LIQUID ORAL at 08:54

## 2024-09-15 NOTE — PROGRESS NOTES
Cardiology Progress Note - Team 2  Marcin ERNIE Sánchez 64 y.o. male MRN: [id number]  [id number]    Current Problem List   Principal Problem:    Acute hypoxic respiratory failure (HCC)  Active Problems:    Acute exacerbation of chronic obstructive pulmonary disease (COPD) (HCC)    Cancer of right main bronchus (HCC)    Chronic combined systolic and diastolic CHF (congestive heart failure) (HCC)    Cardiac arrest (HCC)    Pericardial effusion    Shock (HCC)    Mucus plugging of bronchi      Assessment:    Pericardial effusion  Moderate Sized pericardial effusion noted on  the last 3 echocardiograms 8/29, 9/8, and 9/9. Effusion predominantly around RV and RA free wall with smaller portion tracking around the apex. Does not appear to have changes much since 8/29. Currenlty no clinical or echocardiographic signs of tamponade. He is intubated and sedated for acute respiratry failure form COPD and mucus plugs.  - interval echocardiogram 9/11 with stable effusion and no evidence of tamponade     Cardiac arrest  Cardiac arrest appears to be in the setting of hypoxemia. Initial rhythm reported as PEA with ROSC then VT arrest. Possibly transient ischemia during PEA leating to VT. After intubation does not appear to have any further ectopy. Echocardiogram with LVEF 50-55%.  - no events on telemetry overnight  - continue treatment for COPD exacerbation as per primary team     Chronic combined systolic and diastolic heart failure  Appears euvolemic on exam. Currently requireing pressors. Chronically on Lasix 40mg daily and toprol 25mg daily.  - on pressors  - appears euvolemic today however difficult intravascular volume exam.     Paroxysmal atrial fibrillation  Currenty in sinus rhythm. Rates are controlled.    Acute Hypoxemic Respiratory failure- intubated and sedated, wean as able as per primary team  COPD- steroids and inhalers as per primary.  Pulmonary fibrosis  Current Tobacco use- tobacco  "cessation recomended     Plan:  - no evidence of tamponade, no urgent indication at this time for pericardiocentesis  - If there is acute decompensation recommend fluids and repeat echo to evaluate effusion, if evidence of tamponade then would proceed with pericardial window vs pericardiocentesis  - continue PO lasix 40 mg daily. If patient is not able to take po, can switch to lasix 20 mg IV daily  - currently in NSR, rate is controlled. Currently off pressors but did have a single documented hypotensive episode overnight. Consider restarting metoprolol tartrate and uptitrating if BP will tolerate, home dose of succinate mg qd      Subjective:   Patient seen and examined. Briefly hypotensive overnight with recorded BP to that self resolved with no intervention. Patient is intubated on vent with spontaneous breathing. Patient is awake and  follows commands. Rass 0,     Objective:     Vitals: Blood pressure 119/56, pulse 85, temperature 98.8 °F (37.1 °C), temperature source Oral, resp. rate (!) 24, height 5' 9\" (1.753 m), weight 71.4 kg (157 lb 6.5 oz), SpO2 100%., Body mass index is 23.25 kg/m².,   Orthostatic Blood Pressures      Flowsheet Row Most Recent Value   Blood Pressure 119/56 filed at 09/15/2024 0700   Patient Position - Orthostatic VS Lying filed at 09/14/2024 1200              Intake/Output Summary (Last 24 hours) at 9/15/2024 0919  Last data filed at 9/15/2024 0600  Gross per 24 hour   Intake 1354.3 ml   Output 780 ml   Net 574.3 ml       Physical Exam  Constitutional:       General: He is not in acute distress.     Appearance: He is not ill-appearing.      Interventions: He is intubated.   HENT:      Head: Normocephalic and atraumatic.      Right Ear: External ear normal.      Left Ear: External ear normal.      Mouth/Throat:      Mouth: Mucous membranes are moist.   Eyes:      Pupils: Pupils are equal, round, and reactive to light.   Cardiovascular:      Rate and Rhythm: Normal rate and regular " rhythm.      Heart sounds: No murmur heard.     Comments: Warm extremities  Pulmonary:      Effort: No respiratory distress. He is intubated.      Comments: Coarse breath sounds  Abdominal:      General: Abdomen is flat. There is no distension.      Palpations: Abdomen is soft.      Tenderness: There is no abdominal tenderness.   Musculoskeletal:         General: No swelling or deformity.      Right lower leg: No edema.      Left lower leg: No edema.   Skin:     General: Skin is warm.      Coloration: Skin is not jaundiced.      Findings: No bruising, lesion or rash.   Neurological:      General: No focal deficit present.      Mental Status: He is alert.      Comments: RASS 0, responds to questions with head nod/shake   Psychiatric:         Mood and Affect: Mood normal.          Medications:      Current Facility-Administered Medications:     acetaminophen (TYLENOL) tablet 650 mg, 650 mg, Oral, Q6H PRN, LANDON Desai, 650 mg at 09/11/24 0441    albuterol inhalation solution 2.5 mg, 2.5 mg, Nebulization, Q6H PRN, Brian Poe MD, 2.5 mg at 09/10/24 1206    bisacodyl (DULCOLAX) rectal suppository 10 mg, 10 mg, Rectal, Daily PRN, Axel Smith PA-C, 10 mg at 09/13/24 0803    budesonide (PULMICORT) inhalation solution 0.5 mg, 0.5 mg, Nebulization, Q12H, LANDON Desai, 0.5 mg at 09/15/24 0729    chlorhexidine (PERIDEX) 0.12 % oral rinse 15 mL, 15 mL, Mouth/Throat, Q12H AMERICA, Tobi Hook MD, 15 mL at 09/15/24 0854    cyanocobalamin (VITAMIN B-12) tablet 100 mcg, 100 mcg, Oral, Daily, Axel Smith PA-C, 100 mcg at 09/15/24 0854    dexmedeTOMIDine (Precedex) 400 mcg in sodium chloride 0.9% 100 mL, 0.1-1.2 mcg/kg/hr, Intravenous, Titrated, Axel Smith PA-C, Last Rate: 12.2 mL/hr at 09/15/24 0409, 0.8 mcg/kg/hr at 09/15/24 0409    fentaNYL 1000 mcg in sodium chloride 0.9% 100mL infusion, 75 mcg/hr, Intravenous, Continuous, Tank Dawson MD, Last Rate: 7.5 mL/hr at  09/15/24 0017, 75 mcg/hr at 09/15/24 0017    fentaNYL injection 50 mcg, 50 mcg, Intravenous, Q2H PRN, Kentrell Hughes MD, 50 mcg at 09/14/24 2302    folic acid (FOLVITE) tablet 1 mg, 1 mg, Oral, Daily, Axel Smith PA-C, 1 mg at 09/15/24 0854    formoterol (PERFOROMIST) nebulizer solution 20 mcg, 20 mcg, Nebulization, Q12H, Tobi Hook MD, 20 mcg at 09/15/24 0729    furosemide (LASIX) tablet 40 mg, 40 mg, Oral, Daily, Enrique Medina DO, 40 mg at 09/15/24 0854    heparin (porcine) 25,000 units in 0.45% NaCl 250 mL infusion (premix), 3-22 Units/kg/hr (Order-Specific), Intravenous, Titrated, Emerson Langston MD, Last Rate: 12 mL/hr at 09/15/24 0056, 20 Units/kg/hr at 09/15/24 0056    heparin (porcine) injection 1,800 Units, 1,800 Units, Intravenous, Q6H PRN, Axel Smith PA-C, 1,800 Units at 09/11/24 0342    heparin (porcine) injection 3,600 Units, 3,600 Units, Intravenous, Q6H PRN, Axel Smith PA-C    insulin lispro (HumALOG/ADMELOG) 100 units/mL subcutaneous injection 1-5 Units, 1-5 Units, Subcutaneous, TID AC, Tobi Hook MD, 1 Units at 09/13/24 0620    ipratropium (ATROVENT) 0.02 % inhalation solution 0.5 mg, 0.5 mg, Nebulization, TID, Brian Poe MD, 0.5 mg at 09/15/24 0729    Ketamine HCl 50 mg, 50 mg, Intravenous, Once PRN, Kentrell Hughes MD    levalbuterol (XOPENEX) inhalation solution 1.25 mg, 1.25 mg, Nebulization, TID, Brian Poe MD, 1.25 mg at 09/15/24 0729    LORazepam (ATIVAN) injection 0.25 mg, 0.25 mg, Intravenous, Once, Tobi Hook MD    midazolam (VERSED) injection 0.5 mg, 0.5 mg, Intravenous, Q4H PRN, Kentrell Hughes MD, 0.5 mg at 09/15/24 0621    nicotine (NICODERM CQ) 21 mg/24 hr TD 24 hr patch 1 patch, 1 patch, Transdermal, Daily, LANDON Desai, 1 patch at 09/15/24 0904    norepinephrine (LEVOPHED) 4 mg (STANDARD CONCENTRATION) IV in sodium chloride 0.9% 250 mL, 1-30 mcg/min, Intravenous, Titrated,  Tank Dawson MD, Stopped at 09/14/24 0635    pantoprazole (PROTONIX) injection 40 mg, 40 mg, Intravenous, Q24H AMERICA, LANDON Desai, 40 mg at 09/15/24 0854    polyethylene glycol (MIRALAX) packet 17 g, 17 g, Oral, BID, Emerson Langston MD, 17 g at 09/15/24 0854    senna-docusate sodium (SENOKOT S) 8.6-50 mg per tablet 1 tablet, 1 tablet, Oral, BID, Emerson Langston MD, 1 tablet at 09/15/24 0854    Succinylcholine Chloride 100 mg/5 mL syringe 100 mg, 100 mg, Intravenous, Once, Kentrell Hughes MD     Labs & Results:      Results from last 7 days   Lab Units 09/15/24  0558 09/14/24  0540 09/12/24  0600   WBC Thousand/uL 10.70* 7.39 7.91   HEMOGLOBIN g/dL 9.7* 9.2* 9.4*   HEMATOCRIT % 30.3* 28.4* 29.4*   PLATELETS Thousands/uL 180 154 184         Results from last 7 days   Lab Units 09/15/24  0558 09/14/24  0540 09/13/24  2154 09/11/24  0431 09/10/24  0432 09/09/24  0459   POTASSIUM mmol/L 4.1 4.1 4.3   < > 4.1 3.8   CHLORIDE mmol/L 104 104 104   < > 102 100   CO2 mmol/L 36* 36* 36*   < > 34* 35*   BUN mg/dL 29* 29* 26*   < > 39* 41*   CREATININE mg/dL 0.62 0.63 0.63   < > 0.75 0.79   CALCIUM mg/dL 8.2* 8.3* 8.8   < > 8.4 8.1*   ALK PHOS U/L  --   --  86  --  68 70   ALT U/L  --   --  77*  --  249* 359*   AST U/L  --   --  29  --  166* 412*    < > = values in this interval not displayed.     Results from last 7 days   Lab Units 09/15/24  0558 09/14/24  2333 09/14/24  1634 09/08/24  2021 09/08/24  1348   INR   --   --   --   --  2.14*   PTT seconds 77* 75* 84*   < > 45*    < > = values in this interval not displayed.     Results from last 7 days   Lab Units 09/15/24  0558 09/14/24  0540 09/13/24  2154   MAGNESIUM mg/dL 1.9 2.3 2.0         Adam Hassan MD  Cardiology Fellow   PGY-4

## 2024-09-15 NOTE — PROGRESS NOTES
"Progress Note - Critical Care/ICU   Name: Marcin Sánchez 64 y.o. male I MRN: [id number]  Unit/Bed#: MICU 10 I Date of Admission: 9/8/2024   Date of Service: 9/15/2024 I Hospital Day: 7     Problems:  Shock - suspected vasodilatory vs. Obstructive shock  Large pericardial effusion - less likely tamponade   VT cardiac arrest post intubation  Acute on chronic hypoxic respiratory failure  Severe COPD  Acute/Chronic CHF with severe Aortic insufficiency  Abnormal CT chest with possible pneumonia - Acinetobacter seen on culture  Transaminitis 2/2 shock liver due to ischemia?  Afib on eliquis  History of pulmonary embolism  Squamous cell lung carcinoma s/p chemo/radiation  Alcohol / Tobacco abuse  Seizure disorder on dilantin    Assessment & Plan   Neuro:   Diagnosis: encephalopathy  Goal RASS 0   Patient currently on Precedex and fentanyl  Responds to commands but is still weak  Epilepsy:  Level at 14 today  Restart Phenytoin today  Transaminitis trending down    CV:   Diagnosis: Pericardial effusion  Echocardiogram on 9/9 read as \"moderate to large pericardial effusion circumferential to the heart measuring largest along the RV free wall at 2.3 cm. The fluid exhibits no internal echoes. There is no echocardiographic evidence of tamponade\"  Plan:   Continue to monitor clinical exam for signs of tamponade.  Can consider additional diuresis for optimal fluid balance  Resume Metoprolol XL 25-mg daily for paroxsymal Afib         Pulm:  Diagnosis: Intubated following encephalopathy and and hypoxia  Plan:   Patient currently on pressure support  Patient failed trial of BIPAP after extubation on 9/9, re-intubated. Extubated on 9/13, re-intubated  Continue xopenex atrovent TID, Pulmicort BID, perforomist BID  Given multiple re-intubations, patient should be considered for Tracheostomy     GI:   Diagnosis: Transaminitis- Improving  Improving, likely iso of shock liver following Vtach arrest  Plan: Continue to trend      :   No " active issues    F/E/N:   F: No fluids  E: Replete lytes  N: Trickle tube feeds      Heme/Onc:   Diagnosis: Anemia  Plan: F/U Retic Panel, LDH, Haptoglobin, Iron Panel    Endo:   No active issues    ID:   Diagnosis: Abnormal CT concerning for potential pneumonia  Sputum cultures grew moraxella and acinetobacter  Plan:   ID saw patient yesterday, recommended to continue to follow closely off antibiotics as patient is clinically stable without fever and mild WBC 2/2 to steroids.  If were to clinically deteriorate could start Minocycline  Trend WBCs, fever curve  WBC count 10.70 today from 7.39    MSK/Skin:   No active issues  Disposition: Critical care    ICU Core Measures     Vented Patient  VAP Bundle  VAP bundle ordered     A: Assess, Prevent, and Manage Pain Has pain been assessed? Yes  Need for changes to pain regimen? No   B: Both Spontaneous Awakening Trials (SATs) and Spontaneous Breathing Trials (SBTs) Plan to perform spontaneous awakening trial today? Yes   Plan to perform spontaneous breathing trial today? Yes   Obvious barriers to extubation? No   C: Choice of Sedation RASS Goal: 0 Alert and Calm  Need for changes to sedation or analgesia regimen? No   D: Delirium CAM-ICU: Negative   E: Early Mobility  Plan for early mobility? No   F: Family Engagement Plan for family engagement today? Yes       Review of Invasive Devices:    [place] Plan: Continue for accurate I/O monitoring for 48 hours  Central access plan: Hemodynamic monitoring      Prophylaxis:  VTE VTE covered by:  heparin (porcine), Intravenous, 20 Units/kg/hr at 09/15/24 0056  heparin (porcine), Intravenous, 1,800 Units at 09/11/24 0342  heparin (porcine), Intravenous       Stress Ulcer  covered bypantoprazole (PROTONIX) injection 40 mg [[id number]]         24 Hour Events   24hr events: Patient intubated for AMS and hypoxia; Found to have mucus plug in RLL.    Subjective     Patient seen at bedside. Patient is intubated. Alert, continues to respond  to commands at this time.     Review of Systems: Review of Systems not obtainable due to patient is intubate    Objective                          Vitals I/O      Most Recent Min/Max in 24hrs   Temp 98.8 °F (37.1 °C) Temp  Min: 97.9 °F (36.6 °C)  Max: 99 °F (37.2 °C)   Pulse 85 Pulse  Min: 60  Max: 87   Resp (!) 24 Resp  Min: 10  Max: 44   /56 BP  Min: 86/45  Max: 132/63   O2 Sat 100 % SpO2  Min: 87 %  Max: 100 %      Intake/Output Summary (Last 24 hours) at 9/15/2024 0938  Last data filed at 9/15/2024 0600  Gross per 24 hour   Intake 1354.3 ml   Output 780 ml   Net 574.3 ml       Diet Enteral/Parenteral; Tube Feeding No Oral Diet; Jevity 1.2 Mirza; Continuous; 55; Prosource Protein Liquid - One Packet; 30; Water; Every 4 hours    Invasive Monitoring   Arterial Line  Priti BP 98/45  No data recorded   MAP 67 mmHg  No data recorded           Physical Exam   Physical Exam  Eyes:      Pupils: Pupils are equal, round, and reactive to light.   Cardiovascular:      Rate and Rhythm: Normal rate and regular rhythm.      Pulses: Normal pulses.   Abdominal: General: Bowel sounds are normal.      Palpations: Abdomen is soft.      Tenderness: There is no abdominal tenderness. There is no guarding.   Pulmonary:      Effort: Pulmonary effort is normal.      Breath sounds: Normal breath sounds.      Comments: Mech breath sounds  Neurological:      Mental Status: He is alert.          Diagnostic Studies        Lab Results: I have reviewed the following results:      Medications:  Scheduled PRN   budesonide, 0.5 mg, Q12H  chlorhexidine, 15 mL, Q12H [facility]  vitamin B-12, 100 mcg, Daily  folic acid, 1 mg, Daily  formoterol, 20 mcg, Q12H  furosemide, 40 mg, Daily  insulin lispro, 1-5 Units, TID AC  ipratropium, 0.5 mg, TID  levalbuterol, 1.25 mg, TID  LORazepam, 0.25 mg, Once  nicotine, 1 patch, Daily  pantoprazole, 40 mg, Q24H [facility]  polyethylene glycol, 17 g, BID  senna-docusate sodium, 1 tablet, BID  Succinylcholine Chloride, 100 mg,  Once      acetaminophen, 650 mg, Q6H PRN  albuterol, 2.5 mg, Q6H PRN  bisacodyl, 10 mg, Daily PRN  fentaNYL, 50 mcg, Q2H PRN  heparin (porcine), 1,800 Units, Q6H PRN  heparin (porcine), 3,600 Units, Q6H PRN  Ketamine HCl, 50 mg, Once PRN  midazolam, 0.5 mg, Q4H PRN       Continuous    dexmedetomidine, 0.1-1.2 mcg/kg/hr, Last Rate: 0.8 mcg/kg/hr (09/15/24 0409)  fentaNYL, 75 mcg/hr, Last Rate: 75 mcg/hr (09/15/24 0017)  heparin (porcine), 3-22 Units/kg/hr (Order-Specific), Last Rate: 20 Units/kg/hr (09/15/24 0056)  norepinephrine, 1-30 mcg/min, Last Rate: Stopped (09/14/24 0635)         Labs:   CBC    Recent Labs     09/14/24  0540 09/15/24  0558   WBC 7.39 10.70*   HGB 9.2* 9.7*   HCT 28.4* 30.3*    180   BANDSPCT 4  --      BMP    Recent Labs     09/14/24  0540 09/15/24  0558   SODIUM 145 143   K 4.1 4.1    104   CO2 36* 36*   AGAP 5 3*   BUN 29* 29*   CREATININE 0.63 0.62   CALCIUM 8.3* 8.2*       Coags    Recent Labs     09/14/24  2333 09/15/24  0558   PTT 75* 77*        Additional Electrolytes  Recent Labs     09/14/24  0540 09/15/24  0558   MG 2.3 1.9   PHOS 4.3* 2.8   CAIONIZED 1.14  --           Blood Gas    Recent Labs     09/14/24  0553   PHART 7.413   [id number] 52.0*   PO2ART 149.1*   [id number] 32.4*   BEART 6.8   SOURCE Line, Arterial     Recent Labs     09/14/24  0553   SOURCE Line, Arterial    LFTs  Recent Labs     09/13/24 2154   ALT 77*   AST 29   ALKPHOS 86   ALB 3.3*   TBILI 1.24*       Infectious  No recent results  Glucose  Recent Labs     09/13/24 2154 09/14/24  0540 09/15/24  0558   GLUC 118 88 164*

## 2024-09-15 NOTE — RESPIRATORY THERAPY NOTE
RT Ventilator Management Note  Marcin Sánchez 64 y.o. male MRN: [id number]  Unit/Bed#: [facility] 10 Encounter: [id number]      Daily Screen         9/14/2024  0819 9/15/2024  0733          Patient safety screen outcome:: Failed Failed      Not Ready for Weaning due to:: -- Underline problem not resolved                Physical Exam:   Respiratory Pattern: Assisted  Chest Assessment: Chest expansion symmetrical  Bilateral Breath Sounds: Diminished      Resp Comments: (P) Pt received on dcoumented vent settings, pt appears to be tolerating these settings well, dropped fio2 from 50 to 40%. No other vent changes made at this time, will continue to monitor pt per resp protocol.

## 2024-09-15 NOTE — PLAN OF CARE
Problem: Prexisting or High Potential for Compromised Skin Integrity  Goal: Skin integrity is maintained or improved  Description: INTERVENTIONS:  - Identify patients at risk for skin breakdown  - Assess and monitor skin integrity  - Assess and monitor nutrition and hydration status  - Monitor labs   - Assess for incontinence   - Turn and reposition patient  - Assist with mobility/ambulation  - Relieve pressure over bony prominences  - Avoid friction and shearing  - Provide appropriate hygiene as needed including keeping skin clean and dry  - Evaluate need for skin moisturizer/barrier cream  - Collaborate with interdisciplinary team   - Patient/family teaching  - Consider wound care consult   Outcome: Progressing     Problem: SAFETY,RESTRAINT: NV/NON-SELF DESTRUCTIVE BEHAVIOR  Goal: Remains free of harm/injury (restraint for non violent/non self-detsructive behavior)  Description: INTERVENTIONS:  - Instruct patient/family regarding restraint use   - Assess and monitor physiologic and psychological status   - Provide interventions and comfort measures to meet assessed patient needs   - Identify and implement measures to help patient regain control  - Assess readiness for release of restraint   Outcome: Progressing  Goal: Returns to optimal restraint-free functioning  Description: INTERVENTIONS:  - Assess the patient's behavior and symptoms that indicate continued need for restraint  - Identify and implement measures to help patient regain control  - Assess readiness for release of restraint   Outcome: Progressing     Problem: PAIN - ADULT  Goal: Verbalizes/displays adequate comfort level or baseline comfort level  Description: Interventions:  - Encourage patient to monitor pain and request assistance  - Assess pain using appropriate pain scale  - Administer analgesics based on type and severity of pain and evaluate response  - Implement non-pharmacological measures as appropriate and evaluate response  - Consider  cultural and social influences on pain and pain management  - Notify physician/advanced practitioner if interventions unsuccessful or patient reports new pain  Outcome: Progressing     Problem: INFECTION - ADULT  Goal: Absence or prevention of progression during hospitalization  Description: INTERVENTIONS:  - Assess and monitor for signs and symptoms of infection  - Monitor lab/diagnostic results  - Monitor all insertion sites, i.e. indwelling lines, tubes, and drains  - Monitor endotracheal if appropriate and nasal secretions for changes in amount and color  - [place] appropriate cooling/warming therapies per order  - Administer medications as ordered  - Instruct and encourage patient and family to use good hand hygiene technique  - Identify and instruct in appropriate isolation precautions for identified infection/condition  Outcome: Progressing  Goal: Absence of fever/infection during neutropenic period  Description: INTERVENTIONS:  - Monitor WBC    Outcome: Progressing     Problem: SAFETY ADULT  Goal: Patient will remain free of falls  Description: INTERVENTIONS:  - Educate patient/family on patient safety including physical limitations  - Instruct patient to call for assistance with activity   - Consult OT/PT to assist with strengthening/mobility   - Keep Call bell within reach  - Keep bed low and locked with side rails adjusted as appropriate  - Keep care items and personal belongings within reach  - Initiate and maintain comfort rounds  - Make Fall Risk Sign visible to staff  - Offer Toileting every 2 Hours, in advance of need  - Initiate/Maintain necessary alarms  - Obtain necessary fall risk management equipment  - Apply yellow socks and bracelet for high fall risk patients  - Consider moving patient to room near nurses station  Outcome: Progressing  Goal: Maintain or return to baseline ADL function  Description: INTERVENTIONS:  -  Assess patient's ability to carry out ADLs; assess patient's baseline for  ADL function and identify physical deficits which impact ability to perform ADLs (bathing, care of mouth/teeth, toileting, grooming, dressing, etc.)  - Assess/evaluate cause of self-care deficits   - Assess range of motion  - Assess patient's mobility; develop plan if impaired  - Assess patient's need for assistive devices and provide as appropriate  - Encourage maximum independence but intervene and supervise when necessary  - Involve family in performance of ADLs  - Assess for home care needs following discharge   - Consider OT consult to assist with ADL evaluation and planning for discharge  - Provide patient education as appropriate  Outcome: Progressing  Goal: Maintains/Returns to pre admission functional level  Description: INTERVENTIONS:  - Perform AM-PAC 6 Click Basic Mobility/ Daily Activity assessment daily.  - Set and communicate daily mobility goal to care team and patient/family/caregiver.   - Collaborate with rehabilitation services on mobility goals if consulted  - Perform Range of Motion 2 times a day.  - Reposition patient every 2 hours.  - Dangle patient 2 times a day  - Stand patient 2 times a day  - Ambulate patient 2 times a day  - Out of bed to chair 2 times a day   - Out of bed for meals 3 times a day  - Out of bed for toileting  - Record patient progress and toleration of activity level   Outcome: Progressing     Problem: DISCHARGE PLANNING  Goal: Discharge to home or other facility with appropriate resources  Description: INTERVENTIONS:  - Identify barriers to discharge w/patient and caregiver  - Arrange for needed discharge resources and transportation as appropriate  - Identify discharge learning needs (meds, wound care, etc.)  - Arrange for interpretive services to assist at discharge as needed  - Refer to Case Management Department for coordinating discharge planning if the patient needs post-hospital services based on physician/advanced practitioner order or complex needs related to  functional status, cognitive ability, or social support system  Outcome: Progressing     Problem: Knowledge Deficit  Goal: Patient/family/caregiver demonstrates understanding of disease process, treatment plan, medications, and discharge instructions  Description: Complete learning assessment and assess knowledge base.  Interventions:  - Provide teaching at level of understanding  - Provide teaching via preferred learning methods  Outcome: Progressing     Problem: Nutrition/Hydration-ADULT  Goal: Nutrient/Hydration intake appropriate for improving, restoring or maintaining nutritional needs  Description: Monitor and assess patient's nutrition/hydration status for malnutrition. Collaborate with interdisciplinary team and initiate plan and interventions as ordered.  Monitor patient's weight and dietary intake as ordered or per policy. Utilize nutrition screening tool and intervene as necessary. Determine patient's food preferences and provide high-protein, high-caloric foods as appropriate.     INTERVENTIONS:  - Monitor oral intake, urinary output, labs, and treatment plans  - Assess nutrition and hydration status and recommend course of action  - Evaluate amount of meals eaten  - Assist patient with eating if necessary   - Allow adequate time for meals  - Recommend/ encourage appropriate diets, oral nutritional supplements, and vitamin/mineral supplements  - Order, calculate, and assess calorie counts as needed  - Recommend, monitor, and adjust tube feedings and TPN/PPN based on assessed needs  - Assess need for intravenous fluids  - Provide specific nutrition/hydration education as appropriate  - Include patient/family/caregiver in decisions related to nutrition  Outcome: Progressing

## 2024-09-15 NOTE — PROGRESS NOTES
Progress Note - Infectious Disease   Name: Marcin Sánchez 64 y.o. male I MRN: [id number]  Unit/Bed#: MICU 10 I Date of Admission: 9/8/2024   Date of Service: 9/15/2024 I Hospital Day: 7    Assessment & Plan  Acute hypoxic respiratory failure (HCC)  Present on admission.  Suspect multifactorial due to severe COPD with possible exacerbation, mucus plug.  Status post therapeutic bronch x3.  Unclear if actual pneumonia with minimal infiltrate versus atelectasis seen on CT chest.  Sputum culture with Moraxella, status post 5 days azithromycin.  Also with MDR-Acinetobacter which may represent contaminants versus colonizers, as has remained stable without effective Acinetobacter treatment.  Status post intubation now x3.  With most recent failure of extubation due recurrent mucus plug.  Low clinical suspicion for infection or pneumonia..  Clinically stable without sepsis.      Continue to follow closely off antibiotics  Follow respiratory status closely  Pulmonary toilet  May need trach versus transition to comfort care  Follow temperatures closely  Recheck CBC in a.m.  Supportive care as per the primary service    Antibiotics:  Off antibiotics #4  Acute exacerbation of chronic obstructive pulmonary disease (COPD) (HCC)  On steroids.  Status post 5 days of azithromycin  Mucus plugging of bronchi  Status post therapeutic bronchoscopy x3.  Recurrent issue.  Shock (HCC)  Developed in setting of cardiac arrest, intubation, sedation.  Low clinical suspicion for sepsis.  Has remained afebrile with mild WBC which is likely attributable to steroids.  Recent blood cultures negative.  Now off pressors  Pericardial effusion  Without tamponade.  Cardiology follow-up ongoing.  Cardiac arrest (HCC)  Initially PEA then with VT.  In setting of hypoxia, intubation  Chronic combined systolic and diastolic CHF (congestive heart failure) (HCC)  Wt Readings from Last 3 Encounters:   09/15/24 71.4 kg (157 lb 6.5 oz)   09/08/24 60.8 kg (134  lb)   08/29/24 67.6 kg (149 lb)   With severe aortic insufficiency.  Undergoing diuresis  Cancer of right main bronchus (HCC)  Diagnosed in 2016.  Status postchemotherapy, radiation.  Appears stable.        History of Present Illness   Remains intubated.  No fevers.  WBC remains normal.    Objective      Temp:  [97.9 °F (36.6 °C)-99 °F (37.2 °C)] 98.9 °F (37.2 °C)  HR:  [61-87] 87  Resp:  [10-44] 20  BP: ()/(45-63) 117/59  O2 Device: Ventilator          I/O last 24 hours:  In: 1606.8 [I.V.:769.8; NG/GT:395; Feedings:442]  Out: 910 [Urine:910]  Lines/Drains/Airways       Active Status       Name Placement date Placement time Site Days    CVC Central Lines 09/08/24 09/08/24  1505  --  6    ETT  Cuffed 8 mm 09/13/24  2326  -- 1    NG/OG/Enteral Tube Orogastric Center mouth 09/14/24  0050  Center mouth  1    Urethral Catheter 16 Fr. 09/08/24  0049  --  7                  Physical Exam   Awake and alert  Ventilated respiratory effort  No rashes  Red-brown secretions in suction canister      Lab Results: I have reviewed the following results:   Recent Labs     09/13/24 2154 09/13/24  2154 09/14/24  0540 09/14/24  0542 09/15/24  0558   WBC  --    < > 7.39  --  10.70*   HGB  --    < > 9.2*  --  9.7*   HCT  --    < > 28.4*  --  30.3*   PLT  --    < > 154  --  180   BANDSPCT  --   --  4  --   --    SODIUM 144  --  145  --  143   K 4.3  --  4.1  --  4.1     --  104  --  104   CO2 36*  --  36*  --  36*   BUN 26*  --  29*  --  29*   CREATININE 0.63  --  0.63  --  0.62   GLUC 118  --  88  --  164*   CAIONIZED  --   --  1.14  --   --    MG 2.0  --  2.3  --  1.9   PHOS 5.3*  --  4.3*  --  2.8   AST 29  --   --   --   --    ALT 77*  --   --   --   --    ALB 3.3*  --   --   --   --    TBILI 1.24*  --   --   --   --    ALKPHOS 86  --   --   --   --    PTT  --   --   --    < > 77*    < > = values in this interval not displayed.     Micro:  Lab Results   Component Value Date    BLOODCX No Growth After 5 Days. 09/05/2024     BLOODCX No Growth After 5 Days. 09/05/2024    SPUTUMCULTUR 4+ Growth of Acinetobacter baumannii (A) 09/06/2024    SPUTUMCULTUR Few Colonies of Moraxella catarrhalis (A) 09/06/2024    SPUTUMCULTUR 4+ Growth of 09/06/2024     Imaging Review:   Other Studies: No additional pertinent studies reviewed.

## 2024-09-15 NOTE — ASSESSMENT & PLAN NOTE
Wt Readings from Last 3 Encounters:   09/15/24 71.4 kg (157 lb 6.5 oz)   09/08/24 60.8 kg (134 lb)   08/29/24 67.6 kg (149 lb)   With severe aortic insufficiency.  Undergoing diuresis

## 2024-09-15 NOTE — ASSESSMENT & PLAN NOTE
Present on admission.  Suspect multifactorial due to severe COPD with possible exacerbation, mucus plug.  Status post therapeutic bronch x3.  Unclear if actual pneumonia with minimal infiltrate versus atelectasis seen on CT chest.  Sputum culture with Moraxella, status post 5 days azithromycin.  Also with MDR-Acinetobacter which may represent contaminants versus colonizers, as has remained stable without effective Acinetobacter treatment.  Status post intubation now x3.  With most recent failure of extubation due recurrent mucus plug.  Low clinical suspicion for infection or pneumonia..  Clinically stable without sepsis.      Continue to follow closely off antibiotics  Follow respiratory status closely  Pulmonary toilet  May need trach versus transition to comfort care  Follow temperatures closely  Recheck CBC in a.m.  Supportive care as per the primary service    Antibiotics:  Off antibiotics #4

## 2024-09-16 ENCOUNTER — APPOINTMENT (INPATIENT)
Dept: RADIOLOGY | Facility: HOSPITAL | Age: 64
DRG: 005 | End: 2024-09-16
Payer: COMMERCIAL

## 2024-09-16 LAB
2HR DELTA HS TROPONIN: 7 NG/L
4HR DELTA HS TROPONIN: 5 NG/L
ANION GAP SERPL CALCULATED.3IONS-SCNC: 3 MMOL/L (ref 4–13)
APTT PPP: 72 SECONDS (ref 23–34)
ATRIAL RATE: 119 BPM
BASOPHILS # BLD AUTO: 0.02 THOUSANDS/ΜL (ref 0–0.1)
BASOPHILS NFR BLD AUTO: 0 % (ref 0–1)
BNP SERPL-MCNC: 402 PG/ML (ref 0–100)
BUN SERPL-MCNC: 25 MG/DL (ref 5–25)
CA-I BLD-SCNC: 1.13 MMOL/L (ref 1.12–1.32)
CALCIUM SERPL-MCNC: 8 MG/DL (ref 8.4–10.2)
CARDIAC TROPONIN I PNL SERPL HS: 13 NG/L
CARDIAC TROPONIN I PNL SERPL HS: 18 NG/L
CARDIAC TROPONIN I PNL SERPL HS: 20 NG/L
CHLORIDE SERPL-SCNC: 103 MMOL/L (ref 96–108)
CO2 SERPL-SCNC: 37 MMOL/L (ref 21–32)
CREAT SERPL-MCNC: 0.61 MG/DL (ref 0.6–1.3)
EOSINOPHIL # BLD AUTO: 0.17 THOUSAND/ΜL (ref 0–0.61)
EOSINOPHIL NFR BLD AUTO: 2 % (ref 0–6)
ERYTHROCYTE [DISTWIDTH] IN BLOOD BY AUTOMATED COUNT: 13.6 % (ref 11.6–15.1)
GFR SERPL CREATININE-BSD FRML MDRD: 105 ML/MIN/1.73SQ M
GLUCOSE SERPL-MCNC: 119 MG/DL (ref 65–140)
GLUCOSE SERPL-MCNC: 166 MG/DL (ref 65–140)
GLUCOSE SERPL-MCNC: 86 MG/DL (ref 65–140)
HCT VFR BLD AUTO: 28.5 % (ref 36.5–49.3)
HGB BLD-MCNC: 9.1 G/DL (ref 12–17)
IMM GRANULOCYTES # BLD AUTO: 0.05 THOUSAND/UL (ref 0–0.2)
IMM GRANULOCYTES NFR BLD AUTO: 1 % (ref 0–2)
INR PPP: 1.25 (ref 0.85–1.19)
LYMPHOCYTES # BLD AUTO: 0.52 THOUSANDS/ΜL (ref 0.6–4.47)
LYMPHOCYTES NFR BLD AUTO: 6 % (ref 14–44)
MAGNESIUM SERPL-MCNC: 1.8 MG/DL (ref 1.9–2.7)
MCH RBC QN AUTO: 32.2 PG (ref 26.8–34.3)
MCHC RBC AUTO-ENTMCNC: 31.9 G/DL (ref 31.4–37.4)
MCV RBC AUTO: 101 FL (ref 82–98)
MONOCYTES # BLD AUTO: 1.05 THOUSAND/ΜL (ref 0.17–1.22)
MONOCYTES NFR BLD AUTO: 12 % (ref 4–12)
NEUTROPHILS # BLD AUTO: 6.73 THOUSANDS/ΜL (ref 1.85–7.62)
NEUTS SEG NFR BLD AUTO: 79 % (ref 43–75)
NRBC BLD AUTO-RTO: 0 /100 WBCS
PHOSPHATE SERPL-MCNC: 2.9 MG/DL (ref 2.3–4.1)
PLATELET # BLD AUTO: 187 THOUSANDS/UL (ref 149–390)
PMV BLD AUTO: 9.6 FL (ref 8.9–12.7)
POTASSIUM SERPL-SCNC: 4.2 MMOL/L (ref 3.5–5.3)
PR INTERVAL: 192 MS
PROTHROMBIN TIME: 16 SECONDS (ref 12.3–15)
QRS AXIS: 31 DEGREES
QRSD INTERVAL: 102 MS
QT INTERVAL: 308 MS
QTC INTERVAL: 429 MS
RBC # BLD AUTO: 2.83 MILLION/UL (ref 3.88–5.62)
SODIUM SERPL-SCNC: 143 MMOL/L (ref 135–147)
T WAVE AXIS: 182 DEGREES
VENTRICULAR RATE: 117 BPM
WBC # BLD AUTO: 8.54 THOUSAND/UL (ref 4.31–10.16)

## 2024-09-16 PROCEDURE — 99233 SBSQ HOSP IP/OBS HIGH 50: CPT | Performed by: INTERNAL MEDICINE

## 2024-09-16 PROCEDURE — 84484 ASSAY OF TROPONIN QUANT: CPT

## 2024-09-16 PROCEDURE — 94640 AIRWAY INHALATION TREATMENT: CPT | Performed by: SOCIAL WORKER

## 2024-09-16 PROCEDURE — 80048 BASIC METABOLIC PNL TOTAL CA: CPT

## 2024-09-16 PROCEDURE — 93005 ELECTROCARDIOGRAM TRACING: CPT

## 2024-09-16 PROCEDURE — 82330 ASSAY OF CALCIUM: CPT

## 2024-09-16 PROCEDURE — 84100 ASSAY OF PHOSPHORUS: CPT

## 2024-09-16 PROCEDURE — 85610 PROTHROMBIN TIME: CPT

## 2024-09-16 PROCEDURE — 94760 N-INVAS EAR/PLS OXIMETRY 1: CPT | Performed by: SOCIAL WORKER

## 2024-09-16 PROCEDURE — 93010 ELECTROCARDIOGRAM REPORT: CPT | Performed by: INTERNAL MEDICINE

## 2024-09-16 PROCEDURE — 94003 VENT MGMT INPAT SUBQ DAY: CPT

## 2024-09-16 PROCEDURE — 83735 ASSAY OF MAGNESIUM: CPT

## 2024-09-16 PROCEDURE — 83880 ASSAY OF NATRIURETIC PEPTIDE: CPT

## 2024-09-16 PROCEDURE — 82948 REAGENT STRIP/BLOOD GLUCOSE: CPT

## 2024-09-16 PROCEDURE — 85025 COMPLETE CBC W/AUTO DIFF WBC: CPT

## 2024-09-16 PROCEDURE — 94669 MECHANICAL CHEST WALL OSCILL: CPT

## 2024-09-16 PROCEDURE — 94760 N-INVAS EAR/PLS OXIMETRY 1: CPT

## 2024-09-16 PROCEDURE — 85730 THROMBOPLASTIN TIME PARTIAL: CPT | Performed by: INTERNAL MEDICINE

## 2024-09-16 PROCEDURE — 94664 DEMO&/EVAL PT USE INHALER: CPT | Performed by: SOCIAL WORKER

## 2024-09-16 PROCEDURE — 99232 SBSQ HOSP IP/OBS MODERATE 35: CPT | Performed by: INTERNAL MEDICINE

## 2024-09-16 PROCEDURE — 94640 AIRWAY INHALATION TREATMENT: CPT

## 2024-09-16 PROCEDURE — 94669 MECHANICAL CHEST WALL OSCILL: CPT | Performed by: SOCIAL WORKER

## 2024-09-16 PROCEDURE — 71045 X-RAY EXAM CHEST 1 VIEW: CPT

## 2024-09-16 PROCEDURE — NC001 PR NO CHARGE: Performed by: FAMILY MEDICINE

## 2024-09-16 RX ORDER — PHENYTOIN 125 MG/5ML
50 SUSPENSION ORAL EVERY 12 HOURS SCHEDULED
Status: DISCONTINUED | OUTPATIENT
Start: 2024-09-16 | End: 2024-09-22

## 2024-09-16 RX ORDER — PHENYTOIN SODIUM 100 MG/1
100 CAPSULE, EXTENDED RELEASE ORAL DAILY
Status: DISCONTINUED | OUTPATIENT
Start: 2024-09-16 | End: 2024-09-16

## 2024-09-16 RX ORDER — TRAZODONE HYDROCHLORIDE 100 MG/1
100 TABLET ORAL
Status: DISCONTINUED | OUTPATIENT
Start: 2024-09-16 | End: 2024-10-08

## 2024-09-16 RX ORDER — KETAMINE HCL IN NACL, ISO-OSM 100MG/10ML
0.5 SYRINGE (ML) INJECTION ONCE AS NEEDED
Status: COMPLETED | OUTPATIENT
Start: 2024-09-16 | End: 2024-09-17

## 2024-09-16 RX ORDER — KETAMINE HCL IN NACL, ISO-OSM 100MG/10ML
0.5 SYRINGE (ML) INJECTION ONCE
Status: COMPLETED | OUTPATIENT
Start: 2024-09-16 | End: 2024-09-16

## 2024-09-16 RX ORDER — LEVALBUTEROL INHALATION SOLUTION 1.25 MG/3ML
1.25 SOLUTION RESPIRATORY (INHALATION)
Status: DISCONTINUED | OUTPATIENT
Start: 2024-09-16 | End: 2024-10-16

## 2024-09-16 RX ORDER — MAGNESIUM SULFATE HEPTAHYDRATE 40 MG/ML
2 INJECTION, SOLUTION INTRAVENOUS ONCE
Status: COMPLETED | OUTPATIENT
Start: 2024-09-16 | End: 2024-09-16

## 2024-09-16 RX ORDER — GABAPENTIN 400 MG/1
400 CAPSULE ORAL 2 TIMES DAILY
Status: DISCONTINUED | OUTPATIENT
Start: 2024-09-16 | End: 2024-10-18 | Stop reason: HOSPADM

## 2024-09-16 RX ORDER — MIDAZOLAM HYDROCHLORIDE 2 MG/2ML
1 INJECTION, SOLUTION INTRAMUSCULAR; INTRAVENOUS EVERY 4 HOURS PRN
Status: CANCELLED | OUTPATIENT
Start: 2024-09-16

## 2024-09-16 RX ORDER — QUETIAPINE FUMARATE 25 MG/1
25 TABLET, FILM COATED ORAL
Status: CANCELLED | OUTPATIENT
Start: 2024-09-16

## 2024-09-16 RX ORDER — MIDAZOLAM HYDROCHLORIDE 2 MG/2ML
1 INJECTION, SOLUTION INTRAMUSCULAR; INTRAVENOUS ONCE
Status: COMPLETED | OUTPATIENT
Start: 2024-09-16 | End: 2024-09-16

## 2024-09-16 RX ADMIN — IPRATROPIUM BROMIDE 0.5 MG: 0.5 SOLUTION RESPIRATORY (INHALATION) at 13:05

## 2024-09-16 RX ADMIN — GABAPENTIN 400 MG: 400 CAPSULE ORAL at 18:28

## 2024-09-16 RX ADMIN — MIDAZOLAM 1 MG: 1 INJECTION INTRAMUSCULAR; INTRAVENOUS at 03:36

## 2024-09-16 RX ADMIN — FENTANYL CITRATE 50 MCG: 50 INJECTION INTRAMUSCULAR; INTRAVENOUS at 00:48

## 2024-09-16 RX ADMIN — Medication 75 MCG/HR: at 02:58

## 2024-09-16 RX ADMIN — BUDESONIDE 0.5 MG: 0.5 INHALANT RESPIRATORY (INHALATION) at 19:22

## 2024-09-16 RX ADMIN — FORMOTEROL FUMARATE DIHYDRATE 20 MCG: 20 SOLUTION RESPIRATORY (INHALATION) at 07:13

## 2024-09-16 RX ADMIN — DEXMEDETOMIDINE HYDROCHLORIDE 1 MCG/KG/HR: 4 INJECTION, SOLUTION INTRAVENOUS at 09:28

## 2024-09-16 RX ADMIN — FOLIC ACID 1 MG: 1 TABLET ORAL at 10:29

## 2024-09-16 RX ADMIN — LEVALBUTEROL HYDROCHLORIDE 1.25 MG: 1.25 SOLUTION RESPIRATORY (INHALATION) at 19:22

## 2024-09-16 RX ADMIN — Medication 100 MCG: at 10:30

## 2024-09-16 RX ADMIN — IPRATROPIUM BROMIDE 0.5 MG: 0.5 SOLUTION RESPIRATORY (INHALATION) at 07:13

## 2024-09-16 RX ADMIN — CHLORHEXIDINE GLUCONATE 0.12% ORAL RINSE 15 ML: 1.2 LIQUID ORAL at 10:29

## 2024-09-16 RX ADMIN — FENTANYL CITRATE 50 MCG: 50 INJECTION INTRAMUSCULAR; INTRAVENOUS at 07:47

## 2024-09-16 RX ADMIN — BUDESONIDE 0.5 MG: 0.5 INHALANT RESPIRATORY (INHALATION) at 07:13

## 2024-09-16 RX ADMIN — PHENYTOIN 50 MG: 125 SUSPENSION ORAL at 22:27

## 2024-09-16 RX ADMIN — MIDAZOLAM 0.5 MG: 1 INJECTION INTRAMUSCULAR; INTRAVENOUS at 02:58

## 2024-09-16 RX ADMIN — Medication 75 MCG/HR: at 15:56

## 2024-09-16 RX ADMIN — LEVALBUTEROL HYDROCHLORIDE 1.25 MG: 1.25 SOLUTION RESPIRATORY (INHALATION) at 13:05

## 2024-09-16 RX ADMIN — POLYETHYLENE GLYCOL 3350 17 G: 17 POWDER, FOR SOLUTION ORAL at 18:28

## 2024-09-16 RX ADMIN — GABAPENTIN 400 MG: 400 CAPSULE ORAL at 10:29

## 2024-09-16 RX ADMIN — LEVALBUTEROL HYDROCHLORIDE 1.25 MG: 1.25 SOLUTION RESPIRATORY (INHALATION) at 07:13

## 2024-09-16 RX ADMIN — ALBUTEROL SULFATE 2.5 MG: 2.5 SOLUTION RESPIRATORY (INHALATION) at 08:19

## 2024-09-16 RX ADMIN — CHLORHEXIDINE GLUCONATE 0.12% ORAL RINSE 15 ML: 1.2 LIQUID ORAL at 00:44

## 2024-09-16 RX ADMIN — Medication 35 MG: at 07:40

## 2024-09-16 RX ADMIN — NICOTINE 1 PATCH: 21 PATCH, EXTENDED RELEASE TRANSDERMAL at 10:35

## 2024-09-16 RX ADMIN — SENNOSIDES AND DOCUSATE SODIUM 1 TABLET: 50; 8.6 TABLET ORAL at 18:28

## 2024-09-16 RX ADMIN — MAGNESIUM SULFATE HEPTAHYDRATE 2 G: 40 INJECTION, SOLUTION INTRAVENOUS at 10:30

## 2024-09-16 RX ADMIN — IPRATROPIUM BROMIDE 0.5 MG: 0.5 SOLUTION RESPIRATORY (INHALATION) at 19:22

## 2024-09-16 RX ADMIN — HEPARIN SODIUM 20 UNITS/KG/HR: 10000 INJECTION, SOLUTION INTRAVENOUS at 21:01

## 2024-09-16 RX ADMIN — TRAZODONE HYDROCHLORIDE 100 MG: 100 TABLET ORAL at 22:27

## 2024-09-16 RX ADMIN — PHENYTOIN 50 MG: 125 SUSPENSION ORAL at 13:28

## 2024-09-16 RX ADMIN — DEXMEDETOMIDINE HYDROCHLORIDE 1.2 MCG/KG/HR: 4 INJECTION, SOLUTION INTRAVENOUS at 02:58

## 2024-09-16 RX ADMIN — DEXMEDETOMIDINE HYDROCHLORIDE 0.6 MCG/KG/HR: 4 INJECTION, SOLUTION INTRAVENOUS at 17:48

## 2024-09-16 RX ADMIN — CHLORHEXIDINE GLUCONATE 0.12% ORAL RINSE 15 ML: 1.2 LIQUID ORAL at 21:55

## 2024-09-16 RX ADMIN — FUROSEMIDE 40 MG: 40 TABLET ORAL at 11:09

## 2024-09-16 NOTE — RESPIRATORY THERAPY NOTE
RT Ventilator Management Note  Marcin Sánchez 64 y.o. male MRN: [id number]  [facility]: [id number]      Daily Screen         9/15/2024  0733 9/15/2024  1155          Patient safety screen outcome:: Failed Failed      Not Ready for Weaning due to:: Underline problem not resolved --                Physical Exam:   Assessment Type: Assess only  General Appearance: Awake, Alert  Respiratory Pattern: Assisted  Chest Assessment: Chest expansion symmetrical  Bilateral Breath Sounds: Coarse, Diminished      Resp Comments: pt remained on listed settings through the night with no vent changes.     09/16/24 0403   Respiratory Assessment   Respiratory Pattern Assisted   Chest Assessment Chest expansion symmetrical   Bilateral Breath Sounds Coarse;Diminished   Resp Comments pt remained on listed settings through the night with no vent changes.   Vent Information   Vent type     Vent Mode AC/PC   $ Vent Daily Charge-Subsequent Yes   AC/PC Settings   Resp Rate (BPM) 14 BPM   Pressure Control (cmH2O) 15 cm H2O   Insp Time (sec) 0.9 sec   FiO2 (%) 40 %   PEEP (cmH2O) 6 cmH2O   Insp Rise Time (%) 50 %   Trigger Sensitivity Flow (lpm) 3   Humidification Heater   Heater Temp 98.6 °F (37 °C)   AC/PC ACTUALS   Resp Rate (BPM) 20 BPM   VT (mL) 526 mL   MV 8.4   MAP (cmH2O) 11 cmH2O   Peak Pressure (cmH2O) 21 cmH2O   Heater Temperature (Obs) 98.6 °F (37 °C)   AC/PC Alarms    High Peak Pressure (cmH2O) 40 cmH2O   High Resp Rate (BPM) 35 BPM   High MV (L/min) 20 L/min   Low MV (L/min) 3 L/min   High Elena VT (mL) 1000 mL   Low Elena VT (mL) 200 mL   High Spont VT (mL) 1000 mL   Low Spont VT (mL) 200 mL   AC/PC Apnea Settings   Resp Rate (BMP) 8 BPM   Pressure Control (cmH2O) 15 cmH2O   Inspiratory Time (S) 0.9 S   FiO2 (%) 100 %   Apnea Time (s) 20 S   Maintenance   Alarm (pink) cable attached No   Resuscitation bag with peep valve at bedside Yes   Water bag changed No   Circuit changed No   IHI Ventilator  Associated Pneumonia Bundle   Head of Bed Elevated HOB 60   ETT  Cuffed 8 mm   Placement Date/Time: 09/13/24 (c) 7828   Type: Cuffed  Tube Size: 8 mm  Location: Oral  Insertion attempts: 1  Secured at (cm): 23   Secured at (cm) 25   Measured from Lips   Secured Location Left

## 2024-09-16 NOTE — PROGRESS NOTES
Progress Note - Pulmonology   Name: Marcin Sánchez 64 y.o. male I MRN: [id number]  Unit/Bed#: [facility]U 10 I Date of Admission: 9/8/2024   Date of Service: 9/16/2024 I Hospital Day: 8       Interventional Pulmonary/ Percutaneous Tracheostomy Evaluatoin    At the request of Dr. Hook, patient was evaluated for possible percutaneous tracheostomy placement.    Mechanical Vent D#8, 2 failed extubation attempts - 9/13 & 9/10    Mechanical Vent Support - FiO2 0.4, PEEP 6    Exam of neck with good anatomy definition and ultrasound of neck with out central vasculature    Lab evaluation - 9/16/2024 platelet 187K, BUN 25, 9/8/2024 INR 9.14 (on eliquis)    Anticoagulation: UFH gtt for history of PE and atrial fibrillation    Patient appears to be appropriate candidate for bronchoscopically guided percutaneous dilation tracheostomy    Tentatively plan for 9/17/2024 at 11am    Plan for two physician consent given inability to contact out of state guardianship    Will recheck INR now  Hold TFs at midnight  Hold UFH gtt at 0500    Discussed with Dr. Hook - Pulm/CCM Fellow    John Brunner D.O., FACP

## 2024-09-16 NOTE — UTILIZATION REVIEW
Continued Stay Review    Date: 9/15/24                          Current Patient Class: Inpatient  Current Level of Care: Critical Care/ ICU    HPI:64 y.o. male initially admitted on  9/8/24 for acute hypoxic respiratory failure.     Assessment/Plan:  Cardiology: Recent PEA arrest with subsequent VT, no recurrence on telemetry, shock possibly vasodilatory and pressors have been weaned off. COPD and pulmonary fibrosis with acute hypoxemic respiratory failure, re intubated and may need tracheostomy. Continue current dose of diuretics, maintain slight negative fluid balance, may switch to IV diuresis if needed, may resume low dose beta blockers and IV heparin continued. Prognosis guarded.    Critical Care: Continue to monitor off steroids. ID following, monitoring off antibiotics. Continue fentanyl. Patient and family wish to proceed with trach and PEG.  He has been extubated and reintubated twice now and continues to fail due to mucous plugging.  His bronchoscopy sample is also suggestive of possible persistent malignancy. We will consult surgery for trach and PEG. Continue current vent settings.     Vital Signs (last 3 days)       Date/Time Temp Pulse Resp BP MAP (mmHg) SpO2 FiO2 (%) O2 Flow Rate (L/min) O2 Device O2 Interface Device Patient Position - Orthostatic VS [place] Coma Scale Score Pain    09/16/24 0600 -- 66 32 82/33 55 96 % -- -- -- -- -- -- --    09/16/24 0545 -- 65 32 105/52 75 98 % -- -- -- -- -- -- --    09/16/24 0530 -- 61 24 85/45 63 97 % -- -- -- -- -- -- --    09/16/24 0515 -- 62 16 91/46 66 97 % -- -- -- -- -- -- --    09/16/24 0507 -- 72 37 107/54 78 89 % -- -- -- -- -- -- --    09/16/24 0500 -- 62 55 80/43 59 97 % -- -- -- -- -- -- --    09/16/24 0400 98.5 °F (36.9 °C) 69 40 87/43 62 97 % -- -- Ventilator -- Sitting -- --    09/16/24 0351 -- -- -- -- -- -- -- -- -- -- -- 11 --    09/16/24 0300 -- 104 33 169/137 148 94 % -- -- -- -- -- -- --    09/16/24 0200 98.7 °F (37.1 °C) 71 21 93/55 73 100 %  -- -- Ventilator -- Lying -- --    09/16/24 0100 -- 70 19 88/45 64 99 % -- -- -- -- -- -- --    09/16/24 0040 -- 91 27 121/57 82 98 % -- -- -- -- -- -- --    09/16/24 0000 -- 93 25 -- -- 98 % -- -- -- -- -- 11 --    09/15/24 2300 -- 91 25 -- -- 99 % -- -- -- -- -- -- --    09/15/24 2200 -- 80 19 103/54 76 99 % -- -- -- -- -- -- --    09/15/24 2100 -- 85 23 112/56 80 99 % -- -- -- -- -- -- --    09/15/24 2000 -- 93 28 121/58 84 97 % -- -- -- -- -- 11 --    09/15/24 1900 -- 102 37 148/60 92 96 % -- -- -- -- -- -- --    09/15/24 1800 -- 83 26 112/55 79 99 % -- -- -- -- -- -- --    09/15/24 1700 -- 68 12 94/47 68 100 % -- -- -- -- -- -- --    09/15/24 1600 98.9 °F (37.2 °C) 77 16 96/48 69 99 % -- -- Ventilator -- Lying 11 --    09/15/24 1521 -- -- -- -- -- 97 % -- -- -- -- -- -- --    09/15/24 1500 -- 82 13 90/46 65 97 % -- -- -- -- -- -- --    09/15/24 1430 -- 95 29 127/59 85 96 % -- -- -- -- -- -- --    09/15/24 1405 -- 101 25 165/68 98 98 % -- -- -- -- -- -- --    09/15/24 1400 -- 117 44 224/125 154 89 % -- -- -- -- -- -- --    09/15/24 1338 -- -- -- -- -- 95 % -- -- -- -- -- -- --    09/15/24 1300 -- 91 26 123/58 84 96 % -- -- -- -- -- -- --    09/15/24 1200 -- 88 18 -- -- 95 % -- -- Ventilator -- -- 11 --    09/15/24 1155 -- -- -- -- -- 95 % -- -- -- -- -- -- --    09/15/24 1100 -- 91 29 116/56 81 96 % -- -- -- -- -- -- --    09/15/24 1000 -- 86 18 109/55 79 95 % -- -- -- -- -- -- --    09/15/24 0900 -- 87 20 117/59 85 93 % -- -- -- -- -- -- --    09/15/24 0800 98.9 °F (37.2 °C) 86 29 113/55 79 96 % -- -- Ventilator -- Lying 11 --    09/15/24 0733 -- -- -- -- -- 100 % -- -- -- -- -- -- --    09/15/24 0731 -- -- -- -- -- 100 % -- -- -- -- -- -- --    09/15/24 0700 -- 85 24 119/56 81 98 % -- -- -- -- -- -- --    09/15/24 0600 -- 85 30 110/53 76 98 % -- -- -- -- -- -- --    09/15/24 0500 -- 75 20 112/58 83 99 % -- -- -- -- -- -- --    09/15/24 0400 -- 73 18 97/53 72 100 % -- -- -- -- -- 11 --    09/15/24 0300 98.8 °F  (37.1 °C) 85 30 112/56 81 98 % -- -- -- -- -- -- --    09/15/24 0200 -- 79 23 107/54 78 100 % -- -- -- -- -- -- --    09/15/24 0130 -- 70 19 104/53 76 100 % -- -- -- -- -- -- --    09/15/24 0100 -- 66 44 98/49 70 100 % -- -- -- -- -- -- --    09/15/24 0030 -- 66 15 86/45 62 100 % -- -- -- -- -- -- --    09/15/24 0000 -- -- -- -- -- -- -- -- -- -- -- 11 --    09/14/24 2332 99 °F (37.2 °C) -- -- -- -- -- -- -- -- -- -- -- --    09/14/24 2300 -- 77 22 120/60 86 100 % -- -- -- -- -- -- --    09/14/24 2200 -- 80 19 120/59 85 99 % -- -- -- -- -- -- --    09/14/24 2100 -- 87 26 132/63 91 87 % -- -- -- -- -- -- --    09/14/24 2000 98.9 °F (37.2 °C) 79 25 114/56 81 92 % -- -- -- -- -- 11 --    09/14/24 1900 -- 76 25 105/54 78 93 % -- -- -- -- -- -- --    09/14/24 1800 -- 71 30 94/48 68 90 % -- -- -- -- -- -- --    09/14/24 1700 -- 72 28 102/50 72 93 % -- -- -- -- -- -- --    09/14/24 1635 98.3 °F (36.8 °C) 69 23 101/52 75 94 % -- -- -- -- -- 11 --    09/14/24 1600 -- 72 24 99/48 69 93 % -- -- -- -- -- -- --    09/14/24 1500 -- 70 14 95/48 69 94 % -- -- -- -- -- -- --    09/14/24 1436 -- 71 26 114/54 78 94 % -- -- -- -- -- -- --    09/14/24 1430 -- 66 14 87/46 64 92 % -- -- -- -- -- -- --    09/14/24 1400 -- 63 14 91/45 65 93 % -- -- -- -- -- -- --    09/14/24 1330 -- 73 30 100/54 75 91 % -- -- -- -- -- -- --    09/14/24 1300 -- -- 30 -- -- -- -- -- -- -- -- 11 --    09/14/24 1200 97.9 °F (36.6 °C) 66 13 103/51 74 95 % -- -- -- -- Lying -- --    09/14/24 1130 -- 61 10 109/53 77 95 % -- -- -- -- -- -- --    09/14/24 1100 -- 66 22 114/53 76 93 % -- -- -- -- -- -- --    09/14/24 1030 -- 60 11 91/48 67 95 % 50 -- Ventilator -- -- -- --    09/14/24 1000 -- 61 16 97/49 70 95 % -- -- -- -- -- -- --    09/14/24 0900 -- 66 26 107/53 76 96 % -- -- -- -- -- -- --    09/14/24 0854 -- 72 30 112/51 73 96 % -- -- -- -- -- -- --    09/14/24 0830 -- 66 29 88/47 65 97 % 50 -- Ventilator -- -- 11 --    09/14/24 0825 -- 67 33 -- -- 97 % -- -- --  -- -- -- --    09/14/24 0800 -- 60 24 93/45 65 97 % -- -- -- -- -- -- --    09/14/24 0730 -- 58 19 89/44 63 99 % -- -- -- -- -- -- --    09/14/24 0700 -- 57 15 91/42 61 99 % -- -- -- -- -- -- --    09/14/24 0610 -- 56 18 122/54 78 100 % -- -- -- -- -- -- --    09/14/24 0600 -- 57 14 80/39 56 100 % -- -- -- -- -- -- --    09/14/24 0500 -- 64 25 99/51 73 99 % -- -- -- -- -- -- --    09/14/24 0451 -- -- -- -- -- 99 % -- -- -- -- -- -- --    09/14/24 0400 99.7 °F (37.6 °C) 58 14 93/49 68 98 % -- -- -- -- -- 10 --    09/14/24 0300 -- 62 15 86/45 62 98 % -- -- -- -- -- -- --    09/14/24 0200 -- 57 14 108/51 74 100 % -- -- -- -- -- -- --    09/14/24 0100 -- 59 14 91/47 66 99 % -- -- -- -- -- -- --    09/14/24 0000 99.2 °F (37.3 °C) 66 16 97/51 72 100 % -- -- -- -- -- 7 --    09/13/24 2300 -- 98 21 125/51 84 100 % -- -- -- -- -- -- --    09/13/24 2200 -- 107 35 148/65 93 100 % -- -- -- -- -- -- --    09/13/24 2100 -- 77 37 145/67 96 98 % -- -- -- -- -- -- --    09/13/24 2044 -- -- -- -- -- 100 % 70 50 L/min High flow nasal cannula HFNC prongs -- -- --    09/13/24 2000 -- 72 39 128/56 80 100 % -- -- -- -- -- 14 No Pain    09/13/24 1900 99.6 °F (37.6 °C) 67 33 117/54 78 100 % 70 50 L/min High flow nasal cannula -- -- -- --    09/13/24 1800 -- 66 13 108/52 75 100 % -- -- -- -- -- -- --    09/13/24 1614 -- 74 31 -- -- 100 % -- -- -- -- -- 14 --    09/13/24 1600 -- 72 28 118/58 84 100 % 80 50 L/min High flow nasal cannula -- -- -- --    09/13/24 1500 -- 80 30 123/59 85 100 % 50 -- BiPAP -- -- -- --    09/13/24 1400 -- 83 32 132/63 91 100 % -- -- -- -- -- -- --    09/13/24 1354 -- -- -- -- -- -- -- -- -- Face mask -- -- --    09/13/24 1351 -- 89 37 142/65 94 100 % 80 -- BiPAP -- -- -- --    09/13/24 1345 -- 91 39 128/60 87 100 % -- -- -- -- -- -- --    09/13/24 1342 -- 92 37 131/61 88 100 % -- -- -- -- -- -- --    09/13/24 1330 -- 97 34 156/69 99 100 % -- -- -- -- -- -- --    09/13/24 1315 -- 101 -- 155/67 96 88 % -- -- -- -- --  -- --    09/13/24 1314 -- 74 -- -- -- 83 % -- -- -- -- -- -- --    09/13/24 1313 -- 100 -- -- -- 88 % -- -- -- -- -- -- --    09/13/24 1312 -- 101 -- 145/67 97 91 % -- -- -- -- -- -- --    09/13/24 1300 -- 74 31 121/55 79 100 % -- -- -- -- -- -- --    09/13/24 1245 -- 75 30 117/55 79 100 % -- -- -- -- -- -- --    09/13/24 1243 -- -- -- -- -- -- -- -- -- HFNC prongs -- -- --    09/13/24 1237 -- 80 29 117/57 82 100 % 90 40 L/min High flow nasal cannula -- -- -- --    09/13/24 1218 99 °F (37.2 °C) 74 28 121/57 82 99 % -- -- -- -- -- -- --    09/13/24 1215 -- 74 29 127/60 86 100 % -- -- -- -- -- -- --    09/13/24 1214 -- 74 23 127/60 86 100 % -- -- -- -- -- -- --    09/13/24 1212 -- 75 26 130/63 90 100 % -- -- -- -- -- -- --    09/13/24 1210 -- 77 29 129/62 89 99 % -- -- -- -- -- -- --    09/13/24 1200 -- 83 29 142/64 92 98 % -- -- -- -- -- 11 --    09/13/24 1130 -- 76 35 -- -- 94 % -- -- -- -- -- -- --    09/13/24 1100 -- 77 21 115/55 79 93 % 35 -- Ventilator -- -- -- --    09/13/24 1000 -- 81 32 114/54 78 94 % -- -- -- -- -- -- --    09/13/24 0900 -- 84 30 125/59 85 95 % -- -- -- -- -- -- --    09/13/24 0841 -- 82 34 -- -- 95 % -- -- -- -- -- -- --    09/13/24 0830 -- -- -- -- -- -- -- -- -- -- -- 11 --    09/13/24 0800 -- 83 22 115/58 83 94 % 35 -- Ventilator -- -- -- --    09/13/24 0754 98.6 °F (37 °C) 82 28 -- -- 93 % -- -- -- -- -- -- --    09/13/24 0700 -- 79 21 110/53 77 96 % -- -- -- -- -- -- --    09/13/24 0600 -- 78 18 109/53 77 96 % -- -- -- -- -- -- --    09/13/24 0500 -- 80 23 106/55 79 89 % -- -- -- -- -- -- --    09/13/24 0400 -- 74 26 104/51 74 94 % -- -- -- -- -- 11 --    09/13/24 0333 -- -- -- -- -- 96 % -- -- -- -- -- -- --    09/13/24 0300 -- 65 24 97/50 72 98 % -- -- -- -- -- -- --    09/13/24 0200 -- 67 14 92/49 68 96 % -- -- -- -- -- -- --    09/13/24 0100 -- 77 26 121/60 87 94 % -- -- -- -- -- -- --    09/13/24 0000 100.1 °F (37.8 °C) 78 26 118/57 82 94 % -- -- -- -- -- 11 --          Weight  (last 2 days)       Date/Time Weight    09/16/24 0559 70.2 (154.76)    09/15/24 0600 71.4 (157.41)    09/14/24 0600 66.2 (145.94)              Pertinent Labs/Diagnostic Results:   Radiology:  XR chest portable ICU   Final Interpretation by Sd Lamb MD (09/14 1344)      1.  Tip of endotracheal tube 3.5 cm above the chery.      2.  Progressive atelectasis of the right lung since 9/10/2024 with further right-sided mediastinal shift.      3.  Small left pleural effusion and moderate size right effusion, increased in size since 9/10/2024.      4.  Pulmonary vascular congestion.            Workstation performed: [id number]         XR chest portable ICU   Final Interpretation by Juanito Lee DO (09/11 0231)      Large right and trace left pleural effusion.            Workstation performed: [id number]         XR chest portable   Final Interpretation by Matt Russ MD (09/09 0949)      Tubes and lines in satisfactory position. No pneumothorax.      Unchanged interstitial edema and small right pleural effusion.      Resident: John Blake I, the attending radiologist, have reviewed the images and agree with the final report above.      Workstation performed: [id number]         XR chest portable ICU    (Results Pending)     Cardiology:    GI:  Bronchoscopy   Final Result by Amos Powell DO (09/13 9067)   Right main stem mucous plug   Friable mucosa over right and left lung       RECOMMENDATION:   Continue mechanical ventilation, add on chest percussion therapy for    airway clearance         Bronchoscopy   Final Result by Romy Hill MD (09/14 1232)   Moderate friable mucosa in the trachea, main chery, left lung and right    lung   Bloody mucus plugs with 51-75% obstruction removed with suction from the    bronchus intermedius and RLL         RECOMMENDATION:   Continue monitoring in the ICU          I was the supervising physician and present for the entire procedure on    9/13/2024       There was mucous plugging of the right lower lobe present along with some    bloody secretions causing mild to moderate obstruction of the left    mainstem.  Both were suctioned and cleared.      Romy Hill MD         Bronchoscopy   Final Result by Romy Hill MD (09/12 0856)   Excessive, thin and brown secretions present in all observed locations,    including the trachea, main chery, left lung and right lung   Bloody mucus plugs with greater than 75% obstruction removed with suction    from the bronchus intermedius and RML   Friable mucosa in the left lung and right lung         RECOMMENDATION:   Continue monitoring in the ICU   Follow up results of washing culture, gram stain and cytology             I was the supervising physician and present for the entire procedure on    09/11/2024. I also participated in the procedure.       Severe mucus plugging in the RLL with brownship/bloody secretions.    Suctioned and cleared. Will send for culture and cytology.       Romy Hill MD                 Results from last 7 days   Lab Units 09/16/24  0510 09/15/24  0558 09/14/24  0540 09/12/24  0600 09/11/24  0431 09/11/24  0431   WBC Thousand/uL 8.54 10.70* 7.39 7.91  --  10.93*   HEMOGLOBIN g/dL 9.1* 9.7* 9.2* 9.4*  --  9.5*   HEMATOCRIT % 28.5* 30.3* 28.4* 29.4*  --  29.0*   PLATELETS Thousands/uL 187 180 154 184  --  187   TOTAL NEUT ABS Thousands/µL 6.73 8.93* 6.87*  5.83 6.58   < >  --    BANDS PCT %  --   --  4  --   --   --     < > = values in this interval not displayed.     Results from last 7 days   Lab Units 09/14/24  0540   RETIC CT ABS  26,700   RETIC CT PCT % 0.94     Results from last 7 days   Lab Units 09/16/24  0510 09/15/24  0558 09/14/24  0540 09/13/24 2154 09/12/24 2148 09/12/24  0600 09/11/24  0431 09/10/24  0432   SODIUM mmol/L 143 143 145 144 145 146 144 142   POTASSIUM mmol/L 4.2 4.1 4.1 4.3 3.6 3.9 4.2 4.1   CHLORIDE mmol/L 103 104 104 104 107 106 104 102   CO2 mmol/L 37* 36* 36* 36* 32 33* 31  34*   ANION GAP mmol/L 3* 3* 5 4 6 7 9 6   BUN mg/dL 25 29* 29* 26* 27* 33* 38* 39*   CREATININE mg/dL 0.61 0.62 0.63 0.63 0.64 0.63 0.75 0.75   EGFR ml/min/1.73sq m 105 104 104 104 103 104 96 96   CALCIUM mg/dL 8.0* 8.2* 8.3* 8.8 8.6 8.7 8.9 8.4   CALCIUM, IONIZED mmol/L 1.13  --  1.14  --   --  1.15 1.16 1.11*   MAGNESIUM mg/dL 1.8* 1.9 2.3 2.0 1.6* 1.9 2.4 2.1   PHOSPHORUS mg/dL 2.9 2.8 4.3* 5.3* 2.0* 2.1* 3.1 3.9     Results from last 7 days   Lab Units 09/13/24 2154 09/10/24  0432   AST U/L 29 166*   ALT U/L 77* 249*   ALK PHOS U/L 86 68   TOTAL PROTEIN g/dL 6.9 6.0*   ALBUMIN g/dL 3.3* 2.8*   TOTAL BILIRUBIN mg/dL 1.24* 1.21*   BILIRUBIN DIRECT mg/dL  --  0.68*     Results from last 7 days   Lab Units 09/15/24  2336 09/15/24  1610 09/15/24  1145 09/15/24  0632 09/15/24  0021 09/14/24  1803 09/14/24  1633 09/14/24  1210 09/14/24  1208 09/14/24  0606 09/14/24  0232 09/13/24  1735   POC GLUCOSE mg/dl 140 157* 123 118 155* 105 142* 80 56* 72 105 110     Results from last 7 days   Lab Units 09/16/24  0510 09/15/24  0558 09/14/24  0540 09/13/24  2154 09/12/24  2148 09/12/24  0600 09/11/24  0431 09/10/24  0432   GLUCOSE RANDOM mg/dL 119 164* 88 118 130 151* 121 144*           Results from last 7 days   Lab Units 09/14/24  0553 09/13/24  1344 09/11/24  1155   PH ART  7.413 7.448 7.502*   PCO2 ART mm Hg 52.0* 48.0* 37.6   PO2 ART mm Hg 149.1* 220.6* 77.0   HCO3 ART mmol/L 32.4* 32.5* 28.8*   BASE EXC ART mmol/L 6.8 7.4 5.4   O2 CONTENT ART mL/dL 13.8* 16.2 13.9*   O2 HGB, ARTERIAL % 98.1* 98.6* 94.4   ABG SOURCE  Line, Arterial Radial, Left Line, Arterial   NON VENT TYPE HFNC   --  HFNC Flow  --    HFNC FLOW LPM   --  50  --                  Results from last 7 days   Lab Units 09/16/24  0840   HS TNI 0HR ng/L 13         Results from last 7 days   Lab Units 09/16/24  0510 09/15/24  0558 09/14/24  2333   PTT seconds 72* 77* 75*                   Results from last 7 days   Lab Units 09/16/24  0840   BNP pg/mL 402*      Results from last 7 days   Lab Units 09/14/24  0540   FERRITIN ng/mL 363*   IRON SATURATION % 32   IRON ug/dL 33*   TIBC ug/dL 102*                         Medications:   Scheduled Medications:  budesonide, 0.5 mg, Nebulization, Q12H  chlorhexidine, 15 mL, Mouth/Throat, Q12H [place]  vitamin B-12, 100 mcg, Oral, Daily  folic acid, 1 mg, Oral, Daily  furosemide, 40 mg, Oral, Daily  gabapentin, 400 mg, Per NG Tube, BID  ipratropium, 0.5 mg, Nebulization, TID  levalbuterol, 1.25 mg, Nebulization, TID  magnesium sulfate, 2 g, Intravenous, Once  nicotine, 1 patch, Transdermal, Daily  phenytoin, 50 mg, Oral, Q12H [place]  polyethylene glycol, 17 g, Oral, BID  senna-docusate sodium, 1 tablet, Oral, BID  traZODone, 100 mg, Oral, HS  dexmedeTOMIDine (Precedex) 400 mcg in sodium chloride 0.9% 100 mL  Rate: 1.5-18.2 mL/hr Dose: 0.1-1.2 mcg/kg/hr  Weight Dosing Info: 60.8 kg  Freq: Titrated Route: IV  Last Dose: 1 mcg/kg/hr (09/16/24 0928)    Continuous IV Infusions:  dexmedetomidine, 0.1-1.2 mcg/kg/hr, Intravenous, Titrated  fentaNYL, 75 mcg/hr, Intravenous, Continuous  heparin (porcine), 3-22 Units/kg/hr (Order-Specific), Intravenous, Titrated      PRN Meds:  acetaminophen, 650 mg, Oral, Q6H PRN  albuterol, 2.5 mg, Nebulization, Q6H PRN  bisacodyl, 10 mg, Rectal, Daily PRN  fentaNYL, 50 mcg, Intravenous, Q2H PRN x 1 dose 9/15   heparin (porcine), 1,800 Units, Intravenous, Q6H PRN  heparin (porcine), 3,600 Units, Intravenous, Q6H PRN  midazolam (VERSED) injection 0.5 mg x 3 doses 9/15   Dose: 0.5 mg  Freq: Every 4 hours PRN Route: IV  PRN Reason: anxiety  Start: 09/13/24 2301 End: 09/16/24 0730      Discharge Plan:  [facility]    Network Utilization Review Department  ATTENTION: Please call with any questions or concerns to [phone] and carefully listen to the prompts so that you are directed to the right person. All voicemails are confidential.   For Discharge needs, contact Care Management DC Support Team at [phone] opt.  2  Send all requests for admission clinical reviews, approved or denied determinations and any other requests to dedicated fax number below belonging to the campus where the patient is receiving treatment. List of dedicated fax numbers for the Facilities:  FACILITY NAME UR FAX NUMBER   ADMISSION DENIALS (Administrative/Medical Necessity) [phone]   DISCHARGE SUPPORT TEAM (NETWORK) [phone]   PARENT CHILD HEALTH (Maternity/NICU/Pediatrics) [phone]   [facility] [phone]   [facility] [phone]   [facility] [phone]   [facility] [phone]   [facility] [phone]   [facility] [phone]   [facility] [phone]   [facility] [phone]   [facility] [phone]   [facility] [phone]   [facility] [phone]   [facility] [phone]

## 2024-09-16 NOTE — RESPIRATORY THERAPY NOTE
RT Ventilator Management Note  Marcin Sánchez 64 y.o. male MRN: [id number]  [facility]: [id number]      Daily Screen         9/15/2024  1155 9/16/2024  0734          Patient safety screen outcome:: Failed Failed (P)       Not Ready for Weaning due to:: -- Poor inspiratory effort;Underline problem not resolved (P)                 Physical Exam:   Respiratory Pattern: Assisted  Chest Assessment: Chest expansion symmetrical  Bilateral Breath Sounds: Coarse, Diminished      Resp Comments: (P) Pt found asleep in no distress. Pt now awake and agitated with RR >45. BS coarse, sx for small amt white. Pt to be given sedation. No vent changes at this time.     ,

## 2024-09-16 NOTE — PROGRESS NOTES
Progress Note -     Name: Marcin Sánchez 64 y.o. male I MRN: [id number]  Unit/Bed#: [facility] 10 I Date of Admission: 9/8/2024   Date of Service: 9/16/2024 I Hospital Day: 8     This SmartSection is not supported in the current .     Pastoral Care Progress Note     provided brief visit with patient at suggestion of nurse. He was receptive to the visit and welcomed my prayer.  remains available.       09/16/24 0000   Clinical Encounter Type   Visited With Patient   Routine Visit Introduction   Referral From Nurse   [facility] Encounters   [facility]ai Needs Prayer   Patient Spiritual Encounters   Spiritual Encounter Notes  visited briefly with patient at suggestion of nurse. Patient indicated his openness to visit. Provided prayer.  remains availabe.

## 2024-09-16 NOTE — PROGRESS NOTES
"Progress Note - Critical Care/ICU   Name: Marcin Sánchez 64 y.o. male I MRN: [id number]  Unit/Bed#: MICU 10 I Date of Admission: 9/8/2024   Date of Service: 9/16/2024 I Hospital Day: 8         Problems:  Shock - likely vasodilatory (now improved)  Moderate pericardial effusion - no signs of tamponade on TTE  VT cardiac arrest post-intubation  Acute on chronic hypoxic respiratory failure  Suspected severe COPD with possible exacerbation  Acute/chronic CHF with severe aortic regurgitation   Abnormal CT chest possible pneumonia/aspiration event  Abnormal LFT suspected shock liver  Afib on eliquis  History of pulmonary embolism  Squamous cell carcinoma of lung post chemo/radiation  Alcohol/tobacco abuse  Seizure disorder on dilantin   History of HepC  Severe aortic regurgitation  Assessment & Plan   Neuro:   Diagnosis: encephalopathy  Goal RASS 0  Plan:   Patient on precedex and fentanyl  Went down on precedex this AM  Responds to commands and is alert  Epilepsy:   Restart phenytoin today, levels are within therapeutic range  Transaminitis downtrending    CV:   Diagnosis: Pericardial effusion   Echo on 9/9 read as \"moderate to large pericardial effusion circumferential to the heart measuring largest along the RV free wall at 2.3 cm. The fluid exhibits no internal echoes. There is no echocardiographic evidence of tamponade.\"  Plan:   Monitor for signs of tamponade via physical exam  Consider additional diuresis for fluid balances  Resume metoprolol XL 25 mg daily for paroxysmal AFib     Pulm:  Diagnosis: Intubated  In the setting of encephalopathy and hypoxia  Patient failed trial of BiPAP after being extubated on 9/9 and was re-intubated. Failed another trial of extubation on 9/13 and was re-intubated.  Xopenex, atrovent TID  Pulmicort BID  Perforomist BID  Patient is off steroids at this time   Given multiple re-intubations, patient is a tracheostomy candidate  Patient and family are OK with this    GI:   Diagnosis: " Transaminitis - improved  Likely in setting of shock liver    :   No active issues    F/E/N:   F: No fluids  E: Replete electrolytes K>4, Phos>3, Mg>2  N: Tube feeds      Heme/Onc:   Diagnosis: anemia  Hemolytic workup negative, iron panel revealed AoCD pattern  Stable @ 9.1 from 9.7     Endo:   No active issues    ID:   Diagnosis: Abnormal CT concerning for potential pneumonia  Sputum cultures grew moraxella and acinetobacter  Plan:   ID recommended to continue to follow closely off antibiotics as patient is clinically stable without fever and mild WBC 2/2 to steroids.  If were to clinically deteriorate could start Minocycline  Trend WBCs, fever curve  WBC count 8.54 today from `10.70    MSK/Skin:   No active issues  Disposition: Critical care    ICU Core Measures     Vented Patient  VAP Bundle  VAP bundle ordered     A: Assess, Prevent, and Manage Pain Has pain been assessed? Yes  Need for changes to pain regimen? No   B: Both Spontaneous Awakening Trials (SATs) and Spontaneous Breathing Trials (SBTs) Plan to perform spontaneous awakening trial today? Yes   Plan to perform spontaneous breathing trial today? Yes   Obvious barriers to extubation? No   C: Choice of Sedation RASS Goal: 0 Alert and Calm  Need for changes to sedation or analgesia regimen? No   D: Delirium CAM-ICU: Negative   E: Early Mobility  Plan for early mobility? No   F: Family Engagement Plan for family engagement today? Yes       Review of Invasive Devices:    [place] Plan: Continue for accurate I/O monitoring for 48 hours  Central access plan: Hemodynamic monitoring      Prophylaxis:  VTE VTE covered by:  heparin (porcine), Intravenous, 20 Units/kg/hr at 09/15/24 2105  heparin (porcine), Intravenous, 1,800 Units at 09/11/24 0342  heparin (porcine), Intravenous       Stress Ulcer  covered bypantoprazole (PROTONIX) injection 40 mg [[id number]]         24 Hour Events   24hr events: Patient remains intubated but is alert and responds to commands.  Overnight, the patient became agitated and received 1 mg of Versed, and precedex was increased to 1.2 which improved his agitation. He became hypotensive, so precedex was brought down to 0.6.   Subjective   Review of Systems: See HPI for Review of Systems    Objective                          Vitals I/O      Most Recent Min/Max in 24hrs   Temp 98.5 °F (36.9 °C) Temp  Min: 98.5 °F (36.9 °C)  Max: 98.9 °F (37.2 °C)   Pulse 66 Pulse  Min: 61  Max: 117   Resp (!) 32 Resp  Min: 12  Max: 55   BP (!) 82/33 BP  Min: 80/43  Max: 224/125   O2 Sat 96 % SpO2  Min: 89 %  Max: 100 %      Intake/Output Summary (Last 24 hours) at 9/16/2024 0632  Last data filed at 9/16/2024 0557  Gross per 24 hour   Intake 2109.87 ml   Output 950 ml   Net 1159.87 ml       Diet Enteral/Parenteral; Tube Feeding No Oral Diet; Jevity 1.2 Mirza; Continuous; 55; Prosource Protein Liquid - One Packet; 30; Water; Every 4 hours    Invasive Monitoring   Arterial Line  Priti BP 98/45  No data recorded   MAP 67 mmHg  No data recorded           Physical Exam   Physical Exam  Eyes:      Extraocular Movements: Extraocular movements intact.      Pupils: Pupils are equal, round, and reactive to light.   Cardiovascular:      Rate and Rhythm: Normal rate and regular rhythm.      Pulses: Normal pulses.   Abdominal: General: Bowel sounds are normal.      Palpations: Abdomen is soft.      Tenderness: There is no abdominal tenderness. There is no guarding.   Constitutional:       General: He is not in acute distress.     Interventions: He is intubated.   Pulmonary:      Effort: Pulmonary effort is normal. No respiratory distress. He is intubated.      Breath sounds: Normal breath sounds. No wheezing or rales.      Comments: Mechanical breath sounds appreciated  Neurological:      Mental Status: He is alert.      Comments: Neuro exam limited but patient responds to commands and has good  strength and is able to move all extremities          Diagnostic Studies        Lab  Results: I have reviewed the following results:      Medications:  Scheduled PRN   budesonide, 0.5 mg, Q12H  chlorhexidine, 15 mL, Q12H [place]  vitamin B-12, 100 mcg, Daily  folic acid, 1 mg, Daily  formoterol, 20 mcg, Q12H  furosemide, 40 mg, Daily  insulin lispro, 1-5 Units, TID AC  ipratropium, 0.5 mg, TID  levalbuterol, 1.25 mg, TID  LORazepam, 0.25 mg, Once  nicotine, 1 patch, Daily  pantoprazole, 40 mg, Q24H [place]  polyethylene glycol, 17 g, BID  senna-docusate sodium, 1 tablet, BID  Succinylcholine Chloride, 100 mg, Once      acetaminophen, 650 mg, Q6H PRN  albuterol, 2.5 mg, Q6H PRN  bisacodyl, 10 mg, Daily PRN  fentaNYL, 50 mcg, Q2H PRN  heparin (porcine), 1,800 Units, Q6H PRN  heparin (porcine), 3,600 Units, Q6H PRN  midazolam, 0.5 mg, Q4H PRN       Continuous    dexmedetomidine, 0.1-1.2 mcg/kg/hr, Last Rate: 0.6 mcg/kg/hr (09/16/24 0629)  fentaNYL, 75 mcg/hr, Last Rate: 75 mcg/hr (09/16/24 0258)  heparin (porcine), 3-22 Units/kg/hr (Order-Specific), Last Rate: 20 Units/kg/hr (09/15/24 2105)         Labs:   CBC    Recent Labs     09/15/24  0558 09/16/24  0510   WBC 10.70* 8.54   HGB 9.7* 9.1*   HCT 30.3* 28.5*    187     BMP    Recent Labs     09/15/24  0558 09/16/24  0510   SODIUM 143 143   K 4.1 4.2    103   CO2 36* 37*   AGAP 3* 3*   BUN 29* 25   CREATININE 0.62 0.61   CALCIUM 8.2* 8.0*       Coags    Recent Labs     09/15/24  0558 09/16/24  0510   PTT 77* 72*        Additional Electrolytes  Recent Labs     09/15/24  0558 09/16/24  0510   MG 1.9 1.8*   PHOS 2.8 2.9   CAIONIZED  --  1.13          Blood Gas    No recent results  No recent results LFTs  No recent results    Infectious  No recent results  Glucose  Recent Labs     09/15/24  0558 09/16/24  0510   GLUC 164* 119

## 2024-09-16 NOTE — ASSESSMENT & PLAN NOTE
Without tamponade.  Cardiology follow-up ongoing.   Topical Sulfur Applications Pregnancy And Lactation Text: This medication is Pregnancy Category C and has an unknown safety profile during pregnancy. It is unknown if this topical medication is excreted in breast milk.

## 2024-09-16 NOTE — ASSESSMENT & PLAN NOTE
Wt Readings from Last 3 Encounters:   09/16/24 70.2 kg (154 lb 12.2 oz)   09/08/24 60.8 kg (134 lb)   08/29/24 67.6 kg (149 lb)   With severe aortic insufficiency.  Undergoing diuresis

## 2024-09-16 NOTE — CONSULTS
Ethical concern noted for 'complicated guardian / family situation' on the weekend.  In brief, advised team to continue providing life-prolonging care as medically indicated.    In closer review of the case, the pt's listed / reported Guardian appears to practice out-of-State, suggesting that the Guardian was assigned at a much earlier stage in the patient's life.  [place] would honor guardianship appointment from [place], but it would be best to have the original court appointment of guardian, or a copy of such a form, on file in our records.  At this time, we lack this document.    Contact information from the [facility] facility does provide a mobile, work, and email address for the guardian.  We should continue to use these contacts to try to establish communication with the guardian.    In absence of contact with the guardian, it is likely NOT appropriate to obtain consent from any family or community member.  Speaking with these persons may be defensible, but guardians are specifically assigned by a court in order to protect a patient from bad decisions made by themselves or their family.  We do appreciate that the medical team would recommend trach/PEG placement, and that the pt and family have been agreeable to this approach.  We would phrase this as assent or agreement, NOT consent.    In this case, without formal consent from guardian, it is more permissible to move forward with medically recommended treatment using implied or presumed consent for life-prolonging care in the case of an incompetent or incapacitated individual (EMTALA).      Will defer further Ethics consultation at this time; should ongoing efforts to reach the guardian prove unsuccessful to arrange cares and dispo, it may be appropriate to petition for a [place]-based guardian for this pt.  Would defer this process to our CM and Legal teams.    Chris Head MD  You can find me on [employer]!

## 2024-09-16 NOTE — ASSESSMENT & PLAN NOTE
Present on admission.  Suspect multifactorial due to severe COPD with possible exacerbation, mucus plug.  Status post therapeutic bronch x3.  Unclear if actual pneumonia with minimal infiltrate versus atelectasis seen on CT chest.  Sputum culture with Moraxella, status post 5 days azithromycin.  Also with MDR-Acinetobacter which may represent contaminants versus colonizers, as has remained stable without effective Acinetobacter treatment.  Status post intubation now x3.  With most recent failure of extubation due recurrent mucus plug.  Low clinical suspicion for infection or pneumonia..  Clinically stable without sepsis.      Continue to follow closely off antibiotics  Follow respiratory status closely  Pulmonary toilet  Patient/family to pursue trach  Follow temperatures closely  Supportive care as per the primary service    Antibiotics:  Off antibiotics #6

## 2024-09-16 NOTE — PLAN OF CARE
Problem: Prexisting or High Potential for Compromised Skin Integrity  Goal: Skin integrity is maintained or improved  Description: INTERVENTIONS:  - Identify patients at risk for skin breakdown  - Assess and monitor skin integrity  - Assess and monitor nutrition and hydration status  - Monitor labs   - Assess for incontinence   - Turn and reposition patient  - Assist with mobility/ambulation  - Relieve pressure over bony prominences  - Avoid friction and shearing  - Provide appropriate hygiene as needed including keeping skin clean and dry  - Evaluate need for skin moisturizer/barrier cream  - Collaborate with interdisciplinary team   - Patient/family teaching  - Consider wound care consult   Outcome: Progressing     Problem: SAFETY,RESTRAINT: NV/NON-SELF DESTRUCTIVE BEHAVIOR  Goal: Remains free of harm/injury (restraint for non violent/non self-detsructive behavior)  Description: INTERVENTIONS:  - Instruct patient/family regarding restraint use   - Assess and monitor physiologic and psychological status   - Provide interventions and comfort measures to meet assessed patient needs   - Identify and implement measures to help patient regain control  - Assess readiness for release of restraint   Outcome: Progressing  Goal: Returns to optimal restraint-free functioning  Description: INTERVENTIONS:  - Assess the patient's behavior and symptoms that indicate continued need for restraint  - Identify and implement measures to help patient regain control  - Assess readiness for release of restraint   Outcome: Progressing     Problem: PAIN - ADULT  Goal: Verbalizes/displays adequate comfort level or baseline comfort level  Description: Interventions:  - Encourage patient to monitor pain and request assistance  - Assess pain using appropriate pain scale  - Administer analgesics based on type and severity of pain and evaluate response  - Implement non-pharmacological measures as appropriate and evaluate response  - Consider  cultural and social influences on pain and pain management  - Notify physician/advanced practitioner if interventions unsuccessful or patient reports new pain  Outcome: Progressing     Problem: INFECTION - ADULT  Goal: Absence or prevention of progression during hospitalization  Description: INTERVENTIONS:  - Assess and monitor for signs and symptoms of infection  - Monitor lab/diagnostic results  - Monitor all insertion sites, i.e. indwelling lines, tubes, and drains  - Monitor endotracheal if appropriate and nasal secretions for changes in amount and color  - [place] appropriate cooling/warming therapies per order  - Administer medications as ordered  - Instruct and encourage patient and family to use good hand hygiene technique  - Identify and instruct in appropriate isolation precautions for identified infection/condition  Outcome: Progressing  Goal: Absence of fever/infection during neutropenic period  Description: INTERVENTIONS:  - Monitor WBC    Outcome: Progressing     Problem: SAFETY ADULT  Goal: Patient will remain free of falls  Description: INTERVENTIONS:  - Educate patient/family on patient safety including physical limitations  - Instruct patient to call for assistance with activity   - Consult OT/PT to assist with strengthening/mobility   - Keep Call bell within reach  - Keep bed low and locked with side rails adjusted as appropriate  - Keep care items and personal belongings within reach  - Initiate and maintain comfort rounds  - Make Fall Risk Sign visible to staff  - Offer Toileting every 2 Hours, in advance of need  - Initiate/Maintain necessary alarms  - Obtain necessary fall risk management equipment: yellow bracelet  - Apply yellow socks and bracelet for high fall risk patients  - Consider moving patient to room near nurses station  Outcome: Progressing  Goal: Maintain or return to baseline ADL function  Description: INTERVENTIONS:  -  Assess patient's ability to carry out ADLs; assess  patient's baseline for ADL function and identify physical deficits which impact ability to perform ADLs (bathing, care of mouth/teeth, toileting, grooming, dressing, etc.)  - Assess/evaluate cause of self-care deficits   - Assess range of motion  - Assess patient's mobility; develop plan if impaired  - Assess patient's need for assistive devices and provide as appropriate  - Encourage maximum independence but intervene and supervise when necessary  - Involve family in performance of ADLs  - Assess for home care needs following discharge   - Consider OT consult to assist with ADL evaluation and planning for discharge  - Provide patient education as appropriate  Outcome: Progressing  Goal: Maintains/Returns to pre admission functional level  Description: INTERVENTIONS:  - Perform AM-PAC 6 Click Basic Mobility/ Daily Activity assessment daily.  - Set and communicate daily mobility goal to care team and patient/family/caregiver.   - Collaborate with rehabilitation services on mobility goals if consulted  - Perform Range of Motion 3 times a day.  - Reposition patient every 2 hours.  - Dangle patient 3 times a day  - Stand patient 3 times a day  - Ambulate patient 3 times a day  - Out of bed to chair 3 times a day   - Out of bed for meals 3 times a day  - Out of bed for toileting  - Record patient progress and toleration of activity level   Outcome: Progressing     Problem: DISCHARGE PLANNING  Goal: Discharge to home or other facility with appropriate resources  Description: INTERVENTIONS:  - Identify barriers to discharge w/patient and caregiver  - Arrange for needed discharge resources and transportation as appropriate  - Identify discharge learning needs (meds, wound care, etc.)  - Arrange for interpretive services to assist at discharge as needed  - Refer to Case Management Department for coordinating discharge planning if the patient needs post-hospital services based on physician/advanced practitioner order or  complex needs related to functional status, cognitive ability, or social support system  Outcome: Progressing     Problem: Knowledge Deficit  Goal: Patient/family/caregiver demonstrates understanding of disease process, treatment plan, medications, and discharge instructions  Description: Complete learning assessment and assess knowledge base.  Interventions:  - Provide teaching at level of understanding  - Provide teaching via preferred learning methods  Outcome: Progressing     Problem: Nutrition/Hydration-ADULT  Goal: Nutrient/Hydration intake appropriate for improving, restoring or maintaining nutritional needs  Description: Monitor and assess patient's nutrition/hydration status for malnutrition. Collaborate with interdisciplinary team and initiate plan and interventions as ordered.  Monitor patient's weight and dietary intake as ordered or per policy. Utilize nutrition screening tool and intervene as necessary. Determine patient's food preferences and provide high-protein, high-caloric foods as appropriate.     INTERVENTIONS:  - Monitor oral intake, urinary output, labs, and treatment plans  - Assess nutrition and hydration status and recommend course of action  - Evaluate amount of meals eaten  - Assist patient with eating if necessary   - Allow adequate time for meals  - Recommend/ encourage appropriate diets, oral nutritional supplements, and vitamin/mineral supplements  - Order, calculate, and assess calorie counts as needed  - Recommend, monitor, and adjust tube feedings and TPN/PPN based on assessed needs  - Assess need for intravenous fluids  - Provide specific nutrition/hydration education as appropriate  - Include patient/family/caregiver in decisions related to nutrition  Outcome: Progressing

## 2024-09-16 NOTE — PROGRESS NOTES
"Cardiology Team 2 Progress Note - Marcin Sánchez 64 y.o. male MRN: [id number]    [facility]: [id number]          Subjective:   Patient seen and examined.  No significant events overnight. Remains intubated this morning.    Hospital Course:   Marcin Sánchez is a 64 y.o. year old male who presented to [facility] 9/5 with acute respiratory failure secondary COPD exacerbation.  He has past medical history significant for atrial fibrillation, COPD, chronic combined systolic and diastolic CHF, epilepsy, pulmonary fibrosis.  Radiation, history of squamous cell lung cancer, current tobacco user.  Smoking outside his living facility (the [facility]) when he was noted to become acutely confused.  He also.  Short of breath was noted to be hypoxemic prompting call to EMS.  Upon arrival he was lethargic and confused which is thought to be secondary to hypercarbic respiratory failure in the setting of COPD exacerbation and was initiated on BiPAP.  On 9/7 patient had acute hypoxic event resulting in bradycardia and subsequently PEA cardiac arrest 2 rounds of CPR with VT noted during a pulse check. Shock was administered and ROSC was achieved and he was moving all extremities. She was subsequently intubated.  Echocardiogram rest showed LVEF 55% with normal systolic function with moderate pericardial effusion.  He was transferred to [facility] for thoracic surgery oncology evaluation. There were no clinical signs of tamponade and therefore window and pericardiocentesis were not performed. Attempted extubation to BiPAP 9/10 however failed and required re-intubation due to hypercarbia. He underwent bronchoscopy 9/11 with large amount of mucus removed and mucus plugging removed.    Vitals: Blood pressure (!) 82/33, pulse 66, temperature 98.5 °F (36.9 °C), temperature source Oral, resp. rate (!) 32, height 5' 9\" (1.753 m), weight 70.2 kg (154 lb 12.2 oz), SpO2 96%., Body mass index is 22.85 kg/m²., "   Orthostatic Blood Pressures      Flowsheet Row Most Recent Value   Blood Pressure 82/33 filed at 09/16/2024 0600   Patient Position - Orthostatic VS Sitting filed at 09/16/2024 0400              Intake/Output Summary (Last 24 hours) at 9/16/2024 0844  Last data filed at 9/16/2024 0557  Gross per 24 hour   Intake 1831.47 ml   Output 870 ml   Net 961.47 ml       Review of System:  Review of system was conducted and was negative except for as stated in the subjective course.    Physical Exam:    GEN: Marcin Sánchez appears well, alert and oriented x 3, pleasant and cooperative   HEENT:  Normocephalic, atraumatic, anicteric, moist mucous membranes  NECK: No JVD or carotid bruits   HEART: regular rhythm, regular rate, normal S1 and S2, no murmurs, clicks, gallops or rubs   LUNGS: Intubated this morning. Significant expiratory wheezing b/l.  ABDOMEN:  Normoactive bowel sounds, soft, no tenderness, no distention  EXTREMITIES: peripheral pulses palpable; no edema  NEURO: no gross focal findings; cranial nerves grossly intact   SKIN:  Dry, intact, warm to touch      Current Facility-Administered Medications:     acetaminophen (TYLENOL) tablet 650 mg, 650 mg, Oral, Q6H PRN, LANDON Desai, 650 mg at 09/11/24 0441    albuterol inhalation solution 2.5 mg, 2.5 mg, Nebulization, Q6H PRN, Brian Poe MD, 2.5 mg at 09/16/24 0819    bisacodyl (DULCOLAX) rectal suppository 10 mg, 10 mg, Rectal, Daily PRN, Axel Smith PA-C, 10 mg at 09/13/24 0803    budesonide (PULMICORT) inhalation solution 0.5 mg, 0.5 mg, Nebulization, Q12H, LANDON Desai, 0.5 mg at 09/16/24 0713    chlorhexidine (PERIDEX) 0.12 % oral rinse 15 mL, 15 mL, Mouth/Throat, Q12H AMERICA, Tobi Hook MD, 15 mL at 09/16/24 0044    cyanocobalamin (VITAMIN B-12) tablet 100 mcg, 100 mcg, Oral, Daily, Axel Smith PA-C, 100 mcg at 09/15/24 0854    dexmedeTOMIDine (Precedex) 400 mcg in sodium chloride 0.9% 100 mL, 0.1-1.2  mcg/kg/hr, Intravenous, Titrated, Axel Smith PA-C, Last Rate: 12.2 mL/hr at 09/16/24 0744, 0.8 mcg/kg/hr at 09/16/24 0744    fentaNYL 1000 mcg in sodium chloride 0.9% 100mL infusion, 75 mcg/hr, Intravenous, Continuous, Tank Dawson MD, Last Rate: 7.5 mL/hr at 09/16/24 0258, 75 mcg/hr at 09/16/24 0258    fentaNYL injection 50 mcg, 50 mcg, Intravenous, Q2H PRN, Kentrell Hughes MD, 50 mcg at 09/16/24 0747    folic acid (FOLVITE) tablet 1 mg, 1 mg, Oral, Daily, Axel Smith PA-C, 1 mg at 09/15/24 0854    formoterol (PERFOROMIST) nebulizer solution 20 mcg, 20 mcg, Nebulization, Q12H, Tobi Hook MD, 20 mcg at 09/16/24 0713    furosemide (LASIX) tablet 40 mg, 40 mg, Oral, Daily, Enrique Medina DO, 40 mg at 09/15/24 0854    heparin (porcine) 25,000 units in 0.45% NaCl 250 mL infusion (premix), 3-22 Units/kg/hr (Order-Specific), Intravenous, Titrated, Emerson Langston MD, Last Rate: 12 mL/hr at 09/15/24 2105, 20 Units/kg/hr at 09/15/24 2105    heparin (porcine) injection 1,800 Units, 1,800 Units, Intravenous, Q6H PRN, Axel Smith PA-C, 1,800 Units at 09/11/24 0342    heparin (porcine) injection 3,600 Units, 3,600 Units, Intravenous, Q6H PRN, Axel Smith PA-C    insulin lispro (HumALOG/ADMELOG) 100 units/mL subcutaneous injection 1-5 Units, 1-5 Units, Subcutaneous, TID AC, Tobi Hook MD, 1 Units at 09/15/24 1621    levalbuterol (XOPENEX) inhalation solution 1.25 mg, 1.25 mg, Nebulization, TID, Brian Poe MD, 1.25 mg at 09/16/24 0713    magnesium sulfate 2 g/50 mL IVPB (premix) 2 g, 2 g, Intravenous, Once, Elle Ortiz DO    nicotine (NICODERM CQ) 21 mg/24 hr TD 24 hr patch 1 patch, 1 patch, Transdermal, Daily, LANDON Desai, 1 patch at 09/15/24 0904    pantoprazole (PROTONIX) injection 40 mg, 40 mg, Intravenous, Q24H AMERICA, LANDON Desai, 40 mg at 09/15/24 0854    polyethylene glycol (MIRALAX) packet 17 g, 17 g, Oral, BID, Emerson  MD Kian, 17 g at 09/15/24 0854    senna-docusate sodium (SENOKOT S) 8.6-50 mg per tablet 1 tablet, 1 tablet, Oral, BID, Emerson Langston MD, 1 tablet at 09/15/24 0854    Succinylcholine Chloride 100 mg/5 mL syringe 100 mg, 100 mg, Intravenous, Once, Kentrell Hughes MD    Labs & Results:          Results from last 7 days   Lab Units 09/16/24  0510 09/15/24  0558 09/14/24  0540   POTASSIUM mmol/L 4.2 4.1 4.1   CO2 mmol/L 37* 36* 36*   CHLORIDE mmol/L 103 104 104   BUN mg/dL 25 29* 29*   CREATININE mg/dL 0.61 0.62 0.63     Results from last 7 days   Lab Units 09/16/24  0510 09/15/24  0558 09/14/24  0540   HEMOGLOBIN g/dL 9.1* 9.7* 9.2*   HEMATOCRIT % 28.5* 30.3* 28.4*   PLATELETS Thousands/uL 187 180 154     Results from last 7 days   Lab Units 09/12/24  0600   HEMOGLOBIN A1C % 5.6       Telemetry:   Personally reviewed by Adam Nunez, DO: normal sinus rhythm with periods of tachycardia      Assessment:  Principal Problem:    Acute hypoxic respiratory failure (HCC)  Active Problems:    Acute exacerbation of chronic obstructive pulmonary disease (COPD) (HCC)    Cancer of right main bronchus (HCC)    Chronic combined systolic and diastolic CHF (congestive heart failure) (HCC)    Cardiac arrest (HCC)    Pericardial effusion    Shock (HCC)    Mucus plugging of bronchi      Pericardial effusion  Moderate Sized pericardial effusion noted on  the last 3 echocardiograms 8/29, 9/8, and 9/9. Effusion predominantly around RV and RA free wall with smaller portion tracking around the apex. Does not appear to have changes much since 8/29. Currenlty no clinical or echocardiographic signs of tamponade. He is intubated and sedated for acute respiratry failure form COPD and mucus plugs.  - interval echocardiogram 9/11 with stable effusion and no evidence of tamponade  Plan:  - no evidence of tamponade, no urgent indication at this time for pericardiocentesis  - If there is acute decompensation recommend fluids and repeat echo to  evaluate effusion, if evidence of tamponade then would proceed with pericardial window vs pericardiocentesis  - Continue Lasix 40mg daily  - recommend cancer screenings when stable to due so     Cardiac arrest  Cardiac arrest appears to be in the setting of hypoxemia. Initial rhythm reported as PEA with ROSC then VT arrest. Possibly transient ischemia during PEA leating to VT. After intubation does not appear to have any further ectopy. Echocardiogram with LVEF 50-55%.  - restart BB when remaining hemodynamically stable for 24 hours  - continue treatment for COPD exacerbation as per primary team     Chronic combined systolic and diastolic heart failure  Appears euvolemic on exam. Currently requireing pressors. Chronically on Lasix 40mg daily and toprol 25mg daily.  - holding BB after cardiac arrest and with effusion, may restart when BP more stable  - appears euvolemic today however difficult intravascular volume exam.     Paroxysmal atrial fibrillation  Current yin sinus rhythm. Rates are controlled.  - restart metoprolol when BP more stable  - continue AC with heparin currently, once no furhter procedures required transition back to [facility].     Acute Hypoxemic Respiratory failure- intubated and sedated, wean as able as per primary team  COPD- steroids and inhalers as per primary.  Pulmonary fibrosis  Current Tobacco use- tobacco cessation recomended    Case discussed and reviewed with Dr. Wallace who agrees with my assessment and plan.    Thank you for involving us in the care of your patient.      Adam Nunez,   Cardiology Fellow   PGY-5      ** Please Note: Fluency DirectDictation voice to text software may have been used in the creation of this document. **

## 2024-09-16 NOTE — PLAN OF CARE
Problem: Prexisting or High Potential for Compromised Skin Integrity  Goal: Skin integrity is maintained or improved  Description: INTERVENTIONS:  - Identify patients at risk for skin breakdown  - Assess and monitor skin integrity  - Assess and monitor nutrition and hydration status  - Monitor labs   - Assess for incontinence   - Turn and reposition patient  - Assist with mobility/ambulation  - Relieve pressure over bony prominences  - Avoid friction and shearing  - Provide appropriate hygiene as needed including keeping skin clean and dry  - Evaluate need for skin moisturizer/barrier cream  - Collaborate with interdisciplinary team   - Patient/family teaching  - Consider wound care consult   Outcome: Progressing     Problem: SAFETY,RESTRAINT: NV/NON-SELF DESTRUCTIVE BEHAVIOR  Goal: Remains free of harm/injury (restraint for non violent/non self-detsructive behavior)  Description: INTERVENTIONS:  - Instruct patient/family regarding restraint use   - Assess and monitor physiologic and psychological status   - Provide interventions and comfort measures to meet assessed patient needs   - Identify and implement measures to help patient regain control  - Assess readiness for release of restraint   Outcome: Progressing  Goal: Returns to optimal restraint-free functioning  Description: INTERVENTIONS:  - Assess the patient's behavior and symptoms that indicate continued need for restraint  - Identify and implement measures to help patient regain control  - Assess readiness for release of restraint   Outcome: Progressing     Problem: PAIN - ADULT  Goal: Verbalizes/displays adequate comfort level or baseline comfort level  Description: Interventions:  - Encourage patient to monitor pain and request assistance  - Assess pain using appropriate pain scale  - Administer analgesics based on type and severity of pain and evaluate response  - Implement non-pharmacological measures as appropriate and evaluate response  - Consider  cultural and social influences on pain and pain management  - Notify physician/advanced practitioner if interventions unsuccessful or patient reports new pain  Outcome: Progressing     Problem: INFECTION - ADULT  Goal: Absence or prevention of progression during hospitalization  Description: INTERVENTIONS:  - Assess and monitor for signs and symptoms of infection  - Monitor lab/diagnostic results  - Monitor all insertion sites, i.e. indwelling lines, tubes, and drains  - Monitor endotracheal if appropriate and nasal secretions for changes in amount and color  - [place] appropriate cooling/warming therapies per order  - Administer medications as ordered  - Instruct and encourage patient and family to use good hand hygiene technique  - Identify and instruct in appropriate isolation precautions for identified infection/condition  Outcome: Progressing  Goal: Absence of fever/infection during neutropenic period  Description: INTERVENTIONS:  - Monitor WBC    Outcome: Progressing     Problem: SAFETY ADULT  Goal: Patient will remain free of falls  Description: INTERVENTIONS:  - Educate patient/family on patient safety including physical limitations  - Instruct patient to call for assistance with activity   - Consult OT/PT to assist with strengthening/mobility   - Keep Call bell within reach  - Keep bed low and locked with side rails adjusted as appropriate  - Keep care items and personal belongings within reach  - Initiate and maintain comfort rounds  - Make Fall Risk Sign visible to staff  - Apply yellow socks and bracelet for high fall risk patients  - Consider moving patient to room near nurses station  Outcome: Progressing  Goal: Maintain or return to baseline ADL function  Description: INTERVENTIONS:  -  Assess patient's ability to carry out ADLs; assess patient's baseline for ADL function and identify physical deficits which impact ability to perform ADLs (bathing, care of mouth/teeth, toileting, grooming, dressing,  etc.)  - Assess/evaluate cause of self-care deficits   - Assess range of motion  - Assess patient's mobility; develop plan if impaired  - Assess patient's need for assistive devices and provide as appropriate  - Encourage maximum independence but intervene and supervise when necessary  - Involve family in performance of ADLs  - Assess for home care needs following discharge   - Consider OT consult to assist with ADL evaluation and planning for discharge  - Provide patient education as appropriate  Outcome: Progressing  Goal: Maintains/Returns to pre admission functional level  Description: INTERVENTIONS:  - Perform AM-PAC 6 Click Basic Mobility/ Daily Activity assessment daily.  - Set and communicate daily mobility goal to care team and patient/family/caregiver.   - Collaborate with rehabilitation services on mobility goals if consulted  - Record patient progress and toleration of activity level   Outcome: Progressing     Problem: DISCHARGE PLANNING  Goal: Discharge to home or other facility with appropriate resources  Description: INTERVENTIONS:  - Identify barriers to discharge w/patient and caregiver  - Arrange for needed discharge resources and transportation as appropriate  - Identify discharge learning needs (meds, wound care, etc.)  - Arrange for interpretive services to assist at discharge as needed  - Refer to Case Management Department for coordinating discharge planning if the patient needs post-hospital services based on physician/advanced practitioner order or complex needs related to functional status, cognitive ability, or social support system  Outcome: Progressing     Problem: Knowledge Deficit  Goal: Patient/family/caregiver demonstrates understanding of disease process, treatment plan, medications, and discharge instructions  Description: Complete learning assessment and assess knowledge base.  Interventions:  - Provide teaching at level of understanding  - Provide teaching via preferred learning  methods  Outcome: Progressing     Problem: Nutrition/Hydration-ADULT  Goal: Nutrient/Hydration intake appropriate for improving, restoring or maintaining nutritional needs  Description: Monitor and assess patient's nutrition/hydration status for malnutrition. Collaborate with interdisciplinary team and initiate plan and interventions as ordered.  Monitor patient's weight and dietary intake as ordered or per policy. Utilize nutrition screening tool and intervene as necessary. Determine patient's food preferences and provide high-protein, high-caloric foods as appropriate.     INTERVENTIONS:  - Monitor oral intake, urinary output, labs, and treatment plans  - Assess nutrition and hydration status and recommend course of action  - Evaluate amount of meals eaten  - Assist patient with eating if necessary   - Allow adequate time for meals  - Recommend/ encourage appropriate diets, oral nutritional supplements, and vitamin/mineral supplements  - Order, calculate, and assess calorie counts as needed  - Recommend, monitor, and adjust tube feedings and TPN/PPN based on assessed needs  - Assess need for intravenous fluids  - Provide specific nutrition/hydration education as appropriate  - Include patient/family/caregiver in decisions related to nutrition  Outcome: Progressing

## 2024-09-16 NOTE — QUICK NOTE
Reached out to patient sister to provide an update about tracheostomy, but she was unable to answer the phone. Will call again in the morning.

## 2024-09-17 LAB
ANION GAP SERPL CALCULATED.3IONS-SCNC: 3 MMOL/L (ref 4–13)
APTT PPP: 56 SECONDS (ref 23–34)
APTT PPP: 67 SECONDS (ref 23–34)
BASOPHILS # BLD AUTO: 0.04 THOUSANDS/ΜL (ref 0–0.1)
BASOPHILS NFR BLD AUTO: 0 % (ref 0–1)
BUN SERPL-MCNC: 22 MG/DL (ref 5–25)
CA-I BLD-SCNC: 1.13 MMOL/L (ref 1.12–1.32)
CALCIUM SERPL-MCNC: 8.3 MG/DL (ref 8.4–10.2)
CHLORIDE SERPL-SCNC: 99 MMOL/L (ref 96–108)
CO2 SERPL-SCNC: 37 MMOL/L (ref 21–32)
CREAT SERPL-MCNC: 0.55 MG/DL (ref 0.6–1.3)
EOSINOPHIL # BLD AUTO: 0.19 THOUSAND/ΜL (ref 0–0.61)
EOSINOPHIL NFR BLD AUTO: 2 % (ref 0–6)
ERYTHROCYTE [DISTWIDTH] IN BLOOD BY AUTOMATED COUNT: 14.1 % (ref 11.6–15.1)
GFR SERPL CREATININE-BSD FRML MDRD: 110 ML/MIN/1.73SQ M
GLUCOSE SERPL-MCNC: 104 MG/DL (ref 65–140)
GLUCOSE SERPL-MCNC: 111 MG/DL (ref 65–140)
GLUCOSE SERPL-MCNC: 123 MG/DL (ref 65–140)
GLUCOSE SERPL-MCNC: 67 MG/DL (ref 65–140)
GLUCOSE SERPL-MCNC: 84 MG/DL (ref 65–140)
GLUCOSE SERPL-MCNC: 95 MG/DL (ref 65–140)
HCT VFR BLD AUTO: 31.6 % (ref 36.5–49.3)
HGB BLD-MCNC: 10 G/DL (ref 12–17)
IMM GRANULOCYTES # BLD AUTO: 0.05 THOUSAND/UL (ref 0–0.2)
IMM GRANULOCYTES NFR BLD AUTO: 1 % (ref 0–2)
INR PPP: 1.13 (ref 0.85–1.19)
LYMPHOCYTES # BLD AUTO: 0.63 THOUSANDS/ΜL (ref 0.6–4.47)
LYMPHOCYTES NFR BLD AUTO: 7 % (ref 14–44)
MAGNESIUM SERPL-MCNC: 2.1 MG/DL (ref 1.9–2.7)
MCH RBC QN AUTO: 31.8 PG (ref 26.8–34.3)
MCHC RBC AUTO-ENTMCNC: 31.6 G/DL (ref 31.4–37.4)
MCV RBC AUTO: 101 FL (ref 82–98)
MONOCYTES # BLD AUTO: 0.91 THOUSAND/ΜL (ref 0.17–1.22)
MONOCYTES NFR BLD AUTO: 10 % (ref 4–12)
NEUTROPHILS # BLD AUTO: 7.68 THOUSANDS/ΜL (ref 1.85–7.62)
NEUTS SEG NFR BLD AUTO: 80 % (ref 43–75)
NRBC BLD AUTO-RTO: 0 /100 WBCS
PHOSPHATE SERPL-MCNC: 3.5 MG/DL (ref 2.3–4.1)
PLATELET # BLD AUTO: 207 THOUSANDS/UL (ref 149–390)
PMV BLD AUTO: 9.5 FL (ref 8.9–12.7)
POTASSIUM SERPL-SCNC: 4.2 MMOL/L (ref 3.5–5.3)
PROTHROMBIN TIME: 14.8 SECONDS (ref 12.3–15)
RBC # BLD AUTO: 3.14 MILLION/UL (ref 3.88–5.62)
SODIUM SERPL-SCNC: 139 MMOL/L (ref 135–147)
WBC # BLD AUTO: 9.5 THOUSAND/UL (ref 4.31–10.16)

## 2024-09-17 PROCEDURE — 94003 VENT MGMT INPAT SUBQ DAY: CPT

## 2024-09-17 PROCEDURE — 85730 THROMBOPLASTIN TIME PARTIAL: CPT | Performed by: INTERNAL MEDICINE

## 2024-09-17 PROCEDURE — 82948 REAGENT STRIP/BLOOD GLUCOSE: CPT

## 2024-09-17 PROCEDURE — 80048 BASIC METABOLIC PNL TOTAL CA: CPT

## 2024-09-17 PROCEDURE — 99232 SBSQ HOSP IP/OBS MODERATE 35: CPT | Performed by: INTERNAL MEDICINE

## 2024-09-17 PROCEDURE — 83735 ASSAY OF MAGNESIUM: CPT

## 2024-09-17 PROCEDURE — 99233 SBSQ HOSP IP/OBS HIGH 50: CPT | Performed by: INTERNAL MEDICINE

## 2024-09-17 PROCEDURE — 85610 PROTHROMBIN TIME: CPT

## 2024-09-17 PROCEDURE — 94664 DEMO&/EVAL PT USE INHALER: CPT

## 2024-09-17 PROCEDURE — 94640 AIRWAY INHALATION TREATMENT: CPT

## 2024-09-17 PROCEDURE — 82330 ASSAY OF CALCIUM: CPT

## 2024-09-17 PROCEDURE — 84100 ASSAY OF PHOSPHORUS: CPT

## 2024-09-17 PROCEDURE — 94669 MECHANICAL CHEST WALL OSCILL: CPT

## 2024-09-17 PROCEDURE — 94760 N-INVAS EAR/PLS OXIMETRY 1: CPT

## 2024-09-17 PROCEDURE — 85025 COMPLETE CBC W/AUTO DIFF WBC: CPT

## 2024-09-17 RX ORDER — FENTANYL CITRATE-0.9 % NACL/PF 10 MCG/ML
25 PLASTIC BAG, INJECTION (ML) INTRAVENOUS CONTINUOUS
Status: DISCONTINUED | OUTPATIENT
Start: 2024-09-17 | End: 2024-09-22

## 2024-09-17 RX ORDER — PROPOFOL 10 MG/ML
5-50 INJECTION, EMULSION INTRAVENOUS
Status: DISCONTINUED | OUTPATIENT
Start: 2024-09-17 | End: 2024-09-17

## 2024-09-17 RX ORDER — FENTANYL CITRATE 50 UG/ML
100 INJECTION, SOLUTION INTRAMUSCULAR; INTRAVENOUS ONCE
Status: COMPLETED | OUTPATIENT
Start: 2024-09-17 | End: 2024-09-17

## 2024-09-17 RX ORDER — PROPOFOL 10 MG/ML
50 INJECTION, EMULSION INTRAVENOUS ONCE
Status: DISCONTINUED | OUTPATIENT
Start: 2024-09-17 | End: 2024-09-17

## 2024-09-17 RX ORDER — FENTANYL CITRATE 50 UG/ML
50 INJECTION, SOLUTION INTRAMUSCULAR; INTRAVENOUS ONCE
Status: DISCONTINUED | OUTPATIENT
Start: 2024-09-17 | End: 2024-09-17

## 2024-09-17 RX ORDER — WATER 10 ML/10ML
INJECTION INTRAMUSCULAR; INTRAVENOUS; SUBCUTANEOUS
Status: DISCONTINUED
Start: 2024-09-17 | End: 2024-09-17 | Stop reason: WASHOUT

## 2024-09-17 RX ORDER — HEPARIN SODIUM 10000 [USP'U]/100ML
3-22 INJECTION, SOLUTION INTRAVENOUS
Status: DISCONTINUED | OUTPATIENT
Start: 2024-09-17 | End: 2024-09-19

## 2024-09-17 RX ORDER — MIDAZOLAM HYDROCHLORIDE 2 MG/2ML
2 INJECTION, SOLUTION INTRAMUSCULAR; INTRAVENOUS ONCE
Status: DISCONTINUED | OUTPATIENT
Start: 2024-09-17 | End: 2024-09-17

## 2024-09-17 RX ORDER — MIDAZOLAM HYDROCHLORIDE 2 MG/2ML
4 INJECTION, SOLUTION INTRAMUSCULAR; INTRAVENOUS ONCE
Status: DISCONTINUED | OUTPATIENT
Start: 2024-09-17 | End: 2024-09-18

## 2024-09-17 RX ORDER — NOREPINEPHRINE BITARTRATE 1 MG/ML
INJECTION, SOLUTION INTRAVENOUS
Status: DISPENSED
Start: 2024-09-17 | End: 2024-09-17

## 2024-09-17 RX ORDER — VECURONIUM BROMIDE 1 MG/ML
0.1 INJECTION, POWDER, LYOPHILIZED, FOR SOLUTION INTRAVENOUS ONCE
Status: DISCONTINUED | OUTPATIENT
Start: 2024-09-17 | End: 2024-09-18

## 2024-09-17 RX ORDER — DEXTROSE MONOHYDRATE 25 G/50ML
25 INJECTION, SOLUTION INTRAVENOUS ONCE
Status: COMPLETED | OUTPATIENT
Start: 2024-09-17 | End: 2024-09-17

## 2024-09-17 RX ADMIN — GABAPENTIN 400 MG: 400 CAPSULE ORAL at 09:26

## 2024-09-17 RX ADMIN — DEXTROSE MONOHYDRATE 25 ML: 25 INJECTION, SOLUTION INTRAVENOUS at 12:42

## 2024-09-17 RX ADMIN — SENNOSIDES AND DOCUSATE SODIUM 1 TABLET: 50; 8.6 TABLET ORAL at 17:43

## 2024-09-17 RX ADMIN — FENTANYL CITRATE 50 MCG: 50 INJECTION INTRAMUSCULAR; INTRAVENOUS at 04:20

## 2024-09-17 RX ADMIN — Medication 75 MCG/HR: at 12:00

## 2024-09-17 RX ADMIN — NICOTINE 1 PATCH: 21 PATCH, EXTENDED RELEASE TRANSDERMAL at 09:25

## 2024-09-17 RX ADMIN — DEXMEDETOMIDINE HYDROCHLORIDE 0.8 MCG/KG/HR: 4 INJECTION, SOLUTION INTRAVENOUS at 12:00

## 2024-09-17 RX ADMIN — HEPARIN SODIUM 12 UNITS/KG/HR: 10000 INJECTION, SOLUTION INTRAVENOUS at 12:46

## 2024-09-17 RX ADMIN — Medication 100 MCG: at 09:27

## 2024-09-17 RX ADMIN — LEVALBUTEROL HYDROCHLORIDE 1.25 MG: 1.25 SOLUTION RESPIRATORY (INHALATION) at 12:13

## 2024-09-17 RX ADMIN — POLYETHYLENE GLYCOL 3350 17 G: 17 POWDER, FOR SOLUTION ORAL at 17:43

## 2024-09-17 RX ADMIN — TRAZODONE HYDROCHLORIDE 100 MG: 100 TABLET ORAL at 21:22

## 2024-09-17 RX ADMIN — DEXMEDETOMIDINE HYDROCHLORIDE 0.6 MCG/KG/HR: 4 INJECTION, SOLUTION INTRAVENOUS at 21:32

## 2024-09-17 RX ADMIN — FOLIC ACID 1 MG: 1 TABLET ORAL at 09:25

## 2024-09-17 RX ADMIN — GABAPENTIN 400 MG: 400 CAPSULE ORAL at 17:43

## 2024-09-17 RX ADMIN — NOREPINEPHRINE BITARTRATE 3 MCG/MIN: 1 INJECTION, SOLUTION, CONCENTRATE INTRAVENOUS at 11:10

## 2024-09-17 RX ADMIN — MIDAZOLAM 2 MG: 1 INJECTION INTRAMUSCULAR; INTRAVENOUS at 11:41

## 2024-09-17 RX ADMIN — IPRATROPIUM BROMIDE 0.5 MG: 0.5 SOLUTION RESPIRATORY (INHALATION) at 20:18

## 2024-09-17 RX ADMIN — BUDESONIDE 0.5 MG: 0.5 INHALANT RESPIRATORY (INHALATION) at 20:18

## 2024-09-17 RX ADMIN — FENTANYL CITRATE 50 MCG: 50 INJECTION INTRAMUSCULAR; INTRAVENOUS at 11:41

## 2024-09-17 RX ADMIN — Medication 35 MG: at 04:05

## 2024-09-17 RX ADMIN — PHENYTOIN 50 MG: 125 SUSPENSION ORAL at 09:27

## 2024-09-17 RX ADMIN — IPRATROPIUM BROMIDE 0.5 MG: 0.5 SOLUTION RESPIRATORY (INHALATION) at 12:13

## 2024-09-17 RX ADMIN — PROPOFOL 30 MCG/KG/MIN: 10 INJECTION, EMULSION INTRAVENOUS at 11:07

## 2024-09-17 RX ADMIN — LEVALBUTEROL HYDROCHLORIDE 1.25 MG: 1.25 SOLUTION RESPIRATORY (INHALATION) at 20:18

## 2024-09-17 RX ADMIN — POLYETHYLENE GLYCOL 3350 17 G: 17 POWDER, FOR SOLUTION ORAL at 09:25

## 2024-09-17 RX ADMIN — LEVALBUTEROL HYDROCHLORIDE 1.25 MG: 1.25 SOLUTION RESPIRATORY (INHALATION) at 07:34

## 2024-09-17 RX ADMIN — Medication 75 MCG/HR: at 21:32

## 2024-09-17 RX ADMIN — IPRATROPIUM BROMIDE 0.5 MG: 0.5 SOLUTION RESPIRATORY (INHALATION) at 07:34

## 2024-09-17 RX ADMIN — CHLORHEXIDINE GLUCONATE 0.12% ORAL RINSE 15 ML: 1.2 LIQUID ORAL at 20:07

## 2024-09-17 RX ADMIN — DEXMEDETOMIDINE HYDROCHLORIDE 1 MCG/KG/HR: 4 INJECTION, SOLUTION INTRAVENOUS at 04:14

## 2024-09-17 RX ADMIN — PHENYTOIN 50 MG: 125 SUSPENSION ORAL at 21:22

## 2024-09-17 RX ADMIN — CHLORHEXIDINE GLUCONATE 0.12% ORAL RINSE 15 ML: 1.2 LIQUID ORAL at 09:26

## 2024-09-17 RX ADMIN — BUDESONIDE 0.5 MG: 0.5 INHALANT RESPIRATORY (INHALATION) at 07:34

## 2024-09-17 RX ADMIN — SENNOSIDES AND DOCUSATE SODIUM 1 TABLET: 50; 8.6 TABLET ORAL at 09:25

## 2024-09-17 NOTE — RESPIRATORY THERAPY NOTE
RT Ventilator Management Note  Marcin Sánchez 64 y.o. male MRN: [id number]  Unit/Bed#: [facility] 10 Encounter: [id number]      Daily Screen         9/15/2024  1155 9/16/2024  0734          Patient safety screen outcome:: Failed Failed      Not Ready for Weaning due to:: -- Poor inspiratory effort;Underline problem not resolved                Physical Exam:   Assessment Type: Assess only  General Appearance: Sleeping  Respiratory Pattern: Assisted  Chest Assessment: Chest expansion symmetrical  Bilateral Breath Sounds: Diminished, Coarse, Crackles  Cough: Productive  Suction: ET Tube  O2 Device: Ventilator      Resp Comments: Pt. remains on AC/PC mode.  No changes throughout the night.  Will continue to monitor pt per protocol.

## 2024-09-17 NOTE — QUICK NOTE
Called patient's sister and was able to talk to her to make her aware regarding the procedure planned for today. Answered all the questions regarding the risks and benefits. Discussed that in these cases there is frequently a need for PEG tube to feed the patients. There were no other questions from the sister and she said that she will update her brother herself

## 2024-09-17 NOTE — PLAN OF CARE
Recommend adjusting TF to better meet estimated needs on vent and not overfeed. Recommend Jevity 1.2@55mL/hr, add 1 pack of prosource, water flushes 30mL every 4hrs provides total volume 1320mL, 1644cal, 88g pro, 1305mL. Will continue to monitor TF tolerance, weight, and labs.   Problem: Nutrition/Hydration-ADULT  Goal: Nutrient/Hydration intake appropriate for improving, restoring or maintaining nutritional needs  Description: Monitor and assess patient's nutrition/hydration status for malnutrition. Collaborate with interdisciplinary team and initiate plan and interventions as ordered.  Monitor patient's weight and dietary intake as ordered or per policy. Utilize nutrition screening tool and intervene as necessary. Determine patient's food preferences and provide high-protein, high-caloric foods as appropriate.     INTERVENTIONS:  - Monitor oral intake, urinary output, labs, and treatment plans  - Assess nutrition and hydration status and recommend course of action  - Evaluate amount of meals eaten  - Assist patient with eating if necessary   - Allow adequate time for meals  - Recommend/ encourage appropriate diets, oral nutritional supplements, and vitamin/mineral supplements  - Order, calculate, and assess calorie counts as needed  - Recommend, monitor, and adjust tube feedings and TPN/PPN based on assessed needs  - Assess need for intravenous fluids  - Provide specific nutrition/hydration education as appropriate  - Include patient/family/caregiver in decisions related to nutrition  Outcome: Progressing

## 2024-09-17 NOTE — PROGRESS NOTES
"Progress Note - Critical Care/ICU   Name: Marcin Sánchez 64 y.o. male I MRN: [id number]  Unit/Bed#: MICU 10 I Date of Admission: 9/8/2024   Date of Service: 9/17/2024 I Hospital Day: 9         Problems:  Shock - likely vasodilatory (now improved)  Moderate pericardial effusion - no signs of tamponade on TTE  VT cardiac arrest post-intubation  Acute on chronic hypoxic respiratory failure  Suspected severe COPD with possible exacerbation  Acute/chronic CHF with severe aortic regurgitation   Abnormal CT chest possible pneumonia/aspiration event  Abnormal LFT suspected shock liver  Afib on eliquis  History of pulmonary embolism  Squamous cell carcinoma of lung post chemo/radiation  Alcohol/tobacco abuse  Seizure disorder on dilantin   History of HepC  Severe aortic regurgitation  Assessment & Plan   Neuro:   Diagnosis: encephalopathy  Goal RASS 0  Plan:   Patient on precedex   Off fentanyl overnight  Responds to commands and is alert  Epilepsy:   Continue phenytoin  CV:   Diagnosis: Pericardial effusion   Echo on 9/9 read as \"moderate to large pericardial effusion circumferential to the heart measuring largest along the RV free wall at 2.3 cm. The fluid exhibits no internal echoes. There is no echocardiographic evidence of tamponade.\"  Plan:   Monitor for signs of tamponade via physical exam  Consider additional diuresis for fluid balances  Hold metoprolol XL 25 mg daily for paroxysmal AFib     Pulm:  Diagnosis: Intubated  In the setting of encephalopathy and hypoxia  Patient failed trial of BiPAP after being extubated on 9/9 and was re-intubated. Failed another trial of extubation on 9/13 and was re-intubated.  Xopenex, atrovent TID  Pulmicort BID  Perforomist BID  Patient is off steroids at this time   Tracheostomy this AM    GI:   Diagnosis: Transaminitis - improved  Likely in setting of shock liver    :   No active issues    F/E/N:   F: No fluids  E: Replete electrolytes K>4, Phos>3, Mg>2  N: Tube " feeds      Heme/Onc:   Diagnosis: anemia  Hemolytic workup negative, iron panel revealed AoCD pattern  Stable at 10.1    Endo:   No active issues    ID:   Diagnosis: Abnormal CT concerning for potential pneumonia  Sputum cultures grew moraxella and acinetobacter  Plan:   ID recommended to continue to follow closely off antibiotics as patient is clinically stable without fever and mild WBC 2/2 to steroids.  If were to clinically deteriorate could start Minocycline  Trend WBCs, fever curve  WBC within normal ranges    MSK/Skin:   No active issues  Disposition: Critical care    ICU Core Measures     Vented Patient  VAP Bundle  VAP bundle ordered     A: Assess, Prevent, and Manage Pain Has pain been assessed? Yes  Need for changes to pain regimen? No   B: Both Spontaneous Awakening Trials (SATs) and Spontaneous Breathing Trials (SBTs) Plan to perform spontaneous awakening trial today? Yes   Plan to perform spontaneous breathing trial today? Yes   Obvious barriers to extubation? No   C: Choice of Sedation RASS Goal: 0 Alert and Calm  Need for changes to sedation or analgesia regimen? No   D: Delirium CAM-ICU: Negative   E: Early Mobility  Plan for early mobility? No   F: Family Engagement Plan for family engagement today? Yes       Review of Invasive Devices:    [place] Plan: Continue for accurate I/O monitoring for 48 hours  Central access plan: Hemodynamic monitoring      Prophylaxis:  VTE VTE covered by:  heparin (porcine), Intravenous, 1,800 Units at 09/11/24 0342  heparin (porcine), Intravenous       Stress Ulcer  covered bypantoprazole (PROTONIX) injection 40 mg [[id number]]         24 Hour Events   24hr events: Patient remains intubated, received ketamine overnight and began hallucinating. The patient's fentanyl was held and precedex was lowered to 0.6. The patient is currently no longer hallucinating, and will receive tracheostomy later this morning.  Subjective   Review of Systems: See HPI for Review of  Systems    Objective                          Vitals I/O      Most Recent Min/Max in 24hrs   Temp 99.1 °F (37.3 °C) Temp  Min: 98.6 °F (37 °C)  Max: 99.1 °F (37.3 °C)   Pulse 67 Pulse  Min: 67  Max: 126   Resp (!) 31 Resp  Min: 15  Max: 43   /51 BP  Min: 81/39  Max: 206/109   O2 Sat 100 % SpO2  Min: 89 %  Max: 100 %      Intake/Output Summary (Last 24 hours) at 9/17/2024 0742  Last data filed at 9/17/2024 0600  Gross per 24 hour   Intake 1780.91 ml   Output 1085 ml   Net 695.91 ml       Diet NPO    Invasive Monitoring   Arterial Line  [place] BP 98/45  No data recorded   MAP 67 mmHg  No data recorded           Physical Exam   Physical Exam  Eyes:      Extraocular Movements: Extraocular movements intact.      Pupils: Pupils are equal, round, and reactive to light.   Cardiovascular:      Rate and Rhythm: Normal rate and regular rhythm.      Pulses: Normal pulses.   Abdominal: General: Bowel sounds are normal.      Palpations: Abdomen is soft.      Tenderness: There is no abdominal tenderness. There is no guarding.   Constitutional:       General: He is not in acute distress.     Interventions: He is intubated.   Pulmonary:      Effort: Pulmonary effort is normal. No respiratory distress. He is intubated.      Breath sounds: Normal breath sounds. No wheezing or rales.      Comments: Mechanical breath sounds appreciated  Neurological:      Mental Status: He is alert.      Comments: Neuro exam limited but patient responds to commands and has good  strength and is able to move all extremities          Diagnostic Studies        Lab Results: I have reviewed the following results:      Medications:  Scheduled PRN   budesonide, 0.5 mg, Q12H  chlorhexidine, 15 mL, Q12H [facility]  vitamin B-12, 100 mcg, Daily  folic acid, 1 mg, Daily  furosemide, 40 mg, Daily  gabapentin, 400 mg, BID  ipratropium, 0.5 mg, TID  levalbuterol, 1.25 mg, TID  nicotine, 1 patch, Daily  phenytoin, 50 mg, Q12H AMERICA  polyethylene glycol, 17 g,  BID  senna-docusate sodium, 1 tablet, BID  traZODone, 100 mg, HS      acetaminophen, 650 mg, Q6H PRN  albuterol, 2.5 mg, Q6H PRN  bisacodyl, 10 mg, Daily PRN  fentaNYL, 50 mcg, Q2H PRN  heparin (porcine), 1,800 Units, Q6H PRN  heparin (porcine), 3,600 Units, Q6H PRN       Continuous    dexmedetomidine, 0.1-1.2 mcg/kg/hr, Last Rate: 0.6 mcg/kg/hr (09/17/24 0610)         Labs:   CBC    Recent Labs     09/16/24  0510 09/17/24 0443   WBC 8.54 9.50   HGB 9.1* 10.0*   HCT 28.5* 31.6*    207     BMP    Recent Labs     09/16/24  0510 09/17/24  0443   SODIUM 143 139   K 4.2 4.2    99   CO2 37* 37*   AGAP 3* 3*   BUN 25 22   CREATININE 0.61 0.55*   CALCIUM 8.0* 8.3*       Coags    Recent Labs     09/16/24  0510 09/17/24  0443   INR 1.25*  --    PTT 72* 56*        Additional Electrolytes  Recent Labs     09/16/24  0510 09/17/24  0443   MG 1.8* 2.1   PHOS 2.9 3.5   CAIONIZED 1.13 1.13          Blood Gas    No recent results  No recent results LFTs  No recent results    Infectious  No recent results  Glucose  Recent Labs     09/16/24  0510 09/17/24  0443   GLUC 119 111

## 2024-09-17 NOTE — CASE MANAGEMENT
Case Management Discharge Planning Note    Patient name Marcin Sánchez  Location MICU 10/MICU 10 MRN [id number]  : 1960 Date 2024       Current Admission Date: 2024  Current Admission Diagnosis:Acute hypoxic respiratory failure (HCC)   Patient Active Problem List    Diagnosis Date Noted Date Diagnosed    Acute hypoxic respiratory failure (HCC) 2024     Shock (HCC) 2024     Mucus plugging of bronchi 2024     Transaminitis 2024     Cardiac arrest (HCC) 2024     Pericardial effusion 2024     Acute on chronic respiratory failure with hypoxia and hypercapnia (HCC) 2024     Acute metabolic encephalopathy 2024     Chronic combined systolic and diastolic CHF (congestive heart failure) (HCC) 2024     Atrial fibrillation (HCC) 2024     Pulmonary fibrosis (HCC) 2024     Suspected chronic pulmonary embolism (HCC) 2024     Squamous cell lung cancer (HCC) 2024     Hyponatremia 2024     Severe protein-calorie malnutrition (HCC) 2024     Edema of both legs 2022     Tobacco abuse 2020     Nonrheumatic aortic valve insufficiency 2020     Malignant neoplasm of upper lobe of right lung (HCC) 2018     Thoracic aortic aneurysm without rupture (HCC) 2018     Cancer of right main bronchus (HCC) 10/04/2016     Pleural effusion 10/02/2016     Multiple lung nodules on CT 10/02/2016     Collapse of right lung 2016     Acute exacerbation of chronic obstructive pulmonary disease (COPD) (HCC)      Epilepsy (HCC)      Lyme disease      Major depression      Alcohol abuse        LOS (days): 9  Geometric Mean LOS (GMLOS) (days): 5.1  Days to GMLOS:-3.7     OBJECTIVE:  Risk of Unplanned Readmission Score: 31.83         Current admission status: Inpatient   Preferred Pharmacy:   [facility] - [address] [facility],  [address] [place] [id number]  Phone: [phone]  Fax: [phone]    [facility] - [address] [facility]  [address] [facility] [address]  Phone: [phone] Fax: [phone]    Primary Care Provider: Brandt Godinez MD    Primary Insurance: [facility]  Secondary Insurance:     DISCHARGE DETAILS:    Other Referral/Resources/Interventions Provided:  Referral Comments: CM team continues to follow pt for discharge coordination as appropriate. Chart reviewed: Pt not yet medically ready at this time, plan for possible OR tomorrow for tracheostomy as well as PEG tube insertion. Pt is a LTC resident at [facility], message sent to the [facility] providing update on pt's potential needs. CM team will continue to follow and remain available.

## 2024-09-17 NOTE — PROGRESS NOTES
"Cardiology Team 2 Progress Note - Marcin Sánchez 64 y.o. male MRN: [id number]    [facility] [id number]          Subjective:   Patient seen and examined.  No significant events overnight. Remains intubated this morning, awake and nods yes and no.    Hospital Course:   Marcin Sánchez is a 64 y.o. year old male who presented to [facility] 9/5 with acute respiratory failure secondary COPD exacerbation.  He has past medical history significant for atrial fibrillation, COPD, chronic combined systolic and diastolic CHF, epilepsy, pulmonary fibrosis.  Radiation, history of squamous cell lung cancer, current tobacco user.  Smoking outside his living facility (the [facility]) when he was noted to become acutely confused.  He also.  Short of breath was noted to be hypoxemic prompting call to EMS.  Upon arrival he was lethargic and confused which is thought to be secondary to hypercarbic respiratory failure in the setting of COPD exacerbation and was initiated on BiPAP.  On 9/7 patient had acute hypoxic event resulting in bradycardia and subsequently PEA cardiac arrest 2 rounds of CPR with VT noted during a pulse check. Shock was administered and ROSC was achieved and he was moving all extremities. She was subsequently intubated.  Echocardiogram rest showed LVEF 55% with normal systolic function with moderate pericardial effusion.  He was transferred to [facility] for thoracic surgery oncology evaluation. There were no clinical signs of tamponade and therefore window and pericardiocentesis were not performed. Attempted extubation to BiPAP 9/10 however failed and required re-intubation due to hypercarbia. He underwent bronchoscopy 9/11 with large amount of mucus removed and mucus plugging removed.    Vitals: Blood pressure 98/50, pulse 71, temperature 99.1 °F (37.3 °C), temperature source Oral, resp. rate 22, height 5' 9\" (1.753 m), weight 72.2 kg (159 lb 2.8 oz), SpO2 99%., Body mass index is 23.51 " kg/m².,   Orthostatic Blood Pressures      Flowsheet Row Most Recent Value   Blood Pressure 98/50 filed at 09/17/2024 0600   Patient Position - Orthostatic VS Lying filed at 09/16/2024 1200              Intake/Output Summary (Last 24 hours) at 9/17/2024 0735  Last data filed at 9/17/2024 0600  Gross per 24 hour   Intake 1780.91 ml   Output 1085 ml   Net 695.91 ml       Review of System:  Review of system was conducted and was negative except for as stated in the subjective course.    Physical Exam:    GEN: Marcin Sánchez appears well, alert and oriented x 3, pleasant and cooperative   HEENT:  Normocephalic, atraumatic, anicteric, moist mucous membranes  NECK: No JVD or carotid bruits   HEART: regular rhythm, regular rate, normal S1 and S2, no murmurs, clicks, gallops or rubs   LUNGS: Intubated this morning. Significant expiratory wheezing b/l.  ABDOMEN:  Normoactive bowel sounds, soft, no tenderness, no distention  EXTREMITIES: peripheral pulses palpable; no edema  NEURO: no gross focal findings; cranial nerves grossly intact   SKIN:  Dry, intact, warm to touch      Current Facility-Administered Medications:     acetaminophen (TYLENOL) tablet 650 mg, 650 mg, Oral, Q6H PRN, LANDON Desai, 650 mg at 09/11/24 0441    albuterol inhalation solution 2.5 mg, 2.5 mg, Nebulization, Q6H PRN, Brian Poe MD, 2.5 mg at 09/16/24 0819    bisacodyl (DULCOLAX) rectal suppository 10 mg, 10 mg, Rectal, Daily PRN, Axel Smith PA-C, 10 mg at 09/13/24 0803    budesonide (PULMICORT) inhalation solution 0.5 mg, 0.5 mg, Nebulization, Q12H, LANDON Desai, 0.5 mg at 09/17/24 0734    chlorhexidine (PERIDEX) 0.12 % oral rinse 15 mL, 15 mL, Mouth/Throat, Q12H AMERICA, Tobi Hook MD, 15 mL at 09/16/24 1499    cyanocobalamin (VITAMIN B-12) tablet 100 mcg, 100 mcg, Oral, Daily, Axel Smith PA-C, 100 mcg at 09/16/24 1030    dexmedeTOMIDine (Precedex) 400 mcg in sodium chloride 0.9% 100 mL,  0.1-1.2 mcg/kg/hr, Intravenous, Titrated, Axel Smith PA-C, Last Rate: 9.1 mL/hr at 09/17/24 0610, 0.6 mcg/kg/hr at 09/17/24 0610    fentaNYL injection 50 mcg, 50 mcg, Intravenous, Q2H PRN, Kentrell Hughes MD, 50 mcg at 09/17/24 0420    folic acid (FOLVITE) tablet 1 mg, 1 mg, Oral, Daily, Axel Smith PA-C, 1 mg at 09/16/24 1029    furosemide (LASIX) tablet 40 mg, 40 mg, Oral, Daily, Enrique Medina DO, 40 mg at 09/16/24 1109    gabapentin (NEURONTIN) capsule 400 mg, 400 mg, Per NG Tube, BID, Ammar Alam, DO, 400 mg at 09/16/24 1828    heparin (porcine) injection 1,800 Units, 1,800 Units, Intravenous, Q6H PRN, Axel Smith PA-C, 1,800 Units at 09/11/24 0342    heparin (porcine) injection 3,600 Units, 3,600 Units, Intravenous, Q6H PRN, Axel Smith PA-C    ipratropium (ATROVENT) 0.02 % inhalation solution 0.5 mg, 0.5 mg, Nebulization, TID, Ammar Alam, DO, 0.5 mg at 09/17/24 0734    levalbuterol (XOPENEX) inhalation solution 1.25 mg, 1.25 mg, Nebulization, TID, Ammar Alam, DO, 1.25 mg at 09/17/24 0734    nicotine (NICODERM CQ) 21 mg/24 hr TD 24 hr patch 1 patch, 1 patch, Transdermal, Daily, LANDON Desai, 1 patch at 09/16/24 1035    phenytoin (DILANTIN) oral suspension 50 mg, 50 mg, Oral, Q12H AMERICA, Ammar Alam, DO, 50 mg at 09/16/24 2227    polyethylene glycol (MIRALAX) packet 17 g, 17 g, Oral, BID, Emerson Langston MD, 17 g at 09/16/24 1828    senna-docusate sodium (SENOKOT S) 8.6-50 mg per tablet 1 tablet, 1 tablet, Oral, BID, Emerson Langston MD, 1 tablet at 09/16/24 1828    traZODone (DESYREL) tablet 100 mg, 100 mg, Oral, HS, Amchloe Ortiz DO, 100 mg at 09/16/24 2227    Labs & Results:  Results from last 7 days   Lab Units 09/16/24  1317 09/16/24  1055 09/16/24  0840   HS TNI 0HR ng/L  --   --  13   HS TNI 2HR ng/L 20  --   --    HS TNI 4HR ng/L  --  18  --          Results from last 7 days   Lab Units 09/17/24  0443 09/16/24  0510 09/15/24  0558   POTASSIUM mmol/L 4.2 4.2 4.1    CO2 mmol/L 37* 37* 36*   CHLORIDE mmol/L 99 103 104   BUN mg/dL 22 25 29*   CREATININE mg/dL 0.55* 0.61 0.62     Results from last 7 days   Lab Units 09/17/24  0443 09/16/24  0510 09/15/24  0558   HEMOGLOBIN g/dL 10.0* 9.1* 9.7*   HEMATOCRIT % 31.6* 28.5* 30.3*   PLATELETS Thousands/uL 207 187 180     Results from last 7 days   Lab Units 09/12/24  0600   HEMOGLOBIN A1C % 5.6       Telemetry:   Personally reviewed by Adam Nunez, DO: normal sinus rhythm with periods of tachycardia      Assessment:  Principal Problem:    Acute hypoxic respiratory failure (HCC)  Active Problems:    Acute exacerbation of chronic obstructive pulmonary disease (COPD) (HCC)    Cancer of right main bronchus (HCC)    Chronic combined systolic and diastolic CHF (congestive heart failure) (HCC)    Cardiac arrest (HCC)    Pericardial effusion    Shock (HCC)    Mucus plugging of bronchi      Pericardial effusion  Moderate Sized pericardial effusion noted on  the last 3 echocardiograms 8/29, 9/8, and 9/9. Effusion predominantly around RV and RA free wall with smaller portion tracking around the apex. Does not appear to have changes much since 8/29. Currenlty no clinical or echocardiographic signs of tamponade. He is intubated and sedated for acute respiratry failure form COPD and mucus plugs.  - interval echocardiogram 9/11 with stable effusion and no evidence of tamponade  Plan:  - no evidence of tamponade, no urgent indication at this time for pericardiocentesis  - If there is acute decompensation recommend fluids and repeat echo to evaluate effusion, if evidence of tamponade then would proceed with pericardial window vs pericardiocentesis  - Continue Lasix 40mg daily  - recommend cancer screenings when stable to due so     Cardiac arrest  Cardiac arrest appears to be in the setting of hypoxemia. Initial rhythm reported as PEA with ROSC then VT arrest. Possibly transient ischemia during PEA leating to VT. After intubation does not  appear to have any further ectopy. Echocardiogram with LVEF 50-55%.  - restart BB when remaining hemodynamically stable for 24 hours  - continue treatment for COPD exacerbation as per primary team     Moderate to severe AI  Dilated Aortic root  There is moderate to severe AI with severely dilated aortic root. Closely monitoring volume status as above. Would be poor surgical candidate with the extent of his lung disease and inability to extubate.  - will monitor on echos    Chronic combined systolic and diastolic heart failure  Appears euvolemic on exam. Currently requireing pressors. Chronically on Lasix 40mg daily and toprol 25mg daily.  - holding BB after cardiac arrest and with effusion, may restart when BP more stable  - appears euvolemic today however difficult intravascular volume exam.     Paroxysmal atrial fibrillation  Current yin sinus rhythm. Rates are controlled.  - restart metoprolol when BP more stable  - continue AC with heparin currently, once no furhter procedures [facility]     Acute Hypoxemic Respiratory failure- intubated and sedated, wean as able as per primary team  COPD- steroids and inhalers as per primary.  Pulmonary fibrosis  history of squamous cell lung cancer- s/p chemo/radiation and RUL lung resection  Current Tobacco use- tobacco cessation recomended    Case discussed and reviewed with Dr. Wallace who agrees with my assessment and plan.    Thank you for involving us in the care of your patient.      Adam Nunez,   Cardiology Fellow   PGY-5      ** Please Note: Fluency DirectDictation voice to text software may have been used in the creation of this document. **

## 2024-09-17 NOTE — PLAN OF CARE
Problem: Prexisting or High Potential for Compromised Skin Integrity  Goal: Skin integrity is maintained or improved  Description: INTERVENTIONS:  - Identify patients at risk for skin breakdown  - Assess and monitor skin integrity  - Assess and monitor nutrition and hydration status  - Monitor labs   - Assess for incontinence   - Turn and reposition patient  - Assist with mobility/ambulation  - Relieve pressure over bony prominences  - Avoid friction and shearing  - Provide appropriate hygiene as needed including keeping skin clean and dry  - Evaluate need for skin moisturizer/barrier cream  - Collaborate with interdisciplinary team   - Patient/family teaching  - Consider wound care consult   Outcome: Progressing     Problem: SAFETY,RESTRAINT: NV/NON-SELF DESTRUCTIVE BEHAVIOR  Goal: Remains free of harm/injury (restraint for non violent/non self-detsructive behavior)  Description: INTERVENTIONS:  - Instruct patient/family regarding restraint use   - Assess and monitor physiologic and psychological status   - Provide interventions and comfort measures to meet assessed patient needs   - Identify and implement measures to help patient regain control  - Assess readiness for release of restraint   Outcome: Progressing     Problem: SAFETY,RESTRAINT: NV/NON-SELF DESTRUCTIVE BEHAVIOR  Goal: Returns to optimal restraint-free functioning  Description: INTERVENTIONS:  - Assess the patient's behavior and symptoms that indicate continued need for restraint  - Identify and implement measures to help patient regain control  - Assess readiness for release of restraint   Outcome: Progressing     Problem: INFECTION - ADULT  Goal: Absence or prevention of progression during hospitalization  Description: INTERVENTIONS:  - Assess and monitor for signs and symptoms of infection  - Monitor lab/diagnostic results  - Monitor all insertion sites, i.e. indwelling lines, tubes, and drains  - Monitor endotracheal if appropriate and nasal  secretions for changes in amount and color  - [place] appropriate cooling/warming therapies per order  - Administer medications as ordered  - Instruct and encourage patient and family to use good hand hygiene technique  - Identify and instruct in appropriate isolation precautions for identified infection/condition  Outcome: Progressing     Problem: INFECTION - ADULT  Goal: Absence of fever/infection during neutropenic period  Description: INTERVENTIONS:  - Monitor WBC    Outcome: Progressing     Problem: RESPIRATORY - ADULT  Goal: Achieves optimal ventilation and oxygenation  Description: INTERVENTIONS:  - Assess for changes in respiratory status  - Assess for changes in mentation and behavior  - Position to facilitate oxygenation and minimize respiratory effort  - Oxygen administered by appropriate delivery if ordered  - Initiate smoking cessation education as indicated  - Encourage broncho-pulmonary hygiene including cough, deep breathe, Incentive Spirometry  - Assess the need for suctioning and aspirate as needed  - Assess and instruct to report SOB or any respiratory difficulty  - Respiratory Therapy support as indicated  Outcome: Progressing

## 2024-09-17 NOTE — QUICK NOTE
Spoke with patient's sister regarding attempted bedside tracheostomy and the reasons for not performing it. Discussed that surgery will likely perform the tracheostomy as well as PEG tube insertion sometime tomorrow in an operating room. She was agreeable to this, and had no further questions at this time.

## 2024-09-17 NOTE — ASSESSMENT & PLAN NOTE
- ETT in place, ventilator with PEEP of 6, FiO2 40%, and Vt 500  - Failed BiPAP after extubation on 9/9 and was reintubated; failed another trial of extubation on 9/13 and was reintubated   - Tracheostomy planned for bedside with ICU team this AM but US showed overlying vessels, attempt aborted  - Off of pressors at this time   - No Hx of abdominal surgeries   - Tolerating TF at 75  - Reviewed CT C/A/P which did not show sufficient window for PEG.  - Plan for tracheostomy and PEG vs open G tube placement, tentatively for 9/19  - Follow up ICU regarding medical decision maker for patient     MELD 3.0: 15 at 9/10/2024  4:32 AM  MELD-Na: 15 at 9/10/2024  4:32 AM  Calculated from:  Serum Creatinine: 0.75 mg/dL (Using min of 1 mg/dL) at 9/10/2024  4:32 AM  Serum Sodium: 142 mmol/L (Using max of 137 mmol/L) at 9/10/2024  4:32 AM  Total Bilirubin: 1.21 mg/dL at 9/10/2024  4:32 AM  Serum Albumin: 2.8 g/dL at 9/10/2024  4:32 AM  INR(ratio): 2.14 at 9/8/2024  1:48 PM  Age at listing (hypothetical): 64 years  Sex: Male at 9/10/2024  4:32 AM

## 2024-09-17 NOTE — PROGRESS NOTES
Recommend adjusting TF to better meet estimated needs on vent and not overfeed. Recommend Jevity 1.2@55mL/hr, add 1 pack of prosource, water flushes 30mL every 4hrs provides total volume 1320mL, 1644cal, 88g pro, 1305mL. Will continue to monitor TF tolerance, weight, and labs.

## 2024-09-17 NOTE — ASSESSMENT & PLAN NOTE
Wt Readings from Last 3 Encounters:   09/17/24 72.2 kg (159 lb 2.8 oz)   09/08/24 60.8 kg (134 lb)   08/29/24 67.6 kg (149 lb)

## 2024-09-17 NOTE — QUICK NOTE
Progress Note - Pulmonology   Name: Marcin Sánchez 64 y.o. male I MRN: [id number]  [facility] Date of Admission: 9/8/2024   Date of Service: 9/17/2024 I Hospital Day: 9     Interventional Pulmonary/ Percutaneous Tracheostomy Evaluatoin     Patient scheduled for percutaneous tracheostomy at bedside today. Upon evaluation of patient's anatomy, patient was found to have a high-riding innominate as well as two superficial anterior vessels at the level of the first to fourth tracheal rings. Due to this, the case was canceled. At this point, would recommend surgical consultation for tracheostomy placement.

## 2024-09-17 NOTE — RESPIRATORY THERAPY NOTE
09/17/24 0734   Respiratory Assessment   Assessment Type Assess only   General Appearance Sleeping   Respiratory Pattern Assisted   Chest Assessment Chest expansion symmetrical   Bilateral Breath Sounds Diminished;Coarse   Suction ET Tube;Oral   Resp Comments Pt remains on current settings as documented at this time. All alarms on and functioning, BVM present at bedside. Will continue to monitor pt.   Vent Information   Vent ID [id number]   Vent type     Vent Mode AC/PC   $ Pulse Oximetry Spot Check Charge Completed   AC/PC Settings   Resp Rate (BPM) 14 BPM   Pressure Control (cmH2O) 15 cm H2O   Insp Time (sec) 0.9 sec   FiO2 (%) 40 %   PEEP (cmH2O) 6 cmH2O   Insp Rise Time (%) 60 %   Trigger Sensitivity Flow (lpm) 3   Humidification Heater   Heater Temp 98.6 °F (37 °C)   AC/PC ACTUALS   Resp Rate (BPM) 18 BPM   VT (mL) 613 mL   MV 7.12   MAP (cmH2O) 12 cmH2O   Peak Pressure (cmH2O) 28 cmH2O   Static Compliance (mL/cmH2O) 26 mL/cmH2O   Plateau Pressure (cmH2O) 23 cmH2O   Heater Temperature (Obs) 98.6 °F (37 °C)   AC/PC Alarms    High Peak Pressure (cmH2O) 40 cmH2O   High Resp Rate (BPM) 40 BPM   High MV (L/min) 20 L/min   Low MV (L/min) 3 L/min   High Elena VT (mL) 1000 mL   Low Elena VT (mL) 200 mL   High Spont VT (mL) 1000 mL   Low Spont VT (mL) 200 mL   AC/PC Apnea Settings   Resp Rate (BMP) 14 BPM   Pressure Control (cmH2O) 15 cmH2O   Inspiratory Time (S) 0.9 S   FiO2 (%) 40 %   Apnea Time (s) 20 S   Maintenance   Alarm (pink) cable attached No   Resuscitation bag with peep valve at bedside Yes   Water bag changed No   Circuit changed No   Daily Screen   Patient safety screen outcome: Failed   Not Ready for Weaning due to: Underline problem not resolved   IHI Ventilator Associated Pneumonia Bundle   Daily Awakening Trials Performed Yes   Head of Bed Elevated HOB 30   ETT  Cuffed 8 mm   Placement Date/Time: 09/13/24 c) 5098   Type: Cuffed  Tube Size: 8 mm  Location: Oral  Insertion attempts: 1  Secured at  (cm): 23   Secured at (cm) 25   Measured from Lips   Secured Location Center   Repositioned Right to Center   Secured by Commercial tube marinelli   Site Condition Dry   Cuff Pressure (cm H2O)   (MLT)   Cuff Pressure (color) Green   HI-LO Suction  Intermittent suction   HI-LO Secretions Scant   HI-LO Intervention Patent     RT Ventilator Management Note  Marcin Sánchez 64 y.o. male MRN: [id number]  Unit/Bed#: [facility] 10 Encounter: [id number]      Daily Screen         9/16/2024  0734 9/17/2024  0735          Patient safety screen outcome:: Failed Failed      Not Ready for Weaning due to:: Poor inspiratory effort;Underline problem not resolved Underline problem not resolved                Physical Exam:   Assessment Type: Assess only  General Appearance: Sleeping  Respiratory Pattern: Assisted  Chest Assessment: Chest expansion symmetrical  Bilateral Breath Sounds: Diminished, Coarse  Cough: Productive  Suction: ET Tube, Oral  O2 Device: Ventilator      Resp Comments: Pt remains on current settings as documented at this time. All alarms on and functioning, BVM present at bedside. Will continue to monitor pt.

## 2024-09-17 NOTE — CONSULTS
Consultation Note - Surgery-General   Name: Marcin Sánchez 64 y.o. male I MRN: [id number]  Unit/Bed#: [facility] 10 I Date of Admission: 9/8/2024   Date of Service: 9/18/2024 I Hospital Day: 10   Inpatient consult to Acute Care Surgery  Consult performed by: Donn Foreman MD  Consult ordered by: Elle Ortiz DO        Physician Requesting Evaluation: Wendie Virk DO   Reason for Evaluation / Principal Problem: Tracheostomy and PEG tube placement       Assessment & Plan  Acute hypoxic respiratory failure (HCC)  - ETT in place, ventilator with PEEP of 6, FiO2 40%, and Vt 500  - Failed BiPAP after extubation on 9/9 and was reintubated; failed another trial of extubation on 9/13 and was reintubated   - Tracheostomy planned for bedside with ICU team this AM but US showed overlying vessels, attempt aborted  - Off of pressors at this time   - No Hx of abdominal surgeries   - Tolerating TF at 75  - Reviewed CT C/A/P which did not show sufficient window for PEG.  - Plan for tracheostomy and PEG vs open G tube placement, tentatively for 9/19  - Follow up ICU regarding medical decision maker for patient     MELD 3.0: 15 at 9/10/2024  4:32 AM  MELD-Na: 15 at 9/10/2024  4:32 AM  Calculated from:  Serum Creatinine: 0.75 mg/dL (Using min of 1 mg/dL) at 9/10/2024  4:32 AM  Serum Sodium: 142 mmol/L (Using max of 137 mmol/L) at 9/10/2024  4:32 AM  Total Bilirubin: 1.21 mg/dL at 9/10/2024  4:32 AM  Serum Albumin: 2.8 g/dL at 9/10/2024  4:32 AM  INR(ratio): 2.14 at 9/8/2024  1:48 PM  Age at listing (hypothetical): 64 years  Sex: Male at 9/10/2024  4:32 AM        Surgery-General service will follow.    History of Present Illness   Marcin Sánchez is a 64 y.o. male with history of COPD, CHF, atrial fibrillation who presents with acute on chronic respiratory failure. Family is aware and amenable for PEG and tracheostomy placement. No previous history of neck surgeries. Patient did require chemotherapy and radiation for a  history of squamous cell lung cancer. No history of abdominal surgeries. He is on Heparin drip. No recent history of infection. CT AP reviewed and no window evident for PEG apparent on imaging. Currently tolerating tube feeds.       Review of Systems   Unable to perform ROS: Patient nonverbal     I have reviewed the patient's PMH, PSH, Social History, Family History, Meds, and Allergies    Objective      Temp:  [98.6 °F (37 °C)-99.1 °F (37.3 °C)] 98.6 °F (37 °C)  HR:  [61-92] 66  Resp:  [12-56] 15  BP: ()/(45-60) 95/49  FiO2 (%):  [40-50] 40  O2 Device: Ventilator          I/O         09/16 0701  09/17 0700 09/17 0701 09/18 0700    P.O. 0     I.V. (mL/kg) 653.4 (9.1) 348.1 (4.8)    NG/ 210    IV Piggyback 37.5     Feedings 800 801    Total Intake(mL/kg) 1780.9 (24.7) 1359.1 (18.8)    Urine (mL/kg/hr) 1085 (0.6)     Emesis/NG output 0 0    Stool 0 0    Total Output 1085 0    Net +695.9 +1359.1          Unmeasured Stool Occurrence 2 x 1 x          Lines/Drains/Airways       Active Status       Name Placement date Placement time Site Days    ETT  Cuffed 8 mm 09/13/24  2326  -- 4    NG/OG/Enteral Tube Orogastric Center mouth 09/14/24  0050  Center mouth  4                  Physical Exam  Constitutional:       Interventions: He is sedated and intubated.   Pulmonary:      Effort: He is intubated.      Comments: ET tube in place. Mechanical breath sounds.   Abdominal:      General: There is no distension.      Palpations: Abdomen is soft.      Tenderness: There is no abdominal tenderness.      Comments: No surgical scars.           Lab Results: I have reviewed the following results: CBC/BMP:   .     09/17/24  0443   WBC 9.50   HGB 10.0*   HCT 31.6*      SODIUM 139   K 4.2   CL 99   CO2 37*   BUN 22   CREATININE 0.55*   GLUC 111   CAIONIZED 1.13   MG 2.1   PHOS 3.5    , PTT/INR:  .     09/17/24  0443 09/17/24  1922   PTT 56* 67*   INR 1.13  --       Imaging Review: Reviewed radiology reports from this  admission including: CT chest and CT abdomen/pelvis.  Other Studies: No additional pertinent studies reviewed.    VTE Pharmacologic Prophylaxis: Heparin  VTE Mechanical Prophylaxis: sequential compression device

## 2024-09-18 ENCOUNTER — APPOINTMENT (INPATIENT)
Dept: NON INVASIVE DIAGNOSTICS | Facility: HOSPITAL | Age: 64
DRG: 005 | End: 2024-09-18
Payer: COMMERCIAL

## 2024-09-18 LAB
ALBUMIN SERPL BCG-MCNC: 2.4 G/DL (ref 3.5–5)
ALP SERPL-CCNC: 69 U/L (ref 34–104)
ALT SERPL W P-5'-P-CCNC: 26 U/L (ref 7–52)
ANION GAP SERPL CALCULATED.3IONS-SCNC: 3 MMOL/L (ref 4–13)
AORTIC ROOT: 5.35 CM
APICAL FOUR CHAMBER EJECTION FRACTION: 53 %
APTT PPP: 72 SECONDS (ref 23–34)
AST SERPL W P-5'-P-CCNC: 19 U/L (ref 13–39)
BASOPHILS # BLD AUTO: 0.03 THOUSANDS/ΜL (ref 0–0.1)
BASOPHILS NFR BLD AUTO: 0 % (ref 0–1)
BILIRUB SERPL-MCNC: 0.74 MG/DL (ref 0.2–1)
BSA FOR ECHO PROCEDURE: 1.9 M2
BUN SERPL-MCNC: 24 MG/DL (ref 5–25)
CALCIUM ALBUM COR SERPL-MCNC: 9.3 MG/DL (ref 8.3–10.1)
CALCIUM SERPL-MCNC: 8 MG/DL (ref 8.4–10.2)
CHLORIDE SERPL-SCNC: 101 MMOL/L (ref 96–108)
CO2 SERPL-SCNC: 35 MMOL/L (ref 21–32)
CREAT SERPL-MCNC: 0.62 MG/DL (ref 0.6–1.3)
EOSINOPHIL # BLD AUTO: 0.24 THOUSAND/ΜL (ref 0–0.61)
EOSINOPHIL NFR BLD AUTO: 3 % (ref 0–6)
ERYTHROCYTE [DISTWIDTH] IN BLOOD BY AUTOMATED COUNT: 14.2 % (ref 11.6–15.1)
GFR SERPL CREATININE-BSD FRML MDRD: 104 ML/MIN/1.73SQ M
GLUCOSE SERPL-MCNC: 127 MG/DL (ref 65–140)
GLUCOSE SERPL-MCNC: 130 MG/DL (ref 65–140)
GLUCOSE SERPL-MCNC: 135 MG/DL (ref 65–140)
GLUCOSE SERPL-MCNC: 90 MG/DL (ref 65–140)
HCT VFR BLD AUTO: 26.5 % (ref 36.5–49.3)
HGB BLD-MCNC: 8.7 G/DL (ref 12–17)
IMM GRANULOCYTES # BLD AUTO: 0.04 THOUSAND/UL (ref 0–0.2)
IMM GRANULOCYTES NFR BLD AUTO: 1 % (ref 0–2)
LYMPHOCYTES # BLD AUTO: 0.6 THOUSANDS/ΜL (ref 0.6–4.47)
LYMPHOCYTES NFR BLD AUTO: 9 % (ref 14–44)
MAGNESIUM SERPL-MCNC: 2 MG/DL (ref 1.9–2.7)
MCH RBC QN AUTO: 32.7 PG (ref 26.8–34.3)
MCHC RBC AUTO-ENTMCNC: 32.8 G/DL (ref 31.4–37.4)
MCV RBC AUTO: 100 FL (ref 82–98)
MONOCYTES # BLD AUTO: 0.72 THOUSAND/ΜL (ref 0.17–1.22)
MONOCYTES NFR BLD AUTO: 10 % (ref 4–12)
NEUTROPHILS # BLD AUTO: 5.46 THOUSANDS/ΜL (ref 1.85–7.62)
NEUTS SEG NFR BLD AUTO: 77 % (ref 43–75)
NRBC BLD AUTO-RTO: 0 /100 WBCS
PHENYTOIN SERPL-MCNC: 7.6 UG/ML (ref 10–20)
PHOSPHATE SERPL-MCNC: 3.2 MG/DL (ref 2.3–4.1)
PLATELET # BLD AUTO: 201 THOUSANDS/UL (ref 149–390)
PMV BLD AUTO: 9.5 FL (ref 8.9–12.7)
POTASSIUM SERPL-SCNC: 4.3 MMOL/L (ref 3.5–5.3)
PROT SERPL-MCNC: 5.4 G/DL (ref 6.4–8.4)
RA PRESSURE ESTIMATED: 15 MMHG
RBC # BLD AUTO: 2.66 MILLION/UL (ref 3.88–5.62)
RV PSP: 45 MMHG
SL CV ECHO PERICARDIAL EFFUSION SIZE: 1.9 CM
SL CV LV EF: 53
SODIUM SERPL-SCNC: 139 MMOL/L (ref 135–147)
TR MAX PG: 30 MMHG
TR PEAK VELOCITY: 2.7 M/S
TRICUSPID VALVE PEAK REGURGITATION VELOCITY: 2.73 M/S
WBC # BLD AUTO: 7.09 THOUSAND/UL (ref 4.31–10.16)

## 2024-09-18 PROCEDURE — 99255 IP/OBS CONSLTJ NEW/EST HI 80: CPT | Performed by: SURGERY

## 2024-09-18 PROCEDURE — 94760 N-INVAS EAR/PLS OXIMETRY 1: CPT

## 2024-09-18 PROCEDURE — 93321 DOPPLER ECHO F-UP/LMTD STD: CPT | Performed by: INTERNAL MEDICINE

## 2024-09-18 PROCEDURE — 94669 MECHANICAL CHEST WALL OSCILL: CPT

## 2024-09-18 PROCEDURE — 94664 DEMO&/EVAL PT USE INHALER: CPT

## 2024-09-18 PROCEDURE — 99232 SBSQ HOSP IP/OBS MODERATE 35: CPT | Performed by: INTERNAL MEDICINE

## 2024-09-18 PROCEDURE — 80053 COMPREHEN METABOLIC PANEL: CPT

## 2024-09-18 PROCEDURE — 94003 VENT MGMT INPAT SUBQ DAY: CPT

## 2024-09-18 PROCEDURE — 93325 DOPPLER ECHO COLOR FLOW MAPG: CPT

## 2024-09-18 PROCEDURE — 93325 DOPPLER ECHO COLOR FLOW MAPG: CPT | Performed by: INTERNAL MEDICINE

## 2024-09-18 PROCEDURE — 82948 REAGENT STRIP/BLOOD GLUCOSE: CPT

## 2024-09-18 PROCEDURE — 93308 TTE F-UP OR LMTD: CPT | Performed by: INTERNAL MEDICINE

## 2024-09-18 PROCEDURE — 93308 TTE F-UP OR LMTD: CPT

## 2024-09-18 PROCEDURE — 93321 DOPPLER ECHO F-UP/LMTD STD: CPT

## 2024-09-18 PROCEDURE — 94640 AIRWAY INHALATION TREATMENT: CPT

## 2024-09-18 PROCEDURE — 85730 THROMBOPLASTIN TIME PARTIAL: CPT | Performed by: INTERNAL MEDICINE

## 2024-09-18 PROCEDURE — 83735 ASSAY OF MAGNESIUM: CPT

## 2024-09-18 PROCEDURE — 84100 ASSAY OF PHOSPHORUS: CPT

## 2024-09-18 PROCEDURE — 80185 ASSAY OF PHENYTOIN TOTAL: CPT

## 2024-09-18 PROCEDURE — 85025 COMPLETE CBC W/AUTO DIFF WBC: CPT

## 2024-09-18 RX ORDER — CEFAZOLIN SODIUM 2 G/50ML
2000 SOLUTION INTRAVENOUS
Status: COMPLETED | OUTPATIENT
Start: 2024-09-18 | End: 2024-09-18

## 2024-09-18 RX ORDER — SODIUM CHLORIDE, SODIUM GLUCONATE, SODIUM ACETATE, POTASSIUM CHLORIDE, MAGNESIUM CHLORIDE, SODIUM PHOSPHATE, DIBASIC, AND POTASSIUM PHOSPHATE .53; .5; .37; .037; .03; .012; .00082 G/100ML; G/100ML; G/100ML; G/100ML; G/100ML; G/100ML; G/100ML
500 INJECTION, SOLUTION INTRAVENOUS ONCE
Status: COMPLETED | OUTPATIENT
Start: 2024-09-18 | End: 2024-09-18

## 2024-09-18 RX ADMIN — CHLORHEXIDINE GLUCONATE 0.12% ORAL RINSE 15 ML: 1.2 LIQUID ORAL at 20:59

## 2024-09-18 RX ADMIN — CEFAZOLIN SODIUM 2000 MG: 2 SOLUTION INTRAVENOUS at 12:27

## 2024-09-18 RX ADMIN — IPRATROPIUM BROMIDE 0.5 MG: 0.5 SOLUTION RESPIRATORY (INHALATION) at 19:39

## 2024-09-18 RX ADMIN — BUDESONIDE 0.5 MG: 0.5 INHALANT RESPIRATORY (INHALATION) at 19:39

## 2024-09-18 RX ADMIN — PHENYTOIN 50 MG: 125 SUSPENSION ORAL at 10:31

## 2024-09-18 RX ADMIN — BUDESONIDE 0.5 MG: 0.5 INHALANT RESPIRATORY (INHALATION) at 08:16

## 2024-09-18 RX ADMIN — DEXMEDETOMIDINE HYDROCHLORIDE 0.5 MCG/KG/HR: 4 INJECTION, SOLUTION INTRAVENOUS at 09:35

## 2024-09-18 RX ADMIN — GABAPENTIN 400 MG: 400 CAPSULE ORAL at 18:31

## 2024-09-18 RX ADMIN — SODIUM CHLORIDE, SODIUM GLUCONATE, SODIUM ACETATE, POTASSIUM CHLORIDE, MAGNESIUM CHLORIDE, SODIUM PHOSPHATE, DIBASIC, AND POTASSIUM PHOSPHATE 500 ML: .53; .5; .37; .037; .03; .012; .00082 INJECTION, SOLUTION INTRAVENOUS at 10:45

## 2024-09-18 RX ADMIN — SENNOSIDES AND DOCUSATE SODIUM 1 TABLET: 50; 8.6 TABLET ORAL at 18:31

## 2024-09-18 RX ADMIN — HEPARIN SODIUM 12 UNITS/KG/HR: 10000 INJECTION, SOLUTION INTRAVENOUS at 21:28

## 2024-09-18 RX ADMIN — LEVALBUTEROL HYDROCHLORIDE 1.25 MG: 1.25 SOLUTION RESPIRATORY (INHALATION) at 08:16

## 2024-09-18 RX ADMIN — TRAZODONE HYDROCHLORIDE 100 MG: 100 TABLET ORAL at 21:28

## 2024-09-18 RX ADMIN — IPRATROPIUM BROMIDE 0.5 MG: 0.5 SOLUTION RESPIRATORY (INHALATION) at 13:53

## 2024-09-18 RX ADMIN — IPRATROPIUM BROMIDE 0.5 MG: 0.5 SOLUTION RESPIRATORY (INHALATION) at 08:16

## 2024-09-18 RX ADMIN — PHENYTOIN 50 MG: 125 SUSPENSION ORAL at 21:28

## 2024-09-18 RX ADMIN — CHLORHEXIDINE GLUCONATE 0.12% ORAL RINSE 15 ML: 1.2 LIQUID ORAL at 09:45

## 2024-09-18 RX ADMIN — LEVALBUTEROL HYDROCHLORIDE 1.25 MG: 1.25 SOLUTION RESPIRATORY (INHALATION) at 13:53

## 2024-09-18 RX ADMIN — Medication 75 MCG/HR: at 09:43

## 2024-09-18 RX ADMIN — DEXMEDETOMIDINE HYDROCHLORIDE 0.4 MCG/KG/HR: 4 INJECTION, SOLUTION INTRAVENOUS at 21:28

## 2024-09-18 RX ADMIN — FOLIC ACID 1 MG: 1 TABLET ORAL at 09:46

## 2024-09-18 RX ADMIN — GABAPENTIN 400 MG: 400 CAPSULE ORAL at 09:45

## 2024-09-18 RX ADMIN — LEVALBUTEROL HYDROCHLORIDE 1.25 MG: 1.25 SOLUTION RESPIRATORY (INHALATION) at 19:39

## 2024-09-18 RX ADMIN — Medication 100 MCG: at 10:31

## 2024-09-18 RX ADMIN — NICOTINE 1 PATCH: 21 PATCH, EXTENDED RELEASE TRANSDERMAL at 10:31

## 2024-09-18 NOTE — PLAN OF CARE
Problem: Prexisting or High Potential for Compromised Skin Integrity  Goal: Skin integrity is maintained or improved  Description: INTERVENTIONS:  - Identify patients at risk for skin breakdown  - Assess and monitor skin integrity  - Assess and monitor nutrition and hydration status  - Monitor labs   - Assess for incontinence   - Turn and reposition patient  - Assist with mobility/ambulation  - Relieve pressure over bony prominences  - Avoid friction and shearing  - Provide appropriate hygiene as needed including keeping skin clean and dry  - Evaluate need for skin moisturizer/barrier cream  - Collaborate with interdisciplinary team   - Patient/family teaching  - Consider wound care consult   Outcome: Progressing     Problem: SAFETY,RESTRAINT: NV/NON-SELF DESTRUCTIVE BEHAVIOR  Goal: Remains free of harm/injury (restraint for non violent/non self-detsructive behavior)  Description: INTERVENTIONS:  - Instruct patient/family regarding restraint use   - Assess and monitor physiologic and psychological status   - Provide interventions and comfort measures to meet assessed patient needs   - Identify and implement measures to help patient regain control  - Assess readiness for release of restraint   Outcome: Progressing  Goal: Returns to optimal restraint-free functioning  Description: INTERVENTIONS:  - Assess the patient's behavior and symptoms that indicate continued need for restraint  - Identify and implement measures to help patient regain control  - Assess readiness for release of restraint   Outcome: Progressing     Problem: PAIN - ADULT  Goal: Verbalizes/displays adequate comfort level or baseline comfort level  Description: Interventions:  - Encourage patient to monitor pain and request assistance  - Assess pain using appropriate pain scale  - Administer analgesics based on type and severity of pain and evaluate response  - Implement non-pharmacological measures as appropriate and evaluate response  - Consider  cultural and social influences on pain and pain management  - Notify physician/advanced practitioner if interventions unsuccessful or patient reports new pain  Outcome: Progressing     Problem: INFECTION - ADULT  Goal: Absence or prevention of progression during hospitalization  Description: INTERVENTIONS:  - Assess and monitor for signs and symptoms of infection  - Monitor lab/diagnostic results  - Monitor all insertion sites, i.e. indwelling lines, tubes, and drains  - Monitor endotracheal if appropriate and nasal secretions for changes in amount and color  - Helen appropriate cooling/warming therapies per order  - Administer medications as ordered  - Instruct and encourage patient and family to use good hand hygiene technique  - Identify and instruct in appropriate isolation precautions for identified infection/condition  Outcome: Progressing  Goal: Absence of fever/infection during neutropenic period  Description: INTERVENTIONS:  - Monitor WBC    Outcome: Progressing     Problem: SAFETY ADULT  Goal: Patient will remain free of falls  Description: INTERVENTIONS:  - Educate patient/family on patient safety including physical limitations  - Instruct patient to call for assistance with activity   - Consult OT/PT to assist with strengthening/mobility   - Keep Call bell within reach  - Keep bed low and locked with side rails adjusted as appropriate  - Keep care items and personal belongings within reach  - Initiate and maintain comfort rounds  - Make Fall Risk Sign visible to staff  - Offer Toileting every 2 Hours, in advance of need  - Initiate/Maintain bed alarm  - Obtain necessary fall risk management equipment  - Apply yellow socks and bracelet for high fall risk patients  - Consider moving patient to room near nurses station  Outcome: Progressing  Goal: Maintain or return to baseline ADL function  Description: INTERVENTIONS:  -  Assess patient's ability to carry out ADLs; assess patient's baseline for ADL  function and identify physical deficits which impact ability to perform ADLs (bathing, care of mouth/teeth, toileting, grooming, dressing, etc.)  - Assess/evaluate cause of self-care deficits   - Assess range of motion  - Assess patient's mobility; develop plan if impaired  - Assess patient's need for assistive devices and provide as appropriate  - Encourage maximum independence but intervene and supervise when necessary  - Involve family in performance of ADLs  - Assess for home care needs following discharge   - Consider OT consult to assist with ADL evaluation and planning for discharge  - Provide patient education as appropriate  Outcome: Progressing  Goal: Maintains/Returns to pre admission functional level  Description: INTERVENTIONS:  - Perform AM-PAC 6 Click Basic Mobility/ Daily Activity assessment daily.  - Set and communicate daily mobility goal to care team and patient/family/caregiver.   - Collaborate with rehabilitation services on mobility goals if consulted  - Perform Range of Motion 3 times a day.  - Reposition patient every 2 hours.  - Dangle patient 3 times a day  - Stand patient 3 times a day  - Ambulate patient 3 times a day  - Out of bed to chair 3 times a day   - Out of bed for meals 3 times a day  - Out of bed for toileting  - Record patient progress and toleration of activity level   Outcome: Progressing     Problem: DISCHARGE PLANNING  Goal: Discharge to home or other facility with appropriate resources  Description: INTERVENTIONS:  - Identify barriers to discharge w/patient and caregiver  - Arrange for needed discharge resources and transportation as appropriate  - Identify discharge learning needs (meds, wound care, etc.)  - Arrange for interpretive services to assist at discharge as needed  - Refer to Case Management Department for coordinating discharge planning if the patient needs post-hospital services based on physician/advanced practitioner order or complex needs related to  functional status, cognitive ability, or social support system  Outcome: Progressing     Problem: Knowledge Deficit  Goal: Patient/family/caregiver demonstrates understanding of disease process, treatment plan, medications, and discharge instructions  Description: Complete learning assessment and assess knowledge base.  Interventions:  - Provide teaching at level of understanding  - Provide teaching via preferred learning methods  Outcome: Progressing     Problem: Nutrition/Hydration-ADULT  Goal: Nutrient/Hydration intake appropriate for improving, restoring or maintaining nutritional needs  Description: Monitor and assess patient's nutrition/hydration status for malnutrition. Collaborate with interdisciplinary team and initiate plan and interventions as ordered.  Monitor patient's weight and dietary intake as ordered or per policy. Utilize nutrition screening tool and intervene as necessary. Determine patient's food preferences and provide high-protein, high-caloric foods as appropriate.     INTERVENTIONS:  - Monitor oral intake, urinary output, labs, and treatment plans  - Assess nutrition and hydration status and recommend course of action  - Evaluate amount of meals eaten  - Assist patient with eating if necessary   - Allow adequate time for meals  - Recommend/ encourage appropriate diets, oral nutritional supplements, and vitamin/mineral supplements  - Order, calculate, and assess calorie counts as needed  - Recommend, monitor, and adjust tube feedings and TPN/PPN based on assessed needs  - Assess need for intravenous fluids  - Provide specific nutrition/hydration education as appropriate  - Include patient/family/caregiver in decisions related to nutrition  Outcome: Progressing     Problem: RESPIRATORY - ADULT  Goal: Achieves optimal ventilation and oxygenation  Description: INTERVENTIONS:  - Assess for changes in respiratory status  - Assess for changes in mentation and behavior  - Position to facilitate  oxygenation and minimize respiratory effort  - Oxygen administered by appropriate delivery if ordered  - Initiate smoking cessation education as indicated  - Encourage broncho-pulmonary hygiene including cough, deep breathe, Incentive Spirometry  - Assess the need for suctioning and aspirate as needed  - Assess and instruct to report SOB or any respiratory difficulty  - Respiratory Therapy support as indicated  Outcome: Progressing

## 2024-09-18 NOTE — RESPIRATORY THERAPY NOTE
RT Ventilator Management Note  Marcin Sánchez 64 y.o. male MRN: [id number]  Unit/Bed#: [facility] 10 Encounter: [id number]      Daily Screen         9/16/2024  0734 9/17/2024  0735          Patient safety screen outcome:: Failed Failed      Not Ready for Weaning due to:: Poor inspiratory effort;Underline problem not resolved Underline problem not resolved                Physical Exam:   Assessment Type: Pre-treatment  General Appearance: Awake  Respiratory Pattern: (P) Assisted  Chest Assessment: (P) Chest expansion symmetrical  Bilateral Breath Sounds: (P) Clear, Diminished  Suction: ET Tube      Resp Comments: (P) Pt has been stable on ac/vc settings through the night. No distress noted

## 2024-09-18 NOTE — PROGRESS NOTES
"Cardiology Team 2 Progress Note - Marcin Sánchez 64 y.o. male MRN: [id number]    [facility]: [id number]          Subjective:   Patient seen and examined.  No significant events overnight. Remains intubated this morning, awake and nods yes and no.    Hospital Course:   Marcin Sánchez is a 64 y.o. year old male who presented to [facility] 9/5 with acute respiratory failure secondary COPD exacerbation.  He has past medical history significant for atrial fibrillation, COPD, chronic combined systolic and diastolic CHF, epilepsy, pulmonary fibrosis.  Radiation, history of squamous cell lung cancer, current tobacco user.  Smoking outside his living facility (the [facility]) when he was noted to become acutely confused.  He also.  Short of breath was noted to be hypoxemic prompting call to EMS.  Upon arrival he was lethargic and confused which is thought to be secondary to hypercarbic respiratory failure in the setting of COPD exacerbation and was initiated on BiPAP.  On 9/7 patient had acute hypoxic event resulting in bradycardia and subsequently PEA cardiac arrest 2 rounds of CPR with VT noted during a pulse check. Shock was administered and ROSC was achieved and he was moving all extremities. She was subsequently intubated.  Echocardiogram rest showed LVEF 55% with normal systolic function with moderate pericardial effusion.  He was transferred to [facility] for thoracic surgery oncology evaluation. There were no clinical signs of tamponade and therefore window and pericardiocentesis were not performed. Attempted extubation to BiPAP 9/10 however failed and required re-intubation due to hypercarbia. He underwent bronchoscopy 9/11 with large amount of mucus removed and mucus plugging removed.    Vitals: Blood pressure (!) 95/49, pulse 70, temperature 98.5 °F (36.9 °C), temperature source Oral, resp. rate (!) 24, height 5' 9\" (1.753 m), weight 74.4 kg (164 lb), SpO2 100%., Body mass index is 24.22 " kg/m².,   Orthostatic Blood Pressures      Flowsheet Row Most Recent Value   Blood Pressure 95/49 filed at 09/18/2024 1215   Patient Position - Orthostatic VS Lying filed at 09/18/2024 0900              Intake/Output Summary (Last 24 hours) at 9/18/2024 1330  Last data filed at 9/18/2024 1123  Gross per 24 hour   Intake 2077.36 ml   Output 225 ml   Net 1852.36 ml       Review of System:  Review of system was conducted and was negative except for as stated in the subjective course.    Physical Exam:    GEN: Marcin Sánchez appears well, alert and oriented x 3, pleasant and cooperative   HEENT:  Normocephalic, atraumatic, anicteric, moist mucous membranes  NECK: No JVD or carotid bruits   HEART: regular rhythm, regular rate, normal S1 and S2, no murmurs, clicks, gallops or rubs   LUNGS: Intubated this morning. Significant expiratory wheezing b/l.  ABDOMEN:  Normoactive bowel sounds, soft, no tenderness, no distention  EXTREMITIES: peripheral pulses palpable; no edema  NEURO: no gross focal findings; cranial nerves grossly intact   SKIN:  Dry, intact, warm to touch      Current Facility-Administered Medications:     acetaminophen (TYLENOL) tablet 650 mg, 650 mg, Oral, Q6H PRN, LANDON Desai, 650 mg at 09/11/24 0441    albuterol inhalation solution 2.5 mg, 2.5 mg, Nebulization, Q6H PRN, Brian Poe MD, 2.5 mg at 09/16/24 0819    bisacodyl (DULCOLAX) rectal suppository 10 mg, 10 mg, Rectal, Daily PRN, Axel Smith PA-C, 10 mg at 09/13/24 0803    budesonide (PULMICORT) inhalation solution 0.5 mg, 0.5 mg, Nebulization, Q12H, LANDON Desai, 0.5 mg at 09/18/24 0816    chlorhexidine (PERIDEX) 0.12 % oral rinse 15 mL, 15 mL, Mouth/Throat, Q12H AMERICA, Tobi Hook MD, 15 mL at 09/18/24 0945    cyanocobalamin (VITAMIN B-12) tablet 100 mcg, 100 mcg, Oral, Daily, Axel Smith PA-C, 100 mcg at 09/18/24 1031    dexmedeTOMIDine (Precedex) 400 mcg in sodium chloride 0.9% 100 mL,  0.1-1.2 mcg/kg/hr, Intravenous, Titrated, Axel Smith PA-C, Last Rate: 7.6 mL/hr at 09/18/24 0935, 0.5 mcg/kg/hr at 09/18/24 0935    fentaNYL 1000 mcg in sodium chloride 0.9% 100mL infusion, 75 mcg/hr, Intravenous, Continuous, Tobi Hook MD, Last Rate: 7.5 mL/hr at 09/18/24 0943, 75 mcg/hr at 09/18/24 0943    fentaNYL injection 50 mcg, 50 mcg, Intravenous, Q2H PRN, Kentrell Hughes MD, 50 mcg at 09/17/24 0420    folic acid (FOLVITE) tablet 1 mg, 1 mg, Oral, Daily, Axel Smith PA-C, 1 mg at 09/18/24 0946    gabapentin (NEURONTIN) capsule 400 mg, 400 mg, Per NG Tube, BID, Ammar Alam, DO, 400 mg at 09/18/24 0945    heparin (porcine) 25,000 units in 0.45% NaCl 250 mL infusion (premix), 3-22 Units/kg/hr (Order-Specific), Intravenous, Titrated, Tobi Hook MD, Last Rate: 7.2 mL/hr at 09/17/24 1246, 12 Units/kg/hr at 09/17/24 1246    heparin (porcine) injection 1,800 Units, 1,800 Units, Intravenous, Q6H PRN, Axel Smith PA-C, 1,800 Units at 09/11/24 0342    heparin (porcine) injection 3,600 Units, 3,600 Units, Intravenous, Q6H PRN, Axel Smith PA-C    ipratropium (ATROVENT) 0.02 % inhalation solution 0.5 mg, 0.5 mg, Nebulization, TID, Ammar Alam, DO, 0.5 mg at 09/18/24 0816    levalbuterol (XOPENEX) inhalation solution 1.25 mg, 1.25 mg, Nebulization, TID, Ammar Alam, DO, 1.25 mg at 09/18/24 0816    nicotine (NICODERM CQ) 21 mg/24 hr TD 24 hr patch 1 patch, 1 patch, Transdermal, Daily, LANDON Desai, 1 patch at 09/18/24 1031    phenytoin (DILANTIN) oral suspension 50 mg, 50 mg, Oral, Q12H AMERICA, Elle Ortiz DO, 50 mg at 09/18/24 1031    polyethylene glycol (MIRALAX) packet 17 g, 17 g, Oral, BID, Emerson Langston MD, 17 g at 09/17/24 1743    senna-docusate sodium (SENOKOT S) 8.6-50 mg per tablet 1 tablet, 1 tablet, Oral, BID, Emerson Langston MD, 1 tablet at 09/17/24 1743    traZODone (DESYREL) tablet 100 mg, 100 mg, Oral, HS, Elle Ortiz DO, 100 mg at 09/17/24  2122    Labs & Results:  Results from last 7 days   Lab Units 09/16/24  1317 09/16/24  1055 09/16/24  0840   HS TNI 0HR ng/L  --   --  13   HS TNI 2HR ng/L 20  --   --    HS TNI 4HR ng/L  --  18  --          Results from last 7 days   Lab Units 09/18/24  0525 09/17/24  0443 09/16/24  0510   POTASSIUM mmol/L 4.3 4.2 4.2   CO2 mmol/L 35* 37* 37*   CHLORIDE mmol/L 101 99 103   BUN mg/dL 24 22 25   CREATININE mg/dL 0.62 0.55* 0.61     Results from last 7 days   Lab Units 09/18/24  0525 09/17/24  0443 09/16/24  0510   HEMOGLOBIN g/dL 8.7* 10.0* 9.1*   HEMATOCRIT % 26.5* 31.6* 28.5*   PLATELETS Thousands/uL 201 207 187     Results from last 7 days   Lab Units 09/12/24  0600   HEMOGLOBIN A1C % 5.6       Telemetry:   Personally reviewed by Adam Nunez, DO: normal sinus rhythm with periods of tachycardia      Assessment:  Principal Problem:    Acute hypoxic respiratory failure (HCC)  Active Problems:    Acute exacerbation of chronic obstructive pulmonary disease (COPD) (HCC)    Cancer of right main bronchus (HCC)    Chronic combined systolic and diastolic CHF (congestive heart failure) (HCC)    Cardiac arrest (HCC)    Pericardial effusion    Shock (HCC)    Mucus plugging of bronchi      Pericardial effusion  Moderate Sized pericardial effusion noted on  the last 3 echocardiograms 8/29, 9/8, and 9/9. Effusion predominantly around RV and RA free wall with smaller portion tracking around the apex. Does not appear to have changes much since 8/29. Currenlty no clinical or echocardiographic signs of tamponade. He is intubated and sedated for acute respiratry failure form COPD and mucus plugs.  - interval echocardiogram 9/11 with stable effusion and no evidence of tamponade  Plan:  - Repeat limited echo today to evaluate effusion given low BP  - patient is likely very volume sensitive, low intravascular pressures may lead to hypotension with effusion  - Lasix held, given 500cc fluids, remains off pressors  - recommend cancer  screenings when stable to due so     Cardiac arrest  Cardiac arrest appears to be in the setting of hypoxemia. Initial rhythm reported as PEA with ROSC then VT arrest. Possibly transient ischemia during PEA leating to VT. After intubation does not appear to have any further ectopy. Echocardiogram with LVEF 50-55%.  - restart BB when remaining hemodynamically stable for 24 hours  - continue treatment for COPD exacerbation as per primary team     Moderate to severe AI  Dilated Aortic root  There is moderate to severe AI with severely dilated aortic root. Closely monitoring volume status as above. Would be poor surgical candidate with the extent of his lung disease and inability to extubate.  - will monitor on echos    Chronic combined systolic and diastolic heart failure  Appears euvolemic on exam. Currently requireing pressors. Chronically on Lasix 40mg daily and toprol 25mg daily.  - holding BB after cardiac arrest and with effusion, may restart when BP more stable  - appears euvolemic today however difficult intravascular volume exam.     Paroxysmal atrial fibrillation  Current yin sinus rhythm. Rates are controlled.  - restart metoprolol when BP more stable  - continue AC with heparin currently, once no furhter procedures required transition back to [facility].     Acute Hypoxemic Respiratory failure- intubated and sedated, wean as able as per primary team  COPD- steroids and inhalers as per primary.  Pulmonary fibrosis  history of squamous cell lung cancer- s/p chemo/radiation and RUL lung resection  Current Tobacco use- tobacco cessation recomended    Case discussed and reviewed with Dr. Wallace who agrees with my assessment and plan.    Thank you for involving us in the care of your patient.      Adam Nunez DO  Cardiology Fellow   PGY-5      ** Please Note: Fluency DirectDictation voice to text software may have been used in the creation of this document. **

## 2024-09-18 NOTE — RESPIRATORY THERAPY NOTE
RT Ventilator Management Note  Marcin Sánchez 64 y.o. male MRN: [id number]  Unit/Bed#: [facility] 10 Encounter: [id number]      Daily Screen         9/16/2024  0734 9/17/2024  0735          Patient safety screen outcome:: Failed Failed      Not Ready for Weaning due to:: Poor inspiratory effort;Underline problem not resolved Underline problem not resolved                Physical Exam:   Assessment Type: (P) Assess only  General Appearance: (P) Sleeping  Respiratory Pattern: (P) Assisted  Chest Assessment: (P) Chest expansion symmetrical  Bilateral Breath Sounds: (P) Clear, Diminished  Cough: (P) None  Suction: (P) ET Tube  O2 Device: (P) Ventilator      Resp Comments: (P) Pt. remains on AC/VC mode.  No changes at this time.  Will continue to monitor pt per protocol.

## 2024-09-18 NOTE — PROGRESS NOTES
"Progress Note - Critical Care/ICU   Name: Marcin Sánchez 64 y.o. male I MRN: [id number]  Unit/Bed#: [facility] 10 I Date of Admission: 9/8/2024   Date of Service: 9/18/2024 I Hospital Day: 10       Assessment & Plan   Neuro:   Diagnosis: Metabolic encephalopathy  Plan:   Patient on precedex, current rate 0.5 and fentanyl 75  Responds to commands and is alert    Diagnosis: Epilepsy  Home medication: Phenytoin 100 mg ER capsule and gabapentin 400 mg twice daily  Plan:  Continue phenytoin 100 mg and continue Gabapentin  Phenytoin level this morning 9/18: 7.6    CV:   Diagnosis: Pericardial effusion   Echo on 9/9 read as \"moderate to large pericardial effusion circumferential to the heart measuring largest along the RV free wall at 2.3 cm. The fluid exhibits no internal echoes. There is no echocardiographic evidence of tamponade.\"  Plan:   Monitor for signs of tamponade via physical exam  Consider additional diuresis for fluid balances    Diagnosis: History of Atrial fibrillation  Home medications: Toprol XL 25 mg 24 hour tablet daily  Plan:   Hold metoprolol XL 25 mg daily    Diagnosis: Acute on Chronic combined diastolic and systolic CHF  9/11 ECHO: LVEF 55%, hypokinetic apex. Moderate to severe aortic regurgitation, mild tricuspid regurgitation, dilated ascending aortic root up to 5.4 cm.  Plan:  Continue to monitor fluid status  Continue lasix 40 daily    Pulm:  Diagnosis: Acute on Chronic hypoxic and hypercapnic respiratory failure, moderate, possibly severe COPD with acute exacerbation  Patient has been intubated and unsuccessfully extubated 2x.  Plan:  Continue xopenex and atrovent TID  Continue pulmicort BID  Continue Performist BID  Plan for tracheostomy by surgical team 9/19.    GI:   Diagnosis: Transaminitis  Initially patient presented with elevated LFTs, likely in the setting of shock liver.  Resolved    :   Diagnosis: Urinary retention  Patient had wong discontinued 2 days ago  Possibly retaining " urine.  Plan:  Plan for wong today?  Urine output last 24 hours: 225    F/E/N:   F: No fluids  E: Maintain K >4, Phos >3 and Mag >2  N: Currently on tube feeds, NPO tonight for possible PEG tomorrow. Continue vitamin B12 daily, folic acid daily    Heme/Onc:   Diagnosis: Anemia  Iron panel revealed pattern of anemia of chronic disease  Plan:  Hemoglobin 9/18: 8.1, downtrended from 10.0    Check thiamine and folate?    Diagnosis: DVT prophylaxis  Plan:  Continue heparin drip 12U  Hold tonight for possible trach and peg tomorrow    Endo:   No active issues    ID:   Diagnosis: CT findings suggesting Pneumonia  Sputum cultures 9/6 grew moraxella and acinetobacter  Plan:  ID recommended to continue to follow closely off antibiotics as patient has been clinically stable without fever.  If patient were to show clinical symptoms would start Minocycline  Continue to trend WBC and fever curve    MSK/Skin:   Continue to reposition and turn patient    Disposition: Critical care    ICU Core Measures     Vented Patient  VAP Bundle  VAP bundle ordered     A: Assess, Prevent, and Manage Pain Has pain been assessed? Yes  Need for changes to pain regimen? No   B: Both Spontaneous Awakening Trials (SATs) and Spontaneous Breathing Trials (SBTs) Plan to perform spontaneous awakening trial today? Yes   Plan to perform spontaneous breathing trial today? No secondary to severe hypoxia/hypocapnia   Obvious barriers to extubation? Yes   C: Choice of Sedation RASS Goal: 0 Alert and Calm  Need for changes to sedation or analgesia regimen? No   D: Delirium CAM-ICU: Unable to perform secondary to Acute cognitive dysfunction   E: Early Mobility  Plan for early mobility? Yes   F: Family Engagement Plan for family engagement today? Yes       Review of Invasive Devices:            Prophylaxis:  VTE VTE covered by:  heparin (porcine), Intravenous, 12 Units/kg/hr at 09/17/24 1246  heparin (porcine), Intravenous, 1,800 Units at 09/11/24  0342  heparin (porcine), Intravenous       Stress Ulcer  not ordered         24 Hour Events   24hr events:     No acute events overnight. Patient did not become agitated and did not require additional sedation.     He was afebrile overnight, Hrs 63-92. He was a bit hypotensive Bps varying between 84//58.  Patient states he is in no distress, he has no complaints and no pain at this time.  Subjective   Review of Systems: Review of Systems   Unable to perform ROS: Intubated       Objective                          Vitals I/O      Most Recent Min/Max in 24hrs   Temp 98.9 °F (37.2 °C) Temp  Min: 98.6 °F (37 °C)  Max: 99 °F (37.2 °C)   Pulse 63 Pulse  Min: 61  Max: 92   Resp 14 Resp  Min: 12  Max: 56   BP 92/50 BP  Min: 84/45  Max: 126/60   O2 Sat 99 % SpO2  Min: 95 %  Max: 100 %      Intake/Output Summary (Last 24 hours) at 9/18/2024 0611  Last data filed at 9/18/2024 0530  Gross per 24 hour   Intake 1796.75 ml   Output 225 ml   Net 1571.75 ml       Diet Enteral/Parenteral; Tube Feeding No Oral Diet; Jevity 1.2 Mirza; Continuous; 55; Prosource Protein Liquid - One Packet; 30; Water; Every 4 hours    Invasive Monitoring           Physical Exam   Physical Exam  Eyes:      Conjunctiva/sclera: Conjunctivae normal.      Pupils: Pupils are equal, round, and reactive to light.   Skin:     General: Skin is warm and dry.   HENT:      Head: Normocephalic and atraumatic.   Cardiovascular:      Rate and Rhythm: Normal rate and regular rhythm.      Heart sounds: Normal heart sounds.   Abdominal: General: Bowel sounds are normal. There is no distension.      Palpations: Abdomen is soft.      Tenderness: There is no abdominal tenderness.   Constitutional:       General: He is not in acute distress.     Appearance: He is well-developed. He is not ill-appearing.      Interventions: He is intubated and restrained.   Pulmonary:      Effort: Pulmonary effort is normal. No accessory muscle usage, respiratory distress or accessory muscle  usage. He is intubated.   Neurological:      Mental Status: He is calm.      Comments: Patient is intubated and sedated on precedex. He is calm and alert. He opens his eyes to voice. He is able to answer yes and no questions. He follows commands.          Diagnostic Studies        Lab Results: I have reviewed the following results:      Medications:  Scheduled PRN   budesonide, 0.5 mg, Q12H  chlorhexidine, 15 mL, Q12H AMERICA  vitamin B-12, 100 mcg, Daily  folic acid, 1 mg, Daily  furosemide, 40 mg, Daily  gabapentin, 400 mg, BID  ipratropium, 0.5 mg, TID  levalbuterol, 1.25 mg, TID  midazolam, 4 mg, Once  nicotine, 1 patch, Daily  phenytoin, 50 mg, Q12H AMERICA  polyethylene glycol, 17 g, BID  senna-docusate sodium, 1 tablet, BID  traZODone, 100 mg, HS  vecuronium, 0.1 mg/kg, Once      acetaminophen, 650 mg, Q6H PRN  albuterol, 2.5 mg, Q6H PRN  bisacodyl, 10 mg, Daily PRN  fentaNYL, 50 mcg, Q2H PRN  heparin (porcine), 1,800 Units, Q6H PRN  heparin (porcine), 3,600 Units, Q6H PRN       Continuous    dexmedetomidine, 0.1-1.2 mcg/kg/hr, Last Rate: 0.5 mcg/kg/hr (09/18/24 0147)  fentaNYL, 75 mcg/hr, Last Rate: 75 mcg/hr (09/17/24 2132)  heparin (porcine), 3-22 Units/kg/hr (Order-Specific), Last Rate: 12 Units/kg/hr (09/17/24 1246)  norepinephrine, 1-30 mcg/min, Last Rate: Stopped (09/17/24 1142)         Labs:   CBC    Recent Labs     09/17/24 0443 09/18/24  0525   WBC 9.50 7.09   HGB 10.0* 8.7*   HCT 31.6* 26.5*    201     BMP    Recent Labs     09/17/24 0443   SODIUM 139   K 4.2   CL 99   CO2 37*   AGAP 3*   BUN 22   CREATININE 0.55*   CALCIUM 8.3*       Coags    Recent Labs     09/17/24 0443 09/17/24  1922 09/18/24  0130   INR 1.13  --   --    PTT 56* 67* 72*        Additional Electrolytes  Recent Labs     09/17/24  0443   MG 2.1   PHOS 3.5   CAIONIZED 1.13          Blood Gas    No recent results  No recent results LFTs  No recent results    Infectious  No recent results  Glucose  Recent Labs     09/17/24 0443    GLUC 111

## 2024-09-19 ENCOUNTER — APPOINTMENT (INPATIENT)
Dept: RADIOLOGY | Facility: HOSPITAL | Age: 64
DRG: 005 | End: 2024-09-19
Payer: COMMERCIAL

## 2024-09-19 ENCOUNTER — ANESTHESIA (INPATIENT)
Dept: PERIOP | Facility: HOSPITAL | Age: 64
End: 2024-09-19
Payer: COMMERCIAL

## 2024-09-19 ENCOUNTER — ANESTHESIA EVENT (INPATIENT)
Dept: PERIOP | Facility: HOSPITAL | Age: 64
End: 2024-09-19
Payer: COMMERCIAL

## 2024-09-19 LAB
ANION GAP SERPL CALCULATED.3IONS-SCNC: 5 MMOL/L (ref 4–13)
BASOPHILS # BLD AUTO: 0.04 THOUSANDS/ΜL (ref 0–0.1)
BASOPHILS NFR BLD AUTO: 1 % (ref 0–1)
BUN SERPL-MCNC: 23 MG/DL (ref 5–25)
CA-I BLD-SCNC: 1.11 MMOL/L (ref 1.12–1.32)
CALCIUM SERPL-MCNC: 8.1 MG/DL (ref 8.4–10.2)
CHLORIDE SERPL-SCNC: 99 MMOL/L (ref 96–108)
CO2 SERPL-SCNC: 33 MMOL/L (ref 21–32)
CREAT SERPL-MCNC: 0.56 MG/DL (ref 0.6–1.3)
EOSINOPHIL # BLD AUTO: 0.25 THOUSAND/ΜL (ref 0–0.61)
EOSINOPHIL NFR BLD AUTO: 4 % (ref 0–6)
ERYTHROCYTE [DISTWIDTH] IN BLOOD BY AUTOMATED COUNT: 14.3 % (ref 11.6–15.1)
GFR SERPL CREATININE-BSD FRML MDRD: 109 ML/MIN/1.73SQ M
GLUCOSE SERPL-MCNC: 100 MG/DL (ref 65–140)
GLUCOSE SERPL-MCNC: 113 MG/DL (ref 65–140)
GLUCOSE SERPL-MCNC: 63 MG/DL (ref 65–140)
GLUCOSE SERPL-MCNC: 92 MG/DL (ref 65–140)
GLUCOSE SERPL-MCNC: 96 MG/DL (ref 65–140)
HCT VFR BLD AUTO: 26.1 % (ref 36.5–49.3)
HCT VFR BLD AUTO: 30.2 % (ref 36.5–49.3)
HGB BLD-MCNC: 8.3 G/DL (ref 12–17)
HGB BLD-MCNC: 9.8 G/DL (ref 12–17)
IMM GRANULOCYTES # BLD AUTO: 0.03 THOUSAND/UL (ref 0–0.2)
IMM GRANULOCYTES NFR BLD AUTO: 1 % (ref 0–2)
LYMPHOCYTES # BLD AUTO: 0.56 THOUSANDS/ΜL (ref 0.6–4.47)
LYMPHOCYTES NFR BLD AUTO: 9 % (ref 14–44)
MAGNESIUM SERPL-MCNC: 2 MG/DL (ref 1.9–2.7)
MCH RBC QN AUTO: 32.2 PG (ref 26.8–34.3)
MCHC RBC AUTO-ENTMCNC: 31.8 G/DL (ref 31.4–37.4)
MCV RBC AUTO: 101 FL (ref 82–98)
MONOCYTES # BLD AUTO: 0.72 THOUSAND/ΜL (ref 0.17–1.22)
MONOCYTES NFR BLD AUTO: 11 % (ref 4–12)
NEUTROPHILS # BLD AUTO: 4.94 THOUSANDS/ΜL (ref 1.85–7.62)
NEUTS SEG NFR BLD AUTO: 74 % (ref 43–75)
NRBC BLD AUTO-RTO: 0 /100 WBCS
PHOSPHATE SERPL-MCNC: 3.7 MG/DL (ref 2.3–4.1)
PLATELET # BLD AUTO: 201 THOUSANDS/UL (ref 149–390)
PMV BLD AUTO: 9.3 FL (ref 8.9–12.7)
POTASSIUM SERPL-SCNC: 4.5 MMOL/L (ref 3.5–5.3)
RBC # BLD AUTO: 2.58 MILLION/UL (ref 3.88–5.62)
SODIUM SERPL-SCNC: 137 MMOL/L (ref 135–147)
WBC # BLD AUTO: 6.54 THOUSAND/UL (ref 4.31–10.16)

## 2024-09-19 PROCEDURE — 43246 EGD PLACE GASTROSTOMY TUBE: CPT | Performed by: SURGERY

## 2024-09-19 PROCEDURE — 99232 SBSQ HOSP IP/OBS MODERATE 35: CPT | Performed by: INTERNAL MEDICINE

## 2024-09-19 PROCEDURE — 82330 ASSAY OF CALCIUM: CPT

## 2024-09-19 PROCEDURE — 94664 DEMO&/EVAL PT USE INHALER: CPT

## 2024-09-19 PROCEDURE — 0B113F4 BYPASS TRACHEA TO CUTANEOUS WITH TRACHEOSTOMY DEVICE, PERCUTANEOUS APPROACH: ICD-10-PCS | Performed by: SURGERY

## 2024-09-19 PROCEDURE — 0DH63UZ INSERTION OF FEEDING DEVICE INTO STOMACH, PERCUTANEOUS APPROACH: ICD-10-PCS | Performed by: SURGERY

## 2024-09-19 PROCEDURE — 85018 HEMOGLOBIN: CPT

## 2024-09-19 PROCEDURE — 85014 HEMATOCRIT: CPT

## 2024-09-19 PROCEDURE — 94760 N-INVAS EAR/PLS OXIMETRY 1: CPT

## 2024-09-19 PROCEDURE — 84100 ASSAY OF PHOSPHORUS: CPT

## 2024-09-19 PROCEDURE — 82948 REAGENT STRIP/BLOOD GLUCOSE: CPT

## 2024-09-19 PROCEDURE — 71045 X-RAY EXAM CHEST 1 VIEW: CPT

## 2024-09-19 PROCEDURE — 83735 ASSAY OF MAGNESIUM: CPT

## 2024-09-19 PROCEDURE — 80048 BASIC METABOLIC PNL TOTAL CA: CPT

## 2024-09-19 PROCEDURE — 94003 VENT MGMT INPAT SUBQ DAY: CPT

## 2024-09-19 PROCEDURE — 85025 COMPLETE CBC W/AUTO DIFF WBC: CPT

## 2024-09-19 PROCEDURE — 94640 AIRWAY INHALATION TREATMENT: CPT | Performed by: SOCIAL WORKER

## 2024-09-19 PROCEDURE — 31600 PLANNED TRACHEOSTOMY: CPT | Performed by: SURGERY

## 2024-09-19 PROCEDURE — 94640 AIRWAY INHALATION TREATMENT: CPT

## 2024-09-19 PROCEDURE — 99233 SBSQ HOSP IP/OBS HIGH 50: CPT | Performed by: SURGERY

## 2024-09-19 DEVICE — PERCUTANEOUS ENDOSCOPIC GASTROSTOMY KIT ENFIT
Type: IMPLANTABLE DEVICE | Site: ABDOMEN | Status: FUNCTIONAL
Brand: ENDOVIVE SAFETY PEG KIT

## 2024-09-19 RX ORDER — FENTANYL CITRATE 50 UG/ML
INJECTION, SOLUTION INTRAMUSCULAR; INTRAVENOUS AS NEEDED
Status: DISCONTINUED | OUTPATIENT
Start: 2024-09-19 | End: 2024-09-19

## 2024-09-19 RX ORDER — DEXTROSE MONOHYDRATE 25 G/50ML
25 INJECTION, SOLUTION INTRAVENOUS ONCE
Status: COMPLETED | OUTPATIENT
Start: 2024-09-19 | End: 2024-09-19

## 2024-09-19 RX ORDER — ALBUMIN, HUMAN INJ 5% 5 %
12.5 SOLUTION INTRAVENOUS ONCE
Status: COMPLETED | OUTPATIENT
Start: 2024-09-19 | End: 2024-09-19

## 2024-09-19 RX ORDER — SODIUM CHLORIDE, SODIUM LACTATE, POTASSIUM CHLORIDE, CALCIUM CHLORIDE 600; 310; 30; 20 MG/100ML; MG/100ML; MG/100ML; MG/100ML
INJECTION, SOLUTION INTRAVENOUS CONTINUOUS PRN
Status: DISCONTINUED | OUTPATIENT
Start: 2024-09-19 | End: 2024-09-19

## 2024-09-19 RX ORDER — LIDOCAINE HYDROCHLORIDE 10 MG/ML
INJECTION, SOLUTION EPIDURAL; INFILTRATION; INTRACAUDAL; PERINEURAL AS NEEDED
Status: DISCONTINUED | OUTPATIENT
Start: 2024-09-19 | End: 2024-09-19 | Stop reason: HOSPADM

## 2024-09-19 RX ORDER — DEXTROSE MONOHYDRATE AND SODIUM CHLORIDE 5; .9 G/100ML; G/100ML
75 INJECTION, SOLUTION INTRAVENOUS CONTINUOUS
Status: DISCONTINUED | OUTPATIENT
Start: 2024-09-19 | End: 2024-09-20

## 2024-09-19 RX ORDER — TRANEXAMIC ACID 10 MG/ML
1000 INJECTION, SOLUTION INTRAVENOUS ONCE
Status: DISCONTINUED | OUTPATIENT
Start: 2024-09-19 | End: 2024-09-22

## 2024-09-19 RX ORDER — ROCURONIUM BROMIDE 10 MG/ML
INJECTION, SOLUTION INTRAVENOUS AS NEEDED
Status: DISCONTINUED | OUTPATIENT
Start: 2024-09-19 | End: 2024-09-19

## 2024-09-19 RX ORDER — CALCIUM GLUCONATE 20 MG/ML
2 INJECTION, SOLUTION INTRAVENOUS ONCE
Status: COMPLETED | OUTPATIENT
Start: 2024-09-19 | End: 2024-09-19

## 2024-09-19 RX ORDER — MAGNESIUM HYDROXIDE 1200 MG/15ML
LIQUID ORAL AS NEEDED
Status: DISCONTINUED | OUTPATIENT
Start: 2024-09-19 | End: 2024-09-19 | Stop reason: HOSPADM

## 2024-09-19 RX ORDER — CEFAZOLIN SODIUM 1 G/3ML
INJECTION, POWDER, FOR SOLUTION INTRAMUSCULAR; INTRAVENOUS AS NEEDED
Status: DISCONTINUED | OUTPATIENT
Start: 2024-09-19 | End: 2024-09-19

## 2024-09-19 RX ADMIN — FOLIC ACID 1 MG: 1 TABLET ORAL at 09:39

## 2024-09-19 RX ADMIN — FENTANYL CITRATE 25 MCG: 50 INJECTION INTRAMUSCULAR; INTRAVENOUS at 16:06

## 2024-09-19 RX ADMIN — LEVALBUTEROL HYDROCHLORIDE 1.25 MG: 1.25 SOLUTION RESPIRATORY (INHALATION) at 20:33

## 2024-09-19 RX ADMIN — NICOTINE 1 PATCH: 21 PATCH, EXTENDED RELEASE TRANSDERMAL at 09:43

## 2024-09-19 RX ADMIN — CHLORHEXIDINE GLUCONATE 0.12% ORAL RINSE 15 ML: 1.2 LIQUID ORAL at 23:15

## 2024-09-19 RX ADMIN — DEXTROSE MONOHYDRATE 25 ML: 25 INJECTION, SOLUTION INTRAVENOUS at 12:54

## 2024-09-19 RX ADMIN — Medication 75 MCG/HR: at 09:39

## 2024-09-19 RX ADMIN — Medication 75 MCG/HR: at 23:36

## 2024-09-19 RX ADMIN — IPRATROPIUM BROMIDE 0.5 MG: 0.5 SOLUTION RESPIRATORY (INHALATION) at 20:33

## 2024-09-19 RX ADMIN — SODIUM CHLORIDE, SODIUM LACTATE, POTASSIUM CHLORIDE, AND CALCIUM CHLORIDE: .6; .31; .03; .02 INJECTION, SOLUTION INTRAVENOUS at 14:21

## 2024-09-19 RX ADMIN — IPRATROPIUM BROMIDE 0.5 MG: 0.5 SOLUTION RESPIRATORY (INHALATION) at 07:32

## 2024-09-19 RX ADMIN — IPRATROPIUM BROMIDE 0.5 MG: 0.5 SOLUTION RESPIRATORY (INHALATION) at 13:06

## 2024-09-19 RX ADMIN — GABAPENTIN 400 MG: 400 CAPSULE ORAL at 17:46

## 2024-09-19 RX ADMIN — FENTANYL CITRATE 25 MCG: 50 INJECTION INTRAMUSCULAR; INTRAVENOUS at 15:57

## 2024-09-19 RX ADMIN — CHLORHEXIDINE GLUCONATE 0.12% ORAL RINSE 15 ML: 1.2 LIQUID ORAL at 09:39

## 2024-09-19 RX ADMIN — CEFAZOLIN 2000 MG: 1 INJECTION, POWDER, FOR SOLUTION INTRAMUSCULAR; INTRAVENOUS at 14:42

## 2024-09-19 RX ADMIN — ROCURONIUM BROMIDE 20 MG: 10 INJECTION, SOLUTION INTRAVENOUS at 16:01

## 2024-09-19 RX ADMIN — ROCURONIUM BROMIDE 30 MG: 10 INJECTION, SOLUTION INTRAVENOUS at 14:38

## 2024-09-19 RX ADMIN — TRAZODONE HYDROCHLORIDE 100 MG: 100 TABLET ORAL at 23:16

## 2024-09-19 RX ADMIN — PHENYTOIN 50 MG: 125 SUSPENSION ORAL at 09:39

## 2024-09-19 RX ADMIN — BUDESONIDE 0.5 MG: 0.5 INHALANT RESPIRATORY (INHALATION) at 07:32

## 2024-09-19 RX ADMIN — DEXMEDETOMIDINE HYDROCHLORIDE 0.7 MCG/KG/HR: 4 INJECTION, SOLUTION INTRAVENOUS at 21:55

## 2024-09-19 RX ADMIN — LEVALBUTEROL HYDROCHLORIDE 1.25 MG: 1.25 SOLUTION RESPIRATORY (INHALATION) at 07:32

## 2024-09-19 RX ADMIN — PHENYTOIN 50 MG: 125 SUSPENSION ORAL at 23:16

## 2024-09-19 RX ADMIN — Medication 75 MCG/HR: at 01:11

## 2024-09-19 RX ADMIN — DEXMEDETOMIDINE HYDROCHLORIDE 0.4 MCG/KG/HR: 4 INJECTION, SOLUTION INTRAVENOUS at 12:12

## 2024-09-19 RX ADMIN — PHENYLEPHRINE HYDROCHLORIDE 30 MCG/MIN: 10 INJECTION INTRAVENOUS at 14:33

## 2024-09-19 RX ADMIN — FENTANYL CITRATE 50 MCG: 50 INJECTION INTRAMUSCULAR; INTRAVENOUS at 12:56

## 2024-09-19 RX ADMIN — SENNOSIDES AND DOCUSATE SODIUM 1 TABLET: 50; 8.6 TABLET ORAL at 09:39

## 2024-09-19 RX ADMIN — FENTANYL CITRATE 50 MCG: 50 INJECTION INTRAMUSCULAR; INTRAVENOUS at 14:21

## 2024-09-19 RX ADMIN — CALCIUM GLUCONATE 2 G: 20 INJECTION, SOLUTION INTRAVENOUS at 10:19

## 2024-09-19 RX ADMIN — Medication 100 MCG: at 09:39

## 2024-09-19 RX ADMIN — BUDESONIDE 0.5 MG: 0.5 INHALANT RESPIRATORY (INHALATION) at 20:33

## 2024-09-19 RX ADMIN — DEXTROSE AND SODIUM CHLORIDE 75 ML/HR: 5; .9 INJECTION, SOLUTION INTRAVENOUS at 17:33

## 2024-09-19 RX ADMIN — LEVALBUTEROL HYDROCHLORIDE 1.25 MG: 1.25 SOLUTION RESPIRATORY (INHALATION) at 13:06

## 2024-09-19 RX ADMIN — GABAPENTIN 400 MG: 400 CAPSULE ORAL at 09:39

## 2024-09-19 RX ADMIN — ALBUMIN (HUMAN) 12.5 G: 12.5 INJECTION, SOLUTION INTRAVENOUS at 01:30

## 2024-09-19 NOTE — ANESTHESIA POSTPROCEDURE EVALUATION
Post-Op Assessment Note    CV Status:  Stable  Pain Score: 0    Pain management: adequate       Mental Status:  Sleepy   Hydration Status:  Stable   PONV Controlled:  None   Airway Patency:  Patent    No anethesia notable event occurred.    Staff: CRNA               BP   140/62   Temp      Pulse   82   Resp   16   SpO2   98%

## 2024-09-19 NOTE — ASSESSMENT & PLAN NOTE
- Failed BiPAP after extubation on 9/9 and was reintubated; failed another trial of extubation on 9/13 and was reintubated   - Tracheostomy planned for bedside with ICU team this AM but US showed overlying vessels, attempt aborted  - Off of pressors at this time   - No Hx of abdominal surgeries   - Reviewed CT C/A/P which did not show sufficient window for PEG  - ETT in place, ventilator with PEEP of 6, FiO2 40%, and Vt 420    Plan  - OR today for trach/ PEG  - Hold TF  - DVT prophylaxis   - Rest of care per [facility] team       MELD 3.0: 9 at 9/19/2024  5:02 AM  MELD-Na: 7 at 9/19/2024  5:02 AM  Calculated from:  Serum Creatinine: 0.56 mg/dL (Using min of 1 mg/dL) at 9/19/2024  5:02 AM  Serum Sodium: 137 mmol/L at 9/19/2024  5:02 AM  Total Bilirubin: 0.74 mg/dL (Using min of 1 mg/dL) at 9/18/2024  5:25 AM  Serum Albumin: 2.4 g/dL at 9/18/2024  5:25 AM  INR(ratio): 1.13 at 9/17/2024  4:43 AM  Age at listing (hypothetical): 64 years  Sex: Male at 9/19/2024  5:02 AM

## 2024-09-19 NOTE — PROGRESS NOTES
"Progress Note - Critical Care/ICU   Name: Marcin Sánchez 64 y.o. male I MRN: [id number]  Unit/Bed#: MICU 10 I Date of Admission: 9/8/2024   Date of Service: 9/19/2024 I Hospital Day: 11     Assessment & Plan   Neuro:   Diagnosis: History of epilepsy   Continue Phenytoin 50mg BID  Continue Gabapentin 400mg BID  Monitor Phenytoin level  Diagnosis: Sedation/analgesia   Precedex 0.4 mcg/kg/hr  Fentanyl 75 mcg/hr  RASS goal 0; alert and calm  Diagnosis: Insomnia   Trazodone 100mg daily at bedtime  Diagnosis: At risk for ICU delirium   CAM ICU BID  Regulate sleep/wake cycle    CV:   Diagnosis: Pericardial effusion   Echo 9/9  \"moderate to large pericardial effusion circumferential to the heart measuring largest along the RV free wall at 2.3 cm. The fluid exhibits no internal echoes. There is no echocardiographic evidence of tamponade.\"  Evaluated by CT surgery, no intervention at this time  Continue to monitor for signs of tamponade  Diagnosis: History of Atrial fibrillation on outpatient Eliquis   Home regimen: Metoprolol 25 mg daily  Hold for now, consider restart once hemodynamically improved  Hold Heparin gtt for TRACH/PEG tentative today  Diagnosis: Acute on Chronic combined diastolic and systolic CHF  9/11 ECHO: LVEF 55%, hypokinetic apex. Moderate to severe aortic regurgitation, mild tricuspid regurgitation, dilated ascending aortic root up to 5.4 cm.  Continue home Lasix 40mg daily  Continue to monitor fluid status    Pulm:  Diagnosis: Acute on Chronic hypoxic and hypercapnic respiratory failure   Continue Xopenex and Atrovent TID  Continue pulmicort BID  Continue Performist BID  Plan for tracheostomy by surgical team today    GI:   No acute issues  Bowel regimen: Senokot, Miralax    :   Diagnosis: Urinary retention  Maintain wong   Continue to monitor Is/Os, renal indices    F/E/N:   F: No maintenance fluids  E: Replete as needed for goal K >4, Phos >3 and Mag >2  N: NPO for trach/peg today    Heme/Onc: "   Diagnosis: Anemia   Continue B12/folate  Monitor AM CBC  Transfuse for Hgb <7 and/or symptomatic anemia  Diagnosis: DVT ppx  SCDs only  Holding Heparin gtt for OR    Endo:   No acute issues    ID:   No acute issues  Continue to monitor WBC count and temperature curve  Completed 5 days of azithromycin 9/10    MSK/Skin:   Diagnosis: At risk for pressure injury   PT/OT when able  Frequent turns/repositioning  Skin surveillance     Disposition: Critical care    ICU Core Measures     Vented Patient  VAP Bundle  VAP bundle ordered     A: Assess, Prevent, and Manage Pain Has pain been assessed? Yes  Need for changes to pain regimen? No   B: Both Spontaneous Awakening Trials (SATs) and Spontaneous Breathing Trials (SBTs) Plan to perform spontaneous awakening trial today? Yes   Plan to perform spontaneous breathing trial today? Yes   Obvious barriers to extubation? Yes   C: Choice of Sedation RASS Goal: 0 Alert and Calm  Need for changes to sedation or analgesia regimen? No   D: Delirium CAM-ICU: Unable to perform 2/2 intubated   E: Early Mobility  Plan for early mobility? Yes   F: Family Engagement Plan for family engagement today? Yes       Review of Invasive Devices:  [place] Plan: Continue for accurate I/O monitoring for 48 hours        Prophylaxis:  VTE VTE covered by:  heparin (porcine), Intravenous, Stopped at 09/19/24 0411  heparin (porcine), Intravenous, 1,800 Units at 09/11/24 0342  heparin (porcine), Intravenous       Stress Ulcer  not ordered     24 Hour Events   24hr events: Patient remains mechanically ventilated 14/420/6/40% and sedated on Precedex at 0.4 and Fentanyl at 75. Plan for TRACH/PEG today with red surgery team. Patient seen this AM resting comfortably, following commands and denies pain.    Subjective   Review of Systems: See HPI for Review of Systems    Objective                          Vitals I/O      Most Recent Min/Max in 24hrs   Temp 98.7 °F (37.1 °C) Temp  Min: 98.5 °F (36.9 °C)  Max: 98.7 °F  (37.1 °C)   Pulse 63 Pulse  Min: 63  Max: 88   Resp (!) 35 Resp  Min: 12  Max: 64   BP (!) 90/45 BP  Min: 88/43  Max: 127/59   O2 Sat 99 % SpO2  Min: 99 %  Max: 100 %      Intake/Output Summary (Last 24 hours) at 9/19/2024 0702  Last data filed at 9/19/2024 0501  Gross per 24 hour   Intake 2372.7 ml   Output 1120 ml   Net 1252.7 ml       Diet NPO    Invasive Monitoring   Arterial Line  Priti BP 98/45  No data recorded   MAP 67 mmHg  No data recorded           Physical Exam   Physical Exam  Vitals and nursing note reviewed.   Eyes:      Pupils: Pupils are equal, round, and reactive to light.   Skin:     General: Skin is warm and dry.      Capillary Refill: Capillary refill takes less than 2 seconds.   HENT:      Head: Normocephalic and atraumatic.      Nose: No rhinorrhea.   Cardiovascular:      Rate and Rhythm: Normal rate and regular rhythm.      Pulses: Normal pulses.      Heart sounds: Normal heart sounds.   Musculoskeletal:      Right lower leg: No edema.      Left lower leg: No edema.   Abdominal: General: Bowel sounds are normal.      Palpations: Abdomen is soft.      Tenderness: There is no abdominal tenderness.   Constitutional:       General: He is not in acute distress.     Interventions: He is sedated, intubated and restrained.   Pulmonary:      Effort: Pulmonary effort is normal. He is intubated.      Breath sounds: Normal breath sounds.   Neurological:      Mental Status: He is alert.      Comments: Following commands in all 4 extremities    Genitourinary/Anorectal:  [place] present.        Medications:  Scheduled PRN   budesonide, 0.5 mg, Q12H  chlorhexidine, 15 mL, Q12H [facility]  vitamin B-12, 100 mcg, Daily  folic acid, 1 mg, Daily  gabapentin, 400 mg, BID  ipratropium, 0.5 mg, TID  levalbuterol, 1.25 mg, TID  nicotine, 1 patch, Daily  phenytoin, 50 mg, Q12H [facility]  polyethylene glycol, 17 g, BID  senna-docusate sodium, 1 tablet, BID  traZODone, 100 mg, HS      acetaminophen, 650 mg, Q6H PRN  albuterol, 2.5  mg, Q6H PRN  bisacodyl, 10 mg, Daily PRN  fentaNYL, 50 mcg, Q2H PRN  heparin (porcine), 1,800 Units, Q6H PRN  heparin (porcine), 3,600 Units, Q6H PRN       Continuous    dexmedetomidine, 0.1-1.2 mcg/kg/hr, Last Rate: 0.4 mcg/kg/hr (09/18/24 2128)  fentaNYL, 75 mcg/hr, Last Rate: 75 mcg/hr (09/19/24 0111)  heparin (porcine), 3-22 Units/kg/hr (Order-Specific), Last Rate: Stopped (09/19/24 0411)         Labs:   CBC    Recent Labs     09/18/24  0525 09/19/24  0502   WBC 7.09 6.54   HGB 8.7* 8.3*   HCT 26.5* 26.1*    201     BMP    Recent Labs     09/18/24  0525 09/19/24  0502   SODIUM 139 137   K 4.3 4.5    99   CO2 35* 33*   AGAP 3* 5   BUN 24 23   CREATININE 0.62 0.56*   CALCIUM 8.0* 8.1*       Coags    Recent Labs     09/17/24  1922 09/18/24  0130   PTT 67* 72*        Additional Electrolytes  Recent Labs     09/18/24  0525 09/19/24  0502   MG 2.0 2.0   PHOS 3.2 3.7   CAIONIZED  --  1.11*          Blood Gas    No recent results  No recent results LFTs  Recent Labs     09/18/24  0525   ALT 26   AST 19   ALKPHOS 69   ALB 2.4*   TBILI 0.74       Infectious  No recent results  Glucose  Recent Labs     09/18/24  0525 09/19/24  0502   GLUC 127 92

## 2024-09-19 NOTE — QUICK NOTE
Serial echocardiograms have shown stable moderate/large pericardial effusion since initially reported 8/29/24. Likely chronic process. Patient should have age appropriate cancer screenings pending recovery from his current clinical course, in addition to reassessment to confirm remission of previously known lung cancer, in lines with whatever eventual GOC are decided upon. There are no further acute cardiac issues.     As patient is progressing to Trach + PEG, and will be on controlled tube feeds thereafter, would only restart home lasix and potassium on an as-needed basis for hypervolemia.     Home metoprolol and apixaban for his AF should be restarted as able pending hemodynamic stability.     We will sign off for now. Please re-consult if needed.

## 2024-09-19 NOTE — PROGRESS NOTES
Progress Note - Surgery-General   Name: Marcin Sánchez 64 y.o. male I MRN: [id number]  Unit/Bed#: MICU 10 I Date of Admission: 9/8/2024   Date of Service: 9/19/2024 I Hospital Day: 11     Assessment & Plan  Acute hypoxic respiratory failure (HCC)  - Failed BiPAP after extubation on 9/9 and was reintubated; failed another trial of extubation on 9/13 and was reintubated   - Tracheostomy planned for bedside with ICU team this AM but US showed overlying vessels, attempt aborted  - Off of pressors at this time   - No Hx of abdominal surgeries   - Reviewed CT C/A/P which did not show sufficient window for PEG  - ETT in place, ventilator with PEEP of 6, FiO2 40%, and Vt 420    Plan  - OR today for trach/ PEG  - Hold TF  - DVT prophylaxis   - Rest of care per [facility] team       MELD 3.0: 9 at 9/19/2024  5:02 AM  MELD-Na: 7 at 9/19/2024  5:02 AM  Calculated from:  Serum Creatinine: 0.56 mg/dL (Using min of 1 mg/dL) at 9/19/2024  5:02 AM  Serum Sodium: 137 mmol/L at 9/19/2024  5:02 AM  Total Bilirubin: 0.74 mg/dL (Using min of 1 mg/dL) at 9/18/2024  5:25 AM  Serum Albumin: 2.4 g/dL at 9/18/2024  5:25 AM  INR(ratio): 1.13 at 9/17/2024  4:43 AM  Age at listing (hypothetical): 64 years  Sex: Male at 9/19/2024  5:02 AM        Surgery-General service will follow.    History of Present Illness   Patient seen at bedside, patient intubated and sedated. Patient aware of plan for OR today. Patient expressed no concerns.     Objective      Temp:  [98.5 °F (36.9 °C)-98.7 °F (37.1 °C)] 98.7 °F (37.1 °C)  HR:  [63-88] 63  Resp:  [12-64] 14  BP: ()/(43-59) 90/45  FiO2 (%):  [40] 40  O2 Device: Ventilator          I/O         09/17 0701 09/18 0700 09/18 0701 09/19 0700    I.V. (mL/kg) 477.8 (6.4) 934.7 (12.4)    NG/ 145    IV Piggyback  300    Feedings 1054 993    Total Intake(mL/kg) 1796.8 (24.1) 2372.7 (31.4)    Urine (mL/kg/hr) 225 (0.1) 1120 (0.6)    Emesis/NG output 0     Stool 0     Total Output 225 1120    Net +1571.8  +1252.7          Unmeasured Stool Occurrence 1 x           Lines/Drains/Airways       Active Status       Name Placement date Placement time Site Days    ETT  Cuffed 8 mm 09/13/24  2326  -- 5    NG/OG/Enteral Tube Orogastric Center mouth 09/14/24  0050  Center mouth  5    Urethral Catheter 16 Fr. 09/18/24  1402  --  less than 1                  Physical Exam  Vitals and nursing note reviewed.   Constitutional:       General: He is not in acute distress.     Appearance: He is ill-appearing. He is not toxic-appearing.   HENT:      Head: Normocephalic and atraumatic.   Eyes:      General: No scleral icterus.     Conjunctiva/sclera: Conjunctivae normal.   Cardiovascular:      Rate and Rhythm: Normal rate.      Pulses: Normal pulses.   Pulmonary:      Effort: Pulmonary effort is normal.      Comments: On vent  Abdominal:      General: Abdomen is flat. There is no distension.      Palpations: Abdomen is soft.      Tenderness: There is no abdominal tenderness. There is no guarding or rebound.   Musculoskeletal:         General: Normal range of motion.      Right lower leg: No edema.      Left lower leg: No edema.   Skin:     General: Skin is warm.      Capillary Refill: Capillary refill takes less than 2 seconds.   Psychiatric:         Mood and Affect: Mood normal.          Lab Results: I have reviewed the following results: CBC/BMP:   .     09/19/24  0502   WBC 6.54   HGB 8.3*   HCT 26.1*      SODIUM 137   K 4.5   CL 99   CO2 33*   BUN 23   CREATININE 0.56*   GLUC 92   CAIONIZED 1.11*   MG 2.0   PHOS 3.7    , Creatinine Clearance: Estimated Creatinine Clearance: 133.3 mL/min (A) (by C-G formula based on SCr of 0.56 mg/dL (L))., LFTs: No new results in last 24 hours.   Imaging Review: Reviewed radiology reports from this admission including: CT abdomen/pelvis.  Other Studies: EKG was reviewed.     VTE Pharmacologic Prophylaxis: VTE covered by:  heparin (porcine), Intravenous, Stopped at 09/19/24 0411  heparin  (porcine), Intravenous, 1,800 Units at 09/11/24 0342  heparin (porcine), Intravenous     VTE Mechanical Prophylaxis: sequential compression device

## 2024-09-19 NOTE — ANESTHESIA PREPROCEDURE EVALUATION
Procedure:  TRACHEOSTOMY WITH INSERTION PEG TUBE (Throat)  INSERTION GASTROSTOMY TUBE OPEN (Abdomen)    Relevant Problems   CARDIO   (+) Atrial fibrillation (HCC)   (+) Cardiac arrest (HCC)   (+) Nonrheumatic aortic valve insufficiency   (+) Thoracic aortic aneurysm without rupture (HCC)      NEURO/PSYCH   (+) Epilepsy (HCC)   (+) Major depression      PULMONARY   (+) Acute exacerbation of chronic obstructive pulmonary disease (COPD) (HCC)   (+) Acute hypoxic respiratory failure (HCC)   (+) Acute on chronic respiratory failure with hypoxia and hypercapnia (HCC)   (+) Pleural effusion    Serial echocardiograms have shown stable moderate/large pericardial effusion since initially reported 8/29/24    .   Left Ventricle: Left ventricular cavity size is normal. Wall thickness  is moderately increased. The left ventricular ejection fraction is 53%.  Systolic function is normal. Diastolic function is moderately abnormal,  consistent with grade II (pseudonormal) relaxation.    The following segments are hypokinetic: mid anteroseptal and mid  inferoseptal.    All other segments are normal.    IVS: There is diastolic flattening of the interventricular septum  consistent with right ventricle volume overload.    Aorta: The aortic root is severely dilated.    Pericardium: There is a moderate pericardial effusion circumferential  to the heart. The fluid exhibits no internal echoes. The largest diameter  measures 1.9 cm. There is no echocardiographic evidence of tamponade. The  evidence against tamponade includes: no right ventricular diastolic  collapse, no right atrial inversion and no respiratory variation. There is  a large left pleural effusion.    Physical Exam    Airway    Mallampati score: II  TM Distance: >3 FB  Neck ROM: full     Dental       Cardiovascular      Pulmonary      Other Findings        Anesthesia Plan  ASA Score- 4     Anesthesia Type- general with ASA Monitors.         Additional Monitors:     Airway Plan:  ETT.           Plan Factors-    Chart reviewed.                      Induction- intravenous.    Postoperative Plan-     Perioperative Resuscitation Plan - Level 1 - Full Code.       Informed Consent- Anesthetic plan and risks discussed with patient.  I personally reviewed this patient with the CRNA. Discussed and agreed on the Anesthesia Plan with the CRNA..

## 2024-09-19 NOTE — PLAN OF CARE
Problem: Prexisting or High Potential for Compromised Skin Integrity  Goal: Skin integrity is maintained or improved  Description: INTERVENTIONS:  - Identify patients at risk for skin breakdown  - Assess and monitor skin integrity  - Assess and monitor nutrition and hydration status  - Monitor labs   - Assess for incontinence   - Turn and reposition patient  - Assist with mobility/ambulation  - Relieve pressure over bony prominences  - Avoid friction and shearing  - Provide appropriate hygiene as needed including keeping skin clean and dry  - Evaluate need for skin moisturizer/barrier cream  - Collaborate with interdisciplinary team   - Patient/family teaching  - Consider wound care consult   Outcome: Progressing     Problem: SAFETY,RESTRAINT: NV/NON-SELF DESTRUCTIVE BEHAVIOR  Goal: Remains free of harm/injury (restraint for non violent/non self-detsructive behavior)  Description: INTERVENTIONS:  - Instruct patient/family regarding restraint use   - Assess and monitor physiologic and psychological status   - Provide interventions and comfort measures to meet assessed patient needs   - Identify and implement measures to help patient regain control  - Assess readiness for release of restraint   Outcome: Progressing  Goal: Returns to optimal restraint-free functioning  Description: INTERVENTIONS:  - Assess the patient's behavior and symptoms that indicate continued need for restraint  - Identify and implement measures to help patient regain control  - Assess readiness for release of restraint   Outcome: Progressing     Problem: PAIN - ADULT  Goal: Verbalizes/displays adequate comfort level or baseline comfort level  Description: Interventions:  - Encourage patient to monitor pain and request assistance  - Assess pain using appropriate pain scale  - Administer analgesics based on type and severity of pain and evaluate response  - Implement non-pharmacological measures as appropriate and evaluate response  - Consider  cultural and social influences on pain and pain management  - Notify physician/advanced practitioner if interventions unsuccessful or patient reports new pain  Outcome: Progressing     Problem: INFECTION - ADULT  Goal: Absence or prevention of progression during hospitalization  Description: INTERVENTIONS:  - Assess and monitor for signs and symptoms of infection  - Monitor lab/diagnostic results  - Monitor all insertion sites, i.e. indwelling lines, tubes, and drains  - Monitor endotracheal if appropriate and nasal secretions for changes in amount and color  - [place] appropriate cooling/warming therapies per order  - Administer medications as ordered  - Instruct and encourage patient and family to use good hand hygiene technique  - Identify and instruct in appropriate isolation precautions for identified infection/condition  Outcome: Progressing  Goal: Absence of fever/infection during neutropenic period  Description: INTERVENTIONS:  - Monitor WBC    Outcome: Progressing     Problem: SAFETY ADULT  Goal: Patient will remain free of falls  Description: INTERVENTIONS:  - Educate patient/family on patient safety including physical limitations  - Instruct patient to call for assistance with activity   - Consult OT/PT to assist with strengthening/mobility   - Keep Call bell within reach  - Keep bed low and locked with side rails adjusted as appropriate  - Keep care items and personal belongings within reach  - Initiate and maintain comfort rounds  - Make Fall Risk Sign visible to staff  - Offer Toileting every 2 Hours, in advance of need  - Initiate/Maintain bed alarm  - Obtain necessary fall risk management equipment  - Apply yellow socks and bracelet for high fall risk patients  - Consider moving patient to room near nurses station  Outcome: Progressing  Goal: Maintain or return to baseline ADL function  Description: INTERVENTIONS:  -  Assess patient's ability to carry out ADLs; assess patient's baseline for ADL  function and identify physical deficits which impact ability to perform ADLs (bathing, care of mouth/teeth, toileting, grooming, dressing, etc.)  - Assess/evaluate cause of self-care deficits   - Assess range of motion  - Assess patient's mobility; develop plan if impaired  - Assess patient's need for assistive devices and provide as appropriate  - Encourage maximum independence but intervene and supervise when necessary  - Involve family in performance of ADLs  - Assess for home care needs following discharge   - Consider OT consult to assist with ADL evaluation and planning for discharge  - Provide patient education as appropriate  Outcome: Progressing  Goal: Maintains/Returns to pre admission functional level  Description: INTERVENTIONS:  - Perform AM-PAC 6 Click Basic Mobility/ Daily Activity assessment daily.  - Set and communicate daily mobility goal to care team and patient/family/caregiver.   - Collaborate with rehabilitation services on mobility goals if consulted  - Perform Range of Motion 3 times a day.  - Reposition patient every 2 hours.  - Dangle patient 3 times a day  - Stand patient 3 times a day  - Ambulate patient 3 times a day  - Out of bed to chair 3 times a day   - Out of bed for meals 3 times a day  - Out of bed for toileting  - Record patient progress and toleration of activity level   Outcome: Progressing     Problem: DISCHARGE PLANNING  Goal: Discharge to home or other facility with appropriate resources  Description: INTERVENTIONS:  - Identify barriers to discharge w/patient and caregiver  - Arrange for needed discharge resources and transportation as appropriate  - Identify discharge learning needs (meds, wound care, etc.)  - Arrange for interpretive services to assist at discharge as needed  - Refer to Case Management Department for coordinating discharge planning if the patient needs post-hospital services based on physician/advanced practitioner order or complex needs related to  functional status, cognitive ability, or social support system  Outcome: Progressing     Problem: Knowledge Deficit  Goal: Patient/family/caregiver demonstrates understanding of disease process, treatment plan, medications, and discharge instructions  Description: Complete learning assessment and assess knowledge base.  Interventions:  - Provide teaching at level of understanding  - Provide teaching via preferred learning methods  Outcome: Progressing     Problem: Nutrition/Hydration-ADULT  Goal: Nutrient/Hydration intake appropriate for improving, restoring or maintaining nutritional needs  Description: Monitor and assess patient's nutrition/hydration status for malnutrition. Collaborate with interdisciplinary team and initiate plan and interventions as ordered.  Monitor patient's weight and dietary intake as ordered or per policy. Utilize nutrition screening tool and intervene as necessary. Determine patient's food preferences and provide high-protein, high-caloric foods as appropriate.     INTERVENTIONS:  - Monitor oral intake, urinary output, labs, and treatment plans  - Assess nutrition and hydration status and recommend course of action  - Evaluate amount of meals eaten  - Assist patient with eating if necessary   - Allow adequate time for meals  - Recommend/ encourage appropriate diets, oral nutritional supplements, and vitamin/mineral supplements  - Order, calculate, and assess calorie counts as needed  - Recommend, monitor, and adjust tube feedings and TPN/PPN based on assessed needs  - Assess need for intravenous fluids  - Provide specific nutrition/hydration education as appropriate  - Include patient/family/caregiver in decisions related to nutrition  Outcome: Progressing     Problem: RESPIRATORY - ADULT  Goal: Achieves optimal ventilation and oxygenation  Description: INTERVENTIONS:  - Assess for changes in respiratory status  - Assess for changes in mentation and behavior  - Position to facilitate  oxygenation and minimize respiratory effort  - Oxygen administered by appropriate delivery if ordered  - Initiate smoking cessation education as indicated  - Encourage broncho-pulmonary hygiene including cough, deep breathe, Incentive Spirometry  - Assess the need for suctioning and aspirate as needed  - Assess and instruct to report SOB or any respiratory difficulty  - Respiratory Therapy support as indicated  Outcome: Progressing

## 2024-09-19 NOTE — OP NOTE
OPERATIVE REPORT  PATIENT NAME: Marcin Sánchez    :  1960  MRN: [id number]  Pt Location: [facility] OR ROOM 04    SURGERY DATE: 2024    Surgeons and Role:     * Darrin Burgos DO - Primary     * Donn Foreman MD - Assisting     * Lauryn Ullrich, DO - Co-surgeon    Preop Diagnosis:  Acute on chronic respiratory failure with hypoxia and hypercapnia (HCC) [J96.21, J96.22]    Post-Op Diagnosis Codes:     * Acute on chronic respiratory failure with hypoxia and hypercapnia (HCC) [J96.21, J96.22]    Procedure(s):  TRACHEOSTOMY WITH INSERTION PEG TUBE  INSERTION GASTROSTOMY TUBE OPEN    Specimen(s):  * No specimens in log *    Estimated Blood Loss:   Minimal    Drains:  NG/OG/Enteral Tube Orogastric Center mouth (Active)   Placement Reverification Aspiration 24 0810   Site Assessment Clean;Dry;Intact 24 0810   External Tube Length (cm) 60 cm 24 0815   Marking at Nare (cm) - For ongoing assessment of tube placement 60 cm 24 2000   Status Clamped 24 0001   Intake (mL) 60 mL 24 0810   Output (mL) 0 mL 24 1800   Number of days: 5       Urethral Catheter 16 Fr. (Active)   Amt returned on insertion(mL) 575 mL 24 1412   Reasons to continue Urinary Catheter  Acute urinary retention/obstruction failing urinary retention protocol 24 0810   Goal for Removal Voiding trial when ambulation improves 24 0810   Site Assessment Clean;Skin intact 24 0810   [facility] Done 24 0818   Collection Container Standard drainage bag 24 0810   Securement Method Securing device (Describe) 24 0810   Output (mL) 40 mL 24 0501   Number of days: 1       [REMOVED] NG/OG/Enteral Tube Orogastric Center mouth (Removed)   Placement Reverification Auscultation 24 1700   Site Assessment Dry;Intact 24 1700   Marking at Nare (cm) - For ongoing assessment of tube placement 62 cm 24 0750   Status Clamped 24 0932   Intake (mL)  40 mL 09/13/24 1117   Output (mL) 0 mL 09/11/24 1600   Number of days: 5       [REMOVED] Urethral Catheter 16 Fr. (Removed)   Amt returned on insertion(mL) 500 mL 09/08/24 0128   Reasons to continue Urinary Catheter  Accurate I&O assessment in critically ill patients (48 hr. max) 09/16/24 1200   Goal for Removal No longer needed- Will place order to discontinue 09/16/24 1200   Site Assessment Clean;Skin intact 09/16/24 1200   [facility] Other (Comment) 09/16/24 0900   Collection Container Standard drainage bag 09/16/24 1200   Securement Method Securing device (Describe) 09/16/24 1200   Output (mL) 215 mL 09/16/24 1600   Number of days: 8       Anesthesia Type:   General    Operative Indications:  Acute on chronic respiratory failure with hypoxia and hypercapnia (HCC) [J96.21, J96.22]      Operative Findings:  Tracheostomy placed percutaneously with 8 cuffed Shiley. Bleeding encountered requiring extension of skin incisions, hemostasis achieved with suture ligation. PEG placed with 20Fr PEG tube 1.5cm at the skin.         Complications:   None    Procedure and Technique:  The patient was brought directly from the ICU to the operating room.  The patient was given light intravenous sedation and the area over the neck was prepped and draped in usual sterile fashion.    A transverse incision was made 2 fingerbreadth above the suprasternal notch and dissection taken down to the trachea bluntly. A blue rhino percutaneous tracheostomy kit was used.   Bronchoscopy was performed and visualized the needle entering the midline of the trachea distal to the cricoid. The first dilator was inserted over the wire and visualized within the trachea. Three passes were performed with the dilator. Bleeding was encountered at this point requiring extension of the incision vertically. Oozing was identified near thyroidal tissue and suture ligated. Once hemostasis was ensured, we proceeded with the rest of the percutaneous tracheostomy  placement. The tract was sequentially dilated to appropriate size and an #8 cuffed Shiley tracheostomy was placed.     The patient was then ventilated successfully with excellent oxygen saturations and CO2 return.  A tracheostomy tape was used to secure the flange around the neck.     Attention was then turned to performing the PEG tube placement. The patient's esophagus was intubated with EGD scope. We then passed the GE junction and entered the  stomach.  The light from the endoscope was readily visible through the patient's anterior abdominal wall, 2 cm inferior to the left costal margin. This transilluminated quite easily. A single finger impulse was seen briskly through the gastroscope as well with a 1:1 proportional movement. The patient's epigastrium was then prepped with Chlorhexidine  and draped in a standard sterile fashion. We used a local needle to infiltrate the skin over the proposed PEG site first.     A small stab incision was then made, and the introducer needle was then placed while aspirating into the gastric lumen. No air, succus or stool was noted prior to visualizing the tip of the needle in the gastric lumen. The guidewire was then placed through the introducer needle. This was grasped and was withdrawn through the patient's mouth. This was then attached to a #20 Fr PEG tube, which was then pulled in an antegrade manner into the stomach and out the anterior abdominal wall. The esophagus was reentered with the gastroscope, and this was advanced into the stomach. The tube at 1.5 cm was noted to be appropriate without any blanching of the gastric mucosa. It spun easily as well. The large circular bolster was applied.  It was secured at this depth, then cut to size.    The stomach was then desufflated, and the endoscope was then withdrawn along the length of the esophagus. The patient tolerated the procedure well, There were no complications. The patient was then taken to the MICU in stable  condition.     Dr. Burgos was present for the entire procedure.    Patient Disposition:  Critical Care Unit and hemodynamically stable             SIGNATURE: Donn Foreman MD  DATE: September 19, 2024  TIME: 5:00 PM

## 2024-09-20 LAB
ANION GAP SERPL CALCULATED.3IONS-SCNC: 3 MMOL/L (ref 4–13)
APTT PPP: 45 SECONDS (ref 23–34)
BASOPHILS # BLD AUTO: 0.03 THOUSANDS/ΜL (ref 0–0.1)
BASOPHILS NFR BLD AUTO: 0 % (ref 0–1)
BUN SERPL-MCNC: 23 MG/DL (ref 5–25)
CA-I BLD-SCNC: 1.11 MMOL/L (ref 1.12–1.32)
CALCIUM SERPL-MCNC: 8.2 MG/DL (ref 8.4–10.2)
CHLORIDE SERPL-SCNC: 100 MMOL/L (ref 96–108)
CO2 SERPL-SCNC: 33 MMOL/L (ref 21–32)
CREAT SERPL-MCNC: 0.58 MG/DL (ref 0.6–1.3)
EOSINOPHIL # BLD AUTO: 0.09 THOUSAND/ΜL (ref 0–0.61)
EOSINOPHIL NFR BLD AUTO: 1 % (ref 0–6)
ERYTHROCYTE [DISTWIDTH] IN BLOOD BY AUTOMATED COUNT: 14 % (ref 11.6–15.1)
GFR SERPL CREATININE-BSD FRML MDRD: 107 ML/MIN/1.73SQ M
GLUCOSE SERPL-MCNC: 103 MG/DL (ref 65–140)
GLUCOSE SERPL-MCNC: 108 MG/DL (ref 65–140)
GLUCOSE SERPL-MCNC: 122 MG/DL (ref 65–140)
GLUCOSE SERPL-MCNC: 122 MG/DL (ref 65–140)
GLUCOSE SERPL-MCNC: 98 MG/DL (ref 65–140)
HCT VFR BLD AUTO: 24.9 % (ref 36.5–49.3)
HCT VFR BLD AUTO: 26.2 % (ref 36.5–49.3)
HGB BLD-MCNC: 8.1 G/DL (ref 12–17)
HGB BLD-MCNC: 8.5 G/DL (ref 12–17)
IMM GRANULOCYTES # BLD AUTO: 0.11 THOUSAND/UL (ref 0–0.2)
IMM GRANULOCYTES NFR BLD AUTO: 1 % (ref 0–2)
INR PPP: 1.2 (ref 0.85–1.19)
INR PPP: 1.28 (ref 0.85–1.19)
LYMPHOCYTES # BLD AUTO: 0.52 THOUSANDS/ΜL (ref 0.6–4.47)
LYMPHOCYTES NFR BLD AUTO: 4 % (ref 14–44)
MAGNESIUM SERPL-MCNC: 1.7 MG/DL (ref 1.9–2.7)
MCH RBC QN AUTO: 32.7 PG (ref 26.8–34.3)
MCHC RBC AUTO-ENTMCNC: 32.5 G/DL (ref 31.4–37.4)
MCV RBC AUTO: 100 FL (ref 82–98)
MONOCYTES # BLD AUTO: 0.88 THOUSAND/ΜL (ref 0.17–1.22)
MONOCYTES NFR BLD AUTO: 7 % (ref 4–12)
NEUTROPHILS # BLD AUTO: 11.94 THOUSANDS/ΜL (ref 1.85–7.62)
NEUTS SEG NFR BLD AUTO: 87 % (ref 43–75)
NRBC BLD AUTO-RTO: 0 /100 WBCS
PHOSPHATE SERPL-MCNC: 3.9 MG/DL (ref 2.3–4.1)
PLATELET # BLD AUTO: 207 THOUSANDS/UL (ref 149–390)
PMV BLD AUTO: 9.4 FL (ref 8.9–12.7)
POTASSIUM SERPL-SCNC: 4.4 MMOL/L (ref 3.5–5.3)
PROTHROMBIN TIME: 15.5 SECONDS (ref 12.3–15)
PROTHROMBIN TIME: 16.3 SECONDS (ref 12.3–15)
RBC # BLD AUTO: 2.48 MILLION/UL (ref 3.88–5.62)
SODIUM SERPL-SCNC: 136 MMOL/L (ref 135–147)
WBC # BLD AUTO: 13.57 THOUSAND/UL (ref 4.31–10.16)

## 2024-09-20 PROCEDURE — 82330 ASSAY OF CALCIUM: CPT

## 2024-09-20 PROCEDURE — 85610 PROTHROMBIN TIME: CPT | Performed by: STUDENT IN AN ORGANIZED HEALTH CARE EDUCATION/TRAINING PROGRAM

## 2024-09-20 PROCEDURE — 85730 THROMBOPLASTIN TIME PARTIAL: CPT

## 2024-09-20 PROCEDURE — 83735 ASSAY OF MAGNESIUM: CPT

## 2024-09-20 PROCEDURE — 99232 SBSQ HOSP IP/OBS MODERATE 35: CPT | Performed by: SURGERY

## 2024-09-20 PROCEDURE — 85018 HEMOGLOBIN: CPT

## 2024-09-20 PROCEDURE — 80048 BASIC METABOLIC PNL TOTAL CA: CPT

## 2024-09-20 PROCEDURE — 85610 PROTHROMBIN TIME: CPT

## 2024-09-20 PROCEDURE — 97163 PT EVAL HIGH COMPLEX 45 MIN: CPT

## 2024-09-20 PROCEDURE — 82948 REAGENT STRIP/BLOOD GLUCOSE: CPT

## 2024-09-20 PROCEDURE — 94669 MECHANICAL CHEST WALL OSCILL: CPT

## 2024-09-20 PROCEDURE — 94664 DEMO&/EVAL PT USE INHALER: CPT

## 2024-09-20 PROCEDURE — 94640 AIRWAY INHALATION TREATMENT: CPT

## 2024-09-20 PROCEDURE — 85025 COMPLETE CBC W/AUTO DIFF WBC: CPT

## 2024-09-20 PROCEDURE — 94003 VENT MGMT INPAT SUBQ DAY: CPT

## 2024-09-20 PROCEDURE — 99232 SBSQ HOSP IP/OBS MODERATE 35: CPT | Performed by: INTERNAL MEDICINE

## 2024-09-20 PROCEDURE — 85014 HEMATOCRIT: CPT

## 2024-09-20 PROCEDURE — 84100 ASSAY OF PHOSPHORUS: CPT

## 2024-09-20 PROCEDURE — 94640 AIRWAY INHALATION TREATMENT: CPT | Performed by: SOCIAL WORKER

## 2024-09-20 PROCEDURE — 85730 THROMBOPLASTIN TIME PARTIAL: CPT | Performed by: INTERNAL MEDICINE

## 2024-09-20 PROCEDURE — 94760 N-INVAS EAR/PLS OXIMETRY 1: CPT

## 2024-09-20 PROCEDURE — 97167 OT EVAL HIGH COMPLEX 60 MIN: CPT

## 2024-09-20 RX ORDER — HEPARIN SODIUM 10000 [USP'U]/100ML
3-20 INJECTION, SOLUTION INTRAVENOUS
Status: DISCONTINUED | OUTPATIENT
Start: 2024-09-20 | End: 2024-09-22

## 2024-09-20 RX ORDER — HEPARIN SODIUM 1000 [USP'U]/ML
4000 INJECTION, SOLUTION INTRAVENOUS; SUBCUTANEOUS EVERY 6 HOURS PRN
Status: DISCONTINUED | OUTPATIENT
Start: 2024-09-20 | End: 2024-09-22

## 2024-09-20 RX ORDER — HEPARIN SODIUM 1000 [USP'U]/ML
2000 INJECTION, SOLUTION INTRAVENOUS; SUBCUTANEOUS EVERY 6 HOURS PRN
Status: DISCONTINUED | OUTPATIENT
Start: 2024-09-20 | End: 2024-09-22

## 2024-09-20 RX ORDER — MAGNESIUM SULFATE HEPTAHYDRATE 40 MG/ML
2 INJECTION, SOLUTION INTRAVENOUS ONCE
Status: COMPLETED | OUTPATIENT
Start: 2024-09-20 | End: 2024-09-20

## 2024-09-20 RX ORDER — CALCIUM GLUCONATE 20 MG/ML
2 INJECTION, SOLUTION INTRAVENOUS ONCE
Status: COMPLETED | OUTPATIENT
Start: 2024-09-20 | End: 2024-09-20

## 2024-09-20 RX ADMIN — LEVALBUTEROL HYDROCHLORIDE 1.25 MG: 1.25 SOLUTION RESPIRATORY (INHALATION) at 13:32

## 2024-09-20 RX ADMIN — GABAPENTIN 400 MG: 400 CAPSULE ORAL at 08:28

## 2024-09-20 RX ADMIN — CHLORHEXIDINE GLUCONATE 0.12% ORAL RINSE 15 ML: 1.2 LIQUID ORAL at 21:35

## 2024-09-20 RX ADMIN — IPRATROPIUM BROMIDE 0.5 MG: 0.5 SOLUTION RESPIRATORY (INHALATION) at 13:33

## 2024-09-20 RX ADMIN — SENNOSIDES AND DOCUSATE SODIUM 1 TABLET: 50; 8.6 TABLET ORAL at 08:40

## 2024-09-20 RX ADMIN — DEXMEDETOMIDINE HYDROCHLORIDE 0.7 MCG/KG/HR: 4 INJECTION, SOLUTION INTRAVENOUS at 20:13

## 2024-09-20 RX ADMIN — LEVALBUTEROL HYDROCHLORIDE 1.25 MG: 1.25 SOLUTION RESPIRATORY (INHALATION) at 19:42

## 2024-09-20 RX ADMIN — MAGNESIUM SULFATE HEPTAHYDRATE 2 G: 40 INJECTION, SOLUTION INTRAVENOUS at 09:29

## 2024-09-20 RX ADMIN — Medication 75 MCG/HR: at 12:46

## 2024-09-20 RX ADMIN — IPRATROPIUM BROMIDE 0.5 MG: 0.5 SOLUTION RESPIRATORY (INHALATION) at 19:42

## 2024-09-20 RX ADMIN — POLYETHYLENE GLYCOL 3350 17 G: 17 POWDER, FOR SOLUTION ORAL at 17:44

## 2024-09-20 RX ADMIN — SENNOSIDES AND DOCUSATE SODIUM 1 TABLET: 50; 8.6 TABLET ORAL at 17:44

## 2024-09-20 RX ADMIN — Medication 100 MCG: at 08:29

## 2024-09-20 RX ADMIN — DEXMEDETOMIDINE HYDROCHLORIDE 0.7 MCG/KG/HR: 4 INJECTION, SOLUTION INTRAVENOUS at 10:56

## 2024-09-20 RX ADMIN — PHENYTOIN 50 MG: 125 SUSPENSION ORAL at 21:35

## 2024-09-20 RX ADMIN — DEXTROSE AND SODIUM CHLORIDE 75 ML/HR: 5; .9 INJECTION, SOLUTION INTRAVENOUS at 06:27

## 2024-09-20 RX ADMIN — TRAZODONE HYDROCHLORIDE 100 MG: 100 TABLET ORAL at 21:35

## 2024-09-20 RX ADMIN — CHLORHEXIDINE GLUCONATE 0.12% ORAL RINSE 15 ML: 1.2 LIQUID ORAL at 08:24

## 2024-09-20 RX ADMIN — BUDESONIDE 0.5 MG: 0.5 INHALANT RESPIRATORY (INHALATION) at 08:21

## 2024-09-20 RX ADMIN — FOLIC ACID 1 MG: 1 TABLET ORAL at 08:28

## 2024-09-20 RX ADMIN — BUDESONIDE 0.5 MG: 0.5 INHALANT RESPIRATORY (INHALATION) at 19:42

## 2024-09-20 RX ADMIN — PHENYTOIN 50 MG: 125 SUSPENSION ORAL at 08:30

## 2024-09-20 RX ADMIN — GABAPENTIN 400 MG: 400 CAPSULE ORAL at 17:44

## 2024-09-20 RX ADMIN — HEPARIN SODIUM 12 UNITS/KG/HR: 10000 INJECTION, SOLUTION INTRAVENOUS at 17:37

## 2024-09-20 RX ADMIN — POLYETHYLENE GLYCOL 3350 17 G: 17 POWDER, FOR SOLUTION ORAL at 08:40

## 2024-09-20 RX ADMIN — CALCIUM GLUCONATE 2 G: 20 INJECTION, SOLUTION INTRAVENOUS at 08:24

## 2024-09-20 RX ADMIN — IPRATROPIUM BROMIDE 0.5 MG: 0.5 SOLUTION RESPIRATORY (INHALATION) at 08:21

## 2024-09-20 RX ADMIN — NICOTINE 1 PATCH: 21 PATCH, EXTENDED RELEASE TRANSDERMAL at 08:30

## 2024-09-20 RX ADMIN — LEVALBUTEROL HYDROCHLORIDE 1.25 MG: 1.25 SOLUTION RESPIRATORY (INHALATION) at 08:21

## 2024-09-20 NOTE — QUICK NOTE
"Post-op check -General Surgery  Marcin Sánchez 64 y.o. male MRN: [id number]  [facility]: [id number]    Assessment:  64 y.o. male now 1 Day Post-Op s/p Procedure(s) (LRB):  TRACHEOSTOMY WITH INSERTION PEG TUBE (N/A)  INSERTION GASTROSTOMY TUBE OPEN (N/A)    Plan:  - Diet NPO  - Stat chest x-ray  - Monitor tracheostomy site for bleeding  - Wean vent settings as tolerated  - Multimodal pain regimen  - Tube feeds via G-tube 9/20  - Rest of care per primary team    Subjective/Objective     Subjective: Went to evaluate the patient after arrival to the floor.  Patient seen and examined at bedside.  Patient reports pain in his neck and abdomen.  Patient endorses shortness of breath.  Patient denies fevers chills nausea or vomiting.      Objective:   Vitals: Blood pressure (!) 178/73, pulse 102, temperature 98.6 °F (37 °C), temperature source Oral, resp. rate (!) 37, height 5' 9\" (1.753 m), weight 75.5 kg (166 lb 7.2 oz), SpO2 95%.,Body mass index is 24.58 kg/m².    I/O         09/18 0701  09/19 0700 09/19 0701  09/20 0700    P.O. 0 0    I.V. (mL/kg) 934.7 (12.4) 774.3 (10.3)    NG/ 60    IV Piggyback 300     Feedings 993 0    Total Intake(mL/kg) 2372.7 (31.4) 834.3 (11.1)    Urine (mL/kg/hr) 1120 (0.6) 295 (0.2)    Emesis/NG output  0    Total Output 1120 295    Net +1252.7 +539.3                  Physical Exam:  General: No acute distress, resting comfortably  Neuro: alert and oriented  HEENT: Soft, no palpable hematoma, mildly tender bilaterally  CV: Well perfused, regular rate and rhythm  Lungs: Normal work of breathing, saturating 98% on 14/450/6/40% FiO2  Abdomen: Soft, nondistended, appropriately tender.  G-tube bumper at 1.5 cm to skin.  Extremities: No edema, clubbing or cyanosis  Skin: Warm, dry      Lab, Imaging and other studies: I have personally reviewed pertinent reports.    VTE Pharmacologic Prophylaxis: VTE covered by:  heparin (porcine), Intravenous, 1,800 Units at 09/11/24 " 0342  heparin (porcine), Intravenous     VTE Mechanical Prophylaxis: sequential compression device

## 2024-09-20 NOTE — OCCUPATIONAL THERAPY NOTE
Occupational Therapy Evaluation     Patient Name: Marcin Sánchez  Today's Date: 9/20/2024  Problem List  Principal Problem:    Acute hypoxic respiratory failure (HCC)  Active Problems:    Acute exacerbation of chronic obstructive pulmonary disease (COPD) (HCC)    Cancer of right main bronchus (HCC)    Chronic combined systolic and diastolic CHF (congestive heart failure) (HCC)    Cardiac arrest (HCC)    Pericardial effusion    Shock (HCC)    Mucus plugging of bronchi    Past Medical History  Past Medical History:   Diagnosis Date    Alcohol abuse     Anxiety     Aortic insufficiency 12/18/2020    Atrial fibrillation (HCC)     Cancer (HCC)     COPD (chronic obstructive pulmonary disease) (HCC)     Delirium     Encephalopathy     Epilepsy (HCC)     History of chemotherapy     History of radiation therapy     Hypo-osmolality and hyponatremia     Hypokalemia     Hypothyroid     Lung cancer (HCC)     Lyme disease     Major depression      Past Surgical History  Past Surgical History:   Procedure Laterality Date    BRONCHOSCOPY N/A 10/3/2016    Procedure: BRONCHOSCOPY FLEXIBLE;  Surgeon: John Brunner DO;  Location: [facility];  Service:     GASTROSTOMY TUBE PLACEMENT N/A 9/19/2024    Procedure: INSERTION GASTROSTOMY TUBE OPEN;  Surgeon: Darrin Burgos DO;  Location: [facility];  Service: General    HAND SURGERY      PEG W/TRACHEOSTOMY PLACEMENT N/A 9/19/2024    Procedure: TRACHEOSTOMY WITH INSERTION PEG TUBE;  Surgeon: Darrin Burgos DO;  Location: [facility];  Service: General    PELVIC FRACTURE SURGERY      REMOVAL VENOUS PORT (PORT-A-CATH) Left 9/18/2018    Procedure: REMOVAL VENOUS PORT (PORT-A-CATH)IR;  Surgeon: Randy Lopez DO;  Location: [facility];  Service: Interventional Radiology         09/20/24 1140   OT Last Visit   OT Visit Date 09/20/24   Note Type   Note type Evaluation   Pain Assessment   Pain Assessment Tool FLACC   Pain Rating: FLACC (Rest) - Face 0   Pain Rating: FLACC  "(Rest) - Legs 0   Pain Rating: FLACC (Rest) - Activity 0   Pain Rating: FLACC (Rest) - Cry 0   Pain Rating: FLACC (Rest) - Consolability 0   Score: FLACC (Rest) 0   Pain Rating: FLACC (Activity) - Face 1   Pain Rating: FLACC (Activity) - Legs 1   Pain Rating: FLACC (Activity) - Activity 1   Pain Rating: FLACC (Activity) - Cry 1   Pain Rating: FLACC (Activity) - Consolability 1   Score: FLACC (Activity) 5   Restrictions/Precautions   Weight Bearing Precautions Per Order No   Other Precautions Pain;Fall Risk;Multiple lines;O2;Cognitive;Bed Alarm   Home Living   Type of Home SNF  (resident [facility])   Home Layout One level   Prior Function   Level of [place] Needs assistance with ADLs;Independent with functional mobility;Needs assistance with IADLS   Lives With Facility staff   Receives Help From Personal care attendant   IADLs Family/Friend/Other provides meals;Family/Friend/Other provides transportation;Family/Friend/Other provides medication management   Falls in the last 6 months 0   Vocational Retired   Lifestyle   Autonomy I basic adls, receives assist for showers, ambulates w/o ad - meals/homemaking provided   Reciprocal Relationships supportive facility staff   Service to Others retired   Intrinsic Gratification sedentary   Subjective   Subjective \"I'm comfortable\"   ADL   Eating Assistance Unable to assess   Eating Deficit NPO   Grooming Assistance 2  Maximal Assistance   UB Bathing Assistance 2  Maximal Assistance   LB Bathing Assistance 2  Maximal Assistance   UB Dressing Assistance 2  Maximal Assistance   LB Dressing Assistance 2  Maximal Assistance   Toileting Assistance  2  Maximal Assistance   Bed Mobility   Additional Comments refused all mobility when attempts to initiate- reports he has abdominal discomfort and discomfort at trach site   Functional Mobility   Additional Comments refused all mobility despite encouragement - states he \"does not care\" if he continues to get weak, cannot " walk again or is able to get off of ventilator - closed eyes and ignored ongoing encouragement - asked of he wishes to be left alone pt nodded head yes   Activity Tolerance   Activity Tolerance Patient limited by fatigue;Patient limited by pain;Treatment limited secondary to medical complications (Comment)   Medical Staff Made Aware PT present for co-eval 2* medical complexity, comorbidities and limited overall tolerance to activities   RUE Assessment   RUE Assessment WFL  (able to raise against gravity to ~80*)   Edema   RUE Edema +1   LUE Assessment   LUE Assessment WFL   Cognition   Arousal/Participation Arousable   Attention Attends with cues to redirect   Orientation Level Oriented to person;Oriented to place;Oriented to time;Oriented to situation  (general time)   Memory Unable to assess   Following Commands Follows one step commands with increased time or repetition   Assessment   Limitation Decreased ADL status;Decreased UE ROM;Decreased UE strength;Decreased endurance;Decreased self-care trans   Prognosis Fair   Assessment Pt is a 64 y.o. male who was admitted to [facility] on 9/8/2024 with Acute hypoxic respiratory failure (HCC) s/p prolonged intubation and  POD#1 trach and PEG placement. Patient  has a past medical history of Alcohol abuse, Anxiety, Aortic insufficiency, Atrial fibrillation (HCC), Cancer (HCC), COPD (chronic obstructive pulmonary disease) (HCC), Delirium, Encephalopathy, Epilepsy (HCC), History of chemotherapy, History of radiation therapy, Hypo-osmolality and hyponatremia, Hypokalemia, Hypothyroid, Lung cancer (HCC), Lyme disease, and Major depression.   At baseline pt was completing adls and mobility independently with assist from nursing staff for showers - meals/homemaking provided. Pt lives at [facility]. Currently pt requires max assist for overall ADLS and  refused all functional mobility/transfers Pt currently presents with impairments in the following  categories -difficulty performing ADLS, limited insight into deficits, compliance, flat affect, decreased initiation and engagement , health management , and environment activity tolerance, endurance, standing balance/tolerance, sitting balance/tolerance, UE strength, UE ROM, insight, safety , attention , task initiation , task termination , and communication. These impairments, as well as pt's fatigue, pain, and risk for falls  limit pt's ability to safely engage in all baseline areas of occupation, includinggrooming, bathing, dressing, toileting, functional mobility/transfers, social participation , and leisure activities  From OT standpoint, recommend return to LTC facility upon D/C. OT will continue to follow to address the below stated goals.   Goals   Patient Goals rest and be left alone   LTG Time Frame 10-14   Long Term Goal #1 1) Mod a UB/LB adls after setup with min cues to initiate, sequence and complete tasks  2)  Mod a toileting and clothing management  3) Mod a bed mobility  4) OTR to assess OOB mobility as able   5) Increase activity tolerance to 20-25 for participation in adls and enjoyable activities  6) Assess DME needs   7) Demonstrate fair carryover with safe use of AD during functional tasks   8) Assess DME needs   9) Assist with safe d/c recommendations   Plan   Treatment Interventions ADL retraining;Functional transfer training;UE strengthening/ROM;Endurance training;Cognitive reorientation;Patient/family training;Equipment evaluation/education;Compensatory technique education;Activityengagement   Goal Expiration Date 10/04/24   OT Frequency 1-2x/wk   Discharge Recommendation   Rehab Resource Intensity Level, OT II (Moderate Resource Intensity)   AM-PAC Daily Activity Inpatient   Lower Body Dressing 2   Bathing 2   Toileting 2   Upper Body Dressing 2   Grooming 2   Eating 1   Daily Activity Raw Score 11   Daily Activity Standardized Score (Calc for Raw Score >=11) 29.04   AM-PAC Applied  Cognition Inpatient   Following a Speech/Presentation 3   Understanding Ordinary Conversation 4   Taking Medications 3   Remembering Where Things Are Placed or Put Away 3   Remembering List of 4-5 Errands 3   Taking Care of Complicated Tasks 3   Applied Cognition Raw Score 19   Applied Cognition Standardized Score 39.77   End of Consult   Education Provided Yes   Patient Position at End of Consult Supine;Bed/Chair alarm activated;All needs within reach   Nurse Communication Nurse aware of consult       The patient's raw score on the AM-PAC Daily Activity Inpatient Short Form is 11. A raw score of less than 19 suggests the patient may benefit from discharge to post-acute rehabilitation services. Please refer to the recommendation of the Occupational Therapist for safe discharge planning.        Documentation Completed By:    SMITA Herrera/L  [employer] Certified - [id number]

## 2024-09-20 NOTE — UTILIZATION REVIEW
Continued Stay Review    Date: 09/20                          Current Patient Class: Inpatient  Current Level of Care: CC    HPI:64 y.o. male initially admitted on 09/09     Assessment/Plan: Pt POD #1 s/p tracheostomy/PEG placement. He reports pain at tracheostomy site. Continue trickle tube feeds. Continue vent support for now. Continue to monitor off antibiotics. Continue phenytoin. If hemoglobin stays stable today, can restart heparin. Continue nebs. Start oxycodone and wean off fentanyl drip. Can continue Precedex for now. Monitor tracheostomy site for signs of bleeding.       Vital Signs (last 3 days)       Date/Time Temp Pulse Resp BP MAP (mmHg) SpO2 FiO2 (%) O2 Flow Rate (L/min) O2 Device Patient Position - Orthostatic VS [place] Coma Scale Score Pain    09/20/24 1507 -- -- -- -- -- 94 % -- -- -- -- -- --    09/20/24 1256 -- -- -- -- -- 97 % -- -- -- -- -- --    09/20/24 0830 -- -- -- -- -- -- -- -- -- -- 11 --    09/20/24 0821 -- -- -- -- -- 99 % -- -- -- -- -- --    09/20/24 0800 98 °F (36.7 °C) 60 16 86/48 64 99 % -- -- -- -- -- --    09/20/24 0700 -- 74 25 115/56 81 100 % -- -- -- -- -- --    09/20/24 0600 -- 72 31 117/56 80 99 % -- -- -- -- -- --    09/20/24 0530 -- 66 19 112/55 79 100 % -- -- -- -- -- --    09/20/24 0400 -- 67 27 105/53 75 100 % -- -- -- -- 11 --    09/20/24 0300 -- 70 26 97/51 72 100 % -- -- -- -- -- --    09/20/24 0200 -- 84 28 108/58 79 98 % -- -- -- -- -- --    09/20/24 0130 -- 93 -- 138/62 89 -- -- -- -- -- -- --    09/20/24 0100 98.6 °F (37 °C) 102 37 178/73 105 95 % -- -- -- -- -- --    09/20/24 0030 -- 66 -- 95/46 67 -- -- -- -- -- -- --    09/20/24 0000 -- 67 22 92/44 64 97 % -- -- -- -- 11 No Pain    09/19/24 2300 -- 67 19 89/46 65 99 % -- -- -- -- -- --    09/19/24 2230 -- 68 -- 90/48 66 -- -- -- -- -- -- --    09/19/24 2200 -- 70 17 96/47 68 95 % -- -- -- -- -- --    09/19/24 2100 -- 70 20 97/49 71 98 % -- -- -- -- -- --    09/19/24 2000 -- 70 18 92/45 65 97 % -- -- -- -- 11  No Pain    09/19/24 1900 -- 79 3 93/46 67 99 % -- -- -- -- -- --    09/19/24 1800 97.7 °F (36.5 °C) 88 7 124/60 87 98 % -- -- -- -- -- --    09/19/24 1730 -- 95 28 129/60 87 96 % -- -- -- -- 11 --    09/19/24 1650 -- -- -- -- -- 99 % -- -- -- -- -- --    09/19/24 1256 -- -- -- -- -- -- -- -- -- -- -- Med Not Given for Pain - for MAR use only    09/19/24 1215 -- -- -- -- -- -- -- -- -- -- 11 --    09/19/24 1000 -- 72 14 109/53 77 99 % -- -- -- -- -- --    09/19/24 0900 -- 75 21 106/53 76 98 % -- -- -- -- -- --    09/19/24 0810 -- -- -- -- -- -- -- -- -- -- 11 No Pain    09/19/24 0800 98.2 °F (36.8 °C) 71 25 123/59 85 99 % -- -- -- -- -- --    09/19/24 0732 -- -- -- -- -- 99 % -- -- -- -- -- --    09/19/24 0700 -- 60 16 87/43 62 100 % -- -- -- -- -- --    09/19/24 0600 -- 63 35 90/45 65 99 % 40 -- Ventilator -- -- --    09/19/24 0552 -- -- -- -- -- 100 % -- -- -- -- -- --    09/19/24 0500 -- 65 31 112/52 75 100 % -- -- -- -- -- --    09/19/24 0400 -- 63 14 90/45 65 99 % 40 -- Ventilator Lying 11 No Pain    09/19/24 0300 -- 63 16 89/44 64 100 % 40 -- Ventilator Lying -- --    09/19/24 0228 -- 66 14 102/50 72 100 % -- -- -- -- -- --    09/19/24 0200 -- 64 19 88/43 62 100 % -- -- -- -- -- --    09/19/24 0111 -- -- -- -- -- -- -- -- -- -- -- Med Not Given for Pain - for MAR use only    09/19/24 0100 -- 67 14 88/44 63 100 % -- -- -- -- -- --    09/19/24 0000 98.7 °F (37.1 °C) 69 21 98/47 68 100 % -- -- -- -- 11 No Pain    09/18/24 2300 -- 75 22 97/46 66 100 % -- -- -- -- -- --    09/18/24 2253 -- -- -- -- -- 100 % -- -- -- -- -- --    09/18/24 2200 -- 88 20 116/56 80 99 % -- -- -- -- -- --    09/18/24 2100 98.6 °F (37 °C) 76 51 108/48 69 100 % 40 -- Ventilator Lying -- --    09/18/24 2000 -- -- -- -- -- -- -- -- -- -- 11 No Pain    09/18/24 1941 -- 73 42 100/49 70 100 % 40 -- Ventilator -- -- --    09/18/24 1800 -- 71 64 93/44 63 100 % -- -- -- -- -- --    09/18/24 1700 -- 70 42 101/49 71 100 % -- -- -- -- -- --     09/18/24 1658 -- -- -- -- -- 100 % -- -- -- -- -- --    09/18/24 1610 -- -- -- -- -- -- -- -- -- -- 11 --    09/18/24 1600 -- 72 29 102/54 76 99 % -- -- -- -- -- --    09/18/24 1400 -- 74 32 127/59 85 99 % -- -- -- -- -- --    09/18/24 1353 -- -- -- -- -- 100 % -- -- -- -- -- --    09/18/24 1300 -- 82 23 120/56 81 100 % -- -- -- -- -- --    09/18/24 1230 -- -- -- -- -- -- -- -- -- -- 11 --    09/18/24 1215 -- 70 24 95/49 -- 100 % -- -- -- -- -- --    09/18/24 1200 -- 67 21 99/50 72 100 % -- -- -- -- -- --    09/18/24 1141 -- -- -- -- -- 100 % -- -- -- -- -- --    09/18/24 1100 -- 64 18 95/49 70 100 % -- -- -- -- -- --    09/18/24 1000 -- 69 15 103/51 73 100 % -- -- -- -- -- --    09/18/24 0900 98.5 °F (36.9 °C) 69 12 92/46 65 100 % -- -- -- Lying -- --    09/18/24 0816 -- -- -- -- -- 99 % -- -- -- -- -- --    09/18/24 0815 -- -- -- -- -- -- -- -- -- -- 11 --    09/18/24 0800 -- 74 16 99/54 74 99 % -- -- -- -- -- --    09/18/24 0600 -- 73 23 107/53 77 100 % -- -- -- -- -- --    09/18/24 0500 -- 63 14 92/50 68 99 % -- -- -- -- -- --    09/18/24 0400 98.9 °F (37.2 °C) 67 14 91/47 66 99 % 40 -- Ventilator Lying 11 No Pain    09/18/24 0345 -- -- -- -- -- 99 % -- -- -- -- -- --    09/18/24 0300 -- 67 14 94/49 69 99 % -- -- -- -- -- --    09/18/24 0200 -- 66 14 91/47 66 99 % -- -- -- -- -- --    09/18/24 0100 -- 66 15 95/49 70 100 % -- -- -- -- -- --    09/18/24 0000 -- 68 18 97/50 71 100 % 40 -- Ventilator Lying 11 No Pain    09/17/24 2348 98.6 °F (37 °C) -- -- -- -- -- -- -- -- -- -- --    09/17/24 2300 -- 74 21 114/53 77 99 % -- -- -- -- -- --    09/17/24 2245 -- -- -- -- -- 100 % -- -- -- -- -- --    09/17/24 2200 -- 90 12 102/51 74 100 % 40 -- -- -- -- --    09/17/24 2100 -- 92 16 113/55 79 100 % -- -- -- -- -- --    09/17/24 2020 -- -- -- -- -- 100 % -- -- -- -- -- --    09/17/24 2000 98.8 °F (37.1 °C) 88 14 114/56 81 100 % 40 -- Ventilator Lying 11 No Pain    09/17/24 1900 -- 69 13 98/49 70 100 % -- -- -- -- -- --     09/17/24 1800 -- 75 21 125/58 83 100 % -- -- -- -- -- --    09/17/24 1730 -- 68 14 89/45 64 97 % -- -- -- -- -- --    09/17/24 1700 -- 64 17 84/45 61 98 % -- -- -- -- -- --    09/17/24 1600 99 °F (37.2 °C) 63 24 103/52 75 98 % -- 50 L/min Ventilator Lying 11 --    09/17/24 1500 -- 64 53 112/57 82 98 % -- -- -- -- -- --    09/17/24 1400 -- 66 52 98/49 71 98 % -- -- -- -- -- --    09/17/24 1300 -- 66 56 100/49 71 99 % -- -- -- -- -- --    09/17/24 1200 98.9 °F (37.2 °C) 61 16 113/51 74 100 % -- 50 L/min Ventilator Lying 11 Med Not Given for Pain - for MAR use only    09/17/24 1141 -- -- -- -- -- -- -- -- -- -- -- Med Not Given for Pain - for MAR use only    09/17/24 1100 -- 74 28 126/60 86 100 % -- -- -- -- -- --    09/17/24 1000 -- 75 21 106/52 75 98 % -- -- -- -- -- --    09/17/24 0900 -- 69 33 99/48 69 100 % -- -- -- -- -- --    09/17/24 0800 99 °F (37.2 °C) 67 24 103/49 71 100 % 50 -- Ventilator Lying -- --    09/17/24 0740 -- 92 31 99/49 70 95 % -- -- -- -- 11 No Pain    09/17/24 0700 -- 67 31 108/51 74 100 % -- -- -- -- -- --    09/17/24 0600 -- 71 22 98/50 72 99 % -- -- -- -- -- --    09/17/24 0500 -- 75 22 97/48 69 97 % -- -- -- -- -- --    09/17/24 0400 99.1 °F (37.3 °C) 92 20 126/58 84 97 % -- -- -- -- 11 --    09/17/24 0300 -- 85 22 105/52 75 98 % -- -- -- -- -- --    09/17/24 0229 -- -- -- -- -- 98 % -- -- -- -- -- --    09/17/24 0200 -- 71 24 100/51 71 99 % -- -- -- -- -- --    09/17/24 0100 -- 70 24 101/51 73 99 % -- -- -- -- -- --    09/17/24 0000 99 °F (37.2 °C) 82 24 107/52 75 99 % -- -- -- -- 11 --          Weight (last 2 days)       Date/Time Weight    09/20/24 0550 76.8 (169.31)    09/19/24 0500 75.5 (166.45)    09/18/24 1215 74.4 (164)    09/18/24 0500 74.5 (164.24)              Pertinent Labs/Diagnostic Results:   Radiology:  XR chest portable   Final Interpretation by Bonnie Castillo MD (09/19 2208)      Tracheostomy tube in adequate position.      Increasing opacity in the left lower lung,  which may represent pneumonia or an enlarging left pleural effusion.      Unchanged appearance of the right hemithorax, as detailed above.                  Workstation performed: [id number]         XR chest portable ICU   Final Interpretation by Xavier Fall MD (09/16 1627)      Improved aeration of the right lung, with persistent right hemithoracic volume loss and dense right apical consolidation. There is a also superimposed right pleural effusion and diffuse interstitial lung disease.            Workstation performed: [id number]         XR chest portable ICU   Final Interpretation by Sd Lamb MD (09/14 1344)      1.  Tip of endotracheal tube 3.5 cm above the chery.      2.  Progressive atelectasis of the right lung since 9/10/2024 with further right-sided mediastinal shift.      3.  Small left pleural effusion and moderate size right effusion, increased in size since 9/10/2024.      4.  Pulmonary vascular congestion.            Workstation performed: [id number]         XR chest portable ICU   Final Interpretation by Juanito Lee DO (09/11 0231)      Large right and trace left pleural effusion.            Workstation performed: [id number]         XR chest portable   Final Interpretation by Matt Russ MD (09/09 0949)      Tubes and lines in satisfactory position. No pneumothorax.      Unchanged interstitial edema and small right pleural effusion.      Resident: John Blake I, the attending radiologist, have reviewed the images and agree with the final report above.      Workstation performed: [id number]           Cardiology:  Echo follow up/limited w/ contrast if indicated   Final Result by Eric Montiel MD (09/18 8911)        Left Ventricle: Left ventricular cavity size is normal. Wall thickness    is moderately increased. The left ventricular ejection fraction is 53%.    Systolic function is normal. Diastolic function is moderately abnormal,    consistent with grade II  (pseudonormal) relaxation.     The following segments are hypokinetic: mid anteroseptal and mid    inferoseptal.     All other segments are normal.     IVS: There is diastolic flattening of the interventricular septum    consistent with right ventricle volume overload.     Aorta: The aortic root is severely dilated.     Pericardium: There is a moderate pericardial effusion circumferential    to the heart. The fluid exhibits no internal echoes. The largest diameter    measures 1.9 cm. There is no echocardiographic evidence of tamponade. The    evidence against tamponade includes: no right ventricular diastolic    collapse, no right atrial inversion and no respiratory variation. There is    a large left pleural effusion.     Prior TTE study available for comparison. Prior study date: 9/11/2024.    Changes noted when compared to prior study. Changes include: Pericardial    effusion seems to be slightly reduced in size. .         ECG 12 lead   Final Result by Bernardo Wallace MD (09/16 0768)   Sinus tachycardia   Low voltage QRS   ST & T wave abnormality, consider lateral ischemia   Abnormal ECG   Reconfirmed by Bernardo Wallace ([id number]) on 9/16/2024 12:54:36 PM      ECG 12 lead   Final Result by Dioni Silva MD (09/13 0693)   Normal sinus rhythm   T wave abnormality, consider inferolateral ischemia   Abnormal ECG   When compared with ECG of 11-SEP-2024 00:45,   No significant change was found   Confirmed by Dioni Silva ([id number]) on 9/13/2024 9:14:13 AM      Echo follow up/limited w/ contrast if indicated   Final Result by Vargas Joiner DO (09/11 1200)        Left Ventricle: Left ventricular cavity size is normal. Wall thickness    is normal. The left ventricular ejection fraction is 55%. Systolic    function is normal.     The following segments are hypokinetic: apex.     All other segments are normal.     Aortic Valve: There is moderate to severe regurgitation with a    centrally directed jet.     Tricuspid  Valve: There is mild regurgitation.     Aorta: The aortic root is normal in size. The ascending aorta is    severely dilated. The ascending aorta is 5.4 cm. There is an aneurysm in    the ascending aorta.     Pericardium: There is a moderate pericardial effusion circumferential    to the heart. There is no echocardiographic evidence of tamponade. There    is a left pleural effusion.         ECG 12 lead   Final Result by Dioni Silva MD (09/11 0805)   Normal sinus rhythm   Incomplete left bundle branch block   T wave abnormality, consider inferolateral ischemia   Abnormal ECG   When compared with ECG of 10-SEP-2024 12:11,   Premature supraventricular complexes are no longer Present      Confirmed by Dioni Silva ([id number]) on 9/11/2024 8:05:10 AM      ECG 12 lead   Final Result by Felicita Nicholas MD (09/10 1556)   Sinus rhythm with 1st degree A-V block with Premature supraventricular    complexes   Incomplete left bundle branch block   ST & T wave abnormality, consider lateral ischemia   Abnormal ECG   When compared with ECG of 08-SEP-2024 19:56,   Premature supraventricular complexes are now Present      Confirmed by Felicita Nicholas ([id number]) on 9/10/2024 3:56:28 PM      Echo follow up/limited w/ contrast if indicated   Final Result by Aura Dimas DO (09/09 1212)        Left Ventricle: Left ventricular cavity size is normal. Wall thickness    is normal. The left ventricular ejection fraction is 50%. Systolic    function is low normal. There is global hypokinesis with regional    variation.     Right Ventricle: Right ventricular cavity size is normal. Systolic    function is normal.     Aortic Valve: There is moderate to severe regurgitation.     Pericardium: There is a moderate to large pericardial effusion    circumferential to the heart measuring largest along the RV free wall at    2.3 cm. The fluid exhibits no internal echoes. There is no    echocardiographic evidence of tamponade. The evidence against tamponade     includes: no right ventricular diastolic collapse, no right atrial    inversion and no respiratory variation. There is a left pleural effusion.         ECG 12 lead   Final Result by Effie Davis MD (09/09 0103)   Normal sinus rhythm   Technically poor tracing      Poor data quality, interpretation may be adversely affected      Confirmed by Effie Davis ([id number]) on 9/9/2024 1:03:05 AM        GI:  Bronchoscopy   Final Result by Amos Powell DO (09/13 7945)   Right main stem mucous plug   Friable mucosa over right and left lung       RECOMMENDATION:   Continue mechanical ventilation, add on chest percussion therapy for    airway clearance         Bronchoscopy   Final Result by Romy Hill MD (09/14 1232)   Moderate friable mucosa in the trachea, main chery, left lung and right    lung   Bloody mucus plugs with 51-75% obstruction removed with suction from the    bronchus intermedius and RLL         RECOMMENDATION:   Continue monitoring in the ICU          I was the supervising physician and present for the entire procedure on    9/13/2024      There was mucous plugging of the right lower lobe present along with some    bloody secretions causing mild to moderate obstruction of the left    mainstem.  Both were suctioned and cleared.      Romy Hill MD         Bronchoscopy   Final Result by Romy Hill MD (09/12 0832)   Excessive, thin and brown secretions present in all observed locations,    including the trachea, main chery, left lung and right lung   Bloody mucus plugs with greater than 75% obstruction removed with suction    from the bronchus intermedius and RML   Friable mucosa in the left lung and right lung         RECOMMENDATION:   Continue monitoring in the ICU   Follow up results of washing culture, gram stain and cytology             I was the supervising physician and present for the entire procedure on    09/11/2024. I also participated in the procedure.       Severe mucus plugging in the  RLL with brownship/bloody secretions.    Suctioned and cleared. Will send for culture and cytology.       Romy Hill MD                 Results from last 7 days   Lab Units 09/20/24  0541 09/19/24  1719 09/19/24  0502 09/18/24  0525 09/17/24  0443 09/15/24  0558 09/14/24  0540   WBC Thousand/uL 13.57*  --  6.54 7.09 9.50   < > 7.39   HEMOGLOBIN g/dL 8.1* 9.8* 8.3* 8.7* 10.0*   < > 9.2*   HEMATOCRIT % 24.9* 30.2* 26.1* 26.5* 31.6*   < > 28.4*   PLATELETS Thousands/uL 207  --  201 201 207   < > 154   TOTAL NEUT ABS Thousands/µL 11.94*  --  4.94 5.46 7.68*   < > 6.87*  5.83   BANDS PCT %  --   --   --   --   --   --  4    < > = values in this interval not displayed.     Results from last 7 days   Lab Units 09/14/24  0540   RETIC CT ABS  26,700   RETIC CT PCT % 0.94     Results from last 7 days   Lab Units 09/20/24  0541 09/19/24  0502 09/18/24  0525 09/17/24  0443 09/16/24  0510 09/15/24  0558 09/14/24  0540   SODIUM mmol/L 136 137 139 139 143   < > 145   POTASSIUM mmol/L 4.4 4.5 4.3 4.2 4.2   < > 4.1   CHLORIDE mmol/L 100 99 101 99 103   < > 104   CO2 mmol/L 33* 33* 35* 37* 37*   < > 36*   ANION GAP mmol/L 3* 5 3* 3* 3*   < > 5   BUN mg/dL 23 23 24 22 25   < > 29*   CREATININE mg/dL 0.58* 0.56* 0.62 0.55* 0.61   < > 0.63   EGFR ml/min/1.73sq m 107 109 104 110 105   < > 104   CALCIUM mg/dL 8.2* 8.1* 8.0* 8.3* 8.0*   < > 8.3*   CALCIUM, IONIZED mmol/L 1.11* 1.11*  --  1.13 1.13  --  1.14   MAGNESIUM mg/dL 1.7* 2.0 2.0 2.1 1.8*   < > 2.3   PHOSPHORUS mg/dL 3.9 3.7 3.2 3.5 2.9   < > 4.3*    < > = values in this interval not displayed.     Results from last 7 days   Lab Units 09/18/24  0525 09/13/24  2154   AST U/L 19 29   ALT U/L 26 77*   ALK PHOS U/L 69 86   TOTAL PROTEIN g/dL 5.4* 6.9   ALBUMIN g/dL 2.4* 3.3*   TOTAL BILIRUBIN mg/dL 0.74 1.24*     Results from last 7 days   Lab Units 09/20/24  1139 09/20/24  0549 09/20/24  0027 09/19/24  1651 09/19/24  1212 09/19/24  0502 09/19/24  0007 09/18/24  1823 09/18/24  1143  "09/18/24  0524 09/17/24  2347 09/17/24  1759   POC GLUCOSE mg/dl 108 122 98 100 63* 96 113 90 130 135 123 95     Results from last 7 days   Lab Units 09/20/24  0541 09/19/24  0502 09/18/24  0525 09/17/24  0443 09/16/24  0510 09/15/24  0558 09/14/24  0540 09/13/24  2154   GLUCOSE RANDOM mg/dL 122 92 127 111 119 164* 88 118             No results found for: \"BETA-HYDROXYBUTYRATE\"   Results from last 7 days   Lab Units 09/14/24  0553   PH ART  7.413   PCO2 ART mm Hg 52.0*   PO2 ART mm Hg 149.1*   HCO3 ART mmol/L 32.4*   BASE EXC ART mmol/L 6.8   O2 CONTENT ART mL/dL 13.8*   O2 HGB, ARTERIAL % 98.1*   ABG SOURCE  Line, Arterial                 Results from last 7 days   Lab Units 09/16/24  1317 09/16/24  1055 09/16/24  0840   HS TNI 0HR ng/L  --   --  13   HS TNI 2HR ng/L 20  --   --    HSTNI D2 ng/L 7  --   --    HS TNI 4HR ng/L  --  18  --    HSTNI D4 ng/L  --  5  --          Results from last 7 days   Lab Units 09/20/24  0541 09/18/24  0130 09/17/24  1922 09/17/24  0443 09/16/24  0510   PROTIME seconds 15.5*  --   --  14.8 16.0*   INR  1.20*  --   --  1.13 1.25*   PTT seconds  --  72* 67* 56* 72*                         Results from last 7 days   Lab Units 09/16/24  0840   BNP pg/mL 402*     Results from last 7 days   Lab Units 09/14/24  0540   FERRITIN ng/mL 363*   IRON SATURATION % 32   IRON ug/dL 33*   TIBC ug/dL 102*                                                                     Results from last 7 days   Lab Units 09/14/24  0540   TOTAL COUNTED  100               Medications:   Scheduled Medications:  budesonide, 0.5 mg, Nebulization, Q12H  chlorhexidine, 15 mL, Mouth/Throat, Q12H [place]  vitamin B-12, 100 mcg, Oral, Daily  folic acid, 1 mg, Oral, Daily  gabapentin, 400 mg, Per NG Tube, BID  ipratropium, 0.5 mg, Nebulization, TID  levalbuterol, 1.25 mg, Nebulization, TID  nicotine, 1 patch, Transdermal, Daily  phenytoin, 50 mg, Oral, Q12H [place]  polyethylene glycol, 17 g, Oral, BID  senna-docusate sodium, 1 " tablet, Oral, BID  traZODone, 100 mg, Oral, HS      Continuous IV Infusions:  dexmedetomidine, 0.1-1.2 mcg/kg/hr, Intravenous, Titrated  dextrose 5 % and sodium chloride 0.9 %, 75 mL/hr, Intravenous, Continuous  fentaNYL, 75 mcg/hr, Intravenous, Continuous      PRN Meds:  acetaminophen, 650 mg, Oral, Q6H PRN  albuterol, 2.5 mg, Nebulization, Q6H PRN  bisacodyl, 10 mg, Rectal, Daily PRN  fentaNYL, 50 mcg, Intravenous, Q2H PRN  oxyCODONE, 2.5 mg, Oral, Q4H PRN        Discharge Plan: D    Network Utilization Review Department  ATTENTION: Please call with any questions or concerns to [phone] and carefully listen to the prompts so that you are directed to the right person. All voicemails are confidential.   For Discharge needs, contact Care Management DC Support Team at [phone] opt. 2  Send all requests for admission clinical reviews, approved or denied determinations and any other requests to dedicated fax number below belonging to the [place] where the patient is receiving treatment. List of dedicated fax numbers for the Facilities:  FACILITY NAME UR FAX NUMBER   ADMISSION DENIALS (Administrative/Medical Necessity) [phone]   DISCHARGE SUPPORT TEAM (NETWORK) [phone]   PARENT CHILD HEALTH (Maternity/NICU/Pediatrics) [phone]   [facility] [phone]   [facility] [phone]   [facility] [phone]   [facility] [phone]   [facility] [phone]   [facility] [phone]   [facility] [phone]   [facility] [phone]   [facility] [phone]   [facility] [phone]   [facility] [phone]   [facility] [phone]

## 2024-09-20 NOTE — PHYSICAL THERAPY NOTE
Physical Therapy Evaluation     Patient's Name: Marcin Sánchez    Admitting Diagnosis  Acute on chronic respiratory failure with hypoxia and hypercapnia (HCC) [J96.21, J96.22]    Problem List  Patient Active Problem List   Diagnosis    Acute exacerbation of chronic obstructive pulmonary disease (COPD) (HCC)    Epilepsy (HCC)    Lyme disease    Major depression    Alcohol abuse    Collapse of right lung    Pleural effusion    Multiple lung nodules on CT    Cancer of right main bronchus (HCC)    Malignant neoplasm of upper lobe of right lung (HCC)    Thoracic aortic aneurysm without rupture (HCC)    Tobacco abuse    Nonrheumatic aortic valve insufficiency    Edema of both legs    Atrial fibrillation (HCC)    Pulmonary fibrosis (HCC)    Suspected chronic pulmonary embolism (HCC)    Squamous cell lung cancer (HCC)    Hyponatremia    Severe protein-calorie malnutrition (HCC)    Chronic combined systolic and diastolic CHF (congestive heart failure) (HCC)    Acute on chronic respiratory failure with hypoxia and hypercapnia (HCC)    Acute metabolic encephalopathy    Transaminitis    Cardiac arrest (HCC)    Pericardial effusion    Acute hypoxic respiratory failure (HCC)    Shock (HCC)    Mucus plugging of bronchi       Past Medical History  Past Medical History:   Diagnosis Date    Alcohol abuse     Anxiety     Aortic insufficiency 12/18/2020    Atrial fibrillation (HCC)     Cancer (HCC)     COPD (chronic obstructive pulmonary disease) (HCC)     Delirium     Encephalopathy     Epilepsy (HCC)     History of chemotherapy     History of radiation therapy     Hypo-osmolality and hyponatremia     Hypokalemia     Hypothyroid     Lung cancer (HCC)     Lyme disease     Major depression        Past Surgical History  Past Surgical History:   Procedure Laterality Date    BRONCHOSCOPY N/A 10/3/2016    Procedure: BRONCHOSCOPY FLEXIBLE;  Surgeon: John Brunner DO;  [facility]: [facility];  Service:     GASTROSTOMY TUBE PLACEMENT N/A  9/19/2024    Procedure: INSERTION GASTROSTOMY TUBE OPEN;  Surgeon: Darrin Burgos DO;  Location: [facility];  Service: General    HAND SURGERY      PEG W/TRACHEOSTOMY PLACEMENT N/A 9/19/2024    Procedure: TRACHEOSTOMY WITH INSERTION PEG TUBE;  Surgeon: Darrin Burgos DO;  [facility];  Service: General    PELVIC FRACTURE SURGERY      REMOVAL VENOUS PORT (PORT-A-CATH) Left 9/18/2018    Procedure: REMOVAL VENOUS PORT (PORT-A-CATH)IR;  Surgeon: Randy Lopez DO;  Location: [facility];  Service: Interventional Radiology        09/20/24 1145   PT Last Visit   PT Visit Date 09/20/24   Note Type   Note type Evaluation   Pain Assessment   Pain Assessment Tool FLACC   Pain Rating: FLACC (Rest) - Face 0   Pain Rating: FLACC (Rest) - Legs 0   Pain Rating: FLACC (Rest) - Activity 0   Pain Rating: FLACC (Rest) - Cry 0   Pain Rating: FLACC (Rest) - Consolability 0   Score: FLACC (Rest) 0   Pain Rating: FLACC (Activity) - Face 1   Pain Rating: FLACC (Activity) - Legs 1   Pain Rating: FLACC (Activity) - Activity 1   Pain Rating: FLACC (Activity) - Cry 1   Pain Rating: FLACC (Activity) - Consolability 1   Score: FLACC (Activity) 5   Restrictions/Precautions   Other Precautions Pain;Fall Risk;O2;Telemetry;Multiple lines;Bed Alarm;Chair Alarm;Cognitive;Impulsive   Home Living   Type of Home SNF   Home Layout One level   Additional Comments Pt reports having A for showers although is able to get up and ambulate   Prior Function   Level of [place] Needs assistance with ADLs   Lives With Facility staff   General   Family/Caregiver Present No   Cognition   Orientation Level Oriented to person   RLE Assessment   RLE Assessment   (grossly at least 3/5 with movement)   LLE Assessment   LLE Assessment   (grossly at least 3/5 with movement)   Coordination   Movements are Fluid and Coordinated 0   Bed Mobility   Supine to Sit Unable to assess   Sit to Supine Unable to assess   Transfers   Sit to Stand Unable  to assess   Stand to Sit Unable to assess   Ambulation/Elevation   Gait pattern Not appropriate   Activity Tolerance   Activity Tolerance Patient limited by fatigue;Patient limited by pain   Medical Staff Made Aware OT for D/C planning   Nurse Made Aware yes, nsg gave clearance to work with pt   Assessment   Prognosis Fair   Problem List Decreased strength;Decreased range of motion;Decreased endurance;Impaired balance;Decreased mobility;Decreased coordination;Decreased cognition;Impaired judgement;Decreased safety awareness;Pain   Assessment Pt is 64 y.o. male seen for PT evaluation s/p admit to [facility] on 9/8/2024 w/ Acute hypoxic respiratory failure (HCC). PT consulted to assess pt's functional mobility and d/c needs. Order placed for PT eval and tx, w/ up w/ A order. Comorbidities affecting pt's physical performance at time of assessment include:  has a past medical history of Alcohol abuse, Anxiety, Aortic insufficiency, Atrial fibrillation (HCC), Cancer (HCC), COPD (chronic obstructive pulmonary disease) (HCC), Delirium, Encephalopathy, Epilepsy (HCC), History of chemotherapy, History of radiation therapy, Hypo-osmolality and hyponatremia, Hypokalemia, Hypothyroid, Lung cancer (HCC), Lyme disease, and Major depression. PTA, pt was ambulates household distances and long term resident of SNF. Personal factors affecting pt at time of IE include: decreased initiation and engagement, unable to perform physical activity, inability to perform IADLs, and inability to perform ADLs. Please find objective findings from PT assessment regarding body systems outlined above with impairments and limitations including weakness, impaired balance, decreased endurance, impaired coordination, gait deviations, pain, decreased activity tolerance, decreased functional mobility tolerance, decreased safety awareness, impaired judgement, fall risk, and decreased cognition. Pt initially agreeable demonstrating ability to  mobilize all extremities against gravity multiple times to reposition items and put on socks for mobility. Once time to move EOB pt started to refuse c/o abdominal pain and that he just got comfortable from being moved around the bed earlier. Educated on importance of early mobility although pt continued to refuse additional mobility assessment.  The following objective measures performed on IE also reveal limitations: The patient's AM-PAC Basic Mobility Inpatient Short Form Raw Score is 8.  A standardized score less than 42.9 suggests the patient may benefit from discharge to post-acute rehabilitation services. Please also refer to the recommendation of the Physical Therapist for safe discharge planning. Pt's clinical presentation is currently unstable/unpredictable seen in pt's presentation of critical care monitoring. Pt to benefit from continued PT tx to address deficits as defined above and maximize level of functional independent mobility and consistency. From PT/mobility standpoint, recommendation at time of d/c would be level II pending progress.   Barriers to Discharge Decreased caregiver support;Inaccessible home environment   Goals   Patient Goals To rest   STG Expiration Date 10/02/24   Short Term Goal #1 1. Complete bed mobility with S to decrease need for caregiver support. 2. Improve Strength to 4/5 to prepare for transfers and gait. 3. Tolerate sitting EOB x 25 min with fair balance to engage in ADLs. 4. PT to see for transfers and gait   Plan   Treatment/Interventions OT;Spoke to nursing;Spoke to case management;Gait training;Bed mobility;Patient/family training;Therapeutic exercise;Endurance training;LE strengthening/ROM;Functional transfer training   PT Frequency 2-3x/wk   Discharge Recommendation   Rehab Resource Intensity Level, PT II (Moderate Resource Intensity)   AM-PAC Basic Mobility Inpatient   Turning in Flat Bed Without Bedrails 2   Lying on Back to Sitting on Edge of Flat Bed Without  Bedrails 2   Moving Bed to Chair 1   Standing Up From Chair Using Arms 1   Walk in Room 1   Climb 3-5 Stairs With Railing 1   Basic Mobility Inpatient Raw Score 8   Turning Head Towards Sound 3   Follow Simple Instructions 3   Low Function Basic Mobility Raw Score  14   Low Function Basic Mobility Standardized Score  22.01   [place] Highest Level Of Mobility   -[facility] Goal 3: Sit at edge of bed   -HLM Achieved 2: Bed activities/Dependent transfer         Nevin Brown, PT

## 2024-09-20 NOTE — PROGRESS NOTES
"Progress Note - Critical Care/ICU   Name: Marcin Sánchez 64 y.o. male I MRN: [id number]  Unit/Bed#: MICU 10 I Date of Admission: 9/8/2024   Date of Service: 9/20/2024 I Hospital Day: 12     Assessment & Plan   Neuro:   Diagnosis: History of epilepsy   Continue Phenytoin 50mg BID  Continue Gabapentin 400mg BID  Monitor Phenytoin level  Diagnosis: Sedation/analgesia   Precedex 0.5 mcg/kg/hr  Fentanyl 75 mcg/hr  RASS goal 0; alert and calm  Start Oxycodone 2.5mg PRN  Diagnosis: Insomnia   Trazodone 100mg daily at bedtime  Diagnosis: At risk for ICU delirium   CAM ICU BID  Regulate sleep/wake cycle     CV:   Diagnosis: Pericardial effusion   Echo 9/9  \"moderate to large pericardial effusion circumferential to the heart measuring largest along the RV free wall at 2.3 cm. The fluid exhibits no internal echoes. There is no echocardiographic evidence of tamponade.\"  Evaluated by CT surgery, no intervention at this time  Continue to monitor for signs of tamponade  Diagnosis: History of Atrial fibrillation on outpatient Eliquis   Home regimen: Metoprolol 25 mg daily  Hold for now, consider restart once hemodynamically improved  Hold Heparin gtt, to restart pending 12pm Hgb   Diagnosis: Acute on Chronic combined diastolic and systolic CHF  9/11 ECHO: LVEF 55%, hypokinetic apex. Moderate to severe aortic regurgitation, mild tricuspid regurgitation, dilated ascending aortic root up to 5.4 cm.  Continue home Lasix 40mg daily  Continue to monitor fluid status     Pulm:  Diagnosis: Acute on Chronic hypoxic and hypercapnic respiratory failure   Continue Xopenex and Atrovent TID  Continue pulmicort BID  Continue Performist BID  POD #1 tracheostomy  Trach care, frequent suctioning  Trial of PS today with goals for trach collar     GI:   No acute issues  Bowel regimen: Senokot, Miralax     :   Diagnosis: Urinary retention  Maintain wong   Continue to monitor Is/Os, renal indices     F/E/N:   F: No maintenance fluids  E: Replete as " needed for goal K >4, Phos >3 and Mag >2  N: Start Jevity 1.2 at 10mL/hr, advance to goal as tolerated     Heme/Onc:   Diagnosis: Anemia   Continue B12/folate  Monitor AM CBC  Transfuse for Hgb <7 and/or symptomatic anemia  Hgb recheck at noon  Diagnosis: DVT ppx  SCDs only  Holding Heparin gtt, to restart if noon Hgb stable     Endo:   No acute issues     ID:   No acute issues  Continue to monitor WBC count and temperature curve  Completed 5 days of azithromycin 9/10     MSK/Skin:   Diagnosis: At risk for pressure injury   PT/OT when able  Frequent turns/repositioning  Skin surveillance              Disposition: Critical care    ICU Core Measures     Vented Patient  VAP Bundle  VAP bundle ordered     A: Assess, Prevent, and Manage Pain Has pain been assessed? Yes  Need for changes to pain regimen? Yes   B: Both Spontaneous Awakening Trials (SATs) and Spontaneous Breathing Trials (SBTs) Plan to perform spontaneous awakening trial today? Yes   Plan to perform spontaneous breathing trial today? Yes   Obvious barriers to extubation? Yes   C: Choice of Sedation RASS Goal: 0 Alert and Calm  Need for changes to sedation or analgesia regimen? No   D: Delirium CAM-ICU: N/A   E: Early Mobility  Plan for early mobility? Yes   F: Family Engagement Plan for family engagement today? Yes       Review of Invasive Devices:  [place] Plan: Continue for accurate I/O monitoring for 48 hours        Prophylaxis:  VTE VTE covered by:  heparin (porcine), Intravenous, 1,800 Units at 09/11/24 0342  heparin (porcine), Intravenous       Stress Ulcer  not ordered       24 Hour Events   24hr events: Patient POD 1 tracheostomy/PEG placement. He reports pain at tracheostomy site.  Subjective   Review of Systems: See HPI for Review of Systems    Objective                          Vitals I/O      Most Recent Min/Max in 24hrs   Temp 98.6 °F (37 °C) Temp  Min: 97.7 °F (36.5 °C)  Max: 98.6 °F (37 °C)   Pulse 74 Pulse  Min: 66  Max: 102   Resp (!) 25 Resp   Min: 3  Max: 37   /56 BP  Min: 89/46  Max: 178/73   O2 Sat 100 % SpO2  Min: 95 %  Max: 100 %      Intake/Output Summary (Last 24 hours) at 9/20/2024 0749  Last data filed at 9/20/2024 0600  Gross per 24 hour   Intake 1429.55 ml   Output 425 ml   Net 1004.55 ml       Diet Enteral/Parenteral; Tube Feeding No Oral Diet; Jevity 1.2 Mirza; Continuous; 55; Prosource Protein Liquid - One Packet; 30; Every 4 hours    Invasive Monitoring           Physical Exam   Physical Exam  Vitals and nursing note reviewed.   Skin:     General: Skin is warm and dry.   HENT:      Head: Normocephalic and atraumatic.   Neck:      Trachea: Tracheostomy present.   Cardiovascular:      Rate and Rhythm: Normal rate and regular rhythm.      Pulses: Normal pulses.      Heart sounds: Normal heart sounds.   Musculoskeletal:      Right lower leg: No edema.      Left lower leg: No edema.   Abdominal: General: Bowel sounds are normal. There is abdominal type feeding tube.     Palpations: Abdomen is soft.      Comments: PEG site is clean and dry without erythema or drainage   Constitutional:       General: He is not in acute distress.     Appearance: He is well-developed and well-nourished.      Interventions: He is sedated and restrained.   Pulmonary:      Effort: Pulmonary effort is normal.      Breath sounds: Normal breath sounds.   Neurological:      General: No focal deficit present.      Mental Status: He is alert.          Diagnostic Studies         Medications:  Scheduled PRN   budesonide, 0.5 mg, Q12H  calcium gluconate, 2 g, Once  chlorhexidine, 15 mL, Q12H [facility]  vitamin B-12, 100 mcg, Daily  folic acid, 1 mg, Daily  gabapentin, 400 mg, BID  ipratropium, 0.5 mg, TID  levalbuterol, 1.25 mg, TID  magnesium sulfate, 2 g, Once  nicotine, 1 patch, Daily  phenytoin, 50 mg, Q12H [place]  polyethylene glycol, 17 g, BID  senna-docusate sodium, 1 tablet, BID  tranexamic acid, 1,000 mg, Once  traZODone, 100 mg, HS      acetaminophen, 650 mg, Q6H  PRN  albuterol, 2.5 mg, Q6H PRN  bisacodyl, 10 mg, Daily PRN  fentaNYL, 50 mcg, Q2H PRN  heparin (porcine), 1,800 Units, Q6H PRN  heparin (porcine), 3,600 Units, Q6H PRN       Continuous    dexmedetomidine, 0.1-1.2 mcg/kg/hr, Last Rate: 0.5 mcg/kg/hr (09/20/24 0626)  dextrose 5 % and sodium chloride 0.9 %, 75 mL/hr, Last Rate: 75 mL/hr (09/20/24 0627)  fentaNYL, 75 mcg/hr, Last Rate: 75 mcg/hr (09/19/24 2336)         Labs:   CBC    Recent Labs     09/19/24  0502 09/19/24  1719 09/20/24  0541   WBC 6.54  --  13.57*   HGB 8.3* 9.8* 8.1*   HCT 26.1* 30.2* 24.9*     --  207     BMP    Recent Labs     09/19/24  0502 09/20/24  0541   SODIUM 137 136   K 4.5 4.4   CL 99 100   CO2 33* 33*   AGAP 5 3*   BUN 23 23   CREATININE 0.56* 0.58*   CALCIUM 8.1* 8.2*       Coags    Recent Labs     09/20/24  0541   INR 1.20*        Additional Electrolytes  Recent Labs     09/19/24  0502 09/20/24  0541   MG 2.0 1.7*   PHOS 3.7 3.9   CAIONIZED 1.11* 1.11*          Blood Gas    No recent results  No recent results LFTs  No recent results    Infectious  No recent results  Glucose  Recent Labs     09/19/24  0502 09/20/24  0541   GLUC 92 122

## 2024-09-20 NOTE — PROGRESS NOTES
Progress Note - Surgery-General   Name: Marcin Sánchez 64 y.o. male I MRN: [id number]  Unit/Bed#: MICU 10 I Date of Admission: 9/8/2024   Date of Service: 9/20/2024 I Hospital Day: 12     Assessment & Plan  Acute hypoxic respiratory failure (HCC)  - Failed BiPAP after extubation on 9/9 and was reintubated; failed another trial of extubation on 9/13 and was reintubated   - Tracheostomy planned for bedside with ICU team this AM but US showed overlying vessels, attempt aborted  - Off of pressors at this time   - No Hx of abdominal surgeries   - Reviewed CT C/A/P which did not show sufficient window for PEG  -Status post 9/19 trach and PEG  - Ventilated per tracheostomy, ventilator with PEEP of 6, FiO2 40%, and Vt 420      Plan  -Start tube feeds at trickle and advance to goal via G-tube today  -Okay for meds per G-tube  -Monitor tracheostomy site for signs of bleeding  -Wean ventilator settings as tolerated  -Monitor abdominal exam, G-tube at 1.5 cm to skin  -Rest of care per surgery critical care team      MELD 3.0: 11 at 9/20/2024  5:41 AM  MELD-Na: 8 at 9/20/2024  5:41 AM  Calculated from:  Serum Creatinine: 0.58 mg/dL (Using min of 1 mg/dL) at 9/20/2024  5:41 AM  Serum Sodium: 136 mmol/L at 9/20/2024  5:41 AM  Total Bilirubin: 0.74 mg/dL (Using min of 1 mg/dL) at 9/18/2024  5:25 AM  Serum Albumin: 2.4 g/dL at 9/18/2024  5:25 AM  INR(ratio): 1.20 at 9/20/2024  5:41 AM  Age at listing (hypothetical): 64 years  Sex: Male at 9/20/2024  5:41 AM        Surgery-General service will follow.    History of Present Illness   Patient seen examined at bedside.  Patient expressed no acute concerns.  Patient appears comfortable at this time    Objective      Temp:  [97.7 °F (36.5 °C)-98.6 °F (37 °C)] 98 °F (36.7 °C)  HR:  [] 60  Resp:  [3-37] 16  BP: ()/(44-73) 86/48  O2 Device:  (ETT)          I/O         09/18 0701 09/19 0700 09/19 0701 09/20 0700 09/20 0701 09/21 0700    P.O. 0 0     I.V. (mL/kg) 934.7 (12.4)  1309.6 (17.1) 217.9 (2.8)    NG/ 120     IV Piggyback 300  100    Feedings 993 0     Total Intake(mL/kg) 2372.7 (31.4) 1429.6 (18.6) 317.9 (4.1)    Urine (mL/kg/hr) 1120 (0.6) 425 (0.2) 40 (0.1)    Emesis/NG output  0 180    Stool   0    Total Output 1120 425 220    Net +1252.7 +1004.6 +97.9           Unmeasured Stool Occurrence   1 x          Lines/Drains/Airways       Active Status       Name Placement date Placement time Site Days    Surgical Airway Shiley Cuffed 09/19/24  --  --  1    Gastrostomy/Enterostomy Percutaneous Endoscopic Gastrostomy (PEG) LLQ 09/19/24  --  LLQ  1    Urethral Catheter 16 Fr. 09/18/24  1402  --  1                  Physical Exam  Vitals and nursing note reviewed.   Constitutional:       General: He is not in acute distress.     Appearance: He is ill-appearing. He is not toxic-appearing or diaphoretic.   HENT:      Head: Normocephalic and atraumatic.      Mouth/Throat:      Comments: Tracheostomy in place  Eyes:      General: No scleral icterus.     Conjunctiva/sclera: Conjunctivae normal.   Cardiovascular:      Rate and Rhythm: Normal rate.   Pulmonary:      Effort: Pulmonary effort is normal. No respiratory distress.   Abdominal:      General: Abdomen is flat. There is no distension.      Palpations: Abdomen is soft. There is no mass.      Tenderness: There is abdominal tenderness. There is no guarding or rebound.   Musculoskeletal:      Cervical back: Normal range of motion and neck supple.      Right lower leg: No edema.      Left lower leg: No edema.   Skin:     General: Skin is warm and dry.      Capillary Refill: Capillary refill takes less than 2 seconds.   Neurological:      Mental Status: He is oriented to person, place, and time.   Psychiatric:         Mood and Affect: Mood normal.         Behavior: Behavior normal.         Thought Content: Thought content normal.          Lab Results: I have reviewed the following results: CBC/BMP:   .     09/20/24  0541   WBC 13.57*    HGB 8.1*   HCT 24.9*      SODIUM 136   K 4.4      CO2 33*   BUN 23   CREATININE 0.58*   GLUC 122   CAIONIZED 1.11*   MG 1.7*   PHOS 3.9    , LFTs: No new results in last 24 hours. , PTT/INR:  .     09/20/24  0541   INR 1.20*    , Lactic Acid: No new results in last 24 hours.   Imaging Review: Reviewed radiology reports from this admission including: procedure reports.  Other Studies: No additional pertinent studies reviewed.    VTE Pharmacologic Prophylaxis: VTE covered by:    None     VTE Mechanical Prophylaxis: sequential compression device

## 2024-09-20 NOTE — PLAN OF CARE
Problem: OCCUPATIONAL THERAPY ADULT  Goal: Performs self-care activities at highest level of function for planned discharge setting.  See evaluation for individualized goals.  Description: Treatment Interventions: ADL retraining, Functional transfer training, UE strengthening/ROM, Endurance training, Cognitive reorientation, Patient/family training, Equipment evaluation/education, Compensatory technique education, Activityengagement          See flowsheet documentation for full assessment, interventions and recommendations.   Note: Limitation: Decreased ADL status, Decreased UE ROM, Decreased UE strength, Decreased endurance, Decreased self-care trans  Prognosis: Fair  Assessment: Pt is a 64 y.o. male who was admitted to [facility] on 9/8/2024 with Acute hypoxic respiratory failure (HCC) s/p prolonged intubation and  POD#1 trach and PEG placement. Patient  has a past medical history of Alcohol abuse, Anxiety, Aortic insufficiency, Atrial fibrillation (HCC), Cancer (HCC), COPD (chronic obstructive pulmonary disease) (HCC), Delirium, Encephalopathy, Epilepsy (HCC), History of chemotherapy, History of radiation therapy, Hypo-osmolality and hyponatremia, Hypokalemia, Hypothyroid, Lung cancer (HCC), Lyme disease, and Major depression.   At baseline pt was completing adls and mobility independently with assist from nursing staff for showers - meals/homemaking provided. Pt lives at [facility]. Currently pt requires max assist for overall ADLS and  refused all functional mobility/transfers Pt currently presents with impairments in the following categories -difficulty performing ADLS, limited insight into deficits, compliance, flat affect, decreased initiation and engagement , health management , and environment activity tolerance, endurance, standing balance/tolerance, sitting balance/tolerance, UE strength, UE ROM, insight, safety , attention , task initiation , task termination , and communication.  These impairments, as well as pt's fatigue, pain, and risk for falls  limit pt's ability to safely engage in all baseline areas of occupation, includinggrooming, bathing, dressing, toileting, functional mobility/transfers, social participation , and leisure activities  From OT standpoint, recommend return to LTC facility upon D/C. OT will continue to follow to address the below stated goals.     Rehab Resource Intensity Level, OT: II (Moderate Resource Intensity)

## 2024-09-20 NOTE — PLAN OF CARE
Problem: Prexisting or High Potential for Compromised Skin Integrity  Goal: Skin integrity is maintained or improved  Description: INTERVENTIONS:  - Identify patients at risk for skin breakdown  - Assess and monitor skin integrity  - Assess and monitor nutrition and hydration status  - Monitor labs   - Assess for incontinence   - Turn and reposition patient  - Assist with mobility/ambulation  - Relieve pressure over bony prominences  - Avoid friction and shearing  - Provide appropriate hygiene as needed including keeping skin clean and dry  - Evaluate need for skin moisturizer/barrier cream  - Collaborate with interdisciplinary team   - Patient/family teaching  - Consider wound care consult   Outcome: Progressing     Problem: SAFETY,RESTRAINT: NV/NON-SELF DESTRUCTIVE BEHAVIOR  Goal: Remains free of harm/injury (restraint for non violent/non self-detsructive behavior)  Description: INTERVENTIONS:  - Instruct patient/family regarding restraint use   - Assess and monitor physiologic and psychological status   - Provide interventions and comfort measures to meet assessed patient needs   - Identify and implement measures to help patient regain control  - Assess readiness for release of restraint   Outcome: Progressing  Goal: Returns to optimal restraint-free functioning  Description: INTERVENTIONS:  - Assess the patient's behavior and symptoms that indicate continued need for restraint  - Identify and implement measures to help patient regain control  - Assess readiness for release of restraint   Outcome: Progressing     Problem: PAIN - ADULT  Goal: Verbalizes/displays adequate comfort level or baseline comfort level  Description: Interventions:  - Encourage patient to monitor pain and request assistance  - Assess pain using appropriate pain scale  - Administer analgesics based on type and severity of pain and evaluate response  - Implement non-pharmacological measures as appropriate and evaluate response  - Consider  cultural and social influences on pain and pain management  - Notify physician/advanced practitioner if interventions unsuccessful or patient reports new pain  Outcome: Progressing     Problem: INFECTION - ADULT  Goal: Absence or prevention of progression during hospitalization  Description: INTERVENTIONS:  - Assess and monitor for signs and symptoms of infection  - Monitor lab/diagnostic results  - Monitor all insertion sites, i.e. indwelling lines, tubes, and drains  - Monitor endotracheal if appropriate and nasal secretions for changes in amount and color  - [place] appropriate cooling/warming therapies per order  - Administer medications as ordered  - Instruct and encourage patient and family to use good hand hygiene technique  - Identify and instruct in appropriate isolation precautions for identified infection/condition  Outcome: Progressing  Goal: Absence of fever/infection during neutropenic period  Description: INTERVENTIONS:  - Monitor WBC    Outcome: Progressing     Problem: SAFETY ADULT  Goal: Patient will remain free of falls  Description: INTERVENTIONS:  - Educate patient/family on patient safety including physical limitations  - Instruct patient to call for assistance with activity   - Consult OT/PT to assist with strengthening/mobility   - Keep Call bell within reach  - Keep bed low and locked with side rails adjusted as appropriate  - Keep care items and personal belongings within reach  - Initiate and maintain comfort rounds  - Make Fall Risk Sign visible to staff  - Offer Toileting every 2 Hours, in advance of need  - Initiate/Maintain bedalarm  - Obtain necessary fall risk management equipment.  - Apply yellow socks and bracelet for high fall risk patients  - Consider moving patient to room near nurses station  Outcome: Progressing  Goal: Maintain or return to baseline ADL function  Description: INTERVENTIONS:  -  Assess patient's ability to carry out ADLs; assess patient's baseline for ADL  function and identify physical deficits which impact ability to perform ADLs (bathing, care of mouth/teeth, toileting, grooming, dressing, etc.)  - Assess/evaluate cause of self-care deficits   - Assess range of motion  - Assess patient's mobility; develop plan if impaired  - Assess patient's need for assistive devices and provide as appropriate  - Encourage maximum independence but intervene and supervise when necessary  - Involve family in performance of ADLs  - Assess for home care needs following discharge   - Consider OT consult to assist with ADL evaluation and planning for discharge  - Provide patient education as appropriate  Outcome: Progressing  Goal: Maintains/Returns to pre admission functional level  Description: INTERVENTIONS:  - Perform AM-PAC 6 Click Basic Mobility/ Daily Activity assessment daily.  - Set and communicate daily mobility goal to care team and patient/family/caregiver.   - Collaborate with rehabilitation services on mobility goals if consulted  - Perform Range of Motion 4 times a day.  - Reposition patient every 2 hours.  - Dangle patient 3 times a day  - Stand patient 3 times a day  - Ambulate patient 3 times a day  - Out of bed to chair 3 times a day   - Out of bed for meals 3 times a day  - Out of bed for toileting  - Record patient progress and toleration of activity level   Outcome: Progressing     Problem: DISCHARGE PLANNING  Goal: Discharge to home or other facility with appropriate resources  Description: INTERVENTIONS:  - Identify barriers to discharge w/patient and caregiver  - Arrange for needed discharge resources and transportation as appropriate  - Identify discharge learning needs (meds, wound care, etc.)  - Arrange for interpretive services to assist at discharge as needed  - Refer to Case Management Department for coordinating discharge planning if the patient needs post-hospital services based on physician/advanced practitioner order or complex needs related to  functional status, cognitive ability, or social support system  Outcome: Progressing     Problem: Knowledge Deficit  Goal: Patient/family/caregiver demonstrates understanding of disease process, treatment plan, medications, and discharge instructions  Description: Complete learning assessment and assess knowledge base.  Interventions:  - Provide teaching at level of understanding  - Provide teaching via preferred learning methods  Outcome: Progressing     Problem: Nutrition/Hydration-ADULT  Goal: Nutrient/Hydration intake appropriate for improving, restoring or maintaining nutritional needs  Description: Monitor and assess patient's nutrition/hydration status for malnutrition. Collaborate with interdisciplinary team and initiate plan and interventions as ordered.  Monitor patient's weight and dietary intake as ordered or per policy. Utilize nutrition screening tool and intervene as necessary. Determine patient's food preferences and provide high-protein, high-caloric foods as appropriate.     INTERVENTIONS:  - Monitor oral intake, urinary output, labs, and treatment plans  - Assess nutrition and hydration status and recommend course of action  - Evaluate amount of meals eaten  - Assist patient with eating if necessary   - Allow adequate time for meals  - Recommend/ encourage appropriate diets, oral nutritional supplements, and vitamin/mineral supplements  - Order, calculate, and assess calorie counts as needed  - Recommend, monitor, and adjust tube feedings and TPN/PPN based on assessed needs  - Assess need for intravenous fluids  - Provide specific nutrition/hydration education as appropriate  - Include patient/family/caregiver in decisions related to nutrition  Outcome: Progressing     Problem: RESPIRATORY - ADULT  Goal: Achieves optimal ventilation and oxygenation  Description: INTERVENTIONS:  - Assess for changes in respiratory status  - Assess for changes in mentation and behavior  - Position to facilitate  oxygenation and minimize respiratory effort  - Oxygen administered by appropriate delivery if ordered  - Initiate smoking cessation education as indicated  - Encourage broncho-pulmonary hygiene including cough, deep breathe, Incentive Spirometry  - Assess the need for suctioning and aspirate as needed  - Assess and instruct to report SOB or any respiratory difficulty  - Respiratory Therapy support as indicated  Outcome: Progressing

## 2024-09-20 NOTE — ASSESSMENT & PLAN NOTE
- Failed BiPAP after extubation on 9/9 and was reintubated; failed another trial of extubation on 9/13 and was reintubated   - Tracheostomy planned for bedside with ICU team this AM but US showed overlying vessels, attempt aborted  - Off of pressors at this time   - No Hx of abdominal surgeries   - Reviewed CT C/A/P which did not show sufficient window for PEG  -Status post 9/19 trach and PEG  - Ventilated per tracheostomy, ventilator with PEEP of 6, FiO2 40%, and Vt 420      Plan  -Start tube feeds at trickle and advance to goal via G-tube today  -Okay for meds per G-tube  -Monitor tracheostomy site for signs of bleeding  -Wean ventilator settings as tolerated  -Monitor abdominal exam, G-tube at 1.5 cm to skin  -Rest of care per surgery critical care team      MELD 3.0: 11 at 9/20/2024  5:41 AM  MELD-Na: 8 at 9/20/2024  5:41 AM  Calculated from:  Serum Creatinine: 0.58 mg/dL (Using min of 1 mg/dL) at 9/20/2024  5:41 AM  Serum Sodium: 136 mmol/L at 9/20/2024  5:41 AM  Total Bilirubin: 0.74 mg/dL (Using min of 1 mg/dL) at 9/18/2024  5:25 AM  Serum Albumin: 2.4 g/dL at 9/18/2024  5:25 AM  INR(ratio): 1.20 at 9/20/2024  5:41 AM  Age at listing (hypothetical): 64 years  Sex: Male at 9/20/2024  5:41 AM

## 2024-09-20 NOTE — RESPIRATORY THERAPY NOTE
Resp vent note   09/20/24 0200   Respiratory Assessment   Assessment Type Assess only   General Appearance Sleeping   Respiratory Pattern Assisted   Chest Assessment Chest expansion symmetrical   Bilateral Breath Sounds Coarse   Cough None   Resp Comments pt remains on doc vent settings overnight, pt tolerating well. will cont to monitor per RCP.   Vent Information   Vent ID [id number]   Vent type Morris C3   Morirs Vent Mode (S)CMV   $ Vent Daily Charge-Subsequent Yes   $ Pulse Oximetry Spot Check Charge Completed   SpO2 98 %   (S)CMV Settings   Resp Rate (BPM) 14 BPM   VT (mL) 420 mL   FIO2 (%) 40 %   PEEP (cmH2O) 6 cmH2O   I:E Ratio 1/3.5   Insp Time (%) 0.95 %   Flow Trigger (LPM) 3   Humidification Heater   Heater Temperature (Set) 95 °F (35 °C)   (S)CMV Actuals   Resp Rate (BPM) 18 BPM   VT (mL) 367   MV 5.9   MAP (cmH2O) 13 cmH2O   Peak Pressure (cmH2O) 28 cmH2O   I:E Ratio (Obs) 1/1.5   Insp Resistance 23   Heater Temperature (Obs) 95 °F (35 °C)   Static Compliance (mL/cmH20) 26.6 mL/cmH2O   (S)CMV Alarms   High Peak Pressure (cmH2O) 45   Low Pressure (cmH2O) 5 cm H2O   High Resp Rate (BPM) 40 BPM   Low Resp Rate (BPM) 8 BPM   High MV (L/min) 20 L/min   Low MV (L/min) 3.5 L/min   High VT (mL) 900 mL   Low VT (mL) 250 mL   Apnea Time (s) 20 S   Maintenance   Alarm (pink) cable attached No   Resuscitation bag with peep valve at bedside Yes   Water bag changed No   Circuit changed No   IHI Ventilator Associated Pneumonia Bundle   Head of Bed Elevated HOB 30   Surgical Airway Shiley Cuffed   Placement Date: 09/19/24   Type: Tracheostomy  Brand: Francois  Style: Cuffed  Comments: Pt trach in the OR with 8 Shiley Cuffed   Status Cuff Inflated   Ties Assessment Intact;Secure   Surgical Airway Cuff Pressure (color) Other (Comment)  (MLT)   Equipment at bedside BVM;Wall Suction setup;Additional complete same size trach tube;Additional complete one size smaller trach tube;Obturator;Additional inner cannula;Sterile  saline

## 2024-09-20 NOTE — PLAN OF CARE
Problem: PHYSICAL THERAPY ADULT  Goal: Performs mobility at highest level of function for planned discharge setting.  See evaluation for individualized goals.  Description: Treatment/Interventions: OT, Spoke to nursing, Spoke to case management, Gait training, Bed mobility, Patient/family training, Therapeutic exercise, Endurance training, LE strengthening/ROM, Functional transfer training          See flowsheet documentation for full assessment, interventions and recommendations.  Note: Prognosis: Fair  Problem List: Decreased strength, Decreased range of motion, Decreased endurance, Impaired balance, Decreased mobility, Decreased coordination, Decreased cognition, Impaired judgement, Decreased safety awareness, Pain  Assessment: Pt is 64 y.o. male seen for PT evaluation s/p admit to [facility] on [facility]8/2024 w/ Acute hypoxic respiratory failure (HCC). PT consulted to assess pt's functional mobility and d/c needs. Order placed for PT eval and tx, w/ up w/ A order. Comorbidities affecting pt's physical performance at time of assessment include:  has a past medical history of Alcohol abuse, Anxiety, Aortic insufficiency, Atrial fibrillation (HCC), Cancer (HCC), COPD (chronic obstructive pulmonary disease) (HCC), Delirium, Encephalopathy, Epilepsy (HCC), History of chemotherapy, History of radiation therapy, Hypo-osmolality and hyponatremia, Hypokalemia, Hypothyroid, Lung cancer (HCC), Lyme disease, and Major depression. PTA, pt was ambulates household distances and long term resident of SNF. Personal factors affecting pt at time of IE include: decreased initiation and engagement, unable to perform physical activity, inability to perform IADLs, and inability to perform ADLs. Please find objective findings from PT assessment regarding body systems outlined above with impairments and limitations including weakness, impaired balance, decreased endurance, impaired coordination, gait deviations, pain, decreased  activity tolerance, decreased functional mobility tolerance, decreased safety awareness, impaired judgement, fall risk, and decreased cognition. Pt initially agreeable demonstrating ability to mobilize all extremities against gravity multiple times to reposition items and put on socks for mobility. Once time to move EOB pt started to refuse c/o abdominal pain and that he just got comfortable from being moved around the bed earlier. Educated on importance of early mobility although pt continued to refuse additional mobility assessment.  The following objective measures performed on IE also reveal limitations: The patient's AM-PAC Basic Mobility Inpatient Short Form Raw Score is 8.  A standardized score less than 42.9 suggests the patient may benefit from discharge to post-acute rehabilitation services. Please also refer to the recommendation of the Physical Therapist for safe discharge planning. Pt's clinical presentation is currently unstable/unpredictable seen in pt's presentation of critical care monitoring. Pt to benefit from continued PT tx to address deficits as defined above and maximize level of functional independent mobility and consistency. From PT/mobility standpoint, recommendation at time of d/c would be level II pending progress.  Barriers to Discharge: Decreased caregiver support, Inaccessible home environment     Rehab Resource Intensity Level, PT: II (Moderate Resource Intensity)    See flowsheet documentation for full assessment.

## 2024-09-20 NOTE — PROGRESS NOTES
Nutrition Follow-Up/Recommendations:    Noted dilantin restarted which requires TF to be held for at least 1 hour before and 2 hours after each dose.   Dilantin currently ordered q12 hrs so TF will need to be held a total of 6 hrs per day.     Pended updated TF recs to adjust to cyclic administration:   Jevity 1.2, start at 10mL/hr and advance by 10mL/hr q4 hrs as tolerated to goal rate of 70mL/hr x18 hrs/day (TF on hold a total of 6 hrs each day for dilantin administration).   Continue one packet Prosource protein liquid daily.   At goal, will provide 1572 kcals, 85g protein, 1077mL water including water for Prosource administration.     Adjustments in additional free water flushes per critical care.

## 2024-09-21 LAB
ANION GAP SERPL CALCULATED.3IONS-SCNC: 3 MMOL/L (ref 4–13)
APTT PPP: 52 SECONDS (ref 23–34)
APTT PPP: 52 SECONDS (ref 23–34)
APTT PPP: 89 SECONDS (ref 23–34)
APTT PPP: 90 SECONDS (ref 23–34)
BASOPHILS # BLD AUTO: 0.03 THOUSANDS/ΜL (ref 0–0.1)
BASOPHILS NFR BLD AUTO: 0 % (ref 0–1)
BUN SERPL-MCNC: 21 MG/DL (ref 5–25)
CA-I BLD-SCNC: 1.17 MMOL/L (ref 1.12–1.32)
CALCIUM SERPL-MCNC: 8.2 MG/DL (ref 8.4–10.2)
CHLORIDE SERPL-SCNC: 100 MMOL/L (ref 96–108)
CO2 SERPL-SCNC: 33 MMOL/L (ref 21–32)
CREAT SERPL-MCNC: 0.54 MG/DL (ref 0.6–1.3)
EOSINOPHIL # BLD AUTO: 0.12 THOUSAND/ΜL (ref 0–0.61)
EOSINOPHIL NFR BLD AUTO: 1 % (ref 0–6)
ERYTHROCYTE [DISTWIDTH] IN BLOOD BY AUTOMATED COUNT: 13.9 % (ref 11.6–15.1)
GFR SERPL CREATININE-BSD FRML MDRD: 110 ML/MIN/1.73SQ M
GLUCOSE SERPL-MCNC: 114 MG/DL (ref 65–140)
GLUCOSE SERPL-MCNC: 129 MG/DL (ref 65–140)
HCT VFR BLD AUTO: 22.9 % (ref 36.5–49.3)
HCT VFR BLD AUTO: 25.4 % (ref 36.5–49.3)
HGB BLD-MCNC: 7.5 G/DL (ref 12–17)
HGB BLD-MCNC: 8.4 G/DL (ref 12–17)
IMM GRANULOCYTES # BLD AUTO: 0.04 THOUSAND/UL (ref 0–0.2)
IMM GRANULOCYTES NFR BLD AUTO: 1 % (ref 0–2)
LYMPHOCYTES # BLD AUTO: 0.41 THOUSANDS/ΜL (ref 0.6–4.47)
LYMPHOCYTES NFR BLD AUTO: 5 % (ref 14–44)
MAGNESIUM SERPL-MCNC: 1.8 MG/DL (ref 1.9–2.7)
MCH RBC QN AUTO: 32.9 PG (ref 26.8–34.3)
MCHC RBC AUTO-ENTMCNC: 32.8 G/DL (ref 31.4–37.4)
MCV RBC AUTO: 100 FL (ref 82–98)
MONOCYTES # BLD AUTO: 0.8 THOUSAND/ΜL (ref 0.17–1.22)
MONOCYTES NFR BLD AUTO: 9 % (ref 4–12)
NEUTROPHILS # BLD AUTO: 7.11 THOUSANDS/ΜL (ref 1.85–7.62)
NEUTS SEG NFR BLD AUTO: 84 % (ref 43–75)
NRBC BLD AUTO-RTO: 0 /100 WBCS
PHENYTOIN SERPL-MCNC: 4.9 UG/ML (ref 10–20)
PHOSPHATE SERPL-MCNC: 3.3 MG/DL (ref 2.3–4.1)
PLATELET # BLD AUTO: 206 THOUSANDS/UL (ref 149–390)
PMV BLD AUTO: 9.2 FL (ref 8.9–12.7)
POTASSIUM SERPL-SCNC: 4 MMOL/L (ref 3.5–5.3)
RBC # BLD AUTO: 2.28 MILLION/UL (ref 3.88–5.62)
SODIUM SERPL-SCNC: 136 MMOL/L (ref 135–147)
WBC # BLD AUTO: 8.51 THOUSAND/UL (ref 4.31–10.16)

## 2024-09-21 PROCEDURE — 94760 N-INVAS EAR/PLS OXIMETRY 1: CPT

## 2024-09-21 PROCEDURE — 85730 THROMBOPLASTIN TIME PARTIAL: CPT | Performed by: INTERNAL MEDICINE

## 2024-09-21 PROCEDURE — 85018 HEMOGLOBIN: CPT | Performed by: STUDENT IN AN ORGANIZED HEALTH CARE EDUCATION/TRAINING PROGRAM

## 2024-09-21 PROCEDURE — 85014 HEMATOCRIT: CPT | Performed by: STUDENT IN AN ORGANIZED HEALTH CARE EDUCATION/TRAINING PROGRAM

## 2024-09-21 PROCEDURE — 94003 VENT MGMT INPAT SUBQ DAY: CPT

## 2024-09-21 PROCEDURE — 94640 AIRWAY INHALATION TREATMENT: CPT

## 2024-09-21 PROCEDURE — 80185 ASSAY OF PHENYTOIN TOTAL: CPT

## 2024-09-21 PROCEDURE — 83735 ASSAY OF MAGNESIUM: CPT

## 2024-09-21 PROCEDURE — 85730 THROMBOPLASTIN TIME PARTIAL: CPT

## 2024-09-21 PROCEDURE — 82330 ASSAY OF CALCIUM: CPT

## 2024-09-21 PROCEDURE — 99232 SBSQ HOSP IP/OBS MODERATE 35: CPT | Performed by: INTERNAL MEDICINE

## 2024-09-21 PROCEDURE — 80048 BASIC METABOLIC PNL TOTAL CA: CPT

## 2024-09-21 PROCEDURE — 82948 REAGENT STRIP/BLOOD GLUCOSE: CPT

## 2024-09-21 PROCEDURE — 84100 ASSAY OF PHOSPHORUS: CPT

## 2024-09-21 PROCEDURE — 85025 COMPLETE CBC W/AUTO DIFF WBC: CPT

## 2024-09-21 PROCEDURE — 94669 MECHANICAL CHEST WALL OSCILL: CPT

## 2024-09-21 RX ORDER — MAGNESIUM SULFATE HEPTAHYDRATE 40 MG/ML
2 INJECTION, SOLUTION INTRAVENOUS ONCE
Status: COMPLETED | OUTPATIENT
Start: 2024-09-21 | End: 2024-09-21

## 2024-09-21 RX ORDER — DEXMEDETOMIDINE HYDROCHLORIDE 4 UG/ML
.1-1.2 INJECTION, SOLUTION INTRAVENOUS
Status: DISCONTINUED | OUTPATIENT
Start: 2024-09-21 | End: 2024-09-23

## 2024-09-21 RX ORDER — FUROSEMIDE 40 MG/1
40 TABLET ORAL DAILY
Status: DISCONTINUED | OUTPATIENT
Start: 2024-09-21 | End: 2024-09-23

## 2024-09-21 RX ORDER — LORAZEPAM 2 MG/ML
0.5 INJECTION INTRAMUSCULAR ONCE
Status: DISCONTINUED | OUTPATIENT
Start: 2024-09-21 | End: 2024-09-22

## 2024-09-21 RX ADMIN — NICOTINE 1 PATCH: 21 PATCH, EXTENDED RELEASE TRANSDERMAL at 08:42

## 2024-09-21 RX ADMIN — Medication 2.5 MG: at 19:48

## 2024-09-21 RX ADMIN — TRAZODONE HYDROCHLORIDE 100 MG: 100 TABLET ORAL at 22:12

## 2024-09-21 RX ADMIN — BUDESONIDE 0.5 MG: 0.5 INHALANT RESPIRATORY (INHALATION) at 18:49

## 2024-09-21 RX ADMIN — Medication 100 MCG: at 10:56

## 2024-09-21 RX ADMIN — LEVALBUTEROL HYDROCHLORIDE 1.25 MG: 1.25 SOLUTION RESPIRATORY (INHALATION) at 08:52

## 2024-09-21 RX ADMIN — FOLIC ACID 1 MG: 1 TABLET ORAL at 10:36

## 2024-09-21 RX ADMIN — PHENYTOIN 50 MG: 125 SUSPENSION ORAL at 22:00

## 2024-09-21 RX ADMIN — HEPARIN SODIUM 2000 UNITS: 1000 INJECTION INTRAVENOUS; SUBCUTANEOUS at 13:43

## 2024-09-21 RX ADMIN — LEVALBUTEROL HYDROCHLORIDE 1.25 MG: 1.25 SOLUTION RESPIRATORY (INHALATION) at 14:11

## 2024-09-21 RX ADMIN — SENNOSIDES AND DOCUSATE SODIUM 1 TABLET: 50; 8.6 TABLET ORAL at 10:35

## 2024-09-21 RX ADMIN — FENTANYL CITRATE 50 MCG: 50 INJECTION INTRAMUSCULAR; INTRAVENOUS at 07:38

## 2024-09-21 RX ADMIN — IPRATROPIUM BROMIDE 0.5 MG: 0.5 SOLUTION RESPIRATORY (INHALATION) at 08:52

## 2024-09-21 RX ADMIN — PHENYTOIN 50 MG: 125 SUSPENSION ORAL at 10:36

## 2024-09-21 RX ADMIN — IPRATROPIUM BROMIDE 0.5 MG: 0.5 SOLUTION RESPIRATORY (INHALATION) at 14:11

## 2024-09-21 RX ADMIN — MAGNESIUM SULFATE HEPTAHYDRATE 2 G: 40 INJECTION, SOLUTION INTRAVENOUS at 08:34

## 2024-09-21 RX ADMIN — CHLORHEXIDINE GLUCONATE 0.12% ORAL RINSE 15 ML: 1.2 LIQUID ORAL at 08:41

## 2024-09-21 RX ADMIN — CHLORHEXIDINE GLUCONATE 0.12% ORAL RINSE 15 ML: 1.2 LIQUID ORAL at 22:00

## 2024-09-21 RX ADMIN — GABAPENTIN 400 MG: 400 CAPSULE ORAL at 10:35

## 2024-09-21 RX ADMIN — HEPARIN SODIUM 16 UNITS/KG/HR: 10000 INJECTION, SOLUTION INTRAVENOUS at 17:29

## 2024-09-21 RX ADMIN — BUDESONIDE 0.5 MG: 0.5 INHALANT RESPIRATORY (INHALATION) at 08:52

## 2024-09-21 RX ADMIN — HEPARIN SODIUM 2000 UNITS: 1000 INJECTION INTRAVENOUS; SUBCUTANEOUS at 01:07

## 2024-09-21 RX ADMIN — Medication 50 MCG/HR: at 15:00

## 2024-09-21 RX ADMIN — DEXMEDETOMIDINE HYDROCHLORIDE 0.3 MCG/KG/HR: 400 INJECTION INTRAVENOUS at 17:41

## 2024-09-21 RX ADMIN — DEXMEDETOMIDINE HYDROCHLORIDE 0.5 MCG/KG/HR: 4 INJECTION, SOLUTION INTRAVENOUS at 05:55

## 2024-09-21 RX ADMIN — IPRATROPIUM BROMIDE 0.5 MG: 0.5 SOLUTION RESPIRATORY (INHALATION) at 18:49

## 2024-09-21 RX ADMIN — Medication 75 MCG/HR: at 01:31

## 2024-09-21 RX ADMIN — Medication 2.5 MG: at 22:12

## 2024-09-21 RX ADMIN — Medication 2.5 MG: at 16:00

## 2024-09-21 RX ADMIN — LEVALBUTEROL HYDROCHLORIDE 1.25 MG: 1.25 SOLUTION RESPIRATORY (INHALATION) at 18:49

## 2024-09-21 RX ADMIN — FUROSEMIDE 40 MG: 40 TABLET ORAL at 13:46

## 2024-09-21 RX ADMIN — DEXMEDETOMIDINE HYDROCHLORIDE 0.5 MCG/KG/HR: 400 INJECTION INTRAVENOUS at 06:49

## 2024-09-21 RX ADMIN — GABAPENTIN 400 MG: 400 CAPSULE ORAL at 18:19

## 2024-09-21 RX ADMIN — Medication 2.5 MG: at 10:58

## 2024-09-21 NOTE — PLAN OF CARE
Problem: Prexisting or High Potential for Compromised Skin Integrity  Goal: Skin integrity is maintained or improved  Description: INTERVENTIONS:  - Identify patients at risk for skin breakdown  - Assess and monitor skin integrity  - Assess and monitor nutrition and hydration status  - Monitor labs   - Assess for incontinence   - Turn and reposition patient  - Assist with mobility/ambulation  - Relieve pressure over bony prominences  - Avoid friction and shearing  - Provide appropriate hygiene as needed including keeping skin clean and dry  - Evaluate need for skin moisturizer/barrier cream  - Collaborate with interdisciplinary team   - Patient/family teaching  - Consider wound care consult   Outcome: Progressing     Problem: PAIN - ADULT  Goal: Verbalizes/displays adequate comfort level or baseline comfort level  Description: Interventions:  - Encourage patient to monitor pain and request assistance  - Assess pain using appropriate pain scale  - Administer analgesics based on type and severity of pain and evaluate response  - Implement non-pharmacological measures as appropriate and evaluate response  - Consider cultural and social influences on pain and pain management  - Notify physician/advanced practitioner if interventions unsuccessful or patient reports new pain  Outcome: Progressing     Problem: INFECTION - ADULT  Goal: Absence or prevention of progression during hospitalization  Description: INTERVENTIONS:  - Assess and monitor for signs and symptoms of infection  - Monitor lab/diagnostic results  - Monitor all insertion sites, i.e. indwelling lines, tubes, and drains  - Monitor endotracheal if appropriate and nasal secretions for changes in amount and color  - [place] appropriate cooling/warming therapies per order  - Administer medications as ordered  - Instruct and encourage patient and family to use good hand hygiene technique  - Identify and instruct in appropriate isolation precautions for  identified infection/condition  Outcome: Progressing  Goal: Absence of fever/infection during neutropenic period  Description: INTERVENTIONS:  - Monitor WBC    Outcome: Progressing     Problem: SAFETY ADULT  Goal: Patient will remain free of falls  Description: INTERVENTIONS:  - Educate patient/family on patient safety including physical limitations  - Instruct patient to call for assistance with activity   - Consult OT/PT to assist with strengthening/mobility   - Keep Call bell within reach  - Keep bed low and locked with side rails adjusted as appropriate  - Keep care items and personal belongings within reach  - Initiate and maintain comfort rounds  - Make Fall Risk Sign visible to staff  - Offer Toileting every  Hours, in advance of need  - Initiate/Maintain alarm  - Obtain necessary fall risk management equipment:   - Apply yellow socks and bracelet for high fall risk patients  - Consider moving patient to room near nurses station  Outcome: Progressing  Goal: Maintain or return to baseline ADL function  Description: INTERVENTIONS:  -  Assess patient's ability to carry out ADLs; assess patient's baseline for ADL function and identify physical deficits which impact ability to perform ADLs (bathing, care of mouth/teeth, toileting, grooming, dressing, etc.)  - Assess/evaluate cause of self-care deficits   - Assess range of motion  - Assess patient's mobility; develop plan if impaired  - Assess patient's need for assistive devices and provide as appropriate  - Encourage maximum independence but intervene and supervise when necessary  - Involve family in performance of ADLs  - Assess for home care needs following discharge   - Consider OT consult to assist with ADL evaluation and planning for discharge  - Provide patient education as appropriate  Outcome: Progressing  Goal: Maintains/Returns to pre admission functional level  Description: INTERVENTIONS:  - Perform AM-PAC 6 Click Basic Mobility/ Daily Activity  assessment daily.  - Set and communicate daily mobility goal to care team and patient/family/caregiver.   - Collaborate with rehabilitation services on mobility goals if consulted  - Perform Range of Motion 3 times a day.  - Reposition patient every 2 hours.  - Dangle patient 3 times a day  - Stand patient 3 times a day  - Ambulate patient 3 times a day  - Out of bed to chair 3 times a day   - Out of bed for meals 3 times a day  - Out of bed for toileting  - Record patient progress and toleration of activity level   Outcome: Progressing     Problem: Knowledge Deficit  Goal: Patient/family/caregiver demonstrates understanding of disease process, treatment plan, medications, and discharge instructions  Description: Complete learning assessment and assess knowledge base.  Interventions:  - Provide teaching at level of understanding  - Provide teaching via preferred learning methods  Outcome: Progressing     Problem: Nutrition/Hydration-ADULT  Goal: Nutrient/Hydration intake appropriate for improving, restoring or maintaining nutritional needs  Description: Monitor and assess patient's nutrition/hydration status for malnutrition. Collaborate with interdisciplinary team and initiate plan and interventions as ordered.  Monitor patient's weight and dietary intake as ordered or per policy. Utilize nutrition screening tool and intervene as necessary. Determine patient's food preferences and provide high-protein, high-caloric foods as appropriate.     INTERVENTIONS:  - Monitor oral intake, urinary output, labs, and treatment plans  - Assess nutrition and hydration status and recommend course of action  - Evaluate amount of meals eaten  - Assist patient with eating if necessary   - Allow adequate time for meals  - Recommend/ encourage appropriate diets, oral nutritional supplements, and vitamin/mineral supplements  - Order, calculate, and assess calorie counts as needed  - Recommend, monitor, and adjust tube feedings and  TPN/PPN based on assessed needs  - Assess need for intravenous fluids  - Provide specific nutrition/hydration education as appropriate  - Include patient/family/caregiver in decisions related to nutrition  Outcome: Progressing     Problem: RESPIRATORY - ADULT  Goal: Achieves optimal ventilation and oxygenation  Description: INTERVENTIONS:  - Assess for changes in respiratory status  - Assess for changes in mentation and behavior  - Position to facilitate oxygenation and minimize respiratory effort  - Oxygen administered by appropriate delivery if ordered  - Initiate smoking cessation education as indicated  - Encourage broncho-pulmonary hygiene including cough, deep breathe, Incentive Spirometry  - Assess the need for suctioning and aspirate as needed  - Assess and instruct to report SOB or any respiratory difficulty  - Respiratory Therapy support as indicated  Outcome: Progressing

## 2024-09-21 NOTE — PROGRESS NOTES
"Progress Note - Critical Care/ICU   Name: Marcin Sánchez 64 y.o. male I MRN: [id number]  Unit/Bed#: MICU 10 I Date of Admission: 9/8/2024   Date of Service: 9/21/2024 I Hospital Day: 13       Assessment & Plan   Neuro:   Diagnosis: History of epilepsy  Plan:  Continue Phenytoin 50mg BID  Continue Gabapentin 400mg BID  Monitor Phenytoin level, follow up AM 8:30     Diagnosis: Sedation/analgesia  Plan:  Precedex 0.7 mcg/kg/hr  Fentanyl 75 mcg/hr  RASS goal 0; alert and calm  Continue Oxycodone 2.5mg PRN    Diagnosis: Insomnia  Plan:  Trazodone 100mg daily at bedtime    Diagnosis: At risk for ICU delirium  Plan:  CAM ICU BID  Regulate sleep/wake cycle    CV:   Diagnosis: Pericardial effusion   Echo 9/9  \"moderate to large pericardial effusion circumferential to the heart measuring largest along the RV free wall at 2.3 cm. The fluid exhibits no internal echoes. 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 Moderate to severe aortic regurgitation, mild tricuspid regurgitation, dilated ascending aortic root up to 5.4 cm.  Continue home Lasix 40mg daily  Continue to monitor fluid status     Pulm:  Diagnosis: Acute on Chronic hypoxic and hypercapnic respiratory failure.  Plan:  Continue Xopenex and Atrovent TID  Continue pulmicort BID  Continue Performist BID  POD #2 tracheostomy  CMV 14/6/420/40%  Trach care, frequent suctioning  Trial of PS today with goals for trach collar    GI:   No acute issues  Bowel regimen: Senokot, Miralax  POD #2 PEG    :   Diagnosis: Urinary retention  Plan:  Maintain " wong   Continue to monitor Is/Os, renal indices    F/E/N:   F: No maintenance fluids  E: Replete as needed for goal K >4, Phos >3 and Mag >2  N: Continue Jevity 1.2 at 10mL/hr, advance to goal 70    Heme/Onc:   Diagnosis: Anemia  Plan:   Continue B12/folate  Monitor AM CBC  Transfuse for Hgb <7 and/or symptomatic anemia    Diagnosis: DVT ppx  SCDs and heparin gtt    Endo:   No active issues    ID:   No acute issues  Continue to monitor WBC count and temperature curve  Completed 5 days of azithromycin 9/10    MSK/Skin:   Diagnosis: At risk for pressure injury   PT/OT when able  Frequent turns/repositioning  Skin surveillance     Disposition: Critical care    ICU Core Measures     Vented Patient  VAP Bundle  VAP bundle ordered     A: Assess, Prevent, and Manage Pain Has pain been assessed? Yes  Need for changes to pain regimen? No   B: Both Spontaneous Awakening Trials (SATs) and Spontaneous Breathing Trials (SBTs) Plan to perform spontaneous awakening trial today? Yes   Plan to perform spontaneous breathing trial today? Yes   Obvious barriers to extubation? NA   C: Choice of Sedation RASS Goal: 0 Alert and Calm  Need for changes to sedation or analgesia regimen? No   D: Delirium CAM-ICU: Unable to perform secondary to Acute cognitive dysfunction   E: Early Mobility  Plan for early mobility? Yes   F: Family Engagement Plan for family engagement today? Yes       Review of Invasive Devices:    [place] Plan: Continue for accurate I/O monitoring for 48 hours        Prophylaxis:  VTE VTE covered by:  heparin (porcine), Intravenous, 14 Units/kg/hr at 09/21/24 0100  heparin (porcine), Intravenous, 2,000 Units at 09/21/24 0107  heparin (porcine), Intravenous       Stress Ulcer  not ordered         24 Hour Events   24hr events:     No acute events overnight. The patient was calm and slept for most of the night. He did not become agitated. He endorses pain at the tracheostomy site. He denies shortness of breath, chest pain,  abdominal pain, nausea or headaches.     No fevers reported overnight.    Subjective   Review of Systems: Review of Systems   Constitutional:  Negative for chills and fever.   Respiratory:  Negative for cough and shortness of breath.    Cardiovascular:  Negative for chest pain.   Gastrointestinal:  Negative for abdominal pain and vomiting.   Neurological:  Negative for headaches.       Objective                          Vitals I/O      Most Recent Min/Max in 24hrs   Temp 98.3 °F (36.8 °C) Temp  Min: 98 °F (36.7 °C)  Max: 98.3 °F (36.8 °C)   Pulse 76 Pulse  Min: 59  Max: 93   Resp (!) 26 Resp  Min: 15  Max: 31   /53 BP  Min: 86/48  Max: 146/67   O2 Sat 95 % SpO2  Min: 87 %  Max: 100 %      Intake/Output Summary (Last 24 hours) at 9/21/2024 0410  Last data filed at 9/21/2024 0400  Gross per 24 hour   Intake 2341.75 ml   Output 780 ml   Net 1561.75 ml       Diet Enteral/Parenteral; Tube Feeding No Oral Diet; Jevity 1.2 Mirza; Cyclic; 70; 18 hours; Prosource Protein Liquid - One Packet; 30; Every 4 hours    Invasive Monitoring           Physical Exam   Physical Exam  Vitals reviewed.   Eyes:      Extraocular Movements: Extraocular movements intact.      Conjunctiva/sclera: Conjunctivae normal.      Pupils: Pupils are equal, round, and reactive to light.   Skin:     General: Skin is warm and dry.   HENT:      Head: Normocephalic and atraumatic.      Mouth/Throat:      Mouth: Mucous membranes are moist.   Neck:      Trachea: Tracheostomy present.   Cardiovascular:      Rate and Rhythm: Normal rate and regular rhythm.      Heart sounds: Normal heart sounds.   Abdominal: General: Bowel sounds are normal. There is abdominal type feeding tube.There is no distension.      Palpations: Abdomen is soft.      Tenderness: There is no abdominal tenderness.   Constitutional:       General: He is not in acute distress.     Appearance: He is well-developed. He is not ill-appearing.      Interventions: He is restrained. He is not  intubated.  Pulmonary:      Effort: Pulmonary effort is normal. No accessory muscle usage, respiratory distress or accessory muscle usage. He is not intubated.   Neurological:      Mental Status: He is alert. He is calm.      Comments: He is alert and interactive. He nods yes and no to questions. He follows commands.   Genitourinary/Anorectal:  Haynes present.        Diagnostic Studies        Lab Results: I have reviewed the following results:      Medications:  Scheduled PRN   budesonide, 0.5 mg, Q12H  chlorhexidine, 15 mL, Q12H AMERICA  vitamin B-12, 100 mcg, Daily  folic acid, 1 mg, Daily  gabapentin, 400 mg, BID  ipratropium, 0.5 mg, TID  levalbuterol, 1.25 mg, TID  nicotine, 1 patch, Daily  phenytoin, 50 mg, Q12H AMERICA  polyethylene glycol, 17 g, BID  senna-docusate sodium, 1 tablet, BID  tranexamic acid, 1,000 mg, Once  traZODone, 100 mg, HS      acetaminophen, 650 mg, Q6H PRN  albuterol, 2.5 mg, Q6H PRN  bisacodyl, 10 mg, Daily PRN  fentaNYL, 50 mcg, Q2H PRN  heparin (porcine), 2,000 Units, Q6H PRN  heparin (porcine), 4,000 Units, Q6H PRN  oxyCODONE, 2.5 mg, Q4H PRN       Continuous    dexmedetomidine, 0.1-1.2 mcg/kg/hr, Last Rate: 0.7 mcg/kg/hr (09/20/24 2013)  fentaNYL, 75 mcg/hr, Last Rate: 75 mcg/hr (09/21/24 0131)  heparin (porcine), 3-20 Units/kg/hr (Order-Specific), Last Rate: 14 Units/kg/hr (09/21/24 0100)         Labs:   CBC    Recent Labs     09/19/24  0502 09/19/24  1719 09/20/24  0541 09/20/24  1357   WBC 6.54  --  13.57*  --    HGB 8.3*   < > 8.1* 8.5*   HCT 26.1*   < > 24.9* 26.2*     --  207  --     < > = values in this interval not displayed.     BMP    Recent Labs     09/19/24  0502 09/20/24  0541   SODIUM 137 136   K 4.5 4.4   CL 99 100   CO2 33* 33*   AGAP 5 3*   BUN 23 23   CREATININE 0.56* 0.58*   CALCIUM 8.1* 8.2*       Coags    Recent Labs     09/20/24  0541 09/20/24  1729 09/20/24  2330   INR 1.20* 1.28*  --    PTT  --  45* 52*        Additional Electrolytes  Recent Labs      09/19/24  0502 09/20/24  0541   MG 2.0 1.7*   PHOS 3.7 3.9   CAIONIZED 1.11* 1.11*          Blood Gas    No recent results  No recent results LFTs  No recent results    Infectious  No recent results  Glucose  Recent Labs     09/19/24  0502 09/20/24  0541   GLUC 92 122

## 2024-09-22 ENCOUNTER — APPOINTMENT (INPATIENT)
Dept: RADIOLOGY | Facility: HOSPITAL | Age: 64
DRG: 005 | End: 2024-09-22
Payer: COMMERCIAL

## 2024-09-22 VITALS
WEIGHT: 169.75 LBS | HEART RATE: 83 BPM | HEIGHT: 69 IN | BODY MASS INDEX: 25.14 KG/M2 | RESPIRATION RATE: 26 BRPM | OXYGEN SATURATION: 98 % | DIASTOLIC BLOOD PRESSURE: 56 MMHG | TEMPERATURE: 99 F | SYSTOLIC BLOOD PRESSURE: 111 MMHG

## 2024-09-22 LAB
ALBUMIN SERPL BCG-MCNC: 2.8 G/DL (ref 3.5–5)
ANION GAP SERPL CALCULATED.3IONS-SCNC: 5 MMOL/L (ref 4–13)
APTT PPP: 81 SECONDS (ref 23–34)
BASOPHILS # BLD AUTO: 0.03 THOUSANDS/ΜL (ref 0–0.1)
BASOPHILS NFR BLD AUTO: 0 % (ref 0–1)
BUN SERPL-MCNC: 17 MG/DL (ref 5–25)
CA-I BLD-SCNC: 1.13 MMOL/L (ref 1.12–1.32)
CALCIUM SERPL-MCNC: 8.2 MG/DL (ref 8.4–10.2)
CHLORIDE SERPL-SCNC: 97 MMOL/L (ref 96–108)
CO2 SERPL-SCNC: 33 MMOL/L (ref 21–32)
CREAT SERPL-MCNC: 0.57 MG/DL (ref 0.6–1.3)
EOSINOPHIL # BLD AUTO: 0.15 THOUSAND/ΜL (ref 0–0.61)
EOSINOPHIL NFR BLD AUTO: 2 % (ref 0–6)
ERYTHROCYTE [DISTWIDTH] IN BLOOD BY AUTOMATED COUNT: 14.3 % (ref 11.6–15.1)
GFR SERPL CREATININE-BSD FRML MDRD: 108 ML/MIN/1.73SQ M
GLUCOSE SERPL-MCNC: 130 MG/DL (ref 65–140)
HCT VFR BLD AUTO: 24.6 % (ref 36.5–49.3)
HGB BLD-MCNC: 8.1 G/DL (ref 12–17)
IMM GRANULOCYTES # BLD AUTO: 0.03 THOUSAND/UL (ref 0–0.2)
IMM GRANULOCYTES NFR BLD AUTO: 0 % (ref 0–2)
LYMPHOCYTES # BLD AUTO: 0.59 THOUSANDS/ΜL (ref 0.6–4.47)
LYMPHOCYTES NFR BLD AUTO: 8 % (ref 14–44)
MAGNESIUM SERPL-MCNC: 1.8 MG/DL (ref 1.9–2.7)
MCH RBC QN AUTO: 32.5 PG (ref 26.8–34.3)
MCHC RBC AUTO-ENTMCNC: 32.9 G/DL (ref 31.4–37.4)
MCV RBC AUTO: 99 FL (ref 82–98)
MONOCYTES # BLD AUTO: 0.76 THOUSAND/ΜL (ref 0.17–1.22)
MONOCYTES NFR BLD AUTO: 11 % (ref 4–12)
NEUTROPHILS # BLD AUTO: 5.5 THOUSANDS/ΜL (ref 1.85–7.62)
NEUTS SEG NFR BLD AUTO: 79 % (ref 43–75)
NRBC BLD AUTO-RTO: 0 /100 WBCS
PHENYTOIN SERPL-MCNC: 4.8 UG/ML (ref 10–20)
PHOSPHATE SERPL-MCNC: 3.7 MG/DL (ref 2.3–4.1)
PLATELET # BLD AUTO: 264 THOUSANDS/UL (ref 149–390)
PMV BLD AUTO: 9.1 FL (ref 8.9–12.7)
POTASSIUM SERPL-SCNC: 4 MMOL/L (ref 3.5–5.3)
RBC # BLD AUTO: 2.49 MILLION/UL (ref 3.88–5.62)
SODIUM SERPL-SCNC: 135 MMOL/L (ref 135–147)
WBC # BLD AUTO: 7.06 THOUSAND/UL (ref 4.31–10.16)

## 2024-09-22 PROCEDURE — 80185 ASSAY OF PHENYTOIN TOTAL: CPT

## 2024-09-22 PROCEDURE — 94640 AIRWAY INHALATION TREATMENT: CPT

## 2024-09-22 PROCEDURE — 80048 BASIC METABOLIC PNL TOTAL CA: CPT

## 2024-09-22 PROCEDURE — 85730 THROMBOPLASTIN TIME PARTIAL: CPT

## 2024-09-22 PROCEDURE — 82040 ASSAY OF SERUM ALBUMIN: CPT

## 2024-09-22 PROCEDURE — NC001 PR NO CHARGE: Performed by: EMERGENCY MEDICINE

## 2024-09-22 PROCEDURE — 84100 ASSAY OF PHOSPHORUS: CPT

## 2024-09-22 PROCEDURE — 83735 ASSAY OF MAGNESIUM: CPT

## 2024-09-22 PROCEDURE — 94669 MECHANICAL CHEST WALL OSCILL: CPT

## 2024-09-22 PROCEDURE — 94760 N-INVAS EAR/PLS OXIMETRY 1: CPT

## 2024-09-22 PROCEDURE — 85025 COMPLETE CBC W/AUTO DIFF WBC: CPT

## 2024-09-22 PROCEDURE — 82330 ASSAY OF CALCIUM: CPT

## 2024-09-22 PROCEDURE — 94003 VENT MGMT INPAT SUBQ DAY: CPT

## 2024-09-22 PROCEDURE — 71045 X-RAY EXAM CHEST 1 VIEW: CPT

## 2024-09-22 PROCEDURE — 99233 SBSQ HOSP IP/OBS HIGH 50: CPT | Performed by: INTERNAL MEDICINE

## 2024-09-22 RX ORDER — ONDANSETRON 2 MG/ML
4 INJECTION INTRAMUSCULAR; INTRAVENOUS ONCE
Status: COMPLETED | OUTPATIENT
Start: 2024-09-22 | End: 2024-09-22

## 2024-09-22 RX ORDER — LIDOCAINE HYDROCHLORIDE 10 MG/ML
INJECTION, SOLUTION EPIDURAL; INFILTRATION; INTRACAUDAL; PERINEURAL
Status: COMPLETED
Start: 2024-09-22 | End: 2024-09-22

## 2024-09-22 RX ORDER — PHENYTOIN 125 MG/5ML
25 SUSPENSION ORAL ONCE
Status: COMPLETED | OUTPATIENT
Start: 2024-09-22 | End: 2024-09-22

## 2024-09-22 RX ORDER — MAGNESIUM SULFATE HEPTAHYDRATE 40 MG/ML
2 INJECTION, SOLUTION INTRAVENOUS ONCE
Status: COMPLETED | OUTPATIENT
Start: 2024-09-22 | End: 2024-09-22

## 2024-09-22 RX ORDER — OXYCODONE HYDROCHLORIDE 5 MG/1
5 TABLET ORAL EVERY 4 HOURS
Status: DISCONTINUED | OUTPATIENT
Start: 2024-09-22 | End: 2024-09-25

## 2024-09-22 RX ORDER — PHENYTOIN 125 MG/5ML
75 SUSPENSION ORAL EVERY 12 HOURS SCHEDULED
Status: DISCONTINUED | OUTPATIENT
Start: 2024-09-22 | End: 2024-09-26

## 2024-09-22 RX ADMIN — FENTANYL CITRATE 50 MCG: 50 INJECTION INTRAMUSCULAR; INTRAVENOUS at 08:42

## 2024-09-22 RX ADMIN — GABAPENTIN 400 MG: 400 CAPSULE ORAL at 08:33

## 2024-09-22 RX ADMIN — IPRATROPIUM BROMIDE 0.5 MG: 0.5 SOLUTION RESPIRATORY (INHALATION) at 08:18

## 2024-09-22 RX ADMIN — APIXABAN 5 MG: 5 TABLET, FILM COATED ORAL at 17:55

## 2024-09-22 RX ADMIN — DEXMEDETOMIDINE HYDROCHLORIDE 0.7 MCG/KG/HR: 400 INJECTION INTRAVENOUS at 08:59

## 2024-09-22 RX ADMIN — GABAPENTIN 400 MG: 400 CAPSULE ORAL at 17:55

## 2024-09-22 RX ADMIN — IPRATROPIUM BROMIDE 0.5 MG: 0.5 SOLUTION RESPIRATORY (INHALATION) at 19:07

## 2024-09-22 RX ADMIN — LEVALBUTEROL HYDROCHLORIDE 1.25 MG: 1.25 SOLUTION RESPIRATORY (INHALATION) at 08:18

## 2024-09-22 RX ADMIN — IPRATROPIUM BROMIDE 0.5 MG: 0.5 SOLUTION RESPIRATORY (INHALATION) at 13:43

## 2024-09-22 RX ADMIN — NICOTINE 1 PATCH: 21 PATCH, EXTENDED RELEASE TRANSDERMAL at 08:33

## 2024-09-22 RX ADMIN — FOLIC ACID 1 MG: 1 TABLET ORAL at 08:33

## 2024-09-22 RX ADMIN — SENNOSIDES AND DOCUSATE SODIUM 1 TABLET: 50; 8.6 TABLET ORAL at 08:33

## 2024-09-22 RX ADMIN — Medication 2.5 MG: at 08:32

## 2024-09-22 RX ADMIN — LEVALBUTEROL HYDROCHLORIDE 1.25 MG: 1.25 SOLUTION RESPIRATORY (INHALATION) at 13:43

## 2024-09-22 RX ADMIN — OXYCODONE HYDROCHLORIDE 5 MG: 5 TABLET ORAL at 15:57

## 2024-09-22 RX ADMIN — Medication 100 MCG: at 09:48

## 2024-09-22 RX ADMIN — APIXABAN 5 MG: 5 TABLET, FILM COATED ORAL at 08:33

## 2024-09-22 RX ADMIN — LEVALBUTEROL HYDROCHLORIDE 1.25 MG: 1.25 SOLUTION RESPIRATORY (INHALATION) at 19:07

## 2024-09-22 RX ADMIN — POLYETHYLENE GLYCOL 3350 17 G: 17 POWDER, FOR SOLUTION ORAL at 08:33

## 2024-09-22 RX ADMIN — OXYCODONE HYDROCHLORIDE 5 MG: 5 TABLET ORAL at 12:37

## 2024-09-22 RX ADMIN — PHENYTOIN 75 MG: 125 SUSPENSION ORAL at 21:34

## 2024-09-22 RX ADMIN — Medication 2.5 MG: at 02:37

## 2024-09-22 RX ADMIN — PHENYTOIN 50 MG: 125 SUSPENSION ORAL at 09:48

## 2024-09-22 RX ADMIN — PHENYTOIN 25 MG: 125 SUSPENSION ORAL at 14:45

## 2024-09-22 RX ADMIN — CHLORHEXIDINE GLUCONATE 0.12% ORAL RINSE 15 ML: 1.2 LIQUID ORAL at 21:34

## 2024-09-22 RX ADMIN — BUDESONIDE 0.5 MG: 0.5 INHALANT RESPIRATORY (INHALATION) at 08:26

## 2024-09-22 RX ADMIN — CHLORHEXIDINE GLUCONATE 0.12% ORAL RINSE 15 ML: 1.2 LIQUID ORAL at 08:32

## 2024-09-22 RX ADMIN — FUROSEMIDE 40 MG: 40 TABLET ORAL at 08:35

## 2024-09-22 RX ADMIN — BUDESONIDE 0.5 MG: 0.5 INHALANT RESPIRATORY (INHALATION) at 19:07

## 2024-09-22 RX ADMIN — MAGNESIUM SULFATE HEPTAHYDRATE 2 G: 40 INJECTION, SOLUTION INTRAVENOUS at 08:34

## 2024-09-22 RX ADMIN — TRAZODONE HYDROCHLORIDE 100 MG: 100 TABLET ORAL at 21:34

## 2024-09-22 RX ADMIN — OXYCODONE HYDROCHLORIDE 5 MG: 5 TABLET ORAL at 21:00

## 2024-09-22 RX ADMIN — LIDOCAINE HYDROCHLORIDE 50 MG: 10 INJECTION, SOLUTION EPIDURAL; INFILTRATION; INTRACAUDAL; PERINEURAL at 08:34

## 2024-09-22 RX ADMIN — ONDANSETRON 4 MG: 2 INJECTION INTRAMUSCULAR; INTRAVENOUS at 16:21

## 2024-09-22 RX ADMIN — DEXMEDETOMIDINE HYDROCHLORIDE 0.5 MCG/KG/HR: 400 INJECTION INTRAVENOUS at 14:17

## 2024-09-22 NOTE — PROGRESS NOTES
"dosProgress Note - Critical Care/ICU   Name: Marcin Sánchez 64 y.o. male I MRN: [id number]  Unit/Bed#: MICU 10 I Date of Admission: 9/8/2024   Date of Service: 9/22/2024 I Hospital Day: 14       Assessment & Plan   63 yo M PMH COPD, CHFrEF, afib, epilepsy, AAA, alcohol abuse, nicotine abuse, HCV admitted 9/6 with AECOPD. Developed respiratory failure and cardiac arrest, intubated. ECHO with concern for tamponade. Transferred here. No tamponade physiology on admission. Failed extubation x 2. Trach and PEG placed on 9/19.    Acute on chronic hypoxic and hypercapnic respiratory failure  Moderate to severe COPD with acute exacerbation  Acute on chronic combined diastolic and systolic CHF  Severe aortic regurgitation  VT arrest post intubation - due hypoxic respiratory failure  Paroxysmal atrial fibrillation on Apixaban  Epilepsy  Shock, resolved.  Moderate pericardial effusion, no tamponade on recent ECHO  AAA - 5.4 cm  History of PE  Squamous cell cancer of lung post chemo/radiation 2016, cancer of right mainstem  Alcohol abuse  Nicotine dependence, cigarettes, uncomplicated - 1.5 ppd x 47 years  HCV  Colonization with Acinetobacter and Moraxella      Neuro:   Diagnosis: History of epilepsy  Plan:  Continue Phenytoin 50mg BID  Continue Gabapentin 400mg BID  Monitor Phenytoin level, follow up AM 8:30     Diagnosis: Sedation/analgesia  Plan:  Oxy IR changed to standing  Wean fentanyl as able  On fentanyl drip  On Precedex  RASS goal 0; alert and calm    Diagnosis: Insomnia  Plan:  Trazodone 100mg daily at bedtime    Diagnosis: At risk for ICU delirium  Plan:  CAM ICU BID  Regulate sleep/wake cycle    CV:   Diagnosis: Pericardial effusion   Echo 9/9  \"moderate to large pericardial effusion circumferential to the heart measuring largest along the RV free wall at 2.3 cm. The fluid exhibits no internal echoes. There is no echocardiographic evidence of tamponade.\"  Evaluated by CT surgery, no intervention at this " time  Plan:  Continue to monitor for signs of tamponade    Diagnosis: History of Atrial fibrillation on outpatient Eliquis  Plan:  Home regimen: Metoprolol 25 mg alyssa  Will consider restarting home metoprolol today if hemodynamics remain stable  Restarted home Eliquis, off heparin drip    Diagnosis: Acute on Chronic combined diastolic and systolic CHF  Diagnosis: Moderate to severe aortic regurgitation  9/11 ECHO: LVEF 55%, hypokinetic apex. Moderate to severe aortic regurgitation, mild tricuspid regurgitation, dilated ascending aortic root up to 5.4 cm.  Plan:  Continue home Lasix 40mg daily  Continue to monitor fluid status     Diagnosis: History of AAA  5.4 cm  Plan: Monitor hemodynamics; BP goal <130/80, will likely require outpatient management    Pulm:  Diagnosis: Acute on Chronic hypoxic and hypercapnic respiratory failure.  Diagnosis: COPD, and acute exacerbation  Likely multifactorial including moderate to severe COPD; no recent PFTs on file, last PFTs obtained 217 revealed FEV1 51%  Patient with nicotine dependence of 70 pack years.  Course complicated by recurrent mucous plugging, status post therapeutic bronch x 3.  Sputum culture with Moraxella, status post course of azithromycin x 5 days.  Also with MDR acinetobacter likely contaminant versus colonization as patient has remained stable without antibiotic treatment.  Status post intubated x 3  Status post trach 9/19  Low clinical suspicion for infection and pneumonia at this time.  Plan:  Continue vent support with daily SBT's  Continue Xopenex and Atrovent TID  Continue pulmicort BID  Continue Performist BID  Trach care, frequent suctioning    Diagnosis: History of squamous cell cancer of lung post chemo/radiation 2016, cancer of right mainstem  Noted      GI:   Status post PEG 9/19  Continue tube feeds at goal  Bowel regimen: Senokot, Miralax with holding parameters      :   Diagnosis: Urinary retention  Plan:  Maintain wong   Continue to monitor  Is/Os, renal indices    F/E/N:   F: No maintenance fluids  E: Replete as needed for goal K >4, Phos >3 and Mag >2  N: Continue Jevity 1.2, at goal    Heme/Onc:   Diagnosis: Anemia  Plan:   Continue B12/folate  Monitor AM CBC  Transfuse for Hgb <7 and/or symptomatic anemia    Diagnosis: DVT ppx  SCDs and Eliquis    Endo:   No active issues    ID:   Diagnosis: Colonization with Acinetobacter and Moraxella  No acute issues  Continue to monitor WBC count and temperature curve  Completed 5 days of azithromycin 9/10    MSK/Skin:   Diagnosis: At risk for pressure injury   PT/OT when able  Frequent turns/repositioning  Skin surveillance     Disposition: Critical care    ICU Core Measures     Vented Patient  VAP Bundle  VAP bundle ordered     A: Assess, Prevent, and Manage Pain Has pain been assessed? Yes  Need for changes to pain regimen? No   B: Both Spontaneous Awakening Trials (SATs) and Spontaneous Breathing Trials (SBTs) Plan to perform spontaneous awakening trial today? Yes   Plan to perform spontaneous breathing trial today? Yes   Obvious barriers to extubation? NA   C: Choice of Sedation RASS Goal: 0 Alert and Calm  Need for changes to sedation or analgesia regimen? No   D: Delirium CAM-ICU: Unable to perform secondary to Acute cognitive dysfunction   E: Early Mobility  Plan for early mobility? Yes   F: Family Engagement Plan for family engagement today? Yes       Review of Invasive Devices:    [place] Plan: Continue for accurate I/O monitoring for 48 hours        Prophylaxis:  VTE VTE covered by:  apixaban, Oral       Stress Ulcer  not ordered         24 Hour Events   Overnight patient noted to be more agitated.  Fentanyl drip increased to 30 mcg per hour, patient received scheduled oxycodone 2.5 mg every 4 hours.  Was trialed on pressure control however did not tolerate a switch back to volume control at 14/420/6/40% FiO2.    Subjective   Patient seen and examined.  Resting comfortably.  Endorses some discomfort  around trach site though improved from before.  No other concerns reported.    Objective                          Vitals I/O      Most Recent Min/Max in 24hrs   Temp 98.2 °F (36.8 °C) Temp  Min: 98.2 °F (36.8 °C)  Max: 98.9 °F (37.2 °C)   Pulse 91 Pulse  Min: 67  Max: 91   Resp 21 Resp  Min: 9  Max: 33   /56 BP  Min: 92/46  Max: 129/62   O2 Sat 98 % SpO2  Min: 94 %  Max: 100 %   Status post trach 9/19    SCMV 18/420/6PEEP/40% FiO2   Intake/Output Summary (Last 24 hours) at 9/22/2024 0709  Last data filed at 9/22/2024 0600  Gross per 24 hour   Intake 1722.02 ml   Output 1215 ml   Net 507.02 ml       Diet Enteral/Parenteral; Tube Feeding No Oral Diet; Jevity 1.2 Mirza; Cyclic; 70; 18 hours; Prosource Protein Liquid - One Packet; 30; Every 4 hours    Invasive Monitoring           Physical Exam   Physical Exam  Vitals reviewed.   Eyes:      Extraocular Movements: Extraocular movements intact.      Conjunctiva/sclera: Conjunctivae normal.      Pupils: Pupils are equal, round, and reactive to light.   Skin:     General: Skin is warm and dry.   HENT:      Head: Normocephalic and atraumatic.      Mouth/Throat:      Mouth: Mucous membranes are moist.   Neck:      Trachea: Tracheostomy present.   Cardiovascular:      Rate and Rhythm: Normal rate and regular rhythm.      Heart sounds: Normal heart sounds.   Abdominal: General: Bowel sounds are normal. There is abdominal type feeding tube.There is no distension.      Palpations: Abdomen is soft.      Tenderness: There is no abdominal tenderness.   Constitutional:       General: He is not in acute distress.     Appearance: He is well-developed. He is not ill-appearing.      Interventions: He is restrained. He is not intubated.  Pulmonary:      Effort: Pulmonary effort is normal. No accessory muscle usage, respiratory distress or accessory muscle usage. He is not intubated.   Neurological:      Mental Status: He is alert. He is calm.      Comments: He is alert and  interactive. He nods yes and no to questions. He follows commands.   Genitourinary/Anorectal:  Haynes present.        Diagnostic Studies        Lab Results: I have reviewed the following results:      Medications:  Scheduled PRN   apixaban, 5 mg, BID  budesonide, 0.5 mg, Q12H  chlorhexidine, 15 mL, Q12H AMERICA  vitamin B-12, 100 mcg, Daily  folic acid, 1 mg, Daily  furosemide, 40 mg, Daily  gabapentin, 400 mg, BID  ipratropium, 0.5 mg, TID  levalbuterol, 1.25 mg, TID  LORazepam, 0.5 mg, Once  magnesium sulfate, 2 g, Once  nicotine, 1 patch, Daily  oxyCODONE, 2.5 mg, Q4H  phenytoin, 50 mg, Q12H AMERICA  polyethylene glycol, 17 g, BID  senna-docusate sodium, 1 tablet, BID  tranexamic acid, 1,000 mg, Once  traZODone, 100 mg, HS      acetaminophen, 650 mg, Q6H PRN  albuterol, 2.5 mg, Q6H PRN  bisacodyl, 10 mg, Daily PRN  fentaNYL, 50 mcg, Q2H PRN       Continuous    dexmedetomidine, 0.1-1.2 mcg/kg/hr, Last Rate: 0.5 mcg/kg/hr (09/22/24 0504)  fentaNYL, 50 mcg/hr, Last Rate: 30 mcg/hr (09/22/24 0503)         Labs:   CBC    Recent Labs     09/21/24  0511 09/21/24  1106 09/22/24  0509   WBC 8.51  --  7.06   HGB 7.5* 8.4* 8.1*   HCT 22.9* 25.4* 24.6*     --  264     BMP    Recent Labs     09/21/24  0511 09/22/24  0509   SODIUM 136 135   K 4.0 4.0    97   CO2 33* 33*   AGAP 3* 5   BUN 21 17   CREATININE 0.54* 0.57*   CALCIUM 8.2* 8.2*       Coags    Recent Labs     09/20/24  1729 09/20/24  2330 09/21/24  1943 09/22/24  0239   INR 1.28*  --   --   --    PTT 45*   < > 89* 81*    < > = values in this interval not displayed.        Additional Electrolytes  Recent Labs     09/21/24  0511 09/22/24  0509   MG 1.8* 1.8*   PHOS 3.3 3.7   CAIONIZED 1.17 1.13          Blood Gas    No recent results  No recent results LFTs  No recent results    Infectious  No recent results     9/11 bronch culture 4+ growth acinetobacter  9/6 sputum culture 4+ growth acinetobacter, few colonies Moraxella  9/5 Bcx  x2 negative  9/5 flu/covid/rsv  negative Glucose  Recent Labs     09/21/24  0511 09/22/24  0509   GLUC 129 130

## 2024-09-22 NOTE — PROGRESS NOTES
Called emergently to bedside secondary to patient removing tracheostomy that was placed 3 days ago.  Patient had an 8.0 Shiley in place.  Patient was being supported with bag valve mask ventilation.  Bougie was used to replace tracheostomy, 6.0 Shiley placed.  Surgery contacted to assess airway.  Chest x-ray pending.  Patient placed on ventilator and receiving volumes.    Patient did have decrease in O2 sat. Now 93%    CC time does not include procedures or family update. CC time 15 min

## 2024-09-22 NOTE — RESPIRATORY THERAPY NOTE
RT Ventilator Management Note  Marcin Sánchez 64 y.o. male MRN: [id number]  Unit/Bed#: [facility] 10 Encounter: [id number]      Daily Screen         9/21/2024  0729 9/22/2024  0829          Patient safety screen outcome:: Failed Failed      Not Ready for Weaning due to:: Underline problem not resolved Underline problem not resolved                Physical Exam:   Assessment Type: During-treatment  General Appearance: Alert, Awake  Respiratory Pattern: Assisted  Chest Assessment: Chest expansion symmetrical  Bilateral Breath Sounds: Diminished, Coarse  O2 Device: vent      Resp Comments: Pt pulled out trach and and downsized due to original size not fitting. pt now has 6 shiely and remains on current vent settings

## 2024-09-22 NOTE — PLAN OF CARE
Problem: Prexisting or High Potential for Compromised Skin Integrity  Goal: Skin integrity is maintained or improved  Description: INTERVENTIONS:  - Identify patients at risk for skin breakdown  - Assess and monitor skin integrity  - Assess and monitor nutrition and hydration status  - Monitor labs   - Assess for incontinence   - Turn and reposition patient  - Assist with mobility/ambulation  - Relieve pressure over bony prominences  - Avoid friction and shearing  - Provide appropriate hygiene as needed including keeping skin clean and dry  - Evaluate need for skin moisturizer/barrier cream  - Collaborate with interdisciplinary team   - Patient/family teaching  - Consider wound care consult   Outcome: Progressing     Problem: PAIN - ADULT  Goal: Verbalizes/displays adequate comfort level or baseline comfort level  Description: Interventions:  - Encourage patient to monitor pain and request assistance  - Assess pain using appropriate pain scale  - Administer analgesics based on type and severity of pain and evaluate response  - Implement non-pharmacological measures as appropriate and evaluate response  - Consider cultural and social influences on pain and pain management  - Notify physician/advanced practitioner if interventions unsuccessful or patient reports new pain  Outcome: Progressing     Problem: INFECTION - ADULT  Goal: Absence or prevention of progression during hospitalization  Description: INTERVENTIONS:  - Assess and monitor for signs and symptoms of infection  - Monitor lab/diagnostic results  - Monitor all insertion sites, i.e. indwelling lines, tubes, and drains  - Monitor endotracheal if appropriate and nasal secretions for changes in amount and color  - [facility] appropriate cooling/warming therapies per order  - Administer medications as ordered  - Instruct and encourage patient and family to use good hand hygiene technique  - Identify and instruct in appropriate isolation precautions for  identified infection/condition  Outcome: Progressing  Goal: Absence of fever/infection during neutropenic period  Description: INTERVENTIONS:  - Monitor WBC    Outcome: Progressing     Problem: SAFETY ADULT  Goal: Patient will remain free of falls  Description: INTERVENTIONS:  - Educate patient/family on patient safety including physical limitations  - Instruct patient to call for assistance with activity   - Consult OT/PT to assist with strengthening/mobility   - Keep Call bell within reach  - Keep bed low and locked with side rails adjusted as appropriate  - Keep care items and personal belongings within reach  - Initiate and maintain comfort rounds  - Make Fall Risk Sign visible to staff  - Apply yellow socks and bracelet for high fall risk patients  - Consider moving patient to room near nurses station  Outcome: Progressing  Goal: Maintain or return to baseline ADL function  Description: INTERVENTIONS:  -  Assess patient's ability to carry out ADLs; assess patient's baseline for ADL function and identify physical deficits which impact ability to perform ADLs (bathing, care of mouth/teeth, toileting, grooming, dressing, etc.)  - Assess/evaluate cause of self-care deficits   - Assess range of motion  - Assess patient's mobility; develop plan if impaired  - Assess patient's need for assistive devices and provide as appropriate  - Encourage maximum independence but intervene and supervise when necessary  - Involve family in performance of ADLs  - Assess for home care needs following discharge   - Consider OT consult to assist with ADL evaluation and planning for discharge  - Provide patient education as appropriate  Outcome: Progressing  Goal: Maintains/Returns to pre admission functional level  Description: INTERVENTIONS:  - Perform AM-PAC 6 Click Basic Mobility/ Daily Activity assessment daily.  - Set and communicate daily mobility goal to care team and patient/family/caregiver.   - Collaborate with rehabilitation  services on mobility goals if consulted  - Out of bed for toileting  - Record patient progress and toleration of activity level   Outcome: Progressing     Problem: DISCHARGE PLANNING  Goal: Discharge to home or other facility with appropriate resources  Description: INTERVENTIONS:  - Identify barriers to discharge w/patient and caregiver  - Arrange for needed discharge resources and transportation as appropriate  - Identify discharge learning needs (meds, wound care, etc.)  - Arrange for interpretive services to assist at discharge as needed  - Refer to Case Management Department for coordinating discharge planning if the patient needs post-hospital services based on physician/advanced practitioner order or complex needs related to functional status, cognitive ability, or social support system  Outcome: Progressing     Problem: Knowledge Deficit  Goal: Patient/family/caregiver demonstrates understanding of disease process, treatment plan, medications, and discharge instructions  Description: Complete learning assessment and assess knowledge base.  Interventions:  - Provide teaching at level of understanding  - Provide teaching via preferred learning methods  Outcome: Progressing     Problem: Nutrition/Hydration-ADULT  Goal: Nutrient/Hydration intake appropriate for improving, restoring or maintaining nutritional needs  Description: Monitor and assess patient's nutrition/hydration status for malnutrition. Collaborate with interdisciplinary team and initiate plan and interventions as ordered.  Monitor patient's weight and dietary intake as ordered or per policy. Utilize nutrition screening tool and intervene as necessary. Determine patient's food preferences and provide high-protein, high-caloric foods as appropriate.     INTERVENTIONS:  - Monitor oral intake, urinary output, labs, and treatment plans  - Assess nutrition and hydration status and recommend course of action  - Evaluate amount of meals eaten  - Assist  patient with eating if necessary   - Allow adequate time for meals  - Recommend/ encourage appropriate diets, oral nutritional supplements, and vitamin/mineral supplements  - Order, calculate, and assess calorie counts as needed  - Recommend, monitor, and adjust tube feedings and TPN/PPN based on assessed needs  - Assess need for intravenous fluids  - Provide specific nutrition/hydration education as appropriate  - Include patient/family/caregiver in decisions related to nutrition  Outcome: Progressing     Problem: RESPIRATORY - ADULT  Goal: Achieves optimal ventilation and oxygenation  Description: INTERVENTIONS:  - Assess for changes in respiratory status  - Assess for changes in mentation and behavior  - Position to facilitate oxygenation and minimize respiratory effort  - Oxygen administered by appropriate delivery if ordered  - Initiate smoking cessation education as indicated  - Encourage broncho-pulmonary hygiene including cough, deep breathe, Incentive Spirometry  - Assess the need for suctioning and aspirate as needed  - Assess and instruct to report SOB or any respiratory difficulty  - Respiratory Therapy support as indicated  Outcome: Progressing

## 2024-09-22 NOTE — PLAN OF CARE
Problem: Prexisting or High Potential for Compromised Skin Integrity  Goal: Skin integrity is maintained or improved  Description: INTERVENTIONS:  - Identify patients at risk for skin breakdown  - Assess and monitor skin integrity  - Assess and monitor nutrition and hydration status  - Monitor labs   - Assess for incontinence   - Turn and reposition patient  - Assist with mobility/ambulation  - Relieve pressure over bony prominences  - Avoid friction and shearing  - Provide appropriate hygiene as needed including keeping skin clean and dry  - Evaluate need for skin moisturizer/barrier cream  - Collaborate with interdisciplinary team   - Patient/family teaching  - Consider wound care consult   Outcome: Progressing     Problem: PAIN - ADULT  Goal: Verbalizes/displays adequate comfort level or baseline comfort level  Description: Interventions:  - Encourage patient to monitor pain and request assistance  - Assess pain using appropriate pain scale  - Administer analgesics based on type and severity of pain and evaluate response  - Implement non-pharmacological measures as appropriate and evaluate response  - Consider cultural and social influences on pain and pain management  - Notify physician/advanced practitioner if interventions unsuccessful or patient reports new pain  Outcome: Progressing     Problem: INFECTION - ADULT  Goal: Absence or prevention of progression during hospitalization  Description: INTERVENTIONS:  - Assess and monitor for signs and symptoms of infection  - Monitor lab/diagnostic results  - Monitor all insertion sites, i.e. indwelling lines, tubes, and drains  - Monitor endotracheal if appropriate and nasal secretions for changes in amount and color  - [facility] appropriate cooling/warming therapies per order  - Administer medications as ordered  - Instruct and encourage patient and family to use good hand hygiene technique  - Identify and instruct in appropriate isolation precautions for  identified infection/condition  Outcome: Progressing  Goal: Absence of fever/infection during neutropenic period  Description: INTERVENTIONS:  - Monitor WBC    Outcome: Progressing     Problem: SAFETY ADULT  Goal: Patient will remain free of falls  Description: INTERVENTIONS:  - Educate patient/family on patient safety including physical limitations  - Instruct patient to call for assistance with activity   - Consult OT/PT to assist with strengthening/mobility   - Keep Call bell within reach  - Keep bed low and locked with side rails adjusted as appropriate  - Keep care items and personal belongings within reach  - Initiate and maintain comfort rounds  - Make Fall Risk Sign visible to staff  - Offer Toileting every 2 Hours, in advance of need  - Initiate/Maintain necessary alarms  - Obtain necessary fall risk management equipment: yellow bracelet  - Apply yellow socks and bracelet for high fall risk patients  - Consider moving patient to room near nurses station  Outcome: Progressing  Goal: Maintain or return to baseline ADL function  Description: INTERVENTIONS:  -  Assess patient's ability to carry out ADLs; assess patient's baseline for ADL function and identify physical deficits which impact ability to perform ADLs (bathing, care of mouth/teeth, toileting, grooming, dressing, etc.)  - Assess/evaluate cause of self-care deficits   - Assess range of motion  - Assess patient's mobility; develop plan if impaired  - Assess patient's need for assistive devices and provide as appropriate  - Encourage maximum independence but intervene and supervise when necessary  - Involve family in performance of ADLs  - Assess for home care needs following discharge   - Consider OT consult to assist with ADL evaluation and planning for discharge  - Provide patient education as appropriate  Outcome: Progressing  Goal: Maintains/Returns to pre admission functional level  Description: INTERVENTIONS:  - Perform AM-PAC 6 Click Basic  Mobility/ Daily Activity assessment daily.  - Set and communicate daily mobility goal to care team and patient/family/caregiver.   - Collaborate with rehabilitation services on mobility goals if consulted  - Perform Range of Motion 2 times a day.  - Reposition patient every 2 hours.  - Dangle patient 3 times a day  - Stand patient 3 times a day  - Ambulate patient 3 times a day  - Out of bed to chair 3 times a day   - Out of bed for meals 3 times a day  - Out of bed for toileting  - Record patient progress and toleration of activity level   Outcome: Progressing     Problem: DISCHARGE PLANNING  Goal: Discharge to home or other facility with appropriate resources  Description: INTERVENTIONS:  - Identify barriers to discharge w/patient and caregiver  - Arrange for needed discharge resources and transportation as appropriate  - Identify discharge learning needs (meds, wound care, etc.)  - Arrange for interpretive services to assist at discharge as needed  - Refer to Case Management Department for coordinating discharge planning if the patient needs post-hospital services based on physician/advanced practitioner order or complex needs related to functional status, cognitive ability, or social support system  Outcome: Progressing     Problem: Knowledge Deficit  Goal: Patient/family/caregiver demonstrates understanding of disease process, treatment plan, medications, and discharge instructions  Description: Complete learning assessment and assess knowledge base.  Interventions:  - Provide teaching at level of understanding  - Provide teaching via preferred learning methods  Outcome: Progressing     Problem: Nutrition/Hydration-ADULT  Goal: Nutrient/Hydration intake appropriate for improving, restoring or maintaining nutritional needs  Description: Monitor and assess patient's nutrition/hydration status for malnutrition. Collaborate with interdisciplinary team and initiate plan and interventions as ordered.  Monitor  patient's weight and dietary intake as ordered or per policy. Utilize nutrition screening tool and intervene as necessary. Determine patient's food preferences and provide high-protein, high-caloric foods as appropriate.     INTERVENTIONS:  - Monitor oral intake, urinary output, labs, and treatment plans  - Assess nutrition and hydration status and recommend course of action  - Evaluate amount of meals eaten  - Assist patient with eating if necessary   - Allow adequate time for meals  - Recommend/ encourage appropriate diets, oral nutritional supplements, and vitamin/mineral supplements  - Order, calculate, and assess calorie counts as needed  - Recommend, monitor, and adjust tube feedings and TPN/PPN based on assessed needs  - Assess need for intravenous fluids  - Provide specific nutrition/hydration education as appropriate  - Include patient/family/caregiver in decisions related to nutrition  Outcome: Progressing     Problem: RESPIRATORY - ADULT  Goal: Achieves optimal ventilation and oxygenation  Description: INTERVENTIONS:  - Assess for changes in respiratory status  - Assess for changes in mentation and behavior  - Position to facilitate oxygenation and minimize respiratory effort  - Oxygen administered by appropriate delivery if ordered  - Initiate smoking cessation education as indicated  - Encourage broncho-pulmonary hygiene including cough, deep breathe, Incentive Spirometry  - Assess the need for suctioning and aspirate as needed  - Assess and instruct to report SOB or any respiratory difficulty  - Respiratory Therapy support as indicated  Outcome: Progressing

## 2024-09-22 NOTE — RESPIRATORY THERAPY NOTE
RT Ventilator Management Note  Marcin Sánchez 64 y.o. male MRN: [id number]  Unit/Bed#: [facility] 10 Encounter: [id number]      Daily Screen         9/20/2024  0821 9/21/2024  0729          Patient safety screen outcome:: Failed Failed      Not Ready for Weaning due to:: Underline problem not resolved Underline problem not resolved                Physical Exam:   Assessment Type: (P) Assess only  General Appearance: (P) Sleeping  Respiratory Pattern: (P) Assisted  Chest Assessment: (P) Chest expansion symmetrical  Bilateral Breath Sounds: (P) Diminished, Coarse  O2 Device: (P) vent      Resp Comments: (P) Pt has been stable on current CMV settings all night. No changes made. Will cont to [facility] pt per protocol

## 2024-09-23 LAB
ANION GAP SERPL CALCULATED.3IONS-SCNC: 4 MMOL/L (ref 4–13)
APTT PPP: 48 SECONDS (ref 23–34)
ATRIAL RATE: 86 BPM
BUN SERPL-MCNC: 15 MG/DL (ref 5–25)
CALCIUM SERPL-MCNC: 8.2 MG/DL (ref 8.4–10.2)
CHLORIDE SERPL-SCNC: 92 MMOL/L (ref 96–108)
CO2 SERPL-SCNC: 38 MMOL/L (ref 21–32)
CREAT SERPL-MCNC: 0.51 MG/DL (ref 0.6–1.3)
GFR SERPL CREATININE-BSD FRML MDRD: 113 ML/MIN/1.73SQ M
GLUCOSE SERPL-MCNC: 131 MG/DL (ref 65–140)
P AXIS: 73 DEGREES
POTASSIUM SERPL-SCNC: 3.8 MMOL/L (ref 3.5–5.3)
PR INTERVAL: 262 MS
QRS AXIS: 12 DEGREES
QRSD INTERVAL: 100 MS
QT INTERVAL: 362 MS
QTC INTERVAL: 433 MS
SODIUM SERPL-SCNC: 134 MMOL/L (ref 135–147)
T WAVE AXIS: 244 DEGREES
VENTRICULAR RATE: 86 BPM

## 2024-09-23 PROCEDURE — 94760 N-INVAS EAR/PLS OXIMETRY 1: CPT

## 2024-09-23 PROCEDURE — 94003 VENT MGMT INPAT SUBQ DAY: CPT

## 2024-09-23 PROCEDURE — 94640 AIRWAY INHALATION TREATMENT: CPT

## 2024-09-23 PROCEDURE — 85730 THROMBOPLASTIN TIME PARTIAL: CPT

## 2024-09-23 PROCEDURE — 94664 DEMO&/EVAL PT USE INHALER: CPT

## 2024-09-23 PROCEDURE — 93010 ELECTROCARDIOGRAM REPORT: CPT | Performed by: INTERNAL MEDICINE

## 2024-09-23 PROCEDURE — 93005 ELECTROCARDIOGRAM TRACING: CPT

## 2024-09-23 PROCEDURE — 99291 CRITICAL CARE FIRST HOUR: CPT | Performed by: INTERNAL MEDICINE

## 2024-09-23 PROCEDURE — 80048 BASIC METABOLIC PNL TOTAL CA: CPT

## 2024-09-23 PROCEDURE — 94669 MECHANICAL CHEST WALL OSCILL: CPT

## 2024-09-23 RX ORDER — DOXAZOSIN 1 MG/1
1 TABLET ORAL DAILY
Status: DISCONTINUED | OUTPATIENT
Start: 2024-09-23 | End: 2024-09-23

## 2024-09-23 RX ORDER — MIDAZOLAM HYDROCHLORIDE 2 MG/2ML
INJECTION, SOLUTION INTRAMUSCULAR; INTRAVENOUS
Status: COMPLETED
Start: 2024-09-23 | End: 2024-09-23

## 2024-09-23 RX ORDER — MIDAZOLAM HYDROCHLORIDE 2 MG/2ML
2 INJECTION, SOLUTION INTRAMUSCULAR; INTRAVENOUS ONCE
Status: COMPLETED | OUTPATIENT
Start: 2024-09-23 | End: 2024-09-23

## 2024-09-23 RX ORDER — METOPROLOL SUCCINATE 25 MG/1
25 TABLET, EXTENDED RELEASE ORAL DAILY
Status: DISCONTINUED | OUTPATIENT
Start: 2024-09-23 | End: 2024-09-24

## 2024-09-23 RX ORDER — FUROSEMIDE 10 MG/ML
40 INJECTION INTRAMUSCULAR; INTRAVENOUS
Status: DISCONTINUED | OUTPATIENT
Start: 2024-09-24 | End: 2024-09-25

## 2024-09-23 RX ORDER — DOXAZOSIN 2 MG/1
2 TABLET ORAL DAILY
Status: DISCONTINUED | OUTPATIENT
Start: 2024-09-24 | End: 2024-10-06

## 2024-09-23 RX ORDER — FUROSEMIDE 10 MG/ML
20 INJECTION INTRAMUSCULAR; INTRAVENOUS ONCE
Status: COMPLETED | OUTPATIENT
Start: 2024-09-23 | End: 2024-09-23

## 2024-09-23 RX ORDER — FUROSEMIDE 10 MG/ML
40 INJECTION INTRAMUSCULAR; INTRAVENOUS
Status: DISCONTINUED | OUTPATIENT
Start: 2024-09-23 | End: 2024-09-23

## 2024-09-23 RX ORDER — LANOLIN ALCOHOL/MO/W.PET/CERES
6 CREAM (GRAM) TOPICAL
Status: DISCONTINUED | OUTPATIENT
Start: 2024-09-23 | End: 2024-10-01

## 2024-09-23 RX ADMIN — IPRATROPIUM BROMIDE 0.5 MG: 0.5 SOLUTION RESPIRATORY (INHALATION) at 13:29

## 2024-09-23 RX ADMIN — FENTANYL CITRATE 50 MCG: 50 INJECTION INTRAMUSCULAR; INTRAVENOUS at 22:00

## 2024-09-23 RX ADMIN — Medication 6 MG: at 21:39

## 2024-09-23 RX ADMIN — NICOTINE 1 PATCH: 21 PATCH, EXTENDED RELEASE TRANSDERMAL at 08:33

## 2024-09-23 RX ADMIN — MIDAZOLAM 2 MG: 1 INJECTION INTRAMUSCULAR; INTRAVENOUS at 06:34

## 2024-09-23 RX ADMIN — Medication 100 MCG: at 08:33

## 2024-09-23 RX ADMIN — SENNOSIDES AND DOCUSATE SODIUM 1 TABLET: 50; 8.6 TABLET ORAL at 08:28

## 2024-09-23 RX ADMIN — METOPROLOL SUCCINATE 25 MG: 25 TABLET, FILM COATED, EXTENDED RELEASE ORAL at 12:44

## 2024-09-23 RX ADMIN — IPRATROPIUM BROMIDE 0.5 MG: 0.5 SOLUTION RESPIRATORY (INHALATION) at 19:02

## 2024-09-23 RX ADMIN — POLYETHYLENE GLYCOL 3350 17 G: 17 POWDER, FOR SOLUTION ORAL at 08:28

## 2024-09-23 RX ADMIN — FOLIC ACID 1 MG: 1 TABLET ORAL at 08:28

## 2024-09-23 RX ADMIN — FENTANYL CITRATE 50 MCG: 50 INJECTION INTRAMUSCULAR; INTRAVENOUS at 01:22

## 2024-09-23 RX ADMIN — APIXABAN 5 MG: 5 TABLET, FILM COATED ORAL at 18:02

## 2024-09-23 RX ADMIN — TRAZODONE HYDROCHLORIDE 100 MG: 100 TABLET ORAL at 21:39

## 2024-09-23 RX ADMIN — CHLORHEXIDINE GLUCONATE 0.12% ORAL RINSE 15 ML: 1.2 LIQUID ORAL at 20:26

## 2024-09-23 RX ADMIN — OXYCODONE HYDROCHLORIDE 5 MG: 5 TABLET ORAL at 12:44

## 2024-09-23 RX ADMIN — GABAPENTIN 400 MG: 400 CAPSULE ORAL at 18:02

## 2024-09-23 RX ADMIN — FUROSEMIDE 40 MG: 40 TABLET ORAL at 08:29

## 2024-09-23 RX ADMIN — LEVALBUTEROL HYDROCHLORIDE 1.25 MG: 1.25 SOLUTION RESPIRATORY (INHALATION) at 19:02

## 2024-09-23 RX ADMIN — OXYCODONE HYDROCHLORIDE 5 MG: 5 TABLET ORAL at 08:28

## 2024-09-23 RX ADMIN — OXYCODONE HYDROCHLORIDE 5 MG: 5 TABLET ORAL at 00:26

## 2024-09-23 RX ADMIN — BUDESONIDE 0.5 MG: 0.5 INHALANT RESPIRATORY (INHALATION) at 06:34

## 2024-09-23 RX ADMIN — OXYCODONE HYDROCHLORIDE 5 MG: 5 TABLET ORAL at 16:05

## 2024-09-23 RX ADMIN — OXYCODONE HYDROCHLORIDE 5 MG: 5 TABLET ORAL at 04:43

## 2024-09-23 RX ADMIN — FUROSEMIDE 40 MG: 10 INJECTION, SOLUTION INTRAMUSCULAR; INTRAVENOUS at 12:44

## 2024-09-23 RX ADMIN — CHLORHEXIDINE GLUCONATE 0.12% ORAL RINSE 15 ML: 1.2 LIQUID ORAL at 08:28

## 2024-09-23 RX ADMIN — LEVALBUTEROL HYDROCHLORIDE 1.25 MG: 1.25 SOLUTION RESPIRATORY (INHALATION) at 13:29

## 2024-09-23 RX ADMIN — APIXABAN 5 MG: 5 TABLET, FILM COATED ORAL at 08:28

## 2024-09-23 RX ADMIN — DOXAZOSIN 1 MG: 1 TABLET ORAL at 12:43

## 2024-09-23 RX ADMIN — ALBUTEROL SULFATE 2.5 MG: 2.5 SOLUTION RESPIRATORY (INHALATION) at 01:52

## 2024-09-23 RX ADMIN — MIDAZOLAM HYDROCHLORIDE 2 MG: 2 INJECTION, SOLUTION INTRAMUSCULAR; INTRAVENOUS at 06:34

## 2024-09-23 RX ADMIN — IPRATROPIUM BROMIDE 0.5 MG: 0.5 SOLUTION RESPIRATORY (INHALATION) at 06:34

## 2024-09-23 RX ADMIN — BUDESONIDE 0.5 MG: 0.5 INHALANT RESPIRATORY (INHALATION) at 19:02

## 2024-09-23 RX ADMIN — FUROSEMIDE 20 MG: 10 INJECTION, SOLUTION INTRAMUSCULAR; INTRAVENOUS at 18:02

## 2024-09-23 RX ADMIN — DEXMEDETOMIDINE HYDROCHLORIDE 0.3 MCG/KG/HR: 400 INJECTION INTRAVENOUS at 04:43

## 2024-09-23 RX ADMIN — PHENYTOIN 75 MG: 125 SUSPENSION ORAL at 08:28

## 2024-09-23 RX ADMIN — OXYCODONE HYDROCHLORIDE 5 MG: 5 TABLET ORAL at 20:26

## 2024-09-23 RX ADMIN — LEVALBUTEROL HYDROCHLORIDE 1.25 MG: 1.25 SOLUTION RESPIRATORY (INHALATION) at 06:34

## 2024-09-23 RX ADMIN — PHENYTOIN 75 MG: 125 SUSPENSION ORAL at 20:26

## 2024-09-23 RX ADMIN — GABAPENTIN 400 MG: 400 CAPSULE ORAL at 08:28

## 2024-09-23 NOTE — QUICK NOTE
Attempted to call patient's legal guardian, Tere. No answer    Ron Qureshi M.D.  PGY-2 Internal Medicine   [facility]

## 2024-09-23 NOTE — RESPIRATORY THERAPY NOTE
RT Ventilator Management Note  Marcin Sánchez 64 y.o. male MRN: [id number]  Unit/Bed#: [facility] 10 Encounter: [id number]      Daily Screen         9/21/2024  0729 9/22/2024  0829          Patient safety screen outcome:: Failed Failed      Not Ready for Weaning due to:: Underline problem not resolved Underline problem not resolved                Physical Exam:   Assessment Type: (P) Assess only  General Appearance: (P) Sleeping  Respiratory Pattern: (P) Assisted  Chest Assessment: (P) Chest expansion symmetrical  Bilateral Breath Sounds: (P) Diminished, Coarse  O2 Device: (P) vent      Resp Comments: (P) No changes made at this time to the vent. PT is stable on current CMV settings. WIll cont to monitor pt per protocol

## 2024-09-23 NOTE — PLAN OF CARE
Problem: Prexisting or High Potential for Compromised Skin Integrity  Goal: Skin integrity is maintained or improved  Description: INTERVENTIONS:  - Identify patients at risk for skin breakdown  - Assess and monitor skin integrity  - Assess and monitor nutrition and hydration status  - Monitor labs   - Assess for incontinence   - Turn and reposition patient  - Assist with mobility/ambulation  - Relieve pressure over bony prominences  - Avoid friction and shearing  - Provide appropriate hygiene as needed including keeping skin clean and dry  - Evaluate need for skin moisturizer/barrier cream  - Collaborate with interdisciplinary team   - Patient/family teaching  - Consider wound care consult   Outcome: Progressing     Problem: PAIN - ADULT  Goal: Verbalizes/displays adequate comfort level or baseline comfort level  Description: Interventions:  - Encourage patient to monitor pain and request assistance  - Assess pain using appropriate pain scale  - Administer analgesics based on type and severity of pain and evaluate response  - Implement non-pharmacological measures as appropriate and evaluate response  - Consider cultural and social influences on pain and pain management  - Notify physician/advanced practitioner if interventions unsuccessful or patient reports new pain  Outcome: Progressing     Problem: INFECTION - ADULT  Goal: Absence or prevention of progression during hospitalization  Description: INTERVENTIONS:  - Assess and monitor for signs and symptoms of infection  - Monitor lab/diagnostic results  - Monitor all insertion sites, i.e. indwelling lines, tubes, and drains  - Monitor endotracheal if appropriate and nasal secretions for changes in amount and color  - [place] appropriate cooling/warming therapies per order  - Administer medications as ordered  - Instruct and encourage patient and family to use good hand hygiene technique  - Identify and instruct in appropriate isolation precautions for  identified infection/condition  Outcome: Progressing  Goal: Absence of fever/infection during neutropenic period  Description: INTERVENTIONS:  - Monitor WBC    Outcome: Progressing     Problem: SAFETY ADULT  Goal: Patient will remain free of falls  Description: INTERVENTIONS:  - Educate patient/family on patient safety including physical limitations  - Instruct patient to call for assistance with activity   - Consult OT/PT to assist with strengthening/mobility   - Keep Call bell within reach  - Keep bed low and locked with side rails adjusted as appropriate  - Keep care items and personal belongings within reach  - Initiate and maintain comfort rounds  - Make Fall Risk Sign visible to staff  - Apply yellow socks and bracelet for high fall risk patients  - Consider moving patient to room near nurses station  Outcome: Progressing  Goal: Maintain or return to baseline ADL function  Description: INTERVENTIONS:  -  Assess patient's ability to carry out ADLs; assess patient's baseline for ADL function and identify physical deficits which impact ability to perform ADLs (bathing, care of mouth/teeth, toileting, grooming, dressing, etc.)  - Assess/evaluate cause of self-care deficits   - Assess range of motion  - Assess patient's mobility; develop plan if impaired  - Assess patient's need for assistive devices and provide as appropriate  - Encourage maximum independence but intervene and supervise when necessary  - Involve family in performance of ADLs  - Assess for home care needs following discharge   - Consider OT consult to assist with ADL evaluation and planning for discharge  - Provide patient education as appropriate  Outcome: Progressing  Goal: Maintains/Returns to pre admission functional level  Description: INTERVENTIONS:  - Perform AM-PAC 6 Click Basic Mobility/ Daily Activity assessment daily.  - Set and communicate daily mobility goal to care team and patient/family/caregiver.   - Collaborate with rehabilitation  services on mobility goals if consulted  - Out of bed for toileting  - Record patient progress and toleration of activity level   Outcome: Progressing     Problem: DISCHARGE PLANNING  Goal: Discharge to home or other facility with appropriate resources  Description: INTERVENTIONS:  - Identify barriers to discharge w/patient and caregiver  - Arrange for needed discharge resources and transportation as appropriate  - Identify discharge learning needs (meds, wound care, etc.)  - Arrange for interpretive services to assist at discharge as needed  - Refer to Case Management Department for coordinating discharge planning if the patient needs post-hospital services based on physician/advanced practitioner order or complex needs related to functional status, cognitive ability, or social support system  Outcome: Progressing     Problem: Knowledge Deficit  Goal: Patient/family/caregiver demonstrates understanding of disease process, treatment plan, medications, and discharge instructions  Description: Complete learning assessment and assess knowledge base.  Interventions:  - Provide teaching at level of understanding  - Provide teaching via preferred learning methods  Outcome: Progressing     Problem: Nutrition/Hydration-ADULT  Goal: Nutrient/Hydration intake appropriate for improving, restoring or maintaining nutritional needs  Description: Monitor and assess patient's nutrition/hydration status for malnutrition. Collaborate with interdisciplinary team and initiate plan and interventions as ordered.  Monitor patient's weight and dietary intake as ordered or per policy. Utilize nutrition screening tool and intervene as necessary. Determine patient's food preferences and provide high-protein, high-caloric foods as appropriate.     INTERVENTIONS:  - Monitor oral intake, urinary output, labs, and treatment plans  - Assess nutrition and hydration status and recommend course of action  - Evaluate amount of meals eaten  - Assist  patient with eating if necessary   - Allow adequate time for meals  - Recommend/ encourage appropriate diets, oral nutritional supplements, and vitamin/mineral supplements  - Order, calculate, and assess calorie counts as needed  - Recommend, monitor, and adjust tube feedings and TPN/PPN based on assessed needs  - Assess need for intravenous fluids  - Provide specific nutrition/hydration education as appropriate  - Include patient/family/caregiver in decisions related to nutrition  Outcome: Progressing     Problem: RESPIRATORY - ADULT  Goal: Achieves optimal ventilation and oxygenation  Description: INTERVENTIONS:  - Assess for changes in respiratory status  - Assess for changes in mentation and behavior  - Position to facilitate oxygenation and minimize respiratory effort  - Oxygen administered by appropriate delivery if ordered  - Initiate smoking cessation education as indicated  - Encourage broncho-pulmonary hygiene including cough, deep breathe, Incentive Spirometry  - Assess the need for suctioning and aspirate as needed  - Assess and instruct to report SOB or any respiratory difficulty  - Respiratory Therapy support as indicated  Outcome: Progressing     Problem: SAFETY,RESTRAINT: NV/NON-SELF DESTRUCTIVE BEHAVIOR  Goal: Remains free of harm/injury (restraint for non violent/non self-detsructive behavior)  Description: INTERVENTIONS:  - Instruct patient/family regarding restraint use   - Assess and monitor physiologic and psychological status   - Provide interventions and comfort measures to meet assessed patient needs   - Identify and implement measures to help patient regain control  - Assess readiness for release of restraint   Outcome: Progressing  Goal: Returns to optimal restraint-free functioning  Description: INTERVENTIONS:  - Assess the patient's behavior and symptoms that indicate continued need for restraint  - Identify and implement measures to help patient regain control  - Assess readiness for  release of restraint   Outcome: Progressing

## 2024-09-23 NOTE — RESPIRATORY THERAPY NOTE
RT Ventilator Management Note  Marcin Sánchez 64 y.o. male MRN: [id number]  Unit/Bed#: [facility] 10 Encounter: [id number]      Daily Screen         9/22/2024  0829 9/23/2024  0810          Patient safety screen outcome:: Failed Failed      Not Ready for Weaning due to:: Underline problem not resolved Underline problem not resolved                Physical Exam:   Assessment Type: Assess only  General Appearance: Sleeping  Respiratory Pattern: Assisted, Spontaneous  Chest Assessment: Chest expansion symmetrical  Bilateral Breath Sounds: Diminished, Coarse  O2 Device: vent      Resp Comments: no changes made to ventilator. pt was given sedative to relax and is restong comfortably on vent

## 2024-09-23 NOTE — PROGRESS NOTES
"Progress Note - Critical Care/ICU   Name: Marcin Sánchez 64 y.o. male I MRN: [id number]  Unit/Bed#: MICU 10 I Date of Admission: 9/8/2024   Date of Service: 9/23/2024 I Hospital Day: 15       Assessment & Plan   Neuro:   Diagnosis: History of epilepsy  Plan:  Continue Phenytoin 75mg BID  Continue Gabapentin 400mg BID  Monitor Phenytoin level, follow up Thursday     Diagnosis: Sedation/analgesia  Plan:  Oxy 5mg Q4  On Precedex  RASS goal 0; alert and calm     Diagnosis: Insomnia  Plan:  Trazodone 100mg daily at bedtime     Diagnosis: At risk for ICU delirium  Plan:  CAM ICU BID  Regulate sleep/wake cycle     CV:   Diagnosis: Pericardial effusion   Echo 9/9  \"moderate to large pericardial effusion circumferential to the heart measuring largest along the RV free wall at 2.3 cm. The fluid exhibits no internal echoes. There is no echocardiographic evidence of tamponade.\"  Evaluated by CT surgery, no intervention at this time  Plan:  Continue to monitor for signs of tamponade     Diagnosis: History of Atrial fibrillation on outpatient Eliquis  Plan:  Home regimen: Metoprolol 25 mg daily  Restart home metoprolol today  Restarted home Eliquis, off heparin drip     Diagnosis: Acute on Chronic combined diastolic and systolic CHF  Diagnosis: Moderate to severe aortic regurgitation  9/11 ECHO: LVEF 55%, hypokinetic apex. Moderate to severe aortic regurgitation, mild tricuspid regurgitation, dilated ascending aortic root up to 5.4 cm.  Plan:  Continue home Lasix 40mg daily  Continue to monitor fluid status      Diagnosis: History of AAA  5.4 cm  Plan: Monitor hemodynamics; BP goal <130/80, will likely require outpatient management     Pulm:  Diagnosis: Acute on Chronic hypoxic and hypercapnic respiratory failure.  Diagnosis: COPD, and acute exacerbation  Likely multifactorial including moderate to severe COPD; no recent PFTs on file, last PFTs obtained 217 revealed FEV1 51%  Patient with nicotine dependence of 70 pack " years.  Course complicated by recurrent mucous plugging, status post therapeutic bronch x 3.  Sputum culture with Moraxella, status post course of azithromycin x 5 days.  Also with MDR acinetobacter likely contaminant versus colonization as patient has remained stable without antibiotic treatment.  Status post intubated x 3  Status post trach 9/19  Low clinical suspicion for infection and pneumonia at this time.  Plan:  Continue vent support with daily SBT's  Continue Xopenex and Atrovent TID  Continue pulmicort BID  Continue Performist BID  Trach care, frequent suctioning     Diagnosis: History of squamous cell cancer of lung post chemo/radiation 2016, cancer of right mainstem  Noted        GI:   Status post PEG 9/19  Continue tube feeds at goal  Bowel regimen: Senokot, Miralax with holding parameters        :   Diagnosis: Urinary retention  Plan:  Maintain wong   Continue to monitor Is/Os, renal indices     F/E/N:   F: No maintenance fluids  E: Replete as needed for goal K >4, Phos >3 and Mag >2  N: Continue Jevity 1.2, at goal     Heme/Onc:   Diagnosis: Anemia  Plan:   Continue B12/folate  Monitor AM CBC  Transfuse for Hgb <7 and/or symptomatic anemia     Diagnosis: DVT ppx  SCDs and Eliquis     Endo:   No active issues     ID:   Diagnosis: Colonization with Acinetobacter and Moraxella  No acute issues  Continue to monitor WBC count and temperature curve  Completed 5 days of azithromycin 9/10     MSK/Skin:   Diagnosis: At risk for pressure injury   PT/OT when able  Frequent turns/repositioning  Skin surveillance      Disposition: Critical care    ICU Core Measures     Vented Patient  VAP Bundle  VAP bundle ordered     A: Assess, Prevent, and Manage Pain Has pain been assessed? Yes  Need for changes to pain regimen? No   B: Both Spontaneous Awakening Trials (SATs) and Spontaneous Breathing Trials (SBTs) Plan to perform spontaneous awakening trial today? Yes   Plan to perform spontaneous breathing trial today?  Yes   Obvious barriers to extubation? NA   C: Choice of Sedation RASS Goal: 0 Alert and Calm  Need for changes to sedation or analgesia regimen? No   D: Delirium CAM-ICU: Unable to perform secondary to Acute cognitive dysfunction   E: Early Mobility  Plan for early mobility? Yes   F: Family Engagement Plan for family engagement today? Yes       Review of Invasive Devices:    [place] Plan: Continue for accurate I/O monitoring for 48 hours        Prophylaxis:  VTE VTE covered by:  apixaban, Oral, 5 mg at 09/23/24 0828       Stress Ulcer  not ordered         24 Hour Events   24hr events: No acute overnight events. Fentanyl drip discontinued, patient receiving scheduled oxycodone 5 mg every 4 hours.   Subjective   Review of Systems: See HPI for Review of Systems    Objective                          Vitals I/O      Most Recent Min/Max in 24hrs   Temp 99.4 °F (37.4 °C) Temp  Min: 98.8 °F (37.1 °C)  Max: 99.4 °F (37.4 °C)   Pulse 74 Pulse  Min: 67  Max: 90   Resp 16 Resp  Min: 14  Max: 33   /52 BP  Min: 88/47  Max: 120/58   O2 Sat 98 % SpO2  Min: 95 %  Max: 100 %      Intake/Output Summary (Last 24 hours) at 9/23/2024 1320  Last data filed at 9/23/2024 1000  Gross per 24 hour   Intake 1300.25 ml   Output 845 ml   Net 455.25 ml       Diet Enteral/Parenteral; Tube Feeding No Oral Diet; Jevity 1.2 Mirza; Cyclic; 70; 18 hours; Prosource Protein Liquid - One Packet; 30; Every 4 hours    Invasive Monitoring           Physical Exam   Physical Exam  Vitals and nursing note reviewed.   Eyes:      Conjunctiva/sclera: Conjunctivae normal.   Skin:     General: Skin is warm and dry.   HENT:      Head: Normocephalic and atraumatic.      Nose: No rhinorrhea or nasogastric tube.   Neck:      Trachea: Tracheostomy present.   Cardiovascular:      Rate and Rhythm: Normal rate and regular rhythm.      Pulses: Normal pulses.      Heart sounds: Normal heart sounds.   Musculoskeletal:      Right lower leg: No edema.      Left lower leg: No  edema.   Abdominal: General: Bowel sounds are normal. There is abdominal type feeding tube.     Palpations: Abdomen is soft.      Tenderness: There is no abdominal tenderness.   Constitutional:       General: He is not in acute distress.     Interventions: He is not intubated.  Pulmonary:      Effort: Pulmonary effort is normal. No accessory muscle usage, respiratory distress or accessory muscle usage. He is not intubated.      Breath sounds: Rhonchi present.   Neurological:      Mental Status: He is alert. He is calm.      Comments: He is alert and interactive. Follows commands and responds to questions with head nodding.   Genitourinary/Anorectal:  [place] present.        Diagnostic Studies        Lab Results: I have reviewed the following results: PTT/INR:  .     09/23/24  0446   PTT 48*         Medications:  Scheduled PRN   apixaban, 5 mg, BID  budesonide, 0.5 mg, Q12H  chlorhexidine, 15 mL, Q12H [place]  vitamin B-12, 100 mcg, Daily  [START ON 9/24/2024] doxazosin, 2 mg, Daily  folic acid, 1 mg, Daily  furosemide, 40 mg, BID (diuretic)  gabapentin, 400 mg, BID  ipratropium, 0.5 mg, TID  levalbuterol, 1.25 mg, TID  metoprolol succinate, 25 mg, Daily  nicotine, 1 patch, Daily  oxyCODONE, 5 mg, Q4H  phenytoin, 75 mg, Q12H [place]  polyethylene glycol, 17 g, BID  senna-docusate sodium, 1 tablet, BID  traZODone, 100 mg, HS      acetaminophen, 650 mg, Q6H PRN  albuterol, 2.5 mg, Q6H PRN  bisacodyl, 10 mg, Daily PRN  fentaNYL, 50 mcg, Q2H PRN       Continuous    dexmedetomidine, 0.1-1.2 mcg/kg/hr, Last Rate: Stopped (09/23/24 0940)         Labs:   CBC    Recent Labs     09/22/24  0509   WBC 7.06   HGB 8.1*   HCT 24.6*        BMP    Recent Labs     09/22/24  0509   SODIUM 135   K 4.0   CL 97   CO2 33*   AGAP 5   BUN 17   CREATININE 0.57*   CALCIUM 8.2*       Coags    Recent Labs     09/22/24  0239 09/23/24  0446   PTT 81* 48*        Additional Electrolytes  Recent Labs     09/22/24  0509   MG 1.8*   PHOS 3.7   CAIONIZED  1.13          Blood Gas    No recent results  No recent results LFTs  Recent Labs     09/22/24  0820   ALB 2.8*       Infectious  No recent results  Glucose  Recent Labs     09/22/24  0509   GLUC 130

## 2024-09-24 LAB
ANION GAP SERPL CALCULATED.3IONS-SCNC: 5 MMOL/L (ref 4–13)
ANION GAP SERPL CALCULATED.3IONS-SCNC: 7 MMOL/L (ref 4–13)
BASOPHILS # BLD AUTO: 0.03 THOUSANDS/ΜL (ref 0–0.1)
BASOPHILS NFR BLD AUTO: 0 % (ref 0–1)
BUN SERPL-MCNC: 16 MG/DL (ref 5–25)
BUN SERPL-MCNC: 16 MG/DL (ref 5–25)
CALCIUM SERPL-MCNC: 8 MG/DL (ref 8.4–10.2)
CALCIUM SERPL-MCNC: 8.4 MG/DL (ref 8.4–10.2)
CHLORIDE SERPL-SCNC: 91 MMOL/L (ref 96–108)
CHLORIDE SERPL-SCNC: 92 MMOL/L (ref 96–108)
CO2 SERPL-SCNC: 37 MMOL/L (ref 21–32)
CO2 SERPL-SCNC: 40 MMOL/L (ref 21–32)
CREAT SERPL-MCNC: 0.48 MG/DL (ref 0.6–1.3)
CREAT SERPL-MCNC: 0.49 MG/DL (ref 0.6–1.3)
EOSINOPHIL # BLD AUTO: 0.15 THOUSAND/ΜL (ref 0–0.61)
EOSINOPHIL NFR BLD AUTO: 2 % (ref 0–6)
ERYTHROCYTE [DISTWIDTH] IN BLOOD BY AUTOMATED COUNT: 14.4 % (ref 11.6–15.1)
FOLATE SERPL-MCNC: 20 NG/ML
GFR SERPL CREATININE-BSD FRML MDRD: 115 ML/MIN/1.73SQ M
GFR SERPL CREATININE-BSD FRML MDRD: 116 ML/MIN/1.73SQ M
GLUCOSE SERPL-MCNC: 112 MG/DL (ref 65–140)
GLUCOSE SERPL-MCNC: 142 MG/DL (ref 65–140)
HCT VFR BLD AUTO: 26.8 % (ref 36.5–49.3)
HGB BLD-MCNC: 8.8 G/DL (ref 12–17)
IMM GRANULOCYTES # BLD AUTO: 0.03 THOUSAND/UL (ref 0–0.2)
IMM GRANULOCYTES NFR BLD AUTO: 0 % (ref 0–2)
LYMPHOCYTES # BLD AUTO: 0.56 THOUSANDS/ΜL (ref 0.6–4.47)
LYMPHOCYTES NFR BLD AUTO: 7 % (ref 14–44)
MAGNESIUM SERPL-MCNC: 1.6 MG/DL (ref 1.9–2.7)
MAGNESIUM SERPL-MCNC: 2.4 MG/DL (ref 1.9–2.7)
MCH RBC QN AUTO: 33 PG (ref 26.8–34.3)
MCHC RBC AUTO-ENTMCNC: 32.8 G/DL (ref 31.4–37.4)
MCV RBC AUTO: 100 FL (ref 82–98)
MONOCYTES # BLD AUTO: 0.9 THOUSAND/ΜL (ref 0.17–1.22)
MONOCYTES NFR BLD AUTO: 11 % (ref 4–12)
NEUTROPHILS # BLD AUTO: 6.35 THOUSANDS/ΜL (ref 1.85–7.62)
NEUTS SEG NFR BLD AUTO: 80 % (ref 43–75)
NRBC BLD AUTO-RTO: 0 /100 WBCS
PLATELET # BLD AUTO: 280 THOUSANDS/UL (ref 149–390)
PMV BLD AUTO: 8.5 FL (ref 8.9–12.7)
POTASSIUM SERPL-SCNC: 3.9 MMOL/L (ref 3.5–5.3)
POTASSIUM SERPL-SCNC: 4.1 MMOL/L (ref 3.5–5.3)
RBC # BLD AUTO: 2.67 MILLION/UL (ref 3.88–5.62)
SODIUM SERPL-SCNC: 135 MMOL/L (ref 135–147)
SODIUM SERPL-SCNC: 137 MMOL/L (ref 135–147)
T4 FREE SERPL-MCNC: 0.34 NG/DL (ref 0.61–1.12)
TSH SERPL DL<=0.05 MIU/L-ACNC: 45.06 UIU/ML (ref 0.45–4.5)
VIT B12 SERPL-MCNC: 817 PG/ML (ref 180–914)
WBC # BLD AUTO: 8.02 THOUSAND/UL (ref 4.31–10.16)

## 2024-09-24 PROCEDURE — 94003 VENT MGMT INPAT SUBQ DAY: CPT

## 2024-09-24 PROCEDURE — 94669 MECHANICAL CHEST WALL OSCILL: CPT

## 2024-09-24 PROCEDURE — 99291 CRITICAL CARE FIRST HOUR: CPT | Performed by: INTERNAL MEDICINE

## 2024-09-24 PROCEDURE — 82746 ASSAY OF FOLIC ACID SERUM: CPT

## 2024-09-24 PROCEDURE — 85025 COMPLETE CBC W/AUTO DIFF WBC: CPT

## 2024-09-24 PROCEDURE — 84443 ASSAY THYROID STIM HORMONE: CPT

## 2024-09-24 PROCEDURE — 94760 N-INVAS EAR/PLS OXIMETRY 1: CPT

## 2024-09-24 PROCEDURE — 82607 VITAMIN B-12: CPT

## 2024-09-24 PROCEDURE — 94664 DEMO&/EVAL PT USE INHALER: CPT

## 2024-09-24 PROCEDURE — 84439 ASSAY OF FREE THYROXINE: CPT

## 2024-09-24 PROCEDURE — 80048 BASIC METABOLIC PNL TOTAL CA: CPT

## 2024-09-24 PROCEDURE — 94640 AIRWAY INHALATION TREATMENT: CPT

## 2024-09-24 PROCEDURE — 83735 ASSAY OF MAGNESIUM: CPT

## 2024-09-24 RX ORDER — MAGNESIUM SULFATE HEPTAHYDRATE 40 MG/ML
4 INJECTION, SOLUTION INTRAVENOUS ONCE
Status: COMPLETED | OUTPATIENT
Start: 2024-09-24 | End: 2024-09-24

## 2024-09-24 RX ORDER — METOPROLOL TARTRATE 25 MG/1
25 TABLET, FILM COATED ORAL EVERY 12 HOURS SCHEDULED
Status: DISCONTINUED | OUTPATIENT
Start: 2024-09-25 | End: 2024-09-24

## 2024-09-24 RX ORDER — POTASSIUM CHLORIDE 20MEQ/15ML
40 LIQUID (ML) ORAL ONCE
Status: COMPLETED | OUTPATIENT
Start: 2024-09-24 | End: 2024-09-24

## 2024-09-24 RX ADMIN — DOXAZOSIN 2 MG: 2 TABLET ORAL at 09:52

## 2024-09-24 RX ADMIN — BUDESONIDE 0.5 MG: 0.5 INHALANT RESPIRATORY (INHALATION) at 19:23

## 2024-09-24 RX ADMIN — NICOTINE 1 PATCH: 21 PATCH, EXTENDED RELEASE TRANSDERMAL at 09:34

## 2024-09-24 RX ADMIN — MAGNESIUM SULFATE HEPTAHYDRATE 4 G: 40 INJECTION, SOLUTION INTRAVENOUS at 09:31

## 2024-09-24 RX ADMIN — OXYCODONE HYDROCHLORIDE 5 MG: 5 TABLET ORAL at 00:36

## 2024-09-24 RX ADMIN — OXYCODONE HYDROCHLORIDE 5 MG: 5 TABLET ORAL at 04:28

## 2024-09-24 RX ADMIN — IPRATROPIUM BROMIDE 0.5 MG: 0.5 SOLUTION RESPIRATORY (INHALATION) at 13:30

## 2024-09-24 RX ADMIN — IPRATROPIUM BROMIDE 0.5 MG: 0.5 SOLUTION RESPIRATORY (INHALATION) at 07:42

## 2024-09-24 RX ADMIN — LEVALBUTEROL HYDROCHLORIDE 1.25 MG: 1.25 SOLUTION RESPIRATORY (INHALATION) at 19:23

## 2024-09-24 RX ADMIN — CHLORHEXIDINE GLUCONATE 0.12% ORAL RINSE 15 ML: 1.2 LIQUID ORAL at 21:28

## 2024-09-24 RX ADMIN — IPRATROPIUM BROMIDE 0.5 MG: 0.5 SOLUTION RESPIRATORY (INHALATION) at 19:23

## 2024-09-24 RX ADMIN — FUROSEMIDE 40 MG: 10 INJECTION, SOLUTION INTRAMUSCULAR; INTRAVENOUS at 09:31

## 2024-09-24 RX ADMIN — POTASSIUM CHLORIDE 40 MEQ: 1.5 SOLUTION ORAL at 17:42

## 2024-09-24 RX ADMIN — PHENYTOIN 75 MG: 125 SUSPENSION ORAL at 11:06

## 2024-09-24 RX ADMIN — GABAPENTIN 400 MG: 400 CAPSULE ORAL at 09:52

## 2024-09-24 RX ADMIN — OXYCODONE HYDROCHLORIDE 5 MG: 5 TABLET ORAL at 16:05

## 2024-09-24 RX ADMIN — LEVALBUTEROL HYDROCHLORIDE 1.25 MG: 1.25 SOLUTION RESPIRATORY (INHALATION) at 07:42

## 2024-09-24 RX ADMIN — Medication 6 MG: at 21:28

## 2024-09-24 RX ADMIN — TRAZODONE HYDROCHLORIDE 100 MG: 100 TABLET ORAL at 21:28

## 2024-09-24 RX ADMIN — LEVALBUTEROL HYDROCHLORIDE 1.25 MG: 1.25 SOLUTION RESPIRATORY (INHALATION) at 13:30

## 2024-09-24 RX ADMIN — Medication 100 MCG: at 11:06

## 2024-09-24 RX ADMIN — GABAPENTIN 400 MG: 400 CAPSULE ORAL at 17:42

## 2024-09-24 RX ADMIN — FUROSEMIDE 40 MG: 10 INJECTION, SOLUTION INTRAMUSCULAR; INTRAVENOUS at 17:42

## 2024-09-24 RX ADMIN — APIXABAN 5 MG: 5 TABLET, FILM COATED ORAL at 09:52

## 2024-09-24 RX ADMIN — APIXABAN 5 MG: 5 TABLET, FILM COATED ORAL at 17:42

## 2024-09-24 RX ADMIN — METOPROLOL SUCCINATE 25 MG: 25 TABLET, FILM COATED, EXTENDED RELEASE ORAL at 09:52

## 2024-09-24 RX ADMIN — SENNOSIDES AND DOCUSATE SODIUM 1 TABLET: 50; 8.6 TABLET ORAL at 09:52

## 2024-09-24 RX ADMIN — BUDESONIDE 0.5 MG: 0.5 INHALANT RESPIRATORY (INHALATION) at 07:42

## 2024-09-24 RX ADMIN — PHENYTOIN 75 MG: 125 SUSPENSION ORAL at 22:21

## 2024-09-24 RX ADMIN — FOLIC ACID 1 MG: 1 TABLET ORAL at 09:52

## 2024-09-24 RX ADMIN — OXYCODONE HYDROCHLORIDE 5 MG: 5 TABLET ORAL at 21:33

## 2024-09-24 RX ADMIN — CHLORHEXIDINE GLUCONATE 0.12% ORAL RINSE 15 ML: 1.2 LIQUID ORAL at 09:31

## 2024-09-24 RX ADMIN — POLYETHYLENE GLYCOL 3350 17 G: 17 POWDER, FOR SOLUTION ORAL at 17:43

## 2024-09-24 NOTE — QUICK NOTE
Patient's sister called for medical update. She states that no one has been able to contact patient's legal guardian. All questions and concerns addressed. Addressed that we are currently working on finding Marcin an LTAC. She requests that she be involved in the process since Marcin's guardian is [facility]. Will touch base with case management in the morning and have them give the patient's sister a call.

## 2024-09-24 NOTE — OCCUPATIONAL THERAPY NOTE
Occupational Therapy         Patient Name: Marcin Sánchez  Today's Date: 9/24/2024 09/24/24 1100   OT Last Visit   OT Visit Date 09/24/24   Note Type   Note Type Cancelled Session   Cancel Reasons Refusal  (attempted to see pt for OT intervention today - pt declines participation in therapy despite encouragment - continues to refuse - will defer)         Catalina Terry

## 2024-09-24 NOTE — CASE MANAGEMENT
Case Management Discharge Planning Note    Patient name Marcin Sánchez  Location MICU 10/MICU 10 MRN [id number]  : 1960 Date 2024       Current Admission Date: 2024  Current Admission Diagnosis:Acute hypoxic respiratory failure (HCC)   Patient Active Problem List    Diagnosis Date Noted Date Diagnosed    Acute hypoxic respiratory failure (HCC) 2024     Shock (HCC) 2024     Mucus plugging of bronchi 2024     Transaminitis 2024     Cardiac arrest (HCC) 2024     Pericardial effusion 2024     Acute on chronic respiratory failure with hypoxia and hypercapnia (HCC) 2024     Acute metabolic encephalopathy 2024     Chronic combined systolic and diastolic CHF (congestive heart failure) (HCC) 2024     Atrial fibrillation (HCC) 2024     Pulmonary fibrosis (HCC) 2024     Suspected chronic pulmonary embolism (HCC) 2024     Squamous cell lung cancer (HCC) 2024     Hyponatremia 2024     Severe protein-calorie malnutrition (HCC) 2024     Edema of both legs 2022     Tobacco abuse 2020     Nonrheumatic aortic valve insufficiency 2020     Malignant neoplasm of upper lobe of right lung (HCC) 2018     Thoracic aortic aneurysm without rupture (HCC) 2018     Cancer of right main bronchus (HCC) 10/04/2016     Pleural effusion 10/02/2016     Multiple lung nodules on CT 10/02/2016     Collapse of right lung 2016     Acute exacerbation of chronic obstructive pulmonary disease (COPD) (HCC)      Epilepsy (HCC)      Lyme disease      Major depression      Alcohol abuse        LOS (days): 16  Geometric Mean LOS (GMLOS) (days): 5.1  Days to GMLOS:-10.5     OBJECTIVE:  Risk of Unplanned Readmission Score: 29.19         Current admission status: Inpatient   Preferred Pharmacy:   [facility] - [address] [facility],  [address] [place] [address]  Phone: [phone]  Fax: [phone]    [place], [place] - [address] [facility]  [address] [facility] [address]  Phone: [phone] Fax: [phone]    Primary Care Provider: Brandt Godinez MD    Primary Insurance: [facility]  Secondary Insurance:     DISCHARGE DETAILS:           Contacts  Patient Contacts: Guardian Tere Williamson  Relationship to Patient:: Other (Comment)  Contact Method: Phone  Phone Number: [phone]  Reason/Outcome: Discharge Planning, Continuity of Care          Other Referral/Resources/Interventions Provided:  Referral Comments: Notified by provider anticipate pt stable for dc w/in the next few days to facility that is able to accept vents.          Called pt's guardibasia Carranza to discuss DCP needs @ [phone] and [phone] - fljb  to call back.  Called guardian's work # [phone] - call rings continuously and no one answers.  Contacted [facility] for any additional #'s or information re; Guardian.  No new information obtained from facility.  Awaiting call back from pt's  Guardian Tere Williamson to discuss.  Referrals sent in Aidin to LTACHS/SNF's that accept vents. Awaiting accepting agencies.

## 2024-09-24 NOTE — PROGRESS NOTES
"Progress Note - Critical Care/ICU   Name: Marcin Sánchez 64 y.o. male I MRN: [id number]  Unit/Bed#: MICU 10 I Date of Admission: 9/8/2024   Date of Service: 9/24/2024 I Hospital Day: 16   { Disappearing Link I Update Problem List I PORCH I On-Call Finder:01857}    Assessment & Plan   Neuro:   {Plan:81954}    CV:   {Plan:95874}     Pulm:  {Plan:05016}    GI:   {Plan:15814}    :   {Plan:99757}    F/E/N:   {Plan:71249}    Heme/Onc:   {Plan:70450}    Endo:   {Plan:80509}    ID:   {Plan:11065}    MSK/Skin:   {Plan:39401}  Disposition: {CC Transfer Levels of Care:95992}    ICU Core Measures     Vented Patient  VAP Bundle  VAP bundle ordered     A: Assess, Prevent, and Manage Pain Has pain been assessed? {Yes/No/NA:86573}  Need for changes to pain regimen? {Yes/No/NA:15409}   B: Both Spontaneous Awakening Trials (SATs) and Spontaneous Breathing Trials (SBTs) Plan to perform spontaneous awakening trial today? {Yes/No:27333}  Plan to perform spontaneous breathing trial today? {Yes/No:63909}  Obvious barriers to extubation? {Yes/No/NA:67802}   C: Choice of Sedation {RASS Goal:21824::\"0 Alert and Calm\"}  Need for changes to sedation or analgesia regimen? {Yes/No/NA:09422}   D: Delirium CAM-ICU: {CAM ICU Results:83514}   E: Early Mobility  Plan for early mobility? {Yes/No/NA:03963}   F: Family Engagement Plan for family engagement today? {Yes/No/NA:84736}       Review of Invasive Devices:  {I DISAPPEARING TIP I Click here to view LDAs I:58431}  [place] Plan: {Document removal plan:87478}        Prophylaxis:  VTE VTE covered by:  apixaban, Oral, 5 mg at 09/23/24 1802       Stress Ulcer  not ordered     { Disappearing Link I Update Problem List I PORCH I On-Call Finder:87217}    24 Hour Events   24hr events: ***  Subjective   {Review of Systems:4332511908::\"See HPI for Review of Systems\"}    Objective                          Vitals I/O      Most Recent Min/Max in 24hrs   Temp 99.3 °F (37.4 °C) Temp  Min: 98.9 °F (37.2 °C)  " Max: 100.4 °F (38 °C)   Pulse (!) 112 Pulse  Min: 73  Max: 112   Resp (!) 30 Resp  Min: 14  Max: 35   BP (!) 175/77 BP  Min: 100/51  Max: 175/77   O2 Sat 96 % SpO2  Min: 94 %  Max: 99 %      Intake/Output Summary (Last 24 hours) at 9/24/2024 0626  Last data filed at 9/24/2024 0623  Gross per 24 hour   Intake 1471.07 ml   Output 2625 ml   Net -1153.93 ml       Diet Enteral/Parenteral; Tube Feeding No Oral Diet; Jevity 1.2 Mirza; Cyclic; 70; 18 hours; Prosource Protein Liquid - One Packet; 30; Every 4 hours    Invasive Monitoring   {Invasive Device (Optional):03845}        Physical Exam   Critical Care Physical Exam *** (fill out SmartBlock)     Diagnostic Studies    {IDisappearing Data Review I RadiologyI Cardiology:98876}  {Data Review I Lab Review I Micro Results I Radiology I Cardiology:01915}  Lab Results: I have reviewed the following results: {Lab Results (Optional):66017}     Medications:  Scheduled PRN   apixaban, 5 mg, BID  budesonide, 0.5 mg, Q12H  chlorhexidine, 15 mL, Q12H AMERICA  vitamin B-12, 100 mcg, Daily  doxazosin, 2 mg, Daily  folic acid, 1 mg, Daily  furosemide, 40 mg, BID (diuretic)  gabapentin, 400 mg, BID  ipratropium, 0.5 mg, TID  levalbuterol, 1.25 mg, TID  melatonin, 6 mg, HS  metoprolol succinate, 25 mg, Daily  nicotine, 1 patch, Daily  oxyCODONE, 5 mg, Q4H  phenytoin, 75 mg, Q12H AMERICA  polyethylene glycol, 17 g, BID  senna-docusate sodium, 1 tablet, BID  traZODone, 100 mg, HS      acetaminophen, 650 mg, Q6H PRN  albuterol, 2.5 mg, Q6H PRN  bisacodyl, 10 mg, Daily PRN  fentaNYL, 50 mcg, Q2H PRN       Continuous          Labs: { I Disappearing Data Review I Results Review I Micro :15950}  CBC    No recent results  BMP    Recent Labs     09/23/24  2151   SODIUM 134*   K 3.8   CL 92*   CO2 38*   AGAP 4   BUN 15   CREATININE 0.51*   CALCIUM 8.2*       Coags    Recent Labs     09/23/24  0446   PTT 48*        Additional Electrolytes  No recent results       Blood Gas    No recent results  No recent  results LFTs  Recent Labs     09/22/24  0820   ALB 2.8*       Infectious  No recent results  Glucose  Recent Labs     09/23/24  2151   GLUC 131

## 2024-09-24 NOTE — PROGRESS NOTES
"Progress Note - Critical Care/ICU   Name: Marcin Sánchez 64 y.o. male I MRN: [id number]  Unit/Bed#: MICU 10 I Date of Admission: 9/8/2024   Date of Service: 9/24/2024 I Hospital Day: 16       Assessment & Plan   Neuro:   Diagnosis: History of epilepsy  Plan:  Continue Phenytoin 75mg BID  Continue Gabapentin 400mg BID  Monitor Phenytoin level, follow up Thursday     Diagnosis: Sedation/analgesia  Plan:  Oxy 5mg Q4  Off Precedex  RASS goal 0; alert and calm     Diagnosis: Insomnia  Plan:  Trazodone 100mg daily at bedtime  Monitor Qtc (437 on 9/23)     Diagnosis: At risk for ICU delirium  Plan:  CAM ICU BID  Regulate sleep/wake cycle     CV:   Diagnosis: Pericardial effusion   Echo 9/9  \"moderate to large pericardial effusion circumferential to the heart measuring largest along the RV free wall at 2.3 cm. The fluid exhibits no internal echoes. There is no echocardiographic evidence of tamponade.\"  Evaluated by CT surgery, no intervention at this time  Plan:  Continue to monitor for signs of tamponade     Diagnosis: History of Atrial fibrillation on outpatient Eliquis  Plan:  Home regimen: Metoprolol 25 mg daily, Eliquis 5 mg BID  Restarted home metoprolol 25 mg  Restarted home Eliquis, off heparin drip     Diagnosis: Acute on Chronic combined diastolic and systolic CHF  Diagnosis: Moderate to severe aortic regurgitation  9/11 ECHO: LVEF 55%, hypokinetic apex. Moderate to severe aortic regurgitation, mild tricuspid regurgitation, dilated ascending aortic root up to 5.4 cm.  Plan:  Continue IV Lasix 40mg for suspected volume overload, 2 doses received yesterday  Continue to monitor fluid status      Diagnosis: History of AAA  5.4 cm  Plan: Monitor hemodynamics; BP goal <130/80, will likely require outpatient management     Pulm:  Diagnosis: Acute on Chronic hypoxic and hypercapnic respiratory failure.  Diagnosis: COPD, and acute exacerbation  Likely multifactorial including moderate to severe COPD; no recent PFTs on " file, last PFTs obtained 217 revealed FEV1 51%  Patient with nicotine dependence of 70 pack years.  Course complicated by recurrent mucous plugging, status post therapeutic bronch x 3.  Sputum culture with Moraxella, status post course of azithromycin x 5 days.  Also with MDR acinetobacter likely contaminant versus colonization as patient has remained stable without antibiotic treatment.  Status post intubated x 3  Status post trach 9/19  Low clinical suspicion for infection and pneumonia at this time.  Plan:  Continue vent support with daily SBT's  Continue Xopenex and Atrovent TID  Continue pulmicort BID  Continue Performist BID  Trach care, frequent suctioning     Diagnosis: History of squamous cell cancer of lung post chemo/radiation 2016, cancer of right mainstem  Noted        GI:   Status post PEG 9/19  Continue tube feeds at goal  Bowel regimen: Senokot, Miralax with holding parameters        :   Diagnosis: Urinary retention  Plan:  Maintain wong   Started doxazosin 2mg  Continue to monitor Is/Os, renal indices     F/E/N:   F: No maintenance fluids  E: Replete as needed for goal K >4, Phos >3 and Mag >2  N: Continue Jevity 1.2, at goal     Heme/Onc:   Diagnosis: Anemia  Plan:   Continue B12/folate  Monitor AM CBC  Transfuse for Hgb <7 and/or symptomatic anemia     Diagnosis: DVT ppx  SCDs and Eliquis     Endo:   No active issues     ID:   Diagnosis: Colonization with Acinetobacter and Moraxella  No acute issues  Continue to monitor WBC count and temperature curve  Completed 5 days of azithromycin 9/10     MSK/Skin:   Diagnosis: At risk for pressure injury   PT/OT when able  Frequent turns/repositioning  Skin surveillance      Disposition: Critical care    ICU Core Measures     Vented Patient  VAP Bundle  VAP bundle ordered     A: Assess, Prevent, and Manage Pain Has pain been assessed? Yes  Need for changes to pain regimen? No   B: Both Spontaneous Awakening Trials (SATs) and Spontaneous Breathing Trials  (SBTs) Plan to perform spontaneous awakening trial today? Yes   Plan to perform spontaneous breathing trial today? Yes   Obvious barriers to extubation? NA   C: Choice of Sedation RASS Goal: 0 Alert and Calm  Need for changes to sedation or analgesia regimen? No   D: Delirium CAM-ICU: Unable to perform secondary to Acute cognitive dysfunction   E: Early Mobility  Plan for early mobility? Yes   F: Family Engagement Plan for family engagement today? Yes       Review of Invasive Devices:    [place] Plan: Continue for accurate I/O monitoring for 48 hours        Prophylaxis:  VTE VTE covered by:  apixaban, Oral, 5 mg at 09/23/24 1802       Stress Ulcer  not ordered         24 Hour Events   24hr events: No acute events overnight. Precedex is turned off and patient continues to receive scheduled oxycodone 5 mg Q4.  Subjective   Review of Systems: See HPI for Review of Systems    Objective                          Vitals I/O      Most Recent Min/Max in 24hrs   Temp 99.3 °F (37.4 °C) Temp  Min: 98.9 °F (37.2 °C)  Max: 100.4 °F (38 °C)   Pulse (!) 112 Pulse  Min: 74  Max: 112   Resp (!) 30 Resp  Min: 16  Max: 35   BP (!) 175/77 BP  Min: 103/52  Max: 175/77   O2 Sat 99 % SpO2  Min: 94 %  Max: 99 %      Intake/Output Summary (Last 24 hours) at 9/24/2024 0834  Last data filed at 9/24/2024 0623  Gross per 24 hour   Intake 1299.47 ml   Output 2550 ml   Net -1250.53 ml       Diet Enteral/Parenteral; Tube Feeding No Oral Diet; Jevity 1.2 Mirza; Cyclic; 70; 18 hours; Prosource Protein Liquid - One Packet; 30; Every 4 hours    Invasive Monitoring           Physical Exam   Physical Exam  Vitals and nursing note reviewed.   Eyes:      Conjunctiva/sclera: Conjunctivae normal.   Skin:     General: Skin is warm and dry.   HENT:      Head: Normocephalic and atraumatic.      Nose: No rhinorrhea or nasogastric tube.   Neck:      Trachea: Tracheostomy present.   Cardiovascular:      Rate and Rhythm: Normal rate and regular rhythm.      Pulses:  Normal pulses.      Heart sounds: Normal heart sounds.   Musculoskeletal:      Right lower leg: Trace Edema present.      Left lower leg: Trace Edema present.   Abdominal: General: There is abdominal type feeding tube.     Palpations: Abdomen is soft.      Tenderness: There is no abdominal tenderness. There is no guarding.   Constitutional:       General: He is not in acute distress.     Interventions: He is not sedated and not intubated.  Pulmonary:      Effort: Pulmonary effort is normal. He is not intubated.      Breath sounds: Rhonchi (improved from yesterday) present.   Neurological:      Mental Status: He is alert.      Comments: Patient alert and interactive, responsive to questions   Genitourinary/Anorectal:  [place] present.        Diagnostic Studies        Lab Results: I have reviewed the following results: CBC/BMP:   .     09/24/24  0634   WBC 8.02   HGB 8.8*   HCT 26.8*      SODIUM 135   K 4.1   CL 91*   CO2 37*   BUN 16   CREATININE 0.48*   GLUC 142*   MG 1.6*         Medications:  Scheduled PRN   apixaban, 5 mg, BID  budesonide, 0.5 mg, Q12H  chlorhexidine, 15 mL, Q12H AMERICA  vitamin B-12, 100 mcg, Daily  doxazosin, 2 mg, Daily  folic acid, 1 mg, Daily  furosemide, 40 mg, BID (diuretic)  gabapentin, 400 mg, BID  ipratropium, 0.5 mg, TID  levalbuterol, 1.25 mg, TID  magnesium sulfate, 4 g, Once  melatonin, 6 mg, HS  metoprolol succinate, 25 mg, Daily  nicotine, 1 patch, Daily  oxyCODONE, 5 mg, Q4H  phenytoin, 75 mg, Q12H AMERICA  polyethylene glycol, 17 g, BID  senna-docusate sodium, 1 tablet, BID  traZODone, 100 mg, HS      acetaminophen, 650 mg, Q6H PRN  albuterol, 2.5 mg, Q6H PRN  bisacodyl, 10 mg, Daily PRN  fentaNYL, 50 mcg, Q2H PRN       Continuous          Labs:   CBC    Recent Labs     09/24/24  0634   WBC 8.02   HGB 8.8*   HCT 26.8*        BMP    Recent Labs     09/23/24  2151 09/24/24  0634   SODIUM 134* 135   K 3.8 4.1   CL 92* 91*   CO2 38* 37*   AGAP 4 7   BUN 15 16   CREATININE 0.51*  0.48*   CALCIUM 8.2* 8.4       Coags    Recent Labs     09/23/24  0446   PTT 48*        Additional Electrolytes  Recent Labs     09/24/24  0634   MG 1.6*          Blood Gas    No recent results  No recent results LFTs  No recent results      Infectious  No recent results  Glucose  Recent Labs     09/23/24  2151 09/24/24  0634   GLUC 131 142*

## 2024-09-24 NOTE — PHYSICAL THERAPY NOTE
Physical Therapy Cancellation Note    PT orders received chart review completed. Pt is currently refusing mobility despite education for importance of moblity. PT will follow and eval as medically appropriate.     09/24/24 1100   Note Type   Note type Cancelled Session   Cancel Reasons Refusal       Nevin Brown, PT

## 2024-09-24 NOTE — PLAN OF CARE
Problem: Prexisting or High Potential for Compromised Skin Integrity  Goal: Skin integrity is maintained or improved  Description: INTERVENTIONS:  - Identify patients at risk for skin breakdown  - Assess and monitor skin integrity  - Assess and monitor nutrition and hydration status  - Monitor labs   - Assess for incontinence   - Turn and reposition patient  - Assist with mobility/ambulation  - Relieve pressure over bony prominences  - Avoid friction and shearing  - Provide appropriate hygiene as needed including keeping skin clean and dry  - Evaluate need for skin moisturizer/barrier cream  - Collaborate with interdisciplinary team   - Patient/family teaching  - Consider wound care consult   Outcome: Not Progressing     Problem: SAFETY,RESTRAINT: NV/NON-SELF DESTRUCTIVE BEHAVIOR  Goal: Remains free of harm/injury (restraint for non violent/non self-detsructive behavior)  Description: INTERVENTIONS:  - Instruct patient/family regarding restraint use   - Assess and monitor physiologic and psychological status   - Provide interventions and comfort measures to meet assessed patient needs   - Identify and implement measures to help patient regain control  - Assess readiness for release of restraint   Outcome: Not Progressing  Goal: Returns to optimal restraint-free functioning  Description: INTERVENTIONS:  - Assess the patient's behavior and symptoms that indicate continued need for restraint  - Identify and implement measures to help patient regain control  - Assess readiness for release of restraint   Outcome: Not Progressing     Problem: PAIN - ADULT  Goal: Verbalizes/displays adequate comfort level or baseline comfort level  Description: Interventions:  - Encourage patient to monitor pain and request assistance  - Assess pain using appropriate pain scale  - Administer analgesics based on type and severity of pain and evaluate response  - Implement non-pharmacological measures as appropriate and evaluate  response  - Consider cultural and social influences on pain and pain management  - Notify physician/advanced practitioner if interventions unsuccessful or patient reports new pain  Outcome: Not Progressing     Problem: INFECTION - ADULT  Goal: Absence or prevention of progression during hospitalization  Description: INTERVENTIONS:  - Assess and monitor for signs and symptoms of infection  - Monitor lab/diagnostic results  - Monitor all insertion sites, i.e. indwelling lines, tubes, and drains  - Monitor endotracheal if appropriate and nasal secretions for changes in amount and color  - [place] appropriate cooling/warming therapies per order  - Administer medications as ordered  - Instruct and encourage patient and family to use good hand hygiene technique  - Identify and instruct in appropriate isolation precautions for identified infection/condition  Outcome: Not Progressing  Goal: Absence of fever/infection during neutropenic period  Description: INTERVENTIONS:  - Monitor WBC    Outcome: Not Progressing     Problem: SAFETY ADULT  Goal: Patient will remain free of falls  Description: INTERVENTIONS:  - Educate patient/family on patient safety including physical limitations  - Instruct patient to call for assistance with activity   - Consult OT/PT to assist with strengthening/mobility   - Keep Call bell within reach  - Keep bed low and locked with side rails adjusted as appropriate  - Keep care items and personal belongings within reach  - Initiate and maintain comfort rounds  - Make Fall Risk Sign visible to staff  - Offer Toileting every 2 Hours, in advance of need  - Initiate/Maintain 2alarm  - Obtain necessary fall risk management equipment: 2  - Apply yellow socks and bracelet for high fall risk patients  - Consider moving patient to room near nurses station  Outcome: Not Progressing  Goal: Maintain or return to baseline ADL function  Description: INTERVENTIONS:  -  Assess patient's ability to carry out ADLs;  assess patient's baseline for ADL function and identify physical deficits which impact ability to perform ADLs (bathing, care of mouth/teeth, toileting, grooming, dressing, etc.)  - Assess/evaluate cause of self-care deficits   - Assess range of motion  - Assess patient's mobility; develop plan if impaired  - Assess patient's need for assistive devices and provide as appropriate  - Encourage maximum independence but intervene and supervise when necessary  - Involve family in performance of ADLs  - Assess for home care needs following discharge   - Consider OT consult to assist with ADL evaluation and planning for discharge  - Provide patient education as appropriate  Outcome: Not Progressing  Goal: Maintains/Returns to pre admission functional level  Description: INTERVENTIONS:  - Perform AM-PAC 6 Click Basic Mobility/ Daily Activity assessment daily.  - Set and communicate daily mobility goal to care team and patient/family/caregiver.   - Collaborate with rehabilitation services on mobility goals if consulted  - Perform Range of Motion 2 times a day.  - Reposition patient every 2 hours.  - Dangle patient 2 times a day  - Stand patient 2 times a day  - Ambulate patient 2 times a day  - Out of bed to chair 2 times a day   - Out of bed for meals 2 times a day  - Out of bed for toileting  - Record patient progress and toleration of activity level   Outcome: Not Progressing     Problem: DISCHARGE PLANNING  Goal: Discharge to home or other facility with appropriate resources  Description: INTERVENTIONS:  - Identify barriers to discharge w/patient and caregiver  - Arrange for needed discharge resources and transportation as appropriate  - Identify discharge learning needs (meds, wound care, etc.)  - Arrange for interpretive services to assist at discharge as needed  - Refer to Case Management Department for coordinating discharge planning if the patient needs post-hospital services based on physician/advanced practitioner  order or complex needs related to functional status, cognitive ability, or social support system  Outcome: Not Progressing     Problem: Knowledge Deficit  Goal: Patient/family/caregiver demonstrates understanding of disease process, treatment plan, medications, and discharge instructions  Description: Complete learning assessment and assess knowledge base.  Interventions:  - Provide teaching at level of understanding  - Provide teaching via preferred learning methods  Outcome: Not Progressing     Problem: Nutrition/Hydration-ADULT  Goal: Nutrient/Hydration intake appropriate for improving, restoring or maintaining nutritional needs  Description: Monitor and assess patient's nutrition/hydration status for malnutrition. Collaborate with interdisciplinary team and initiate plan and interventions as ordered.  Monitor patient's weight and dietary intake as ordered or per policy. Utilize nutrition screening tool and intervene as necessary. Determine patient's food preferences and provide high-protein, high-caloric foods as appropriate.     INTERVENTIONS:  - Monitor oral intake, urinary output, labs, and treatment plans  - Assess nutrition and hydration status and recommend course of action  - Evaluate amount of meals eaten  - Assist patient with eating if necessary   - Allow adequate time for meals  - Recommend/ encourage appropriate diets, oral nutritional supplements, and vitamin/mineral supplements  - Order, calculate, and assess calorie counts as needed  - Recommend, monitor, and adjust tube feedings and TPN/PPN based on assessed needs  - Assess need for intravenous fluids  - Provide specific nutrition/hydration education as appropriate  - Include patient/family/caregiver in decisions related to nutrition  Outcome: Not Progressing     Problem: RESPIRATORY - ADULT  Goal: Achieves optimal ventilation and oxygenation  Description: INTERVENTIONS:  - Assess for changes in respiratory status  - Assess for changes in  mentation and behavior  - Position to facilitate oxygenation and minimize respiratory effort  - Oxygen administered by appropriate delivery if ordered  - Initiate smoking cessation education as indicated  - Encourage broncho-pulmonary hygiene including cough, deep breathe, Incentive Spirometry  - Assess the need for suctioning and aspirate as needed  - Assess and instruct to report SOB or any respiratory difficulty  - Respiratory Therapy support as indicated  Outcome: Not Progressing

## 2024-09-24 NOTE — RESPIRATORY THERAPY NOTE
RT Ventilator Management Note  Marcin Sánchez 64 y.o. male MRN: [id number]  Unit/Bed#: [facility] 10 Encounter: [id number]      Daily Screen         9/22/2024  0829 9/23/2024  0810          Patient safety screen outcome:: Failed Failed      Not Ready for Weaning due to:: Underline problem not resolved Underline problem not resolved                Physical Exam:   Assessment Type: (P) Assess only  General Appearance: (P) Awake  Respiratory Pattern: (P) Assisted  Chest Assessment: (P) Chest expansion symmetrical  Bilateral Breath Sounds: (P) Diminished, Coarse  O2 Device: (P) vent      Resp Comments: (P) PT is stable at this time on current CMV settings. Will cont to monitor pt per protocol

## 2024-09-24 NOTE — PROGRESS NOTES
Nutrition Follow-Up/Recommendations:    Reassessed estimated nutrition needs using current vent settings.   To better meet estimated calorie needs, increased goal rate to 75mL/hr x18 hrs, continue one packet Prosource Liquid Protein daily (provides 1680 kcals, 90g protein, 1149mL water including water needed for Prosource administration).     Adjustments in additional free water flushes per critical care; suggest to continue at least 30mL q4 hrs to help maintain tube patency.

## 2024-09-24 NOTE — CASE MANAGEMENT
Case Management Discharge Planning Note    Patient name Marcin Sánchez  Location MICU 10/MICU 10 MRN [id number]  : 1960 Date 2024       Current Admission Date: 2024  Current Admission Diagnosis:Acute hypoxic respiratory failure (HCC)   Patient Active Problem List    Diagnosis Date Noted Date Diagnosed    Acute hypoxic respiratory failure (HCC) 2024     Shock (HCC) 2024     Mucus plugging of bronchi 2024     Transaminitis 2024     Cardiac arrest (HCC) 2024     Pericardial effusion 2024     Acute on chronic respiratory failure with hypoxia and hypercapnia (HCC) 2024     Acute metabolic encephalopathy 2024     Chronic combined systolic and diastolic CHF (congestive heart failure) (HCC) 2024     Atrial fibrillation (HCC) 2024     Pulmonary fibrosis (HCC) 2024     Suspected chronic pulmonary embolism (HCC) 2024     Squamous cell lung cancer (HCC) 2024     Hyponatremia 2024     Severe protein-calorie malnutrition (HCC) 2024     Edema of both legs 2022     Tobacco abuse 2020     Nonrheumatic aortic valve insufficiency 2020     Malignant neoplasm of upper lobe of right lung (HCC) 2018     Thoracic aortic aneurysm without rupture (HCC) 2018     Cancer of right main bronchus (HCC) 10/04/2016     Pleural effusion 10/02/2016     Multiple lung nodules on CT 10/02/2016     Collapse of right lung 2016     Acute exacerbation of chronic obstructive pulmonary disease (COPD) (HCC)      Epilepsy (HCC)      Lyme disease      Major depression      Alcohol abuse        LOS (days): 16  Geometric Mean LOS (GMLOS) (days): 5.1  Days to GMLOS:-10.7     OBJECTIVE:  Risk of Unplanned Readmission Score: 29.25         Current admission status: Inpatient   Preferred Pharmacy:   [facility] - [address] [facility],  [address] [place] [id number]  Phone: [phone]  Fax: [phone]    [place] - [facility]  [address] [facility] [address]  Phone: [phone] Fax: [phone]    Primary Care Provider: Brandt Godinez MD    [facility] Insurance: [facility]  Secondary Insurance:     DISCHARGE DETAILS:             Other Referral/Resources/Interventions Provided:  Referral Comments: Received tc from [facility] admissions liaison w/[facility] in  LTACH re: referral.  If pt is off precedex & fentanyl for 24 hours and the guardian is agreeable to their facility, they tentatively are able to accept pt.  Notified provider of same. Per provider pt has been off Precedex for 24 hours & will be off the fentanyl for 24 hours tonight - notified [facility] liaison of same.  Still awaiting call back from pt's guardian Tere Teri to discuss dcp/ choice. Notified provider of same.  Message received in [place] from [facility] that they do not accept his insurance but since pt has a trach and is on vent they have been doing some single case agreements w/[facility].  [facility] in  is contracted w/[facility] for LTACH.  Message sent to [facility] requesting any additional information or phone #'s for pt's Guardian - awaiting response.  CM will follow for dcp needs.

## 2024-09-25 ENCOUNTER — APPOINTMENT (INPATIENT)
Dept: RADIOLOGY | Facility: HOSPITAL | Age: 64
DRG: 005 | End: 2024-09-25
Payer: COMMERCIAL

## 2024-09-25 LAB
ANION GAP SERPL CALCULATED.3IONS-SCNC: 4 MMOL/L (ref 4–13)
ANION GAP SERPL CALCULATED.3IONS-SCNC: 5 MMOL/L (ref 4–13)
BASOPHILS # BLD AUTO: 0.04 THOUSANDS/ΜL (ref 0–0.1)
BASOPHILS NFR BLD AUTO: 1 % (ref 0–1)
BUN SERPL-MCNC: 17 MG/DL (ref 5–25)
BUN SERPL-MCNC: 18 MG/DL (ref 5–25)
CA-I BLD-SCNC: 0.99 MMOL/L (ref 1.12–1.32)
CALCIUM SERPL-MCNC: 8 MG/DL (ref 8.4–10.2)
CALCIUM SERPL-MCNC: 8.4 MG/DL (ref 8.4–10.2)
CHLORIDE SERPL-SCNC: 87 MMOL/L (ref 96–108)
CHLORIDE SERPL-SCNC: 91 MMOL/L (ref 96–108)
CO2 SERPL-SCNC: 37 MMOL/L (ref 21–32)
CO2 SERPL-SCNC: 41 MMOL/L (ref 21–32)
CREAT SERPL-MCNC: 0.49 MG/DL (ref 0.6–1.3)
CREAT SERPL-MCNC: 0.49 MG/DL (ref 0.6–1.3)
EOSINOPHIL # BLD AUTO: 0.21 THOUSAND/ΜL (ref 0–0.61)
EOSINOPHIL NFR BLD AUTO: 4 % (ref 0–6)
ERYTHROCYTE [DISTWIDTH] IN BLOOD BY AUTOMATED COUNT: 14.6 % (ref 11.6–15.1)
GFR SERPL CREATININE-BSD FRML MDRD: 115 ML/MIN/1.73SQ M
GFR SERPL CREATININE-BSD FRML MDRD: 115 ML/MIN/1.73SQ M
GLUCOSE SERPL-MCNC: 121 MG/DL (ref 65–140)
GLUCOSE SERPL-MCNC: 136 MG/DL (ref 65–140)
HCT VFR BLD AUTO: 22.4 % (ref 36.5–49.3)
HCT VFR BLD AUTO: 23.5 % (ref 36.5–49.3)
HGB BLD-MCNC: 7.3 G/DL (ref 12–17)
HGB BLD-MCNC: 7.8 G/DL (ref 12–17)
IMM GRANULOCYTES # BLD AUTO: 0.02 THOUSAND/UL (ref 0–0.2)
IMM GRANULOCYTES NFR BLD AUTO: 0 % (ref 0–2)
LYMPHOCYTES # BLD AUTO: 0.68 THOUSANDS/ΜL (ref 0.6–4.47)
LYMPHOCYTES NFR BLD AUTO: 11 % (ref 14–44)
MAGNESIUM SERPL-MCNC: 2 MG/DL (ref 1.9–2.7)
MCH RBC QN AUTO: 32.7 PG (ref 26.8–34.3)
MCHC RBC AUTO-ENTMCNC: 32.6 G/DL (ref 31.4–37.4)
MCV RBC AUTO: 100 FL (ref 82–98)
MONOCYTES # BLD AUTO: 0.77 THOUSAND/ΜL (ref 0.17–1.22)
MONOCYTES NFR BLD AUTO: 13 % (ref 4–12)
NEUTROPHILS # BLD AUTO: 4.27 THOUSANDS/ΜL (ref 1.85–7.62)
NEUTS SEG NFR BLD AUTO: 71 % (ref 43–75)
NRBC BLD AUTO-RTO: 0 /100 WBCS
PHOSPHATE SERPL-MCNC: 3.6 MG/DL (ref 2.3–4.1)
PLATELET # BLD AUTO: 227 THOUSANDS/UL (ref 149–390)
PMV BLD AUTO: 9 FL (ref 8.9–12.7)
POTASSIUM SERPL-SCNC: 4 MMOL/L (ref 3.5–5.3)
POTASSIUM SERPL-SCNC: 4.3 MMOL/L (ref 3.5–5.3)
PROCALCITONIN SERPL-MCNC: 0.24 NG/ML
RBC # BLD AUTO: 2.23 MILLION/UL (ref 3.88–5.62)
SODIUM SERPL-SCNC: 132 MMOL/L (ref 135–147)
SODIUM SERPL-SCNC: 133 MMOL/L (ref 135–147)
WBC # BLD AUTO: 5.99 THOUSAND/UL (ref 4.31–10.16)

## 2024-09-25 PROCEDURE — 94664 DEMO&/EVAL PT USE INHALER: CPT

## 2024-09-25 PROCEDURE — 80048 BASIC METABOLIC PNL TOTAL CA: CPT

## 2024-09-25 PROCEDURE — 99291 CRITICAL CARE FIRST HOUR: CPT | Performed by: INTERNAL MEDICINE

## 2024-09-25 PROCEDURE — 84145 PROCALCITONIN (PCT): CPT

## 2024-09-25 PROCEDURE — 85018 HEMOGLOBIN: CPT

## 2024-09-25 PROCEDURE — 83735 ASSAY OF MAGNESIUM: CPT

## 2024-09-25 PROCEDURE — 93005 ELECTROCARDIOGRAM TRACING: CPT

## 2024-09-25 PROCEDURE — 85025 COMPLETE CBC W/AUTO DIFF WBC: CPT

## 2024-09-25 PROCEDURE — 82330 ASSAY OF CALCIUM: CPT

## 2024-09-25 PROCEDURE — 85014 HEMATOCRIT: CPT

## 2024-09-25 PROCEDURE — 94669 MECHANICAL CHEST WALL OSCILL: CPT

## 2024-09-25 PROCEDURE — 94003 VENT MGMT INPAT SUBQ DAY: CPT

## 2024-09-25 PROCEDURE — 94640 AIRWAY INHALATION TREATMENT: CPT

## 2024-09-25 PROCEDURE — 71045 X-RAY EXAM CHEST 1 VIEW: CPT

## 2024-09-25 PROCEDURE — 84100 ASSAY OF PHOSPHORUS: CPT

## 2024-09-25 PROCEDURE — 94760 N-INVAS EAR/PLS OXIMETRY 1: CPT

## 2024-09-25 RX ORDER — FUROSEMIDE 10 MG/ML
40 INJECTION INTRAMUSCULAR; INTRAVENOUS
Status: DISCONTINUED | OUTPATIENT
Start: 2024-09-25 | End: 2024-09-26

## 2024-09-25 RX ORDER — ACETAMINOPHEN 160 MG/5ML
650 SUSPENSION ORAL EVERY 4 HOURS PRN
Status: DISCONTINUED | OUTPATIENT
Start: 2024-09-25 | End: 2024-09-30

## 2024-09-25 RX ORDER — FUROSEMIDE 10 MG/ML
40 INJECTION INTRAMUSCULAR; INTRAVENOUS ONCE
Status: DISCONTINUED | OUTPATIENT
Start: 2024-09-26 | End: 2024-09-25

## 2024-09-25 RX ORDER — CALCIUM GLUCONATE 20 MG/ML
2 INJECTION, SOLUTION INTRAVENOUS ONCE
Status: COMPLETED | OUTPATIENT
Start: 2024-09-25 | End: 2024-09-25

## 2024-09-25 RX ORDER — SENNOSIDES 8.8 MG/5ML
8.8 LIQUID ORAL
Status: DISCONTINUED | OUTPATIENT
Start: 2024-09-25 | End: 2024-10-10

## 2024-09-25 RX ORDER — OXYCODONE HCL 5 MG/5 ML
5 SOLUTION, ORAL ORAL EVERY 4 HOURS
Status: DISCONTINUED | OUTPATIENT
Start: 2024-09-25 | End: 2024-09-27

## 2024-09-25 RX ORDER — FUROSEMIDE 10 MG/ML
40 INJECTION INTRAMUSCULAR; INTRAVENOUS ONCE
Status: COMPLETED | OUTPATIENT
Start: 2024-09-25 | End: 2024-09-25

## 2024-09-25 RX ORDER — OXYCODONE HCL 5 MG/5 ML
5 SOLUTION, ORAL ORAL EVERY 4 HOURS PRN
Status: DISCONTINUED | OUTPATIENT
Start: 2024-09-25 | End: 2024-09-25

## 2024-09-25 RX ORDER — FUROSEMIDE 10 MG/ML
40 INJECTION INTRAMUSCULAR; INTRAVENOUS
Status: DISCONTINUED | OUTPATIENT
Start: 2024-09-25 | End: 2024-09-25

## 2024-09-25 RX ADMIN — Medication 100 MCG: at 08:52

## 2024-09-25 RX ADMIN — IPRATROPIUM BROMIDE 0.5 MG: 0.5 SOLUTION RESPIRATORY (INHALATION) at 19:34

## 2024-09-25 RX ADMIN — POLYETHYLENE GLYCOL 3350 17 G: 17 POWDER, FOR SOLUTION ORAL at 08:44

## 2024-09-25 RX ADMIN — IPRATROPIUM BROMIDE 0.5 MG: 0.5 SOLUTION RESPIRATORY (INHALATION) at 14:19

## 2024-09-25 RX ADMIN — LEVOTHYROXINE SODIUM 125 MCG: 100 TABLET ORAL at 09:41

## 2024-09-25 RX ADMIN — OXYCODONE HYDROCHLORIDE 5 MG: 5 SOLUTION ORAL at 08:47

## 2024-09-25 RX ADMIN — TRAZODONE HYDROCHLORIDE 100 MG: 100 TABLET ORAL at 21:32

## 2024-09-25 RX ADMIN — OXYCODONE HYDROCHLORIDE 5 MG: 5 SOLUTION ORAL at 20:20

## 2024-09-25 RX ADMIN — PHENYTOIN 75 MG: 125 SUSPENSION ORAL at 21:32

## 2024-09-25 RX ADMIN — Medication 12.5 MG: at 08:46

## 2024-09-25 RX ADMIN — BUDESONIDE 0.5 MG: 0.5 INHALANT RESPIRATORY (INHALATION) at 19:34

## 2024-09-25 RX ADMIN — PHENYTOIN 75 MG: 125 SUSPENSION ORAL at 09:41

## 2024-09-25 RX ADMIN — DOXAZOSIN 2 MG: 2 TABLET ORAL at 08:44

## 2024-09-25 RX ADMIN — FOLIC ACID 1 MG: 1 TABLET ORAL at 08:46

## 2024-09-25 RX ADMIN — CHLORHEXIDINE GLUCONATE 0.12% ORAL RINSE 15 ML: 1.2 LIQUID ORAL at 20:20

## 2024-09-25 RX ADMIN — NICOTINE 1 PATCH: 21 PATCH, EXTENDED RELEASE TRANSDERMAL at 08:58

## 2024-09-25 RX ADMIN — GABAPENTIN 400 MG: 400 CAPSULE ORAL at 17:05

## 2024-09-25 RX ADMIN — CHLORHEXIDINE GLUCONATE 0.12% ORAL RINSE 15 ML: 1.2 LIQUID ORAL at 08:52

## 2024-09-25 RX ADMIN — FUROSEMIDE 40 MG: 10 INJECTION, SOLUTION INTRAMUSCULAR; INTRAVENOUS at 10:21

## 2024-09-25 RX ADMIN — APIXABAN 5 MG: 5 TABLET, FILM COATED ORAL at 17:05

## 2024-09-25 RX ADMIN — LEVALBUTEROL HYDROCHLORIDE 1.25 MG: 1.25 SOLUTION RESPIRATORY (INHALATION) at 06:11

## 2024-09-25 RX ADMIN — OXYCODONE HYDROCHLORIDE 5 MG: 5 TABLET ORAL at 03:00

## 2024-09-25 RX ADMIN — LEVALBUTEROL HYDROCHLORIDE 1.25 MG: 1.25 SOLUTION RESPIRATORY (INHALATION) at 14:19

## 2024-09-25 RX ADMIN — BUDESONIDE 0.5 MG: 0.5 INHALANT RESPIRATORY (INHALATION) at 06:11

## 2024-09-25 RX ADMIN — FUROSEMIDE 40 MG: 10 INJECTION, SOLUTION INTRAMUSCULAR; INTRAVENOUS at 16:53

## 2024-09-25 RX ADMIN — OXYCODONE HYDROCHLORIDE 5 MG: 5 SOLUTION ORAL at 16:53

## 2024-09-25 RX ADMIN — GABAPENTIN 400 MG: 400 CAPSULE ORAL at 08:46

## 2024-09-25 RX ADMIN — Medication 6 MG: at 21:32

## 2024-09-25 RX ADMIN — FUROSEMIDE 40 MG: 10 INJECTION, SOLUTION INTRAMUSCULAR; INTRAVENOUS at 21:32

## 2024-09-25 RX ADMIN — APIXABAN 5 MG: 5 TABLET, FILM COATED ORAL at 08:45

## 2024-09-25 RX ADMIN — IPRATROPIUM BROMIDE 0.5 MG: 0.5 SOLUTION RESPIRATORY (INHALATION) at 06:11

## 2024-09-25 RX ADMIN — Medication 12.5 MG: at 20:20

## 2024-09-25 RX ADMIN — CALCIUM GLUCONATE 2 G: 20 INJECTION, SOLUTION INTRAVENOUS at 08:40

## 2024-09-25 RX ADMIN — LEVALBUTEROL HYDROCHLORIDE 1.25 MG: 1.25 SOLUTION RESPIRATORY (INHALATION) at 19:34

## 2024-09-25 NOTE — PLAN OF CARE
Problem: Prexisting or High Potential for Compromised Skin Integrity  Goal: Skin integrity is maintained or improved  Description: INTERVENTIONS:  - Identify patients at risk for skin breakdown  - Assess and monitor skin integrity  - Assess and monitor nutrition and hydration status  - Monitor labs   - Assess for incontinence   - Turn and reposition patient  - Assist with mobility/ambulation  - Relieve pressure over bony prominences  - Avoid friction and shearing  - Provide appropriate hygiene as needed including keeping skin clean and dry  - Evaluate need for skin moisturizer/barrier cream  - Collaborate with interdisciplinary team   - Patient/family teaching  - Consider wound care consult   Outcome: Progressing     Problem: PAIN - ADULT  Goal: Verbalizes/displays adequate comfort level or baseline comfort level  Description: Interventions:  - Encourage patient to monitor pain and request assistance  - Assess pain using appropriate pain scale  - Administer analgesics based on type and severity of pain and evaluate response  - Implement non-pharmacological measures as appropriate and evaluate response  - Consider cultural and social influences on pain and pain management  - Notify physician/advanced practitioner if interventions unsuccessful or patient reports new pain  Outcome: Progressing     Problem: INFECTION - ADULT  Goal: Absence or prevention of progression during hospitalization  Description: INTERVENTIONS:  - Assess and monitor for signs and symptoms of infection  - Monitor lab/diagnostic results  - Monitor all insertion sites, i.e. indwelling lines, tubes, and drains  - Monitor endotracheal if appropriate and nasal secretions for changes in amount and color  - [place] appropriate cooling/warming therapies per order  - Administer medications as ordered  - Instruct and encourage patient and family to use good hand hygiene technique  - Identify and instruct in appropriate isolation precautions for  identified infection/condition  Outcome: Progressing  Goal: Absence of fever/infection during neutropenic period  Description: INTERVENTIONS:  - Monitor WBC    Outcome: Progressing     Problem: SAFETY ADULT  Goal: Patient will remain free of falls  Description: INTERVENTIONS:  - Educate patient/family on patient safety including physical limitations  - Instruct patient to call for assistance with activity   - Consult OT/PT to assist with strengthening/mobility   - Keep Call bell within reach  - Keep bed low and locked with side rails adjusted as appropriate  - Keep care items and personal belongings within reach  - Initiate and maintain comfort rounds  - Make Fall Risk Sign visible to staff  - Apply yellow socks and bracelet for high fall risk patients  - Consider moving patient to room near nurses station  Outcome: Progressing  Goal: Maintain or return to baseline ADL function  Description: INTERVENTIONS:  -  Assess patient's ability to carry out ADLs; assess patient's baseline for ADL function and identify physical deficits which impact ability to perform ADLs (bathing, care of mouth/teeth, toileting, grooming, dressing, etc.)  - Assess/evaluate cause of self-care deficits   - Assess range of motion  - Assess patient's mobility; develop plan if impaired  - Assess patient's need for assistive devices and provide as appropriate  - Encourage maximum independence but intervene and supervise when necessary  - Involve family in performance of ADLs  - Assess for home care needs following discharge   - Consider OT consult to assist with ADL evaluation and planning for discharge  - Provide patient education as appropriate  Outcome: Progressing     Problem: DISCHARGE PLANNING  Goal: Discharge to home or other facility with appropriate resources  Description: INTERVENTIONS:  - Identify barriers to discharge w/patient and caregiver  - Arrange for needed discharge resources and transportation as appropriate  - Identify  discharge learning needs (meds, wound care, etc.)  - Arrange for interpretive services to assist at discharge as needed  - Refer to Case Management Department for coordinating discharge planning if the patient needs post-hospital services based on physician/advanced practitioner order or complex needs related to functional status, cognitive ability, or social support system  Outcome: Progressing     Problem: Knowledge Deficit  Goal: Patient/family/caregiver demonstrates understanding of disease process, treatment plan, medications, and discharge instructions  Description: Complete learning assessment and assess knowledge base.  Interventions:  - Provide teaching at level of understanding  - Provide teaching via preferred learning methods  Outcome: Progressing     Problem: Nutrition/Hydration-ADULT  Goal: Nutrient/Hydration intake appropriate for improving, restoring or maintaining nutritional needs  Description: Monitor and assess patient's nutrition/hydration status for malnutrition. Collaborate with interdisciplinary team and initiate plan and interventions as ordered.  Monitor patient's weight and dietary intake as ordered or per policy. Utilize nutrition screening tool and intervene as necessary. Determine patient's food preferences and provide high-protein, high-caloric foods as appropriate.     INTERVENTIONS:  - Monitor oral intake, urinary output, labs, and treatment plans  - Assess nutrition and hydration status and recommend course of action  - Evaluate amount of meals eaten  - Assist patient with eating if necessary   - Allow adequate time for meals  - Recommend/ encourage appropriate diets, oral nutritional supplements, and vitamin/mineral supplements  - Order, calculate, and assess calorie counts as needed  - Recommend, monitor, and adjust tube feedings and TPN/PPN based on assessed needs  - Assess need for intravenous fluids  - Provide specific nutrition/hydration education as appropriate  - Include  patient/family/caregiver in decisions related to nutrition  Outcome: Progressing     Problem: RESPIRATORY - ADULT  Goal: Achieves optimal ventilation and oxygenation  Description: INTERVENTIONS:  - Assess for changes in respiratory status  - Assess for changes in mentation and behavior  - Position to facilitate oxygenation and minimize respiratory effort  - Oxygen administered by appropriate delivery if ordered  - Initiate smoking cessation education as indicated  - Encourage broncho-pulmonary hygiene including cough, deep breathe, Incentive Spirometry  - Assess the need for suctioning and aspirate as needed  - Assess and instruct to report SOB or any respiratory difficulty  - Respiratory Therapy support as indicated  Outcome: Progressing

## 2024-09-25 NOTE — PROGRESS NOTES
"Progress Note - Critical Care/ICU   Name: Marcin Sánchez 64 y.o. male I MRN: [id number]  Unit/Bed#: MICU 10 I Date of Admission: 9/8/2024   Date of Service: 9/25/2024 I Hospital Day: 17       Assessment & Plan   Neuro:   Diagnosis: History of epilepsy  Plan:  Continue Phenytoin 75mg BID  Continue Gabapentin 400mg BID  Monitor Phenytoin level, follow up Thursday     Diagnosis: Sedation/analgesia  Plan:  Oxy 5mg Q4  Off Precedex  RASS goal 0; alert and calm     Diagnosis: Insomnia  Plan:  Trazodone 100mg daily at bedtime  Monitor Qtc (444 on 9/25)     Diagnosis: At risk for ICU delirium  Plan:  CAM ICU BID  Regulate sleep/wake cycle     CV:   Diagnosis: Pericardial effusion   Echo 9/9  \"moderate to large pericardial effusion circumferential to the heart measuring largest along the RV free wall at 2.3 cm. The fluid exhibits no internal echoes. There is no echocardiographic evidence of tamponade.\"  Evaluated by CT surgery, no intervention at this time  Plan:  Continue to monitor for signs of tamponade     Diagnosis: History of Atrial fibrillation on outpatient Eliquis  Plan:  Home regimen: Metoprolol 25 mg daily, Eliquis 5 mg BID  Changed to metoprolol tartrate 12.5 mg Q12H  Restarted home Eliquis, off heparin drip     Diagnosis: Acute on Chronic combined diastolic and systolic CHF  Diagnosis: Moderate to severe aortic regurgitation  9/11 ECHO: LVEF 55%, hypokinetic apex. Moderate to severe aortic regurgitation, mild tricuspid regurgitation, dilated ascending aortic root up to 5.4 cm.  Plan:  40 mg IV lasix depending on chest XR  Continue to monitor fluid status      Diagnosis: History of AAA  5.4 cm  Plan: Monitor hemodynamics; BP goal <130/80, will likely require outpatient management     Pulm:  Diagnosis: Acute on Chronic hypoxic and hypercapnic respiratory failure.  Diagnosis: COPD, and acute exacerbation  Likely multifactorial including moderate to severe COPD; no recent PFTs on file, last PFTs obtained 217 " revealed FEV1 51%  Patient with nicotine dependence of 70 pack years.  Course complicated by recurrent mucous plugging, status post therapeutic bronch x 3.  Sputum culture with Moraxella, status post course of azithromycin x 5 days.  Also with MDR acinetobacter likely contaminant versus colonization as patient has remained stable without antibiotic treatment.  Status post intubated x 3  Status post trach 9/19  Low clinical suspicion for infection and pneumonia at this time.  Plan:  Continue vent support with daily SBT's  Continue Xopenex and Atrovent TID  Continue pulmicort BID  Continue Performist BID  Trach care, frequent suctioning     Diagnosis: History of squamous cell cancer of lung post chemo/radiation 2016, cancer of right mainstem  Noted        GI:   Status post PEG 9/19  Continue tube feeds at goal  Bowel regimen: Senokot, Miralax with holding parameters        :   Diagnosis: Urinary retention  Plan:  Voiding trial today for wong  Doxazosin 2mg  Continue to monitor Is/Os, renal indices     F/E/N:   F: No maintenance fluids  E: Replete as needed for goal K >4, Phos >3 and Mag >2  N: Continue Jevity 1.2, at goal     Heme/Onc:   Diagnosis: Anemia  Plan:   Continue B12/folate  Monitor AM CBC  Transfuse for Hgb <7 and/or symptomatic anemia     Diagnosis: DVT ppx  SCDs and Eliquis     Endo:   Diagnosis: Hypothyroidism  Home med: Levothyroxine 250 mg  TSH 45.06 on 9/25 from 0.59 on 9/5, free T4 0.34 (9/25)  Restart on half dose of levothyroxine for the first week, 125 mg     ID:   Diagnosis: Colonization with Acinetobacter and Moraxella  No acute issues  Continue to monitor WBC count and temperature curve  Completed 5 days of azithromycin 9/10     MSK/Skin:   Diagnosis: At risk for pressure injury   PT/OT when able  Frequent turns/repositioning  Skin surveillance      Disposition: Critical care    ICU Core Measures     Vented Patient  VAP Bundle  VAP bundle ordered     A: Assess, Prevent, and Manage Pain Has  pain been assessed? Yes  Need for changes to pain regimen? No   B: Both Spontaneous Awakening Trials (SATs) and Spontaneous Breathing Trials (SBTs) Plan to perform spontaneous awakening trial today? Yes   Plan to perform spontaneous breathing trial today? Yes   Obvious barriers to extubation? NA   C: Choice of Sedation RASS Goal: 0 Alert and Calm  Need for changes to sedation or analgesia regimen? No   D: Delirium CAM-ICU: Unable to perform secondary to Acute cognitive dysfunction   E: Early Mobility  Plan for early mobility? Yes   F: Family Engagement Plan for family engagement today? Yes       Review of Invasive Devices:    Haynes Plan:  voiding trial today        Prophylaxis:  VTE VTE covered by:  apixaban, Oral, 5 mg at 09/24/24 1742       Stress Ulcer  not ordered         24 Hour Events   24hr events: No acute events overnight. Scheduled oxycodone 5 mg Q4  Subjective   Review of Systems: See HPI for Review of Systems    Objective                          Vitals I/O      Most Recent Min/Max in 24hrs   Temp (!) 97.2 °F (36.2 °C) Temp  Min: 97.2 °F (36.2 °C)  Max: 100.6 °F (38.1 °C)   Pulse 80 Pulse  Min: 76  Max: 93   Resp (!) 24 Resp  Min: 18  Max: 39   /56 BP  Min: 102/53  Max: 138/62   O2 Sat 100 % SpO2  Min: 91 %  Max: 100 %      Intake/Output Summary (Last 24 hours) at 9/25/2024 0829  Last data filed at 9/25/2024 0800  Gross per 24 hour   Intake 2149 ml   Output 2700 ml   Net -551 ml       Diet Enteral/Parenteral; Tube Feeding No Oral Diet; Jevity 1.2 Mirza; Cyclic; 75; 18 hours; Prosource Protein Liquid - One Packet; 30; Every 4 hours    Invasive Monitoring           Physical Exam   Physical Exam  Vitals and nursing note reviewed.   Eyes:      Conjunctiva/sclera: Conjunctivae normal.   Skin:     General: Skin is warm and dry.   HENT:      Head: Normocephalic and atraumatic.      Nose: No rhinorrhea.   Neck:      Trachea: Tracheostomy present.   Cardiovascular:      Rate and Rhythm: Normal rate and  regular rhythm.      Pulses: Normal pulses.      Heart sounds: Normal heart sounds.   Musculoskeletal:      Right lower leg: No edema.      Left lower leg: No edema.   Abdominal: General: There is abdominal type feeding tube.     Palpations: Abdomen is soft.   Constitutional:       General: He is not in acute distress.     Interventions: He is not sedated and not intubated.  Pulmonary:      Effort: Pulmonary effort is normal. No accessory muscle usage, respiratory distress or accessory muscle usage. He is not intubated.      Breath sounds: Normal breath sounds. No wheezing, rhonchi or rales.   Neurological:      Mental Status: He is alert.      Comments: Alert and responsive to questions   Genitourinary/Anorectal:  Hay[place] present.        Diagnostic Studies        Lab Results: I have reviewed the following results:      Medications:  Scheduled PRN   apixaban, 5 mg, BID  budesonide, 0.5 mg, Q12H  calcium gluconate, 2 g, Once  chlorhexidine, 15 mL, Q12H [place]  vitamin B-12, 100 mcg, Daily  doxazosin, 2 mg, Daily  folic acid, 1 mg, Daily  furosemide, 40 mg, BID (diuretic)  gabapentin, 400 mg, BID  ipratropium, 0.5 mg, TID  levalbuterol, 1.25 mg, TID  levothyroxine, 125 mcg, Early Morning   Followed by  [START ON 10/2/2024] levothyroxine, 250 mcg, Early Morning  melatonin, 6 mg, HS  metoprolol tartrate, 12.5 mg, Q12H [place]  nicotine, 1 patch, Daily  oxyCODONE, 5 mg, Q4H  phenytoin, 75 mg, Q12H [place]  polyethylene glycol, 17 g, BID  senna, 8.8 mg, HS  traZODone, 100 mg, HS      acetaminophen, 650 mg, Q4H PRN  albuterol, 2.5 mg, Q6H PRN  bisacodyl, 10 mg, Daily PRN  fentaNYL, 50 mcg, Q2H PRN       Continuous          Labs:   CBC    Recent Labs     09/24/24  0634 09/25/24  0525   WBC 8.02 5.99   HGB 8.8* 7.3*   HCT 26.8* 22.4*    227     BMP    Recent Labs     09/24/24  1600 09/25/24  0525   SODIUM 137 133*   K 3.9 4.0   CL 92* 91*   CO2 40* 37*   AGAP 5 5   BUN 16 17   CREATININE 0.49* 0.49*   CALCIUM 8.0* 8.0*        Coags    No recent results     Additional Electrolytes  Recent Labs     09/24/24  1600 09/25/24  0525   MG 2.4 2.0   PHOS  --  3.6   CAIONIZED  --  0.99*          Blood Gas    No recent results  No recent results LFTs  No recent results    Infectious  Recent Labs     09/25/24  0525   PROCALCITONI 0.24     Glucose  Recent Labs     09/23/24  2151 09/24/24  0634 09/24/24  1600 09/25/24  0525   GLUC 131 142* 112 136

## 2024-09-25 NOTE — RESPIRATORY THERAPY NOTE
09/25/24 0725   Respiratory Assessment   Resp Comments Pt received on CMV settings, switched to PS 18/6. Tolerating well. RT will cont to monitor.   Vent Information   Vent ID [id number]   Vent type Morris C3   Morris Vent Mode SPONT   $ Pulse Oximetry Spot Check Charge Completed   SPONT Settings   FIO2 (%) 40 %   PEEP (cmH2O) 6 cmH2O   Pressure Support (cmH2O) 18 cmH20   Flow Trigger (LPM) 3 LPM   P-ramp (ms) 50 ms   ETS  (%) 25 %   Humidification Heater   Heater Temp 91.4 °F (33 °C)   SPONT Actuals   Resp Rate (BPM) 12 BPM   VT (mL) 629 mL   MV (Obs) 6.1   MAP (cmH2O) 9.8 cmH2O   Peak Pressure (cmH2O) 24 cmH2O   I:E Ratio (Obs) 1/4.5   RSBI (f/vt) 25 f/Vt   Heater Temperature (Obs) 91.4 °F (33 °C)   SPONT Alarms   High Peak Pressure (cmH20) 50 cmH2O   High Resp Rate (BPM) 35 BPM   High MV (L/min) 16 L/min   Low MV (L/min) 4 L/min   High Spont VTE (mL) 900 mL   Low Spont VTE (mL) 250 mL   Apnea Time (S) 20 S   SPONT Apnea Settings   Resp Rate (BPM) 10 BPM   FIO2 (%) 40 %   %TI (%)   (1)   Apnea Time (S) 20 S   Maintenance   Alarm (pink) cable attached No   Resuscitation bag with peep valve at bedside Yes   Water bag changed No   Circuit changed No   Daily Screen   Patient safety screen outcome: Passed   IHI Ventilator Associated Pneumonia Bundle   Daily Assessment of Readiness to Extubate Yes   Surgical Airway Shiley Cuffed   Placement Date: 09/19/24   Type: Tracheostomy  Brand: Francois  Style: Cuffed  Comments: Pt trach in the OR with 8 Shiley Cuffed   Status Cuff Inflated;Secured   Equipment at bedside BVM;Wall Suction setup;Additional complete same size trach tube;Additional complete one size smaller trach tube;Obturator;Sterile saline;Additional inner cannula

## 2024-09-25 NOTE — OCCUPATIONAL THERAPY NOTE
OT CANCEL NOTE    Pt chart reviewed. Pt refusing to work w/ therapy at this time; pt turned head/closed eyes and wouldn't respond when approached in room. Will continue to follow pt on caseload and see pt when medically stable and as clinically appropriate.       09/25/24 7938   OT Last Visit   OT Visit Date 09/25/24   Note Type   Note Type Cancelled Session   Cancel Reasons Refusal         Rhoda López MS, OTR/L

## 2024-09-25 NOTE — PLAN OF CARE
Problem: Prexisting or High Potential for Compromised Skin Integrity  Goal: Skin integrity is maintained or improved  Description: INTERVENTIONS:  - Identify patients at risk for skin breakdown  - Assess and monitor skin integrity  - Assess and monitor nutrition and hydration status  - Monitor labs   - Assess for incontinence   - Turn and reposition patient  - Assist with mobility/ambulation  - Relieve pressure over bony prominences  - Avoid friction and shearing  - Provide appropriate hygiene as needed including keeping skin clean and dry  - Evaluate need for skin moisturizer/barrier cream  - Collaborate with interdisciplinary team   - Patient/family teaching  - Consider wound care consult   Outcome: Progressing     Problem: SAFETY,RESTRAINT: NV/NON-SELF DESTRUCTIVE BEHAVIOR  Goal: Remains free of harm/injury (restraint for non violent/non self-detsructive behavior)  Description: INTERVENTIONS:  - Instruct patient/family regarding restraint use   - Assess and monitor physiologic and psychological status   - Provide interventions and comfort measures to meet assessed patient needs   - Identify and implement measures to help patient regain control  - Assess readiness for release of restraint   Outcome: Progressing  Goal: Returns to optimal restraint-free functioning  Description: INTERVENTIONS:  - Assess the patient's behavior and symptoms that indicate continued need for restraint  - Identify and implement measures to help patient regain control  - Assess readiness for release of restraint   Outcome: Progressing     Problem: PAIN - ADULT  Goal: Verbalizes/displays adequate comfort level or baseline comfort level  Description: Interventions:  - Encourage patient to monitor pain and request assistance  - Assess pain using appropriate pain scale  - Administer analgesics based on type and severity of pain and evaluate response  - Implement non-pharmacological measures as appropriate and evaluate response  - Consider  cultural and social influences on pain and pain management  - Notify physician/advanced practitioner if interventions unsuccessful or patient reports new pain  Outcome: Progressing     Problem: INFECTION - ADULT  Goal: Absence or prevention of progression during hospitalization  Description: INTERVENTIONS:  - Assess and monitor for signs and symptoms of infection  - Monitor lab/diagnostic results  - Monitor all insertion sites, i.e. indwelling lines, tubes, and drains  - Monitor endotracheal if appropriate and nasal secretions for changes in amount and color  - [place] appropriate cooling/warming therapies per order  - Administer medications as ordered  - Instruct and encourage patient and family to use good hand hygiene technique  - Identify and instruct in appropriate isolation precautions for identified infection/condition  Outcome: Progressing  Goal: Absence of fever/infection during neutropenic period  Description: INTERVENTIONS:  - Monitor WBC    Outcome: Progressing     Problem: SAFETY ADULT  Goal: Patient will remain free of falls  Description: INTERVENTIONS:  - Educate patient/family on patient safety including physical limitations  - Instruct patient to call for assistance with activity   - Consult OT/PT to assist with strengthening/mobility   - Keep Call bell within reach  - Keep bed low and locked with side rails adjusted as appropriate  - Keep care items and personal belongings within reach  - Initiate and maintain comfort rounds  - Make Fall Risk Sign visible to staff  - Offer Toileting every 2 Hours, in advance of need  - Initiate/Maintain alarm  - Obtain necessary fall risk management equipment  - Apply yellow socks and bracelet for high fall risk patients  - Consider moving patient to room near nurses station  Outcome: Progressing  Goal: Maintain or return to baseline ADL function  Description: INTERVENTIONS:  -  Assess patient's ability to carry out ADLs; assess patient's baseline for ADL  function and identify physical deficits which impact ability to perform ADLs (bathing, care of mouth/teeth, toileting, grooming, dressing, etc.)  - Assess/evaluate cause of self-care deficits   - Assess range of motion  - Assess patient's mobility; develop plan if impaired  - Assess patient's need for assistive devices and provide as appropriate  - Encourage maximum independence but intervene and supervise when necessary  - Involve family in performance of ADLs  - Assess for home care needs following discharge   - Consider OT consult to assist with ADL evaluation and planning for discharge  - Provide patient education as appropriate  Outcome: Progressing  Goal: Maintains/Returns to pre admission functional level  Description: INTERVENTIONS:  - Perform AM-PAC 6 Click Basic Mobility/ Daily Activity assessment daily.  - Set and communicate daily mobility goal to care team and patient/family/caregiver.   - Collaborate with rehabilitation services on mobility goals if consulted  - Perform Range of Motion 3 times a day.  - Reposition patient every 2 hours.  - Dangle patient 3 times a day  - Stand patient 3 times a day  - Ambulate patient 3 times a day  - Out of bed to chair 3 times a day   - Out of bed for meals 3 times a day  - Out of bed for toileting  - Record patient progress and toleration of activity level   Outcome: Progressing     Problem: DISCHARGE PLANNING  Goal: Discharge to home or other facility with appropriate resources  Description: INTERVENTIONS:  - Identify barriers to discharge w/patient and caregiver  - Arrange for needed discharge resources and transportation as appropriate  - Identify discharge learning needs (meds, wound care, etc.)  - Arrange for interpretive services to assist at discharge as needed  - Refer to Case Management Department for coordinating discharge planning if the patient needs post-hospital services based on physician/advanced practitioner order or complex needs related to  functional status, cognitive ability, or social support system  Outcome: Progressing     Problem: Knowledge Deficit  Goal: Patient/family/caregiver demonstrates understanding of disease process, treatment plan, medications, and discharge instructions  Description: Complete learning assessment and assess knowledge base.  Interventions:  - Provide teaching at level of understanding  - Provide teaching via preferred learning methods  Outcome: Progressing     Problem: Nutrition/Hydration-ADULT  Goal: Nutrient/Hydration intake appropriate for improving, restoring or maintaining nutritional needs  Description: Monitor and assess patient's nutrition/hydration status for malnutrition. Collaborate with interdisciplinary team and initiate plan and interventions as ordered.  Monitor patient's weight and dietary intake as ordered or per policy. Utilize nutrition screening tool and intervene as necessary. Determine patient's food preferences and provide high-protein, high-caloric foods as appropriate.     INTERVENTIONS:  - Monitor oral intake, urinary output, labs, and treatment plans  - Assess nutrition and hydration status and recommend course of action  - Evaluate amount of meals eaten  - Assist patient with eating if necessary   - Allow adequate time for meals  - Recommend/ encourage appropriate diets, oral nutritional supplements, and vitamin/mineral supplements  - Order, calculate, and assess calorie counts as needed  - Recommend, monitor, and adjust tube feedings and TPN/PPN based on assessed needs  - Assess need for intravenous fluids  - Provide specific nutrition/hydration education as appropriate  - Include patient/family/caregiver in decisions related to nutrition  Outcome: Progressing     Problem: RESPIRATORY - ADULT  Goal: Achieves optimal ventilation and oxygenation  Description: INTERVENTIONS:  - Assess for changes in respiratory status  - Assess for changes in mentation and behavior  - Position to facilitate  oxygenation and minimize respiratory effort  - Oxygen administered by appropriate delivery if ordered  - Initiate smoking cessation education as indicated  - Encourage broncho-pulmonary hygiene including cough, deep breathe, Incentive Spirometry  - Assess the need for suctioning and aspirate as needed  - Assess and instruct to report SOB or any respiratory difficulty  - Respiratory Therapy support as indicated  Outcome: Progressing

## 2024-09-25 NOTE — CASE MANAGEMENT
Case Management Discharge Planning Note    Patient name Marcin Sánchez  Location MICU 10/MICU 10 MRN [id number]  : 1960 Date 2024       Current Admission Date: 2024  Current Admission Diagnosis:Acute hypoxic respiratory failure (HCC)   Patient Active Problem List    Diagnosis Date Noted Date Diagnosed    Acute hypoxic respiratory failure (HCC) 2024     Shock (HCC) 2024     Mucus plugging of bronchi 2024     Transaminitis 2024     Cardiac arrest (HCC) 2024     Pericardial effusion 2024     Acute on chronic respiratory failure with hypoxia and hypercapnia (HCC) 2024     Acute metabolic encephalopathy 2024     Chronic combined systolic and diastolic CHF (congestive heart failure) (HCC) 2024     Atrial fibrillation (HCC) 2024     Pulmonary fibrosis (HCC) 2024     Suspected chronic pulmonary embolism (HCC) 2024     Squamous cell lung cancer (HCC) 2024     Hyponatremia 2024     Severe protein-calorie malnutrition (HCC) 2024     Edema of both legs 2022     Tobacco abuse 2020     Nonrheumatic aortic valve insufficiency 2020     Malignant neoplasm of upper lobe of right lung (HCC) 2018     Thoracic aortic aneurysm without rupture (HCC) 2018     Cancer of right main bronchus (HCC) 10/04/2016     Pleural effusion 10/02/2016     Multiple lung nodules on CT 10/02/2016     Collapse of right lung 2016     Acute exacerbation of chronic obstructive pulmonary disease (COPD) (HCC)      Epilepsy (HCC)      Lyme disease      Major depression      Alcohol abuse        LOS (days): 17  Geometric Mean LOS (GMLOS) (days): 5.1  Days to GMLOS:-11.5     OBJECTIVE:  Risk of Unplanned Readmission Score: 33.4         Current admission status: Inpatient   Preferred Pharmacy:   [facility] - [address] [facility],  [address] [place] [address]  Phone: [phone]  Fax: [phone]    [facility] [place] - [address] [facility]  [address] [facility] [address]  Phone: [phone] Fax: [phone]    Primary Care Provider: Brandt Godinez MD    Primary Insurance: [facility]  Secondary Insurance:     DISCHARGE DETAILS:                           Contacts  Patient Contacts: Guardian Tere Williamson  Relationship to Patient:: Other (Comment)  Contact Method: Phone  Phone Number: [phone]  Reason/Outcome: Discharge Planning              Other Referral/Resources/Interventions Provided:  Referral Comments: Called pt's guardian Tere to discuss dcp & accpeting facilities, no answer - left a VM. Awaiting call back from pt's guardian.  Called [facility], spoke to DORENE Esteban - stated they had difficulty getting in touch w/her also.  Asked SW if they had any paperwork for the guardian if she is affiliated w/a company - DORENE not in his office, will call CM back if he is able to locate any paperwork.  Awaiting call back from DORENE @ [facility].

## 2024-09-25 NOTE — RESPIRATORY THERAPY NOTE
RT Ventilator Management Note  Marcin Sánchez 64 y.o. male MRN: [id number]  Unit/Bed#: [facility] 10 Encounter: [id number]      Daily Screen         9/23/2024  0810 9/24/2024  0743          Patient safety screen outcome:: Failed Passed      Not Ready for Weaning due to:: Underline problem not resolved --                Physical Exam:   Assessment Type: (P) Assess only  General Appearance: (P) Sleeping  Respiratory Pattern: (P) Assisted  Chest Assessment: (P) Chest expansion symmetrical  Bilateral Breath Sounds: (P) Clear, Diminished  Suction: Trach  O2 Device: (P) vent      Resp Comments: (P) No changes made at this time, pt is stable overnight on CMV settings.

## 2024-09-25 NOTE — PHYSICAL THERAPY NOTE
Physical Therapy Cancellation Note    Patient's Name: Marcin Sánchez       09/25/24 1230   PT Last Visit   PT Visit Date 09/25/24   Note Type   Note Type Cancelled Session   Cancel Reasons Refusal       Sabrina Zamora, PT, DPT

## 2024-09-25 NOTE — CASE MANAGEMENT
Case Management Discharge Planning Note    Patient name Marcin Sánchez  Location MICU 10/MICU 10 MRN [id number]  : 1960 Date 2024       Current Admission Date: 2024  Current Admission Diagnosis:Acute hypoxic respiratory failure (HCC)   Patient Active Problem List    Diagnosis Date Noted Date Diagnosed    Acute hypoxic respiratory failure (HCC) 2024     Shock (HCC) 2024     Mucus plugging of bronchi 2024     Transaminitis 2024     Cardiac arrest (HCC) 2024     Pericardial effusion 2024     Acute on chronic respiratory failure with hypoxia and hypercapnia (HCC) 2024     Acute metabolic encephalopathy 2024     Chronic combined systolic and diastolic CHF (congestive heart failure) (HCC) 2024     Atrial fibrillation (HCC) 2024     Pulmonary fibrosis (HCC) 2024     Suspected chronic pulmonary embolism (HCC) 2024     Squamous cell lung cancer (HCC) 2024     Hyponatremia 2024     Severe protein-calorie malnutrition (HCC) 2024     Edema of both legs 2022     Tobacco abuse 2020     Nonrheumatic aortic valve insufficiency 2020     Malignant neoplasm of upper lobe of right lung (HCC) 2018     Thoracic aortic aneurysm without rupture (HCC) 2018     Cancer of right main bronchus (HCC) 10/04/2016     Pleural effusion 10/02/2016     Multiple lung nodules on CT 10/02/2016     Collapse of right lung 2016     Acute exacerbation of chronic obstructive pulmonary disease (COPD) (HCC)      Epilepsy (HCC)      Lyme disease      Major depression      Alcohol abuse        LOS (days): 17  Geometric Mean LOS (GMLOS) (days): 5.1  Days to GMLOS:-11.7     OBJECTIVE:  Risk of Unplanned Readmission Score: 34.02         Current admission status: Inpatient   Preferred Pharmacy:   [facility] - [address] [facility],  [address] [place] [address]  Phone: [phone]  Fax: [phone]    [place] - [address] [facility]  [address] [facility] [address]  Phone: [phone] Fax: [phone]    Primary Care Provider: Brandt Godinez MD    Primary Insurance: [facility]  Secondary Insurance:     DISCHARGE DETAILS:           1301:  Called DOREEN Esteban at [facility] re: f/u to any information they have re: the guardian.  Per Carlito his Director stated they had no paperwork on file for the guardian. CM asked to speak to director - stated she was gone for the day.  The phone #'s the facility has on file are the same ones provided to the hospital.  Per Carlito, it appears pt works for [employer] as a pt .   CM to continue to attempt to contact pt's legal guardian.    1303: Called work # on file for Tere Williamson [phone] - number rings continuously w/no answer and no voicemail.    1309: Called [employer] to verify if Tere Williamson is employed at their company in an attempt to speak w/pt's legal guardian.  Spoke to Daniel - per Daniel, Tere no longer works at company for the last few months.  Explained we are attempting to contact her to discuss dcp for a patient she is assigned as legal guardian & she is not returning our calls.  Daniel is unable to disclose any information re: prior employees but he would talk to his supervisor.      1312:  CM called Tere's cell phone [phone] - no answer - left VM requesting call back ASAP to confirm she is the  and to discuss dcp if she is the legal guardian.   Awaiting call back.    1408: Received call back from pt's guardian Tere Williamson. Confirmed she is the legal guardian of pt since 2015.  Tere apologized for not returning call sooner - stated she had some medical appointments the last 2 days.  CM asked Tere why she was appointed as pt's legal guardian - she could not remember since it was so long ago.  Discussed  w/Tere DCP & LTACH options.  Tere does not have a preference - agreeable to any facility that is able to accept pt & obtain authorization but asked that CM call pt's sister Katalina to discuss dcp & review facilities.  Per Tere it is ok to talk to pt's sister Vesna and for her to decide on facility for pt. Per Tere, pt's sister visits pt frequently to her knowledge. Per Tere it is ok to also talk to pt's other family members to discuss dcp if Katalina is not available.  CM asked Tere if she needed a return call to notify when pt is being discharged. Per Tere it is okay to notify pt's sister Katalina of discharge time/date and she does not need to be called when pt is discharged.      1425:  Called pt's sister Katalina [phone] to discuss dcp & LTACH options.  Per Katalina, she visits pt frequently, she does not have a car but is able to borrow her friends car to visit pt.  Explained to pt's sister Katalina - w/pt's current medical needs he is not able to return to SNF at this time & needs a LTACH to continue to wean pt from vent w/plan to return to [facility].  Katalina verbalized understanding. Explained there is a facility in [place] that is contracted w/his insurance that is able to accept pt pending insurance authorization.  Per Katalina, she was hoping pt could return to [facility] as she lives in [place] and does not have a car.  Katalina aked if there were any LTACH's in [place] - reviewed a few LTACH's in [place] or near [place] - explained they are about 50 miles away in [place] but will send a referral for review.  Explained [facility] is not contracted w/his insurance but they may be able to get a one time agreement w/the insurance company.  Per Katalina, she would prefer if pt was closer so she can visit but is agreeable to [facility] in  if unable to get a facility approved closer.  CM asked pt's sister Katalina why pt was assigned a guardian - per Katalina the prior director at the SNF (now called [facility]) claimed pt had no  family & submitted for guardianship for patient, even though she was visiting pt frequently & was the  prior to 2015. Pt has been at SNF since 2014.  CM explained to Katalina will send additional referrals and will f/u w/her re: dcp when responses received from additional LTACH's.      Additional LTACH's added to referral & sent in [place].  Pending response from facilities to discuss w/pt's sister.

## 2024-09-25 NOTE — PROGRESS NOTES
Patient was offered multiple times to get OOB to chair to which he refused.  Reviewed importance of mobility for recovery to which patient continued to refuse to get OOB to chair.  PT/OT to eval to which he continued to refuse mobility.

## 2024-09-26 LAB
ALBUMIN SERPL BCG-MCNC: 2.5 G/DL (ref 3.5–5)
ANION GAP SERPL CALCULATED.3IONS-SCNC: 3 MMOL/L (ref 4–13)
ATRIAL RATE: 84 BPM
BASOPHILS # BLD AUTO: 0.03 THOUSANDS/ΜL (ref 0–0.1)
BASOPHILS NFR BLD AUTO: 0 % (ref 0–1)
BUN SERPL-MCNC: 19 MG/DL (ref 5–25)
CA-I BLD-SCNC: 1.05 MMOL/L (ref 1.12–1.32)
CALCIUM SERPL-MCNC: 8.1 MG/DL (ref 8.4–10.2)
CHLORIDE SERPL-SCNC: 87 MMOL/L (ref 96–108)
CO2 SERPL-SCNC: 40 MMOL/L (ref 21–32)
CREAT SERPL-MCNC: 0.55 MG/DL (ref 0.6–1.3)
EOSINOPHIL # BLD AUTO: 0.18 THOUSAND/ΜL (ref 0–0.61)
EOSINOPHIL NFR BLD AUTO: 2 % (ref 0–6)
ERYTHROCYTE [DISTWIDTH] IN BLOOD BY AUTOMATED COUNT: 14.6 % (ref 11.6–15.1)
GFR SERPL CREATININE-BSD FRML MDRD: 110 ML/MIN/1.73SQ M
GLUCOSE SERPL-MCNC: 156 MG/DL (ref 65–140)
HCT VFR BLD AUTO: 22.7 % (ref 36.5–49.3)
HGB BLD-MCNC: 7.6 G/DL (ref 12–17)
IMM GRANULOCYTES # BLD AUTO: 0.03 THOUSAND/UL (ref 0–0.2)
IMM GRANULOCYTES NFR BLD AUTO: 0 % (ref 0–2)
LYMPHOCYTES # BLD AUTO: 0.52 THOUSANDS/ΜL (ref 0.6–4.47)
LYMPHOCYTES NFR BLD AUTO: 6 % (ref 14–44)
MAGNESIUM SERPL-MCNC: 1.8 MG/DL (ref 1.9–2.7)
MCH RBC QN AUTO: 32.8 PG (ref 26.8–34.3)
MCHC RBC AUTO-ENTMCNC: 33.5 G/DL (ref 31.4–37.4)
MCV RBC AUTO: 98 FL (ref 82–98)
MONOCYTES # BLD AUTO: 0.84 THOUSAND/ΜL (ref 0.17–1.22)
MONOCYTES NFR BLD AUTO: 10 % (ref 4–12)
NEUTROPHILS # BLD AUTO: 7.16 THOUSANDS/ΜL (ref 1.85–7.62)
NEUTS SEG NFR BLD AUTO: 82 % (ref 43–75)
NRBC BLD AUTO-RTO: 0 /100 WBCS
P AXIS: 50 DEGREES
PHENYTOIN SERPL-MCNC: 4 UG/ML (ref 10–20)
PHOSPHATE SERPL-MCNC: 4.2 MG/DL (ref 2.3–4.1)
PLATELET # BLD AUTO: 249 THOUSANDS/UL (ref 149–390)
PMV BLD AUTO: 8.9 FL (ref 8.9–12.7)
POTASSIUM SERPL-SCNC: 4.4 MMOL/L (ref 3.5–5.3)
PR INTERVAL: 210 MS
QRS AXIS: -20 DEGREES
QRSD INTERVAL: 94 MS
QT INTERVAL: 376 MS
QTC INTERVAL: 444 MS
RBC # BLD AUTO: 2.32 MILLION/UL (ref 3.88–5.62)
SODIUM SERPL-SCNC: 130 MMOL/L (ref 135–147)
T WAVE AXIS: 251 DEGREES
VENTRICULAR RATE: 84 BPM
WBC # BLD AUTO: 8.76 THOUSAND/UL (ref 4.31–10.16)

## 2024-09-26 PROCEDURE — 97535 SELF CARE MNGMENT TRAINING: CPT

## 2024-09-26 PROCEDURE — 80048 BASIC METABOLIC PNL TOTAL CA: CPT

## 2024-09-26 PROCEDURE — 82330 ASSAY OF CALCIUM: CPT

## 2024-09-26 PROCEDURE — 93010 ELECTROCARDIOGRAM REPORT: CPT | Performed by: INTERNAL MEDICINE

## 2024-09-26 PROCEDURE — 94760 N-INVAS EAR/PLS OXIMETRY 1: CPT

## 2024-09-26 PROCEDURE — 99291 CRITICAL CARE FIRST HOUR: CPT | Performed by: INTERNAL MEDICINE

## 2024-09-26 PROCEDURE — 85025 COMPLETE CBC W/AUTO DIFF WBC: CPT

## 2024-09-26 PROCEDURE — 80185 ASSAY OF PHENYTOIN TOTAL: CPT

## 2024-09-26 PROCEDURE — 97530 THERAPEUTIC ACTIVITIES: CPT

## 2024-09-26 PROCEDURE — 94669 MECHANICAL CHEST WALL OSCILL: CPT

## 2024-09-26 PROCEDURE — 82040 ASSAY OF SERUM ALBUMIN: CPT

## 2024-09-26 PROCEDURE — 94003 VENT MGMT INPAT SUBQ DAY: CPT

## 2024-09-26 PROCEDURE — 94664 DEMO&/EVAL PT USE INHALER: CPT

## 2024-09-26 PROCEDURE — 84100 ASSAY OF PHOSPHORUS: CPT

## 2024-09-26 PROCEDURE — 97110 THERAPEUTIC EXERCISES: CPT

## 2024-09-26 PROCEDURE — 83735 ASSAY OF MAGNESIUM: CPT

## 2024-09-26 PROCEDURE — 94640 AIRWAY INHALATION TREATMENT: CPT

## 2024-09-26 RX ORDER — PHENYTOIN 125 MG/5ML
75 SUSPENSION ORAL 3 TIMES DAILY
Status: DISCONTINUED | OUTPATIENT
Start: 2024-09-26 | End: 2024-09-27

## 2024-09-26 RX ORDER — FUROSEMIDE 10 MG/ML
60 INJECTION INTRAMUSCULAR; INTRAVENOUS ONCE
Status: COMPLETED | OUTPATIENT
Start: 2024-09-26 | End: 2024-09-26

## 2024-09-26 RX ORDER — FUROSEMIDE 10 MG/ML
40 INJECTION INTRAMUSCULAR; INTRAVENOUS
Status: DISCONTINUED | OUTPATIENT
Start: 2024-09-26 | End: 2024-09-26

## 2024-09-26 RX ORDER — FUROSEMIDE 10 MG/ML
40 INJECTION INTRAMUSCULAR; INTRAVENOUS
Status: DISCONTINUED | OUTPATIENT
Start: 2024-09-27 | End: 2024-09-29

## 2024-09-26 RX ORDER — MAGNESIUM SULFATE HEPTAHYDRATE 40 MG/ML
4 INJECTION, SOLUTION INTRAVENOUS ONCE
Status: COMPLETED | OUTPATIENT
Start: 2024-09-26 | End: 2024-09-26

## 2024-09-26 RX ORDER — NICOTINE 21 MG/24HR
14 PATCH, TRANSDERMAL 24 HOURS TRANSDERMAL DAILY
Status: DISCONTINUED | OUTPATIENT
Start: 2024-10-19 | End: 2024-10-18 | Stop reason: HOSPADM

## 2024-09-26 RX ORDER — CALCIUM GLUCONATE 20 MG/ML
2 INJECTION, SOLUTION INTRAVENOUS ONCE
Status: COMPLETED | OUTPATIENT
Start: 2024-09-26 | End: 2024-09-26

## 2024-09-26 RX ADMIN — APIXABAN 5 MG: 5 TABLET, FILM COATED ORAL at 09:36

## 2024-09-26 RX ADMIN — LEVOTHYROXINE SODIUM 250 MCG: 100 TABLET ORAL at 05:40

## 2024-09-26 RX ADMIN — PHENYTOIN 75 MG: 125 SUSPENSION ORAL at 17:11

## 2024-09-26 RX ADMIN — GABAPENTIN 400 MG: 400 CAPSULE ORAL at 09:37

## 2024-09-26 RX ADMIN — PHENYTOIN 75 MG: 125 SUSPENSION ORAL at 09:42

## 2024-09-26 RX ADMIN — LEVALBUTEROL HYDROCHLORIDE 1.25 MG: 1.25 SOLUTION RESPIRATORY (INHALATION) at 07:08

## 2024-09-26 RX ADMIN — GABAPENTIN 400 MG: 400 CAPSULE ORAL at 17:11

## 2024-09-26 RX ADMIN — MAGNESIUM SULFATE HEPTAHYDRATE 4 G: 40 INJECTION, SOLUTION INTRAVENOUS at 06:12

## 2024-09-26 RX ADMIN — LEVALBUTEROL HYDROCHLORIDE 1.25 MG: 1.25 SOLUTION RESPIRATORY (INHALATION) at 13:37

## 2024-09-26 RX ADMIN — CHLORHEXIDINE GLUCONATE 0.12% ORAL RINSE 15 ML: 1.2 LIQUID ORAL at 20:32

## 2024-09-26 RX ADMIN — Medication 6 MG: at 22:36

## 2024-09-26 RX ADMIN — APIXABAN 5 MG: 5 TABLET, FILM COATED ORAL at 17:11

## 2024-09-26 RX ADMIN — Medication 12.5 MG: at 20:32

## 2024-09-26 RX ADMIN — Medication 100 MCG: at 09:40

## 2024-09-26 RX ADMIN — FUROSEMIDE 60 MG: 10 INJECTION, SOLUTION INTRAMUSCULAR; INTRAVENOUS at 17:11

## 2024-09-26 RX ADMIN — BUDESONIDE 0.5 MG: 0.5 INHALANT RESPIRATORY (INHALATION) at 07:08

## 2024-09-26 RX ADMIN — OXYCODONE HYDROCHLORIDE 5 MG: 5 SOLUTION ORAL at 20:32

## 2024-09-26 RX ADMIN — FUROSEMIDE 40 MG: 10 INJECTION, SOLUTION INTRAMUSCULAR; INTRAVENOUS at 12:11

## 2024-09-26 RX ADMIN — IPRATROPIUM BROMIDE 0.5 MG: 0.5 SOLUTION RESPIRATORY (INHALATION) at 19:34

## 2024-09-26 RX ADMIN — OXYCODONE HYDROCHLORIDE 5 MG: 5 SOLUTION ORAL at 16:26

## 2024-09-26 RX ADMIN — CHLORHEXIDINE GLUCONATE 0.12% ORAL RINSE 15 ML: 1.2 LIQUID ORAL at 09:36

## 2024-09-26 RX ADMIN — LEVALBUTEROL HYDROCHLORIDE 1.25 MG: 1.25 SOLUTION RESPIRATORY (INHALATION) at 19:34

## 2024-09-26 RX ADMIN — FENTANYL CITRATE 50 MCG: 50 INJECTION INTRAMUSCULAR; INTRAVENOUS at 11:20

## 2024-09-26 RX ADMIN — CALCIUM GLUCONATE 2 G: 20 INJECTION, SOLUTION INTRAVENOUS at 06:12

## 2024-09-26 RX ADMIN — TRAZODONE HYDROCHLORIDE 100 MG: 100 TABLET ORAL at 22:36

## 2024-09-26 RX ADMIN — OXYCODONE HYDROCHLORIDE 5 MG: 5 SOLUTION ORAL at 13:13

## 2024-09-26 RX ADMIN — OXYCODONE HYDROCHLORIDE 5 MG: 5 SOLUTION ORAL at 09:37

## 2024-09-26 RX ADMIN — SENNOSIDES 8.8 MG: 8.8 LIQUID ORAL at 22:36

## 2024-09-26 RX ADMIN — NICOTINE 1 PATCH: 21 PATCH, EXTENDED RELEASE TRANSDERMAL at 09:36

## 2024-09-26 RX ADMIN — IPRATROPIUM BROMIDE 0.5 MG: 0.5 SOLUTION RESPIRATORY (INHALATION) at 07:08

## 2024-09-26 RX ADMIN — BUDESONIDE 0.5 MG: 0.5 INHALANT RESPIRATORY (INHALATION) at 19:34

## 2024-09-26 RX ADMIN — POLYETHYLENE GLYCOL 3350 17 G: 17 POWDER, FOR SOLUTION ORAL at 17:11

## 2024-09-26 RX ADMIN — PHENYTOIN 75 MG: 125 SUSPENSION ORAL at 20:34

## 2024-09-26 RX ADMIN — IPRATROPIUM BROMIDE 0.5 MG: 0.5 SOLUTION RESPIRATORY (INHALATION) at 13:37

## 2024-09-26 RX ADMIN — OXYCODONE HYDROCHLORIDE 5 MG: 5 SOLUTION ORAL at 00:11

## 2024-09-26 RX ADMIN — ACETAMINOPHEN 650 MG: 650 SUSPENSION ORAL at 00:24

## 2024-09-26 RX ADMIN — FOLIC ACID 1 MG: 1 TABLET ORAL at 09:37

## 2024-09-26 NOTE — PHYSICAL THERAPY NOTE
PHYSICAL THERAPY TREATMENT  NAME:  Marcin Sánchez  DATE: 09/26/24    AGE:   64 y.o.  Mrn:   [id number]  ADMIT DX:  Acute on chronic respiratory failure with hypoxia and hypercapnia (HCC) [J96.21, J96.22]    Past Medical History:   Diagnosis Date    Alcohol abuse     Anxiety     Aortic insufficiency 12/18/2020    Atrial fibrillation (HCC)     Cancer (HCC)     COPD (chronic obstructive pulmonary disease) (HCC)     Delirium     Encephalopathy     Epilepsy (HCC)     History of chemotherapy     History of radiation therapy     Hypo-osmolality and hyponatremia     Hypokalemia     Hypothyroid     Lung cancer (HCC)     Lyme disease     Major depression      Past Surgical History:   Procedure Laterality Date    BRONCHOSCOPY N/A 10/3/2016    Procedure: BRONCHOSCOPY FLEXIBLE;  Surgeon: John Brunner DO;  Location: [facility];  Service:     GASTROSTOMY TUBE PLACEMENT N/A 9/19/2024    Procedure: INSERTION GASTROSTOMY TUBE OPEN;  Surgeon: Darrin Burgos DO;  Location[facility];  Service: General    HAND SURGERY      PEG W/TRACHEOSTOMY PLACEMENT N/A 9/19/2024    Procedure: TRACHEOSTOMY WITH INSERTION PEG TUBE;  Surgeon: Darrin Burgos DO;  [facility];  Service: General    PELVIC FRACTURE SURGERY      REMOVAL VENOUS PORT (PORT-A-CATH) Left 9/18/2018    Procedure: REMOVAL VENOUS PORT (PORT-A-CATH)IR;  Surgeon: Randy Lopez DO;  [facility];  Service: Interventional Radiology       Length Of Stay: 18 09/26/24 1031   PT Last Visit   PT Visit Date 09/26/24   Note Type   Note Type Treatment   Pain Assessment   Pain Assessment Tool 0-10   Pain Score 10 - Worst Possible Pain   Pain Location/Orientation Location: Neck  (trach site - RN aware)   Hospital Pain Intervention(s) Repositioned;Ambulation/increased activity;Emotional support   Restrictions/Precautions   Weight Bearing Precautions Per Order No   Other Precautions Impulsive;Cognitive;Chair Alarm;Bed Alarm;Multiple  lines;Telemetry;Fall Risk;Pain  (trach to vent/ PEG- impulsive/ max cues for participation+  effort)   Cognition   Overall Cognitive Status Impaired   Arousal/Participation Arousable   Attention Attends with cues to redirect   Orientation Level Unable to assess   Memory Unable to assess   Following Commands Follows one step commands with increased time or repetition   Comments required max cues + encouragement and educatoin on importance in participation- becomes irratable and making rude gestures at PT/OT w/ RN present at conclusion of session. Pt was initially agreeable to participate- became agitated at conclusion of session   Bed Mobility   Supine to Sit 3  Moderate assistance   Additional items HOB elevated;Assist x 1   Sit to Supine 3  Moderate assistance   Additional items Assist x 2;Increased time required;Verbal cues   Additional Comments pt able to sit EOB w/ close S ~10-11 mins ; stable vitals. performed limited therex 2*  effort and motivational barriers vs c/o pain in neck.   Transfers   Sit to Stand Unable to assess  (pt refused OOB to recliner and standing trials despite encouragement from RN; OT and PT. pt has good LE strength for trial of these task but is self limited at this time. Will continue to encourage)   Additional Comments (S)  pt refused OOB to recliner and standing trials despite encouragement from RN; OT and PT. pt has good LE strength for trial of these tasks but is self limited at this time. pt pushing PT away and gesturing on educaton as to why this would be beneficial for him. - pt was assited BTB following  Will continue to encourage   Activity Tolerance   Activity Tolerance Patient limited by fatigue;Other (Comment)  (self limiting behaviors)   Medical Staff Made Aware yes- OT and RN+ restorative   Nurse Made Aware yes   Exercises   Heelslides Supine;15 reps;Right;Left  (x2 w light manual resistance)   Hip Abduction Supine;15 reps   Hip Adduction Supine;15 reps   Knee AROM Long Arc  Quad Sitting;5 reps   Ankle Pumps Supine;25 reps  (x2)   Balance training  seated balance   Assessment   Prognosis Fair   Problem List Decreased strength;Decreased endurance;Impaired balance;Decreased mobility;Impaired judgement;Decreased safety awareness;Decreased skin integrity;Pain   Assessment Pt seen for PT session for therex;  bed mobility and seated balance/ activity at EOB. Pt remains on trach/ vent; is awake and alert throughout. pt required modA x1 for supine> sit and modA x2 for LE/ trunk management BTB + for repositinging for comfort. Pt refused OOB to recliner and standing trials at EOB  despite encouragement from RN; OT and PT. Pt demonstrates good LE strength for trial of these tasks but is self limited at this time. Pt pushing PT away and gesturing inappropriately on educaton as to why this would be beneficial for him. - pt was assited BTB to conclude session + was repositioned for comfort.  Will continue to encourage and follow on caseload. Level 2 PT resources on d/c recommended.   Barriers to Discharge Decreased caregiver support   Goals   Patient Goals to lay down   STG Expiration Date 10/02/24   PT Treatment Day 1   Plan   Treatment/Interventions Functional transfer training;LE strengthening/ROM;Therapeutic exercise;Endurance training;Cognitive reorientation;Patient/family training;Equipment eval/education;Bed mobility;Compensatory technique education;Continued evaluation;Spoke to nursing;OT   Progress Slow progress, multiple refusals   PT Frequency 2-3x/wk   Discharge Recommendation   Rehab Resource Intensity Level, PT II (Moderate Resource Intensity)   AM-PAC Basic Mobility Inpatient   Turning in Flat Bed Without Bedrails 2   Lying on Back to Sitting on Edge of Flat Bed Without Bedrails 2   Moving Bed to Chair 1   Standing Up From Chair Using Arms 1   Walk in Room 1   Climb 3-5 Stairs With Railing 1   Basic Mobility Inpatient Raw Score 8   Turning Head Towards Sound 4   Follow Simple  Instructions 3   Low Function Basic Mobility Raw Score  15   Low Function Basic Mobility Standardized Score  23.9   [facility] Highest Level Of Mobility   -[facility] Goal 3: Sit at edge of bed   -[facility] Achieved 3: Sit at edge of bed   End of Consult   Patient Position at End of Consult Supine;Bed/Chair alarm activated;All needs within reach     The patient's AM-PAC Basic Mobility Inpatient Short Form Raw Score is 8. A Raw score of less than or equal to 16 suggests the patient may benefit from discharge to post-acute rehabilitation services. Please also refer to the recommendation of the Physical Therapist for safe discharge planning.              Florence Benítez, PT

## 2024-09-26 NOTE — PLAN OF CARE
Problem: Prexisting or High Potential for Compromised Skin Integrity  Goal: Skin integrity is maintained or improved  Description: INTERVENTIONS:  - Identify patients at risk for skin breakdown  - Assess and monitor skin integrity  - Assess and monitor nutrition and hydration status  - Monitor labs   - Assess for incontinence   - Turn and reposition patient  - Assist with mobility/ambulation  - Relieve pressure over bony prominences  - Avoid friction and shearing  - Provide appropriate hygiene as needed including keeping skin clean and dry  - Evaluate need for skin moisturizer/barrier cream  - Collaborate with interdisciplinary team   - Patient/family teaching  - Consider wound care consult   Outcome: Progressing     Problem: PAIN - ADULT  Goal: Verbalizes/displays adequate comfort level or baseline comfort level  Description: Interventions:  - Encourage patient to monitor pain and request assistance  - Assess pain using appropriate pain scale  - Administer analgesics based on type and severity of pain and evaluate response  - Implement non-pharmacological measures as appropriate and evaluate response  - Consider cultural and social influences on pain and pain management  - Notify physician/advanced practitioner if interventions unsuccessful or patient reports new pain  Outcome: Progressing     Problem: INFECTION - ADULT  Goal: Absence or prevention of progression during hospitalization  Description: INTERVENTIONS:  - Assess and monitor for signs and symptoms of infection  - Monitor lab/diagnostic results  - Monitor all insertion sites, i.e. indwelling lines, tubes, and drains  - Monitor endotracheal if appropriate and nasal secretions for changes in amount and color  - [place] appropriate cooling/warming therapies per order  - Administer medications as ordered  - Instruct and encourage patient and family to use good hand hygiene technique  - Identify and instruct in appropriate isolation precautions for  identified infection/condition  Outcome: Progressing  Goal: Absence of fever/infection during neutropenic period  Description: INTERVENTIONS:  - Monitor WBC    Outcome: Progressing     Problem: SAFETY ADULT  Goal: Patient will remain free of falls  Description: INTERVENTIONS:  - Educate patient/family on patient safety including physical limitations  - Instruct patient to call for assistance with activity   - Consult OT/PT to assist with strengthening/mobility   - Keep Call bell within reach  - Keep bed low and locked with side rails adjusted as appropriate  - Keep care items and personal belongings within reach  - Initiate and maintain comfort rounds  - Make Fall Risk Sign visible to staff  - Offer Toileting every 2 Hours, in advance of need  - Initiate/Maintain alarm  - Obtain necessary fall risk management equipment  - Apply yellow socks and bracelet for high fall risk patients  - Consider moving patient to room near nurses station  Outcome: Progressing  Goal: Maintain or return to baseline ADL function  Description: INTERVENTIONS:  -  Assess patient's ability to carry out ADLs; assess patient's baseline for ADL function and identify physical deficits which impact ability to perform ADLs (bathing, care of mouth/teeth, toileting, grooming, dressing, etc.)  - Assess/evaluate cause of self-care deficits   - Assess range of motion  - Assess patient's mobility; develop plan if impaired  - Assess patient's need for assistive devices and provide as appropriate  - Encourage maximum independence but intervene and supervise when necessary  - Involve family in performance of ADLs  - Assess for home care needs following discharge   - Consider OT consult to assist with ADL evaluation and planning for discharge  - Provide patient education as appropriate  Outcome: Progressing  Goal: Maintains/Returns to pre admission functional level  Description: INTERVENTIONS:  - Perform AM-PAC 6 Click Basic Mobility/ Daily Activity  assessment daily.  - Set and communicate daily mobility goal to care team and patient/family/caregiver.   - Collaborate with rehabilitation services on mobility goals if consulted  - Perform Range of Motion 3 times a day.  - Reposition patient every 2 hours.  - Dangle patient 3 times a day  - Record patient progress and toleration of activity level   Outcome: Progressing     Problem: DISCHARGE PLANNING  Goal: Discharge to home or other facility with appropriate resources  Description: INTERVENTIONS:  - Identify barriers to discharge w/patient and caregiver  - Arrange for needed discharge resources and transportation as appropriate  - Identify discharge learning needs (meds, wound care, etc.)  - Arrange for interpretive services to assist at discharge as needed  - Refer to Case Management Department for coordinating discharge planning if the patient needs post-hospital services based on physician/advanced practitioner order or complex needs related to functional status, cognitive ability, or social support system  Outcome: Progressing     Problem: Knowledge Deficit  Goal: Patient/family/caregiver demonstrates understanding of disease process, treatment plan, medications, and discharge instructions  Description: Complete learning assessment and assess knowledge base.  Interventions:  - Provide teaching at level of understanding  - Provide teaching via preferred learning methods  Outcome: Progressing     Problem: Nutrition/Hydration-ADULT  Goal: Nutrient/Hydration intake appropriate for improving, restoring or maintaining nutritional needs  Description: Monitor and assess patient's nutrition/hydration status for malnutrition. Collaborate with interdisciplinary team and initiate plan and interventions as ordered.  Monitor patient's weight and dietary intake as ordered or per policy. Utilize nutrition screening tool and intervene as necessary. Determine patient's food preferences and provide high-protein, high-caloric  foods as appropriate.     INTERVENTIONS:  - Monitor oral intake, urinary output, labs, and treatment plans  - Assess nutrition and hydration status and recommend course of action  - Evaluate amount of meals eaten  - Assist patient with eating if necessary   - Allow adequate time for meals  - Recommend/ encourage appropriate diets, oral nutritional supplements, and vitamin/mineral supplements  - Order, calculate, and assess calorie counts as needed  - Recommend, monitor, and adjust tube feedings and TPN/PPN based on assessed needs  - Assess need for intravenous fluids  - Provide specific nutrition/hydration education as appropriate  - Include patient/family/caregiver in decisions related to nutrition  Outcome: Progressing     Problem: RESPIRATORY - ADULT  Goal: Achieves optimal ventilation and oxygenation  Description: INTERVENTIONS:  - Assess for changes in respiratory status  - Assess for changes in mentation and behavior  - Position to facilitate oxygenation and minimize respiratory effort  - Oxygen administered by appropriate delivery if ordered  - Initiate smoking cessation education as indicated  - Encourage broncho-pulmonary hygiene including cough, deep breathe, Incentive Spirometry  - Assess the need for suctioning and aspirate as needed  - Assess and instruct to report SOB or any respiratory difficulty  - Respiratory Therapy support as indicated  Outcome: Progressing

## 2024-09-26 NOTE — CASE MANAGEMENT
Case Management Discharge Planning Note    Patient name Marcin Sánchez  Location [facility]U 10/MICU 10 MRN [id number]  : 1960 Date 2024       Current Admission Date: 2024  Current Admission Diagnosis:Acute hypoxic respiratory failure (HCC)   Patient Active Problem List    Diagnosis Date Noted Date Diagnosed    Acute hypoxic respiratory failure (HCC) 2024     Shock (HCC) 2024     Mucus plugging of bronchi 2024     Transaminitis 2024     Cardiac arrest (HCC) 2024     Pericardial effusion 2024     Acute on chronic respiratory failure with hypoxia and hypercapnia (HCC) 2024     Acute metabolic encephalopathy 2024     Chronic combined systolic and diastolic CHF (congestive heart failure) (HCC) 2024     Atrial fibrillation (HCC) 2024     Pulmonary fibrosis (HCC) 2024     Suspected chronic pulmonary embolism (HCC) 2024     Squamous cell lung cancer (HCC) 2024     Hyponatremia 2024     Severe protein-calorie malnutrition (HCC) 2024     Edema of both legs 2022     Tobacco abuse 2020     Nonrheumatic aortic valve insufficiency 2020     Malignant neoplasm of upper lobe of right lung (HCC) 2018     Thoracic aortic aneurysm without rupture (HCC) 2018     Cancer of right main bronchus (HCC) 10/04/2016     Pleural effusion 10/02/2016     Multiple lung nodules on CT 10/02/2016     Collapse of right lung 2016     Acute exacerbation of chronic obstructive pulmonary disease (COPD) (HCC)      Epilepsy (HCC)      Lyme disease      Major depression      Alcohol abuse        LOS (days): 18  Geometric Mean LOS (GMLOS) (days): 5.1  Days to GMLOS:-12.7     OBJECTIVE:  Risk of Unplanned Readmission Score: 34.14         Current admission status: Inpatient   Preferred Pharmacy:   [facility] - [address] [facility],  [address] [place] [id number]  Phone: [phone]  Fax: [phone]    [place], [place] - [address] [address]  Phone: [phone] Fax: [phone]    Primary Care Provider: Brandt Godinez MD    Primary Insurance: [facility]  Secondary Insurance:     DISCHARGE DETAILS:                Other Referral/Resources/Interventions Provided:  Referral Comments: Per message in [facility] from  [facility] they are able to accept pt if [employer] authorizes it and consents to a single case agreement for payment to  [facility] since they are not a provider w/[employer] insurance.  [facility] Liaison will submit for auth w/[employer].  If insurance denies [facility] due to not being a provider, [facility] in [place] is able to accept pt and family is agreeable to facility if no [facility] is available closer to where pt's sister lives.  CM spoke w/Michelle @ [facility] and notified of same. Per Michelle, they have beds available.  CM to continue to follow for dcp needs.

## 2024-09-26 NOTE — OCCUPATIONAL THERAPY NOTE
Occupational Therapy Progress Note     Patient Name: Marcin Sánchez  Today's Date: 9/26/2024  Problem List  Principal Problem:    Acute hypoxic respiratory failure (HCC)  Active Problems:    Acute exacerbation of chronic obstructive pulmonary disease (COPD) (HCC)    Cancer of right main bronchus (HCC)    Chronic combined systolic and diastolic CHF (congestive heart failure) (HCC)    Cardiac arrest (HCC)    Pericardial effusion    Shock (HCC)    Mucus plugging of bronchi          09/26/24 1029   OT Last Visit   OT Visit Date 09/26/24   Note Type   Note Type Treatment   Pain Assessment   Pain Assessment Tool 0-10   Pain Score 10 - Worst Possible Pain   Pain Location/Orientation Location: Neck  (+ at trach site)   Hospital Pain Intervention(s) Repositioned;Ambulation/increased activity   Restrictions/Precautions   Weight Bearing Precautions Per Order No   Other Precautions Cognitive;Bed Alarm;Chair Alarm;Multiple lines;Telemetry;O2;Fall Risk;Pain  (trach and PEG)   Lifestyle   Autonomy I basic adls, receives assist for showers, ambulates w/o ad - meals/homemaking provided   Reciprocal Relationships supportive facility staff   Service to Others retired   Intrinsic Gratification Likes automechanics   ADL   Grooming Assistance 4  Minimal Assistance   Grooming Deficit Wash/dry face   Grooming Comments increased A for thoroughness. Offered washing hair but pt declines   LB Dressing Assistance 2  Maximal Assistance   LB Dressing Deficit   (sock mgmt)   Bed Mobility   Supine to Sit 3  Moderate assistance   Additional items HOB elevated;Bedrails;Increased time required;LE management;Assist x 1   Sit to Supine 3  Moderate assistance   Additional items Assist x 2;HOB elevated;Bedrails;Increased time required;LE management   Additional Comments Pt maintains EOB sitting position with S/CGA, sat EOB for ~ 10 minutes   Transfers   Sit to Stand Unable to assess   Stand to Sit Unable to assess   Additional Comments Pt adamantly  refusing OOB despite encouragement   Cognition   Overall Cognitive Status Impaired   Arousal/Participation Arousable   Attention Attends with cues to redirect   Orientation Level Unable to assess   Memory Unable to assess   Following Commands Follows one step commands with increased time or repetition   Comments Pt requires increased encouragement to participate. Initially somewhat pleasant but then became irritable and agitated. Limited verbalizations 2* trach but does mouth words at times   Activity Tolerance   Activity Tolerance Patient limited by fatigue;Patient limited by pain;Treatment limited secondary to agitation   Medical Staff Made Aware PT Trista Henriquez RN clearance for session   Assessment   Assessment Patient participated in Skilled OT session this date with interventions consisting of ADL re training with the use of correct body mechnaics, Energy Conservation techniques, safety awareness and fall prevention techniques,  therapeutic activities to: increase activity tolerance, increase dynamic sit/ stand balance during functional activity , and increase OOB/ sitting tolerance .Upon arrival patient was found supine in bed. Pt demonstrated the following tasks: MOD A sup to sit, MOD A x 2 sit to sup, MIN A for grooming, and MAX A for sock mgmt. Pt adamantly refusing OOB at this time, despite encouragement. Patient continues to be functioning below baseline level, occupational performance remains limited secondary to factors listed above and increased risk for falls and injury.   From OT standpoint, recommendation at time of d/c would be STR.  Patient to benefit from continued Occupational Therapy treatment while in the hospital to address deficits as defined above and maximize level of functional independence with ADLs and functional mobility. Pt was left after session with all current needs met. The patient's raw score on the AM-PAC Daily Activity Inpatient Short Form is 13. A raw score of less  than 19 suggests the patient may benefit from discharge to post-acute rehabilitation services. Please refer to the recommendation of the Occupational Therapist for safe discharge planning.   Plan   Treatment Interventions ADL retraining;Functional transfer training;UE strengthening/ROM;Endurance training;Cognitive reorientation;Patient/family training;Equipment evaluation/education;Compensatory technique education;Continued evaluation;Energy conservation;Activityengagement   Goal Expiration Date 10/04/24   OT Treatment Day 1   OT Frequency 1-2x/wk   Discharge Recommendation   Rehab Resource Intensity Level, OT II (Moderate Resource Intensity)   AM-PAC Daily Activity Inpatient   Lower Body Dressing 2   Bathing 2   Toileting 1   Upper Body Dressing 2   Grooming 3   Eating 3   Daily Activity Raw Score 13   Daily Activity Standardized Score (Calc for Raw Score >=11) 32.03   AM-PAC Applied Cognition Inpatient   Following a Speech/Presentation 3   Understanding Ordinary Conversation 3   Taking Medications 3   Remembering Where Things Are Placed or Put Away 3   Remembering List of 4-5 Errands 2   Taking Care of Complicated Tasks 2   Applied Cognition Raw Score 16   Applied Cognition Standardized Score 35.03     Angela Bunn MS, OTR/L

## 2024-09-26 NOTE — UTILIZATION REVIEW
Continued Stay Review    Date: 09/25                          Current Patient Class: Inpatient  Current Level of Care: CC    HPI:64 y.o. male initially admitted on 09/09     Assessment/Plan: Pt febrile ovn w/ Tmax of 100.8, tachypneic, on trach, remains in MV. Off sedation. Alert, responsive to questions on exam. continue to wean vent as tolerated. Continue bronchodilators nebulizer therapy. There is some improvement in resp status w/ diuresis Continue diuresis with IV Lasix, will give 3 times daily today.  Continue metoprolol, Eliquis. Repeat echocardiogram in few weeks to monitor pericardial effusion. Cont TF. Monitor electrolytes and replete as needed while on diuretics. Mon BMP. DC wong cath and void trial today. Continue doxazosin. Start Synthroid to 50 mcg daily and check TSH in 4-6 weeks. Continue Accu-Cheks    Medications:   Scheduled Medications:  apixaban, 5 mg, Oral, BID  budesonide, 0.5 mg, Nebulization, Q12H  chlorhexidine, 15 mL, Mouth/Throat, Q12H [place]  vitamin B-12, 100 mcg, Oral, Daily  doxazosin, 2 mg, Oral, Daily  folic acid, 1 mg, Oral, Daily  furosemide, 40 mg, Intravenous, TID (diuretic)  gabapentin, 400 mg, Per NG Tube, BID  ipratropium, 0.5 mg, Nebulization, TID  levalbuterol, 1.25 mg, Nebulization, TID  levothyroxine, 250 mcg, Oral, Early Morning  melatonin, 6 mg, Oral, HS  metoprolol tartrate, 12.5 mg, Oral, Q12H [facility]  [START ON 10/19/2024] nicotine, 14 mg, Transdermal, Daily  nicotine, 1 patch, Transdermal, Daily  [START ON 11/2/2024] nicotine, 7 mg, Transdermal, Daily  oxyCODONE, 5 mg, Per G Tube, Q4H  phenytoin, 75 mg, Per PEG Tube, TID  polyethylene glycol, 17 g, Oral, BID  senna, 8.8 mg, Per G Tube, HS  traZODone, 100 mg, Oral, HS      Continuous IV Infusions:     PRN Meds:  acetaminophen, 650 mg, Oral, Q4H PRN  albuterol, 2.5 mg, Nebulization, Q6H PRN  bisacodyl, 10 mg, Rectal, Daily PRN  fentaNYL, 50 mcg, Intravenous, Q2H PRN      Discharge Plan: TBD    Vital Signs (last 3 days)        Date/Time Temp Pulse Resp BP MAP (mmHg) SpO2 FiO2 (%) O2 Device Patient Position - Orthostatic VS Alecia Coma Scale Score Pain    09/26/24 1337 -- -- -- -- -- 96 % -- -- -- -- --    09/26/24 1313 -- -- -- -- -- -- -- -- -- -- 8    09/26/24 1200 99 °F (37.2 °C) 80 25 111/55 79 96 % -- Ventilator -- -- --    09/26/24 1100 -- 75 24 110/56 80 97 % 40 Ventilator -- -- --    09/26/24 1000 -- 81 23 108/54 78 89 % 40 Ventilator -- -- --    09/26/24 0900 -- 79 17 112/56 80 90 % 40 Ventilator -- -- --    09/26/24 0832 -- -- -- -- -- 90 % -- -- -- -- --    09/26/24 0830 -- -- -- -- -- -- -- -- -- 11 --    09/26/24 0800 -- 76 20 112/56 81 88 % -- -- -- -- --    09/26/24 0700 -- 74 26 106/54 78 93 % 40 Ventilator Lying -- --    09/26/24 0600 -- 74 26 102/51 74 95 % -- -- -- -- --    09/26/24 0500 -- 82 19 96/48 69 98 % -- -- -- -- --    09/26/24 0415 -- -- -- -- -- -- -- -- -- 11 --    09/26/24 0400 100 °F (37.8 °C) 79 20 101/52 74 99 % 40 Ventilator Lying -- --    09/26/24 0300 -- 87 24 107/53 76 98 % -- -- -- -- --    09/26/24 0254 -- -- -- -- -- 98 % -- -- -- -- --    09/26/24 0200 -- 84 26 105/53 76 95 % -- -- -- -- --    09/26/24 0142 100.3 °F (37.9 °C) -- -- -- -- -- -- -- -- -- --    09/26/24 0100 -- 87 19 108/54 78 98 % -- -- -- -- --    09/26/24 0024 -- -- -- -- -- -- -- -- -- -- Med Not Given for Pain - for MAR use only    09/26/24 0011 -- -- -- -- -- -- -- -- -- -- Med Not Given for Pain - for MAR use only    09/26/24 0010 -- -- -- -- -- -- -- -- -- 11 No Pain    09/26/24 0000 100.9 °F (38.3 °C) 96 27 137/63 90 98 % 40 Ventilator Lying -- --    09/25/24 2356 -- -- -- -- -- 93 % -- -- -- -- --    09/25/24 2300 -- 87 27 120/59 85 97 % -- -- -- -- --    09/25/24 2200 -- 87 16 122/58 83 97 % -- -- -- -- --    09/25/24 2100 -- 84 22 127/61 88 96 % -- -- -- -- --    09/25/24 2012 -- 87 -- 124/58 -- -- -- -- -- -- No Pain    09/25/24 2010 -- -- -- -- -- -- -- -- -- 11 No Pain    09/25/24 2000 98.7 °F (37.1 °C) 86 30 124/58  83 98 % 40 Ventilator Lying -- --    09/25/24 1935 -- -- -- -- -- 96 % -- -- -- -- --    09/25/24 1800 -- 96 26 136/61 88 79 % -- -- -- -- --    09/25/24 1700 97.9 °F (36.6 °C) 86 26 122/58 83 94 % -- -- -- -- --    09/25/24 1653 -- -- -- -- -- -- -- -- -- -- 4    09/25/24 1635 -- -- -- -- -- -- -- -- -- 11 4    09/25/24 1600 -- 82 24 121/60 86 98 % -- -- -- -- --    09/25/24 1500 -- 82 23 118/59 85 98 % -- -- -- -- --    09/25/24 1400 -- 81 24 121/56 81 99 % -- -- -- -- --    09/25/24 1300 -- 75 22 108/54 78 100 % -- -- -- -- --    09/25/24 1201 99.1 °F (37.3 °C) 80 24 110/54 78 99 % -- Ventilator Lying 11 No Pain    09/25/24 1100 -- 69 19 116/56 81 97 % -- -- -- -- --    09/25/24 1000 -- 77 29 117/57 82 99 % -- -- -- -- --    09/25/24 0900 98.4 °F (36.9 °C) 77 29 114/56 81 100 % -- -- -- 11 2    09/25/24 0847 -- -- -- -- -- -- -- -- -- -- 2    09/25/24 0846 -- 74 -- -- -- -- -- -- -- -- --    09/25/24 0844 -- -- -- 119/57 -- -- -- -- -- -- --    09/25/24 0700 -- 80 24 112/56 80 100 % -- -- -- -- --    09/25/24 0611 -- -- -- -- -- 95 % -- -- -- -- --    09/25/24 0600 -- 80 30 138/62 89 95 % -- -- -- -- --    09/25/24 0529 97.2 °F (36.2 °C) -- -- -- -- -- -- -- -- -- --    09/25/24 0500 -- 84 21 122/60 87 100 % -- -- -- -- --    09/25/24 0400 -- 78 20 102/53 76 100 % -- -- -- 11 No Pain    09/25/24 0300 -- 85 27 109/55 79 98 % -- -- -- -- 8    09/25/24 0222 100.5 °F (38.1 °C) -- -- -- -- -- -- -- -- -- --    09/25/24 0200 -- 76 18 102/50 72 100 % -- -- -- -- --    09/25/24 0100 -- 83 22 106/53 76 100 % -- -- -- -- --    09/25/24 0030 100.6 °F (38.1 °C) 81 24 -- -- 100 % -- -- -- -- --    09/25/24 0015 -- -- -- -- -- -- -- -- -- 11 No Pain    09/25/24 0000 -- 81 32 112/55 79 99 % -- -- -- -- --    09/24/24 2359 -- -- -- -- -- 98 % -- -- -- -- --    09/24/24 2308 -- -- -- -- -- 98 % -- -- -- -- --    09/24/24 2300 -- 85 38 111/56 80 99 % -- -- -- -- --    09/24/24 2200 100.5 °F (38.1 °C) 93 33 113/59 83 -- -- -- -- --  --    09/24/24 2100 -- 84 28 103/56 77 91 % -- -- -- -- --    09/24/24 2015 -- -- -- -- -- -- -- -- -- 11 No Pain    09/24/24 2000 -- 89 37 112/53 77 93 % 40 Ventilator -- -- --    09/24/24 1945 100.5 °F (38.1 °C) -- -- -- -- -- -- -- -- -- --    09/24/24 1923 -- -- -- -- -- 98 % -- -- -- -- --    09/24/24 1900 -- 83 24 122/57 82 96 % -- -- -- -- --    09/24/24 1800 -- 87 31 127/59 85 97 % -- -- -- -- --    09/24/24 1700 -- 93 39 120/63 87 95 % -- -- -- -- --    09/24/24 1642 -- -- -- -- -- 96 % -- -- -- -- --    09/24/24 1630 -- -- -- -- -- -- -- -- -- 11 No Pain    09/24/24 1605 100.2 °F (37.9 °C) 90 24 109/51 74 -- -- -- -- -- No Pain    09/24/24 1500 -- 92 25 112/56 80 96 % -- -- -- -- --    09/24/24 1400 -- 84 27 110/56 80 93 % -- -- -- 11 --    09/24/24 1300 98.8 °F (37.1 °C) 78 22 107/53 77 97 % 40 Ventilator -- -- --    09/24/24 1200 -- 88 24 110/56 80 97 % -- -- -- -- --    09/24/24 1000 -- 86 30 117/58 81 98 % -- -- -- -- --    09/24/24 0900 -- 81 27 125/60 87 94 % -- -- -- -- --    09/24/24 0815 -- -- -- -- -- -- -- -- -- 11 --    09/24/24 0800 -- 81 32 117/57 82 97 % -- -- -- -- --    09/24/24 0743 -- -- -- -- -- 99 % -- -- -- -- --    09/24/24 0700 -- 85 22 122/57 82 97 % -- -- -- -- --    09/24/24 0600 -- 112 30 175/77 111 96 % -- -- -- -- --    09/24/24 0500 99.3 °F (37.4 °C) 78 24 111/53 76 97 % -- -- -- -- --    09/24/24 0400 -- 82 31 119/56 81 96 % -- Ventilator -- 11 7 09/24/24 0305 -- -- -- -- -- 99 % -- -- -- -- --    09/24/24 0300 -- 98 29 151/65 94 97 % -- -- -- -- --    09/24/24 0200 -- 84 21 107/53 77 95 % -- -- -- -- --    09/24/24 0100 -- 90 27 106/53 76 99 % -- -- -- -- --    09/24/24 0000 -- 84 20 108/54 78 98 % -- Ventilator -- 11 5 09/23/24 2313 98.9 °F (37.2 °C) -- -- -- -- -- -- -- -- -- --    09/23/24 2300 -- 88 29 104/51 72 98 % -- -- -- -- --    09/23/24 2259 -- -- -- -- -- 97 % -- -- -- -- --    09/23/24 2200 -- 97 32 105/51 74 96 % -- -- -- -- --    09/23/24 2100 -- 98 30  116/58 84 97 % -- -- -- -- --    09/23/24 2046 -- -- -- -- -- -- -- -- -- 11 5    09/23/24 2000 100.4 °F (38 °C) 96 26 112/55 79 95 % -- Ventilator -- -- --    09/23/24 1902 -- -- -- -- -- 94 % -- -- -- -- --    09/23/24 1605 -- -- -- -- -- -- -- -- -- -- 7    09/23/24 1600 99 °F (37.2 °C) 100 26 131/59 85 96 % -- Ventilator Lying 11 --    09/23/24 1522 -- -- -- -- -- 96 % -- -- -- -- --    09/23/24 1500 -- 94 35 123/58 84 96 % -- -- -- -- --    09/23/24 1400 -- 86 28 116/57 82 97 % -- -- -- -- --    09/23/24 1330 -- 84 30 114/55 79 97 % -- -- -- -- --    09/23/24 1300 -- 95 27 157/68 98 95 % -- -- -- -- --    09/23/24 1243 -- -- -- -- -- -- -- -- -- -- 7    09/23/24 1200 99 °F (37.2 °C) 86 25 110/57 82 96 % -- Ventilator Lying 11 4    09/23/24 1110 -- -- -- -- -- 98 % -- -- -- -- --    09/23/24 1100 -- 83 20 115/55 79 -- -- -- -- -- --    09/23/24 1000 -- 74 16 103/52 75 97 % -- -- -- -- --    09/23/24 0900 -- 89 33 120/58 83 95 % -- -- -- -- --    09/23/24 0810 -- 73 14 101/51 73 98 % -- -- -- -- --    09/23/24 0800 99.4 °F (37.4 °C) 73 19 101/51 73 96 % -- Ventilator Lying 11 No Pain    09/23/24 0700 -- 79 21 100/51 74 98 % -- -- -- -- --    09/23/24 0634 -- -- -- -- -- 98 % -- -- -- -- --    09/23/24 0600 -- 72 21 111/56 81 97 % -- -- -- -- --    09/23/24 0500 -- 74 20 111/54 78 97 % -- -- -- -- --    09/23/24 0400 99 °F (37.2 °C) 67 14 92/47 67 97 % -- Ventilator Lying 11 --    09/23/24 0321 -- -- -- -- -- 98 % -- -- -- -- --    09/23/24 0300 -- 77 18 97/49 70 98 % -- -- -- -- --    09/23/24 0200 -- 73 17 88/47 65 98 % -- -- -- -- --    09/23/24 0152 -- -- -- -- -- 98 % -- -- -- -- --    09/23/24 0100 -- 84 29 106/52 75 98 % -- -- -- -- --    09/23/24 0000 98.8 °F (37.1 °C) 71 18 94/49 69 97 % 50 Ventilator Lying 11 --          Weight (last 2 days)       Date/Time Weight    09/26/24 0542 --     Weight: Unable to weight pt. bed not working at 09/26/24 0542            Pertinent Labs/Diagnostic Results:    Radiology:  XR chest portable ICU   Final Interpretation by Vargas Oconnor MD (09/25 1027)      Persistent bilateral infiltrates and dense opacity at the right apex and pleural effusions without significant change from prior            Workstation performed: [id number]         XR chest portable ICU   Final Interpretation by Nieves Burks MD (09/22 1026)      Tracheostomy projecting over the trachea.      No change in extensive bilateral consolidation.      Question pleural effusions.            Workstation performed: [id number]         XR chest portable   Final Interpretation by Bonnie Castillo MD (09/19 2208)      Tracheostomy tube in adequate position.      Increasing opacity in the left lower lung, which may represent pneumonia or an enlarging left pleural effusion.      Unchanged appearance of the right hemithorax, as detailed above.                  Workstation performed: [id number]         XR chest portable ICU   Final Interpretation by Xavier Fall MD (09/16 1627)      Improved aeration of the right lung, with persistent right hemithoracic volume loss and dense right apical consolidation. There is a also superimposed right pleural effusion and diffuse interstitial lung disease.            Workstation performed: [id number]         XR chest portable ICU   Final Interpretation by Sd Lamb MD (09/14 1344)      1.  Tip of endotracheal tube 3.5 cm above the chery.      2.  Progressive atelectasis of the right lung since 9/10/2024 with further right-sided mediastinal shift.      3.  Small left pleural effusion and moderate size right effusion, increased in size since 9/10/2024.      4.  Pulmonary vascular congestion.            Workstation performed: [id number]         XR chest portable ICU   Final Interpretation by Juanito Lee DO (09/11 0231)      Large right and trace left pleural effusion.            Workstation performed: [id number]         XR chest portable   Final  Interpretation by Matt Russ MD (09/09 0949)      Tubes and lines in satisfactory position. No pneumothorax.      Unchanged interstitial edema and small right pleural effusion.      Resident: John Blake I, the attending radiologist, have reviewed the images and agree with the final report above.      Workstation performed: [id number]           Cardiology:  ECG 12 lead   Final Result by Vargas Joiner DO (09/26 0748)   Sinus rhythm with 1st degree A-V block   T wave abnormality, consider inferior ischemia   Abnormal ECG   When compared with ECG of 23-SEP-2024 12:27,   Fusion complexes are no longer Present   Confirmed by Vargas Joiner ([id number]) on 9/26/2024 7:48:44 AM      ECG 12 lead   Final Result by Hedy Lopez MD (09/23 1438)   Sinus rhythm with 1st degree A-V block with Fusion complexes   T wave abnormality, consider inferior ischemia   Abnormal ECG   When compared with ECG of 16-SEP-2024 08:35,   Fusion complexes are now Present   VA interval has increased   ST no longer elevated in Anterior leads   Nonspecific T wave abnormality, worse in Anterior leads   Nonspecific T wave abnormality has replaced inverted T waves in Lateral    leads   Confirmed by Hedy Lopez ([id number]) on 9/23/2024 2:38:32 PM      Echo follow up/limited w/ contrast if indicated   Final Result by Eric Montiel MD (09/18 4827)        Left Ventricle: Left ventricular cavity size is normal. Wall thickness    is moderately increased. The left ventricular ejection fraction is 53%.    Systolic function is normal. Diastolic function is moderately abnormal,    consistent with grade II (pseudonormal) relaxation.     The following segments are hypokinetic: mid anteroseptal and mid    inferoseptal.     All other segments are normal.     IVS: There is diastolic flattening of the interventricular septum    consistent with right ventricle volume overload.     Aorta: The aortic root is severely dilated.     Pericardium:  There is a moderate pericardial effusion circumferential    to the heart. The fluid exhibits no internal echoes. The largest diameter    measures 1.9 cm. There is no echocardiographic evidence of tamponade. The    evidence against tamponade includes: no right ventricular diastolic    collapse, no right atrial inversion and no respiratory variation. There is    a large left pleural effusion.     Prior TTE study available for comparison. Prior study date: 9/11/2024.    Changes noted when compared to prior study. Changes include: Pericardial    effusion seems to be slightly reduced in size. .         ECG 12 lead   Final Result by Bernardo Wallace MD (09/16 1254)   Sinus tachycardia   Low voltage QRS   ST & T wave abnormality, consider lateral ischemia   Abnormal ECG   Reconfirmed by Bernardo Wallace ([id number]) on 9/16/2024 12:54:36 PM      ECG 12 lead   Final Result by Dioni Silva MD (09/13 0914)   Normal sinus rhythm   T wave abnormality, consider inferolateral ischemia   Abnormal ECG   When compared with ECG of 11-SEP-2024 00:45,   No significant change was found   Confirmed by Dioni Silva ([id number]) on 9/13/2024 9:14:13 AM      Echo follow up/limited w/ contrast if indicated   Final Result by Vargas Joiner DO (09/11 1200)        Left Ventricle: Left ventricular cavity size is normal. Wall thickness    is normal. The left ventricular ejection fraction is 55%. Systolic    function is normal.     The following segments are hypokinetic: apex.     All other segments are normal.     Aortic Valve: There is moderate to severe regurgitation with a    centrally directed jet.     Tricuspid Valve: There is mild regurgitation.     Aorta: The aortic root is normal in size. The ascending aorta is    severely dilated. The ascending aorta is 5.4 cm. There is an aneurysm in    the ascending aorta.     Pericardium: There is a moderate pericardial effusion circumferential    to the heart. There is no echocardiographic evidence  of tamponade. There    is a left pleural effusion.         ECG 12 lead   Final Result by Dioni Silva MD (09/11 0805)   Normal sinus rhythm   Incomplete left bundle branch block   T wave abnormality, consider inferolateral ischemia   Abnormal ECG   When compared with ECG of 10-SEP-2024 12:11,   Premature supraventricular complexes are no longer Present      Confirmed by Dioni Silva ([id number]) on 9/11/2024 8:05:10 AM      ECG 12 lead   Final Result by Felicita Nicholas MD (09/10 1556)   Sinus rhythm with 1st degree A-V block with Premature supraventricular    complexes   Incomplete left bundle branch block   ST & T wave abnormality, consider lateral ischemia   Abnormal ECG   When compared with ECG of 08-SEP-2024 19:56,   Premature supraventricular complexes are now Present      Confirmed by Felicita Nicholas ([id number]) on 9/10/2024 3:56:28 PM      Echo follow up/limited w/ contrast if indicated   Final Result by Aura Dimas DO (09/09 1212)        Left Ventricle: Left ventricular cavity size is normal. Wall thickness    is normal. The left ventricular ejection fraction is 50%. Systolic    function is low normal. There is global hypokinesis with regional    variation.     Right Ventricle: Right ventricular cavity size is normal. Systolic    function is normal.     Aortic Valve: There is moderate to severe regurgitation.     Pericardium: There is a moderate to large pericardial effusion    circumferential to the heart measuring largest along the RV free wall at    2.3 cm. The fluid exhibits no internal echoes. There is no    echocardiographic evidence of tamponade. The evidence against tamponade    includes: no right ventricular diastolic collapse, no right atrial    inversion and no respiratory variation. There is a left pleural effusion.         ECG 12 lead   Final Result by Effie Davis MD (09/09 0103)   Normal sinus rhythm   Technically poor tracing      Poor data quality, interpretation may be adversely affected       Confirmed by Effie Davis ([id number]) on 9/9/2024 1:03:05 AM        GI:  Bronchoscopy   Final Result by Amos Poewll DO (09/13 9664)   Right main stem mucous plug   Friable mucosa over right and left lung       RECOMMENDATION:   Continue mechanical ventilation, add on chest percussion therapy for    airway clearance         Bronchoscopy   Final Result by Romy Hill MD (09/14 1232)   Moderate friable mucosa in the trachea, main chery, left lung and right    lung   Bloody mucus plugs with 51-75% obstruction removed with suction from the    bronchus intermedius and RLL         RECOMMENDATION:   Continue monitoring in the ICU          I was the supervising physician and present for the entire procedure on    9/13/2024      There was mucous plugging of the right lower lobe present along with some    bloody secretions causing mild to moderate obstruction of the left    mainstem.  Both were suctioned and cleared.      Romy Hill MD         Bronchoscopy   Final Result by Romy Hill MD (09/12 8684)   Excessive, thin and brown secretions present in all observed locations,    including the trachea, main chery, left lung and right lung   Bloody mucus plugs with greater than 75% obstruction removed with suction    from the bronchus intermedius and RML   Friable mucosa in the left lung and right lung         RECOMMENDATION:   Continue monitoring in the ICU   Follow up results of washing culture, gram stain and cytology             I was the supervising physician and present for the entire procedure on    09/11/2024. I also participated in the procedure.       Severe mucus plugging in the RLL with brownship/bloody secretions.    Suctioned and cleared. Will send for culture and cytology.       Romy Hill MD                 Results from last 7 days   Lab Units 09/26/24  0434 09/25/24  1750 09/25/24  0525 09/24/24  0634 09/22/24  0509   WBC Thousand/uL 8.76  --  5.99 8.02 7.06   HEMOGLOBIN g/dL 7.6* 7.8* 7.3* 8.8* 8.1*  "  HEMATOCRIT % 22.7* 23.5* 22.4* 26.8* 24.6*   PLATELETS Thousands/uL 249  --  227 280 264   TOTAL NEUT ABS Thousands/µL 7.16  --  4.27 6.35 5.50         Results from last 7 days   Lab Units 09/26/24  0434 09/25/24  1750 09/25/24  0525 09/24/24  1600 09/24/24  0634 09/23/24  2151 09/22/24  0509 09/21/24  0511 09/20/24  0541   SODIUM mmol/L 130* 132* 133* 137 135   < > 135 136 136   POTASSIUM mmol/L 4.4 4.3 4.0 3.9 4.1   < > 4.0 4.0 4.4   CHLORIDE mmol/L 87* 87* 91* 92* 91*   < > 97 100 100   CO2 mmol/L 40* 41* 37* 40* 37*   < > 33* 33* 33*   ANION GAP mmol/L 3* 4 5 5 7   < > 5 3* 3*   BUN mg/dL 19 18 17 16 16   < > 17 21 23   CREATININE mg/dL 0.55* 0.49* 0.49* 0.49* 0.48*   < > 0.57* 0.54* 0.58*   EGFR ml/min/1.73sq m 110 115 115 115 116   < > 108 110 107   CALCIUM mg/dL 8.1* 8.4 8.0* 8.0* 8.4   < > 8.2* 8.2* 8.2*   CALCIUM, IONIZED mmol/L 1.05*  --  0.99*  --   --   --  1.13 1.17 1.11*   MAGNESIUM mg/dL 1.8*  --  2.0 2.4 1.6*  --  1.8* 1.8* 1.7*   PHOSPHORUS mg/dL 4.2*  --  3.6  --   --   --  3.7 3.3 3.9    < > = values in this interval not displayed.     Results from last 7 days   Lab Units 09/22/24  0820   ALBUMIN g/dL 2.8*     Results from last 7 days   Lab Units 09/21/24  1256 09/20/24  1733 09/20/24  1139 09/20/24  0549 09/20/24  0027 09/19/24  1651   POC GLUCOSE mg/dl 114 103 108 122 98 100     Results from last 7 days   Lab Units 09/26/24  0434 09/25/24  1750 09/25/24  0525 09/24/24  1600 09/24/24  0634 09/23/24  2151 09/22/24  0509 09/21/24  0511 09/20/24  0541   GLUCOSE RANDOM mg/dL 156* 121 136 112 142* 131 130 129 122             No results found for: \"BETA-HYDROXYBUTYRATE\"                           Results from last 7 days   Lab Units 09/23/24  0446 09/22/24  0239 09/21/24  1943 09/20/24  2330 09/20/24  1729 09/20/24  0541   PROTIME seconds  --   --   --   --  16.3* 15.5*   INR   --   --   --   --  1.28* 1.20*   PTT seconds 48* 81* 89*   < > 45*  --     < > = values in this interval not displayed. "     Results from last 7 days   Lab Units 09/24/24  0634   TSH 3RD GENERATON uIU/mL 45.059*     Results from last 7 days   Lab Units 09/25/24  0525   PROCALCITONIN ng/ml 0.24                                                                                                       Network Utilization Review Department  ATTENTION: Please call with any questions or concerns to [phone] and carefully listen to the prompts so that you are directed to the right person. All voicemails are confidential.   For Discharge needs, contact Care Management DC Support Team at [phone] opt. 2  Send all requests for admission clinical reviews, approved or denied determinations and any other requests to dedicated fax number below belonging to the campus where the patient is receiving treatment. List of dedicated fax numbers for the Facilities:  FACILITY NAME UR FAX NUMBER   ADMISSION DENIALS (Administrative/Medical Necessity) [phone]   DISCHARGE SUPPORT TEAM (NETWORK) [phone]   PARENT CHILD HEALTH (Maternity/NICU/Pediatrics) [phone]   [facility] [phone]   [facility] [phone]   [facility] [phone]   [facility] [phone]   [facility] [phone]   [facility] [phone]   [facility] [phone]   [facility] [phone]   [facility] [phone]   [facility] [phone]   [facility] [phone]   [facility] [phone]

## 2024-09-26 NOTE — PROGRESS NOTES
"Progress Note - Critical Care/ICU   Name: Marcin Sánchez 64 y.o. male I MRN: [id number]  Unit/Bed#: MICU 10 I Date of Admission: 9/8/2024   Date of Service: 9/26/2024 I Hospital Day: 18       Assessment & Plan   Neuro:   Diagnosis: History of epilepsy  Plan:  Continue Phenytoin 75mg BID  Continue Gabapentin 400mg BID  Monitor Phenytoin level, follow up Thursday     Diagnosis: Sedation/analgesia  Plan:  Oxy 5mg Q4  Off Precedex  RASS goal 0; alert and calm     Diagnosis: Insomnia  Plan:  Trazodone 100mg daily at bedtime  Monitor Qtc (444 on 9/25)     Diagnosis: At risk for ICU delirium  Plan:  CAM ICU BID  Regulate sleep/wake cycle     CV:   Diagnosis: Pericardial effusion   Echo 9/9  \"moderate to large pericardial effusion circumferential to the heart measuring largest along the RV free wall at 2.3 cm. The fluid exhibits no internal echoes. There is no echocardiographic evidence of tamponade.\"  Evaluated by CT surgery, no intervention at this time  Plan:  Continue to monitor for signs of tamponade     Diagnosis: History of Atrial fibrillation on outpatient Eliquis  Plan:  Home regimen: Metoprolol 25 mg daily, Eliquis 5 mg BID  On metoprolol tartrate 12.5 mg Q12H  Restarted home Eliquis, off heparin drip     Diagnosis: Acute on Chronic combined diastolic and systolic CHF  Diagnosis: Moderate to severe aortic regurgitation  9/11 ECHO: LVEF 55%, hypokinetic apex. Moderate to severe aortic regurgitation, mild tricuspid regurgitation, dilated ascending aortic root up to 5.4 cm.  Plan:  Continue IV lasix for diuresis  Continue to monitor fluid status      Diagnosis: History of AAA  5.4 cm  Plan: Monitor hemodynamics; BP goal <130/80, will likely require outpatient management     Pulm:  Diagnosis: Acute on Chronic hypoxic and hypercapnic respiratory failure.  Diagnosis: COPD, and acute exacerbation  Likely multifactorial including moderate to severe COPD; no recent PFTs on file, last PFTs obtained 217 revealed FEV1 " 51%  Patient with nicotine dependence of 70 pack years.  Course complicated by recurrent mucous plugging, status post therapeutic bronch x 3.  Sputum culture with Moraxella, status post course of azithromycin x 5 days.  Also with MDR acinetobacter likely contaminant versus colonization as patient has remained stable without antibiotic treatment.  Status post intubated x 3  Status post trach 9/19  Low clinical suspicion for infection and pneumonia at this time.  Plan:  Continue vent support with daily SBT's  Continue Xopenex and Atrovent TID  Continue pulmicort BID  Continue Performist BID  Trach care, frequent suctioning     Diagnosis: History of squamous cell cancer of lung post chemo/radiation 2016, cancer of right mainstem  Noted        GI:   Status post PEG 9/19  Continue tube feeds at goal  Bowel regimen: Senokot, Miralax with holding parameters        :   Haynes removed 9/25  Doxazosin 2mg  Continue to monitor Is/Os, renal indices     F/E/N:   F: No maintenance fluids  E: Replete as needed for goal K >4, Phos >3 and Mag >2  N: Continue Jevity 1.2, at goal     Heme/Onc:   Diagnosis: Anemia  Plan:   Continue B12/folate  Monitor AM CBC  Transfuse for Hgb <7 and/or symptomatic anemia     Diagnosis: DVT ppx  SCDs and Eliquis     Endo:   Diagnosis: Hypothyroidism  Home med: Levothyroxine 250 mg  TSH 45.06 on 9/25 from 0.59 on 9/5, free T4 0.34 (9/25)  Restarted on home levothyroxine 250 mg     ID:   Diagnosis: Colonization with Acinetobacter and Moraxella  No acute issues  Continue to monitor WBC count and temperature curve  Completed 5 days of azithromycin 9/10     MSK/Skin:   Diagnosis: At risk for pressure injury   PT/OT when able  Frequent turns/repositioning  Skin surveillance      Disposition: Critical care    ICU Core Measures     Vented Patient  VAP Bundle  VAP bundle ordered     A: Assess, Prevent, and Manage Pain Has pain been assessed? Yes  Need for changes to pain regimen? No   B: Both Spontaneous  Awakening Trials (SATs) and Spontaneous Breathing Trials (SBTs) Plan to perform spontaneous awakening trial today? Yes   Plan to perform spontaneous breathing trial today? Yes   Obvious barriers to extubation? NA   C: Choice of Sedation RASS Goal: 0 Alert and Calm  Need for changes to sedation or analgesia regimen? No   D: Delirium CAM-ICU: Unable to perform secondary to Acute cognitive dysfunction   E: Early Mobility  Plan for early mobility? Yes   F: Family Engagement Plan for family engagement today? Yes       Review of Invasive Devices:            Prophylaxis:  VTE VTE covered by:  apixaban, Oral, 5 mg at 09/25/24 1705       Stress Ulcer  not ordered         24 Hour Events   24hr events: No acute overnight events. Off precedex and receiving oxycodone 5 mg Q4.  Subjective   Review of Systems: See HPI for Review of Systems    Objective                          Vitals I/O      Most Recent Min/Max in 24hrs   Temp 100 °F (37.8 °C) Temp  Min: 97.9 °F (36.6 °C)  Max: 100.9 °F (38.3 °C)   Pulse 74 Pulse  Min: 69  Max: 96   Resp (!) 26 Resp  Min: 16  Max: 30   /51 BP  Min: 96/48  Max: 137/63   O2 Sat 95 % SpO2  Min: 79 %  Max: 100 %      Intake/Output Summary (Last 24 hours) at 9/26/2024 0720  Last data filed at 9/26/2024 0401  Gross per 24 hour   Intake 1958 ml   Output 2925 ml   Net -967 ml       Diet Enteral/Parenteral; Tube Feeding No Oral Diet; Jevity 1.2 Mirza; Cyclic; 75; 18 hours; Prosource Protein Liquid - One Packet; 30; Every 4 hours    Invasive Monitoring           Physical Exam   Physical Exam  Vitals and nursing note reviewed.   Eyes:      Conjunctiva/sclera: Conjunctivae normal.   Skin:     General: Skin is warm and dry.   HENT:      Head: Normocephalic and atraumatic.      Nose: No congestion.   Neck:      Trachea: Tracheostomy present.   Cardiovascular:      Rate and Rhythm: Normal rate and regular rhythm.      Pulses: Normal pulses.      Heart sounds: Normal heart sounds.   Musculoskeletal:       Right lower leg: No edema.      Left lower leg: No edema.   Abdominal: General: There is abdominal type feeding tube.There is no distension.   Constitutional:       General: He is not in acute distress.     Interventions: He is not intubated.  Pulmonary:      Effort: Pulmonary effort is normal. No accessory muscle usage, respiratory distress or accessory muscle usage. He is not intubated.      Breath sounds: Normal breath sounds. No wheezing, rhonchi or rales.   Neurological:      Mental Status: He is calm.      Comments: Resting comfortably in bed. Awakens and shakes head to respond to questions appropriately.          Diagnostic Studies        Lab Results: I have reviewed the following results:      Medications:  Scheduled PRN   apixaban, 5 mg, BID  budesonide, 0.5 mg, Q12H  chlorhexidine, 15 mL, Q12H [place]  vitamin B-12, 100 mcg, Daily  doxazosin, 2 mg, Daily  folic acid, 1 mg, Daily  furosemide, 40 mg, TID (diuretic)  gabapentin, 400 mg, BID  ipratropium, 0.5 mg, TID  levalbuterol, 1.25 mg, TID  levothyroxine, 250 mcg, Early Morning  magnesium sulfate, 4 g, Once  melatonin, 6 mg, HS  metoprolol tartrate, 12.5 mg, Q12H [place]  nicotine, 1 patch, Daily  oxyCODONE, 5 mg, Q4H  phenytoin, 75 mg, Q12H [place]  polyethylene glycol, 17 g, BID  senna, 8.8 mg, HS  traZODone, 100 mg, HS      acetaminophen, 650 mg, Q4H PRN  albuterol, 2.5 mg, Q6H PRN  bisacodyl, 10 mg, Daily PRN  fentaNYL, 50 mcg, Q2H PRN       Continuous          Labs:   CBC    Recent Labs     09/25/24  0525 09/25/24  1750 09/26/24  0434   WBC 5.99  --  8.76   HGB 7.3* 7.8* 7.6*   HCT 22.4* 23.5* 22.7*     --  249     BMP    Recent Labs     09/25/24  1750 09/26/24  0434   SODIUM 132* 130*   K 4.3 4.4   CL 87* 87*   CO2 41* 40*   AGAP 4 3*   BUN 18 19   CREATININE 0.49* 0.55*   CALCIUM 8.4 8.1*       Coags    No recent results     Additional Electrolytes  Recent Labs     09/25/24  0525 09/26/24  0434   MG 2.0 1.8*   PHOS 3.6 4.2*   CAIONIZED 0.99* 1.05*           Blood Gas    No recent results  No recent results LFTs  No recent results    Infectious  Recent Labs     09/25/24  0525   PROCALCITONI 0.24     Glucose  Recent Labs     09/24/24  1600 09/25/24  0525 09/25/24  1750 09/26/24  0434   GLUC 112 136 121 156*

## 2024-09-26 NOTE — WOUND OSTOMY CARE
Progress Note - Wound   Marcin Sánchez 64 y.o. male MRN: [id number]  Unit/Bed#: MICU 10 Encounter: [id number]        Assessment:   Patient is seen for wound care follow-up. Patient seen lying on critical care low air loss mattress. Moderate assist for turning and repositioning. Incontinent of bowel and bladder is managed via internal urinary catheter. Trach in place on vent. On tube feedings.     Findings:  B/L heels are dry intact and sohan with no skin loss or wounds present. Recommend preventative foam dressings and proper offloading/ repositioning.      POA Evolving DTI Sacrum: has fully evolved and now presents as an unstageable. Scattered areas of full thickness skin loss measured together. Wound beds are 100% yellow moist slough tissue. Oksana-wounds are fragile and macerated. Small to moderate serosanguineous drainage noted. Recommend silver alginate and foam dressing.   HA Neck Unstageable: at least full thickness skin loss- cannot appreciate true depth at this time. Pressure from trach plate pressing against neck. Wound bed is moist yellow slough tissue. Oksana-wound is fragile and pink in color. Moderate yellow drainage noted. Recommend silver alginate and mepilex up up      No induration, fluctuance, odor, warmth/temperature differences, redness, or purulence noted to the above noted wounds and skin areas assessed. New dressings applied per orders listed below. Patient tolerated well- no s/s of non-verbal pain or discomfort observed during the encounter. Bedside nurse aware of plan of care. See flow sheets for more detailed assessment findings.      Orders listed below and wound care will continue to follow, call or Secure Chat Message with questions.       Skin Care Plan:  1-Mid Sacro-Buttocks Wound: Cleanse sacro-buttocks with soap and water. Apply melgisorb ag to wound bed and cover with Silicone Bordered Foam Dressing(Mepilex) to area. Ramírez with T for Treatment. Change every other day or  PRN.  2-Turn/reposition q2h or when medically stable for pressure re-distribution on skin .  3-Elevate heels to offload pressure.  4-Moisturize skin daily with skin nourishing cream  5-Ehob cushion in chair when out of bed.  6-Cleanse B/L Heels with soap and water. Pat dry. Apply Silicone Border Foam (Mepilex) to areas. Ramírez with P for Prevention and change every 3 days or PRN soilage/displacement. Peel back and inspect Q-shift.  7-Trach Wound: Cleanse with NSS and dry. Cut silver alginate (Melgisorb Ag) sheet like a split gauze and apply around trach insertion site. Cover with mepilex up dressing (cut like a split gauze). Change daily or PRN soilage/displacement.         WOUNDS:  Wound 09/09/24 Pressure Injury Buttocks (Active)   Wound Image   09/26/24 1129   Wound Description Slough;Yellow;Drainage 09/26/24 1500   Pressure Injury Stage U 09/26/24 1500   Oksana-wound Assessment Fragile;Maceration 09/26/24 1500   Wound Length (cm) 5 cm 09/26/24 1129   Wound Width (cm) 4 cm 09/26/24 1129   Wound Depth (cm) 0.1 cm 09/26/24 1129   Wound Surface Area (cm^2) 20 cm^2 09/26/24 1129   Wound Volume (cm^3) 2 cm^3 09/26/24 1129   Calculated Wound Volume (cm^3) 2 cm^3 09/26/24 1129   Wound Site Closure BECKY 09/26/24 1500   Drainage Amount Small 09/26/24 1500   Drainage Description Serosanguineous 09/26/24 1500   Non-staged Wound Description Full thickness 09/26/24 1500   Treatments Cleansed;Irrigation with NSS;Site care 09/26/24 1500   Dressing Calcium Alginate with Silver;Foam, Silicon (eg. Allevyn, etc) 09/26/24 1500   Wound packed? No 09/26/24 1500   Packing- # removed 0 09/26/24 1500   Packing- # inserted 0 09/26/24 1500   Dressing Changed Changed 09/26/24 1500   Patient Tolerance Tolerated well 09/26/24 1500   Dressing Status Clean;Dry;Intact 09/26/24 1500       Wound 09/26/24 Pressure Injury Throat Distal;Mid (Active)   Wound Image   09/26/24 1500   Wound Description Slough;Yellow;Drainage 09/26/24 1500   Pressure Injury  Stage U 09/26/24 1500   Oksana-wound Assessment Fragile 09/26/24 1500   Wound Length (cm) 1.5 cm 09/26/24 1500   Wound Width (cm) 0.5 cm 09/26/24 1500   Wound Depth (cm) 0.2 cm 09/26/24 1500   Wound Surface Area (cm^2) 0.75 cm^2 09/26/24 1500   Wound Volume (cm^3) 0.15 cm^3 09/26/24 1500   Calculated Wound Volume (cm^3) 0.15 cm^3 09/26/24 1500   Drainage Amount Moderate 09/26/24 1500   Drainage Description Yellow 09/26/24 1500   Non-staged Wound Description Full thickness 09/26/24 1500   Treatments Cleansed;Site care;Irrigation with NSS 09/26/24 1500   Dressing Calcium Alginate with Silver;Foam 09/26/24 1500   Dressing Changed Changed 09/26/24 1500   Patient Tolerance Tolerated poorly 09/26/24 1500   Dressing Status Intact 09/26/24 1500                Barb Rowan RN, BSN, CWOCN

## 2024-09-26 NOTE — PLAN OF CARE
Problem: OCCUPATIONAL THERAPY ADULT  Goal: Performs self-care activities at highest level of function for planned discharge setting.  See evaluation for individualized goals.  Description: Treatment Interventions: ADL retraining, Functional transfer training, UE strengthening/ROM, Endurance training, Cognitive reorientation, Patient/family training, Equipment evaluation/education, Compensatory technique education, Activityengagement          See flowsheet documentation for full assessment, interventions and recommendations.   Outcome: Progressing  Note: Limitation: Decreased ADL status, Decreased UE ROM, Decreased UE strength, Decreased endurance, Decreased self-care trans  Prognosis: Fair  Assessment: Patient participated in Skilled OT session this date with interventions consisting of ADL re training with the use of correct body mechnaics, Energy Conservation techniques, safety awareness and fall prevention techniques,  therapeutic activities to: increase activity tolerance, increase dynamic sit/ stand balance during functional activity , and increase OOB/ sitting tolerance .Upon arrival patient was found supine in bed. Pt demonstrated the following tasks: MOD A sup to sit, MOD A x 2 sit to sup, MIN A for grooming, and MAX A for sock mgmt. Pt adamantly refusing OOB at this time, despite encouragement. Patient continues to be functioning below baseline level, occupational performance remains limited secondary to factors listed above and increased risk for falls and injury.   From OT standpoint, recommendation at time of d/c would be STR.  Patient to benefit from continued Occupational Therapy treatment while in the hospital to address deficits as defined above and maximize level of functional independence with ADLs and functional mobility. Pt was left after session with all current needs met. The patient's raw score on the AM-PAC Daily Activity Inpatient Short Form is 13. A raw score of less than 19 suggests the  patient may benefit from discharge to post-acute rehabilitation services. Please refer to the recommendation of the Occupational Therapist for safe discharge planning.     Rehab Resource Intensity Level, OT: II (Moderate Resource Intensity)

## 2024-09-27 LAB
ANION GAP SERPL CALCULATED.3IONS-SCNC: 2 MMOL/L (ref 4–13)
BASOPHILS # BLD AUTO: 0.07 THOUSANDS/ΜL (ref 0–0.1)
BASOPHILS NFR BLD AUTO: 1 % (ref 0–1)
BUN SERPL-MCNC: 18 MG/DL (ref 5–25)
CA-I BLD-SCNC: 1.07 MMOL/L (ref 1.12–1.32)
CALCIUM SERPL-MCNC: 8 MG/DL (ref 8.4–10.2)
CHLORIDE SERPL-SCNC: 85 MMOL/L (ref 96–108)
CO2 SERPL-SCNC: 42 MMOL/L (ref 21–32)
CREAT SERPL-MCNC: 0.54 MG/DL (ref 0.6–1.3)
EOSINOPHIL # BLD AUTO: 0.21 THOUSAND/ΜL (ref 0–0.61)
EOSINOPHIL NFR BLD AUTO: 3 % (ref 0–6)
ERYTHROCYTE [DISTWIDTH] IN BLOOD BY AUTOMATED COUNT: 14.4 % (ref 11.6–15.1)
GFR SERPL CREATININE-BSD FRML MDRD: 110 ML/MIN/1.73SQ M
GLUCOSE SERPL-MCNC: 120 MG/DL (ref 65–140)
HCT VFR BLD AUTO: 22.5 % (ref 36.5–49.3)
HGB BLD-MCNC: 7.4 G/DL (ref 12–17)
IMM GRANULOCYTES # BLD AUTO: 0.02 THOUSAND/UL (ref 0–0.2)
IMM GRANULOCYTES NFR BLD AUTO: 0 % (ref 0–2)
LYMPHOCYTES # BLD AUTO: 0.67 THOUSANDS/ΜL (ref 0.6–4.47)
LYMPHOCYTES NFR BLD AUTO: 9 % (ref 14–44)
MAGNESIUM SERPL-MCNC: 2 MG/DL (ref 1.9–2.7)
MCH RBC QN AUTO: 32.9 PG (ref 26.8–34.3)
MCHC RBC AUTO-ENTMCNC: 32.9 G/DL (ref 31.4–37.4)
MCV RBC AUTO: 100 FL (ref 82–98)
MONOCYTES # BLD AUTO: 0.84 THOUSAND/ΜL (ref 0.17–1.22)
MONOCYTES NFR BLD AUTO: 12 % (ref 4–12)
NEUTROPHILS # BLD AUTO: 5.48 THOUSANDS/ΜL (ref 1.85–7.62)
NEUTS SEG NFR BLD AUTO: 75 % (ref 43–75)
NRBC BLD AUTO-RTO: 0 /100 WBCS
PHOSPHATE SERPL-MCNC: 3.8 MG/DL (ref 2.3–4.1)
PLATELET # BLD AUTO: 236 THOUSANDS/UL (ref 149–390)
PMV BLD AUTO: 8.7 FL (ref 8.9–12.7)
POTASSIUM SERPL-SCNC: 4.2 MMOL/L (ref 3.5–5.3)
RBC # BLD AUTO: 2.25 MILLION/UL (ref 3.88–5.62)
SODIUM SERPL-SCNC: 129 MMOL/L (ref 135–147)
WBC # BLD AUTO: 7.29 THOUSAND/UL (ref 4.31–10.16)

## 2024-09-27 PROCEDURE — 94760 N-INVAS EAR/PLS OXIMETRY 1: CPT

## 2024-09-27 PROCEDURE — 84100 ASSAY OF PHOSPHORUS: CPT

## 2024-09-27 PROCEDURE — 82330 ASSAY OF CALCIUM: CPT

## 2024-09-27 PROCEDURE — 85025 COMPLETE CBC W/AUTO DIFF WBC: CPT

## 2024-09-27 PROCEDURE — 94003 VENT MGMT INPAT SUBQ DAY: CPT

## 2024-09-27 PROCEDURE — 83735 ASSAY OF MAGNESIUM: CPT

## 2024-09-27 PROCEDURE — 94640 AIRWAY INHALATION TREATMENT: CPT

## 2024-09-27 PROCEDURE — 99291 CRITICAL CARE FIRST HOUR: CPT | Performed by: INTERNAL MEDICINE

## 2024-09-27 PROCEDURE — 80048 BASIC METABOLIC PNL TOTAL CA: CPT

## 2024-09-27 PROCEDURE — 94664 DEMO&/EVAL PT USE INHALER: CPT

## 2024-09-27 PROCEDURE — 94669 MECHANICAL CHEST WALL OSCILL: CPT

## 2024-09-27 RX ORDER — OXYCODONE HCL 5 MG/5 ML
5 SOLUTION, ORAL ORAL EVERY 6 HOURS SCHEDULED
Status: DISCONTINUED | OUTPATIENT
Start: 2024-09-27 | End: 2024-09-27

## 2024-09-27 RX ORDER — CALCIUM GLUCONATE 20 MG/ML
2 INJECTION, SOLUTION INTRAVENOUS ONCE
Status: COMPLETED | OUTPATIENT
Start: 2024-09-27 | End: 2024-09-27

## 2024-09-27 RX ORDER — PHENYTOIN 125 MG/5ML
150 SUSPENSION ORAL EVERY 12 HOURS SCHEDULED
Status: DISCONTINUED | OUTPATIENT
Start: 2024-09-27 | End: 2024-10-10

## 2024-09-27 RX ORDER — SODIUM CHLORIDE 1 G/1
2 TABLET ORAL 2 TIMES DAILY WITH MEALS
Status: DISCONTINUED | OUTPATIENT
Start: 2024-09-27 | End: 2024-10-06

## 2024-09-27 RX ORDER — PHENYTOIN 125 MG/5ML
100 SUSPENSION ORAL 3 TIMES DAILY
Status: DISCONTINUED | OUTPATIENT
Start: 2024-09-27 | End: 2024-09-27

## 2024-09-27 RX ADMIN — DOXAZOSIN 2 MG: 2 TABLET ORAL at 08:37

## 2024-09-27 RX ADMIN — IPRATROPIUM BROMIDE 0.5 MG: 0.5 SOLUTION RESPIRATORY (INHALATION) at 13:16

## 2024-09-27 RX ADMIN — NICOTINE 1 PATCH: 21 PATCH, EXTENDED RELEASE TRANSDERMAL at 08:38

## 2024-09-27 RX ADMIN — FOLIC ACID 1 MG: 1 TABLET ORAL at 08:37

## 2024-09-27 RX ADMIN — Medication 100 MCG: at 08:38

## 2024-09-27 RX ADMIN — IPRATROPIUM BROMIDE 0.5 MG: 0.5 SOLUTION RESPIRATORY (INHALATION) at 08:07

## 2024-09-27 RX ADMIN — Medication 12.5 MG: at 23:03

## 2024-09-27 RX ADMIN — GABAPENTIN 400 MG: 400 CAPSULE ORAL at 08:37

## 2024-09-27 RX ADMIN — CHLORHEXIDINE GLUCONATE 0.12% ORAL RINSE 15 ML: 1.2 LIQUID ORAL at 08:37

## 2024-09-27 RX ADMIN — Medication 12.5 MG: at 08:37

## 2024-09-27 RX ADMIN — PHENYTOIN 150 MG: 125 SUSPENSION ORAL at 23:01

## 2024-09-27 RX ADMIN — BUDESONIDE 0.5 MG: 0.5 INHALANT RESPIRATORY (INHALATION) at 20:10

## 2024-09-27 RX ADMIN — OXYCODONE HYDROCHLORIDE 5 MG: 5 SOLUTION ORAL at 03:36

## 2024-09-27 RX ADMIN — FUROSEMIDE 40 MG: 10 INJECTION, SOLUTION INTRAVENOUS at 12:21

## 2024-09-27 RX ADMIN — LEVALBUTEROL HYDROCHLORIDE 1.25 MG: 1.25 SOLUTION RESPIRATORY (INHALATION) at 08:07

## 2024-09-27 RX ADMIN — IPRATROPIUM BROMIDE 0.5 MG: 0.5 SOLUTION RESPIRATORY (INHALATION) at 20:10

## 2024-09-27 RX ADMIN — LEVOTHYROXINE SODIUM 250 MCG: 100 TABLET ORAL at 06:08

## 2024-09-27 RX ADMIN — PHENYTOIN 75 MG: 125 SUSPENSION ORAL at 08:38

## 2024-09-27 RX ADMIN — POLYETHYLENE GLYCOL 3350 17 G: 17 POWDER, FOR SOLUTION ORAL at 08:37

## 2024-09-27 RX ADMIN — GABAPENTIN 400 MG: 400 CAPSULE ORAL at 17:04

## 2024-09-27 RX ADMIN — LEVALBUTEROL HYDROCHLORIDE 1.25 MG: 1.25 SOLUTION RESPIRATORY (INHALATION) at 20:10

## 2024-09-27 RX ADMIN — OXYCODONE HYDROCHLORIDE 5 MG: 5 SOLUTION ORAL at 00:32

## 2024-09-27 RX ADMIN — POLYETHYLENE GLYCOL 3350 17 G: 17 POWDER, FOR SOLUTION ORAL at 17:04

## 2024-09-27 RX ADMIN — FUROSEMIDE 40 MG: 10 INJECTION, SOLUTION INTRAVENOUS at 17:04

## 2024-09-27 RX ADMIN — CHLORHEXIDINE GLUCONATE 0.12% ORAL RINSE 15 ML: 1.2 LIQUID ORAL at 23:01

## 2024-09-27 RX ADMIN — TRAZODONE HYDROCHLORIDE 100 MG: 100 TABLET ORAL at 23:01

## 2024-09-27 RX ADMIN — OXYCODONE HYDROCHLORIDE 5 MG: 5 SOLUTION ORAL at 08:37

## 2024-09-27 RX ADMIN — APIXABAN 5 MG: 5 TABLET, FILM COATED ORAL at 08:37

## 2024-09-27 RX ADMIN — LEVALBUTEROL HYDROCHLORIDE 1.25 MG: 1.25 SOLUTION RESPIRATORY (INHALATION) at 13:16

## 2024-09-27 RX ADMIN — Medication 6 MG: at 23:01

## 2024-09-27 RX ADMIN — SODIUM CHLORIDE 2 G: 1 TABLET ORAL at 16:37

## 2024-09-27 RX ADMIN — APIXABAN 5 MG: 5 TABLET, FILM COATED ORAL at 17:04

## 2024-09-27 RX ADMIN — BUDESONIDE 0.5 MG: 0.5 INHALANT RESPIRATORY (INHALATION) at 08:07

## 2024-09-27 RX ADMIN — CALCIUM GLUCONATE 2 G: 20 INJECTION, SOLUTION INTRAVENOUS at 06:25

## 2024-09-27 RX ADMIN — FUROSEMIDE 40 MG: 10 INJECTION, SOLUTION INTRAVENOUS at 06:08

## 2024-09-27 NOTE — RESPIRATORY THERAPY NOTE
09/27/24 1341   Respiratory Assessment   Resp Comments Pt placed on 40% TC   Oxygen Therapy/Pulse Ox   O2 Device Trach mask   O2 Therapy Oxygen humidified   FiO2 (%) 40   O2 Flow Rate (L/min) 10 L/min   SpO2 100 %   SpO2 Activity At Rest   $ Pulse Oximetry Spot Check Charge Completed

## 2024-09-27 NOTE — PLAN OF CARE
Problem: Prexisting or High Potential for Compromised Skin Integrity  Goal: Skin integrity is maintained or improved  Description: INTERVENTIONS:  - Identify patients at risk for skin breakdown  - Assess and monitor skin integrity  - Assess and monitor nutrition and hydration status  - Monitor labs   - Assess for incontinence   - Turn and reposition patient  - Assist with mobility/ambulation  - Relieve pressure over bony prominences  - Avoid friction and shearing  - Provide appropriate hygiene as needed including keeping skin clean and dry  - Evaluate need for skin moisturizer/barrier cream  - Collaborate with interdisciplinary team   - Patient/family teaching  - Consider wound care consult   Outcome: Progressing     Problem: PAIN - ADULT  Goal: Verbalizes/displays adequate comfort level or baseline comfort level  Description: Interventions:  - Encourage patient to monitor pain and request assistance  - Assess pain using appropriate pain scale  - Administer analgesics based on type and severity of pain and evaluate response  - Implement non-pharmacological measures as appropriate and evaluate response  - Consider cultural and social influences on pain and pain management  - Notify physician/advanced practitioner if interventions unsuccessful or patient reports new pain  Outcome: Progressing     Problem: INFECTION - ADULT  Goal: Absence or prevention of progression during hospitalization  Description: INTERVENTIONS:  - Assess and monitor for signs and symptoms of infection  - Monitor lab/diagnostic results  - Monitor all insertion sites, i.e. indwelling lines, tubes, and drains  - Monitor endotracheal if appropriate and nasal secretions for changes in amount and color  - Ida appropriate cooling/warming therapies per order  - Administer medications as ordered  - Instruct and encourage patient and family to use good hand hygiene technique  - Identify and instruct in appropriate isolation precautions for  identified infection/condition  Outcome: Progressing  Goal: Absence of fever/infection during neutropenic period  Description: INTERVENTIONS:  - Monitor WBC    Outcome: Progressing     Problem: SAFETY ADULT  Goal: Patient will remain free of falls  Description: INTERVENTIONS:  - Educate patient/family on patient safety including physical limitations  - Instruct patient to call for assistance with activity   - Consult OT/PT to assist with strengthening/mobility   - Keep Call bell within reach  - Keep bed low and locked with side rails adjusted as appropriate  - Keep care items and personal belongings within reach  - Initiate and maintain comfort rounds  - Make Fall Risk Sign visible to staff  - Offer Toileting every  Hours, in advance of need  - Initiate/Maintain alarm  - Obtain necessary fall risk management equipment:   - Apply yellow socks and bracelet for high fall risk patients  - Consider moving patient to room near nurses station  Outcome: Progressing  Goal: Maintain or return to baseline ADL function  Description: INTERVENTIONS:  -  Assess patient's ability to carry out ADLs; assess patient's baseline for ADL function and identify physical deficits which impact ability to perform ADLs (bathing, care of mouth/teeth, toileting, grooming, dressing, etc.)  - Assess/evaluate cause of self-care deficits   - Assess range of motion  - Assess patient's mobility; develop plan if impaired  - Assess patient's need for assistive devices and provide as appropriate  - Encourage maximum independence but intervene and supervise when necessary  - Involve family in performance of ADLs  - Assess for home care needs following discharge   - Consider OT consult to assist with ADL evaluation and planning for discharge  - Provide patient education as appropriate  Outcome: Progressing  Goal: Maintains/Returns to pre admission functional level  Description: INTERVENTIONS:  - Perform AM-PAC 6 Click Basic Mobility/ Daily Activity  assessment daily.  - Set and communicate daily mobility goal to care team and patient/family/caregiver.   - Collaborate with rehabilitation services on mobility goals if consulted  - Perform Range of Motion times a day.  - Reposition patient every  hours.  - Dangle patient  times a day  - Stand patient  times a day  - Ambulate patient  times a day  - Out of bed to abraham times a day   - Out of bed for meals times a day  - Out of bed for toileting  - Record patient progress and toleration of activity level   Outcome: Progressing     Problem: DISCHARGE PLANNING  Goal: Discharge to home or other facility with appropriate resources  Description: INTERVENTIONS:  - Identify barriers to discharge w/patient and caregiver  - Arrange for needed discharge resources and transportation as appropriate  - Identify discharge learning needs (meds, wound care, etc.)  - Arrange for interpretive services to assist at discharge as needed  - Refer to Case Management Department for coordinating discharge planning if the patient needs post-hospital services based on physician/advanced practitioner order or complex needs related to functional status, cognitive ability, or social support system  Outcome: Progressing     Problem: Knowledge Deficit  Goal: Patient/family/caregiver demonstrates understanding of disease process, treatment plan, medications, and discharge instructions  Description: Complete learning assessment and assess knowledge base.  Interventions:  - Provide teaching at level of understanding  - Provide teaching via preferred learning methods  Outcome: Progressing     Problem: Nutrition/Hydration-ADULT  Goal: Nutrient/Hydration intake appropriate for improving, restoring or maintaining nutritional needs  Description: Monitor and assess patient's nutrition/hydration status for malnutrition. Collaborate with interdisciplinary team and initiate plan and interventions as ordered.  Monitor patient's weight and dietary intake as  ordered or per policy. Utilize nutrition screening tool and intervene as necessary. Determine patient's food preferences and provide high-protein, high-caloric foods as appropriate.     INTERVENTIONS:  - Monitor oral intake, urinary output, labs, and treatment plans  - Assess nutrition and hydration status and recommend course of action  - Evaluate amount of meals eaten  - Assist patient with eating if necessary   - Allow adequate time for meals  - Recommend/ encourage appropriate diets, oral nutritional supplements, and vitamin/mineral supplements  - Order, calculate, and assess calorie counts as needed  - Recommend, monitor, and adjust tube feedings and TPN/PPN based on assessed needs  - Assess need for intravenous fluids  - Provide specific nutrition/hydration education as appropriate  - Include patient/family/caregiver in decisions related to nutrition  Outcome: Progressing     Problem: RESPIRATORY - ADULT  Goal: Achieves optimal ventilation and oxygenation  Description: INTERVENTIONS:  - Assess for changes in respiratory status  - Assess for changes in mentation and behavior  - Position to facilitate oxygenation and minimize respiratory effort  - Oxygen administered by appropriate delivery if ordered  - Initiate smoking cessation education as indicated  - Encourage broncho-pulmonary hygiene including cough, deep breathe, Incentive Spirometry  - Assess the need for suctioning and aspirate as needed  - Assess and instruct to report SOB or any respiratory difficulty  - Respiratory Therapy support as indicated  Outcome: Progressing

## 2024-09-27 NOTE — PROGRESS NOTES
"Progress Note - Critical Care/ICU   Name: Marcin Sánchez 64 y.o. male I MRN: [id number]  Unit/Bed#: MICU 10 I Date of Admission: 9/8/2024   Date of Service: 9/27/2024 I Hospital Day: 19       Assessment & Plan   Neuro:   #Seizure disorder  Phenytoin subtherapeutic, increase to 100 mg TID  Continue Gabapentin 400mg BID     #Analgesia  Oxycodone 5 mg scheduled, wean as able     #Insomnia  Trazodone 100mg daily at bedtime  Monitor Qtc (444 on 9/25)     CV:   #Pericardial effusion   Echo 9/9  \"moderate to large pericardial effusion circumferential to the heart measuring largest along the RV free wall at 2.3 cm. The fluid exhibits no internal echoes. 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 Moderate to severe aortic regurgitation, mild tricuspid regurgitation, dilated ascending aortic root up to 5.4 cm.  Continue IV lasix for diuresis with goal output of -1 to -2L/day  Continue to monitor fluid status      #History of AAA  5.4 cm  Monitor hemodynamics; BP goal <130/80, will likely require outpatient management     Pulm:  #Acute on Chronic hypoxic and hypercapnic respiratory failure.  #COPD, and acute exacerbation now resolved  Likely multifactorial including moderate to severe COPD  Course complicated by recurrent mucous plugging, status post therapeutic bronch x 3.  Sputum culture with Moraxella, status post course of azithromycin x 5 days.  Also with MDR acinetobacter likely contaminant versus colonization as patient has remained stable without antibiotic treatment.  Status post trach 9/19  Continue vent support with daily SBT's, wean vent as able  Continue Xopenex and " Atrovent TID  Continue pulmicort BID, Continue Performist BID     #History of squamous cell cancer of lung post chemo/radiation 2016, cancer of right mainstem  Noted     GI:   #PEG placed 9/19  Continue tube feeds at goal  Bowel regimen: Senokot, Miralax     :   Haynes removed 9/25  Doxazosin 2mg  Continue to monitor Is/Os, renal indices     F/E/N:   F: No maintenance fluids  E: Replete as needed, consider sodium replacement or fluid restriction as able for hyponatremia  N: Continue Jevity 1.2, at goal     Heme/Onc:   #Anemia  Continue B12/folate  No signs of active bleeding  Transfuse for Hgb <7 and/or symptomatic anemia    #Metabolic alkalosis  Likely 2/2 ongoing diuresis    #DVT ppx  SCDs and Eliquis     Endo:   #Hypothyroidism  Home med: Levothyroxine 250 mg  TSH 45.06 on 9/25 from 0.59 on 9/5, free T4 0.34 (9/25)  Restarted on home levothyroxine 250 mg  Recheck in 6 weeks     ID:   #Colonization with Acinetobacter and Moraxella  No acute issues  Continue to monitor WBC count and temperature curve  Completed 5 days of azithromycin 9/10    #Fevers  Intermittent low grade fevers  WBC stable  CXR without worsening consolidations  Patient remains without new symptoms  Low clinical suspicion for infection  Continue to monitor     MSK/Skin:   #At risk for pressure injury   PT/OT when able  Frequent turns/repositioning  Skin surveillance      Disposition: Critical care    ICU Core Measures     Vented Patient  VAP Bundle  VAP bundle ordered     A: Assess, Prevent, and Manage Pain Has pain been assessed? Yes  Need for changes to pain regimen? No   B: Both Spontaneous Awakening Trials (SATs) and Spontaneous Breathing Trials (SBTs) Plan to perform spontaneous awakening trial today? N/A   Plan to perform spontaneous breathing trial today? Yes   Obvious barriers to extubation? Yes   C: Choice of Sedation RASS Goal: N/A patient not on sedation  Need for changes to sedation or analgesia regimen? NA   D: Delirium CAM-ICU:  Negative   E: Early Mobility  Plan for early mobility? Yes   F: Family Engagement Plan for family engagement today? Yes       Review of Invasive Devices:            Prophylaxis:  VTE VTE covered by:  apixaban, Oral, 5 mg at 09/26/24 1711       Stress Ulcer  not ordered         24 Hour Events   24hr events: No acute events  Subjective  Patient reports no complaints this morning. Said he will come off vent when he is ready      Objective                          Vitals I/O      Most Recent Min/Max in 24hrs   Temp 98.5 °F (36.9 °C) Temp  Min: 98.5 °F (36.9 °C)  Max: 99 °F (37.2 °C)   Pulse 73 Pulse  Min: 68  Max: 94   Resp 22 Resp  Min: 14  Max: 49   /53 BP  Min: 93/46  Max: 148/66   O2 Sat 99 % SpO2  Min: 88 %  Max: 99 %   Vent: ASV 80/6/60%  Lines: PIV x 2  Drains: none  Airway: Tracheostomy    Intake/Output Summary (Last 24 hours) at 9/27/2024 0608  Last data filed at 9/27/2024 0400  Gross per 24 hour   Intake 2096 ml   Output 1945 ml   Net 151 ml       Diet Enteral/Parenteral; Tube Feeding No Oral Diet; Jevity 1.2 Mirza; Cyclic; 75; 18 hours; Prosource Protein Liquid - One Packet; 30; Every 4 hours    Invasive Monitoring           Physical Exam   Physical Exam  Eyes:      Extraocular Movements: Extraocular movements intact.      Conjunctiva/sclera: Conjunctivae normal.   Skin:     General: Skin is warm and dry.   HENT:      Head: Normocephalic and atraumatic.      Right Ear: No drainage.      Left Ear: No drainage.      Mouth/Throat:      Mouth: Mucous membranes are dry.   Cardiovascular:      Rate and Rhythm: Normal rate and regular rhythm.   Musculoskeletal:      Right lower leg: No edema.      Left lower leg: No edema.   Abdominal: General: Bowel sounds are normal. There is abdominal type feeding tube.     Palpations: Abdomen is soft.   Constitutional:       General: He is not in acute distress.     Appearance: He is not ill-appearing.      Interventions: He is intubated.   Pulmonary:      Effort: Pulmonary  effort is normal. He is intubated.      Comments: Right middle and lower lobe rhonchi present  Neurological:      Mental Status: He is alert and oriented to person, place and time. Mental status is at baseline.          Diagnostic Studies        Lab Results: I have reviewed the following results:      Medications:  Scheduled PRN   apixaban, 5 mg, BID  budesonide, 0.5 mg, Q12H  chlorhexidine, 15 mL, Q12H AMERICA  vitamin B-12, 100 mcg, Daily  doxazosin, 2 mg, Daily  folic acid, 1 mg, Daily  furosemide, 40 mg, TID (diuretic)  gabapentin, 400 mg, BID  ipratropium, 0.5 mg, TID  levalbuterol, 1.25 mg, TID  levothyroxine, 250 mcg, Early Morning  melatonin, 6 mg, HS  metoprolol tartrate, 12.5 mg, Q12H AMERICA  [START ON 10/19/2024] nicotine, 14 mg, Daily  nicotine, 1 patch, Daily  [START ON 11/2/2024] nicotine, 7 mg, Daily  oxyCODONE, 5 mg, Q4H  phenytoin, 75 mg, TID  polyethylene glycol, 17 g, BID  senna, 8.8 mg, HS  traZODone, 100 mg, HS      acetaminophen, 650 mg, Q4H PRN  albuterol, 2.5 mg, Q6H PRN  bisacodyl, 10 mg, Daily PRN  fentaNYL, 50 mcg, Q2H PRN       Continuous          Labs:   CBC    Recent Labs     09/26/24  0434 09/27/24  0527   WBC 8.76 7.29   HGB 7.6* 7.4*   HCT 22.7* 22.5*    236     BMP    Recent Labs     09/25/24  1750 09/26/24  0434   SODIUM 132* 130*   K 4.3 4.4   CL 87* 87*   CO2 41* 40*   AGAP 4 3*   BUN 18 19   CREATININE 0.49* 0.55*   CALCIUM 8.4 8.1*       Coags    No recent results     Additional Electrolytes  Recent Labs     09/26/24  0434 09/27/24  0527   MG 1.8*  --    PHOS 4.2*  --    CAIONIZED 1.05* 1.07*          Blood Gas    No recent results  No recent results LFTs  Recent Labs     09/26/24  0932   ALB 2.5*       Infectious  No recent results  Glucose  Recent Labs     09/25/24  1750 09/26/24  0434   GLUC 121 156*

## 2024-09-27 NOTE — CASE MANAGEMENT
Case Management Discharge Planning Note    Patient name Marcin Sánchez  Location MICU 10/MICU 10 MRN [id number]  : 1960 Date 2024       Current Admission Date: 2024  Current Admission Diagnosis:Acute hypoxic respiratory failure (HCC)   Patient Active Problem List    Diagnosis Date Noted Date Diagnosed    Acute hypoxic respiratory failure (HCC) 2024     Shock (HCC) 2024     Mucus plugging of bronchi 2024     Transaminitis 2024     Cardiac arrest (HCC) 2024     Pericardial effusion 2024     Acute on chronic respiratory failure with hypoxia and hypercapnia (HCC) 2024     Acute metabolic encephalopathy 2024     Chronic combined systolic and diastolic CHF (congestive heart failure) (HCC) 2024     Atrial fibrillation (HCC) 2024     Pulmonary fibrosis (HCC) 2024     Suspected chronic pulmonary embolism (HCC) 2024     Squamous cell lung cancer (HCC) 2024     Hyponatremia 2024     Severe protein-calorie malnutrition (HCC) 2024     Edema of both legs 2022     Tobacco abuse 2020     Nonrheumatic aortic valve insufficiency 2020     Malignant neoplasm of upper lobe of right lung (HCC) 2018     Thoracic aortic aneurysm without rupture (HCC) 2018     Cancer of right main bronchus (HCC) 10/04/2016     Pleural effusion 10/02/2016     Multiple lung nodules on CT 10/02/2016     Collapse of right lung 2016     Acute exacerbation of chronic obstructive pulmonary disease (COPD) (HCC)      Epilepsy (HCC)      Lyme disease      Major depression      Alcohol abuse        LOS (days): 19  Geometric Mean LOS (GMLOS) (days): 5.1  Days to GMLOS:-13.7     OBJECTIVE:  Risk of Unplanned Readmission Score: 34.81         Current admission status: Inpatient   Preferred Pharmacy:   [facility] - [address] [facility],  [address] [place] [id number]  Phone: [phone]  Fax: [phone]    [facility] - [address] [facility]  [address] [facility] [address]  Phone: [phone] Fax: [phone]    Primary Care Provider: Brandt Godinez MD    [facility] Insurance: [facility]  Secondary Insurance:     DISCHARGE DETAILS:                Other Referral/Resources/Interventions Provided:  Referral Comments: Per message from  [facility] - auth submitted to "[facility] via fax.  If approved will still need to get one time single case contract signed by "[facility]  Spoke to Michelle talley/RUEL in [place] -notified of pt family request for  [facility]. Notified Michelle, family agreeable to RUEL in [place] if denied by "[facility] [facility]  CM to continue to follow for dcp needs.

## 2024-09-27 NOTE — RESPIRATORY THERAPY NOTE
09/26/24 3505   Respiratory Assessment   Resp Comments Patient tolerating transition to ASV, breathng comfortably.   Vent Information   Vent type Morris C3   Morris Vent Mode ASV   $ Pulse Oximetry Spot Check Charge Completed   ASV Settings   P-ramp (ms) 100 (ms)   P ASV Limit (cmH2O) 30 cmH2O   ETS (%) 25 %   FIO2 (%) 80 %   PEEP (cmH2O) 6 cmH2O   % MinVol 5.7 %   % MinVol Target 80 %   Flow Trigger (LPM) 3 LPM   Humidification Heater   ASV Actuals   Resp Rate (BPM) 11 BPM   VT (mL) 500 mL   MV (Obs) 5.7   MAP (cmH2O) 5.5 cmH2O   Peak Pressure (cmH2O) 22 cmH2O   Pressure Support (cmH2O) 16 cmH2O   I:E Ratio (Obs) 1:2.9   ASV Alarms   High Peak Pressure (cmH2O) 40 cmH2O   Low Pressure (cmH2O) 5 cmH2O   High Resp Rate (BPM) 40 BPM   Low Resp Rate (BPM) 8 BPM   High MV (L/min) 20 L/min   Low MV (L/min) 3 L/min   High VT (mL) 1000 mL   Low VT (mL) 180 mL   Apnea Time (s) 20 S

## 2024-09-27 NOTE — QUICK NOTE
I have called the patient's sister, Katalina, and provided a medical update. States she will try to visit tomorrow. All questions answered.     Ron Qureshi M.D.  PGY-2 Internal Medicine   [facility]

## 2024-09-27 NOTE — PROGRESS NOTES
Nutrition Follow-Up/Recommendations:    Noted levothyroxine restarted in addition to phenytoin q12 hrs, all of which should have TF held for administration.   Discussed this with critical care, will adjust to bolus feeds to accommodate medication schedule (TF to be held 1 hr before and 1 hr after levothyroxine administration and 1 hour before and 2 hours after phenytoin administration).     Also noted hyponatremia, will change formula to Jevity 1.5 which will provide less free water than Jevity 1.2.     Updated estimated nutrition needs to be better suited for long-term EN given pt is s/p trach.     Updated TF order with new recommendations:   Jevity 1.5, 300mL bolus 4 times daily (0700, 1200, 1600, 2000).   Continue one packet of Prosource daily.   30mL water flushes before and after each bolus to help maintain tube patency.   In total this will provide 1860 kcals, 91g protein, 1212mL free water including all flushes

## 2024-09-28 LAB
BASOPHILS # BLD AUTO: 0.03 THOUSANDS/ΜL (ref 0–0.1)
BASOPHILS NFR BLD AUTO: 1 % (ref 0–1)
BUN SERPL-MCNC: 19 MG/DL (ref 5–25)
CA-I BLD-SCNC: 1.09 MMOL/L (ref 1.12–1.32)
CALCIUM SERPL-MCNC: 8.3 MG/DL (ref 8.4–10.2)
CHLORIDE SERPL-SCNC: 84 MMOL/L (ref 96–108)
CO2 SERPL-SCNC: >45 MMOL/L (ref 21–32)
CREAT SERPL-MCNC: 0.52 MG/DL (ref 0.6–1.3)
EOSINOPHIL # BLD AUTO: 0.2 THOUSAND/ΜL (ref 0–0.61)
EOSINOPHIL NFR BLD AUTO: 3 % (ref 0–6)
ERYTHROCYTE [DISTWIDTH] IN BLOOD BY AUTOMATED COUNT: 14.1 % (ref 11.6–15.1)
GFR SERPL CREATININE-BSD FRML MDRD: 112 ML/MIN/1.73SQ M
GLUCOSE SERPL-MCNC: 112 MG/DL (ref 65–140)
HCT VFR BLD AUTO: 23.1 % (ref 36.5–49.3)
HGB BLD-MCNC: 7.5 G/DL (ref 12–17)
IMM GRANULOCYTES # BLD AUTO: 0.02 THOUSAND/UL (ref 0–0.2)
IMM GRANULOCYTES NFR BLD AUTO: 0 % (ref 0–2)
LYMPHOCYTES # BLD AUTO: 0.59 THOUSANDS/ΜL (ref 0.6–4.47)
LYMPHOCYTES NFR BLD AUTO: 10 % (ref 14–44)
MAGNESIUM SERPL-MCNC: 1.8 MG/DL (ref 1.9–2.7)
MCH RBC QN AUTO: 31.9 PG (ref 26.8–34.3)
MCHC RBC AUTO-ENTMCNC: 32.5 G/DL (ref 31.4–37.4)
MCV RBC AUTO: 98 FL (ref 82–98)
MONOCYTES # BLD AUTO: 0.77 THOUSAND/ΜL (ref 0.17–1.22)
MONOCYTES NFR BLD AUTO: 12 % (ref 4–12)
NEUTROPHILS # BLD AUTO: 4.58 THOUSANDS/ΜL (ref 1.85–7.62)
NEUTS SEG NFR BLD AUTO: 74 % (ref 43–75)
NRBC BLD AUTO-RTO: 0 /100 WBCS
PHOSPHATE SERPL-MCNC: 4.3 MG/DL (ref 2.3–4.1)
PLATELET # BLD AUTO: 233 THOUSANDS/UL (ref 149–390)
PMV BLD AUTO: 8.6 FL (ref 8.9–12.7)
POTASSIUM SERPL-SCNC: 4 MMOL/L (ref 3.5–5.3)
RBC # BLD AUTO: 2.35 MILLION/UL (ref 3.88–5.62)
SODIUM SERPL-SCNC: 131 MMOL/L (ref 135–147)
WBC # BLD AUTO: 6.19 THOUSAND/UL (ref 4.31–10.16)

## 2024-09-28 PROCEDURE — 97530 THERAPEUTIC ACTIVITIES: CPT

## 2024-09-28 PROCEDURE — 82330 ASSAY OF CALCIUM: CPT

## 2024-09-28 PROCEDURE — 85025 COMPLETE CBC W/AUTO DIFF WBC: CPT

## 2024-09-28 PROCEDURE — 94640 AIRWAY INHALATION TREATMENT: CPT

## 2024-09-28 PROCEDURE — 99291 CRITICAL CARE FIRST HOUR: CPT | Performed by: INTERNAL MEDICINE

## 2024-09-28 PROCEDURE — 97535 SELF CARE MNGMENT TRAINING: CPT

## 2024-09-28 PROCEDURE — 94003 VENT MGMT INPAT SUBQ DAY: CPT

## 2024-09-28 PROCEDURE — 83735 ASSAY OF MAGNESIUM: CPT

## 2024-09-28 PROCEDURE — 97112 NEUROMUSCULAR REEDUCATION: CPT

## 2024-09-28 PROCEDURE — 84100 ASSAY OF PHOSPHORUS: CPT

## 2024-09-28 PROCEDURE — 80048 BASIC METABOLIC PNL TOTAL CA: CPT

## 2024-09-28 PROCEDURE — 94760 N-INVAS EAR/PLS OXIMETRY 1: CPT

## 2024-09-28 RX ORDER — MAGNESIUM SULFATE HEPTAHYDRATE 40 MG/ML
4 INJECTION, SOLUTION INTRAVENOUS ONCE
Status: COMPLETED | OUTPATIENT
Start: 2024-09-28 | End: 2024-09-28

## 2024-09-28 RX ORDER — OXYCODONE HCL 5 MG/5 ML
5 SOLUTION, ORAL ORAL EVERY 6 HOURS PRN
Status: DISCONTINUED | OUTPATIENT
Start: 2024-09-28 | End: 2024-10-06

## 2024-09-28 RX ORDER — ACETAZOLAMIDE 500 MG/5ML
500 INJECTION, POWDER, LYOPHILIZED, FOR SOLUTION INTRAVENOUS EVERY 12 HOURS SCHEDULED
Status: DISPENSED | OUTPATIENT
Start: 2024-09-28 | End: 2024-09-29

## 2024-09-28 RX ADMIN — LEVALBUTEROL HYDROCHLORIDE 1.25 MG: 1.25 SOLUTION RESPIRATORY (INHALATION) at 20:55

## 2024-09-28 RX ADMIN — BUDESONIDE 0.5 MG: 0.5 INHALANT RESPIRATORY (INHALATION) at 07:55

## 2024-09-28 RX ADMIN — ACETAMINOPHEN 650 MG: 650 SUSPENSION ORAL at 09:27

## 2024-09-28 RX ADMIN — CHLORHEXIDINE GLUCONATE 0.12% ORAL RINSE 15 ML: 1.2 LIQUID ORAL at 09:27

## 2024-09-28 RX ADMIN — APIXABAN 5 MG: 5 TABLET, FILM COATED ORAL at 09:28

## 2024-09-28 RX ADMIN — BUDESONIDE 0.5 MG: 0.5 INHALANT RESPIRATORY (INHALATION) at 21:00

## 2024-09-28 RX ADMIN — LEVALBUTEROL HYDROCHLORIDE 1.25 MG: 1.25 SOLUTION RESPIRATORY (INHALATION) at 14:12

## 2024-09-28 RX ADMIN — APIXABAN 5 MG: 5 TABLET, FILM COATED ORAL at 17:00

## 2024-09-28 RX ADMIN — FUROSEMIDE 40 MG: 10 INJECTION, SOLUTION INTRAVENOUS at 05:36

## 2024-09-28 RX ADMIN — OXYCODONE HYDROCHLORIDE 5 MG: 5 SOLUTION ORAL at 14:09

## 2024-09-28 RX ADMIN — GABAPENTIN 400 MG: 400 CAPSULE ORAL at 09:28

## 2024-09-28 RX ADMIN — FUROSEMIDE 40 MG: 10 INJECTION, SOLUTION INTRAVENOUS at 17:00

## 2024-09-28 RX ADMIN — Medication 6 MG: at 22:05

## 2024-09-28 RX ADMIN — GABAPENTIN 400 MG: 400 CAPSULE ORAL at 17:00

## 2024-09-28 RX ADMIN — NICOTINE 1 PATCH: 21 PATCH, EXTENDED RELEASE TRANSDERMAL at 09:27

## 2024-09-28 RX ADMIN — IPRATROPIUM BROMIDE 0.5 MG: 0.5 SOLUTION RESPIRATORY (INHALATION) at 20:55

## 2024-09-28 RX ADMIN — ACETAMINOPHEN 650 MG: 650 SUSPENSION ORAL at 17:00

## 2024-09-28 RX ADMIN — IPRATROPIUM BROMIDE 0.5 MG: 0.5 SOLUTION RESPIRATORY (INHALATION) at 14:12

## 2024-09-28 RX ADMIN — FUROSEMIDE 40 MG: 10 INJECTION, SOLUTION INTRAVENOUS at 13:19

## 2024-09-28 RX ADMIN — FOLIC ACID 1 MG: 1 TABLET ORAL at 09:28

## 2024-09-28 RX ADMIN — Medication 100 MCG: at 09:28

## 2024-09-28 RX ADMIN — LEVALBUTEROL HYDROCHLORIDE 1.25 MG: 1.25 SOLUTION RESPIRATORY (INHALATION) at 07:55

## 2024-09-28 RX ADMIN — Medication 12.5 MG: at 22:05

## 2024-09-28 RX ADMIN — PHENYTOIN 150 MG: 125 SUSPENSION ORAL at 22:10

## 2024-09-28 RX ADMIN — TRAZODONE HYDROCHLORIDE 100 MG: 100 TABLET ORAL at 22:05

## 2024-09-28 RX ADMIN — MAGNESIUM SULFATE HEPTAHYDRATE 4 G: 40 INJECTION, SOLUTION INTRAVENOUS at 09:06

## 2024-09-28 RX ADMIN — IPRATROPIUM BROMIDE 0.5 MG: 0.5 SOLUTION RESPIRATORY (INHALATION) at 07:55

## 2024-09-28 RX ADMIN — PHENYTOIN 150 MG: 125 SUSPENSION ORAL at 09:32

## 2024-09-28 RX ADMIN — CHLORHEXIDINE GLUCONATE 0.12% ORAL RINSE 15 ML: 1.2 LIQUID ORAL at 22:06

## 2024-09-28 RX ADMIN — ACETAZOLAMIDE 500 MG: 500 INJECTION, POWDER, LYOPHILIZED, FOR SOLUTION INTRAVENOUS at 13:18

## 2024-09-28 RX ADMIN — SODIUM CHLORIDE 2 G: 1 TABLET ORAL at 09:31

## 2024-09-28 NOTE — PLAN OF CARE
Problem: OCCUPATIONAL THERAPY ADULT  Goal: Performs self-care activities at highest level of function for planned discharge setting.  See evaluation for individualized goals.  Description: Treatment Interventions: ADL retraining, Functional transfer training, UE strengthening/ROM, Endurance training, Cognitive reorientation, Patient/family training, Equipment evaluation/education, Compensatory technique education, Activityengagement          See flowsheet documentation for full assessment, interventions and recommendations.   Note: Limitation: Decreased ADL status, Decreased UE ROM, Decreased UE strength, Decreased endurance, Decreased self-care trans  Prognosis: Fair  Assessment: Pt seen for OT session - agreeable to particpate and get OOB - noted to be more pleasant and agreeable/participatory today - making jokes and engaging with therapist - requires mod to max a for adls and mod a for mobility - fatigues quickly with poor overall tolerance to activity but agreeable to continue to work towards improving strength and endurance for increased functional I - recommend Level II resources if pt remain off vent - OT to continue to follow to address goals as stated on eval with additional goal for mobility:  Pt to complete transfers and functional mobility at SBA level with fair overall balance and safety     Rehab Resource Intensity Level, OT: II (Moderate Resource Intensity)

## 2024-09-28 NOTE — OCCUPATIONAL THERAPY NOTE
Occupational Therapy Progress Note     Patient Name: Marcin Sánchez  Today's Date: 9/28/2024  Problem List  Principal Problem:    Acute hypoxic respiratory failure (HCC)  Active Problems:    Acute exacerbation of chronic obstructive pulmonary disease (COPD) (HCC)    Cancer of right main bronchus (HCC)    Chronic combined systolic and diastolic CHF (congestive heart failure) (HCC)    Cardiac arrest (HCC)    Pericardial effusion    Shock (HCC)    Mucus plugging of bronchi          09/28/24 1000   OT Last Visit   OT Visit Date 09/28/24   Note Type   Note Type Treatment   Pain Assessment   Pain Assessment Tool FLACC   Pain Rating: FLACC (Rest) - Face 0   Pain Rating: FLACC (Rest) - Legs 0   Pain Rating: FLACC (Rest) - Activity 0   Pain Rating: FLACC (Rest) - Cry 0   Pain Rating: FLACC (Rest) - Consolability 0   Score: FLACC (Rest) 0   Pain Rating: FLACC (Activity) - Face 0   Pain Rating: FLACC (Activity) - Legs 0   Pain Rating: FLACC (Activity) - Activity 0   Pain Rating: FLACC (Activity) - Cry 0   Pain Rating: FLACC (Activity) - Consolability 0   Score: FLACC (Activity) 0   Restrictions/Precautions   Weight Bearing Precautions Per Order No   Other Precautions Chair Alarm;Bed Alarm;Multiple lines;Fall Risk  (on trach collar)   Lifestyle   Autonomy I basic adls, receives assist for showers, ambulates w/o ad - meals/homemaking provided   Reciprocal Relationships supportive facility staff   Service to Others retired   Intrinsic Gratification Likes automechanics   ADL   Eating Assistance Unable to assess   Eating Deficit NPO   Grooming Assistance 4  Minimal Assistance   UB Bathing Assistance 3  Moderate Assistance   LB Bathing Assistance 2  Maximal Assistance   UB Dressing Assistance 3  Moderate Assistance   LB Dressing Assistance 2  Maximal Assistance   Toileting Assistance  5  Supervision/Setup   Toileting Deficit   (to use urinal)   Bed Mobility   Supine to Sit 4  Minimal assistance   Transfers   Sit to Stand 4   "Minimal assistance   Additional items Assist x 2   Stand to Sit 4  Minimal assistance   Additional items Assist x 2   Stand pivot 4  Minimal assistance   Additional items Assist x 2   Functional Mobility   Functional Mobility 4  Minimal assistance   Additional Comments x2 to take a few steps from bed to recliner chair HHA   Additional items Hand hold assistance   Subjective   Subjective \"Why not\" when asked about getting oob to chair   Cognition   Arousal/Participation Arousable;Cooperative   Attention Attends with cues to redirect   Orientation Level Oriented to person;Oriented to place;Oriented to time;Oriented to situation   Memory Unable to assess   Following Commands Follows one step commands without difficulty   Comments pt more alert and interactive today - waved at therapist when passing doorway - was agreeable to try getting OOB to chair - continues to communicate via mouthing words and gestures - attempted to have pt write however declined   Activity Tolerance   Activity Tolerance Patient limited by fatigue;Treatment limited secondary to medical complications (Comment)   Assessment   Assessment Pt seen for OT session - agreeable to particpate and get OOB - noted to be more pleasant and agreeable/participatory today - making jokes and engaging with therapist - requires mod to max a for adls and mod a for mobility - fatigues quickly with poor overall tolerance to activity but agreeable to continue to work towards improving strength and endurance for increased functional I - recommend Level II resources if pt remain off vent - OT to continue to follow to address goals as stated on eval with additional goal for mobility:  Pt to complete transfers and functional mobility at SBA level with fair overall balance and safety   Plan   Treatment Interventions ADL retraining;Functional transfer training;UE strengthening/ROM;Endurance training;Cognitive reorientation;Patient/family training;Equipment " evaluation/education;Compensatory technique education;Activityengagement;Energy conservation   Goal Expiration Date 10/04/24   OT Treatment Day 2   OT Frequency 1-2x/wk   Discharge Recommendation   Rehab Resource Intensity Level, OT II (Moderate Resource Intensity)   AM-PAC Daily Activity Inpatient   Lower Body Dressing 2   Bathing 2   Toileting 3   Upper Body Dressing 3   Grooming 3   Eating 1   Daily Activity Raw Score 14   Daily Activity Standardized Score (Calc for Raw Score >=11) 33.39   AM-PAC Applied Cognition Inpatient   Following a Speech/Presentation 3   Understanding Ordinary Conversation 4   Taking Medications 3   Remembering Where Things Are Placed or Put Away 3   Remembering List of 4-5 Errands 3   Taking Care of Complicated Tasks 3   Applied Cognition Raw Score 19   Applied Cognition Standardized Score 39.77   End of Consult   Education Provided Yes   Patient Position at End of Consult Bedside chair;Bed/Chair alarm activated;All needs within reach   Nurse Communication Nurse aware of consult       The patient's raw score on the AM-PAC Daily Activity Inpatient Short Form is 14. A raw score of less than 19 suggests the patient may benefit from discharge to post-acute rehabilitation services. Please refer to the recommendation of the Occupational Therapist for safe discharge planning.      Documentation Completed By:    SMITA Herrera/L  [employer] Certified - [id number]

## 2024-09-28 NOTE — QUICK NOTE
Called patient's sister to update her about current treatment plan.  All questions were answered and all concerns were addressed.

## 2024-09-28 NOTE — RESPIRATORY THERAPY NOTE
RT Ventilator Management Note  Marcin Sánchez 64 y.o. male MRN: [id number]  Unit/Bed#: [facility] 208-01 Encounter: [id number]      Daily Screen         9/26/2024  1047 9/27/2024  0807          Patient safety screen outcome:: Passed Passed      RSBI: -- 59                Physical Exam:   Assessment Type: Assess only  General Appearance: Alert, Awake  Respiratory Pattern: Normal  Chest Assessment: Chest expansion symmetrical  Bilateral Breath Sounds: Clear  Cough: Strong  Suction: Trach  O2 Device: t/c      Resp Comments: (P) Pressure support increased due to lower tidal volumes at this  time. No distress noted. WIll continue to moniter pt per protocol

## 2024-09-28 NOTE — PLAN OF CARE
Problem: INFECTION - ADULT  Goal: Absence or prevention of progression during hospitalization  Description: INTERVENTIONS:  - Assess and monitor for signs and symptoms of infection  - Monitor lab/diagnostic results  - Monitor all insertion sites, i.e. indwelling lines, tubes, and drains  - Monitor endotracheal if appropriate and nasal secretions for changes in amount and color  - [place] appropriate cooling/warming therapies per order  - Administer medications as ordered  - Instruct and encourage patient and family to use good hand hygiene technique  - Identify and instruct in appropriate isolation precautions for identified infection/condition  Outcome: Progressing  Goal: Absence of fever/infection during neutropenic period  Description: INTERVENTIONS:  - Monitor WBC    Outcome: Progressing

## 2024-09-28 NOTE — PHYSICAL THERAPY NOTE
Physical Therapy Cancellation Note    Patient's Name: Marcin Sánchez       09/28/24 0917   PT Last Visit   PT Visit Date 09/28/24   Note Type   Note Type Treatment   Pain Assessment   Pain Assessment Tool FLACC   Pain Rating: FLACC (Rest) - Face 0   Pain Rating: FLACC (Rest) - Legs 0   Pain Rating: FLACC (Rest) - Activity 0   Pain Rating: FLACC (Rest) - Cry 0   Pain Rating: FLACC (Rest) - Consolability 0   Score: FLACC (Rest) 0   Pain Rating: FLACC (Activity) - Face 0   Pain Rating: FLACC (Activity) - Legs 0   Pain Rating: FLACC (Activity) - Activity 0   Pain Rating: FLACC (Activity) - Cry 0   Pain Rating: FLACC (Activity) - Consolability 0   Score: FLACC (Activity) 0   Restrictions/Precautions   Weight Bearing Precautions Per Order No   Other Precautions Contact/isolation;Cognitive;Chair Alarm;Bed Alarm;Aspiration;Limb alert;Multiple lines;Telemetry;O2;Fall Risk  (trach, trach collar, PEG, RUE limb alert)   General   Chart Reviewed Yes   Response to Previous Treatment Patient with no complaints from previous session.   Family/Caregiver Present No   Subjective   Subjective Agreeable to mobilize.   Bed Mobility   Supine to Sit 3  Moderate assistance   Additional items Assist x 1;HOB elevated;Bedrails;Increased time required;Verbal cues   Additional Comments Pt greeted in supine.   Transfers   Sit to Stand 3  Moderate assistance   Additional items Assist x 2;Increased time required;Verbal cues   Stand to Sit 3  Moderate assistance   Additional items Assist x 2;Increased time required;Verbal cues   Additional Comments B HHA   Ambulation/Elevation   Gait pattern Excessively slow;Short stride;Decreased foot clearance;Shuffling   Gait Assistance 3  Moderate assist   Additional items Assist x 2;Verbal cues;Tactile cues   Assistive Device   (B HHA)   Distance 4' bed>chair   Balance   Static Sitting Fair   Dynamic Sitting Fair -   Static Standing Poor +   Dynamic Standing Poor   Ambulatory Poor  (B HHA)   Endurance  Deficit   Endurance Deficit Yes   Endurance Deficit Description weakness, fatigue   Activity Tolerance   Activity Tolerance Patient limited by fatigue   Medical Staff Made Aware OT Guicho   Nurse Made Aware yes - cleared + observed mobility   Exercises   Balance training  Dynamic sitting balance, sitting EOB unsupported, multidirectional reaching w/ ADL tasks.   Assessment   Prognosis Fair   Problem List Decreased strength;Decreased endurance;Impaired balance;Decreased mobility;Impaired judgement;Decreased safety awareness;Decreased skin integrity   Assessment Pt seen for PT treatment session w/ interventions consisting of bed mobility training, t/f training, + gait training. Pt appeared motivated to get OOB today. He demonstrated excellent progress this session as he was able to participate in t/f and short distance ambulation for the first time. Goals updated. From a PT standpoint continue to recommend rehab upon d/c as pt continues to be functioning below his functional baseline.   Goals   Patient Goals get OOB   STG Expiration Date 10/12/24   Short Term Goal #1 In 14 days pt will complete: 1) Bed mobility skills with Kalie to facilitate safe return to previous living environment. 2) Functional transfers with Kalie to facilitate safe return to previous living environment. 3) Ambulation with least restrictive ' w/ Kalie without LOB for safe ambulation in home/community environment. 4) Improve balance scores by 1 grade to decrease fall risk. 5) Improve LE strength grades by 1 to increase independence w/ all functional mobility, transfers and gait. 6) PT for ongoing pt and family education; DME needs and D/C planning to promote highest level of function in least restrictive environment.   Plan   Treatment/Interventions Functional transfer training;LE strengthening/ROM;Therapeutic exercise;Endurance training;Patient/family training;Equipment eval/education;Bed mobility;Gait training;Compensatory technique  education;Spoke to nursing;OT   Progress Progressing toward goals   PT Frequency 2-3x/wk   Discharge Recommendation   Rehab Resource Intensity Level, PT II (Moderate Resource Intensity)   AM-PAC Basic Mobility Inpatient   Turning in Flat Bed Without Bedrails 3   Lying on Back to Sitting on Edge of Flat Bed Without Bedrails 2   Moving Bed to Chair 2   Standing Up From Chair Using Arms 2   Walk in Room 1   Climb 3-5 Stairs With Railing 1   Basic Mobility Inpatient Raw Score 11   Basic Mobility Standardized Score 30.25   [facility] Highest Level Of Mobility   -[facility] Goal 4: Move to chair/commode   -HL Achieved 4: Move to chair/commode   Education   Education Provided Mobility training   Patient Reinforcement needed   End of Consult   Patient Position at End of Consult Bedside chair;Bed/Chair alarm activated;All needs within reach  (on waffle cushion, all lines in tact, BLE elevated, RN at bedside)     Sabirna Zamora, PT, DPT

## 2024-09-28 NOTE — PLAN OF CARE
Problem: PHYSICAL THERAPY ADULT  Goal: Performs mobility at highest level of function for planned discharge setting.  See evaluation for individualized goals.  Description: Treatment/Interventions: OT, Spoke to nursing, Spoke to case management, Gait training, Bed mobility, Patient/family training, Therapeutic exercise, Endurance training, LE strengthening/ROM, Functional transfer training          See flowsheet documentation for full assessment, interventions and recommendations.  9/28/2024 1043 by Sabrina Zamora, PT  Outcome: Progressing  Note: Prognosis: Fair  Problem List: Decreased strength, Decreased endurance, Impaired balance, Decreased mobility, Impaired judgement, Decreased safety awareness, Decreased skin integrity  Assessment: Pt seen for PT treatment session w/ interventions consisting of bed mobility training, t/f training, + gait training. Pt appeared motivated to get OOB today. He demonstrated excellent progress this session as he was able to participate in t/f and short distance ambulation for the first time. Goals updated. From a PT standpoint continue to recommend rehab upon d/c as pt continues to be functioning below his functional baseline.  Barriers to Discharge: Decreased caregiver support     Rehab Resource Intensity Level, PT: II (Moderate Resource Intensity)    See flowsheet documentation for full assessment.     9/28/2024 1043 by Sabrina Zamora, PT  Outcome: Progressing  Note: Prognosis: Fair  Problem List: Decreased strength, Decreased endurance, Impaired balance, Decreased mobility, Impaired judgement, Decreased safety awareness, Decreased skin integrity  Assessment: Pt seen for PT treatment session w/ interventions consisting of bed mobility training, t/f training, + gait training. Pt appeared motivated to get OOB today. He demonstrated excellent progress this session as he was able to participate in t/f and short distance ambulation for the first time. Goals updated. From a PT  standpoint continue to recommend rehab upon d/c as pt continues to be functioning below his functional baseline.  Barriers to Discharge: Decreased caregiver support     Rehab Resource Intensity Level, PT: II (Moderate Resource Intensity)    See flowsheet documentation for full assessment.

## 2024-09-28 NOTE — PROGRESS NOTES
Progress Note - Critical Care/ICU   Name: Marcin Sánchez 64 y.o. 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 Moderate to severe aortic regurgitation, mild tricuspid regurgitation, dilated ascending aortic root up to 5.4 cm.  Continue IV lasix for diuresis with goal output of -1 to -2L/day  Continue to monitor fluid status   Diagnosis: AAA, 5.4 cm  `Monitor hemodynamics; BP goal <130/80, will likely require outpatient management     Pulm:  Diagnosis: Acute on Chronic hypoxic and hypercapnic respiratory failure.   COPD, and acute exacerbation now resolvedLikely multifactorial including moderate to severe COPD  Course complicated by recurrent mucous plugging, status post therapeutic bronch x 3.  Sputum culture with Moraxella, status post course of azithromycin x 5 days.  Also with MDR acinetobacter likely contaminant versus colonization as patient has remained stable without antibiotic treatment.  Status post trach 9/19  Continue vent support with daily SBT's, wean vent as able  Continue Xopenex and Atrovent TID  Continue pulmicort BID, Continue Performist BID     History of squamous cell cancer of lung post chemo/radiation 2016, cancer of right mainstem  Noted     GI:   PEG placed 9/19  Continue tube feeds at goal  Bowel regimen: Senokot, Miralax     :   Haynes removed  9/25  Doxazosin 2mg  Continue to monitor Is/Os, renal indices     F/E/N:   F: No maintenance fluids  E: Replete as needed, consider sodium replacement or fluid restriction as able for hyponatremia  N: Continue Jevity 1.2, at goal     Heme/Onc:   Anemia  Continue B12/folate  No signs of active bleeding  Transfuse for Hgb <7 and/or symptomatic anemia     Metabolic alkalosis  Likely 2/2 ongoing diuresis  Consider addition of acetazolamide      DVT ppx  SCDs and Eliquis     Endo:   Hypothyroidism  Home med: Levothyroxine 250 mg  TSH 45.06 on 9/25 from 0.59 on 9/5, free T4 0.34 (9/25)  Restarted on home levothyroxine 250 mg  Recheck in 6 weeks     ID:   Colonization with Acinetobacter and Moraxella  No acute issues  Continue to monitor WBC count and temperature curve  Completed 5 days of azithromycin 9/10     Intermittent low grade fevers  WBC stable  CXR without worsening consolidations  Patient remains without new symptoms  Low clinical suspicion for infection  Continue to monitor     MSK/Skin:   At risk for pressure injury PT/OT when able  Frequent turns/repositioning  Skin surveillance      Disposition: Critical care    ICU Core Measures     Vented Patient  VAP Bundle  VAP bundle ordered     A: Assess, Prevent, and Manage Pain Has pain been assessed? Yes  Need for changes to pain regimen? No   B: Both Spontaneous Awakening Trials (SATs) and Spontaneous Breathing Trials (SBTs) Plan to perform spontaneous awakening trial today? N/A   Plan to perform spontaneous breathing trial today? Yes   Obvious barriers to extubation? Yes   C: Choice of Sedation RASS Goal: 0 Alert and Calm or N/A patient not on sedation  Need for changes to sedation or analgesia regimen? NA   D: Delirium CAM-ICU: Negative   E: Early Mobility  Plan for early mobility? Yes   F: Family Engagement Plan for family engagement today? Yes       Review of Invasive Devices:            Prophylaxis:  VTE VTE covered by:  apixaban, Oral, 5 mg at 09/27/24  1704       Stress Ulcer  not ordered         24 Hour Events   24hr events: No acute events over the last 24 hours.  Subjective   Patient was trialed on trach collar yesterday.  Patient was placed back on pressure support overnight due to increased work of breathing.  Patient expressed that he did not have any significant pain and had no new complaints this morning.      Objective                          Vitals I/O      Most Recent Min/Max in 24hrs   Temp (!) 97.2 °F (36.2 °C) Temp  Min: 97.2 °F (36.2 °C)  Max: 98 °F (36.7 °C)   Pulse 80 Pulse  Min: 64  Max: 94   Resp (!) 37 Resp  Min: 23  Max: 49   BP (!) 103/38 BP  Min: 103/38  Max: 138/63   O2 Sat 100 % SpO2  Min: 91 %  Max: 100 %      Intake/Output Summary (Last 24 hours) at 9/28/2024 0729  Last data filed at 9/28/2024 0407  Gross per 24 hour   Intake 980 ml   Output 1575 ml   Net -595 ml       Diet Enteral/Parenteral; Tube Feeding No Oral Diet; Jevity 1.5; Bolus; 300; (4x/day) - 8:00AM,12:00PM,4:00PM,8:00PM; Prosource Protein Liquid - One Packet; 30; Water; Before and After each Bolus    Invasive Monitoring           Physical Exam   Physical Exam  Eyes:      General: Vision grossly intact.      Extraocular Movements: Extraocular movements intact.   Skin:     General: Skin is warm.      Capillary Refill: Capillary refill takes less than 2 seconds.   HENT:      Head: Normocephalic and atraumatic.      Mouth/Throat:      Mouth: Mucous membranes are moist.   Neck:      Trachea: Tracheostomy present.   Cardiovascular:      Rate and Rhythm: Normal rate and regular rhythm.   Musculoskeletal:      Right lower leg: No edema.      Left lower leg: No edema.   Abdominal: General: Bowel sounds are normal. There is abdominal type feeding tube.     Palpations: Abdomen is soft.   Constitutional:       General: He is not in acute distress.  Pulmonary:      Breath sounds: Normal breath sounds.   Neurological:      Mental Status: He is alert.          Diagnostic Studies         Lab Results: I have reviewed the following results:      Medications:  Scheduled PRN   apixaban, 5 mg, BID  budesonide, 0.5 mg, Q12H  chlorhexidine, 15 mL, Q12H AMERICA  vitamin B-12, 100 mcg, Daily  doxazosin, 2 mg, Daily  folic acid, 1 mg, Daily  furosemide, 40 mg, TID (diuretic)  gabapentin, 400 mg, BID  ipratropium, 0.5 mg, TID  levalbuterol, 1.25 mg, TID  levothyroxine, 250 mcg, Early Morning  melatonin, 6 mg, HS  metoprolol tartrate, 12.5 mg, Q12H AMERICA  [START ON 10/19/2024] nicotine, 14 mg, Daily  nicotine, 1 patch, Daily  [START ON 11/2/2024] nicotine, 7 mg, Daily  phenytoin, 150 mg, Q12H AMERICA  polyethylene glycol, 17 g, BID  senna, 8.8 mg, HS  sodium chloride, 2 g, BID With Meals  traZODone, 100 mg, HS      acetaminophen, 650 mg, Q4H PRN  albuterol, 2.5 mg, Q6H PRN  bisacodyl, 10 mg, Daily PRN  fentaNYL, 50 mcg, Q2H PRN       Continuous          Labs:   CBC    Recent Labs     09/27/24 0527 09/28/24  0542   WBC 7.29 6.19   HGB 7.4* 7.5*   HCT 22.5* 23.1*    233     BMP    Recent Labs     09/27/24 0527 09/28/24  0542   SODIUM 129* 131*   K 4.2 4.0   CL 85* 84*   CO2 42* >45*   AGAP 2*  --    BUN 18 19   CREATININE 0.54* 0.52*   CALCIUM 8.0* 8.3*       Coags    No recent results     Additional Electrolytes  Recent Labs     09/27/24  0527 09/28/24  0542   MG 2.0 1.8*   PHOS 3.8 4.3*   CAIONIZED 1.07* 1.09*          Blood Gas    No recent results  No recent results LFTs  Recent Labs     09/26/24  0932   ALB 2.5*       Infectious  No recent results  Glucose  Recent Labs     09/27/24  0527 09/28/24  0542   GLUC 120 112

## 2024-09-29 LAB
ANION GAP SERPL CALCULATED.3IONS-SCNC: 3 MMOL/L (ref 4–13)
BUN SERPL-MCNC: 20 MG/DL (ref 5–25)
CALCIUM SERPL-MCNC: 8.4 MG/DL (ref 8.4–10.2)
CHLORIDE SERPL-SCNC: 83 MMOL/L (ref 96–108)
CO2 SERPL-SCNC: 44 MMOL/L (ref 21–32)
CREAT SERPL-MCNC: 0.51 MG/DL (ref 0.6–1.3)
ERYTHROCYTE [DISTWIDTH] IN BLOOD BY AUTOMATED COUNT: 13.8 % (ref 11.6–15.1)
GFR SERPL CREATININE-BSD FRML MDRD: 113 ML/MIN/1.73SQ M
GLUCOSE SERPL-MCNC: 108 MG/DL (ref 65–140)
HCT VFR BLD AUTO: 23 % (ref 36.5–49.3)
HGB BLD-MCNC: 7.5 G/DL (ref 12–17)
MAGNESIUM SERPL-MCNC: 2.1 MG/DL (ref 1.9–2.7)
MCH RBC QN AUTO: 32.3 PG (ref 26.8–34.3)
MCHC RBC AUTO-ENTMCNC: 32.6 G/DL (ref 31.4–37.4)
MCV RBC AUTO: 99 FL (ref 82–98)
PHOSPHATE SERPL-MCNC: 3.5 MG/DL (ref 2.3–4.1)
PLATELET # BLD AUTO: 228 THOUSANDS/UL (ref 149–390)
PMV BLD AUTO: 8.7 FL (ref 8.9–12.7)
POTASSIUM SERPL-SCNC: 3.3 MMOL/L (ref 3.5–5.3)
RBC # BLD AUTO: 2.32 MILLION/UL (ref 3.88–5.62)
SODIUM SERPL-SCNC: 130 MMOL/L (ref 135–147)
WBC # BLD AUTO: 8.89 THOUSAND/UL (ref 4.31–10.16)

## 2024-09-29 PROCEDURE — 94664 DEMO&/EVAL PT USE INHALER: CPT

## 2024-09-29 PROCEDURE — 94760 N-INVAS EAR/PLS OXIMETRY 1: CPT

## 2024-09-29 PROCEDURE — 84100 ASSAY OF PHOSPHORUS: CPT

## 2024-09-29 PROCEDURE — 94640 AIRWAY INHALATION TREATMENT: CPT

## 2024-09-29 PROCEDURE — 83735 ASSAY OF MAGNESIUM: CPT

## 2024-09-29 PROCEDURE — 80048 BASIC METABOLIC PNL TOTAL CA: CPT

## 2024-09-29 PROCEDURE — 85027 COMPLETE CBC AUTOMATED: CPT

## 2024-09-29 PROCEDURE — 94003 VENT MGMT INPAT SUBQ DAY: CPT

## 2024-09-29 PROCEDURE — 99233 SBSQ HOSP IP/OBS HIGH 50: CPT | Performed by: INTERNAL MEDICINE

## 2024-09-29 RX ORDER — POTASSIUM CHLORIDE 20MEQ/15ML
40 LIQUID (ML) ORAL 2 TIMES DAILY
Status: COMPLETED | OUTPATIENT
Start: 2024-09-29 | End: 2024-09-29

## 2024-09-29 RX ORDER — WATER 10 ML/10ML
INJECTION INTRAMUSCULAR; INTRAVENOUS; SUBCUTANEOUS
Status: COMPLETED
Start: 2024-09-29 | End: 2024-09-29

## 2024-09-29 RX ORDER — ACETAZOLAMIDE 500 MG/5ML
500 INJECTION, POWDER, LYOPHILIZED, FOR SOLUTION INTRAVENOUS 3 TIMES DAILY
Status: DISPENSED | OUTPATIENT
Start: 2024-09-29 | End: 2024-09-30

## 2024-09-29 RX ORDER — ACETAZOLAMIDE 500 MG/5ML
500 INJECTION, POWDER, LYOPHILIZED, FOR SOLUTION INTRAVENOUS EVERY 12 HOURS SCHEDULED
Status: DISCONTINUED | OUTPATIENT
Start: 2024-09-29 | End: 2024-09-29

## 2024-09-29 RX ORDER — OLANZAPINE 10 MG/1
10 TABLET, ORALLY DISINTEGRATING ORAL
Status: DISCONTINUED | OUTPATIENT
Start: 2024-09-29 | End: 2024-09-30

## 2024-09-29 RX ORDER — LIDOCAINE 40 MG/G
CREAM TOPICAL 4 TIMES DAILY PRN
Status: DISCONTINUED | OUTPATIENT
Start: 2024-09-29 | End: 2024-09-30

## 2024-09-29 RX ADMIN — LEVALBUTEROL HYDROCHLORIDE 1.25 MG: 1.25 SOLUTION RESPIRATORY (INHALATION) at 14:39

## 2024-09-29 RX ADMIN — LIDOCAINE 1 APPLICATION: 40 CREAM TOPICAL at 16:03

## 2024-09-29 RX ADMIN — OLANZAPINE 10 MG: 10 TABLET, ORALLY DISINTEGRATING ORAL at 23:51

## 2024-09-29 RX ADMIN — POTASSIUM CHLORIDE 40 MEQ: 1.5 SOLUTION ORAL at 17:00

## 2024-09-29 RX ADMIN — LEVOTHYROXINE SODIUM 250 MCG: 100 TABLET ORAL at 07:30

## 2024-09-29 RX ADMIN — POTASSIUM CHLORIDE 40 MEQ: 1.5 SOLUTION ORAL at 10:15

## 2024-09-29 RX ADMIN — IPRATROPIUM BROMIDE 0.5 MG: 0.5 SOLUTION RESPIRATORY (INHALATION) at 19:31

## 2024-09-29 RX ADMIN — SODIUM CHLORIDE 2 G: 1 TABLET ORAL at 08:02

## 2024-09-29 RX ADMIN — NICOTINE 1 PATCH: 21 PATCH, EXTENDED RELEASE TRANSDERMAL at 08:02

## 2024-09-29 RX ADMIN — Medication 100 MCG: at 08:01

## 2024-09-29 RX ADMIN — WATER: 1 INJECTION INTRAMUSCULAR; INTRAVENOUS; SUBCUTANEOUS at 09:25

## 2024-09-29 RX ADMIN — Medication 12.5 MG: at 21:23

## 2024-09-29 RX ADMIN — APIXABAN 5 MG: 5 TABLET, FILM COATED ORAL at 17:00

## 2024-09-29 RX ADMIN — ACETAZOLAMIDE 500 MG: 500 INJECTION, POWDER, LYOPHILIZED, FOR SOLUTION INTRAVENOUS at 16:02

## 2024-09-29 RX ADMIN — POLYETHYLENE GLYCOL 3350 17 G: 17 POWDER, FOR SOLUTION ORAL at 08:01

## 2024-09-29 RX ADMIN — FOLIC ACID 1 MG: 1 TABLET ORAL at 08:01

## 2024-09-29 RX ADMIN — CHLORHEXIDINE GLUCONATE 0.12% ORAL RINSE 15 ML: 1.2 LIQUID ORAL at 21:23

## 2024-09-29 RX ADMIN — PHENYTOIN 150 MG: 125 SUSPENSION ORAL at 07:30

## 2024-09-29 RX ADMIN — GABAPENTIN 400 MG: 400 CAPSULE ORAL at 17:00

## 2024-09-29 RX ADMIN — ACETAZOLAMIDE 500 MG: 500 INJECTION, POWDER, LYOPHILIZED, FOR SOLUTION INTRAVENOUS at 21:26

## 2024-09-29 RX ADMIN — TRAZODONE HYDROCHLORIDE 100 MG: 100 TABLET ORAL at 21:22

## 2024-09-29 RX ADMIN — WATER 10 ML: 1 INJECTION INTRAMUSCULAR; INTRAVENOUS; SUBCUTANEOUS at 21:32

## 2024-09-29 RX ADMIN — BUDESONIDE 0.5 MG: 0.5 INHALANT RESPIRATORY (INHALATION) at 19:31

## 2024-09-29 RX ADMIN — APIXABAN 5 MG: 5 TABLET, FILM COATED ORAL at 08:01

## 2024-09-29 RX ADMIN — LEVALBUTEROL HYDROCHLORIDE 1.25 MG: 1.25 SOLUTION RESPIRATORY (INHALATION) at 08:24

## 2024-09-29 RX ADMIN — LEVALBUTEROL HYDROCHLORIDE 1.25 MG: 1.25 SOLUTION RESPIRATORY (INHALATION) at 19:31

## 2024-09-29 RX ADMIN — WATER 10 ML: 1 INJECTION INTRAMUSCULAR; INTRAVENOUS; SUBCUTANEOUS at 16:18

## 2024-09-29 RX ADMIN — FUROSEMIDE 40 MG: 10 INJECTION, SOLUTION INTRAVENOUS at 06:02

## 2024-09-29 RX ADMIN — PHENYTOIN 150 MG: 125 SUSPENSION ORAL at 22:08

## 2024-09-29 RX ADMIN — Medication 6 MG: at 21:22

## 2024-09-29 RX ADMIN — OXYCODONE HYDROCHLORIDE 5 MG: 5 SOLUTION ORAL at 08:05

## 2024-09-29 RX ADMIN — BUDESONIDE 0.5 MG: 0.5 INHALANT RESPIRATORY (INHALATION) at 08:24

## 2024-09-29 RX ADMIN — IPRATROPIUM BROMIDE 0.5 MG: 0.5 SOLUTION RESPIRATORY (INHALATION) at 08:24

## 2024-09-29 RX ADMIN — CHLORHEXIDINE GLUCONATE 0.12% ORAL RINSE 15 ML: 1.2 LIQUID ORAL at 08:01

## 2024-09-29 RX ADMIN — SODIUM CHLORIDE 2 G: 1 TABLET ORAL at 16:02

## 2024-09-29 RX ADMIN — IPRATROPIUM BROMIDE 0.5 MG: 0.5 SOLUTION RESPIRATORY (INHALATION) at 14:39

## 2024-09-29 RX ADMIN — GABAPENTIN 400 MG: 400 CAPSULE ORAL at 08:01

## 2024-09-29 RX ADMIN — ACETAZOLAMIDE 500 MG: 500 INJECTION, POWDER, LYOPHILIZED, FOR SOLUTION INTRAVENOUS at 08:04

## 2024-09-29 NOTE — PROGRESS NOTES
Progress Note - Critical Care/ICU   Name: Marcin Sánchez 64 y.o. male I MRN: [id number]  [facility] Date of Admission: 9/8/2024   Date of Service: 9/29/2024 I Hospital Day: 21       Assessment & Plan   Neuro:   Diagnosis: Seizure disorder  Plan: Phenytoin 150 mg BID, recheck level later this week  Diagnosis: Insomnia   Trazodone 100 mg Qhs, monitor Qtc 444 9/25        CV:   Diagnosis: Pericardial effusion  Initial concern for tamponade on 9/9, no evidence on echo  CT surgery: no intervention  Monitor for signs of tamponade  Diagnosis: Atrial fibrillation  Rate control: Metoprolol tartrate 12.5 q12  Anticoagulation: eliquis 5mg BID  Diagnosis: Acute on Chronic combined diastolic and systolic CHF, Moderate to severe aortic regurgitation  9/11 ECHO: LVEF 55%, hypokinetic apex. Moderate to severe aortic regurgitation, mild tricuspid regurgitation, dilated ascending aortic root up to 5.4 cm.  Continue IV lasix for diuresis with goal output of -1 to -2L/day  Continue to monitor fluid status and BMP  Diagnosis: AAA, 5.4 cm  `Monitor hemodynamics; BP goal <130/80, will likely require outpatient management     Pulm:  Diagnosis: Acute on Chronic hypoxic and hypercapnic respiratory failure.   COPD, and acute exacerbation now resolvedLikely multifactorial including moderate to severe COPD  Course complicated by recurrent mucous plugging, status post therapeutic bronch x 3.  Sputum culture with Moraxella, status post course of azithromycin x 5 days.  Also with MDR acinetobacter likely contaminant versus colonization as patient has remained stable without antibiotic treatment.  Status post trach 9/19  Continue vent support with daily SBT's, wean vent as able  Continue Xopenex and Atrovent TID  Continue pulmicort BID, Continue Performist BID     History of squamous cell cancer of lung post chemo/radiation 2016, cancer of right mainstem  Noted     GI:   PEG placed 9/19  Continue tube feeds at goal  Bowel regimen:  Senokot, Miralax     :   Haynes removed 9/25  Doxazosin 2mg  Continue to monitor Is/Os, renal indices     F/E/N:   F: No maintenance fluids  E: Replete as needed, consider sodium replacement or fluid restriction as able for hyponatremia  N: Continue Jevity 1.2, at goal     Heme/Onc:   Anemia  Continue B12/folate  No signs of active bleeding  Transfuse for Hgb <7 and/or symptomatic anemia     Metabolic alkalosis  Likely 2/2 ongoing diuresis  Consider addition of acetazolamide      DVT ppx  SCDs and Eliquis     Endo:   Hypothyroidism  Home med: Levothyroxine 250 mg  TSH 45.06 on 9/25 from 0.59 on 9/5, free T4 0.34 (9/25)  Restarted on home levothyroxine 250 mg  Recheck in 6 weeks     ID:   Colonization with Acinetobacter and Moraxella  No acute issues  Continue to monitor WBC count and temperature curve  Completed 5 days of azithromycin 9/10       MSK/Skin:   At risk for pressure injury PT/OT when able  Frequent turns/repositioning  Skin surveillance      Disposition: ICCU    ICU Core Measures     Vented Patient  VAP Bundle  VAP bundle ordered     A: Assess, Prevent, and Manage Pain Has pain been assessed? Yes  Need for changes to pain regimen? NA   B: Both Spontaneous Awakening Trials (SATs) and Spontaneous Breathing Trials (SBTs) Plan to perform spontaneous awakening trial today? N/A   Plan to perform spontaneous breathing trial today? Yes   Obvious barriers to extubation? Yes   C: Choice of Sedation RASS Goal: 0 Alert and Calm  Need for changes to sedation or analgesia regimen? NA   D: Delirium CAM-ICU: Negative   E: Early Mobility  Plan for early mobility? Yes   F: Family Engagement Plan for family engagement today? Yes       Review of Invasive Devices:            Prophylaxis:  VTE VTE covered by:  apixaban, Oral, 5 mg at 09/28/24 1700       Stress Ulcer  not ordered         24 Hour Events   24hr events: No acute events overnight  Subjective   Review of Systems: See HPI for Review of Systems    Objective                           Vitals I/O      Most Recent Min/Max in 24hrs   Temp 98.5 °F (36.9 °C) Temp  Min: 97.3 °F (36.3 °C)  Max: 98.7 °F (37.1 °C)   Pulse 78 Pulse  Min: 68  Max: 80   Resp (!) 37 No data recorded   BP (!) 116/44 BP  Min: 106/43  Max: 152/54   O2 Sat 100 % SpO2  Min: 99 %  Max: 100 %      Intake/Output Summary (Last 24 hours) at 9/29/2024 0749  Last data filed at 9/29/2024 0601  Gross per 24 hour   Intake 1620 ml   Output 1812 ml   Net -192 ml       Diet Enteral/Parenteral; Tube Feeding No Oral Diet; Jevity 1.5; Bolus; 300; (4x/day) - 8:00AM,12:00PM,4:00PM,8:00PM; Prosource Protein Liquid - One Packet; 30; Water; Before and After each Bolus    Invasive Monitoring           Physical Exam   Physical Exam  Vitals and nursing note reviewed.   Eyes:      Conjunctiva/sclera: Conjunctivae normal.   Skin:     General: Skin is warm and dry.   HENT:      Head: Normocephalic and atraumatic.   Neck:      Trachea: Tracheostomy present.   Cardiovascular:      Rate and Rhythm: Normal rate and regular rhythm.      Pulses: Normal pulses.      Heart sounds: Normal heart sounds.   Musculoskeletal:      Right lower leg: No edema.      Left lower leg: No edema.   Abdominal: General: There is abdominal type feeding tube.  Constitutional:       General: He is not in acute distress.     Interventions: He is not sedated and not intubated.  Pulmonary:      Effort: Pulmonary effort is normal. No accessory muscle usage, respiratory distress or accessory muscle usage. He is not intubated.      Breath sounds: Normal breath sounds. No wheezing, rhonchi or rales.   Neurological:      Mental Status: He is calm.          Diagnostic Studies        Lab Results: I have reviewed the following results:      Medications:  Scheduled PRN   acetaZOLAMIDE, 500 mg, Q12H AMERICA  apixaban, 5 mg, BID  budesonide, 0.5 mg, Q12H  chlorhexidine, 15 mL, Q12H AMERICA  vitamin B-12, 100 mcg, Daily  doxazosin, 2 mg, Daily  folic acid, 1 mg, Daily  furosemide, 40 mg, TID  (diuretic)  gabapentin, 400 mg, BID  ipratropium, 0.5 mg, TID  levalbuterol, 1.25 mg, TID  levothyroxine, 250 mcg, Early Morning  melatonin, 6 mg, HS  metoprolol tartrate, 12.5 mg, Q12H AMERICA  [START ON 10/19/2024] nicotine, 14 mg, Daily  nicotine, 1 patch, Daily  [START ON 11/2/2024] nicotine, 7 mg, Daily  phenytoin, 150 mg, Q12H AMERICA  polyethylene glycol, 17 g, BID  senna, 8.8 mg, HS  sodium chloride, 2 g, BID With Meals  traZODone, 100 mg, HS      acetaminophen, 650 mg, Q4H PRN  albuterol, 2.5 mg, Q6H PRN  bisacodyl, 10 mg, Daily PRN  oxyCODONE, 5 mg, Q6H PRN       Continuous          Labs:   CBC    Recent Labs     09/28/24  0542 09/29/24  0632   WBC 6.19 8.89   HGB 7.5* 7.5*   HCT 23.1* 23.0*    228     BMP    Recent Labs     09/28/24  0542 09/29/24  0632   SODIUM 131* 130*   K 4.0 3.3*   CL 84* 83*   CO2 >45* 44*   AGAP  --  3*   BUN 19 20   CREATININE 0.52* 0.51*   CALCIUM 8.3* 8.4       Coags    No recent results     Additional Electrolytes  Recent Labs     09/28/24  0542 09/29/24  0632   MG 1.8* 2.1   PHOS 4.3* 3.5   CAIONIZED 1.09*  --           Blood Gas    No recent results  No recent results LFTs  No recent results    Infectious  No recent results  Glucose  Recent Labs     09/28/24  0542 09/29/24  0632   GLUC 112 108

## 2024-09-29 NOTE — RESPIRATORY THERAPY NOTE
RT Ventilator Management Note  Marcin Sánchez 64 y.o. male MRN: [id number]  [facility]: [id number]      Daily Screen         9/27/2024  0807 9/28/2024  0849          Patient safety screen outcome:: Passed Passed      Not Ready for Weaning due to:: -- Underline problem not resolved      RSBI: 59 --                Physical Exam:   Respiratory Pattern: (P) Normal  Chest Assessment: (P) Chest expansion symmetrical      Resp Comments: (P) Pt sleeping comfortably on current vent settings. No distress noted at this time

## 2024-09-29 NOTE — PLAN OF CARE
Problem: Prexisting or High Potential for Compromised Skin Integrity  Goal: Skin integrity is maintained or improved  Description: INTERVENTIONS:  - Identify patients at risk for skin breakdown  - Assess and monitor skin integrity  - Assess and monitor nutrition and hydration status  - Monitor labs   - Assess for incontinence   - Turn and reposition patient  - Assist with mobility/ambulation  - Relieve pressure over bony prominences  - Avoid friction and shearing  - Provide appropriate hygiene as needed including keeping skin clean and dry  - Evaluate need for skin moisturizer/barrier cream  - Collaborate with interdisciplinary team   - Patient/family teaching  - Consider wound care consult   Outcome: Progressing     Problem: PAIN - ADULT  Goal: Verbalizes/displays adequate comfort level or baseline comfort level  Description: Interventions:  - Encourage patient to monitor pain and request assistance  - Assess pain using appropriate pain scale  - Administer analgesics based on type and severity of pain and evaluate response  - Implement non-pharmacological measures as appropriate and evaluate response  - Consider cultural and social influences on pain and pain management  - Notify physician/advanced practitioner if interventions unsuccessful or patient reports new pain  Outcome: Progressing     Problem: INFECTION - ADULT  Goal: Absence or prevention of progression during hospitalization  Description: INTERVENTIONS:  - Assess and monitor for signs and symptoms of infection  - Monitor lab/diagnostic results  - Monitor all insertion sites, i.e. indwelling lines, tubes, and drains  - Monitor endotracheal if appropriate and nasal secretions for changes in amount and color  - [place] appropriate cooling/warming therapies per order  - Administer medications as ordered  - Instruct and encourage patient and family to use good hand hygiene technique  - Identify and instruct in appropriate isolation precautions for  identified infection/condition  Outcome: Progressing     Problem: INFECTION - ADULT  Goal: Absence of fever/infection during neutropenic period  Description: INTERVENTIONS:  - Monitor WBC    Outcome: Progressing     Problem: SAFETY ADULT  Goal: Patient will remain free of falls  Description: INTERVENTIONS:  - Educate patient/family on patient safety including physical limitations  - Instruct patient to call for assistance with activity   - Consult OT/PT to assist with strengthening/mobility   - Keep Call bell within reach  - Keep bed low and locked with side rails adjusted as appropriate  - Keep care items and personal belongings within reach  - Initiate and maintain comfort rounds  - Make Fall Risk Sign visible to staff  - Offer Toileting every 2 Hours, in advance of need  - Initiate/Maintain bed alarm  - Obtain necessary fall risk management equipment:    Problem: Nutrition/Hydration-ADULT  Goal: Nutrient/Hydration intake appropriate for improving, restoring or maintaining nutritional needs  Description: Monitor and assess patient's nutrition/hydration status for malnutrition. Collaborate with interdisciplinary team and initiate plan and interventions as ordered.  Monitor patient's weight and dietary intake as ordered or per policy. Utilize nutrition screening tool and intervene as necessary. Determine patient's food preferences and provide high-protein, high-caloric foods as appropriate.     INTERVENTIONS:  - Monitor oral intake, urinary output, labs, and treatment plans  - Assess nutrition and hydration status and recommend course of action  - Evaluate amount of meals eaten  - Assist patient with eating if necessary   - Allow adequate time for meals  - Recommend/ encourage appropriate diets, oral nutritional supplements, and vitamin/mineral supplements  - Order, calculate, and assess calorie counts as needed  - Recommend, monitor, and adjust tube feedings and TPN/PPN based on assessed needs  - Assess need for  intravenous fluids  - Provide specific nutrition/hydration education as appropriate  - Include patient/family/caregiver in decisions related to nutrition  Outcome: Progressing     Problem: RESPIRATORY - ADULT  Goal: Achieves optimal ventilation and oxygenation  Description: INTERVENTIONS:  - Assess for changes in respiratory status  - Assess for changes in mentation and behavior  - Position to facilitate oxygenation and minimize respiratory effort  - Oxygen administered by appropriate delivery if ordered  - Initiate smoking cessation education as indicated  - Encourage broncho-pulmonary hygiene including cough, deep breathe, Incentive Spirometry  - Assess the need for suctioning and aspirate as needed  - Assess and instruct to report SOB or any respiratory difficulty  - Respiratory Therapy support as indicated  Outcome: Progressing     - Apply yellow socks and bracelet for high fall risk patients  - Consider moving patient to room near nurses station  Outcome: Progressing

## 2024-09-30 ENCOUNTER — APPOINTMENT (INPATIENT)
Dept: RADIOLOGY | Facility: HOSPITAL | Age: 64
DRG: 005 | End: 2024-09-30
Payer: COMMERCIAL

## 2024-09-30 LAB
ANION GAP SERPL CALCULATED.3IONS-SCNC: 4 MMOL/L (ref 4–13)
ANION GAP SERPL CALCULATED.3IONS-SCNC: 5 MMOL/L (ref 4–13)
BASE EX.OXY STD BLDV CALC-SCNC: 55.7 % (ref 60–80)
BASE EX.OXY STD BLDV CALC-SCNC: 70.3 % (ref 60–80)
BASE EX.OXY STD BLDV CALC-SCNC: 77.6 % (ref 60–80)
BASE EXCESS BLDV CALC-SCNC: 12.2 MMOL/L
BASE EXCESS BLDV CALC-SCNC: 7 MMOL/L
BASE EXCESS BLDV CALC-SCNC: 7.5 MMOL/L
BASOPHILS # BLD AUTO: 0.05 THOUSANDS/ΜL (ref 0–0.1)
BASOPHILS NFR BLD AUTO: 1 % (ref 0–1)
BUN SERPL-MCNC: 21 MG/DL (ref 5–25)
BUN SERPL-MCNC: 26 MG/DL (ref 5–25)
CA-I BLD-SCNC: 1.13 MMOL/L (ref 1.12–1.32)
CALCIUM SERPL-MCNC: 8.4 MG/DL (ref 8.4–10.2)
CALCIUM SERPL-MCNC: 8.7 MG/DL (ref 8.4–10.2)
CHLORIDE SERPL-SCNC: 89 MMOL/L (ref 96–108)
CHLORIDE SERPL-SCNC: 92 MMOL/L (ref 96–108)
CO2 SERPL-SCNC: 37 MMOL/L (ref 21–32)
CO2 SERPL-SCNC: 40 MMOL/L (ref 21–32)
CREAT SERPL-MCNC: 0.46 MG/DL (ref 0.6–1.3)
CREAT SERPL-MCNC: 0.52 MG/DL (ref 0.6–1.3)
EOSINOPHIL # BLD AUTO: 0.19 THOUSAND/ΜL (ref 0–0.61)
EOSINOPHIL NFR BLD AUTO: 2 % (ref 0–6)
ERYTHROCYTE [DISTWIDTH] IN BLOOD BY AUTOMATED COUNT: 14 % (ref 11.6–15.1)
GFR SERPL CREATININE-BSD FRML MDRD: 112 ML/MIN/1.73SQ M
GFR SERPL CREATININE-BSD FRML MDRD: 118 ML/MIN/1.73SQ M
GLUCOSE SERPL-MCNC: 107 MG/DL (ref 65–140)
GLUCOSE SERPL-MCNC: 133 MG/DL (ref 65–140)
GLUCOSE SERPL-MCNC: 144 MG/DL (ref 65–140)
GLUCOSE SERPL-MCNC: 162 MG/DL (ref 65–140)
HCO3 BLDV-SCNC: 34.1 MMOL/L (ref 24–30)
HCO3 BLDV-SCNC: 35 MMOL/L (ref 24–30)
HCO3 BLDV-SCNC: 39.6 MMOL/L (ref 24–30)
HCT VFR BLD AUTO: 22.1 % (ref 36.5–49.3)
HCT VFR BLD AUTO: 24.8 % (ref 36.5–49.3)
HGB BLD-MCNC: 7.1 G/DL (ref 12–17)
HGB BLD-MCNC: 8.1 G/DL (ref 12–17)
IMM GRANULOCYTES # BLD AUTO: 0.04 THOUSAND/UL (ref 0–0.2)
IMM GRANULOCYTES NFR BLD AUTO: 1 % (ref 0–2)
LYMPHOCYTES # BLD AUTO: 0.64 THOUSANDS/ΜL (ref 0.6–4.47)
LYMPHOCYTES NFR BLD AUTO: 8 % (ref 14–44)
MAGNESIUM SERPL-MCNC: 1.9 MG/DL (ref 1.9–2.7)
MAGNESIUM SERPL-MCNC: 1.9 MG/DL (ref 1.9–2.7)
MCH RBC QN AUTO: 32.4 PG (ref 26.8–34.3)
MCHC RBC AUTO-ENTMCNC: 32.1 G/DL (ref 31.4–37.4)
MCV RBC AUTO: 101 FL (ref 82–98)
MONOCYTES # BLD AUTO: 1.17 THOUSAND/ΜL (ref 0.17–1.22)
MONOCYTES NFR BLD AUTO: 14 % (ref 4–12)
NEUTROPHILS # BLD AUTO: 6.48 THOUSANDS/ΜL (ref 1.85–7.62)
NEUTS SEG NFR BLD AUTO: 74 % (ref 43–75)
NRBC BLD AUTO-RTO: 0 /100 WBCS
O2 CT BLDV-SCNC: 6.2 ML/DL
O2 CT BLDV-SCNC: 8.7 ML/DL
O2 CT BLDV-SCNC: 8.7 ML/DL
PCO2 BLDV: 61.6 MM HG (ref 42–50)
PCO2 BLDV: 75.1 MM HG (ref 42–50)
PCO2 BLDV: 77.3 MM HG (ref 42–50)
PH BLDV: 7.27 [PH] (ref 7.3–7.4)
PH BLDV: 7.34 [PH] (ref 7.3–7.4)
PH BLDV: 7.36 [PH] (ref 7.3–7.4)
PHENYTOIN SERPL-MCNC: 8.6 UG/ML (ref 10–20)
PHOSPHATE SERPL-MCNC: 3.3 MG/DL (ref 2.3–4.1)
PHOSPHATE SERPL-MCNC: 3.5 MG/DL (ref 2.3–4.1)
PLATELET # BLD AUTO: 213 THOUSANDS/UL (ref 149–390)
PMV BLD AUTO: 8.7 FL (ref 8.9–12.7)
PO2 BLDV: 30.9 MM HG (ref 35–45)
PO2 BLDV: 38.2 MM HG (ref 35–45)
PO2 BLDV: 48.6 MM HG (ref 35–45)
POTASSIUM SERPL-SCNC: 3.4 MMOL/L (ref 3.5–5.3)
POTASSIUM SERPL-SCNC: 3.9 MMOL/L (ref 3.5–5.3)
RBC # BLD AUTO: 2.19 MILLION/UL (ref 3.88–5.62)
SODIUM SERPL-SCNC: 133 MMOL/L (ref 135–147)
SODIUM SERPL-SCNC: 134 MMOL/L (ref 135–147)
WBC # BLD AUTO: 8.57 THOUSAND/UL (ref 4.31–10.16)

## 2024-09-30 PROCEDURE — 94003 VENT MGMT INPAT SUBQ DAY: CPT

## 2024-09-30 PROCEDURE — 85018 HEMOGLOBIN: CPT

## 2024-09-30 PROCEDURE — 82805 BLOOD GASES W/O2 SATURATION: CPT

## 2024-09-30 PROCEDURE — 85025 COMPLETE CBC W/AUTO DIFF WBC: CPT

## 2024-09-30 PROCEDURE — 84100 ASSAY OF PHOSPHORUS: CPT

## 2024-09-30 PROCEDURE — 82948 REAGENT STRIP/BLOOD GLUCOSE: CPT

## 2024-09-30 PROCEDURE — 94640 AIRWAY INHALATION TREATMENT: CPT

## 2024-09-30 PROCEDURE — 83735 ASSAY OF MAGNESIUM: CPT

## 2024-09-30 PROCEDURE — 80048 BASIC METABOLIC PNL TOTAL CA: CPT

## 2024-09-30 PROCEDURE — 85014 HEMATOCRIT: CPT

## 2024-09-30 PROCEDURE — 94760 N-INVAS EAR/PLS OXIMETRY 1: CPT

## 2024-09-30 PROCEDURE — 94664 DEMO&/EVAL PT USE INHALER: CPT

## 2024-09-30 PROCEDURE — 99233 SBSQ HOSP IP/OBS HIGH 50: CPT | Performed by: INTERNAL MEDICINE

## 2024-09-30 PROCEDURE — 82805 BLOOD GASES W/O2 SATURATION: CPT | Performed by: STUDENT IN AN ORGANIZED HEALTH CARE EDUCATION/TRAINING PROGRAM

## 2024-09-30 PROCEDURE — 82330 ASSAY OF CALCIUM: CPT

## 2024-09-30 PROCEDURE — 70450 CT HEAD/BRAIN W/O DYE: CPT

## 2024-09-30 PROCEDURE — 80185 ASSAY OF PHENYTOIN TOTAL: CPT

## 2024-09-30 RX ORDER — OXYCODONE HCL 5 MG/5 ML
2.5 SOLUTION, ORAL ORAL EVERY 6 HOURS
Status: DISCONTINUED | OUTPATIENT
Start: 2024-09-30 | End: 2024-10-06

## 2024-09-30 RX ORDER — ACETYLCYSTEINE 200 MG/ML
3 SOLUTION ORAL; RESPIRATORY (INHALATION)
Status: DISCONTINUED | OUTPATIENT
Start: 2024-09-30 | End: 2024-10-02

## 2024-09-30 RX ORDER — POTASSIUM CHLORIDE 20MEQ/15ML
40 LIQUID (ML) ORAL 2 TIMES DAILY
Status: DISCONTINUED | OUTPATIENT
Start: 2024-09-30 | End: 2024-10-01

## 2024-09-30 RX ORDER — ACETAMINOPHEN 160 MG/5ML
975 SUSPENSION ORAL EVERY 8 HOURS
Status: DISCONTINUED | OUTPATIENT
Start: 2024-09-30 | End: 2024-10-05

## 2024-09-30 RX ORDER — LIDOCAINE 40 MG/G
CREAM TOPICAL 4 TIMES DAILY PRN
Status: DISCONTINUED | OUTPATIENT
Start: 2024-09-30 | End: 2024-10-15

## 2024-09-30 RX ORDER — OLANZAPINE 5 MG/1
5 TABLET ORAL
Status: DISCONTINUED | OUTPATIENT
Start: 2024-09-30 | End: 2024-10-01

## 2024-09-30 RX ORDER — OLANZAPINE 10 MG/2ML
5 INJECTION, POWDER, FOR SOLUTION INTRAMUSCULAR ONCE AS NEEDED
Status: DISCONTINUED | OUTPATIENT
Start: 2024-09-30 | End: 2024-10-01

## 2024-09-30 RX ORDER — ACETYLCYSTEINE 200 MG/ML
3 SOLUTION ORAL; RESPIRATORY (INHALATION)
Status: DISCONTINUED | OUTPATIENT
Start: 2024-09-30 | End: 2024-09-30

## 2024-09-30 RX ORDER — POTASSIUM CHLORIDE 20MEQ/15ML
40 LIQUID (ML) ORAL 2 TIMES DAILY
Status: DISCONTINUED | OUTPATIENT
Start: 2024-09-30 | End: 2024-09-30

## 2024-09-30 RX ORDER — ACETAMINOPHEN 160 MG/5ML
975 SUSPENSION ORAL EVERY 8 HOURS
Status: DISCONTINUED | OUTPATIENT
Start: 2024-09-30 | End: 2024-09-30

## 2024-09-30 RX ORDER — ACETAZOLAMIDE 500 MG/5ML
500 INJECTION, POWDER, LYOPHILIZED, FOR SOLUTION INTRAVENOUS 3 TIMES DAILY
Status: COMPLETED | OUTPATIENT
Start: 2024-09-30 | End: 2024-09-30

## 2024-09-30 RX ORDER — OLANZAPINE 5 MG/1
2.5 TABLET ORAL DAILY PRN
Status: DISCONTINUED | OUTPATIENT
Start: 2024-09-30 | End: 2024-10-02

## 2024-09-30 RX ORDER — WATER 10 ML/10ML
INJECTION INTRAMUSCULAR; INTRAVENOUS; SUBCUTANEOUS
Status: COMPLETED
Start: 2024-09-30 | End: 2024-09-30

## 2024-09-30 RX ADMIN — OXYCODONE HYDROCHLORIDE 5 MG: 5 SOLUTION ORAL at 08:48

## 2024-09-30 RX ADMIN — BUDESONIDE 0.5 MG: 0.5 INHALANT RESPIRATORY (INHALATION) at 19:58

## 2024-09-30 RX ADMIN — OLANZAPINE 5 MG: 5 TABLET, FILM COATED ORAL at 21:34

## 2024-09-30 RX ADMIN — NICOTINE 1 PATCH: 21 PATCH, EXTENDED RELEASE TRANSDERMAL at 08:47

## 2024-09-30 RX ADMIN — GABAPENTIN 400 MG: 400 CAPSULE ORAL at 08:47

## 2024-09-30 RX ADMIN — IPRATROPIUM BROMIDE 0.5 MG: 0.5 SOLUTION RESPIRATORY (INHALATION) at 19:59

## 2024-09-30 RX ADMIN — OXYCODONE HYDROCHLORIDE 5 MG: 5 SOLUTION ORAL at 15:34

## 2024-09-30 RX ADMIN — GABAPENTIN 400 MG: 400 CAPSULE ORAL at 18:36

## 2024-09-30 RX ADMIN — POTASSIUM CHLORIDE 40 MEQ: 1.5 SOLUTION ORAL at 06:46

## 2024-09-30 RX ADMIN — DOXAZOSIN 2 MG: 2 TABLET ORAL at 08:46

## 2024-09-30 RX ADMIN — ACETAMINOPHEN 975 MG: 650 SUSPENSION ORAL at 15:31

## 2024-09-30 RX ADMIN — Medication 12.5 MG: at 21:34

## 2024-09-30 RX ADMIN — LEVALBUTEROL HYDROCHLORIDE 1.25 MG: 1.25 SOLUTION RESPIRATORY (INHALATION) at 19:58

## 2024-09-30 RX ADMIN — OLANZAPINE 2.5 MG: 5 TABLET, FILM COATED ORAL at 15:43

## 2024-09-30 RX ADMIN — LEVALBUTEROL HYDROCHLORIDE 1.25 MG: 1.25 SOLUTION RESPIRATORY (INHALATION) at 07:18

## 2024-09-30 RX ADMIN — WATER 10 ML: 1 INJECTION INTRAMUSCULAR; INTRAVENOUS; SUBCUTANEOUS at 21:38

## 2024-09-30 RX ADMIN — POTASSIUM CHLORIDE 40 MEQ: 1.5 SOLUTION ORAL at 18:36

## 2024-09-30 RX ADMIN — APIXABAN 5 MG: 5 TABLET, FILM COATED ORAL at 18:36

## 2024-09-30 RX ADMIN — CHLORHEXIDINE GLUCONATE 0.12% ORAL RINSE 15 ML: 1.2 LIQUID ORAL at 20:44

## 2024-09-30 RX ADMIN — APIXABAN 5 MG: 5 TABLET, FILM COATED ORAL at 08:46

## 2024-09-30 RX ADMIN — ALBUTEROL SULFATE 2.5 MG: 2.5 SOLUTION RESPIRATORY (INHALATION) at 12:11

## 2024-09-30 RX ADMIN — BUDESONIDE 0.5 MG: 0.5 INHALANT RESPIRATORY (INHALATION) at 07:18

## 2024-09-30 RX ADMIN — FOLIC ACID 1 MG: 1 TABLET ORAL at 08:46

## 2024-09-30 RX ADMIN — ACETAZOLAMIDE 500 MG: 500 INJECTION, POWDER, LYOPHILIZED, FOR SOLUTION INTRAVENOUS at 13:03

## 2024-09-30 RX ADMIN — PHENYTOIN 150 MG: 125 SUSPENSION ORAL at 21:36

## 2024-09-30 RX ADMIN — PHENYTOIN 150 MG: 125 SUSPENSION ORAL at 08:47

## 2024-09-30 RX ADMIN — ACETAMINOPHEN 650 MG: 650 SUSPENSION ORAL at 08:46

## 2024-09-30 RX ADMIN — Medication 12.5 MG: at 08:46

## 2024-09-30 RX ADMIN — POLYETHYLENE GLYCOL 3350 17 G: 17 POWDER, FOR SOLUTION ORAL at 18:36

## 2024-09-30 RX ADMIN — ACETYLCYSTEINE 600 MG: 200 SOLUTION ORAL; RESPIRATORY (INHALATION) at 12:11

## 2024-09-30 RX ADMIN — ACETYLCYSTEINE 600 MG: 200 SOLUTION ORAL; RESPIRATORY (INHALATION) at 19:58

## 2024-09-30 RX ADMIN — CHLORHEXIDINE GLUCONATE 0.12% ORAL RINSE 15 ML: 1.2 LIQUID ORAL at 08:46

## 2024-09-30 RX ADMIN — Medication 100 MCG: at 08:46

## 2024-09-30 RX ADMIN — ACETAZOLAMIDE 500 MG: 500 INJECTION, POWDER, LYOPHILIZED, FOR SOLUTION INTRAVENOUS at 21:38

## 2024-09-30 RX ADMIN — IPRATROPIUM BROMIDE 0.5 MG: 0.5 SOLUTION RESPIRATORY (INHALATION) at 15:00

## 2024-09-30 RX ADMIN — TRAZODONE HYDROCHLORIDE 100 MG: 100 TABLET ORAL at 21:33

## 2024-09-30 RX ADMIN — OXYCODONE HYDROCHLORIDE 2.5 MG: 5 SOLUTION ORAL at 18:36

## 2024-09-30 RX ADMIN — LEVOTHYROXINE SODIUM 250 MCG: 100 TABLET ORAL at 06:13

## 2024-09-30 RX ADMIN — LEVALBUTEROL HYDROCHLORIDE 1.25 MG: 1.25 SOLUTION RESPIRATORY (INHALATION) at 15:00

## 2024-09-30 RX ADMIN — ACETAZOLAMIDE 500 MG: 500 INJECTION, POWDER, LYOPHILIZED, FOR SOLUTION INTRAVENOUS at 15:31

## 2024-09-30 RX ADMIN — IPRATROPIUM BROMIDE 0.5 MG: 0.5 SOLUTION RESPIRATORY (INHALATION) at 07:18

## 2024-09-30 RX ADMIN — Medication 6 MG: at 21:34

## 2024-09-30 RX ADMIN — SODIUM CHLORIDE 2 G: 1 TABLET ORAL at 06:46

## 2024-09-30 RX ADMIN — SODIUM CHLORIDE 2 G: 1 TABLET ORAL at 15:36

## 2024-09-30 NOTE — PROGRESS NOTES
Progress Note -     Name: Marcin Sánchez 64 y.o. male I MRN: [id number]  Unit/Bed#: [facility] 208-01 I Date of Admission: 9/8/2024   Date of Service: 9/30/2024 I Hospital Day: 22     This SmartSection is not supported in the current .     Pastoral Care Progress Note             09/30/24 1300   Clinical Encounter Type   Visited With Patient   Referral From Nurse   Referral To       met with pt at nurse suggestion and found pt stating that he did not want a visit at that time.  remains available.

## 2024-09-30 NOTE — UTILIZATION REVIEW
Continued Stay Review    Date: 9/30/24                          Current Patient Class: Inpatient  Current Level of Care: ICCU    HPI:64 y.o. male initially admitted on 9/8/24    Assessment/Plan:  Agitated overnight, tried to pull at his tracheostomy site, ripped one of the sutures. Started zyprexa 10 mg qHS. Continue gabapentin, trazodone and monitor Qtc. Continue to monitor for signs of tamponade due to pericardial effusion. Monitor volume status and continue IV diuresis. Monitor HD and labs. Continue daily trach collar as tolerated, neb txs and pulmicort. Continue tube feeds via PEG tube, bowel regimen. monitor electrolytes and replete prn, monitor hgb and transfuse less than 7. PT OT when able. Continue ICCU level of care.        Medications:   Scheduled Medications:  acetaminophen, 975 mg, Oral, Q8H  acetaZOLAMIDE, 500 mg, Intravenous, TID  acetylcysteine, 3 mL, Nebulization, Q8H  apixaban, 5 mg, Oral, BID  budesonide, 0.5 mg, Nebulization, Q12H  chlorhexidine, 15 mL, Mouth/Throat, Q12H [place]  vitamin B-12, 100 mcg, Oral, Daily  doxazosin, 2 mg, Oral, Daily  folic acid, 1 mg, Oral, Daily  gabapentin, 400 mg, Per NG Tube, BID  ipratropium, 0.5 mg, Nebulization, TID  levalbuterol, 1.25 mg, Nebulization, TID  levothyroxine, 250 mcg, Oral, Early Morning  melatonin, 6 mg, Oral, HS  metoprolol tartrate, 12.5 mg, Oral, Q12H [place]  [START ON 10/19/2024] nicotine, 14 mg, Transdermal, Daily  nicotine, 1 patch, Transdermal, Daily  [START ON 11/2/2024] nicotine, 7 mg, Transdermal, Daily  OLANZapine, 5 mg, Oral, HS  oxyCODONE, 2.5 mg, Oral, Q6H  phenytoin, 150 mg, Per PEG Tube, Q12H [place]  polyethylene glycol, 17 g, Oral, BID  potassium chloride, 40 mEq, Oral, BID  senna, 8.8 mg, Per G Tube, HS  sodium chloride, 2 g, Oral, BID With Meals  traZODone, 100 mg, Oral, HS      Continuous IV Infusions: none     PRN Meds:  albuterol, 2.5 mg, Nebulization, Q6H PRN  bisacodyl, 10 mg, Rectal, Daily PRN  lidocaine, , Topical, 4x Daily  PRN  OLANZapine, 2.5 mg, Oral, Daily PRN  oxyCODONE, 5 mg, Oral, Q6H PRN      Discharge Plan: tbd    Vital Signs (last 3 days)       Date/Time Temp Pulse Resp BP MAP (mmHg) SpO2 FiO2 (%) O2 Flow Rate (L/min) O2 Device O2 Interface Device Patient Position - Orthostatic VS Alecia Coma Scale Score Pain    09/30/24 1217 -- -- -- -- -- 99 % -- -- -- -- -- -- --    09/30/24 1100 97.7 °F (36.5 °C) -- -- -- -- -- -- -- -- -- Lying -- --    09/30/24 1028 -- -- -- -- -- -- 35 35 L/min High flow nasal cannula -- -- -- --    09/30/24 1024 -- -- -- -- -- 95 % -- -- -- HFNC prongs -- -- --    09/30/24 0848 -- -- -- -- -- -- -- -- -- -- -- -- 6    09/30/24 0748 -- -- -- -- -- 100 % -- -- -- -- -- -- --    09/30/24 0725 97.7 °F (36.5 °C) 84 20 118/47 -- -- -- -- -- -- Lying -- --    09/30/24 0721 -- -- -- -- -- 100 % -- -- -- -- -- -- --    09/30/24 0400 -- -- -- -- -- -- -- -- -- -- -- 10 No Pain    09/30/24 0320 -- -- -- -- -- 100 % -- -- -- -- -- -- --    09/30/24 0220 99.1 °F (37.3 °C) 76 27 115/44 73 100 % -- -- -- -- -- -- --    09/30/24 0000 -- -- -- -- -- -- -- -- -- -- -- 10 No Pain    09/29/24 2240 98.6 °F (37 °C) 74 16 104/43 69 100 % -- -- -- -- -- -- --    09/29/24 2119 -- 82 21 126/48 76 100 % -- -- -- -- -- -- --    09/29/24 2000 -- -- -- -- -- -- -- -- -- -- -- 11 No Pain    09/29/24 1955 98.5 °F (36.9 °C) 82 18 111/49 79 100 % -- -- Ventilator -- Lying -- No Pain    09/29/24 1935 -- -- -- -- -- 100 % -- -- -- -- -- -- --    09/29/24 1600 -- -- -- -- -- -- -- -- -- -- -- 11 No Pain    09/29/24 1505 98.1 °F (36.7 °C) 78 18 122/49 86 100 % -- -- Trach mask -- Lying -- --    09/29/24 1439 -- -- -- -- -- 100 % -- -- -- -- -- -- --    09/29/24 1200 -- -- -- -- -- -- -- -- -- -- -- 11 No Pain    09/29/24 1107 98.1 °F (36.7 °C) 82 18 119/40 63 98 % 40 10 L/min Trach mask -- Lying -- --    09/29/24 0845 -- -- -- -- -- 100 % 40 10 L/min Trach mask -- -- -- --    09/29/24 0824 -- -- -- -- -- 100 % -- -- -- -- -- -- --     09/29/24 0800 -- -- -- -- -- -- -- -- -- -- -- 11 8 09/29/24 0750 98.6 °F (37 °C) 83 18 97/39 -- 100 % -- -- Ventilator -- Lying -- --    09/29/24 0400 -- -- -- -- -- -- -- -- -- -- -- 11 --    09/29/24 0345 -- -- -- -- -- 100 % -- -- -- -- -- -- --    09/29/24 0240 98.5 °F (36.9 °C) 78 -- 116/44 71 100 % -- -- -- -- -- -- --    09/29/24 0022 -- -- -- -- -- 100 % -- -- -- -- -- -- --    09/29/24 0000 -- -- -- -- -- -- -- -- -- -- -- 11 --    09/28/24 2205 98.7 °F (37.1 °C) 80 -- 115/45 -- -- -- -- -- -- -- -- --    09/28/24 2100 -- -- -- -- -- 100 % -- -- -- -- -- -- --    09/28/24 2059 -- -- -- -- -- 100 % -- -- -- -- -- -- --    09/28/24 2000 -- -- -- -- -- -- -- -- -- -- -- 11 --    09/28/24 1955 -- 74 -- 106/43 65 100 % -- -- -- -- -- -- --    09/28/24 1953 97.9 °F (36.6 °C) -- -- -- -- -- -- -- -- -- -- -- --    09/28/24 1700 -- -- -- -- -- -- -- -- -- -- -- -- 8 09/28/24 1600 -- 68 -- 106/48 72 100 % -- -- Ventilator -- -- 11 6    09/28/24 1555 97.7 °F (36.5 °C) -- -- 106/48 72 -- -- -- -- -- Lying -- --    09/28/24 1414 -- -- -- -- -- 100 % -- -- -- -- -- -- --    09/28/24 1409 -- -- -- -- -- -- -- -- -- -- -- -- 7 09/28/24 1400 -- 78 -- 152/54 77 100 % -- -- -- -- -- -- --    09/28/24 1200 -- -- -- -- -- -- 40 -- Trach mask -- -- 11 6 09/28/24 1145 -- -- -- -- -- 99 % 40 10 L/min Trach mask -- -- -- --    09/28/24 1132 97.6 °F (36.4 °C) -- -- -- -- -- -- -- -- -- Lying -- --    09/28/24 0849 -- -- -- -- -- 100 % -- -- Trach mask -- -- -- --    09/28/24 0800 -- -- -- -- -- -- 60 -- Trach mask -- -- 11 6 09/28/24 0751 97.3 °F (36.3 °C) -- -- -- -- -- -- -- -- -- Lying -- --    09/28/24 0400 -- -- -- -- -- -- -- -- -- -- -- 11 --    09/28/24 0335 -- -- -- -- -- 100 % -- -- -- -- -- -- --    09/28/24 0300 97.2 °F (36.2 °C) 80 -- 103/38 63 100 % -- -- -- -- Lying -- --    09/28/24 0100 -- -- -- -- -- -- -- -- -- -- -- -- No Pain    09/28/24 0000 -- -- -- -- -- -- -- -- -- -- -- 11 --    09/27/24  2320 -- -- -- -- -- 97 % -- -- -- -- -- -- No Pain    09/27/24 2210 97.5 °F (36.4 °C) 92 -- 134/43 70 98 % -- -- Trach mask -- Lying 11 --    09/27/24 2010 -- -- -- -- -- -- 40 12 L/min Trach mask -- -- -- --    09/27/24 1930 -- -- -- -- -- -- -- -- -- -- -- -- No Pain    09/27/24 1900 -- 87 37 130/62 89 94 % -- -- -- -- -- -- --    09/27/24 1800 -- 94 49 138/63 91 91 % -- -- -- -- -- -- --    09/27/24 1700 -- 83 24 115/57 82 95 % -- -- -- -- -- -- --    09/27/24 1601 -- -- -- -- -- -- -- -- -- -- -- 11 No Pain    09/27/24 1600 -- 91 47 136/57 82 93 % -- 12 L/min Trach mask -- -- -- --    09/27/24 1516 -- -- -- -- -- -- -- -- -- -- -- -- No Pain    09/27/24 1500 -- 84 27 112/53 77 95 % 40 10 L/min Trach mask -- Lying -- --    09/27/24 1400 -- 82 30 107/54 78 98 % 40 10 L/min Trach mask -- Lying -- --    09/27/24 1341 -- -- -- -- -- 100 % 40 10 L/min Trach mask -- -- -- --    09/27/24 1317 -- -- -- -- -- 99 % -- -- -- -- -- -- --    09/27/24 1300 -- 76 28 106/53 77 99 % -- -- -- -- Lying -- --    09/27/24 1200 98 °F (36.7 °C) 74 24 114/56 80 98 % -- -- Ventilator -- Lying 11 No Pain    09/27/24 1157 -- -- -- -- -- 98 % -- -- -- -- -- -- --    09/27/24 1100 -- 73 27 109/55 79 98 % -- -- -- -- -- -- --    09/27/24 1000 -- 70 31 107/55 79 98 % -- -- -- -- -- -- No Pain    09/27/24 0900 -- 64 34 109/56 81 100 % -- -- -- -- -- -- --    09/27/24 0807 -- -- -- -- -- 98 % -- -- -- -- -- -- --    09/27/24 0800 -- 74 23 120/57 82 99 % -- -- -- -- -- -- --    09/27/24 0701 -- -- -- -- -- -- -- -- -- -- -- 11 --    09/27/24 0700 98.8 °F (37.1 °C) 72 25 131/57 82 96 % -- -- Ventilator -- Lying -- No Pain    09/27/24 0600 -- 73 22 116/53 77 99 % -- -- -- -- -- -- --    09/27/24 0500 -- 75 26 113/53 76 99 % -- -- -- -- -- -- --    09/27/24 0400 99 °F (37.2 °C) 71 24 110/54 78 99 % -- -- Ventilator -- Lying 11 No Pain    09/27/24 0300 -- 78 22 103/51 66 98 % -- -- -- -- -- -- --    09/27/24 0243 -- -- -- -- -- 99 % -- -- -- -- -- --  --    09/27/24 0200 -- 79 27 100/51 73 99 % -- -- -- -- -- -- --    09/27/24 0100 -- 68 24 100/49 71 98 % -- -- -- -- -- -- --    09/27/24 0032 -- -- -- -- -- -- -- -- -- -- -- -- 8    09/27/24 0000 98.5 °F (36.9 °C) 72 14 93/46 67 99 % -- -- Ventilator -- Lying 11 --          Weight (last 2 days)       Date/Time Weight    09/30/24 0605 --     Weight: bed shows pt is -4lbs at 09/30/24 0605    09/28/24 0557 67.2 (148.1)            Pertinent Labs/Diagnostic Results:   Radiology:  XR chest portable ICU   Final Interpretation by Vargas Oconnor MD (09/25 1027)      Persistent bilateral infiltrates and dense opacity at the right apex and pleural effusions without significant change from prior            Workstation performed: [id number]         XR chest portable ICU   Final Interpretation by Nieves Burks MD (09/22 1026)      Tracheostomy projecting over the trachea.      No change in extensive bilateral consolidation.      Question pleural effusions.            Workstation performed: [id number]         XR chest portable   Final Interpretation by Bonnie Castillo MD (09/19 2208)      Tracheostomy tube in adequate position.      Increasing opacity in the left lower lung, which may represent pneumonia or an enlarging left pleural effusion.      Unchanged appearance of the right hemithorax, as detailed above.                  Workstation performed: [id number]         XR chest portable ICU   Final Interpretation by Xavier Fall MD (09/16 1627)      Improved aeration of the right lung, with persistent right hemithoracic volume loss and dense right apical consolidation. There is a also superimposed right pleural effusion and diffuse interstitial lung disease.            Workstation performed: [id number]         XR chest portable ICU   Final Interpretation by Sd Lamb MD (09/14 0195)      1.  Tip of endotracheal tube 3.5 cm above the chery.      2.  Progressive atelectasis of the right lung  since 9/10/2024 with further right-sided mediastinal shift.      3.  Small left pleural effusion and moderate size right effusion, increased in size since 9/10/2024.      4.  Pulmonary vascular congestion.            Workstation performed: [id number]         XR chest portable ICU   Final Interpretation by Juanito Lee DO (09/11 0231)      Large right and trace left pleural effusion.            Workstation performed: [id number]         XR chest portable   Final Interpretation by Matt Russ MD (09/09 0949)      Tubes and lines in satisfactory position. No pneumothorax.      Unchanged interstitial edema and small right pleural effusion.      Resident: John Blake I, the attending radiologist, have reviewed the images and agree with the final report above.      Workstation performed: [id number]           Cardiology:  ECG 12 lead   Final Result by Vargas Joiner DO (09/26 0748)   Sinus rhythm with 1st degree A-V block   T wave abnormality, consider inferior ischemia   Abnormal ECG   When compared with ECG of 23-SEP-2024 12:27,   Fusion complexes are no longer Present   Confirmed by Vargas Joiner ([id number]) on 9/26/2024 7:48:44 AM      ECG 12 lead   Final Result by Hedy Lopez MD (09/23 1438)   Sinus rhythm with 1st degree A-V block with Fusion complexes   T wave abnormality, consider inferior ischemia   Abnormal ECG   When compared with ECG of 16-SEP-2024 08:35,   Fusion complexes are now Present   MT interval has increased   ST no longer elevated in Anterior leads   Nonspecific T wave abnormality, worse in Anterior leads   Nonspecific T wave abnormality has replaced inverted T waves in Lateral    leads   Confirmed by Hedy Lopez ([id number]) on 9/23/2024 2:38:32 PM      Echo follow up/limited w/ contrast if indicated   Final Result by Eric Montiel MD (09/18 2744)        Left Ventricle: Left ventricular cavity size is normal. Wall thickness    is moderately increased. The left ventricular  ejection fraction is 53%.    Systolic function is normal. Diastolic function is moderately abnormal,    consistent with grade II (pseudonormal) relaxation.     The following segments are hypokinetic: mid anteroseptal and mid    inferoseptal.     All other segments are normal.     IVS: There is diastolic flattening of the interventricular septum    consistent with right ventricle volume overload.     Aorta: The aortic root is severely dilated.     Pericardium: There is a moderate pericardial effusion circumferential    to the heart. The fluid exhibits no internal echoes. The largest diameter    measures 1.9 cm. There is no echocardiographic evidence of tamponade. The    evidence against tamponade includes: no right ventricular diastolic    collapse, no right atrial inversion and no respiratory variation. There is    a large left pleural effusion.     Prior TTE study available for comparison. Prior study date: 9/11/2024.    Changes noted when compared to prior study. Changes include: Pericardial    effusion seems to be slightly reduced in size. .         ECG 12 lead   Final Result by Bernardo Wallace MD (09/16 1254)   Sinus tachycardia   Low voltage QRS   ST & T wave abnormality, consider lateral ischemia   Abnormal ECG   Reconfirmed by Bernardo Wallace ([id number]) on 9/16/2024 12:54:36 PM      ECG 12 lead   Final Result by Dioni Silva MD (09/13 0914)   Normal sinus rhythm   T wave abnormality, consider inferolateral ischemia   Abnormal ECG   When compared with ECG of 11-SEP-2024 00:45,   No significant change was found   Confirmed by Dioni Silva ([id number]) on 9/13/2024 9:14:13 AM      Echo follow up/limited w/ contrast if indicated   Final Result by Vargas Joiner DO (09/11 1200)        Left Ventricle: Left ventricular cavity size is normal. Wall thickness    is normal. The left ventricular ejection fraction is 55%. Systolic    function is normal.     The following segments are hypokinetic: apex.     All other  segments are normal.     Aortic Valve: There is moderate to severe regurgitation with a    centrally directed jet.     Tricuspid Valve: There is mild regurgitation.     Aorta: The aortic root is normal in size. The ascending aorta is    severely dilated. The ascending aorta is 5.4 cm. There is an aneurysm in    the ascending aorta.     Pericardium: There is a moderate pericardial effusion circumferential    to the heart. There is no echocardiographic evidence of tamponade. There    is a left pleural effusion.         ECG 12 lead   Final Result by Dioni Silva MD (09/11 0805)   Normal sinus rhythm   Incomplete left bundle branch block   T wave abnormality, consider inferolateral ischemia   Abnormal ECG   When compared with ECG of 10-SEP-2024 12:11,   Premature supraventricular complexes are no longer Present      Confirmed by Dioni Silva ([id number]) on 9/11/2024 8:05:10 AM      ECG 12 lead   Final Result by Felicita Nicholas MD (09/10 1556)   Sinus rhythm with 1st degree A-V block with Premature supraventricular    complexes   Incomplete left bundle branch block   ST & T wave abnormality, consider lateral ischemia   Abnormal ECG   When compared with ECG of 08-SEP-2024 19:56,   Premature supraventricular complexes are now Present      Confirmed by Felicita Nicholas ([id number]) on 9/10/2024 3:56:28 PM      Echo follow up/limited w/ contrast if indicated   Final Result by Aura Dimas DO (09/09 1212)        Left Ventricle: Left ventricular cavity size is normal. Wall thickness    is normal. The left ventricular ejection fraction is 50%. Systolic    function is low normal. There is global hypokinesis with regional    variation.     Right Ventricle: Right ventricular cavity size is normal. Systolic    function is normal.     Aortic Valve: There is moderate to severe regurgitation.     Pericardium: There is a moderate to large pericardial effusion    circumferential to the heart measuring largest along the RV free wall at    2.3 cm. The  fluid exhibits no internal echoes. There is no    echocardiographic evidence of tamponade. The evidence against tamponade    includes: no right ventricular diastolic collapse, no right atrial    inversion and no respiratory variation. There is a left pleural effusion.         ECG 12 lead   Final Result by Effie Davis MD (09/09 0103)   Normal sinus rhythm   Technically poor tracing          Poor data quality, interpretation may be adversely affected      Confirmed by Effie Davis ([id number]) on 9/9/2024 1:03:05 AM        GI:  Bronchoscopy   Final Result by Amos Powell DO (09/13 1670)   Right main stem mucous plug   Friable mucosa over right and left lung       RECOMMENDATION:   Continue mechanical ventilation, add on chest percussion therapy for    airway clearance         Bronchoscopy   Final Result by Romy Hill MD (09/14 1232)   Moderate friable mucosa in the trachea, main chery, left lung and right    lung   Bloody mucus plugs with 51-75% obstruction removed with suction from the    bronchus intermedius and RLL         RECOMMENDATION:   Continue monitoring in the ICU          I was the supervising physician and present for the entire procedure on    9/13/2024      There was mucous plugging of the right lower lobe present along with some    bloody secretions causing mild to moderate obstruction of the left    mainstem.  Both were suctioned and cleared.      Romy Hill MD         Bronchoscopy   Final Result by Romy Hill MD (09/12 8778)   Excessive, thin and brown secretions present in all observed locations,    including the trachea, main chery, left lung and right lung   Bloody mucus plugs with greater than 75% obstruction removed with suction    from the bronchus intermedius and RML   Friable mucosa in the left lung and right lung         RECOMMENDATION:   Continue monitoring in the ICU   Follow up results of washing culture, gram stain and cytology             I was the supervising physician  and present for the entire procedure on    09/11/2024. I also participated in the procedure.       Severe mucus plugging in the RLL with brownship/bloody secretions.    Suctioned and cleared. Will send for culture and cytology.       Romy Hill MD                 Results from last 7 days   Lab Units 09/30/24  0527 09/29/24  0632 09/28/24  0542 09/27/24  0527 09/26/24  0434   WBC Thousand/uL 8.57 8.89 6.19 7.29 8.76   HEMOGLOBIN g/dL 7.1* 7.5* 7.5* 7.4* 7.6*   HEMATOCRIT % 22.1* 23.0* 23.1* 22.5* 22.7*   PLATELETS Thousands/uL 213 228 233 236 249   TOTAL NEUT ABS Thousands/µL 6.48  --  4.58 5.48 7.16         Results from last 7 days   Lab Units 09/30/24  0527 09/29/24  0632 09/28/24  0542 09/27/24  0527 09/26/24  0434 09/25/24  1750 09/25/24  0525   SODIUM mmol/L 133* 130* 131* 129* 130* 132* 133*   POTASSIUM mmol/L 3.4* 3.3* 4.0 4.2 4.4 4.3 4.0   CHLORIDE mmol/L 89* 83* 84* 85* 87* 87* 91*   CO2 mmol/L 40* 44* >45* 42* 40* 41* 37*   ANION GAP mmol/L 4 3*  --  2* 3* 4 5   BUN mg/dL 21 20 19 18 19 18 17   CREATININE mg/dL 0.46* 0.51* 0.52* 0.54* 0.55* 0.49* 0.49*   EGFR ml/min/1.73sq m 118 113 112 110 110 115 115   CALCIUM mg/dL 8.4 8.4 8.3* 8.0* 8.1* 8.4 8.0*   CALCIUM, IONIZED mmol/L 1.13  --  1.09* 1.07* 1.05*  --  0.99*   MAGNESIUM mg/dL 1.9 2.1 1.8* 2.0 1.8*  --  2.0   PHOSPHORUS mg/dL 3.3 3.5 4.3* 3.8 4.2*  --  3.6     Results from last 7 days   Lab Units 09/26/24  0932   ALBUMIN g/dL 2.5*     Results from last 7 days   Lab Units 09/30/24  0007   POC GLUCOSE mg/dl 133     Results from last 7 days   Lab Units 09/30/24  0527 09/29/24  0632 09/28/24  0542 09/27/24  0527 09/26/24  0434 09/25/24  1750 09/25/24  0525 09/24/24  1600 09/24/24  0634 09/23/24  2151   GLUCOSE RANDOM mg/dL 107 108 112 120 156* 121 136 112 142* 131     Results from last 7 days   Lab Units 09/30/24  0834 09/30/24  0527   PH RADHA  7.361 7.340   PCO2 RADHA mm Hg 61.6* 75.1*   PO2 RADHA mm Hg 38.2 30.9*   HCO3 RADHA mmol/L 34.1* 39.6*   BASE EXC RADHA  mmol/L 7.5 12.2   O2 CONTENT RADHA ml/dL 8.7 6.2   O2 HGB, VENOUS % 70.3 55.7*     Results from last 7 days   Lab Units 09/24/24  0634   TSH 3RD GENERATON uIU/mL 45.059*     Results from last 7 days   Lab Units 09/25/24  0525   PROCALCITONIN ng/ml 0.24     Network Utilization Review Department  ATTENTION: Please call with any questions or concerns to [phone] and carefully listen to the prompts so that you are directed to the right person. All voicemails are confidential.   For Discharge needs, contact Care Management DC Support Team at [phone] opt. 2  Send all requests for admission clinical reviews, approved or denied determinations and any other requests to dedicated fax number below belonging to the campus where the patient is receiving treatment. List of dedicated fax numbers for the Facilities:  FACILITY NAME UR FAX NUMBER   ADMISSION DENIALS (Administrative/Medical Necessity) [phone]   DISCHARGE SUPPORT TEAM (NETWORK) [phone]   PARENT CHILD HEALTH (Maternity/NICU/Pediatrics) [phone]   [facility] [phone]   [facility] [phone]   [facility] [phone]   [facility] [phone]   [facility] [phone]   [facility] [phone]   [facility] [phone]   [facility] [phone]   [facility] [phone]   [facility] [phone]   [facility] [phone]   [facility] [phone]

## 2024-09-30 NOTE — PLAN OF CARE
Problem: PAIN - ADULT  Goal: Verbalizes/displays adequate comfort level or baseline comfort level  Description: Interventions:  - Encourage patient to monitor pain and request assistance  - Assess pain using appropriate pain scale  - Administer analgesics based on type and severity of pain and evaluate response  - Implement non-pharmacological measures as appropriate and evaluate response  - Consider cultural and social influences on pain and pain management  - Notify physician/advanced practitioner if interventions unsuccessful or patient reports new pain  Outcome: Progressing     Problem: SAFETY ADULT  Goal: Patient will remain free of falls  Description: INTERVENTIONS:  - Educate patient/family on patient safety including physical limitations  - Instruct patient to call for assistance with activity   - Consult OT/PT to assist with strengthening/mobility   - Keep Call bell within reach  - Keep bed low and locked with side rails adjusted as appropriate  - Keep care items and personal belongings within reach  - Initiate and maintain comfort rounds  - Make Fall Risk Sign visible to staff  - Offer Toileting every 2 Hours, in advance of need  - Initiate/Maintain bed alarm  - Obtain necessary fall risk management equipment  - Apply yellow socks and bracelet for high fall risk patients  - Consider moving patient to room near nurses station  Outcome: Progressing

## 2024-09-30 NOTE — PROGRESS NOTES
"Progress Note - Critical Care/ICU   Name: Marcin Sánchez 64 y.o. male I MRN: [id number]  Unit/Bed#: [facility] 208-01 I Date of Admission: 9/8/2024   Date of Service: 9/30/2024 I Hospital Day: 22       Assessment & Plan   #Seizure disorder  Phenytoin titration ongoing, 150 mg BID  Continue Gabapentin 400mg BID     #Insomnia  Trazodone 100mg daily at bedtime  Monitor Qtc (444 on 9/25)     CV:   #Pericardial effusion   Echo 9/9  \"moderate to large pericardial effusion circumferential to the heart measuring largest along the RV free wall at 2.3 cm. The fluid exhibits no internal echoes. 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 Moderate to severe aortic regurgitation, mild tricuspid regurgitation, dilated ascending aortic root up to 5.4 cm.  Continue diuresis with goal output of -1 to -2L/day  Continue to monitor fluid status      #History of AAA  5.4 cm  Monitor hemodynamics; BP goal <130/80, will likely require outpatient management     Pulm:  #Acute on Chronic hypoxic and hypercapnic respiratory failure.  #COPD, and acute exacerbation now resolved  Likely multifactorial including moderate to severe COPD  Course complicated by recurrent mucous plugging, status post therapeutic bronch x 3.  Sputum culture with Moraxella, status post course of azithromycin x 5 days.  Also with MDR acinetobacter likely contaminant versus colonization as patient has remained stable without antibiotic treatment.  Status post trach 9/19  Continue daily trach collar as tolerated  Continue Xopenex and Atrovent TID  Continue pulmicort BID, Continue Performist BID     #History of squamous cell cancer of lung " post chemo/radiation 2016, cancer of right mainstem  Noted     GI:   #PEG placed 9/19  Continue tube feeds at goal  Bowel regimen: Senokot, Miralax     :   Haynes removed 9/25  Doxazosin 2mg  Continue to monitor Is/Os, renal indices     F/E/N:   F: No maintenance fluids  E: Replete as needed, consider sodium replacement or fluid restriction as able for hyponatremia  N: Continue Jevity 1.2, at goal     Heme/Onc:   #Anemia  Continue B12/folate  No signs of active bleeding  Transfuse for Hgb <7 and/or symptomatic anemia     #Metabolic alkalosis  Likely 2/2 ongoing diuresis     #DVT ppx  SCDs and Eliquis     Endo:   #Hypothyroidism  Home med: Levothyroxine 250 mg  TSH 45.06 on 9/25 from 0.59 on 9/5, free T4 0.34 (9/25)  Restarted on home levothyroxine 250 mg  Recheck in 6 weeks     ID:   #Colonization with Acinetobacter and Moraxella  No acute issues  Continue to monitor WBC count and temperature curve  Completed 5 days of azithromycin 9/10     MSK/Skin:   #At risk for pressure injury   PT/OT when able  Frequent turns/repositioning  Skin surveillance      Disposition: Critical care    ICU Core Measures     Vented Patient  VAP Bundle  VAP bundle ordered     A: Assess, Prevent, and Manage Pain Has pain been assessed? Yes  Need for changes to pain regimen? No   B: Both Spontaneous Awakening Trials (SATs) and Spontaneous Breathing Trials (SBTs) Plan to perform spontaneous awakening trial today? N/A   Plan to perform spontaneous breathing trial today? Yes   Obvious barriers to extubation? Yes   C: Choice of Sedation RASS Goal: N/A patient not on sedation  Need for changes to sedation or analgesia regimen? NA   D: Delirium CAM-ICU: Negative   E: Early Mobility  Plan for early mobility? Yes   F: Family Engagement Plan for family engagement today? Yes       Review of Invasive Devices:            Prophylaxis:  VTE VTE covered by:  apixaban, Oral, 5 mg at 09/29/24 1700       Stress Ulcer  not ordered         Hospital course:  64  yo M PMH COPD, HFimpEF (35% to 55%), afib on eliquis, epilepsy, squamous cell carcinoma of lung s/p chemo/radiation, AAA, alcohol abuse, nicotine abuse, HCV admitted 9/6 to [facility] with AECOPD and hypoxic/hypercarbic respiratory failure. On 9/8 he had V tach arrest with ROSC achieved after 2 rounds of CPR and defibrillation. On TTE was noted to have moderate pericardial effusion with concern for tamponade and so was transferred to [facility] on 9/8.  He was evaluated by cardiology/CTS who felt that no intervention was warranted on review of the echo findings. Follow up TTE's showed moderate effusion without tamponade. Extubation was attempted on 9/10 but he was re-intubated due to respiratory distress and hypercarbia. Attempt was made again on 9/13 but failed. Subsequently had Trach and PEG placed on 9/19. Currently he remains off sedation with daily SBT and trach collar. Diuresis is ongoing for assistance with weaning of vent settings.     24 Hour Events   24hr events: agitated overnight, tried to pull at his tracheostomy site, ripped one of the sutures. Started zyprexa 10 mg qHS  Subjective       Objective                          Vitals I/O      Most Recent Min/Max in 24hrs   Temp 97.7 °F (36.5 °C) Temp  Min: 97.7 °F (36.5 °C)  Max: 99.1 °F (37.3 °C)   Pulse 76 Pulse  Min: 74  Max: 82   Resp (!) 27 Resp  Min: 16  Max: 27   BP (!) 118/47 BP  Min: 104/43  Max: 126/48   O2 Sat 100 % SpO2  Min: 98 %  Max: 100 %    + 8 unmeasured  + 980 in 240 hr  L: midline  D: PEG  A: ETT    Intake/Output Summary (Last 24 hours) at 9/30/2024 0755  Last data filed at 9/30/2024 0623  Gross per 24 hour   Intake 1900 ml   Output 920 ml   Net 980 ml       Diet Enteral/Parenteral; Tube Feeding No Oral Diet; Jevity 1.5; Bolus; 300; (4x/day) - 8:00AM,12:00PM,4:00PM,8:00PM; Prosource Protein Liquid - One Packet; 30; Water; Before and After each Bolus    Invasive Monitoring           Physical Exam   Physical Exam  Vitals and nursing note  reviewed.   Eyes:      General: No scleral icterus.     Conjunctiva/sclera: Conjunctivae normal.   Skin:     General: Skin is warm and dry.      Coloration: Skin is not jaundiced.   HENT:      Head: Normocephalic and atraumatic.      Right Ear: No drainage.      Left Ear: No drainage.      Nose: No congestion.      Mouth/Throat:      Mouth: Mucous membranes are dry.   Neck:      Trachea: Tracheostomy present.   Cardiovascular:      Rate and Rhythm: Normal rate and regular rhythm.   Musculoskeletal:      Right lower leg: No edema.      Left lower leg: No edema.   Abdominal: General: Bowel sounds are normal. There is abdominal type feeding tube.     Palpations: Abdomen is soft.   Constitutional:       General: He is not in acute distress.     Appearance: He is well-developed. He is not ill-appearing.      Interventions: He is restrained.   Pulmonary:      Breath sounds: Normal breath sounds.   Neurological:      General: No focal deficit present.      Mental Status: He is alert.          Diagnostic Studies        Lab Results: I have reviewed the following results:      Medications:  Scheduled PRN   acetaZOLAMIDE, 500 mg, TID  apixaban, 5 mg, BID  budesonide, 0.5 mg, Q12H  chlorhexidine, 15 mL, Q12H AMERICA  vitamin B-12, 100 mcg, Daily  doxazosin, 2 mg, Daily  folic acid, 1 mg, Daily  gabapentin, 400 mg, BID  ipratropium, 0.5 mg, TID  levalbuterol, 1.25 mg, TID  levothyroxine, 250 mcg, Early Morning  melatonin, 6 mg, HS  metoprolol tartrate, 12.5 mg, Q12H AMERICA  [START ON 10/19/2024] nicotine, 14 mg, Daily  nicotine, 1 patch, Daily  [START ON 11/2/2024] nicotine, 7 mg, Daily  OLANZapine, 10 mg, HS  phenytoin, 150 mg, Q12H AMERICA  polyethylene glycol, 17 g, BID  potassium chloride, 40 mEq, BID  senna, 8.8 mg, HS  sodium chloride, 2 g, BID With Meals  traZODone, 100 mg, HS      acetaminophen, 650 mg, Q4H PRN  albuterol, 2.5 mg, Q6H PRN  bisacodyl, 10 mg, Daily PRN  lidocaine, , 4x Daily PRN  oxyCODONE, 5 mg, Q6H PRN        Continuous          Labs:   CBC    Recent Labs     09/29/24 0632 09/30/24  0527   WBC 8.89 8.57   HGB 7.5* 7.1*   HCT 23.0* 22.1*    213     BMP    Recent Labs     09/29/24 0632 09/30/24  0527   SODIUM 130* 133*   K 3.3* 3.4*   CL 83* 89*   CO2 44* 40*   AGAP 3* 4   BUN 20 21   CREATININE 0.51* 0.46*   CALCIUM 8.4 8.4       Coags    No recent results     Additional Electrolytes  Recent Labs     09/29/24 0632 09/30/24  0527   MG 2.1 1.9   PHOS 3.5 3.3   CAIONIZED  --  1.13          Blood Gas    No recent results  Recent Labs     09/30/24  0527   PHVEN 7.340   EGB1AEQ 75.1*   PO2VEN 30.9*   ELZ2SWF 39.6*   BEVEN 12.2   M0HPEAO 55.7*    LFTs  No recent results    Infectious  No recent results  Glucose  Recent Labs     09/29/24  0632 09/30/24  0527   GLUC 108 107

## 2024-09-30 NOTE — RESPIRATORY THERAPY NOTE
RT Ventilator Management Note  Marcin Sánchez 64 y.o. male MRN: [id number]  [facility] Encounter: [id number]      Daily Screen         9/29/2024  0845 9/30/2024  0721          Patient safety screen outcome:: Passed Passed      Not Ready for Weaning due to:: -- Underline problem not resolved                Physical Exam:   Respiratory Pattern: Spontaneous, Assisted  Chest Assessment: Chest expansion symmetrical  Bilateral Breath Sounds: Clear      Resp Comments: will keep pt on vent for time being due to increased CO2 . Plan to trial on hiflow to for heated humidity

## 2024-10-01 ENCOUNTER — APPOINTMENT (INPATIENT)
Dept: NEUROLOGY | Facility: CLINIC | Age: 64
DRG: 005 | End: 2024-10-01
Payer: COMMERCIAL

## 2024-10-01 PROBLEM — Z51.5 PALLIATIVE CARE ENCOUNTER: Status: ACTIVE | Noted: 2024-10-01

## 2024-10-01 LAB
ANION GAP SERPL CALCULATED.3IONS-SCNC: 5 MMOL/L (ref 4–13)
BASE EX.OXY STD BLDV CALC-SCNC: 79 % (ref 60–80)
BASE EX.OXY STD BLDV CALC-SCNC: 94.1 % (ref 60–80)
BASE EXCESS BLDV CALC-SCNC: 10 MMOL/L
BASE EXCESS BLDV CALC-SCNC: 5.4 MMOL/L
BUN SERPL-MCNC: 26 MG/DL (ref 5–25)
CALCIUM SERPL-MCNC: 8.5 MG/DL (ref 8.4–10.2)
CHLORIDE SERPL-SCNC: 94 MMOL/L (ref 96–108)
CK SERPL-CCNC: 29 U/L (ref 39–308)
CO2 SERPL-SCNC: 34 MMOL/L (ref 21–32)
CREAT SERPL-MCNC: 0.51 MG/DL (ref 0.6–1.3)
GFR SERPL CREATININE-BSD FRML MDRD: 113 ML/MIN/1.73SQ M
GLUCOSE SERPL-MCNC: 100 MG/DL (ref 65–140)
HCO3 BLDV-SCNC: 31.3 MMOL/L (ref 24–30)
HCO3 BLDV-SCNC: 37.9 MMOL/L (ref 24–30)
LACTATE SERPL-SCNC: 0.8 MMOL/L (ref 0.5–2)
MAGNESIUM SERPL-MCNC: 2 MG/DL (ref 1.9–2.7)
O2 CT BLDV-SCNC: 8.6 ML/DL
O2 CT BLDV-SCNC: 9.4 ML/DL
PCO2 BLDV: 56.2 MM HG (ref 42–50)
PCO2 BLDV: 77.2 MM HG (ref 42–50)
PH BLDV: 7.31 [PH] (ref 7.3–7.4)
PH BLDV: 7.36 [PH] (ref 7.3–7.4)
PO2 BLDV: 46.7 MM HG (ref 35–45)
PO2 BLDV: 80.6 MM HG (ref 35–45)
POTASSIUM SERPL-SCNC: 4.3 MMOL/L (ref 3.5–5.3)
SODIUM SERPL-SCNC: 133 MMOL/L (ref 135–147)

## 2024-10-01 PROCEDURE — 94003 VENT MGMT INPAT SUBQ DAY: CPT

## 2024-10-01 PROCEDURE — 83605 ASSAY OF LACTIC ACID: CPT

## 2024-10-01 PROCEDURE — 82805 BLOOD GASES W/O2 SATURATION: CPT

## 2024-10-01 PROCEDURE — 95700 EEG CONT REC W/VID EEG TECH: CPT

## 2024-10-01 PROCEDURE — 83735 ASSAY OF MAGNESIUM: CPT

## 2024-10-01 PROCEDURE — 95718 EEG PHYS/QHP 2-12 HR W/VEEG: CPT | Performed by: STUDENT IN AN ORGANIZED HEALTH CARE EDUCATION/TRAINING PROGRAM

## 2024-10-01 PROCEDURE — 93005 ELECTROCARDIOGRAM TRACING: CPT

## 2024-10-01 PROCEDURE — 94640 AIRWAY INHALATION TREATMENT: CPT

## 2024-10-01 PROCEDURE — 94760 N-INVAS EAR/PLS OXIMETRY 1: CPT

## 2024-10-01 PROCEDURE — 82550 ASSAY OF CK (CPK): CPT

## 2024-10-01 PROCEDURE — 95712 VEEG 2-12 HR INTMT MNTR: CPT

## 2024-10-01 PROCEDURE — 80048 BASIC METABOLIC PNL TOTAL CA: CPT

## 2024-10-01 PROCEDURE — NC001 PR NO CHARGE: Performed by: INTERNAL MEDICINE

## 2024-10-01 PROCEDURE — 99233 SBSQ HOSP IP/OBS HIGH 50: CPT | Performed by: INTERNAL MEDICINE

## 2024-10-01 RX ORDER — FUROSEMIDE 40 MG/1
40 TABLET ORAL DAILY
Status: DISCONTINUED | OUTPATIENT
Start: 2024-10-01 | End: 2024-10-06

## 2024-10-01 RX ORDER — HALOPERIDOL 2 MG/ML
1 SOLUTION ORAL EVERY 6 HOURS PRN
Status: DISCONTINUED | OUTPATIENT
Start: 2024-10-01 | End: 2024-10-06

## 2024-10-01 RX ORDER — LANOLIN ALCOHOL/MO/W.PET/CERES
3 CREAM (GRAM) TOPICAL
Status: DISCONTINUED | OUTPATIENT
Start: 2024-10-01 | End: 2024-10-06

## 2024-10-01 RX ADMIN — SODIUM CHLORIDE 2 G: 1 TABLET ORAL at 18:20

## 2024-10-01 RX ADMIN — CHLORHEXIDINE GLUCONATE 0.12% ORAL RINSE 15 ML: 1.2 LIQUID ORAL at 21:25

## 2024-10-01 RX ADMIN — ACETYLCYSTEINE 600 MG: 200 SOLUTION ORAL; RESPIRATORY (INHALATION) at 19:51

## 2024-10-01 RX ADMIN — BUDESONIDE 0.5 MG: 0.5 INHALANT RESPIRATORY (INHALATION) at 19:52

## 2024-10-01 RX ADMIN — ACETAMINOPHEN 975 MG: 650 SUSPENSION ORAL at 00:29

## 2024-10-01 RX ADMIN — OXYCODONE HYDROCHLORIDE 2.5 MG: 5 SOLUTION ORAL at 11:22

## 2024-10-01 RX ADMIN — ACETAMINOPHEN 975 MG: 650 SUSPENSION ORAL at 09:57

## 2024-10-01 RX ADMIN — ACETAMINOPHEN 975 MG: 650 SUSPENSION ORAL at 18:13

## 2024-10-01 RX ADMIN — PHENYTOIN 150 MG: 125 SUSPENSION ORAL at 21:34

## 2024-10-01 RX ADMIN — APIXABAN 5 MG: 5 TABLET, FILM COATED ORAL at 18:07

## 2024-10-01 RX ADMIN — SODIUM CHLORIDE 2 G: 1 TABLET ORAL at 11:20

## 2024-10-01 RX ADMIN — TRAZODONE HYDROCHLORIDE 100 MG: 100 TABLET ORAL at 21:32

## 2024-10-01 RX ADMIN — PHENYTOIN 150 MG: 125 SUSPENSION ORAL at 11:19

## 2024-10-01 RX ADMIN — OXYCODONE HYDROCHLORIDE 2.5 MG: 5 SOLUTION ORAL at 18:07

## 2024-10-01 RX ADMIN — LEVALBUTEROL HYDROCHLORIDE 1.25 MG: 1.25 SOLUTION RESPIRATORY (INHALATION) at 08:43

## 2024-10-01 RX ADMIN — Medication 3 MG: at 21:32

## 2024-10-01 RX ADMIN — GABAPENTIN 400 MG: 400 CAPSULE ORAL at 09:53

## 2024-10-01 RX ADMIN — Medication 100 MCG: at 09:53

## 2024-10-01 RX ADMIN — ACETYLCYSTEINE 600 MG: 200 SOLUTION ORAL; RESPIRATORY (INHALATION) at 08:43

## 2024-10-01 RX ADMIN — CHLORHEXIDINE GLUCONATE 0.12% ORAL RINSE 15 ML: 1.2 LIQUID ORAL at 09:53

## 2024-10-01 RX ADMIN — BUDESONIDE 0.5 MG: 0.5 INHALANT RESPIRATORY (INHALATION) at 08:43

## 2024-10-01 RX ADMIN — LEVOTHYROXINE SODIUM 250 MCG: 100 TABLET ORAL at 05:58

## 2024-10-01 RX ADMIN — NICOTINE 1 PATCH: 21 PATCH, EXTENDED RELEASE TRANSDERMAL at 09:55

## 2024-10-01 RX ADMIN — Medication 12.5 MG: at 09:53

## 2024-10-01 RX ADMIN — DOXAZOSIN 2 MG: 2 TABLET ORAL at 09:53

## 2024-10-01 RX ADMIN — ACETYLCYSTEINE 600 MG: 200 SOLUTION ORAL; RESPIRATORY (INHALATION) at 14:26

## 2024-10-01 RX ADMIN — LEVALBUTEROL HYDROCHLORIDE 1.25 MG: 1.25 SOLUTION RESPIRATORY (INHALATION) at 14:25

## 2024-10-01 RX ADMIN — FOLIC ACID 1 MG: 1 TABLET ORAL at 09:53

## 2024-10-01 RX ADMIN — IPRATROPIUM BROMIDE 0.5 MG: 0.5 SOLUTION RESPIRATORY (INHALATION) at 19:52

## 2024-10-01 RX ADMIN — LEVALBUTEROL HYDROCHLORIDE 1.25 MG: 1.25 SOLUTION RESPIRATORY (INHALATION) at 19:51

## 2024-10-01 RX ADMIN — FUROSEMIDE 40 MG: 40 TABLET ORAL at 11:21

## 2024-10-01 RX ADMIN — IPRATROPIUM BROMIDE 0.5 MG: 0.5 SOLUTION RESPIRATORY (INHALATION) at 08:43

## 2024-10-01 RX ADMIN — IPRATROPIUM BROMIDE 0.5 MG: 0.5 SOLUTION RESPIRATORY (INHALATION) at 14:26

## 2024-10-01 RX ADMIN — POLYETHYLENE GLYCOL 3350 17 G: 17 POWDER, FOR SOLUTION ORAL at 09:53

## 2024-10-01 RX ADMIN — POLYETHYLENE GLYCOL 3350 17 G: 17 POWDER, FOR SOLUTION ORAL at 18:07

## 2024-10-01 RX ADMIN — GABAPENTIN 400 MG: 400 CAPSULE ORAL at 18:07

## 2024-10-01 RX ADMIN — APIXABAN 5 MG: 5 TABLET, FILM COATED ORAL at 09:53

## 2024-10-01 NOTE — CASE MANAGEMENT
Case Management Discharge Planning Note    Patient name Marcin Sánchez  [place] [facility]  MRN [id number]  : 1960 Date 10/1/2024       Current Admission Date: 2024  Current Admission Diagnosis:Acute hypoxic respiratory failure (HCC)   Patient Active Problem List    Diagnosis Date Noted Date Diagnosed    Acute hypoxic respiratory failure (HCC) 2024     Shock (HCC) 2024     Mucus plugging of bronchi 2024     Transaminitis 2024     Cardiac arrest (HCC) 2024     Pericardial effusion 2024     Acute on chronic respiratory failure with hypoxia and hypercapnia (HCC) 2024     Acute metabolic encephalopathy 2024     Chronic combined systolic and diastolic CHF (congestive heart failure) (HCC) 2024     Atrial fibrillation (HCC) 2024     Pulmonary fibrosis (HCC) 2024     Suspected chronic pulmonary embolism (HCC) 2024     Squamous cell lung cancer (HCC) 2024     Hyponatremia 2024     Severe protein-calorie malnutrition (HCC) 2024     Edema of both legs 2022     Tobacco abuse 2020     Nonrheumatic aortic valve insufficiency 2020     Malignant neoplasm of upper lobe of right lung (HCC) 2018     Thoracic aortic aneurysm without rupture (HCC) 2018     Cancer of right main bronchus (HCC) 10/04/2016     Pleural effusion 10/02/2016     Multiple lung nodules on CT 10/02/2016     Collapse of right lung 2016     Acute exacerbation of chronic obstructive pulmonary disease (COPD) (HCC)      Epilepsy (HCC)      Lyme disease      Major depression      Alcohol abuse        LOS (days): 23  Geometric Mean LOS (GMLOS) (days): 5.1  Days to GMLOS:-17.5     OBJECTIVE:  Risk of Unplanned Readmission Score: 41.65         Current admission status: Inpatient   Preferred Pharmacy:   [facility] - [address] [facility],  [address] [place] [id number]  Phone:  [phone] Fax: [phone]    [place] [place] - [address] [place] [address]  Phone: [phone] Fax: [phone]    Primary Care Provider: Brandt Godinez MD    Primary Insurance: [facility]  Secondary Insurance:     DISCHARGE DETAILS:    CM received message in [facility] from [facility] that patient was denied for LTACH. CM sent peer to peer contact information to patient's attending and requested that peer to peer review be completed.

## 2024-10-01 NOTE — ASSESSMENT & PLAN NOTE
"Capacity:  Pt did not exhibit full competency during our evaluation today. Communication is inherently challenging with his trach and he reported being illiterate today when we offered a whiteboard for him to write on. He was oriented to person and place, but other than knowing the year, he was not oriented to time. He was unable to answer who the current [place] President is, and could not articulate his wishes should he suffer another cardiac arrest.    GOC:  Without full capacity, medical decisions revert to his legally appointed guardian, Tere Williamson ([phone]). Per chart review, Tere was appointed pt's guardian in 2015 and since it was \"so long ago,\" was unable to recall why she was appointed. Tere had been employed at \"[employer]\" when she was appointed pt's guardian, but has not worked for the company for the \"last few months.\" Tere is now living in [place]. Tere was contacted by JEFF MELO on 9/25 and confirmed she was pt's legal guardian; Tere went on to say it was okay to speak with pt's sister, Katalina, about pt's medical state.    Upon collaborative discussion, palliative service will reach out to [facility]'s legal consult resource, Mr. Ivan Baker, for specific legal assistance in navigating pt's medical decision maker complexity. We have communicated this to the pt's primary CC team as well.    ACP:  "

## 2024-10-01 NOTE — RESPIRATORY THERAPY NOTE
RT Ventilator Management Note  Marcin Sánchez 64 y.o. male MRN: [id number]  Unit/Bed#: [facility] 205-01 Encounter: [id number]      Daily Screen         10/1/2024  0439 10/1/2024  0845          Patient safety screen outcome:: Failed Failed      Not Ready for Weaning due to:: Underline problem not resolved Underline problem not resolved                Physical Exam:   Assessment Type: Assess only  General Appearance: Sleeping  Respiratory Pattern: Normal  Chest Assessment: Chest expansion symmetrical  Bilateral Breath Sounds: Diminished, Coarse  Cough: Productive, Strong  Suction: Trach  O2 Device: C3      Resp Comments: (P) Pt found on SPONT PS 12 PEEP 6 FIO2 40%. No vent changes made at this time, will continue to monitor pt per resp protocol.

## 2024-10-01 NOTE — PROGRESS NOTES
"Progress Note - Critical Care/ICU   Name: Marcin Sánchez 64 y.o. male I MRN: [id number]  Unit/Bed#: [facility] 205-01 I Date of Admission: 9/8/2024   Date of Service: 10/1/2024 I Hospital Day: 23     Assessment & Plan     Problem list  Acute on chronic hypercapnic respiratory failure s/p Trach  Shock, resolved  COPD  Encephalopathy  Anemia of chronic disease  Atrial fibrillation  Metabolic alkalosis  Pericardial effusion  Seizure disorder  Hypothyroidism  AAA  Insomnia  Tobacco use  Hx of lung SCC    #Seizure disorder  Phenytoin titration ongoing, 150 mg BID  Continue Gabapentin 400mg BID     #Insomnia  Trazodone 100mg daily at bedtime  Monitor Qtc (444 on 9/25)    #Encephalopathy, intermittent  Mostly occurs at night, suspect hyperactive delirium with agitation & trying to pull out trach  Overnight was hypoactive and was sent for CT head, and VEEG  May be multifactorial with hypercarbia and delirium given multiple risk factors vs breakthrough seizure     CV:   #Pericardial effusion   Echo 9/9  \"moderate to large pericardial effusion circumferential to the heart measuring largest along the RV free wall at 2.3 cm. The fluid exhibits no internal echoes. 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 Moderate to severe aortic regurgitation, mild tricuspid regurgitation, dilated ascending aortic root up to 5.4 cm.  Continue diuresis with goal output of -1 to -2L/day  Continue to monitor fluid status      #History of AAA  5.4 cm  Monitor hemodynamics; BP goal <130/80  Outpatient follow up     Pulm:  #Acute on Chronic hypoxic and hypercapnic respiratory failure.  #COPD, and acute exacerbation now resolved  Likely " multifactorial including moderate to severe COPD  Course complicated by recurrent mucous plugging, status post therapeutic bronch x 3.  Sputum culture with Moraxella, status post course of azithromycin x 5 days.  Also with MDR acinetobacter likely contaminant versus colonization as patient has remained stable without antibiotic treatment.  Status post trach 9/19  Continue to wean vent as tolerated  Continue Xopenex and Atrovent TID  Continue pulmicort BID, Continue Performist BID     #History of squamous cell cancer of lung post chemo/radiation 2016, cancer of right mainstem  Noted     GI:   #PEG placed 9/19  Continue tube feeds at goal  Bowel regimen: Senokot, Miralax     :   Haynes removed 9/25  Doxazosin 2mg  Continue to monitor Is/Os, renal indices     F/E/N:   F: No maintenance fluids  E: Replete as needed, continue sodium tabs  N: Continue Jevity 1.2, at goal     Heme/Onc:   #Anemia  Continue B12/folate  No signs of active bleeding  Transfuse for Hgb <7 and/or symptomatic anemia     #Metabolic alkalosis  Likely 2/2 ongoing diuresis     #DVT ppx  SCDs and Eliquis     Endo:   #Hypothyroidism  Home med: Levothyroxine 250 mg  TSH 45.06 on 9/25 from 0.59 on 9/5, free T4 0.34 (9/25)  Restarted on home levothyroxine 250 mg  Recheck in 6 weeks     ID:   #Colonization with Acinetobacter and Moraxella  No acute issues  Continue to monitor WBC count and temperature curve  Completed 5 days of azithromycin 9/10     MSK/Skin:   #At risk for pressure injury   PT/OT when able  Frequent turns/repositioning  Skin surveillance      Disposition: Critical care    ICU Core Measures     Vented Patient  VAP Bundle  VAP bundle ordered     A: Assess, Prevent, and Manage Pain Has pain been assessed? Yes  Need for changes to pain regimen? No   B: Both Spontaneous Awakening Trials (SATs) and Spontaneous Breathing Trials (SBTs) Plan to perform spontaneous awakening trial today? N/A   Plan to perform spontaneous breathing trial today? Yes    Obvious barriers to extubation? Yes   C: Choice of Sedation RASS Goal: N/A patient not on sedation  Need for changes to sedation or analgesia regimen? No   D: Delirium CAM-ICU: Negative   E: Early Mobility  Plan for early mobility? Yes   F: Family Engagement Plan for family engagement today? Yes       Review of Invasive Devices:            Prophylaxis:  VTE VTE covered by:  apixaban, Oral, 5 mg at 09/30/24 1836       Stress Ulcer  not ordered         24 Hour Events   24hr events:   Overnight, patient was found to be somnolent. There was concern for cranial pathology vs seizure. CT head was negative for acute changes. He was started on VEEG. While at CT, he had an episode of urinary incontinence. VBG was ordered which showed hypercarbia and so he was placed on PCV+. This morning, patient is resting in bed comfortably without acute complaint.   Subjective       Objective                          Vitals I/O      Most Recent Min/Max in 24hrs   Temp 97.9 °F (36.6 °C) Temp  Min: 97.7 °F (36.5 °C)  Max: 99.2 °F (37.3 °C)   Pulse 72 Pulse  Min: 72  Max: 118   Resp 17 Resp  Min: 12  Max: 37   BP (!) 118/49 BP  Min: 107/44  Max: 187/68   O2 Sat 100 % SpO2  Min: 93 %  Max: 100 %      Intake/Output Summary (Last 24 hours) at 10/1/2024 0637  Last data filed at 10/1/2024 0600  Gross per 24 hour   Intake 2020 ml   Output 1110 ml   Net 910 ml       Diet Enteral/Parenteral; Tube Feeding No Oral Diet; Jevity 1.5; Bolus; 300; (4x/day) - 8:00AM,12:00PM,4:00PM,8:00PM; Prosource Protein Liquid - One Packet; 30; Water; Before and After each Bolus    Invasive Monitoring           Physical Exam   Physical Exam  Eyes:      General: No scleral icterus.     Extraocular Movements: Extraocular movements intact.      Conjunctiva/sclera: Conjunctivae normal.   Skin:     General: Skin is warm and dry.      Coloration: Skin is not jaundiced.   HENT:      Head: Normocephalic and atraumatic.      Right Ear: No drainage.      Left Ear: No drainage.       Nose: No congestion.      Mouth/Throat:      Mouth: Mucous membranes are dry.   Neck:      Trachea: Tracheostomy present.   Cardiovascular:      Rate and Rhythm: Normal rate and regular rhythm.      Pulses: Normal pulses.   Musculoskeletal:      Right lower leg: No edema.      Left lower leg: No edema.   Abdominal: General: Bowel sounds are normal. There is abdominal type feeding tube.     Palpations: Abdomen is soft.   Constitutional:       General: He is not in acute distress.     Appearance: He is ill-appearing.      Interventions: He is intubated. He is not restrained.  Pulmonary:      Effort: Pulmonary effort is normal. He is intubated.      Breath sounds: Normal breath sounds.   Neurological:      Mental Status: He is alert. He is calm.          Diagnostic Studies        Lab Results: I have reviewed the following results:      Medications:  Scheduled PRN   acetaminophen, 975 mg, Q8H  acetylcysteine, 3 mL, TID  apixaban, 5 mg, BID  budesonide, 0.5 mg, Q12H  chlorhexidine, 15 mL, Q12H AMERICA  vitamin B-12, 100 mcg, Daily  doxazosin, 2 mg, Daily  folic acid, 1 mg, Daily  gabapentin, 400 mg, BID  ipratropium, 0.5 mg, TID  levalbuterol, 1.25 mg, TID  levothyroxine, 250 mcg, Early Morning  melatonin, 6 mg, HS  metoprolol tartrate, 12.5 mg, Q12H AMERICA  [START ON 10/19/2024] nicotine, 14 mg, Daily  nicotine, 1 patch, Daily  [START ON 11/2/2024] nicotine, 7 mg, Daily  OLANZapine, 5 mg, HS  oxyCODONE, 2.5 mg, Q6H  phenytoin, 150 mg, Q12H AMERICA  polyethylene glycol, 17 g, BID  potassium chloride, 40 mEq, BID  senna, 8.8 mg, HS  sodium chloride, 2 g, BID With Meals  traZODone, 100 mg, HS      albuterol, 2.5 mg, Q6H PRN  bisacodyl, 10 mg, Daily PRN  lidocaine, , 4x Daily PRN  OLANZapine, 5 mg, Once PRN  OLANZapine, 2.5 mg, Daily PRN  oxyCODONE, 5 mg, Q6H PRN       Continuous          Labs:   CBC    Recent Labs     09/30/24  0527 09/30/24  1548   WBC 8.57  --    HGB 7.1* 8.1*   HCT 22.1* 24.8*     --      BMP    Recent  Labs     09/30/24  0527 09/30/24  1548   SODIUM 133* 134*   K 3.4* 3.9   CL 89* 92*   CO2 40* 37*   AGAP 4 5   BUN 21 26*   CREATININE 0.46* 0.52*   CALCIUM 8.4 8.7       Coags    No recent results     Additional Electrolytes  Recent Labs     09/30/24 0527 09/30/24  1548   MG 1.9 1.9   PHOS 3.3 3.5   CAIONIZED 1.13  --           Blood Gas    No recent results  Recent Labs     10/01/24  0615   PHVEN 7.364   DTC3RAJ 56.2*   PO2VEN 80.6*   [id number] 31.3*   BEVEN 5.4   [id number] 94.1*    LFTs  No recent results    Infectious  No recent results  Glucose  Recent Labs     09/30/24  0527 09/30/24  1548   GLUC 107 162*

## 2024-10-01 NOTE — ASSESSMENT & PLAN NOTE
S/p cardiac arrest soon after initial admission at [facility].thought to initially be secondary to circumferential pericardial effusion, as well as acute hypoxia and hypercarbia from respiratory failure.  Of note, no intervention for the effusion indicated at this time by CT surgery with a follow-up TTE discussed after discharge.  Patient also carries diagnosis of chronic combined systolic and diastolic heart failure and atrial fibrillation

## 2024-10-01 NOTE — CONSULTS
Consultation - Palliative Care   Name: Marcin Sánchez 64 y.o. male I MRN: [id number]  Unit/Bed#: ICCU 205-01 I Date of Admission: 9/8/2024   Date of Service: 10/1/2024 I Hospital Day: 23       Inpatient consult to Palliative Care     Date/Time  9/8/2024 7:49 PM     Performed by  Presley Serrato DO   Authorized by  Ron Qureshi MD           Physician Requesting Evaluation: Debo Whittaker DO   Reason for Evaluation / Principal Problem: Medical representation/guardianship concerns; intermittent agitation/behaviors    Assessment & Plan  Acute hypoxic respiratory failure (HCC)  Likely secondary to acute on chronic hypercarbic and hypoxic respiratory failure from acute exacerbation of COPD in the setting of pulmonary fibrosis and long-term tobacco abuse.  Patient was intubated after suffering cardiac arrest soon after admission 3 weeks ago and failed extubation on 9/10.  He was switched to mechanical ventilation via tracheostomy on 9/19.  Per intensivist service, plan is for continued decrease of respiratory MV support with hopeful ultimate wean.  Chronic combined systolic and diastolic CHF (congestive heart failure) (HCC)  Wt Readings from Last 3 Encounters:   10/01/24 66.9 kg (147 lb 7.8 oz)   09/08/24 60.8 kg (134 lb)   08/29/24 67.6 kg (149 lb)   Primary intensivist service is continuing to diurese patient, with monitoring of fluid status and weight.          Cardiac arrest (HCC)  S/p cardiac arrest soon after initial admission at [facility].thought to initially be secondary to circumferential pericardial effusion, as well as acute hypoxia and hypercarbia from respiratory failure.  Of note, no intervention for the effusion indicated at this time by CT surgery with a follow-up TTE discussed after discharge.  Patient also carries diagnosis of chronic combined systolic and diastolic heart failure and atrial fibrillation  Palliative care encounter  Capacity:  Pt did not exhibit full competency during our evaluation today.  "Communication is inherently challenging with his trach and he reported being illiterate today when we offered a whiteboard for him to write on. He was oriented to person and place, but other than knowing the year, he was not oriented to time. He was unable to answer who the current [place] President is, and could not articulate his wishes should he suffer another cardiac arrest.    GOC:  Without full capacity, medical decisions revert to his legally appointed guardian, Tere Williamson ([phone]). Per chart review, Tere was appointed pt's guardian in 2015 and since it was \"so long ago,\" was unable to recall why she was appointed. Tere had been employed at \"[employer]\" when she was appointed pt's guardian, but has not worked for the company for the \"last few months.\" Tere is now living in [place]. Tere was contacted by JEFF MELO on 9/25 and confirmed she was pt's legal guardian; Tere went on to say it was okay to speak with pt's sister, Katalina, about pt's medical state.    Upon collaborative discussion, palliative service will reach out to [facility]'s legal consult resource, Mr. Ivan Baker, for specific legal assistance in navigating pt's medical decision maker complexity. We have communicated this to the pt's primary CC team as well.    ACP:  Pt's chart states he has ACP docs, but when examined, it is a single scanned-in sheet from 3/14/14 signed by pt's sister Katalina stating that the pt does not have any AD, LW, or durable POA.    Disposition:  At this time, pt's medical needs supersede that of his previous assisted living facility residence, [facility]. CM is actively searching for a LTAC facility that could accommodate pt.    Symptom Support:  Symptom mgmt portion of palliative consult was related to pt's intermittent agitation, specifically at night grabbing for/pulling at his trach. On discussion with primary CC service, there is a balance between sedation for his behaviors, and " oversedation as they steadily attempt to decrease pt's MV support. We have switched pt from qHS olanzapine to prn haldol, as haldol is one of the least sedating anti-confusion/anti-psychotic medications. Should haldol ultimately prove ineffective, could revisit return to olanzapine. He also remains on 1:1 observation as well.    Follow-up:  Palliative service will continue to follow Marcin and coordinate medical decision making apparatus with help of legal consult service.    PDMP Review: I have reviewed the patient's controlled substance dispensing history in the Prescription Drug Monitoring Program in compliance with the [facility] regulations before prescribing any controlled substances.    History of Present Illness   HPI: Marcin Sánchez is a 64 y.o. year old male w/ notable PMH COPD/pulmonary fibrosis/tobacco abuse, squamous cell ca of lung s/p CRT (last onc appt 4/2024), epilepsy, afib on eliquis, and chronic combined heart failure who was originally admitted on 9/6 to [facility] for AMS with acute respiratory failure from COPD exacerbation i/s/o pulmonary fibrosis. He desaturated into the 20s and subsequently suffered a cardiac arrest. ROSC was ultimately achieved, and he was intubated. Post-arrest imaging revealed a circumferential cardiac effusion. Marcin was transferred to [facility] on 9/8 for possible intervention related to the cardiac effusion. 9/9 imaging subsequently ruled out pericardial tamponade. Pt was extubated on 9/10 but unfortunately required re-intubation soon thereafter and [facility].    Prior to his admission at [facility], Marcin was a resident at [facility] assisted living [facility]. He has had an appointed legal guardian since 2015, and also has a sister who lives locally. The CM team is working on finding placement at a LTAC facility for Marcin.    Marcin's primary critical care service consulted palliative medicine on 10/1 to assist with deciphering his medical  "decision making dynamic (legal appointed guardian vs transition to his sister) in preparation for LTAC placement, as well as intermittent agitation episodes where Marcin tugs on his trach tube.    Review of Systems   Constitutional:  Negative for chills.        Pt felt \"warm,\" and asked for thermostat to be turned down but denied feeling feverish   Respiratory:  Negative for chest tightness and shortness of breath.         Complained of mucus build-up/asked for suctioning   Cardiovascular:  Negative for chest pain.   Gastrointestinal:  Negative for abdominal pain, nausea and vomiting.       Objective :  Temp:  [97.8 °F (36.6 °C)-99.2 °F (37.3 °C)] 97.8 °F (36.6 °C)  HR:  [] 72  BP: (107-187)/(44-71) 128/46  Resp:  [12-37] 17  SpO2:  [93 %-100 %] 99 %  O2 Device: Ventilator  FiO2 (%):  [40] 40    Physical Exam  Constitutional:       General: He is awake.      Appearance: He is ill-appearing.      Comments: On MV via trach   Eyes:      Conjunctiva/sclera:      Right eye: Right conjunctiva is not injected.      Left eye: Left conjunctiva is not injected.   Neck:      Comments: Trach tube in place attached to MV  Cardiovascular:      Rate and Rhythm: Normal rate.   Pulmonary:      Comments: Receiving MV via tracheostomy w/ spO2 96% during our exam  Chest:      Comments: Telemetry monitors present on chest  Abdominal:      Comments: Feeding tube currently clamped that inserts in Lt side of abdomen   Musculoskeletal:      Right lower leg: No edema.      Left lower leg: No edema.   Skin:     General: Skin is dry.      Comments: Backside not examined during our eval (wound service reports unstageable sacral wound).   Neurological:      Mental Status: He is alert.      Comments: He is oriented to person, place, and year, but struggled with day of week and month. He did know it was rubi. He was unable to articulate the current [place] President.    He mostly mouths his words, and is difficult to to decipher at times. He " struggles to write answers on a whiteboard.    He seemed to understand the majority of our questions during exam.   Psychiatric:         Behavior: Behavior is cooperative.      Comments: Exasperated at times during our eval            Lab Results: I have reviewed the following results:  Lab Results   Component Value Date/Time    SODIUM 133 (L) 10/01/2024 06:15 AM    SODIUM 136 08/06/2021 06:22 AM    K 4.3 10/01/2024 06:15 AM    K 4.2 08/06/2021 06:22 AM    BUN 26 (H) 10/01/2024 06:15 AM    BUN 8 08/06/2021 06:22 AM    CREATININE 0.51 (L) 10/01/2024 06:15 AM    CREATININE 0.42 (L) 08/06/2021 06:22 AM    GLUC 100 10/01/2024 06:15 AM    GLUC 87 08/06/2021 06:22 AM    CALCIUM 8.5 10/01/2024 06:15 AM    CALCIUM 9.5 08/06/2021 06:22 AM    AST 19 09/18/2024 05:25 AM    AST 18 08/06/2021 06:22 AM    ALT 26 09/18/2024 05:25 AM    ALT 17 08/06/2021 06:22 AM    ALB 2.5 (L) 09/26/2024 09:32 AM    ALB 3.8 08/06/2021 06:22 AM    TP 5.4 (L) 09/18/2024 05:25 AM    TP 7.7 08/06/2021 06:22 AM    EGFR 113 10/01/2024 06:15 AM    EGFR 115 10/02/2020 06:35 AM     Lab Results   Component Value Date/Time    HGB 8.1 (L) 09/30/2024 03:48 PM    WBC 8.57 09/30/2024 05:27 AM     09/30/2024 05:27 AM    INR 1.28 (H) 09/20/2024 05:29 PM    PTT 48 (H) 09/23/2024 04:46 AM     Lab Results   Component Value Date/Time    [id number] 45.059 (H) 09/24/2024 06:34 AM       Code Status: Level 1 - Full Code  Advance Directive and Living Will: No; there's a document scanned into the chart signed by the pt's sister, Katalina, from March 2014 stating pt has no AD, LW, or durable POA.  Power of : No scanned in POA documentation. The pt does have a court-appointed guardian, Ms. Tere Williamson.  POLST:  None     Administrative Statements   I have spent a total time of 50 minutes in caring for this patient on the day of the visit/encounter including Patient and family education, Risk factor reductions, Counseling / Coordination of care, Documenting  in the medical record, Obtaining or reviewing history  , and Communicating with other healthcare professionals . Topics discussed with the patient / family include symptom assessment and management, medication review, psychosocial support, goals of care, supportive listening, and anticipatory guidance.

## 2024-10-01 NOTE — ASSESSMENT & PLAN NOTE
Wt Readings from Last 3 Encounters:   10/01/24 66.9 kg (147 lb 7.8 oz)   09/08/24 60.8 kg (134 lb)   08/29/24 67.6 kg (149 lb)   Primary intensivist service is continuing to diurese patient, with monitoring of fluid status and weight.

## 2024-10-01 NOTE — QUICK NOTE
Called to bedside earlier in the night given the patient's agitation and elevated blood pressures.  Upon arrival, the patient was not responding to commands appropriately.  With a history of seizures, there was concern for breakthrough seizure.  Physical exam noted appropriate response to stimuli, so no concern for seizure at that time.  The patient was sent for a stat CT head.    When the patient arrived back to the ICCU, he was noted to be incontinent of urine and confused, possibly suggesting a postictal state.  No noted convulsions while at CT, but may have had subclinical seizures.  Patient was started on video EEG.  VBG checked to rule out CO2 narcosis, but the patient has a persistent CO2 retention with appropriate elevation of bicarb.  No additional antiepileptics or benzodiazepines given throughout the night, plan to await video EEG results.    Presley Alvarez, DO  PGY-5, Pulmonary/Critical Care  [facility]

## 2024-10-01 NOTE — CONSULTS
"Consultation - Palliative & Supportive Care   Marcin Sánchez  64 y.o.  male  [facility]   MRN: [id number]  Encounter: [id number]    ASSESSMENT & PLAN:    64M w/ complicated medical history including COPD and combined CHF, pulmonary fibrosis, squamous cell lung cancer, A-fib transferred to [facility] for pericardial effusion management. PSC consulted for goals.    No new Assessment & Plan notes have been filed under this hospital service since the last note was generated.  Service: Palliative Care      IDENTIFICATION:  Inpatient consult to Palliative Care  Consult performed by: Sd Cameron MD  Consult ordered by: Ron Qureshi MD      Reason for Consult / Principal Problem: \"assist with GOC, assist with agitation/delirium, complex guardianship dynamic\"    HISTORY OF PRESENT ILLNESS:    Marcin Sánchez is a 64 y.o. male with chronic hypoxic and hypercapnic respiratory failure in the setting of COPD and chronic combined systolic and diastolic CHF, pulmonary fibrosis, squamous cell lung cancer, epilepsy, atrial fibrillation, tobacco dependence, A-fib on Eliquis. Patient transferred from [facility] to [facility] on 9/8/24, for management of pericardial effusion. Originally admitted 9/6/24 for acute on chronic hypoxic and hypercapnic respiratory failure in the setting of COPD exacerbation. Extended hospital stay noted. Per Critical Care note 10/1/24 for 15 a.m., patient was nonresponsive to commands and there was concern for breakthrough seizure; after stat CT head patient was incontinent of urine suggesting possible postictal state. Convulsions noted while at CT.    Patient reported to have intermittent encephalopathy, mostly occurring at night (\"suspect hyperactive delirium with reported episodes of agitation and pulling at lines/interventions including tracheostomy tubing). Hospital course complicated by recurrent mucous plugging, patient has had multiple therapeutic bronc's. Sputum culture grew Moraxella and he has " received appropriate antibiotic therapy. Patient is on ventilatory support.    Interview and exam limited by: ***    Past Medical History:   Diagnosis Date    Alcohol abuse     Anxiety     Aortic insufficiency 12/18/2020    Atrial fibrillation (HCC)     Cancer (HCC)     COPD (chronic obstructive pulmonary disease) (HCC)     Delirium     Encephalopathy     Epilepsy (HCC)     History of chemotherapy     History of radiation therapy     Hypo-osmolality and hyponatremia     Hypokalemia     Hypothyroid     Lung cancer (HCC)     Lyme disease     Major depression      Past Surgical History:   Procedure Laterality Date    BRONCHOSCOPY N/A 10/3/2016    Procedure: BRONCHOSCOPY FLEXIBLE;  Surgeon: John Brunner DO;  [facility]: [facility];  Service:     GASTROSTOMY TUBE PLACEMENT N/A 9/19/2024    Procedure: INSERTION GASTROSTOMY TUBE OPEN;  Surgeon: Darrin Burgos DO;  [facility];  Service: General    HAND SURGERY      PEG W/TRACHEOSTOMY PLACEMENT N/A 9/19/2024    Procedure: TRACHEOSTOMY WITH INSERTION PEG TUBE;  Surgeon: Darrin Burgos DO;  [facility];  Service: General    PELVIC FRACTURE SURGERY      REMOVAL VENOUS PORT (PORT-A-CATH) Left 9/18/2018    Procedure: REMOVAL VENOUS PORT (PORT-A-CATH)IR;  Surgeon: Randy Lopez DO;  [facility];  Service: Interventional Radiology     Social History     Socioeconomic History    Marital status:      Spouse name: Not on file    Number of children: Not on file    Years of education: Not on file    Highest education level: Not on file   Occupational History    Not on file   Tobacco Use    Smoking status: Every Day     Current packs/day: 1.50     Average packs/day: 1.5 packs/day for 47.0 years (70.5 ttl pk-yrs)     Types: Cigarettes    Smokeless tobacco: Never    Tobacco comments:     smoking more than 1 ppd   Vaping Use    Vaping status: Never Used   Substance and Sexual Activity    Alcohol use: Never    Drug use: Never     Sexual activity: Not on file   Other Topics Concern    Not on file   Social History Narrative    Not on file     Social Determinants of Health     Financial Resource Strain: Not on file   Food Insecurity: No Food Insecurity (9/6/2024)    Hunger Vital Sign     Worried About Running Out of Food in the Last Year: Never true     Ran Out of Food in the Last Year: Never true   Transportation Needs: No Transportation Needs (9/6/2024)    PRAPARE - Transportation     Lack of Transportation (Medical): No     Lack of Transportation (Non-Medical): No   Physical Activity: Not on file   Stress: Not on file   Social Connections: Not on file   Intimate Partner Violence: Not on file   Housing Stability: Low Risk  (9/6/2024)    Housing Stability Vital Sign     Unable to Pay for Housing in the Last Year: No     Number of Times Moved in the Last Year: 0     Homeless in the Last Year: No     Family History   Problem Relation Age of Onset    Diabetes Mother     Lung cancer Father        MEDICATIONS / ALLERGIES:  all current active meds have been reviewed and current meds:   Current Facility-Administered Medications:     acetaminophen (TYLENOL) oral suspension 975 mg, Q8H    acetylcysteine (MUCOMYST) 200 mg/mL inhalation solution 600 mg, TID    albuterol inhalation solution 2.5 mg, Q6H PRN    apixaban (ELIQUIS) tablet 5 mg, BID    bisacodyl (DULCOLAX) rectal suppository 10 mg, Daily PRN    budesonide (PULMICORT) inhalation solution 0.5 mg, Q12H    chlorhexidine (PERIDEX) 0.12 % oral rinse 15 mL, Q12H [place]    cyanocobalamin (VITAMIN B-12) tablet 100 mcg, Daily    doxazosin (CARDURA) tablet 2 mg, Daily    folic acid (FOLVITE) tablet 1 mg, Daily    furosemide (LASIX) tablet 40 mg, Daily    gabapentin (NEURONTIN) capsule 400 mg, BID    ipratropium (ATROVENT) 0.02 % inhalation solution 0.5 mg, TID    levalbuterol (XOPENEX) inhalation solution 1.25 mg, TID    levothyroxine (Synthroid) suspension 250 mcg, Early Morning    lidocaine (LMX) 4 %  "cream, 4x Daily PRN    melatonin tablet 3 mg, HS    metoprolol tartrate (LOPRESSOR) partial tablet 12.5 mg, Q12H AMERICA    [START ON 10/19/2024] nicotine (NICODERM CQ) 14 mg/24hr TD 24 hr patch 14 mg, Daily    nicotine (NICODERM CQ) 21 mg/24 hr TD 24 hr patch 1 patch, Daily    [START ON 11/2/2024] nicotine (NICODERM CQ) 7 mg/24hr TD 24 hr patch 7 mg, Daily    OLANZapine (ZyPREXA) tablet 2.5 mg, Daily PRN    OLANZapine (ZyPREXA) tablet 5 mg, HS    oxyCODONE (ROXICODONE) oral solution 2.5 mg, Q6H    oxyCODONE (ROXICODONE) oral solution 5 mg, Q6H PRN    phenytoin (DILANTIN) oral suspension 150 mg, Q12H AMERICA    polyethylene glycol (MIRALAX) packet 17 g, BID    senna oral syrup 8.8 mg, HS    sodium chloride tablet 2 g, BID With Meals    traZODone (DESYREL) tablet 100 mg, HS    No Known Allergies    OBJECTIVE:  BP (!) 128/46 (BP Location: Right arm)   Pulse 72   Temp 97.8 °F (36.6 °C) (Axillary)   Resp 17   Ht 5' 9\" (1.753 m)   Wt 66.9 kg (147 lb 7.8 oz)   SpO2 99%   BMI 21.78 kg/m²   Physical Exam  ***  Lab Results: I have personally reviewed pertinent labs.    HEMATOLOGY PROFILE:  Results from last 7 days   Lab Units 09/30/24  1548 09/30/24  0527 09/29/24  0632 09/28/24  0542 09/27/24  0527   WBC Thousand/uL  --  8.57 8.89 6.19 7.29   HEMOGLOBIN g/dL 8.1* 7.1* 7.5* 7.5* 7.4*   HEMATOCRIT % 24.8* 22.1* 23.0* 23.1* 22.5*   PLATELETS Thousands/uL  --  213 228 233 236   SEGS PCT %  --  74  --  74 75   MONO PCT %  --  14*  --  12 12   EOS PCT %  --  2  --  3 3       CHEMISTRY PROFILE:  Results from last 7 days   Lab Units 10/01/24  0615 09/30/24  1548 09/30/24  0527 09/27/24  0527 09/26/24  0932   SODIUM mmol/L 133* 134* 133*   < >  --    POTASSIUM mmol/L 4.3 3.9 3.4*   < >  --    CHLORIDE mmol/L 94* 92* 89*   < >  --    CO2 mmol/L 34* 37* 40*   < >  --    BUN mg/dL 26* 26* 21   < >  --    CREATININE mg/dL 0.51* 0.52* 0.46*   < >  --    ALBUMIN g/dL  --   --   --   --  2.5*   CALCIUM mg/dL 8.5 8.7 8.4   < >  --     < > = " values in this interval not displayed.       Albumin:  0   Lab Value Date/Time    ALB 2.5 (L) 09/26/2024 0932    ALB 3.8 08/06/2021 0622     Imaging Studies: I have personally reviewed pertinent reports.    Echo follow up/limited w/ contrast if indicated    Result Date: 9/9/2024    Left Ventricle: Left ventricular cavity size is normal. Wall thickness is normal. The left ventricular ejection fraction is 50%. Systolic function is low normal. There is global hypokinesis with regional variation.   Right Ventricle: Right ventricular cavity size is normal. Systolic function is normal.   Aortic Valve: There is moderate to severe regurgitation.   Pericardium: There is a moderate to large pericardial effusion circumferential to the heart measuring largest along the RV free wall at 2.3 cm. The fluid exhibits no internal echoes. There is no echocardiographic evidence of tamponade. The evidence against tamponade includes: no right ventricular diastolic collapse, no right atrial inversion and no respiratory variation. There is a left pleural effusion.     XR chest portable    Result Date: 9/9/2024  XR CHEST PORTABLE INDICATION: Confirm ETT positioning and central line placement. COMPARISON: Chest radiograph and CT chest abdomen pelvis earlier on 9/8/2024. FINDINGS: Sideport of enteric tube projects over the stomach in the left upper quadrant. Tip of central line projects over the cavoatrial junction. Tip of ET tube approximately 4 cm above the chery. Unchanged interstitial edema and small right pleural effusion. Unchanged right lung post radiation fibrosis. No pneumothorax. Normal cardiomediastinal silhouette. Bones are unremarkable for age. Normal upper abdomen.     Tubes and lines in satisfactory position. No pneumothorax. Unchanged interstitial edema and small right pleural effusion. Resident: John Blake I, the attending radiologist, have reviewed the images and agree with the final report above. Workstation performed:  [id number]     XR chest portable ICU    Result Date: 9/9/2024  XR CHEST PORTABLE ICU INDICATION: Central line verification. COMPARISON: CT chest abdomen pelvis and chest radiograph earlier on 9/8/2024 FINDINGS: Tip of right IJ central line projects over the lower SVC. Tip of ET tube is 2.5 cm above the chery. Sideport of enteric tube projects over the lower mediastinum. Unchanged interstitial edema. Unchanged right lung post radiation fibrosis. Right perihilar bulla measuring 3 cm. Small right pleural effusion. The left costophrenic angle is not included in the image. No pneumothorax. Normal cardiomediastinal silhouette. Bones are unremarkable for age. Unremarkable visualized upper abdomen.     Tip of right IJ central line in satisfactory position. No pneumothorax. Tip of enteric tube projects over the lower mediastinum, which was subsequently advanced on the follow-up radiograph. Unchanged interstitial edema and small right pleural effusion. The left costophrenic angle is not included in the image. Resident: John Blake I, the attending radiologist, have reviewed the images and agree with the final report above. Workstation performed: [id number]     US bedside procedure    Result Date: 9/9/2024  [id number]    CT chest abdomen pelvis wo contrast    Result Date: 9/8/2024  CT CHEST, ABDOMEN AND PELVIS WITHOUT IV CONTRAST INDICATION: hypoxia, elevated lactic. COMPARISON: Chest CT dated 8/28/2024. PET/CT dated 4/8/2019. TECHNIQUE: CT examination of the chest, abdomen and pelvis was performed without intravenous contrast. Multiplanar 2D reformatted images were created from the source data. This examination, like all CT scans performed in the [facility], was performed utilizing techniques to minimize radiation dose exposure, including the use of iterative reconstruction and automated exposure control. Radiation dose length product (DLP) for this visit:  565 mGy-cm Enteric Contrast: Not administered. FINDINGS: CHEST LUNGS: Stable right upper perihilar postradiation fibrosis with volume loss and traction bronchiectasis.  Emphysematous changes and left apical scarring Increased bibasilar dependent, right greater than left that may represent atelectasis or pneumonia. Several stable pulmonary nodules scattered in the left lung measuring up to 5 mm. Stable severe focal narrowing of the central right middle lobe bronchus. Several new mucous impacted bronchi in the right middle and right lower lobe. Endotracheal tube is in place. PLEURA: Enlarging small bilateral pleural effusions. HEART/GREAT VESSELS: Heart size is within normal limits. Stable moderate pericardial effusion. Stable aneurysmal dilatation of the ascending thoracic aorta measuring 5.4 cm. MEDIASTINUM AND ANDREW: Stable 1.5 cm right upper paratracheal lymph node.. CHEST WALL AND LOWER NECK: Unremarkable. ABDOMEN LIVER/BILIARY TREE: No focal liver lesion. No biliary dilatation. GALLBLADDER: Cholelithiasis. Nonspecific pericholecystic edema. Suggest clinical correlation for cholecystitis and further evaluation with HIDA scan if warranted. SPLEEN: Unremarkable. PANCREAS: Unremarkable. ADRENAL GLANDS: Unremarkable. KIDNEYS/URETERS: Unremarkable. No hydronephrosis. STOMACH AND BOWEL: Diverticulosis without diverticulitis. No obstruction. NG tube terminates in the stomach APPENDIX: No findings to suggest appendicitis. ABDOMINOPELVIC CAVITY: Small amount of free fluid in the pelvis. VESSELS: Atherosclerosis without abdominal aortic aneurysm. PELVIS REPRODUCTIVE ORGANS: Unremarkable for patient's age. URINARY BLADDER: Not well distended with Haynes catheter in place. ABDOMINAL WALL/INGUINAL REGIONS: Unremarkable. BONES: Left hip ORIF. Healed left pubic rami fracture. Spinal degenerative changes. No acute osseous abnormality.     Enlarging small bilateral pleural effusions with increased bibasilar consolidation that may  be due to atelectasis post-rest correlation for pneumonia. Stable pericardial effusion Stable right perihilar postradiation fibrosis. Stable enlarged right paratracheal lymph node. Cholelithiasis with nonspecific pericholecystic edema. Recommend clinical correlation for cholecystitis biliary scintigraphy if warranted. Workstation performed: [id number]     CT head wo contrast    Result Date: 9/8/2024  CT BRAIN - WITHOUT CONTRAST INDICATION:   altered MS. COMPARISON:  None. TECHNIQUE:  CT examination of the brain was performed.  Multiplanar 2D reformatted images were created from the source data. Radiation dose length product (DLP) for this visit:  870 mGy-cm .  This examination, like all CT scans performed in the [facility], was performed utilizing techniques to minimize radiation dose exposure, including the use of iterative reconstruction and automated exposure control. IMAGE QUALITY:  Diagnostic. FINDINGS: PARENCHYMA:  No intracranial mass, mass effect or midline shift. No CT signs of acute infarction.  No acute parenchymal hemorrhage. Stable chronic cystic lacunar infarct in the left basal ganglia with ex vacuo dilatation of the left lateral ventricle. Mild chronic microangiopathy. VENTRICLES AND EXTRA-AXIAL SPACES: Ex vacuo dilatation of the left lateral ventricle. No hydrocephalus. VISUALIZED ORBITS: Normal visualized orbits. PARANASAL SINUSES: Normal visualized paranasal sinuses. CALVARIUM AND EXTRACRANIAL SOFT TISSUES:  Normal.     No acute intracranial abnormality. Stable chronic microangiopathy and chronic left basal ganglia lacunar infarct. Workstation performed: [id number]     Echo follow up/limited w/ contrast if indicated    Result Date: 9/8/2024    Left Ventricle: Left ventricular cavity size is mildly dilated. Wall thickness is mildly increased. The left ventricular ejection fraction is 55%. Systolic function is normal. Wall motion cannot be accurately assessed.   Left Atrium: The  atrium is moderately dilated.   Right Atrium: The atrium is mildly dilated.   Aortic Valve: There is severe regurgitation.   Mitral Valve: There is mild annular calcification.   Aorta: The ascending aorta is severely dilated. The ascending aorta is 5.6 cm.   Pericardium: There is a moderate pericardial effusion circumferential to the heart, appearing large adjacent to the apical segments of the left ventricle and right ventricular free wall.  Unable to rule out early tamponade, as the right ventricle appears underfilled in some images, although off-axis imaging and patient immobility (on mechanical ventilator) limit this study. There is a left pleural effusion. Compared to prior study dated 8/29/2024, left ventricular systolic function appears improved, although patient is on norepinephrine during the study.  The severity of pericardial effusion appears unchanged, still appearing large adjacent to the apical segments of the left ventricle and right ventricular free wall.  Advise clinical correlation.     XR chest portable ICU    Result Date: 9/8/2024  XR CHEST PORTABLE ICU INDICATION: et. COMPARISON: CXR 9/7/2024, chest CT 8/28/2024. FINDINGS: ET tube 5 cm above the chery. Chronic volume loss in the right hemithorax with redemonstration of opacity in the upper right hemithorax. Moderate pulmonary edema. Small right pleural effusion. Normal cardiomediastinal silhouette. Bones are unremarkable for age. Normal upper abdomen.     ET tube 5 cm above the chery. Chronic volume loss in the right hemithorax with persistent opacity in the upper right hemithorax and small right effusion. Moderate pulmonary edema. Pneumonia not excluded in the appropriate clinical setting. Workstation performed: [id number]      EKG, Pathology, and Other Studies: I have personally reviewed pertinent reports.  Results for orders placed or performed during the hospital encounter of 09/08/24 (from the past 1000 hour(s))   ECG 12 lead    Collection  "Time: 09/25/24 12:40 AM   Result Value    Ventricular Rate 84    Atrial Rate 84    MI Interval 210    QRSD Interval 94    QT Interval 376    QTC Interval 444    P Axis 50    QRS Axis -20    T Wave Axis 251    Narrative    Sinus rhythm with 1st degree A-V block  T wave abnormality, consider inferior ischemia  Abnormal ECG  When compared with ECG of 23-SEP-2024 12:27,  Fusion complexes are no longer Present  Confirmed by Vargas Joiner ([id number]) on 9/26/2024 7:48:44 AM     *Note: Due to a large number of results and/or encounters for the requested time period, some results have not been displayed. A complete set of results can be found in Results Review.     Counseling / Coordination of Care:  *** minutes total time spent on 10/01/24 in caring for this patient including {MBKTOPICSDISCUSSED:22349::\"obtaining/reviewing history\",\"symptom assessment and management\",\"medication review / adjustment\",\"psychosocial support\",\"reviewing / ordering tests, medicines, imaging, procedures\",\"supportive listening\",\"anticipatory guidance\",\"documenting in the medical record\"}. All patient/family questions were answered during this discussion.    Sd Cameron MD  [facility] Palliative and Supportive Care  [phone]    Portions of this document may have been created using dictation software and as such some \"sound alike\" terms may have been generated by the system. Do not hesitate to contact me with any questions or clarifications.\  "

## 2024-10-01 NOTE — ASSESSMENT & PLAN NOTE
Likely secondary to acute on chronic hypercarbic and hypoxic respiratory failure from acute exacerbation of COPD in the setting of pulmonary fibrosis and long-term tobacco abuse.  Patient was intubated after suffering cardiac arrest soon after admission 3 weeks ago and failed extubation on 9/10.  He was switched to mechanical ventilation via tracheostomy on 9/19.  Per intensivist service, plan is for continued decrease of respiratory MV support with hopeful ultimate wean.

## 2024-10-01 NOTE — SPEECH THERAPY NOTE
Speech Language/Pathology  Speech-Language Pathology   Hold Note    Patient Name: Marcin Sánchez 64 y.o.  Today's Date: 10/1/2024      Problem List  Principal Problem:    Acute hypoxic respiratory failure (HCC)  Active Problems:    Acute exacerbation of chronic obstructive pulmonary disease (COPD) (HCC)    Cancer of right main bronchus (HCC)    Chronic combined systolic and diastolic CHF (congestive heart failure) (HCC)    Cardiac arrest (HCC)    Pericardial effusion    Shock (HCC)    Mucus plugging of bronchi    Past Medical History  Past Medical History:   Diagnosis Date    Alcohol abuse     Anxiety     Aortic insufficiency 12/18/2020    Atrial fibrillation (HCC)     Cancer (HCC)     COPD (chronic obstructive pulmonary disease) (HCC)     Delirium     Encephalopathy     Epilepsy (HCC)     History of chemotherapy     History of radiation therapy     Hypo-osmolality and hyponatremia     Hypokalemia     Hypothyroid     Lung cancer (HCC)     Lyme disease     Major depression      Past Surgical History  Past Surgical History:   Procedure Laterality Date    BRONCHOSCOPY N/A 10/3/2016    Procedure: BRONCHOSCOPY FLEXIBLE;  Surgeon: John Brunner DO;  [facility];  Service:     GASTROSTOMY TUBE PLACEMENT N/A 9/19/2024    Procedure: INSERTION GASTROSTOMY TUBE OPEN;  Surgeon: Darrin Burgos DO;  [facility];  Service: General    HAND SURGERY      PEG W/TRACHEOSTOMY PLACEMENT N/A 9/19/2024    Procedure: TRACHEOSTOMY WITH INSERTION PEG TUBE;  Surgeon: Darrin Burgos DO;  [facility];  Service: General    PELVIC FRACTURE SURGERY      REMOVAL VENOUS PORT (PORT-A-CATH) Left 9/18/2018    Procedure: REMOVAL VENOUS PORT (PORT-A-CATH)IR;  Surgeon: Randy Lopez DO;  [facility];  Service: Interventional Radiology         Current Medical Status  Pt admitted to [facility] on 9/5/24 for SOB, hypoxia.  Transferred to [facility], he was hypoxic and required up to 8 L of oxygen and had severe  shortness of breath requiring BiPAP. Likely severe COPD exacerbation.  Intubated at [facility] cardial arrest with CPR and shock x1.  Trasnferred to [facility] on 9/8/24 for further management-pericardial effusion eval.   Pt intubated 9/8-9/10/24, required re intubation that day until 9/13/24.  He eventually received a trach on 9/19/24.  The pt self decannulated on 9/21/24.  A vent wean was attempted yesterday but unsuccessful.    Consult for speaking valve evaluation received. Spoke w/ MD- pt remains on vent, not appropriate for po trials or speaking valve after conversation with RT.  Please re consult when the pt is on trach collar and able to attempt PMV and some po or swallow study.      PMH  See EMR for details  history of squamous cell carcinoma of the right lung status post chemo and radiation, right upper lobe fibrosis from radiation, moderate COPD, nicotine dependence, epilepsy, combined systolic and diastolic heart failure EF 35%, atrial fibrillation, possible pulmonary embolism on Eliquis

## 2024-10-01 NOTE — WOUND OSTOMY CARE
Progress Note - Wound   Marcin Sánchez 64 y.o. male MRN: [id number]  Unit/Bed#: [facility] 205-01 Encounter: [id number]        Assessment:   Patient is seen for wound care follow-up. Patient is max assist for turning and repositioning. Incontinent of bowel and bladder is managed with external suction urinary catheter. Patient is vented with trach. Patient being turned and repositioned with green foam wedges. Patient receives enteral nutrition.     Findings:  B/L heels are dry and intact, preventative foams in place.     POA unstageable sacrum- small area of full thickness skin loss with 100% yellow moist slough tissue. Oksana-wounds are fragile and macerated. Small to moderate serosanguineous drainage noted.    HA Unstageable under trach plate: full thickness skin loss with moist yellow slough tissue. Oksana-wound is fragile and pink in color. Moderate yellow milky drainage.    No induration, fluctuance, odor, warmth/temperature differences, redness, or purulence noted to the above noted wounds and skin areas assessed. New dressings applied per orders listed below. Patient tolerated well- no s/s of non-verbal pain or discomfort observed during the encounter. Bedside nurse aware of plan of care. See flow sheets for more detailed assessment findings.  Wound care will continue to follow, call or Secure Chat Message with questions.       Skin Care Plan:  1-Mid Sacro-Buttocks Wound: Cleanse sacro-buttocks with soap and water. Apply melgisorb ag to wound bed and cover with Silicone Bordered Foam Dressing(Mepilex) to area. Ramírez with T for Treatment. Change every other day or PRN.  2-Turn/reposition q2h or when medically stable for pressure re-distribution on skin .  3-Elevate heels to offload pressure.  4-Moisturize skin daily with skin nourishing cream  5-Ehob cushion in chair when out of bed.  6-Cleanse B/L Heels with soap and water. Pat dry. Apply Silicone Border Foam (Mepilex) to areas. Ramírez with P for Prevention and change  every 3 days or PRN soilage/displacement. Peel back and inspect Q-shift.  7-Trach Wound: Cleanse with NSS and dry. Cut silver alginate (MelgisoTherma Flite Ag) sheet like a split gauze and apply around trach insertion site. Cover with mepilex up dressing (cut like a split gauze). Change daily or PRN soilage/displacement.     Wound 09/09/24 Pressure Injury Buttocks (Active)   Wound Image   10/01/24 1335   Wound Description Slough;Non-blanchable erythema 10/01/24 1335   Pressure Injury Stage DTPI 10/01/24 1335   Oksana-wound Assessment Fragile 10/01/24 1335   Wound Length (cm) 2.5 cm 10/01/24 1335   Wound Width (cm) 2 cm 10/01/24 1335   Wound Depth (cm) 0.1 cm 10/01/24 1335   Wound Surface Area (cm^2) 5 cm^2 10/01/24 1335   Wound Volume (cm^3) 0.5 cm^3 10/01/24 1335   Calculated Wound Volume (cm^3) 0.5 cm^3 10/01/24 1335   Wound Site Closure BECKY 10/01/24 1335   Drainage Amount Small 10/01/24 1335   Drainage Description Serosanguineous 10/01/24 1335   Non-staged Wound Description Full thickness 10/01/24 1335   Treatments Cleansed 10/01/24 1335   Dressing Foam, Silicon (eg. Allevyn, etc);Calcium Alginate with Silver 10/01/24 1335   Wound packed? No 10/01/24 1335   Packing- # removed 0 10/01/24 1335   Packing- # inserted 0 10/01/24 1335   Dressing Changed New 10/01/24 1335   Patient Tolerance Tolerated well 10/01/24 1335   Dressing Status Clean;Dry;Intact 10/01/24 1335       Wound 09/26/24 Pressure Injury Throat Distal;Mid (Active)   Wound Image   10/01/24 1332   Wound Description Pink;Yellow 10/01/24 1332   Pressure Injury Stage U 09/29/24 0800   Oksana-wound Assessment Fragile;Pink 10/01/24 1332   Wound Length (cm) 1.5 cm 10/01/24 1332   Wound Width (cm) 0.5 cm 10/01/24 1332   Wound Depth (cm) 0.2 cm 10/01/24 1332   Wound Surface Area (cm^2) 0.75 cm^2 10/01/24 1332   Wound Volume (cm^3) 0.15 cm^3 10/01/24 1332   Calculated Wound Volume (cm^3) 0.15 cm^3 10/01/24 1332   Change in Wound Size % 0 10/01/24 1332   Wound Site Closure BECKY  10/01/24 1332   Drainage Amount Moderate 10/01/24 1332   Drainage Description Milky;Yellow 10/01/24 1332   Non-staged Wound Description Full thickness 10/01/24 1332   Treatments Cleansed 10/01/24 1332   Dressing Foam 10/01/24 1332   Wound packed? No 10/01/24 1332   Packing- # removed 0 10/01/24 1332   Packing- # inserted 0 10/01/24 1332   Dressing Changed New 10/01/24 1332   Patient Tolerance Tolerated well 10/01/24 1332   Dressing Status Clean;Dry;Intact 10/01/24 1332           Moon Espinoza BSN, RN, CWOCN

## 2024-10-01 NOTE — PLAN OF CARE
Problem: Prexisting or High Potential for Compromised Skin Integrity  Goal: Skin integrity is maintained or improved  Description: INTERVENTIONS:  - Identify patients at risk for skin breakdown  - Assess and monitor skin integrity  - Assess and monitor nutrition and hydration status  - Monitor labs   - Assess for incontinence   - Turn and reposition patient  - Assist with mobility/ambulation  - Relieve pressure over bony prominences  - Avoid friction and shearing  - Provide appropriate hygiene as needed including keeping skin clean and dry  - Evaluate need for skin moisturizer/barrier cream  - Collaborate with interdisciplinary team   - Patient/family teaching  - Consider wound care consult   Outcome: Progressing     Problem: PAIN - ADULT  Goal: Verbalizes/displays adequate comfort level or baseline comfort level  Description: Interventions:  - Encourage patient to monitor pain and request assistance  - Assess pain using appropriate pain scale  - Administer analgesics based on type and severity of pain and evaluate response  - Implement non-pharmacological measures as appropriate and evaluate response  - Consider cultural and social influences on pain and pain management  - Notify physician/advanced practitioner if interventions unsuccessful or patient reports new pain  Outcome: Progressing     Problem: INFECTION - ADULT  Goal: Absence or prevention of progression during hospitalization  Description: INTERVENTIONS:  - Assess and monitor for signs and symptoms of infection  - Monitor lab/diagnostic results  - Monitor all insertion sites, i.e. indwelling lines, tubes, and drains  - Monitor endotracheal if appropriate and nasal secretions for changes in amount and color  - [place] appropriate cooling/warming therapies per order  - Administer medications as ordered  - Instruct and encourage patient and family to use good hand hygiene technique  - Identify and instruct in appropriate isolation precautions for  identified infection/condition  Outcome: Progressing  Goal: Absence of fever/infection during neutropenic period  Description: INTERVENTIONS:  - Monitor WBC    Outcome: Progressing     Problem: SAFETY ADULT  Goal: Patient will remain free of falls  Description: INTERVENTIONS:  - Educate patient/family on patient safety including physical limitations  - Instruct patient to call for assistance with activity   - Consult OT/PT to assist with strengthening/mobility   - Keep Call bell within reach  - Keep bed low and locked with side rails adjusted as appropriate  - Keep care items and personal belongings within reach  - Initiate and maintain comfort rounds  - Make Fall Risk Sign visible to staff  - Offer Toileting every  Hours, in advance of need  - Initiate/Maintain alarm  - Obtain necessary fall risk management equipment:   - Apply yellow socks and bracelet for high fall risk patients  - Consider moving patient to room near nurses station  Outcome: Progressing  Goal: Maintain or return to baseline ADL function  Description: INTERVENTIONS:  -  Assess patient's ability to carry out ADLs; assess patient's baseline for ADL function and identify physical deficits which impact ability to perform ADLs (bathing, care of mouth/teeth, toileting, grooming, dressing, etc.)  - Assess/evaluate cause of self-care deficits   - Assess range of motion  - Assess patient's mobility; develop plan if impaired  - Assess patient's need for assistive devices and provide as appropriate  - Encourage maximum independence but intervene and supervise when necessary  - Involve family in performance of ADLs  - Assess for home care needs following discharge   - Consider OT consult to assist with ADL evaluation and planning for discharge  - Provide patient education as appropriate  Outcome: Progressing  Goal: Maintains/Returns to pre admission functional level  Description: INTERVENTIONS:  - Perform AM-PAC 6 Click Basic Mobility/ Daily Activity  assessment daily.  - Set and communicate daily mobility goal to care team and patient/family/caregiver.   - Collaborate with rehabilitation services on mobility goals if consulted  - Perform Range of Motion  times a day.  - Reposition patient every  hours.  - Dangle patient  times a day  - Stand patient  times a day  - Ambulate patient  times a day  - Out of bed to chair  times a day   - Out of bed for meals times a day  - Out of bed for toileting  - Record patient progress and toleration of activity level   Outcome: Progressing     Problem: DISCHARGE PLANNING  Goal: Discharge to home or other facility with appropriate resources  Description: INTERVENTIONS:  - Identify barriers to discharge w/patient and caregiver  - Arrange for needed discharge resources and transportation as appropriate  - Identify discharge learning needs (meds, wound care, etc.)  - Arrange for interpretive services to assist at discharge as needed  - Refer to Case Management Department for coordinating discharge planning if the patient needs post-hospital services based on physician/advanced practitioner order or complex needs related to functional status, cognitive ability, or social support system  Outcome: Progressing     Problem: Knowledge Deficit  Goal: Patient/family/caregiver demonstrates understanding of disease process, treatment plan, medications, and discharge instructions  Description: Complete learning assessment and assess knowledge base.  Interventions:  - Provide teaching at level of understanding  - Provide teaching via preferred learning methods  Outcome: Progressing     Problem: Nutrition/Hydration-ADULT  Goal: Nutrient/Hydration intake appropriate for improving, restoring or maintaining nutritional needs  Description: Monitor and assess patient's nutrition/hydration status for malnutrition. Collaborate with interdisciplinary team and initiate plan and interventions as ordered.  Monitor patient's weight and dietary intake as  ordered or per policy. Utilize nutrition screening tool and intervene as necessary. Determine patient's food preferences and provide high-protein, high-caloric foods as appropriate.     INTERVENTIONS:  - Monitor oral intake, urinary output, labs, and treatment plans  - Assess nutrition and hydration status and recommend course of action  - Evaluate amount of meals eaten  - Assist patient with eating if necessary   - Allow adequate time for meals  - Recommend/ encourage appropriate diets, oral nutritional supplements, and vitamin/mineral supplements  - Order, calculate, and assess calorie counts as needed  - Recommend, monitor, and adjust tube feedings and TPN/PPN based on assessed needs  - Assess need for intravenous fluids  - Provide specific nutrition/hydration education as appropriate  - Include patient/family/caregiver in decisions related to nutrition  Outcome: Progressing     Problem: RESPIRATORY - ADULT  Goal: Achieves optimal ventilation and oxygenation  Description: INTERVENTIONS:  - Assess for changes in respiratory status  - Assess for changes in mentation and behavior  - Position to facilitate oxygenation and minimize respiratory effort  - Oxygen administered by appropriate delivery if ordered  - Initiate smoking cessation education as indicated  - Encourage broncho-pulmonary hygiene including cough, deep breathe, Incentive Spirometry  - Assess the need for suctioning and aspirate as needed  - Assess and instruct to report SOB or any respiratory difficulty  - Respiratory Therapy support as indicated  Outcome: Progressing

## 2024-10-02 LAB
ALBUMIN SERPL BCG-MCNC: 2.7 G/DL (ref 3.5–5)
ALP SERPL-CCNC: 92 U/L (ref 34–104)
ALT SERPL W P-5'-P-CCNC: 10 U/L (ref 7–52)
ANION GAP SERPL CALCULATED.3IONS-SCNC: 4 MMOL/L (ref 4–13)
AST SERPL W P-5'-P-CCNC: 14 U/L (ref 13–39)
ATRIAL RATE: 72 BPM
BASE EX.OXY STD BLDV CALC-SCNC: 90.1 % (ref 60–80)
BASE EXCESS BLDV CALC-SCNC: 7.7 MMOL/L
BASOPHILS # BLD AUTO: 0.05 THOUSANDS/ΜL (ref 0–0.1)
BASOPHILS NFR BLD AUTO: 1 % (ref 0–1)
BILIRUB SERPL-MCNC: 0.52 MG/DL (ref 0.2–1)
BUN SERPL-MCNC: 23 MG/DL (ref 5–25)
CALCIUM ALBUM COR SERPL-MCNC: 9.3 MG/DL (ref 8.3–10.1)
CALCIUM SERPL-MCNC: 8.3 MG/DL (ref 8.4–10.2)
CHLORIDE SERPL-SCNC: 96 MMOL/L (ref 96–108)
CO2 SERPL-SCNC: 36 MMOL/L (ref 21–32)
CREAT SERPL-MCNC: 0.52 MG/DL (ref 0.6–1.3)
EOSINOPHIL # BLD AUTO: 0.17 THOUSAND/ΜL (ref 0–0.61)
EOSINOPHIL NFR BLD AUTO: 2 % (ref 0–6)
ERYTHROCYTE [DISTWIDTH] IN BLOOD BY AUTOMATED COUNT: 13.9 % (ref 11.6–15.1)
GFR SERPL CREATININE-BSD FRML MDRD: 112 ML/MIN/1.73SQ M
GLUCOSE SERPL-MCNC: 126 MG/DL (ref 65–140)
HCO3 BLDV-SCNC: 33.6 MMOL/L (ref 24–30)
HCT VFR BLD AUTO: 21.8 % (ref 36.5–49.3)
HGB BLD-MCNC: 7.1 G/DL (ref 12–17)
IMM GRANULOCYTES # BLD AUTO: 0.04 THOUSAND/UL (ref 0–0.2)
IMM GRANULOCYTES NFR BLD AUTO: 1 % (ref 0–2)
LYMPHOCYTES # BLD AUTO: 0.65 THOUSANDS/ΜL (ref 0.6–4.47)
LYMPHOCYTES NFR BLD AUTO: 8 % (ref 14–44)
MCH RBC QN AUTO: 32.7 PG (ref 26.8–34.3)
MCHC RBC AUTO-ENTMCNC: 32.6 G/DL (ref 31.4–37.4)
MCV RBC AUTO: 101 FL (ref 82–98)
MONOCYTES # BLD AUTO: 1.04 THOUSAND/ΜL (ref 0.17–1.22)
MONOCYTES NFR BLD AUTO: 12 % (ref 4–12)
NEUTROPHILS # BLD AUTO: 6.66 THOUSANDS/ΜL (ref 1.85–7.62)
NEUTS SEG NFR BLD AUTO: 76 % (ref 43–75)
NRBC BLD AUTO-RTO: 0 /100 WBCS
O2 CT BLDV-SCNC: 10 ML/DL
P AXIS: 57 DEGREES
PCO2 BLDV: 56.8 MM HG (ref 42–50)
PH BLDV: 7.39 [PH] (ref 7.3–7.4)
PLATELET # BLD AUTO: 194 THOUSANDS/UL (ref 149–390)
PMV BLD AUTO: 8.3 FL (ref 8.9–12.7)
PO2 BLDV: 63.5 MM HG (ref 35–45)
POTASSIUM SERPL-SCNC: 3.7 MMOL/L (ref 3.5–5.3)
PR INTERVAL: 258 MS
PROT SERPL-MCNC: 6.5 G/DL (ref 6.4–8.4)
QRS AXIS: -17 DEGREES
QRSD INTERVAL: 104 MS
QT INTERVAL: 371 MS
QTC INTERVAL: 406 MS
RBC # BLD AUTO: 2.17 MILLION/UL (ref 3.88–5.62)
RETICS # AUTO: NORMAL 10*3/UL (ref 14356–105094)
RETICS # CALC: 0.87 % (ref 0.37–1.87)
SODIUM SERPL-SCNC: 136 MMOL/L (ref 135–147)
T WAVE AXIS: 244 DEGREES
VENTRICULAR RATE: 72 BPM
WBC # BLD AUTO: 8.61 THOUSAND/UL (ref 4.31–10.16)

## 2024-10-02 PROCEDURE — 94640 AIRWAY INHALATION TREATMENT: CPT

## 2024-10-02 PROCEDURE — 93010 ELECTROCARDIOGRAM REPORT: CPT | Performed by: INTERNAL MEDICINE

## 2024-10-02 PROCEDURE — 80053 COMPREHEN METABOLIC PANEL: CPT

## 2024-10-02 PROCEDURE — 94664 DEMO&/EVAL PT USE INHALER: CPT

## 2024-10-02 PROCEDURE — 85025 COMPLETE CBC W/AUTO DIFF WBC: CPT

## 2024-10-02 PROCEDURE — 97110 THERAPEUTIC EXERCISES: CPT

## 2024-10-02 PROCEDURE — 82805 BLOOD GASES W/O2 SATURATION: CPT

## 2024-10-02 PROCEDURE — 94003 VENT MGMT INPAT SUBQ DAY: CPT

## 2024-10-02 PROCEDURE — 85045 AUTOMATED RETICULOCYTE COUNT: CPT

## 2024-10-02 PROCEDURE — 99233 SBSQ HOSP IP/OBS HIGH 50: CPT | Performed by: INTERNAL MEDICINE

## 2024-10-02 PROCEDURE — 94760 N-INVAS EAR/PLS OXIMETRY 1: CPT

## 2024-10-02 RX ORDER — POTASSIUM CHLORIDE 20MEQ/15ML
40 LIQUID (ML) ORAL ONCE
Status: COMPLETED | OUTPATIENT
Start: 2024-10-02 | End: 2024-10-02

## 2024-10-02 RX ORDER — POTASSIUM CHLORIDE 20MEQ/15ML
20 LIQUID (ML) ORAL DAILY
Status: DISCONTINUED | OUTPATIENT
Start: 2024-10-03 | End: 2024-10-06

## 2024-10-02 RX ADMIN — PHENYTOIN 150 MG: 125 SUSPENSION ORAL at 09:14

## 2024-10-02 RX ADMIN — LEVOTHYROXINE SODIUM 250 MCG: 100 TABLET ORAL at 05:33

## 2024-10-02 RX ADMIN — SODIUM CHLORIDE 2 G: 1 TABLET ORAL at 17:34

## 2024-10-02 RX ADMIN — HALOPERIDOL 1 MG: 2 SOLUTION ORAL at 13:54

## 2024-10-02 RX ADMIN — NICOTINE 1 PATCH: 21 PATCH, EXTENDED RELEASE TRANSDERMAL at 09:15

## 2024-10-02 RX ADMIN — POLYETHYLENE GLYCOL 3350 17 G: 17 POWDER, FOR SOLUTION ORAL at 17:32

## 2024-10-02 RX ADMIN — ACETYLCYSTEINE 600 MG: 200 SOLUTION ORAL; RESPIRATORY (INHALATION) at 07:57

## 2024-10-02 RX ADMIN — OXYCODONE HYDROCHLORIDE 2.5 MG: 5 SOLUTION ORAL at 12:16

## 2024-10-02 RX ADMIN — APIXABAN 5 MG: 5 TABLET, FILM COATED ORAL at 09:08

## 2024-10-02 RX ADMIN — LEVALBUTEROL HYDROCHLORIDE 1.25 MG: 1.25 SOLUTION RESPIRATORY (INHALATION) at 13:54

## 2024-10-02 RX ADMIN — TRAZODONE HYDROCHLORIDE 100 MG: 100 TABLET ORAL at 21:54

## 2024-10-02 RX ADMIN — LEVALBUTEROL HYDROCHLORIDE 1.25 MG: 1.25 SOLUTION RESPIRATORY (INHALATION) at 19:10

## 2024-10-02 RX ADMIN — FUROSEMIDE 40 MG: 40 TABLET ORAL at 09:11

## 2024-10-02 RX ADMIN — PHENYTOIN 150 MG: 125 SUSPENSION ORAL at 21:54

## 2024-10-02 RX ADMIN — ACETAMINOPHEN 975 MG: 650 SUSPENSION ORAL at 09:08

## 2024-10-02 RX ADMIN — ACETAMINOPHEN 975 MG: 650 SUSPENSION ORAL at 17:32

## 2024-10-02 RX ADMIN — OXYCODONE HYDROCHLORIDE 2.5 MG: 5 SOLUTION ORAL at 17:32

## 2024-10-02 RX ADMIN — BUDESONIDE 0.5 MG: 0.5 INHALANT RESPIRATORY (INHALATION) at 19:10

## 2024-10-02 RX ADMIN — POLYETHYLENE GLYCOL 3350 17 G: 17 POWDER, FOR SOLUTION ORAL at 09:11

## 2024-10-02 RX ADMIN — CHLORHEXIDINE GLUCONATE 0.12% ORAL RINSE 15 ML: 1.2 LIQUID ORAL at 09:08

## 2024-10-02 RX ADMIN — CHLORHEXIDINE GLUCONATE 0.12% ORAL RINSE 15 ML: 1.2 LIQUID ORAL at 21:54

## 2024-10-02 RX ADMIN — ACETAMINOPHEN 975 MG: 650 SUSPENSION ORAL at 02:16

## 2024-10-02 RX ADMIN — BUDESONIDE 0.5 MG: 0.5 INHALANT RESPIRATORY (INHALATION) at 07:57

## 2024-10-02 RX ADMIN — IPRATROPIUM BROMIDE 0.5 MG: 0.5 SOLUTION RESPIRATORY (INHALATION) at 13:54

## 2024-10-02 RX ADMIN — APIXABAN 5 MG: 5 TABLET, FILM COATED ORAL at 17:33

## 2024-10-02 RX ADMIN — GABAPENTIN 400 MG: 400 CAPSULE ORAL at 09:08

## 2024-10-02 RX ADMIN — IPRATROPIUM BROMIDE 0.5 MG: 0.5 SOLUTION RESPIRATORY (INHALATION) at 07:57

## 2024-10-02 RX ADMIN — IPRATROPIUM BROMIDE 0.5 MG: 0.5 SOLUTION RESPIRATORY (INHALATION) at 19:10

## 2024-10-02 RX ADMIN — SODIUM CHLORIDE 2 G: 1 TABLET ORAL at 09:14

## 2024-10-02 RX ADMIN — DOXAZOSIN 2 MG: 2 TABLET ORAL at 09:11

## 2024-10-02 RX ADMIN — Medication 3 MG: at 21:54

## 2024-10-02 RX ADMIN — OXYCODONE HYDROCHLORIDE 2.5 MG: 5 SOLUTION ORAL at 05:28

## 2024-10-02 RX ADMIN — POTASSIUM CHLORIDE 40 MEQ: 1.5 SOLUTION ORAL at 09:16

## 2024-10-02 RX ADMIN — FOLIC ACID 1 MG: 1 TABLET ORAL at 09:11

## 2024-10-02 RX ADMIN — GABAPENTIN 400 MG: 400 CAPSULE ORAL at 17:33

## 2024-10-02 RX ADMIN — LEVALBUTEROL HYDROCHLORIDE 1.25 MG: 1.25 SOLUTION RESPIRATORY (INHALATION) at 07:57

## 2024-10-02 RX ADMIN — OXYCODONE HYDROCHLORIDE 2.5 MG: 5 SOLUTION ORAL at 00:21

## 2024-10-02 RX ADMIN — Medication 100 MCG: at 09:08

## 2024-10-02 NOTE — RESPIRATORY THERAPY NOTE
RT Ventilator Management Note  Marcin Sánchez 64 y.o. male MRN: [id number]  Unit/Bed#: [facility] 205-01 Encounter: [id number]      Daily Screen         10/1/2024  0439 10/1/2024  0845          Patient safety screen outcome:: Failed Failed      Not Ready for Weaning due to:: Underline problem not resolved Underline problem not resolved                Physical Exam:   Assessment Type: (P) Assess only  Bilateral Breath Sounds: (P) Diminished, Rhonchi      Resp Comments: (P) Pt. doing well on pressure control. No changes throughout the night.

## 2024-10-02 NOTE — PLAN OF CARE
Problem: PHYSICAL THERAPY ADULT  Goal: Performs mobility at highest level of function for planned discharge setting.  See evaluation for individualized goals.  Description: Treatment/Interventions: OT, Spoke to nursing, Spoke to case management, Gait training, Bed mobility, Patient/family training, Therapeutic exercise, Endurance training, LE strengthening/ROM, Functional transfer training          See flowsheet documentation for full assessment, interventions and recommendations.  Outcome: Progressing  Note: Prognosis: Fair  Problem List: Decreased strength, Decreased endurance, Impaired balance, Decreased mobility, Impaired judgement, Decreased safety awareness, Decreased skin integrity  Assessment: Patient was received supine in bed . Patient was agreeable to therapy services today. PT session focused on exercises today in order to improve functional mobility and independence. Functional mobility was performed as described above.  Pt required increased encouragement to participate in therapy services today. Pt defers any OOB mobility but was willing to complete exercises in bed. Pt was led through several supine exercises for BLE strengthening. Pt left comfortable in bed.  Patient will benefit from continued PT services while in hospital in order to address remaining limitations. The patients AM-PAC Basic Mobility Inpatient Short From Raw Score is 6 .  Based on AM-PAC scoring and patient presentation, PT currently recommending Level II (Moderate Resource Intensity). Please also refer to the recommendation of the Physical Therapist for safe discharge planning.  Barriers to Discharge: Decreased caregiver support     Rehab Resource Intensity Level, PT: II (Moderate Resource Intensity)    See flowsheet documentation for full assessment.

## 2024-10-02 NOTE — QUICK NOTE
Patient seen and examined at bedside.  Patient did not answer any questions.  Patient appeared to be resting comfortably, in no acute distress.  On exam, a pressure wound was present at the inferior aspect of the tracheostomy device.  There appeared to be mild erythema and minimal skin breakdown at the midline inferior aspect of the tracheostomy closure, see image below.  The skin was cleansed with saline.     Plan  -White foam Mepilex applied inferior to the tracheostomy.  An additional white foam was placed underneath the tracheostomy device.  -Wound care orders placed for nursing team, daily dressing changes or more frequently if needed due to mucous  -Monitor for worsening erythema  -Surgery will follow and wound check later this week

## 2024-10-02 NOTE — PHYSICAL THERAPY NOTE
PHYSICAL THERAPY NOTE          Patient Name: Marcin Sánchez  Today's Date: 10/2/2024       10/02/24 1316   PT Last Visit   PT Visit Date 10/02/24   Note Type   Note Type Treatment   Pain Assessment   Pain Assessment Tool 0-10   Pain Score No Pain   Restrictions/Precautions   Weight Bearing Precautions Per Order No   Other Precautions Chair Alarm;Bed Alarm;Multiple lines;O2;Fall Risk;1:1   General   Chart Reviewed Yes   Response to Previous Treatment Patient with no complaints from previous session.   Family/Caregiver Present No   Cognition   Overall Cognitive Status Impaired   Arousal/Participation Arousable;Cooperative   Attention Attends with cues to redirect   Orientation Level Oriented to person;Oriented to place;Oriented to time   Memory Unable to assess   Following Commands Follows one step commands without difficulty   Subjective   Subjective pt mostly pleasant and mostly cooperative throughout therapy session. pt received supine in bed   Bed Mobility   Supine to Sit Unable to assess   Sit to Supine Unable to assess   Additional Comments deferred   Transfers   Sit to Stand Unable to assess   Stand to Sit Unable to assess   Additional Comments deferred   Ambulation/Elevation   Gait pattern Not tested   Ambulation/Elevation Additional Comments deferred   Endurance Deficit   Endurance Deficit Yes   Endurance Deficit Description generalized weakness, decreased exercise tolerance   Activity Tolerance   Activity Tolerance Patient limited by fatigue   Medical Staff Made Aware OT Ammy co-treat due to medical complexity and multiple comorbidities   Nurse Made Aware RN cleared and updated   Exercises   Quad Sets Supine;10 reps;AROM;Bilateral   Hip Abduction Supine;10 reps;AROM;Bilateral   Hip Adduction Supine;10 reps;AROM;Bilateral   Ankle Pumps Supine;10 reps;AROM;Bilateral   Assessment   Prognosis Fair   Problem List Decreased  strength;Decreased endurance;Impaired balance;Decreased mobility;Impaired judgement;Decreased safety awareness;Decreased skin integrity   Assessment Patient was received supine in bed . Patient was agreeable to therapy services today. PT session focused on exercises today in order to improve functional mobility and independence. Functional mobility was performed as described above.  Pt required increased encouragement to participate in therapy services today. Pt defers any OOB mobility but was willing to complete exercises in bed. Pt was led through several supine exercises for BLE strengthening. Pt left comfortable in bed.  Patient will benefit from continued PT services while in hospital in order to address remaining limitations. The patients AM-[facility] Basic Mobility Inpatient Short From Raw Score is 6 .  Based on AM-PAC scoring and patient presentation, PT currently recommending Level II (Moderate Resource Intensity). Please also refer to the recommendation of the Physical Therapist for safe discharge planning.   Barriers to Discharge Decreased caregiver support   Goals   Patient Goals to relax   STG Expiration Date 10/12/24   PT Treatment Day 2   Plan   Treatment/Interventions ADL retraining;Functional transfer training;LE strengthening/ROM;Elevations;Therapeutic exercise;Endurance training;Bed mobility;Gait training;Spoke to case management;OT   Progress Slow progress, decreased activity tolerance   PT Frequency 2-3x/wk   Discharge Recommendation   Rehab Resource Intensity Level, PT II (Moderate Resource Intensity)   AM-PAC Basic Mobility Inpatient   Turning in Flat Bed Without Bedrails 1   Lying on Back to Sitting on Edge of Flat Bed Without Bedrails 1   Moving Bed to Chair 1   Standing Up From Chair Using Arms 1   Walk in Room 1   Climb 3-5 Stairs With Railing 1   Basic Mobility Inpatient Raw Score 6   Turning Head Towards Sound 4   Follow Simple Instructions 3   Low Function Basic Mobility Raw Score  13   Low  Function Basic Mobility Standardized Score  20.14   [facility] Highest Level Of Mobility   -[facility] Goal 2: Bed activities/Dependent transfer   -HL Achieved 2: Bed activities/Dependent transfer   Education   Education Provided Mobility training   Patient Reinforcement needed   End of Consult   Patient Position at End of Consult Supine;All needs within reach;Bed/Chair alarm activated  (1:1 present)         Eulalio Jain PT, DPT

## 2024-10-02 NOTE — PLAN OF CARE
Problem: OCCUPATIONAL THERAPY ADULT  Goal: Performs self-care activities at highest level of function for planned discharge setting.  See evaluation for individualized goals.  Description: Treatment Interventions: ADL retraining, Functional transfer training, UE strengthening/ROM, Endurance training, Cognitive reorientation, Patient/family training, Equipment evaluation/education, Compensatory technique education, Activityengagement          See flowsheet documentation for full assessment, interventions and recommendations.   Outcome: Progressing  Note: Limitation: Decreased ADL status, Decreased UE ROM, Decreased UE strength, Decreased endurance, Decreased self-care trans  Prognosis: Fair  Assessment: Pt was seen on 10/2/2024 to address ADL retraining, functional transfer training, and activity tolerance/endurance. Pt completed 1 set of 10 bicep curls supine in bed and 1 set of 10 front raises for UE strengthening for functional activity. Pt refused all mobility, despite education and encouragement. Pt is limited by decreased ADL status, functional transfers, functional mobility, and activity tolerance. Pt supine in bed at beginning of session and supine in bed at end of session with alarm set and all items within reach. The patient's raw score on the AM-PAC Daily Activity Inpatient Short Form is 12. A raw score of less than 19 suggests the patient may benefit from discharge to post-acute rehabilitation services. Please refer to the recommendation of the Occupational Therapist for safe discharge planning. Recommend Level II moderate intensity OT services at d/c to maximize pt function.     Rehab Resource Intensity Level, OT: II (Moderate Resource Intensity)

## 2024-10-02 NOTE — PROGRESS NOTES
"  Progress Note - Critical Care/ICU   Name: Marcin Sánchez 64 y.o. male I MRN: [id number]  Unit/Bed#: [facility] 205-01 I Date of Admission: 9/8/2024   Date of Service: 10/2/2024 I Hospital Day: 24       Assessment & Plan   Problem list  Acute on chronic hypercapnic respiratory failure s/p Trach  V Tach Arrest  Shock, resolved  COPD  Encephalopathy  Anemia of chronic disease  Atrial fibrillation  Metabolic alkalosis  Pericardial effusion  Seizure disorder  Hypothyroidism  AAA  Insomnia  Tobacco use  Hx of lung SCC     #Seizure disorder  Phenytoin 150 mg BID, recent check is therapeutic  Continue Gabapentin 400mg BID     #Insomnia  Trazodone 100mg daily at bedtime  Monitor Qtc (444 on 9/25)    #Agitation  Intermittent  Palliative consulted  Switched from zyprexa to haldol     CV:   #Pericardial effusion   Echo 9/9  \"moderate to large pericardial effusion circumferential to the heart measuring largest along the RV free wall at 2.3 cm. The fluid exhibits no internal echoes. 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 Moderate to severe aortic regurgitation, mild tricuspid regurgitation, dilated ascending aortic root up to 5.4 cm.  On maintenance lasix po daily  Continue to monitor fluid status      #History of AAA  5.4 cm  Monitor hemodynamics; BP goal <130/80  Outpatient follow up     Pulm:  #Acute on Chronic hypoxic and hypercapnic respiratory failure.  #COPD, and acute exacerbation now resolved  Likely multifactorial including moderate to severe COPD  Course complicated by recurrent mucous plugging, status post therapeutic bronch x 3.  Sputum culture with Moraxella, status post course of azithromycin x 5 " days.  Also with MDR acinetobacter likely contaminant versus colonization as patient has remained stable without antibiotic treatment.  Status post trach 9/19  Continue to wean vent as tolerated  Continue Xopenex and Atrovent TID  Continue pulmicort BID, Continue Performist BID     #History of squamous cell cancer of lung post chemo/radiation 2016, cancer of right mainstem  Noted     GI:   #PEG placed 9/19  Continue tube feeds at goal  Bowel regimen: Senokot, Miralax     :   #Urinary retention  Haynes removed 9/25  Doxazosin 2mg  Continue to monitor Is/Os, renal indices     F/E/N:   F: No maintenance fluids  E: Replete as needed, continue sodium tabs  N: Continue Jevity 1.2, at goal     Heme/Onc:   #Anemia  Continue B12/folate  No signs of active bleeding  Transfuse for Hgb <7 and/or symptomatic anemia     #Metabolic alkalosis  Likely 2/2 ongoing diuresis     #DVT ppx  SCDs and Eliquis     Endo:   #Hypothyroidism  Home med: Levothyroxine 250 mg  TSH 45.06 on 9/25 from 0.59 on 9/5, free T4 0.34 (9/25)  Restarted on home levothyroxine 250 mg  Recheck in 6 weeks     ID:   #Colonization with Acinetobacter and Moraxella  No acute issues  Continue to monitor WBC count and temperature curve  Completed 5 days of azithromycin 9/10     MSK/Skin:   #At risk for pressure injury   PT/OT when able  Frequent turns/repositioning  Skin surveillance      L: midline, PEG  D: none  A: PCV+ 16/6/40%     Disposition: Critical care    ICU Core Measures     Vented Patient  VAP Bundle  VAP bundle ordered     A: Assess, Prevent, and Manage Pain Has pain been assessed? Yes  Need for changes to pain regimen? No   B: Both Spontaneous Awakening Trials (SATs) and Spontaneous Breathing Trials (SBTs) Plan to perform spontaneous awakening trial today? N/A   Plan to perform spontaneous breathing trial today? Yes   Obvious barriers to extubation? Yes   C: Choice of Sedation RASS Goal: N/A patient not on sedation  Need for changes to sedation or  analgesia regimen? NA   D: Delirium CAM-ICU: Negative   E: Early Mobility  Plan for early mobility? Yes   F: Family Engagement Plan for family engagement today? Yes       Review of Invasive Devices:            Prophylaxis:  VTE VTE covered by:  apixaban, Oral, 5 mg at 10/01/24 1807       Stress Ulcer  not ordered         24 Hour Events : No acute events  Subjective       Objective :                   Vitals I/O      Most Recent Min/Max in 24hrs   Temp 99.6 °F (37.6 °C) Temp  Min: 97.6 °F (36.4 °C)  Max: 99.6 °F (37.6 °C)   Pulse 82 Pulse  Min: 74  Max: 88   Resp 17 Resp  Min: 14  Max: 30   BP (!) 126/45 BP  Min: 101/42  Max: 128/46   O2 Sat 100 % SpO2  Min: 98 %  Max: 100 %      Intake/Output Summary (Last 24 hours) at 10/2/2024 0842  Last data filed at 10/2/2024 0608  Gross per 24 hour   Intake 820 ml   Output 1350 ml   Net -530 ml       Diet Enteral/Parenteral; Tube Feeding No Oral Diet; Jevity 1.5; Bolus; 300; (4x/day) - 8:00AM,12:00PM,4:00PM,8:00PM; Prosource Protein Liquid - One Packet; 30; Water; Before and After each Bolus    Invasive Monitoring           Physical Exam   Physical Exam  Eyes:      General: No scleral icterus.     Conjunctiva/sclera: Conjunctivae normal.   Skin:     General: Skin is warm and dry.      Coloration: Skin is not jaundiced.   HENT:      Head: Normocephalic and atraumatic.      Right Ear: No drainage.      Left Ear: No drainage.      Mouth/Throat:      Mouth: Mucous membranes are dry.   Cardiovascular:      Rate and Rhythm: Normal rate and regular rhythm.   Musculoskeletal:      Right lower leg: No edema.      Left lower leg: No edema.   Abdominal: General: Bowel sounds are normal. There is abdominal type feeding tube.     Palpations: Abdomen is soft.   Constitutional:       General: He is not in acute distress.     Appearance: He is well-developed. He is ill-appearing. He is not toxic-appearing.      Interventions: He is intubated. He is not sedated and not restrained.  Pulmonary:       Effort: Pulmonary effort is normal. He is intubated.      Breath sounds: Normal breath sounds.   Neurological:      General: No focal deficit present.      Mental Status: He is alert and oriented to person, place and time.          Diagnostic Studies        Lab Results: I have reviewed the following results:     Medications:  Scheduled PRN   acetaminophen, 975 mg, Q8H  acetylcysteine, 3 mL, TID  apixaban, 5 mg, BID  budesonide, 0.5 mg, Q12H  chlorhexidine, 15 mL, Q12H [place]  vitamin B-12, 100 mcg, Daily  doxazosin, 2 mg, Daily  folic acid, 1 mg, Daily  furosemide, 40 mg, Daily  gabapentin, 400 mg, BID  ipratropium, 0.5 mg, TID  levalbuterol, 1.25 mg, TID  levothyroxine, 250 mcg, Early Morning  melatonin, 3 mg, HS  metoprolol tartrate, 12.5 mg, Q12H [place]  [START ON 10/19/2024] nicotine, 14 mg, Daily  nicotine, 1 patch, Daily  [START ON 11/2/2024] nicotine, 7 mg, Daily  oxyCODONE, 2.5 mg, Q6H  phenytoin, 150 mg, Q12H [place]  polyethylene glycol, 17 g, BID  potassium chloride, 40 mEq, Once  senna, 8.8 mg, HS  sodium chloride, 2 g, BID With Meals  traZODone, 100 mg, HS      albuterol, 2.5 mg, Q6H PRN  bisacodyl, 10 mg, Daily PRN  haloperidol, 1 mg, Q6H PRN  lidocaine, , 4x Daily PRN  OLANZapine, 2.5 mg, Daily PRN  oxyCODONE, 5 mg, Q6H PRN       Continuous          Labs:   CBC    Recent Labs     09/30/24  1548 10/02/24  0552   WBC  --  8.61   HGB 8.1* 7.1*   HCT 24.8* 21.8*   PLT  --  194     BMP    Recent Labs     10/01/24  0615 10/02/24  0552   SODIUM 133* 136   K 4.3 3.7   CL 94* 96   CO2 34* 36*   AGAP 5 4   BUN 26* 23   CREATININE 0.51* 0.52*   CALCIUM 8.5 8.3*       Coags    No recent results     Additional Electrolytes  Recent Labs     09/30/24  1548 10/01/24  0615   MG 1.9 2.0   PHOS 3.5  --           Blood Gas    No recent results  Recent Labs     10/02/24  0552   PHVEN 7.390   DGC1RCI 56.8*   PO2VEN 63.5*   KOB2UXC 33.6*   BEVEN 7.7   S6AJYWP 90.1*    LFTs  Recent Labs     10/02/24  0552   ALT 10   AST 14    ALKPHOS 92   ALB 2.7*   TBILI 0.52       Infectious  No recent results  Glucose  Recent Labs     09/30/24  1548 10/01/24  0615 10/02/24  0552   GLUC 162* 100 126

## 2024-10-02 NOTE — QUICK NOTE
Called patient's sister, Katalina, and LVM giving non-urgent medical update.    Ron Qureshi M.D.  PGY-2 Internal Medicine   [facility]

## 2024-10-02 NOTE — PLAN OF CARE
Problem: Prexisting or High Potential for Compromised Skin Integrity  Goal: Skin integrity is maintained or improved  Description: INTERVENTIONS:  - Identify patients at risk for skin breakdown  - Assess and monitor skin integrity  - Assess and monitor nutrition and hydration status  - Monitor labs   - Assess for incontinence   - Turn and reposition patient  - Assist with mobility/ambulation  - Relieve pressure over bony prominences  - Avoid friction and shearing  - Provide appropriate hygiene as needed including keeping skin clean and dry  - Evaluate need for skin moisturizer/barrier cream  - Collaborate with interdisciplinary team   - Patient/family teaching  - Consider wound care consult   Outcome: Progressing     Problem: PAIN - ADULT  Goal: Verbalizes/displays adequate comfort level or baseline comfort level  Description: Interventions:  - Encourage patient to monitor pain and request assistance  - Assess pain using appropriate pain scale  - Administer analgesics based on type and severity of pain and evaluate response  - Implement non-pharmacological measures as appropriate and evaluate response  - Consider cultural and social influences on pain and pain management  - Notify physician/advanced practitioner if interventions unsuccessful or patient reports new pain  Outcome: Progressing     Problem: INFECTION - ADULT  Goal: Absence or prevention of progression during hospitalization  Description: INTERVENTIONS:  - Assess and monitor for signs and symptoms of infection  - Monitor lab/diagnostic results  - Monitor all insertion sites, i.e. indwelling lines, tubes, and drains  - Monitor endotracheal if appropriate and nasal secretions for changes in amount and color  - [place] appropriate cooling/warming therapies per order  - Administer medications as ordered  - Instruct and encourage patient and family to use good hand hygiene technique  - Identify and instruct in appropriate isolation precautions for  identified infection/condition  Outcome: Progressing  Goal: Absence of fever/infection during neutropenic period  Description: INTERVENTIONS:  - Monitor WBC    Outcome: Progressing     Problem: SAFETY ADULT  Goal: Patient will remain free of falls  Description: INTERVENTIONS:  - Educate patient/family on patient safety including physical limitations  - Instruct patient to call for assistance with activity   - Consult OT/PT to assist with strengthening/mobility   - Keep Call bell within reach  - Keep bed low and locked with side rails adjusted as appropriate  - Keep care items and personal belongings within reach  - Initiate and maintain comfort rounds  - Make Fall Risk Sign visible to staff  - Offer Toileting every  Hours, in advance of need  - Initiate/Maintain alarm  - Obtain necessary fall risk management equipment:   - Apply yellow socks and bracelet for high fall risk patients  - Consider moving patient to room near nurses station  Outcome: Progressing  Goal: Maintain or return to baseline ADL function  Description: INTERVENTIONS:  -  Assess patient's ability to carry out ADLs; assess patient's baseline for ADL function and identify physical deficits which impact ability to perform ADLs (bathing, care of mouth/teeth, toileting, grooming, dressing, etc.)  - Assess/evaluate cause of self-care deficits   - Assess range of motion  - Assess patient's mobility; develop plan if impaired  - Assess patient's need for assistive devices and provide as appropriate  - Encourage maximum independence but intervene and supervise when necessary  - Involve family in performance of ADLs  - Assess for home care needs following discharge   - Consider OT consult to assist with ADL evaluation and planning for discharge  - Provide patient education as appropriate  Outcome: Progressing  Goal: Maintains/Returns to pre admission functional level  Description: INTERVENTIONS:  - Perform AM-PAC 6 Click Basic Mobility/ Daily Activity  assessment daily.  - Set and communicate daily mobility goal to care team and patient/family/caregiver.   - Collaborate with rehabilitation services on mobility goals if consulted  - Perform Range of Motion  times a day.  - Reposition patient every  hours.  - Dangle patient  times a day  - Stand patient  times a day  - Ambulate patient  times a day  - Out of bed to chair  times a day   - Out of bed for meals  times a day  - Out of bed for toileting  - Record patient progress and toleration of activity level   Outcome: Progressing     Problem: DISCHARGE PLANNING  Goal: Discharge to home or other facility with appropriate resources  Description: INTERVENTIONS:  - Identify barriers to discharge w/patient and caregiver  - Arrange for needed discharge resources and transportation as appropriate  - Identify discharge learning needs (meds, wound care, etc.)  - Arrange for interpretive services to assist at discharge as needed  - Refer to Case Management Department for coordinating discharge planning if the patient needs post-hospital services based on physician/advanced practitioner order or complex needs related to functional status, cognitive ability, or social support system  Outcome: Progressing     Problem: Knowledge Deficit  Goal: Patient/family/caregiver demonstrates understanding of disease process, treatment plan, medications, and discharge instructions  Description: Complete learning assessment and assess knowledge base.  Interventions:  - Provide teaching at level of understanding  - Provide teaching via preferred learning methods  Outcome: Progressing     Problem: Nutrition/Hydration-ADULT  Goal: Nutrient/Hydration intake appropriate for improving, restoring or maintaining nutritional needs  Description: Monitor and assess patient's nutrition/hydration status for malnutrition. Collaborate with interdisciplinary team and initiate plan and interventions as ordered.  Monitor patient's weight and dietary intake as  ordered or per policy. Utilize nutrition screening tool and intervene as necessary. Determine patient's food preferences and provide high-protein, high-caloric foods as appropriate.     INTERVENTIONS:  - Monitor oral intake, urinary output, labs, and treatment plans  - Assess nutrition and hydration status and recommend course of action  - Evaluate amount of meals eaten  - Assist patient with eating if necessary   - Allow adequate time for meals  - Recommend/ encourage appropriate diets, oral nutritional supplements, and vitamin/mineral supplements  - Order, calculate, and assess calorie counts as needed  - Recommend, monitor, and adjust tube feedings and TPN/PPN based on assessed needs  - Assess need for intravenous fluids  - Provide specific nutrition/hydration education as appropriate  - Include patient/family/caregiver in decisions related to nutrition  Outcome: Progressing     Problem: RESPIRATORY - ADULT  Goal: Achieves optimal ventilation and oxygenation  Description: INTERVENTIONS:  - Assess for changes in respiratory status  - Assess for changes in mentation and behavior  - Position to facilitate oxygenation and minimize respiratory effort  - Oxygen administered by appropriate delivery if ordered  - Initiate smoking cessation education as indicated  - Encourage broncho-pulmonary hygiene including cough, deep breathe, Incentive Spirometry  - Assess the need for suctioning and aspirate as needed  - Assess and instruct to report SOB or any respiratory difficulty  - Respiratory Therapy support as indicated  Outcome: Progressing

## 2024-10-02 NOTE — NUTRITION
Nutrition Follow-Up:       10/01/24 2099   Recommendations/Interventions   Recommendations to Provider Continue bolus TF regimen to accommodate medication schedule (TF to be held 1 hr before and 1 hr after levothyroxine administration and 1 hour before and 2 hours after phenytoin administration):  Jevity 1.5, 300mL bolus 4 times daily (0700, 1200, 1600, 2000). Continue one packet of Prosource daily. 30mL water flushes before and after each bolus to help maintain tube patency. Pt has had some loose BM's over the last few days per chart documentation, monitor need to add banatrol.

## 2024-10-02 NOTE — CASE MANAGEMENT
Case Management Discharge Planning Note    Patient name Marcin Sánchez  [facility]  MRN [id number]  : 1960 Date 10/2/2024       Current Admission Date: 2024  Current Admission Diagnosis:Acute hypoxic respiratory failure (HCC)   Patient Active Problem List    Diagnosis Date Noted Date Diagnosed    Palliative care encounter 10/01/2024     Acute hypoxic respiratory failure (HCC) 2024     Shock (HCC) 2024     Mucus plugging of bronchi 2024     Transaminitis 2024     Cardiac arrest (HCC) 2024     Pericardial effusion 2024     Acute on chronic respiratory failure with hypoxia and hypercapnia (HCC) 2024     Acute metabolic encephalopathy 2024     Chronic combined systolic and diastolic CHF (congestive heart failure) (HCC) 2024     Atrial fibrillation (HCC) 2024     Pulmonary fibrosis (HCC) 2024     Suspected chronic pulmonary embolism (HCC) 2024     Squamous cell lung cancer (HCC) 2024     Hyponatremia 2024     Severe protein-calorie malnutrition (HCC) 2024     Edema of both legs 2022     Tobacco abuse 2020     Nonrheumatic aortic valve insufficiency 2020     Malignant neoplasm of upper lobe of right lung (HCC) 2018     Thoracic aortic aneurysm without rupture (HCC) 2018     Cancer of right main bronchus (HCC) 10/04/2016     Pleural effusion 10/02/2016     Multiple lung nodules on CT 10/02/2016     Collapse of right lung 2016     Acute exacerbation of chronic obstructive pulmonary disease (COPD) (HCC)      Epilepsy (HCC)      Lyme disease      Major depression      Alcohol abuse        LOS (days): 24  Geometric Mean LOS (GMLOS) (days): 5.1  Days to GMLOS:-18.6     OBJECTIVE:  Risk of Unplanned Readmission Score: 42.09         Current admission status: Inpatient   Preferred Pharmacy:   [facility] - [address],  [address] [address]  Phone: [phone] Fax: [phone]    [place] [place] - [address] [place] [address]  Phone: [phone] Fax: [phone]    Primary Care Provider: Brandt Godinez MD    Primary Insurance: [facility]  Secondary Insurance:     DISCHARGE DETAILS:    Peer to peer was unable to be completed yesterday due to patient's attending calling the peer to peer line and being unable to connect with someone. Patient's provider will call in peer to peer today.       CM called patient's guardian to provide update and left message for call back.

## 2024-10-02 NOTE — OCCUPATIONAL THERAPY NOTE
Occupational Therapy Progress Note     Patient Name: Marcin Sánchez  Today's Date: 10/2/2024  Problem List  Principal Problem:    Acute hypoxic respiratory failure (HCC)  Active Problems:    Acute exacerbation of chronic obstructive pulmonary disease (COPD) (HCC)    Cancer of right main bronchus (HCC)    Chronic combined systolic and diastolic CHF (congestive heart failure) (HCC)    Cardiac arrest (HCC)    Pericardial effusion    Shock (HCC)    Mucus plugging of bronchi    Palliative care encounter        10/02/24 1315   OT Last Visit   OT Visit Date 10/02/24   Note Type   Note Type Treatment   Pain Assessment   Pain Assessment Tool 0-10   Pain Score No Pain   Restrictions/Precautions   Weight Bearing Precautions Per Order No   Other Precautions Chair Alarm;Bed Alarm;1:1;Multiple lines;O2;Fall Risk  (trech)   Lifestyle   Autonomy I basic adls, receives assist for showers, ambulates w/o ad - meals/homemaking provided   Reciprocal Relationships supportive facility staff   Service to Others retired   Intrinsic Gratification Likes automechanics   Bed Mobility   Additional Comments pt refused all mobility, despite encouragment   Transfers   Additional Comments pt refused all mobility, despite encouragement   Therapeutic Excerise-Strength   UE Strength Yes   Right Upper Extremity- Strength   R Shoulder Flexion;Extension   R Elbow Elbow flexion;Elbow extension   RUE Strength Comment Pt completed 1 set of 10 bicep curls supine in bed and 1 set of 10 front raises for UE strengthening for functional activity   Left Upper Extremity-Strength   L Shoulder Flexion;Extension   L Elbow Elbow flexion;Elbow extension   LUE Strength Comment Pt completed 1 set of 10 bicep curls supine in bed and 1 set of 10 front raises for UE strengthening for functional activity   Cognition   Overall Cognitive Status Impaired   Arousal/Participation Arousable;Cooperative   Attention Attends with cues to redirect   Orientation Level Oriented to  person;Oriented to place;Oriented to time   Memory Unable to assess   Following Commands Follows one step commands without difficulty   Activity Tolerance   Activity Tolerance Patient limited by fatigue   Medical Staff Made Aware PT, RN Cleared   Assessment   Assessment Pt was seen on 10/2/2024 to address ADL retraining, functional transfer training, and activity tolerance/endurance. Pt completed 1 set of 10 bicep curls supine in bed and 1 set of 10 front raises for UE strengthening for functional activity. Pt refused all mobility, despite education and encouragement. Pt is limited by decreased ADL status, functional transfers, functional mobility, and activity tolerance. Pt supine in bed at beginning of session and supine in bed at end of session with alarm set and all items within reach. The patient's raw score on the AM-PAC Daily Activity Inpatient Short Form is 12. A raw score of less than 19 suggests the patient may benefit from discharge to post-acute rehabilitation services. Please refer to the recommendation of the Occupational Therapist for safe discharge planning. Recommend Level II moderate intensity OT services at d/c to maximize pt function.   Plan   Treatment Interventions UE strengthening/ROM;ADL retraining;Cognitive reorientation;Patient/family training;Compensatory technique education;Energy conservation;Continued evaluation;Activityengagement;Equipment evaluation/education   Goal Expiration Date 10/04/24   OT Treatment Day 3   OT Frequency 1-2x/wk   Discharge Recommendation   Rehab Resource Intensity Level, OT II (Moderate Resource Intensity)   AM-PAC Daily Activity Inpatient   Lower Body Dressing 2   Bathing 2   Toileting 2   Upper Body Dressing 2   Grooming 2   Eating 2   Daily Activity Raw Score 12   Daily Activity Standardized Score (Calc for Raw Score >=11) 30.6   AM-[facility] Applied Cognition Inpatient   Following a Speech/Presentation 3   Understanding Ordinary Conversation 4   Taking Medications  3   Remembering Where Things Are Placed or Put Away 3   Remembering List of 4-5 Errands 3   Taking Care of Complicated Tasks 3   Applied Cognition Raw Score 19   Applied Cognition Standardized Score 39.77   End of Consult   Education Provided Yes   Patient Position at End of Consult Supine;Bed/Chair alarm activated;All needs within reach  (1:1 present)   Nurse Communication Nurse aware of consult       CALEB Ash, OTR/L

## 2024-10-03 PROBLEM — G47.00 INSOMNIA: Status: ACTIVE | Noted: 2024-10-03

## 2024-10-03 PROBLEM — R45.1 AGITATION: Status: ACTIVE | Noted: 2024-10-03

## 2024-10-03 PROCEDURE — 94640 AIRWAY INHALATION TREATMENT: CPT

## 2024-10-03 PROCEDURE — 99233 SBSQ HOSP IP/OBS HIGH 50: CPT | Performed by: INTERNAL MEDICINE

## 2024-10-03 PROCEDURE — 94760 N-INVAS EAR/PLS OXIMETRY 1: CPT

## 2024-10-03 PROCEDURE — 99232 SBSQ HOSP IP/OBS MODERATE 35: CPT

## 2024-10-03 PROCEDURE — 94664 DEMO&/EVAL PT USE INHALER: CPT

## 2024-10-03 PROCEDURE — 94003 VENT MGMT INPAT SUBQ DAY: CPT

## 2024-10-03 RX ADMIN — ACETAMINOPHEN 975 MG: 650 SUSPENSION ORAL at 01:32

## 2024-10-03 RX ADMIN — SODIUM CHLORIDE 2 G: 1 TABLET ORAL at 16:11

## 2024-10-03 RX ADMIN — BUDESONIDE 0.5 MG: 0.5 INHALANT RESPIRATORY (INHALATION) at 07:47

## 2024-10-03 RX ADMIN — IPRATROPIUM BROMIDE 0.5 MG: 0.5 SOLUTION RESPIRATORY (INHALATION) at 07:47

## 2024-10-03 RX ADMIN — POLYETHYLENE GLYCOL 3350 17 G: 17 POWDER, FOR SOLUTION ORAL at 08:46

## 2024-10-03 RX ADMIN — LEVALBUTEROL HYDROCHLORIDE 1.25 MG: 1.25 SOLUTION RESPIRATORY (INHALATION) at 14:15

## 2024-10-03 RX ADMIN — BUDESONIDE 0.5 MG: 0.5 INHALANT RESPIRATORY (INHALATION) at 19:48

## 2024-10-03 RX ADMIN — FOLIC ACID 1 MG: 1 TABLET ORAL at 08:57

## 2024-10-03 RX ADMIN — OXYCODONE HYDROCHLORIDE 2.5 MG: 5 SOLUTION ORAL at 22:22

## 2024-10-03 RX ADMIN — PHENYTOIN 150 MG: 125 SUSPENSION ORAL at 09:02

## 2024-10-03 RX ADMIN — CHLORHEXIDINE GLUCONATE 0.12% ORAL RINSE 15 ML: 1.2 LIQUID ORAL at 08:46

## 2024-10-03 RX ADMIN — SODIUM CHLORIDE 2 G: 1 TABLET ORAL at 09:02

## 2024-10-03 RX ADMIN — OXYCODONE HYDROCHLORIDE 2.5 MG: 5 SOLUTION ORAL at 01:32

## 2024-10-03 RX ADMIN — POLYETHYLENE GLYCOL 3350 17 G: 17 POWDER, FOR SOLUTION ORAL at 17:53

## 2024-10-03 RX ADMIN — IPRATROPIUM BROMIDE 0.5 MG: 0.5 SOLUTION RESPIRATORY (INHALATION) at 19:48

## 2024-10-03 RX ADMIN — POTASSIUM CHLORIDE 20 MEQ: 1.5 SOLUTION ORAL at 08:57

## 2024-10-03 RX ADMIN — Medication 100 MCG: at 08:56

## 2024-10-03 RX ADMIN — IPRATROPIUM BROMIDE 0.5 MG: 0.5 SOLUTION RESPIRATORY (INHALATION) at 14:15

## 2024-10-03 RX ADMIN — ACETAMINOPHEN 975 MG: 650 SUSPENSION ORAL at 09:01

## 2024-10-03 RX ADMIN — PHENYTOIN 150 MG: 125 SUSPENSION ORAL at 22:23

## 2024-10-03 RX ADMIN — FUROSEMIDE 40 MG: 40 TABLET ORAL at 09:03

## 2024-10-03 RX ADMIN — APIXABAN 5 MG: 5 TABLET, FILM COATED ORAL at 17:53

## 2024-10-03 RX ADMIN — OXYCODONE HYDROCHLORIDE 2.5 MG: 5 SOLUTION ORAL at 09:01

## 2024-10-03 RX ADMIN — NICOTINE 1 PATCH: 21 PATCH, EXTENDED RELEASE TRANSDERMAL at 09:01

## 2024-10-03 RX ADMIN — APIXABAN 5 MG: 5 TABLET, FILM COATED ORAL at 08:56

## 2024-10-03 RX ADMIN — TRAZODONE HYDROCHLORIDE 100 MG: 100 TABLET ORAL at 22:22

## 2024-10-03 RX ADMIN — GABAPENTIN 400 MG: 400 CAPSULE ORAL at 17:53

## 2024-10-03 RX ADMIN — LEVALBUTEROL HYDROCHLORIDE 1.25 MG: 1.25 SOLUTION RESPIRATORY (INHALATION) at 07:47

## 2024-10-03 RX ADMIN — ACETAMINOPHEN 975 MG: 650 SUSPENSION ORAL at 17:53

## 2024-10-03 RX ADMIN — Medication 3 MG: at 22:23

## 2024-10-03 RX ADMIN — LEVALBUTEROL HYDROCHLORIDE 1.25 MG: 1.25 SOLUTION RESPIRATORY (INHALATION) at 19:48

## 2024-10-03 RX ADMIN — GABAPENTIN 400 MG: 400 CAPSULE ORAL at 08:57

## 2024-10-03 RX ADMIN — LEVOTHYROXINE SODIUM 250 MCG: 100 TABLET ORAL at 05:34

## 2024-10-03 RX ADMIN — OXYCODONE HYDROCHLORIDE 2.5 MG: 5 SOLUTION ORAL at 16:08

## 2024-10-03 NOTE — PLAN OF CARE
Problem: Prexisting or High Potential for Compromised Skin Integrity  Goal: Skin integrity is maintained or improved  Description: INTERVENTIONS:  - Identify patients at risk for skin breakdown  - Assess and monitor skin integrity  - Assess and monitor nutrition and hydration status  - Monitor labs   - Assess for incontinence   - Turn and reposition patient  - Assist with mobility/ambulation  - Relieve pressure over bony prominences  - Avoid friction and shearing  - Provide appropriate hygiene as needed including keeping skin clean and dry  - Evaluate need for skin moisturizer/barrier cream  - Collaborate with interdisciplinary team   - Patient/family teaching  - Consider wound care consult   Outcome: Progressing     Problem: PAIN - ADULT  Goal: Verbalizes/displays adequate comfort level or baseline comfort level  Description: Interventions:  - Encourage patient to monitor pain and request assistance  - Assess pain using appropriate pain scale  - Administer analgesics based on type and severity of pain and evaluate response  - Implement non-pharmacological measures as appropriate and evaluate response  - Consider cultural and social influences on pain and pain management  - Notify physician/advanced practitioner if interventions unsuccessful or patient reports new pain  Outcome: Progressing     Problem: INFECTION - ADULT  Goal: Absence or prevention of progression during hospitalization  Description: INTERVENTIONS:  - Assess and monitor for signs and symptoms of infection  - Monitor lab/diagnostic results  - Monitor all insertion sites, i.e. indwelling lines, tubes, and drains  - Monitor endotracheal if appropriate and nasal secretions for changes in amount and color  - [place] appropriate cooling/warming therapies per order  - Administer medications as ordered  - Instruct and encourage patient and family to use good hand hygiene technique  - Identify and instruct in appropriate isolation precautions for  identified infection/condition  Outcome: Progressing  Goal: Absence of fever/infection during neutropenic period  Description: INTERVENTIONS:  - Monitor WBC    Outcome: Progressing     Problem: SAFETY ADULT  Goal: Patient will remain free of falls  Description: INTERVENTIONS:  - Educate patient/family on patient safety including physical limitations  - Instruct patient to call for assistance with activity   - Consult OT/PT to assist with strengthening/mobility   - Keep Call bell within reach  - Keep bed low and locked with side rails adjusted as appropriate  - Keep care items and personal belongings within reach  - Initiate and maintain comfort rounds  - Make Fall Risk Sign visible to staff  - Offer Toileting every  Hours, in advance of need  - Initiate/Maintain alarm  - Obtain necessary fall risk management equipment:   - Apply yellow socks and bracelet for high fall risk patients  - Consider moving patient to room near nurses station  Outcome: Progressing  Goal: Maintain or return to baseline ADL function  Description: INTERVENTIONS:  -  Assess patient's ability to carry out ADLs; assess patient's baseline for ADL function and identify physical deficits which impact ability to perform ADLs (bathing, care of mouth/teeth, toileting, grooming, dressing, etc.)  - Assess/evaluate cause of self-care deficits   - Assess range of motion  - Assess patient's mobility; develop plan if impaired  - Assess patient's need for assistive devices and provide as appropriate  - Encourage maximum independence but intervene and supervise when necessary  - Involve family in performance of ADLs  - Assess for home care needs following discharge   - Consider OT consult to assist with ADL evaluation and planning for discharge  - Provide patient education as appropriate  Outcome: Progressing  Goal: Maintains/Returns to pre admission functional level  Description: INTERVENTIONS:  - Perform AM-PAC 6 Click Basic Mobility/ Daily Activity  assessment daily.  - Set and communicate daily mobility goal to care team and patient/family/caregiver.   - Collaborate with rehabilitation services on mobility goals if consulted  - Perform Range of Motion  times a day.  - Reposition patient every  hours.  - Dangle patient  times a day  - Stand patient  times a day  - Ambulate patient  times a day  - Out of bed to chair  times a day   - Out of bed for meal times a day  - Out of bed for toileting  - Record patient progress and toleration of activity level   Outcome: Progressing     Problem: DISCHARGE PLANNING  Goal: Discharge to home or other facility with appropriate resources  Description: INTERVENTIONS:  - Identify barriers to discharge w/patient and caregiver  - Arrange for needed discharge resources and transportation as appropriate  - Identify discharge learning needs (meds, wound care, etc.)  - Arrange for interpretive services to assist at discharge as needed  - Refer to Case Management Department for coordinating discharge planning if the patient needs post-hospital services based on physician/advanced practitioner order or complex needs related to functional status, cognitive ability, or social support system  Outcome: Progressing     Problem: Knowledge Deficit  Goal: Patient/family/caregiver demonstrates understanding of disease process, treatment plan, medications, and discharge instructions  Description: Complete learning assessment and assess knowledge base.  Interventions:  - Provide teaching at level of understanding  - Provide teaching via preferred learning methods  Outcome: Progressing     Problem: Nutrition/Hydration-ADULT  Goal: Nutrient/Hydration intake appropriate for improving, restoring or maintaining nutritional needs  Description: Monitor and assess patient's nutrition/hydration status for malnutrition. Collaborate with interdisciplinary team and initiate plan and interventions as ordered.  Monitor patient's weight and dietary intake as  ordered or per policy. Utilize nutrition screening tool and intervene as necessary. Determine patient's food preferences and provide high-protein, high-caloric foods as appropriate.     INTERVENTIONS:  - Monitor oral intake, urinary output, labs, and treatment plans  - Assess nutrition and hydration status and recommend course of action  - Evaluate amount of meals eaten  - Assist patient with eating if necessary   - Allow adequate time for meals  - Recommend/ encourage appropriate diets, oral nutritional supplements, and vitamin/mineral supplements  - Order, calculate, and assess calorie counts as needed  - Recommend, monitor, and adjust tube feedings and TPN/PPN based on assessed needs  - Assess need for intravenous fluids  - Provide specific nutrition/hydration education as appropriate  - Include patient/family/caregiver in decisions related to nutrition  Outcome: Progressing     Problem: RESPIRATORY - ADULT  Goal: Achieves optimal ventilation and oxygenation  Description: INTERVENTIONS:  - Assess for changes in respiratory status  - Assess for changes in mentation and behavior  - Position to facilitate oxygenation and minimize respiratory effort  - Oxygen administered by appropriate delivery if ordered  - Initiate smoking cessation education as indicated  - Encourage broncho-pulmonary hygiene including cough, deep breathe, Incentive Spirometry  - Assess the need for suctioning and aspirate as needed  - Assess and instruct to report SOB or any respiratory difficulty  - Respiratory Therapy support as indicated  Outcome: Progressing

## 2024-10-03 NOTE — CASE MANAGEMENT
Case Management Discharge Planning Note    Patient name Marcin Sánchez  [facility]  MRN [id number]  : 1960 Date 10/3/2024       Current Admission Date: 2024  Current Admission Diagnosis:Acute hypoxic respiratory failure (HCC)   Patient Active Problem List    Diagnosis Date Noted Date Diagnosed    Insomnia 10/03/2024     Agitation 10/03/2024     Palliative care encounter 10/01/2024     Acute hypoxic respiratory failure (HCC) 2024     Shock (HCC) 2024     Mucus plugging of bronchi 2024     Transaminitis 2024     Cardiac arrest (HCC) 2024     Pericardial effusion 2024     Acute on chronic respiratory failure with hypoxia and hypercapnia (HCC) 2024     Acute metabolic encephalopathy 2024     Chronic combined systolic and diastolic CHF (congestive heart failure) (HCC) 2024     Atrial fibrillation (HCC) 2024     Pulmonary fibrosis (HCC) 2024     Suspected chronic pulmonary embolism (HCC) 2024     Squamous cell lung cancer (HCC) 2024     Hyponatremia 2024     Severe protein-calorie malnutrition (HCC) 2024     Edema of both legs 2022     Tobacco abuse 2020     Nonrheumatic aortic valve insufficiency 2020     Malignant neoplasm of upper lobe of right lung (HCC) 2018     Thoracic aortic aneurysm without rupture (HCC) 2018     Cancer of right main bronchus (HCC) 10/04/2016     Pleural effusion 10/02/2016     Multiple lung nodules on CT 10/02/2016     Collapse of right lung 2016     Acute exacerbation of chronic obstructive pulmonary disease (COPD) (HCC)      Epilepsy (HCC)      Lyme disease      Major depression      Alcohol abuse        LOS (days): 25  Geometric Mean LOS (GMLOS) (days): 5.1  Days to GMLOS:-19.6     OBJECTIVE:  Risk of Unplanned Readmission Score: 41.91         Current admission status: Inpatient   Preferred Pharmacy:   [facility] -  -  JAMAL Clifford - [address],  [address],  [facility] [address]  [place] [address]  Phone: [phone] Fax: [phone]    [facility] JAMAL Argueta - [address] [place] [address]  Phone: [phone] Fax: [phone]    Primary Care Provider: Brandt Godinez MD    Primary Insurance: [facility]  Secondary Insurance:     DISCHARGE DETAILS:    Peer to peer review was approved. CM sent message to Maik Kirkland regarding this. Maik Kirkland needs to obtain a single case agreement with insurance. They are going to work on this and follow up with LORIE.

## 2024-10-03 NOTE — ASSESSMENT & PLAN NOTE
"Capacity:  Pt did not exhibit full competency during our evaluation today. Communication is inherently challenging with his trach and he reported being illiterate today when we offered a whiteboard for him to write on. He was oriented to person and place, but other than knowing the year, he was not oriented to time. He was unable to answer who the current [place] President is, and could not articulate his wishes should he suffer another cardiac arrest.    GOC:  Without full capacity, medical decisions revert to his legally appointed guardian, Tere Williamson ([phone]). Per chart review, Tere was appointed pt's guardian in 2015 and since it was \"so long ago,\" was unable to recall why she was appointed. Tere had been employed at \"[employer]\" when she was appointed pt's guardian, but has not worked for the company for the \"last few months.\" Tere is now living in [place]. Tere was contacted by JEFF  on 9/25 and confirmed she was pt's legal guardian; Tere went on to say it was okay to speak with pt's sister, Katalina, about pt's medical state.    Upon collaborative discussion, palliative service will reach out to [facility]'s legal consult resource, Catherine Ivan Huitronzahraabryon, for specific legal assistance in navigating pt's medical decision maker complexity. We have communicated this to the pt's primary CC team as well.    ACP:  Pt's chart states he has ACP docs, but when examined, it is a single scanned-in sheet from 3/14/14 signed by pt's sister Katalina stating that the pt does not have any AD, LW, or durable POA.     Disposition:  At this time, pt's medical needs supersede that of his previous assisted living facility residence, [facility]. CM is actively searching for a LTAC facility that could accommodate pt.     Symptom Support:  Symptom mgmt portion of palliative consult was related to pt's intermittent agitation, specifically at night grabbing for/pulling at his trach. On discussion with primary CC " service, there is a balance between sedation for his behaviors, and oversedation as they steadily attempt to decrease pt's MV support. We have switched pt from qHS olanzapine to prn haldol, as haldol is one of the least sedating anti-confusion/anti-psychotic medications. Should haldol ultimately prove ineffective, could revisit return to olanzapine. He also remains on 1:1 observation as well.     Follow-up:  Palliative service will continue to follow Marcin and coordinate medical decision making apparatus with help of legal consult service.     PDMP Review: I have reviewed the patient's controlled substance dispensing history in the Prescription Drug Monitoring Program in compliance with the [facility] regulations before prescribing any controlled substances.

## 2024-10-03 NOTE — PROGRESS NOTES
"Assessment & Plan   Problem list  Acute on chronic hypercapnic respiratory failure s/p Trach  V Tach Arrest  Shock, resolved  COPD  Encephalopathy  Anemia of chronic disease  Atrial fibrillation  Metabolic alkalosis  Pericardial effusion  Seizure disorder  Hypothyroidism  AAA  Insomnia  Tobacco use  Hx of lung SCC     Neuro  #Seizure disorder  Phenytoin 150 mg BID, recent check is therapeutic  Continue Gabapentin 400mg BID     #Insomnia  Trazodone 100mg daily at bedtime  Monitor Qtc (444 on 9/25)     #Agitation  Intermittent  Palliative consulted  Switched from zyprexa to haldol     CV:   #Pericardial effusion   Echo 9/9  \"moderate to large pericardial effusion circumferential to the heart measuring largest along the RV free wall at 2.3 cm. The fluid exhibits no internal echoes. 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 Moderate to severe aortic regurgitation, mild tricuspid regurgitation, dilated ascending aortic root up to 5.4 cm.  On maintenance lasix po daily  Continue to monitor fluid status      #History of AAA  5.4 cm  Monitor hemodynamics; BP goal <130/80  Outpatient follow up     Pulm:  #Acute on Chronic hypoxic and hypercapnic respiratory failure.  #COPD, and acute exacerbation now resolved  Likely multifactorial including moderate to severe COPD  Course complicated by recurrent mucous plugging, status post therapeutic bronch x 3.  Sputum culture with Moraxella, status post course of azithromycin x 5 days.  Also with MDR acinetobacter likely contaminant versus colonization as patient has remained stable without antibiotic treatment.  Status post trach 9/19  Pressure control while asleep, " pressure support while awake as tolerated  Continue Xopenex and Atrovent TID  Continue pulmicort BID, Continue Performist BID     #History of squamous cell cancer of lung post chemo/radiation 2016, cancer of right mainstem  Noted     GI:   #PEG placed 9/19  Continue tube feeds at goal  Bowel regimen: Senokot, Miralax     :   #Urinary retention  Haynes removed 9/25  Doxazosin 2mg  Continue to monitor Is/Os, renal indices     F/E/N:   F: No maintenance fluids  E: Replete as needed, continue sodium tabs  N: Continue Jevity 1.2, at goal     Heme/Onc:   #Anemia  Continue B12/folate  No signs of active bleeding  Transfuse for Hgb <7 and/or symptomatic anemia     #Metabolic alkalosis  Likely 2/2 ongoing diuresis     #DVT ppx  SCDs and Eliquis     Endo:   #Hypothyroidism  Home med: Levothyroxine 250 mg  TSH 45.06 on 9/25 from 0.59 on 9/5, free T4 0.34 (9/25)  Restarted on home levothyroxine 250 mg  Recheck in 6 weeks     ID:   #Colonization with Acinetobacter and Moraxella  No acute issues  Continue to monitor WBC count and temperature curve  Completed 5 days of azithromycin 9/10     MSK/Skin:   #At risk for pressure injury   PT/OT when able  Frequent turns/repositioning  Skin surveillance      L: midline, PEG  D: none  A: PCV+ 16/6/40%      Disposition: Critical care    ICU Core Measures     Vented Patient  VAP Bundle  VAP bundle ordered     A: Assess, Prevent, and Manage Pain Has pain been assessed? Yes  Need for changes to pain regimen? No   B: Both Spontaneous Awakening Trials (SATs) and Spontaneous Breathing Trials (SBTs) Plan to perform spontaneous awakening trial today? N/A   Plan to perform spontaneous breathing trial today? Yes   Obvious barriers to extubation? Yes   C: Choice of Sedation RASS Goal: N/A patient not on sedation  Need for changes to sedation or analgesia regimen? No   D: Delirium CAM-ICU: Negative   E: Early Mobility  Plan for early mobility? Yes   F: Family Engagement Plan for family engagement  today? Yes       Review of Invasive Devices:            Prophylaxis:  VTE VTE covered by:  apixaban, Oral, 5 mg at 10/02/24 1733       Stress Ulcer  not ordered         24 Hour Events  No acute events  Subjective  Resting comfortably in bed this morning no acute complaints      Objective :                   Vitals I/O      Most Recent Min/Max in 24hrs   Temp 98.3 °F (36.8 °C) Temp  Min: 98.3 °F (36.8 °C)  Max: 100.1 °F (37.8 °C)   Pulse 88 Pulse  Min: 76  Max: 92   Resp 20 Resp  Min: 16  Max: 34   BP (!) 108/46 BP  Min: 104/47  Max: 110/42   O2 Sat 100 % SpO2  Min: 97 %  Max: 100 %      Intake/Output Summary (Last 24 hours) at 10/3/2024 0705  Last data filed at 10/3/2024 0533  Gross per 24 hour   Intake 900 ml   Output 850 ml   Net 50 ml       Diet Enteral/Parenteral; Tube Feeding No Oral Diet; Jevity 1.5; Bolus; 300; (4x/day) - 8:00AM,12:00PM,4:00PM,8:00PM; Prosource Protein Liquid - One Packet; 30; Water; Before and After each Bolus    Invasive Monitoring           Physical Exam   Physical Exam  Vitals reviewed.   Eyes:      Extraocular Movements: Extraocular movements intact.      Conjunctiva/sclera: Conjunctivae normal.   Skin:     General: Skin is warm and dry.   HENT:      Head: Normocephalic and atraumatic.      Nose: No congestion.      Mouth/Throat:      Mouth: Mucous membranes are dry.   Cardiovascular:      Rate and Rhythm: Normal rate and regular rhythm.   Musculoskeletal:      Right lower leg: No edema.      Left lower leg: No edema.   Abdominal: General: Bowel sounds are normal.      Palpations: Abdomen is soft.   Constitutional:       General: He is not in acute distress.     Appearance: He is well-developed.      Interventions: He is intubated.   Pulmonary:      Effort: Pulmonary effort is normal. He is intubated.      Breath sounds: Normal breath sounds.   Neurological:      General: No focal deficit present.      Mental Status: He is alert.          Diagnostic Studies        Lab Results: I have  reviewed the following results:     Medications:  Scheduled PRN   acetaminophen, 975 mg, Q8H  apixaban, 5 mg, BID  budesonide, 0.5 mg, Q12H  chlorhexidine, 15 mL, Q12H [place]  vitamin B-12, 100 mcg, Daily  doxazosin, 2 mg, Daily  folic acid, 1 mg, Daily  furosemide, 40 mg, Daily  gabapentin, 400 mg, BID  ipratropium, 0.5 mg, TID  levalbuterol, 1.25 mg, TID  levothyroxine, 250 mcg, Early Morning  melatonin, 3 mg, HS  metoprolol tartrate, 12.5 mg, Q12H [place]  [START ON 10/19/2024] nicotine, 14 mg, Daily  nicotine, 1 patch, Daily  [START ON 11/2/2024] nicotine, 7 mg, Daily  oxyCODONE, 2.5 mg, Q6H  phenytoin, 150 mg, Q12H [place]  polyethylene glycol, 17 g, BID  potassium chloride, 20 mEq, Daily  senna, 8.8 mg, HS  sodium chloride, 2 g, BID With Meals  traZODone, 100 mg, HS      albuterol, 2.5 mg, Q6H PRN  bisacodyl, 10 mg, Daily PRN  haloperidol, 1 mg, Q6H PRN  lidocaine, , 4x Daily PRN  oxyCODONE, 5 mg, Q6H PRN       Continuous          Labs:   CBC    Recent Labs     10/02/24  0552   WBC 8.61   HGB 7.1*   HCT 21.8*        BMP    Recent Labs     10/02/24  0552   SODIUM 136   K 3.7   CL 96   CO2 36*   AGAP 4   BUN 23   CREATININE 0.52*   CALCIUM 8.3*       Coags    No recent results     Additional Electrolytes  No recent results       Blood Gas    No recent results  Recent Labs     10/02/24  0552   PHVEN 7.390   USS7WPR 56.8*   PO2VEN 63.5*   MNA6FDZ 33.6*   BEVEN 7.7   [id number] 90.1*    LFTs  Recent Labs     10/02/24  0552   ALT 10   AST 14   ALKPHOS 92   ALB 2.7*   TBILI 0.52       Infectious  No recent results  Glucose  Recent Labs     10/02/24  0552   GLUC 126

## 2024-10-03 NOTE — PROGRESS NOTES
Progress Note - Palliative Care   Name: Marcin Sánchez 64 y.o. male I MRN: [id number]  [facility] I Date of Admission: 9/8/2024   Date of Service: 10/3/2024 I Hospital Day: 25    Assessment & Plan  Acute hypoxic respiratory failure (HCC)  Likely secondary to acute on chronic hypercarbic and hypoxic respiratory failure from acute exacerbation of COPD in the setting of pulmonary fibrosis and long-term tobacco abuse.  Patient was intubated after suffering cardiac arrest soon after admission 3 weeks ago and failed extubation on 9/10.  He was switched to mechanical ventilation via tracheostomy on 9/19.  Per intensivist service, plan is for continued decrease of respiratory MV support with hopeful ultimate wean.  Chronic combined systolic and diastolic CHF (congestive heart failure) (HCC)  Wt Readings from Last 3 Encounters:   10/03/24 64.5 kg (142 lb 3.2 oz)   09/08/24 60.8 kg (134 lb)   08/29/24 67.6 kg (149 lb)   Primary intensivist service is continuing to diurese patient, with monitoring of fluid status and weight.          Cardiac arrest (HCC)  S/p cardiac arrest soon after initial admission at [facility].thought to initially be secondary to circumferential pericardial effusion, as well as acute hypoxia and hypercarbia from respiratory failure.  Of note, no intervention for the effusion indicated at this time by CT surgery with a follow-up TTE discussed after discharge.  Patient also carries diagnosis of chronic combined systolic and diastolic heart failure and atrial fibrillation  Palliative care encounter  Capacity:  Pt did not exhibit full competency during our evaluation today. Communication is inherently challenging with his trach and he reported being illiterate today when we offered a whiteboard for him to write on. He was oriented to person and place, but other than knowing the year, he was not oriented to time. He was unable to answer who the current [place] President is, and could not articulate his  "wishes should he suffer another cardiac arrest.    GOC:  Without full capacity, medical decisions revert to his legally appointed guardian, Tere Williamson ([phone]). Per chart review, Tere was appointed pt's guardian in 2015 and since it was \"so long ago,\" was unable to recall why she was appointed. Tere had been employed at \"[employer]\" when she was appointed pt's guardian, but has not worked for the company for the \"last few months.\" Tere is now living in [place]. Tere was contacted by [facility] CM on 9/25 and confirmed she was pt's legal guardian; Tere went on to say it was okay to speak with pt's sister, Katalina, about pt's medical state.    Upon collaborative discussion, palliative service will reach out to [facility]'s legal consult resource, Mr. Ivan Baker, for specific legal assistance in navigating pt's medical decision maker complexity. We have communicated this to the pt's primary CC team as well.    ACP:  Pt's chart states he has ACP docs, but when examined, it is a single scanned-in sheet from 3/14/14 signed by pt's sister Katalina stating that the pt does not have any AD, LW, or durable POA.     Disposition:  At this time, pt's medical needs supersede that of his previous assisted living facility residence, [facility].  is actively searching for a LTAC facility that could accommodate pt.     Symptom Support:  Symptom mgmt portion of palliative consult was related to pt's intermittent agitation, specifically at night grabbing for/pulling at his trach. On discussion with primary CC service, there is a balance between sedation for his behaviors, and oversedation as they steadily attempt to decrease pt's MV support. We have switched pt from qHS olanzapine to prn haldol, as haldol is one of the least sedating anti-confusion/anti-psychotic medications. Should haldol ultimately prove ineffective, could revisit return to olanzapine. He also remains on 1:1 observation as well.   " "  Follow-up:  Palliative service will continue to follow Marcin and coordinate medical decision making apparatus with help of legal consult service.     PDMP Review: I have reviewed the patient's controlled substance dispensing history in the Prescription Drug Monitoring Program in compliance with the [facility] regulations before prescribing any controlled substances.  Insomnia  Reportedly has been sleeping poorly  Having trouble staying asleep  Current Medications:  Trazodone 100 mg qHS  Melatonin 3 mg qHS  Recommendations:  Could switch Trazodone to Seroquel to help w/ insomnia  Agitation  Intermittent agitation - at times pulling at trach tubing  1:1 in place  Out of restraints   Current Medications for agitation:  Haldol 1 mg Q6hrs PRN for agitation, restlessness  Continue recommend global delirium precautions including:  Establishment of day/night cycle via lights during the day and blinds open.  Please limit interruptions at night as medically appropriate.  Provide glasses/hearing aids as apprioriate.    Minimize deliriogenic medications as able.   Provide reorientation including date on board and visible clock.   Avoid restraints as able, frequent verbal reorientations or patient care sitter as appropriate.    Subjective   Met w/ patient at the bedside. 1:1 in place. Not restrained. When attempting to communicate w/ patient he stated that he can't spell and prefers to mouth words. He reports that he had a terrible night and was unable to sleep. Stated that he wants to get out of here. Reports mild headache. Spoke w/ bedside RN, she stated that he has been fine for her all morning except he told her he was in \"hell.\" 1:1 stated that she has been sitting w/ him for the past few days. Stated that he intermittently gets agitated and will \"ball his fists up and grab his vent tubing but doesn't pull at it.\"     Objective :  Temp:  [98.3 °F (36.8 °C)-100.1 °F (37.8 °C)] 98.5 °F (36.9 °C)  HR:  [86-92] 90  BP: " (104-120)/(42-49) 120/49  Resp:  [17-34] 22  SpO2:  [95 %-100 %] 95 %    Physical Exam  Constitutional:       General: He is awake. He is not in acute distress.     Comments: Trach on ventilator; 1:1 present    HENT:      Head: Normocephalic and atraumatic.      Mouth/Throat:      Mouth: Mucous membranes are dry.   Eyes:      Conjunctiva/sclera: Conjunctivae normal.   Cardiovascular:      Rate and Rhythm: Normal rate.   Pulmonary:      Effort: Pulmonary effort is normal. No respiratory distress.      Comments: Trach on ventilator   Skin:     General: Skin is warm and dry.   Neurological:      Mental Status: He is alert.      Comments: Able to state person, place and time (year) but did not know the president's name.    Psychiatric:         Behavior: Behavior is cooperative.          Lab Results: I have reviewed the following results:  Lab Results   Component Value Date/Time    SODIUM 136 10/02/2024 05:52 AM    SODIUM 136 08/06/2021 06:22 AM    K 3.7 10/02/2024 05:52 AM    K 4.2 08/06/2021 06:22 AM    BUN 23 10/02/2024 05:52 AM    BUN 8 08/06/2021 06:22 AM    CREATININE 0.52 (L) 10/02/2024 05:52 AM    CREATININE 0.42 (L) 08/06/2021 06:22 AM    GLUC 126 10/02/2024 05:52 AM    GLUC 87 08/06/2021 06:22 AM    CALCIUM 8.3 (L) 10/02/2024 05:52 AM    CALCIUM 9.5 08/06/2021 06:22 AM    AST 14 10/02/2024 05:52 AM    AST 18 08/06/2021 06:22 AM    ALT 10 10/02/2024 05:52 AM    ALT 17 08/06/2021 06:22 AM    ALB 2.7 (L) 10/02/2024 05:52 AM    ALB 3.8 08/06/2021 06:22 AM    TP 6.5 10/02/2024 05:52 AM    TP 7.7 08/06/2021 06:22 AM    EGFR 112 10/02/2024 05:52 AM    EGFR 115 10/02/2020 06:35 AM     Lab Results   Component Value Date/Time    HGB 7.1 (L) 10/02/2024 05:52 AM    WBC 8.61 10/02/2024 05:52 AM     10/02/2024 05:52 AM    INR 1.28 (H) 09/20/2024 05:29 PM    PTT 48 (H) 09/23/2024 04:46 AM     Lab Results   Component Value Date/Time    [id number] 45.059 (H) 09/24/2024 06:34 AM        Administrative Statements   I have  spent a total time of 30 minutes in caring for this patient on the day of the visit/encounter including Risks and benefits of tx options, Instructions for management, Patient and family education, Importance of tx compliance, Risk factor reductions, Counseling / Coordination of care, Documenting in the medical record, Reviewing / ordering tests, medicine, procedures  , Obtaining or reviewing history  , and Communicating with other healthcare professionals . Topics discussed with the patient / family include symptom assessment and management, medication review, medication adjustment, psychosocial support, supportive listening, and anticipatory guidance.    Yulisa Payne

## 2024-10-03 NOTE — ASSESSMENT & PLAN NOTE
Wt Readings from Last 3 Encounters:   10/03/24 64.5 kg (142 lb 3.2 oz)   09/08/24 60.8 kg (134 lb)   08/29/24 67.6 kg (149 lb)   Primary intensivist service is continuing to diurese patient, with monitoring of fluid status and weight.

## 2024-10-03 NOTE — ASSESSMENT & PLAN NOTE
S/p cardiac arrest soon after initial admission at [facility].thought to initially be secondary to circumferential pericardial effusion, as well as acute hypoxia and hypercarbia from respiratory failure.  Of note, no intervention for the effusion indicated at this time by CT surgery with a follow-up TTE discussed after discharge.  Patient also carries diagnosis of chronic combined systolic and diastolic heart failure and atrial fibrillation

## 2024-10-03 NOTE — RESPIRATORY THERAPY NOTE
10/03/24 0756   Respiratory Assessment   Resp Comments pt placed on PS 18/6 at this time. VT 5-6cc/kg. Tolerating well. RT will cont to monitor.   Vent Information   Vent ID [id number]   Vent type Morris C3   Morris Vent Mode SPONT   SPONT Settings   FIO2 (%) 40 %   PEEP (cmH2O) 6 cmH2O   Pressure Support (cmH2O) 18 cmH20   Flow Trigger (LPM) 3 LPM   P-ramp (ms) 50 ms   ETS  (%) 25 %   Humidification Heater   Heater Temp 95 °F (35 °C)   SPONT Actuals   Resp Rate (BPM) 26 BPM   VT (mL) 372 mL   MV (Obs) 11.1   MAP (cmH2O) 13 cmH2O   Peak Pressure (cmH2O) 29 cmH2O   I:E Ratio (Obs) 1/2.1   RSBI (f/vt) 92 f/Vt   Heater Temperature (Obs) 95 °F (35 °C)   SPONT Alarms   High Peak Pressure (cmH20) 40 cmH2O   High Resp Rate (BPM) 35 BPM   High MV (L/min) 20 L/min   Low MV (L/min) 4 L/min   High Spont VTE (mL) 1000 mL   Low Spont VTE (mL) 150 mL   Apnea Time (S) 20 S   SPONT Apnea Settings   Resp Rate (BPM) 10 BPM   FIO2 (%) 40 %   %TI (%) 1 %   Apnea Time (S) 20 S   Maintenance   Alarm (pink) cable attached No   Resuscitation bag with peep valve at bedside Yes   Water bag changed No   Circuit changed No   Daily Screen   Patient safety screen outcome: Passed   Surgical Airway Shiley Cuffed   Placement Date: 09/19/24   Type: Tracheostomy  Brand: Francois  Style: Cuffed  Comments: Pt trach in the OR with 8 Shiley Cuffed   Status Cuff Inflated;Secured   Surgical Airway Cuff Pressure (cm H20)   (mlt)   Equipment at bedside BVM;Wall Suction setup;Additional complete same size trach tube;Additional complete one size smaller trach tube;Obturator;Sterile saline;Additional inner cannula

## 2024-10-03 NOTE — ASSESSMENT & PLAN NOTE
Intermittent agitation - at times pulling at trach tubing  1:1 in place  Out of restraints   Current Medications for agitation:  Haldol 1 mg Q6hrs PRN for agitation, restlessness  Continue recommend global delirium precautions including:  Establishment of day/night cycle via lights during the day and blinds open.  Please limit interruptions at night as medically appropriate.  Provide glasses/hearing aids as apprioriate.    Minimize deliriogenic medications as able.   Provide reorientation including date on board and visible clock.   Avoid restraints as able, frequent verbal reorientations or patient care sitter as appropriate.

## 2024-10-03 NOTE — ASSESSMENT & PLAN NOTE
Reportedly has been sleeping poorly  Having trouble staying asleep  Current Medications:  Trazodone 100 mg qHS  Melatonin 3 mg qHS  Recommendations:  Could switch Trazodone to Seroquel to help w/ insomnia

## 2024-10-04 LAB
ALBUMIN SERPL BCG-MCNC: 2.7 G/DL (ref 3.5–5)
ALP SERPL-CCNC: 93 U/L (ref 34–104)
ALT SERPL W P-5'-P-CCNC: 10 U/L (ref 7–52)
ANION GAP SERPL CALCULATED.3IONS-SCNC: 5 MMOL/L (ref 4–13)
AST SERPL W P-5'-P-CCNC: 16 U/L (ref 13–39)
BASOPHILS # BLD AUTO: 0.06 THOUSANDS/ΜL (ref 0–0.1)
BASOPHILS NFR BLD AUTO: 1 % (ref 0–1)
BILIRUB SERPL-MCNC: 0.59 MG/DL (ref 0.2–1)
BUN SERPL-MCNC: 21 MG/DL (ref 5–25)
CALCIUM ALBUM COR SERPL-MCNC: 9.4 MG/DL (ref 8.3–10.1)
CALCIUM SERPL-MCNC: 8.4 MG/DL (ref 8.4–10.2)
CHLORIDE SERPL-SCNC: 97 MMOL/L (ref 96–108)
CO2 SERPL-SCNC: 35 MMOL/L (ref 21–32)
CREAT SERPL-MCNC: 0.53 MG/DL (ref 0.6–1.3)
EOSINOPHIL # BLD AUTO: 0.25 THOUSAND/ΜL (ref 0–0.61)
EOSINOPHIL NFR BLD AUTO: 4 % (ref 0–6)
ERYTHROCYTE [DISTWIDTH] IN BLOOD BY AUTOMATED COUNT: 14.7 % (ref 11.6–15.1)
ERYTHROCYTE [DISTWIDTH] IN BLOOD BY AUTOMATED COUNT: 14.8 % (ref 11.6–15.1)
GFR SERPL CREATININE-BSD FRML MDRD: 111 ML/MIN/1.73SQ M
GLUCOSE SERPL-MCNC: 100 MG/DL (ref 65–140)
HCT VFR BLD AUTO: 21.8 % (ref 36.5–49.3)
HCT VFR BLD AUTO: 24.1 % (ref 36.5–49.3)
HGB BLD-MCNC: 7 G/DL (ref 12–17)
HGB BLD-MCNC: 7.8 G/DL (ref 12–17)
IMM GRANULOCYTES # BLD AUTO: 0.03 THOUSAND/UL (ref 0–0.2)
IMM GRANULOCYTES NFR BLD AUTO: 0 % (ref 0–2)
LYMPHOCYTES # BLD AUTO: 0.83 THOUSANDS/ΜL (ref 0.6–4.47)
LYMPHOCYTES NFR BLD AUTO: 12 % (ref 14–44)
MCH RBC QN AUTO: 32.7 PG (ref 26.8–34.3)
MCH RBC QN AUTO: 33.5 PG (ref 26.8–34.3)
MCHC RBC AUTO-ENTMCNC: 32.1 G/DL (ref 31.4–37.4)
MCHC RBC AUTO-ENTMCNC: 32.4 G/DL (ref 31.4–37.4)
MCV RBC AUTO: 102 FL (ref 82–98)
MCV RBC AUTO: 103 FL (ref 82–98)
MONOCYTES # BLD AUTO: 0.97 THOUSAND/ΜL (ref 0.17–1.22)
MONOCYTES NFR BLD AUTO: 14 % (ref 4–12)
NEUTROPHILS # BLD AUTO: 4.66 THOUSANDS/ΜL (ref 1.85–7.62)
NEUTS SEG NFR BLD AUTO: 69 % (ref 43–75)
NRBC BLD AUTO-RTO: 0 /100 WBCS
PLATELET # BLD AUTO: 221 THOUSANDS/UL (ref 149–390)
PLATELET # BLD AUTO: 251 THOUSANDS/UL (ref 149–390)
PMV BLD AUTO: 8.4 FL (ref 8.9–12.7)
PMV BLD AUTO: 8.7 FL (ref 8.9–12.7)
POTASSIUM SERPL-SCNC: 4 MMOL/L (ref 3.5–5.3)
PROT SERPL-MCNC: 6.7 G/DL (ref 6.4–8.4)
RBC # BLD AUTO: 2.14 MILLION/UL (ref 3.88–5.62)
RBC # BLD AUTO: 2.33 MILLION/UL (ref 3.88–5.62)
SODIUM SERPL-SCNC: 137 MMOL/L (ref 135–147)
WBC # BLD AUTO: 5.55 THOUSAND/UL (ref 4.31–10.16)
WBC # BLD AUTO: 6.8 THOUSAND/UL (ref 4.31–10.16)

## 2024-10-04 PROCEDURE — 94640 AIRWAY INHALATION TREATMENT: CPT

## 2024-10-04 PROCEDURE — 80053 COMPREHEN METABOLIC PANEL: CPT

## 2024-10-04 PROCEDURE — 85027 COMPLETE CBC AUTOMATED: CPT

## 2024-10-04 PROCEDURE — 99233 SBSQ HOSP IP/OBS HIGH 50: CPT

## 2024-10-04 PROCEDURE — 94760 N-INVAS EAR/PLS OXIMETRY 1: CPT

## 2024-10-04 PROCEDURE — 85025 COMPLETE CBC W/AUTO DIFF WBC: CPT

## 2024-10-04 PROCEDURE — 97535 SELF CARE MNGMENT TRAINING: CPT

## 2024-10-04 PROCEDURE — 97530 THERAPEUTIC ACTIVITIES: CPT

## 2024-10-04 PROCEDURE — 99233 SBSQ HOSP IP/OBS HIGH 50: CPT | Performed by: INTERNAL MEDICINE

## 2024-10-04 PROCEDURE — 94664 DEMO&/EVAL PT USE INHALER: CPT

## 2024-10-04 PROCEDURE — 94003 VENT MGMT INPAT SUBQ DAY: CPT

## 2024-10-04 RX ORDER — HALOPERIDOL 5 MG/ML
2 INJECTION INTRAMUSCULAR ONCE
Status: COMPLETED | OUTPATIENT
Start: 2024-10-04 | End: 2024-10-04

## 2024-10-04 RX ADMIN — GABAPENTIN 400 MG: 400 CAPSULE ORAL at 15:27

## 2024-10-04 RX ADMIN — TRAZODONE HYDROCHLORIDE 100 MG: 100 TABLET ORAL at 23:05

## 2024-10-04 RX ADMIN — SODIUM CHLORIDE 2 G: 1 TABLET ORAL at 18:23

## 2024-10-04 RX ADMIN — OXYCODONE HYDROCHLORIDE 5 MG: 5 SOLUTION ORAL at 15:27

## 2024-10-04 RX ADMIN — PHENYTOIN 150 MG: 125 SUSPENSION ORAL at 23:05

## 2024-10-04 RX ADMIN — IPRATROPIUM BROMIDE 0.5 MG: 0.5 SOLUTION RESPIRATORY (INHALATION) at 08:13

## 2024-10-04 RX ADMIN — IPRATROPIUM BROMIDE 0.5 MG: 0.5 SOLUTION RESPIRATORY (INHALATION) at 20:39

## 2024-10-04 RX ADMIN — OXYCODONE HYDROCHLORIDE 2.5 MG: 5 SOLUTION ORAL at 05:06

## 2024-10-04 RX ADMIN — LEVOTHYROXINE SODIUM 250 MCG: 100 TABLET ORAL at 05:05

## 2024-10-04 RX ADMIN — SODIUM CHLORIDE 2 G: 1 TABLET ORAL at 09:39

## 2024-10-04 RX ADMIN — ACETAMINOPHEN 975 MG: 650 SUSPENSION ORAL at 05:06

## 2024-10-04 RX ADMIN — NICOTINE 1 PATCH: 21 PATCH, EXTENDED RELEASE TRANSDERMAL at 09:37

## 2024-10-04 RX ADMIN — HALOPERIDOL 1 MG: 2 SOLUTION ORAL at 00:41

## 2024-10-04 RX ADMIN — HALOPERIDOL LACTATE 2 MG: 5 INJECTION, SOLUTION INTRAMUSCULAR at 18:24

## 2024-10-04 RX ADMIN — LEVALBUTEROL HYDROCHLORIDE 1.25 MG: 1.25 SOLUTION RESPIRATORY (INHALATION) at 08:13

## 2024-10-04 RX ADMIN — POTASSIUM CHLORIDE 20 MEQ: 1.5 SOLUTION ORAL at 09:28

## 2024-10-04 RX ADMIN — OXYCODONE HYDROCHLORIDE 2.5 MG: 5 SOLUTION ORAL at 23:04

## 2024-10-04 RX ADMIN — POLYETHYLENE GLYCOL 3350 17 G: 17 POWDER, FOR SOLUTION ORAL at 15:27

## 2024-10-04 RX ADMIN — POLYETHYLENE GLYCOL 3350 17 G: 17 POWDER, FOR SOLUTION ORAL at 09:28

## 2024-10-04 RX ADMIN — APIXABAN 5 MG: 5 TABLET, FILM COATED ORAL at 09:28

## 2024-10-04 RX ADMIN — LEVALBUTEROL HYDROCHLORIDE 1.25 MG: 1.25 SOLUTION RESPIRATORY (INHALATION) at 20:39

## 2024-10-04 RX ADMIN — DOXAZOSIN 2 MG: 2 TABLET ORAL at 09:27

## 2024-10-04 RX ADMIN — Medication 12.5 MG: at 09:27

## 2024-10-04 RX ADMIN — CHLORHEXIDINE GLUCONATE 0.12% ORAL RINSE 15 ML: 1.2 LIQUID ORAL at 23:05

## 2024-10-04 RX ADMIN — LEVALBUTEROL HYDROCHLORIDE 1.25 MG: 1.25 SOLUTION RESPIRATORY (INHALATION) at 13:32

## 2024-10-04 RX ADMIN — OXYCODONE HYDROCHLORIDE 2.5 MG: 5 SOLUTION ORAL at 09:27

## 2024-10-04 RX ADMIN — Medication 3 MG: at 23:05

## 2024-10-04 RX ADMIN — IPRATROPIUM BROMIDE 0.5 MG: 0.5 SOLUTION RESPIRATORY (INHALATION) at 13:32

## 2024-10-04 RX ADMIN — ALBUTEROL SULFATE 2.5 MG: 2.5 SOLUTION RESPIRATORY (INHALATION) at 05:27

## 2024-10-04 RX ADMIN — ACETAMINOPHEN 975 MG: 650 SUSPENSION ORAL at 09:27

## 2024-10-04 RX ADMIN — Medication 100 MCG: at 09:28

## 2024-10-04 RX ADMIN — PHENYTOIN 150 MG: 125 SUSPENSION ORAL at 09:38

## 2024-10-04 RX ADMIN — BUDESONIDE 0.5 MG: 0.5 INHALANT RESPIRATORY (INHALATION) at 20:39

## 2024-10-04 RX ADMIN — FUROSEMIDE 40 MG: 40 TABLET ORAL at 09:28

## 2024-10-04 RX ADMIN — APIXABAN 5 MG: 5 TABLET, FILM COATED ORAL at 15:27

## 2024-10-04 RX ADMIN — CHLORHEXIDINE GLUCONATE 0.12% ORAL RINSE 15 ML: 1.2 LIQUID ORAL at 09:28

## 2024-10-04 RX ADMIN — GABAPENTIN 400 MG: 400 CAPSULE ORAL at 09:27

## 2024-10-04 RX ADMIN — FOLIC ACID 1 MG: 1 TABLET ORAL at 09:28

## 2024-10-04 RX ADMIN — BUDESONIDE 0.5 MG: 0.5 INHALANT RESPIRATORY (INHALATION) at 08:13

## 2024-10-04 RX ADMIN — ACETAMINOPHEN 975 MG: 650 SUSPENSION ORAL at 18:23

## 2024-10-04 NOTE — ASSESSMENT & PLAN NOTE
S/p cardiac arrest soon after initial admission at [facility].thought to initially be secondary to circumferential pericardial effusion, as well as acute hypoxia and hypercarbia from respiratory failure.  Of note, no intervention for the effusion indicated at this time by CT surgery with a follow-up TTE discussed after discharge.  Patient also carries diagnosis of chronic combined systolic and diastolic heart failure and atrial fibrillation

## 2024-10-04 NOTE — ASSESSMENT & PLAN NOTE
Wt Readings from Last 3 Encounters:   10/04/24 65.2 kg (143 lb 11.8 oz)   09/08/24 60.8 kg (134 lb)   08/29/24 67.6 kg (149 lb)   Primary intensivist service is continuing to diurese patient, with monitoring of fluid status and weight.

## 2024-10-04 NOTE — PROGRESS NOTES
Progress Note - Palliative Care   Name: Marcin Sánchez 64 y.o. male I MRN: [id number]  [facility] Date of Admission: 9/8/2024   Date of Service: 10/4/2024 I Hospital Day: 26    Assessment & Plan  Acute hypoxic respiratory failure (HCC)  Likely secondary to acute on chronic hypercarbic and hypoxic respiratory failure from acute exacerbation of COPD in the setting of pulmonary fibrosis and long-term tobacco abuse.  Patient was intubated after suffering cardiac arrest soon after admission 3 weeks ago and failed extubation on 9/10.  He was switched to mechanical ventilation via tracheostomy on 9/19.  Per intensivist service, plan is for continued decrease of respiratory MV support with hopeful ultimate wean.  Chronic combined systolic and diastolic CHF (congestive heart failure) (HCC)  Wt Readings from Last 3 Encounters:   10/04/24 65.2 kg (143 lb 11.8 oz)   09/08/24 60.8 kg (134 lb)   08/29/24 67.6 kg (149 lb)   Primary intensivist service is continuing to diurese patient, with monitoring of fluid status and weight.          Cardiac arrest (HCC)  S/p cardiac arrest soon after initial admission at [facility].thought to initially be secondary to circumferential pericardial effusion, as well as acute hypoxia and hypercarbia from respiratory failure.  Of note, no intervention for the effusion indicated at this time by CT surgery with a follow-up TTE discussed after discharge.  Patient also carries diagnosis of chronic combined systolic and diastolic heart failure and atrial fibrillation  Palliative care encounter  Capacity:  Pt did not exhibit full competency during our evaluation today. Communication is inherently challenging with his trach and he reported being illiterate today when we offered a whiteboard for him to write on. He was oriented to person and place, but other than knowing the year, he was not oriented to time. He was unable to answer who the current [place] President is, and could not articulate his  "wishes should he suffer another cardiac arrest.    GOC:  Without full capacity, medical decisions revert to his legally appointed guardian, Tere Williamson ([phone]). Per chart review, Tere was appointed pt's guardian in 2015 and since it was \"so long ago,\" was unable to recall why she was appointed. Tere had been employed at \"[employer]\" when she was appointed pt's guardian, but has not worked for the company for the \"last few months.\" Tere is now living in [place]. Tere was contacted by [facility] CM on 9/25 and confirmed she was pt's legal guardian; Tere went on to say it was okay to speak with pt's sister, Katalina, about pt's medical state.    Upon collaborative discussion, palliative service will reach out to [facility]'s legal consult resource, Mr. Ivan Baker, for specific legal assistance in navigating pt's medical decision maker complexity. We have communicated this to the pt's primary CC team as well.    ACP:  Pt's chart states he has ACP docs, but when examined, it is a single scanned-in sheet from 3/14/14 signed by pt's sister Katalina stating that the pt does not have any AD, LW, or durable POA.     Disposition:  At this time, pt's medical needs supersede that of his previous assisted living facility residence, [facility].  is actively searching for a LTAC facility that could accommodate pt.     Symptom Support:  Symptom mgmt portion of palliative consult was related to pt's intermittent agitation, specifically at night grabbing for/pulling at his trach. On discussion with primary CC service, there is a balance between sedation for his behaviors, and oversedation as they steadily attempt to decrease pt's MV support. We have switched pt from qHS olanzapine to prn haldol, as haldol is one of the least sedating anti-confusion/anti-psychotic medications. Should haldol ultimately prove ineffective, could revisit return to olanzapine. He also remains on 1:1 observation as well.   "   Follow-up:  Palliative service will continue to follow Marcin and coordinate medical decision making apparatus with help of legal consult service.     PDMP Review: I have reviewed the patient's controlled substance dispensing history in the Prescription Drug Monitoring Program in compliance with the [facility] regulations before prescribing any controlled substances.  Insomnia  Reportedly has been sleeping poorly  Having trouble staying or falling asleep  Stays up all night per bedside nurse  Current Medications:  Trazodone 100 mg qHS - not working per nursing  Melatonin 3 mg qHS  Recommendations:  Could switch Trazodone to Seroquel to help w/ insomnia  Agitation  Intermittent agitation - at times pulling at trach tubing  Virtual 1:1 in place  Out of restraints   Current Medications for agitation:  Haldol 1 mg Q6hrs PRN for agitation, restlessness  Continue recommend global delirium precautions including:  Establishment of day/night cycle via lights during the day and blinds open.  Please limit interruptions at night as medically appropriate.  Provide glasses/hearing aids as apprioriate.    Minimize deliriogenic medications as able.   Provide reorientation including date on board and visible clock.   Avoid restraints as able, frequent verbal reorientations or patient care sitter as appropriate.    Subjective   Met w/ the patient at the bedside. Patient lying in bed, appears comfortable, NAD. Patient's nurse, Neil,in the room as well. Patient stated that he is comfortable but dislikes his trach/vent. Stated that he wants to get rid of it. Denies any pain but stated that he is still having trouble sleeping. Per Neil, patient did not sleep all night and believes it is contributing to his agitation. Past 24 hours, used x1 dose of Haldol. No PRNs used for pain. Neil also stated that she has concerns about his bolus feeds and believes that he is unable to tolerate them. Spoke with CC team.     Objective :  Temp:  [97.7 °F  (36.5 °C)-99.1 °F (37.3 °C)] 99 °F (37.2 °C)  HR:  [68-90] 68  BP: (103-120)/(42-49) 103/47  Resp:  [12-25] 12  SpO2:  [95 %-100 %] 100 %    Physical Exam  Constitutional:       General: He is awake. He is not in acute distress.     Comments: Trach on ventilator    HENT:      Head: Normocephalic and atraumatic.      Mouth/Throat:      Mouth: Mucous membranes are moist.   Eyes:      Conjunctiva/sclera: Conjunctivae normal.   Cardiovascular:      Rate and Rhythm: Normal rate.   Pulmonary:      Effort: Pulmonary effort is normal. No respiratory distress.   Skin:     General: Skin is warm and dry.      Coloration: Skin is pale.   Neurological:      General: No focal deficit present.      Mental Status: He is alert.      Comments: Mouths words to questions appropriately           Lab Results: I have reviewed the following results:  Lab Results   Component Value Date/Time    SODIUM 137 10/04/2024 05:06 AM    SODIUM 136 08/06/2021 06:22 AM    K 4.0 10/04/2024 05:06 AM    K 4.2 08/06/2021 06:22 AM    BUN 21 10/04/2024 05:06 AM    BUN 8 08/06/2021 06:22 AM    CREATININE 0.53 (L) 10/04/2024 05:06 AM    CREATININE 0.42 (L) 08/06/2021 06:22 AM    GLUC 100 10/04/2024 05:06 AM    GLUC 87 08/06/2021 06:22 AM    CALCIUM 8.4 10/04/2024 05:06 AM    CALCIUM 9.5 08/06/2021 06:22 AM    AST 16 10/04/2024 05:06 AM    AST 18 08/06/2021 06:22 AM    ALT 10 10/04/2024 05:06 AM    ALT 17 08/06/2021 06:22 AM    ALB 2.7 (L) 10/04/2024 05:06 AM    ALB 3.8 08/06/2021 06:22 AM    TP 6.7 10/04/2024 05:06 AM    TP 7.7 08/06/2021 06:22 AM    EGFR 111 10/04/2024 05:06 AM    EGFR 115 10/02/2020 06:35 AM     Lab Results   Component Value Date/Time    HGB 7.0 (L) 10/04/2024 05:06 AM    WBC 6.80 10/04/2024 05:06 AM     10/04/2024 05:06 AM    INR 1.28 (H) 09/20/2024 05:29 PM    PTT 48 (H) 09/23/2024 04:46 AM     Lab Results   Component Value Date/Time    [id number] 45.059 (H) 09/24/2024 06:34 AM     Administrative Statements   I have spent a total  time of 20 minutes in caring for this patient on the day of the visit/encounter including Instructions for management, Patient and family education, Risk factor reductions, Counseling / Coordination of care, Documenting in the medical record, Reviewing / ordering tests, medicine, procedures  , Obtaining or reviewing history  , and Communicating with other healthcare professionals . Topics discussed with the patient / family include symptom assessment and management, medication review, psychosocial support, supportive listening, and anticipatory guidance.    Yulisa Payne

## 2024-10-04 NOTE — CONSULTS
Nutrition Follow-Up/Recommendations:    Per communication with RN and critical care, pt does not appear to be tolerating 300mL volume of bolus feeds, appears to get anxious by the end of the feed.   RD explained rationale for bolus feeds over continuous- d/t extent of hold time needed to prevent drug-nutrient interaction with dilantin BID and levothyroxine once daily.   Critical care and RN agreeable to try lesser bolus volume of a more concentrated formula.   Updated TF order with new recs: TwoCalHN, 240mL bolus 4 times daily, continue one packet Prosource Protein Liquid daily.   With 30mL water flushes before and after each bolus, will provide 1980 kcals, 95g protein, 972mL water including all flushes.     TwoCal HN formula will provide less free water then Jevity 1.5. Monitor need to adjust water flushes.     Continue to monitor GI function/stool output with change in TF formula.     Weight today is back down to admission weight, pt now on maintenance daily lasix, will continue to monitor weight trends.

## 2024-10-04 NOTE — PROGRESS NOTES
"  Progress Note - Critical Care/ICU   Name: Marcin Sánchez 64 y.o. male I MRN: [id number]  Unit/Bed#: [facility] 205-01 I Date of Admission: 9/8/2024   Date of Service: 10/4/2024 I Hospital Day: 26       Assessment & Plan   Problem list  Acute on chronic hypercapnic respiratory failure s/p Trach  V Tach Arrest  Shock, resolved  COPD  Encephalopathy  Anemia of chronic disease  Atrial fibrillation  Metabolic alkalosis  Pericardial effusion  Seizure disorder  Hypothyroidism  AAA  Insomnia  Tobacco use  Hx of lung SCC     Neuro  #Seizure disorder  Phenytoin 150 mg BID, recent check is therapeutic  Continue Gabapentin 400mg BID     #Insomnia  Trazodone 100mg daily at bedtime  Monitor Qtc (406 on 10/01)     #Agitation  Intermittent  Palliative consulted  Switched from zyprexa to haldol     CV:   #Pericardial effusion   Echo 9/9  \"moderate to large pericardial effusion circumferential to the heart measuring largest along the RV free wall at 2.3 cm. The fluid exhibits no internal echoes. 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 Moderate to severe aortic regurgitation, mild tricuspid regurgitation, dilated ascending aortic root up to 5.4 cm.  On maintenance lasix po daily  Continue to monitor fluid status      #History of AAA  5.4 cm  Monitor hemodynamics; BP goal <130/80  Outpatient follow up     Pulm:  #Acute on Chronic hypoxic and hypercapnic respiratory failure.  #COPD, and acute exacerbation now resolved  Likely multifactorial including moderate to severe COPD  Course complicated by recurrent mucous plugging, status post therapeutic bronch x 3.  Sputum culture with Moraxella, status post course of azithromycin " x 5 days.  Also with MDR acinetobacter likely contaminant versus colonization as patient has remained stable without antibiotic treatment.  Status post trach 9/19  Pressure control while asleep, pressure support while awake as tolerated  Continue Xopenex and Atrovent TID  Continue pulmicort BID, Continue Performist BID     #History of squamous cell cancer of lung post chemo/radiation 2016, cancer of right mainstem  Noted     GI:   #PEG placed 9/19  Continue tube feeds at goal  Bowel regimen: Senokot, Miralax     :   #Urinary retention  Haynes removed 9/25  Doxazosin 2mg  Continue to monitor Is/Os, renal indices     F/E/N:   F: No maintenance fluids  E: Replete as needed, continue sodium tabs  N: Continue Jevity 1.2, at goal     Heme/Onc:   #Anemia  Continue B12/folate  No signs of active bleeding  Transfuse for Hgb <7 and/or symptomatic anemia     #Metabolic alkalosis  Likely 2/2 ongoing diuresis     #DVT ppx  SCDs and Eliquis     Endo:   #Hypothyroidism  Home med: Levothyroxine 250 mg  TSH 45.06 on 9/25 from 0.59 on 9/5, free T4 0.34 (9/25)  Restarted on home levothyroxine 250 mg  Recheck in 6 weeks     ID:   #Colonization with Acinetobacter and Moraxella  No acute issues  Continue to monitor WBC count and temperature curve  Completed 5 days of azithromycin 9/10     MSK/Skin:   #At risk for pressure injury   PT/OT when able  Frequent turns/repositioning  Skin surveillance      L: midline, PEG  D: none  A: PCV+ 16/6/40%      Disposition: Critical care    ICU Core Measures     Vented Patient  VAP Bundle  VAP bundle ordered     A: Assess, Prevent, and Manage Pain Has pain been assessed? Yes  Need for changes to pain regimen? No   B: Both Spontaneous Awakening Trials (SATs) and Spontaneous Breathing Trials (SBTs) Plan to perform spontaneous awakening trial today? N/A   Plan to perform spontaneous breathing trial today? Yes   Obvious barriers to extubation? Yes   C: Choice of Sedation RASS Goal: 0 Alert and Calm or N/A  patient not on sedation  Need for changes to sedation or analgesia regimen? No   D: Delirium CAM-ICU: Negative   E: Early Mobility  Plan for early mobility? Yes   F: Family Engagement Plan for family engagement today? Yes       Review of Invasive Devices:            Prophylaxis:  VTE VTE covered by:  apixaban, Oral, 5 mg at 10/03/24 1753       Stress Ulcer  not ordered         24 Hour Events : No acute events overnight  Subjective   Review of Systems: See HPI for Review of Systems    Objective :                   Vitals I/O      Most Recent Min/Max in 24hrs   Temp 99 °F (37.2 °C) Temp  Min: 97.7 °F (36.5 °C)  Max: 99.1 °F (37.3 °C)   Pulse 68 Pulse  Min: 68  Max: 90   Resp 12 Resp  Min: 12  Max: 25   BP (!) 103/47 BP  Min: 103/47  Max: 120/49   O2 Sat 100 % SpO2  Min: 95 %  Max: 100 %      Intake/Output Summary (Last 24 hours) at 10/4/2024 0845  Last data filed at 10/4/2024 0601  Gross per 24 hour   Intake --   Output 650 ml   Net -650 ml       Diet Enteral/Parenteral; Tube Feeding No Oral Diet; Jevity 1.5; Bolus; 300; (4x/day) - 8:00AM,12:00PM,4:00PM,8:00PM; Prosource Protein Liquid - One Packet; 30; Water; Before and After each Bolus    Invasive Monitoring           Physical Exam   Physical Exam  Vitals and nursing note reviewed.   Constitutional:       Interventions: He is not intubated.  Pulmonary:      Effort: He is not intubated.          Diagnostic Studies        Lab Results: I have reviewed the following results:     Medications:  Scheduled PRN   acetaminophen, 975 mg, Q8H  apixaban, 5 mg, BID  budesonide, 0.5 mg, Q12H  chlorhexidine, 15 mL, Q12H [place]  vitamin B-12, 100 mcg, Daily  doxazosin, 2 mg, Daily  folic acid, 1 mg, Daily  furosemide, 40 mg, Daily  gabapentin, 400 mg, BID  ipratropium, 0.5 mg, TID  levalbuterol, 1.25 mg, TID  levothyroxine, 250 mcg, Early Morning  melatonin, 3 mg, HS  metoprolol tartrate, 12.5 mg, Q12H [place]  [START ON 10/19/2024] nicotine, 14 mg, Daily  nicotine, 1 patch, Daily  [START  ON 11/2/2024] nicotine, 7 mg, Daily  oxyCODONE, 2.5 mg, Q6H  phenytoin, 150 mg, Q12H [place]  polyethylene glycol, 17 g, BID  potassium chloride, 20 mEq, Daily  senna, 8.8 mg, HS  sodium chloride, 2 g, BID With Meals  traZODone, 100 mg, HS      albuterol, 2.5 mg, Q6H PRN  bisacodyl, 10 mg, Daily PRN  haloperidol, 1 mg, Q6H PRN  lidocaine, , 4x Daily PRN  oxyCODONE, 5 mg, Q6H PRN       Continuous          Labs:   CBC    Recent Labs     10/04/24  0506   WBC 6.80   HGB 7.0*   HCT 21.8*        BMP    Recent Labs     10/04/24  0506   SODIUM 137   K 4.0   CL 97   CO2 35*   AGAP 5   BUN 21   CREATININE 0.53*   CALCIUM 8.4       Coags    No recent results     Additional Electrolytes  No recent results       Blood Gas    No recent results  No recent results LFTs  Recent Labs     10/04/24  0506   ALT 10   AST 16   ALKPHOS 93   ALB 2.7*   TBILI 0.59       Infectious  No recent results  Glucose  Recent Labs     10/04/24  0506   GLUC 100

## 2024-10-04 NOTE — ASSESSMENT & PLAN NOTE
Reportedly has been sleeping poorly  Having trouble staying or falling asleep  Stays up all night per bedside nurse  Current Medications:  Trazodone 100 mg qHS - not working per nursing  Melatonin 3 mg qHS  Recommendations:  Could switch Trazodone to Seroquel to help w/ insomnia

## 2024-10-04 NOTE — ASSESSMENT & PLAN NOTE
Intermittent agitation - at times pulling at trach tubing  Virtual 1:1 in place  Out of restraints   Current Medications for agitation:  Haldol 1 mg Q6hrs PRN for agitation, restlessness  Continue recommend global delirium precautions including:  Establishment of day/night cycle via lights during the day and blinds open.  Please limit interruptions at night as medically appropriate.  Provide glasses/hearing aids as apprioriate.    Minimize deliriogenic medications as able.   Provide reorientation including date on board and visible clock.   Avoid restraints as able, frequent verbal reorientations or patient care sitter as appropriate.

## 2024-10-04 NOTE — PHYSICAL THERAPY NOTE
PHYSICAL THERAPY NOTE          Patient Name: Marcin Sánchez  Today's Date: 10/4/2024       10/04/24 1107   PT Last Visit   PT Visit Date 10/04/24   Note Type   Note Type Treatment   Pain Assessment   Pain Assessment Tool FLACC   Patient's Stated Pain Goal No pain   Restrictions/Precautions   Weight Bearing Precautions Per Order No   Other Precautions Fall Risk;Pain;Multiple lines;Bed Alarm;Chair Alarm;Cognitive;O2;Telemetry   General   Chart Reviewed Yes   Family/Caregiver Present No   Cognition   Orientation Level Oriented to person;Oriented to place   Subjective   Subjective Pt agreeable to PT session.   Bed Mobility   Rolling R 3  Moderate assistance   Additional items Assist x 1;Increased time required   Rolling L 3  Moderate assistance   Additional items Assist x 1   Supine to Sit 3  Moderate assistance   Additional items Assist x 1   Sit to Supine 3  Moderate assistance   Additional items Assist x 1   Transfers   Sit to Stand Unable to assess   Stand to Sit Unable to assess   Ambulation/Elevation   Gait pattern Not tested   Balance   Static Sitting Fair   Dynamic Sitting Fair -   Static Standing Poor +   Dynamic Standing Poor   Ambulatory Poor   Endurance Deficit   Endurance Deficit Yes   Endurance Deficit Description generalized weakness, fatigue, pain, anxiety   Activity Tolerance   Activity Tolerance Patient limited by fatigue;Patient limited by pain   Medical Staff Made Aware OT   Nurse Made Aware yes, nsg gave clearance to work with pt   Exercises   Balance training  sitting unsupported EOB   Assessment   Prognosis Fair   Problem List Decreased strength;Decreased range of motion;Decreased endurance;Impaired balance;Decreased mobility;Decreased coordination;Decreased cognition;Impaired judgement;Decreased safety awareness;Pain   Assessment Pt initially demonstrated good motivation to complete therapy. Pt agreeable to  "complete standing to have pericare done. Pt required A to complete LE management and upright trunk. Noted increased anxiety sitting EOB with increased respiratory rate and WOB. Pt impulsively put himself supine, requesting to be suctioned. After prolonged rest period pt was able to complete rolling to complete pericare. Pt will benefit from continued inpt skilled PT and rehab to maximize functional mobility & safety.   Barriers to Discharge Decreased caregiver support;Inaccessible home environment   Goals   Patient Goals \"To feel like I can breathe\"   STG Expiration Date 10/12/24   Plan   Treatment/Interventions OT;Spoke to case management;Spoke to nursing;Gait training;Bed mobility;Patient/family training;Endurance training;LE strengthening/ROM;Functional transfer training   Progress Slow progress, decreased activity tolerance   PT Frequency 2-3x/wk   Discharge Recommendation   Rehab Resource Intensity Level, PT II (Moderate Resource Intensity)   AM-PAC Basic Mobility Inpatient   Turning in Flat Bed Without Bedrails 3   Lying on Back to Sitting on Edge of Flat Bed Without Bedrails 2   Moving Bed to Chair 2   Standing Up From Chair Using Arms 2   Walk in Room 2   Climb 3-5 Stairs With Railing 1   Basic Mobility Inpatient Raw Score 12   Basic Mobility Standardized Score 32.23   Turning Head Towards Sound 4   Follow Simple Instructions 3   Low Function Basic Mobility Raw Score  19   Low Function Basic Mobility Standardized Score  31.06   [facility] Highest Level Of Mobility   -HLM Goal 4: Move to chair/commode   -HLM Achieved 3: Sit at edge of bed     Nevin Brown PT, DPT  "

## 2024-10-04 NOTE — PLAN OF CARE
Problem: PHYSICAL THERAPY ADULT  Goal: Performs mobility at highest level of function for planned discharge setting.  See evaluation for individualized goals.  Description: Treatment/Interventions: OT, Spoke to nursing, Spoke to case management, Gait training, Bed mobility, Patient/family training, Therapeutic exercise, Endurance training, LE strengthening/ROM, Functional transfer training          See flowsheet documentation for full assessment, interventions and recommendations.  Note: Prognosis: Fair  Problem List: Decreased strength, Decreased range of motion, Decreased endurance, Impaired balance, Decreased mobility, Decreased coordination, Decreased cognition, Impaired judgement, Decreased safety awareness, Pain  Assessment: Pt initially demonstrated good motivation to complete therapy. Pt agreeable to complete standing to have pericare done. Pt required A to complete LE management and upright trunk. Noted increased anxiety sitting EOB with increased respiratory rate and WOB. Pt impulsively put himself supine, requesting to be suctioned. After prolonged rest period pt was able to complete rolling to complete pericare. Pt will benefit from continued inpt skilled PT and rehab to maximize functional mobility & safety.  Barriers to Discharge: Decreased caregiver support, Inaccessible home environment     Rehab Resource Intensity Level, PT: II (Moderate Resource Intensity)    See flowsheet documentation for full assessment.

## 2024-10-04 NOTE — PLAN OF CARE
Problem: OCCUPATIONAL THERAPY ADULT  Goal: Performs self-care activities at highest level of function for planned discharge setting.  See evaluation for individualized goals.  Description: Treatment Interventions: ADL retraining, Functional transfer training, UE strengthening/ROM, Endurance training, Cognitive reorientation, Patient/family training, Equipment evaluation/education, Compensatory technique education, Activityengagement          See flowsheet documentation for full assessment, interventions and recommendations.   Note: Limitation: Decreased ADL status, Decreased UE ROM, Decreased UE strength, Decreased endurance, Decreased self-care trans  Prognosis: Fair  Assessment: Patient participated in Skilled OT session 10/4/2024 with interventions consisting of ADL re training with the use of correct body mechnaics, Energy Conservation techniques, safety awareness and fall prevention techniques,  therapeutic activities to: increase activity tolerance, increase postural control, and increase trunk control . Patient agreeable to OT treatment session, upon arrival patient was found supine in bed.  In comparison to previous session, patient with improvements in bed mobility and EOB sitting tolerance . Patient requiring verbal cues for safety, verbal cues for correct technique, verbal cues for pacing thru activity steps, and frequent rest periods. Patient continues to be functioning below baseline level, occupational performance remains limited secondary to factors listed above and increased risk for falls and injury.   From OT standpoint, recommendation at time of d/c would be moderate resource intensity. Patient to benefit from continued Occupational Therapy treatment while in the hospital to address deficits as defined above and maximize level of functional independence with ADLs and functional mobility.     Rehab Resource Intensity Level, OT: II (Moderate Resource Intensity)        Rhoda López MS,  OTR/L

## 2024-10-04 NOTE — CASE MANAGEMENT
Case Management Discharge Planning Note    Patient name Marcin Sánchez  Location [facility]  MRN [id number]  : 1960 Date 10/4/2024       Current Admission Date: 2024  Current Admission Diagnosis:Acute hypoxic respiratory failure (HCC)   Patient Active Problem List    Diagnosis Date Noted Date Diagnosed    Insomnia 10/03/2024     Agitation 10/03/2024     Palliative care encounter 10/01/2024     Acute hypoxic respiratory failure (HCC) 2024     Shock (HCC) 2024     Mucus plugging of bronchi 2024     Transaminitis 2024     Cardiac arrest (HCC) 2024     Pericardial effusion 2024     Acute on chronic respiratory failure with hypoxia and hypercapnia (HCC) 2024     Acute metabolic encephalopathy 2024     Chronic combined systolic and diastolic CHF (congestive heart failure) (HCC) 2024     Atrial fibrillation (HCC) 2024     Pulmonary fibrosis (HCC) 2024     Suspected chronic pulmonary embolism (HCC) 2024     Squamous cell lung cancer (HCC) 2024     Hyponatremia 2024     Severe protein-calorie malnutrition (HCC) 2024     Edema of both legs 2022     Tobacco abuse 2020     Nonrheumatic aortic valve insufficiency 2020     Malignant neoplasm of upper lobe of right lung (HCC) 2018     Thoracic aortic aneurysm without rupture (HCC) 2018     Cancer of right main bronchus (HCC) 10/04/2016     Pleural effusion 10/02/2016     Multiple lung nodules on CT 10/02/2016     Collapse of right lung 2016     Acute exacerbation of chronic obstructive pulmonary disease (COPD) (HCC)      Epilepsy (HCC)      Lyme disease      Major depression      Alcohol abuse        LOS (days): 26  Geometric Mean LOS (GMLOS) (days): 5.1  Days to GMLOS:-20.7     OBJECTIVE:  Risk of Unplanned Readmission Score: 35.81         Current admission status: Inpatient   Preferred Pharmacy:   [facility] -  -  [facility] - [address]  [address],  [facility] [address]  [place] [address]  Phone: [phone] Fax: [phone]    [facility]gueta - [address] [place] [address]  Phone: [phone] Fax: [phone]    Primary Care Provider: Brandt Godinez MD    Primary Insurance: [facility]  Secondary Insurance:     DISCHARGE DETAILS:     sent message in [employer] to [employer] inquiring if there was an update on the single case agreement with insurance. [facility]ik [facility] informed that single case agreement was in process.

## 2024-10-04 NOTE — RESPIRATORY THERAPY NOTE
RT Ventilator Management Note  Marcin Sánchez 64 y.o. male MRN: [id number]  Unit/Bed#: [facility] 205-01 Encounter: [id number]      Daily Screen         10/3/2024  0756 10/4/2024  0813          Patient safety screen outcome:: Passed Passed      Spont breathing trial outcome:: -- Passed                Physical Exam:   Assessment Type: Pre-treatment  General Appearance: Awake  Respiratory Pattern: Normal, Assisted  Chest Assessment: Chest expansion symmetrical  Bilateral Breath Sounds: Diminished, Coarse  Cough: Productive, Strong  Suction: Trach      Resp Comments: (P) Pt placed on PS 12 peep 6 40%. Will continue to monitor pt.

## 2024-10-04 NOTE — OCCUPATIONAL THERAPY NOTE
Occupational Therapy Treatment Note      Marcin Sánchez    10/4/2024    Principal Problem:    Acute hypoxic respiratory failure (HCC)  Active Problems:    Acute exacerbation of chronic obstructive pulmonary disease (COPD) (HCC)    Cancer of right main bronchus (HCC)    Chronic combined systolic and diastolic CHF (congestive heart failure) (HCC)    Cardiac arrest (HCC)    Pericardial effusion    Shock (HCC)    Mucus plugging of bronchi    Palliative care encounter    Insomnia    Agitation      Past Medical History:   Diagnosis Date    Alcohol abuse     Anxiety     Aortic insufficiency 12/18/2020    Atrial fibrillation (HCC)     Cancer (HCC)     COPD (chronic obstructive pulmonary disease) (HCC)     Delirium     Encephalopathy     Epilepsy (HCC)     History of chemotherapy     History of radiation therapy     Hypo-osmolality and hyponatremia     Hypokalemia     Hypothyroid     Lung cancer (HCC)     Lyme disease     Major depression        Past Surgical History:   Procedure Laterality Date    BRONCHOSCOPY N/A 10/3/2016    Procedure: BRONCHOSCOPY FLEXIBLE;  Surgeon: John Brunner DO;  [facility]: [facility];  Service:     GASTROSTOMY TUBE PLACEMENT N/A 9/19/2024    Procedure: INSERTION GASTROSTOMY TUBE OPEN;  Surgeon: Darrin Bugros DO;  Location: [facility];  Service: General    HAND SURGERY      PEG W/TRACHEOSTOMY PLACEMENT N/A 9/19/2024    Procedure: TRACHEOSTOMY WITH INSERTION PEG TUBE;  Surgeon: Darrin Burgos DO;  [facility];  Service: General    PELVIC FRACTURE SURGERY      REMOVAL VENOUS PORT (PORT-A-CATH) Left 9/18/2018    Procedure: REMOVAL VENOUS PORT (PORT-A-CATH)IR;  Surgeon: Randy Lopez DO;  [facility]: [facility];  Service: Interventional Radiology        10/04/24 1106   OT Last Visit   OT Visit Date 10/04/24   Note Type   Note Type Treatment   Pain Assessment   Pain Assessment Tool FLACC   Pain Rating: FLACC (Rest) - Face 1   Pain Rating: FLACC (Rest) - Legs 1   Pain  Rating: FLACC (Rest) - Activity 1   Pain Rating: FLACC (Rest) - Cry 1   Pain Rating: FLACC (Rest) - Consolability 1   Score: FLACC (Rest) 5   Pain Rating: FLACC (Activity) - Face 1   Pain Rating: FLACC (Activity) - Legs 1   Pain Rating: FLACC (Activity) - Activity 1   Pain Rating: FLACC (Activity) - Cry 1   Pain Rating: FLACC (Activity) - Consolability 1   Score: FLACC (Activity) 5   Restrictions/Precautions   Weight Bearing Precautions Per Order No   Other Precautions Contact/isolation;Cognitive;Impulsive;Agitated;Multiple lines;Telemetry;O2;Fall Risk;Pain;1:1;Bed Alarm;Chair Alarm  (trach,vent,peg)   Lifestyle   Autonomy I basic adls, receives assist for showers, ambulates w/o ad - meals/homemaking provided   Reciprocal Relationships supportive facility staff   Service to Others retired   Intrinsic Gratification Likes automechanics   ADL   Eating Assistance Unable to assess   Eating Deficit NPO   LB Bathing Assistance 2  Maximal Assistance   LB Bathing Deficit Buttocks;Increased time to complete;Supervision/safety;Verbal cueing   LB Bathing Comments Max A for hygiene while supinein bed   Toileting Assistance  1  Total Assistance   Toileting Deficit Perineal hygiene   Bed Mobility   Rolling R 3  Moderate assistance   Additional items Assist x 1;Increased time required;Verbal cues;LE management   Rolling L 3  Moderate assistance   Additional items Assist x 1;Increased time required;Verbal cues;LE management   Supine to Sit 3  Moderate assistance   Additional items Assist x 1;HOB elevated;Verbal cues;LE management;Increased time required   Sit to Supine 3  Moderate assistance   Additional items Assist x 1;Increased time required;Verbal cues;LE management   Additional Comments pt went from supine<>sit w/ Mod A x1; SBA 2nd for safety; Sat EOB w/ CGA for sitting balance/trunk control. Pt limited by anxiety and increased secretions while seated upright. Pt laid himself back in supine. Pt then rolled L/Rin bed w/ Mod A  x1; VC for safety, SBA 2nd for vent management.   Transfers   Sit to Stand Unable to assess   Stand to Sit Unable to assess   Cognition   Overall Cognitive Status Impaired   Arousal/Participation Responsive;Cooperative   Attention Attends with cues to redirect   Orientation Level Oriented to person;Oriented to place   Memory Decreased recall of precautions   Following Commands Follows one step commands with increased time or repetition   Comments pt is overall cooperative; limited by fatigue/pain/anxiety. Needs increased time to complete all tasks   Activity Tolerance   Activity Tolerance Patient limited by pain;Patient limited by fatigue   Medical Staff Made Aware PT, RN   Assessment   Assessment Patient participated in Skilled OT session 10/4/2024 with interventions consisting of ADL re training with the use of correct body mechnaics, Energy Conservation techniques, safety awareness and fall prevention techniques,  therapeutic activities to: increase activity tolerance, increase postural control, and increase trunk control . Patient agreeable to OT treatment session, upon arrival patient was found supine in bed.  In comparison to previous session, patient with improvements in bed mobility and EOB sitting tolerance . Patient requiring verbal cues for safety, verbal cues for correct technique, verbal cues for pacing thru activity steps, and frequent rest periods. Patient continues to be functioning below baseline level, occupational performance remains limited secondary to factors listed above and increased risk for falls and injury.   From OT standpoint, recommendation at time of d/c would be moderate resource intensity. Patient to benefit from continued Occupational Therapy treatment while in the hospital to address deficits as defined above and maximize level of functional independence with ADLs and functional mobility.   Plan   Treatment Interventions ADL retraining;Functional transfer training;UE  strengthening/ROM;Endurance training;Patient/family training;Cognitive reorientation;Equipment evaluation/education;Compensatory technique education;Continued evaluation;Energy conservation;Activityengagement   Goal Expiration Date 10/18/24  (extension of previously est. goals)   OT Treatment Day 4   OT Frequency 1-2x/wk   Discharge Recommendation   Rehab Resource Intensity Level, OT II (Moderate Resource Intensity)   Additional Comments  The patient's raw score on the AM-PAC Daily Activity Inpatient Short Form is 11. A raw score of less than 19 suggests the patient may benefit from discharge to post-acute rehabilitation services. Please refer to the recommendation of the Occupational Therapist for safe discharge planning.   Additional Comments 2 Pt seen as a co-session due to the patient's co-morbidities, clinically unstable presentation, and present impairments which are a regression from the patient's baseline.   AM-PAC Daily Activity Inpatient   Lower Body Dressing 2   Bathing 2   Toileting 2   Upper Body Dressing 2   Grooming 2   Eating 1   Daily Activity Raw Score 11   Daily Activity Standardized Score (Calc for Raw Score >=11) 29.04   AM-PAC Applied Cognition Inpatient   Following a Speech/Presentation 2   Understanding Ordinary Conversation 3   Taking Medications 3   Remembering Where Things Are Placed or Put Away 3   Remembering List of 4-5 Errands 2   Taking Care of Complicated Tasks 1   Applied Cognition Raw Score 14   Applied Cognition Standardized Score 32.02   End of Consult   Education Provided Yes   Patient Position at End of Consult Supine;All needs within reach;Bed/Chair alarm activated   Nurse Communication Nurse aware of consult     Rhoda López MS, OTR/L     Will identify and utilize 2 coping skills Will identify and utilize 2 coping skills

## 2024-10-04 NOTE — ASSESSMENT & PLAN NOTE
"Capacity:  Pt did not exhibit full competency during our evaluation today. Communication is inherently challenging with his trach and he reported being illiterate today when we offered a whiteboard for him to write on. He was oriented to person and place, but other than knowing the year, he was not oriented to time. He was unable to answer who the current [place] President is, and could not articulate his wishes should he suffer another cardiac arrest.    GOC:  Without full capacity, medical decisions revert to his legally appointed guardian, Tere Williamson ([phone]). Per chart review, Tere was appointed pt's guardian in 2015 and since it was \"so long ago,\" was unable to recall why she was appointed. Tere had been employed at \"[employer]\" when she was appointed pt's guardian, but has not worked for the company for the \"last few months.\" Tere is now living in [place]. Tere was contacted by JEFF  on 9/25 and confirmed she was pt's legal guardian; Tere went on to say it was okay to speak with pt's sister, Katalina, about pt's medical state.    Upon collaborative discussion, palliative service will reach out to [facility]'s legal consult resource, Catherine Ivan Huitronzahraabryon, for specific legal assistance in navigating pt's medical decision maker complexity. We have communicated this to the pt's primary CC team as well.    ACP:  Pt's chart states he has ACP docs, but when examined, it is a single scanned-in sheet from 3/14/14 signed by pt's sister Katalina stating that the pt does not have any AD, LW, or durable POA.     Disposition:  At this time, pt's medical needs supersede that of his previous assisted living facility residence, [facility]. CM is actively searching for a LTAC facility that could accommodate pt.     Symptom Support:  Symptom mgmt portion of palliative consult was related to pt's intermittent agitation, specifically at night grabbing for/pulling at his trach. On discussion with primary CC " service, there is a balance between sedation for his behaviors, and oversedation as they steadily attempt to decrease pt's MV support. We have switched pt from qHS olanzapine to prn haldol, as haldol is one of the least sedating anti-confusion/anti-psychotic medications. Should haldol ultimately prove ineffective, could revisit return to olanzapine. He also remains on 1:1 observation as well.     Follow-up:  Palliative service will continue to follow Marcin and coordinate medical decision making apparatus with help of legal consult service.     PDMP Review: I have reviewed the patient's controlled substance dispensing history in the Prescription Drug Monitoring Program in compliance with the [facility] regulations before prescribing any controlled substances.

## 2024-10-05 ENCOUNTER — APPOINTMENT (INPATIENT)
Dept: RADIOLOGY | Facility: HOSPITAL | Age: 64
DRG: 005 | End: 2024-10-05
Payer: COMMERCIAL

## 2024-10-05 LAB
ANION GAP SERPL CALCULATED.3IONS-SCNC: 6 MMOL/L (ref 4–13)
BASE EX.OXY STD BLDV CALC-SCNC: 52.5 % (ref 60–80)
BASE EXCESS BLDV CALC-SCNC: 8.4 MMOL/L
BASOPHILS # BLD AUTO: 0.04 THOUSANDS/ΜL (ref 0–0.1)
BASOPHILS NFR BLD AUTO: 0 % (ref 0–1)
BUN SERPL-MCNC: 26 MG/DL (ref 5–25)
CALCIUM SERPL-MCNC: 8.5 MG/DL (ref 8.4–10.2)
CHLORIDE SERPL-SCNC: 96 MMOL/L (ref 96–108)
CO2 SERPL-SCNC: 35 MMOL/L (ref 21–32)
CREAT SERPL-MCNC: 0.47 MG/DL (ref 0.6–1.3)
EOSINOPHIL # BLD AUTO: 0.11 THOUSAND/ΜL (ref 0–0.61)
EOSINOPHIL NFR BLD AUTO: 1 % (ref 0–6)
ERYTHROCYTE [DISTWIDTH] IN BLOOD BY AUTOMATED COUNT: 14.3 % (ref 11.6–15.1)
GFR SERPL CREATININE-BSD FRML MDRD: 117 ML/MIN/1.73SQ M
GLUCOSE SERPL-MCNC: 158 MG/DL (ref 65–140)
GLUCOSE SERPL-MCNC: 210 MG/DL (ref 65–140)
HCO3 BLDV-SCNC: 36 MMOL/L (ref 24–30)
HCT VFR BLD AUTO: 22.9 % (ref 36.5–49.3)
HGB BLD-MCNC: 7.2 G/DL (ref 12–17)
IMM GRANULOCYTES # BLD AUTO: 0.07 THOUSAND/UL (ref 0–0.2)
IMM GRANULOCYTES NFR BLD AUTO: 1 % (ref 0–2)
LYMPHOCYTES # BLD AUTO: 0.38 THOUSANDS/ΜL (ref 0.6–4.47)
LYMPHOCYTES NFR BLD AUTO: 3 % (ref 14–44)
MAGNESIUM SERPL-MCNC: 1.8 MG/DL (ref 1.9–2.7)
MCH RBC QN AUTO: 32.6 PG (ref 26.8–34.3)
MCHC RBC AUTO-ENTMCNC: 31.4 G/DL (ref 31.4–37.4)
MCV RBC AUTO: 104 FL (ref 82–98)
MONOCYTES # BLD AUTO: 1.28 THOUSAND/ΜL (ref 0.17–1.22)
MONOCYTES NFR BLD AUTO: 10 % (ref 4–12)
NEUTROPHILS # BLD AUTO: 10.44 THOUSANDS/ΜL (ref 1.85–7.62)
NEUTS SEG NFR BLD AUTO: 85 % (ref 43–75)
NRBC BLD AUTO-RTO: 0 /100 WBCS
O2 CT BLDV-SCNC: 6.2 ML/DL
PCO2 BLDV: 71.9 MM HG (ref 42–50)
PH BLDV: 7.32 [PH] (ref 7.3–7.4)
PHOSPHATE SERPL-MCNC: 3.7 MG/DL (ref 2.3–4.1)
PLATELET # BLD AUTO: 243 THOUSANDS/UL (ref 149–390)
PMV BLD AUTO: 8.6 FL (ref 8.9–12.7)
PO2 BLDV: 30.2 MM HG (ref 35–45)
POTASSIUM SERPL-SCNC: 4.5 MMOL/L (ref 3.5–5.3)
RBC # BLD AUTO: 2.21 MILLION/UL (ref 3.88–5.62)
SODIUM SERPL-SCNC: 137 MMOL/L (ref 135–147)
WBC # BLD AUTO: 12.32 THOUSAND/UL (ref 4.31–10.16)

## 2024-10-05 PROCEDURE — 71045 X-RAY EXAM CHEST 1 VIEW: CPT

## 2024-10-05 PROCEDURE — 94003 VENT MGMT INPAT SUBQ DAY: CPT

## 2024-10-05 PROCEDURE — 94664 DEMO&/EVAL PT USE INHALER: CPT

## 2024-10-05 PROCEDURE — 80048 BASIC METABOLIC PNL TOTAL CA: CPT

## 2024-10-05 PROCEDURE — 94760 N-INVAS EAR/PLS OXIMETRY 1: CPT

## 2024-10-05 PROCEDURE — 94640 AIRWAY INHALATION TREATMENT: CPT

## 2024-10-05 PROCEDURE — 85025 COMPLETE CBC W/AUTO DIFF WBC: CPT

## 2024-10-05 PROCEDURE — 94669 MECHANICAL CHEST WALL OSCILL: CPT

## 2024-10-05 PROCEDURE — 93005 ELECTROCARDIOGRAM TRACING: CPT

## 2024-10-05 PROCEDURE — 82805 BLOOD GASES W/O2 SATURATION: CPT

## 2024-10-05 PROCEDURE — 99233 SBSQ HOSP IP/OBS HIGH 50: CPT | Performed by: INTERNAL MEDICINE

## 2024-10-05 PROCEDURE — 82948 REAGENT STRIP/BLOOD GLUCOSE: CPT

## 2024-10-05 PROCEDURE — 84100 ASSAY OF PHOSPHORUS: CPT

## 2024-10-05 PROCEDURE — 83735 ASSAY OF MAGNESIUM: CPT

## 2024-10-05 RX ORDER — SODIUM CHLORIDE FOR INHALATION 3 %
4 VIAL, NEBULIZER (ML) INHALATION
Status: DISCONTINUED | OUTPATIENT
Start: 2024-10-05 | End: 2024-10-08

## 2024-10-05 RX ORDER — SODIUM CHLORIDE FOR INHALATION 3 %
4 VIAL, NEBULIZER (ML) INHALATION ONCE
Status: DISCONTINUED | OUTPATIENT
Start: 2024-10-05 | End: 2024-10-05

## 2024-10-05 RX ORDER — MAGNESIUM SULFATE HEPTAHYDRATE 40 MG/ML
2 INJECTION, SOLUTION INTRAVENOUS ONCE
Status: COMPLETED | OUTPATIENT
Start: 2024-10-05 | End: 2024-10-06

## 2024-10-05 RX ORDER — QUETIAPINE FUMARATE 25 MG/1
25 TABLET, FILM COATED ORAL
Status: DISCONTINUED | OUTPATIENT
Start: 2024-10-05 | End: 2024-10-06

## 2024-10-05 RX ADMIN — CHLORHEXIDINE GLUCONATE 0.12% ORAL RINSE 15 ML: 1.2 LIQUID ORAL at 09:17

## 2024-10-05 RX ADMIN — HALOPERIDOL 1 MG: 2 SOLUTION ORAL at 20:19

## 2024-10-05 RX ADMIN — LEVALBUTEROL HYDROCHLORIDE 1.25 MG: 1.25 SOLUTION RESPIRATORY (INHALATION) at 07:10

## 2024-10-05 RX ADMIN — SENNOSIDES 8.8 MG: 8.8 LIQUID ORAL at 21:07

## 2024-10-05 RX ADMIN — OXYCODONE HYDROCHLORIDE 2.5 MG: 5 SOLUTION ORAL at 09:18

## 2024-10-05 RX ADMIN — OXYCODONE HYDROCHLORIDE 2.5 MG: 5 SOLUTION ORAL at 21:08

## 2024-10-05 RX ADMIN — LEVOTHYROXINE SODIUM 250 MCG: 100 TABLET ORAL at 05:11

## 2024-10-05 RX ADMIN — IPRATROPIUM BROMIDE 0.5 MG: 0.5 SOLUTION RESPIRATORY (INHALATION) at 20:01

## 2024-10-05 RX ADMIN — APIXABAN 5 MG: 5 TABLET, FILM COATED ORAL at 18:11

## 2024-10-05 RX ADMIN — BUDESONIDE 0.5 MG: 0.5 INHALANT RESPIRATORY (INHALATION) at 07:10

## 2024-10-05 RX ADMIN — Medication 12.5 MG: at 09:18

## 2024-10-05 RX ADMIN — PHENYTOIN 150 MG: 125 SUSPENSION ORAL at 09:17

## 2024-10-05 RX ADMIN — OXYCODONE HYDROCHLORIDE 2.5 MG: 5 SOLUTION ORAL at 18:11

## 2024-10-05 RX ADMIN — FOLIC ACID 1 MG: 1 TABLET ORAL at 09:17

## 2024-10-05 RX ADMIN — OXYCODONE HYDROCHLORIDE 2.5 MG: 5 SOLUTION ORAL at 05:12

## 2024-10-05 RX ADMIN — DOXAZOSIN 2 MG: 2 TABLET ORAL at 09:18

## 2024-10-05 RX ADMIN — LEVALBUTEROL HYDROCHLORIDE 1.25 MG: 1.25 SOLUTION RESPIRATORY (INHALATION) at 20:01

## 2024-10-05 RX ADMIN — SODIUM CHLORIDE SOLN NEBU 3% 4 ML: 3 NEBU SOLN at 13:27

## 2024-10-05 RX ADMIN — SODIUM CHLORIDE 2 G: 1 TABLET ORAL at 18:11

## 2024-10-05 RX ADMIN — POTASSIUM CHLORIDE 20 MEQ: 1.5 SOLUTION ORAL at 09:17

## 2024-10-05 RX ADMIN — FUROSEMIDE 40 MG: 40 TABLET ORAL at 09:17

## 2024-10-05 RX ADMIN — SODIUM CHLORIDE SOLN NEBU 3% 4 ML: 3 NEBU SOLN at 20:02

## 2024-10-05 RX ADMIN — MAGNESIUM SULFATE HEPTAHYDRATE 2 G: 40 INJECTION, SOLUTION INTRAVENOUS at 23:08

## 2024-10-05 RX ADMIN — BUDESONIDE 0.5 MG: 0.5 INHALANT RESPIRATORY (INHALATION) at 20:01

## 2024-10-05 RX ADMIN — CHLORHEXIDINE GLUCONATE 0.12% ORAL RINSE 15 ML: 1.2 LIQUID ORAL at 21:08

## 2024-10-05 RX ADMIN — Medication 3 MG: at 21:07

## 2024-10-05 RX ADMIN — Medication 100 MCG: at 09:17

## 2024-10-05 RX ADMIN — NICOTINE 1 PATCH: 21 PATCH, EXTENDED RELEASE TRANSDERMAL at 09:22

## 2024-10-05 RX ADMIN — POLYETHYLENE GLYCOL 3350 17 G: 17 POWDER, FOR SOLUTION ORAL at 09:18

## 2024-10-05 RX ADMIN — ACETAMINOPHEN 975 MG: 650 SUSPENSION ORAL at 05:12

## 2024-10-05 RX ADMIN — APIXABAN 5 MG: 5 TABLET, FILM COATED ORAL at 09:17

## 2024-10-05 RX ADMIN — SODIUM CHLORIDE 2 G: 1 TABLET ORAL at 09:17

## 2024-10-05 RX ADMIN — GABAPENTIN 400 MG: 400 CAPSULE ORAL at 09:17

## 2024-10-05 RX ADMIN — QUETIAPINE FUMARATE 25 MG: 25 TABLET ORAL at 21:08

## 2024-10-05 RX ADMIN — IPRATROPIUM BROMIDE 0.5 MG: 0.5 SOLUTION RESPIRATORY (INHALATION) at 07:10

## 2024-10-05 RX ADMIN — IPRATROPIUM BROMIDE 0.5 MG: 0.5 SOLUTION RESPIRATORY (INHALATION) at 13:27

## 2024-10-05 RX ADMIN — SODIUM CHLORIDE SOLN NEBU 3% 4 ML: 3 NEBU SOLN at 07:10

## 2024-10-05 RX ADMIN — LEVALBUTEROL HYDROCHLORIDE 1.25 MG: 1.25 SOLUTION RESPIRATORY (INHALATION) at 13:27

## 2024-10-05 RX ADMIN — Medication 12.5 MG: at 21:08

## 2024-10-05 RX ADMIN — POLYETHYLENE GLYCOL 3350 17 G: 17 POWDER, FOR SOLUTION ORAL at 18:11

## 2024-10-05 RX ADMIN — GABAPENTIN 400 MG: 400 CAPSULE ORAL at 18:11

## 2024-10-05 RX ADMIN — PHENYTOIN 150 MG: 125 SUSPENSION ORAL at 21:08

## 2024-10-05 NOTE — RESPIRATORY THERAPY NOTE
RT Ventilator Management Note  Marcin Sánchez 64 y.o. male MRN: [id number]  Unit/Bed#: [facility] 205-01 Encounter: [id number]      Daily Screen         10/4/2024  0813 10/5/2024  0710          Patient safety screen outcome:: Passed Passed (P)       Spont breathing trial % for 30 min: -- Yes (P)       Spont breathing trial outcome:: Passed --                Physical Exam:   Assessment Type: Assess only  General Appearance: Awake  Respiratory Pattern: Normal, Assisted  Chest Assessment: Chest expansion symmetrical  Bilateral Breath Sounds: Coarse, Diminished  R Breath Sounds: Expiratory wheezes  Cough: Non-productive  Suction: Trach  O2 Device: vent      Resp Comments: (P) Pt awake. Pt placed on PS wean 12/6/40%. Will continue to monitor pt.

## 2024-10-05 NOTE — PLAN OF CARE
Problem: PAIN - ADULT  Goal: Verbalizes/displays adequate comfort level or baseline comfort level  Description: Interventions:  - Encourage patient to monitor pain and request assistance  - Assess pain using appropriate pain scale  - Administer analgesics based on type and severity of pain and evaluate response  - Implement non-pharmacological measures as appropriate and evaluate response  - Consider cultural and social influences on pain and pain management  - Notify physician/advanced practitioner if interventions unsuccessful or patient reports new pain  Outcome: Progressing     Problem: INFECTION - ADULT  Goal: Absence or prevention of progression during hospitalization  Description: INTERVENTIONS:  - Assess and monitor for signs and symptoms of infection  - Monitor lab/diagnostic results  - Monitor all insertion sites, i.e. indwelling lines, tubes, and drains  - Monitor endotracheal if appropriate and nasal secretions for changes in amount and color  - [place] appropriate cooling/warming therapies per order  - Administer medications as ordered  - Instruct and encourage patient and family to use good hand hygiene technique  - Identify and instruct in appropriate isolation precautions for identified infection/condition  Outcome: Progressing     Problem: RESPIRATORY - ADULT  Goal: Achieves optimal ventilation and oxygenation  Description: INTERVENTIONS:  - Assess for changes in respiratory status  - Assess for changes in mentation and behavior  - Position to facilitate oxygenation and minimize respiratory effort  - Oxygen administered by appropriate delivery if ordered  - Initiate smoking cessation education as indicated  - Encourage broncho-pulmonary hygiene including cough, deep breathe, Incentive Spirometry  - Assess the need for suctioning and aspirate as needed  - Assess and instruct to report SOB or any respiratory difficulty  - Respiratory Therapy support as indicated  Outcome: Progressing     Problem:  SAFETY ADULT  Goal: Patient will remain free of falls  Description: INTERVENTIONS:  - Educate patient/family on patient safety including physical limitations  - Instruct patient to call for assistance with activity   - Consult OT/PT to assist with strengthening/mobility   - Keep Call bell within reach  - Keep bed low and locked with side rails adjusted as appropriate  - Keep care items and personal belongings within reach  - Initiate and maintain comfort rounds  - Make Fall Risk Sign visible to staff  - Offer Toileting every 2 Hours, in advance of need  - Initiate/Maintain bed alarm  - Apply yellow socks and bracelet for high fall risk patients  - Consider moving patient to room near nurses station  Outcome: Progressing

## 2024-10-05 NOTE — PROGRESS NOTES
"Assessment & Plan   Problem list  Acute on chronic hypercapnic respiratory failure s/p Trach  V Tach Arrest  Shock, resolved  COPD  Encephalopathy  Anemia of chronic disease  Atrial fibrillation  Metabolic alkalosis  Pericardial effusion  Seizure disorder  Hypothyroidism  AAA  Insomnia  Tobacco use  Hx of lung SCC     Neuro  #Seizure disorder  Phenytoin 150 mg BID, recent check is therapeutic  Continue Gabapentin 400mg BID     #Insomnia  Trazodone 100mg daily at bedtime  Monitor Qtc (406 on 10/01)  Consider seroquel qhs     #Agitation  Intermittent  Palliative consulted  Switched from zyprexa to haldol  Continue 1-1     CV:   #Pericardial effusion   Echo 9/9  \"moderate to large pericardial effusion circumferential to the heart measuring largest along the RV free wall at 2.3 cm. The fluid exhibits no internal echoes. 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 Moderate to severe aortic regurgitation, mild tricuspid regurgitation, dilated ascending aortic root up to 5.4 cm.  On maintenance lasix po daily, consider additional diuresis today  Continue to monitor fluid status      #History of AAA  5.4 cm  Monitor hemodynamics; BP goal <130/80  Outpatient follow up     Pulm:  #Acute on Chronic hypoxic and hypercapnic respiratory failure.  #COPD, and acute exacerbation now resolved  Likely multifactorial including moderate to severe COPD  Course complicated by recurrent mucous plugging, status post therapeutic bronch x 3.  Sputum culture with Moraxella, status post course of azithromycin x 5 days.  Also with MDR acinetobacter likely contaminant versus colonization as patient has remained stable without " antibiotic treatment.  Status post trach 9/19  Pressure control while asleep, pressure support while awake as tolerated  Continue Xopenex and Atrovent TID  Continue pulmicort BID, Continue Performist BID     #History of squamous cell cancer of lung post chemo/radiation 2016, cancer of right mainstem  Noted     GI:   #PEG placed 9/19  Continue tube feeds at goal  Bowel regimen: Senokot, Miralax     :   #Urinary retention  Doxazosin 2mg     F/E/N:   F: No maintenance fluids  E: Replete as needed, continue sodium tabs  N: Continue TwoCalHN bolus feeds     Heme/Onc:   #Anemia of chronic disease  Continue B12/folate  Transfuse for Hgb <7 and/or symptomatic anemia     #Metabolic alkalosis  Due to prior diuresis, improved after diamox     #DVT ppx  SCDs and Eliquis     Endo:   #Hypothyroidism  Home med: Levothyroxine 250 mg  TSH 45.06 on 9/25 from 0.59 on 9/5, free T4 0.34 (9/25)  Restarted on home levothyroxine 250 mg  Recheck in 6 weeks     ID:   #Colonization with Acinetobacter  Continue to monitor WBC count and temperature curve     MSK/Skin:   #At risk for pressure injury   PT/OT when able  Frequent turns/repositioning  Skin surveillance      L: midline, PEG  D: none  A: PCV+ 16/6/40%      Disposition: Critical care    ICU Core Measures     Vented Patient  VAP Bundle  VAP bundle ordered     A: Assess, Prevent, and Manage Pain Has pain been assessed? Yes  Need for changes to pain regimen? No   B: Both Spontaneous Awakening Trials (SATs) and Spontaneous Breathing Trials (SBTs) Plan to perform spontaneous awakening trial today? N/A   Plan to perform spontaneous breathing trial today? Yes   Obvious barriers to extubation? Yes   C: Choice of Sedation RASS Goal: N/A patient not on sedation  Need for changes to sedation or analgesia regimen? NA   D: Delirium CAM-ICU: Negative   E: Early Mobility  Plan for early mobility? Yes   F: Family Engagement Plan for family engagement today? Yes       Review of Invasive  Devices:            Prophylaxis:  VTE VTE covered by:  apixaban, Oral, 5 mg at 10/04/24 1527       Stress Ulcer  not ordered         24 Hour Events : SOB overnight, CXR without acute changes  Subjective  Resting in bed comfortably without acute complaints      Objective :                   Vitals I/O      Most Recent Min/Max in 24hrs   Temp 97.6 °F (36.4 °C) Temp  Min: 97.6 °F (36.4 °C)  Max: 99.3 °F (37.4 °C)   Pulse 80 Pulse  Min: 72  Max: 108   Resp (!) 33 Resp  Min: 24  Max: 41   /54 BP  Min: 94/41  Max: 158/64   O2 Sat 100 % SpO2  Min: 97 %  Max: 100 %      Intake/Output Summary (Last 24 hours) at 10/5/2024 0639  Last data filed at 10/5/2024 0305  Gross per 24 hour   Intake 1690 ml   Output 500 ml   Net 1190 ml       Diet Enteral/Parenteral; Tube Feeding No Oral Diet; Two Mirza HN; Bolus; 240; (4x/day) - 8:00AM,12:00PM,4:00PM,8:00PM; Prosource Protein Liquid - One Packet; 30; Water; Before and After each Bolus    Invasive Monitoring           Physical Exam   Physical Exam  Vitals and nursing note reviewed.   Eyes:      General: No scleral icterus.     Conjunctiva/sclera: Conjunctivae normal.   Skin:     General: Skin is warm and dry.   HENT:      Head: Normocephalic and atraumatic.      Right Ear: No drainage.      Left Ear: No drainage.      Nose: No congestion.      Mouth/Throat:      Mouth: Mucous membranes are dry.   Cardiovascular:      Rate and Rhythm: Normal rate and regular rhythm.   Musculoskeletal:      Right lower leg: No edema.      Left lower leg: No edema.   Abdominal: General: Bowel sounds are normal.   Constitutional:       General: He is not in acute distress.     Appearance: He is well-developed. He is not ill-appearing.   Pulmonary:      Effort: Pulmonary effort is normal.      Breath sounds: Rales present.   Neurological:      General: No focal deficit present.      Mental Status: He is alert. He is calm.          Diagnostic Studies        Lab Results: I have reviewed the following  results:     Medications:  Scheduled PRN   apixaban, 5 mg, BID  budesonide, 0.5 mg, Q12H  chlorhexidine, 15 mL, Q12H [place]  vitamin B-12, 100 mcg, Daily  doxazosin, 2 mg, Daily  folic acid, 1 mg, Daily  furosemide, 40 mg, Daily  gabapentin, 400 mg, BID  ipratropium, 0.5 mg, TID  levalbuterol, 1.25 mg, TID  levothyroxine, 250 mcg, Early Morning  melatonin, 3 mg, HS  metoprolol tartrate, 12.5 mg, Q12H [place]  [START ON 10/19/2024] nicotine, 14 mg, Daily  nicotine, 1 patch, Daily  [START ON 11/2/2024] nicotine, 7 mg, Daily  oxyCODONE, 2.5 mg, Q6H  phenytoin, 150 mg, Q12H [place]  polyethylene glycol, 17 g, BID  potassium chloride, 20 mEq, Daily  senna, 8.8 mg, HS  sodium chloride, 4 mL, Q6H  sodium chloride, 2 g, BID With Meals  traZODone, 100 mg, HS      albuterol, 2.5 mg, Q6H PRN  bisacodyl, 10 mg, Daily PRN  haloperidol, 1 mg, Q6H PRN  lidocaine, , 4x Daily PRN  oxyCODONE, 5 mg, Q6H PRN       Continuous          Labs:   CBC    Recent Labs     10/04/24  0506 10/04/24  1542   WBC 6.80 5.55   HGB 7.0* 7.8*   HCT 21.8* 24.1*    251     BMP    Recent Labs     10/04/24  0506   SODIUM 137   K 4.0   CL 97   CO2 35*   AGAP 5   BUN 21   CREATININE 0.53*   CALCIUM 8.4       Coags    No recent results     Additional Electrolytes  No recent results       Blood Gas    No recent results  No recent results LFTs  Recent Labs     10/04/24  0506   ALT 10   AST 16   ALKPHOS 93   ALB 2.7*   TBILI 0.59       Infectious  No recent results  Glucose  Recent Labs     10/04/24  0506   GLUC 100

## 2024-10-05 NOTE — QUICK NOTE
10/5/2024 6:36 AM -  Marcin Sánchez's chart and case were reviewed by Olivia Lucero PA-C.  Mode of review included electronic chart check.      Per review, symptoms remain controlled on current regimen and no changes are made at this time. Patient has not used any PRN haldol in the past 24H. 1 dose of PRN oxycodone. Any additional changes ultimately at discretion of primary team. Please continue the regimen in place, and review our last note for details.  For dispo plan, please review Case Management notes.        For urgent issues or any questions/concerns, please notify on-call provider via [employer] Secure Chat.  You may also call our answering service 24/7 at [phone].    Olivia Lucero PA-C  Palliative and Supportive Care  Clinic/Answering Service: [phone]  You can find me on [employer]!

## 2024-10-06 ENCOUNTER — APPOINTMENT (INPATIENT)
Dept: RADIOLOGY | Facility: HOSPITAL | Age: 64
DRG: 005 | End: 2024-10-06
Payer: COMMERCIAL

## 2024-10-06 LAB
ATRIAL RATE: 99 BPM
BASE EX.OXY STD BLDV CALC-SCNC: 41.9 % (ref 60–80)
BASE EX.OXY STD BLDV CALC-SCNC: 44.4 % (ref 60–80)
BASE EXCESS BLDV CALC-SCNC: 4.5 MMOL/L
BASE EXCESS BLDV CALC-SCNC: 7.7 MMOL/L
HCO3 BLDV-SCNC: 31.9 MMOL/L (ref 24–30)
HCO3 BLDV-SCNC: 37.3 MMOL/L (ref 24–30)
O2 CT BLDV-SCNC: 5 ML/DL
O2 CT BLDV-SCNC: 5.2 ML/DL
P AXIS: 95 DEGREES
PCO2 BLDV: 66.3 MM HG (ref 42–50)
PCO2 BLDV: 95.3 MM HG (ref 42–50)
PH BLDV: 7.21 [PH] (ref 7.3–7.4)
PH BLDV: 7.3 [PH] (ref 7.3–7.4)
PO2 BLDV: 28 MM HG (ref 35–45)
PO2 BLDV: 29.5 MM HG (ref 35–45)
PR INTERVAL: 208 MS
QRS AXIS: -31 DEGREES
QRSD INTERVAL: 104 MS
QT INTERVAL: 317 MS
QTC INTERVAL: 407 MS
T WAVE AXIS: 125 DEGREES
VENTRICULAR RATE: 99 BPM

## 2024-10-06 PROCEDURE — 82805 BLOOD GASES W/O2 SATURATION: CPT | Performed by: STUDENT IN AN ORGANIZED HEALTH CARE EDUCATION/TRAINING PROGRAM

## 2024-10-06 PROCEDURE — 94640 AIRWAY INHALATION TREATMENT: CPT

## 2024-10-06 PROCEDURE — 82805 BLOOD GASES W/O2 SATURATION: CPT

## 2024-10-06 PROCEDURE — 71045 X-RAY EXAM CHEST 1 VIEW: CPT

## 2024-10-06 PROCEDURE — 94760 N-INVAS EAR/PLS OXIMETRY 1: CPT

## 2024-10-06 PROCEDURE — 99233 SBSQ HOSP IP/OBS HIGH 50: CPT | Performed by: INTERNAL MEDICINE

## 2024-10-06 PROCEDURE — 94669 MECHANICAL CHEST WALL OSCILL: CPT

## 2024-10-06 PROCEDURE — 93010 ELECTROCARDIOGRAM REPORT: CPT | Performed by: INTERNAL MEDICINE

## 2024-10-06 PROCEDURE — 94003 VENT MGMT INPAT SUBQ DAY: CPT

## 2024-10-06 PROCEDURE — 94664 DEMO&/EVAL PT USE INHALER: CPT

## 2024-10-06 RX ORDER — HALOPERIDOL 5 MG/ML
5 INJECTION INTRAMUSCULAR ONCE
Status: DISCONTINUED | OUTPATIENT
Start: 2024-10-06 | End: 2024-10-06

## 2024-10-06 RX ORDER — HALOPERIDOL 5 MG/ML
5 INJECTION INTRAMUSCULAR ONCE
Status: COMPLETED | OUTPATIENT
Start: 2024-10-06 | End: 2024-10-06

## 2024-10-06 RX ORDER — QUETIAPINE FUMARATE 25 MG/1
50 TABLET, FILM COATED ORAL 2 TIMES DAILY
Status: DISCONTINUED | OUTPATIENT
Start: 2024-10-06 | End: 2024-10-08

## 2024-10-06 RX ORDER — POLYETHYLENE GLYCOL 3350 17 G/17G
17 POWDER, FOR SOLUTION ORAL 2 TIMES DAILY
Status: DISCONTINUED | OUTPATIENT
Start: 2024-10-06 | End: 2024-10-16

## 2024-10-06 RX ORDER — DOXAZOSIN 2 MG/1
2 TABLET ORAL DAILY
Status: DISCONTINUED | OUTPATIENT
Start: 2024-10-07 | End: 2024-10-18 | Stop reason: HOSPADM

## 2024-10-06 RX ORDER — POTASSIUM CHLORIDE 20MEQ/15ML
20 LIQUID (ML) ORAL DAILY
Status: DISCONTINUED | OUTPATIENT
Start: 2024-10-07 | End: 2024-10-08

## 2024-10-06 RX ORDER — OXYCODONE HCL 5 MG/5 ML
2.5 SOLUTION, ORAL ORAL EVERY 6 HOURS
Status: DISCONTINUED | OUTPATIENT
Start: 2024-10-06 | End: 2024-10-15

## 2024-10-06 RX ORDER — FOLIC ACID 1 MG/1
1 TABLET ORAL DAILY
Status: DISCONTINUED | OUTPATIENT
Start: 2024-10-07 | End: 2024-10-08

## 2024-10-06 RX ORDER — QUETIAPINE FUMARATE 25 MG/1
25 TABLET, FILM COATED ORAL EVERY 12 HOURS
Status: COMPLETED | OUTPATIENT
Start: 2024-10-06 | End: 2024-10-06

## 2024-10-06 RX ORDER — OXYCODONE HCL 5 MG/5 ML
5 SOLUTION, ORAL ORAL EVERY 6 HOURS PRN
Status: DISCONTINUED | OUTPATIENT
Start: 2024-10-06 | End: 2024-10-15

## 2024-10-06 RX ORDER — FUROSEMIDE 40 MG/1
40 TABLET ORAL DAILY
Status: DISCONTINUED | OUTPATIENT
Start: 2024-10-07 | End: 2024-10-07

## 2024-10-06 RX ORDER — SODIUM CHLORIDE 1 G/1
2 TABLET ORAL 2 TIMES DAILY WITH MEALS
Status: DISCONTINUED | OUTPATIENT
Start: 2024-10-06 | End: 2024-10-08

## 2024-10-06 RX ORDER — HALOPERIDOL 2 MG/ML
1 SOLUTION ORAL EVERY 6 HOURS PRN
Status: DISCONTINUED | OUTPATIENT
Start: 2024-10-06 | End: 2024-10-13

## 2024-10-06 RX ORDER — LANOLIN ALCOHOL/MO/W.PET/CERES
3 CREAM (GRAM) TOPICAL
Status: DISCONTINUED | OUTPATIENT
Start: 2024-10-06 | End: 2024-10-10

## 2024-10-06 RX ADMIN — Medication 3 MG: at 21:56

## 2024-10-06 RX ADMIN — Medication 12.5 MG: at 09:18

## 2024-10-06 RX ADMIN — SENNOSIDES 8.8 MG: 8.8 LIQUID ORAL at 21:57

## 2024-10-06 RX ADMIN — OXYCODONE HYDROCHLORIDE 2.5 MG: 5 SOLUTION ORAL at 05:52

## 2024-10-06 RX ADMIN — Medication 12.5 MG: at 21:44

## 2024-10-06 RX ADMIN — DOXAZOSIN 2 MG: 2 TABLET ORAL at 09:18

## 2024-10-06 RX ADMIN — SODIUM CHLORIDE SOLN NEBU 3% 4 ML: 3 NEBU SOLN at 07:13

## 2024-10-06 RX ADMIN — SODIUM CHLORIDE 2 G: 1 TABLET ORAL at 09:21

## 2024-10-06 RX ADMIN — OXYCODONE HYDROCHLORIDE 2.5 MG: 5 SOLUTION ORAL at 09:18

## 2024-10-06 RX ADMIN — CHLORHEXIDINE GLUCONATE 0.12% ORAL RINSE 15 ML: 1.2 LIQUID ORAL at 09:17

## 2024-10-06 RX ADMIN — PHENYTOIN 150 MG: 125 SUSPENSION ORAL at 22:04

## 2024-10-06 RX ADMIN — Medication 100 MCG: at 09:18

## 2024-10-06 RX ADMIN — APIXABAN 5 MG: 5 TABLET, FILM COATED ORAL at 17:17

## 2024-10-06 RX ADMIN — GABAPENTIN 400 MG: 400 CAPSULE ORAL at 17:18

## 2024-10-06 RX ADMIN — APIXABAN 5 MG: 5 TABLET, FILM COATED ORAL at 09:18

## 2024-10-06 RX ADMIN — HALOPERIDOL 1 MG: 2 SOLUTION ORAL at 12:26

## 2024-10-06 RX ADMIN — QUETIAPINE FUMARATE 50 MG: 25 TABLET ORAL at 17:18

## 2024-10-06 RX ADMIN — BUDESONIDE 0.5 MG: 0.5 INHALANT RESPIRATORY (INHALATION) at 20:14

## 2024-10-06 RX ADMIN — FUROSEMIDE 40 MG: 40 TABLET ORAL at 09:18

## 2024-10-06 RX ADMIN — POLYETHYLENE GLYCOL 3350 17 G: 17 POWDER, FOR SOLUTION ORAL at 09:17

## 2024-10-06 RX ADMIN — LEVOTHYROXINE SODIUM 250 MCG: 100 TABLET ORAL at 09:26

## 2024-10-06 RX ADMIN — IPRATROPIUM BROMIDE 0.5 MG: 0.5 SOLUTION RESPIRATORY (INHALATION) at 20:14

## 2024-10-06 RX ADMIN — POTASSIUM CHLORIDE 20 MEQ: 1.5 SOLUTION ORAL at 09:17

## 2024-10-06 RX ADMIN — FOLIC ACID 1 MG: 1 TABLET ORAL at 09:18

## 2024-10-06 RX ADMIN — BUDESONIDE 0.5 MG: 0.5 INHALANT RESPIRATORY (INHALATION) at 07:13

## 2024-10-06 RX ADMIN — SODIUM CHLORIDE SOLN NEBU 3% 4 ML: 3 NEBU SOLN at 20:29

## 2024-10-06 RX ADMIN — NICOTINE 1 PATCH: 21 PATCH, EXTENDED RELEASE TRANSDERMAL at 09:20

## 2024-10-06 RX ADMIN — QUETIAPINE FUMARATE 25 MG: 25 TABLET ORAL at 09:17

## 2024-10-06 RX ADMIN — IPRATROPIUM BROMIDE 0.5 MG: 0.5 SOLUTION RESPIRATORY (INHALATION) at 15:23

## 2024-10-06 RX ADMIN — LEVALBUTEROL HYDROCHLORIDE 1.25 MG: 1.25 SOLUTION RESPIRATORY (INHALATION) at 20:14

## 2024-10-06 RX ADMIN — SODIUM CHLORIDE SOLN NEBU 3% 4 ML: 3 NEBU SOLN at 15:23

## 2024-10-06 RX ADMIN — OXYCODONE HYDROCHLORIDE 2.5 MG: 5 SOLUTION ORAL at 21:57

## 2024-10-06 RX ADMIN — SODIUM CHLORIDE 2 G: 1 TABLET ORAL at 17:19

## 2024-10-06 RX ADMIN — POLYETHYLENE GLYCOL 3350 17 G: 17 POWDER, FOR SOLUTION ORAL at 17:17

## 2024-10-06 RX ADMIN — QUETIAPINE FUMARATE 25 MG: 25 TABLET ORAL at 21:57

## 2024-10-06 RX ADMIN — LEVALBUTEROL HYDROCHLORIDE 1.25 MG: 1.25 SOLUTION RESPIRATORY (INHALATION) at 15:23

## 2024-10-06 RX ADMIN — HALOPERIDOL LACTATE 5 MG: 5 INJECTION, SOLUTION INTRAMUSCULAR at 00:41

## 2024-10-06 RX ADMIN — GABAPENTIN 400 MG: 400 CAPSULE ORAL at 09:18

## 2024-10-06 RX ADMIN — LEVALBUTEROL HYDROCHLORIDE 1.25 MG: 1.25 SOLUTION RESPIRATORY (INHALATION) at 07:13

## 2024-10-06 RX ADMIN — CHLORHEXIDINE GLUCONATE 0.12% ORAL RINSE 15 ML: 1.2 LIQUID ORAL at 20:34

## 2024-10-06 RX ADMIN — PHENYTOIN 150 MG: 125 SUSPENSION ORAL at 09:20

## 2024-10-06 RX ADMIN — IPRATROPIUM BROMIDE 0.5 MG: 0.5 SOLUTION RESPIRATORY (INHALATION) at 07:13

## 2024-10-06 NOTE — RESPIRATORY THERAPY NOTE
RT Ventilator Management Note  Marcin Sánchez 64 y.o. male MRN: [id number]  Unit/Bed#: [facility] 205-01 Encounter: [id number]      Daily Screen         10/5/2024  0710 10/6/2024  0725          Patient safety screen outcome:: Passed Passed (P)       Spont breathing trial % for 30 min: Yes --      Spont breathing trial outcome:: -- Passed (P)                 Physical Exam:   Assessment Type: Assess only  General Appearance: Awake  Respiratory Pattern: Normal, Assisted  Chest Assessment: Chest expansion symmetrical  Bilateral Breath Sounds: Diminished, Coarse  O2 Device: vent      Resp Comments: (P) Pt found on 20PS6 PEEP 40%. No vent changes at this time. Will continue to monitor pt.     signed

## 2024-10-06 NOTE — NURSING NOTE
Patient has been restless and agitated on and off, he pulled his trach our by almost 1 inch,  respiratory arrive to unit while nursing staff keeping patent stable with saturation above 95%, respiratory addressed trach and trach care provided.Patient now with productive blood tinged mucous and coughing frequently, suctioned multiple time, remains restless, will give medication to help settle. Will continue monitoring.

## 2024-10-06 NOTE — PROGRESS NOTES
"  Progress Note - Critical Care/ICU   Name: Marcin Sánchez 64 y.o. male I MRN: [id number]  Unit/Bed#: [facility] 205-01 I Date of Admission: 9/8/2024   Date of Service: 10/6/2024 I Hospital Day: 28       Assessment & Plan   Problem list  Acute on chronic hypercapnic respiratory failure s/p Trach  V Tach Arrest  Shock, resolved  COPD  Encephalopathy  Anemia of chronic disease  Atrial fibrillation  Metabolic alkalosis  Pericardial effusion  Seizure disorder  Hypothyroidism  AAA  Insomnia  Tobacco use  Hx of lung SCC      Neuro:   #Seizure disorder  Phenytoin 150 mg BID, recent check is therapeutic  Continue Gabapentin 400mg BID    #Insomnia  Trazodone 100mg daily at bedtime  Monitor Qtc (406--> 407 on 10/05)    #Agitation  Intermittent  Palliative consulted  Switched from zyprexa to haldol PRN  Received Seroquel last pm. Will continue Seroquel 25 mg BID  Monitor for respiratory depression & ETCO2.   Monitor qTC  Continue 1-1  Frequent reorientation     CV:   #Pericardial effusion   Echo 9/9  \"moderate to large pericardial effusion circumferential to the heart measuring largest along the RV free wall at 2.3 cm. The fluid exhibits no internal echoes. 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 Moderate to severe aortic regurgitation, mild tricuspid regurgitation, dilated ascending aortic root up to 5.4 cm.  On maintenance lasix po daily, consider additional diuresis today  Continue to monitor fluid status      #History of AAA  5.4 cm  Monitor hemodynamics; BP goal <130/80  Outpatient follow up    Pulm:  #Acute on Chronic hypoxic and hypercapnic respiratory failure.  #COPD, and acute exacerbation now " resolved  Likely multifactorial including moderate to severe COPD  Course complicated by recurrent mucous plugging, status post therapeutic bronch x 3.  Sputum culture with Moraxella, status post course of azithromycin x 5 days.  Also with MDR acinetobacter likely contaminant versus colonization as patient has remained stable without antibiotic treatment.  Status post trach 9/19  Pressure control while asleep, pressure support while awake as tolerated  Continue Xopenex and Atrovent TID  Continue pulmicort BID, Continue Performist BID  ETCO2 monitioring.      #History of squamous cell cancer of lung post chemo/radiation 2016, cancer of right mainstem  Noted       GI:   #PEG placed 9/19  Continue tube feeds at goal  Bowel regimen: Senokot, Miralax    :   #Urinary retention  Doxazosin 2mg     #Metabolic alkalosis  Due to prior diuresis, improved after diamox    F/E/N:   F: No maintenance fluids  E: Replete as needed, continue sodium tabs  N: Continue TwoCalHN bolus feeds       Heme/Onc:   #Anemia of chronic disease  Continue B12/folate  Transfuse for Hgb <7 and/or symptomatic anemia    #DVT ppx  SCDs and Eliquis    Endo:   #Hypothyroidism  Home med: Levothyroxine 250 mg  TSH 45.06 on 9/25 from 0.59 on 9/5, free T4 0.34 (9/25)  Restarted on home levothyroxine 250 mg  Recheck in 6 weeks    ID:   #Colonization with Acinetobacter  Continue to monitor WBC count and temperature curve    MSK/Skin:   #At risk for pressure injury   PT/OT when able  Frequent turns/repositioning  Skin surveillance      L: midline, PEG  D: none  A: PCV+ 16/6/40%     Disposition: Critical care    ICU Core Measures     Vented Patient  VAP Bundle  VAP bundle ordered     A: Assess, Prevent, and Manage Pain Has pain been assessed? Yes  Need for changes to pain regimen? No   B: Both Spontaneous Awakening Trials (SATs) and Spontaneous Breathing Trials (SBTs) Plan to perform spontaneous awakening trial today? N/A   Plan to perform spontaneous breathing  trial today? Yes   Obvious barriers to extubation? Yes   C: Choice of Sedation RASS Goal: 0 Alert and Calm or N/A patient not on sedation  Need for changes to sedation or analgesia regimen? NA   D: Delirium CAM-ICU: Unable to perform secondary to Acute cognitive dysfunction   E: Early Mobility  Plan for early mobility? Yes   F: Family Engagement Plan for family engagement today? Yes       Review of Invasive Devices:            Prophylaxis:  VTE VTE covered by:  apixaban, Oral, 5 mg at 10/05/24 1811       Stress Ulcer  not ordered         24 Hour Events :   Overnight agitated, Seroquel 25 mg given. Inadequate TV on PC with agitation & worsening acute on chronic hypercapneic respiratory failure. Switched to PS 20/6 with better volumes tolerated. XR Chest ordered for AM. Bedside POCUS lung sliding w/ minimal B lines, trace effusions B/L.     Subjective   Review of Systems: Review of Systems not obtainable due to Altered mental status    Objective :                   Vitals I/O      Most Recent Min/Max in 24hrs   Temp 98.6 °F (37 °C) Temp  Min: 97.6 °F (36.4 °C)  Max: 98.6 °F (37 °C)   Pulse 78 Pulse  Min: 78  Max: 98   Resp (!) 24 Resp  Min: 24  Max: 37   BP (!) 129/49 BP  Min: 124/51  Max: 170/56   O2 Sat 100 % SpO2  Min: 96 %  Max: 100 %      Intake/Output Summary (Last 24 hours) at 10/6/2024 0119  Last data filed at 10/5/2024 1615  Gross per 24 hour   Intake 600 ml   Output 150 ml   Net 450 ml       Diet Enteral/Parenteral; Tube Feeding No Oral Diet; Two Mirza HN; Bolus; 240; (4x/day) - 8:00AM,12:00PM,4:00PM,8:00PM; Prosource Protein Liquid - One Packet; 30; Water; Before and After each Bolus    Invasive Monitoring           Physical Exam   Physical Exam  HENT:      Head: Normocephalic and atraumatic.   Cardiovascular:      Rate and Rhythm: Regular rhythm.      Heart sounds: No murmur heard.  Abdominal: General: There is no distension.      Tenderness: There is no abdominal tenderness. There is no guarding.    Constitutional:       Interventions: He is restrained.   Pulmonary:      Effort: No accessory muscle usage, respiratory distress or accessory muscle usage.      Breath sounds: No wheezing or rales.   Neurological:      General: No focal deficit present.      Mental Status: He is agitated.      Motor: No motor deficit.   Genitourinary/Anorectal:  external catheter present.        Diagnostic Studies        Lab Results: I have reviewed the following results:     Medications:  Scheduled PRN   apixaban, 5 mg, BID  budesonide, 0.5 mg, Q12H  chlorhexidine, 15 mL, Q12H [place]  vitamin B-12, 100 mcg, Daily  doxazosin, 2 mg, Daily  folic acid, 1 mg, Daily  furosemide, 40 mg, Daily  gabapentin, 400 mg, BID  ipratropium, 0.5 mg, TID  levalbuterol, 1.25 mg, TID  levothyroxine, 250 mcg, Early Morning  melatonin, 3 mg, HS  metoprolol tartrate, 12.5 mg, Q12H AMERICA  [START ON 10/19/2024] nicotine, 14 mg, Daily  nicotine, 1 patch, Daily  [START ON 11/2/2024] nicotine, 7 mg, Daily  oxyCODONE, 2.5 mg, Q6H  phenytoin, 150 mg, Q12H [place]  polyethylene glycol, 17 g, BID  potassium chloride, 20 mEq, Daily  QUEtiapine, 25 mg, HS  senna, 8.8 mg, HS  sodium chloride, 4 mL, Q6H  sodium chloride, 2 g, BID With Meals  [Held by provider] traZODone, 100 mg, HS      albuterol, 2.5 mg, Q6H PRN  bisacodyl, 10 mg, Daily PRN  haloperidol, 1 mg, Q6H PRN  lidocaine, , 4x Daily PRN  oxyCODONE, 5 mg, Q6H PRN       Continuous          Labs:   CBC    Recent Labs     10/04/24  1542 10/05/24  2223   WBC 5.55 12.32*   HGB 7.8* 7.2*   HCT 24.1* 22.9*    243     BMP    Recent Labs     10/04/24  0506 10/05/24  2223   SODIUM 137 137   K 4.0 4.5   CL 97 96   CO2 35* 35*   AGAP 5 6   BUN 21 26*   CREATININE 0.53* 0.47*   CALCIUM 8.4 8.5       Coags    No recent results     Additional Electrolytes  Recent Labs     10/05/24  2223   MG 1.8*   PHOS 3.7          Blood Gas    No recent results  Recent Labs     10/06/24  0009   PHVEN 7.210*   LCM9SNU 95.3*   PO2VEN 29.5*    SZE2GNS 37.3*   BEVEN 7.7   E5UOCQY 41.9*    LFTs  Recent Labs     10/04/24  0506   ALT 10   AST 16   ALKPHOS 93   ALB 2.7*   TBILI 0.59       Infectious  No recent results  Glucose  Recent Labs     10/04/24  0506 10/05/24  2223   GLUC 100 210*

## 2024-10-06 NOTE — PLAN OF CARE
Problem: Prexisting or High Potential for Compromised Skin Integrity  Goal: Skin integrity is maintained or improved  Description: INTERVENTIONS:  - Identify patients at risk for skin breakdown  - Assess and monitor skin integrity  - Assess and monitor nutrition and hydration status  - Monitor labs   - Assess for incontinence   - Turn and reposition patient  - Assist with mobility/ambulation  - Relieve pressure over bony prominences  - Avoid friction and shearing  - Provide appropriate hygiene as needed including keeping skin clean and dry  - Evaluate need for skin moisturizer/barrier cream  - Collaborate with interdisciplinary team   - Patient/family teaching  - Consider wound care consult   Outcome: Progressing     Problem: PAIN - ADULT  Goal: Verbalizes/displays adequate comfort level or baseline comfort level  Description: Interventions:  - Encourage patient to monitor pain and request assistance  - Assess pain using appropriate pain scale  - Administer analgesics based on type and severity of pain and evaluate response  - Implement non-pharmacological measures as appropriate and evaluate response  - Consider cultural and social influences on pain and pain management  - Notify physician/advanced practitioner if interventions unsuccessful or patient reports new pain  Outcome: Progressing     Problem: INFECTION - ADULT  Goal: Absence or prevention of progression during hospitalization  Description: INTERVENTIONS:  - Assess and monitor for signs and symptoms of infection  - Monitor lab/diagnostic results  - Monitor all insertion sites, i.e. indwelling lines, tubes, and drains  - Monitor endotracheal if appropriate and nasal secretions for changes in amount and color  - Dorena appropriate cooling/warming therapies per order  - Administer medications as ordered  - Instruct and encourage patient and family to use good hand hygiene technique  - Identify and instruct in appropriate isolation precautions for  identified infection/condition  Outcome: Progressing  Goal: Absence of fever/infection during neutropenic period  Description: INTERVENTIONS:  - Monitor WBC    Outcome: Progressing     Problem: SAFETY ADULT  Goal: Patient will remain free of falls  Description: INTERVENTIONS:  - Educate patient/family on patient safety including physical limitations  - Instruct patient to call for assistance with activity   - Consult OT/PT to assist with strengthening/mobility   - Keep Call bell within reach  - Keep bed low and locked with side rails adjusted as appropriate  - Keep care items and personal belongings within reach  - Initiate and maintain comfort rounds  - Make Fall Risk Sign visible to staff  - Offer Toileting every   Hours, in advance of need  - Initiate/Maintain  alarm  - Obtain necessary fall risk management equipment:    - Apply yellow socks and bracelet for high fall risk patients  - Consider moving patient to room near nurses station  Outcome: Progressing  Goal: Maintain or return to baseline ADL function  Description: INTERVENTIONS:  -  Assess patient's ability to carry out ADLs; assess patient's baseline for ADL function and identify physical deficits which impact ability to perform ADLs (bathing, care of mouth/teeth, toileting, grooming, dressing, etc.)  - Assess/evaluate cause of self-care deficits   - Assess range of motion  - Assess patient's mobility; develop plan if impaired  - Assess patient's need for assistive devices and provide as appropriate  - Encourage maximum independence but intervene and supervise when necessary  - Involve family in performance of ADLs  - Assess for home care needs following discharge   - Consider OT consult to assist with ADL evaluation and planning for discharge  - Provide patient education as appropriate  Outcome: Progressing  Goal: Maintains/Returns to pre admission functional level  Description: INTERVENTIONS:  - Perform AM-PAC 6 Click Basic Mobility/ Daily Activity  assessment daily.  - Set and communicate daily mobility goal to care team and patient/family/caregiver.   - Collaborate with rehabilitation services on mobility goals if consulted  - Perform Range of Motion   times a day.  - Reposition patient every   hours.  - Dangle patient   times a day  - Stand patient   times a day  - Ambulate patient   times a day  - Out of bed to chair   times a day   - Out of bed for meals   times a day  - Out of bed for toileting  - Record patient progress and toleration of activity level   Outcome: Progressing     Problem: DISCHARGE PLANNING  Goal: Discharge to home or other facility with appropriate resources  Description: INTERVENTIONS:  - Identify barriers to discharge w/patient and caregiver  - Arrange for needed discharge resources and transportation as appropriate  - Identify discharge learning needs (meds, wound care, etc.)  - Arrange for interpretive services to assist at discharge as needed  - Refer to Case Management Department for coordinating discharge planning if the patient needs post-hospital services based on physician/advanced practitioner order or complex needs related to functional status, cognitive ability, or social support system  Outcome: Progressing     Problem: Knowledge Deficit  Goal: Patient/family/caregiver demonstrates understanding of disease process, treatment plan, medications, and discharge instructions  Description: Complete learning assessment and assess knowledge base.  Interventions:  - Provide teaching at level of understanding  - Provide teaching via preferred learning methods  Outcome: Progressing     Problem: Nutrition/Hydration-ADULT  Goal: Nutrient/Hydration intake appropriate for improving, restoring or maintaining nutritional needs  Description: Monitor and assess patient's nutrition/hydration status for malnutrition. Collaborate with interdisciplinary team and initiate plan and interventions as ordered.  Monitor patient's weight and dietary  intake as ordered or per policy. Utilize nutrition screening tool and intervene as necessary. Determine patient's food preferences and provide high-protein, high-caloric foods as appropriate.     INTERVENTIONS:  - Monitor oral intake, urinary output, labs, and treatment plans  - Assess nutrition and hydration status and recommend course of action  - Evaluate amount of meals eaten  - Assist patient with eating if necessary   - Allow adequate time for meals  - Recommend/ encourage appropriate diets, oral nutritional supplements, and vitamin/mineral supplements  - Order, calculate, and assess calorie counts as needed  - Recommend, monitor, and adjust tube feedings and TPN/PPN based on assessed needs  - Assess need for intravenous fluids  - Provide specific nutrition/hydration education as appropriate  - Include patient/family/caregiver in decisions related to nutrition  Outcome: Progressing     Problem: RESPIRATORY - ADULT  Goal: Achieves optimal ventilation and oxygenation  Description: INTERVENTIONS:  - Assess for changes in respiratory status  - Assess for changes in mentation and behavior  - Position to facilitate oxygenation and minimize respiratory effort  - Oxygen administered by appropriate delivery if ordered  - Initiate smoking cessation education as indicated  - Encourage broncho-pulmonary hygiene including cough, deep breathe, Incentive Spirometry  - Assess the need for suctioning and aspirate as needed  - Assess and instruct to report SOB or any respiratory difficulty  - Respiratory Therapy support as indicated  Outcome: Progressing

## 2024-10-07 ENCOUNTER — APPOINTMENT (INPATIENT)
Dept: RADIOLOGY | Facility: HOSPITAL | Age: 64
DRG: 005 | End: 2024-10-07
Payer: COMMERCIAL

## 2024-10-07 LAB
ABO GROUP BLD: NORMAL
ABO GROUP BLD: NORMAL
ALBUMIN SERPL BCG-MCNC: 2.7 G/DL (ref 3.5–5)
ALP SERPL-CCNC: 126 U/L (ref 34–104)
ALT SERPL W P-5'-P-CCNC: 10 U/L (ref 7–52)
ANION GAP SERPL CALCULATED.3IONS-SCNC: 5 MMOL/L (ref 4–13)
AST SERPL W P-5'-P-CCNC: 17 U/L (ref 13–39)
BASE EX.OXY STD BLDV CALC-SCNC: 78.2 % (ref 60–80)
BASE EX.OXY STD BLDV CALC-SCNC: 95.4 % (ref 60–80)
BASE EXCESS BLDA CALC-SCNC: 11 MMOL/L (ref -2–3)
BASE EXCESS BLDV CALC-SCNC: 10.1 MMOL/L
BASE EXCESS BLDV CALC-SCNC: 9.3 MMOL/L
BASOPHILS # BLD AUTO: 0.04 THOUSANDS/ΜL (ref 0–0.1)
BASOPHILS # BLD AUTO: 0.05 THOUSANDS/ΜL (ref 0–0.1)
BASOPHILS NFR BLD AUTO: 0 % (ref 0–1)
BASOPHILS NFR BLD AUTO: 1 % (ref 0–1)
BILIRUB SERPL-MCNC: 0.45 MG/DL (ref 0.2–1)
BLD GP AB SCN SERPL QL: NEGATIVE
BUN SERPL-MCNC: 27 MG/DL (ref 5–25)
CA-I BLD-SCNC: 1.13 MMOL/L (ref 1.12–1.32)
CA-I BLD-SCNC: 1.21 MMOL/L (ref 1.12–1.32)
CALCIUM ALBUM COR SERPL-MCNC: 9.6 MG/DL (ref 8.3–10.1)
CALCIUM SERPL-MCNC: 8.6 MG/DL (ref 8.4–10.2)
CHLORIDE SERPL-SCNC: 99 MMOL/L (ref 96–108)
CO2 SERPL-SCNC: 37 MMOL/L (ref 21–32)
CREAT SERPL-MCNC: 0.41 MG/DL (ref 0.6–1.3)
EOSINOPHIL # BLD AUTO: 0.1 THOUSAND/ΜL (ref 0–0.61)
EOSINOPHIL # BLD AUTO: 0.17 THOUSAND/ΜL (ref 0–0.61)
EOSINOPHIL NFR BLD AUTO: 1 % (ref 0–6)
EOSINOPHIL NFR BLD AUTO: 2 % (ref 0–6)
ERYTHROCYTE [DISTWIDTH] IN BLOOD BY AUTOMATED COUNT: 15 % (ref 11.6–15.1)
ERYTHROCYTE [DISTWIDTH] IN BLOOD BY AUTOMATED COUNT: 15.9 % (ref 11.6–15.1)
FERRITIN SERPL-MCNC: 461 NG/ML (ref 24–336)
FOLATE SERPL-MCNC: >22.3 NG/ML
GFR SERPL CREATININE-BSD FRML MDRD: 124 ML/MIN/1.73SQ M
GLUCOSE SERPL-MCNC: 127 MG/DL (ref 65–140)
GLUCOSE SERPL-MCNC: 137 MG/DL (ref 65–140)
GLUCOSE SERPL-MCNC: 157 MG/DL (ref 65–140)
HCO3 BLDA-SCNC: 37.4 MMOL/L (ref 22–28)
HCO3 BLDV-SCNC: 32.8 MMOL/L (ref 24–30)
HCO3 BLDV-SCNC: 36.8 MMOL/L (ref 24–30)
HCT VFR BLD AUTO: 22.5 % (ref 36.5–49.3)
HCT VFR BLD AUTO: 23.2 % (ref 36.5–49.3)
HCT VFR BLD CALC: 25 % (ref 36.5–49.3)
HGB BLD-MCNC: 6.9 G/DL (ref 12–17)
HGB BLD-MCNC: 7.4 G/DL (ref 12–17)
HGB BLDA-MCNC: 8.5 G/DL (ref 12–17)
IMM GRANULOCYTES # BLD AUTO: 0.03 THOUSAND/UL (ref 0–0.2)
IMM GRANULOCYTES # BLD AUTO: 0.04 THOUSAND/UL (ref 0–0.2)
IMM GRANULOCYTES NFR BLD AUTO: 0 % (ref 0–2)
IMM GRANULOCYTES NFR BLD AUTO: 0 % (ref 0–2)
IRON SATN MFR SERPL: 22 % (ref 15–50)
IRON SERPL-MCNC: 28 UG/DL (ref 50–212)
LYMPHOCYTES # BLD AUTO: 0.65 THOUSANDS/ΜL (ref 0.6–4.47)
LYMPHOCYTES # BLD AUTO: 0.74 THOUSANDS/ΜL (ref 0.6–4.47)
LYMPHOCYTES NFR BLD AUTO: 8 % (ref 14–44)
LYMPHOCYTES NFR BLD AUTO: 8 % (ref 14–44)
MAGNESIUM SERPL-MCNC: 1.9 MG/DL (ref 1.9–2.7)
MCH RBC QN AUTO: 32.4 PG (ref 26.8–34.3)
MCH RBC QN AUTO: 32.6 PG (ref 26.8–34.3)
MCHC RBC AUTO-ENTMCNC: 30.7 G/DL (ref 31.4–37.4)
MCHC RBC AUTO-ENTMCNC: 31.9 G/DL (ref 31.4–37.4)
MCV RBC AUTO: 102 FL (ref 82–98)
MCV RBC AUTO: 106 FL (ref 82–98)
MONOCYTES # BLD AUTO: 0.85 THOUSAND/ΜL (ref 0.17–1.22)
MONOCYTES # BLD AUTO: 0.88 THOUSAND/ΜL (ref 0.17–1.22)
MONOCYTES NFR BLD AUTO: 10 % (ref 4–12)
MONOCYTES NFR BLD AUTO: 11 % (ref 4–12)
NEUTROPHILS # BLD AUTO: 6.28 THOUSANDS/ΜL (ref 1.85–7.62)
NEUTROPHILS # BLD AUTO: 7.28 THOUSANDS/ΜL (ref 1.85–7.62)
NEUTS SEG NFR BLD AUTO: 79 % (ref 43–75)
NEUTS SEG NFR BLD AUTO: 80 % (ref 43–75)
NRBC BLD AUTO-RTO: 0 /100 WBCS
NRBC BLD AUTO-RTO: 0 /100 WBCS
O2 CT BLDV-SCNC: 10.5 ML/DL
O2 CT BLDV-SCNC: 9.1 ML/DL
PCO2 BLD: 39 MMOL/L (ref 21–32)
PCO2 BLD: 64.6 MM HG (ref 36–44)
PCO2 BLDV: 36 MM HG (ref 42–50)
PCO2 BLDV: 72.7 MM HG (ref 42–50)
PH BLD: 7.37 [PH] (ref 7.35–7.45)
PH BLDV: 7.32 [PH] (ref 7.3–7.4)
PH BLDV: 7.58 [PH] (ref 7.3–7.4)
PHOSPHATE SERPL-MCNC: 3.2 MG/DL (ref 2.3–4.1)
PLATELET # BLD AUTO: 226 THOUSANDS/UL (ref 149–390)
PLATELET # BLD AUTO: 243 THOUSANDS/UL (ref 149–390)
PMV BLD AUTO: 8.8 FL (ref 8.9–12.7)
PMV BLD AUTO: 9.1 FL (ref 8.9–12.7)
PO2 BLD: 93 MM HG (ref 75–129)
PO2 BLDV: 175.7 MM HG (ref 35–45)
PO2 BLDV: 48.4 MM HG (ref 35–45)
POTASSIUM BLD-SCNC: 4.5 MMOL/L (ref 3.5–5.3)
POTASSIUM SERPL-SCNC: 4.3 MMOL/L (ref 3.5–5.3)
PROCALCITONIN SERPL-MCNC: 0.22 NG/ML
PROT SERPL-MCNC: 6.6 G/DL (ref 6.4–8.4)
RBC # BLD AUTO: 2.13 MILLION/UL (ref 3.88–5.62)
RBC # BLD AUTO: 2.27 MILLION/UL (ref 3.88–5.62)
RH BLD: NEGATIVE
RH BLD: NEGATIVE
SAO2 % BLD FROM PO2: 97 % (ref 60–85)
SODIUM BLD-SCNC: 142 MMOL/L (ref 136–145)
SODIUM SERPL-SCNC: 141 MMOL/L (ref 135–147)
SPECIMEN EXPIRATION DATE: NORMAL
SPECIMEN SOURCE: ABNORMAL
TIBC SERPL-MCNC: 125 UG/DL (ref 250–450)
UIBC SERPL-MCNC: 97 UG/DL (ref 155–355)
VIT B12 SERPL-MCNC: 817 PG/ML (ref 180–914)
WBC # BLD AUTO: 7.96 THOUSAND/UL (ref 4.31–10.16)
WBC # BLD AUTO: 9.15 THOUSAND/UL (ref 4.31–10.16)

## 2024-10-07 PROCEDURE — 94003 VENT MGMT INPAT SUBQ DAY: CPT

## 2024-10-07 PROCEDURE — 82330 ASSAY OF CALCIUM: CPT

## 2024-10-07 PROCEDURE — 84132 ASSAY OF SERUM POTASSIUM: CPT

## 2024-10-07 PROCEDURE — 94760 N-INVAS EAR/PLS OXIMETRY 1: CPT

## 2024-10-07 PROCEDURE — 85025 COMPLETE CBC W/AUTO DIFF WBC: CPT

## 2024-10-07 PROCEDURE — 82947 ASSAY GLUCOSE BLOOD QUANT: CPT

## 2024-10-07 PROCEDURE — 84295 ASSAY OF SERUM SODIUM: CPT

## 2024-10-07 PROCEDURE — 83550 IRON BINDING TEST: CPT | Performed by: STUDENT IN AN ORGANIZED HEALTH CARE EDUCATION/TRAINING PROGRAM

## 2024-10-07 PROCEDURE — 0B21XFZ CHANGE TRACHEOSTOMY DEVICE IN TRACHEA, EXTERNAL APPROACH: ICD-10-PCS | Performed by: SURGERY

## 2024-10-07 PROCEDURE — 82607 VITAMIN B-12: CPT | Performed by: STUDENT IN AN ORGANIZED HEALTH CARE EDUCATION/TRAINING PROGRAM

## 2024-10-07 PROCEDURE — 80053 COMPREHEN METABOLIC PANEL: CPT

## 2024-10-07 PROCEDURE — 94664 DEMO&/EVAL PT USE INHALER: CPT

## 2024-10-07 PROCEDURE — 83540 ASSAY OF IRON: CPT | Performed by: STUDENT IN AN ORGANIZED HEALTH CARE EDUCATION/TRAINING PROGRAM

## 2024-10-07 PROCEDURE — 31502 CHANGE OF WINDPIPE AIRWAY: CPT

## 2024-10-07 PROCEDURE — 82805 BLOOD GASES W/O2 SATURATION: CPT

## 2024-10-07 PROCEDURE — 83605 ASSAY OF LACTIC ACID: CPT

## 2024-10-07 PROCEDURE — 30233N1 TRANSFUSION OF NONAUTOLOGOUS RED BLOOD CELLS INTO PERIPHERAL VEIN, PERCUTANEOUS APPROACH: ICD-10-PCS | Performed by: SURGERY

## 2024-10-07 PROCEDURE — 82948 REAGENT STRIP/BLOOD GLUCOSE: CPT

## 2024-10-07 PROCEDURE — 94640 AIRWAY INHALATION TREATMENT: CPT

## 2024-10-07 PROCEDURE — 84145 PROCALCITONIN (PCT): CPT | Performed by: STUDENT IN AN ORGANIZED HEALTH CARE EDUCATION/TRAINING PROGRAM

## 2024-10-07 PROCEDURE — 86900 BLOOD TYPING SEROLOGIC ABO: CPT

## 2024-10-07 PROCEDURE — 85014 HEMATOCRIT: CPT

## 2024-10-07 PROCEDURE — 82728 ASSAY OF FERRITIN: CPT | Performed by: STUDENT IN AN ORGANIZED HEALTH CARE EDUCATION/TRAINING PROGRAM

## 2024-10-07 PROCEDURE — P9016 RBC LEUKOCYTES REDUCED: HCPCS

## 2024-10-07 PROCEDURE — 99233 SBSQ HOSP IP/OBS HIGH 50: CPT

## 2024-10-07 PROCEDURE — 86923 COMPATIBILITY TEST ELECTRIC: CPT

## 2024-10-07 PROCEDURE — 83735 ASSAY OF MAGNESIUM: CPT

## 2024-10-07 PROCEDURE — 85045 AUTOMATED RETICULOCYTE COUNT: CPT | Performed by: STUDENT IN AN ORGANIZED HEALTH CARE EDUCATION/TRAINING PROGRAM

## 2024-10-07 PROCEDURE — 71045 X-RAY EXAM CHEST 1 VIEW: CPT

## 2024-10-07 PROCEDURE — 99291 CRITICAL CARE FIRST HOUR: CPT | Performed by: INTERNAL MEDICINE

## 2024-10-07 PROCEDURE — 86901 BLOOD TYPING SEROLOGIC RH(D): CPT

## 2024-10-07 PROCEDURE — 82746 ASSAY OF FOLIC ACID SERUM: CPT | Performed by: STUDENT IN AN ORGANIZED HEALTH CARE EDUCATION/TRAINING PROGRAM

## 2024-10-07 PROCEDURE — 87040 BLOOD CULTURE FOR BACTERIA: CPT

## 2024-10-07 PROCEDURE — 86850 RBC ANTIBODY SCREEN: CPT

## 2024-10-07 PROCEDURE — 84100 ASSAY OF PHOSPHORUS: CPT

## 2024-10-07 PROCEDURE — 36600 WITHDRAWAL OF ARTERIAL BLOOD: CPT

## 2024-10-07 PROCEDURE — 82803 BLOOD GASES ANY COMBINATION: CPT

## 2024-10-07 RX ORDER — SODIUM CHLORIDE, SODIUM GLUCONATE, SODIUM ACETATE, POTASSIUM CHLORIDE, MAGNESIUM CHLORIDE, SODIUM PHOSPHATE, DIBASIC, AND POTASSIUM PHOSPHATE .53; .5; .37; .037; .03; .012; .00082 G/100ML; G/100ML; G/100ML; G/100ML; G/100ML; G/100ML; G/100ML
1000 INJECTION, SOLUTION INTRAVENOUS ONCE
Status: COMPLETED | OUTPATIENT
Start: 2024-10-07 | End: 2024-10-08

## 2024-10-07 RX ORDER — FUROSEMIDE 10 MG/ML
40 INJECTION INTRAMUSCULAR; INTRAVENOUS ONCE
Status: COMPLETED | OUTPATIENT
Start: 2024-10-07 | End: 2024-10-07

## 2024-10-07 RX ORDER — ACETYLCYSTEINE 200 MG/ML
3 SOLUTION ORAL; RESPIRATORY (INHALATION)
Status: DISCONTINUED | OUTPATIENT
Start: 2024-10-07 | End: 2024-10-10

## 2024-10-07 RX ORDER — FUROSEMIDE 40 MG/1
40 TABLET ORAL DAILY
Status: DISCONTINUED | OUTPATIENT
Start: 2024-10-07 | End: 2024-10-18 | Stop reason: HOSPADM

## 2024-10-07 RX ORDER — DEXMEDETOMIDINE HYDROCHLORIDE 4 UG/ML
.1-.7 INJECTION, SOLUTION INTRAVENOUS
Status: DISCONTINUED | OUTPATIENT
Start: 2024-10-07 | End: 2024-10-08

## 2024-10-07 RX ORDER — DEXMEDETOMIDINE HYDROCHLORIDE 4 UG/ML
.1-.7 INJECTION, SOLUTION INTRAVENOUS
Status: DISCONTINUED | OUTPATIENT
Start: 2024-10-07 | End: 2024-10-07

## 2024-10-07 RX ORDER — VANCOMYCIN HYDROCHLORIDE 1 G/200ML
15 INJECTION, SOLUTION INTRAVENOUS EVERY 12 HOURS
Status: DISCONTINUED | OUTPATIENT
Start: 2024-10-07 | End: 2024-10-07

## 2024-10-07 RX ORDER — MAGNESIUM SULFATE HEPTAHYDRATE 40 MG/ML
2 INJECTION, SOLUTION INTRAVENOUS ONCE
Status: COMPLETED | OUTPATIENT
Start: 2024-10-07 | End: 2024-10-07

## 2024-10-07 RX ADMIN — GABAPENTIN 400 MG: 400 CAPSULE ORAL at 17:32

## 2024-10-07 RX ADMIN — HALOPERIDOL 1 MG: 2 SOLUTION ORAL at 08:13

## 2024-10-07 RX ADMIN — ACETYLCYSTEINE 600 MG: 200 SOLUTION ORAL; RESPIRATORY (INHALATION) at 07:35

## 2024-10-07 RX ADMIN — MAGNESIUM SULFATE HEPTAHYDRATE 2 G: 40 INJECTION, SOLUTION INTRAVENOUS at 08:08

## 2024-10-07 RX ADMIN — VANCOMYCIN HYDROCHLORIDE 1750 MG: 10 INJECTION, POWDER, LYOPHILIZED, FOR SOLUTION INTRAVENOUS at 06:35

## 2024-10-07 RX ADMIN — VITAM B12 100 MCG: 100 TAB at 08:08

## 2024-10-07 RX ADMIN — SODIUM CHLORIDE SOLN NEBU 3% 4 ML: 3 NEBU SOLN at 02:03

## 2024-10-07 RX ADMIN — IPRATROPIUM BROMIDE 0.5 MG: 0.5 SOLUTION RESPIRATORY (INHALATION) at 20:24

## 2024-10-07 RX ADMIN — PHENYTOIN 150 MG: 125 SUSPENSION ORAL at 21:33

## 2024-10-07 RX ADMIN — POLYETHYLENE GLYCOL 3350 17 G: 17 POWDER, FOR SOLUTION ORAL at 17:32

## 2024-10-07 RX ADMIN — CHLORHEXIDINE GLUCONATE 0.12% ORAL RINSE 15 ML: 1.2 LIQUID ORAL at 08:08

## 2024-10-07 RX ADMIN — LEVALBUTEROL HYDROCHLORIDE 1.25 MG: 1.25 SOLUTION RESPIRATORY (INHALATION) at 20:24

## 2024-10-07 RX ADMIN — IPRATROPIUM BROMIDE 0.5 MG: 0.5 SOLUTION RESPIRATORY (INHALATION) at 07:35

## 2024-10-07 RX ADMIN — QUETIAPINE FUMARATE 50 MG: 25 TABLET ORAL at 17:32

## 2024-10-07 RX ADMIN — GABAPENTIN 400 MG: 400 CAPSULE ORAL at 08:08

## 2024-10-07 RX ADMIN — SODIUM CHLORIDE 2 G: 1 TABLET ORAL at 17:36

## 2024-10-07 RX ADMIN — LEVOTHYROXINE SODIUM 250 MCG: 100 TABLET ORAL at 06:07

## 2024-10-07 RX ADMIN — SODIUM CHLORIDE 2 G: 1 TABLET ORAL at 08:10

## 2024-10-07 RX ADMIN — NICOTINE 1 PATCH: 21 PATCH, EXTENDED RELEASE TRANSDERMAL at 08:09

## 2024-10-07 RX ADMIN — LEVALBUTEROL HYDROCHLORIDE 1.25 MG: 1.25 SOLUTION RESPIRATORY (INHALATION) at 14:11

## 2024-10-07 RX ADMIN — SODIUM CHLORIDE, SODIUM GLUCONATE, SODIUM ACETATE, POTASSIUM CHLORIDE, MAGNESIUM CHLORIDE, SODIUM PHOSPHATE, DIBASIC, AND POTASSIUM PHOSPHATE 1000 ML: .53; .5; .37; .037; .03; .012; .00082 INJECTION, SOLUTION INTRAVENOUS at 23:21

## 2024-10-07 RX ADMIN — Medication 12.5 MG: at 21:33

## 2024-10-07 RX ADMIN — ALBUTEROL SULFATE 2.5 MG: 2.5 SOLUTION RESPIRATORY (INHALATION) at 09:28

## 2024-10-07 RX ADMIN — DEXMEDETOMIDINE HYDROCHLORIDE 0.1 MCG/KG/HR: 4 INJECTION, SOLUTION INTRAVENOUS at 23:54

## 2024-10-07 RX ADMIN — LEVALBUTEROL HYDROCHLORIDE 1.25 MG: 1.25 SOLUTION RESPIRATORY (INHALATION) at 07:35

## 2024-10-07 RX ADMIN — PHENYTOIN 150 MG: 125 SUSPENSION ORAL at 08:10

## 2024-10-07 RX ADMIN — FUROSEMIDE 40 MG: 10 INJECTION, SOLUTION INTRAVENOUS at 04:42

## 2024-10-07 RX ADMIN — BUDESONIDE 0.5 MG: 0.5 INHALANT RESPIRATORY (INHALATION) at 20:24

## 2024-10-07 RX ADMIN — OXYCODONE HYDROCHLORIDE 2.5 MG: 5 SOLUTION ORAL at 04:44

## 2024-10-07 RX ADMIN — Medication 12.5 MG: at 08:08

## 2024-10-07 RX ADMIN — SODIUM CHLORIDE SOLN NEBU 3% 4 ML: 3 NEBU SOLN at 07:37

## 2024-10-07 RX ADMIN — Medication 3 MG: at 21:33

## 2024-10-07 RX ADMIN — CEFEPIME 2000 MG: 2 INJECTION, POWDER, FOR SOLUTION INTRAVENOUS at 21:34

## 2024-10-07 RX ADMIN — CEFEPIME 2000 MG: 2 INJECTION, POWDER, FOR SOLUTION INTRAVENOUS at 04:54

## 2024-10-07 RX ADMIN — ACETYLCYSTEINE 600 MG: 200 SOLUTION ORAL; RESPIRATORY (INHALATION) at 20:24

## 2024-10-07 RX ADMIN — ACETYLCYSTEINE 600 MG: 200 SOLUTION ORAL; RESPIRATORY (INHALATION) at 14:11

## 2024-10-07 RX ADMIN — FOLIC ACID 1 MG: 1 TABLET ORAL at 08:08

## 2024-10-07 RX ADMIN — APIXABAN 5 MG: 5 TABLET, FILM COATED ORAL at 17:32

## 2024-10-07 RX ADMIN — IPRATROPIUM BROMIDE 0.5 MG: 0.5 SOLUTION RESPIRATORY (INHALATION) at 14:11

## 2024-10-07 RX ADMIN — POLYETHYLENE GLYCOL 3350 17 G: 17 POWDER, FOR SOLUTION ORAL at 08:08

## 2024-10-07 RX ADMIN — POTASSIUM CHLORIDE 20 MEQ: 1.5 SOLUTION ORAL at 08:08

## 2024-10-07 RX ADMIN — FUROSEMIDE 40 MG: 40 TABLET ORAL at 17:37

## 2024-10-07 RX ADMIN — BUDESONIDE 0.5 MG: 0.5 INHALANT RESPIRATORY (INHALATION) at 07:35

## 2024-10-07 RX ADMIN — DOXAZOSIN 2 MG: 2 TABLET ORAL at 08:08

## 2024-10-07 RX ADMIN — QUETIAPINE FUMARATE 50 MG: 25 TABLET ORAL at 08:11

## 2024-10-07 RX ADMIN — APIXABAN 5 MG: 5 TABLET, FILM COATED ORAL at 08:08

## 2024-10-07 RX ADMIN — OXYCODONE HYDROCHLORIDE 2.5 MG: 5 SOLUTION ORAL at 21:33

## 2024-10-07 RX ADMIN — VANCOMYCIN HYDROCHLORIDE 1250 MG: 10 INJECTION, POWDER, LYOPHILIZED, FOR SOLUTION INTRAVENOUS at 21:34

## 2024-10-07 RX ADMIN — HALOPERIDOL 1 MG: 2 SOLUTION ORAL at 01:57

## 2024-10-07 NOTE — RESPIRATORY THERAPY NOTE
Trach changed to 6 XLT by Dr. Block. Pt tolerated procedure. Placed back on Vent with settings per flow sheet.. Head of bed card updated. Back up trach ordered.

## 2024-10-07 NOTE — CASE MANAGEMENT
Case Management Discharge Planning Note    Patient name Marcin Sánchez  [facility]  MRN [id number]  : 1960 Date 10/7/2024       Current Admission Date: 2024  Current Admission Diagnosis:Acute hypoxic respiratory failure (HCC)   Patient Active Problem List    Diagnosis Date Noted Date Diagnosed    Insomnia 10/03/2024     Agitation 10/03/2024     Palliative care encounter 10/01/2024     Acute hypoxic respiratory failure (HCC) 2024     Shock (HCC) 2024     Mucus plugging of bronchi 2024     Transaminitis 2024     Cardiac arrest (HCC) 2024     Pericardial effusion 2024     Acute on chronic respiratory failure with hypoxia and hypercapnia (HCC) 2024     Acute metabolic encephalopathy 2024     Chronic combined systolic and diastolic CHF (congestive heart failure) (HCC) 2024     Atrial fibrillation (HCC) 2024     Pulmonary fibrosis (HCC) 2024     Suspected chronic pulmonary embolism (HCC) 2024     Squamous cell lung cancer (HCC) 2024     Hyponatremia 2024     Severe protein-calorie malnutrition (HCC) 2024     Edema of both legs 2022     Tobacco abuse 2020     Nonrheumatic aortic valve insufficiency 2020     Malignant neoplasm of upper lobe of right lung (HCC) 2018     Thoracic aortic aneurysm without rupture (HCC) 2018     Cancer of right main bronchus (HCC) 10/04/2016     Pleural effusion 10/02/2016     Multiple lung nodules on CT 10/02/2016     Collapse of right lung 2016     Acute exacerbation of chronic obstructive pulmonary disease (COPD) (HCC)      Epilepsy (HCC)      Lyme disease      Major depression      Alcohol abuse        LOS (days): 29  Geometric Mean LOS (GMLOS) (days): 5.1  Days to GMLOS:-23.6     OBJECTIVE:  Risk of Unplanned Readmission Score: 46.4         Current admission status: Inpatient   Preferred Pharmacy:   [facility] -  -  J[facility] Clifford - [address] [facility],  [address] [facility],  [facility] [address]  [place] [address]  Phone: [phone] Fax: [phone]    [facility] [place] JAMAL Argueta - [address] [facility]  [address] [place] [address] [place] [address]  Phone: [phone] Fax: [phone]    Primary Care Provider: Brandt Godinez MD    Primary Insurance: [facility]  Secondary Insurance:     DISCHARGE DETAILS:    CM sent message in Aidin to [facility] regarding if there was an update on Single Case Agreement. CM was informed that there was no update. CM also informed by Maik [facility] that patient will have to be off 1 to 1. Provider was updated.

## 2024-10-07 NOTE — QUICK NOTE
Trach exchange performed bedside with Dr. Block.  6fr Shiley trach removed over bougie and 6fr shiley XLT distal placed.   No complications and patient tolerated well.     Upon examination removed 6fr Shiley trach cuff with audible air leak.

## 2024-10-07 NOTE — ASSESSMENT & PLAN NOTE
"Capacity:  Pt did not exhibit full competency during our evaluation today. Communication is inherently challenging with his trach and he reported being illiterate today when we offered a whiteboard for him to write on. He was oriented to person and place, but other than knowing the year, he was not oriented to time. He was unable to answer who the current [place] President is, and could not articulate his wishes should he suffer another cardiac arrest.    GOC:  Without full capacity, medical decisions revert to his legally appointed guardian, Tere Williamson ([phone]). Per chart review, Tere was appointed pt's guardian in 2015 and since it was \"so long ago,\" was unable to recall why she was appointed. Tere had been employed at \"[employer]\" when she was appointed pt's guardian, but has not worked for the company for the \"last few months.\" Tere is now living in [place]. Tere was contacted by JEFF  on 9/25 and confirmed she was pt's legal guardian; Tere went on to say it was okay to speak with pt's sister, Katalina, about pt's medical state.    Upon collaborative discussion, palliative service will reach out to [facility]'s legal consult resource, Catherine Ivan Huitronzahraabryon, for specific legal assistance in navigating pt's medical decision maker complexity. We have communicated this to the pt's primary CC team as well.    ACP:  Pt's chart states he has ACP docs, but when examined, it is a single scanned-in sheet from 3/14/14 signed by pt's sister Katalina stating that the pt does not have any AD, LW, or durable POA.     Disposition:  At this time, pt's medical needs supersede that of his previous assisted living facility residence, [facility]. CM is actively searching for a LTAC facility that could accommodate pt.     Symptom Support:  Symptom mgmt portion of palliative consult was related to pt's intermittent agitation, specifically at night grabbing for/pulling at his trach. On discussion with primary CC " service, there is a balance between sedation for his behaviors, and oversedation as they steadily attempt to decrease pt's MV support. We have switched pt from qHS olanzapine to prn haldol, as haldol is one of the least sedating anti-confusion/anti-psychotic medications. Should haldol ultimately prove ineffective, could revisit return to olanzapine. He also remains on 1:1 observation as well.     Follow-up:  Palliative service will continue to follow Marcin and coordinate medical decision making apparatus with help of legal consult service.     PDMP Review: I have reviewed the patient's controlled substance dispensing history in the Prescription Drug Monitoring Program in compliance with the [facility] regulations before prescribing any controlled substances.

## 2024-10-07 NOTE — ASSESSMENT & PLAN NOTE
Wt Readings from Last 3 Encounters:   10/07/24 65.6 kg (144 lb 10 oz)   09/08/24 60.8 kg (134 lb)   08/29/24 67.6 kg (149 lb)   Primary intensivist service is continuing to diurese patient, with monitoring of fluid status and weight.

## 2024-10-07 NOTE — RESPIRATORY THERAPY NOTE
Resp vent note   10/07/24 0335   Respiratory Assessment   Assessment Type Assess only   General Appearance Sleeping   Respiratory Pattern Normal   Chest Assessment Chest expansion symmetrical   Bilateral Breath Sounds Diminished;Coarse   Cough Strong   Resp Comments Pt cont on doc vent settings, FiO2 weaned to 50%.  Pt tolerating well at this time. Will cont to monitor per RCP.   O2 Device C3   Vent Information   Vent ID [id number]   Vent type Morris C3   Morris Vent Mode P-CMV/PCV+   $ Vent Daily Charge-Subsequent Yes   $ Pulse Oximetry Spot Check Charge Completed   SpO2 98 %   P-CMV/PCV+ Settings   Resp Rate (BPM) 16 BPM   Pressure Control/Pinsp (cmH20) 22 cmH20   Insp Time (S) 1 sec   FiO2 (%) 50 %   PEEP (cmH2O) 8 cmH2O   I:E Ratio 1/4   Insp Resistance 22   P-ramp (ms) 100 (ms)   Flow Trigger (LPM) 2 LPM   Humidification Heater   Heater Temp 95 °F (35 °C)   P-CMV/PCV+ Actuals   Resp Rate (BPM) 13 BPM   VT (mL) 488 mL   MV 6.2   MAP (cmH2O) 15 cmH2O   Peak Pressure (cmH2O) 32 cmH2O   I:E Ratio (Obs) 1/2.3   Static Compliance (mL/cmH2O) 41 mL/cmH2O   Heater Temperature (Obs) 95 °F (35 °C)   P-CMV/PCV+ Alarms    High Peak Pressure (cmH2O) 40 cmH2O   Low Pressure (cmH2O) 5 cm H2O   High Resp Rate (BPM) 50 BPM   Low Resp Rate (BPM) 6 BPM   High MV (L/min) 20 L/min   Low MV (L/min) 3 L/min   High Spont VTE (mL) 1000 mL   Low Spont VTE (mL) 150 mL   Maintenance   Alarm (pink) cable attached No   Resuscitation bag with peep valve at bedside Yes   Water bag changed Yes   Circuit changed No   IHI Ventilator Associated Pneumonia Bundle   Head of Bed Elevated HOB 30   Surgical Airway Shiley Cuffed   Placement Date: 09/19/24   Type: Tracheostomy  Brand: Francois  Style: Cuffed  Comments: Pt trach in the OR with 8 Shiley Cuffed   Status Cuff Inflated;Secured   Ties Assessment Intact;Secure   Surgical Airway Cuff Pressure (cm H20)   (MLT)   Equipment at bedside BVM;Wall Suction setup;Additional complete same size trach  tube;Additional complete one size smaller trach tube;Obturator;Sterile saline;Additional inner cannula

## 2024-10-07 NOTE — ASSESSMENT & PLAN NOTE
Reportedly has been sleeping poorly  Having trouble staying or falling asleep  Stays up all night per bedside nurse  Current Medications:  Primary team switched patient from Trazodone to Seroquel 50mg BID  Melatonin 3 mg qHS

## 2024-10-07 NOTE — UTILIZATION REVIEW
Continued Stay Review    Date: 10/5/24                          Current Patient Class: Inpatient  Current Level of Care: ICCU    HPI:64 y.o. male initially admitted on 9/8/24     Assessment/Plan:  SOB overnight, CXR without acute changes. Rales present. Continue tube feeds, trial of trach collar and evaluation for speaking valve. Pericardial Effusion, evaluated by CT surgery, no intervention at this time. FU on TTE prior to DC. Monitor volume status, VS, labs. Continue schedule neb txs and pulmicort bid. Bowel regimen with senokot and miralax. Monitor hgb and transfuse less than 7 or symptomatic. Continue ICCU level of care.     Medications:   Scheduled Medications:  acetylcysteine, 3 mL, Nebulization, Q6H While awake  apixaban, 5 mg, Per PEG Tube, BID  budesonide, 0.5 mg, Nebulization, Q12H  chlorhexidine, 15 mL, Mouth/Throat, Q12H [place]  vitamin B-12, 100 mcg, Per PEG Tube, Daily  doxazosin, 2 mg, Per PEG Tube, Daily  folic acid, 1 mg, Per PEG Tube, Daily  furosemide, 40 mg, Per PEG Tube, Daily  gabapentin, 400 mg, Per NG Tube, BID  ipratropium, 0.5 mg, Nebulization, TID  levalbuterol, 1.25 mg, Nebulization, TID  levothyroxine, 250 mcg, Per PEG Tube, Early Morning  melatonin, 3 mg, Per PEG Tube, HS  metoprolol tartrate, 12.5 mg, Per PEG Tube, Q12H [place]  [START ON 10/19/2024] nicotine, 14 mg, Transdermal, Daily  nicotine, 1 patch, Transdermal, Daily  [START ON 11/2/2024] nicotine, 7 mg, Transdermal, Daily  oxyCODONE, 2.5 mg, Per PEG Tube, Q6H  phenytoin, 150 mg, Per PEG Tube, Q12H AMERICA  polyethylene glycol, 17 g, Per PEG Tube, BID  potassium chloride, 20 mEq, Per PEG Tube, Daily  QUEtiapine, 50 mg, Per PEG Tube, BID  senna, 8.8 mg, Per G Tube, HS  sodium chloride, 4 mL, Nebulization, Q6H  sodium chloride, 2 g, Per PEG Tube, BID With Meals  [Held by provider] traZODone, 100 mg, Oral, HS      Continuous IV Infusions:  dexmedetomidine, 0.1-0.7 mcg/kg/hr, Intravenous, Titrated      PRN Meds:  albuterol, 2.5 mg,  Nebulization, Q6H PRN  bisacodyl, 10 mg, Rectal, Daily PRN  haloperidol, 1 mg, Per PEG Tube, Q6H PRN  lidocaine, , Topical, 4x Daily PRN  oxyCODONE, 5 mg, Per PEG Tube, Q6H PRN      Discharge Plan: tbd    Vital Signs (last 3 days)       Date/Time Temp Pulse Resp BP MAP (mmHg) SpO2 FiO2 (%) O2 Device Patient Position - Orthostatic VS Alecia Coma Scale Score Pain    10/07/24 0928 -- -- -- -- -- 98 % -- -- -- -- --    10/07/24 0738 -- -- -- -- -- -- 50 Ventilator -- -- --    10/07/24 0730 97.9 °F (36.6 °C) 84 28 142/58 81 100 % -- -- Lying -- --    10/07/24 0400 -- -- -- -- -- -- -- -- -- 15 --    10/07/24 0340 98.3 °F (36.8 °C) 74 14 118/45 79 100 % -- -- -- -- --    10/07/24 0335 -- -- -- -- -- 98 % -- -- -- -- --    10/07/24 0213 -- -- -- -- -- 92 % -- -- -- -- --    10/07/24 0203 -- -- -- -- -- 98 % -- -- -- -- --    10/07/24 0000 -- -- -- -- -- -- -- -- -- 15 No Pain    10/06/24 2330 99.5 °F (37.5 °C) 76 18 106/41 66 100 % -- -- -- -- --    10/06/24 2300 -- -- -- -- -- 98 % -- -- -- -- --    10/06/24 2144 -- 80 -- 114/53 -- -- -- -- -- -- --    10/06/24 2017 -- -- -- -- -- 99 % -- -- -- -- --    10/06/24 2000 -- -- -- -- -- -- -- -- -- 15 No Pain    10/06/24 1925 99.7 °F (37.6 °C) 82 13 118/45 64 100 % -- -- -- -- --    10/06/24 1825 -- 82 21 122/46 80 100 % -- None (Room air) Lying -- --    10/06/24 1630 -- -- -- -- -- -- -- -- -- 15 No Pain    10/06/24 1548 -- -- -- -- -- 99 % -- -- -- -- --    10/06/24 1523 -- -- -- -- -- 99 % -- -- -- -- --    10/06/24 1505 98.1 °F (36.7 °C) 88 37 137/55 90 99 % -- -- Lying -- --    10/06/24 1200 -- -- -- -- -- -- -- -- -- 15 --    10/06/24 1130 99.3 °F (37.4 °C) 88 33 140/55 68 99 % -- -- Lying -- --    10/06/24 1100 -- -- -- -- -- 98 % -- -- -- -- --    10/06/24 0800 -- -- -- -- -- -- -- -- -- 15 No Pain    10/06/24 0725 -- -- -- -- -- 97 % -- -- -- -- --    10/06/24 0713 -- -- -- -- -- 98 % -- -- -- -- --    10/06/24 0420 -- -- -- -- -- 99 % -- -- -- -- --    10/06/24 0400  -- -- -- -- -- -- -- -- -- 11 --    10/06/24 0340 97.9 °F (36.6 °C) 78 17 129/47 88 100 % -- -- -- -- --    10/06/24 0053 -- -- -- -- -- 100 % -- -- -- -- --    10/06/24 0034 -- -- -- -- -- -- -- -- -- 8 --    10/05/24 2305 -- 78 24 129/49 75 100 % -- -- -- -- --    10/05/24 2300 98.6 °F (37 °C) -- -- -- -- -- -- -- -- -- --    10/05/24 2110 -- -- -- -- -- 99 % -- -- -- -- --    10/05/24 2004 -- -- -- -- -- 97 % -- -- -- -- --    10/05/24 2001 -- -- -- -- -- 99 % -- -- -- -- --    10/05/24 2000 -- -- -- -- -- -- -- -- -- 8 --    10/05/24 1920 98.3 °F (36.8 °C) 98 -- 170/56 85 96 % -- -- -- -- --    10/05/24 1811 -- -- -- -- -- -- -- -- -- -- 7    10/05/24 1630 -- -- -- -- -- -- -- -- -- 11 --    10/05/24 1616 -- -- -- -- -- 100 % -- -- -- -- --    10/05/24 1327 -- -- -- -- -- 99 % -- -- -- -- --    10/05/24 1210 -- -- -- -- -- -- -- -- -- 11 --    10/05/24 1201 -- -- -- -- -- 99 % -- -- -- -- --    10/05/24 1101 98.6 °F (37 °C) -- -- 124/51 67 -- -- -- Lying -- --    10/05/24 1100 -- 82 33 124/51 67 99 % -- -- -- -- --    10/05/24 0918 -- -- -- -- -- -- -- -- -- -- 5    10/05/24 0810 -- -- -- -- -- -- -- -- -- 11 --    10/05/24 0731 98.2 °F (36.8 °C) 88 37 148/60 69 96 % -- -- -- -- --    10/05/24 0710 -- -- -- -- -- 100 % -- -- -- -- --    10/05/24 0512 -- -- -- -- -- -- -- -- -- -- Med Not Given for Pain - for MAR use only    10/05/24 0432 -- -- -- -- -- 100 % -- -- -- -- --    10/05/24 0400 -- -- -- -- -- -- -- -- -- 11 --    10/05/24 0310 -- 80 33 -- -- 100 % -- -- -- -- --    10/05/24 0305 97.6 °F (36.4 °C) 84 36 127/54 76 99 % -- -- Lying -- --    10/05/24 0300 -- -- -- -- -- -- -- -- -- -- No Pain    10/05/24 0025 -- -- -- -- -- 100 % -- -- -- -- --    10/05/24 0000 -- -- -- -- -- -- -- -- -- 11 --    10/04/24 2245 -- 80 37 -- -- 100 % -- -- -- -- --    10/04/24 2240 -- 76 24 115/52 77 100 % -- -- -- -- --    10/04/24 2235 -- 82 32 -- -- 100 % -- -- -- -- --    10/04/24 2100 97.8 °F (36.6 °C) -- -- -- -- --  -- -- Lying -- --    10/04/24 2040 -- -- -- -- -- 100 % -- -- -- -- --    10/04/24 2000 -- -- -- -- -- -- -- -- -- 11 --    10/04/24 1940 98.1 °F (36.7 °C) 86 30 -- -- 100 % -- -- -- -- --    10/04/24 1900 -- 108 39 -- -- 100 % -- -- -- -- --    10/04/24 1845 99.3 °F (37.4 °C) 104 41 158/64 87 97 % -- -- Lying -- --    10/04/24 1600 -- -- -- -- -- -- -- Ventilator -- 11 8    10/04/24 1533 -- -- -- -- -- 100 % -- -- -- -- --    10/04/24 1510 97.8 °F (36.6 °C) 98 29 128/63 92 100 % -- -- Lying -- --    10/04/24 1332 -- -- -- -- -- 100 % -- -- -- -- --    10/04/24 1200 98.4 °F (36.9 °C) 72 24 94/41 62 100 % -- Ventilator Lying 11 5    10/04/24 1152 -- -- -- -- -- 100 % -- -- -- -- --    10/04/24 0945 98.3 °F (36.8 °C) 80 30 115/51 71 100 % -- -- Lying -- --    10/04/24 0813 -- -- -- -- -- 100 % -- -- -- -- --    10/04/24 0800 -- -- -- -- -- -- -- Ventilator -- 11 5    10/04/24 0527 -- -- -- -- -- 100 % -- -- -- -- --    10/04/24 0506 -- -- -- -- -- -- -- -- -- -- Med Not Given for Pain - for MAR use only    10/04/24 0345 -- -- -- -- -- 99 % -- -- -- -- --    10/04/24 0310 99 °F (37.2 °C) 68 12 103/47 78 100 % -- -- -- -- --    10/04/24 0100 -- -- -- -- -- -- -- -- -- -- No Pain    10/04/24 0000 -- -- -- -- -- -- -- -- -- 11 --          Weight (last 2 days)       Date/Time Weight    10/07/24 0600 65.6 (144.62)    10/06/24 0600 65.7 (144.84)    10/05/24 0533 67 (147.71)            Pertinent Labs/Diagnostic Results:   Radiology:  XR chest portable ICU   Final Interpretation by Jorge Valladares MD (10/07 0852)      Unchanged diffuse airspace opacities and small bilateral pleural effusions.            Workstation performed: [id number]         XR chest portable   Final Interpretation by Jorge Valladares MD (10/07 0850)      Unchanged diffuse airspace opacities and small bilateral pleural effusions.            Workstation performed: [id number]         XR chest portable   Final Interpretation by Ross Melendez MD (10/05  0824)      Extensive bilateral airspace disease and bilateral pleural effusions.            Workstation performed: [id number]         CT head without contrast   Final Interpretation by Mau Saab MD (09/30 2324)      No acute intracranial hemorrhage, midline shift, or mass effect. Chronic small vessel ischemic changes and chronic left caudate/basal ganglia infarct.                  Workstation performed: [id number]         XR chest portable ICU   Final Interpretation by Vargas Oconnor MD (09/25 1027)      Persistent bilateral infiltrates and dense opacity at the right apex and pleural effusions without significant change from prior            Workstation performed: [id number]         XR chest portable ICU   Final Interpretation by Nieves Burks MD (09/22 1026)      Tracheostomy projecting over the trachea.      No change in extensive bilateral consolidation.      Question pleural effusions.            Workstation performed: [id number]         XR chest portable   Final Interpretation by Bonnie Castillo MD (09/19 2208)      Tracheostomy tube in adequate position.      Increasing opacity in the left lower lung, which may represent pneumonia or an enlarging left pleural effusion.      Unchanged appearance of the right hemithorax, as detailed above.                  Workstation performed: [id number]         XR chest portable ICU   Final Interpretation by Xavier Fall MD (09/16 1627)      Improved aeration of the right lung, with persistent right hemithoracic volume loss and dense right apical consolidation. There is a also superimposed right pleural effusion and diffuse interstitial lung disease.            Workstation performed: [id number]         XR chest portable ICU   Final Interpretation by Sd Lamb MD (09/14 5579)      1.  Tip of endotracheal tube 3.5 cm above the chery.      2.  Progressive atelectasis of the right lung since 9/10/2024 with further right-sided mediastinal  shift.      3.  Small left pleural effusion and moderate size right effusion, increased in size since 9/10/2024.      4.  Pulmonary vascular congestion.            Workstation performed: [id number]         XR chest portable ICU   Final Interpretation by Juanito Lee DO (09/11 0231)      Large right and trace left pleural effusion.            Workstation performed: [id number]         XR chest portable   Final Interpretation by Matt Russ MD (09/09 0949)      Tubes and lines in satisfactory position. No pneumothorax.      Unchanged interstitial edema and small right pleural effusion.      Resident: John Blake I, the attending radiologist, have reviewed the images and agree with the final report above.      Workstation performed: [id number]         CT soft tissue neck wo contrast    (Results Pending)   CT chest wo contrast    (Results Pending)     Cardiology:  ECG 12 lead   Final Result by Augustina Mora MD (10/06 0825)   Normal sinus rhythm with 1st degree A-V block with occasional Premature    ventricular complexes   Left axis deviation   ST & T wave abnormality, consider anterolateral ischemia   Abnormal ECG   When compared with ECG of 01-OCT-2024 11:17,   Premature ectopic complexes are now Present   Non-specific change in ST segment in Inferior leads   T wave inversion no longer evident in Inferior leads   Confirmed by Augustina Mora ([id number]) on 10/6/2024 8:25:40 AM      ECG 12 lead   Final Result by Chris Trujillo MD (10/02 0700)   Normal sinus rhythm with 1st degree A-V block   Nonspecific ST and T wave abnormality   Abnormal ECG   When compared with ECG of 25-SEP-2024 00:40,   No significant change was found   Confirmed by Chris Trujillo ([id number]) on 10/2/2024 7:00:46 AM      ECG 12 lead   Final Result by Vargas Joiner DO (09/26 0748)   Sinus rhythm with 1st degree A-V block   T wave abnormality, consider inferior ischemia   Abnormal ECG   When compared with ECG of 23-SEP-2024  12:27,   Fusion complexes are no longer Present   Confirmed by Vargas Joiner ([id number]) on 9/26/2024 7:48:44 AM      ECG 12 lead   Final Result by Hedy Lopez MD (09/23 7710)   Sinus rhythm with 1st degree A-V block with Fusion complexes   T wave abnormality, consider inferior ischemia   Abnormal ECG   When compared with ECG of 16-SEP-2024 08:35,   Fusion complexes are now Present   MD interval has increased   ST no longer elevated in Anterior leads   Nonspecific T wave abnormality, worse in Anterior leads   Nonspecific T wave abnormality has replaced inverted T waves in Lateral    leads   Confirmed by Hedy Lopez ([id number]) on 9/23/2024 2:38:32 PM      Echo follow up/limited w/ contrast if indicated   Final Result by Eric Montiel MD (09/18 1303)        Left Ventricle: Left ventricular cavity size is normal. Wall thickness    is moderately increased. The left ventricular ejection fraction is 53%.    Systolic function is normal. Diastolic function is moderately abnormal,    consistent with grade II (pseudonormal) relaxation.     The following segments are hypokinetic: mid anteroseptal and mid    inferoseptal.     All other segments are normal.     IVS: There is diastolic flattening of the interventricular septum    consistent with right ventricle volume overload.     Aorta: The aortic root is severely dilated.     Pericardium: There is a moderate pericardial effusion circumferential    to the heart. The fluid exhibits no internal echoes. The largest diameter    measures 1.9 cm. There is no echocardiographic evidence of tamponade. The    evidence against tamponade includes: no right ventricular diastolic    collapse, no right atrial inversion and no respiratory variation. There is    a large left pleural effusion.     Prior TTE study available for comparison. Prior study date: 9/11/2024.    Changes noted when compared to prior study. Changes include: Pericardial    effusion seems to be slightly reduced in  size. .         ECG 12 lead   Final Result by Bernardo Wallace MD (09/16 1254)   Sinus tachycardia   Low voltage QRS   ST & T wave abnormality, consider lateral ischemia   Abnormal ECG   Reconfirmed by Bernardo Wallace ([id number]) on 9/16/2024 12:54:36 PM      ECG 12 lead   Final Result by Dioni Silva MD (09/13 0914)   Normal sinus rhythm   T wave abnormality, consider inferolateral ischemia   Abnormal ECG   When compared with ECG of 11-SEP-2024 00:45,   No significant change was found   Confirmed by Dioni Silva ([id number]) on 9/13/2024 9:14:13 AM      Echo follow up/limited w/ contrast if indicated   Final Result by Vargas Joiner DO (09/11 1200)        Left Ventricle: Left ventricular cavity size is normal. Wall thickness    is normal. The left ventricular ejection fraction is 55%. Systolic    function is normal.     The following segments are hypokinetic: apex.     All other segments are normal.     Aortic Valve: There is moderate to severe regurgitation with a    centrally directed jet.     Tricuspid Valve: There is mild regurgitation.     Aorta: The aortic root is normal in size. The ascending aorta is    severely dilated. The ascending aorta is 5.4 cm. There is an aneurysm in    the ascending aorta.     Pericardium: There is a moderate pericardial effusion circumferential    to the heart. There is no echocardiographic evidence of tamponade. There    is a left pleural effusion.         ECG 12 lead   Final Result by Dioni Silva MD (09/11 0805)   Normal sinus rhythm   Incomplete left bundle branch block   T wave abnormality, consider inferolateral ischemia   Abnormal ECG   When compared with ECG of 10-SEP-2024 12:11,   Premature supraventricular complexes are no longer Present      Confirmed by Dioni Silva ([id number]) on 9/11/2024 8:05:10 AM      ECG 12 lead   Final Result by Felicita Nicholas MD (09/10 1066)   Sinus rhythm with 1st degree A-V block with Premature supraventricular    complexes   Incomplete left  bundle branch block   ST & T wave abnormality, consider lateral ischemia   Abnormal ECG   When compared with ECG of 08-SEP-2024 19:56,   Premature supraventricular complexes are now Present      Confirmed by Felicita Nicholas ([id number]) on 9/10/2024 3:56:28 PM      Echo follow up/limited w/ contrast if indicated   Final Result by Aura Dimas DO (09/09 1212)        Left Ventricle: Left ventricular cavity size is normal. Wall thickness    is normal. The left ventricular ejection fraction is 50%. Systolic    function is low normal. There is global hypokinesis with regional    variation.     Right Ventricle: Right ventricular cavity size is normal. Systolic    function is normal.     Aortic Valve: There is moderate to severe regurgitation.     Pericardium: There is a moderate to large pericardial effusion    circumferential to the heart measuring largest along the RV free wall at    2.3 cm. The fluid exhibits no internal echoes. There is no    echocardiographic evidence of tamponade. The evidence against tamponade    includes: no right ventricular diastolic collapse, no right atrial    inversion and no respiratory variation. There is a left pleural effusion.         ECG 12 lead   Final Result by Effie Davis MD (09/09 0103)   Normal sinus rhythm   Technically poor tracing        Poor data quality, interpretation may be adversely affected      Confirmed by Effie Davis ([id number]) on 9/9/2024 1:03:05 AM        GI:  Bronchoscopy   Final Result by Amos Powell DO (09/13 8017)   Right main stem mucous plug   Friable mucosa over right and left lung       RECOMMENDATION:   Continue mechanical ventilation, add on chest percussion therapy for    airway clearance         Bronchoscopy   Final Result by Romy Hill MD (09/14 1232)   Moderate friable mucosa in the trachea, main chery, left lung and right    lung   Bloody mucus plugs with 51-75% obstruction removed with suction from the    bronchus intermedius and RLL          RECOMMENDATION:   Continue monitoring in the ICU          I was the supervising physician and present for the entire procedure on    9/13/2024      There was mucous plugging of the right lower lobe present along with some    bloody secretions causing mild to moderate obstruction of the left    mainstem.  Both were suctioned and cleared.      Romy Hill MD         Bronchoscopy   Final Result by Romy Hill MD (09/12 0856)   Excessive, thin and brown secretions present in all observed locations,    including the trachea, main chery, left lung and right lung   Bloody mucus plugs with greater than 75% obstruction removed with suction    from the bronchus intermedius and RML   Friable mucosa in the left lung and right lung         RECOMMENDATION:   Continue monitoring in the ICU   Follow up results of washing culture, gram stain and cytology             I was the supervising physician and present for the entire procedure on    09/11/2024. I also participated in the procedure.       Severe mucus plugging in the RLL with brownship/bloody secretions.    Suctioned and cleared. Will send for culture and cytology.       Romy Hill MD                 Results from last 7 days   Lab Units 10/07/24  0614 10/05/24  2223 10/04/24  1542 10/04/24  0506 10/02/24  0552   WBC Thousand/uL 9.15 12.32* 5.55 6.80 8.61   HEMOGLOBIN g/dL 6.9* 7.2* 7.8* 7.0* 7.1*   HEMATOCRIT % 22.5* 22.9* 24.1* 21.8* 21.8*   PLATELETS Thousands/uL 243 243 251 221 194   TOTAL NEUT ABS Thousands/µL 7.28 10.44*  --  4.66 6.66     Results from last 7 days   Lab Units 10/02/24  0552   RETIC CT ABS  18,500   RETIC CT PCT % 0.87     Results from last 7 days   Lab Units 10/07/24  0614 10/05/24  2223 10/04/24  0506 10/02/24  0552 10/01/24  0615 09/30/24  1548   SODIUM mmol/L 141 137 137 136 133* 134*   POTASSIUM mmol/L 4.3 4.5 4.0 3.7 4.3 3.9   CHLORIDE mmol/L 99 96 97 96 94* 92*   CO2 mmol/L 37* 35* 35* 36* 34* 37*   ANION GAP mmol/L 5 6 5 4 5 5   BUN mg/dL 27* 26*  21 23 26* 26*   CREATININE mg/dL 0.41* 0.47* 0.53* 0.52* 0.51* 0.52*   EGFR ml/min/1.73sq m 124 117 111 112 113 112   CALCIUM mg/dL 8.6 8.5 8.4 8.3* 8.5 8.7   CALCIUM, IONIZED mmol/L 1.13  --   --   --   --   --    MAGNESIUM mg/dL 1.9 1.8*  --   --  2.0 1.9   PHOSPHORUS mg/dL 3.2 3.7  --   --   --  3.5     Results from last 7 days   Lab Units 10/07/24  0614 10/04/24  0506 10/02/24  0552   AST U/L 17 16 14   ALT U/L 10 10 10   ALK PHOS U/L 126* 93 92   TOTAL PROTEIN g/dL 6.6 6.7 6.5   ALBUMIN g/dL 2.7* 2.7* 2.7*   TOTAL BILIRUBIN mg/dL 0.45 0.59 0.52     Results from last 7 days   Lab Units 10/05/24  2149 09/30/24  2202   POC GLUCOSE mg/dl 158* 144*     Results from last 7 days   Lab Units 10/07/24  0614 10/05/24  2223 10/04/24  0506 10/02/24  0552 10/01/24  0615 09/30/24  1548   GLUCOSE RANDOM mg/dL 157* 210* 100 126 100 162*     Results from last 7 days   Lab Units 10/07/24  0923 10/06/24  0551 10/06/24  0009   PH RADHA  7.322 7.300 7.210*   PCO2 RADHA mm Hg 72.7* 66.3* 95.3*   PO2 RADHA mm Hg 48.4* 28.0* 29.5*   HCO3 RADHA mmol/L 36.8* 31.9* 37.3*   BASE EXC RADHA mmol/L 9.3 4.5 7.7   O2 CONTENT RADHA ml/dL 9.1 5.2 5.0   O2 HGB, VENOUS % 78.2 44.4* 41.9*         Results from last 7 days   Lab Units 10/01/24  0036   CK TOTAL U/L 29*     Results from last 7 days   Lab Units 10/07/24  0614   PROCALCITONIN ng/ml 0.22     Results from last 7 days   Lab Units 10/01/24  0036   LACTIC ACID mmol/L 0.8     Network Utilization Review Department  ATTENTION: Please call with any questions or concerns to [phone] and carefully listen to the prompts so that you are directed to the right person. All voicemails are confidential.   For Discharge needs, contact Care Management DC Support Team at [phone] opt. 2  Send all requests for admission clinical reviews, approved or denied determinations and any other requests to dedicated fax number below belonging to the campus where the patient is receiving treatment. List of dedicated fax  numbers for the Facilities:  FACILITY NAME UR FAX NUMBER   ADMISSION DENIALS (Administrative/Medical Necessity) [phone]   DISCHARGE SUPPORT TEAM (NETWORK) [phone]   PARENT CHILD HEALTH (Maternity/NICU/Pediatrics) [phone]   [facility] [phone]   [facility] [phone]   [facility] [phone]   [facility] [phone]   [facility] [phone]   [facility] [phone]   [facility] [phone]   [facility] [phone]   [facility] [phone]   [facility] [phone]   [facility] [phone]   [facility] [phone]

## 2024-10-07 NOTE — ASSESSMENT & PLAN NOTE
S/p cardiac arrest soon after initial admission at [facility].thought to initially be secondary to circumferential pericardial effusion, as well as acute hypoxia and hypercarbia from respiratory failure.  Of note, no intervention for the effusion indicated at this time by CT surgery with a follow-up TTE discussed after discharge.  Patient also carries diagnosis of chronic combined systolic and diastolic heart failure and atrial fibrillation

## 2024-10-07 NOTE — PROGRESS NOTES
"  Progress Note - Critical Care/ICU   Name: Marcin Sánchez 64 y.o. male I MRN: [id number]  [facility] I Date of Admission: 9/8/2024   Date of Service: 10/7/2024 I Hospital Day: 29       Assessment & Plan   Diagnosis: Seizure disorder  Phenytoin 150 mg BID, recent check is therapeutic  Continue Gabapentin 400mg BID     Diagnosis: Insomnia  Trazodone 100mg daily at bedtime, currently held  Monitor Qtc (406--> 407 on 10/05)     Diagnosis: Agitation  Intermittent  Palliative consulted  Haldol PRN  Continue Seroquel 25 mg BID  Monitor for respiratory depression & ETCO2.   Monitor qTC  Continue 1-1  Frequent reorientation    CV:   Diagnosis: Pericardial effusion   Echo 9/9  \"moderate to large pericardial effusion circumferential to the heart measuring largest along the RV free wall at 2.3 cm. The fluid exhibits no internal echoes. 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 Moderate to severe aortic regurgitation, mild tricuspid regurgitation, dilated ascending aortic root up to 5.4 cm.  On maintenance 40 mg lasix PO daily  Continue to monitor fluid status      Diagnosis: History of AAA  5.4 cm  Monitor hemodynamics; BP goal <130/80  Outpatient follow up     Pulm:  Diagnosis: Acute on Chronic hypoxic and hypercapnic respiratory failure.  Diagnosis: COPD, and acute exacerbation now resolved  Likely multifactorial including moderate to severe COPD  Course complicated by recurrent mucous plugging, status post therapeutic bronch x 3.  Sputum culture with Moraxella, status post course of azithromycin x 5 days.  Also with MDR acinetobacter likely contaminant versus " colonization as patient has remained stable without antibiotic treatment.  Status post trach 9/19  Pressure control while asleep, pressure support while awake as tolerated  Continue Xopenex and Atrovent TID  Continue pulmicort BID, Continue Performist BID  ETCO2 monitioring.     GI:   Diagnosis: PEG placed 9/19  Continue tube feeds at goal  Bowel regimen: Senokot, Miralax    :   Diagnosis: Urinary retention  Doxazosin 2mg daily  Diagnosis: Metabolic alkalosis  Due to prior diuresis, improved after diamox    F/E/N:   F: No maintenance fluids  E: Replete as needed, continue sodium tabs  N: Continue TwoCalHN bolus feeds    Heme/Onc:   Diagnosis: Anemia of chronic disease  Continue B12/folate  Transfuse for Hgb <7 and/or symptomatic anemia  10/7: Hgb 6.9 from 7.2, consider transfusion with 1 unit  Diagnosis: DVT ppx  SCDs and Eliquis    Endo:   Diagnosis: Hypothyroidism  Home med: Levothyroxine 250 mg  TSH 45.06 on 9/25 from 0.59 on 9/5, free T4 0.34 (9/25)  Restarted on home levothyroxine 250 mg  Recheck in 6 weeks    ID:   Diagnosis: Colonization with Acinetobacter  Continue to monitor WBC count and temperature curve  Worsening bilateral infiltrates, vanc and cefepime started  Continue Abx    MSK/Skin:   Diagnosis: At risk for pressure injury   PT/OT when able  Frequent turns/repositioning  Skin surveillance     Disposition: Critical care    ICU Core Measures     Vented Patient  VAP Bundle  VAP bundle ordered     A: Assess, Prevent, and Manage Pain Has pain been assessed? Yes  Need for changes to pain regimen? No   B: Both Spontaneous Awakening Trials (SATs) and Spontaneous Breathing Trials (SBTs) Plan to perform spontaneous awakening trial today? Yes   Plan to perform spontaneous breathing trial today? Yes   Obvious barriers to extubation? Yes   C: Choice of Sedation RASS Goal: 0 Alert and Calm or +2 Agitated  Need for changes to sedation or analgesia regimen? No   D: Delirium CAM-ICU: Negative   E: Early Mobility   Plan for early mobility? Yes   F: Family Engagement Plan for family engagement today? Yes       Antibiotic Review: Awaiting culture results.     Review of Invasive Devices:            Prophylaxis:  VTE VTE covered by:  apixaban, Per PEG Tube, 5 mg at 10/06/24 1717       Stress Ulcer  not ordered         24 Hour Events : Overnight, attempted to wean O2 and patient desatted    Subjective   Review of Systems: Review of Systems   Respiratory:  Positive for shortness of breath.    Cardiovascular:  Negative for chest pain.   Gastrointestinal:  Negative for abdominal pain, nausea and vomiting.   Neurological:  Negative for headaches.       Objective :                   Vitals I/O      Most Recent Min/Max in 24hrs   Temp 98.3 °F (36.8 °C) Temp  Min: 98.1 °F (36.7 °C)  Max: 99.7 °F (37.6 °C)   Pulse 74 Pulse  Min: 74  Max: 88   Resp 14 Resp  Min: 13  Max: 37   BP (!) 118/45 BP  Min: 106/41  Max: 140/55   O2 Sat 100 % SpO2  Min: 92 %  Max: 100 %      Intake/Output Summary (Last 24 hours) at 10/7/2024 0649  Last data filed at 10/7/2024 0340  Gross per 24 hour   Intake 240 ml   Output 550 ml   Net -310 ml       Diet Enteral/Parenteral; Tube Feeding No Oral Diet; Two Mirza HN; Bolus; 240; (4x/day) - 8:00AM,12:00PM,4:00PM,8:00PM; Prosource Protein Liquid - One Packet; 30; Water; Before and After each Bolus    Invasive Monitoring           Physical Exam   Physical Exam  Vitals and nursing note reviewed.   Eyes:      General: No scleral icterus.        Right eye: No discharge.         Left eye: No discharge.   Skin:     General: Skin is warm and dry.      Coloration: Skin is not jaundiced.      Findings: No rash.   HENT:      Head: Normocephalic and atraumatic.   Neck:      Trachea: Tracheostomy present.   Cardiovascular:      Rate and Rhythm: Normal rate and regular rhythm.      Pulses: Normal pulses.      Heart sounds: Normal heart sounds.   Musculoskeletal:      Right lower leg: No edema.      Left lower leg: No edema.    Abdominal: General: Bowel sounds are normal.      Palpations: Abdomen is soft.      Tenderness: There is no abdominal tenderness.   Constitutional:       General: He is not in acute distress.  Pulmonary:      Effort: Pulmonary effort is normal. No respiratory distress.      Breath sounds: Rhonchi present. No wheezing.   Psychiatric:         Mood and Affect: Mood is anxious.   Neurological:      General: No focal deficit present.      Mental Status: He is alert and oriented to person, place and time.      Motor: Strength full and intact in all extremities.   Genitourinary/Anorectal:  Haynes present.        Diagnostic Studies        Lab Results: I have reviewed the following results:     Medications:  Scheduled PRN   acetylcysteine, 3 mL, Q6H While awake  apixaban, 5 mg, BID  budesonide, 0.5 mg, Q12H  chlorhexidine, 15 mL, Q12H [place]  vitamin B-12, 100 mcg, Daily  doxazosin, 2 mg, Daily  folic acid, 1 mg, Daily  furosemide, 40 mg, Daily  gabapentin, 400 mg, BID  ipratropium, 0.5 mg, TID  levalbuterol, 1.25 mg, TID  levothyroxine, 250 mcg, Early Morning  melatonin, 3 mg, HS  metoprolol tartrate, 12.5 mg, Q12H AMERICA  [START ON 10/19/2024] nicotine, 14 mg, Daily  nicotine, 1 patch, Daily  [START ON 11/2/2024] nicotine, 7 mg, Daily  oxyCODONE, 2.5 mg, Q6H  phenytoin, 150 mg, Q12H AMERICA  polyethylene glycol, 17 g, BID  potassium chloride, 20 mEq, Daily  QUEtiapine, 50 mg, BID  senna, 8.8 mg, HS  sodium chloride, 4 mL, Q6H  sodium chloride, 2 g, BID With Meals  [Held by provider] traZODone, 100 mg, HS  vancomycin, 25 mg/kg, Once      albuterol, 2.5 mg, Q6H PRN  bisacodyl, 10 mg, Daily PRN  haloperidol, 1 mg, Q6H PRN  lidocaine, , 4x Daily PRN  oxyCODONE, 5 mg, Q6H PRN       Continuous          Labs:   CBC    Recent Labs     10/05/24  2223   WBC 12.32*   HGB 7.2*   HCT 22.9*        BMP    Recent Labs     10/05/24  2223   SODIUM 137   K 4.5   CL 96   CO2 35*   AGAP 6   BUN 26*   CREATININE 0.47*   CALCIUM 8.5        Coags    No recent results     Additional Electrolytes  Recent Labs     10/05/24  2223   MG 1.8*   PHOS 3.7          Blood Gas    No recent results  Recent Labs     10/06/24  0551   PHVEN 7.300   QGT6CDC 66.3*   PO2VEN 28.0*   IVT3BNR 31.9*   BEVEN 4.5   V0ZLGFF 44.4*    LFTs  No recent results    Infectious  No recent results  Glucose  Recent Labs     10/05/24  2223   GLUC 210*         The patient has been re-examined and I agree with the above assessment or I updated with my findings.

## 2024-10-07 NOTE — ASSESSMENT & PLAN NOTE
Likely secondary to acute on chronic hypercarbic and hypoxic respiratory failure from acute exacerbation of COPD in the setting of pulmonary fibrosis and long-term tobacco abuse.  Patient was intubated after suffering cardiac arrest soon after admission 3 weeks ago and failed extubation on 9/10.  He was switched to mechanical ventilation via tracheostomy on 9/19.  Per intensivist service, plan is for continued decrease of respiratory MV support with hopeful ultimate wean.  Having difficult being weaned off ventilator due to desatting

## 2024-10-07 NOTE — ASSESSMENT & PLAN NOTE
Intermittent agitation - at times pulling at trach tubing  Virtual 1:1 in place  Out of restraints   Current Medications for agitation:  Haldol 1 mg Q6hrs PRN for agitation, restlessness  Seroquel 50 mg BID   Continue recommend global delirium precautions including:  Establishment of day/night cycle via lights during the day and blinds open.  Please limit interruptions at night as medically appropriate.  Provide glasses/hearing aids as apprioriate.    Minimize deliriogenic medications as able.   Provide reorientation including date on board and visible clock.   Avoid restraints as able, frequent verbal reorientations or patient care sitter as appropriate.

## 2024-10-07 NOTE — PROGRESS NOTES
Progress Note - Palliative Care   Name: Marcin Sánchez 64 y.o. male I MRN: [id number]  [facility] Date of Admission: 9/8/2024   Date of Service: 10/7/2024 I Hospital Day: 29    Assessment & Plan  Acute hypoxic respiratory failure (HCC)  Likely secondary to acute on chronic hypercarbic and hypoxic respiratory failure from acute exacerbation of COPD in the setting of pulmonary fibrosis and long-term tobacco abuse.  Patient was intubated after suffering cardiac arrest soon after admission 3 weeks ago and failed extubation on 9/10.  He was switched to mechanical ventilation via tracheostomy on 9/19.  Per intensivist service, plan is for continued decrease of respiratory MV support with hopeful ultimate wean.  Having difficult being weaned off ventilator due to desatting   Chronic combined systolic and diastolic CHF (congestive heart failure) (HCC)  Wt Readings from Last 3 Encounters:   10/07/24 65.6 kg (144 lb 10 oz)   09/08/24 60.8 kg (134 lb)   08/29/24 67.6 kg (149 lb)   Primary intensivist service is continuing to diurese patient, with monitoring of fluid status and weight.          Cardiac arrest (HCC)  S/p cardiac arrest soon after initial admission at [facility].thought to initially be secondary to circumferential pericardial effusion, as well as acute hypoxia and hypercarbia from respiratory failure.  Of note, no intervention for the effusion indicated at this time by CT surgery with a follow-up TTE discussed after discharge.  Patient also carries diagnosis of chronic combined systolic and diastolic heart failure and atrial fibrillation  Palliative care encounter  Capacity:  Pt did not exhibit full competency during our evaluation today. Communication is inherently challenging with his trach and he reported being illiterate today when we offered a whiteboard for him to write on. He was oriented to person and place, but other than knowing the year, he was not oriented to time. He was unable to answer who  "the current [place] President is, and could not articulate his wishes should he suffer another cardiac arrest.    GOC:  Without full capacity, medical decisions revert to his legally appointed guardian, Tere Wililamson ([phone]). Per chart review, Tere was appointed pt's guardian in 2015 and since it was \"so long ago,\" was unable to recall why she was appointed. Tere had been employed at \"[employer]\" when she was appointed pt's guardian, but has not worked for the company for the \"last few months.\" Tere is now living in [place]. Tere was contacted by JEFF  on 9/25 and confirmed she was pt's legal guardian; Tere went on to say it was okay to speak with pt's sister, Katalina, about pt's medical state.    Upon collaborative discussion, palliative service will reach out to [facility]'s legal consult resource, Catherine Ivan Huitronzahraabryon, for specific legal assistance in navigating pt's medical decision maker complexity. We have communicated this to the pt's primary CC team as well.    ACP:  Pt's chart states he has ACP docs, but when examined, it is a single scanned-in sheet from 3/14/14 signed by pt's sister Katalina stating that the pt does not have any AD, LW, or durable POA.     Disposition:  At this time, pt's medical needs supersede that of his previous assisted living facility residence, [facility].  is actively searching for a LTAC facility that could accommodate pt.     Symptom Support:  Symptom mgmt portion of palliative consult was related to pt's intermittent agitation, specifically at night grabbing for/pulling at his trach. On discussion with primary CC service, there is a balance between sedation for his behaviors, and oversedation as they steadily attempt to decrease pt's MV support. We have switched pt from qHS olanzapine to prn haldol, as haldol is one of the least sedating anti-confusion/anti-psychotic medications. Should haldol ultimately prove ineffective, could revisit return to " olanzapine. He also remains on 1:1 observation as well.     Follow-up:  Palliative service will continue to follow Marcin and coordinate medical decision making apparatus with help of legal consult service.     PDMP Review: I have reviewed the patient's controlled substance dispensing history in the Prescription Drug Monitoring Program in compliance with the [facility] regulations before prescribing any controlled substances.  Insomnia  Reportedly has been sleeping poorly  Having trouble staying or falling asleep  Stays up all night per bedside nurse  Current Medications:  Primary team switched patient from Trazodone to Seroquel 50mg BID  Melatonin 3 mg qHS  Agitation  Intermittent agitation - at times pulling at trach tubing  Virtual 1:1 in place  Out of restraints   Current Medications for agitation:  Haldol 1 mg Q6hrs PRN for agitation, restlessness  Seroquel 50 mg BID   Continue recommend global delirium precautions including:  Establishment of day/night cycle via lights during the day and blinds open.  Please limit interruptions at night as medically appropriate.  Provide glasses/hearing aids as apprioriate.    Minimize deliriogenic medications as able.   Provide reorientation including date on board and visible clock.   Avoid restraints as able, frequent verbal reorientations or patient care sitter as appropriate.    Subjective   Met w/ patient at the bedside. Patient lying in bed, appears comfortable, NAD. 1:1 sitter in place due to intermittent agitation, no restraints. Patient sleeping, did not awaken patient due to patient's already poor sleep. Sitter stated that he has been fine, not agitated. OME's in the past 24 hours: 11.25 mg. Needed x2 doses of Haldol in the past 24 hours for intermittent agitation. Spoke w/ Lu MELO, she is in contact w/ Maik Kirkland, pending Single Case Agreement. She stated that he will need to be off his 1:1 for patient to be accepted. Had trach exchange this AM.    Objective  :  Temp:  [97.9 °F (36.6 °C)-99.7 °F (37.6 °C)] 97.9 °F (36.6 °C)  HR:  [74-88] 84  BP: (106-142)/(41-58) 142/58  Resp:  [13-37] 28  SpO2:  [92 %-100 %] 100 %  O2 Device: Ventilator  FiO2 (%):  [50] 50    Physical Exam  Constitutional:       General: He is sleeping. He is not in acute distress.     Comments: Trach on ventilator    HENT:      Head: Normocephalic and atraumatic.      Mouth/Throat:      Mouth: Mucous membranes are moist.   Cardiovascular:      Rate and Rhythm: Normal rate.   Pulmonary:      Effort: Pulmonary effort is normal. No respiratory distress.   Skin:     Coloration: Skin is pale.          Lab Results: I have reviewed the following results:  Lab Results   Component Value Date/Time    SODIUM 141 10/07/2024 06:14 AM    SODIUM 136 08/06/2021 06:22 AM    K 4.3 10/07/2024 06:14 AM    K 4.2 08/06/2021 06:22 AM    BUN 27 (H) 10/07/2024 06:14 AM    BUN 8 08/06/2021 06:22 AM    CREATININE 0.41 (L) 10/07/2024 06:14 AM    CREATININE 0.42 (L) 08/06/2021 06:22 AM    GLUC 157 (H) 10/07/2024 06:14 AM    GLUC 87 08/06/2021 06:22 AM    CALCIUM 8.6 10/07/2024 06:14 AM    CALCIUM 9.5 08/06/2021 06:22 AM    AST 17 10/07/2024 06:14 AM    AST 18 08/06/2021 06:22 AM    ALT 10 10/07/2024 06:14 AM    ALT 17 08/06/2021 06:22 AM    ALB 2.7 (L) 10/07/2024 06:14 AM    ALB 3.8 08/06/2021 06:22 AM    TP 6.6 10/07/2024 06:14 AM    TP 7.7 08/06/2021 06:22 AM    EGFR 124 10/07/2024 06:14 AM    EGFR 115 10/02/2020 06:35 AM     Lab Results   Component Value Date/Time    HGB 6.9 (L) 10/07/2024 06:14 AM    WBC 9.15 10/07/2024 06:14 AM     10/07/2024 06:14 AM    INR 1.28 (H) 09/20/2024 05:29 PM    PTT 48 (H) 09/23/2024 04:46 AM     Lab Results   Component Value Date/Time    [id number] 45.059 (H) 09/24/2024 06:34 AM     Administrative Statements   I have spent a total time of 20 minutes in caring for this patient on the day of the visit/encounter including Counseling / Coordination of care, Documenting in the medical record,  Reviewing / ordering tests, medicine, procedures  , Obtaining or reviewing history  , and Communicating with other healthcare professionals . Topics discussed with the patient / family include symptom assessment and management, medication review, and anticipatory guidance.    Yulisa Payne

## 2024-10-08 ENCOUNTER — APPOINTMENT (INPATIENT)
Dept: RADIOLOGY | Facility: HOSPITAL | Age: 64
DRG: 005 | End: 2024-10-08
Payer: COMMERCIAL

## 2024-10-08 LAB
ABO GROUP BLD BPU: NORMAL
ALBUMIN SERPL BCG-MCNC: 3 G/DL (ref 3.5–5)
ALP SERPL-CCNC: 131 U/L (ref 34–104)
ALT SERPL W P-5'-P-CCNC: 10 U/L (ref 7–52)
ANION GAP SERPL CALCULATED.3IONS-SCNC: 6 MMOL/L (ref 4–13)
APTT PPP: 43 SECONDS (ref 23–34)
APTT PPP: 46 SECONDS (ref 23–34)
AST SERPL W P-5'-P-CCNC: 18 U/L (ref 13–39)
BACTERIA UR QL AUTO: ABNORMAL /HPF
BASOPHILS # BLD AUTO: 0.08 THOUSANDS/ΜL (ref 0–0.1)
BASOPHILS NFR BLD AUTO: 1 % (ref 0–1)
BILIRUB SERPL-MCNC: 0.79 MG/DL (ref 0.2–1)
BILIRUB UR QL STRIP: NEGATIVE
BPU ID: NORMAL
BUN SERPL-MCNC: 24 MG/DL (ref 5–25)
CA-I BLD-SCNC: 1.11 MMOL/L (ref 1.12–1.32)
CALCIUM ALBUM COR SERPL-MCNC: 9.3 MG/DL (ref 8.3–10.1)
CALCIUM SERPL-MCNC: 8.5 MG/DL (ref 8.4–10.2)
CHLORIDE SERPL-SCNC: 101 MMOL/L (ref 96–108)
CLARITY UR: ABNORMAL
CO2 SERPL-SCNC: 36 MMOL/L (ref 21–32)
COLOR UR: YELLOW
CREAT SERPL-MCNC: 0.49 MG/DL (ref 0.6–1.3)
CROSSMATCH: NORMAL
EOSINOPHIL # BLD AUTO: 0.16 THOUSAND/ΜL (ref 0–0.61)
EOSINOPHIL NFR BLD AUTO: 2 % (ref 0–6)
ERYTHROCYTE [DISTWIDTH] IN BLOOD BY AUTOMATED COUNT: 16.3 % (ref 11.6–15.1)
GFR SERPL CREATININE-BSD FRML MDRD: 115 ML/MIN/1.73SQ M
GLUCOSE SERPL-MCNC: 113 MG/DL (ref 65–140)
GLUCOSE UR STRIP-MCNC: NEGATIVE MG/DL
HCT VFR BLD AUTO: 26 % (ref 36.5–49.3)
HGB BLD-MCNC: 8.4 G/DL (ref 12–17)
HGB UR QL STRIP.AUTO: NEGATIVE
HYALINE CASTS #/AREA URNS LPF: ABNORMAL /LPF
IMM GRANULOCYTES # BLD AUTO: 0.03 THOUSAND/UL (ref 0–0.2)
IMM GRANULOCYTES NFR BLD AUTO: 0 % (ref 0–2)
KETONES UR STRIP-MCNC: NEGATIVE MG/DL
LACTATE SERPL-SCNC: 0.5 MMOL/L (ref 0.5–2)
LEUKOCYTE ESTERASE UR QL STRIP: ABNORMAL
LYMPHOCYTES # BLD AUTO: 0.86 THOUSANDS/ΜL (ref 0.6–4.47)
LYMPHOCYTES NFR BLD AUTO: 11 % (ref 14–44)
MAGNESIUM SERPL-MCNC: 2.3 MG/DL (ref 1.9–2.7)
MCH RBC QN AUTO: 33.1 PG (ref 26.8–34.3)
MCHC RBC AUTO-ENTMCNC: 32.3 G/DL (ref 31.4–37.4)
MCV RBC AUTO: 102 FL (ref 82–98)
MONOCYTES # BLD AUTO: 0.86 THOUSAND/ΜL (ref 0.17–1.22)
MONOCYTES NFR BLD AUTO: 11 % (ref 4–12)
MUCOUS THREADS UR QL AUTO: ABNORMAL
NEUTROPHILS # BLD AUTO: 5.86 THOUSANDS/ΜL (ref 1.85–7.62)
NEUTS SEG NFR BLD AUTO: 75 % (ref 43–75)
NITRITE UR QL STRIP: NEGATIVE
NON-SQ EPI CELLS URNS QL MICRO: ABNORMAL /HPF
NRBC BLD AUTO-RTO: 0 /100 WBCS
PH UR STRIP.AUTO: 5 [PH]
PHENYTOIN SERPL-MCNC: 19.3 UG/ML (ref 10–20)
PHOSPHATE SERPL-MCNC: 3.9 MG/DL (ref 2.3–4.1)
PLATELET # BLD AUTO: 235 THOUSANDS/UL (ref 149–390)
PMV BLD AUTO: 8.6 FL (ref 8.9–12.7)
POTASSIUM SERPL-SCNC: 4.6 MMOL/L (ref 3.5–5.3)
PROT SERPL-MCNC: 7 G/DL (ref 6.4–8.4)
PROT UR STRIP-MCNC: ABNORMAL MG/DL
RBC # BLD AUTO: 2.54 MILLION/UL (ref 3.88–5.62)
RBC #/AREA URNS AUTO: ABNORMAL /HPF
RETICS # AUTO: NORMAL 10*3/UL (ref 14356–105094)
RETICS # CALC: 1.66 % (ref 0.37–1.87)
SODIUM SERPL-SCNC: 143 MMOL/L (ref 135–147)
SP GR UR STRIP.AUTO: 1.02 (ref 1–1.03)
UNIT DISPENSE STATUS: NORMAL
UNIT PRODUCT CODE: NORMAL
UNIT PRODUCT VOLUME: 350 ML
UNIT RH: NORMAL
UROBILINOGEN UR STRIP-ACNC: <2 MG/DL
VANCOMYCIN SERPL-MCNC: 18.9 UG/ML (ref 10–20)
WBC # BLD AUTO: 7.85 THOUSAND/UL (ref 4.31–10.16)
WBC #/AREA URNS AUTO: ABNORMAL /HPF

## 2024-10-08 PROCEDURE — 94640 AIRWAY INHALATION TREATMENT: CPT

## 2024-10-08 PROCEDURE — 80185 ASSAY OF PHENYTOIN TOTAL: CPT

## 2024-10-08 PROCEDURE — 71250 CT THORAX DX C-: CPT

## 2024-10-08 PROCEDURE — 94669 MECHANICAL CHEST WALL OSCILL: CPT

## 2024-10-08 PROCEDURE — 94760 N-INVAS EAR/PLS OXIMETRY 1: CPT

## 2024-10-08 PROCEDURE — 94664 DEMO&/EVAL PT USE INHALER: CPT

## 2024-10-08 PROCEDURE — 70490 CT SOFT TISSUE NECK W/O DYE: CPT

## 2024-10-08 PROCEDURE — 87186 SC STD MICRODIL/AGAR DIL: CPT | Performed by: STUDENT IN AN ORGANIZED HEALTH CARE EDUCATION/TRAINING PROGRAM

## 2024-10-08 PROCEDURE — 80053 COMPREHEN METABOLIC PANEL: CPT

## 2024-10-08 PROCEDURE — A7521 TRACH/LARYN TUBE CUFFED: HCPCS

## 2024-10-08 PROCEDURE — 87205 SMEAR GRAM STAIN: CPT | Performed by: STUDENT IN AN ORGANIZED HEALTH CARE EDUCATION/TRAINING PROGRAM

## 2024-10-08 PROCEDURE — 87077 CULTURE AEROBIC IDENTIFY: CPT | Performed by: STUDENT IN AN ORGANIZED HEALTH CARE EDUCATION/TRAINING PROGRAM

## 2024-10-08 PROCEDURE — 84100 ASSAY OF PHOSPHORUS: CPT

## 2024-10-08 PROCEDURE — 87070 CULTURE OTHR SPECIMN AEROBIC: CPT | Performed by: STUDENT IN AN ORGANIZED HEALTH CARE EDUCATION/TRAINING PROGRAM

## 2024-10-08 PROCEDURE — 99291 CRITICAL CARE FIRST HOUR: CPT | Performed by: INTERNAL MEDICINE

## 2024-10-08 PROCEDURE — 94003 VENT MGMT INPAT SUBQ DAY: CPT

## 2024-10-08 PROCEDURE — 85730 THROMBOPLASTIN TIME PARTIAL: CPT | Performed by: INTERNAL MEDICINE

## 2024-10-08 PROCEDURE — 83735 ASSAY OF MAGNESIUM: CPT

## 2024-10-08 PROCEDURE — 85025 COMPLETE CBC W/AUTO DIFF WBC: CPT

## 2024-10-08 PROCEDURE — 82330 ASSAY OF CALCIUM: CPT

## 2024-10-08 PROCEDURE — 87081 CULTURE SCREEN ONLY: CPT

## 2024-10-08 PROCEDURE — 99255 IP/OBS CONSLTJ NEW/EST HI 80: CPT | Performed by: PSYCHIATRY & NEUROLOGY

## 2024-10-08 PROCEDURE — 80202 ASSAY OF VANCOMYCIN: CPT

## 2024-10-08 PROCEDURE — 81001 URINALYSIS AUTO W/SCOPE: CPT | Performed by: STUDENT IN AN ORGANIZED HEALTH CARE EDUCATION/TRAINING PROGRAM

## 2024-10-08 RX ORDER — SODIUM CHLORIDE, SODIUM GLUCONATE, SODIUM ACETATE, POTASSIUM CHLORIDE, MAGNESIUM CHLORIDE, SODIUM PHOSPHATE, DIBASIC, AND POTASSIUM PHOSPHATE .53; .5; .37; .037; .03; .012; .00082 G/100ML; G/100ML; G/100ML; G/100ML; G/100ML; G/100ML; G/100ML
500 INJECTION, SOLUTION INTRAVENOUS ONCE
Status: COMPLETED | OUTPATIENT
Start: 2024-10-08 | End: 2024-10-08

## 2024-10-08 RX ORDER — HEPARIN SODIUM 10000 [USP'U]/100ML
3-20 INJECTION, SOLUTION INTRAVENOUS
Status: DISCONTINUED | OUTPATIENT
Start: 2024-10-08 | End: 2024-10-10

## 2024-10-08 RX ORDER — SODIUM CHLORIDE FOR INHALATION 3 %
4 VIAL, NEBULIZER (ML) INHALATION
Status: DISCONTINUED | OUTPATIENT
Start: 2024-10-08 | End: 2024-10-18 | Stop reason: HOSPADM

## 2024-10-08 RX ORDER — FUROSEMIDE 10 MG/ML
40 INJECTION INTRAMUSCULAR; INTRAVENOUS ONCE
Status: COMPLETED | OUTPATIENT
Start: 2024-10-08 | End: 2024-10-08

## 2024-10-08 RX ORDER — QUETIAPINE FUMARATE 25 MG/1
50 TABLET, FILM COATED ORAL DAILY
Status: DISCONTINUED | OUTPATIENT
Start: 2024-10-09 | End: 2024-10-10

## 2024-10-08 RX ORDER — QUETIAPINE FUMARATE 100 MG/1
100 TABLET, FILM COATED ORAL
Status: DISCONTINUED | OUTPATIENT
Start: 2024-10-08 | End: 2024-10-10

## 2024-10-08 RX ORDER — HEPARIN SODIUM 1000 [USP'U]/ML
3900 INJECTION, SOLUTION INTRAVENOUS; SUBCUTANEOUS ONCE
Status: COMPLETED | OUTPATIENT
Start: 2024-10-08 | End: 2024-10-08

## 2024-10-08 RX ORDER — ALBUMIN, HUMAN INJ 5% 5 %
12.5 SOLUTION INTRAVENOUS ONCE
Status: COMPLETED | OUTPATIENT
Start: 2024-10-08 | End: 2024-10-08

## 2024-10-08 RX ORDER — VANCOMYCIN HYDROCHLORIDE 1 G/200ML
1000 INJECTION, SOLUTION INTRAVENOUS EVERY 12 HOURS
Status: DISCONTINUED | OUTPATIENT
Start: 2024-10-09 | End: 2024-10-09

## 2024-10-08 RX ADMIN — VANCOMYCIN HYDROCHLORIDE 1250 MG: 10 INJECTION, POWDER, LYOPHILIZED, FOR SOLUTION INTRAVENOUS at 05:13

## 2024-10-08 RX ADMIN — POLYETHYLENE GLYCOL 3350 17 G: 17 POWDER, FOR SOLUTION ORAL at 09:46

## 2024-10-08 RX ADMIN — SODIUM CHLORIDE, SODIUM GLUCONATE, SODIUM ACETATE, POTASSIUM CHLORIDE, MAGNESIUM CHLORIDE, SODIUM PHOSPHATE, DIBASIC, AND POTASSIUM PHOSPHATE 500 ML: .53; .5; .37; .037; .03; .012; .00082 INJECTION, SOLUTION INTRAVENOUS at 01:18

## 2024-10-08 RX ADMIN — ALBUMIN (HUMAN) 12.5 G: 12.5 INJECTION, SOLUTION INTRAVENOUS at 03:23

## 2024-10-08 RX ADMIN — LEVALBUTEROL HYDROCHLORIDE 1.25 MG: 1.25 SOLUTION RESPIRATORY (INHALATION) at 20:02

## 2024-10-08 RX ADMIN — ACETYLCYSTEINE 600 MG: 200 SOLUTION ORAL; RESPIRATORY (INHALATION) at 07:38

## 2024-10-08 RX ADMIN — CEFEPIME 2000 MG: 2 INJECTION, POWDER, FOR SOLUTION INTRAVENOUS at 12:31

## 2024-10-08 RX ADMIN — APIXABAN 5 MG: 5 TABLET, FILM COATED ORAL at 09:46

## 2024-10-08 RX ADMIN — QUETIAPINE FUMARATE 100 MG: 100 TABLET ORAL at 21:57

## 2024-10-08 RX ADMIN — HEPARIN SODIUM 12 UNITS/KG/HR: 10000 INJECTION, SOLUTION INTRAVENOUS at 13:46

## 2024-10-08 RX ADMIN — IPRATROPIUM BROMIDE 0.5 MG: 0.5 SOLUTION RESPIRATORY (INHALATION) at 20:02

## 2024-10-08 RX ADMIN — BUDESONIDE 0.5 MG: 0.5 INHALANT RESPIRATORY (INHALATION) at 07:37

## 2024-10-08 RX ADMIN — PHENYTOIN 150 MG: 125 SUSPENSION ORAL at 23:02

## 2024-10-08 RX ADMIN — BUDESONIDE 0.5 MG: 0.5 INHALANT RESPIRATORY (INHALATION) at 20:02

## 2024-10-08 RX ADMIN — Medication 3 MG: at 21:57

## 2024-10-08 RX ADMIN — IPRATROPIUM BROMIDE 0.5 MG: 0.5 SOLUTION RESPIRATORY (INHALATION) at 07:37

## 2024-10-08 RX ADMIN — OXYCODONE HYDROCHLORIDE 2.5 MG: 5 SOLUTION ORAL at 18:33

## 2024-10-08 RX ADMIN — ACETYLCYSTEINE 3 ML: 200 SOLUTION ORAL; RESPIRATORY (INHALATION) at 20:02

## 2024-10-08 RX ADMIN — QUETIAPINE FUMARATE 50 MG: 25 TABLET ORAL at 09:46

## 2024-10-08 RX ADMIN — SODIUM CHLORIDE SOLN NEBU 3% 4 ML: 3 NEBU SOLN at 14:42

## 2024-10-08 RX ADMIN — LEVALBUTEROL HYDROCHLORIDE 1.25 MG: 1.25 SOLUTION RESPIRATORY (INHALATION) at 14:33

## 2024-10-08 RX ADMIN — GABAPENTIN 400 MG: 400 CAPSULE ORAL at 09:46

## 2024-10-08 RX ADMIN — GABAPENTIN 400 MG: 400 CAPSULE ORAL at 18:34

## 2024-10-08 RX ADMIN — PHENYTOIN 150 MG: 125 SUSPENSION ORAL at 09:46

## 2024-10-08 RX ADMIN — HEPARIN SODIUM 3900 UNITS: 1000 INJECTION INTRAVENOUS; SUBCUTANEOUS at 13:47

## 2024-10-08 RX ADMIN — FUROSEMIDE 40 MG: 40 TABLET ORAL at 09:45

## 2024-10-08 RX ADMIN — CHLORHEXIDINE GLUCONATE 0.12% ORAL RINSE 15 ML: 1.2 LIQUID ORAL at 09:45

## 2024-10-08 RX ADMIN — LEVOTHYROXINE SODIUM 250 MCG: 100 TABLET ORAL at 05:13

## 2024-10-08 RX ADMIN — NICOTINE 1 PATCH: 21 PATCH, EXTENDED RELEASE TRANSDERMAL at 09:46

## 2024-10-08 RX ADMIN — LEVALBUTEROL HYDROCHLORIDE 1.25 MG: 1.25 SOLUTION RESPIRATORY (INHALATION) at 07:37

## 2024-10-08 RX ADMIN — IPRATROPIUM BROMIDE 0.5 MG: 0.5 SOLUTION RESPIRATORY (INHALATION) at 14:33

## 2024-10-08 RX ADMIN — FUROSEMIDE 40 MG: 10 INJECTION, SOLUTION INTRAVENOUS at 18:34

## 2024-10-08 RX ADMIN — SODIUM CHLORIDE SOLN NEBU 3% 4 ML: 3 NEBU SOLN at 07:38

## 2024-10-08 RX ADMIN — CEFEPIME 2000 MG: 2 INJECTION, POWDER, FOR SOLUTION INTRAVENOUS at 21:57

## 2024-10-08 RX ADMIN — OXYCODONE HYDROCHLORIDE 2.5 MG: 5 SOLUTION ORAL at 09:42

## 2024-10-08 RX ADMIN — VANCOMYCIN HYDROCHLORIDE 1250 MG: 10 INJECTION, POWDER, LYOPHILIZED, FOR SOLUTION INTRAVENOUS at 12:31

## 2024-10-08 RX ADMIN — ACETYLCYSTEINE 600 MG: 200 SOLUTION ORAL; RESPIRATORY (INHALATION) at 14:32

## 2024-10-08 RX ADMIN — SODIUM CHLORIDE SOLN NEBU 3% 4 ML: 3 NEBU SOLN at 20:04

## 2024-10-08 NOTE — QUICK NOTE
Interval progress note:  Overnight patient's BP fell to 85/34, HR  Obtained blood cultures, sputum culture, UA, and lactic acid. Administered 1L bolus, continued cefepime and vanc. Metoprolol was held as well. LA returned at 0.5, procal from yesterday 0.22. Performed POCUS due to hypotension in the setting of known pericardial effusion. No tamponade physiology on POCUS, however a moderate-large L pleural effusion was discovered. IVC measured 2.2cm with DI of 11%, however this was measured while on PC 20/6. Of note, BP drops mainly while patient is resting, and when he is awake blood pressure improves to 110/60s with MAP in 70s. Patient currently denies SOB, chest pain, nausea, dizziness, lightheadedness.

## 2024-10-08 NOTE — RESPIRATORY THERAPY NOTE
RT Ventilator Management Note  Marcin Sánchez 64 y.o. male MRN: [id number]  Unit/Bed#: [facility] 205-01 Encounter: [id number]      Daily Screen         10/6/2024  0725 10/7/2024  0738          Patient safety screen outcome:: Passed Failed      Not Ready for Weaning due to:: -- Underline problem not resolved      Spont breathing trial outcome:: Passed --                Physical Exam:   Assessment Type: Assess only  General Appearance: Alert, Awake  Respiratory Pattern: Assisted  Chest Assessment: Chest expansion symmetrical  Bilateral Breath Sounds: Diminished      Resp Comments: Pt appears to be resting comfortably on pressure control vent settings at this time. No distress noted.

## 2024-10-08 NOTE — ASSESSMENT & PLAN NOTE
S/p cardiac arrest soon after initial admission at [facility].thought to initially be secondary to circumferential pericardial effusion, as well as acute hypoxia and hypercarbia from respiratory failure.  Of note, no intervention for the effusion indicated at this time by CT surgery with a follow-up TTE discussed after discharge.  Patient also carries diagnosis of chronic combined systolic and diastolic heart failure and atrial fibrillation

## 2024-10-08 NOTE — PROGRESS NOTES
"Assessment & Plan   Problem list  Acute on chronic hypercapnic respiratory failure s/p Trach  V Tach Arrest  Shock, resolved  Mucous plugging  COPD  Encephalopathy  Anemia of chronic disease  Atrial fibrillation  Metabolic alkalosis  Pericardial effusion  Seizure disorder  Hypothyroidism  AAA  Insomnia  Tobacco use  Hx of lung SCC     Neuro:   #Seizure disorder  Phenytoin 150 mg BID  Continue Gabapentin 400mg BID     #Insomnia  #Agitation  Trazodone switched to Seroquel 50 mg bid  Haldol prn  Appreciate palliative care assistance     CV:   #Pericardial effusion   Echo 9/9  \"moderate to large pericardial effusion circumferential to the heart measuring largest along the RV free wall at 2.3 cm. The fluid exhibits no internal echoes. 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 Moderate to severe aortic regurgitation, mild tricuspid regurgitation, dilated ascending aortic root up to 5.4 cm.  On maintenance lasix po daily  Continue to monitor fluid status      #History of AAA  5.4 cm  Monitor hemodynamics; BP goal <130/80  Outpatient follow up     Pulm:  #Acute on Chronic hypoxic and hypercapnic respiratory failure.  #COPD, and acute exacerbation now resolved  Likely multifactorial including moderate to severe COPD  Course complicated by recurrent mucous plugging, status post therapeutic bronch x 3.  Sputum culture with Moraxella, status post course of azithromycin x 5 days.  Also with MDR acinetobacter likely contaminant versus colonization as patient has remained stable without antibiotic treatment.  Status post trach 9/19  Pressure control while asleep, pressure support while awake " as tolerated  Continue Xopenex and Atrovent TID  Continue pulmicort BID, Continue Performist BID     #History of squamous cell cancer of lung post chemo/radiation 2016, cancer of right mainstem  Noted     GI:   #PEG placed 9/19  Continue tube feeds at goal  Bowel regimen: Senokot, Miralax     :   #Urinary retention  Doxazosin 2mg     #Metabolic alkalosis  Due to prior diuresis, improved after diamox     F/E/N:   F: No maintenance fluids  E: Replete as needed  N: Continue TwoCalHN bolus feeds     Heme/Onc:   #Anemia of chronic disease  Transfuse for Hgb <7 and/or symptomatic anemia     #DVT ppx  SCDs and Eliquis     Endo:   #Hypothyroidism  Home med: Levothyroxine 250 mg  TSH 45.06 on 9/25 from 0.59 on 9/5, free T4 0.34 (9/25)  Restarted on home levothyroxine 250 mg  Recheck in 10/31     ID:   #Colonization with Acinetobacter  Continue to monitor WBC count and temperature curve    #C/f pneumonia  With hypotension and continued infiltrates on CXR  Lactate and procal normal, no leukocytosis  Consider monitoring off abx or at least discontinuing vanc     MSK/Skin:   #At risk for pressure injury   PT/OT when able  Frequent turns/repositioning  Skin surveillance      L: midline, PEG  D: none  A: PCV+ 16/6/40%      Disposition: Critical care    ICU Core Measures     Vented Patient  VAP Bundle  VAP bundle ordered     A: Assess, Prevent, and Manage Pain Has pain been assessed? Yes  Need for changes to pain regimen? No   B: Both Spontaneous Awakening Trials (SATs) and Spontaneous Breathing Trials (SBTs) Plan to perform spontaneous awakening trial today? N/A   Plan to perform spontaneous breathing trial today? Yes   Obvious barriers to extubation? Yes   C: Choice of Sedation RASS Goal: 0 Alert and Calm  Need for changes to sedation or analgesia regimen? No   D: Delirium CAM-ICU: Positive   E: Early Mobility  Plan for early mobility? Yes   F: Family Engagement Plan for family engagement today? Yes       Antibiotic Review:  Awaiting culture results.     Review of Invasive Devices:            Prophylaxis:  VTE VTE covered by:  apixaban, Per PEG Tube, 5 mg at 10/07/24 1662       Stress Ulcer  not ordered         24 Hour Events : hypotensive overnight, asymptomatic, patient received 1.5L IVF + albumin. Lactate and blood cultures were ordered. POCUS was performed which showed left pleural effusion.     Subjective  Patient resting in bed comfortably without acute complaints      Objective :                   Vitals I/O      Most Recent Min/Max in 24hrs   Temp 99 °F (37.2 °C) Temp  Min: 97.9 °F (36.6 °C)  Max: 99.2 °F (37.3 °C)   Pulse 76 Pulse  Min: 64  Max: 92   Resp 15 Resp  Min: 8  Max: 42   BP (!) 102/45 BP  Min: 80/44  Max: 159/62   O2 Sat 100 % SpO2  Min: 94 %  Max: 100 %   PC: 20/8   Intake/Output Summary (Last 24 hours) at 10/8/2024 0650  Last data filed at 10/7/2024 1930  Gross per 24 hour   Intake 225.33 ml   Output 900 ml   Net -674.67 ml       Diet Enteral/Parenteral; Tube Feeding No Oral Diet; Two Mirza HN; Bolus; 240; (4x/day) - 8:00AM,12:00PM,4:00PM,8:00PM; Prosource Protein Liquid - One Packet; 30; Water; Before and After each Bolus    Invasive Monitoring           Physical Exam   Physical Exam  Eyes:      Extraocular Movements: Extraocular movements intact.      Conjunctiva/sclera: Conjunctivae normal.   Skin:     General: Skin is warm and dry.   HENT:      Head: Normocephalic and atraumatic.      Nose: No congestion.      Mouth/Throat:      Mouth: Mucous membranes are dry.   Neck:      Trachea: Tracheostomy present.   Cardiovascular:      Rate and Rhythm: Normal rate and regular rhythm.   Musculoskeletal:      Right lower leg: No edema.      Left lower leg: No edema.   Abdominal: General: Bowel sounds are normal.      Palpations: Abdomen is soft.   Constitutional:       General: He is not in acute distress.     Appearance: He is well-developed. He is not ill-appearing.      Interventions: He is intubated and restrained.    Pulmonary:      Effort: Tachypnea present. He is intubated.      Comments: Decreased breath sounds b/l  Neurological:      Mental Status: He is alert. He is calm.          Diagnostic Studies        Lab Results: I have reviewed the following results:     Medications:  Scheduled PRN   acetylcysteine, 3 mL, Q6H While awake  apixaban, 5 mg, BID  budesonide, 0.5 mg, Q12H  cefepime, 2,000 mg, Q12H  chlorhexidine, 15 mL, Q12H [place]  [Held by provider] doxazosin, 2 mg, Daily  furosemide, 40 mg, Daily  gabapentin, 400 mg, BID  ipratropium, 0.5 mg, TID  levalbuterol, 1.25 mg, TID  levothyroxine, 250 mcg, Early Morning  melatonin, 3 mg, HS  [START ON 10/19/2024] nicotine, 14 mg, Daily  nicotine, 1 patch, Daily  [START ON 11/2/2024] nicotine, 7 mg, Daily  oxyCODONE, 2.5 mg, Q6H  phenytoin, 150 mg, Q12H [place]  polyethylene glycol, 17 g, BID  QUEtiapine, 50 mg, BID  senna, 8.8 mg, HS  sodium chloride, 4 mL, Q6H  [Held by provider] traZODone, 100 mg, HS  vancomycin, 17.5 mg/kg, Q8H      albuterol, 2.5 mg, Q6H PRN  bisacodyl, 10 mg, Daily PRN  haloperidol, 1 mg, Q6H PRN  lidocaine, , 4x Daily PRN  oxyCODONE, 5 mg, Q6H PRN       Continuous    dexmedetomidine, 0.1-0.7 mcg/kg/hr, Last Rate: Stopped (10/08/24 0215)         Labs:   CBC    Recent Labs     10/07/24  2202 10/07/24  2336 10/08/24  0512   WBC 7.96  --  7.85   HGB 7.4* 8.5* 8.4*   HCT 23.2* 25* 26.0*     --  235     BMP    Recent Labs     10/07/24  0614 10/07/24  2336 10/08/24  0512   SODIUM 141  --  143   K 4.3  --  4.6   CL 99  --  101   CO2 37* 39* 36*   AGAP 5  --  6   BUN 27*  --  24   CREATININE 0.41*  --  0.49*   CALCIUM 8.6  --  8.5       Coags    No recent results     Additional Electrolytes  Recent Labs     10/07/24  0614 10/07/24  2336 10/08/24  0512   MG 1.9  --  2.3   PHOS 3.2  --  3.9   CAIONIZED 1.13 1.21 1.11*          Blood Gas    No recent results  Recent Labs     10/07/24  1255   PHVEN 7.578*   [id number] 36.0*   PO2VEN 175.7*   [id number] 32.8*   BEVEN 10.1    P5EDDVQ 95.4*    LFTs  Recent Labs     10/07/24  0614 10/08/24  0512   ALT 10 10   AST 17 18   ALKPHOS 126* 131*   ALB 2.7* 3.0*   TBILI 0.45 0.79       Infectious  Recent Labs     10/07/24  0614   PROCALCITONI 0.22     Glucose  Recent Labs     10/07/24  0614 10/08/24  0512   GLUC 157* 113

## 2024-10-08 NOTE — PROGRESS NOTES
Marcin Sánchez is a 64 y.o. male who is currently ordered Vancomycin IV with management by the Pharmacy Consult service.  Relevant clinical data and objective / subjective history reviewed.  Vancomycin Assessment:  Indication and Goal AUC/Trough: Pneumonia (goal -600, trough >10), -600, trough >10  Clinical Status: stable  Micro:   10/7 blood culture x2: pending  10/8 sputum culture: 1+ polys, 1+GNR  Renal Function:  SCr: 0.49 mg/dL  CrCl: >100 mL/min  Renal replacement: Not on dialysis  Days of Therapy: 2  Current Dose: 1250mg IV q8h  Vancomycin Plan:  New Dosinmg IV q12h  Estimated AUC: 491 mcg*hr/mL  Estimated Trough: 12.3 mcg/mL  Next Level: 10/15 random with AM labs  Renal Function Monitoring: Daily BMP and UOP  Pharmacy will continue to follow closely for s/sx of nephrotoxicity, infusion reactions and appropriateness of therapy.  BMP and CBC will be ordered per protocol. We will continue to follow the patient’s culture results and clinical progress daily.    Olivia Levine, Pharmacist

## 2024-10-08 NOTE — PLAN OF CARE
Problem: SAFETY,RESTRAINT: NV/NON-SELF DESTRUCTIVE BEHAVIOR  Goal: Remains free of harm/injury (restraint for non violent/non self-detsructive behavior)  Description: INTERVENTIONS:  - Instruct patient/family regarding restraint use   - Assess and monitor physiologic and psychological status   - Provide interventions and comfort measures to meet assessed patient needs   - Identify and implement measures to help patient regain control  - Assess readiness for release of restraint   Outcome: Progressing     Problem: PAIN - ADULT  Goal: Verbalizes/displays adequate comfort level or baseline comfort level  Description: Interventions:  - Encourage patient to monitor pain and request assistance  - Assess pain using appropriate pain scale  - Administer analgesics based on type and severity of pain and evaluate response  - Implement non-pharmacological measures as appropriate and evaluate response  - Consider cultural and social influences on pain and pain management  - Notify physician/advanced practitioner if interventions unsuccessful or patient reports new pain  Outcome: Progressing     Problem: INFECTION - ADULT  Goal: Absence or prevention of progression during hospitalization  Description: INTERVENTIONS:  - Assess and monitor for signs and symptoms of infection  - Monitor lab/diagnostic results  - Monitor all insertion sites, i.e. indwelling lines, tubes, and drains  - Monitor endotracheal if appropriate and nasal secretions for changes in amount and color  - [place] appropriate cooling/warming therapies per order  - Administer medications as ordered  - Instruct and encourage patient and family to use good hand hygiene technique  - Identify and instruct in appropriate isolation precautions for identified infection/condition  Outcome: Progressing     Problem: SAFETY ADULT  Goal: Patient will remain free of falls  Description: INTERVENTIONS:  - Educate patient/family on patient safety including physical  limitations  - Instruct patient to call for assistance with activity   - Consult OT/PT to assist with strengthening/mobility   - Keep Call bell within reach  - Keep bed low and locked with side rails adjusted as appropriate  - Keep care items and personal belongings within reach  - Initiate and maintain comfort rounds  - Make Fall Risk Sign visible to staff  - Offer Toileting every 2 Hours, in advance of need  - Initiate/Maintain bed alarm  - Apply yellow socks and bracelet for high fall risk patients  - Consider moving patient to room near nurses station  Outcome: Progressing

## 2024-10-08 NOTE — ASSESSMENT & PLAN NOTE
Patient has history of epilepsy, he is on Dilantin 150 mg twice daily and gabapentin 400 mg twice daily.  Neurology has been consulted for episodes of staring spells and decreased responsiveness.  These lasted for about 1 to 2 minutes before patient slowly returns to baseline over 15 to 20 minutes.  Patient's phenytoin levels throughout his admission initially were supratherapeutic and then subtherapeutic without any indication of overt seizures.  Patient's prior seizure semiology is unknown, no notes from neurology are seen from care everywhere.    Patient had a CT head on 9/30/24 which showed the chronic left basal ganglia/caudate infarct without any acute findings.  He had a past video EEG during this admission which showed a left temporal epileptogenicity and moderate diffuse encephalopathy, this was done for 12 hours.    Impression: No events or seizures were captures on vEEG monitoring, can now discontinue with 48+ hours of monitoring completed.  Phenytoin switched to Keppra due to elevated phenytoin levels after albumin correction.      Plan:  -Switched phenytoin to Keppra 1 g q12h  -D/c vEEG  -Seizure precautions  -Stat CT head for any acute neurologic changes and please notify the neurology team  -No further management from Neurology. Please call with questions or concerns

## 2024-10-08 NOTE — PROGRESS NOTES
Progress Note - Infectious Disease   Name: Marcin Sánchez 64 y.o. male I MRN: [id number]  Unit/Bed#: MICU 10 I Date of Admission: 9/8/2024   Date of Service: 9/16/2024 I Hospital Day: 8    Assessment & Plan  Acute hypoxic respiratory failure (HCC)  Present on admission.  Suspect multifactorial due to severe COPD with possible exacerbation, mucus plug.  Status post therapeutic bronch x3.  Unclear if actual pneumonia with minimal infiltrate versus atelectasis seen on CT chest.  Sputum culture with Moraxella, status post 5 days azithromycin.  Also with MDR-Acinetobacter which may represent contaminants versus colonizers, as has remained stable without effective Acinetobacter treatment.  Status post intubation now x3.  With most recent failure of extubation due recurrent mucus plug.  Low clinical suspicion for infection or pneumonia..  Clinically stable without sepsis.      Continue to follow closely off antibiotics  Follow respiratory status closely  Pulmonary toilet  Patient/family to pursue trach  Follow temperatures closely  Supportive care as per the primary service    Antibiotics:  Off antibiotics #6  Acute exacerbation of chronic obstructive pulmonary disease (COPD) (HCC)  On steroids.  Status post 5 days of azithromycin  Mucus plugging of bronchi  Status post therapeutic bronchoscopy x3.  Recurrent issue.  Shock (HCC)  Developed in setting of cardiac arrest, intubation, sedation.  Low clinical suspicion for sepsis.  Has remained afebrile with mild WBC which is likely attributable to steroids.  Recent blood cultures negative.  Now off pressors  Pericardial effusion  Without tamponade.  Cardiology follow-up ongoing.  Cardiac arrest (HCC)  Initially PEA then with VT.  In setting of hypoxia, intubation  Chronic combined systolic and diastolic CHF (congestive heart failure) (HCC)  Wt Readings from Last 3 Encounters:   09/16/24 70.2 kg (154 lb 12.2 oz)   09/08/24 60.8 kg (134 lb)   08/29/24 67.6 kg (149 lb)   With  severe aortic insufficiency.  Undergoing diuresis  Cancer of right main bronchus (HCC)  Diagnosed in 2016.  Status postchemotherapy, radiation.  Appears stable.    The patient is stable from an ID standpoint  We will sign off for now.  Please call with new questions.        History of Present Illness   Remains intubated.  No pressors.  No fevers or diarrhea.    Objective      Temp:  [98.5 °F (36.9 °C)-98.9 °F (37.2 °C)] 98.5 °F (36.9 °C)  HR:  [] 88  Resp:  [12-55] 24  BP: ()/() 111/55  O2 Device: Ventilator          I/O last 24 hours:  In: 2109.9 [I.V.:815.9; NG/GT:220; Feedings:1074]  Out: 1045 [Urine:1045]  Lines/Drains/Airways       Active Status       Name Placement date Placement time Site Days    CVC Central Lines 09/08/24 09/08/24  1505  --  7    ETT  Cuffed 8 mm 09/13/24 2326  -- 2    NG/OG/Enteral Tube Orogastric Center mouth 09/14/24  0050  Center mouth  2    Urethral Catheter 16 Fr. 09/08/24  0049  --  8                  Physical Exam   Sleeping, intubated  Minimal secretions noted in suction canister  No rashes  Abdomen nondistended  No edema    Lab Results: I have reviewed the following results:   Recent Labs     09/13/24 2154 09/13/24 2154 09/14/24  0540 09/14/24  0542 09/16/24  0510 09/16/24  0840   WBC  --   --  7.39   < > 8.54  --    HGB  --   --  9.2*   < > 9.1*  --    HCT  --   --  28.4*   < > 28.5*  --    PLT  --   --  154   < > 187  --    BANDSPCT  --   --  4  --   --   --    SODIUM 144  --  145   < > 143  --    K 4.3  --  4.1   < > 4.2  --      --  104   < > 103  --    CO2 36*  --  36*   < > 37*  --    BUN 26*  --  29*   < > 25  --    CREATININE 0.63  --  0.63   < > 0.61  --    GLUC 118  --  88   < > 119  --    CAIONIZED  --    < > 1.14  --  1.13  --    MG 2.0  --  2.3   < > 1.8*  --    PHOS 5.3*  --  4.3*   < > 2.9  --    AST 29  --   --   --   --   --    ALT 77*  --   --   --   --   --    ALB 3.3*  --   --   --   --   --    TBILI 1.24*  --   --   --   --   --     ALKPHOS 86  --   --   --   --   --    PTT  --   --   --    < > 72*  --    HSTNI0  --   --   --   --   --  13   BNP  --   --   --   --   --  402*    < > = values in this interval not displayed.     Micro:  Lab Results   Component Value Date    BLOODCX No Growth After 5 Days. 09/05/2024    BLOODCX No Growth After 5 Days. 09/05/2024    SPUTUMCULTUR 4+ Growth of Acinetobacter baumannii (A) 09/06/2024    SPUTUMCULTUR Few Colonies of Moraxella catarrhalis (A) 09/06/2024    SPUTUMCULTUR 4+ Growth of 09/06/2024     Imaging Review: Personally reviewed the following image studies in PACS and associated radiology reports: chest xray. My interpretation of the radiology images/reports is: Improved aeration RLL.  Other Studies: No additional pertinent studies reviewed.       What Type Of Note Output Would You Prefer (Optional)?: Standard Output How Severe Is Your Skin Lesion?: mild [id number]: treated_been_treated Is This A New Presentation, Or A Follow-Up?: Growth Additional History: Pt states lesion was treated with cryotherapy about a year ago and went away completely.  A few weeks ago a red bump appeared in the area.  She says it looks different than when it was there before.

## 2024-10-08 NOTE — OCCUPATIONAL THERAPY NOTE
Occupational Therapy         Patient Name: Marcin Sánchez  Today's Date: 10/8/2024       10/08/24 1100   OT Last Visit   OT Visit Date 10/08/24   Note Type   Note Type Cancelled Session   Cancel Reasons Medical status  (Pt just returned from CT scan - per RN pt not as responsive and potential L facial droop - will defer at this time - medical team assessing pt)       Catalina Terry

## 2024-10-08 NOTE — WOUND OSTOMY CARE
Progress Note - Wound   Marcin Sánchez 64 y.o. male MRN: [id number]  Unit/Bed#: [facility] 205-01 Encounter: [id number]        Assessment:   Patient is seen for wound care follow-up. Seen lying on p-500 low air loss mattress. On in person continual observation. Incontinent of bowel and incontinent of bladder- external male purewick in use for management of urine. Max assist for turning and repositioning. Dependent for all care needs. Receiving nutrition via tube feedings.     Findings:  B/L heels are dry intact and sohan with no skin loss or wounds present. Recommend preventative silicone bordered foam dressings and proper offloading/ repositioning.      POA Unstageable Sacrum: irregular in shape area of full thickness skin loss. Wound bed with 100% moist yellow slough tissue. Oksana-wound is fragile. Moderate serosanguineous drainage noted. Recommend continuing with silver alginate and foam dressing.  HA Unstageable Neck Under Trach Plate: full thickness skin loss with mix of moist yellow slough tissue and beefy red bleeding tissue. Oksana-wound is fragile. Moderate serosanguineous/yellow in color drainage noted. Recommend silver alginate and optifoam to the area for treatment.       No induration, fluctuance, odor, warmth/temperature differences, redness, or purulence noted to the above noted wounds and skin areas assessed. New dressings applied per orders listed below. Patient tolerated well- no s/s of non-verbal pain or discomfort observed during the encounter. Bedside nurse aware of plan of care. See flow sheets for more detailed assessment findings.      Orders listed below and wound care will continue to follow, call or Secure Chat Message with questions.       Skin Care Plan:  1-Mid Sacro-Buttocks Wound: Cleanse sacro-buttocks with soap and water. Apply melgisorb ag to wound bed and cover with Silicone Bordered Foam Dressing(Mepilex) to area. Ramírez with T for Treatment. Change every other day or  PRN.  2-Turn/reposition q2h or when medically stable for pressure re-distribution on skin .  3-Elevate heels to offload pressure.  4-Moisturize skin daily with skin nourishing cream  5-Ehob cushion in chair when out of bed.  6-Cleanse B/L Heels with soap and water. Pat dry. Apply Silicone Border Foam (Mepilex) to areas. Ramírez with P for Prevention and change every 3 days or PRN soilage/displacement. Peel back and inspect Q-shift.  7-Trach Wound: Cleanse with NSS and dry. Cut silver alginate (MelgisoZALP Ag) sheet like a split gauze and apply around trach insertion site. Cover with Optifoam. Change daily or PRN soilage/displacement.         WOUNDS:  Wound 09/09/24 Pressure Injury Buttocks (Active)   Wound Image   10/08/24 1139   Wound Description Yellow;Slough 10/08/24 1139   Pressure Injury Stage U 10/08/24 1139   Oksana-wound Assessment Fragile;[place] 10/08/24 1139   Wound Length (cm) 2.5 cm 10/08/24 1139   Wound Width (cm) 2 cm 10/08/24 1139   Wound Depth (cm) 0.2 cm 10/08/24 1139   Wound Surface Area (cm^2) 5 cm^2 10/08/24 1139   Wound Volume (cm^3) 1 cm^3 10/08/24 1139   Calculated Wound Volume (cm^3) 1 cm^3 10/08/24 1139   Wound Site Closure BECKY 10/08/24 1139   Drainage Amount Moderate 10/08/24 1139   Drainage Description Serosanguineous 10/08/24 1139   Non-staged Wound Description Full thickness 10/08/24 1139   Treatments Cleansed;Irrigation with NSS;Site care 10/08/24 1139   Dressing Calcium Alginate with Silver;Foam, Silicon (eg. Allevyn, etc) 10/08/24 1139   Wound packed? No 10/08/24 1139   Packing- # removed 0 10/08/24 1139   Packing- # inserted 0 10/08/24 1139   Dressing Changed Changed 10/08/24 1139   Patient Tolerance Tolerated well 10/08/24 1139   Dressing Status Clean;Intact;Dry 10/08/24 1139       Wound 09/26/24 Pressure Injury Throat Distal;Mid (Active)   Wound Image   10/08/24 1135   Wound Description Yellow;Bleeding;Beefy red 10/08/24 1135   Pressure Injury Stage U 10/08/24 1135   Oksana-wound Assessment  Fragile 10/08/24 1135   Wound Length (cm) 1.5 cm 10/08/24 1135   Wound Width (cm) 0.5 cm 10/08/24 1135   Wound Depth (cm) 0.2 cm 10/08/24 1135   Wound Surface Area (cm^2) 0.75 cm^2 10/08/24 1135   Wound Volume (cm^3) 0.15 cm^3 10/08/24 1135   Calculated Wound Volume (cm^3) 0.15 cm^3 10/08/24 1135   Change in Wound Size % 0 10/08/24 1135   Wound Site Closure BECKY 10/08/24 1135   Drainage Amount Moderate 10/08/24 1135   Drainage Description Serosanguineous;Yellow 10/08/24 1135   Non-staged Wound Description Full thickness 10/08/24 1135   Treatments Cleansed;Site care;Irrigation with NSS 10/08/24 1135   Dressing Calcium Alginate with Silver;Foam 10/08/24 1135   Wound packed? No 10/08/24 1135   Packing- # removed 0 10/08/24 1135   Packing- # inserted 0 10/08/24 1135   Dressing Changed Changed 10/08/24 1135   Patient Tolerance Tolerated well 10/08/24 1135   Dressing Status Clean 10/08/24 1135                Barb Rowan RN, BSN, CWOCN

## 2024-10-08 NOTE — ASSESSMENT & PLAN NOTE
"Capacity:  Pt did not exhibit full competency during our evaluation today. Communication is inherently challenging with his trach and he reported being illiterate today when we offered a whiteboard for him to write on. He was oriented to person and place, but other than knowing the year, he was not oriented to time. He was unable to answer who the current [place] President is, and could not articulate his wishes should he suffer another cardiac arrest.    GOC:  Without full capacity, medical decisions revert to his legally appointed guardian, Tere Williamson ([phone]). Per chart review, Tere was appointed pt's guardian in 2015 and since it was \"so long ago,\" was unable to recall why she was appointed. Tere had been employed at \"[employer]\" when she was appointed pt's guardian, but has not worked for the company for the \"last few months.\" Tere is now living in [place]. Tere was contacted by JEFF  on 9/25 and confirmed she was pt's legal guardian; Tere went on to say it was okay to speak with pt's sister, Katalina, about pt's medical state.    Upon collaborative discussion, palliative service will reach out to [facility]'s legal consult resource, Catherine Ivan Huitronzahraabryon, for specific legal assistance in navigating pt's medical decision maker complexity. We have communicated this to the pt's primary CC team as well.    ACP:  Pt's chart states he has ACP docs, but when examined, it is a single scanned-in sheet from 3/14/14 signed by pt's sister Katalina stating that the pt does not have any AD, LW, or durable POA.     Disposition:  At this time, pt's medical needs supersede that of his previous assisted living facility residence, [facility]. CM is actively searching for a LTAC facility that could accommodate pt.     Symptom Support:  Symptom mgmt portion of palliative consult was related to pt's intermittent agitation, specifically at night grabbing for/pulling at his trach. On discussion with primary CC " service, there is a balance between sedation for his behaviors, and oversedation as they steadily attempt to decrease pt's MV support. We have switched pt from qHS olanzapine to prn haldol, as haldol is one of the least sedating anti-confusion/anti-psychotic medications. Should haldol ultimately prove ineffective, could revisit return to olanzapine. He also remains on 1:1 observation as well.     Follow-up:  Palliative service will continue to follow Marcin and coordinate medical decision making apparatus with help of legal consult service.     PDMP Review: I have reviewed the patient's controlled substance dispensing history in the Prescription Drug Monitoring Program in compliance with the [facility] regulations before prescribing any controlled substances.

## 2024-10-08 NOTE — ASSESSMENT & PLAN NOTE
Wt Readings from Last 3 Encounters:   10/08/24 67 kg (147 lb 11.3 oz)   09/08/24 60.8 kg (134 lb)   08/29/24 67.6 kg (149 lb)   Primary intensivist service is continuing to diurese patient, with monitoring of fluid status and weight.

## 2024-10-08 NOTE — PROGRESS NOTES
Nutrition Follow-Up/Recommendations:    No recommended changes to EN regimen at this time; pt appears to be tolerating bolus feeds of [employer] per discussion with RN.     Continue to monitor GI function/stool output; adjust bowel regimen prn.     Noted sodium level trending up, consider increasing volume of free water flushes before and after bolus feeds. Defer to critical care. Current enteral regimen provides ~ 970mL water/day.

## 2024-10-08 NOTE — CONSULTS
NEUROLOGY RESIDENCY CONSULT NOTE     Name: Marcin Sánchez   Age & Sex: 64 y.o. male   MRN: [id number]  Unit/Bed#: [facility] 205-01   Encounter: [id number]  Length of Stay: 30    Marcin Sánchez will need follow up in in 6 weeks with epilepsy attending/AP .  He will not require outpatient neurological testing.    Staff message sent.         ASSESSMENT & PLAN     Epilepsy ([facility])  Assessment & Plan  Patient has history of epilepsy, he is on Dilantin 150 mg twice daily and gabapentin 400 mg twice daily.  Neurology has been consulted for episodes of staring spells and decreased responsiveness.  These lasted for about 1 to 2 minutes before patient slowly returns to baseline over 15 to 20 minutes.  Patient's phenytoin levels throughout his admission initially were supratherapeutic and then subtherapeutic without any indication of overt seizures.  Patient's prior seizure semiology is unknown, no notes from neurology are seen from care everywhere.    Patient had a CT head on 9/30/24 which showed the chronic left basal ganglia/caudate infarct without any acute findings.  He had a past video EEG during this admission which showed a left temporal epileptogenicity and moderate diffuse encephalopathy, this was done for 12 hours.    Impression: Patient's clinical picture does not seem consistent with seizure however considering patient's limited exam and history of seizures he would be appropriate to try to capture one of the spells on video EEG.  His prior study was only 12 hours so can certainly try to extend the study again.  With patient's latest phenytoin level at therapeutic range there is low clinical suspicion for breakthrough seizure and we would not recommend adjusting his AEDs at this time.    Plan:  Continue phenytoin 150 mg twice daily  Video EEG monitoring  Seizure precautions  Stat CT head for any acute neurologic changes and please notify the neurology team          SUBJECTIVE     Reason for Consult / Principal  Problem: Staring spells, repeated spells of decreased responsiveness, hx of seizures  Hx and PE limited by: patient is nonverbal    HPI: Marcin Sánchez is a 64 y.o. (handedness not determinable) male past medical history of seizure disorder, A-fib, lung cancer, chronic respiratory failure status post trach and PEG, COPD, hypothyroidism, bilateral pleural effusions, CHF who presents with multiple episodes of staring spells and decreased responsiveness.  He was noted today to have had a episode where he were not respond promptly to providers for 1 to 2 minutes.  After the staring spell he would follow commands by looking at providers when they would call his name and then would track on command as well.  Nursing reports that he was not back to his baseline for up to 15 to 20 minutes, however his baseline consist of restlessness and agitation requiring him to be in restraints as he continually tries to pull out the trach.  For the patient's history of seizures he takes Dilantin, his levels initially were quite elevated in the 30s but then fell to subtherapeutic range.  No obvious seizures occurred during this time.  It is unknown of how the patient's typical seizure semiology is, no neurology notes are seen in care everywhere.  The primary team did note that the patient has these occasional decreased responsiveness episodes around 3 times a week.  There does not seem to be a significant postictal phase associated with these events.  During my evaluation the patient the did not seem to remember the event in question, for the most part he does answer most questions by nodding or shaking his head appropriately.  Patient also takes gabapentin 400 mg twice daily.    Inpatient consult to Neurology  Consult performed by: Riky Leigh DO  Consult ordered by: Ron Qureshi MD          Historical Information   Past Medical History:   Diagnosis Date    Alcohol abuse     Anxiety     Aortic insufficiency 12/18/2020     Atrial fibrillation (HCC)     Cancer (HCC)     COPD (chronic obstructive pulmonary disease) (HCC)     Delirium     Encephalopathy     Epilepsy (HCC)     History of chemotherapy     History of radiation therapy     Hypo-osmolality and hyponatremia     Hypokalemia     Hypothyroid     Lung cancer (HCC)     Lyme disease     Major depression      Past Surgical History:   Procedure Laterality Date    BRONCHOSCOPY N/A 10/3/2016    Procedure: BRONCHOSCOPY FLEXIBLE;  Surgeon: John Brunner DO;  Location: [facility];  Service:     GASTROSTOMY TUBE PLACEMENT N/A 9/19/2024    Procedure: INSERTION GASTROSTOMY TUBE OPEN;  Surgeon: Darrin Burgos DO;  Location: [facility];  Service: General    HAND SURGERY      PEG W/TRACHEOSTOMY PLACEMENT N/A 9/19/2024    Procedure: TRACHEOSTOMY WITH INSERTION PEG TUBE;  Surgeon: Darrin Burgos DO;  Location: [facility];  Service: General    PELVIC FRACTURE SURGERY      REMOVAL VENOUS PORT (PORT-A-CATH) Left 9/18/2018    Procedure: REMOVAL VENOUS PORT (PORT-A-CATH)IR;  Surgeon: Randy Lopez DO;  Location: [facility];  Service: Interventional Radiology     Social History   Social History     Substance and Sexual Activity   Alcohol Use Never     Social History     Substance and Sexual Activity   Drug Use Never     E-Cigarette/Vaping    E-Cigarette Use Never User      E-Cigarette/Vaping Substances    Nicotine No     THC No     CBD No     Flavoring No     Other No     Unknown No      Social History     Tobacco Use   Smoking Status Every Day    Current packs/day: 1.50    Average packs/day: 1.5 packs/day for 47.0 years (70.5 ttl pk-yrs)    Types: Cigarettes   Smokeless Tobacco Never   Tobacco Comments    smoking more than 1 ppd     Family History:   Family History   Problem Relation Age of Onset    Diabetes Mother     Lung cancer Father      Meds/Allergies   all current active meds have been reviewed, current meds:   Current Facility-Administered Medications:     acetylcysteine  (MUCOMYST) 200 mg/mL inhalation solution 600 mg, Q6H While awake    albuterol inhalation solution 2.5 mg, Q6H PRN    bisacodyl (DULCOLAX) rectal suppository 10 mg, Daily PRN    budesonide (PULMICORT) inhalation solution 0.5 mg, Q12H    ceFEPime (MAXIPIME) 2,000 mg in dextrose 5 % 50 mL IVPB, Q12H, Last Rate: 2,000 mg (10/08/24 1231)    chlorhexidine (PERIDEX) 0.12 % oral rinse 15 mL, Q12H AMERICA    [Held by provider] doxazosin (CARDURA) tablet 2 mg, Daily    furosemide (LASIX) injection 40 mg, Once    furosemide (LASIX) tablet 40 mg, Daily    gabapentin (NEURONTIN) capsule 400 mg, BID    haloperidol (HALDOL) oral concentrated solution 1 mg, Q6H PRN    heparin (porcine) 25,000 units in 0.45% NaCl 250 mL infusion (premix), Titrated, Last Rate: 12 Units/kg/hr (10/08/24 1346)    ipratropium (ATROVENT) 0.02 % inhalation solution 0.5 mg, TID    levalbuterol (XOPENEX) inhalation solution 1.25 mg, TID    levothyroxine (Synthroid) suspension 250 mcg, Early Morning    lidocaine (LMX) 4 % cream, 4x Daily PRN    melatonin tablet 3 mg, HS    [START ON 10/19/2024] nicotine (NICODERM CQ) 14 mg/24hr TD 24 hr patch 14 mg, Daily    nicotine (NICODERM CQ) 21 mg/24 hr TD 24 hr patch 1 patch, Daily    [START ON 11/2/2024] nicotine (NICODERM CQ) 7 mg/24hr TD 24 hr patch 7 mg, Daily    oxyCODONE (ROXICODONE) oral solution 2.5 mg, Q6H    oxyCODONE (ROXICODONE) oral solution 5 mg, Q6H PRN    phenytoin (DILANTIN) oral suspension 150 mg, Q12H AMERICA    polyethylene glycol (MIRALAX) packet 17 g, BID    QUEtiapine (SEROquel) tablet 100 mg, HS    [START ON 10/9/2024] QUEtiapine (SEROquel) tablet 50 mg, Daily    senna oral syrup 8.8 mg, HS    sodium chloride 3 % inhalation solution 4 mL, Q6H    [Held by provider] traZODone (DESYREL) tablet 100 mg, HS    [START ON 10/9/2024] vancomycin (VANCOCIN) IVPB (premix in dextrose) 1,000 mg 200 mL, Q12H, and PTA meds:   Prior to Admission Medications   Prescriptions Last Dose Informant Patient Reported? Taking?    Advair -21 MCG/ACT inhaler   Yes No   Vitamin D, Cholecalciferol, 1000 UNITS CAPS  Outside Facility (Specify) Yes No   Sig: Take 2 capsules by mouth daily    albuterol (PROVENTIL HFA,VENTOLIN HFA) 90 mcg/act inhaler  Outside Facility (Specify) No No   Sig: Inhale 2 puffs every 6 (six) hours as needed for wheezing   apixaban (ELIQUIS) 5 mg   No No   Sig: Take 1 tablet (5 mg total) by mouth 2 (two) times a day   furosemide (LASIX) 40 mg tablet   No No   Sig: Take 1 tablet (40 mg total) by mouth daily   gabapentin (NEURONTIN) 400 mg capsule  Outside Facility (Specify) Yes No   Sig: Take 400 mg by mouth 2 (two) times a day     levalbuterol (XOPENEX) 0.63 mg/3 mL nebulizer solution   No No   Sig: Take 3 mL (0.63 mg total) by nebulization every 8 (eight) hours as needed for wheezing   metoprolol succinate (TOPROL-XL) 25 mg 24 hr tablet  Outside Facility (Specify) Yes No   Sig: Take 25 mg by mouth daily   phenytoin (DILANTIN) 100 mg ER capsule  Outside Facility (Specify) Yes No   Sig: Take 100 mg by mouth 2 (two) times a day And 250mg at 5pm   potassium chloride (Klor-Con M20) 20 mEq tablet   No No   Sig: Take 1 tablet (20 mEq total) by mouth daily   sodium chloride 1 g tablet   No No   Sig: Take 1 tablet (1 g total) by mouth 2 (two) times a day with meals   tiotropium (SPIRIVA) 18 mcg inhalation capsule  Outside Facility (Specify) Yes No   Sig: Place 18 mcg into inhaler and inhale daily      Facility-Administered Medications: None     No Known Allergies    Review of previous medical records was completed.       Review of Systems   Unable to perform ROS: Patient nonverbal       OBJECTIVE     Patient ID: Marcin Sánchez is a 64 y.o. male.    Vitals:   Vitals:    10/08/24 1130 10/08/24 1543 10/08/24 1630 10/08/24 1700   BP: 154/56 (!) 113/49 119/60 124/51   BP Location:  Left arm     Pulse: 92 78 82 82   Resp: (!) 34 (!) 32 (!) 26 (!) 27   Temp:  98.3 °F (36.8 °C)     TempSrc:  Axillary     SpO2: 98% 100% 100% 100%    Weight:       Height:          Body mass index is 21.81 kg/m².     Intake/Output Summary (Last 24 hours) at 10/8/2024 1730  Last data filed at 10/7/2024 1930  Gross per 24 hour   Intake 225.33 ml   Output 1000 ml   Net -774.67 ml       Temperature:   Temp (24hrs), Av.5 °F (36.9 °C), Min:98 °F (36.7 °C), Max:99.2 °F (37.3 °C)    Temperature: 98.3 °F (36.8 °C)    Invasive Devices:   Invasive Devices       Peripheral Intravenous Line  Duration             Long-Dwell Peripheral IV (Midline) 24 Right Cephalic Vein 22 days    Peripheral IV 10/07/24 Left Antecubital 1 day              Drain  Duration             Gastrostomy/Enterostomy Percutaneous Endoscopic Gastrostomy (PEG) LLQ 19 days    External Urinary Catheter 7 days              Airway  Duration             Surgical Airway Shiley Cuffed;Proximal 1 day                    Physical Exam  Constitutional:       Appearance: He is normal weight. He is ill-appearing.   HENT:      Mouth/Throat:      Mouth: Mucous membranes are dry.   Eyes:      Extraocular Movements: Extraocular movements intact and EOM normal.      Conjunctiva/sclera: Conjunctivae normal.      Pupils: Pupils are equal, round, and reactive to light.   Neurological:      Mental Status: He is alert.      Deep Tendon Reflexes:      Reflex Scores:       Tricep reflexes are 2+ on the right side and 2+ on the left side.       Bicep reflexes are 2+ on the right side and 2+ on the left side.       Brachioradialis reflexes are 2+ on the right side and 2+ on the left side.       Patellar reflexes are 2+ on the right side and 2+ on the left side.       Achilles reflexes are 2+ on the right side and 2+ on the left side.         Neurologic Exam     Mental Status   Speech: mute   Level of consciousness: alert  Unable to assess orientation.    Comprehension appears normal, he follows commands.     Cranial Nerves     CN II   Visual fields full to confrontation.     CN III, IV, VI   Pupils are equal, round, and  reactive to light.  Extraocular motions are normal.     CN VII   Facial expression full, symmetric.     CN XI   Right trapezius strength: normal  Left trapezius strength: normal    CN XII   Tongue: not atrophic  Tongue deviation: none    Motor Exam   Muscle bulk: decreased  Moves all 4 extremities on command.  Appears to have some decreased muscle bulk and deconditioning, unable to lift legs off bed however does move both feet.     Sensory Exam     Unable to test this reliably.     Gait, Coordination, and Reflexes     Tremor   Resting tremor: absent    Reflexes   Right brachioradialis: 2+  Left brachioradialis: 2+  Right biceps: 2+  Left biceps: 2+  Right triceps: 2+  Left triceps: 2+  Right patellar: 2+  Left patellar: 2+  Right achilles: 2+  Left achilles: 2+  Right plantar: normal  Left plantar: normal  Right Branham: present  Left Branham: present  Right ankle clonus: absent  Left ankle clonus: absent         LABORATORY DATA     Labs: I have personally reviewed pertinent reports.    Results from last 7 days   Lab Units 10/08/24  0512 10/07/24  2336 10/07/24  2202 10/07/24  0614   WBC Thousand/uL 7.85  --  7.96 9.15   HEMOGLOBIN g/dL 8.4*  --  7.4* 6.9*   I STAT HEMOGLOBIN g/dl  --  8.5*  --   --    HEMATOCRIT % 26.0*  --  23.2* 22.5*   HEMATOCRIT, ISTAT %  --  25*  --   --    PLATELETS Thousands/uL 235  --  226 243   SEGS PCT % 75  --  79* 80*   MONO PCT % 11  --  11 10   EOS PCT % 2  --  1 2      Results from last 7 days   Lab Units 10/08/24  0512 10/07/24  2336 10/07/24  0614 10/05/24  2223 10/04/24  0506   POTASSIUM mmol/L 4.6  --  4.3 4.5 4.0   CHLORIDE mmol/L 101  --  99 96 97   CO2 mmol/L 36*  --  37* 35* 35*   CO2, I-STAT mmol/L  --  39*  --   --   --    BUN mg/dL 24  --  27* 26* 21   CREATININE mg/dL 0.49*  --  0.41* 0.47* 0.53*   CALCIUM mg/dL 8.5  --  8.6 8.5 8.4   ALK PHOS U/L 131*  --  126*  --  93   ALT U/L 10  --  10  --  10   AST U/L 18  --  17  --  16   GLUCOSE, ISTAT mg/dl  --  137  --   --   --       Results from last 7 days   Lab Units 10/08/24  0512 10/07/24  0614 10/05/24  2223   MAGNESIUM mg/dL 2.3 1.9 1.8*     Results from last 7 days   Lab Units 10/08/24  0512 10/07/24  0614 10/05/24  2223   PHOSPHORUS mg/dL 3.9 3.2 3.7      Results from last 7 days   Lab Units 10/08/24  1345   PTT seconds 43*     Results from last 7 days   Lab Units 10/07/24  2336   LACTIC ACID mmol/L 0.5           IMAGING & DIAGNOSTIC TESTING     Radiology Results: I have personally reviewed pertinent reports.    CT soft tissue neck wo contrast   Final Result by E. Alec Schoenberger, MD (10/08 1116)      Tracheostomy in place. No adjacent collection but evaluation is limited due to lack of IV contrast. No mass or adenopathy in the neck.               Workstation performed: [id number]         CT chest wo contrast   Final Result by Xavier Fall MD (10/08 1119)      1.  Moderate to large left and small right pleural effusions, with bibasilar atelectasis. This is superimposed on advanced centrilobular emphysema and diffuse pleural parenchymal scarring throughout the right lung. The pleural effusions have increased    since the most recent prior study.   2.  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   3.  Cholelithiasis.   4.  Moderate anasarca.               Workstation performed: [id number]         XR chest portable ICU   Final Result by Jorge Valladares MD (10/07 0852)      Unchanged diffuse airspace opacities and small bilateral pleural effusions.            Workstation performed: [id number]         XR chest portable   Final Result by Jorge Valladares MD (10/07 0850)      Unchanged diffuse airspace opacities and small bilateral pleural effusions.            Workstation performed: [id number]         XR chest portable   Final Result by Ross Melendez MD (10/05 0824)      Extensive bilateral airspace disease and bilateral pleural effusions.            Workstation performed: [id number]         CT head without  contrast   Final Result by Mau Saab MD (09/30 2324)      No acute intracranial hemorrhage, midline shift, or mass effect. Chronic small vessel ischemic changes and chronic left caudate/basal ganglia infarct.                  Workstation performed: [id number]         XR chest portable ICU   Final Result by Vargas Oconnor MD (09/25 1027)      Persistent bilateral infiltrates and dense opacity at the right apex and pleural effusions without significant change from prior            Workstation performed: [id number]         XR chest portable ICU   Final Result by Nieves Burks MD (09/22 1026)      Tracheostomy projecting over the trachea.      No change in extensive bilateral consolidation.      Question pleural effusions.            Workstation performed: [id number]         XR chest portable   Final Result by Bonnie Castillo MD (09/19 2208)      Tracheostomy tube in adequate position.      Increasing opacity in the left lower lung, which may represent pneumonia or an enlarging left pleural effusion.      Unchanged appearance of the right hemithorax, as detailed above.                  Workstation performed: [id number]         XR chest portable ICU   Final Result by Xavier Fall MD (09/16 1627)      Improved aeration of the right lung, with persistent right hemithoracic volume loss and dense right apical consolidation. There is a also superimposed right pleural effusion and diffuse interstitial lung disease.            Workstation performed: [id number]         XR chest portable ICU   Final Result by Sd Lamb MD (09/14 1344)      1.  Tip of endotracheal tube 3.5 cm above the chery.      2.  Progressive atelectasis of the right lung since 9/10/2024 with further right-sided mediastinal shift.      3.  Small left pleural effusion and moderate size right effusion, increased in size since 9/10/2024.      4.  Pulmonary vascular congestion.            Workstation performed: [id number]          XR chest portable ICU   Final Result by Juanito Lee DO (09/11 0231)      Large right and trace left pleural effusion.            Workstation performed: [id number]         XR chest portable   Final Result by Matt Russ MD (09/09 0949)      Tubes and lines in satisfactory position. No pneumothorax.      Unchanged interstitial edema and small right pleural effusion.      Resident: John Blake I, the attending radiologist, have reviewed the images and agree with the final report above.      Workstation performed: [id number]             Other Diagnostic Testing: I have personally reviewed pertinent reports.      ACTIVE MEDICATIONS       Current Facility-Administered Medications:     acetylcysteine (MUCOMYST) 200 mg/mL inhalation solution 600 mg, Q6H While awake    albuterol inhalation solution 2.5 mg, Q6H PRN    bisacodyl (DULCOLAX) rectal suppository 10 mg, Daily PRN    budesonide (PULMICORT) inhalation solution 0.5 mg, Q12H    ceFEPime (MAXIPIME) 2,000 mg in dextrose 5 % 50 mL IVPB, Q12H, Last Rate: 2,000 mg (10/08/24 1231)    chlorhexidine (PERIDEX) 0.12 % oral rinse 15 mL, Q12H AMERICA    [Held by provider] doxazosin (CARDURA) tablet 2 mg, Daily    furosemide (LASIX) injection 40 mg, Once    furosemide (LASIX) tablet 40 mg, Daily    gabapentin (NEURONTIN) capsule 400 mg, BID    haloperidol (HALDOL) oral concentrated solution 1 mg, Q6H PRN    heparin (porcine) 25,000 units in 0.45% NaCl 250 mL infusion (premix), Titrated, Last Rate: 12 Units/kg/hr (10/08/24 1346)    ipratropium (ATROVENT) 0.02 % inhalation solution 0.5 mg, TID    levalbuterol (XOPENEX) inhalation solution 1.25 mg, TID    levothyroxine (Synthroid) suspension 250 mcg, Early Morning    lidocaine (LMX) 4 % cream, 4x Daily PRN    melatonin tablet 3 mg, HS    [START ON 10/19/2024] nicotine (NICODERM CQ) 14 mg/24hr TD 24 hr patch 14 mg, Daily    nicotine (NICODERM CQ) 21 mg/24 hr TD 24 hr patch 1 patch, Daily    [START ON 11/2/2024]  nicotine (NICODERM CQ) 7 mg/24hr TD 24 hr patch 7 mg, Daily    oxyCODONE (ROXICODONE) oral solution 2.5 mg, Q6H    oxyCODONE (ROXICODONE) oral solution 5 mg, Q6H PRN    phenytoin (DILANTIN) oral suspension 150 mg, Q12H AMERICA    polyethylene glycol (MIRALAX) packet 17 g, BID    QUEtiapine (SEROquel) tablet 100 mg, HS    [START ON 10/9/2024] QUEtiapine (SEROquel) tablet 50 mg, Daily    senna oral syrup 8.8 mg, HS    sodium chloride 3 % inhalation solution 4 mL, Q6H    [Held by provider] traZODone (DESYREL) tablet 100 mg, HS    [START ON 10/9/2024] vancomycin (VANCOCIN) IVPB (premix in dextrose) 1,000 mg 200 mL, Q12H    Prior to Admission medications    Medication Sig Start Date End Date Taking? Authorizing Provider   Advair -21 MCG/ACT inhaler  12/19/22   Historical Provider, MD   albuterol (PROVENTIL HFA,VENTOLIN HFA) 90 mcg/act inhaler Inhale 2 puffs every 6 (six) hours as needed for wheezing 2/25/20   Bienvenido Lee MD   apixaban (ELIQUIS) 5 mg Take 1 tablet (5 mg total) by mouth 2 (two) times a day 8/30/24   Malia Donis MD   furosemide (LASIX) 40 mg tablet Take 1 tablet (40 mg total) by mouth daily 8/30/24   Malia Donis MD   gabapentin (NEURONTIN) 400 mg capsule Take 400 mg by mouth 2 (two) times a day      Historical Provider, MD   levalbuterol (XOPENEX) 0.63 mg/3 mL nebulizer solution Take 3 mL (0.63 mg total) by nebulization every 8 (eight) hours as needed for wheezing 8/30/24   Malia Donis MD   metoprolol succinate (TOPROL-XL) 25 mg 24 hr tablet Take 25 mg by mouth daily    Historical Provider, MD   phenytoin (DILANTIN) 100 mg ER capsule Take 100 mg by mouth 2 (two) times a day And 250mg at 5pm    Historical Provider, MD   potassium chloride (Klor-Con M20) 20 mEq tablet Take 1 tablet (20 mEq total) by mouth daily 8/30/24   Malia Donis MD   sodium chloride 1 g tablet Take 1 tablet (1 g total) by mouth 2 (two) times a day with meals 8/30/24   Malia Donis MD   tiotropium (SPIRIVA) 18 mcg  inhalation capsule Place 18 mcg into inhaler and inhale daily    Historical Provider, MD   Vitamin D, Cholecalciferol, 1000 UNITS CAPS Take 2 capsules by mouth daily     Historical Provider, MD       CODE STATUS & ADVANCED DIRECTIVES     Code Status: Level 1 - Full Code  Advance Directive and Living Will: Yes    Power of :    POLST:        VTE Pharmacologic Prophylaxis: Heparin Drip  VTE Mechanical Prophylaxis: sequential compression device    ======    I have discussed the patient's history, physical exam findings, assessment, and plan in detail with attending, Dr. Young    Thank you for allowing me to participate in the care of your patient, Marcin Sánchez.    Riky Leigh,   [facility], PGY-2

## 2024-10-08 NOTE — PROGRESS NOTES
Marcin Sánchez is a 64 y.o. male who is currently ordered Vancomycin IV with management by the Pharmacy Consult service.  Relevant clinical data and objective / subjective history reviewed.  Vancomycin Assessment:  Indication and Goal AUC/Trough: Pneumonia (goal -600, trough >10), -600, trough >10  Clinical Status: stable  Micro:   No recent  Renal Function:  SCr: 0.4 mg/dL  CrCl: 100 mL/min  Renal replacement: Not on dialysis  Days of Therapy: 1  Current Dose: 1750 mg IV once  Vancomycin Plan:  New Dosin mg IV q8h  Estimated AUC: 541 mcg*hr/mL  Estimated Trough: 13.3 mcg/mL  Next Level: 10/8 with AM labs  Renal Function Monitoring: Daily BMP and UOP  Pharmacy will continue to follow closely for s/sx of nephrotoxicity, infusion reactions and appropriateness of therapy.  BMP and CBC will be ordered per protocol. We will continue to follow the patient’s culture results and clinical progress daily.    Aydee Gan, Pharmacist

## 2024-10-09 ENCOUNTER — APPOINTMENT (INPATIENT)
Dept: NEUROLOGY | Facility: CLINIC | Age: 64
DRG: 005 | End: 2024-10-09
Payer: COMMERCIAL

## 2024-10-09 ENCOUNTER — TELEPHONE (OUTPATIENT)
Age: 64
End: 2024-10-09

## 2024-10-09 ENCOUNTER — APPOINTMENT (INPATIENT)
Dept: RADIOLOGY | Facility: HOSPITAL | Age: 64
DRG: 005 | End: 2024-10-09
Payer: COMMERCIAL

## 2024-10-09 PROBLEM — R56.9 SEIZURE (HCC): Status: ACTIVE | Noted: 2024-10-09

## 2024-10-09 LAB
ANION GAP SERPL CALCULATED.3IONS-SCNC: 5 MMOL/L (ref 4–13)
APTT PPP: 49 SECONDS (ref 23–34)
APTT PPP: 55 SECONDS (ref 23–34)
APTT PPP: 60 SECONDS (ref 23–34)
ARTERIAL PATENCY WRIST A: YES
BASE EX.OXY STD BLDV CALC-SCNC: 78.8 % (ref 60–80)
BASE EXCESS BLDA CALC-SCNC: 10.8 MMOL/L
BASE EXCESS BLDV CALC-SCNC: 11.3 MMOL/L
BASOPHILS # BLD AUTO: 0.05 THOUSANDS/ΜL (ref 0–0.1)
BASOPHILS NFR BLD AUTO: 1 % (ref 0–1)
BUN SERPL-MCNC: 30 MG/DL (ref 5–25)
CA-I BLD-SCNC: 1.13 MMOL/L (ref 1.12–1.32)
CALCIUM SERPL-MCNC: 8.4 MG/DL (ref 8.4–10.2)
CHLORIDE SERPL-SCNC: 100 MMOL/L (ref 96–108)
CO2 SERPL-SCNC: 38 MMOL/L (ref 21–32)
CREAT SERPL-MCNC: 0.58 MG/DL (ref 0.6–1.3)
EOSINOPHIL # BLD AUTO: 0.19 THOUSAND/ΜL (ref 0–0.61)
EOSINOPHIL NFR BLD AUTO: 2 % (ref 0–6)
ERYTHROCYTE [DISTWIDTH] IN BLOOD BY AUTOMATED COUNT: 15.4 % (ref 11.6–15.1)
GFR SERPL CREATININE-BSD FRML MDRD: 107 ML/MIN/1.73SQ M
GLUCOSE SERPL-MCNC: 117 MG/DL (ref 65–140)
HCO3 BLDA-SCNC: 38.8 MMOL/L (ref 22–28)
HCO3 BLDV-SCNC: 39.8 MMOL/L (ref 24–30)
HCT VFR BLD AUTO: 25.2 % (ref 36.5–49.3)
HGB BLD-MCNC: 8 G/DL (ref 12–17)
HOROWITZ INDEX BLDA+IHG-RTO: 80 MM[HG]
IMM GRANULOCYTES # BLD AUTO: 0.03 THOUSAND/UL (ref 0–0.2)
IMM GRANULOCYTES NFR BLD AUTO: 0 % (ref 0–2)
INR PPP: 1.25 (ref 0.85–1.19)
LYMPHOCYTES # BLD AUTO: 0.7 THOUSANDS/ΜL (ref 0.6–4.47)
LYMPHOCYTES NFR BLD AUTO: 8 % (ref 14–44)
MAGNESIUM SERPL-MCNC: 2 MG/DL (ref 1.9–2.7)
MCH RBC QN AUTO: 33.2 PG (ref 26.8–34.3)
MCHC RBC AUTO-ENTMCNC: 31.7 G/DL (ref 31.4–37.4)
MCV RBC AUTO: 105 FL (ref 82–98)
MONOCYTES # BLD AUTO: 1.05 THOUSAND/ΜL (ref 0.17–1.22)
MONOCYTES NFR BLD AUTO: 12 % (ref 4–12)
MRSA NOSE QL CULT: NORMAL
NEUTROPHILS # BLD AUTO: 7.03 THOUSANDS/ΜL (ref 1.85–7.62)
NEUTS SEG NFR BLD AUTO: 77 % (ref 43–75)
NRBC BLD AUTO-RTO: 0 /100 WBCS
O2 CT BLDA-SCNC: 11.9 ML/DL (ref 16–23)
O2 CT BLDV-SCNC: 9.7 ML/DL
OXYHGB MFR BLDA: 97.4 % (ref 94–97)
PCO2 BLDA: 77.9 MM HG (ref 36–44)
PCO2 BLDV: 83.3 MM HG (ref 42–50)
PEEP RESPIRATORY: 8 CM[H2O]
PH BLDA: 7.32 [PH] (ref 7.35–7.45)
PH BLDV: 7.3 [PH] (ref 7.3–7.4)
PHOSPHATE SERPL-MCNC: 3.3 MG/DL (ref 2.3–4.1)
PLATELET # BLD AUTO: 205 THOUSANDS/UL (ref 149–390)
PMV BLD AUTO: 8.9 FL (ref 8.9–12.7)
PO2 BLDA: 124.9 MM HG (ref 75–129)
PO2 BLDV: 46.5 MM HG (ref 35–45)
POTASSIUM SERPL-SCNC: 4.2 MMOL/L (ref 3.5–5.3)
PROTHROMBIN TIME: 16 SECONDS (ref 12.3–15)
RBC # BLD AUTO: 2.41 MILLION/UL (ref 3.88–5.62)
SODIUM SERPL-SCNC: 143 MMOL/L (ref 135–147)
VENT AC: 23
VENT- AC: AC
VT SETTING VENT: 350 ML
WBC # BLD AUTO: 9.05 THOUSAND/UL (ref 4.31–10.16)

## 2024-10-09 PROCEDURE — 85730 THROMBOPLASTIN TIME PARTIAL: CPT

## 2024-10-09 PROCEDURE — 99291 CRITICAL CARE FIRST HOUR: CPT | Performed by: INTERNAL MEDICINE

## 2024-10-09 PROCEDURE — 80048 BASIC METABOLIC PNL TOTAL CA: CPT

## 2024-10-09 PROCEDURE — 85025 COMPLETE CBC W/AUTO DIFF WBC: CPT

## 2024-10-09 PROCEDURE — 94760 N-INVAS EAR/PLS OXIMETRY 1: CPT

## 2024-10-09 PROCEDURE — 94003 VENT MGMT INPAT SUBQ DAY: CPT

## 2024-10-09 PROCEDURE — 94640 AIRWAY INHALATION TREATMENT: CPT

## 2024-10-09 PROCEDURE — 82330 ASSAY OF CALCIUM: CPT

## 2024-10-09 PROCEDURE — 95715 VEEG EA 12-26HR INTMT MNTR: CPT

## 2024-10-09 PROCEDURE — 99233 SBSQ HOSP IP/OBS HIGH 50: CPT | Performed by: PSYCHIATRY & NEUROLOGY

## 2024-10-09 PROCEDURE — 82805 BLOOD GASES W/O2 SATURATION: CPT | Performed by: STUDENT IN AN ORGANIZED HEALTH CARE EDUCATION/TRAINING PROGRAM

## 2024-10-09 PROCEDURE — 83735 ASSAY OF MAGNESIUM: CPT

## 2024-10-09 PROCEDURE — 85730 THROMBOPLASTIN TIME PARTIAL: CPT | Performed by: INTERNAL MEDICINE

## 2024-10-09 PROCEDURE — 99233 SBSQ HOSP IP/OBS HIGH 50: CPT | Performed by: INTERNAL MEDICINE

## 2024-10-09 PROCEDURE — 85610 PROTHROMBIN TIME: CPT

## 2024-10-09 PROCEDURE — 36600 WITHDRAWAL OF ARTERIAL BLOOD: CPT

## 2024-10-09 PROCEDURE — 82805 BLOOD GASES W/O2 SATURATION: CPT

## 2024-10-09 PROCEDURE — 94669 MECHANICAL CHEST WALL OSCILL: CPT

## 2024-10-09 PROCEDURE — 94664 DEMO&/EVAL PT USE INHALER: CPT

## 2024-10-09 PROCEDURE — 93005 ELECTROCARDIOGRAM TRACING: CPT

## 2024-10-09 PROCEDURE — 95700 EEG CONT REC W/VID EEG TECH: CPT

## 2024-10-09 PROCEDURE — 84100 ASSAY OF PHOSPHORUS: CPT

## 2024-10-09 PROCEDURE — 71045 X-RAY EXAM CHEST 1 VIEW: CPT

## 2024-10-09 RX ORDER — DEXMEDETOMIDINE HYDROCHLORIDE 4 UG/ML
.1-.7 INJECTION, SOLUTION INTRAVENOUS
Status: DISCONTINUED | OUTPATIENT
Start: 2024-10-09 | End: 2024-10-11

## 2024-10-09 RX ADMIN — HEPARIN SODIUM 16 UNITS/KG/HR: 10000 INJECTION, SOLUTION INTRAVENOUS at 17:01

## 2024-10-09 RX ADMIN — CEFEPIME 2000 MG: 2 INJECTION, POWDER, FOR SOLUTION INTRAVENOUS at 22:01

## 2024-10-09 RX ADMIN — OXYCODONE HYDROCHLORIDE 2.5 MG: 5 SOLUTION ORAL at 00:02

## 2024-10-09 RX ADMIN — OXYCODONE HYDROCHLORIDE 2.5 MG: 5 SOLUTION ORAL at 10:25

## 2024-10-09 RX ADMIN — FUROSEMIDE 40 MG: 40 TABLET ORAL at 10:26

## 2024-10-09 RX ADMIN — Medication 12.5 MG: at 10:26

## 2024-10-09 RX ADMIN — CHLORHEXIDINE GLUCONATE 0.12% ORAL RINSE 15 ML: 1.2 LIQUID ORAL at 22:02

## 2024-10-09 RX ADMIN — GABAPENTIN 400 MG: 400 CAPSULE ORAL at 17:45

## 2024-10-09 RX ADMIN — VANCOMYCIN HYDROCHLORIDE 1000 MG: 1 INJECTION, SOLUTION INTRAVENOUS at 06:25

## 2024-10-09 RX ADMIN — NICOTINE 1 PATCH: 21 PATCH, EXTENDED RELEASE TRANSDERMAL at 10:27

## 2024-10-09 RX ADMIN — POLYETHYLENE GLYCOL 3350 17 G: 17 POWDER, FOR SOLUTION ORAL at 10:25

## 2024-10-09 RX ADMIN — QUETIAPINE FUMARATE 50 MG: 25 TABLET ORAL at 10:26

## 2024-10-09 RX ADMIN — GABAPENTIN 400 MG: 400 CAPSULE ORAL at 10:25

## 2024-10-09 RX ADMIN — IPRATROPIUM BROMIDE 0.5 MG: 0.5 SOLUTION RESPIRATORY (INHALATION) at 14:38

## 2024-10-09 RX ADMIN — ACETYLCYSTEINE 3 ML: 200 SOLUTION ORAL; RESPIRATORY (INHALATION) at 14:36

## 2024-10-09 RX ADMIN — POLYETHYLENE GLYCOL 3350 17 G: 17 POWDER, FOR SOLUTION ORAL at 17:45

## 2024-10-09 RX ADMIN — IPRATROPIUM BROMIDE 0.5 MG: 0.5 SOLUTION RESPIRATORY (INHALATION) at 08:26

## 2024-10-09 RX ADMIN — LEVALBUTEROL HYDROCHLORIDE 1.25 MG: 1.25 SOLUTION RESPIRATORY (INHALATION) at 14:38

## 2024-10-09 RX ADMIN — DOXAZOSIN 2 MG: 2 TABLET ORAL at 10:29

## 2024-10-09 RX ADMIN — LEVALBUTEROL HYDROCHLORIDE 1.25 MG: 1.25 SOLUTION RESPIRATORY (INHALATION) at 20:20

## 2024-10-09 RX ADMIN — BUDESONIDE 0.5 MG: 0.5 INHALANT RESPIRATORY (INHALATION) at 20:16

## 2024-10-09 RX ADMIN — Medication 3 MG: at 22:01

## 2024-10-09 RX ADMIN — IPRATROPIUM BROMIDE 0.5 MG: 0.5 SOLUTION RESPIRATORY (INHALATION) at 20:16

## 2024-10-09 RX ADMIN — BUDESONIDE 0.5 MG: 0.5 INHALANT RESPIRATORY (INHALATION) at 08:26

## 2024-10-09 RX ADMIN — OXYCODONE HYDROCHLORIDE 2.5 MG: 5 SOLUTION ORAL at 16:20

## 2024-10-09 RX ADMIN — PHENYTOIN 150 MG: 125 SUSPENSION ORAL at 22:01

## 2024-10-09 RX ADMIN — SODIUM CHLORIDE SOLN NEBU 3% 4 ML: 3 NEBU SOLN at 08:36

## 2024-10-09 RX ADMIN — PHENYTOIN 150 MG: 125 SUSPENSION ORAL at 10:28

## 2024-10-09 RX ADMIN — CHLORHEXIDINE GLUCONATE 0.12% ORAL RINSE 15 ML: 1.2 LIQUID ORAL at 10:29

## 2024-10-09 RX ADMIN — ACETYLCYSTEINE 600 MG: 200 SOLUTION ORAL; RESPIRATORY (INHALATION) at 20:17

## 2024-10-09 RX ADMIN — CEFEPIME 2000 MG: 2 INJECTION, POWDER, FOR SOLUTION INTRAVENOUS at 10:51

## 2024-10-09 RX ADMIN — LEVALBUTEROL HYDROCHLORIDE 1.25 MG: 1.25 SOLUTION RESPIRATORY (INHALATION) at 08:26

## 2024-10-09 RX ADMIN — QUETIAPINE FUMARATE 100 MG: 100 TABLET ORAL at 22:01

## 2024-10-09 RX ADMIN — DEXMEDETOMIDINE HYDROCHLORIDE 0.1 MCG/KG/HR: 400 INJECTION INTRAVENOUS at 05:04

## 2024-10-09 RX ADMIN — SODIUM CHLORIDE SOLN NEBU 3% 4 ML: 3 NEBU SOLN at 14:38

## 2024-10-09 RX ADMIN — OXYCODONE HYDROCHLORIDE 2.5 MG: 5 SOLUTION ORAL at 22:01

## 2024-10-09 RX ADMIN — LEVOTHYROXINE SODIUM 250 MCG: 100 TABLET ORAL at 05:04

## 2024-10-09 NOTE — PROGRESS NOTES
"Assessment & Plan   Problem list  Acute on chronic hypercapnic respiratory failure s/p Trach  V Tach Arrest  Shock, resolved  Respiratory acidosis/metabolic alkalosis  Mucous plugging  COPD  Encephalopathy  Anemia of chronic disease  Atrial fibrillation  Metabolic alkalosis  Pericardial effusion  Seizure disorder  Hypothyroidism  AAA  Insomnia  Tobacco use  Hx of lung SCC     Neuro:   #Seizure disorder  Phenytoin 150 mg BID  Continue Gabapentin 400mg BID  VEEG ongoing, neurology consulted     #Insomnia  #Agitation  Trazodone switched to Seroquel  Haldol prn  Appreciate palliative care assistance     CV:   #Pericardial effusion   Echo 9/9  \"moderate to large pericardial effusion circumferential to the heart measuring largest along the RV free wall at 2.3 cm. The fluid exhibits no internal echoes. 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 Moderate to severe aortic regurgitation, mild tricuspid regurgitation, dilated ascending aortic root up to 5.4 cm.  On maintenance lasix po daily  Continue to monitor fluid status      #History of AAA  5.4 cm  Monitor hemodynamics; BP goal <130/80  Outpatient follow up     Pulm:  #Acute on Chronic hypoxic and hypercapnic respiratory failure.  #COPD, and acute exacerbation now resolved  Status post trach 9/19  Trial VC+ to assist with poor tidal volumes  Continue Xopenex and Atrovent TID  Continue pulmicort BID, Continue Performist BID     #Respiratory acidosis  2/2 poor tidal volume  Adjust vent as tolerated, trial of VC+    #Pleural effusions  L>R  Consider thoracentesis    #History of squamous cell cancer of lung post chemo/radiation 2016, cancer of right " mainstem  Noted     GI:   #PEG placed 9/19  Continue tube feeds at goal  Bowel regimen: Senokot, Miralax     :   #Urinary retention  Doxazosin 2mg     #Metabolic alkalosis  Due to diuresis & respiratory acidosis     F/E/N:   F: No maintenance fluids  E: Replete as needed  N: Continue TwoCalHN bolus feeds     Heme/Onc:   #Anemia of chronic disease  Transfuse for Hgb <7 and/or symptomatic anemia     #DVT ppx  SCDs and heparin     Endo:   #Hypothyroidism  Home med: Levothyroxine 250 mg  TSH 45.06 on 9/25 from 0.59 on 9/5, free T4 0.34 (9/25)  Restarted on home levothyroxine 250 mg  Recheck in 10/31     ID:   #Colonization with Acinetobacter  Continue to monitor WBC count and temperature curve     #C/f pneumonia  With hypotension and continued infiltrates on CXR  Lactate and procal normal, no leukocytosis  Sputum w/ GNR, BC NGTD  Continue vanc and cefepime for now     MSK/Skin:   #At risk for pressure injury   PT/OT when able  Frequent turns/repositioning  Skin surveillance      L: midline, PEG  D: none  A: VC+ 350/8/12/80%      Disposition: SD1    ICU Core Measures     Vented Patient  VAP Bundle  VAP bundle ordered     A: Assess, Prevent, and Manage Pain Has pain been assessed? Yes  Need for changes to pain regimen? No   B: Both Spontaneous Awakening Trials (SATs) and Spontaneous Breathing Trials (SBTs) Plan to perform spontaneous awakening trial today? N/A   Plan to perform spontaneous breathing trial today? Yes   Obvious barriers to extubation? Yes   C: Choice of Sedation RASS Goal: 0 Alert and Calm  Need for changes to sedation or analgesia regimen? No   D: Delirium CAM-ICU: Unable to perform secondary to Acute cognitive dysfunction   E: Early Mobility  Plan for early mobility? Yes   F: Family Engagement Plan for family engagement today? Yes       Antibiotic Review: Awaiting culture results.     Review of Invasive Devices:            Prophylaxis:  VTE VTE covered by:  heparin (porcine), Intravenous, 14 Units/kg/hr  at 10/08/24 2039       Stress Ulcer  not ordered         24 Hour Events : poor tidal volumes overnight, placed on precedex to assist  Subjective  resting in bed comfortably      Objective :                   Vitals I/O      Most Recent Min/Max in 24hrs   Temp 98.4 °F (36.9 °C) Temp  Min: 98.3 °F (36.8 °C)  Max: 98.8 °F (37.1 °C)   Pulse 72 Pulse  Min: 72  Max: 94   Resp 19 Resp  Min: 13  Max: 40   /63 BP  Min: 110/44  Max: 169/66   O2 Sat 100 % SpO2  Min: 91 %  Max: 100 %   VC+ Vt 350, peep 8, RR 12, 80%     Intake/Output Summary (Last 24 hours) at 10/9/2024 0705  Last data filed at 10/9/2024 0528  Gross per 24 hour   Intake --   Output 1290 ml   Net -1290 ml       Diet Enteral/Parenteral; Tube Feeding No Oral Diet; Two Mirza HN; Bolus; 240; (4x/day) - 8:00AM,12:00PM,4:00PM,8:00PM; Prosource Protein Liquid - One Packet; 30; Water; Before and After each Bolus    Invasive Monitoring           Physical Exam   Physical Exam  Eyes:      Extraocular Movements: Extraocular movements intact.      Conjunctiva/sclera: Conjunctivae normal.   Skin:     General: Skin is warm and dry.   HENT:      Head: Normocephalic and atraumatic.      Mouth/Throat:      Mouth: Mucous membranes are dry.   Cardiovascular:      Rate and Rhythm: Normal rate and regular rhythm.   Musculoskeletal:      Right lower leg: No edema.      Left lower leg: No edema.   Abdominal: General: Bowel sounds are normal. There is abdominal type feeding tube.     Palpations: Abdomen is soft.   Constitutional:       Appearance: He is well-developed and well-nourished.   Pulmonary:      Effort: Pulmonary effort is normal.      Breath sounds: Rhonchi present.   Neurological:      Mental Status: He is alert. He is calm.          Diagnostic Studies        Lab Results: I have reviewed the following results:     Medications:  Scheduled PRN   acetylcysteine, 3 mL, Q6H While awake  budesonide, 0.5 mg, Q12H  cefepime, 2,000 mg, Q12H  chlorhexidine, 15 mL, Q12H AMERICA  [Held  by provider] doxazosin, 2 mg, Daily  furosemide, 40 mg, Daily  gabapentin, 400 mg, BID  ipratropium, 0.5 mg, TID  levalbuterol, 1.25 mg, TID  levothyroxine, 250 mcg, Early Morning  melatonin, 3 mg, HS  [START ON 10/19/2024] nicotine, 14 mg, Daily  nicotine, 1 patch, Daily  [START ON 11/2/2024] nicotine, 7 mg, Daily  oxyCODONE, 2.5 mg, Q6H  phenytoin, 150 mg, Q12H AMERICA  polyethylene glycol, 17 g, BID  QUEtiapine, 100 mg, HS  QUEtiapine, 50 mg, Daily  senna, 8.8 mg, HS  sodium chloride, 4 mL, TID  vancomycin, 1,000 mg, Q12H      albuterol, 2.5 mg, Q6H PRN  bisacodyl, 10 mg, Daily PRN  haloperidol, 1 mg, Q6H PRN  lidocaine, , 4x Daily PRN  oxyCODONE, 5 mg, Q6H PRN       Continuous    dexmedetomidine, 0.1-0.7 mcg/kg/hr, Last Rate: 0.1 mcg/kg/hr (10/09/24 0504)  heparin (porcine), 3-20 Units/kg/hr (Order-Specific), Last Rate: 14 Units/kg/hr (10/08/24 2039)         Labs:   CBC    Recent Labs     10/07/24  2202 10/07/24  2336 10/08/24  0512   WBC 7.96  --  7.85   HGB 7.4* 8.5* 8.4*   HCT 23.2* 25* 26.0*     --  235     BMP    Recent Labs     10/08/24  0512 10/09/24  0525   SODIUM 143 143   K 4.6 4.2    100   CO2 36* 38*   AGAP 6 5   BUN 24 30*   CREATININE 0.49* 0.58*   CALCIUM 8.5 8.4       Coags    Recent Labs     10/08/24  1949 10/09/24  0244 10/09/24  0525   INR  --   --  1.25*   PTT 46* 60*  --         Additional Electrolytes  Recent Labs     10/08/24  0512 10/09/24  0525   MG 2.3 2.0   PHOS 3.9 3.3   CAIONIZED 1.11* 1.13          Blood Gas    Recent Labs     10/09/24  0441   PHART 7.315*   VOE4BTH 77.9*   PO2ART 124.9   GEL3HBR 38.8*   BEART 10.8     Recent Labs     10/07/24  1255   PHVEN 7.578*   ABC0EHB 36.0*   PO2VEN 175.7*   EAK8IDM 32.8*   BEVEN 10.1   C8MKWDJ 95.4*    LFTs  Recent Labs     10/08/24  0512   ALT 10   AST 18   ALKPHOS 131*   ALB 3.0*   TBILI 0.79       Infectious  No recent results  Glucose  Recent Labs     10/08/24  0512 10/09/24  0525   GLUC 113 117

## 2024-10-09 NOTE — ASSESSMENT & PLAN NOTE
Intermittent agitation - at times pulling at trach tubing  1:1 in place  Current Medications for agitation:  Haldol 1 mg Q6hrs PRN for agitation, restlessness  Seroquel 50 mg BID   Continue recommend global delirium precautions including:  Establishment of day/night cycle via lights during the day and blinds open.  Please limit interruptions at night as medically appropriate.  Provide glasses/hearing aids as apprioriate.    Minimize deliriogenic medications as able.   Provide reorientation including date on board and visible clock.   Avoid restraints as able, frequent verbal reorientations or patient care sitter as appropriate.

## 2024-10-09 NOTE — TELEPHONE ENCOUNTER
STILL ADMITTED:9/8/2024 - present (31 days)  [facility]    HFU/ SL RADHA/ Epilepsy ([facility])     DC -      ----- Message from Riky Leigh DO sent at 10/8/2024  4:54 PM EDT -----  Regarding: HFTIAN  Marcin Sánchez will need follow up in in 6 weeks with epilepsy attending/AP .  He will not require outpatient neurological testing.

## 2024-10-09 NOTE — PROGRESS NOTES
NEUROLOGY RESIDENCY PROGRESS NOTE     Name: Marcin Sánchez   Age & Sex: 64 y.o. male   MRN: [id number]  Unit/Bed#: [facility] 205-01   Encounter: [id number]    Marcin Sánchez will need follow up in in 6 weeks with epilepsy attending/AP .  He will not require outpatient neurological testing.    Staff message sent.     Pending for discharge: vEEG    ASSESSMENT & PLAN     Epilepsy ([facility])  Assessment & Plan  Patient has history of epilepsy, he is on Dilantin 150 mg twice daily and gabapentin 400 mg twice daily.  Neurology has been consulted for episodes of staring spells and decreased responsiveness.  These lasted for about 1 to 2 minutes before patient slowly returns to baseline over 15 to 20 minutes.  Patient's phenytoin levels throughout his admission initially were supratherapeutic and then subtherapeutic without any indication of overt seizures.  Patient's prior seizure semiology is unknown, no notes from neurology are seen from care everywhere.    Patient had a CT head on 9/30/24 which showed the chronic left basal ganglia/caudate infarct without any acute findings.  He had a past video EEG during this admission which showed a left temporal epileptogenicity and moderate diffuse encephalopathy, this was done for 12 hours.    Impression: Patient's clinical picture does not seem consistent with seizure however considering patient's limited exam and history of seizures he would be appropriate to try to capture one of the spells on video EEG.  His prior study was only 12 hours so can certainly try to extend the study again.  With patient's latest phenytoin level at therapeutic range there is low clinical suspicion for breakthrough seizure and we would not recommend adjusting his AEDs at this time.    Plan:  Continue phenytoin 150 mg twice daily  Video EEG monitoring  Seizure precautions  Stat CT head for any acute neurologic changes and please notify the neurology team            SUBJECTIVE     Patient was seen and  examined. No acute events overnight.       Review of Systems   Unable to perform ROS: Patient nonverbal       OBJECTIVE     Patient ID: Marcin Sánchez is a 64 y.o. male.    Vitals:    10/09/24 0030 10/09/24 0240 10/09/24 0556 10/09/24 0700   BP: (!) 114/46 142/63     Pulse: 72 72     Resp: 15 19     Temp:  98.4 °F (36.9 °C)  99.1 °F (37.3 °C)   TempSrc:  Oral  Oral   SpO2: 100% 100%     Weight:   68 kg (150 lb)    Height:          Temperature:   Temp (24hrs), Av.6 °F (37 °C), Min:98.3 °F (36.8 °C), Max:99.1 °F (37.3 °C)    Temperature: 99.1 °F (37.3 °C)      Physical Exam  Constitutional:       Appearance: He is normal weight. He is ill-appearing.   HENT:      Mouth/Throat:      Mouth: Mucous membranes are dry.   Eyes:      Extraocular Movements: Extraocular movements intact and EOM normal.      Conjunctiva/sclera: Conjunctivae normal.      Pupils: Pupils are equal, round, and reactive to light.   Neurological:      Mental Status: He is alert.      Deep Tendon Reflexes:      Reflex Scores:       Tricep reflexes are 2+ on the right side and 2+ on the left side.       Bicep reflexes are 2+ on the right side and 2+ on the left side.       Brachioradialis reflexes are 2+ on the right side and 2+ on the left side.       Patellar reflexes are 2+ on the right side and 2+ on the left side.       Achilles reflexes are 2+ on the right side and 2+ on the left side.         Neurologic Exam     Mental Status   Speech: mute   Level of consciousness: alert  Unable to assess orientation.    Comprehension appears normal, he follows commands.     Cranial Nerves     CN II   Visual fields full to confrontation.     CN III, IV, VI   Pupils are equal, round, and reactive to light.  Extraocular motions are normal.     CN VII   Facial expression full, symmetric.     CN XI   Right trapezius strength: normal  Left trapezius strength: normal    CN XII   Tongue: not atrophic  Tongue deviation: none    Motor Exam   Muscle bulk:  decreased  Moves all 4 extremities on command.  Appears to have some decreased muscle bulk and deconditioning, unable to lift legs off bed however does move both feet.     Sensory Exam     Unable to test this reliably.     Gait, Coordination, and Reflexes     Tremor   Resting tremor: absent    Reflexes   Right brachioradialis: 2+  Left brachioradialis: 2+  Right biceps: 2+  Left biceps: 2+  Right triceps: 2+  Left triceps: 2+  Right patellar: 2+  Left patellar: 2+  Right achilles: 2+  Left achilles: 2+  Right plantar: normal  Left plantar: normal  Right Branham: present  Left Branham: present  Right ankle clonus: absent  Left ankle clonus: absent           LABORATORY DATA     Labs: I have personally reviewed pertinent reports.    Results from last 7 days   Lab Units 10/08/24  0512 10/07/24  2336 10/07/24  2202 10/07/24  0614   WBC Thousand/uL 7.85  --  7.96 9.15   HEMOGLOBIN g/dL 8.4*  --  7.4* 6.9*   I STAT HEMOGLOBIN g/dl  --  8.5*  --   --    HEMATOCRIT % 26.0*  --  23.2* 22.5*   HEMATOCRIT, ISTAT %  --  25*  --   --    PLATELETS Thousands/uL 235  --  226 243   SEGS PCT % 75  --  79* 80*   MONO PCT % 11  --  11 10   EOS PCT % 2  --  1 2      Results from last 7 days   Lab Units 10/09/24  0525 10/08/24  0512 10/07/24  2336 10/07/24  0614 10/05/24  2223 10/04/24  0506   SODIUM mmol/L 143 143  --  141   < > 137   POTASSIUM mmol/L 4.2 4.6  --  4.3   < > 4.0   CHLORIDE mmol/L 100 101  --  99   < > 97   CO2 mmol/L 38* 36*  --  37*   < > 35*   CO2, I-STAT mmol/L  --   --  39*  --   --   --    BUN mg/dL 30* 24  --  27*   < > 21   CREATININE mg/dL 0.58* 0.49*  --  0.41*   < > 0.53*   CALCIUM mg/dL 8.4 8.5  --  8.6   < > 8.4   ALK PHOS U/L  --  131*  --  126*  --  93   ALT U/L  --  10  --  10  --  10   AST U/L  --  18  --  17  --  16   GLUCOSE, ISTAT mg/dl  --   --  137  --   --   --     < > = values in this interval not displayed.     Results from last 7 days   Lab Units 10/09/24  0525 10/08/24  0512 10/07/24  0614    MAGNESIUM mg/dL 2.0 2.3 1.9     Results from last 7 days   Lab Units 10/09/24  0525 10/08/24  0512 10/07/24  0614   PHOSPHORUS mg/dL 3.3 3.9 3.2      Results from last 7 days   Lab Units 10/09/24  0525 10/09/24  0244 10/08/24  1949 10/08/24  1345   INR  1.25*  --   --   --    PTT seconds  --  60* 46* 43*     Results from last 7 days   Lab Units 10/07/24  2336   LACTIC ACID mmol/L 0.5           IMAGING & DIAGNOSTIC TESTING     Radiology Results: I have personally reviewed pertinent reports.      CT soft tissue neck wo contrast   Final Result by E. Alec Schoenberger, MD (10/08 1116)      Tracheostomy in place. No adjacent collection but evaluation is limited due to lack of IV contrast. No mass or adenopathy in the neck.               Workstation performed: [id number]         CT chest wo contrast   Final Result by Xavier Fall MD (10/08 1119)      1.  Moderate to large left and small right pleural effusions, with bibasilar atelectasis. This is superimposed on advanced centrilobular emphysema and diffuse pleural parenchymal scarring throughout the right lung. The pleural effusions have increased    since the most recent prior study.   2.  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   3.  Cholelithiasis.   4.  Moderate anasarca.               Workstation performed: [id number]         XR chest portable ICU   Final Result by Jorge Valladares MD (10/07 0852)      Unchanged diffuse airspace opacities and small bilateral pleural effusions.            Workstation performed: [id number]         XR chest portable   Final Result by Jorge Valladares MD (10/07 0850)      Unchanged diffuse airspace opacities and small bilateral pleural effusions.            Workstation performed: [id number]         XR chest portable   Final Result by Ross Melendez MD (10/05 0824)      Extensive bilateral airspace disease and bilateral pleural effusions.            Workstation performed: [id number]         CT head without  contrast   Final Result by Mau Saab MD (09/30 2324)      No acute intracranial hemorrhage, midline shift, or mass effect. Chronic small vessel ischemic changes and chronic left caudate/basal ganglia infarct.                  Workstation performed: [id number]         XR chest portable ICU   Final Result by Vargas Oconnor MD (09/25 1027)      Persistent bilateral infiltrates and dense opacity at the right apex and pleural effusions without significant change from prior            Workstation performed: [id number]         XR chest portable ICU   Final Result by Nieves Burks MD (09/22 1026)      Tracheostomy projecting over the trachea.      No change in extensive bilateral consolidation.      Question pleural effusions.            Workstation performed: [id number]         XR chest portable   Final Result by Bonnie Castillo MD (09/19 2208)      Tracheostomy tube in adequate position.      Increasing opacity in the left lower lung, which may represent pneumonia or an enlarging left pleural effusion.      Unchanged appearance of the right hemithorax, as detailed above.                  Workstation performed: [id number]         XR chest portable ICU   Final Result by Xavier Fall MD (09/16 1627)      Improved aeration of the right lung, with persistent right hemithoracic volume loss and dense right apical consolidation. There is a also superimposed right pleural effusion and diffuse interstitial lung disease.            Workstation performed: [id number]         XR chest portable ICU   Final Result by Sd Lamb MD (09/14 1344)      1.  Tip of endotracheal tube 3.5 cm above the chery.      2.  Progressive atelectasis of the right lung since 9/10/2024 with further right-sided mediastinal shift.      3.  Small left pleural effusion and moderate size right effusion, increased in size since 9/10/2024.      4.  Pulmonary vascular congestion.            Workstation performed: [id number]          XR chest portable ICU   Final Result by Juanito Lee DO (09/11 0231)      Large right and trace left pleural effusion.            Workstation performed: [id number]         XR chest portable   Final Result by Matt Russ MD (09/09 0949)      Tubes and lines in satisfactory position. No pneumothorax.      Unchanged interstitial edema and small right pleural effusion.      Resident: John Blake I, the attending radiologist, have reviewed the images and agree with the final report above.      Workstation performed: [id number]         XR chest portable    (Results Pending)       Other Diagnostic Testing: I have personally reviewed pertinent reports.      ACTIVE MEDICATIONS       Current Facility-Administered Medications:     acetylcysteine (MUCOMYST) 200 mg/mL inhalation solution 600 mg, Q6H While awake    albuterol inhalation solution 2.5 mg, Q6H PRN    bisacodyl (DULCOLAX) rectal suppository 10 mg, Daily PRN    budesonide (PULMICORT) inhalation solution 0.5 mg, Q12H    ceFEPime (MAXIPIME) 2,000 mg in dextrose 5 % 50 mL IVPB, Q12H, Last Rate: 2,000 mg (10/08/24 2157)    chlorhexidine (PERIDEX) 0.12 % oral rinse 15 mL, Q12H AMERICA    dexmedeTOMIDine (Precedex) 400 mcg in sodium chloride 0.9% 100 mL, Titrated, Last Rate: 0.1 mcg/kg/hr (10/09/24 0504)    doxazosin (CARDURA) tablet 2 mg, Daily    furosemide (LASIX) tablet 40 mg, Daily    gabapentin (NEURONTIN) capsule 400 mg, BID    haloperidol (HALDOL) oral concentrated solution 1 mg, Q6H PRN    heparin (porcine) 25,000 units in 0.45% NaCl 250 mL infusion (premix), Titrated, Last Rate: 14 Units/kg/hr (10/08/24 2039)    ipratropium (ATROVENT) 0.02 % inhalation solution 0.5 mg, TID    levalbuterol (XOPENEX) inhalation solution 1.25 mg, TID    levothyroxine (Synthroid) suspension 250 mcg, Early Morning    lidocaine (LMX) 4 % cream, 4x Daily PRN    melatonin tablet 3 mg, HS    metoprolol tartrate (LOPRESSOR) partial tablet 12.5 mg, Q12H AMERICA    [START ON  10/19/2024] nicotine (NICODERM CQ) 14 mg/24hr TD 24 hr patch 14 mg, Daily    nicotine (NICODERM CQ) 21 mg/24 hr TD 24 hr patch 1 patch, Daily    [START ON 11/2/2024] nicotine (NICODERM CQ) 7 mg/24hr TD 24 hr patch 7 mg, Daily    oxyCODONE (ROXICODONE) oral solution 2.5 mg, Q6H    oxyCODONE (ROXICODONE) oral solution 5 mg, Q6H PRN    phenytoin (DILANTIN) oral suspension 150 mg, Q12H AMERICA    polyethylene glycol (MIRALAX) packet 17 g, BID    QUEtiapine (SEROquel) tablet 100 mg, HS    QUEtiapine (SEROquel) tablet 50 mg, Daily    senna oral syrup 8.8 mg, HS    sodium chloride 3 % inhalation solution 4 mL, TID    vancomycin (VANCOCIN) IVPB (premix in dextrose) 1,000 mg 200 mL, Q12H, Last Rate: 1,000 mg (10/09/24 0625)    Prior to Admission medications    Medication Sig Start Date End Date Taking? Authorizing Provider   Advair -21 MCG/ACT inhaler  12/19/22   Historical Provider, MD   albuterol (PROVENTIL HFA,VENTOLIN HFA) 90 mcg/act inhaler Inhale 2 puffs every 6 (six) hours as needed for wheezing 2/25/20   Bienvenido Lee MD   apixaban (ELIQUIS) 5 mg Take 1 tablet (5 mg total) by mouth 2 (two) times a day 8/30/24   Malia Donis MD   furosemide (LASIX) 40 mg tablet Take 1 tablet (40 mg total) by mouth daily 8/30/24   Malia Donis MD   gabapentin (NEURONTIN) 400 mg capsule Take 400 mg by mouth 2 (two) times a day      Historical Provider, MD   levalbuterol (XOPENEX) 0.63 mg/3 mL nebulizer solution Take 3 mL (0.63 mg total) by nebulization every 8 (eight) hours as needed for wheezing 8/30/24   Malia Donis MD   metoprolol succinate (TOPROL-XL) 25 mg 24 hr tablet Take 25 mg by mouth daily    Historical Provider, MD   phenytoin (DILANTIN) 100 mg ER capsule Take 100 mg by mouth 2 (two) times a day And 250mg at 5pm    Historical Provider, MD   potassium chloride (Klor-Con M20) 20 mEq tablet Take 1 tablet (20 mEq total) by mouth daily 8/30/24   Malia Donis MD   sodium chloride 1 g tablet Take 1 tablet (1 g total)  by mouth 2 (two) times a day with meals 8/30/24   Malia Donis MD   tiotropium (SPIRIVA) 18 mcg inhalation capsule Place 18 mcg into inhaler and inhale daily    Historical Provider, MD   Vitamin D, Cholecalciferol, 1000 UNITS CAPS Take 2 capsules by mouth daily     Historical Provider, MD         VTE Pharmacologic Prophylaxis: VTE covered by:  heparin (porcine), Intravenous, 14 Units/kg/hr at 10/08/24 2039      VTE Mechanical Prophylaxis: sequential compression device    ======    I have discussed the patient's history, physical exam findings, assessment, and plan in detail with attending, Dr. Young    Thank you for allowing me to participate in the care of your patient, Marcin Sánchez.    Riky Leigh DO  [facility] Neurology Residency, PGY-2

## 2024-10-09 NOTE — CASE MANAGEMENT
Case Management Discharge Planning Note    Patient name Marcin Sánchez  [facility]  MRN [id number]  : 1960 Date 10/9/2024       Current Admission Date: 2024  Current Admission Diagnosis:Acute hypoxic respiratory failure (HCC)   Patient Active Problem List    Diagnosis Date Noted Date Diagnosed    Seizure (HCC) 10/09/2024     Insomnia 10/03/2024     Agitation 10/03/2024     Palliative care encounter 10/01/2024     Acute hypoxic respiratory failure (HCC) 2024     Shock (HCC) 2024     Mucus plugging of bronchi 2024     Transaminitis 2024     Cardiac arrest (HCC) 2024     Pericardial effusion 2024     Acute on chronic respiratory failure with hypoxia and hypercapnia (HCC) 2024     Acute metabolic encephalopathy 2024     Chronic combined systolic and diastolic CHF (congestive heart failure) (HCC) 2024     Atrial fibrillation (HCC) 2024     Pulmonary fibrosis (HCC) 2024     Suspected chronic pulmonary embolism (HCC) 2024     Squamous cell lung cancer (HCC) 2024     Hyponatremia 2024     Severe protein-calorie malnutrition (HCC) 2024     Edema of both legs 2022     Tobacco abuse 2020     Nonrheumatic aortic valve insufficiency 2020     Malignant neoplasm of upper lobe of right lung (HCC) 2018     Thoracic aortic aneurysm without rupture (HCC) 2018     Cancer of right main bronchus (HCC) 10/04/2016     Pleural effusion 10/02/2016     Multiple lung nodules on CT 10/02/2016     Collapse of right lung 2016     Acute exacerbation of chronic obstructive pulmonary disease (COPD) (HCC)      Epilepsy (HCC)      Lyme disease      Major depression      Alcohol abuse        LOS (days): 31  Geometric Mean LOS (GMLOS) (days): 5.1  Days to GMLOS:-25.6     OBJECTIVE:  Risk of Unplanned Readmission Score: 48.71         Current admission status: Inpatient   Preferred Pharmacy:    [facility] -  - J[facility]AL Clifford - [address] [facility],  [address],  [facility] [address]  [facility] [address]  Phone: [phone] Fax: [phone]    [place] - [address] [facility] [address]  [address] [place] [address]  Phone: [phone] Fax: [phone]    Primary Care Provider: Brandt Godinez MD    Primary Insurance: [facility]  Secondary Insurance:     DISCHARGE DETAILS:    CM received message in [facility] from [facility] [place]. CM was informed that [facility] is in the process of the [facility] agreement paperwork and that this usually takes a day or two.

## 2024-10-09 NOTE — ASSESSMENT & PLAN NOTE
Capacity:  Patient not competent on today's encounter    GOC:  Without full capacity, medical decisions revert to his legally appointed guardian, Tere Williamson ([phone]). Tere went on to say it was okay to speak with pt's sister, Katalina, about pt's medical state.      ACP:  None on file     Disposition:  Case management working on LTAC placement      Follow-up:  Palliative service will continue to follow Marcin and coordinate medical decision making apparatus with help of legal consult service.     PDMP Review: I have reviewed the patient's controlled substance dispensing history in the Prescription Drug Monitoring Program in compliance with the [facility] regulations before prescribing any controlled substances.

## 2024-10-09 NOTE — ASSESSMENT & PLAN NOTE
S/p cardiac arrest soon after initial admission at [facility].thought to initially be secondary to circumferential pericardial effusion, as well as acute hypoxia and hypercarbia from respiratory failure.  Of note, no intervention for the effusion indicated at this time by CT surgery with a follow-up TTE discussed after discharge.  Patient also carries diagnosis of chronic combined systolic and diastolic heart failure and atrial fibrillation

## 2024-10-09 NOTE — PLAN OF CARE
Problem: Prexisting or High Potential for Compromised Skin Integrity  Goal: Skin integrity is maintained or improved  Description: INTERVENTIONS:  - Identify patients at risk for skin breakdown  - Assess and monitor skin integrity  - Assess and monitor nutrition and hydration status  - Monitor labs   - Assess for incontinence   - Turn and reposition patient  - Assist with mobility/ambulation  - Relieve pressure over bony prominences  - Avoid friction and shearing  - Provide appropriate hygiene as needed including keeping skin clean and dry  - Evaluate need for skin moisturizer/barrier cream  - Collaborate with interdisciplinary team   - Patient/family teaching  - Consider wound care consult   Outcome: Progressing     Problem: SAFETY,RESTRAINT: NV/NON-SELF DESTRUCTIVE BEHAVIOR  Goal: Remains free of harm/injury (restraint for non violent/non self-detsructive behavior)  Description: INTERVENTIONS:  - Instruct patient/family regarding restraint use   - Assess and monitor physiologic and psychological status   - Provide interventions and comfort measures to meet assessed patient needs   - Identify and implement measures to help patient regain control  - Assess readiness for release of restraint   Outcome: Progressing  Goal: Returns to optimal restraint-free functioning  Description: INTERVENTIONS:  - Assess the patient's behavior and symptoms that indicate continued need for restraint  - Identify and implement measures to help patient regain control  - Assess readiness for release of restraint   Outcome: Progressing     Problem: PAIN - ADULT  Goal: Verbalizes/displays adequate comfort level or baseline comfort level  Description: Interventions:  - Encourage patient to monitor pain and request assistance  - Assess pain using appropriate pain scale  - Administer analgesics based on type and severity of pain and evaluate response  - Implement non-pharmacological measures as appropriate and evaluate response  - Consider  cultural and social influences on pain and pain management  - Notify physician/advanced practitioner if interventions unsuccessful or patient reports new pain  Outcome: Progressing     Problem: INFECTION - ADULT  Goal: Absence or prevention of progression during hospitalization  Description: INTERVENTIONS:  - Assess and monitor for signs and symptoms of infection  - Monitor lab/diagnostic results  - Monitor all insertion sites, i.e. indwelling lines, tubes, and drains  - Monitor endotracheal if appropriate and nasal secretions for changes in amount and color  - [place] appropriate cooling/warming therapies per order  - Administer medications as ordered  - Instruct and encourage patient and family to use good hand hygiene technique  - Identify and instruct in appropriate isolation precautions for identified infection/condition  Outcome: Progressing  Goal: Absence of fever/infection during neutropenic period  Description: INTERVENTIONS:  - Monitor WBC    Outcome: Progressing     Problem: SAFETY ADULT  Goal: Patient will remain free of falls  Description: INTERVENTIONS:  - Educate patient/family on patient safety including physical limitations  - Instruct patient to call for assistance with activity   - Consult OT/PT to assist with strengthening/mobility   - Keep Call bell within reach  - Keep bed low and locked with side rails adjusted as appropriate  - Keep care items and personal belongings within reach  - Initiate and maintain comfort rounds  - Make Fall Risk Sign visible to staff  - Offer Toileting every  Hours, in advance of need  - Initiate/Maintain alarm  - Obtain necessary fall risk management equipment:   - Apply yellow socks and bracelet for high fall risk patients  - Consider moving patient to room near nurses station  Outcome: Progressing  Goal: Maintain or return to baseline ADL function  Description: INTERVENTIONS:  -  Assess patient's ability to carry out ADLs; assess patient's baseline for ADL  function and identify physical deficits which impact ability to perform ADLs (bathing, care of mouth/teeth, toileting, grooming, dressing, etc.)  - Assess/evaluate cause of self-care deficits   - Assess range of motion  - Assess patient's mobility; develop plan if impaired  - Assess patient's need for assistive devices and provide as appropriate  - Encourage maximum independence but intervene and supervise when necessary  - Involve family in performance of ADLs  - Assess for home care needs following discharge   - Consider OT consult to assist with ADL evaluation and planning for discharge  - Provide patient education as appropriate  Outcome: Progressing  Goal: Maintains/Returns to pre admission functional level  Description: INTERVENTIONS:  - Perform AM-PAC 6 Click Basic Mobility/ Daily Activity assessment daily.  - Set and communicate daily mobility goal to care team and patient/family/caregiver.   - Collaborate with rehabilitation services on mobility goals if consulted  - Perform Range of Motion  times a day.  - Reposition patient every  hours.  - Dangle patient  times a day  - Stand patient  times a day  - Ambulate patient  times a day  - Out of bed to chair  times a day   - Out of bed for meals  times a day  - Out of bed for toileting  - Record patient progress and toleration of activity level   Outcome: Progressing     Problem: DISCHARGE PLANNING  Goal: Discharge to home or other facility with appropriate resources  Description: INTERVENTIONS:  - Identify barriers to discharge w/patient and caregiver  - Arrange for needed discharge resources and transportation as appropriate  - Identify discharge learning needs (meds, wound care, etc.)  - Arrange for interpretive services to assist at discharge as needed  - Refer to Case Management Department for coordinating discharge planning if the patient needs post-hospital services based on physician/advanced practitioner order or complex needs related to functional  status, cognitive ability, or social support system  Outcome: Progressing     Problem: Knowledge Deficit  Goal: Patient/family/caregiver demonstrates understanding of disease process, treatment plan, medications, and discharge instructions  Description: Complete learning assessment and assess knowledge base.  Interventions:  - Provide teaching at level of understanding  - Provide teaching via preferred learning methods  Outcome: Progressing     Problem: Nutrition/Hydration-ADULT  Goal: Nutrient/Hydration intake appropriate for improving, restoring or maintaining nutritional needs  Description: Monitor and assess patient's nutrition/hydration status for malnutrition. Collaborate with interdisciplinary team and initiate plan and interventions as ordered.  Monitor patient's weight and dietary intake as ordered or per policy. Utilize nutrition screening tool and intervene as necessary. Determine patient's food preferences and provide high-protein, high-caloric foods as appropriate.     INTERVENTIONS:  - Monitor oral intake, urinary output, labs, and treatment plans  - Assess nutrition and hydration status and recommend course of action  - Evaluate amount of meals eaten  - Assist patient with eating if necessary   - Allow adequate time for meals  - Recommend/ encourage appropriate diets, oral nutritional supplements, and vitamin/mineral supplements  - Order, calculate, and assess calorie counts as needed  - Recommend, monitor, and adjust tube feedings and TPN/PPN based on assessed needs  - Assess need for intravenous fluids  - Provide specific nutrition/hydration education as appropriate  - Include patient/family/caregiver in decisions related to nutrition  Outcome: Progressing     Problem: RESPIRATORY - ADULT  Goal: Achieves optimal ventilation and oxygenation  Description: INTERVENTIONS:  - Assess for changes in respiratory status  - Assess for changes in mentation and behavior  - Position to facilitate oxygenation and  minimize respiratory effort  - Oxygen administered by appropriate delivery if ordered  - Initiate smoking cessation education as indicated  - Encourage broncho-pulmonary hygiene including cough, deep breathe, Incentive Spirometry  - Assess the need for suctioning and aspirate as needed  - Assess and instruct to report SOB or any respiratory difficulty  - Respiratory Therapy support as indicated  Outcome: Progressing

## 2024-10-09 NOTE — ASSESSMENT & PLAN NOTE
Current Medications:  Primary team switched patient from Trazodone to Seroquel 50mg BID  Melatonin 3 mg qHS

## 2024-10-09 NOTE — PROGRESS NOTES
Progress Note - Palliative Care   Name: Marcin Sánchez 64 y.o. male I MRN: [id number]  Unit/Bed#: [facility] 205-01 I Date of Admission: 9/8/2024   Date of Service: 10/9/2024 I Hospital Day: 31    Assessment & Plan  Acute hypoxic respiratory failure (HCC)  Has tracheostomy in place  Chronic combined systolic and diastolic CHF (congestive heart failure) ([facility])  Management per primary           Cardiac arrest ([facility])  S/p cardiac arrest soon after initial admission at [facility].thought to initially be secondary to circumferential pericardial effusion, as well as acute hypoxia and hypercarbia from respiratory failure.  Of note, no intervention for the effusion indicated at this time by CT surgery with a follow-up TTE discussed after discharge.  Patient also carries diagnosis of chronic combined systolic and diastolic heart failure and atrial fibrillation  Palliative care encounter  Capacity:  Patient not competent on today's encounter    GOC:  Without full capacity, medical decisions revert to his legally appointed guardian, Tere Teri ([phone]). Tere went on to say it was okay to speak with pt's sister, Katalina, about pt's medical state.      ACP:  None on file     Disposition:  Case management working on LTAC placement      Follow-up:  Palliative service will continue to follow Marcin and coordinate medical decision making apparatus with help of legal consult service.     PDMP Review: I have reviewed the patient's controlled substance dispensing history in the Prescription Drug Monitoring Program in compliance with the [facility] regulations before prescribing any controlled substances.  Insomnia    Current Medications:  Primary team switched patient from Trazodone to Seroquel 50mg BID  Melatonin 3 mg qHS  Agitation  Intermittent agitation - at times pulling at trach tubing  1:1 in place  Current Medications for agitation:  Haldol 1 mg Q6hrs PRN for agitation, restlessness  Seroquel 50 mg BID   Continue recommend global  delirium precautions including:  Establishment of day/night cycle via lights during the day and blinds open.  Please limit interruptions at night as medically appropriate.  Provide glasses/hearing aids as apprioriate.    Minimize deliriogenic medications as able.   Provide reorientation including date on board and visible clock.   Avoid restraints as able, frequent verbal reorientations or patient care sitter as appropriate.  Epilepsy (HCC)    Seizure (HCC)      Subjective   Patient seen and examined with sitter at bedside.  Currently remains on video EEG.  No acute events overnight.    Objective :  Temp:  [98.3 °F (36.8 °C)-99.1 °F (37.3 °C)] 99.1 °F (37.3 °C)  HR:  [70-86] 70  BP: (110-142)/(42-63) 132/45  Resp:  [13-32] 18  SpO2:  [99 %-100 %] 100 %  O2 Device: Ventilator  FiO2 (%):  [50] 50    Physical Exam  Vitals and nursing note reviewed.   Constitutional:       General: He is not in acute distress.     Appearance: He is ill-appearing.   HENT:      Head: Normocephalic and atraumatic.      Right Ear: External ear normal.      Left Ear: External ear normal.      Nose: Nose normal.   Eyes:      General: No scleral icterus.        Right eye: No discharge.         Left eye: No discharge.   Cardiovascular:      Rate and Rhythm: Normal rate and regular rhythm.   Pulmonary:      Effort: Pulmonary effort is normal. No respiratory distress.      Comments: Tracheostomy in place  Skin:     General: Skin is warm and dry.      Coloration: Skin is pale.   Neurological:      Comments: Remains on vEEG          Lab Results: I have reviewed the following results:  Lab Results   Component Value Date/Time    SODIUM 143 10/09/2024 05:25 AM    SODIUM 136 08/06/2021 06:22 AM    K 4.2 10/09/2024 05:25 AM    K 4.2 08/06/2021 06:22 AM    BUN 30 (H) 10/09/2024 05:25 AM    BUN 8 08/06/2021 06:22 AM    CREATININE 0.58 (L) 10/09/2024 05:25 AM    CREATININE 0.42 (L) 08/06/2021 06:22 AM    GLUC 117 10/09/2024 05:25 AM    GLUC 87 08/06/2021  06:22 AM    CALCIUM 8.4 10/09/2024 05:25 AM    CALCIUM 9.5 08/06/2021 06:22 AM    AST 18 10/08/2024 05:12 AM    AST 18 08/06/2021 06:22 AM    ALT 10 10/08/2024 05:12 AM    ALT 17 08/06/2021 06:22 AM    ALB 3.0 (L) 10/08/2024 05:12 AM    ALB 3.8 08/06/2021 06:22 AM    TP 7.0 10/08/2024 05:12 AM    TP 7.7 08/06/2021 06:22 AM    EGFR 107 10/09/2024 05:25 AM    EGFR 115 10/02/2020 06:35 AM     Lab Results   Component Value Date/Time    HGB 8.0 (L) 10/09/2024 10:18 AM    WBC 9.05 10/09/2024 10:18 AM     10/09/2024 10:18 AM    INR 1.25 (H) 10/09/2024 05:25 AM    PTT 49 (H) 10/09/2024 10:18 AM     Lab Results   Component Value Date/Time    [id number] 45.059 (H) 09/24/2024 06:34 AM     Administrative Statements   I have spent a total time of 20 minutes in caring for this patient on the day of the visit/encounter including Counseling / Coordination of care, Documenting in the medical record, Reviewing / ordering tests, medicine, procedures  , Obtaining or reviewing history  , and Communicating with other healthcare professionals . Topics discussed with the patient / family include symptom assessment and management, medication review, and anticipatory guidance.    Shital Vuong

## 2024-10-09 NOTE — PROGRESS NOTES
Marcin Sánchez is a 64 y.o. male who is currently ordered Vancomycin IV with management by the Pharmacy Consult service.  Relevant clinical data and objective / subjective history reviewed.  Vancomycin Assessment:  Indication and Goal AUC/Trough: Pneumonia (goal -600, trough >10), -600, trough >10  Clinical Status: stable  Micro:     Renal Function:  SCr: 0.58 mg/dL  CrCl: >100 mL/min  Renal replacement: Not on dialysis  Days of Therapy: 3  Current Dose: 1000mg IV Q 12H  Vancomycin Plan:  New Dosing: Continue 1000mg IV q12h  Estimated AUC: 577 mcg*hr/mL  Estimated Trough: 15.8 mcg/mL  Next Level: 10/15 random with AM labs  Renal Function Monitoring: Daily BMP and UOP  Pharmacy will continue to follow closely for s/sx of nephrotoxicity, infusion reactions and appropriateness of therapy.  BMP and CBC will be ordered per protocol. We will continue to follow the patient’s culture results and clinical progress daily.

## 2024-10-10 ENCOUNTER — APPOINTMENT (INPATIENT)
Dept: NEUROLOGY | Facility: CLINIC | Age: 64
DRG: 005 | End: 2024-10-10
Payer: COMMERCIAL

## 2024-10-10 LAB
ANION GAP SERPL CALCULATED.3IONS-SCNC: 7 MMOL/L (ref 4–13)
APTT PPP: 63 SECONDS (ref 23–34)
APTT PPP: 66 SECONDS (ref 23–34)
ATRIAL RATE: 79 BPM
BACTERIA SPT RESP CULT: ABNORMAL
BASE EX.OXY STD BLDV CALC-SCNC: 69.9 % (ref 60–80)
BASE EXCESS BLDV CALC-SCNC: 6.3 MMOL/L
BASOPHILS # BLD AUTO: 0.08 THOUSANDS/ΜL (ref 0–0.1)
BASOPHILS NFR BLD AUTO: 1 % (ref 0–1)
BUN SERPL-MCNC: 29 MG/DL (ref 5–25)
CA-I BLD-SCNC: 1.14 MMOL/L (ref 1.12–1.32)
CALCIUM SERPL-MCNC: 8.8 MG/DL (ref 8.4–10.2)
CHLORIDE SERPL-SCNC: 100 MMOL/L (ref 96–108)
CO2 SERPL-SCNC: 35 MMOL/L (ref 21–32)
CREAT SERPL-MCNC: 0.62 MG/DL (ref 0.6–1.3)
EOSINOPHIL # BLD AUTO: 0.19 THOUSAND/ΜL (ref 0–0.61)
EOSINOPHIL NFR BLD AUTO: 3 % (ref 0–6)
ERYTHROCYTE [DISTWIDTH] IN BLOOD BY AUTOMATED COUNT: 14.9 % (ref 11.6–15.1)
GFR SERPL CREATININE-BSD FRML MDRD: 104 ML/MIN/1.73SQ M
GLUCOSE SERPL-MCNC: 113 MG/DL (ref 65–140)
GRAM STN SPEC: ABNORMAL
GRAM STN SPEC: ABNORMAL
HCO3 BLDV-SCNC: 34.4 MMOL/L (ref 24–30)
HCT VFR BLD AUTO: 24.4 % (ref 36.5–49.3)
HGB BLD-MCNC: 7.5 G/DL (ref 12–17)
IMM GRANULOCYTES # BLD AUTO: 0.05 THOUSAND/UL (ref 0–0.2)
IMM GRANULOCYTES NFR BLD AUTO: 1 % (ref 0–2)
LYMPHOCYTES # BLD AUTO: 0.81 THOUSANDS/ΜL (ref 0.6–4.47)
LYMPHOCYTES NFR BLD AUTO: 11 % (ref 14–44)
MAGNESIUM SERPL-MCNC: 2 MG/DL (ref 1.9–2.7)
MCH RBC QN AUTO: 31.9 PG (ref 26.8–34.3)
MCHC RBC AUTO-ENTMCNC: 30.7 G/DL (ref 31.4–37.4)
MCV RBC AUTO: 104 FL (ref 82–98)
MONOCYTES # BLD AUTO: 0.85 THOUSAND/ΜL (ref 0.17–1.22)
MONOCYTES NFR BLD AUTO: 12 % (ref 4–12)
NEUTROPHILS # BLD AUTO: 5.34 THOUSANDS/ΜL (ref 1.85–7.62)
NEUTS SEG NFR BLD AUTO: 72 % (ref 43–75)
NRBC BLD AUTO-RTO: 0 /100 WBCS
O2 CT BLDV-SCNC: 13.4 ML/DL
P AXIS: 14 DEGREES
PCO2 BLDV: 66.3 MM HG (ref 42–50)
PH BLDV: 7.33 [PH] (ref 7.3–7.4)
PHOSPHATE SERPL-MCNC: 3.1 MG/DL (ref 2.3–4.1)
PLATELET # BLD AUTO: 223 THOUSANDS/UL (ref 149–390)
PMV BLD AUTO: 9 FL (ref 8.9–12.7)
PO2 BLDV: 39.4 MM HG (ref 35–45)
POTASSIUM SERPL-SCNC: 4.3 MMOL/L (ref 3.5–5.3)
PR INTERVAL: 154 MS
QRS AXIS: 19 DEGREES
QRSD INTERVAL: 104 MS
QT INTERVAL: 363 MS
QTC INTERVAL: 417 MS
RBC # BLD AUTO: 2.35 MILLION/UL (ref 3.88–5.62)
SODIUM SERPL-SCNC: 142 MMOL/L (ref 135–147)
T WAVE AXIS: 246 DEGREES
VENTRICULAR RATE: 79 BPM
WBC # BLD AUTO: 7.32 THOUSAND/UL (ref 4.31–10.16)

## 2024-10-10 PROCEDURE — 85730 THROMBOPLASTIN TIME PARTIAL: CPT | Performed by: INTERNAL MEDICINE

## 2024-10-10 PROCEDURE — 80048 BASIC METABOLIC PNL TOTAL CA: CPT | Performed by: INTERNAL MEDICINE

## 2024-10-10 PROCEDURE — 83735 ASSAY OF MAGNESIUM: CPT

## 2024-10-10 PROCEDURE — 94760 N-INVAS EAR/PLS OXIMETRY 1: CPT

## 2024-10-10 PROCEDURE — 94669 MECHANICAL CHEST WALL OSCILL: CPT

## 2024-10-10 PROCEDURE — 99232 SBSQ HOSP IP/OBS MODERATE 35: CPT | Performed by: PSYCHIATRY & NEUROLOGY

## 2024-10-10 PROCEDURE — 99233 SBSQ HOSP IP/OBS HIGH 50: CPT

## 2024-10-10 PROCEDURE — 94664 DEMO&/EVAL PT USE INHALER: CPT

## 2024-10-10 PROCEDURE — 85025 COMPLETE CBC W/AUTO DIFF WBC: CPT

## 2024-10-10 PROCEDURE — 94640 AIRWAY INHALATION TREATMENT: CPT

## 2024-10-10 PROCEDURE — 95715 VEEG EA 12-26HR INTMT MNTR: CPT

## 2024-10-10 PROCEDURE — 93010 ELECTROCARDIOGRAM REPORT: CPT | Performed by: INTERNAL MEDICINE

## 2024-10-10 PROCEDURE — 82805 BLOOD GASES W/O2 SATURATION: CPT

## 2024-10-10 PROCEDURE — 82330 ASSAY OF CALCIUM: CPT

## 2024-10-10 PROCEDURE — 84100 ASSAY OF PHOSPHORUS: CPT

## 2024-10-10 PROCEDURE — 94003 VENT MGMT INPAT SUBQ DAY: CPT

## 2024-10-10 PROCEDURE — 95720 EEG PHY/QHP EA INCR W/VEEG: CPT | Performed by: PSYCHIATRY & NEUROLOGY

## 2024-10-10 PROCEDURE — 99232 SBSQ HOSP IP/OBS MODERATE 35: CPT | Performed by: INTERNAL MEDICINE

## 2024-10-10 RX ORDER — LANOLIN ALCOHOL/MO/W.PET/CERES
6 CREAM (GRAM) TOPICAL
Status: DISCONTINUED | OUTPATIENT
Start: 2024-10-10 | End: 2024-10-18 | Stop reason: HOSPADM

## 2024-10-10 RX ORDER — HALOPERIDOL 2 MG/ML
2 SOLUTION ORAL
Status: DISCONTINUED | OUTPATIENT
Start: 2024-10-10 | End: 2024-10-11

## 2024-10-10 RX ORDER — LEVETIRACETAM 100 MG/ML
1000 SOLUTION ORAL EVERY 12 HOURS SCHEDULED
Status: DISCONTINUED | OUTPATIENT
Start: 2024-10-10 | End: 2024-10-18 | Stop reason: HOSPADM

## 2024-10-10 RX ORDER — FUROSEMIDE 40 MG/1
40 TABLET ORAL ONCE
Status: COMPLETED | OUTPATIENT
Start: 2024-10-10 | End: 2024-10-10

## 2024-10-10 RX ORDER — HEPARIN SODIUM 10000 [USP'U]/100ML
3-25 INJECTION, SOLUTION INTRAVENOUS
Status: DISCONTINUED | OUTPATIENT
Start: 2024-10-10 | End: 2024-10-15

## 2024-10-10 RX ADMIN — IPRATROPIUM BROMIDE 0.5 MG: 0.5 SOLUTION RESPIRATORY (INHALATION) at 08:17

## 2024-10-10 RX ADMIN — QUETIAPINE FUMARATE 50 MG: 25 TABLET ORAL at 09:20

## 2024-10-10 RX ADMIN — OXYCODONE HYDROCHLORIDE 2.5 MG: 5 SOLUTION ORAL at 09:23

## 2024-10-10 RX ADMIN — Medication 6 MG: at 22:05

## 2024-10-10 RX ADMIN — SODIUM CHLORIDE SOLN NEBU 3% 4 ML: 3 NEBU SOLN at 19:20

## 2024-10-10 RX ADMIN — CHLORHEXIDINE GLUCONATE 0.12% ORAL RINSE 15 ML: 1.2 LIQUID ORAL at 22:03

## 2024-10-10 RX ADMIN — CHLORHEXIDINE GLUCONATE 0.12% ORAL RINSE 15 ML: 1.2 LIQUID ORAL at 09:20

## 2024-10-10 RX ADMIN — GABAPENTIN 400 MG: 400 CAPSULE ORAL at 09:20

## 2024-10-10 RX ADMIN — LEVALBUTEROL HYDROCHLORIDE 1.25 MG: 1.25 SOLUTION RESPIRATORY (INHALATION) at 13:20

## 2024-10-10 RX ADMIN — Medication 12.5 MG: at 09:22

## 2024-10-10 RX ADMIN — GABAPENTIN 400 MG: 400 CAPSULE ORAL at 19:13

## 2024-10-10 RX ADMIN — NICOTINE 1 PATCH: 21 PATCH, EXTENDED RELEASE TRANSDERMAL at 09:22

## 2024-10-10 RX ADMIN — LEVOTHYROXINE SODIUM 250 MCG: 100 TABLET ORAL at 05:20

## 2024-10-10 RX ADMIN — LEVETIRACETAM 1000 MG: 100 SOLUTION ORAL at 22:49

## 2024-10-10 RX ADMIN — BUDESONIDE 0.5 MG: 0.5 INHALANT RESPIRATORY (INHALATION) at 19:20

## 2024-10-10 RX ADMIN — LEVALBUTEROL HYDROCHLORIDE 1.25 MG: 1.25 SOLUTION RESPIRATORY (INHALATION) at 08:17

## 2024-10-10 RX ADMIN — PHENYTOIN 150 MG: 125 SUSPENSION ORAL at 09:21

## 2024-10-10 RX ADMIN — IPRATROPIUM BROMIDE 0.5 MG: 0.5 SOLUTION RESPIRATORY (INHALATION) at 13:20

## 2024-10-10 RX ADMIN — HALOPERIDOL 2 MG: 2 SOLUTION ORAL at 22:03

## 2024-10-10 RX ADMIN — POLYETHYLENE GLYCOL 3350 17 G: 17 POWDER, FOR SOLUTION ORAL at 09:22

## 2024-10-10 RX ADMIN — DOXAZOSIN 2 MG: 2 TABLET ORAL at 09:21

## 2024-10-10 RX ADMIN — ALBUTEROL SULFATE 2.5 MG: 2.5 SOLUTION RESPIRATORY (INHALATION) at 16:29

## 2024-10-10 RX ADMIN — LEVALBUTEROL HYDROCHLORIDE 1.25 MG: 1.25 SOLUTION RESPIRATORY (INHALATION) at 19:20

## 2024-10-10 RX ADMIN — FUROSEMIDE 40 MG: 40 TABLET ORAL at 19:13

## 2024-10-10 RX ADMIN — IPRATROPIUM BROMIDE 0.5 MG: 0.5 SOLUTION RESPIRATORY (INHALATION) at 19:20

## 2024-10-10 RX ADMIN — DEXMEDETOMIDINE HYDROCHLORIDE 0.3 MCG/KG/HR: 400 INJECTION INTRAVENOUS at 08:11

## 2024-10-10 RX ADMIN — OXYCODONE HYDROCHLORIDE 2.5 MG: 5 SOLUTION ORAL at 19:13

## 2024-10-10 RX ADMIN — SODIUM CHLORIDE SOLN NEBU 3% 4 ML: 3 NEBU SOLN at 08:17

## 2024-10-10 RX ADMIN — BUDESONIDE 0.5 MG: 0.5 INHALANT RESPIRATORY (INHALATION) at 08:17

## 2024-10-10 NOTE — RESPIRATORY THERAPY NOTE
RT Ventilator Management Note  Marcin Sánchez 64 y.o. male MRN: [id number]  Unit/Bed#: [facility] 205-01 Encounter: [id number]      Daily Screen         10/9/2024  0830 10/10/2024  0827          Patient safety screen outcome:: Failed Failed (P)       Not Ready for Weaning due to:: Underline problem not resolved --                Physical Exam:   Respiratory Pattern: (P) Assisted  Chest Assessment: (P) Chest expansion symmetrical  Bilateral Breath Sounds: (P) Diminished, Coarse      Resp Comments: (P) pt remains on apv cmv vent settings at this time. no changes made.  pt agitated and refusing vest at this time.

## 2024-10-10 NOTE — PLAN OF CARE
Problem: Prexisting or High Potential for Compromised Skin Integrity  Goal: Skin integrity is maintained or improved  Description: INTERVENTIONS:  - Identify patients at risk for skin breakdown  - Assess and monitor skin integrity  - Assess and monitor nutrition and hydration status  - Monitor labs   - Assess for incontinence   - Turn and reposition patient  - Assist with mobility/ambulation  - Relieve pressure over bony prominences  - Avoid friction and shearing  - Provide appropriate hygiene as needed including keeping skin clean and dry  - Evaluate need for skin moisturizer/barrier cream  - Collaborate with interdisciplinary team   - Patient/family teaching  - Consider wound care consult   Outcome: Progressing     Problem: SAFETY,RESTRAINT: NV/NON-SELF DESTRUCTIVE BEHAVIOR  Goal: Remains free of harm/injury (restraint for non violent/non self-detsructive behavior)  Description: INTERVENTIONS:  - Instruct patient/family regarding restraint use   - Assess and monitor physiologic and psychological status   - Provide interventions and comfort measures to meet assessed patient needs   - Identify and implement measures to help patient regain control  - Assess readiness for release of restraint   Outcome: Progressing  Goal: Returns to optimal restraint-free functioning  Description: INTERVENTIONS:  - Assess the patient's behavior and symptoms that indicate continued need for restraint  - Identify and implement measures to help patient regain control  - Assess readiness for release of restraint   Outcome: Progressing     Problem: PAIN - ADULT  Goal: Verbalizes/displays adequate comfort level or baseline comfort level  Description: Interventions:  - Encourage patient to monitor pain and request assistance  - Assess pain using appropriate pain scale  - Administer analgesics based on type and severity of pain and evaluate response  - Implement non-pharmacological measures as appropriate and evaluate response  - Consider  cultural and social influences on pain and pain management  - Notify physician/advanced practitioner if interventions unsuccessful or patient reports new pain  Outcome: Progressing     Problem: INFECTION - ADULT  Goal: Absence or prevention of progression during hospitalization  Description: INTERVENTIONS:  - Assess and monitor for signs and symptoms of infection  - Monitor lab/diagnostic results  - Monitor all insertion sites, i.e. indwelling lines, tubes, and drains  - Monitor endotracheal if appropriate and nasal secretions for changes in amount and color  - [place] appropriate cooling/warming therapies per order  - Administer medications as ordered  - Instruct and encourage patient and family to use good hand hygiene technique  - Identify and instruct in appropriate isolation precautions for identified infection/condition  Outcome: Progressing  Goal: Absence of fever/infection during neutropenic period  Description: INTERVENTIONS:  - Monitor WBC    Outcome: Progressing     Problem: SAFETY ADULT  Goal: Patient will remain free of falls  Description: INTERVENTIONS:  - Educate patient/family on patient safety including physical limitations  - Instruct patient to call for assistance with activity   - Consult OT/PT to assist with strengthening/mobility   - Keep Call bell within reach  - Keep bed low and locked with side rails adjusted as appropriate  - Keep care items and personal belongings within reach  - Initiate and maintain comfort rounds  - Make Fall Risk Sign visible to staff  - Offer Toileting every  Hours, in advance of need  - Initiate/Maintain alarm  - Obtain necessary fall risk management equipment:   - Apply yellow socks and bracelet for high fall risk patients  - Consider moving patient to room near nurses station  Outcome: Progressing  Goal: Maintain or return to baseline ADL function  Description: INTERVENTIONS:  -  Assess patient's ability to carry out ADLs; assess patient's baseline for ADL  function and identify physical deficits which impact ability to perform ADLs (bathing, care of mouth/teeth, toileting, grooming, dressing, etc.)  - Assess/evaluate cause of self-care deficits   - Assess range of motion  - Assess patient's mobility; develop plan if impaired  - Assess patient's need for assistive devices and provide as appropriate  - Encourage maximum independence but intervene and supervise when necessary  - Involve family in performance of ADLs  - Assess for home care needs following discharge   - Consider OT consult to assist with ADL evaluation and planning for discharge  - Provide patient education as appropriate  Outcome: Progressing  Goal: Maintains/Returns to pre admission functional level  Description: INTERVENTIONS:  - Perform AM-PAC 6 Click Basic Mobility/ Daily Activity assessment daily.  - Set and communicate daily mobility goal to care team and patient/family/caregiver.   - Collaborate with rehabilitation services on mobility goals if consulted  - Perform Range of Motion  times a day.  - Reposition patient every  hours.  - Dangle patient  times a day  - Stand patient  times a day  - Ambulate patient  times a day  - Out of bed to chair  times a day   - Out of bed for meals times a day  - Out of bed for toileting  - Record patient progress and toleration of activity level   Outcome: Progressing     Problem: DISCHARGE PLANNING  Goal: Discharge to home or other facility with appropriate resources  Description: INTERVENTIONS:  - Identify barriers to discharge w/patient and caregiver  - Arrange for needed discharge resources and transportation as appropriate  - Identify discharge learning needs (meds, wound care, etc.)  - Arrange for interpretive services to assist at discharge as needed  - Refer to Case Management Department for coordinating discharge planning if the patient needs post-hospital services based on physician/advanced practitioner order or complex needs related to functional  status, cognitive ability, or social support system  Outcome: Progressing     Problem: Knowledge Deficit  Goal: Patient/family/caregiver demonstrates understanding of disease process, treatment plan, medications, and discharge instructions  Description: Complete learning assessment and assess knowledge base.  Interventions:  - Provide teaching at level of understanding  - Provide teaching via preferred learning methods  Outcome: Progressing     Problem: Nutrition/Hydration-ADULT  Goal: Nutrient/Hydration intake appropriate for improving, restoring or maintaining nutritional needs  Description: Monitor and assess patient's nutrition/hydration status for malnutrition. Collaborate with interdisciplinary team and initiate plan and interventions as ordered.  Monitor patient's weight and dietary intake as ordered or per policy. Utilize nutrition screening tool and intervene as necessary. Determine patient's food preferences and provide high-protein, high-caloric foods as appropriate.     INTERVENTIONS:  - Monitor oral intake, urinary output, labs, and treatment plans  - Assess nutrition and hydration status and recommend course of action  - Evaluate amount of meals eaten  - Assist patient with eating if necessary   - Allow adequate time for meals  - Recommend/ encourage appropriate diets, oral nutritional supplements, and vitamin/mineral supplements  - Order, calculate, and assess calorie counts as needed  - Recommend, monitor, and adjust tube feedings and TPN/PPN based on assessed needs  - Assess need for intravenous fluids  - Provide specific nutrition/hydration education as appropriate  - Include patient/family/caregiver in decisions related to nutrition  Outcome: Progressing

## 2024-10-10 NOTE — UTILIZATION REVIEW
Continued Stay Review    Date: 10/10                          Current Patient Class: Inpatient  Current Level of Care: Level 1 stepdown    HPI:64 y.o. male initially admitted on 09/09     Assessment/Plan: Pt was agitated and removed his EEG leads overnight but this was placed back on. Continue phenytoin 150 mg twice daily. Video EEG monitoring, started around 9 AM 10/9. Will attempt 48 hours to capture a spell. On sedation w/ precedex. Haldol prn. Trach on vent, Tidal volumes improved with VC+. Cont nebs. Cont po Lasix, maintain euvolemia. Cont to mon fluid status. Cont TF. Sputum w/ GNR, BC NGTD. MRSA negative. Vanco and Cefepime dc'd today.       Medications:   Scheduled Medications:  budesonide, 0.5 mg, Nebulization, Q12H  chlorhexidine, 15 mL, Mouth/Throat, Q12H [place]  doxazosin, 2 mg, Per PEG Tube, Daily  [Held by provider] furosemide, 40 mg, Per PEG Tube, Daily  gabapentin, 400 mg, Per NG Tube, BID  ipratropium, 0.5 mg, Nebulization, TID  levalbuterol, 1.25 mg, Nebulization, TID  levothyroxine, 250 mcg, Per PEG Tube, Early Morning  melatonin, 6 mg, Per PEG Tube, HS  metoprolol tartrate, 12.5 mg, Per PEG Tube, Q12H [place]  [START ON 10/19/2024] nicotine, 14 mg, Transdermal, Daily  nicotine, 1 patch, Transdermal, Daily  [START ON 11/2/2024] nicotine, 7 mg, Transdermal, Daily  oxyCODONE, 2.5 mg, Per PEG Tube, Q6H  phenytoin, 150 mg, Per PEG Tube, Q12H [place]  polyethylene glycol, 17 g, Per PEG Tube, BID  QUEtiapine, 100 mg, Oral, HS  QUEtiapine, 50 mg, Oral, Daily  senna, 8.8 mg, Per G Tube, HS  sodium chloride, 4 mL, Nebulization, TID      Continuous IV Infusions:  dexmedetomidine, 0.1-0.7 mcg/kg/hr, Intravenous, Titrated  heparin (porcine) 25,000 units in 0.45% NaCl 250 mL infusion (premix)  Rate: 2-13 mL/hr Dose: 3-20 Units/kg/hr  Weight Dosing Info: 65 kg (Order-Specific)  Freq: Titrated Route: IV  Last Dose: Stopped (10/10/24 1213)  Start: 10/08/24 1215 End: 10/10/24 1158    PRN Meds:  albuterol, 2.5 mg,  Nebulization, Q6H PRN  bisacodyl, 10 mg, Rectal, Daily PRN  haloperidol, 1 mg, Per PEG Tube, Q6H PRN  lidocaine, , Topical, 4x Daily PRN  oxyCODONE, 5 mg, Per PEG Tube, Q6H PRN      Discharge Plan: TBD    Vital Signs (last 3 days)       Date/Time Temp Pulse Resp BP MAP (mmHg) SpO2 FiO2 (%) O2 Device Patient Position - Orthostatic VS Alecia Coma Scale Score Pain    10/10/24 1215 97.7 °F (36.5 °C) -- 14 -- -- -- -- -- Lying -- --    10/10/24 0801 -- -- -- -- -- -- -- -- -- 15 --    10/10/24 0800 98.3 °F (36.8 °C) -- -- 127/52 -- -- -- -- Lying -- No Pain    10/10/24 0400 -- -- -- -- -- -- -- -- -- 15 --    10/10/24 0250 98.2 °F (36.8 °C) 80 31 154/49 69 100 % -- -- -- -- --    10/10/24 0000 -- -- -- -- -- -- -- -- -- 15 --    10/09/24 2245 99 °F (37.2 °C) 82 14 116/44 74 97 % -- -- -- -- --    10/09/24 2000 -- -- -- -- -- -- -- -- -- 15 No Pain    10/09/24 1600 -- -- -- -- -- -- -- -- -- 15 --    10/09/24 1500 98.9 °F (37.2 °C) 82 36 166/50 81 89 % -- -- -- -- --    10/09/24 1211 -- -- -- -- -- -- -- -- -- 15 --    10/09/24 1000 -- 70 18 132/45 71 100 % -- -- -- -- --    10/09/24 0800 -- 82 31 121/46 72 100 % -- -- -- -- --    10/09/24 0730 -- -- -- -- -- -- -- -- -- 15 No Pain    10/09/24 0700 99.1 °F (37.3 °C) -- -- -- -- -- -- -- Lying -- --    10/09/24 0600 -- 76 26 118/51 68 99 % -- -- -- -- --    10/09/24 0400 -- -- -- -- -- -- -- -- -- 15 --    10/09/24 0240 98.4 °F (36.9 °C) 72 19 142/63 90 100 % -- -- -- -- --    10/09/24 0030 -- 72 15 114/46 69 100 % -- -- -- -- --    10/09/24 0015 -- 74 16 122/49 78 100 % -- -- -- -- --    10/09/24 0002 -- -- -- -- -- -- -- -- -- -- Med Not Given for Pain - for MAR use only    10/09/24 0000 -- 86 31 110/44 68 100 % -- -- -- 15 --    10/08/24 2330 -- 74 13 112/42 70 100 % -- -- -- -- --    10/08/24 2300 98.6 °F (37 °C) -- -- -- -- -- -- -- -- -- --    10/08/24 2000 -- -- -- -- -- -- -- -- -- 15 No Pain    10/08/24 1925 98.8 °F (37.1 °C) 74 26 120/51 84 100 % -- -- -- -- --     10/08/24 1833 -- -- -- -- -- -- -- -- -- -- 5    10/08/24 1700 -- 82 27 124/51 61 100 % -- -- -- 15 --    10/08/24 1630 -- 82 26 119/60 75 100 % -- -- -- -- --    10/08/24 1543 98.3 °F (36.8 °C) 78 32 113/49 85 100 % -- -- Lying -- --    10/08/24 1511 -- -- -- -- -- -- 50 Ventilator -- -- --    10/08/24 1300 -- -- -- -- -- -- -- -- -- 15 4    10/08/24 1130 -- 92 34 154/56 86 98 % -- -- -- -- --    10/08/24 1100 -- -- -- -- -- 98 % 50 Ventilator -- 13 --    10/08/24 0942 -- -- -- -- -- -- -- -- -- -- 5    10/08/24 0830 -- 94 40 160/60 111 96 % -- -- -- -- --    10/08/24 0815 -- -- -- -- -- -- -- -- -- 15 --    10/08/24 0800 -- 92 38 156/76 117 97 % -- -- -- -- --    10/08/24 0745 -- -- -- -- -- 98 % 50 Ventilator -- -- --    10/08/24 0730 -- 86 38 169/66 99 91 % -- -- -- -- --    10/08/24 0700 98.3 °F (36.8 °C) 82 32 121/71 104 98 % -- -- Lying -- --    10/08/24 0636 -- 76 15 102/45 68 100 % -- -- -- -- --    10/08/24 0610 -- 84 22 126/66 109 98 % -- -- -- -- --    10/08/24 0600 -- 84 33 110/45 66 98 % -- -- -- -- --    10/08/24 0550 -- 82 29 124/58 76 99 % -- -- -- -- --    10/08/24 0540 -- 88 34 159/62 111 98 % -- -- -- -- --    10/08/24 0530 -- 82 29 124/57 88 99 % -- -- -- -- --    10/08/24 0520 -- 80 29 132/57 79 100 % -- -- -- -- --    10/08/24 0422 -- -- -- -- -- 100 % -- -- -- -- --    10/08/24 0420 -- 66 17 92/38 64 100 % -- -- -- -- --    10/08/24 0410 -- 68 13 88/37 55 100 % -- -- -- -- --    10/08/24 0400 -- 68 12 96/38 54 100 % -- -- -- 15 --    10/08/24 0350 -- 68 19 98/39 66 100 % -- -- -- -- --    10/08/24 0340 -- 68 9 92/35 56 100 % -- -- -- -- --    10/08/24 0330 -- 68 12 92/37 62 100 % -- -- -- -- --    10/08/24 0320 99 °F (37.2 °C) 70 10 97/42 61 100 % -- -- -- -- --    10/08/24 0310 -- 82 25 113/51 77 99 % -- -- -- -- --    10/08/24 0300 -- 66 13 93/43 62 100 % -- -- -- -- --    10/08/24 0250 -- 68 12 87/39 60 100 % -- -- -- -- --    10/08/24 0240 -- 66 12 96/40 60 100 % -- -- -- -- --     10/08/24 0229 -- 66 10 85/39 55 100 % -- -- -- -- --    10/08/24 0215 -- 66 12 84/38 54 100 % -- -- -- -- --    10/08/24 0200 -- 66 8 102/42 75 100 % -- -- -- -- --    10/08/24 0155 -- 68 11 80/44 56 100 % -- -- -- -- --    10/08/24 0140 -- 70 14 97/38 56 100 % -- -- -- -- --    10/08/24 0130 -- 76 18 104/43 64 100 % -- -- -- -- --    10/08/24 0115 -- 76 37 91/37 53 100 % -- -- -- -- --    10/08/24 0100 -- 86 32 113/50 64 98 % -- -- -- -- --    10/08/24 0045 -- 88 35 132/54 91 100 % -- -- -- -- --    10/08/24 0030 -- 92 36 142/82 103 97 % -- -- -- -- --    10/08/24 0000 -- -- -- -- -- -- -- -- -- 15 --    10/07/24 2358 -- -- -- -- -- 94 % -- -- -- -- --    10/07/24 2355 -- 86 42 142/61 92 95 % -- -- -- -- --    10/07/24 2340 98.5 °F (36.9 °C) 74 33 117/50 70 98 % -- -- -- -- --    10/07/24 2330 -- 74 30 110/47 72 99 % -- -- -- -- --    10/07/24 2326 -- 64 29 98/46 62 100 % -- -- -- -- --    10/07/24 2315 -- 64 25 87/38 52 100 % -- -- -- -- --    10/07/24 2313 -- 66 24 89/38 60 100 % -- -- -- -- --    10/07/24 2303 -- 66 24 85/34 55 100 % -- -- -- -- --    10/07/24 2253 -- 72 25 91/38 54 100 % -- -- -- -- --    10/07/24 2133 -- -- -- 125/49 -- -- -- -- -- -- --    10/07/24 2027 -- -- -- -- -- 98 % -- -- -- -- --    10/07/24 2000 -- -- -- -- -- -- -- -- -- 15 No Pain    10/07/24 1950 98 °F (36.7 °C) 88 31 128/51 74 99 % -- -- -- -- --    10/07/24 1930 98 °F (36.7 °C) 88 16 128/51 -- -- -- -- -- -- --    10/07/24 1835 -- 80 37 112/47 67 100 % -- -- -- -- --    10/07/24 1800 -- 82 28 117/43 67 100 % -- -- -- -- --    10/07/24 1735 99.2 °F (37.3 °C) 80 24 119/40 61 100 % -- -- -- -- --    10/07/24 1600 -- -- -- -- -- -- -- -- -- 15 --    10/07/24 1535 98.5 °F (36.9 °C) 76 17 104/52 67 100 % -- -- Lying -- --    10/07/24 1416 -- -- -- -- -- -- 50 Ventilator -- -- --    10/07/24 1200 -- -- -- -- -- -- -- -- -- 15 --    10/07/24 1130 98 °F (36.7 °C) 74 36 107/41 72 100 % -- -- Lying -- --    10/07/24 0928 -- -- -- -- -- 98  % -- -- -- -- --    10/07/24 0815 -- -- -- -- -- -- -- -- -- 15 No Pain    10/07/24 0738 -- -- -- -- -- -- 50 Ventilator -- -- --    10/07/24 0730 97.9 °F (36.6 °C) 84 28 142/58 81 100 % -- -- Lying -- --    10/07/24 0400 -- -- -- -- -- -- -- -- -- 15 --    10/07/24 0340 98.3 °F (36.8 °C) 74 14 118/45 79 100 % -- -- -- -- --    10/07/24 0335 -- -- -- -- -- 98 % -- -- -- -- --    10/07/24 0213 -- -- -- -- -- 92 % -- -- -- -- --    10/07/24 0203 -- -- -- -- -- 98 % -- -- -- -- --    10/07/24 0000 -- -- -- -- -- -- -- -- -- 15 No Pain          Weight (last 2 days)       Date/Time Weight    10/10/24 0543 68 (150)    10/09/24 0556 68 (150)     Weight: Simultaneous filing. User may not have seen previous data. at 10/09/24 0556    10/08/24 0531 67 (147.71)            Pertinent Labs/Diagnostic Results:   Radiology:  XR chest portable   Final Interpretation by Rj Perdomo MD (10/09 0856)      1.  Increasing left-sided airspace disease, pneumonia versus pulmonary edema.      2.  Otherwise stable chest with right upper lung opacity and bilateral pleural effusions.            Workstation performed: [id number]         CT soft tissue neck wo contrast   Final Interpretation by E. Alec Schoenberger, MD (10/08 1116)      Tracheostomy in place. No adjacent collection but evaluation is limited due to lack of IV contrast. No mass or adenopathy in the neck.               Workstation performed: [id number]         CT chest wo contrast   Final Interpretation by Xavier Fall MD (10/08 1119)      1.  Moderate to large left and small right pleural effusions, with bibasilar atelectasis. This is superimposed on advanced centrilobular emphysema and diffuse pleural parenchymal scarring throughout the right lung. The pleural effusions have increased    since the most recent prior study.   2.  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   3.  Cholelithiasis.   4.  Moderate anasarca.               Workstation performed:  [id number]         XR chest portable ICU   Final Interpretation by Jorge Valladares MD (10/07 0852)      Unchanged diffuse airspace opacities and small bilateral pleural effusions.            Workstation performed: [id number]         XR chest portable   Final Interpretation by Jorge Valladares MD (10/07 0850)      Unchanged diffuse airspace opacities and small bilateral pleural effusions.            Workstation performed: [id number]         XR chest portable   Final Interpretation by Ross Melendez MD (10/05 0824)      Extensive bilateral airspace disease and bilateral pleural effusions.            Workstation performed: [id number]         CT head without contrast   Final Interpretation by Mau Saab MD (09/30 2324)      No acute intracranial hemorrhage, midline shift, or mass effect. Chronic small vessel ischemic changes and chronic left caudate/basal ganglia infarct.                  Workstation performed: [id number]         XR chest portable ICU   Final Interpretation by Vargas Oconnor MD (09/25 1027)      Persistent bilateral infiltrates and dense opacity at the right apex and pleural effusions without significant change from prior            Workstation performed: [id number]         XR chest portable ICU   Final Interpretation by Nieves Burks MD (09/22 1026)      Tracheostomy projecting over the trachea.      No change in extensive bilateral consolidation.      Question pleural effusions.            Workstation performed: [id number]         XR chest portable   Final Interpretation by Bonnie Castillo MD (09/19 2208)      Tracheostomy tube in adequate position.      Increasing opacity in the left lower lung, which may represent pneumonia or an enlarging left pleural effusion.      Unchanged appearance of the right hemithorax, as detailed above.                  Workstation performed: [id number]         XR chest portable ICU   Final Interpretation by Xavier Fall MD (09/16 1627)       Improved aeration of the right lung, with persistent right hemithoracic volume loss and dense right apical consolidation. There is a also superimposed right pleural effusion and diffuse interstitial lung disease.            Workstation performed: [id number]         XR chest portable ICU   Final Interpretation by Sd Lamb MD (09/14 1344)      1.  Tip of endotracheal tube 3.5 cm above the chery.      2.  Progressive atelectasis of the right lung since 9/10/2024 with further right-sided mediastinal shift.      3.  Small left pleural effusion and moderate size right effusion, increased in size since 9/10/2024.      4.  Pulmonary vascular congestion.            Workstation performed: [id number]         XR chest portable ICU   Final Interpretation by Juanito Lee DO (09/11 0231)      Large right and trace left pleural effusion.            Workstation performed: [id number]         XR chest portable   Final Interpretation by Matt Russ MD (09/09 0949)      Tubes and lines in satisfactory position. No pneumothorax.      Unchanged interstitial edema and small right pleural effusion.      Resident: John Blake I, the attending radiologist, have reviewed the images and agree with the final report above.      Workstation performed: [id number]         XR chest portable    (Results Pending)     Cardiology:  ECG 12 lead   Final Result by Effie Davis MD (10/10 0041)   Undetermined rhythm   Cannot rule out Atrial fibrillation   Technically poor tracing   Septal infarct , age undetermined   Possible Lateral infarct , age undetermined   Abnormal ECG   Confirmed by Effie Davis ([id number]) on 10/10/2024 12:41:44 AM      ECG 12 lead   Final Result by Augustina Mora MD (10/06 0825)   Normal sinus rhythm with 1st degree A-V block with occasional Premature    ventricular complexes   Left axis deviation   ST & T wave abnormality, consider anterolateral ischemia   Abnormal ECG   When compared with  ECG of 01-OCT-2024 11:17,   Premature ectopic complexes are now Present   Non-specific change in ST segment in Inferior leads   T wave inversion no longer evident in Inferior leads   Confirmed by Augustina Mora ([id number]) on 10/6/2024 8:25:40 AM      ECG 12 lead   Final Result by Chris Trujillo MD (10/02 0700)   Normal sinus rhythm with 1st degree A-V block   Nonspecific ST and T wave abnormality   Abnormal ECG   When compared with ECG of 25-SEP-2024 00:40,   No significant change was found   Confirmed by Chris Trujillo ([id number]) on 10/2/2024 7:00:46 AM      ECG 12 lead   Final Result by Vargas Joiner DO (09/26 0748)   Sinus rhythm with 1st degree A-V block   T wave abnormality, consider inferior ischemia   Abnormal ECG   When compared with ECG of 23-SEP-2024 12:27,   Fusion complexes are no longer Present   Confirmed by Vargas Joiner ([id number]) on 9/26/2024 7:48:44 AM      ECG 12 lead   Final Result by Hedy Lopez MD (09/23 1438)   Sinus rhythm with 1st degree A-V block with Fusion complexes   T wave abnormality, consider inferior ischemia   Abnormal ECG   When compared with ECG of 16-SEP-2024 08:35,   Fusion complexes are now Present   WA interval has increased   ST no longer elevated in Anterior leads   Nonspecific T wave abnormality, worse in Anterior leads   Nonspecific T wave abnormality has replaced inverted T waves in Lateral    leads   Confirmed by Hedy Lopez ([id number]) on 9/23/2024 2:38:32 PM      Echo follow up/limited w/ contrast if indicated   Final Result by Eric Montiel MD (09/18 1447)        Left Ventricle: Left ventricular cavity size is normal. Wall thickness    is moderately increased. The left ventricular ejection fraction is 53%.    Systolic function is normal. Diastolic function is moderately abnormal,    consistent with grade II (pseudonormal) relaxation.     The following segments are hypokinetic: mid anteroseptal and mid    inferoseptal.     All other segments are normal.      IVS: There is diastolic flattening of the interventricular septum    consistent with right ventricle volume overload.     Aorta: The aortic root is severely dilated.     Pericardium: There is a moderate pericardial effusion circumferential    to the heart. The fluid exhibits no internal echoes. The largest diameter    measures 1.9 cm. There is no echocardiographic evidence of tamponade. The    evidence against tamponade includes: no right ventricular diastolic    collapse, no right atrial inversion and no respiratory variation. There is    a large left pleural effusion.     Prior TTE study available for comparison. Prior study date: 9/11/2024.    Changes noted when compared to prior study. Changes include: Pericardial    effusion seems to be slightly reduced in size. .         ECG 12 lead   Final Result by Bernardo Wallace MD (09/16 1254)   Sinus tachycardia   Low voltage QRS   ST & T wave abnormality, consider lateral ischemia   Abnormal ECG   Reconfirmed by Bernardo Wallace ([id number]) on 9/16/2024 12:54:36 PM      ECG 12 lead   Final Result by Dioni Silva MD (09/13 0914)   Normal sinus rhythm   T wave abnormality, consider inferolateral ischemia   Abnormal ECG   When compared with ECG of 11-SEP-2024 00:45,   No significant change was found   Confirmed by Dioni Silva ([id number]) on 9/13/2024 9:14:13 AM      Echo follow up/limited w/ contrast if indicated   Final Result by Vargas Joiner DO (09/11 1200)        Left Ventricle: Left ventricular cavity size is normal. Wall thickness    is normal. The left ventricular ejection fraction is 55%. Systolic    function is normal.     The following segments are hypokinetic: apex.     All other segments are normal.     Aortic Valve: There is moderate to severe regurgitation with a    centrally directed jet.     Tricuspid Valve: There is mild regurgitation.     Aorta: The aortic root is normal in size. The ascending aorta is    severely dilated. The ascending aorta is 5.4  cm. There is an aneurysm in    the ascending aorta.     Pericardium: There is a moderate pericardial effusion circumferential    to the heart. There is no echocardiographic evidence of tamponade. There    is a left pleural effusion.         ECG 12 lead   Final Result by Dioni Silva MD (09/11 0805)   Normal sinus rhythm   Incomplete left bundle branch block   T wave abnormality, consider inferolateral ischemia   Abnormal ECG   When compared with ECG of 10-SEP-2024 12:11,   Premature supraventricular complexes are no longer Present      Confirmed by Dioni Silva ([id number]) on 9/11/2024 8:05:10 AM      ECG 12 lead   Final Result by Felicita Nicholas MD (09/10 1556)   Sinus rhythm with 1st degree A-V block with Premature supraventricular    complexes   Incomplete left bundle branch block   ST & T wave abnormality, consider lateral ischemia   Abnormal ECG   When compared with ECG of 08-SEP-2024 19:56,   Premature supraventricular complexes are now Present      Confirmed by Felicita Nicholas ([id number]) on 9/10/2024 3:56:28 PM      Echo follow up/limited w/ contrast if indicated   Final Result by Aura Dimas DO (09/09 1212)        Left Ventricle: Left ventricular cavity size is normal. Wall thickness    is normal. The left ventricular ejection fraction is 50%. Systolic    function is low normal. There is global hypokinesis with regional    variation.     Right Ventricle: Right ventricular cavity size is normal. Systolic    function is normal.     Aortic Valve: There is moderate to severe regurgitation.     Pericardium: There is a moderate to large pericardial effusion    circumferential to the heart measuring largest along the RV free wall at    2.3 cm. The fluid exhibits no internal echoes. There is no    echocardiographic evidence of tamponade. The evidence against tamponade    includes: no right ventricular diastolic collapse, no right atrial    inversion and no respiratory variation. There is a left pleural effusion.         ECG  12 lead   Final Result by Effie Davis MD (09/09 0103)   Normal sinus rhythm   Technically poor tracing       Poor data quality, interpretation may be adversely affected      Confirmed by Effie Davis ([id number]) on 9/9/2024 1:03:05 AM        GI:  Bronchoscopy   Final Result by Amos Powell DO (09/13 0214)   Right main stem mucous plug   Friable mucosa over right and left lung       RECOMMENDATION:   Continue mechanical ventilation, add on chest percussion therapy for    airway clearance         Bronchoscopy   Final Result by Romy Hill MD (09/14 1232)   Moderate friable mucosa in the trachea, main chery, left lung and right    lung   Bloody mucus plugs with 51-75% obstruction removed with suction from the    bronchus intermedius and RLL         RECOMMENDATION:   Continue monitoring in the ICU          I was the supervising physician and present for the entire procedure on    9/13/2024      There was mucous plugging of the right lower lobe present along with some    bloody secretions causing mild to moderate obstruction of the left    mainstem.  Both were suctioned and cleared.      Romy Hill MD         Bronchoscopy   Final Result by Romy Hill MD (09/12 0879)   Excessive, thin and brown secretions present in all observed locations,    including the trachea, main chery, left lung and right lung   Bloody mucus plugs with greater than 75% obstruction removed with suction    from the bronchus intermedius and RML   Friable mucosa in the left lung and right lung         RECOMMENDATION:   Continue monitoring in the ICU   Follow up results of washing culture, gram stain and cytology             I was the supervising physician and present for the entire procedure on    09/11/2024. I also participated in the procedure.       Severe mucus plugging in the RLL with brownship/bloody secretions.    Suctioned and cleared. Will send for culture and cytology.       Romy Hill MD                 Results from last 7  "days   Lab Units 10/10/24  0527 10/09/24  1018 10/08/24  0512 10/07/24  2336 10/07/24  2202   WBC Thousand/uL 7.32 9.05 7.85  --  7.96   HEMOGLOBIN g/dL 7.5* 8.0* 8.4*  --  7.4*   I STAT HEMOGLOBIN g/dl  --   --   --  8.5*  --    HEMATOCRIT % 24.4* 25.2* 26.0*  --  23.2*   HEMATOCRIT, ISTAT %  --   --   --  25*  --    PLATELETS Thousands/uL 223 205 235  --  226   TOTAL NEUT ABS Thousands/µL 5.34 7.03 5.86  --  6.28     Results from last 7 days   Lab Units 10/07/24  2202   RETIC CT ABS  38,000   RETIC CT PCT % 1.66     Results from last 7 days   Lab Units 10/10/24  0527 10/09/24  0525 10/08/24  0512 10/07/24  2336 10/07/24  0614 10/05/24  2223   SODIUM mmol/L 142 143 143  --  141 137   POTASSIUM mmol/L 4.3 4.2 4.6  --  4.3 4.5   CHLORIDE mmol/L 100 100 101  --  99 96   CO2 mmol/L 35* 38* 36*  --  37* 35*   CO2, I-STAT mmol/L  --   --   --  39*  --   --    ANION GAP mmol/L 7 5 6  --  5 6   BUN mg/dL 29* 30* 24  --  27* 26*   CREATININE mg/dL 0.62 0.58* 0.49*  --  0.41* 0.47*   EGFR ml/min/1.73sq m 104 107 115  --  124 117   CALCIUM mg/dL 8.8 8.4 8.5  --  8.6 8.5   CALCIUM, IONIZED mmol/L 1.14 1.13 1.11*  --  1.13  --    CALCIUM, IONIZED, ISTAT mmol/L  --   --   --  1.21  --   --    MAGNESIUM mg/dL 2.0 2.0 2.3  --  1.9 1.8*   PHOSPHORUS mg/dL 3.1 3.3 3.9  --  3.2 3.7     Results from last 7 days   Lab Units 10/08/24  0512 10/07/24  0614 10/04/24  0506   AST U/L 18 17 16   ALT U/L 10 10 10   ALK PHOS U/L 131* 126* 93   TOTAL PROTEIN g/dL 7.0 6.6 6.7   ALBUMIN g/dL 3.0* 2.7* 2.7*   TOTAL BILIRUBIN mg/dL 0.79 0.45 0.59     Results from last 7 days   Lab Units 10/07/24  2201 10/05/24  2149   POC GLUCOSE mg/dl 127 158*     Results from last 7 days   Lab Units 10/10/24  0527 10/09/24  0525 10/08/24  0512 10/07/24  0614 10/05/24  2223 10/04/24  0506   GLUCOSE RANDOM mg/dL 113 117 113 157* 210* 100             No results found for: \"BETA-HYDROXYBUTYRATE\"   Results from last 7 days   Lab Units 10/09/24  0441   PH ART  7.315* "   PCO2 ART mm Hg 77.9*   PO2 ART mm Hg 124.9   HCO3 ART mmol/L 38.8*   BASE EXC ART mmol/L 10.8   O2 CONTENT ART mL/dL 11.9*   O2 HGB, ARTERIAL % 97.4*     Results from last 7 days   Lab Units 10/10/24  0527 10/09/24  1018 10/07/24  1255   PH RADHA  7.333 7.297* 7.578*   PCO2 RADHA mm Hg 66.3* 83.3* 36.0*   PO2 RADHA mm Hg 39.4 46.5* 175.7*   HCO3 RADHA mmol/L 34.4* 39.8* 32.8*   BASE EXC RADHA mmol/L 6.3 11.3 10.1   O2 CONTENT RADHA ml/dL 13.4 9.7 10.5   O2 HGB, VENOUS % 69.9 78.8 95.4*     Results from last 7 days   Lab Units 10/07/24  2336   I STAT BASE EXC mmol/L 11*   I STAT O2 SAT % 97*   ISTAT PH ART  7.371   I STAT ART PCO2 mm HG 64.6*   I STAT ART PO2 mm HG 93.0   I STAT ART HCO3 mmol/L 37.4*                 Results from last 7 days   Lab Units 10/10/24  1007 10/10/24  0105 10/09/24  1729 10/09/24  1018 10/09/24  0525   PROTIME seconds  --   --   --   --  16.0*   INR   --   --   --   --  1.25*   PTT seconds 66* 63* 55*   < >  --     < > = values in this interval not displayed.         Results from last 7 days   Lab Units 10/07/24  0614   PROCALCITONIN ng/ml 0.22     Results from last 7 days   Lab Units 10/07/24  2336   LACTIC ACID mmol/L 0.5                 Results from last 7 days   Lab Units 10/07/24  0614   FERRITIN ng/mL 461*   IRON SATURATION % 22   IRON ug/dL 28*   TIBC ug/dL 125*         Results from last 7 days   Lab Units 10/08/24  0555   UNIT PRODUCT CODE  [id number]   UNIT NUMBER  [id number]   UNITABO  O   UNITRH  NEG   CROSSMATCH  Compatible   UNIT DISPENSE STATUS  Presumed Trans   UNIT PRODUCT VOL mL 350                         Results from last 7 days   Lab Units 10/08/24  0512   CLARITY UA  Turbid   COLOR UA  Yellow   SPEC GRAV UA  1.018   PH UA  5.0   GLUCOSE UA mg/dl Negative   KETONES UA mg/dl Negative   BLOOD UA  Negative   PROTEIN UA mg/dl Trace*   NITRITE UA  Negative   BILIRUBIN UA  Negative   UROBILINOGEN UA (BE) mg/dl <2.0   LEUKOCYTES UA  Large*   WBC UA /hpf Innumerable*   RBC UA /hpf None  Seen   BACTERIA UA /hpf Occasional   EPITHELIAL CELLS WET PREP /hpf None Seen   MUCUS THREADS  Occasional*                                 Results from last 7 days   Lab Units 10/08/24  0418 10/07/24  2336   BLOOD CULTURE   --  No Growth at 48 hrs.  No Growth at 48 hrs.   SPUTUM CULTURE  4+ Growth of Acinetobacter baumannii*  --    GRAM STAIN RESULT  1+ Polys*  1+ Gram negative rods*  --              Results from last 7 days   Lab Units 10/08/24  0512   VANCOMYCIN RM ug/mL 18.9       Network Utilization Review Department  ATTENTION: Please call with any questions or concerns to [phone] and carefully listen to the prompts so that you are directed to the right person. All voicemails are confidential.   For Discharge needs, contact Care Management DC Support Team at [phone] opt. 2  Send all requests for admission clinical reviews, approved or denied determinations and any other requests to dedicated fax number below belonging to the [facility] where the patient is receiving treatment. List of dedicated fax numbers for the Facilities:  FACILITY NAME UR FAX NUMBER   ADMISSION DENIALS (Administrative/Medical Necessity) [phone]   DISCHARGE SUPPORT TEAM (NETWORK) [phone]   PARENT CHILD HEALTH (Maternity/NICU/Pediatrics) [phone]   [facility] [phone]   [facility] [phone]   [facility] [phone]   [facility] [phone]   [facility] [phone]   [facility] [phone]   [facility] [phone]   [facility] [phone]   [facility] [phone]   [facility] [phone]   [facility] [phone]   [facility] [phone]

## 2024-10-10 NOTE — ASSESSMENT & PLAN NOTE
S/p cardiac arrest soon after initial admission at [facility].thought to initially be secondary to circumferential pericardial effusion, as well as acute hypoxia and hypercarbia from respiratory failure.  Of note, no intervention for the effusion indicated at this time by CT surgery with a follow-up TTE discussed after discharge.  Patient also carries diagnosis of chronic combined systolic and diastolic heart failure and atrial fibrillation

## 2024-10-10 NOTE — ASSESSMENT & PLAN NOTE
"Capacity:  Patient not competent on today's encounter    GOC:  Without full capacity, medical decisions revert to his legally appointed guardian, Tere Williamson ([phone]). Tere went on to say it was okay to speak with pt's sister, Katalina, about pt's medical state.      ACP:  None on file     Disposition:  Case management working on LTAC placement. Ongoing discussions w/ [facility] legal consultants, per chart review, \"patient's sister is next of kin and legally allowed to make medical decisions due to patient's guardian being unavailable/unresponsive, however due to financial constraints, disposition must be discussed with guardian.\"  Per CM note, [facility] received [facility]  Patient will need to be off IV Precedex     Follow-up:  Palliative service will continue to follow Marcin and coordinate medical decision making apparatus with help of legal consult service.    Care Coordination:  Reviewed case with: RN, 1:1 PCA     PDMP Review: I have reviewed the patient's controlled substance dispensing history in the Prescription Drug Monitoring Program in compliance with the [place] regulations before prescribing any controlled substances.  "

## 2024-10-10 NOTE — CASE MANAGEMENT
Case Management Discharge Planning Note    Patient name Marcin Sánchez  [facility]  MRN [id number]  : 1960 Date 10/10/2024       Current Admission Date: 2024  Current Admission Diagnosis:Acute hypoxic respiratory failure (HCC)   Patient Active Problem List    Diagnosis Date Noted Date Diagnosed    Seizure (HCC) 10/09/2024     Insomnia 10/03/2024     Agitation 10/03/2024     Palliative care encounter 10/01/2024     Acute hypoxic respiratory failure (HCC) 2024     Shock (HCC) 2024     Mucus plugging of bronchi 2024     Transaminitis 2024     Cardiac arrest (HCC) 2024     Pericardial effusion 2024     Acute on chronic respiratory failure with hypoxia and hypercapnia (HCC) 2024     Acute metabolic encephalopathy 2024     Chronic combined systolic and diastolic CHF (congestive heart failure) (HCC) 2024     Atrial fibrillation (HCC) 2024     Pulmonary fibrosis (HCC) 2024     Suspected chronic pulmonary embolism (HCC) 2024     Squamous cell lung cancer (HCC) 2024     Hyponatremia 2024     Severe protein-calorie malnutrition (HCC) 2024     Edema of both legs 2022     Tobacco abuse 2020     Nonrheumatic aortic valve insufficiency 2020     Malignant neoplasm of upper lobe of right lung (HCC) 2018     Thoracic aortic aneurysm without rupture (HCC) 2018     Cancer of right main bronchus (HCC) 10/04/2016     Pleural effusion 10/02/2016     Multiple lung nodules on CT 10/02/2016     Collapse of right lung 2016     Acute exacerbation of chronic obstructive pulmonary disease (COPD) (HCC)      Epilepsy (HCC)      Lyme disease      Major depression      Alcohol abuse        LOS (days): 32  Geometric Mean LOS (GMLOS) (days): 5.1  Days to GMLOS:-26.6     OBJECTIVE:  Risk of Unplanned Readmission Score: 46.83         Current admission status: Inpatient   Preferred Pharmacy:    [facility] -  - J[facility]AL Clifford - [address],  [address],  [facility] [address]  [facility] [address]  Phone: [phone] Fax: [phone]    [facility] [place] [place] - [address]  [address] [address]  Phone: [phone] Fax: [phone]    Primary Care Provider: Brandt Godinez MD    Primary Insurance: [facility]  Secondary Insurance:     DISCHARGE DETAILS:    CM informed by [facility] that Single Case Agreement was received. Patient is currently on IV Precedex. Patient will have to be off IV pressors to be able to go to LTACH. Patient will also have to be 1-1. [facility] is able to accept patient with 2 wrist restraints. CM updated medical team regarding these things.       CM called patient's guardian, Tere, to provide update. CM left message for call back.

## 2024-10-10 NOTE — PROGRESS NOTES
NEUROLOGY RESIDENCY PROGRESS NOTE     Name: Marcin Sánchez   Age & Sex: 64 y.o. male   MRN: [id number]  Unit/Bed#: [facility] 205-01   Encounter: [id number]    Marcin Sánchez will need follow up in in 6 weeks with epilepsy attending/AP .  He will not require outpatient neurological testing.    Staff message sent.      Pending for discharge: vEEG    ASSESSMENT & PLAN     Epilepsy ([facility])  Assessment & Plan  Patient has history of epilepsy, he is on Dilantin 150 mg twice daily and gabapentin 400 mg twice daily.  Neurology has been consulted for episodes of staring spells and decreased responsiveness.  These lasted for about 1 to 2 minutes before patient slowly returns to baseline over 15 to 20 minutes.  Patient's phenytoin levels throughout his admission initially were supratherapeutic and then subtherapeutic without any indication of overt seizures.  Patient's prior seizure semiology is unknown, no notes from neurology are seen from care everywhere.    Patient had a CT head on 9/30/24 which showed the chronic left basal ganglia/caudate infarct without any acute findings.  He had a past video EEG during this admission which showed a left temporal epileptogenicity and moderate diffuse encephalopathy, this was done for 12 hours.    Impression: Patient's clinical picture may be consistent with seizure and considering patient's limited exam and history of seizures he would be appropriate to try to capture one of the spells on video EEG.  Will attempt 48 hours to capture a spell.  With patient's latest phenytoin level at therapeutic range there is low clinical suspicion for breakthrough seizure and we would not recommend adjusting his AEDs at this time.    Plan:  Continue phenytoin 150 mg twice daily  Video EEG monitoring, started around 9 AM 10/9  Seizure precautions  Stat CT head for any acute neurologic changes and please notify the neurology team            SUBJECTIVE     Patient was seen and examined. He was agitated  and removed his EEG leads overnight but this was placed back on.  Otherwise no events reported.        Review of Systems   Unable to perform ROS: Patient nonverbal       OBJECTIVE     Patient ID: Marcin Sánchez is a 64 y.o. male.    Vitals:    10/09/24 2245 10/10/24 0250 10/10/24 0543 10/10/24 0800   BP: (!) 116/44 (!) 154/49  127/52   BP Location:    Left arm   Pulse: 82 80     Resp: 14 (!) 31     Temp: 99 °F (37.2 °C) 98.2 °F (36.8 °C)  98.3 °F (36.8 °C)   TempSrc: Oral Axillary  Oral   SpO2: 97% 100%     Weight:   68 kg (150 lb)    Height:          Temperature:   Temp (24hrs), Av.6 °F (37 °C), Min:98.2 °F (36.8 °C), Max:99 °F (37.2 °C)    Temperature: 98.3 °F (36.8 °C)      Physical Exam  Constitutional:       Appearance: He is normal weight. He is ill-appearing.   HENT:      Mouth/Throat:      Mouth: Mucous membranes are dry.   Eyes:      Extraocular Movements: Extraocular movements intact and EOM normal.      Conjunctiva/sclera: Conjunctivae normal.      Pupils: Pupils are equal, round, and reactive to light.   Neurological:      Mental Status: He is alert.      Deep Tendon Reflexes:      Reflex Scores:       Tricep reflexes are 2+ on the right side and 2+ on the left side.       Bicep reflexes are 2+ on the right side and 2+ on the left side.       Brachioradialis reflexes are 2+ on the right side and 2+ on the left side.       Patellar reflexes are 2+ on the right side and 2+ on the left side.       Achilles reflexes are 2+ on the right side and 2+ on the left side.         Neurologic Exam     Mental Status   Speech: mute   Level of consciousness: alert  Unable to assess orientation.    Comprehension appears normal, he follows commands.     Cranial Nerves     CN II   Visual fields full to confrontation.     CN III, IV, VI   Pupils are equal, round, and reactive to light.  Extraocular motions are normal.     CN VII   Facial expression full, symmetric.     CN XI   Right trapezius strength: normal  Left  trapezius strength: normal    CN XII   Tongue: not atrophic  Tongue deviation: none    Motor Exam   Muscle bulk: decreased  Moves all 4 extremities on command.  Appears to have some decreased muscle bulk and deconditioning, unable to lift legs off bed however does move both feet.     Sensory Exam     Nods when asked if he feels light touch to all 4 extremities.     Gait, Coordination, and Reflexes     Tremor   Resting tremor: absent    Reflexes   Right brachioradialis: 2+  Left brachioradialis: 2+  Right biceps: 2+  Left biceps: 2+  Right triceps: 2+  Left triceps: 2+  Right patellar: 2+  Left patellar: 2+  Right achilles: 2+  Left achilles: 2+  Right plantar: normal  Left plantar: normal  Right Branham: present  Left Branham: present  Right ankle clonus: absent  Left ankle clonus: absent           LABORATORY DATA     Labs: I have personally reviewed pertinent reports.    Results from last 7 days   Lab Units 10/10/24  0527 10/09/24  1018 10/08/24  0512   WBC Thousand/uL 7.32 9.05 7.85   HEMOGLOBIN g/dL 7.5* 8.0* 8.4*   HEMATOCRIT % 24.4* 25.2* 26.0*   PLATELETS Thousands/uL 223 205 235   SEGS PCT % 72 77* 75   MONO PCT % 12 12 11   EOS PCT % 3 2 2      Results from last 7 days   Lab Units 10/10/24  0527 10/09/24  0525 10/08/24  0512 10/07/24  2336 10/07/24  0614 10/05/24  2223 10/04/24  0506   SODIUM mmol/L 142 143 143  --  141   < > 137   POTASSIUM mmol/L 4.3 4.2 4.6  --  4.3   < > 4.0   CHLORIDE mmol/L 100 100 101  --  99   < > 97   CO2 mmol/L 35* 38* 36*  --  37*   < > 35*   CO2, I-STAT mmol/L  --   --   --  39*  --   --   --    BUN mg/dL 29* 30* 24  --  27*   < > 21   CREATININE mg/dL 0.62 0.58* 0.49*  --  0.41*   < > 0.53*   CALCIUM mg/dL 8.8 8.4 8.5  --  8.6   < > 8.4   ALK PHOS U/L  --   --  131*  --  126*  --  93   ALT U/L  --   --  10  --  10  --  10   AST U/L  --   --  18  --  17  --  16   GLUCOSE, ISTAT mg/dl  --   --   --  137  --   --   --     < > = values in this interval not displayed.     Results  from last 7 days   Lab Units 10/10/24  0527 10/09/24  0525 10/08/24  0512   MAGNESIUM mg/dL 2.0 2.0 2.3     Results from last 7 days   Lab Units 10/10/24  0527 10/09/24  0525 10/08/24  0512   PHOSPHORUS mg/dL 3.1 3.3 3.9      Results from last 7 days   Lab Units 10/10/24  0105 10/09/24  1729 10/09/24  1018 10/09/24  0525   INR   --   --   --  1.25*   PTT seconds 63* 55* 49*  --      Results from last 7 days   Lab Units 10/07/24  2336   LACTIC ACID mmol/L 0.5           IMAGING & DIAGNOSTIC TESTING     Radiology Results: I have personally reviewed pertinent reports.      XR chest portable   Final Result by Rj Perdomo MD (10/09 0856)      1.  Increasing left-sided airspace disease, pneumonia versus pulmonary edema.      2.  Otherwise stable chest with right upper lung opacity and bilateral pleural effusions.            Workstation performed: [id number]         CT soft tissue neck wo contrast   Final Result by E. Alec Schoenberger, MD (10/08 1116)      Tracheostomy in place. No adjacent collection but evaluation is limited due to lack of IV contrast. No mass or adenopathy in the neck.               Workstation performed: [id number]         CT chest wo contrast   Final Result by Xavier Fall MD (10/08 1119)      1.  Moderate to large left and small right pleural effusions, with bibasilar atelectasis. This is superimposed on advanced centrilobular emphysema and diffuse pleural parenchymal scarring throughout the right lung. The pleural effusions have increased    since the most recent prior study.   2.  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   3.  Cholelithiasis.   4.  Moderate anasarca.               Workstation performed: [id number]         XR chest portable ICU   Final Result by Jorge Valladares MD (10/07 0852)      Unchanged diffuse airspace opacities and small bilateral pleural effusions.            Workstation performed: [id number]         XR chest portable   Final Result by Jorge Valladares  MD (10/07 0850)      Unchanged diffuse airspace opacities and small bilateral pleural effusions.            Workstation performed: [id number]         XR chest portable   Final Result by Ross Melendez MD (10/05 0824)      Extensive bilateral airspace disease and bilateral pleural effusions.            Workstation performed: [id number]         CT head without contrast   Final Result by Mau Saab MD (09/30 2324)      No acute intracranial hemorrhage, midline shift, or mass effect. Chronic small vessel ischemic changes and chronic left caudate/basal ganglia infarct.                  Workstation performed: [id number]         XR chest portable ICU   Final Result by Vargas Oconnor MD (09/25 1027)      Persistent bilateral infiltrates and dense opacity at the right apex and pleural effusions without significant change from prior            Workstation performed: [id number]         XR chest portable ICU   Final Result by Nieves Burks MD (09/22 1026)      Tracheostomy projecting over the trachea.      No change in extensive bilateral consolidation.      Question pleural effusions.            Workstation performed: [id number]         XR chest portable   Final Result by Bonnie Castillo MD (09/19 2208)      Tracheostomy tube in adequate position.      Increasing opacity in the left lower lung, which may represent pneumonia or an enlarging left pleural effusion.      Unchanged appearance of the right hemithorax, as detailed above.                  Workstation performed: [id number]         XR chest portable ICU   Final Result by Xavier Fall MD (09/16 1627)      Improved aeration of the right lung, with persistent right hemithoracic volume loss and dense right apical consolidation. There is a also superimposed right pleural effusion and diffuse interstitial lung disease.            Workstation performed: [id number]         XR chest portable ICU   Final Result by Sd Lamb MD (09/14  1344)      1.  Tip of endotracheal tube 3.5 cm above the chery.      2.  Progressive atelectasis of the right lung since 9/10/2024 with further right-sided mediastinal shift.      3.  Small left pleural effusion and moderate size right effusion, increased in size since 9/10/2024.      4.  Pulmonary vascular congestion.            Workstation performed: [id number]         XR chest portable ICU   Final Result by Juanito Lee DO (09/11 0231)      Large right and trace left pleural effusion.            Workstation performed: [id number]         XR chest portable   Final Result by Matt Russ MD (09/09 0949)      Tubes and lines in satisfactory position. No pneumothorax.      Unchanged interstitial edema and small right pleural effusion.      Resident: John Blake I, the attending radiologist, have reviewed the images and agree with the final report above.      Workstation performed: [id number]             Other Diagnostic Testing: I have personally reviewed pertinent reports.      ACTIVE MEDICATIONS       Current Facility-Administered Medications:     albuterol inhalation solution 2.5 mg, Q6H PRN    bisacodyl (DULCOLAX) rectal suppository 10 mg, Daily PRN    budesonide (PULMICORT) inhalation solution 0.5 mg, Q12H    chlorhexidine (PERIDEX) 0.12 % oral rinse 15 mL, Q12H [place]    dexmedeTOMIDine (Precedex) 400 mcg in sodium chloride 0.9% 100 mL, Titrated, Last Rate: 0.3 mcg/kg/hr (10/10/24 0811)    doxazosin (CARDURA) tablet 2 mg, Daily    [Held by provider] furosemide (LASIX) tablet 40 mg, Daily    gabapentin (NEURONTIN) capsule 400 mg, BID    haloperidol (HALDOL) oral concentrated solution 1 mg, Q6H PRN    heparin (porcine) 25,000 units in 0.45% NaCl 250 mL infusion (premix), Titrated, Last Rate: 18 Units/kg/hr (10/09/24 5117)    ipratropium (ATROVENT) 0.02 % inhalation solution 0.5 mg, TID    levalbuterol (XOPENEX) inhalation solution 1.25 mg, TID    levothyroxine (Synthroid) suspension 250 mcg, Early  Morning    lidocaine (LMX) 4 % cream, 4x Daily PRN    melatonin tablet 6 mg, HS    metoprolol tartrate (LOPRESSOR) partial tablet 12.5 mg, Q12H [place]    [START ON 10/19/2024] nicotine (NICODERM CQ) 14 mg/24hr TD 24 hr patch 14 mg, Daily    nicotine (NICODERM CQ) 21 mg/24 hr TD 24 hr patch 1 patch, Daily    [START ON 11/2/2024] nicotine (NICODERM CQ) 7 mg/24hr TD 24 hr patch 7 mg, Daily    oxyCODONE (ROXICODONE) oral solution 2.5 mg, Q6H    oxyCODONE (ROXICODONE) oral solution 5 mg, Q6H PRN    phenytoin (DILANTIN) oral suspension 150 mg, Q12H [place]    polyethylene glycol (MIRALAX) packet 17 g, BID    QUEtiapine (SEROquel) tablet 100 mg, HS    QUEtiapine (SEROquel) tablet 50 mg, Daily    senna oral syrup 8.8 mg, HS    sodium chloride 3 % inhalation solution 4 mL, TID    Prior to Admission medications    Medication Sig Start Date End Date Taking? Authorizing Provider   Advair -21 MCG/ACT inhaler  12/19/22   Historical Provider, MD   albuterol (PROVENTIL HFA,VENTOLIN HFA) 90 mcg/act inhaler Inhale 2 puffs every 6 (six) hours as needed for wheezing 2/25/20   Bienvenido Lee MD   apixaban (ELIQUIS) 5 mg Take 1 tablet (5 mg total) by mouth 2 (two) times a day 8/30/24   Malia Donis MD   furosemide (LASIX) 40 mg tablet Take 1 tablet (40 mg total) by mouth daily 8/30/24   Malia Donis MD   gabapentin (NEURONTIN) 400 mg capsule Take 400 mg by mouth 2 (two) times a day      Historical Provider, MD   levalbuterol (XOPENEX) 0.63 mg/3 mL nebulizer solution Take 3 mL (0.63 mg total) by nebulization every 8 (eight) hours as needed for wheezing 8/30/24   Malia Donis MD   metoprolol succinate (TOPROL-XL) 25 mg 24 hr tablet Take 25 mg by mouth daily    Historical Provider, MD   phenytoin (DILANTIN) 100 mg ER capsule Take 100 mg by mouth 2 (two) times a day And 250mg at 5pm    Historical Provider, MD   potassium chloride (Klor-Con M20) 20 mEq tablet Take 1 tablet (20 mEq total) by mouth daily 8/30/24   Malia Donis MD    sodium chloride 1 g tablet Take 1 tablet (1 g total) by mouth 2 (two) times a day with meals 8/30/24   Malia Donis MD   tiotropium (SPIRIVA) 18 mcg inhalation capsule Place 18 mcg into inhaler and inhale daily    Historical Provider, MD   Vitamin D, Cholecalciferol, 1000 UNITS CAPS Take 2 capsules by mouth daily     Historical Provider, MD         VTE Pharmacologic Prophylaxis: VTE covered by:  heparin (porcine), Intravenous, 18 Units/kg/hr at 10/09/24 9385      VTE Mechanical Prophylaxis: sequential compression device    ======    I have discussed the patient's history, physical exam findings, assessment, and plan in detail with attending, Dr. Young    Thank you for allowing me to participate in the care of your patient, Marcin ERNIE Sánchez.    Riky Leigh DO  [facility] Neurology Residency, PGY-2

## 2024-10-10 NOTE — ASSESSMENT & PLAN NOTE
Intermittent agitation - at times pulling at trach tubing  1:1 in place  Current Medications for agitation:  Haldol 1 mg Q6hrs PRN for agitation, restlessness  Seroquel 150 mg total daily   Now on Precedex   Continue recommend global delirium precautions including:  Establishment of day/night cycle via lights during the day and blinds open.  Please limit interruptions at night as medically appropriate.  Provide glasses/hearing aids as apprioriate.    Minimize deliriogenic medications as able.   Provide reorientation including date on board and visible clock.   Avoid restraints as able, frequent verbal reorientations or patient care sitter as appropriate.

## 2024-10-10 NOTE — SOCIAL WORK
Palliative SW saw patient at the bedside today. LSW appreciates the opportunity to provider patient/family with inpatient emotional support and guidance while patient continues to receive medical attention from the medical team       Palliative SW visited Pt bedside. Pt was sleeping, not aware of SW's presence. Pt appeared to be comfortable.   Following this encounter, SW spoke with Pt's sister Katalina via telephone to review POC. Brother Olivia also present on the phone.     Topics discussed:   Status: Pt appeared calm and comfortable  Disposition: [facility] acquiring a 1x agreement with Pt's medical insurance. Per CM notes, this is in process, Pt's insurance is complying.      Areas that need follow-up:   Final determination from Pt's insurance - expectation is Pt's discharge to [facility].   Expectation for Pt's return to [facility] following [facility].     Resources given: SW directed Sister and Brother to the hospital CM for status of discharge to LTACH.      I have spent 15 minutes with Patient and family today in which greater than 50% of this time was spent in counseling/coordination of care regarding Long Term Acute Care needs, Emotional well-being of Pt and Family, expectations following discharge from LTACH.     Palliative SW will continue to follow as requested by the medical team, patient, or family

## 2024-10-10 NOTE — PROGRESS NOTES
"Assessment & Plan   Problem list  Acute on chronic hypercapnic respiratory failure s/p Trach  V Tach Arrest  Shock, resolved  Respiratory acidosis/metabolic alkalosis  Mucous plugging  COPD  Encephalopathy  Anemia of chronic disease  Atrial fibrillation  Metabolic alkalosis  Pericardial effusion  Seizure disorder  Hypothyroidism  AAA  Insomnia  Tobacco use  Hx of lung SCC     Neuro:   #Seizure disorder  Phenytoin 150 mg BID  Continue Gabapentin 400mg BID  VEEG ongoing, neurology consulted     #Insomnia  #Agitation  Trazodone switched to Seroquel  Haldol prn, consider scheduling nightly dose due to agitation  Appreciate palliative care assistance     CV:   #Pericardial effusion   Echo 9/9  \"moderate to large pericardial effusion circumferential to the heart measuring largest along the RV free wall at 2.3 cm. The fluid exhibits no internal echoes. 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 Moderate to severe aortic regurgitation, mild tricuspid regurgitation, dilated ascending aortic root up to 5.4 cm.  On maintenance lasix po daily, maintain euvolemia  Continue to monitor fluid status      #History of AAA  5.4 cm  Monitor hemodynamics; BP goal <130/80  Outpatient follow up     Pulm:  #Acute on Chronic hypoxic and hypercapnic respiratory failure.  #COPD, and acute exacerbation now resolved  Status post trach 9/19  Tidal volumes improved with VC+  Continue Xopenex and Atrovent TID  Continue pulmicort BID, Continue Performist BID     #Respiratory acidosis  2/2 poor tidal volume and underlying COPD  Adjust vent as tolerated, VC+     #Pleural effusions  L>R  Consider thoracentesis     #History of " squamous cell cancer of lung post chemo/radiation 2016, cancer of right mainstem  Noted     GI:   #PEG placed 9/19  Continue tube feeds at goal  Bowel regimen: Senokot, Miralax     :   #Urinary retention  Doxazosin 2mg     #Metabolic alkalosis  Due to diuresis & respiratory acidosis     F/E/N:   F: No maintenance fluids  E: Replete as needed  N: Continue TwoCalHN bolus feeds     Heme/Onc:   #Anemia of chronic disease  Transfuse for Hgb <7 and/or symptomatic anemia     #DVT ppx  SCDs and heparin     Endo:   #Hypothyroidism  Home med: Levothyroxine 250 mg  TSH 45.06 on 9/25 from 0.59 on 9/5, free T4 0.34 (9/25)  Restarted on home levothyroxine 250 mg  Recheck in 10/31     ID:   #Colonization with Acinetobacter  Continue to monitor WBC count and temperature curve     #C/f pneumonia  With hypotension and continued infiltrates on CXR  Lactate and procal normal, no leukocytosis  Sputum w/ GNR, BC NGTD  MRSA negative, vanc d/cd  Continue cefepime for now, GNR in sputum     MSK/Skin:   #At risk for pressure injury   PT/OT when able  Frequent turns/repositioning  Skin surveillance      L: midline, PEG  D: none  A: VC+ 350/8/18/40%      In discussion with [facility] legal consultants, patient's sister is next of kin and legally allowed to make medical decisions due to patient's guardian being unavailable/unresponsive, however due to financial constraints, disposition must be discussed with guardian.     Disposition: SD1    ICU Core Measures     Vented Patient  VAP Bundle  VAP bundle ordered     A: Assess, Prevent, and Manage Pain Has pain been assessed? Yes  Need for changes to pain regimen? No   B: Both Spontaneous Awakening Trials (SATs) and Spontaneous Breathing Trials (SBTs) Plan to perform spontaneous awakening trial today? N/A   Plan to perform spontaneous breathing trial today? Yes   Obvious barriers to extubation? Yes   C: Choice of Sedation RASS Goal: 0 Alert and Calm  Need for changes to sedation or analgesia regimen? No  "  D: Delirium CAM-ICU: Unable to perform secondary to Dementia or other chronic cognitive dysfunction   E: Early Mobility  Plan for early mobility? Yes   F: Family Engagement Plan for family engagement today? Yes       Antibiotic Review: Awaiting culture results.     Review of Invasive Devices:            Prophylaxis:  VTE VTE covered by:  heparin (porcine), Intravenous, 18 Units/kg/hr at 10/09/24 1857       Stress Ulcer  not ordered         24 Hour Events : removed EEG leads by patient  Subjective  resting in bed comfortably, NAD. When awoken, he states \"I don't know what happened\" with regard to overnight events.       Objective :                   Vitals I/O      Most Recent Min/Max in 24hrs   Temp 98.3 °F (36.8 °C) Temp  Min: 98.2 °F (36.8 °C)  Max: 99 °F (37.2 °C)   Pulse 80 Pulse  Min: 70  Max: 82   Resp (!) 31 Resp  Min: 14  Max: 36   /52 BP  Min: 116/44  Max: 166/50   O2 Sat 100 % SpO2  Min: 89 %  Max: 100 %   VC+ 350Vt, peep 8, 40%, RR 18  Gtt: heparin, precedex   Intake/Output Summary (Last 24 hours) at 10/10/2024 0819  Last data filed at 10/10/2024 0512  Gross per 24 hour   Intake --   Output 800 ml   Net -800 ml       Diet Enteral/Parenteral; Tube Feeding No Oral Diet; Two Mirza HN; Bolus; 240; (4x/day) - 8:00AM,12:00PM,4:00PM,8:00PM; Prosource Protein Liquid - One Packet; 100; Water; Before and After each Bolus    Invasive Monitoring           Physical Exam   Physical Exam  Eyes:      Extraocular Movements: Extraocular movements intact.      Conjunctiva/sclera: Conjunctivae normal.   Skin:     General: Skin is warm and dry.   HENT:      Head: Normocephalic and atraumatic.      Mouth/Throat:      Mouth: Mucous membranes are dry.   Cardiovascular:      Rate and Rhythm: Normal rate and regular rhythm.   Musculoskeletal:      Right lower leg: No edema.      Left lower leg: No edema.   Abdominal: General: Bowel sounds are normal. There is abdominal type feeding tube.     Palpations: Abdomen is soft. "   Constitutional:       Appearance: He is well-developed.      Interventions: He is intubated and restrained.   Pulmonary:      Effort: Pulmonary effort is normal. He is intubated.      Breath sounds: Normal breath sounds.   Neurological:      Mental Status: He is alert. Mental status is at baseline. He is calm.          Diagnostic Studies        Lab Results: I have reviewed the following results:     Medications:  Scheduled PRN   budesonide, 0.5 mg, Q12H  cefepime, 2,000 mg, Q12H  chlorhexidine, 15 mL, Q12H [place]  doxazosin, 2 mg, Daily  [Held by provider] furosemide, 40 mg, Daily  gabapentin, 400 mg, BID  ipratropium, 0.5 mg, TID  levalbuterol, 1.25 mg, TID  levothyroxine, 250 mcg, Early Morning  melatonin, 6 mg, HS  metoprolol tartrate, 12.5 mg, Q12H [place]  [START ON 10/19/2024] nicotine, 14 mg, Daily  nicotine, 1 patch, Daily  [START ON 11/2/2024] nicotine, 7 mg, Daily  oxyCODONE, 2.5 mg, Q6H  phenytoin, 150 mg, Q12H [place]  polyethylene glycol, 17 g, BID  QUEtiapine, 100 mg, HS  QUEtiapine, 50 mg, Daily  senna, 8.8 mg, HS  sodium chloride, 4 mL, TID      albuterol, 2.5 mg, Q6H PRN  bisacodyl, 10 mg, Daily PRN  haloperidol, 1 mg, Q6H PRN  lidocaine, , 4x Daily PRN  oxyCODONE, 5 mg, Q6H PRN       Continuous    dexmedetomidine, 0.1-0.7 mcg/kg/hr, Last Rate: 0.3 mcg/kg/hr (10/10/24 0811)  heparin (porcine), 3-20 Units/kg/hr (Order-Specific), Last Rate: 18 Units/kg/hr (10/09/24 1857)         Labs:   CBC    Recent Labs     10/09/24  1018 10/10/24  0527   WBC 9.05 7.32   HGB 8.0* 7.5*   HCT 25.2* 24.4*    223     BMP    Recent Labs     10/09/24  0525 10/10/24  0527   SODIUM 143 142   K 4.2 4.3    100   CO2 38* 35*   AGAP 5 7   BUN 30* 29*   CREATININE 0.58* 0.62   CALCIUM 8.4 8.8       Coags    Recent Labs     10/09/24  0525 10/09/24  1018 10/09/24  1729 10/10/24  0105   INR 1.25*  --   --   --    PTT  --    < > 55* 63*    < > = values in this interval not displayed.        Additional Electrolytes  Recent Labs      10/09/24  0525 10/10/24  0527   MG 2.0 2.0   PHOS 3.3 3.1   CAIONIZED 1.13 1.14          Blood Gas    Recent Labs     10/09/24  0441   PHART 7.315*   UCT2LVA 77.9*   PO2ART 124.9   NGS8YLL 38.8*   BEART 10.8     Recent Labs     10/10/24  0527   PHVEN 7.333   VAQ7SPR 66.3*   PO2VEN 39.4   CWH1CDQ 34.4*   BEVEN 6.3   [id number] 69.9    LFTs  No recent results    Infectious  No recent results  Glucose  Recent Labs     10/09/24  0525 10/10/24  0527   GLUC 117 113

## 2024-10-10 NOTE — PROGRESS NOTES
"Progress Note - Palliative Care   Name: Marcin Sánchez 64 y.o. male I MRN: [id number]  Unit/Bed#: [facility]U 205-01 I Date of Admission: 9/8/2024   Date of Service: 10/10/2024 I Hospital Day: 32    Assessment & Plan  Acute hypoxic respiratory failure (HCC)  Has tracheostomy in place  Chronic combined systolic and diastolic CHF (congestive heart failure) ([facility])  Management per primary           Cardiac arrest (HCC)  S/p cardiac arrest soon after initial admission at [facility].thought to initially be secondary to circumferential pericardial effusion, as well as acute hypoxia and hypercarbia from respiratory failure.  Of note, no intervention for the effusion indicated at this time by CT surgery with a follow-up TTE discussed after discharge.  Patient also carries diagnosis of chronic combined systolic and diastolic heart failure and atrial fibrillation  Palliative care encounter  Capacity:  Patient not competent on today's encounter    GOC:  Without full capacity, medical decisions revert to his legally appointed guardian, Tere Williamson ([phone]). Tere went on to say it was okay to speak with pt's sister, Katalina, about pt's medical state.      ACP:  None on file     Disposition:  Case management working on LTAC placement. Ongoing discussions w/ [employer] legal consultants, per chart review, \"patient's sister is next of kin and legally allowed to make medical decisions due to patient's guardian being unavailable/unresponsive, however due to financial constraints, disposition must be discussed with guardian.\"  Per CM note, [facility] received [facility]  Patient will need to be off IV Precedex     Follow-up:  Palliative service will continue to follow Marcin and coordinate medical decision making apparatus with help of legal consult service.    Care Coordination:  Reviewed case with: RN, 1:1 PCA     PDMP Review: I have reviewed the patient's controlled substance dispensing history in the Prescription Drug Monitoring Program in " compliance with the [facility] regulations before prescribing any controlled substances.  Insomnia    Current Medications:  Seroquel 150 mg total   Melatonin 3 mg qHS  Agitation  Intermittent agitation - at times pulling at trach tubing  1:1 in place  Current Medications for agitation:  Haldol 1 mg Q6hrs PRN for agitation, restlessness  Seroquel 150 mg total daily   Now on Precedex   Continue recommend global delirium precautions including:  Establishment of day/night cycle via lights during the day and blinds open.  Please limit interruptions at night as medically appropriate.  Provide glasses/hearing aids as apprioriate.    Minimize deliriogenic medications as able.   Provide reorientation including date on board and visible clock.   Avoid restraints as able, frequent verbal reorientations or patient care sitter as appropriate.    Subjective   Met w/ patient at the bedside. Patient lying in bed, appears comfortable, NAD. 1:1 sitter in place. Patient not engaging in conversation. Sitter stated that he gets increasingly agitated overnight but has been fine during the morning. Stated patient becomes agitated and frustrated when he can't get certain things such as something to drink. Patient out of restraints during assessment, but was on Precedex.     Objective :  Temp:  [98.2 °F (36.8 °C)-99 °F (37.2 °C)] 98.3 °F (36.8 °C)  HR:  [70-82] 80  BP: (116-166)/(44-52) 127/52  Resp:  [14-36] 31  SpO2:  [89 %-100 %] 100 %    Physical Exam  Constitutional:       General: He is not in acute distress.     Appearance: He is ill-appearing.      Interventions: He is sedated.      Comments: Trach on ventilator    HENT:      Head: Normocephalic and atraumatic.      Mouth/Throat:      Mouth: Mucous membranes are dry.   Eyes:      Conjunctiva/sclera: Conjunctivae normal.   Cardiovascular:      Rate and Rhythm: Normal rate.   Pulmonary:      Effort: Pulmonary effort is normal. No respiratory distress.      Comments: Trach on ventilator    Skin:     General: Skin is warm and dry.      Coloration: Skin is pale.   Neurological:      Mental Status: He is lethargic.   Psychiatric:         Mood and Affect: Affect is flat.         Behavior: Behavior is withdrawn.          Lab Results: I have reviewed the following results:  Lab Results   Component Value Date/Time    SODIUM 142 10/10/2024 05:27 AM    SODIUM 136 08/06/2021 06:22 AM    K 4.3 10/10/2024 05:27 AM    K 4.2 08/06/2021 06:22 AM    BUN 29 (H) 10/10/2024 05:27 AM    BUN 8 08/06/2021 06:22 AM    CREATININE 0.62 10/10/2024 05:27 AM    CREATININE 0.42 (L) 08/06/2021 06:22 AM    GLUC 113 10/10/2024 05:27 AM    GLUC 87 08/06/2021 06:22 AM    CALCIUM 8.8 10/10/2024 05:27 AM    CALCIUM 9.5 08/06/2021 06:22 AM    AST 18 10/08/2024 05:12 AM    AST 18 08/06/2021 06:22 AM    ALT 10 10/08/2024 05:12 AM    ALT 17 08/06/2021 06:22 AM    ALB 3.0 (L) 10/08/2024 05:12 AM    ALB 3.8 08/06/2021 06:22 AM    TP 7.0 10/08/2024 05:12 AM    TP 7.7 08/06/2021 06:22 AM    EGFR 104 10/10/2024 05:27 AM    EGFR 115 10/02/2020 06:35 AM     Lab Results   Component Value Date/Time    HGB 7.5 (L) 10/10/2024 05:27 AM    WBC 7.32 10/10/2024 05:27 AM     10/10/2024 05:27 AM    INR 1.25 (H) 10/09/2024 05:25 AM    PTT 63 (H) 10/10/2024 01:05 AM     Lab Results   Component Value Date/Time    CKQ8COXWABVZ 45.059 (H) 09/24/2024 06:34 AM     Administrative Statements   I have spent a total time of 30 minutes in caring for this patient on the day of the visit/encounter including Risks and benefits of tx options, Instructions for management, Patient and family education, Importance of tx compliance, Risk factor reductions, Counseling / Coordination of care, Documenting in the medical record, Reviewing / ordering tests, medicine, procedures  , Obtaining or reviewing history  , and Communicating with other healthcare professionals . Topics discussed with the patient / family include symptom assessment and management, medication review,  psychosocial support, supportive listening, and anticipatory guidance.    Yulisa Payne

## 2024-10-11 ENCOUNTER — APPOINTMENT (INPATIENT)
Dept: RADIOLOGY | Facility: HOSPITAL | Age: 64
DRG: 005 | End: 2024-10-11
Payer: COMMERCIAL

## 2024-10-11 ENCOUNTER — APPOINTMENT (INPATIENT)
Dept: NEUROLOGY | Facility: CLINIC | Age: 64
DRG: 005 | End: 2024-10-11
Payer: COMMERCIAL

## 2024-10-11 LAB
ALBUMIN SERPL BCG-MCNC: 2.8 G/DL (ref 3.5–5)
ALP SERPL-CCNC: 138 U/L (ref 34–104)
ALT SERPL W P-5'-P-CCNC: 12 U/L (ref 7–52)
ANION GAP SERPL CALCULATED.3IONS-SCNC: 4 MMOL/L (ref 4–13)
APTT PPP: 66 SECONDS (ref 23–34)
APTT PPP: 71 SECONDS (ref 23–34)
AST SERPL W P-5'-P-CCNC: 17 U/L (ref 13–39)
BASOPHILS # BLD AUTO: 0.08 THOUSANDS/ΜL (ref 0–0.1)
BASOPHILS NFR BLD AUTO: 1 % (ref 0–1)
BILIRUB SERPL-MCNC: 0.57 MG/DL (ref 0.2–1)
BUN SERPL-MCNC: 27 MG/DL (ref 5–25)
CALCIUM ALBUM COR SERPL-MCNC: 9.8 MG/DL (ref 8.3–10.1)
CALCIUM SERPL-MCNC: 8.8 MG/DL (ref 8.4–10.2)
CHLORIDE SERPL-SCNC: 99 MMOL/L (ref 96–108)
CO2 SERPL-SCNC: 40 MMOL/L (ref 21–32)
CREAT SERPL-MCNC: 0.61 MG/DL (ref 0.6–1.3)
EOSINOPHIL # BLD AUTO: 0.18 THOUSAND/ΜL (ref 0–0.61)
EOSINOPHIL NFR BLD AUTO: 3 % (ref 0–6)
ERYTHROCYTE [DISTWIDTH] IN BLOOD BY AUTOMATED COUNT: 14.6 % (ref 11.6–15.1)
GFR SERPL CREATININE-BSD FRML MDRD: 105 ML/MIN/1.73SQ M
GLUCOSE SERPL-MCNC: 149 MG/DL (ref 65–140)
HCT VFR BLD AUTO: 26 % (ref 36.5–49.3)
HGB BLD-MCNC: 7.9 G/DL (ref 12–17)
IMM GRANULOCYTES # BLD AUTO: 0.02 THOUSAND/UL (ref 0–0.2)
IMM GRANULOCYTES NFR BLD AUTO: 0 % (ref 0–2)
LYMPHOCYTES # BLD AUTO: 0.74 THOUSANDS/ΜL (ref 0.6–4.47)
LYMPHOCYTES NFR BLD AUTO: 11 % (ref 14–44)
MAGNESIUM SERPL-MCNC: 2 MG/DL (ref 1.9–2.7)
MCH RBC QN AUTO: 32.4 PG (ref 26.8–34.3)
MCHC RBC AUTO-ENTMCNC: 30.4 G/DL (ref 31.4–37.4)
MCV RBC AUTO: 107 FL (ref 82–98)
MONOCYTES # BLD AUTO: 0.8 THOUSAND/ΜL (ref 0.17–1.22)
MONOCYTES NFR BLD AUTO: 12 % (ref 4–12)
NEUTROPHILS # BLD AUTO: 4.8 THOUSANDS/ΜL (ref 1.85–7.62)
NEUTS SEG NFR BLD AUTO: 73 % (ref 43–75)
NRBC BLD AUTO-RTO: 0 /100 WBCS
PLATELET # BLD AUTO: 214 THOUSANDS/UL (ref 149–390)
PMV BLD AUTO: 8.9 FL (ref 8.9–12.7)
POTASSIUM SERPL-SCNC: 4.1 MMOL/L (ref 3.5–5.3)
PROT SERPL-MCNC: 6.7 G/DL (ref 6.4–8.4)
RBC # BLD AUTO: 2.44 MILLION/UL (ref 3.88–5.62)
SODIUM SERPL-SCNC: 143 MMOL/L (ref 135–147)
WBC # BLD AUTO: 6.62 THOUSAND/UL (ref 4.31–10.16)

## 2024-10-11 PROCEDURE — NC001 PR NO CHARGE: Performed by: ANESTHESIOLOGY

## 2024-10-11 PROCEDURE — 83735 ASSAY OF MAGNESIUM: CPT

## 2024-10-11 PROCEDURE — 95720 EEG PHY/QHP EA INCR W/VEEG: CPT | Performed by: PSYCHIATRY & NEUROLOGY

## 2024-10-11 PROCEDURE — 94760 N-INVAS EAR/PLS OXIMETRY 1: CPT

## 2024-10-11 PROCEDURE — 94003 VENT MGMT INPAT SUBQ DAY: CPT

## 2024-10-11 PROCEDURE — 71045 X-RAY EXAM CHEST 1 VIEW: CPT

## 2024-10-11 PROCEDURE — 85730 THROMBOPLASTIN TIME PARTIAL: CPT | Performed by: INTERNAL MEDICINE

## 2024-10-11 PROCEDURE — 99232 SBSQ HOSP IP/OBS MODERATE 35: CPT

## 2024-10-11 PROCEDURE — 80053 COMPREHEN METABOLIC PANEL: CPT

## 2024-10-11 PROCEDURE — 95715 VEEG EA 12-26HR INTMT MNTR: CPT

## 2024-10-11 PROCEDURE — 94640 AIRWAY INHALATION TREATMENT: CPT

## 2024-10-11 PROCEDURE — 99232 SBSQ HOSP IP/OBS MODERATE 35: CPT | Performed by: INTERNAL MEDICINE

## 2024-10-11 PROCEDURE — 85025 COMPLETE CBC W/AUTO DIFF WBC: CPT

## 2024-10-11 PROCEDURE — NC001 PR NO CHARGE: Performed by: PSYCHIATRY & NEUROLOGY

## 2024-10-11 PROCEDURE — 94669 MECHANICAL CHEST WALL OSCILL: CPT

## 2024-10-11 RX ORDER — HALOPERIDOL 2 MG/ML
4 SOLUTION ORAL
Status: DISCONTINUED | OUTPATIENT
Start: 2024-10-11 | End: 2024-10-13

## 2024-10-11 RX ORDER — HALOPERIDOL 2 MG/ML
2 SOLUTION ORAL ONCE
Status: COMPLETED | OUTPATIENT
Start: 2024-10-11 | End: 2024-10-11

## 2024-10-11 RX ORDER — KETOROLAC TROMETHAMINE 30 MG/ML
30 INJECTION, SOLUTION INTRAMUSCULAR; INTRAVENOUS 3 TIMES DAILY
Status: DISCONTINUED | OUTPATIENT
Start: 2024-10-11 | End: 2024-10-11

## 2024-10-11 RX ORDER — ALBUTEROL SULFATE 0.83 MG/ML
2.5 SOLUTION RESPIRATORY (INHALATION) 3 TIMES DAILY PRN
Status: DISCONTINUED | OUTPATIENT
Start: 2024-10-11 | End: 2024-10-16

## 2024-10-11 RX ADMIN — NICOTINE 1 PATCH: 21 PATCH, EXTENDED RELEASE TRANSDERMAL at 09:02

## 2024-10-11 RX ADMIN — FUROSEMIDE 40 MG: 40 TABLET ORAL at 09:08

## 2024-10-11 RX ADMIN — Medication 12.5 MG: at 09:10

## 2024-10-11 RX ADMIN — POLYETHYLENE GLYCOL 3350 17 G: 17 POWDER, FOR SOLUTION ORAL at 09:02

## 2024-10-11 RX ADMIN — LEVALBUTEROL HYDROCHLORIDE 1.25 MG: 1.25 SOLUTION RESPIRATORY (INHALATION) at 13:34

## 2024-10-11 RX ADMIN — HEPARIN SODIUM 18 UNITS/KG/HR: 10000 INJECTION, SOLUTION INTRAVENOUS at 00:00

## 2024-10-11 RX ADMIN — LEVETIRACETAM 1000 MG: 100 SOLUTION ORAL at 09:02

## 2024-10-11 RX ADMIN — LEVALBUTEROL HYDROCHLORIDE 1.25 MG: 1.25 SOLUTION RESPIRATORY (INHALATION) at 07:53

## 2024-10-11 RX ADMIN — HALOPERIDOL 4 MG: 2 SOLUTION ORAL at 23:20

## 2024-10-11 RX ADMIN — OXYCODONE HYDROCHLORIDE 2.5 MG: 5 SOLUTION ORAL at 06:33

## 2024-10-11 RX ADMIN — HEPARIN SODIUM 18 UNITS/KG/HR: 10000 INJECTION, SOLUTION INTRAVENOUS at 19:39

## 2024-10-11 RX ADMIN — OXYCODONE HYDROCHLORIDE 5 MG: 5 SOLUTION ORAL at 14:49

## 2024-10-11 RX ADMIN — Medication 6 MG: at 23:17

## 2024-10-11 RX ADMIN — BUDESONIDE 0.5 MG: 0.5 INHALANT RESPIRATORY (INHALATION) at 07:53

## 2024-10-11 RX ADMIN — IPRATROPIUM BROMIDE 0.5 MG: 0.5 SOLUTION RESPIRATORY (INHALATION) at 13:34

## 2024-10-11 RX ADMIN — GABAPENTIN 400 MG: 400 CAPSULE ORAL at 09:03

## 2024-10-11 RX ADMIN — IPRATROPIUM BROMIDE 0.5 MG: 0.5 SOLUTION RESPIRATORY (INHALATION) at 07:53

## 2024-10-11 RX ADMIN — Medication 2 MG: at 12:10

## 2024-10-11 RX ADMIN — BUDESONIDE 0.5 MG: 0.5 INHALANT RESPIRATORY (INHALATION) at 20:02

## 2024-10-11 RX ADMIN — GABAPENTIN 400 MG: 400 CAPSULE ORAL at 17:18

## 2024-10-11 RX ADMIN — OXYCODONE HYDROCHLORIDE 2.5 MG: 5 SOLUTION ORAL at 23:15

## 2024-10-11 RX ADMIN — OXYCODONE HYDROCHLORIDE 2.5 MG: 5 SOLUTION ORAL at 00:41

## 2024-10-11 RX ADMIN — CHLORHEXIDINE GLUCONATE 0.12% ORAL RINSE 15 ML: 1.2 LIQUID ORAL at 21:00

## 2024-10-11 RX ADMIN — DOXAZOSIN 2 MG: 2 TABLET ORAL at 09:09

## 2024-10-11 RX ADMIN — IPRATROPIUM BROMIDE 0.5 MG: 0.5 SOLUTION RESPIRATORY (INHALATION) at 20:02

## 2024-10-11 RX ADMIN — CHLORHEXIDINE GLUCONATE 0.12% ORAL RINSE 15 ML: 1.2 LIQUID ORAL at 09:02

## 2024-10-11 RX ADMIN — DEXMEDETOMIDINE HYDROCHLORIDE 0.3 MCG/KG/HR: 400 INJECTION INTRAVENOUS at 03:56

## 2024-10-11 RX ADMIN — LEVETIRACETAM 1000 MG: 100 SOLUTION ORAL at 23:23

## 2024-10-11 RX ADMIN — Medication 12.5 MG: at 23:17

## 2024-10-11 RX ADMIN — LEVOTHYROXINE SODIUM 250 MCG: 100 TABLET ORAL at 06:33

## 2024-10-11 RX ADMIN — LEVALBUTEROL HYDROCHLORIDE 1.25 MG: 1.25 SOLUTION RESPIRATORY (INHALATION) at 20:02

## 2024-10-11 RX ADMIN — POLYETHYLENE GLYCOL 3350 17 G: 17 POWDER, FOR SOLUTION ORAL at 17:18

## 2024-10-11 RX ADMIN — OXYCODONE HYDROCHLORIDE 2.5 MG: 5 SOLUTION ORAL at 09:03

## 2024-10-11 NOTE — RESPIRATORY THERAPY NOTE
10/11/24 7826   Respiratory Assessment   Resp Comments Responded to Rapid response called on pt due to pt pulled trach out. Arrived as RN BVM over open stoma SpO2 in 50's. Fellow at bedside ordered to intubate orally even with extra trach supplies at bedside plus one size smaller. Applied guaze and pressure over open stoma to guide proper BVM venitlation via orally. Attending arrived at bedside and intubated via trachea stoma and was successful with positive capnography color change and replaced with size 6.0 XLT trach in trachea stoma. Positive capnography color change and bilateral breath sounds equal. Trach cuff inflated and secured. Placed pt back on vent and pt is stable SpO2 100%. All alarms on functioning, Will continue to monitor pt.

## 2024-10-11 NOTE — QUICK NOTE
This SN and pca responded first to patient disconnecting his vent at 1800. Stayed with patient over 15 minutes with redirection. Vitals taken. Patient appeared to close eyes and settle slightly. No 1:1 at this time or restraints or medications given other than the planned haldol this afternoon to release the 1:1 at 2pm.   Within 3 minutes patient vent alarmed again and pca responded to patient with full removal of trach and ties in his hand. Sats rapidly declining to 40s. AMBU initiated and rapid called. Tube feedings immediately turned off to lie patient flat. MICU rapid response team arrived and overnight attending replaced trach 6xlt. New supplies at bedside now for same. Restraints order reapplied and new order for 1:1. Patient now noted to be hypertensive and tachypneic. Sats in 80s on 40% vent setting. RT readjusted vent settings and micu resident notified. Came to bedside with no new orders other than restraints and 1:!. Report given to oncoming nurse and assisted with pcxr completion

## 2024-10-11 NOTE — PROGRESS NOTES
Pastoral Care Progress Note             10/11/24 1300   Clinical Encounter Type   Visited With Patient   Routine Visit Follow-up      met with pt who does not speak and who's hearing is limited.  sat with pt and asked if prayer was desired. Pt said prayer was not wanted and  remains available.

## 2024-10-11 NOTE — PLAN OF CARE
Problem: Prexisting or High Potential for Compromised Skin Integrity  Goal: Skin integrity is maintained or improved  Description: INTERVENTIONS:  - Identify patients at risk for skin breakdown  - Assess and monitor skin integrity  - Assess and monitor nutrition and hydration status  - Monitor labs   - Assess for incontinence   - Turn and reposition patient  - Assist with mobility/ambulation  - Relieve pressure over bony prominences  - Avoid friction and shearing  - Provide appropriate hygiene as needed including keeping skin clean and dry  - Evaluate need for skin moisturizer/barrier cream  - Collaborate with interdisciplinary team   - Patient/family teaching  - Consider wound care consult   Outcome: Progressing     Problem: PAIN - ADULT  Goal: Verbalizes/displays adequate comfort level or baseline comfort level  Description: Interventions:  - Encourage patient to monitor pain and request assistance  - Assess pain using appropriate pain scale  - Administer analgesics based on type and severity of pain and evaluate response  - Implement non-pharmacological measures as appropriate and evaluate response  - Consider cultural and social influences on pain and pain management  - Notify physician/advanced practitioner if interventions unsuccessful or patient reports new pain  Outcome: Progressing     Problem: INFECTION - ADULT  Goal: Absence or prevention of progression during hospitalization  Description: INTERVENTIONS:  - Assess and monitor for signs and symptoms of infection  - Monitor lab/diagnostic results  - Monitor all insertion sites, i.e. indwelling lines, tubes, and drains  - Monitor endotracheal if appropriate and nasal secretions for changes in amount and color  - [place] appropriate cooling/warming therapies per order  - Administer medications as ordered  - Instruct and encourage patient and family to use good hand hygiene technique  - Identify and instruct in appropriate isolation precautions for  identified infection/condition  Outcome: Progressing     Problem: INFECTION - ADULT  Goal: Absence of fever/infection during neutropenic period  Description: INTERVENTIONS:  - Monitor WBC    Outcome: Progressing     Problem: SAFETY ADULT  Goal: Patient will remain free of falls  Description: INTERVENTIONS:  - Educate patient/family on patient safety including physical limitations  - Instruct patient to call for assistance with activity   - Consult OT/PT to assist with strengthening/mobility   - Keep Call bell within reach  - Keep bed low and locked with side rails adjusted as appropriate  - Keep care items and personal belongings within reach  - Initiate and maintain comfort rounds  - Make Fall Risk Sign visible to staff  - Offer Toileting every 2 Hours, in advance of need  - Initiate/Maintain bed alarm  - Obtain necessary fall risk management equipment:    Problem: Knowledge Deficit  Goal: Patient/family/caregiver demonstrates understanding of disease process, treatment plan, medications, and discharge instructions  Description: Complete learning assessment and assess knowledge base.  Interventions:  - Provide teaching at level of understanding  - Provide teaching via preferred learning methods  Outcome: Progressing     Problem: DISCHARGE PLANNING  Goal: Discharge to home or other facility with appropriate resources  Description: INTERVENTIONS:  - Identify barriers to discharge w/patient and caregiver  - Arrange for needed discharge resources and transportation as appropriate  - Identify discharge learning needs (meds, wound care, etc.)  - Arrange for interpretive services to assist at discharge as needed  - Refer to Case Management Department for coordinating discharge planning if the patient needs post-hospital services based on physician/advanced practitioner order or complex needs related to functional status, cognitive ability, or social support system  Outcome: Progressing     Problem: Knowledge  Deficit  Goal: Patient/family/caregiver demonstrates understanding of disease process, treatment plan, medications, and discharge instructions  Description: Complete learning assessment and assess knowledge base.  Interventions:  - Provide teaching at level of understanding  - Provide teaching via preferred learning methods  Outcome: Progressing     Problem: Nutrition/Hydration-ADULT  Goal: Nutrient/Hydration intake appropriate for improving, restoring or maintaining nutritional needs  Description: Monitor and assess patient's nutrition/hydration status for malnutrition. Collaborate with interdisciplinary team and initiate plan and interventions as ordered.  Monitor patient's weight and dietary intake as ordered or per policy. Utilize nutrition screening tool and intervene as necessary. Determine patient's food preferences and provide high-protein, high-caloric foods as appropriate.     INTERVENTIONS:  - Monitor oral intake, urinary output, labs, and treatment plans  - Assess nutrition and hydration status and recommend course of action  - Evaluate amount of meals eaten  - Assist patient with eating if necessary   - Allow adequate time for meals  - Recommend/ encourage appropriate diets, oral nutritional supplements, and vitamin/mineral supplements  - Order, calculate, and assess calorie counts as needed  - Recommend, monitor, and adjust tube feedings and TPN/PPN based on assessed needs  - Assess need for intravenous fluids  - Provide specific nutrition/hydration education as appropriate  - Include patient/family/caregiver in decisions related to nutrition  Outcome: Progressing     Problem: RESPIRATORY - ADULT  Goal: Achieves optimal ventilation and oxygenation  Description: INTERVENTIONS:  - Assess for changes in respiratory status  - Assess for changes in mentation and behavior  - Position to facilitate oxygenation and minimize respiratory effort  - Oxygen administered by appropriate delivery if ordered  -  Initiate smoking cessation education as indicated  - Encourage broncho-pulmonary hygiene including cough, deep breathe, Incentive Spirometry  - Assess the need for suctioning and aspirate as needed  - Assess and instruct to report SOB or any respiratory difficulty  - Respiratory Therapy support as indicated  Outcome: Progressing     Problem: SAFETY,RESTRAINT: NV/NON-SELF DESTRUCTIVE BEHAVIOR  Goal: Remains free of harm/injury (restraint for non violent/non self-detsructive behavior)  Description: INTERVENTIONS:  - Instruct patient/family regarding restraint use   - Assess and monitor physiologic and psychological status   - Provide interventions and comfort measures to meet assessed patient needs   - Identify and implement measures to help patient regain control  - Assess readiness for release of restraint   Outcome: Progressing     Problem: SAFETY,RESTRAINT: NV/NON-SELF DESTRUCTIVE BEHAVIOR  Goal: Returns to optimal restraint-free functioning  Description: INTERVENTIONS:  - Assess the patient's behavior and symptoms that indicate continued need for restraint  - Identify and implement measures to help patient regain control  - Assess readiness for release of restraint   Outcome: Progressing     - Apply yellow socks and bracelet for high fall risk patients  - Consider moving patient to room near nurses station  Outcome: Progressing

## 2024-10-11 NOTE — ASSESSMENT & PLAN NOTE
S/p cardiac arrest soon after initial admission at [facility].thought to initially be secondary to circumferential pericardial effusion, as well as acute hypoxia and hypercarbia from respiratory failure.  Of note, no intervention for the effusion indicated at this time by CT surgery with a follow-up TTE discussed after discharge.  Patient also carries diagnosis of chronic combined systolic and diastolic heart failure and atrial fibrillation

## 2024-10-11 NOTE — RESPIRATORY THERAPY NOTE
RT Ventilator Management Note  Marcin Sánchez 64 y.o. male MRN: [id number]  [facility]: [id number]      Daily Screen         10/9/2024  0830 10/10/2024  0827          Patient safety screen outcome:: Failed Failed      Not Ready for Weaning due to:: Underline problem not resolved --                Physical Exam:   Assessment Type: (P) Assess only  General Appearance: (P) Sleeping  Respiratory Pattern: (P) Assisted  Chest Assessment: (P) Chest expansion symmetrical  Bilateral Breath Sounds: (P) Diminished, Coarse  Cough: (P) None  Suction: (P) Trach  O2 Device: (P) Ventilator      Resp Comments: (P) Pt. remains on APV/CMV/VC mode.  No changes throughout the night.  Will continue to monitor pt per protocol.

## 2024-10-11 NOTE — ASSESSMENT & PLAN NOTE
Intermittent agitation - at times pulling at trach tubing  1:1 in place  On/off restraints   Current Medications for agitation:  Haldol 1 mg Q6hrs PRN for agitation, restlessness  Haldol 2mg qHS  With Haldol be mindful of Qtc (10/9 - 417)  Seroquel 150 mg total daily (daily, qHS)  Precedex discontinued   Other recommendations:  Scheduled Haldol  Depakote 250 mg BID  Keppra may possibly worsen agitation  Continue recommend global delirium precautions including:  Establishment of day/night cycle via lights during the day and blinds open.  Please limit interruptions at night as medically appropriate.  Provide glasses/hearing aids as apprioriate.    Minimize deliriogenic medications as able.   Provide reorientation including date on board and visible clock.   Avoid restraints as able, frequent verbal reorientations or patient care sitter as appropriate.

## 2024-10-11 NOTE — PROGRESS NOTES
"Assessment & Plan   Problem list  Acute on chronic hypercapnic respiratory failure s/p Trach  V Tach Arrest  Shock, resolved  Respiratory acidosis/metabolic alkalosis  Mucous plugging  COPD  Encephalopathy  Anemia of chronic disease  Atrial fibrillation  Metabolic alkalosis  Pericardial effusion  Seizure disorder  Hypothyroidism  AAA  Insomnia  Tobacco use  Hx of lung SCC     Neuro:   #Seizure disorder  Discussed with neurology attending yesterday regarding variable phenytoin level, ultimately decided to switch to keppra 1000 mg BID  Continue Gabapentin 400mg BID  VEEG ongoing, neurology following     #Insomnia  #Agitation  Remained agitated on seroquel and trazodone, both d/cd  Haldol qhs started 10/10, increase as needed  Appreciate palliative care assistance     CV:   #Pericardial effusion   Echo 9/9  \"moderate to large pericardial effusion circumferential to the heart measuring largest along the RV free wall at 2.3 cm. The fluid exhibits no internal echoes. 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 Moderate to severe aortic regurgitation, mild tricuspid regurgitation, dilated ascending aortic root up to 5.4 cm.  On maintenance lasix po daily, maintain euvolemia  Continue to monitor fluid status      #History of AAA  5.4 cm  Monitor hemodynamics; BP goal <130/80  Outpatient follow up     Pulm:  #Acute on Chronic hypoxic and hypercapnic respiratory failure.  #COPD, and acute exacerbation now resolved  Status post trach 9/19  Tidal volumes improved with VC+  Continue Xopenex and Atrovent TID  Continue pulmicort BID     #Respiratory acidosis  2/2 poor tidal volume and underlying COPD  Adjust vent as tolerated, " VC+     #Pleural effusions  L>R  Consider thoracentesis     #History of squamous cell cancer of lung post chemo/radiation 2016, cancer of right mainstem  Noted     GI:   #PEG placed 9/19  Continue tube feeds at goal  Bowel regimen: Senokot, Miralax     :   #Urinary retention  Doxazosin 2mg     #Metabolic alkalosis  Due to diuresis & respiratory acidosis     F/E/N:   F: No maintenance fluids  E: Replete as needed  N: Continue TwoCalHN bolus feeds, consider adjustment now that he's off phenytoin     Heme/Onc:   #Anemia of chronic disease  Transfuse for Hgb <7 and/or symptomatic anemia     #DVT ppx  SCDs and heparin     Endo:   #Hypothyroidism  Home med: Levothyroxine 250 mg  TSH 45.06 on 9/25 from 0.59 on 9/5, free T4 0.34 (9/25)  Restarted on home levothyroxine 250 mg  Recheck in 10/31     ID:   #Colonization with Acinetobacter  Continue to monitor WBC count and temperature curve     MSK/Skin:   #At risk for pressure injury   PT/OT when able  Frequent turns/repositioning  Skin surveillance      L: midline, PEG  D: none  A: VC+ 350/8/16/40%     In discussion with [employer] legal consultants, patient's sister is next of kin and legally allowed to make medical decisions due to patient's guardian being unavailable/unresponsive, however due to financial constraints, disposition must be discussed with guardian.     ICU Core Measures     Vented Patient  VAP Bundle  VAP bundle ordered     A: Assess, Prevent, and Manage Pain Has pain been assessed? Yes  Need for changes to pain regimen? No   B: Both Spontaneous Awakening Trials (SATs) and Spontaneous Breathing Trials (SBTs) Plan to perform spontaneous awakening trial today? N/A   Plan to perform spontaneous breathing trial today? Yes   Obvious barriers to extubation? Yes   C: Choice of Sedation RASS Goal: N/A patient not on sedation  Need for changes to sedation or analgesia regimen? NA   D: Delirium CAM-ICU: Negative   E: Early Mobility  Plan for early mobility? Yes   F: Family  Engagement Plan for family engagement today? Yes       Review of Invasive Devices:            Prophylaxis:  VTE VTE covered by:  heparin (porcine), Intravenous, 18 Units/kg/hr at 10/11/24 0000       Stress Ulcer  not ordered         24 Hour Events : No acute events.  Subjective  Resting in bed comfortably this morning without acute complaints. Discussed with 1-1 at bedside patient has been calm.       Objective :                   Vitals I/O      Most Recent Min/Max in 24hrs   Temp (!) 97.4 °F (36.3 °C) Temp  Min: 97.2 °F (36.2 °C)  Max: 98.2 °F (36.8 °C)   Pulse 76 Pulse  Min: 70  Max: 86   Resp (!) 28 Resp  Min: 14  Max: 28   BP (!) 106/41 BP  Min: 104/60  Max: 137/41   O2 Sat 98 % SpO2  Min: 93 %  Max: 100 %      Intake/Output Summary (Last 24 hours) at 10/11/2024 0822  Last data filed at 10/11/2024 0633  Gross per 24 hour   Intake 819.4 ml   Output 1000 ml   Net -180.6 ml       Diet Enteral/Parenteral; Tube Feeding No Oral Diet; Two Mirza HN; Bolus; 240; (4x/day) - 8:00AM,12:00PM,4:00PM,8:00PM; Prosource Protein Liquid - One Packet; 100; Water; Before and After each Bolus    Invasive Monitoring           Physical Exam   Physical Exam  Vitals reviewed.   Eyes:      Extraocular Movements: Extraocular movements intact.      Conjunctiva/sclera: Conjunctivae normal.   Skin:     General: Skin is warm and dry.   HENT:      Head: Normocephalic and atraumatic.      Nose: No congestion.      Mouth/Throat:      Mouth: Mucous membranes are dry.   Neck:      Trachea: Tracheostomy present.   Cardiovascular:      Rate and Rhythm: Normal rate and regular rhythm.      Pulses: Normal pulses.   Musculoskeletal:      Right lower leg: No edema.      Left lower leg: No edema.   Abdominal: General: There is abdominal type feeding tube.     Palpations: Abdomen is rigid.   Constitutional:       General: He is not in acute distress.     Appearance: He is well-developed. He is not ill-appearing.   Pulmonary:      Effort: Pulmonary effort is  normal.      Breath sounds: Rales present.      Comments: Rales in lung bases  Neurological:      General: No focal deficit present.      Mental Status: He is alert. He is calm.          Diagnostic Studies        Lab Results: I have reviewed the following results:     Medications:  Scheduled PRN   budesonide, 0.5 mg, Q12H  chlorhexidine, 15 mL, Q12H [place]  doxazosin, 2 mg, Daily  furosemide, 40 mg, Daily  gabapentin, 400 mg, BID  haloperidol, 2 mg, HS  ipratropium, 0.5 mg, TID  levalbuterol, 1.25 mg, TID  levETIRAcetam, 1,000 mg, Q12H [place]  levothyroxine, 250 mcg, Early Morning  melatonin, 6 mg, HS  metoprolol tartrate, 12.5 mg, Q12H [place]  [START ON 10/19/2024] nicotine, 14 mg, Daily  nicotine, 1 patch, Daily  [START ON 11/2/2024] nicotine, 7 mg, Daily  oxyCODONE, 2.5 mg, Q6H  polyethylene glycol, 17 g, BID  sodium chloride, 4 mL, TID      albuterol, 2.5 mg, Q6H PRN  bisacodyl, 10 mg, Daily PRN  haloperidol, 1 mg, Q6H PRN  lidocaine, , 4x Daily PRN  oxyCODONE, 5 mg, Q6H PRN       Continuous    heparin (porcine), 3-20 Units/kg/hr (Order-Specific), Last Rate: 18 Units/kg/hr (10/11/24 0000)         Labs:   CBC    Recent Labs     10/10/24  0527 10/11/24  0520   WBC 7.32 6.62   HGB 7.5* 7.9*   HCT 24.4* 26.0*    214     BMP    Recent Labs     10/10/24  0527 10/11/24  0520   SODIUM 142 143   K 4.3 4.1    99   CO2 35* 40*   AGAP 7 4   BUN 29* 27*   CREATININE 0.62 0.61   CALCIUM 8.8 8.8       Coags    Recent Labs     10/11/24  0109 10/11/24  0657   PTT 71* 66*        Additional Electrolytes  Recent Labs     10/10/24  0527 10/11/24  0520   MG 2.0 2.0   PHOS 3.1  --    CAIONIZED 1.14  --           Blood Gas    No recent results  Recent Labs     10/10/24  0527   PHVEN 7.333   [id number] 66.3*   PO2VEN 39.4   IYA7MLT 34.4*   BEVEN 6.3   G4IQVSS 69.9    LFTs  Recent Labs     10/11/24  0520   ALT 12   AST 17   ALKPHOS 138*   ALB 2.8*   TBILI 0.57       Infectious  No recent results  Glucose  Recent Labs     10/10/24  0527  10/11/24  0520   GLUC 113 149*

## 2024-10-11 NOTE — CHAPLAIN
responded to Medical Emergency code. Services not needed at this time. Spiritual care remains available

## 2024-10-11 NOTE — PROGRESS NOTES
"Progress Note - Palliative Care   Name: Marcin Sánchez 64 y.o. male I MRN: [id number]  Unit/Bed#: [facility] 205[facility]01 I Date of Admission: 9/8/2024   Date of Service: 10/11/2024 I Hospital Day: 33    Assessment & Plan  Acute hypoxic respiratory failure (HCC)  Has tracheostomy in place  Chronic combined systolic and diastolic CHF (congestive heart failure) (HCC)  Management per primary           Cardiac arrest (HCC)  S/p cardiac arrest soon after initial admission at [facility].thought to initially be secondary to circumferential pericardial effusion, as well as acute hypoxia and hypercarbia from respiratory failure.  Of note, no intervention for the effusion indicated at this time by CT surgery with a follow-up TTE discussed after discharge.  Patient also carries diagnosis of chronic combined systolic and diastolic heart failure and atrial fibrillation  Palliative care encounter  Capacity:  Patient not competent on today's encounter    GOC:  Without full capacity, medical decisions revert to his legally appointed guardian, Tere Williamson ([phone]). Tere went on to say it was okay to speak with pt's sister, Katalina, about pt's medical state.      ACP:  None on file     Disposition:  Case management working on LTAC placement. Ongoing discussions w/ [employer] legal consultants, per chart review, \"patient's sister is next of kin and legally allowed to make medical decisions due to patient's guardian being unavailable/unresponsive, however due to financial constraints, disposition must be discussed with guardian.\"  Per CM note, [facility] received [facility]  Patient will need to be off IV Precedex     Follow-up:  Palliative service will continue to follow Marcin and coordinate medical decision making apparatus with help of legal consult service.    Care Coordination:  Reviewed case with: RN, 1:1 PCA     PDMP Review: I have reviewed the patient's controlled substance dispensing history in the Prescription Drug Monitoring Program in " compliance with the [facility] regulations before prescribing any controlled substances.  Insomnia    Current Medications:  Seroquel 150 mg total   Melatonin 3 mg qHS  Agitation  Intermittent agitation - at times pulling at trach tubing  1:1 in place  On/off restraints   Current Medications for agitation:  Haldol 1 mg Q6hrs PRN for agitation, restlessness  Haldol 2mg qHS  With Haldol be mindful of Qtc (10/9 - 417)  Seroquel 150 mg total daily (daily, qHS)  Precedex discontinued   Other recommendations:  Scheduled Haldol  Depakote 250 mg BID  Keppra may possibly worsen agitation  Continue recommend global delirium precautions including:  Establishment of day/night cycle via lights during the day and blinds open.  Please limit interruptions at night as medically appropriate.  Provide glasses/hearing aids as apprioriate.    Minimize deliriogenic medications as able.   Provide reorientation including date on board and visible clock.   Avoid restraints as able, frequent verbal reorientations or patient care sitter as appropriate.    Subjective   Met w/ patient at the bedside. Patient lying in bed, sleeping, on Precedex, out of restraints w/ 1:1 in place. Sitter stated that patient has been calm for her, but he continues to have intermittent agitation.   Last PRN dose of Haldol was used on 10/7.     Objective :  Temp:  [97.2 °F (36.2 °C)-98.2 °F (36.8 °C)] 97.4 °F (36.3 °C)  HR:  [70-86] 76  BP: (104-137)/(41-63) 106/41  Resp:  [14-28] 28  SpO2:  [93 %-100 %] 98 %    Physical Exam  Constitutional:       General: He is sleeping. He is not in acute distress.     Interventions: He is sedated.      Comments: Trach on ventilator   HENT:      Head: Normocephalic and atraumatic.      Mouth/Throat:      Mouth: Mucous membranes are moist.   Cardiovascular:      Rate and Rhythm: Normal rate.   Pulmonary:      Effort: Pulmonary effort is normal. No respiratory distress.   Skin:     General: Skin is warm and dry.      Coloration: Skin is  pale.          Lab Results: I have reviewed the following results:  Lab Results   Component Value Date/Time    SODIUM 143 10/11/2024 05:20 AM    SODIUM 136 08/06/2021 06:22 AM    K 4.1 10/11/2024 05:20 AM    K 4.2 08/06/2021 06:22 AM    BUN 27 (H) 10/11/2024 05:20 AM    BUN 8 08/06/2021 06:22 AM    CREATININE 0.61 10/11/2024 05:20 AM    CREATININE 0.42 (L) 08/06/2021 06:22 AM    GLUC 149 (H) 10/11/2024 05:20 AM    GLUC 87 08/06/2021 06:22 AM    CALCIUM 8.8 10/11/2024 05:20 AM    CALCIUM 9.5 08/06/2021 06:22 AM    AST 17 10/11/2024 05:20 AM    AST 18 08/06/2021 06:22 AM    ALT 12 10/11/2024 05:20 AM    ALT 17 08/06/2021 06:22 AM    ALB 2.8 (L) 10/11/2024 05:20 AM    ALB 3.8 08/06/2021 06:22 AM    TP 6.7 10/11/2024 05:20 AM    TP 7.7 08/06/2021 06:22 AM    EGFR 105 10/11/2024 05:20 AM    EGFR 115 10/02/2020 06:35 AM     Lab Results   Component Value Date/Time    HGB 7.9 (L) 10/11/2024 05:20 AM    WBC 6.62 10/11/2024 05:20 AM     10/11/2024 05:20 AM    INR 1.25 (H) 10/09/2024 05:25 AM    PTT 66 (H) 10/11/2024 06:57 AM     Lab Results   Component Value Date/Time    [id number] 45.059 (H) 09/24/2024 06:34 AM     Administrative Statements   I have spent a total time of 20 minutes in caring for this patient on the day of the visit/encounter including Risks and benefits of tx options, Instructions for management, Patient and family education, Counseling / Coordination of care, Documenting in the medical record, Reviewing / ordering tests, medicine, procedures  , Obtaining or reviewing history  , and Communicating with other healthcare professionals . Topics discussed with the patient / family include symptom assessment and management, medication review, psychosocial support, supportive listening, and anticipatory guidance.    Yulisa Payne

## 2024-10-11 NOTE — PROGRESS NOTES
NEUROLOGY RESIDENCY PROGRESS NOTE     Name: Marcin Sánchez   Age & Sex: 64 y.o. male   MRN: [id number]  Unit/Bed#: [facility] 205-01   Encounter: [id number]    Marcin Sánchez will need follow up in in 6 weeks with epilepsy attending/AP .  He will not require outpatient neurological testing.    Staff message sent.        ASSESSMENT & PLAN     Epilepsy ([facility])  Assessment & Plan  Patient has history of epilepsy, he is on Dilantin 150 mg twice daily and gabapentin 400 mg twice daily.  Neurology has been consulted for episodes of staring spells and decreased responsiveness.  These lasted for about 1 to 2 minutes before patient slowly returns to baseline over 15 to 20 minutes.  Patient's phenytoin levels throughout his admission initially were supratherapeutic and then subtherapeutic without any indication of overt seizures.  Patient's prior seizure semiology is unknown, no notes from neurology are seen from care everywhere.    Patient had a CT head on 9/30/24 which showed the chronic left basal ganglia/caudate infarct without any acute findings.  He had a past video EEG during this admission which showed a left temporal epileptogenicity and moderate diffuse encephalopathy, this was done for 12 hours.    Impression: No events or seizures were captures on vEEG monitoring, can now discontinue with 48+ hours of monitoring completed.  Phenytoin switched to Keppra due to elevated phenytoin levels after albumin correction.      Plan:  -Switched phenytoin to Keppra 1 g q12h  -D/c vEEG  -Seizure precautions  -Stat CT head for any acute neurologic changes and please notify the neurology team  -No further management from Neurology. Please call with questions or concerns              SUBJECTIVE     Patient was seen and examined. No acute events overnight. Nursing did note he was more agitated once Precedex was turned off and did try to pull at his trach collar.  Otherwise no staring spells were noted.        Review of Systems    Unable to perform ROS: Patient nonverbal       OBJECTIVE     Patient ID: Marcin Sánchez is a 64 y.o. male.    Vitals:    10/10/24 2230 10/10/24 2310 10/11/24 0315 10/11/24 0700   BP:  (!) 137/41  (!) 106/41   BP Location:       Pulse:  76     Resp:  (!) 28     Temp:  98.1 °F (36.7 °C)  (!) 97.4 °F (36.3 °C)   TempSrc:  Oral  Axillary   SpO2: 100% 94% 98%    Weight:       Height:          Temperature:   Temp (24hrs), Av.7 °F (36.5 °C), Min:97.2 °F (36.2 °C), Max:98.2 °F (36.8 °C)    Temperature: (!) 97.4 °F (36.3 °C)      Physical Exam  Constitutional:       Appearance: He is normal weight. He is ill-appearing.   HENT:      Mouth/Throat:      Mouth: Mucous membranes are dry.   Eyes:      Extraocular Movements: Extraocular movements intact and EOM normal.      Conjunctiva/sclera: Conjunctivae normal.      Pupils: Pupils are equal, round, and reactive to light.   Neurological:      Mental Status: He is alert.      Deep Tendon Reflexes:      Reflex Scores:       Tricep reflexes are 2+ on the right side and 2+ on the left side.       Bicep reflexes are 2+ on the right side and 2+ on the left side.       Brachioradialis reflexes are 2+ on the right side and 2+ on the left side.       Patellar reflexes are 2+ on the right side and 2+ on the left side.       Achilles reflexes are 2+ on the right side and 2+ on the left side.         Neurologic Exam     Mental Status   Speech: mute   Level of consciousness: alert  Unable to assess orientation.    Comprehension appears normal, he follows commands.     Cranial Nerves     CN II   Visual fields full to confrontation.     CN III, IV, VI   Pupils are equal, round, and reactive to light.  Extraocular motions are normal.     CN VII   Facial expression full, symmetric.     CN XII   Tongue: not atrophic  Tongue deviation: none  Does appear to have some hearing impairment.     Motor Exam   Muscle bulk: decreased  Moves all 4 extremities on command.  Appears to have some  decreased muscle bulk and deconditioning, unable to lift legs off bed however does move both feet.     Sensory Exam     Nods when asked if he feels light touch to all 4 extremities.     Gait, Coordination, and Reflexes     Tremor   Resting tremor: absent    Reflexes   Right brachioradialis: 2+  Left brachioradialis: 2+  Right biceps: 2+  Left biceps: 2+  Right triceps: 2+  Left triceps: 2+  Right patellar: 2+  Left patellar: 2+  Right achilles: 2+  Left achilles: 2+  Right plantar: normal  Left plantar: normal  Right ankle clonus: absent  Left ankle clonus: absent           LABORATORY DATA     Labs: I have personally reviewed pertinent reports.    Results from last 7 days   Lab Units 10/11/24  0520 10/10/24  0527 10/09/24  1018   WBC Thousand/uL 6.62 7.32 9.05   HEMOGLOBIN g/dL 7.9* 7.5* 8.0*   HEMATOCRIT % 26.0* 24.4* 25.2*   PLATELETS Thousands/uL 214 223 205   SEGS PCT % 73 72 77*   MONO PCT % 12 12 12   EOS PCT % 3 3 2      Results from last 7 days   Lab Units 10/11/24  0520 10/10/24  0527 10/09/24  0525 10/08/24  0512 10/07/24  2336 10/07/24  0614   SODIUM mmol/L 143 142 143 143  --  141   POTASSIUM mmol/L 4.1 4.3 4.2 4.6  --  4.3   CHLORIDE mmol/L 99 100 100 101  --  99   CO2 mmol/L 40* 35* 38* 36*  --  37*   CO2, I-STAT mmol/L  --   --   --   --  39*  --    BUN mg/dL 27* 29* 30* 24  --  27*   CREATININE mg/dL 0.61 0.62 0.58* 0.49*  --  0.41*   CALCIUM mg/dL 8.8 8.8 8.4 8.5  --  8.6   ALK PHOS U/L 138*  --   --  131*  --  126*   ALT U/L 12  --   --  10  --  10   AST U/L 17  --   --  18  --  17   GLUCOSE, ISTAT mg/dl  --   --   --   --  137  --      Results from last 7 days   Lab Units 10/11/24  0520 10/10/24  0527 10/09/24  0525   MAGNESIUM mg/dL 2.0 2.0 2.0     Results from last 7 days   Lab Units 10/10/24  0527 10/09/24  0525 10/08/24  0512   PHOSPHORUS mg/dL 3.1 3.3 3.9      Results from last 7 days   Lab Units 10/11/24  0657 10/11/24  0109 10/10/24  1007 10/09/24  1018 10/09/24  0525   INR   --   --   --    --  1.25*   PTT seconds 66* 71* 66*   < >  --     < > = values in this interval not displayed.     Results from last 7 days   Lab Units 10/07/24  2336   LACTIC ACID mmol/L 0.5           IMAGING & DIAGNOSTIC TESTING     Radiology Results: I have personally reviewed pertinent reports.      XR chest portable   Final Result by Rj Perdomo MD (10/09 0856)      1.  Increasing left-sided airspace disease, pneumonia versus pulmonary edema.      2.  Otherwise stable chest with right upper lung opacity and bilateral pleural effusions.            Workstation performed: [id number]         CT soft tissue neck wo contrast   Final Result by E. Alec Schoenberger, MD (10/08 1116)      Tracheostomy in place. No adjacent collection but evaluation is limited due to lack of IV contrast. No mass or adenopathy in the neck.               Workstation performed: [id number]         CT chest wo contrast   Final Result by Xavier Fall MD (10/08 1119)      1.  Moderate to large left and small right pleural effusions, with bibasilar atelectasis. This is superimposed on advanced centrilobular emphysema and diffuse pleural parenchymal scarring throughout the right lung. The pleural effusions have increased    since the most recent prior study.   2.  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   3.  Cholelithiasis.   4.  Moderate anasarca.               Workstation performed: [id number]         XR chest portable ICU   Final Result by Jorge Valladares MD (10/07 0852)      Unchanged diffuse airspace opacities and small bilateral pleural effusions.            Workstation performed: [id number]         XR chest portable   Final Result by Jorge Valladares MD (10/07 0850)      Unchanged diffuse airspace opacities and small bilateral pleural effusions.            Workstation performed: [id number]         XR chest portable   Final Result by Ross Melendez MD (10/05 0824)      Extensive bilateral airspace disease and bilateral  pleural effusions.            Workstation performed: [id number]         CT head without contrast   Final Result by Mau Saab MD (09/30 2324)      No acute intracranial hemorrhage, midline shift, or mass effect. Chronic small vessel ischemic changes and chronic left caudate/basal ganglia infarct.                  Workstation performed: [id number]         XR chest portable ICU   Final Result by Vargas Oconnor MD (09/25 1027)      Persistent bilateral infiltrates and dense opacity at the right apex and pleural effusions without significant change from prior            Workstation performed: [id number]         XR chest portable ICU   Final Result by Nieves Burks MD (09/22 1026)      Tracheostomy projecting over the trachea.      No change in extensive bilateral consolidation.      Question pleural effusions.            Workstation performed: [id number]         XR chest portable   Final Result by Bonnie Castillo MD (09/19 2208)      Tracheostomy tube in adequate position.      Increasing opacity in the left lower lung, which may represent pneumonia or an enlarging left pleural effusion.      Unchanged appearance of the right hemithorax, as detailed above.                  Workstation performed: [id number]         XR chest portable ICU   Final Result by Xavier Fall MD (09/16 1627)      Improved aeration of the right lung, with persistent right hemithoracic volume loss and dense right apical consolidation. There is a also superimposed right pleural effusion and diffuse interstitial lung disease.            Workstation performed: [id number]         XR chest portable ICU   Final Result by Sd Lamb MD (09/14 1344)      1.  Tip of endotracheal tube 3.5 cm above the chery.      2.  Progressive atelectasis of the right lung since 9/10/2024 with further right-sided mediastinal shift.      3.  Small left pleural effusion and moderate size right effusion, increased in size since  9/10/2024.      4.  Pulmonary vascular congestion.            Workstation performed: [id number]         XR chest portable ICU   Final Result by Juanito Lee DO (09/11 0231)      Large right and trace left pleural effusion.            Workstation performed: [id number]         XR chest portable   Final Result by Matt Russ MD (09/09 0949)      Tubes and lines in satisfactory position. No pneumothorax.      Unchanged interstitial edema and small right pleural effusion.      Resident: John Blake I, the attending radiologist, have reviewed the images and agree with the final report above.      Workstation performed: [id number]         XR chest portable    (Results Pending)       Other Diagnostic Testing: I have personally reviewed pertinent reports.      ACTIVE MEDICATIONS       Current Facility-Administered Medications:     albuterol inhalation solution 2.5 mg, TID PRN    bisacodyl (DULCOLAX) rectal suppository 10 mg, Daily PRN    budesonide (PULMICORT) inhalation solution 0.5 mg, Q12H    chlorhexidine (PERIDEX) 0.12 % oral rinse 15 mL, Q12H [place]    doxazosin (CARDURA) tablet 2 mg, Daily    furosemide (LASIX) tablet 40 mg, Daily    gabapentin (NEURONTIN) capsule 400 mg, BID    haloperidol (HALDOL) oral concentrated solution 1 mg, Q6H PRN    haloperidol (HALDOL) oral concentrated solution 2 mg, HS    heparin (porcine) 25,000 units in 0.45% NaCl 250 mL infusion (premix), Titrated, Last Rate: 18 Units/kg/hr (10/11/24 0000)    ipratropium (ATROVENT) 0.02 % inhalation solution 0.5 mg, TID    levalbuterol (XOPENEX) inhalation solution 1.25 mg, TID    levETIRAcetam (KEPPRA) oral solution 1,000 mg, Q12H [place]    levothyroxine (Synthroid) suspension 250 mcg, Early Morning    lidocaine (LMX) 4 % cream, 4x Daily PRN    melatonin tablet 6 mg, HS    metoprolol tartrate (LOPRESSOR) partial tablet 12.5 mg, Q12H [place]    [START ON 10/19/2024] nicotine (NICODERM CQ) 14 mg/24hr TD 24 hr patch 14 mg, Daily    nicotine  (NICODERM CQ) 21 mg/24 hr TD 24 hr patch 1 patch, Daily    [START ON 11/2/2024] nicotine (NICODERM CQ) 7 mg/24hr TD 24 hr patch 7 mg, Daily    oxyCODONE (ROXICODONE) oral solution 2.5 mg, Q6H    oxyCODONE (ROXICODONE) oral solution 5 mg, Q6H PRN    polyethylene glycol (MIRALAX) packet 17 g, BID    sodium chloride 3 % inhalation solution 4 mL, TID    Prior to Admission medications    Medication Sig Start Date End Date Taking? Authorizing Provider   Advair -21 MCG/ACT inhaler  12/19/22   Historical Provider, MD   albuterol (PROVENTIL HFA,VENTOLIN HFA) 90 mcg/act inhaler Inhale 2 puffs every 6 (six) hours as needed for wheezing 2/25/20   Bienvenido Lee MD   apixaban (ELIQUIS) 5 mg Take 1 tablet (5 mg total) by mouth 2 (two) times a day 8/30/24   Malia Donis MD   furosemide (LASIX) 40 mg tablet Take 1 tablet (40 mg total) by mouth daily 8/30/24   Malia Donis MD   gabapentin (NEURONTIN) 400 mg capsule Take 400 mg by mouth 2 (two) times a day      Historical Provider, MD   levalbuterol (XOPENEX) 0.63 mg/3 mL nebulizer solution Take 3 mL (0.63 mg total) by nebulization every 8 (eight) hours as needed for wheezing 8/30/24   Malia Donis MD   metoprolol succinate (TOPROL-XL) 25 mg 24 hr tablet Take 25 mg by mouth daily    Historical Provider, MD   phenytoin (DILANTIN) 100 mg ER capsule Take 100 mg by mouth 2 (two) times a day And 250mg at 5pm    Historical Provider, MD   potassium chloride (Klor-Con M20) 20 mEq tablet Take 1 tablet (20 mEq total) by mouth daily 8/30/24   Malia Donis MD   sodium chloride 1 g tablet Take 1 tablet (1 g total) by mouth 2 (two) times a day with meals 8/30/24   Malia Donis MD   tiotropium (SPIRIVA) 18 mcg inhalation capsule Place 18 mcg into inhaler and inhale daily    Historical Provider, MD   Vitamin D, Cholecalciferol, 1000 UNITS CAPS Take 2 capsules by mouth daily     Historical Provider, MD         VTE Pharmacologic Prophylaxis: VTE covered by:  heparin (porcine),  Intravenous, 18 Units/kg/hr at 10/11/24 0000      VTE Mechanical Prophylaxis: sequential compression device    ======    I have discussed the patient's history, physical exam findings, assessment, and plan in detail with attending, Dr. Young    Thank you for allowing me to participate in the care of your patient, Marcin Sánchez.    Riky Leigh DO  [facility], PGY-2

## 2024-10-11 NOTE — ASSESSMENT & PLAN NOTE
"Capacity:  Patient not competent on today's encounter    GOC:  Without full capacity, medical decisions revert to his legally appointed guardian, Tere Williamson ([phone]). Tere went on to say it was okay to speak with pt's sister, Katalina, about pt's medical state.      ACP:  None on file     Disposition:  Case management working on LTAC placement. Ongoing discussions w/ [facility] legal consultants, per chart review, \"patient's sister is next of kin and legally allowed to make medical decisions due to patient's guardian being unavailable/unresponsive, however due to financial constraints, disposition must be discussed with guardian.\"  Per CM note, [facility] received [facility]  Patient will need to be off IV Precedex     Follow-up:  Palliative service will continue to follow Marcin and coordinate medical decision making apparatus with help of legal consult service.    Care Coordination:  Reviewed case with: RN, 1:1 PCA     PDMP Review: I have reviewed the patient's controlled substance dispensing history in the Prescription Drug Monitoring Program in compliance with the [place] regulations before prescribing any controlled substances.  "

## 2024-10-11 NOTE — PLAN OF CARE
Problem: PAIN - ADULT  Goal: Verbalizes/displays adequate comfort level or baseline comfort level  Description: Interventions:  - Encourage patient to monitor pain and request assistance  - Assess pain using appropriate pain scale  - Administer analgesics based on type and severity of pain and evaluate response  - Implement non-pharmacological measures as appropriate and evaluate response  - Consider cultural and social influences on pain and pain management  - Notify physician/advanced practitioner if interventions unsuccessful or patient reports new pain  Outcome: Progressing     Problem: SAFETY ADULT  Goal: Patient will remain free of falls  Description: INTERVENTIONS:  - Educate patient/family on patient safety including physical limitations  - Instruct patient to call for assistance with activity   - Consult OT/PT to assist with strengthening/mobility   - Keep Call bell within reach  - Keep bed low and locked with side rails adjusted as appropriate  - Keep care items and personal belongings within reach  - Initiate and maintain comfort rounds  - Make Fall Risk Sign visible to staff  - Offer Toileting every  Hours, in advance of need  - Initiate/Maintain alarm  - Obtain necessary fall risk management equipment:   - Apply yellow socks and bracelet for high fall risk patients  - Consider moving patient to room near nurses station  Outcome: Progressing     Problem: SAFETY,RESTRAINT: NV/NON-SELF DESTRUCTIVE BEHAVIOR  Goal: Remains free of harm/injury (restraint for non violent/non self-detsructive behavior)  Description: INTERVENTIONS:  - Instruct patient/family regarding restraint use   - Assess and monitor physiologic and psychological status   - Provide interventions and comfort measures to meet assessed patient needs   - Identify and implement measures to help patient regain control  - Assess readiness for release of restraint   Outcome: Progressing

## 2024-10-12 LAB
ALBUMIN SERPL BCG-MCNC: 2.9 G/DL (ref 3.5–5)
ALP SERPL-CCNC: 156 U/L (ref 34–104)
ALT SERPL W P-5'-P-CCNC: 10 U/L (ref 7–52)
ANION GAP SERPL CALCULATED.3IONS-SCNC: 3 MMOL/L (ref 4–13)
ANION GAP SERPL CALCULATED.3IONS-SCNC: 6 MMOL/L (ref 4–13)
APTT PPP: 66 SECONDS (ref 23–34)
AST SERPL W P-5'-P-CCNC: 19 U/L (ref 13–39)
BASE EX.OXY STD BLDV CALC-SCNC: 88.6 % (ref 60–80)
BASE EXCESS BLDV CALC-SCNC: 11.2 MMOL/L
BASOPHILS # BLD AUTO: 0.07 THOUSANDS/ΜL (ref 0–0.1)
BASOPHILS NFR BLD AUTO: 1 % (ref 0–1)
BILIRUB SERPL-MCNC: 0.58 MG/DL (ref 0.2–1)
BUN SERPL-MCNC: 34 MG/DL (ref 5–25)
BUN SERPL-MCNC: 36 MG/DL (ref 5–25)
CALCIUM ALBUM COR SERPL-MCNC: 9.8 MG/DL (ref 8.3–10.1)
CALCIUM SERPL-MCNC: 8.9 MG/DL (ref 8.4–10.2)
CALCIUM SERPL-MCNC: 8.9 MG/DL (ref 8.4–10.2)
CHLORIDE SERPL-SCNC: 97 MMOL/L (ref 96–108)
CHLORIDE SERPL-SCNC: 97 MMOL/L (ref 96–108)
CO2 SERPL-SCNC: 37 MMOL/L (ref 21–32)
CO2 SERPL-SCNC: 39 MMOL/L (ref 21–32)
CREAT SERPL-MCNC: 0.6 MG/DL (ref 0.6–1.3)
CREAT SERPL-MCNC: 0.62 MG/DL (ref 0.6–1.3)
EOSINOPHIL # BLD AUTO: 0.26 THOUSAND/ΜL (ref 0–0.61)
EOSINOPHIL NFR BLD AUTO: 3 % (ref 0–6)
ERYTHROCYTE [DISTWIDTH] IN BLOOD BY AUTOMATED COUNT: 14.6 % (ref 11.6–15.1)
GFR SERPL CREATININE-BSD FRML MDRD: 104 ML/MIN/1.73SQ M
GFR SERPL CREATININE-BSD FRML MDRD: 106 ML/MIN/1.73SQ M
GLUCOSE SERPL-MCNC: 135 MG/DL (ref 65–140)
GLUCOSE SERPL-MCNC: 141 MG/DL (ref 65–140)
HCO3 BLDV-SCNC: 38.7 MMOL/L (ref 24–30)
HCT VFR BLD AUTO: 25.1 % (ref 36.5–49.3)
HGB BLD-MCNC: 7.8 G/DL (ref 12–17)
IMM GRANULOCYTES # BLD AUTO: 0.05 THOUSAND/UL (ref 0–0.2)
IMM GRANULOCYTES NFR BLD AUTO: 1 % (ref 0–2)
LYMPHOCYTES # BLD AUTO: 0.76 THOUSANDS/ΜL (ref 0.6–4.47)
LYMPHOCYTES NFR BLD AUTO: 10 % (ref 14–44)
MAGNESIUM SERPL-MCNC: 1.9 MG/DL (ref 1.9–2.7)
MAGNESIUM SERPL-MCNC: 2 MG/DL (ref 1.9–2.7)
MCH RBC QN AUTO: 32.2 PG (ref 26.8–34.3)
MCHC RBC AUTO-ENTMCNC: 31.1 G/DL (ref 31.4–37.4)
MCV RBC AUTO: 104 FL (ref 82–98)
MONOCYTES # BLD AUTO: 0.91 THOUSAND/ΜL (ref 0.17–1.22)
MONOCYTES NFR BLD AUTO: 12 % (ref 4–12)
NEUTROPHILS # BLD AUTO: 5.72 THOUSANDS/ΜL (ref 1.85–7.62)
NEUTS SEG NFR BLD AUTO: 73 % (ref 43–75)
NRBC BLD AUTO-RTO: 0 /100 WBCS
O2 CT BLDV-SCNC: 10.8 ML/DL
PCO2 BLDV: 73.2 MM HG (ref 42–50)
PH BLDV: 7.34 [PH] (ref 7.3–7.4)
PHOSPHATE SERPL-MCNC: 3.2 MG/DL (ref 2.3–4.1)
PHOSPHATE SERPL-MCNC: 3.4 MG/DL (ref 2.3–4.1)
PLATELET # BLD AUTO: 223 THOUSANDS/UL (ref 149–390)
PMV BLD AUTO: 9.3 FL (ref 8.9–12.7)
PO2 BLDV: 61.9 MM HG (ref 35–45)
POTASSIUM SERPL-SCNC: 4.5 MMOL/L (ref 3.5–5.3)
POTASSIUM SERPL-SCNC: 4.5 MMOL/L (ref 3.5–5.3)
PROT SERPL-MCNC: 6.9 G/DL (ref 6.4–8.4)
RBC # BLD AUTO: 2.42 MILLION/UL (ref 3.88–5.62)
SODIUM SERPL-SCNC: 139 MMOL/L (ref 135–147)
SODIUM SERPL-SCNC: 140 MMOL/L (ref 135–147)
WBC # BLD AUTO: 7.77 THOUSAND/UL (ref 4.31–10.16)

## 2024-10-12 PROCEDURE — 94640 AIRWAY INHALATION TREATMENT: CPT

## 2024-10-12 PROCEDURE — 83735 ASSAY OF MAGNESIUM: CPT | Performed by: INTERNAL MEDICINE

## 2024-10-12 PROCEDURE — 31502 CHANGE OF WINDPIPE AIRWAY: CPT

## 2024-10-12 PROCEDURE — 85730 THROMBOPLASTIN TIME PARTIAL: CPT | Performed by: INTERNAL MEDICINE

## 2024-10-12 PROCEDURE — 80048 BASIC METABOLIC PNL TOTAL CA: CPT | Performed by: INTERNAL MEDICINE

## 2024-10-12 PROCEDURE — 83735 ASSAY OF MAGNESIUM: CPT

## 2024-10-12 PROCEDURE — 99232 SBSQ HOSP IP/OBS MODERATE 35: CPT | Performed by: INTERNAL MEDICINE

## 2024-10-12 PROCEDURE — 80053 COMPREHEN METABOLIC PANEL: CPT

## 2024-10-12 PROCEDURE — 85025 COMPLETE CBC W/AUTO DIFF WBC: CPT

## 2024-10-12 PROCEDURE — 94760 N-INVAS EAR/PLS OXIMETRY 1: CPT

## 2024-10-12 PROCEDURE — 94669 MECHANICAL CHEST WALL OSCILL: CPT

## 2024-10-12 PROCEDURE — 84100 ASSAY OF PHOSPHORUS: CPT

## 2024-10-12 PROCEDURE — 84100 ASSAY OF PHOSPHORUS: CPT | Performed by: INTERNAL MEDICINE

## 2024-10-12 PROCEDURE — 82805 BLOOD GASES W/O2 SATURATION: CPT

## 2024-10-12 PROCEDURE — 94664 DEMO&/EVAL PT USE INHALER: CPT

## 2024-10-12 RX ORDER — FUROSEMIDE 10 MG/ML
40 INJECTION INTRAMUSCULAR; INTRAVENOUS ONCE
Status: COMPLETED | OUTPATIENT
Start: 2024-10-12 | End: 2024-10-12

## 2024-10-12 RX ORDER — MAGNESIUM SULFATE HEPTAHYDRATE 40 MG/ML
2 INJECTION, SOLUTION INTRAVENOUS ONCE
Status: COMPLETED | OUTPATIENT
Start: 2024-10-12 | End: 2024-10-12

## 2024-10-12 RX ADMIN — NICOTINE 1 PATCH: 21 PATCH, EXTENDED RELEASE TRANSDERMAL at 10:36

## 2024-10-12 RX ADMIN — OXYCODONE HYDROCHLORIDE 5 MG: 5 SOLUTION ORAL at 17:01

## 2024-10-12 RX ADMIN — LEVALBUTEROL HYDROCHLORIDE 1.25 MG: 1.25 SOLUTION RESPIRATORY (INHALATION) at 14:12

## 2024-10-12 RX ADMIN — LEVETIRACETAM 1000 MG: 100 SOLUTION ORAL at 10:35

## 2024-10-12 RX ADMIN — LEVALBUTEROL HYDROCHLORIDE 1.25 MG: 1.25 SOLUTION RESPIRATORY (INHALATION) at 21:04

## 2024-10-12 RX ADMIN — OXYCODONE HYDROCHLORIDE 2.5 MG: 5 SOLUTION ORAL at 05:08

## 2024-10-12 RX ADMIN — OXYCODONE HYDROCHLORIDE 2.5 MG: 5 SOLUTION ORAL at 23:33

## 2024-10-12 RX ADMIN — LEVOTHYROXINE SODIUM 250 MCG: 100 TABLET ORAL at 06:00

## 2024-10-12 RX ADMIN — HEPARIN SODIUM 18 UNITS/KG/HR: 10000 INJECTION, SOLUTION INTRAVENOUS at 16:55

## 2024-10-12 RX ADMIN — FUROSEMIDE 40 MG: 10 INJECTION, SOLUTION INTRAVENOUS at 12:54

## 2024-10-12 RX ADMIN — SODIUM CHLORIDE SOLN NEBU 3% 4 ML: 3 NEBU SOLN at 14:12

## 2024-10-12 RX ADMIN — HALOPERIDOL 4 MG: 2 SOLUTION ORAL at 21:55

## 2024-10-12 RX ADMIN — IPRATROPIUM BROMIDE 0.5 MG: 0.5 SOLUTION RESPIRATORY (INHALATION) at 14:12

## 2024-10-12 RX ADMIN — GABAPENTIN 400 MG: 400 CAPSULE ORAL at 10:34

## 2024-10-12 RX ADMIN — Medication 12.5 MG: at 10:34

## 2024-10-12 RX ADMIN — BUDESONIDE 0.5 MG: 0.5 INHALANT RESPIRATORY (INHALATION) at 08:49

## 2024-10-12 RX ADMIN — POLYETHYLENE GLYCOL 3350 17 G: 17 POWDER, FOR SOLUTION ORAL at 10:34

## 2024-10-12 RX ADMIN — OXYCODONE HYDROCHLORIDE 5 MG: 5 SOLUTION ORAL at 10:33

## 2024-10-12 RX ADMIN — BUDESONIDE 0.5 MG: 0.5 INHALANT RESPIRATORY (INHALATION) at 21:04

## 2024-10-12 RX ADMIN — MAGNESIUM SULFATE HEPTAHYDRATE 2 G: 40 INJECTION, SOLUTION INTRAVENOUS at 20:22

## 2024-10-12 RX ADMIN — GABAPENTIN 400 MG: 400 CAPSULE ORAL at 17:01

## 2024-10-12 RX ADMIN — SODIUM CHLORIDE SOLN NEBU 3% 4 ML: 3 NEBU SOLN at 08:49

## 2024-10-12 RX ADMIN — CHLORHEXIDINE GLUCONATE 0.12% ORAL RINSE 15 ML: 1.2 LIQUID ORAL at 10:33

## 2024-10-12 RX ADMIN — LEVALBUTEROL HYDROCHLORIDE 1.25 MG: 1.25 SOLUTION RESPIRATORY (INHALATION) at 08:49

## 2024-10-12 RX ADMIN — LEVETIRACETAM 1000 MG: 100 SOLUTION ORAL at 20:23

## 2024-10-12 RX ADMIN — IPRATROPIUM BROMIDE 0.5 MG: 0.5 SOLUTION RESPIRATORY (INHALATION) at 21:04

## 2024-10-12 RX ADMIN — CHLORHEXIDINE GLUCONATE 0.12% ORAL RINSE 15 ML: 1.2 LIQUID ORAL at 20:22

## 2024-10-12 RX ADMIN — POLYETHYLENE GLYCOL 3350 17 G: 17 POWDER, FOR SOLUTION ORAL at 17:01

## 2024-10-12 RX ADMIN — Medication 6 MG: at 21:54

## 2024-10-12 RX ADMIN — IPRATROPIUM BROMIDE 0.5 MG: 0.5 SOLUTION RESPIRATORY (INHALATION) at 08:49

## 2024-10-12 RX ADMIN — DOXAZOSIN 2 MG: 2 TABLET ORAL at 10:33

## 2024-10-12 RX ADMIN — FUROSEMIDE 40 MG: 40 TABLET ORAL at 10:34

## 2024-10-12 RX ADMIN — Medication 12.5 MG: at 20:22

## 2024-10-12 NOTE — PROGRESS NOTES
"  Progress Note - Critical Care/ICU   Name: Marcin Sánchez 64 y.o. male I MRN: [id number]  Unit/Bed#: [facility] 205-01 I Date of Admission: 9/8/2024   Date of Service: 10/12/2024 I Hospital Day: 34       Assessment & Plan   Problem list  Acute on chronic hypercapnic respiratory failure s/p Trach  V Tach Arrest  Shock, resolved  Respiratory acidosis/metabolic alkalosis  Mucous plugging  COPD  Encephalopathy  Anemia of chronic disease  Atrial fibrillation  Metabolic alkalosis  Pericardial effusion  Seizure disorder  Hypothyroidism  AAA  Insomnia  Tobacco use  Hx of lung SCC     Neuro:   #Seizure disorder  Discussed with neurology attending yesterday regarding variable phenytoin level, ultimately decided to switch to keppra 1000 mg BID  Continue Gabapentin 400mg BID  VEEG ongoing, neurology following     #Insomnia  #Agitation  Remained agitated on seroquel and trazodone, both d/cd  Haldol qhs started 10/10, increase as needed  Appreciate palliative care assistance     CV:   #Pericardial effusion   Echo 9/9  \"moderate to large pericardial effusion circumferential to the heart measuring largest along the RV free wall at 2.3 cm. The fluid exhibits no internal echoes. 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 Moderate to severe aortic regurgitation, mild tricuspid regurgitation, dilated ascending aortic root up to 5.4 cm.  On maintenance lasix po daily, maintain euvolemia  Continue to monitor fluid status      #History of AAA  5.4 cm  Monitor hemodynamics; BP goal <130/80  Outpatient follow up     Pulm:  #Acute on Chronic hypoxic and hypercapnic respiratory failure.  #COPD, and acute exacerbation now resolved  Status " post trach 9/19  Tidal volumes improved with VC+  Continue Xopenex and Atrovent TID  Continue pulmicort BID     #Respiratory acidosis  2/2 poor tidal volume and underlying COPD  Adjust vent as tolerated, VC+     #Pleural effusions  L>R  Consider thoracentesis     #History of squamous cell cancer of lung post chemo/radiation 2016, cancer of right mainstem  Noted     GI:   #PEG placed 9/19  Continue tube feeds at goal  Bowel regimen: Senokot, Miralax     :   #Urinary retention  Doxazosin 2mg     #Metabolic alkalosis  Due to diuresis & respiratory acidosis     F/E/N:   F: No maintenance fluids  E: Replete as needed  N: Continue TwoCalHN bolus feeds, consider adjustment now that he's off phenytoin     Heme/Onc:   #Anemia of chronic disease  Transfuse for Hgb <7 and/or symptomatic anemia     #DVT ppx  SCDs and heparin     Endo:   #Hypothyroidism  Home med: Levothyroxine 250 mg  TSH 45.06 on 9/25 from 0.59 on 9/5, free T4 0.34 (9/25)  Restarted on home levothyroxine 250 mg  Recheck in 10/31     ID:   #Colonization with Acinetobacter  Continue to monitor WBC count and temperature curve     MSK/Skin:   #At risk for pressure injury   PT/OT when able  Frequent turns/repositioning  Skin surveillance      L: midline, PEG  D: none  A: VC+ 350/8/16/40%      In discussion with [employer] legal consultants, patient's sister is next of kin and legally allowed to make medical decisions due to patient's guardian being unavailable/unresponsive, however due to financial constraints, disposition must be discussed with guardian.  ICU Core Measures     Vented Patient  VAP Bundle  VAP bundle ordered     A: Assess, Prevent, and Manage Pain Has pain been assessed? Yes  Need for changes to pain regimen? No   B: Both Spontaneous Awakening Trials (SATs) and Spontaneous Breathing Trials (SBTs) Plan to perform spontaneous awakening trial today? Yes   Plan to perform spontaneous breathing trial today? Yes   Obvious barriers to extubation? Yes   C:  Choice of Sedation RASS Goal: 0 Alert and Calm  Need for changes to sedation or analgesia regimen? No   D: Delirium CAM-ICU: Negative   E: Early Mobility  Plan for early mobility? Yes   F: Family Engagement Plan for family engagement today? Yes       Review of Invasive Devices:            Prophylaxis:  VTE VTE covered by:  heparin (porcine), Intravenous, 18 Units/kg/hr at 10/12/24 0917       Stress Ulcer  not ordered         24 Hour Events : Patient pulled tracheotomy, RRT called at 1815, and a new tracheotomy tube was successfully inserted without incident. Restraints reapplied and new 1:1 ordered. He was placed back on the vent and weaned overnight back to his previous settings.     Subjective   Review of Systems: See HPI for Review of Systems    Objective :                   Vitals I/O      Most Recent Min/Max in 24hrs   Temp (!) 97.4 °F (36.3 °C) Temp  Min: 97.4 °F (36.3 °C)  Max: 99.8 °F (37.7 °C)   Pulse 76 Pulse  Min: 58  Max: 92   Resp 18 Resp  Min: 16  Max: 32   BP (!) 174/58 BP  Min: 114/42  Max: 188/85   O2 Sat 97 % SpO2  Min: 50 %  Max: 100 %      Intake/Output Summary (Last 24 hours) at 10/12/2024 1040  Last data filed at 10/12/2024 0501  Gross per 24 hour   Intake 2228.3 ml   Output 250 ml   Net 1978.3 ml       Diet Enteral/Parenteral; Tube Feeding No Oral Diet; Two Mirza HN; Cyclic; 45; 22 hours; Prosource Protein Liquid - One Packet; BID; 100; Water; Every 4 hours    Invasive Monitoring           Physical Exam   Physical Exam  Eyes:      General: Vision grossly intact.      Extraocular Movements: Extraocular movements intact.      Pupils: Pupils are equal, round, and reactive to light.   Skin:     General: Skin is warm and dry.   HENT:      Head: Normocephalic and atraumatic.      Nose: No congestion.      Mouth/Throat:      Mouth: Mucous membranes are dry.   Neck:      Trachea: Tracheostomy present.   Cardiovascular:      Rate and Rhythm: Normal rate and regular rhythm.      Pulses: Normal pulses.       Heart sounds: Normal heart sounds.   Abdominal: General: Bowel sounds are normal. There is abdominal type feeding tube.     Palpations: Abdomen is rigid.   Constitutional:       General: He is not in acute distress.     Appearance: He is well-developed. He is not ill-appearing.   Pulmonary:      Effort: Pulmonary effort is normal.      Breath sounds: Rales present.   Neurological:      Mental Status: He is calm.   Genitourinary/Anorectal:  external catheter present.        Diagnostic Studies        Lab Results: I have reviewed the following results:     Medications:  Scheduled PRN   budesonide, 0.5 mg, Q12H  chlorhexidine, 15 mL, Q12H AMERICA  doxazosin, 2 mg, Daily  furosemide, 40 mg, Daily  gabapentin, 400 mg, BID  haloperidol, 4 mg, HS  ipratropium, 0.5 mg, TID  levalbuterol, 1.25 mg, TID  levETIRAcetam, 1,000 mg, Q12H AMERICA  levothyroxine, 250 mcg, Early Morning  melatonin, 6 mg, HS  metoprolol tartrate, 12.5 mg, Q12H AMERICA  [START ON 10/19/2024] nicotine, 14 mg, Daily  nicotine, 1 patch, Daily  [START ON 11/2/2024] nicotine, 7 mg, Daily  oxyCODONE, 2.5 mg, Q6H  polyethylene glycol, 17 g, BID  sodium chloride, 4 mL, TID      albuterol, 2.5 mg, TID PRN  bisacodyl, 10 mg, Daily PRN  haloperidol, 1 mg, Q6H PRN  lidocaine, , 4x Daily PRN  oxyCODONE, 5 mg, Q6H PRN       Continuous    heparin (porcine), 3-20 Units/kg/hr (Order-Specific), Last Rate: 18 Units/kg/hr (10/12/24 0917)         Labs:   CBC    Recent Labs     10/11/24  0520 10/12/24  0532   WBC 6.62 7.77   HGB 7.9* 7.8*   HCT 26.0* 25.1*    223     BMP    Recent Labs     10/11/24  0520 10/12/24  0532   SODIUM 143 140   K 4.1 4.5   CL 99 97   CO2 40* 37*   AGAP 4 6   BUN 27* 34*   CREATININE 0.61 0.60   CALCIUM 8.8 8.9       Coags    Recent Labs     10/11/24  0657 10/12/24  0532   PTT 66* 66*        Additional Electrolytes  Recent Labs     10/11/24  0520 10/12/24  0532   MG 2.0 2.0   PHOS  --  3.4          Blood Gas    No recent results  Recent Labs      10/12/24  0532   PHVEN 7.341   [id number] 73.2*   PO2VEN 61.9*   [id number] 38.7*   BEVEN 11.2   [id number] 88.6*    LFTs  Recent Labs     10/11/24  0520 10/12/24  0532   ALT 12 10   AST 17 19   ALKPHOS 138* 156*   ALB 2.8* 2.9*   TBILI 0.57 0.58       Infectious  No recent results  Glucose  Recent Labs     10/11/24  0520 10/12/24  0532   GLUC 149* 141*

## 2024-10-12 NOTE — PLAN OF CARE
Problem: PAIN - ADULT  Goal: Verbalizes/displays adequate comfort level or baseline comfort level  Description: Interventions:  - Encourage patient to monitor pain and request assistance  - Assess pain using appropriate pain scale  - Administer analgesics based on type and severity of pain and evaluate response  - Implement non-pharmacological measures as appropriate and evaluate response  - Consider cultural and social influences on pain and pain management  - Notify physician/advanced practitioner if interventions unsuccessful or patient reports new pain  10/12/2024 1001 by Kvng Silverman  Outcome: Progressing  10/12/2024 1001 by Kvng Silverman  Outcome: Progressing     Problem: SAFETY ADULT  Goal: Patient will remain free of falls  Description: INTERVENTIONS:  - Educate patient/family on patient safety including physical limitations  - Instruct patient to call for assistance with activity   - Consult OT/PT to assist with strengthening/mobility   - Keep Call bell within reach  - Keep bed low and locked with side rails adjusted as appropriate  - Keep care items and personal belongings within reach  - Initiate and maintain comfort rounds  - Make Fall Risk Sign visible to staff  - Offer Toileting every 2 Hours, in advance of need  - Initiate/Maintain bed alarm  - Obtain necessary fall risk management equipment  - Apply yellow socks and bracelet for high fall risk patients  - Consider moving patient to room near nurses station  Outcome: Progressing

## 2024-10-12 NOTE — RAPID RESPONSE
Rapid Response Note  Marcin Sánchez 64 y.o. male MRN: [id number]  [facility] Encounter: [id number]    Rapid Response Notification(s):   Response called date/time:  10/11/2024 6:15 PM  Response team arrival date/time:  10/11/2024 6:15 PM  Response end date/time:  10/11/2024 6:35 PM  Level of care:  Stepdown 1  Rapid response location:  Stepdown unit  Primary reason for rapid response call:  Acute change in O2 sat    Rapid Response Intervention(s):   Airway:  Tracheostomy  Breathing:  Assisted ventilation and oxygen  Circulation:  None  Fluids administered:  None  Medications administered:  None       Assessment:   Acute hypoxic respiratory failure  Tracheostomy removal  Possible aspiration    Plan:   Tracheostomy successfully replaced at bedside  Place back on vent at prior settings  CXR for concern for aspiration  Restart physical restraints     Rapid Response Outcome:   Transfer:  Remain on floor  Code Status: Level 1 (Full Code)      Family notified: Family has not been notified     Background/Situation:   Marcin Sánchez is a 64 y.o. male w/ hx of COPD, seizure disorder who has had a prolonged hospital course due to acute hypoxic and hypercapnic respiratory failure s/p trach and peg, hospital course complicated by cardiac arrest, pneumonia, and agitation. RRT was called at 1815 and patient was found in bed with hypoxia and tracheostomy removed. Patient was bagged and O2 saturations improved. Once attending arrived, decision was made to attempt to insert new trach which was successful without incident. He was subsequently placed back on the ventilator.     Review of Systems   Reason unable to perform ROS: patient obtunded.       Objective:   Vitals:    10/11/24 1845 10/11/24 1850 10/11/24 2004 10/11/24 2016   BP: (!) 185/78 (!) 188/85     BP Location:       Pulse: 82 82     Resp: 16 18     Temp:       TempSrc:       SpO2: 92% (!) 88% 100% 99%   Weight:       Height:         Physical Exam  Vitals and  nursing note reviewed.   Constitutional:       General: He is in acute distress.      Appearance: He is well-developed. He is ill-appearing.   HENT:      Head: Normocephalic and atraumatic.      Mouth/Throat:      Mouth: Mucous membranes are dry.   Eyes:      Conjunctiva/sclera: Conjunctivae normal.   Cardiovascular:      Rate and Rhythm: Regular rhythm. Tachycardia present.      Heart sounds: No murmur heard.  Pulmonary:      Effort: Respiratory distress present.   Abdominal:      Palpations: Abdomen is soft.      Tenderness: There is no abdominal tenderness.   Musculoskeletal:         General: No swelling.      Cervical back: Neck supple.   Skin:     General: Skin is warm and dry.      Capillary Refill: Capillary refill takes less than 2 seconds.

## 2024-10-12 NOTE — NUTRITION
Nutrition Follow-Up:       10/11/24 4933   Recommendations/Interventions   Intervention Comments Adjusted TF to cyclic x22 hrs d/t hold time for levothyroxine. TwoCalHN @ 45mL/hr x22 hrs/day + one packet Prosource Liquid Protein BID will provide 2100 kcals, 112g protein, 813mL water in TF formula plus flushes for Prosource.   Recommendations to Provider Adjustments in additional free water flushes per critical care.

## 2024-10-13 LAB
ANION GAP SERPL CALCULATED.3IONS-SCNC: 3 MMOL/L (ref 4–13)
APTT PPP: 54 SECONDS (ref 23–34)
APTT PPP: 65 SECONDS (ref 23–34)
APTT PPP: 71 SECONDS (ref 23–34)
BACTERIA BLD CULT: NORMAL
BACTERIA BLD CULT: NORMAL
BASE EX.OXY STD BLDV CALC-SCNC: 82.9 % (ref 60–80)
BASE EXCESS BLDV CALC-SCNC: 14 MMOL/L
BASOPHILS # BLD AUTO: 0.07 THOUSANDS/ΜL (ref 0–0.1)
BASOPHILS NFR BLD AUTO: 1 % (ref 0–1)
BUN SERPL-MCNC: 35 MG/DL (ref 5–25)
CALCIUM SERPL-MCNC: 8.8 MG/DL (ref 8.4–10.2)
CHLORIDE SERPL-SCNC: 96 MMOL/L (ref 96–108)
CO2 SERPL-SCNC: 40 MMOL/L (ref 21–32)
CREAT SERPL-MCNC: 0.63 MG/DL (ref 0.6–1.3)
EOSINOPHIL # BLD AUTO: 0.25 THOUSAND/ΜL (ref 0–0.61)
EOSINOPHIL NFR BLD AUTO: 3 % (ref 0–6)
ERYTHROCYTE [DISTWIDTH] IN BLOOD BY AUTOMATED COUNT: 14.6 % (ref 11.6–15.1)
GFR SERPL CREATININE-BSD FRML MDRD: 104 ML/MIN/1.73SQ M
GLUCOSE SERPL-MCNC: 146 MG/DL (ref 65–140)
HCO3 BLDV-SCNC: 40.9 MMOL/L (ref 24–30)
HCT VFR BLD AUTO: 24.4 % (ref 36.5–49.3)
HGB BLD-MCNC: 7.9 G/DL (ref 12–17)
IMM GRANULOCYTES # BLD AUTO: 0.04 THOUSAND/UL (ref 0–0.2)
IMM GRANULOCYTES NFR BLD AUTO: 1 % (ref 0–2)
LYMPHOCYTES # BLD AUTO: 0.9 THOUSANDS/ΜL (ref 0.6–4.47)
LYMPHOCYTES NFR BLD AUTO: 11 % (ref 14–44)
MAGNESIUM SERPL-MCNC: 2.3 MG/DL (ref 1.9–2.7)
MCH RBC QN AUTO: 34.1 PG (ref 26.8–34.3)
MCHC RBC AUTO-ENTMCNC: 32.4 G/DL (ref 31.4–37.4)
MCV RBC AUTO: 105 FL (ref 82–98)
MONOCYTES # BLD AUTO: 0.87 THOUSAND/ΜL (ref 0.17–1.22)
MONOCYTES NFR BLD AUTO: 11 % (ref 4–12)
NEUTROPHILS # BLD AUTO: 6.16 THOUSANDS/ΜL (ref 1.85–7.62)
NEUTS SEG NFR BLD AUTO: 73 % (ref 43–75)
NRBC BLD AUTO-RTO: 0 /100 WBCS
O2 CT BLDV-SCNC: 10 ML/DL
PCO2 BLDV: 69.8 MM HG (ref 42–50)
PH BLDV: 7.39 [PH] (ref 7.3–7.4)
PHOSPHATE SERPL-MCNC: 3.2 MG/DL (ref 2.3–4.1)
PLATELET # BLD AUTO: 235 THOUSANDS/UL (ref 149–390)
PMV BLD AUTO: 9.3 FL (ref 8.9–12.7)
PO2 BLDV: 49.5 MM HG (ref 35–45)
POTASSIUM SERPL-SCNC: 4.3 MMOL/L (ref 3.5–5.3)
RBC # BLD AUTO: 2.32 MILLION/UL (ref 3.88–5.62)
SODIUM SERPL-SCNC: 139 MMOL/L (ref 135–147)
WBC # BLD AUTO: 8.29 THOUSAND/UL (ref 4.31–10.16)

## 2024-10-13 PROCEDURE — 94003 VENT MGMT INPAT SUBQ DAY: CPT

## 2024-10-13 PROCEDURE — 80048 BASIC METABOLIC PNL TOTAL CA: CPT

## 2024-10-13 PROCEDURE — 95718 EEG PHYS/QHP 2-12 HR W/VEEG: CPT | Performed by: PSYCHIATRY & NEUROLOGY

## 2024-10-13 PROCEDURE — 99232 SBSQ HOSP IP/OBS MODERATE 35: CPT | Performed by: INTERNAL MEDICINE

## 2024-10-13 PROCEDURE — 84100 ASSAY OF PHOSPHORUS: CPT

## 2024-10-13 PROCEDURE — 85730 THROMBOPLASTIN TIME PARTIAL: CPT | Performed by: INTERNAL MEDICINE

## 2024-10-13 PROCEDURE — 94669 MECHANICAL CHEST WALL OSCILL: CPT

## 2024-10-13 PROCEDURE — 31502 CHANGE OF WINDPIPE AIRWAY: CPT

## 2024-10-13 PROCEDURE — 85025 COMPLETE CBC W/AUTO DIFF WBC: CPT

## 2024-10-13 PROCEDURE — 83735 ASSAY OF MAGNESIUM: CPT

## 2024-10-13 PROCEDURE — 94640 AIRWAY INHALATION TREATMENT: CPT

## 2024-10-13 PROCEDURE — 94760 N-INVAS EAR/PLS OXIMETRY 1: CPT

## 2024-10-13 PROCEDURE — 82805 BLOOD GASES W/O2 SATURATION: CPT

## 2024-10-13 RX ORDER — HALOPERIDOL 2 MG/ML
5 SOLUTION ORAL
Status: DISCONTINUED | OUTPATIENT
Start: 2024-10-13 | End: 2024-10-18 | Stop reason: HOSPADM

## 2024-10-13 RX ORDER — HALOPERIDOL 5 MG/ML
5 INJECTION INTRAMUSCULAR EVERY 6 HOURS PRN
Status: DISCONTINUED | OUTPATIENT
Start: 2024-10-13 | End: 2024-10-15

## 2024-10-13 RX ADMIN — Medication 12.5 MG: at 10:05

## 2024-10-13 RX ADMIN — LEVALBUTEROL HYDROCHLORIDE 1.25 MG: 1.25 SOLUTION RESPIRATORY (INHALATION) at 20:21

## 2024-10-13 RX ADMIN — GABAPENTIN 400 MG: 400 CAPSULE ORAL at 10:05

## 2024-10-13 RX ADMIN — BUDESONIDE 0.5 MG: 0.5 INHALANT RESPIRATORY (INHALATION) at 08:04

## 2024-10-13 RX ADMIN — OXYCODONE HYDROCHLORIDE 2.5 MG: 5 SOLUTION ORAL at 22:36

## 2024-10-13 RX ADMIN — LEVETIRACETAM 1000 MG: 100 SOLUTION ORAL at 10:06

## 2024-10-13 RX ADMIN — IPRATROPIUM BROMIDE 0.5 MG: 0.5 SOLUTION RESPIRATORY (INHALATION) at 08:03

## 2024-10-13 RX ADMIN — LEVALBUTEROL HYDROCHLORIDE 1.25 MG: 1.25 SOLUTION RESPIRATORY (INHALATION) at 08:04

## 2024-10-13 RX ADMIN — SODIUM CHLORIDE SOLN NEBU 3% 4 ML: 3 NEBU SOLN at 08:04

## 2024-10-13 RX ADMIN — OXYCODONE HYDROCHLORIDE 5 MG: 5 SOLUTION ORAL at 15:05

## 2024-10-13 RX ADMIN — BUDESONIDE 0.5 MG: 0.5 INHALANT RESPIRATORY (INHALATION) at 20:21

## 2024-10-13 RX ADMIN — CHLORHEXIDINE GLUCONATE 0.12% ORAL RINSE 15 ML: 1.2 LIQUID ORAL at 10:06

## 2024-10-13 RX ADMIN — Medication 12.5 MG: at 22:37

## 2024-10-13 RX ADMIN — LEVALBUTEROL HYDROCHLORIDE 1.25 MG: 1.25 SOLUTION RESPIRATORY (INHALATION) at 14:19

## 2024-10-13 RX ADMIN — CHLORHEXIDINE GLUCONATE 0.12% ORAL RINSE 15 ML: 1.2 LIQUID ORAL at 20:32

## 2024-10-13 RX ADMIN — POLYETHYLENE GLYCOL 3350 17 G: 17 POWDER, FOR SOLUTION ORAL at 17:51

## 2024-10-13 RX ADMIN — SODIUM CHLORIDE SOLN NEBU 3% 4 ML: 3 NEBU SOLN at 14:18

## 2024-10-13 RX ADMIN — DOXAZOSIN 2 MG: 2 TABLET ORAL at 10:05

## 2024-10-13 RX ADMIN — Medication 6 MG: at 22:37

## 2024-10-13 RX ADMIN — OXYCODONE HYDROCHLORIDE 2.5 MG: 5 SOLUTION ORAL at 05:34

## 2024-10-13 RX ADMIN — POLYETHYLENE GLYCOL 3350 17 G: 17 POWDER, FOR SOLUTION ORAL at 10:05

## 2024-10-13 RX ADMIN — FUROSEMIDE 40 MG: 40 TABLET ORAL at 10:05

## 2024-10-13 RX ADMIN — GABAPENTIN 400 MG: 400 CAPSULE ORAL at 17:51

## 2024-10-13 RX ADMIN — LEVETIRACETAM 1000 MG: 100 SOLUTION ORAL at 22:39

## 2024-10-13 RX ADMIN — IPRATROPIUM BROMIDE 0.5 MG: 0.5 SOLUTION RESPIRATORY (INHALATION) at 14:19

## 2024-10-13 RX ADMIN — HALOPERIDOL 5 MG: 2 SOLUTION ORAL at 22:37

## 2024-10-13 RX ADMIN — HEPARIN SODIUM 20 UNITS/KG/HR: 10000 INJECTION, SOLUTION INTRAVENOUS at 14:05

## 2024-10-13 RX ADMIN — IPRATROPIUM BROMIDE 0.5 MG: 0.5 SOLUTION RESPIRATORY (INHALATION) at 20:21

## 2024-10-13 RX ADMIN — NICOTINE 1 PATCH: 21 PATCH, EXTENDED RELEASE TRANSDERMAL at 10:06

## 2024-10-13 RX ADMIN — LEVOTHYROXINE SODIUM 250 MCG: 100 TABLET ORAL at 05:34

## 2024-10-13 NOTE — PROGRESS NOTES
"Assessment & Plan   Problem list  Acute on chronic hypercapnic respiratory failure s/p Trach  V Tach Arrest  Shock, resolved  Respiratory acidosis/metabolic alkalosis  Mucous plugging  COPD  Encephalopathy  Anemia of chronic disease  Atrial fibrillation  Metabolic alkalosis  Pericardial effusion  Seizure disorder  Hypothyroidism  AAA  Insomnia  Tobacco use  Hx of lung SCC     Neuro:   #Seizure disorder  Continue keppra 1000 mg bid  Continue Gabapentin 400mg BID  VEEG negative     #Insomnia  #Agitation  Remained agitated on seroquel and trazodone, both d/cd  Haldol qhs and prn  Appreciate palliative care assistance     CV:   #Pericardial effusion   Echo 9/9  \"moderate to large pericardial effusion circumferential to the heart measuring largest along the RV free wall at 2.3 cm. The fluid exhibits no internal echoes. 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 Moderate to severe aortic regurgitation, mild tricuspid regurgitation, dilated ascending aortic root up to 5.4 cm.  On maintenance lasix po daily, maintain euvolemia  Continue to monitor fluid status      #History of AAA  5.4 cm  Monitor hemodynamics; BP goal <130/80  Outpatient follow up     Pulm:  #Acute on Chronic hypoxic and hypercapnic respiratory failure.  #COPD, and acute exacerbation now resolved  Status post trach 9/19  Tidal volumes improved with VC+  Continue Xopenex and Atrovent TID  Continue pulmicort BID     #Respiratory acidosis  2/2 poor tidal volume and underlying COPD  Adjust vent as tolerated, VC+     #Pleural effusions  L>R  Consider thoracentesis     #History of squamous cell cancer of lung post chemo/radiation 2016, cancer of right " mainstem  Noted     GI:   #PEG placed 9/19  Continue tube feeds at goal  Bowel regimen: Senokot, Miralax     :   #Urinary retention  Doxazosin 2mg     #Metabolic alkalosis  Due to diuresis & respiratory acidosis     F/E/N:   F: No maintenance fluids  E: Replete as needed  N: Continue TwoCalHN continuous     Heme/Onc:   #Anemia of chronic disease  Transfuse for Hgb <7 and/or symptomatic anemia     #DVT ppx  SCDs and heparin     Endo:   #Hypothyroidism  Home med: Levothyroxine 250 mg  TSH 45.06 on 9/25 from 0.59 on 9/5, free T4 0.34 (9/25)  Restarted on home levothyroxine 250 mg  Recheck in 10/31     ID:   #Colonization with Acinetobacter  Continue to monitor WBC count and temperature curve     MSK/Skin:   #At risk for pressure injury   PT/OT when able  Frequent turns/repositioning  Skin surveillance      L: midline, PEG  D: none  A: VC+ 350/8/16/40%      In discussion with [employer] legal consultants, patient's sister is next of kin and legally allowed to make medical decisions due to patient's guardian being unavailable/unresponsive, however due to financial constraints, disposition must be discussed with guardian.    ICU Core Measures     Vented Patient  VAP Bundle  VAP bundle ordered     A: Assess, Prevent, and Manage Pain Has pain been assessed? Yes  Need for changes to pain regimen? No   B: Both Spontaneous Awakening Trials (SATs) and Spontaneous Breathing Trials (SBTs) Plan to perform spontaneous awakening trial today? N/A   Plan to perform spontaneous breathing trial today? Yes   Obvious barriers to extubation? Yes   C: Choice of Sedation RASS Goal: 0 Alert and Calm or N/A patient not on sedation  Need for changes to sedation or analgesia regimen? No   D: Delirium CAM-ICU: Positive   E: Early Mobility  Plan for early mobility? Yes   F: Family Engagement Plan for family engagement today? Yes       Review of Invasive Devices:            Prophylaxis:  VTE VTE covered by:  heparin (porcine), Intravenous, 18  Units/kg/hr at 10/12/24 1655       Stress Ulcer  not ordered         24 Hour Events : no acute events  Subjective  Resting in bed comfortably, asked to stretch his arms. With assistance from nursing, restraints removed and allowed to stretch. Restraints placed back on.       Objective :                   Vitals I/O      Most Recent Min/Max in 24hrs   Temp 97.5 °F (36.4 °C) Temp  Min: 97.4 °F (36.3 °C)  Max: 99.2 °F (37.3 °C)   Pulse 80 Pulse  Min: 72  Max: 88   Resp 22 Resp  Min: 19  Max: 32   /68 BP  Min: 121/44  Max: 174/58   O2 Sat 97 % SpO2  Min: 95 %  Max: 98 %      Intake/Output Summary (Last 24 hours) at 10/13/2024 0752  Last data filed at 10/13/2024 0523  Gross per 24 hour   Intake 2208.81 ml   Output 1550 ml   Net 658.81 ml       Diet Enteral/Parenteral; Tube Feeding No Oral Diet; Two Mirza HN; Cyclic; 45; 22 hours; Prosource Protein Liquid - One Packet; BID; 100; Water; Every 4 hours    Invasive Monitoring           Physical Exam   Physical Exam  Vitals and nursing note reviewed.   Eyes:      Extraocular Movements: Extraocular movements intact.      Conjunctiva/sclera: Conjunctivae normal.   Skin:     General: Skin is warm and dry.   HENT:      Head: Normocephalic and atraumatic.      Mouth/Throat:      Mouth: Mucous membranes are dry.   Cardiovascular:      Rate and Rhythm: Normal rate and regular rhythm.      Pulses: Normal pulses.   Musculoskeletal:      Right lower leg: No edema.      Left lower leg: No edema.   Abdominal: General: Bowel sounds are normal. There is abdominal type feeding tube.     Palpations: Abdomen is soft.   Constitutional:       General: He is not in acute distress.     Appearance: He is well-developed. He is not ill-appearing.   Pulmonary:      Effort: Pulmonary effort is normal.      Comments: Bronchial breath sounds  Neurological:      General: No focal deficit present.      Mental Status: He is alert. He is calm.          Diagnostic Studies        Lab Results: I have  reviewed the following results:     Medications:  Scheduled PRN   budesonide, 0.5 mg, Q12H  chlorhexidine, 15 mL, Q12H AMERICA  doxazosin, 2 mg, Daily  furosemide, 40 mg, Daily  gabapentin, 400 mg, BID  haloperidol, 4 mg, HS  ipratropium, 0.5 mg, TID  levalbuterol, 1.25 mg, TID  levETIRAcetam, 1,000 mg, Q12H AMERICA  levothyroxine, 250 mcg, Early Morning  melatonin, 6 mg, HS  metoprolol tartrate, 12.5 mg, Q12H AMERICA  [START ON 10/19/2024] nicotine, 14 mg, Daily  nicotine, 1 patch, Daily  [START ON 11/2/2024] nicotine, 7 mg, Daily  oxyCODONE, 2.5 mg, Q6H  polyethylene glycol, 17 g, BID  sodium chloride, 4 mL, TID      albuterol, 2.5 mg, TID PRN  bisacodyl, 10 mg, Daily PRN  haloperidol, 1 mg, Q6H PRN  lidocaine, , 4x Daily PRN  oxyCODONE, 5 mg, Q6H PRN       Continuous    heparin (porcine), 3-20 Units/kg/hr (Order-Specific), Last Rate: 18 Units/kg/hr (10/12/24 1655)         Labs:   CBC    Recent Labs     10/12/24  0532 10/13/24  0524   WBC 7.77 8.29   HGB 7.8* 7.9*   HCT 25.1* 24.4*    235     BMP    Recent Labs     10/12/24  2010 10/13/24  0524   SODIUM 139 139   K 4.5 4.3   CL 97 96   CO2 39* 40*   AGAP 3* 3*   BUN 36* 35*   CREATININE 0.62 0.63   CALCIUM 8.9 8.8       Coags    Recent Labs     10/12/24  0532 10/13/24  0533   PTT 66* 54*        Additional Electrolytes  Recent Labs     10/12/24  2010 10/13/24  0524   MG 1.9 2.3   PHOS 3.2 3.2          Blood Gas    No recent results  Recent Labs     10/13/24  0524   PHVEN 7.386   MUT2MAL 69.8*   PO2VEN 49.5*   [id number] 40.9*   BEVEN 14.0   S2LCKDL 82.9*    LFTs  Recent Labs     10/12/24  0532   ALT 10   AST 19   ALKPHOS 156*   ALB 2.9*   TBILI 0.58       Infectious  No recent results  Glucose  Recent Labs     10/12/24  0532 10/12/24  2010 10/13/24  0524   GLUC 141* 135 146*

## 2024-10-14 LAB
ANION GAP SERPL CALCULATED.3IONS-SCNC: 2 MMOL/L (ref 4–13)
ANION GAP SERPL CALCULATED.3IONS-SCNC: 4 MMOL/L (ref 4–13)
APTT PPP: 70 SECONDS (ref 23–34)
BASOPHILS # BLD AUTO: 0.04 THOUSANDS/ΜL (ref 0–0.1)
BASOPHILS NFR BLD AUTO: 1 % (ref 0–1)
BUN SERPL-MCNC: 33 MG/DL (ref 5–25)
BUN SERPL-MCNC: 35 MG/DL (ref 5–25)
CALCIUM SERPL-MCNC: 8.7 MG/DL (ref 8.4–10.2)
CALCIUM SERPL-MCNC: 8.9 MG/DL (ref 8.4–10.2)
CHLORIDE SERPL-SCNC: 95 MMOL/L (ref 96–108)
CHLORIDE SERPL-SCNC: 96 MMOL/L (ref 96–108)
CO2 SERPL-SCNC: 38 MMOL/L (ref 21–32)
CO2 SERPL-SCNC: 39 MMOL/L (ref 21–32)
CREAT SERPL-MCNC: 0.53 MG/DL (ref 0.6–1.3)
CREAT SERPL-MCNC: 0.56 MG/DL (ref 0.6–1.3)
EOSINOPHIL # BLD AUTO: 0.25 THOUSAND/ΜL (ref 0–0.61)
EOSINOPHIL NFR BLD AUTO: 3 % (ref 0–6)
ERYTHROCYTE [DISTWIDTH] IN BLOOD BY AUTOMATED COUNT: 14.6 % (ref 11.6–15.1)
GFR SERPL CREATININE-BSD FRML MDRD: 109 ML/MIN/1.73SQ M
GFR SERPL CREATININE-BSD FRML MDRD: 111 ML/MIN/1.73SQ M
GLUCOSE SERPL-MCNC: 126 MG/DL (ref 65–140)
GLUCOSE SERPL-MCNC: 141 MG/DL (ref 65–140)
HCT VFR BLD AUTO: 24.9 % (ref 36.5–49.3)
HGB BLD-MCNC: 7.8 G/DL (ref 12–17)
IMM GRANULOCYTES # BLD AUTO: 0.07 THOUSAND/UL (ref 0–0.2)
IMM GRANULOCYTES NFR BLD AUTO: 1 % (ref 0–2)
LYMPHOCYTES # BLD AUTO: 0.78 THOUSANDS/ΜL (ref 0.6–4.47)
LYMPHOCYTES NFR BLD AUTO: 10 % (ref 14–44)
MAGNESIUM SERPL-MCNC: 1.9 MG/DL (ref 1.9–2.7)
MCH RBC QN AUTO: 33.1 PG (ref 26.8–34.3)
MCHC RBC AUTO-ENTMCNC: 31.3 G/DL (ref 31.4–37.4)
MCV RBC AUTO: 106 FL (ref 82–98)
MONOCYTES # BLD AUTO: 0.71 THOUSAND/ΜL (ref 0.17–1.22)
MONOCYTES NFR BLD AUTO: 9 % (ref 4–12)
NEUTROPHILS # BLD AUTO: 5.71 THOUSANDS/ΜL (ref 1.85–7.62)
NEUTS SEG NFR BLD AUTO: 76 % (ref 43–75)
NRBC BLD AUTO-RTO: 0 /100 WBCS
PHOSPHATE SERPL-MCNC: 3.6 MG/DL (ref 2.3–4.1)
PLATELET # BLD AUTO: 248 THOUSANDS/UL (ref 149–390)
PMV BLD AUTO: 9.3 FL (ref 8.9–12.7)
POTASSIUM SERPL-SCNC: 4.4 MMOL/L (ref 3.5–5.3)
POTASSIUM SERPL-SCNC: 4.7 MMOL/L (ref 3.5–5.3)
RBC # BLD AUTO: 2.36 MILLION/UL (ref 3.88–5.62)
SODIUM SERPL-SCNC: 136 MMOL/L (ref 135–147)
SODIUM SERPL-SCNC: 138 MMOL/L (ref 135–147)
WBC # BLD AUTO: 7.56 THOUSAND/UL (ref 4.31–10.16)

## 2024-10-14 PROCEDURE — 99232 SBSQ HOSP IP/OBS MODERATE 35: CPT | Performed by: INTERNAL MEDICINE

## 2024-10-14 PROCEDURE — 85730 THROMBOPLASTIN TIME PARTIAL: CPT | Performed by: INTERNAL MEDICINE

## 2024-10-14 PROCEDURE — 94669 MECHANICAL CHEST WALL OSCILL: CPT

## 2024-10-14 PROCEDURE — 94760 N-INVAS EAR/PLS OXIMETRY 1: CPT

## 2024-10-14 PROCEDURE — 94003 VENT MGMT INPAT SUBQ DAY: CPT

## 2024-10-14 PROCEDURE — 85025 COMPLETE CBC W/AUTO DIFF WBC: CPT

## 2024-10-14 PROCEDURE — 94640 AIRWAY INHALATION TREATMENT: CPT

## 2024-10-14 PROCEDURE — 94664 DEMO&/EVAL PT USE INHALER: CPT

## 2024-10-14 PROCEDURE — 31502 CHANGE OF WINDPIPE AIRWAY: CPT

## 2024-10-14 PROCEDURE — 83735 ASSAY OF MAGNESIUM: CPT

## 2024-10-14 PROCEDURE — 80048 BASIC METABOLIC PNL TOTAL CA: CPT

## 2024-10-14 PROCEDURE — 84100 ASSAY OF PHOSPHORUS: CPT

## 2024-10-14 RX ADMIN — IPRATROPIUM BROMIDE 0.5 MG: 0.5 SOLUTION RESPIRATORY (INHALATION) at 19:07

## 2024-10-14 RX ADMIN — OXYCODONE HYDROCHLORIDE 2.5 MG: 5 SOLUTION ORAL at 13:43

## 2024-10-14 RX ADMIN — Medication 6 MG: at 21:02

## 2024-10-14 RX ADMIN — GABAPENTIN 400 MG: 400 CAPSULE ORAL at 08:58

## 2024-10-14 RX ADMIN — OXYCODONE HYDROCHLORIDE 2.5 MG: 5 SOLUTION ORAL at 18:01

## 2024-10-14 RX ADMIN — GABAPENTIN 400 MG: 400 CAPSULE ORAL at 18:02

## 2024-10-14 RX ADMIN — OXYCODONE HYDROCHLORIDE 2.5 MG: 5 SOLUTION ORAL at 23:52

## 2024-10-14 RX ADMIN — IPRATROPIUM BROMIDE 0.5 MG: 0.5 SOLUTION RESPIRATORY (INHALATION) at 14:13

## 2024-10-14 RX ADMIN — CHLORHEXIDINE GLUCONATE 0.12% ORAL RINSE 15 ML: 1.2 LIQUID ORAL at 08:58

## 2024-10-14 RX ADMIN — HEPARIN SODIUM 20 UNITS/KG/HR: 10000 INJECTION, SOLUTION INTRAVENOUS at 08:59

## 2024-10-14 RX ADMIN — OXYCODONE HYDROCHLORIDE 2.5 MG: 5 SOLUTION ORAL at 05:23

## 2024-10-14 RX ADMIN — BUDESONIDE 0.5 MG: 0.5 INHALANT RESPIRATORY (INHALATION) at 08:23

## 2024-10-14 RX ADMIN — BUDESONIDE 0.5 MG: 0.5 INHALANT RESPIRATORY (INHALATION) at 19:07

## 2024-10-14 RX ADMIN — NICOTINE 1 PATCH: 21 PATCH, EXTENDED RELEASE TRANSDERMAL at 09:02

## 2024-10-14 RX ADMIN — Medication 12.5 MG: at 21:02

## 2024-10-14 RX ADMIN — DOXAZOSIN 2 MG: 2 TABLET ORAL at 08:58

## 2024-10-14 RX ADMIN — SODIUM CHLORIDE SOLN NEBU 3% 4 ML: 3 NEBU SOLN at 14:13

## 2024-10-14 RX ADMIN — LEVALBUTEROL HYDROCHLORIDE 1.25 MG: 1.25 SOLUTION RESPIRATORY (INHALATION) at 19:07

## 2024-10-14 RX ADMIN — LEVOTHYROXINE SODIUM 250 MCG: 100 TABLET ORAL at 05:23

## 2024-10-14 RX ADMIN — LEVALBUTEROL HYDROCHLORIDE 1.25 MG: 1.25 SOLUTION RESPIRATORY (INHALATION) at 14:13

## 2024-10-14 RX ADMIN — SODIUM CHLORIDE SOLN NEBU 3% 4 ML: 3 NEBU SOLN at 08:23

## 2024-10-14 RX ADMIN — IPRATROPIUM BROMIDE 0.5 MG: 0.5 SOLUTION RESPIRATORY (INHALATION) at 08:23

## 2024-10-14 RX ADMIN — FUROSEMIDE 40 MG: 40 TABLET ORAL at 09:02

## 2024-10-14 RX ADMIN — LEVETIRACETAM 1000 MG: 100 SOLUTION ORAL at 22:37

## 2024-10-14 RX ADMIN — SODIUM CHLORIDE SOLN NEBU 3% 4 ML: 3 NEBU SOLN at 19:07

## 2024-10-14 RX ADMIN — Medication 12.5 MG: at 09:01

## 2024-10-14 RX ADMIN — POLYETHYLENE GLYCOL 3350 17 G: 17 POWDER, FOR SOLUTION ORAL at 08:58

## 2024-10-14 RX ADMIN — HALOPERIDOL 5 MG: 2 SOLUTION ORAL at 22:37

## 2024-10-14 RX ADMIN — CHLORHEXIDINE GLUCONATE 0.12% ORAL RINSE 15 ML: 1.2 LIQUID ORAL at 21:03

## 2024-10-14 RX ADMIN — LEVALBUTEROL HYDROCHLORIDE 1.25 MG: 1.25 SOLUTION RESPIRATORY (INHALATION) at 08:23

## 2024-10-14 RX ADMIN — LEVETIRACETAM 1000 MG: 100 SOLUTION ORAL at 13:45

## 2024-10-14 NOTE — PLAN OF CARE
Problem: Prexisting or High Potential for Compromised Skin Integrity  Goal: Skin integrity is maintained or improved  Description: INTERVENTIONS:  - Identify patients at risk for skin breakdown  - Assess and monitor skin integrity  - Assess and monitor nutrition and hydration status  - Monitor labs   - Assess for incontinence   - Turn and reposition patient  - Assist with mobility/ambulation  - Relieve pressure over bony prominences  - Avoid friction and shearing  - Provide appropriate hygiene as needed including keeping skin clean and dry  - Evaluate need for skin moisturizer/barrier cream  - Collaborate with interdisciplinary team   - Patient/family teaching  - Consider wound care consult   Outcome: Progressing     Problem: SAFETY,RESTRAINT: NV/NON-SELF DESTRUCTIVE BEHAVIOR  Goal: Remains free of harm/injury (restraint for non violent/non self-detsructive behavior)  Description: INTERVENTIONS:  - Instruct patient/family regarding restraint use   - Assess and monitor physiologic and psychological status   - Provide interventions and comfort measures to meet assessed patient needs   - Identify and implement measures to help patient regain control  - Assess readiness for release of restraint   Outcome: Progressing  Goal: Returns to optimal restraint-free functioning  Description: INTERVENTIONS:  - Assess the patient's behavior and symptoms that indicate continued need for restraint  - Identify and implement measures to help patient regain control  - Assess readiness for release of restraint   Outcome: Progressing     Problem: PAIN - ADULT  Goal: Verbalizes/displays adequate comfort level or baseline comfort level  Description: Interventions:  - Encourage patient to monitor pain and request assistance  - Assess pain using appropriate pain scale  - Administer analgesics based on type and severity of pain and evaluate response  - Implement non-pharmacological measures as appropriate and evaluate response  - Consider  cultural and social influences on pain and pain management  - Notify physician/advanced practitioner if interventions unsuccessful or patient reports new pain  Outcome: Progressing     Problem: INFECTION - ADULT  Goal: Absence or prevention of progression during hospitalization  Description: INTERVENTIONS:  - Assess and monitor for signs and symptoms of infection  - Monitor lab/diagnostic results  - Monitor all insertion sites, i.e. indwelling lines, tubes, and drains  - Monitor endotracheal if appropriate and nasal secretions for changes in amount and color  - Meraux appropriate cooling/warming therapies per order  - Administer medications as ordered  - Instruct and encourage patient and family to use good hand hygiene technique  - Identify and instruct in appropriate isolation precautions for identified infection/condition  Outcome: Progressing  Goal: Absence of fever/infection during neutropenic period  Description: INTERVENTIONS:  - Monitor WBC    Outcome: Progressing     Problem: SAFETY ADULT  Goal: Patient will remain free of falls  Description: INTERVENTIONS:  - Educate patient/family on patient safety including physical limitations  - Instruct patient to call for assistance with activity   - Consult OT/PT to assist with strengthening/mobility   - Keep Call bell within reach  - Keep bed low and locked with side rails adjusted as appropriate  - Keep care items and personal belongings within reach  - Initiate and maintain comfort rounds  - Make Fall Risk Sign visible to staff  - Offer Toileting every  Hours, in advance of need  - Initiate/Maintain alarm  - Obtain necessary fall risk management equipment:   - Apply yellow socks and bracelet for high fall risk patients  - Consider moving patient to room near nurses station  Outcome: Progressing  Goal: Maintain or return to baseline ADL function  Description: INTERVENTIONS:  -  Assess patient's ability to carry out ADLs; assess patient's baseline for ADL  function and identify physical deficits which impact ability to perform ADLs (bathing, care of mouth/teeth, toileting, grooming, dressing, etc.)  - Assess/evaluate cause of self-care deficits   - Assess range of motion  - Assess patient's mobility; develop plan if impaired  - Assess patient's need for assistive devices and provide as appropriate  - Encourage maximum independence but intervene and supervise when necessary  - Involve family in performance of ADLs  - Assess for home care needs following discharge   - Consider OT consult to assist with ADL evaluation and planning for discharge  - Provide patient education as appropriate  Outcome: Progressing  Goal: Maintains/Returns to pre admission functional level  Description: INTERVENTIONS:  - Perform AM-PAC 6 Click Basic Mobility/ Daily Activity assessment daily.  - Set and communicate daily mobility goal to care team and patient/family/caregiver.   - Collaborate with rehabilitation services on mobility goals if consulted  - Perform Range of Motion  times a day.  - Reposition patient every  hours.  - Dangle patient  times a day  - Stand patient  times a day  - Ambulate patient  times a day  - Out of bed to chair  times a day   - Out of bed for meals times a day  - Out of bed for toileting  - Record patient progress and toleration of activity level   Outcome: Progressing     Problem: DISCHARGE PLANNING  Goal: Discharge to home or other facility with appropriate resources  Description: INTERVENTIONS:  - Identify barriers to discharge w/patient and caregiver  - Arrange for needed discharge resources and transportation as appropriate  - Identify discharge learning needs (meds, wound care, etc.)  - Arrange for interpretive services to assist at discharge as needed  - Refer to Case Management Department for coordinating discharge planning if the patient needs post-hospital services based on physician/advanced practitioner order or complex needs related to functional  status, cognitive ability, or social support system  Outcome: Progressing

## 2024-10-14 NOTE — PROGRESS NOTES
Pastoral Care Progress Note             10/14/24 1100   Clinical Encounter Type   Visited With Patient;Health care provider   Routine Visit Follow-up      follow-up found pt and PA in room.  assisted PA in pt care and pt becoming tired and no needing care.  will continue to follow up on pt and remains available.

## 2024-10-14 NOTE — RESPIRATORY THERAPY NOTE
Resp vent note   10/14/24 0350   Respiratory Assessment   Assessment Type Assess only   General Appearance Awake   Respiratory Pattern Assisted   Chest Assessment Chest expansion symmetrical   Bilateral Breath Sounds Diminished;Coarse   Cough Productive   Suction Trach   Resp Comments Pt cont on doc vent settings, no changes made overnight. Pt tolerating well. Will cont to monitor per RCP.   O2 Device C3   Vent Information   Vent ID [id number]   Vent type Morris C3   Morris Vent Mode APVcmv   $ Vent Daily Charge-Subsequent Yes   $ Pulse Oximetry Spot Check Charge Completed   SpO2 97 %   APVcmv Settings   Resp Rate (BPM) 16 BPM   VT (mL) 350 mL   %TI (%) 0.75 %   FIO2 (%) 40 %   PEEP (cmH2O) 8 cmH2O   I:E Ratio 1/4   Insp Resistance 25   P-ramp (ms) 50 (ms)   Flow Trigger (LPM) 3 LPM   Humidification Heater   Heater Temp 87.8 °F (31 °C)  (rainout)   APVcmv Actuals   Resp Rate (BPM) 16 BPM   VT (mL) 339 mL   MV (Obs) 5.9   MAP (cmH2O) 14 cmH2O   Peak Pressure (cmH2O) 26 cmH2O   I:E Ratio (Obs) 1/2.2   Static Compliance (mL/cmH20) 23.5 mL/cmH2O   Heater Temperature (Obs) 87.8 °F (31 °C)   APVcmv ALARMS   High Peak Pressure (cmH2O) 55 cmH2O   Low Peak Pressure (cmH2O) 5 cm H2O   High Resp Rate (BPM) 40 BPM   High MV (L/min) 20 L/min   Low MV (L/min) 2 L/min   High VT (mL) 1000 mL   Low VT (mL) 200 mL   Apnea Time (s) 20 S   Maintenance   Alarm (pink) cable attached No   Resuscitation bag with peep valve at bedside Yes   Water bag changed Yes   Circuit changed No   Surgical Airway Francois Cuffed;Long   Placement Date/Time: 10/11/24 1220   Tube Size: 6  Placed By: Physician  Placed by (Name): dr lischnier replaced [id number] francois at bedside from patient traumatically removal previous.  Type: Tracheostomy  Brand: Francois  Style: Cuffed;Long  Comments: repl...   Status Cuff Inflated;Secured   Ties Assessment Intact;Secure   Surgical Airway Cuff Pressure (color) Green   Equipment at bedside BVM;Wall Suction setup;Additional  complete same size trach tube;Additional complete one size smaller trach tube;Obturator;Sterile saline;Additional inner cannula

## 2024-10-14 NOTE — UTILIZATION REVIEW
Continued Stay Review    Date: 10/14/24                    Current Patient Class: Inpatient  Current Level of Care: Level 1 Stepdown    HPI:64 y.o. male initially admitted on 9/9/24    10/14 Critical Care:  Acute on chronic hypercapnic respiratory failure s/p tracheostomy.  Vent weaning, trach collar trials, vent at night.  Large L pleural effusion, will need thoracentesis,  clarify GOC.  TF at goal. Ongoing encephalopathy requiring soft restraints (pulled out tracheostomy tube 10/11). Tracheostomy successfully replaced at bedside with size 6.0 XLT trach.  Called patient's sister, Katalina, to follow up on our GOC discussion from yesterday. She has not gotten through to her brother to discuss next steps. States she will come in for a meeting tomorrow around 2:30 whether or not she is able to discuss with the brother.  Exam:  Alert. Tracheostomy in place. Coarse breath sounds. No LE edema.       Medications:   Scheduled Medications:    budesonide, 0.5 mg, Nebulization, Q12H  chlorhexidine, 15 mL, Mouth/Throat, Q12H AMERICA  doxazosin, 2 mg, Per PEG Tube, Daily  furosemide, 40 mg, Per PEG Tube, Daily  gabapentin, 400 mg, Per NG Tube, BID  haloperidol, 5 mg, Oral, HS  ipratropium, 0.5 mg, Nebulization, TID  levalbuterol, 1.25 mg, Nebulization, TID  levETIRAcetam, 1,000 mg, Per PEG Tube, Q12H AMERICA  levothyroxine, 250 mcg, Per PEG Tube, Early Morning  melatonin, 6 mg, Per PEG Tube, HS  metoprolol tartrate, 12.5 mg, Per PEG Tube, Q12H AMERICA  [START ON 10/19/2024] nicotine, 14 mg, Transdermal, Daily  nicotine, 1 patch, Transdermal, Daily  [START ON 11/2/2024] nicotine, 7 mg, Transdermal, Daily  oxyCODONE, 2.5 mg, Per PEG Tube, Q6H  polyethylene glycol, 17 g, Per PEG Tube, BID  sodium chloride, 4 mL, Nebulization, TID      Continuous IV Infusions:    heparin (porcine), 3-20 Units/kg/hr (Order-Specific), Intravenous, Titrated      PRN Meds:    albuterol, 2.5 mg, Nebulization, TID PRN  bisacodyl, 10 mg, Rectal, Daily PRN  haloperidol  lactate, 5 mg, Intravenous, Q6H PRN  lidocaine, , Topical, 4x Daily PRN  oxyCODONE, 5 mg, Per PEG Tube, Q6H PRN x 1 dose 10/13       Discharge Plan: TBD        Vital Signs (last 3 days)       Date/Time Temp Pulse Resp BP MAP (mmHg) SpO2 FiO2 (%) O2 Device Alecia Coma Scale Score Pain    10/14/24 1801 -- -- -- -- -- -- -- -- -- 6    10/14/24 1600 -- 80 23 116/58 83 96 % -- -- -- --    10/14/24 1530 99.2 °F (37.3 °C) 79 17 119/56 80 95 % -- Ventilator 15 4    10/14/24 1500 -- 78 34 126/45 59 96 % -- -- -- --    10/14/24 1343 -- -- -- -- -- -- -- -- -- 5    10/14/24 1156 99 °F (37.2 °C) -- -- -- -- -- -- -- -- --    10/14/24 1152 98.1 °F (36.7 °C) -- -- 115/49 76 -- -- -- -- --    10/14/24 1137 -- -- -- -- -- -- -- -- 15 --    10/14/24 0750 98.5 °F (36.9 °C) -- -- -- -- -- -- -- 15 2    10/14/24 0400 -- -- -- -- -- -- -- -- 15 No Pain    10/14/24 0350 -- -- -- -- -- 97 % -- -- -- --    10/14/24 0300 -- 78 34 144/55 98 95 % -- -- -- --    10/14/24 0226 98.6 °F (37 °C) 80 34 150/59 119 96 % -- Ventilator -- No Pain    10/14/24 0000 -- -- -- -- -- -- -- -- 15 No Pain    10/13/24 2220 98.7 °F (37.1 °C) 84 -- 153/50 75 95 % -- -- -- --    10/13/24 2210 -- -- -- -- -- 95 % -- -- -- --    10/13/24 2000 -- -- -- -- -- -- -- -- 15 No Pain    10/13/24 1850 98.1 °F (36.7 °C) 86 31 152/58 98 96 % -- -- -- --    10/13/24 1715 -- 80 21 150/58 82 96 % -- -- -- --    10/13/24 1600 -- -- -- -- -- -- -- Ventilator 11 --    10/13/24 1545 -- 82 21 124/55 88 97 % -- -- -- --    10/13/24 1505 -- -- -- -- -- -- -- -- -- 8    10/13/24 1450 -- 84 36 158/64 119 95 % -- None (Room air) -- --    10/13/24 1215 -- 78 34 123/49 74 97 % -- -- -- --    10/13/24 1208 -- -- -- -- -- -- -- -- -- 2    10/13/24 1200 -- -- -- -- -- -- -- Ventilator 11 --    10/13/24 1050 98.2 °F (36.8 °C) 78 28 118/47 75 98 % -- None (Room air) -- --    10/13/24 1000 -- 80 21 159/52 80 96 % -- -- -- --    10/13/24 0945 -- 80 24 152/56 95 97 % -- -- -- --    10/13/24 0800  -- -- -- -- -- -- -- Ventilator 11 --    10/13/24 0750 98.3 °F (36.8 °C) 78 28 146/56 107 95 % -- -- -- --    10/13/24 0400 -- -- -- -- -- -- -- -- 15 No Pain    10/13/24 0240 97.5 °F (36.4 °C) 80 22 128/68 102 97 % -- -- -- --    10/13/24 0006 -- -- -- -- -- 98 % -- -- -- --    10/13/24 0000 -- -- -- -- -- -- -- -- 15 No Pain    10/12/24 2310 98.5 °F (36.9 °C) 72 19 121/44 72 97 % -- -- -- --    10/12/24 2000 -- -- -- -- -- -- -- -- 15 No Pain    10/12/24 1930 98.9 °F (37.2 °C) 88 27 169/60 109 96 % -- -- -- --    10/12/24 1730 -- 88 32 151/48 87 95 % -- -- -- --    10/12/24 1701 -- -- -- -- -- -- -- -- -- 7    10/12/24 1600 -- -- -- -- -- -- -- Ventilator 11 --    10/12/24 1518 99.2 °F (37.3 °C) -- -- -- -- -- -- -- -- --    10/12/24 1515 -- 82 19 132/55 80 96 % -- -- -- --    10/12/24 1300 -- 80 24 159/60 93 96 % -- -- -- --    10/12/24 1200 -- -- -- -- -- -- -- Ventilator 11 --    10/12/24 1033 -- -- -- -- -- -- -- -- -- 6    10/12/24 0851 -- -- -- -- -- -- 40 Ventilator -- --    10/12/24 0815 -- 82 30 174/58 84 96 % -- -- -- --    10/12/24 0804 97.4 °F (36.3 °C) -- -- 174/58 84 -- -- Ventilator -- --    10/12/24 0800 -- -- -- -- -- 95 % -- Ventilator 11 7    10/12/24 0400 -- -- -- -- -- -- -- -- 15 No Pain    10/12/24 0352 -- -- -- -- -- 97 % -- -- -- --    10/12/24 0320 99.8 °F (37.7 °C) 76 18 151/56 72 94 % -- -- -- --    10/12/24 0036 -- -- -- -- -- 100 % -- -- -- --    10/12/24 0000 -- -- -- -- -- -- -- -- 15 No Pain    10/11/24 2330 99.2 °F (37.3 °C) 80 31 156/50 66 100 % -- -- -- --    10/11/24 2248 -- 82 26 150/44 69 100 % -- -- -- --    10/11/24 2200 -- 82 20 168/49 79 100 % -- -- -- --    10/11/24 2016 -- -- -- -- -- 99 % -- -- -- --    10/11/24 2004 -- -- -- -- -- 100 % -- -- -- --    10/11/24 2000 -- -- -- -- -- -- -- -- 15 No Pain    10/11/24 1850 -- 82 18 188/85 139 88 % -- -- -- --    10/11/24 1845 -- 82 16 185/78 126 92 % -- -- -- --    10/11/24 1833 -- 92 21 -- -- 100 % 100 Ventilator -- --     10/11/24 1830 -- 76 32 -- -- 90 % -- -- -- --    10/11/24 1827 -- 74 31 -- -- 83 % -- -- -- --    10/11/24 1824 -- 58 18 -- -- 50 % -- -- -- --    10/11/24 1821 -- 62 22 -- -- 50 % -- -- -- --    10/11/24 1818 -- 80 29 -- -- 94 % -- -- -- --    10/11/24 1815 -- 80 29 -- -- 98 % -- -- -- --    10/11/24 18:14:54 -- -- -- 156/58 -- -- -- -- -- --    10/11/24 18:12:56 -- -- -- 180/69 -- -- -- -- -- --    10/11/24 1812 -- 68 32 -- -- 85 % -- -- -- --    10/11/24 1600 -- -- -- -- -- -- -- Ventilator 11 --    10/11/24 1455 97.6 °F (36.4 °C) 76 25 143/51 81 95 % -- -- -- --    10/11/24 1449 -- -- -- -- -- -- -- -- -- 6    10/11/24 1430 98.4 °F (36.9 °C) 76 30 142/48 64 93 % -- -- -- --    10/11/24 1200 -- -- -- -- -- -- -- Ventilator 11 5    10/11/24 1100 97.7 °F (36.5 °C) -- -- 114/42 -- -- -- -- -- --    10/11/24 0903 -- -- -- -- -- -- -- -- -- 5    10/11/24 0800 -- -- -- -- -- 95 % -- Ventilator 11 7    10/11/24 0700 97.4 °F (36.3 °C) -- -- 106/41 -- -- -- -- -- --    10/11/24 0633 -- -- -- -- -- -- -- -- -- 5    10/11/24 0400 -- -- -- -- -- -- -- -- 15 --    10/11/24 0315 -- -- -- -- -- 98 % -- -- -- --    10/11/24 0041 -- -- -- -- -- -- -- -- -- 5    10/11/24 0000 -- -- -- -- -- -- -- -- 15 --          Weight (last 2 days)       Date/Time Weight    10/14/24 0600 67.6 (149.03)    10/12/24 0600 67.5 (148.81)            Pertinent Labs/Diagnostic Results:   Radiology:    XR chest portable ICU   Final Interpretation by Jorge Valladares MD (10/12 8041)      Tracheostomy tube projects over the upper thoracic trachea.      Stable diffuse pulmonary opacities and small pleural effusions            Resident: Timothy Maldonado I, the attending radiologist, have reviewed the images and agree with the final report above.      Workstation performed: [id number]         XR chest portable   Final Interpretation by Bebo Loera MD (10/11 7876)         1. Stable chest radiograph with pulmonary edema redemonstrated.  Superimposed right upper lobe pneumonia versus mass difficult to exclude.            Workstation performed: [id number]                 Results from last 7 days   Lab Units 10/14/24  0436 10/13/24  0524 10/12/24  0532 10/11/24  0520 10/10/24  0527   WBC Thousand/uL 7.56 8.29 7.77 6.62 7.32   HEMOGLOBIN g/dL 7.8* 7.9* 7.8* 7.9* 7.5*   HEMATOCRIT % 24.9* 24.4* 25.1* 26.0* 24.4*   PLATELETS Thousands/uL 248 235 223 214 223   TOTAL NEUT ABS Thousands/µL 5.71 6.16 5.72 4.80 5.34     Results from last 7 days   Lab Units 10/07/24  2202   RETIC CT ABS  38,000   RETIC CT PCT % 1.66     Results from last 7 days   Lab Units 10/14/24  0436 10/13/24  0524 10/12/24  2010 10/12/24  0532 10/11/24  0520 10/10/24  0527 10/09/24  0525 10/08/24  0512 10/08/24  0512 10/07/24  2336   SODIUM mmol/L 138 139 139 140 143 142 143   < > 143  --    POTASSIUM mmol/L 4.7 4.3 4.5 4.5 4.1 4.3 4.2   < > 4.6  --    CHLORIDE mmol/L 96 96 97 97 99 100 100   < > 101  --    CO2 mmol/L 38* 40* 39* 37* 40* 35* 38*   < > 36*  --    CO2, I-STAT mmol/L  --   --   --   --   --   --   --   --   --  39*   ANION GAP mmol/L 4 3* 3* 6 4 7 5   < > 6  --    BUN mg/dL 35* 35* 36* 34* 27* 29* 30*   < > 24  --    CREATININE mg/dL 0.53* 0.63 0.62 0.60 0.61 0.62 0.58*   < > 0.49*  --    EGFR ml/min/1.73sq m 111 104 104 106 105 104 107   < > 115  --    CALCIUM mg/dL 8.9 8.8 8.9 8.9 8.8 8.8 8.4   < > 8.5  --    CALCIUM, IONIZED mmol/L  --   --   --   --   --  1.14 1.13  --  1.11*  --    CALCIUM, IONIZED, ISTAT mmol/L  --   --   --   --   --   --   --   --   --  1.21   MAGNESIUM mg/dL  --  2.3 1.9 2.0 2.0 2.0 2.0   < > 2.3  --    PHOSPHORUS mg/dL  --  3.2 3.2 3.4  --  3.1 3.3   < > 3.9  --     < > = values in this interval not displayed.     Results from last 7 days   Lab Units 10/12/24  0532 10/11/24  0520 10/08/24  0512   AST U/L 19 17 18   ALT U/L 10 12 10   ALK PHOS U/L 156* 138* 131*   TOTAL PROTEIN g/dL 6.9 6.7 7.0   ALBUMIN g/dL 2.9* 2.8* 3.0*   TOTAL BILIRUBIN mg/dL  0.58 0.57 0.79     Results from last 7 days   Lab Units 10/07/24  2201   POC GLUCOSE mg/dl 127     Results from last 7 days   Lab Units 10/14/24  0436 10/13/24  0524 10/12/24  2010 10/12/24  0532 10/11/24  0520 10/10/24  0527 10/09/24  0525 10/08/24  0512   GLUCOSE RANDOM mg/dL 141* 146* 135 141* 149* 113 117 113           Results from last 7 days   Lab Units 10/09/24  0441   PH ART  7.315*   PCO2 ART mm Hg 77.9*   PO2 ART mm Hg 124.9   HCO3 ART mmol/L 38.8*   BASE EXC ART mmol/L 10.8   O2 CONTENT ART mL/dL 11.9*   O2 HGB, ARTERIAL % 97.4*     Results from last 7 days   Lab Units 10/13/24  0524 10/12/24  0532 10/10/24  0527   PH RADHA  7.386 7.341 7.333   PCO2 RADHA mm Hg 69.8* 73.2* 66.3*   PO2 RADHA mm Hg 49.5* 61.9* 39.4   HCO3 RADHA mmol/L 40.9* 38.7* 34.4*   BASE EXC RADHA mmol/L 14.0 11.2 6.3   O2 CONTENT RADHA ml/dL 10.0 10.8 13.4   O2 HGB, VENOUS % 82.9* 88.6* 69.9     Results from last 7 days   Lab Units 10/07/24  2336   I STAT BASE EXC mmol/L 11*   I STAT O2 SAT % 97*   ISTAT PH ART  7.371   I STAT ART PCO2 mm HG 64.6*   I STAT ART PO2 mm HG 93.0   I STAT ART HCO3 mmol/L 37.4*                 Results from last 7 days   Lab Units 10/14/24  0436 10/13/24  2030 10/13/24  1502 10/09/24  1018 10/09/24  0525   PROTIME seconds  --   --   --   --  16.0*   INR   --   --   --   --  1.25*   PTT seconds 70* 65* 71*   < >  --     < > = values in this interval not displayed.             Results from last 7 days   Lab Units 10/07/24  2336   LACTIC ACID mmol/L 0.5                 Results from last 7 days   Lab Units 10/08/24  0512   CLARITY UA  Turbid   COLOR UA  Yellow   SPEC GRAV UA  1.018   PH UA  5.0   GLUCOSE UA mg/dl Negative   KETONES UA mg/dl Negative   BLOOD UA  Negative   PROTEIN UA mg/dl Trace*   NITRITE UA  Negative   BILIRUBIN UA  Negative   UROBILINOGEN UA (BE) mg/dl <2.0   LEUKOCYTES UA  Large*   WBC UA /hpf Innumerable*   RBC UA /hpf None Seen   BACTERIA UA /hpf Occasional   EPITHELIAL CELLS WET PREP /hpf None Seen    MUCUS THREADS  Occasional*               Results from last 7 days   Lab Units 10/08/24  0418 10/07/24  2336   BLOOD CULTURE   --  No Growth After 5 Days.  No Growth After 5 Days.   SPUTUM CULTURE  4+ Growth of Acinetobacter baumannii*  --    GRAM STAIN RESULT  1+ Polys*  1+ Gram negative rods*  --              Results from last 7 days   Lab Units 10/08/24  0512   VANCOMYCIN RM ug/mL 18.9           Network Utilization Review Department  ATTENTION: Please call with any questions or concerns to [phone] and carefully listen to the prompts so that you are directed to the right person. All voicemails are confidential.   For Discharge needs, contact Care Management DC Support Team at [phone] opt. [phone]  Send all requests for admission clinical reviews, approved or denied determinations and any other requests to dedicated fax number below belonging to the campus where the patient is receiving treatment. List of dedicated fax numbers for the Facilities:  FACILITY NAME UR FAX NUMBER   ADMISSION DENIALS (Administrative/Medical Necessity) [phone]   DISCHARGE SUPPORT TEAM (NETWORK) [phone]   PARENT CHILD HEALTH (Maternity/NICU/Pediatrics) [phone]   [facility] [phone]   [facility] [phone]   [facility] [phone]   [facility] [phone]   [facility] [phone]   [facility] [phone]   [facility] [phone]   [facility] [phone]   [facility] [phone]   [facility] [phone]   [facility] [phone]   [facility] [phone]

## 2024-10-14 NOTE — PROGRESS NOTES
Assessment & Plan   Problem list  Acute on chronic hypercapnic respiratory failure s/p Trach  V Tach Arrest  Shock, resolved  Respiratory acidosis/metabolic alkalosis  Mucous plugging  COPD  Encephalopathy  Anemia of chronic disease  Atrial fibrillation  Metabolic alkalosis  Pericardial effusion  Seizure disorder  Hypothyroidism  AAA  Insomnia  Tobacco use  Hx of lung SCC     Neuro:   #Seizure disorder  Continue keppra 1000 mg bid  Continue Gabapentin 400mg BID     #Insomnia  #Agitation  Remained agitated on seroquel and trazodone, both d/cd  Haldol qhs and prn  Appreciate palliative care assistance     CV:   #Atrial fibrillation  metoprolol tartrate 12.5 mg Q12H  Restart eliquis     #HFpEF  #Moderate to severe aortic regurgitation  9/11 ECHO: LVEF 55%, hypokinetic apex. Moderate to severe aortic regurgitation, mild tricuspid regurgitation, dilated ascending aortic root up to 5.4 cm.  On maintenance lasix po daily, maintain euvolemia  Continue to monitor fluid status      #History of AAA  5.4 cm  Monitor hemodynamics; BP goal <130/80  Outpatient follow up     Pulm:  #Acute on Chronic hypoxic and hypercapnic respiratory failure.  #COPD, and acute exacerbation now resolved  Status post trach 9/19  Tidal volumes improved with VC+  Continue Xopenex and Atrovent TID  Continue pulmicort BID     #Respiratory acidosis  2/2 poor tidal volume and underlying COPD  Adjust vent as tolerated, VC+     #Pleural effusions  L>R, persistent despite diuresis  Consider thoracentesis     #History of squamous cell cancer of lung post chemo/radiation 2016, cancer of right mainstem  Noted     GI:   #PEG placed 9/19  Continue tube feeds at goal  Bowel regimen: Senokot, Miralax     :   #Urinary retention  Doxazosin 2mg     #Metabolic alkalosis  Due to diuresis & compensation from respiratory acidosis     F/E/N:   F: No maintenance fluids  E: Replete as needed  N: Continue TwoCalHN continuous     Heme/Onc:   #Anemia of chronic  disease  Transfuse for Hgb <7 and/or symptomatic anemia     #DVT ppx  SCDs and heparin     Endo:   #Hypothyroidism  Home med: Levothyroxine 250 mg  TSH 45.06 on 9/25 from 0.59 on 9/5, free T4 0.34 (9/25)  Restarted on home levothyroxine 250 mg  Recheck in 10/31     ID:   #Colonization with Acinetobacter  Continue to monitor WBC count and temperature curve     MSK/Skin:   #At risk for pressure injury   PT/OT when able  Frequent turns/repositioning  Skin surveillance      L: midline, PEG  D: none  A: VC+ 350/8/16/40%      In discussion with [facility] legal consultants, patient's sister is next of kin and legally allowed to make medical decisions due to patient's guardian being unavailable/unresponsive, however due to financial constraints, disposition must be discussed with guardian.    ICU Core Measures     Vented Patient  VAP Bundle  VAP bundle ordered     A: Assess, Prevent, and Manage Pain Has pain been assessed? Yes  Need for changes to pain regimen? No   B: Both Spontaneous Awakening Trials (SATs) and Spontaneous Breathing Trials (SBTs) Plan to perform spontaneous awakening trial today? N/A   Plan to perform spontaneous breathing trial today? Yes   Obvious barriers to extubation? Yes   C: Choice of Sedation RASS Goal: N/A patient not on sedation  Need for changes to sedation or analgesia regimen? No   D: Delirium CAM-ICU: Positive   E: Early Mobility  Plan for early mobility? Yes   F: Family Engagement Plan for family engagement today? Yes       Review of Invasive Devices:            Prophylaxis:  VTE VTE covered by:  heparin (porcine), Intravenous, 20 Units/kg/hr at 10/13/24 1744       Stress Ulcer  not ordered         24 Hour Events : no acute events  Subjective  patient resting in bed comfortably, restrained. No acute complaints      Objective :                   Vitals I/O      Most Recent Min/Max in 24hrs   Temp 98.6 °F (37 °C) Temp  Min: 98.1 °F (36.7 °C)  Max: 98.7 °F (37.1 °C)   Pulse 78 Pulse  Min: 78  Max:  86   Resp (!) 34 Resp  Min: 21  Max: 36   /55 BP  Min: 118/47  Max: 159/52   O2 Sat 97 % SpO2  Min: 95 %  Max: 98 %      Intake/Output Summary (Last 24 hours) at 10/14/2024 0618  Last data filed at 10/14/2024 0550  Gross per 24 hour   Intake 2392.78 ml   Output 950 ml   Net 1442.78 ml       Diet Enteral/Parenteral; Tube Feeding No Oral Diet; Two Mirza HN; Cyclic; 45; 22 hours; Prosource Protein Liquid - One Packet; BID; 100; Water; Every 4 hours    Invasive Monitoring           Physical Exam   Physical Exam  Vitals and nursing note reviewed.   Eyes:      General: No scleral icterus.     Extraocular Movements: Extraocular movements intact.      Pupils: Pupils are equal, round, and reactive to light.   Skin:     General: Skin is warm and dry.   HENT:      Head: Normocephalic and atraumatic.      Nose: No congestion.      Mouth/Throat:      Mouth: Mucous membranes are dry.   Cardiovascular:      Rate and Rhythm: Normal rate and regular rhythm.   Musculoskeletal:      Right lower leg: No edema.      Left lower leg: No edema.   Abdominal: General: Bowel sounds are normal. There is abdominal type feeding tube.     Palpations: Abdomen is soft.   Constitutional:       Appearance: He is well-developed and well-nourished.      Interventions: He is restrained.   Pulmonary:      Effort: Pulmonary effort is normal.      Breath sounds: Normal breath sounds.   Neurological:      General: No focal deficit present.      Mental Status: He is alert. Mental status is at baseline. He is calm.          Diagnostic Studies        Lab Results: I have reviewed the following results:     Medications:  Scheduled PRN   budesonide, 0.5 mg, Q12H  chlorhexidine, 15 mL, Q12H [place]  doxazosin, 2 mg, Daily  furosemide, 40 mg, Daily  gabapentin, 400 mg, BID  haloperidol, 5 mg, HS  ipratropium, 0.5 mg, TID  levalbuterol, 1.25 mg, TID  levETIRAcetam, 1,000 mg, Q12H AMERICA  levothyroxine, 250 mcg, Early Morning  melatonin, 6 mg, HS  metoprolol tartrate,  12.5 mg, Q12H [place]  [START ON 10/19/2024] nicotine, 14 mg, Daily  nicotine, 1 patch, Daily  [START ON 11/2/2024] nicotine, 7 mg, Daily  oxyCODONE, 2.5 mg, Q6H  polyethylene glycol, 17 g, BID  sodium chloride, 4 mL, TID      albuterol, 2.5 mg, TID PRN  bisacodyl, 10 mg, Daily PRN  haloperidol lactate, 5 mg, Q6H PRN  lidocaine, , 4x Daily PRN  oxyCODONE, 5 mg, Q6H PRN       Continuous    heparin (porcine), 3-20 Units/kg/hr (Order-Specific), Last Rate: 20 Units/kg/hr (10/13/24 1749)         Labs:   CBC    Recent Labs     10/13/24  0524 10/14/24  0436   WBC 8.29 7.56   HGB 7.9* 7.8*   HCT 24.4* 24.9*    248     BMP    Recent Labs     10/13/24  0524 10/14/24  0436   SODIUM 139 138   K 4.3 4.7   CL 96 96   CO2 40* 38*   AGAP 3* 4   BUN 35* 35*   CREATININE 0.63 0.53*   CALCIUM 8.8 8.9       Coags    Recent Labs     10/13/24  2030 10/14/24  0436   PTT 65* 70*        Additional Electrolytes  Recent Labs     10/12/24  2010 10/13/24  0524   MG 1.9 2.3   PHOS 3.2 3.2          Blood Gas    No recent results  Recent Labs     10/13/24  0524   PHVEN 7.386   [id number] 69.8*   PO2VEN 49.5*   [id number] 40.9*   BEVEN 14.0   B2USQCH 82.9*    LFTs  No recent results    Infectious  No recent results  Glucose  Recent Labs     10/12/24  2010 10/13/24  0524 10/14/24  0436   GLUC 135 146* 141*

## 2024-10-14 NOTE — CASE MANAGEMENT
Case Management Discharge Planning Note    Patient name Marcin Sánchez  Location [facility] - MRN [id number]  : 1960 Date 10/14/2024       Current Admission Date: 2024  Current Admission Diagnosis:Acute hypoxic respiratory failure (HCC)   Patient Active Problem List    Diagnosis Date Noted Date Diagnosed    Seizure (HCC) 10/09/2024     Insomnia 10/03/2024     Agitation 10/03/2024     Palliative care encounter 10/01/2024     Acute hypoxic respiratory failure (HCC) 2024     Shock (HCC) 2024     Mucus plugging of bronchi 2024     Transaminitis 2024     Cardiac arrest (HCC) 2024     Pericardial effusion 2024     Acute on chronic respiratory failure with hypoxia and hypercapnia (HCC) 2024     Acute metabolic encephalopathy 2024     Chronic combined systolic and diastolic CHF (congestive heart failure) (HCC) 2024     Atrial fibrillation (HCC) 2024     Pulmonary fibrosis (HCC) 2024     Suspected chronic pulmonary embolism (HCC) 2024     Squamous cell lung cancer (HCC) 2024     Hyponatremia 2024     Severe protein-calorie malnutrition (HCC) 2024     Edema of both legs 2022     Tobacco abuse 2020     Nonrheumatic aortic valve insufficiency 2020     Malignant neoplasm of upper lobe of right lung (HCC) 2018     Thoracic aortic aneurysm without rupture (HCC) 2018     Cancer of right main bronchus (HCC) 10/04/2016     Pleural effusion 10/02/2016     Multiple lung nodules on CT 10/02/2016     Collapse of right lung 2016     Acute exacerbation of chronic obstructive pulmonary disease (COPD) (HCC)      Epilepsy (HCC)      Lyme disease      Major depression      Alcohol abuse        LOS (days): 36  Geometric Mean LOS (GMLOS) (days): 5.1  Days to GMLOS:-30.6     OBJECTIVE:  Risk of Unplanned Readmission Score: 42.95         Current admission status: Inpatient   Preferred Pharmacy:    [facility] -  - [facility] - [address] [facility] [address],  [address],  [address]  [place] [address]  Phone: [phone] Fax: [phone]    [facility], [place], operated [facility] [place], [place] - [address] [facility]  [address] [place] [id number]  Phone: [phone] Fax: [phone]    Primary Care Provider: Brandt Godinez MD    Primary Insurance: [facility]  Secondary Insurance:     DISCHARGE DETAILS:    Patient is not medically cleared for LTACH at this time. Patient is on 1-1.   Per chart review, possible thoracentesis.

## 2024-10-14 NOTE — QUICK NOTE
I have called patient's sister, Katalina, to follow up on our [facility] discussion from yesterday. She has not gotten through to her brother to discuss next steps. States she will come in for a meeting tomorrow around 230 whether or not she is able to discuss with the brother. She was reminded that the brother is not allowed here.

## 2024-10-15 LAB
APTT PPP: 39 SECONDS (ref 23–34)
APTT PPP: 96 SECONDS (ref 23–34)
APTT PPP: >210 SECONDS (ref 23–34)

## 2024-10-15 PROCEDURE — 99497 ADVNCD CARE PLAN 30 MIN: CPT | Performed by: PHYSICIAN ASSISTANT

## 2024-10-15 PROCEDURE — 94669 MECHANICAL CHEST WALL OSCILL: CPT

## 2024-10-15 PROCEDURE — 94760 N-INVAS EAR/PLS OXIMETRY 1: CPT

## 2024-10-15 PROCEDURE — 94003 VENT MGMT INPAT SUBQ DAY: CPT

## 2024-10-15 PROCEDURE — 99232 SBSQ HOSP IP/OBS MODERATE 35: CPT | Performed by: INTERNAL MEDICINE

## 2024-10-15 PROCEDURE — 85730 THROMBOPLASTIN TIME PARTIAL: CPT | Performed by: SURGERY

## 2024-10-15 PROCEDURE — 85730 THROMBOPLASTIN TIME PARTIAL: CPT | Performed by: INTERNAL MEDICINE

## 2024-10-15 PROCEDURE — 94664 DEMO&/EVAL PT USE INHALER: CPT

## 2024-10-15 PROCEDURE — 94640 AIRWAY INHALATION TREATMENT: CPT

## 2024-10-15 PROCEDURE — 99498 ADVNCD CARE PLAN ADDL 30 MIN: CPT | Performed by: PHYSICIAN ASSISTANT

## 2024-10-15 PROCEDURE — 93005 ELECTROCARDIOGRAM TRACING: CPT

## 2024-10-15 RX ORDER — METOPROLOL TARTRATE 25 MG/1
25 TABLET, FILM COATED ORAL EVERY 12 HOURS SCHEDULED
Status: DISCONTINUED | OUTPATIENT
Start: 2024-10-15 | End: 2024-10-18 | Stop reason: HOSPADM

## 2024-10-15 RX ORDER — BUSPIRONE HYDROCHLORIDE 5 MG/1
5 TABLET ORAL 2 TIMES DAILY
Status: DISCONTINUED | OUTPATIENT
Start: 2024-10-15 | End: 2024-10-15

## 2024-10-15 RX ORDER — OXYCODONE HCL 5 MG/5 ML
2.5 SOLUTION, ORAL ORAL EVERY 6 HOURS PRN
Status: DISCONTINUED | OUTPATIENT
Start: 2024-10-15 | End: 2024-10-18 | Stop reason: HOSPADM

## 2024-10-15 RX ORDER — FAMOTIDINE 40 MG/5ML
20 POWDER, FOR SUSPENSION ORAL 2 TIMES DAILY
Status: DISCONTINUED | OUTPATIENT
Start: 2024-10-15 | End: 2024-10-18 | Stop reason: HOSPADM

## 2024-10-15 RX ORDER — ACETAMINOPHEN 160 MG/5ML
650 SUSPENSION ORAL EVERY 4 HOURS PRN
Status: DISCONTINUED | OUTPATIENT
Start: 2024-10-15 | End: 2024-10-18 | Stop reason: HOSPADM

## 2024-10-15 RX ADMIN — Medication 5 DROP: at 17:51

## 2024-10-15 RX ADMIN — DOXAZOSIN 2 MG: 2 TABLET ORAL at 08:57

## 2024-10-15 RX ADMIN — IPRATROPIUM BROMIDE 0.5 MG: 0.5 SOLUTION RESPIRATORY (INHALATION) at 13:08

## 2024-10-15 RX ADMIN — POLYETHYLENE GLYCOL 3350 17 G: 17 POWDER, FOR SOLUTION ORAL at 17:51

## 2024-10-15 RX ADMIN — LEVETIRACETAM 1000 MG: 100 SOLUTION ORAL at 09:00

## 2024-10-15 RX ADMIN — NICOTINE 1 PATCH: 21 PATCH, EXTENDED RELEASE TRANSDERMAL at 08:58

## 2024-10-15 RX ADMIN — Medication 5 DROP: at 10:00

## 2024-10-15 RX ADMIN — LEVALBUTEROL HYDROCHLORIDE 1.25 MG: 1.25 SOLUTION RESPIRATORY (INHALATION) at 13:08

## 2024-10-15 RX ADMIN — CHLORHEXIDINE GLUCONATE 0.12% ORAL RINSE 15 ML: 1.2 LIQUID ORAL at 21:13

## 2024-10-15 RX ADMIN — GABAPENTIN 400 MG: 400 CAPSULE ORAL at 17:51

## 2024-10-15 RX ADMIN — LEVETIRACETAM 1000 MG: 100 SOLUTION ORAL at 21:14

## 2024-10-15 RX ADMIN — SODIUM CHLORIDE SOLN NEBU 3% 4 ML: 3 NEBU SOLN at 06:08

## 2024-10-15 RX ADMIN — LEVOTHYROXINE SODIUM 250 MCG: 100 TABLET ORAL at 06:41

## 2024-10-15 RX ADMIN — BUDESONIDE 0.5 MG: 0.5 INHALANT RESPIRATORY (INHALATION) at 06:08

## 2024-10-15 RX ADMIN — FAMOTIDINE 20 MG: 40 POWDER, FOR SUSPENSION ORAL at 17:51

## 2024-10-15 RX ADMIN — CHLORHEXIDINE GLUCONATE 0.12% ORAL RINSE 15 ML: 1.2 LIQUID ORAL at 08:57

## 2024-10-15 RX ADMIN — METOPROLOL TARTRATE 25 MG: 25 TABLET, FILM COATED ORAL at 21:14

## 2024-10-15 RX ADMIN — Medication 6 MG: at 21:13

## 2024-10-15 RX ADMIN — HALOPERIDOL 5 MG: 2 SOLUTION ORAL at 22:14

## 2024-10-15 RX ADMIN — HEPARIN SODIUM 20 UNITS/KG/HR: 10000 INJECTION, SOLUTION INTRAVENOUS at 01:48

## 2024-10-15 RX ADMIN — OXYCODONE HYDROCHLORIDE 2.5 MG: 5 SOLUTION ORAL at 06:41

## 2024-10-15 RX ADMIN — SODIUM CHLORIDE SOLN NEBU 3% 4 ML: 3 NEBU SOLN at 13:08

## 2024-10-15 RX ADMIN — IPRATROPIUM BROMIDE 0.5 MG: 0.5 SOLUTION RESPIRATORY (INHALATION) at 19:38

## 2024-10-15 RX ADMIN — POLYETHYLENE GLYCOL 3350 17 G: 17 POWDER, FOR SOLUTION ORAL at 08:57

## 2024-10-15 RX ADMIN — LEVALBUTEROL HYDROCHLORIDE 1.25 MG: 1.25 SOLUTION RESPIRATORY (INHALATION) at 06:08

## 2024-10-15 RX ADMIN — GABAPENTIN 400 MG: 400 CAPSULE ORAL at 08:58

## 2024-10-15 RX ADMIN — IPRATROPIUM BROMIDE 0.5 MG: 0.5 SOLUTION RESPIRATORY (INHALATION) at 06:08

## 2024-10-15 RX ADMIN — Medication 12.5 MG: at 08:57

## 2024-10-15 RX ADMIN — APIXABAN 5 MG: 5 TABLET, FILM COATED ORAL at 17:51

## 2024-10-15 RX ADMIN — LEVALBUTEROL HYDROCHLORIDE 1.25 MG: 1.25 SOLUTION RESPIRATORY (INHALATION) at 19:38

## 2024-10-15 RX ADMIN — FUROSEMIDE 40 MG: 40 TABLET ORAL at 08:58

## 2024-10-15 RX ADMIN — SODIUM CHLORIDE SOLN NEBU 3% 4 ML: 3 NEBU SOLN at 19:38

## 2024-10-15 NOTE — PLAN OF CARE
Problem: Prexisting or High Potential for Compromised Skin Integrity  Goal: Skin integrity is maintained or improved  Description: INTERVENTIONS:  - Identify patients at risk for skin breakdown  - Assess and monitor skin integrity  - Assess and monitor nutrition and hydration status  - Monitor labs   - Assess for incontinence   - Turn and reposition patient  - Assist with mobility/ambulation  - Relieve pressure over bony prominences  - Avoid friction and shearing  - Provide appropriate hygiene as needed including keeping skin clean and dry  - Evaluate need for skin moisturizer/barrier cream  - Collaborate with interdisciplinary team   - Patient/family teaching  - Consider wound care consult   Outcome: Progressing     Problem: PAIN - ADULT  Goal: Verbalizes/displays adequate comfort level or baseline comfort level  Description: Interventions:  - Encourage patient to monitor pain and request assistance  - Assess pain using appropriate pain scale  - Administer analgesics based on type and severity of pain and evaluate response  - Implement non-pharmacological measures as appropriate and evaluate response  - Consider cultural and social influences on pain and pain management  - Notify physician/advanced practitioner if interventions unsuccessful or patient reports new pain  Outcome: Progressing     Problem: INFECTION - ADULT  Goal: Absence or prevention of progression during hospitalization  Description: INTERVENTIONS:  - Assess and monitor for signs and symptoms of infection  - Monitor lab/diagnostic results  - Monitor all insertion sites, i.e. indwelling lines, tubes, and drains  - Monitor endotracheal if appropriate and nasal secretions for changes in amount and color  - [place] appropriate cooling/warming therapies per order  - Administer medications as ordered  - Instruct and encourage patient and family to use good hand hygiene technique  - Identify and instruct in appropriate isolation precautions for  identified infection/condition  Outcome: Progressing  Goal: Absence of fever/infection during neutropenic period  Description: INTERVENTIONS:  - Monitor WBC    Outcome: Progressing     Problem: SAFETY ADULT  Goal: Patient will remain free of falls  Description: INTERVENTIONS:  - Educate patient/family on patient safety including physical limitations  - Instruct patient to call for assistance with activity   - Consult OT/PT to assist with strengthening/mobility   - Keep Call bell within reach  - Keep bed low and locked with side rails adjusted as appropriate  - Keep care items and personal belongings within reach  - Initiate and maintain comfort rounds  - Make Fall Risk Sign visible to staff  - Apply yellow socks and bracelet for high fall risk patients  - Consider moving patient to room near nurses station  Outcome: Progressing  Goal: Maintain or return to baseline ADL function  Description: INTERVENTIONS:  -  Assess patient's ability to carry out ADLs; assess patient's baseline for ADL function and identify physical deficits which impact ability to perform ADLs (bathing, care of mouth/teeth, toileting, grooming, dressing, etc.)  - Assess/evaluate cause of self-care deficits   - Assess range of motion  - Assess patient's mobility; develop plan if impaired  - Assess patient's need for assistive devices and provide as appropriate  - Encourage maximum independence but intervene and supervise when necessary  - Involve family in performance of ADLs  - Assess for home care needs following discharge   - Consider OT consult to assist with ADL evaluation and planning for discharge  - Provide patient education as appropriate  Outcome: Progressing  Goal: Maintains/Returns to pre admission functional level  Description: INTERVENTIONS:  - Perform AM-PAC 6 Click Basic Mobility/ Daily Activity assessment daily.  - Set and communicate daily mobility goal to care team and patient/family/caregiver.   - Collaborate with rehabilitation  services on mobility goals if consulted  - Out of bed for toileting  - Record patient progress and toleration of activity level   Outcome: Progressing     Problem: DISCHARGE PLANNING  Goal: Discharge to home or other facility with appropriate resources  Description: INTERVENTIONS:  - Identify barriers to discharge w/patient and caregiver  - Arrange for needed discharge resources and transportation as appropriate  - Identify discharge learning needs (meds, wound care, etc.)  - Arrange for interpretive services to assist at discharge as needed  - Refer to Case Management Department for coordinating discharge planning if the patient needs post-hospital services based on physician/advanced practitioner order or complex needs related to functional status, cognitive ability, or social support system  Outcome: Progressing     Problem: Knowledge Deficit  Goal: Patient/family/caregiver demonstrates understanding of disease process, treatment plan, medications, and discharge instructions  Description: Complete learning assessment and assess knowledge base.  Interventions:  - Provide teaching at level of understanding  - Provide teaching via preferred learning methods  Outcome: Progressing     Problem: Nutrition/Hydration-ADULT  Goal: Nutrient/Hydration intake appropriate for improving, restoring or maintaining nutritional needs  Description: Monitor and assess patient's nutrition/hydration status for malnutrition. Collaborate with interdisciplinary team and initiate plan and interventions as ordered.  Monitor patient's weight and dietary intake as ordered or per policy. Utilize nutrition screening tool and intervene as necessary. Determine patient's food preferences and provide high-protein, high-caloric foods as appropriate.     INTERVENTIONS:  - Monitor oral intake, urinary output, labs, and treatment plans  - Assess nutrition and hydration status and recommend course of action  - Evaluate amount of meals eaten  - Assist  patient with eating if necessary   - Allow adequate time for meals  - Recommend/ encourage appropriate diets, oral nutritional supplements, and vitamin/mineral supplements  - Order, calculate, and assess calorie counts as needed  - Recommend, monitor, and adjust tube feedings and TPN/PPN based on assessed needs  - Assess need for intravenous fluids  - Provide specific nutrition/hydration education as appropriate  - Include patient/family/caregiver in decisions related to nutrition  Outcome: Progressing     Problem: RESPIRATORY - ADULT  Goal: Achieves optimal ventilation and oxygenation  Description: INTERVENTIONS:  - Assess for changes in respiratory status  - Assess for changes in mentation and behavior  - Position to facilitate oxygenation and minimize respiratory effort  - Oxygen administered by appropriate delivery if ordered  - Initiate smoking cessation education as indicated  - Encourage broncho-pulmonary hygiene including cough, deep breathe, Incentive Spirometry  - Assess the need for suctioning and aspirate as needed  - Assess and instruct to report SOB or any respiratory difficulty  - Respiratory Therapy support as indicated  Outcome: Progressing     Problem: SAFETY,RESTRAINT: NV/NON-SELF DESTRUCTIVE BEHAVIOR  Goal: Remains free of harm/injury (restraint for non violent/non self-detsructive behavior)  Description: INTERVENTIONS:  - Instruct patient/family regarding restraint use   - Assess and monitor physiologic and psychological status   - Provide interventions and comfort measures to meet assessed patient needs   - Identify and implement measures to help patient regain control  - Assess readiness for release of restraint   Outcome: Progressing  Goal: Returns to optimal restraint-free functioning  Description: INTERVENTIONS:  - Assess the patient's behavior and symptoms that indicate continued need for restraint  - Identify and implement measures to help patient regain control  - Assess readiness for  release of restraint   Outcome: Progressing

## 2024-10-15 NOTE — PLAN OF CARE
Problem: Prexisting or High Potential for Compromised Skin Integrity  Goal: Skin integrity is maintained or improved  Description: INTERVENTIONS:  - Identify patients at risk for skin breakdown  - Assess and monitor skin integrity  - Assess and monitor nutrition and hydration status  - Monitor labs   - Assess for incontinence   - Turn and reposition patient  - Assist with mobility/ambulation  - Relieve pressure over bony prominences  - Avoid friction and shearing  - Provide appropriate hygiene as needed including keeping skin clean and dry  - Evaluate need for skin moisturizer/barrier cream  - Collaborate with interdisciplinary team   - Patient/family teaching  - Consider wound care consult   Outcome: Progressing     Problem: SAFETY,RESTRAINT: NV/NON-SELF DESTRUCTIVE BEHAVIOR  Goal: Remains free of harm/injury (restraint for non violent/non self-detsructive behavior)  Description: INTERVENTIONS:  - Instruct patient/family regarding restraint use   - Assess and monitor physiologic and psychological status   - Provide interventions and comfort measures to meet assessed patient needs   - Identify and implement measures to help patient regain control  - Assess readiness for release of restraint   Outcome: Progressing  Goal: Returns to optimal restraint-free functioning  Description: INTERVENTIONS:  - Assess the patient's behavior and symptoms that indicate continued need for restraint  - Identify and implement measures to help patient regain control  - Assess readiness for release of restraint   Outcome: Progressing     Problem: PAIN - ADULT  Goal: Verbalizes/displays adequate comfort level or baseline comfort level  Description: Interventions:  - Encourage patient to monitor pain and request assistance  - Assess pain using appropriate pain scale  - Administer analgesics based on type and severity of pain and evaluate response  - Implement non-pharmacological measures as appropriate and evaluate response  - Consider  cultural and social influences on pain and pain management  - Notify physician/advanced practitioner if interventions unsuccessful or patient reports new pain  Outcome: Progressing     Problem: INFECTION - ADULT  Goal: Absence or prevention of progression during hospitalization  Description: INTERVENTIONS:  - Assess and monitor for signs and symptoms of infection  - Monitor lab/diagnostic results  - Monitor all insertion sites, i.e. indwelling lines, tubes, and drains  - Monitor endotracheal if appropriate and nasal secretions for changes in amount and color  - [place] appropriate cooling/warming therapies per order  - Administer medications as ordered  - Instruct and encourage patient and family to use good hand hygiene technique  - Identify and instruct in appropriate isolation precautions for identified infection/condition  Outcome: Progressing  Goal: Absence of fever/infection during neutropenic period  Description: INTERVENTIONS:  - Monitor WBC    Outcome: Progressing     Problem: SAFETY ADULT  Goal: Patient will remain free of falls  Description: INTERVENTIONS:  - Educate patient/family on patient safety including physical limitations  - Instruct patient to call for assistance with activity   - Consult OT/PT to assist with strengthening/mobility   - Keep Call bell within reach  - Keep bed low and locked with side rails adjusted as appropriate  - Keep care items and personal belongings within reach  - Initiate and maintain comfort rounds  - Make Fall Risk Sign visible to staff  - Offer Toileting every 2 Hours, in advance of need  - Initiate/Maintain 2alarm  - Obtain necessary fall risk management equipment: 2  - Apply yellow socks and bracelet for high fall risk patients  - Consider moving patient to room near nurses station  Outcome: Progressing  Goal: Maintain or return to baseline ADL function  Description: INTERVENTIONS:  -  Assess patient's ability to carry out ADLs; assess patient's baseline for ADL  function and identify physical deficits which impact ability to perform ADLs (bathing, care of mouth/teeth, toileting, grooming, dressing, etc.)  - Assess/evaluate cause of self-care deficits   - Assess range of motion  - Assess patient's mobility; develop plan if impaired  - Assess patient's need for assistive devices and provide as appropriate  - Encourage maximum independence but intervene and supervise when necessary  - Involve family in performance of ADLs  - Assess for home care needs following discharge   - Consider OT consult to assist with ADL evaluation and planning for discharge  - Provide patient education as appropriate  Outcome: Progressing  Goal: Maintains/Returns to pre admission functional level  Description: INTERVENTIONS:  - Perform AM-PAC 6 Click Basic Mobility/ Daily Activity assessment daily.  - Set and communicate daily mobility goal to care team and patient/family/caregiver.   - Collaborate with rehabilitation services on mobility goals if consulted  - Perform Range of Motion 2 times a day.  - Reposition patient every 2 hours.  - Dangle patient 2 times a day  - Stand patient 2 times a day  - Ambulate patient 2 times a day  - Out of bed to chair 2 times a day   - Out of bed for meals 2 times a day  - Out of bed for toileting  - Record patient progress and toleration of activity level   Outcome: Progressing     Problem: Knowledge Deficit  Goal: Patient/family/caregiver demonstrates understanding of disease process, treatment plan, medications, and discharge instructions  Description: Complete learning assessment and assess knowledge base.  Interventions:  - Provide teaching at level of understanding  - Provide teaching via preferred learning methods  Outcome: Progressing     Problem: DISCHARGE PLANNING  Goal: Discharge to home or other facility with appropriate resources  Description: INTERVENTIONS:  - Identify barriers to discharge w/patient and caregiver  - Arrange for needed discharge  resources and transportation as appropriate  - Identify discharge learning needs (meds, wound care, etc.)  - Arrange for interpretive services to assist at discharge as needed  - Refer to Case Management Department for coordinating discharge planning if the patient needs post-hospital services based on physician/advanced practitioner order or complex needs related to functional status, cognitive ability, or social support system  Outcome: Progressing     Problem: Nutrition/Hydration-ADULT  Goal: Nutrient/Hydration intake appropriate for improving, restoring or maintaining nutritional needs  Description: Monitor and assess patient's nutrition/hydration status for malnutrition. Collaborate with interdisciplinary team and initiate plan and interventions as ordered.  Monitor patient's weight and dietary intake as ordered or per policy. Utilize nutrition screening tool and intervene as necessary. Determine patient's food preferences and provide high-protein, high-caloric foods as appropriate.     INTERVENTIONS:  - Monitor oral intake, urinary output, labs, and treatment plans  - Assess nutrition and hydration status and recommend course of action  - Evaluate amount of meals eaten  - Assist patient with eating if necessary   - Allow adequate time for meals  - Recommend/ encourage appropriate diets, oral nutritional supplements, and vitamin/mineral supplements  - Order, calculate, and assess calorie counts as needed  - Recommend, monitor, and adjust tube feedings and TPN/PPN based on assessed needs  - Assess need for intravenous fluids  - Provide specific nutrition/hydration education as appropriate  - Include patient/family/caregiver in decisions related to nutrition  Outcome: Progressing     Problem: RESPIRATORY - ADULT  Goal: Achieves optimal ventilation and oxygenation  Description: INTERVENTIONS:  - Assess for changes in respiratory status  - Assess for changes in mentation and behavior  - Position to facilitate  oxygenation and minimize respiratory effort  - Oxygen administered by appropriate delivery if ordered  - Initiate smoking cessation education as indicated  - Encourage broncho-pulmonary hygiene including cough, deep breathe, Incentive Spirometry  - Assess the need for suctioning and aspirate as needed  - Assess and instruct to report SOB or any respiratory difficulty  - Respiratory Therapy support as indicated  Outcome: Progressing

## 2024-10-15 NOTE — PROGRESS NOTES
Assessment & Plan   Problem list  Acute on chronic hypercapnic respiratory failure s/p Trach  V Tach Arrest  Shock, resolved  Respiratory acidosis/metabolic alkalosis  Mucous plugging  COPD  Encephalopathy  Anemia of chronic disease  Atrial fibrillation  Metabolic alkalosis  Pericardial effusion  Seizure disorder  Hypothyroidism  AAA  Insomnia  Tobacco use  Hx of lung SCC  B/l impacted cerumen     Neuro:   #Seizure disorder  Continue keppra 1000 mg bid  Continue Gabapentin 400mg BID     #Insomnia  #Agitation  Remained agitated on seroquel and trazodone, both d/cd  Haldol qhs and prn  Appreciate palliative care assistance  GOC ongoing as pt is calm but requires restraints due to pulling out trach x 3    #Hearing loss  Some baseline deficits  Has b/l severely impacted cerumen  Start debrox, will try to find ear cleaning supplies     CV:   #Atrial fibrillation  metoprolol tartrate 12.5 mg Q12H  Restart eliquis pending GOC     #HFpEF  #Moderate to severe aortic regurgitation  9/11 ECHO: LVEF 55%, hypokinetic apex. Moderate to severe aortic regurgitation, mild tricuspid regurgitation, dilated ascending aortic root up to 5.4 cm.  On maintenance lasix po daily, maintain euvolemia  Continue to monitor fluid status      #History of AAA  5.4 cm  Monitor hemodynamics; BP goal <130/80  Outpatient follow up     Pulm:  #Acute on Chronic hypoxic and hypercapnic respiratory failure.  #COPD, and acute exacerbation now resolved  Status post trach 9/19  Tidal volumes improved with VC+, daily SBT  Continue Xopenex and Atrovent TID     #Respiratory acidosis  2/2 poor tidal volume and underlying COPD  Adjust vent settings as tolerated     #Pleural effusions  L>R, persistent despite diuresis  Consider thoracentesis     #History of squamous cell cancer of lung post chemo/radiation 2016, cancer of right mainstem  Noted     GI:   #PEG placed 9/19  Continue tube feeds at goal  Bowel regimen: Senokot, Miralax     :   #Urinary  retention  Doxazosin 2mg     #Metabolic alkalosis  Due to diuresis & compensation from respiratory acidosis     F/E/N:   F: No maintenance fluids  E: Replete as needed  N: Continue TwoCalHN continuous     Heme/Onc:   #Anemia of chronic disease  Transfuse for Hgb <7 and/or symptomatic anemia     #DVT ppx  SCDs and heparin     Endo:   #Hypothyroidism  Home med: Levothyroxine 250 mg  TSH 45.06 on 9/25 from 0.59 on 9/5, free T4 0.34 (9/25)  Restarted on home levothyroxine 250 mg  Recheck in 10/31     ID:   #Colonization with Acinetobacter  Continue to monitor WBC count and temperature curve     MSK/Skin:   #At risk for pressure injury   PT/OT  Frequent turns/repositioning  Skin surveillance      L: midline, PEG  D: none  A: VC+ 350/8/16/40%      In discussion with [facility] legal consultants, patient's sister is next of kin and legally allowed to make medical decisions due to patient's guardian being unavailable/unresponsive, however due to financial constraints, disposition must be discussed with guardian.    ICU Core Measures     Vented Patient  VAP Bundle  VAP bundle ordered     A: Assess, Prevent, and Manage Pain Has pain been assessed? Yes  Need for changes to pain regimen? No   B: Both Spontaneous Awakening Trials (SATs) and Spontaneous Breathing Trials (SBTs) Plan to perform spontaneous awakening trial today? N/A   Plan to perform spontaneous breathing trial today? Yes   Obvious barriers to extubation? Yes   C: Choice of Sedation RASS Goal: N/A patient not on sedation  Need for changes to sedation or analgesia regimen? No   D: Delirium CAM-ICU: Unable to perform secondary to Dementia or other chronic cognitive dysfunction   E: Early Mobility  Plan for early mobility? Yes   F: Family Engagement Plan for family engagement today? Yes       Review of Invasive Devices:            Prophylaxis:  VTE VTE covered by:  heparin (porcine), Intravenous, 20 Units/kg/hr at 10/15/24 0148       Stress Ulcer  not ordered         24 Hour  Events : No acute events  Subjective  Resting in bed comfortably, no acute complaints        Objective :                   Vitals I/O      Most Recent Min/Max in 24hrs   Temp 98.3 °F (36.8 °C) Temp  Min: 98.1 °F (36.7 °C)  Max: 99.2 °F (37.3 °C)   Pulse 72 Pulse  Min: 67  Max: 82   Resp 17 Resp  Min: 17  Max: 36   /63 BP  Min: 107/54  Max: 187/79   O2 Sat 100 % SpO2  Min: 94 %  Max: 100 %   VC+ 16/8/40%   Intake/Output Summary (Last 24 hours) at 10/15/2024 0652  Last data filed at 10/15/2024 0500  Gross per 24 hour   Intake 1228.78 ml   Output 1350 ml   Net -121.22 ml       Diet Enteral/Parenteral; Tube Feeding No Oral Diet; Two Mirza HN; Cyclic; 45; 22 hours; Prosource Protein Liquid - One Packet; BID; 100; Water; Every 4 hours    Invasive Monitoring           Physical Exam   Physical Exam  Vitals and nursing note reviewed.   Eyes:      Extraocular Movements: Extraocular movements intact.      Conjunctiva/sclera: Conjunctivae normal.   Skin:     General: Skin is warm and dry.   HENT:      Head: Normocephalic and atraumatic.      Nose: No congestion.      Mouth/Throat:      Mouth: Mucous membranes are dry.   Cardiovascular:      Rate and Rhythm: Normal rate and regular rhythm.   Musculoskeletal:      Right lower leg: No edema.      Left lower leg: No edema.   Abdominal: General: Bowel sounds are normal. There is abdominal type feeding tube.     Palpations: Abdomen is soft.   Constitutional:       Appearance: He is well-developed and well-nourished.      Interventions: He is restrained.   Pulmonary:      Effort: Pulmonary effort is normal.      Breath sounds: Normal breath sounds.   Neurological:      General: No focal deficit present.      Mental Status: He is alert. Mental status is at baseline. He is calm.          Diagnostic Studies        Lab Results: I have reviewed the following results:     Medications:  Scheduled PRN   budesonide, 0.5 mg, Q12H  chlorhexidine, 15 mL, Q12H [place]  doxazosin, 2 mg,  Daily  furosemide, 40 mg, Daily  gabapentin, 400 mg, BID  haloperidol, 5 mg, HS  ipratropium, 0.5 mg, TID  levalbuterol, 1.25 mg, TID  levETIRAcetam, 1,000 mg, Q12H [place]  levothyroxine, 250 mcg, Early Morning  melatonin, 6 mg, HS  metoprolol tartrate, 12.5 mg, Q12H [place]  [START ON 10/19/2024] nicotine, 14 mg, Daily  nicotine, 1 patch, Daily  [START ON 11/2/2024] nicotine, 7 mg, Daily  oxyCODONE, 2.5 mg, Q6H  polyethylene glycol, 17 g, BID  sodium chloride, 4 mL, TID      albuterol, 2.5 mg, TID PRN  bisacodyl, 10 mg, Daily PRN  haloperidol lactate, 5 mg, Q6H PRN  lidocaine, , 4x Daily PRN  oxyCODONE, 5 mg, Q6H PRN       Continuous    heparin (porcine), 3-20 Units/kg/hr (Order-Specific), Last Rate: 20 Units/kg/hr (10/15/24 0148)         Labs:   CBC    Recent Labs     10/14/24  0436   WBC 7.56   HGB 7.8*   HCT 24.9*        BMP    Recent Labs     10/14/24  0436 10/14/24  2243   SODIUM 138 136   K 4.7 4.4   CL 96 95*   CO2 38* 39*   AGAP 4 2*   BUN 35* 33*   CREATININE 0.53* 0.56*   CALCIUM 8.9 8.7       Coags    Recent Labs     10/14/24  0436 10/15/24  0508   PTT 70* >210*        Additional Electrolytes  Recent Labs     10/14/24  2243   MG 1.9   PHOS 3.6          Blood Gas    No recent results  No recent results LFTs  No recent results    Infectious  No recent results  Glucose  Recent Labs     10/14/24  0436 10/14/24  2243   GLUC 141* 126

## 2024-10-15 NOTE — RESPIRATORY THERAPY NOTE
RT Ventilator Management Note  Marcin Sánchez 64 y.o. male MRN: [id number]  [facility]counter: [id number]      Daily Screen         10/13/2024  0815 10/14/2024  0827          Patient safety screen outcome:: Failed Failed      Not Ready for Weaning due to:: Underline problem not resolved Underline problem not resolved                Physical Exam:   Assessment Type: Assess only  Suction: Trach      Resp Comments: (P) Pt. doing well on APVCMV. No changes throughout the night.

## 2024-10-15 NOTE — PROCEDURES
Insert Complex Venous Access Line    Date/Time: 10/15/2024 11:48 AM    Performed by: Nubia Franco RN  Authorized by: Alie Lane MD    Patient location:  Bedside  Other Assisting Provider: Yes (comment) (cyndi)    Consent:     Consent obtained: no consent required.  Universal protocol:     Procedure explained and questions answered to patient or proxy's satisfaction: yes      Immediately prior to procedure, a time out was called: yes      Site/side marked: yes      Patient identity confirmed:  Provided demographic data, arm band, hospital-assigned identification number and anonymous protocol, patient vented/unresponsive  Pre-procedure details:     Hand hygiene: Hand hygiene performed prior to insertion      Sterile barrier technique: All elements of maximal sterile technique followed      Skin preparation:  ChloraPrep  Procedure details:     Complex Venous Access Line Type: Midline      Complex Venous Access Line Indications: new site      Orientation:  Left    Location:  Brachial    Catheter size:  18 gauge ([id number] ex 6/30/25)    Total catheter length (cm):  10    Catheter out on skin (cm):  0    Max flow rate:  999    Arm circumference:  23    Patient evaluated for contraindications to access (i.e. fistula, thrombosis, etc): Yes      Site selection rationale:  Largest most available vein    Approach: percutaneous technique used      Patient position:  Flat    Ultrasound image availability:  Not saved    Sterile ultrasound techniques: Sterile gel and sterile probe covers were used      Successful placement: yes      Landmarks identified: yes    Anesthesia (see MAR for exact dosages):     Anesthesia method:  None  Post-procedure details:     Post-procedure:  Dressing applied and securement device placed    Assessment:  Blood return through all ports and free fluid flow    Post-procedure complications: none      Patient tolerance of procedure:  Tolerated well, no immediate complications

## 2024-10-15 NOTE — QUICK NOTE
Ultrasound imaging of left-sided pleural effusion performed this afternoon. There is a complex loculated fluid pocket and evidence of broken ribs which is concerning for hemothorax. Will defer thoracentesis at this time as patient is not febrile or requiring increased O2 and is asymptomatic.     Alie Lane MD
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
## 2024-10-15 NOTE — RESPIRATORY THERAPY NOTE
RT Ventilator Management Note  Marcin Sánchez 64 y.o. male MRN: [id number]  Unit/Bed#: MICU 02 Encounter: [id number]      Daily Screen         10/14/2024  0827 10/15/2024  0834          Patient safety screen outcome:: Failed Failed      Not Ready for Weaning due to:: Underline problem not resolved Underline problem not resolved                Physical Exam:   Assessment Type: (P) Assess only  General Appearance: (P) Alert, Awake  Respiratory Pattern: (P) Assisted  Chest Assessment: (P) Chest expansion symmetrical  Bilateral Breath Sounds: (P) Diminished  Suction: (P) Trach      Resp Comments: (P) Pt cont on APV settings. No resp distress noted. No changes made to settings at this time. Trach card placed with obturator at head of bed. All trach supplies accounted for at bedside. Will cont to monitor per resp protocol.

## 2024-10-16 LAB
ANION GAP SERPL CALCULATED.3IONS-SCNC: 3 MMOL/L (ref 4–13)
ATRIAL RATE: 69 BPM
ATRIAL RATE: 72 BPM
BASE EX.OXY STD BLDV CALC-SCNC: 68.8 % (ref 60–80)
BASE EXCESS BLDV CALC-SCNC: 11.6 MMOL/L
BASOPHILS # BLD AUTO: 0.06 THOUSANDS/ΜL (ref 0–0.1)
BASOPHILS NFR BLD AUTO: 1 % (ref 0–1)
BUN SERPL-MCNC: 31 MG/DL (ref 5–25)
CALCIUM SERPL-MCNC: 8.8 MG/DL (ref 8.4–10.2)
CHLORIDE SERPL-SCNC: 92 MMOL/L (ref 96–108)
CO2 SERPL-SCNC: 39 MMOL/L (ref 21–32)
CREAT SERPL-MCNC: 0.55 MG/DL (ref 0.6–1.3)
EOSINOPHIL # BLD AUTO: 0.24 THOUSAND/ΜL (ref 0–0.61)
EOSINOPHIL NFR BLD AUTO: 4 % (ref 0–6)
ERYTHROCYTE [DISTWIDTH] IN BLOOD BY AUTOMATED COUNT: 14.7 % (ref 11.6–15.1)
GFR SERPL CREATININE-BSD FRML MDRD: 110 ML/MIN/1.73SQ M
GLUCOSE SERPL-MCNC: 132 MG/DL (ref 65–140)
HCO3 BLDV-SCNC: 38.6 MMOL/L (ref 24–30)
HCT VFR BLD AUTO: 22.9 % (ref 36.5–49.3)
HGB BLD-MCNC: 7.5 G/DL (ref 12–17)
HOROWITZ INDEX BLDA+IHG-RTO: 40 MM[HG]
IMM GRANULOCYTES # BLD AUTO: 0.03 THOUSAND/UL (ref 0–0.2)
IMM GRANULOCYTES NFR BLD AUTO: 1 % (ref 0–2)
LYMPHOCYTES # BLD AUTO: 0.79 THOUSANDS/ΜL (ref 0.6–4.47)
LYMPHOCYTES NFR BLD AUTO: 13 % (ref 14–44)
MCH RBC QN AUTO: 33.5 PG (ref 26.8–34.3)
MCHC RBC AUTO-ENTMCNC: 32.8 G/DL (ref 31.4–37.4)
MCV RBC AUTO: 102 FL (ref 82–98)
MONOCYTES # BLD AUTO: 0.64 THOUSAND/ΜL (ref 0.17–1.22)
MONOCYTES NFR BLD AUTO: 11 % (ref 4–12)
NEUTROPHILS # BLD AUTO: 4.33 THOUSANDS/ΜL (ref 1.85–7.62)
NEUTS SEG NFR BLD AUTO: 70 % (ref 43–75)
NRBC BLD AUTO-RTO: 0 /100 WBCS
O2 CT BLDV-SCNC: 7.9 ML/DL
P AXIS: 77 DEGREES
PCO2 BLDV: 68.9 MM HG (ref 42–50)
PEEP RESPIRATORY: 8 CM[H2O]
PH BLDV: 7.37 [PH] (ref 7.3–7.4)
PLATELET # BLD AUTO: 250 THOUSANDS/UL (ref 149–390)
PMV BLD AUTO: 9.6 FL (ref 8.9–12.7)
PO2 BLDV: 38 MM HG (ref 35–45)
POTASSIUM SERPL-SCNC: 4.9 MMOL/L (ref 3.5–5.3)
PR INTERVAL: 222 MS
QRS AXIS: 30 DEGREES
QRS AXIS: 34 DEGREES
QRSD INTERVAL: 94 MS
QRSD INTERVAL: 98 MS
QT INTERVAL: 406 MS
QT INTERVAL: 418 MS
QTC INTERVAL: 447 MS
QTC INTERVAL: 456 MS
RBC # BLD AUTO: 2.24 MILLION/UL (ref 3.88–5.62)
SODIUM SERPL-SCNC: 134 MMOL/L (ref 135–147)
T WAVE AXIS: 243 DEGREES
T WAVE AXIS: 246 DEGREES
VENT AC: 16
VENT- AC: AC
VENTRICULAR RATE: 69 BPM
VENTRICULAR RATE: 76 BPM
VT SETTING VENT: 350 ML
WBC # BLD AUTO: 6.09 THOUSAND/UL (ref 4.31–10.16)

## 2024-10-16 PROCEDURE — 93005 ELECTROCARDIOGRAM TRACING: CPT

## 2024-10-16 PROCEDURE — 94640 AIRWAY INHALATION TREATMENT: CPT

## 2024-10-16 PROCEDURE — 83735 ASSAY OF MAGNESIUM: CPT | Performed by: STUDENT IN AN ORGANIZED HEALTH CARE EDUCATION/TRAINING PROGRAM

## 2024-10-16 PROCEDURE — 80048 BASIC METABOLIC PNL TOTAL CA: CPT

## 2024-10-16 PROCEDURE — 84100 ASSAY OF PHOSPHORUS: CPT | Performed by: STUDENT IN AN ORGANIZED HEALTH CARE EDUCATION/TRAINING PROGRAM

## 2024-10-16 PROCEDURE — 94760 N-INVAS EAR/PLS OXIMETRY 1: CPT

## 2024-10-16 PROCEDURE — 85025 COMPLETE CBC W/AUTO DIFF WBC: CPT

## 2024-10-16 PROCEDURE — 93010 ELECTROCARDIOGRAM REPORT: CPT | Performed by: INTERNAL MEDICINE

## 2024-10-16 PROCEDURE — 99221 1ST HOSP IP/OBS SF/LOW 40: CPT

## 2024-10-16 PROCEDURE — 99232 SBSQ HOSP IP/OBS MODERATE 35: CPT | Performed by: INTERNAL MEDICINE

## 2024-10-16 PROCEDURE — 94669 MECHANICAL CHEST WALL OSCILL: CPT

## 2024-10-16 PROCEDURE — 94664 DEMO&/EVAL PT USE INHALER: CPT

## 2024-10-16 PROCEDURE — 82805 BLOOD GASES W/O2 SATURATION: CPT

## 2024-10-16 RX ORDER — FORMOTEROL FUMARATE DIHYDRATE 20 UG/2ML
20 SOLUTION RESPIRATORY (INHALATION)
Status: DISCONTINUED | OUTPATIENT
Start: 2024-10-16 | End: 2024-10-18 | Stop reason: HOSPADM

## 2024-10-16 RX ORDER — POLYETHYLENE GLYCOL 3350 17 G/17G
17 POWDER, FOR SOLUTION ORAL DAILY
Status: DISCONTINUED | OUTPATIENT
Start: 2024-10-17 | End: 2024-10-18 | Stop reason: HOSPADM

## 2024-10-16 RX ORDER — FORMOTEROL FUMARATE DIHYDRATE 20 UG/2ML
20 SOLUTION RESPIRATORY (INHALATION)
Status: DISCONTINUED | OUTPATIENT
Start: 2024-10-16 | End: 2024-10-16

## 2024-10-16 RX ORDER — LEVALBUTEROL INHALATION SOLUTION 1.25 MG/3ML
1.25 SOLUTION RESPIRATORY (INHALATION) 2 TIMES DAILY
Status: DISCONTINUED | OUTPATIENT
Start: 2024-10-16 | End: 2024-10-16

## 2024-10-16 RX ORDER — LEVALBUTEROL INHALATION SOLUTION 1.25 MG/3ML
1.25 SOLUTION RESPIRATORY (INHALATION) EVERY 6 HOURS PRN
Status: DISCONTINUED | OUTPATIENT
Start: 2024-10-16 | End: 2024-10-18 | Stop reason: HOSPADM

## 2024-10-16 RX ADMIN — GABAPENTIN 400 MG: 400 CAPSULE ORAL at 17:25

## 2024-10-16 RX ADMIN — LEVETIRACETAM 1000 MG: 100 SOLUTION ORAL at 08:12

## 2024-10-16 RX ADMIN — HALOPERIDOL 5 MG: 2 SOLUTION ORAL at 22:27

## 2024-10-16 RX ADMIN — SODIUM CHLORIDE SOLN NEBU 3% 4 ML: 3 NEBU SOLN at 12:56

## 2024-10-16 RX ADMIN — APIXABAN 5 MG: 5 TABLET, FILM COATED ORAL at 17:26

## 2024-10-16 RX ADMIN — POLYETHYLENE GLYCOL 3350 17 G: 17 POWDER, FOR SOLUTION ORAL at 08:10

## 2024-10-16 RX ADMIN — CHLORHEXIDINE GLUCONATE 0.12% ORAL RINSE 15 ML: 1.2 LIQUID ORAL at 20:52

## 2024-10-16 RX ADMIN — FORMOTEROL FUMARATE DIHYDRATE 20 MCG: 20 SOLUTION RESPIRATORY (INHALATION) at 19:33

## 2024-10-16 RX ADMIN — FUROSEMIDE 40 MG: 40 TABLET ORAL at 08:11

## 2024-10-16 RX ADMIN — CHLORHEXIDINE GLUCONATE 0.12% ORAL RINSE 15 ML: 1.2 LIQUID ORAL at 08:10

## 2024-10-16 RX ADMIN — APIXABAN 5 MG: 5 TABLET, FILM COATED ORAL at 08:11

## 2024-10-16 RX ADMIN — FAMOTIDINE 20 MG: 40 POWDER, FOR SUSPENSION ORAL at 17:25

## 2024-10-16 RX ADMIN — Medication 5 DROP: at 17:50

## 2024-10-16 RX ADMIN — SODIUM CHLORIDE SOLN NEBU 3% 4 ML: 3 NEBU SOLN at 19:33

## 2024-10-16 RX ADMIN — Medication 6 MG: at 21:01

## 2024-10-16 RX ADMIN — IPRATROPIUM BROMIDE 0.5 MG: 0.5 SOLUTION RESPIRATORY (INHALATION) at 08:17

## 2024-10-16 RX ADMIN — DOXAZOSIN 2 MG: 2 TABLET ORAL at 08:11

## 2024-10-16 RX ADMIN — NICOTINE 1 PATCH: 21 PATCH, EXTENDED RELEASE TRANSDERMAL at 08:11

## 2024-10-16 RX ADMIN — GABAPENTIN 400 MG: 400 CAPSULE ORAL at 08:11

## 2024-10-16 RX ADMIN — LEVALBUTEROL HYDROCHLORIDE 1.25 MG: 1.25 SOLUTION RESPIRATORY (INHALATION) at 08:17

## 2024-10-16 RX ADMIN — LEVOTHYROXINE SODIUM 250 MCG: 100 TABLET ORAL at 06:02

## 2024-10-16 RX ADMIN — LEVALBUTEROL HYDROCHLORIDE 1.25 MG: 1.25 SOLUTION RESPIRATORY (INHALATION) at 19:32

## 2024-10-16 RX ADMIN — Medication 5 DROP: at 08:10

## 2024-10-16 RX ADMIN — METOPROLOL TARTRATE 25 MG: 25 TABLET, FILM COATED ORAL at 08:11

## 2024-10-16 RX ADMIN — FAMOTIDINE 20 MG: 40 POWDER, FOR SUSPENSION ORAL at 08:12

## 2024-10-16 RX ADMIN — SODIUM CHLORIDE SOLN NEBU 3% 4 ML: 3 NEBU SOLN at 08:17

## 2024-10-16 RX ADMIN — IPRATROPIUM BROMIDE 0.5 MG: 0.5 SOLUTION RESPIRATORY (INHALATION) at 19:33

## 2024-10-16 RX ADMIN — LEVETIRACETAM 1000 MG: 100 SOLUTION ORAL at 20:52

## 2024-10-16 NOTE — RESPIRATORY THERAPY NOTE
10/16/24 1611   Respiratory Assessment   Resp Comments Placed pt into SPONT mode. Pt tolerating well   O2 Device vent   Vent Information   Vent type Morris C3   Morris Vent Mode SPONT   $ Pulse Oximetry Spot Check Charge Completed   SpO2 99 %   SPONT Settings   FIO2 (%) 40 %   PEEP (cmH2O) 8 cmH2O   Pressure Support (cmH2O) 14 cmH20   Flow Trigger (LPM) 3 LPM   P-ramp (ms) 50 ms   ETS  (%) 25 %   Humidification Heater   Heater Temp 95 °F (35 °C)   SPONT Actuals   Resp Rate (BPM) 15 BPM   VT (mL) 400 mL   MV (Obs) 5.8   MAP (cmH2O) 10 cmH2O   Peak Pressure (cmH2O) 24 cmH2O   I:E Ratio (Obs) 1/2.1   Heater Temperature (Obs) 95 °F (35 °C)   SPONT Alarms   High Peak Pressure (cmH20) 50 cmH2O   High Resp Rate (BPM) 35 BPM   High MV (L/min) 20 L/min   Low MV (L/min) 5 L/min   High Spont VTE (mL) 1000 mL   Low Spont VTE (mL) 250 mL   Apnea Time (S) 20 S   SPONT Apnea Settings   Resp Rate (BPM) 10 BPM   FIO2 (%) 100 %   %TI (%) 1 %   Apnea Time (S) 20 S

## 2024-10-16 NOTE — PLAN OF CARE
Problem: Prexisting or High Potential for Compromised Skin Integrity  Goal: Skin integrity is maintained or improved  Description: INTERVENTIONS:  - Identify patients at risk for skin breakdown  - Assess and monitor skin integrity  - Assess and monitor nutrition and hydration status  - Monitor labs   - Assess for incontinence   - Turn and reposition patient  - Assist with mobility/ambulation  - Relieve pressure over bony prominences  - Avoid friction and shearing  - Provide appropriate hygiene as needed including keeping skin clean and dry  - Evaluate need for skin moisturizer/barrier cream  - Collaborate with interdisciplinary team   - Patient/family teaching  - Consider wound care consult   Outcome: Progressing     Problem: SAFETY,RESTRAINT: NV/NON-SELF DESTRUCTIVE BEHAVIOR  Goal: Remains free of harm/injury (restraint for non violent/non self-detsructive behavior)  Description: INTERVENTIONS:  - Instruct patient/family regarding restraint use   - Assess and monitor physiologic and psychological status   - Provide interventions and comfort measures to meet assessed patient needs   - Identify and implement measures to help patient regain control  - Assess readiness for release of restraint   Outcome: Progressing  Goal: Returns to optimal restraint-free functioning  Description: INTERVENTIONS:  - Assess the patient's behavior and symptoms that indicate continued need for restraint  - Identify and implement measures to help patient regain control  - Assess readiness for release of restraint   Outcome: Progressing     Problem: PAIN - ADULT  Goal: Verbalizes/displays adequate comfort level or baseline comfort level  Description: Interventions:  - Encourage patient to monitor pain and request assistance  - Assess pain using appropriate pain scale  - Administer analgesics based on type and severity of pain and evaluate response  - Implement non-pharmacological measures as appropriate and evaluate response  - Consider  cultural and social influences on pain and pain management  - Notify physician/advanced practitioner if interventions unsuccessful or patient reports new pain  Outcome: Progressing     Problem: INFECTION - ADULT  Goal: Absence or prevention of progression during hospitalization  Description: INTERVENTIONS:  - Assess and monitor for signs and symptoms of infection  - Monitor lab/diagnostic results  - Monitor all insertion sites, i.e. indwelling lines, tubes, and drains  - Monitor endotracheal if appropriate and nasal secretions for changes in amount and color  - [place] appropriate cooling/warming therapies per order  - Administer medications as ordered  - Instruct and encourage patient and family to use good hand hygiene technique  - Identify and instruct in appropriate isolation precautions for identified infection/condition  Outcome: Progressing  Goal: Absence of fever/infection during neutropenic period  Description: INTERVENTIONS:  - Monitor WBC    Outcome: Progressing     Problem: SAFETY ADULT  Goal: Patient will remain free of falls  Description: INTERVENTIONS:  - Educate patient/family on patient safety including physical limitations  - Instruct patient to call for assistance with activity   - Consult OT/PT to assist with strengthening/mobility   - Keep Call bell within reach  - Keep bed low and locked with side rails adjusted as appropriate  - Keep care items and personal belongings within reach  - Initiate and maintain comfort rounds  - Make Fall Risk Sign visible to staff  - Offer Toileting every 2 Hours, in advance of need  - Initiate/Maintain 2alarm  - Obtain necessary fall risk management equipment: 2  - Apply yellow socks and bracelet for high fall risk patients  - Consider moving patient to room near nurses station  Outcome: Progressing  Goal: Maintain or return to baseline ADL function  Description: INTERVENTIONS:  -  Assess patient's ability to carry out ADLs; assess patient's baseline for ADL  function and identify physical deficits which impact ability to perform ADLs (bathing, care of mouth/teeth, toileting, grooming, dressing, etc.)  - Assess/evaluate cause of self-care deficits   - Assess range of motion  - Assess patient's mobility; develop plan if impaired  - Assess patient's need for assistive devices and provide as appropriate  - Encourage maximum independence but intervene and supervise when necessary  - Involve family in performance of ADLs  - Assess for home care needs following discharge   - Consider OT consult to assist with ADL evaluation and planning for discharge  - Provide patient education as appropriate  Outcome: Progressing  Goal: Maintains/Returns to pre admission functional level  Description: INTERVENTIONS:  - Perform AM-PAC 6 Click Basic Mobility/ Daily Activity assessment daily.  - Set and communicate daily mobility goal to care team and patient/family/caregiver.   - Collaborate with rehabilitation services on mobility goals if consulted  - Perform Range of Motion 2 times a day.  - Reposition patient every 2 hours.  - Dangle patient 2 times a day  - Stand patient 2 times a day  - Ambulate patient 2 times a day  - Out of bed to chair 2 times a day   - Out of bed for meals 2 times a day  - Out of bed for toileting  - Record patient progress and toleration of activity level   Outcome: Progressing     Problem: DISCHARGE PLANNING  Goal: Discharge to home or other facility with appropriate resources  Description: INTERVENTIONS:  - Identify barriers to discharge w/patient and caregiver  - Arrange for needed discharge resources and transportation as appropriate  - Identify discharge learning needs (meds, wound care, etc.)  - Arrange for interpretive services to assist at discharge as needed  - Refer to Case Management Department for coordinating discharge planning if the patient needs post-hospital services based on physician/advanced practitioner order or complex needs related to  functional status, cognitive ability, or social support system  Outcome: Progressing     Problem: Knowledge Deficit  Goal: Patient/family/caregiver demonstrates understanding of disease process, treatment plan, medications, and discharge instructions  Description: Complete learning assessment and assess knowledge base.  Interventions:  - Provide teaching at level of understanding  - Provide teaching via preferred learning methods  Outcome: Progressing     Problem: Nutrition/Hydration-ADULT  Goal: Nutrient/Hydration intake appropriate for improving, restoring or maintaining nutritional needs  Description: Monitor and assess patient's nutrition/hydration status for malnutrition. Collaborate with interdisciplinary team and initiate plan and interventions as ordered.  Monitor patient's weight and dietary intake as ordered or per policy. Utilize nutrition screening tool and intervene as necessary. Determine patient's food preferences and provide high-protein, high-caloric foods as appropriate.     INTERVENTIONS:  - Monitor oral intake, urinary output, labs, and treatment plans  - Assess nutrition and hydration status and recommend course of action  - Evaluate amount of meals eaten  - Assist patient with eating if necessary   - Allow adequate time for meals  - Recommend/ encourage appropriate diets, oral nutritional supplements, and vitamin/mineral supplements  - Order, calculate, and assess calorie counts as needed  - Recommend, monitor, and adjust tube feedings and TPN/PPN based on assessed needs  - Assess need for intravenous fluids  - Provide specific nutrition/hydration education as appropriate  - Include patient/family/caregiver in decisions related to nutrition  Outcome: Progressing     Problem: RESPIRATORY - ADULT  Goal: Achieves optimal ventilation and oxygenation  Description: INTERVENTIONS:  - Assess for changes in respiratory status  - Assess for changes in mentation and behavior  - Position to facilitate  oxygenation and minimize respiratory effort  - Oxygen administered by appropriate delivery if ordered  - Initiate smoking cessation education as indicated  - Encourage broncho-pulmonary hygiene including cough, deep breathe, Incentive Spirometry  - Assess the need for suctioning and aspirate as needed  - Assess and instruct to report SOB or any respiratory difficulty  - Respiratory Therapy support as indicated  Outcome: Progressing

## 2024-10-16 NOTE — CASE MANAGEMENT
Case Management Discharge Planning Note    Patient name Marcin Sánchez  Location [facility] 02/MICU 02 MRN [id number]  : 1960 Date 10/16/2024       Current Admission Date: 2024  Current Admission Diagnosis:Acute hypoxic respiratory failure (HCC)   Patient Active Problem List    Diagnosis Date Noted Date Diagnosed    Seizure (HCC) 10/09/2024     Insomnia 10/03/2024     Agitation 10/03/2024     Palliative care encounter 10/01/2024     Acute hypoxic respiratory failure (HCC) 2024     Shock (HCC) 2024     Mucus plugging of bronchi 2024     Transaminitis 2024     Cardiac arrest (HCC) 2024     Pericardial effusion 2024     Acute on chronic respiratory failure with hypoxia and hypercapnia (HCC) 2024     Acute metabolic encephalopathy 2024     Chronic combined systolic and diastolic CHF (congestive heart failure) (HCC) 2024     Atrial fibrillation (HCC) 2024     Pulmonary fibrosis (HCC) 2024     Suspected chronic pulmonary embolism (HCC) 2024     Squamous cell lung cancer (HCC) 2024     Hyponatremia 2024     Severe protein-calorie malnutrition (HCC) 2024     Edema of both legs 2022     Tobacco abuse 2020     Nonrheumatic aortic valve insufficiency 2020     Malignant neoplasm of upper lobe of right lung (HCC) 2018     Thoracic aortic aneurysm without rupture (HCC) 2018     Cancer of right main bronchus (HCC) 10/04/2016     Pleural effusion 10/02/2016     Multiple lung nodules on CT 10/02/2016     Collapse of right lung 2016     Acute exacerbation of chronic obstructive pulmonary disease (COPD) (HCC)      Epilepsy (HCC)      Lyme disease      Major depression      Alcohol abuse        LOS (days): 38  Geometric Mean LOS (GMLOS) (days): 5.1  Days to GMLOS:-32.6     OBJECTIVE:  Risk of Unplanned Readmission Score: 42.74         Current admission status: Inpatient   Preferred Pharmacy:  "  [facility] -  - [facility] - [address],  [address],  [facility] [address]  [facility] [address]  Phone: [phone] Fax: [phone]    [facility] [place] - [address]  [address] [facility] [address]  Phone: [phone] Fax: [phone]    Primary Care Provider: Brandt Godinez MD    Primary Insurance: [facility]  Secondary Insurance:     DISCHARGE DETAILS:       Other Referral/Resources/Interventions Provided:  Referral Comments: Message received in [employer] from  LTACH: \"Unfortunately, after reviewing notes from yesterday regarding the dynamics of the social situation of   the patient and potential need for a new guardian etc..I discussed with my admissions team and we are  no longer able to offer a bed. Legally a high risk and also concern about transition to next level of care.\"  Referral reopened and resent to LTACH's for review.  Updated clinicals uploaded in [employer].  Expanded search to 200 [place] - added another LTACH to review.  Notified by provider pt is medically stable for discharge.                                                                             "

## 2024-10-16 NOTE — PROGRESS NOTES
10/16/24 1500   Clinical Encounter Type   Visited With Patient      visited patient but he was not responsive, so  gave blessing before leaving room and will be available upon request.

## 2024-10-16 NOTE — OCCUPATIONAL THERAPY NOTE
"OT CANCEL NOTE:    Orders for OT evaluation received. Chart reveiewed.  Therapy team  spent > 15 mins obtaining chair/ DME for  pt to participate in eval and OOB as indicated. RN present. Pt would not allow covers to be removed and becoming agitated. Pt educated on importance of getting up and OOB, however, he continues to refuse and waving his hands in a \"no\" motion. At this time, pt not willing to participate in skilled OT services.   "

## 2024-10-16 NOTE — CONSULTS
Consultation - Wound Care   Marcin Sánchez 64 y.o. male MRN: [id number]  Unit/Bed#: [facility] 02 Encounter: [id number]    Assessment:  Pressure injury of sacral region, unstageable  Medical device related pressure injury  Tracheostomy present  Ambulatory dysfunction      Plan:  Tracheostomy and sacral pressure injuries are improved, measuring smaller. Will recommend to continue with current plan in place with silver alginate and respective foam dressings to the wounds. Orders placed as per WOCN team.  No clinical s/s of infection present  Recommend to continue with preventative nursing skin care measures in place as per WOCN team  Pressure relief- offloading of pressure with turning/repositioning as patient medically tolerates, heel elevation, foam wedges for offloading/repositioning, and waffle cushion to chair. Continue with ATR device for repositioning.   Nutrition is following- enteral feedings in place for nutrition.  Epic secure chat wound care team with questions or concerns.   Routine wound care follow-up while admitted. AVS updated.   Discussed with primary RN at bedside.     History of Present Illness:  Patient is a 64 year old male who is admitted to the hospital with few hypoxic respiratory failure.  Patient has a history of alcohol abuse, lung cancer, COPD, CHF, epilepsy, and atrial fibrillation. Patient seen today for follow-up visit of present on admission sacral pressure injury and hospital-acquired unstageable pressure injury related to trach plate.  Per WOCN notes, patient with present on admission DTI to sacrum which evolved to unstageable pressure injury. Per chart review patient is s/p tracheostomy and peg-tube placement on 9/19/24. Patient has been followed by WOCN team during this hospital stay, with current wound management of silver alginate and foam dressings to the wounds, using optifoam dressing to trach site and silicone bordered foam dressing to sacrum. Patient seen in bed, agreeable for  the assessment. Seen with primary RN. Wounds dressed as per orders at time of the assessment. No reported increased pain or drainage from the wounds. No reported fever or chills.     Subjective:    Review of Systems   Unable to perform ROS: Intubated       Historical Information   Past Medical History:   Diagnosis Date    Alcohol abuse     Anxiety     Aortic insufficiency 12/18/2020    Atrial fibrillation (HCC)     Cancer (HCC)     COPD (chronic obstructive pulmonary disease) (HCC)     Delirium     Encephalopathy     Epilepsy (HCC)     History of chemotherapy     History of radiation therapy     Hypo-osmolality and hyponatremia     Hypokalemia     Hypothyroid     Lung cancer (HCC)     Lyme disease     Major depression      Past Surgical History:   Procedure Laterality Date    BRONCHOSCOPY N/A 10/3/2016    Procedure: BRONCHOSCOPY FLEXIBLE;  Surgeon: John Brunner DO;  Location: [facility];  Service:     GASTROSTOMY TUBE PLACEMENT N/A 9/19/2024    Procedure: INSERTION GASTROSTOMY TUBE OPEN;  Surgeon: Darrin Burgos DO;  Location: [facility];  Service: General    HAND SURGERY      PEG W/TRACHEOSTOMY PLACEMENT N/A 9/19/2024    Procedure: TRACHEOSTOMY WITH INSERTION PEG TUBE;  Surgeon: Darrin Burgos DO;  Location: [facility];  Service: General    PELVIC FRACTURE SURGERY      REMOVAL VENOUS PORT (PORT-A-CATH) Left 9/18/2018    Procedure: REMOVAL VENOUS PORT (PORT-A-CATH)IR;  Surgeon: Rnady Lopez DO;  Location: [facility];  Service: Interventional Radiology     Social History   Social History     Substance and Sexual Activity   Alcohol Use Never     Social History     Substance and Sexual Activity   Drug Use Never     E-Cigarette/Vaping    E-Cigarette Use Never User      E-Cigarette/Vaping Substances    Nicotine No     THC No     CBD No     Flavoring No     Other No     Unknown No      Social History     Tobacco Use   Smoking Status Every Day    Current packs/day: 1.50    Average packs/day: 1.5  "packs/day for 47.0 years (70.5 ttl pk-yrs)    Types: Cigarettes   Smokeless Tobacco Never   Tobacco Comments    smoking more than 1 ppd     Family History:   Family History   Problem Relation Age of Onset    Diabetes Mother     Lung cancer Father        Meds/Allergies   current meds:   Current Facility-Administered Medications:     acetaminophen (TYLENOL) oral suspension 650 mg, Q4H PRN    apixaban (ELIQUIS) tablet 5 mg, BID    carbamide peroxide (DEBROX) 6.5 % otic solution 5 drop, BID    chlorhexidine (PERIDEX) 0.12 % oral rinse 15 mL, Q12H [place]    doxazosin (CARDURA) tablet 2 mg, Daily    famotidine (PEPCID) oral suspension 20 mg, BID    formoterol (PERFOROMIST) nebulizer solution 20 mcg, Q12H    furosemide (LASIX) tablet 40 mg, Daily    gabapentin (NEURONTIN) capsule 400 mg, BID    haloperidol (HALDOL) oral concentrated solution 5 mg, HS    ipratropium (ATROVENT) 0.02 % inhalation solution 0.5 mg, BID    levalbuterol (XOPENEX) inhalation solution 1.25 mg, Q6H PRN    levETIRAcetam (KEPPRA) oral solution 1,000 mg, Q12H [place]    levothyroxine (Synthroid) suspension 250 mcg, Early Morning    melatonin tablet 6 mg, HS    metoprolol tartrate (LOPRESSOR) tablet 25 mg, Q12H [place]    [START ON 10/19/2024] nicotine (NICODERM CQ) 14 mg/24hr TD 24 hr patch 14 mg, Daily    nicotine (NICODERM CQ) 21 mg/24 hr TD 24 hr patch 1 patch, Daily    [START ON 11/2/2024] nicotine (NICODERM CQ) 7 mg/24hr TD 24 hr patch 7 mg, Daily    oxyCODONE (ROXICODONE) oral solution 2.5 mg, Q6H PRN    [START ON 10/17/2024] polyethylene glycol (MIRALAX) packet 17 g, Daily    sodium chloride 3 % inhalation solution 4 mL, TID  No Known Allergies    Objective   Vitals: Blood pressure 94/52, pulse 72, temperature (!) 97.2 °F (36.2 °C), temperature source Oral, resp. rate 19, height 5' 9\" (1.753 m), weight 69.4 kg (153 lb), SpO2 97%.     Wounds:   Mid sacrum with unstageable pressure injury - POA. Wound is round shaped, full thickness, with approx: 95% moist " adhered yellow slough with 5% scattered pink granular buds within slough.  Wound is producing small amount of serosanguineous drainage.  Edges fragile and attached without maceration.  Periwound is dry and intact with blanchable pink/hyperpigmented skin and scar tissue.  Mid distal trach plate edge with unstageable pressure injury - HA.  Wound is full-thickness with approximately 50% moist adhered yellow slough and 50% moist red granular tissue.  Wound is producing moderate amount of serosanguineous drainage.  Edges fragile attached with slight maceration. Oksana-wound is dry and intact with blanchable pink/hyperpigmented skin and scar tissue.  B/l heels are dry and intact without redness or wounds. Continue with preventative foam dressings.      No induration, fluctuance, odor, warmth/temperature differences, redness, or purulence noted to the above mentioned wounds and skin areas assessed. New dressings applied as noted above. Patient tolerated assessment well- no s/s of non-verbal pain or discomfort observed during the encounter.        Wound 09/09/24 Pressure Injury Buttocks (Active)   Wound Image   10/16/24 1055   Wound Length (cm) 1.4 cm 10/16/24 1251   Wound Width (cm) 1.2 cm 10/16/24 1251   Wound Depth (cm) 0.2 cm 10/16/24 1251   Wound 09/26/24 Pressure Injury Throat Distal;Mid (Active)   Wound Image   10/16/24 1104   Wound Length (cm) 1 cm 10/16/24 1104   Wound Width (cm) 0.3 cm 10/16/24 1104   Wound Depth (cm) 0.2 cm 10/16/24 1104   Wound Surface Area (cm^2) 0.3 cm^2 10/16/24 1104   Wound Volume (cm^3) 0.06 cm^3 10/16/24 1104   Calculated Wound Volume (cm^3) 0.06 cm^3 10/16/24 1104   Change in Wound Size % 60 10/16/24 1104       Physical Exam  Constitutional:       General: He is awake. He is not in acute distress.     Appearance: He is not diaphoretic.      Interventions: He is intubated and restrained.   HENT:      Head: Normocephalic and atraumatic.      Right Ear: External ear normal.      Left Ear:  "External ear normal.   Eyes:      Conjunctiva/sclera: Conjunctivae normal.   Pulmonary:      Effort: No respiratory distress. He is intubated.      Comments: Mechanically ventilated via trach.  Genitourinary:     Comments: Condom catheter present for urinary management. Incontinent for small amount of bowel, dressings not soiled upon assessment.  Musculoskeletal:      Comments: Patient seen in bed on low-air-loss critical care mattress.  Dependent for care. Max assist of 1-2 with turning for the assessment.  Patient is on ATR turning system with wedges for repositioning.    Skin:     General: Skin is warm and dry.      Findings: Wound present.      Comments: Refer to wound section for assessment details.    Neurological:      Mental Status: He is alert.      Gait: Gait abnormal.   Psychiatric:         Behavior: Behavior is cooperative.           Lab, Imaging and other studies: Results Review Statement: No pertinent imaging studies reviewed.    Code Status: Level 1 - Full Code      Counseling / Coordination of Care  Total time spent today:    Total time (face-to-face and non-face-to-face) spent on today's visit was 20 minutes. This includes preparation for the visits (H&P on 9/6/24, wound care notes 9/9/24, 9/13/24, 9/20/24, 9/26/24, 10/1/24, and 10/8/24) performance of a medically appropriate history and examination, and orders for medications/treatments or testing.  Discussed assessment findings, and plan of care/recommendations with patients RN.      LANDON Dillon, SPENCER-C, LINDSAY      Portions of the record may have been created with voice recognition software.  Occasional wrong word or \"sound a like\" substitutions may have occurred due to the inherent limitations of voice recognition software.  Read the chart carefully and recognize, using context, where substitutions have occurred      "

## 2024-10-16 NOTE — NUTRITION
Nutrition Follow-Up:       10/16/24 6812   Recommendations/Interventions   Recommendations to Provider No recommended changes to TF formula or rate at this time. It is not needed to hold TF for famotidine unless preferred by provider. Multiple BM's documented over the last few days, loose/watery. Noted miralax d/c'd today, was ordered BID. If no improvement in BM consistency consider adding banatrol. Adjustments in additional free water flushes per critical care.

## 2024-10-16 NOTE — RESPIRATORY THERAPY NOTE
RT Ventilator Management Note  Marcin Sánchez 64 y.o. male MRN: [id number]  [facility] En[facility]: [id number]      Daily Screen         10/15/2024  0834 10/16/2024  0817          Patient safety screen outcome:: Failed Failed      Not Ready for Weaning due to:: Underline problem not resolved Underline problem not resolved                Physical Exam:   Assessment Type: During-treatment  General Appearance: Alert, Awake  Respiratory Pattern: Assisted  Chest Assessment: Chest expansion symmetrical  Bilateral Breath Sounds: Clear, Diminished  Suction: Trach  O2 Device: vent      Resp Comments: Receioved on docuemtned vent settings. Pt has a 6 shiley. Bilateral Bs clear/.diminshed. all necessary supplies at bedside. Suctionedfro sm thick white secretions

## 2024-10-16 NOTE — QUICK NOTE
I have called patient's brother, Olivia ([facility]). I have updated him regarding plan for discharge to [facility]ACH. He is happy with this news. He apologized for his strong emotions last week. He expressed gratitude for the continued care of Marcin. We discussed next steps and long term prognosis.

## 2024-10-16 NOTE — PHYSICAL THERAPY NOTE
"                                     Physical Therapy Cancellation Note         10/16/24 1100   PT Last Visit   PT Visit Date 10/16/24   Note Type   Note type Cancelled Session   Cancel Reasons Refusal     ORDERS FOR PT EVALUATION RECEIVED AND CHART REVIEW COMPLETED. PT/OT spent > 15 mins obtaining chair/ DME for  pt to participate in eval and OOB as indicated. RN present. Pt resisting care and waving UE/ mouthing \"no\" and refusing to get OOB or even EOB w/ encouragement. Pt would not allow covers to be removed and becoming agitated. PT will d/c from services per protocol if pt continues to refuse care additional time.    Florence Benítez                                                                              "

## 2024-10-17 LAB
ATRIAL RATE: 68 BPM
ATRIAL RATE: 78 BPM
MAGNESIUM SERPL-MCNC: 1.8 MG/DL (ref 1.9–2.7)
P AXIS: 43 DEGREES
P AXIS: 78 DEGREES
PHOSPHATE SERPL-MCNC: 4.1 MG/DL (ref 2.3–4.1)
PR INTERVAL: 214 MS
PR INTERVAL: 232 MS
QRS AXIS: 15 DEGREES
QRS AXIS: 35 DEGREES
QRSD INTERVAL: 102 MS
QRSD INTERVAL: 92 MS
QT INTERVAL: 396 MS
QT INTERVAL: 408 MS
QTC INTERVAL: 433 MS
QTC INTERVAL: 451 MS
T WAVE AXIS: -74 DEGREES
T WAVE AXIS: 264 DEGREES
VENTRICULAR RATE: 68 BPM
VENTRICULAR RATE: 78 BPM

## 2024-10-17 PROCEDURE — 99232 SBSQ HOSP IP/OBS MODERATE 35: CPT | Performed by: INTERNAL MEDICINE

## 2024-10-17 PROCEDURE — 93005 ELECTROCARDIOGRAM TRACING: CPT

## 2024-10-17 PROCEDURE — 94003 VENT MGMT INPAT SUBQ DAY: CPT

## 2024-10-17 PROCEDURE — 94760 N-INVAS EAR/PLS OXIMETRY 1: CPT

## 2024-10-17 PROCEDURE — 93010 ELECTROCARDIOGRAM REPORT: CPT | Performed by: STUDENT IN AN ORGANIZED HEALTH CARE EDUCATION/TRAINING PROGRAM

## 2024-10-17 PROCEDURE — 94640 AIRWAY INHALATION TREATMENT: CPT

## 2024-10-17 PROCEDURE — 94664 DEMO&/EVAL PT USE INHALER: CPT

## 2024-10-17 PROCEDURE — 94669 MECHANICAL CHEST WALL OSCILL: CPT

## 2024-10-17 RX ORDER — MAGNESIUM SULFATE HEPTAHYDRATE 40 MG/ML
2 INJECTION, SOLUTION INTRAVENOUS ONCE
Status: COMPLETED | OUTPATIENT
Start: 2024-10-17 | End: 2024-10-17

## 2024-10-17 RX ADMIN — FORMOTEROL FUMARATE DIHYDRATE 20 MCG: 20 SOLUTION RESPIRATORY (INHALATION) at 07:51

## 2024-10-17 RX ADMIN — IPRATROPIUM BROMIDE 0.5 MG: 0.5 SOLUTION RESPIRATORY (INHALATION) at 07:51

## 2024-10-17 RX ADMIN — APIXABAN 5 MG: 5 TABLET, FILM COATED ORAL at 17:58

## 2024-10-17 RX ADMIN — NICOTINE 1 PATCH: 21 PATCH, EXTENDED RELEASE TRANSDERMAL at 13:22

## 2024-10-17 RX ADMIN — LEVETIRACETAM 1000 MG: 100 SOLUTION ORAL at 09:07

## 2024-10-17 RX ADMIN — FORMOTEROL FUMARATE DIHYDRATE 20 MCG: 20 SOLUTION RESPIRATORY (INHALATION) at 19:32

## 2024-10-17 RX ADMIN — GABAPENTIN 400 MG: 400 CAPSULE ORAL at 17:58

## 2024-10-17 RX ADMIN — CHLORHEXIDINE GLUCONATE 0.12% ORAL RINSE 15 ML: 1.2 LIQUID ORAL at 09:21

## 2024-10-17 RX ADMIN — Medication 5 DROP: at 13:26

## 2024-10-17 RX ADMIN — DOXAZOSIN 2 MG: 2 TABLET ORAL at 09:07

## 2024-10-17 RX ADMIN — METOPROLOL TARTRATE 25 MG: 25 TABLET, FILM COATED ORAL at 09:07

## 2024-10-17 RX ADMIN — CHLORHEXIDINE GLUCONATE 0.12% ORAL RINSE 15 ML: 1.2 LIQUID ORAL at 20:57

## 2024-10-17 RX ADMIN — Medication 5 DROP: at 17:58

## 2024-10-17 RX ADMIN — FAMOTIDINE 20 MG: 40 POWDER, FOR SUSPENSION ORAL at 17:58

## 2024-10-17 RX ADMIN — SODIUM CHLORIDE SOLN NEBU 3% 4 ML: 3 NEBU SOLN at 14:01

## 2024-10-17 RX ADMIN — FUROSEMIDE 40 MG: 40 TABLET ORAL at 09:07

## 2024-10-17 RX ADMIN — SODIUM CHLORIDE SOLN NEBU 3% 4 ML: 3 NEBU SOLN at 07:51

## 2024-10-17 RX ADMIN — MAGNESIUM SULFATE HEPTAHYDRATE 2 G: 40 INJECTION, SOLUTION INTRAVENOUS at 00:12

## 2024-10-17 RX ADMIN — LEVALBUTEROL HYDROCHLORIDE 1.25 MG: 1.25 SOLUTION RESPIRATORY (INHALATION) at 07:51

## 2024-10-17 RX ADMIN — LEVOTHYROXINE SODIUM 250 MCG: 100 TABLET ORAL at 06:26

## 2024-10-17 RX ADMIN — APIXABAN 5 MG: 5 TABLET, FILM COATED ORAL at 09:13

## 2024-10-17 RX ADMIN — LEVETIRACETAM 1000 MG: 100 SOLUTION ORAL at 20:57

## 2024-10-17 RX ADMIN — GABAPENTIN 400 MG: 400 CAPSULE ORAL at 09:13

## 2024-10-17 RX ADMIN — FAMOTIDINE 20 MG: 40 POWDER, FOR SUSPENSION ORAL at 09:13

## 2024-10-17 RX ADMIN — SODIUM CHLORIDE SOLN NEBU 3% 4 ML: 3 NEBU SOLN at 19:32

## 2024-10-17 RX ADMIN — Medication 6 MG: at 21:02

## 2024-10-17 RX ADMIN — IPRATROPIUM BROMIDE 0.5 MG: 0.5 SOLUTION RESPIRATORY (INHALATION) at 19:32

## 2024-10-17 RX ADMIN — HALOPERIDOL 5 MG: 2 SOLUTION ORAL at 21:24

## 2024-10-17 NOTE — QUICK NOTE
10/17/2024 4:16 PM -  Marcin KLEIN Gonzalo's chart and case were reviewed by Olivia Lucero PA-C.  Mode of review included electronic chart check, communication with CM and primary team.    Patient is accepted at [facility] with plan of transport tomorrow at 11am. No further palliative needs at this time, will sign off. Please page if questions or concerns arise.        For urgent issues or any questions/concerns, please notify on-call provider via [employer] Secure Chat.  You may also call our answering service 24/7 at [phone].    Olivia Lucero PA-C  Palliative and Supportive Care  Clinic/Answering Service: [phone]  You can find me on [employer] Chat!

## 2024-10-17 NOTE — PROGRESS NOTES
Assessment & Plan   Neuro:   #Seizure disorder  Continue keppra 1000 mg bid, Gabapentin 400mg BID     #Impulsivity  No issues since 10/11 while in restraints  Continue qhs haldol     #Hearing loss  Some baseline deficits  Has b/l severely impacted cerumen  Continue debrox, attempt to flush later this week     CV:   #Atrial fibrillation  metoprolol tartrate 25 mg bid, eliquis 5 mg bid     #HFpEF  #Moderate to severe aortic regurgitation  9/11 ECHO: LVEF 55%, hypokinetic apex. Moderate to severe aortic regurgitation, mild tricuspid regurgitation, dilated ascending aortic root up to 5.4 cm.  On maintenance lasix po daily, maintain euvolemia  Continue to monitor fluid status      #History of AAA  5.4 cm  Monitor hemodynamics; BP goal <130/80  Outpatient follow up     Pulm:  #Acute on Chronic hypoxic and hypercapnic respiratory failure.  #COPD, and acute exacerbation now resolved  #Hx of RUL radiation therapy 2/2 right mainstem squamous cell cancer, 2016  Status post trach 9/19  Continue VC+, daily SBT  Continue Xopenex and Atrovent BID     #Respiratory acidosis  2/2 poor tidal volume and underlying COPD  Adjust vent settings as tolerated     #Pleural effusions  L>R, persistent despite diuresis  Not amenable to drainage on POCUS     GI:   #PEG placed 9/19  Continue tube feeds at goal  Bowel regimen: Senokot, Miralax     :   #Urinary retention  Doxazosin 2mg     #Metabolic alkalosis  Due to diuresis & compensation from respiratory acidosis     F/E/N:   F: None  E: Replete as needed  N: Continue TwoCalHN continuous     Heme/Onc:   #Anemia of chronic disease  Transfuse for Hgb <7 and/or symptomatic anemia     #DVT ppx  SCDs and eliquis     Endo:   #Hypothyroidism  Home med: Levothyroxine 250 mg  TSH 45.06 on 9/25 from 0.59 on 9/5, free T4 0.34 (9/25)  Restarted on home levothyroxine 250 mg  Recheck 10/31     ID:   #Colonization with Acinetobacter  Continue to monitor WBC count and temperature curve     MSK/Skin:    PT/OT  Frequent turns/repositioning  Skin surveillance      L: midline, PEG  D: none  A: VC+ 350/8/16/40%      Disposition: Medically stable for discharge, pending placement    ICU Core Measures     Vented Patient  VAP Bundle  VAP bundle ordered     A: Assess, Prevent, and Manage Pain Has pain been assessed? Yes  Need for changes to pain regimen? No   B: Both Spontaneous Awakening Trials (SATs) and Spontaneous Breathing Trials (SBTs) Plan to perform spontaneous awakening trial today? N/A   Plan to perform spontaneous breathing trial today? Yes   Obvious barriers to extubation? Yes   C: Choice of Sedation RASS Goal: N/A patient not on sedation  Need for changes to sedation or analgesia regimen? No   D: Delirium CAM-ICU: Unable to perform secondary to Dementia or other chronic cognitive dysfunction   E: Early Mobility  Plan for early mobility? Yes   F: Family Engagement Plan for family engagement today? Yes       Review of Invasive Devices:            Prophylaxis:  VTE VTE covered by:  apixaban, Oral, 5 mg at 10/16/24 1726       Stress Ulcer  covered byfamotidine (PEPCID) oral suspension 20 mg [[id number]]         24 Hour Events : no acute events overnight  Subjective  Resting comfortably in bed. No acute complaints.       Objective :                   Vitals I/O      Most Recent Min/Max in 24hrs   Temp 98.6 °F (37 °C) Temp  Min: 97.2 °F (36.2 °C)  Max: 98.7 °F (37.1 °C)   Pulse 63 Pulse  Min: 62  Max: 80   Resp 20 Resp  Min: 16  Max: 32   BP (!) 95/46 BP  Min: 94/52  Max: 147/65   O2 Sat 97 % SpO2  Min: 94 %  Max: 100 %      Intake/Output Summary (Last 24 hours) at 10/17/2024 0612  Last data filed at 10/17/2024 0401  Gross per 24 hour   Intake 1236 ml   Output 1050 ml   Net 186 ml       Diet Enteral/Parenteral; Tube Feeding No Oral Diet; Two Mirza HN; Cyclic; 45; 20 hours; Prosource Protein Liquid - One Packet; BID; 100; Water; Every 6 hours    Invasive Monitoring           Physical Exam   Physical Exam  Vitals and  nursing note reviewed.   Eyes:      Extraocular Movements: Extraocular movements intact.      Conjunctiva/sclera: Conjunctivae normal.   Skin:     General: Skin is warm and dry.   HENT:      Head: Normocephalic and atraumatic.      Nose: No congestion.      Mouth/Throat:      Mouth: Mucous membranes are moist.   Cardiovascular:      Rate and Rhythm: Normal rate and regular rhythm.   Musculoskeletal:      Right lower leg: No edema.      Left lower leg: No edema.   Abdominal: General: Bowel sounds are normal. There is abdominal type feeding tube.     Palpations: Abdomen is soft.   Constitutional:       Appearance: He is well-developed and well-nourished.   Pulmonary:      Effort: Pulmonary effort is normal.      Breath sounds: Normal breath sounds.   Neurological:      General: No focal deficit present.      Mental Status: He is alert. Mental status is at baseline. He is calm.          Diagnostic Studies        Lab Results: I have reviewed the following results:     Medications:  Scheduled PRN   apixaban, 5 mg, BID  carbamide peroxide, 5 drop, BID  chlorhexidine, 15 mL, Q12H AMERICA  doxazosin, 2 mg, Daily  famotidine, 20 mg, BID  formoterol, 20 mcg, Q12H  furosemide, 40 mg, Daily  gabapentin, 400 mg, BID  haloperidol, 5 mg, HS  ipratropium, 0.5 mg, BID  levETIRAcetam, 1,000 mg, Q12H AMERICA  levothyroxine, 250 mcg, Early Morning  melatonin, 6 mg, HS  metoprolol tartrate, 25 mg, Q12H AMERICA  [START ON 10/19/2024] nicotine, 14 mg, Daily  nicotine, 1 patch, Daily  [START ON 11/2/2024] nicotine, 7 mg, Daily  polyethylene glycol, 17 g, Daily  sodium chloride, 4 mL, TID      acetaminophen, 650 mg, Q4H PRN  levalbuterol, 1.25 mg, Q6H PRN  oxyCODONE, 2.5 mg, Q6H PRN       Continuous          Labs:   CBC    Recent Labs     10/16/24  0622   WBC 6.09   HGB 7.5*   HCT 22.9*        BMP    Recent Labs     10/16/24  0622   SODIUM 134*   K 4.9   CL 92*   CO2 39*   AGAP 3*   BUN 31*   CREATININE 0.55*   CALCIUM 8.8       Coags    Recent  Labs     10/15/24  0728 10/15/24  1513   PTT 39* 96*        Additional Electrolytes  Recent Labs     10/16/24  2333   MG 1.8*   PHOS 4.1          Blood Gas    No recent results  Recent Labs     10/16/24  0622   PHVEN 7.366   YYQ4AVU 68.9*   PO2VEN 38.0   EDP2VHS 38.6*   BEVEN 11.6   T0TOAPF 68.8    LFTs  No recent results    Infectious  No recent results  Glucose  Recent Labs     10/16/24  0622   GLUC 132

## 2024-10-17 NOTE — CASE MANAGEMENT
Case Management Discharge Planning Note    Patient name Marcin Sánchez  [facility] MRN [id number]  : 1960 Date 10/17/2024       Current Admission Date: 2024  Current Admission Diagnosis:Acute hypoxic respiratory failure (HCC)   Patient Active Problem List    Diagnosis Date Noted Date Diagnosed    Seizure (HCC) 10/09/2024     Insomnia 10/03/2024     Agitation 10/03/2024     Palliative care encounter 10/01/2024     Acute hypoxic respiratory failure (HCC) 2024     Shock (HCC) 2024     Mucus plugging of bronchi 2024     Transaminitis 2024     Cardiac arrest (HCC) 2024     Pericardial effusion 2024     Acute on chronic respiratory failure with hypoxia and hypercapnia (HCC) 2024     Acute metabolic encephalopathy 2024     Chronic combined systolic and diastolic CHF (congestive heart failure) (HCC) 2024     Atrial fibrillation (HCC) 2024     Pulmonary fibrosis (HCC) 2024     Suspected chronic pulmonary embolism (HCC) 2024     Squamous cell lung cancer (HCC) 2024     Hyponatremia 2024     Severe protein-calorie malnutrition (HCC) 2024     Edema of both legs 2022     Tobacco abuse 2020     Nonrheumatic aortic valve insufficiency 2020     Malignant neoplasm of upper lobe of right lung (HCC) 2018     Thoracic aortic aneurysm without rupture (HCC) 2018     Cancer of right main bronchus (HCC) 10/04/2016     Pleural effusion 10/02/2016     Multiple lung nodules on CT 10/02/2016     Collapse of right lung 2016     Acute exacerbation of chronic obstructive pulmonary disease (COPD) (HCC)      Epilepsy (HCC)      Lyme disease      Major depression      Alcohol abuse        LOS (days): 39  Geometric Mean LOS (GMLOS) (days): 5.1  Days to GMLOS:-33.7     OBJECTIVE:  Risk of Unplanned Readmission Score: 43.05         Current admission status: Inpatient   Preferred Pharmacy:    [facility] -  - JAMAL Clifford - [address],  [address] [facility],  [address]  [place] [address]  Phone: [phone] Fax: [phone]    [facility] - JAMAL Argueta - [address]  [address] [facility] [place] [address]  Phone: [phone] Fax: [phone]    Primary Care Provider: Brandt Godinez MD    Primary Insurance: [facility]  Secondary Insurance:     DISCHARGE DETAILS:    Discharge planning discussed with:: Pt's guardian Tere  Freedom of Choice: Yes  Comments - Freedom of Choice: Discussed FOC  CM contacted family/caregiver?: Yes  Were Treatment Team discharge recommendations reviewed with patient/caregiver?: Yes  Did patient/caregiver verbalize understanding of patient care needs?: N/A- going to facility  Were patient/caregiver advised of the risks associated with not following Treatment Team discharge recommendations?: Yes    Contacts  Patient Contacts: Tere Williamson  Relationship to Patient:: Other (Comment) (Guardian)  Contact Method: Phone  Phone Number: [phone]  Reason/Outcome: Discharge Planning, Continuity of Care, Emergency Contact       Other Referral/Resources/Interventions Provided:  Referral Comments: Notified by [facility] they received auth from [facility] for [facility]ACH.  Notified provider of same.  Per provider pt medically stable for discharge.  Plan to arrange trasnport for 10/18/2024 at 1100.  Resquest submitted through [facility] - awaiting accepting transport.  CM received call back from pt's guardian Tere re: dcp.  Notified Tere on dcp to [facility] - agreeable to facility and will sign consents.  Explained [facility] declined pt at this time.  Explained plan to wean from the vent at [facility] with plan to return to [facility] when off vent.  Guardian Tere verbalized understanding & in agreeement w/dcp and accepting facility.  Consents for LTACH can be sent to [email or web address].  Spoke to  Joan @ [facility] - notfiied of same.  Received message from Joan she spoke to guardian Tere and received verbal consent via phone and will email her the forms to sign.  Notified provider of physician at [facility] to call w/report - # given to provider.      CM spoke to pt's sister Katalina - update on dc with anticipate transport for tomorrow at 11 am. Verbalized understanding & in agreement w/facility & dc.     Accepting Facility Name, City & State : [facility]  Receiving Facility/Agency Phone Number: [phone]  Facility/Agency Fax Number: [phone]    Provider to Provider report to: Dr. Cristal Mcdonald [phone],

## 2024-10-17 NOTE — PROGRESS NOTES
Patient:  NILSA FOY    MRN:  [id number]    Aidin Request ID:  [id number]    [facility]    Partner Reserved:  [facility] [place] [address] [phone]    Clinical needs requested:    Geography searched:  500 miles [place] [address]    Start of Service:    Request sent:  10:58am EDT on 9/24/2024 by Brandy Alcantara    Partner reserved:  9:42am EDT on 10/17/2024 by Brandy Alcantara    Choice list shared:  9:42am EDT on 10/17/2024 by Brandy Alcantara

## 2024-10-17 NOTE — RESPIRATORY THERAPY NOTE
RT Ventilator Management Note  Marcin Sánchez 64 y.o. male MRN: [id number]  [id number]      Daily Screen         10/16/2024  0817 10/16/2024  1039          Patient safety screen outcome:: Failed Failed      Not Ready for Weaning due to:: Underline problem not resolved --                Physical Exam:   Assessment Type: During-treatment  General Appearance: Awake  Respiratory Pattern: (P) Assisted  Chest Assessment: (P) Chest expansion symmetrical  Bilateral Breath Sounds: (P) Diminished  Suction: Trach      Resp Comments: (P) Pt appears to be resting comfortably on apv/cmv settings. No distress noted. No changes made to vent at this time

## 2024-10-17 NOTE — QUICK NOTE
I have called and LVM for Katalina regarding LTACH placement. I have also called and spoke to Olivia.

## 2024-10-17 NOTE — CASE MANAGEMENT
Case Management Discharge Planning Note    Patient name Marcin Sánchez  [facility] [id number]  : 1960 Date 10/17/2024       Current Admission Date: 2024  Current Admission Diagnosis:Acute hypoxic respiratory failure (HCC)   Patient Active Problem List    Diagnosis Date Noted Date Diagnosed    Seizure (HCC) 10/09/2024     Insomnia 10/03/2024     Agitation 10/03/2024     Palliative care encounter 10/01/2024     Acute hypoxic respiratory failure (HCC) 2024     Shock (HCC) 2024     Mucus plugging of bronchi 2024     Transaminitis 2024     Cardiac arrest (HCC) 2024     Pericardial effusion 2024     Acute on chronic respiratory failure with hypoxia and hypercapnia (HCC) 2024     Acute metabolic encephalopathy 2024     Chronic combined systolic and diastolic CHF (congestive heart failure) (HCC) 2024     Atrial fibrillation (HCC) 2024     Pulmonary fibrosis (HCC) 2024     Suspected chronic pulmonary embolism (HCC) 2024     Squamous cell lung cancer (HCC) 2024     Hyponatremia 2024     Severe protein-calorie malnutrition (HCC) 2024     Edema of both legs 2022     Tobacco abuse 2020     Nonrheumatic aortic valve insufficiency 2020     Malignant neoplasm of upper lobe of right lung (HCC) 2018     Thoracic aortic aneurysm without rupture (HCC) 2018     Cancer of right main bronchus (HCC) 10/04/2016     Pleural effusion 10/02/2016     Multiple lung nodules on CT 10/02/2016     Collapse of right lung 2016     Acute exacerbation of chronic obstructive pulmonary disease (COPD) (HCC)      Epilepsy (HCC)      Lyme disease      Major depression      Alcohol abuse        LOS (days): 39  Geometric Mean LOS (GMLOS) (days): 5.1  Days to GMLOS:-33.5     OBJECTIVE:  Risk of Unplanned Readmission Score: 43.05         Current admission status: Inpatient   Preferred Pharmacy:    [facility] -  - JAMAL Clifford - [address],  [address] [facility],  [address]  [facility] [address]  Phone: [phone] Fax: [phone]    [facility] [place] - [address] [facility] [address] [facility]  [address] [facility] [address]  Phone: [phone] Fax: [phone]    Primary Care Provider: Brandt Godinez MD    Primary Insurance: [facility]  Secondary Insurance:     DISCHARGE DETAILS:    Discharge planning discussed with:: Pt's sister Katalina  [place] of Choice: Yes  Comments - Freedom of Choice: Discussed FOC  CM contacted family/caregiver?: Yes  Were Treatment Team discharge recommendations reviewed with patient/caregiver?: Yes  Did patient/caregiver verbalize understanding of patient care needs?: N/A- going to facility       Contacts  Patient Contacts: Katalina - sister  Relationship to Patient:: Family  Contact Method: Phone  Phone Number: [phone]  Reason/Outcome: Discharge Planning, Referral, Continuity of Care              CM received message in Aidin from [facility] they are able to accept pt and [facility] are [facility]/for [facility]ACH level of care. CM called pt's Guardian Tere Teri [phone] and left VM re: pt's dcp & requested a call back to discuss - awaiting call back.  Per prior conversation w/pt's guardian Tere ok to discuss w/pt's sister re: choice.  Called pt's sister Katalina to discuss dcp. Notified Katalina that [facility] has declined pt at this time and [facility] accepted pt. Pt's sister Katalina verbailized understanding and is in agreement for dc to [facility].  Message sent in Aidin to [facility] to notify of same.  Notified MICU charge RN & provider of same.      Per provider pt is medically stable for dc to LTACH.

## 2024-10-18 VITALS
DIASTOLIC BLOOD PRESSURE: 56 MMHG | HEART RATE: 80 BPM | SYSTOLIC BLOOD PRESSURE: 103 MMHG | HEIGHT: 69 IN | RESPIRATION RATE: 22 BRPM | TEMPERATURE: 99.4 F | OXYGEN SATURATION: 97 % | BODY MASS INDEX: 23.12 KG/M2 | WEIGHT: 156.09 LBS

## 2024-10-18 LAB
ATRIAL RATE: 72 BPM
GLUCOSE SERPL-MCNC: 95 MG/DL (ref 65–140)
P AXIS: 40 DEGREES
PR INTERVAL: 226 MS
QRS AXIS: -25 DEGREES
QRSD INTERVAL: 88 MS
QT INTERVAL: 392 MS
QTC INTERVAL: 429 MS
T WAVE AXIS: 195 DEGREES
VENTRICULAR RATE: 72 BPM

## 2024-10-18 PROCEDURE — 94664 DEMO&/EVAL PT USE INHALER: CPT

## 2024-10-18 PROCEDURE — 94669 MECHANICAL CHEST WALL OSCILL: CPT

## 2024-10-18 PROCEDURE — 94760 N-INVAS EAR/PLS OXIMETRY 1: CPT

## 2024-10-18 PROCEDURE — 93010 ELECTROCARDIOGRAM REPORT: CPT | Performed by: INTERNAL MEDICINE

## 2024-10-18 PROCEDURE — 94003 VENT MGMT INPAT SUBQ DAY: CPT

## 2024-10-18 PROCEDURE — 82948 REAGENT STRIP/BLOOD GLUCOSE: CPT

## 2024-10-18 PROCEDURE — 94640 AIRWAY INHALATION TREATMENT: CPT

## 2024-10-18 PROCEDURE — 99239 HOSP IP/OBS DSCHRG MGMT >30: CPT | Performed by: INTERNAL MEDICINE

## 2024-10-18 RX ORDER — LEVETIRACETAM 100 MG/ML
1000 SOLUTION ORAL EVERY 12 HOURS SCHEDULED
Qty: 473 ML | Refills: 0 | Status: SHIPPED
Start: 2024-10-18

## 2024-10-18 RX ORDER — LEVOTHYROXINE SODIUM 50 UG/1
250 TABLET ORAL DAILY
Qty: 150 TABLET | Refills: 0 | Status: SHIPPED
Start: 2024-10-18

## 2024-10-18 RX ORDER — LANOLIN ALCOHOL/MO/W.PET/CERES
6 CREAM (GRAM) TOPICAL
Qty: 60 TABLET | Refills: 0 | Status: SHIPPED
Start: 2024-10-18 | End: 2024-11-17

## 2024-10-18 RX ORDER — FUROSEMIDE 40 MG/1
40 TABLET ORAL DAILY
Qty: 30 TABLET | Refills: 0 | Status: SHIPPED
Start: 2024-10-18

## 2024-10-18 RX ORDER — NICOTINE 21 MG/24HR
1 PATCH, TRANSDERMAL 24 HOURS TRANSDERMAL DAILY
Qty: 14 PATCH | Refills: 0 | Status: SHIPPED
Start: 2024-10-19 | End: 2024-11-02

## 2024-10-18 RX ORDER — HALOPERIDOL 2 MG/ML
5 SOLUTION ORAL
Qty: 120 ML | Refills: 0 | Status: SHIPPED
Start: 2024-10-18

## 2024-10-18 RX ORDER — GABAPENTIN 400 MG/1
400 CAPSULE ORAL 2 TIMES DAILY
Qty: 60 CAPSULE | Refills: 0 | Status: SHIPPED
Start: 2024-10-18

## 2024-10-18 RX ORDER — POTASSIUM CHLORIDE 1500 MG/1
20 TABLET, EXTENDED RELEASE ORAL DAILY
Qty: 30 TABLET | Refills: 0 | Status: SHIPPED
Start: 2024-10-18

## 2024-10-18 RX ORDER — SODIUM CHLORIDE FOR INHALATION 3 %
4 VIAL, NEBULIZER (ML) INHALATION
Qty: 250 ML | Refills: 0 | Status: SHIPPED
Start: 2024-10-18

## 2024-10-18 RX ORDER — FAMOTIDINE 20 MG
2 TABLET ORAL DAILY
Qty: 60 TABLET | Refills: 0 | Status: SHIPPED
Start: 2024-10-18

## 2024-10-18 RX ORDER — METOPROLOL TARTRATE 25 MG/1
25 TABLET, FILM COATED ORAL EVERY 12 HOURS SCHEDULED
Qty: 60 TABLET | Refills: 0 | Status: SHIPPED
Start: 2024-10-18

## 2024-10-18 RX ORDER — DOXAZOSIN 2 MG/1
2 TABLET ORAL DAILY
Qty: 30 TABLET | Refills: 0 | Status: SHIPPED
Start: 2024-10-18

## 2024-10-18 RX ORDER — POLYETHYLENE GLYCOL 3350 17 G/17G
17 POWDER, FOR SOLUTION ORAL DAILY
Qty: 30 EACH | Refills: 0 | Status: SHIPPED
Start: 2024-10-19

## 2024-10-18 RX ORDER — FORMOTEROL FUMARATE DIHYDRATE 20 UG/2ML
20 SOLUTION RESPIRATORY (INHALATION)
Qty: 60 ML | Refills: 0 | Status: SHIPPED
Start: 2024-10-18

## 2024-10-18 RX ORDER — FAMOTIDINE 40 MG/5ML
20 POWDER, FOR SUSPENSION ORAL 2 TIMES DAILY
Qty: 100 ML | Refills: 0 | Status: SHIPPED
Start: 2024-10-18

## 2024-10-18 RX ADMIN — FAMOTIDINE 20 MG: 40 POWDER, FOR SUSPENSION ORAL at 08:36

## 2024-10-18 RX ADMIN — LEVETIRACETAM 1000 MG: 100 SOLUTION ORAL at 08:36

## 2024-10-18 RX ADMIN — LEVALBUTEROL HYDROCHLORIDE 1.25 MG: 1.25 SOLUTION RESPIRATORY (INHALATION) at 07:28

## 2024-10-18 RX ADMIN — IPRATROPIUM BROMIDE 0.5 MG: 0.5 SOLUTION RESPIRATORY (INHALATION) at 07:29

## 2024-10-18 RX ADMIN — SODIUM CHLORIDE SOLN NEBU 3% 4 ML: 3 NEBU SOLN at 07:29

## 2024-10-18 RX ADMIN — FUROSEMIDE 40 MG: 40 TABLET ORAL at 08:32

## 2024-10-18 RX ADMIN — CHLORHEXIDINE GLUCONATE 0.12% ORAL RINSE 15 ML: 1.2 LIQUID ORAL at 08:31

## 2024-10-18 RX ADMIN — NICOTINE 1 PATCH: 21 PATCH, EXTENDED RELEASE TRANSDERMAL at 08:35

## 2024-10-18 RX ADMIN — Medication 5 DROP: at 08:36

## 2024-10-18 RX ADMIN — LEVOTHYROXINE SODIUM 250 MCG: 100 TABLET ORAL at 05:57

## 2024-10-18 RX ADMIN — APIXABAN 5 MG: 5 TABLET, FILM COATED ORAL at 08:31

## 2024-10-18 RX ADMIN — GABAPENTIN 400 MG: 400 CAPSULE ORAL at 08:32

## 2024-10-18 RX ADMIN — FORMOTEROL FUMARATE DIHYDRATE 20 MCG: 20 SOLUTION RESPIRATORY (INHALATION) at 07:28

## 2024-10-18 NOTE — CASE MANAGEMENT
Case Management Discharge Planning Note    Patient name Marcin Sánchez  [facility] [id number]  : 1960 Date 10/18/2024       Current Admission Date: 2024  Current Admission Diagnosis:Acute hypoxic respiratory failure (HCC)   Patient Active Problem List    Diagnosis Date Noted Date Diagnosed    Seizure (HCC) 10/09/2024     Insomnia 10/03/2024     Agitation 10/03/2024     Palliative care encounter 10/01/2024     Acute hypoxic respiratory failure (HCC) 2024     Shock (HCC) 2024     Mucus plugging of bronchi 2024     Transaminitis 2024     Cardiac arrest (HCC) 2024     Pericardial effusion 2024     Acute on chronic respiratory failure with hypoxia and hypercapnia (HCC) 2024     Acute metabolic encephalopathy 2024     Chronic combined systolic and diastolic CHF (congestive heart failure) (HCC) 2024     Atrial fibrillation (HCC) 2024     Pulmonary fibrosis (HCC) 2024     Suspected chronic pulmonary embolism (HCC) 2024     Squamous cell lung cancer (HCC) 2024     Hyponatremia 2024     Severe protein-calorie malnutrition (HCC) 2024     Edema of both legs 2022     Tobacco abuse 2020     Nonrheumatic aortic valve insufficiency 2020     Malignant neoplasm of upper lobe of right lung (HCC) 2018     Thoracic aortic aneurysm without rupture (HCC) 2018     Cancer of right main bronchus (HCC) 10/04/2016     Pleural effusion 10/02/2016     Multiple lung nodules on CT 10/02/2016     Collapse of right lung 2016     Acute exacerbation of chronic obstructive pulmonary disease (COPD) (HCC)      Epilepsy (HCC)      Lyme disease      Major depression      Alcohol abuse        LOS (days): 40  Geometric Mean LOS (GMLOS) (days): 5.1  Days to GMLOS:-34.5     OBJECTIVE:  Risk of Unplanned Readmission Score: 42.74         Current admission status: Inpatient   Preferred Pharmacy:    [facility] -  - JAMAL Clifford - [address],  [address] [facility],  [facility] [address]  [place] [address]  Phone: [phone] Fax: [phone]    [facility] [place] [place] - [address] [facility]  [address] [facility] [address]  Phone: [phone] Fax: [phone]    Primary Care Provider: Brandt Godinez MD    Primary Insurance: [facility]  Secondary Insurance:     DISCHARGE DETAILS:        Treatment Team Recommendation: LTACH  Discharge Destination Plan:: LTACH           Transported by (Company and Unit #): [facility]  ETA of Transport (Date): 10/18/24  ETA of Transport (Time): 1100       Additional Comments: CM sent message in Aidin to [facility] that transport was confirmed for 11am. [facility] requested that provider to provider report and nurse to nurse report be called in prior to discharge. CM sent message to patient's resident and nurse informing that provider to provider and nurse to nurse report needed to be called in prior to discharge.

## 2024-10-18 NOTE — DISCHARGE SUMMARY
Discharge Summary - Critical Care/ICU   Name: Marcin Sánchez 64 y.o. male I MRN: [id number]  Unit/Bed#: MICU 02 I Date of Admission: 9/8/2024   Date of Service: 10/18/2024 I Hospital Day: 40    Admission Date: 9/8/2024 1949  Discharge Date: 10/18/24  Admitting Diagnosis: Acute on chronic respiratory failure with hypoxia and hypercapnia (HCC) [J96.21, J96.22]  Discharge Diagnosis:   Medical Problems       Resolved Problems  Date Reviewed: 9/15/2024   None         HPI: 64 year old male with hx of COPD, RUL cancer s/p chemo/radiation, HFimpEF (55%), AAA, who presented on 9/6 from [facility] for COPD exacerbation.      Procedures Performed:   Orders Placed This Encounter   Procedures    Intubation    Intubation       Summary of Hospital Course:   64M w/ PMH COPD, HFimpEF (35% to 55%), afib on eliquis, epilepsy, lung cancer s/p chemo/radiation, AAA, HCV, tobacco use admitted 9/6 to [place] with AECOPD and hypoxic/hypercarbic respiratory failure. While at [place], he was initially admitted to the medical floors but he had worsening confusion in setting of hypercapnia and was subsequently transferred to the MICU. He did not improve on BIPAP and was intubated on 9/8. The intubation was complicated by VT arrest (s/p 2 rounds of CPR and defibrillation with return of pulse). TTE was then ordered and he was noted to have moderate pericardial effusion with concern for tamponade and so was transferred to [facility] [place] on 9/8. He was evaluated by cardiology/CTS who felt that no intervention was warranted on review of the echo findings. Follow up TTE's showed moderate effusion without tamponade. During this time, he was treated for Acinetobacter and Moraxella pneumonia with azithromycin and cefepime. Extubation was attempted on 9/10 but he was re-intubated due to respiratory distress and hypercarbia. Attempt was made again on 9/13 but failed. Subsequently had Trach and PEG placed on 9/19. Following tracheostomy placement, his  ventilator settings were slowly weaned with contributions from daily diuresis. He was eventually tolerating trach collar, however he commonly had CO2 retention causing AMS. When on the ventilator, he was noted to have low tidal volumes on pressure control which also contributed to hypercapnia. After ventilator adjustments, he had improved ventilation with APV/ tidal volume 14/8 40% FIO2. He remained with stable hypercapnia, with most recent CO2 on VBG of 68.9. He also has a moderate left pleural effusion which was not amenable to thoracentesis due to broken ribs and minimal window available. His hospital course was otherwise complicated by impulsivity and agitation. He pulled out his trach x 2. He currently has an XLT Trach. Multiple medications were tried including seroquel, zyprexa, and trazodone. His agitation was most improved on haldol qhs. At the time of discharge, he requires soft wrist restraints at all times due to impulsivity with pulling out trach.       Physical Exam  Vitals and nursing note reviewed.   Constitutional:       General: He is not in acute distress.     Appearance: He is well-developed.   HENT:      Head: Normocephalic and atraumatic.      Right Ear: There is impacted cerumen.      Left Ear: There is impacted cerumen.      Mouth/Throat:      Mouth: Mucous membranes are dry.      Comments: ETT in place  Eyes:      Conjunctiva/sclera: Conjunctivae normal.   Cardiovascular:      Rate and Rhythm: Normal rate and regular rhythm.      Heart sounds: No murmur heard.  Pulmonary:      Effort: Pulmonary effort is normal. No respiratory distress.      Breath sounds: Normal breath sounds.   Abdominal:      Palpations: Abdomen is soft.      Tenderness: There is no abdominal tenderness.      Comments: PEG tube in place c/d/i   Musculoskeletal:         General: No swelling.      Cervical back: Neck supple.      Right lower leg: No edema.      Left lower leg: No edema.   Skin:     General: Skin is  warm and dry.      Capillary Refill: Capillary refill takes less than 2 seconds.   Neurological:      Mental Status: He is alert. Mental status is at baseline.   Psychiatric:         Mood and Affect: Mood normal.       Assessment and Plan    Problem list  Acute on chronic hypoxic and hypercapnic respiratory failure s/p trach  VT arrest  Shock, resolved  Respiratory acidosis, metabolic alkalosis  COPD previously with exacerbation  Pericardial effusion  B/L pleural effusions L>R  HFimpEF 55%  Afib on eliquis  Seizure disorder  Hypothyroidism  Anemia of chronic disease  S/p PEG  Hearing loss, chronic, with b/l impacted cerumen  Pulmonary acinetobacter colonization    Neuro:   #Seizure disorder  Continue keppra 1000 mg bid, Gabapentin 400mg BID     #Impulsivity  Continue qhs haldol     #Hearing loss  Some baseline deficits  Has b/l severely impacted cerumen  Continue debrox     CV:   #Atrial fibrillation  metoprolol tartrate 25 mg bid, eliquis 5 mg bid     #HFpEF  #Moderate to severe aortic regurgitation  9/11 ECHO: LVEF 55%, hypokinetic apex. Moderate to severe aortic regurgitation, mild tricuspid regurgitation, dilated ascending aortic root up to 5.4 cm.  On maintenance lasix po daily, maintain euvolemia     #History of AAA  5.4 cm  Monitor hemodynamics; BP goal <130/80  Outpatient follow up     Pulm:  #Acute on Chronic hypoxic and hypercapnic respiratory failure.  #COPD, and acute exacerbation now resolved  #Hx of RUL radiation therapy 2/2 right mainstem squamous cell cancer, 2016  Status post trach 9/19  Continue VC+, daily SBT  Continue Xopenex and Atrovent TID     #Respiratory acidosis  2/2 poor tidal volume and underlying COPD w/ hx of RUL radiation  Adjust vent settings as tolerated     #Pleural effusions  L>R, persistent despite diuresis  Not amenable to drainage on POCUS     GI:   #PEG placed 9/19  Continue tube feeds at goal, TwoCalHN 50 mL/hr  Bowel regimen: Senokot, Miralax     :   #Urinary  retention  Doxazosin 2mg     #Metabolic alkalosis  Due to diuresis & compensation from respiratory acidosis     F/E/N:   F: None  E: Replete as needed  N: Continue TwoCalHN continuous     Heme/Onc:   #Anemia of chronic disease  Stable, Hb range 7.5-8.5     #DVT ppx  SCDs and eliquis     Endo:   #Hypothyroidism  Home med: Levothyroxine 250 mg  TSH 45.06 on 9/25 from 0.59 on 9/5, free T4 0.34 (9/25)  Restarted on home levothyroxine 250 mg  Recheck due 10/31     ID:   #Colonization with Acinetobacter  Monitor off abx, afebrile without leukocytosis     MSK/Skin:   PT/OT  Frequent turns/repositioning  Skin surveillance      L: midline (remove prior to d/c), PEG  D: none  A: VC+ 350/8/16/40% , SBT daily, XLT trach     Disposition: discharge to [facility]      Significant Findings, Care, Treatment and Services Provided: as noted above    Complications: as noted above    Condition at Discharge: good       Discharge instructions/Information to patient and family:   See After Visit Summary (AVS) for information provided to patient and family.      Provisions for Follow-Up Care:  See after visit summary for information related to follow-up care and any pertinent home health orders.      PCP: Brandt Godinez MD    Disposition:  [facility]    Planned Readmission: No     Discharge Medications:  See after visit summary for reconciled discharge medications provided to patient and family.      Discharge Statement:  I have spent a total time of 45 minutes in caring for this patient on the day of the visit/encounter. .

## 2024-10-18 NOTE — PLAN OF CARE
Problem: Prexisting or High Potential for Compromised Skin Integrity  Goal: Skin integrity is maintained or improved  Description: INTERVENTIONS:  - Identify patients at risk for skin breakdown  - Assess and monitor skin integrity  - Assess and monitor nutrition and hydration status  - Monitor labs   - Assess for incontinence   - Turn and reposition patient  - Assist with mobility/ambulation  - Relieve pressure over bony prominences  - Avoid friction and shearing  - Provide appropriate hygiene as needed including keeping skin clean and dry  - Evaluate need for skin moisturizer/barrier cream  - Collaborate with interdisciplinary team   - Patient/family teaching  - Consider wound care consult   Outcome: Progressing     Problem: PAIN - ADULT  Goal: Verbalizes/displays adequate comfort level or baseline comfort level  Description: Interventions:  - Encourage patient to monitor pain and request assistance  - Assess pain using appropriate pain scale  - Administer analgesics based on type and severity of pain and evaluate response  - Implement non-pharmacological measures as appropriate and evaluate response  - Consider cultural and social influences on pain and pain management  - Notify physician/advanced practitioner if interventions unsuccessful or patient reports new pain  Outcome: Progressing     Problem: INFECTION - ADULT  Goal: Absence or prevention of progression during hospitalization  Description: INTERVENTIONS:  - Assess and monitor for signs and symptoms of infection  - Monitor lab/diagnostic results  - Monitor all insertion sites, i.e. indwelling lines, tubes, and drains  - Monitor endotracheal if appropriate and nasal secretions for changes in amount and color  - Harman appropriate cooling/warming therapies per order  - Administer medications as ordered  - Instruct and encourage patient and family to use good hand hygiene technique  - Identify and instruct in appropriate isolation precautions for  identified infection/condition  Outcome: Progressing  Goal: Absence of fever/infection during neutropenic period  Description: INTERVENTIONS:  - Monitor WBC    Outcome: Progressing     Problem: SAFETY ADULT  Goal: Patient will remain free of falls  Description: INTERVENTIONS:  - Educate patient/family on patient safety including physical limitations  - Instruct patient to call for assistance with activity   - Consult OT/PT to assist with strengthening/mobility   - Keep Call bell within reach  - Keep bed low and locked with side rails adjusted as appropriate  - Keep care items and personal belongings within reach  - Initiate and maintain comfort rounds  - Make Fall Risk Sign visible to staff  - Offer Toileting every 2 Hours, in advance of need  - Initiate/Maintain bed alarm  - Obtain necessary fall risk management equipment:   - Apply yellow socks and bracelet for high fall risk patients  - Consider moving patient to room near nurses station  Outcome: Progressing  Goal: Maintain or return to baseline ADL function  Description: INTERVENTIONS:  -  Assess patient's ability to carry out ADLs; assess patient's baseline for ADL function and identify physical deficits which impact ability to perform ADLs (bathing, care of mouth/teeth, toileting, grooming, dressing, etc.)  - Assess/evaluate cause of self-care deficits   - Assess range of motion  - Assess patient's mobility; develop plan if impaired  - Assess patient's need for assistive devices and provide as appropriate  - Encourage maximum independence but intervene and supervise when necessary  - Involve family in performance of ADLs  - Assess for home care needs following discharge   - Consider OT consult to assist with ADL evaluation and planning for discharge  - Provide patient education as appropriate  Outcome: Progressing  Goal: Maintains/Returns to pre admission functional level  Description: INTERVENTIONS:  - Perform AM-PAC 6 Click Basic Mobility/ Daily Activity  assessment daily.  - Set and communicate daily mobility goal to care team and patient/family/caregiver.   - Collaborate with rehabilitation services on mobility goals if consulted  - Perform Range of Motion 2 times a day.  - Reposition patient every 2 hours.  - Out of bed for toileting  - Record patient progress and toleration of activity level   Outcome: Progressing     Problem: DISCHARGE PLANNING  Goal: Discharge to home or other facility with appropriate resources  Description: INTERVENTIONS:  - Identify barriers to discharge w/patient and caregiver  - Arrange for needed discharge resources and transportation as appropriate  - Identify discharge learning needs (meds, wound care, etc.)  - Arrange for interpretive services to assist at discharge as needed  - Refer to Case Management Department for coordinating discharge planning if the patient needs post-hospital services based on physician/advanced practitioner order or complex needs related to functional status, cognitive ability, or social support system  Outcome: Progressing     Problem: Knowledge Deficit  Goal: Patient/family/caregiver demonstrates understanding of disease process, treatment plan, medications, and discharge instructions  Description: Complete learning assessment and assess knowledge base.  Interventions:  - Provide teaching at level of understanding  - Provide teaching via preferred learning methods  Outcome: Progressing     Problem: Nutrition/Hydration-ADULT  Goal: Nutrient/Hydration intake appropriate for improving, restoring or maintaining nutritional needs  Description: Monitor and assess patient's nutrition/hydration status for malnutrition. Collaborate with interdisciplinary team and initiate plan and interventions as ordered.  Monitor patient's weight and dietary intake as ordered or per policy. Utilize nutrition screening tool and intervene as necessary. Determine patient's food preferences and provide high-protein, high-caloric foods  as appropriate.     INTERVENTIONS:  - Monitor oral intake, urinary output, labs, and treatment plans  - Assess nutrition and hydration status and recommend course of action  - Evaluate amount of meals eaten  - Assist patient with eating if necessary   - Allow adequate time for meals  - Recommend/ encourage appropriate diets, oral nutritional supplements, and vitamin/mineral supplements  - Order, calculate, and assess calorie counts as needed  - Recommend, monitor, and adjust tube feedings and TPN/PPN based on assessed needs  - Assess need for intravenous fluids  - Provide specific nutrition/hydration education as appropriate  - Include patient/family/caregiver in decisions related to nutrition  Outcome: Progressing     Problem: RESPIRATORY - ADULT  Goal: Achieves optimal ventilation and oxygenation  Description: INTERVENTIONS:  - Assess for changes in respiratory status  - Assess for changes in mentation and behavior  - Position to facilitate oxygenation and minimize respiratory effort  - Oxygen administered by appropriate delivery if ordered  - Initiate smoking cessation education as indicated  - Encourage broncho-pulmonary hygiene including cough, deep breathe, Incentive Spirometry  - Assess the need for suctioning and aspirate as needed  - Assess and instruct to report SOB or any respiratory difficulty  - Respiratory Therapy support as indicated  Outcome: Progressing     Problem: SAFETY,RESTRAINT: NV/NON-SELF DESTRUCTIVE BEHAVIOR  Goal: Remains free of harm/injury (restraint for non violent/non self-detsructive behavior)  Description: INTERVENTIONS:  - Instruct patient/family regarding restraint use   - Assess and monitor physiologic and psychological status   - Provide interventions and comfort measures to meet assessed patient needs   - Identify and implement measures to help patient regain control  - Assess readiness for release of restraint   Outcome: Progressing  Goal: Returns to optimal restraint-free  functioning  Description: INTERVENTIONS:  - Assess the patient's behavior and symptoms that indicate continued need for restraint  - Identify and implement measures to help patient regain control  - Assess readiness for release of restraint   Outcome: Progressing

## 2024-10-18 NOTE — RESPIRATORY THERAPY NOTE
RT Ventilator Management Note  Marcin Sánchez 64 y.o. male MRN: [id number]  [facility] [id number]      Daily Screen         10/16/2024  1039 10/17/2024  0752          Patient safety screen outcome:: Failed Failed      Not Ready for Weaning due to:: -- Underline problem not resolved                Physical Exam:   Assessment Type: During-treatment  General Appearance: Awake  Respiratory Pattern: (P) Assisted  Chest Assessment: (P) Chest expansion symmetrical  Suction: Trach      Resp Comments: (P) Pt resting comfortably on apv/cmv settings no distress noted at this time

## 2024-10-18 NOTE — RESPIRATORY THERAPY NOTE
RT Ventilator Management Note  Marcin Sánchez 64 y.o. male MRN: [id number]  Unit/Bed#: MICU 02 Encounter: [id number]      Daily Screen         10/17/2024  0752 10/18/2024  0736          Patient safety screen outcome:: Failed Failed (P)       Not Ready for Weaning due to:: Underline problem not resolved Underline problem not resolved (P)                 Physical Exam:   Respiratory Pattern: (P) Assisted  Chest Assessment: (P) Chest expansion symmetrical      Resp Comments: (P) pt placed on spont mode. pt Vt were low.  pt placed back on apv cmv at this time. pt suctioned and vent disinfected at this time. will continue to monitor per protocol.

## 2024-12-09 NOTE — PROGRESS NOTES
Neurology Ambulatory Visit  Name: Marcin Sánchez       : 1960       MRN: [id number]   Encounter Provider: LANDON Kurtz   Encounter Date: 2024  Encounter department: [facility] [place]      Assessment & Plan  Epilepsy ([facility])   64 y.o.male, with COPD, heart failure, atrial fibrillation on Eliquis, lung cancer s/p chemo and radiation, AAA , Lyme disease, hyponatremia, HCV and recent hospitalization for due to respiratory failure now trach and ventilator dependent who is presenting to [facility] for evaluation of his seizures.  Exact details of patient's epilepsy history is unclear as below.  However, video EEG monitoring while patient was inpatient, revealed the presence of left temporal sharp waves.  He was previously maintained on phenytoin.  However, this was changed to levetiracetam while inpatient due to staring spells.  No further seizure activity has been reported by staff.  Will continue the levetiracetam at his current dose at this time.    Diagnosis: Focal epilepsy, staff has not noticed any seizures while at his current facility.    Reason for Continued Antiepileptic Medications: High risk for further seizures due to abnormal EEGs.    Plan: Continue levetiracetam 1000 mg twice daily            Staff should contact us if he experiences any seizure-like activity              Follow-up:  3 months      History of Present Illness   Marcin Sánchez is a 64 y.o.male, with COPD, heart failure, atrial fibrillation on Eliquis, lung cancer status post chemo and radiation, AAA , Lyme disease, hyponatremia, HCV who is presenting to [facility] for evaluation of his seizures. He is accompanied by two EMS transporters.    Patient was  hospitalized in September due to respiratory failure.  Inpatient notes reference he developed worsening confusion in the setting of hypercapnia resulting in transfer to the MICU.  He was intubated 2 days later.  However, the  intubation was complicated by V. tach arrest with return of ROSC after 2 rounds of CPR.  A TTE noted pericardial effusion with concern for tamponade.  Further evaluation ruled out the tamponade.  He received antibiotics for pneumonia with multiple attempts at extubation and needing reintubation.  Trach and PEG was placed on 919.    There is a history of epilepsy.  However, unable to obtain exact details of patient's epilepsy history as patient is nonverbal due to his tracheotomy and is ventilator dependent.  He was previously maintained on phenytoin.  However, this was changed to levetiracetam while he was inpatient due to events of staring.  Various video EEG monitoring sessions did not capture any seizures.  However, left temporal sharp waves were seen.  He is currently maintained on levetiracetam at 1000 mg twice daily and denies experiencing any side effects from that.  Staff at his facility have not witnessed any seizure-like events.  I contacted patient's appointed guardian who was unaware of the details of the patient's previous epilepsy history.  In all scripts note from 2016 lists an already known diagnosis of focal epilepsy.       Current Antiepileptic Medications:  1.  Levetiracetam 1000 mg twice daily  Other medications as per Epic      Event/Seizure Semiology:  1.  Staring    Special Features:  Status epilepticus: unknown  Self Injury Seizures: unknown  Precipitating Factors: unknown      Epilepsy Risk Factors:   Abnormal pregnancy: no    Abnormal birth/: no    Abnormal Development: no    Febrile seizures, simple: no    Febrile seizures, complex: no    CNS infection: no    Intellectual disability: no     Cerebral palsy:  no    Head injury (moderate/severe): no  CNS neoplasm: no     CNS malformation: no     Neurosurgical procedure: no    Stroke:  no     Alcohol abuse: no    Drug abuse: no     Family history Sz/epilepsy: no   Prior physical/sexual/emotional abuse: no     Prior  AEDs:  unknown    Prior Evaluation:    -HCT- No acute intracranial hemorrhage, midline shift, or mass effect. Chronic small vessel ischemic changes and chronic left caudate/basal ganglia infarct.     - MRI brain: 1-/24/16- Chronic anterior inferior left basal ganglia hemorrhagic lacunar infarction consistent with CT     - Routine EEG:   - Ambulatory EEG:     - Video EEG: 10/9-11/24-   10/11/24 This 3 hour, continuous video-EEG recording is abnormal. Occasional left anterior to mid-temporal sharp wave discharges are a potential source of seizures. Background activities of predominantly diffuse theta and delta activities suggest mild to moderate diffuse cerebral dysfunction of non-specific etiology. No electrographic seizures were seen.  This concludes the monitoring session. No clinical events were reported. No electrographic seizures were seen. Background activities were as detailed above.      10/9/24 This 22.5 hour continuous video-EEG recording is abnormal. Occasional left anterior to mid-temporal sharp wave discharges are a potential source of seizures. Background activities of predominantly diffuse theta and delta activities suggest mild to moderate diffuse cerebral dysfunction of non-specific etiology. No electrographic seizures were seen.     - Video EEG: 10/1/24- This 12 hour vEEG is indicative of left temporal epileptogenicity and a moderate diffuse encephalopathy. No seizures or lateralizing signs are recorded.     - PET scan brain :   - Neuropsychologic testing:   - Labs:     Psychiatric History:  Depression: no  Anxiety: no  Psychosis: no  Psychiatric Admissions: no    Medical History:  See above      Social History:  Currently living at ventilator facility    I reviewed Allergies, Medical History, Surgical History and Family History as documented in Epic/Care Everywhere      Review of Systems:  Constitutional:  Negative for appetite change, fatigue and fever.   HENT: Negative for hearing loss, tinnitus,  trouble swallowing and voice change.    Eyes: Negative for photophobia, pain and visual disturbance.   Respiratory: Negative for shortness of breath.    Cardiovascular: Negative for palpitations.   Gastrointestinal: Negative for nausea and vomiting.   Endocrine: Negative for cold intolerance.   Genitourinary: Negative for dysuria, frequency and urgency.   Musculoskeletal:  Negative for back pain, gait problem, myalgias, neck pain and neck stiffness.   Skin: Negative for rash.   Allergic/Immunologic: Negative for hives.  Neurological: Negative for dizziness, tremors, syncope, speech difficulty, weakness, light-headedness, numbness and headaches.   Hematological: Negative for easy bruising and bleeding  Psychiatric/Behavioral: Negative for confusion, hallucinations and sleep disturbance.         Objective   /53 (BP Location: Left arm, Patient Position: Sitting, Cuff Size: Standard)   Pulse 65   Temp 98.7 °F (37.1 °C) (Temporal)   SpO2 99%      GENERAL EXAMINATION:   In general patient is well appearing and in no distress.    NEUROLOGIC EXAMINATION:     Alert, shakes head yes and no, trach and ventilator dependent  Mood and affect are appropriate. Attention is intact.  Language function including fluency, naming, and comprehension intact.    Cranial nerves: Pupils are equal round reactive to light and accommodation. Visual Fields are full to confrontation bilaterally. Extraocular movements are intact without nystagmus. Facial sensation is intact to light touch. No facial droop, face activates symmetrically. There is no dysarthria. Hearing was intact to finger rub. Tongue and uvula are midline and palate elevates symmetrically. Shoulder shrug  5/5.    Motor Exam:  No pronator drift. Bulk and tone are normal. Strength is equal throughout. Able to lift extremities antigravity off of the stretcher.    Deep tendon reflexes: Biceps 2+, brachioradialis 2+, patellar 2+, Achilles 2+ bilaterally.     Sensation: Intact  to light touch in all four extremities    Coordination: no dysmetria noted    Gait: Not tested, on stretcher    Administrative Statements     Voice recognition software was used in the generation of this note. There may be unintentional errors including grammatical errors, spelling errors, or pronoun errors.

## 2024-12-11 ENCOUNTER — OFFICE VISIT (OUTPATIENT)
Dept: NEUROLOGY | Facility: CLINIC | Age: 64
End: 2024-12-11
Payer: COMMERCIAL

## 2024-12-11 VITALS
DIASTOLIC BLOOD PRESSURE: 53 MMHG | HEART RATE: 65 BPM | SYSTOLIC BLOOD PRESSURE: 120 MMHG | OXYGEN SATURATION: 99 % | TEMPERATURE: 98.7 F

## 2024-12-11 DIAGNOSIS — G40.909 EPILEPSY (HCC): Primary | ICD-10-CM

## 2024-12-11 PROCEDURE — 99214 OFFICE O/P EST MOD 30 MIN: CPT | Performed by: NURSE PRACTITIONER

## 2024-12-11 RX ORDER — CLONIDINE HYDROCHLORIDE 0.1 MG/1
0.1 TABLET ORAL EVERY 6 HOURS PRN
COMMUNITY

## 2024-12-11 RX ORDER — ATORVASTATIN CALCIUM 40 MG/1
40 TABLET, FILM COATED ORAL DAILY
COMMUNITY

## 2024-12-11 RX ORDER — FERROUS SULFATE 325(65) MG
325 TABLET, DELAYED RELEASE (ENTERIC COATED) ORAL
COMMUNITY

## 2024-12-11 RX ORDER — SERTRALINE HYDROCHLORIDE 25 MG/1
25 TABLET, FILM COATED ORAL DAILY
COMMUNITY

## 2024-12-11 RX ORDER — HYDROXYZINE PAMOATE 25 MG/1
25 CAPSULE ORAL 3 TIMES DAILY PRN
COMMUNITY

## 2024-12-11 NOTE — PATIENT INSTRUCTIONS
Recent hospitalization for respiratory failure. History of epilepsy, although exact details unclear as patient is nonverbal due to tracheostomy and ventilator. Patient was previously maintained on Phenytoin due to history of epilepsy. However, this was changed to levetiracetam while inpatient due to events of staring. EEGs did not capture any seizures but were abnormal due to left temporal sharp waves.     Plan:   Continue levetiracetam at current dose of 1000mg bid  Notify office if patient experiences any further seizures (staring spells).

## 2025-02-14 NOTE — PLAN OF CARE
Problem: PHYSICAL THERAPY ADULT  Goal: Performs mobility at highest level of function for planned discharge setting.  See evaluation for individualized goals.  Description: Treatment/Interventions: OT, Spoke to nursing, Spoke to case management, Gait training, Bed mobility, Patient/family training, Therapeutic exercise, Endurance training, LE strengthening/ROM, Functional transfer training          See flowsheet documentation for full assessment, interventions and recommendations.  Outcome: Progressing  Note: Prognosis: Fair  Problem List: Decreased strength, Decreased endurance, Impaired balance, Decreased mobility, Impaired judgement, Decreased safety awareness, Decreased skin integrity, Pain  Assessment: Pt seen for PT session for therex;  bed mobility and seated balance/ activity at EOB. Pt remains on trach/ vent; is awake and alert throughout. pt required modA x1 for supine> sit and modA x2 for LE/ trunk management BTB + for repositinging for comfort. Pt refused OOB to recliner and standing trials at EOB  despite encouragement from RN; OT and PT. Pt demonstrates good LE strength for trial of these tasks but is self limited at this time. Pt pushing PT away and gesturing inappropriately on educaton as to why this would be beneficial for him. - pt was assited BTB to conclude session + was repositioned for comfort.  Will continue to encourage and follow on caseload. Level 2 PT resources on d/c recommended.  Barriers to Discharge: Decreased caregiver support     Rehab Resource Intensity Level, PT: II (Moderate Resource Intensity)    See flowsheet documentation for full assessment.         normal (ped)... In no apparent distress. Well appearing, Cooperative.

## 2025-03-02 NOTE — ASSESSMENT & PLAN NOTE
64 y.o.male, with COPD, heart failure, atrial fibrillation on Eliquis, lung cancer s/p chemo and radiation, AAA , Lyme disease, hyponatremia, HCV and recent hospitalization for due to respiratory failure now trach and ventilator dependent who is presenting to [facility] for evaluation of his seizures.  Exact details of patient's epilepsy history is unclear as below.  However, video EEG monitoring while patient was inpatient, revealed the presence of left temporal sharp waves.  He was previously maintained on phenytoin.  However, this was changed to levetiracetam while inpatient due to staring spells.  No further seizure activity has been reported by staff.  Will continue the levetiracetam at his current dose at this time.    Diagnosis: Focal epilepsy, staff has not noticed any seizures while at his current facility.    Reason for Continued Antiepileptic Medications: High risk for further seizures due to abnormal EEGs.    Plan: Continue levetiracetam 1000 mg twice daily            Staff should contact us if he experiences any seizure-like activity              Follow-up:  3 months

## (undated) DEVICE — PACK PBDS PLASTIC HEAD AND NECK RF

## (undated) DEVICE — SUT SILK 2-0 SH 30 IN K833H

## (undated) DEVICE — GLOVE SRG BIOGEL 6.5

## (undated) DEVICE — INTENDED FOR TISSUE SEPARATION, AND OTHER PROCEDURES THAT REQUIRE A SHARP SURGICAL BLADE TO PUNCTURE OR CUT.: Brand: BARD-PARKER SAFETY BLADES SIZE 11, STERILE

## (undated) DEVICE — SUT VICRYL 2-0 REEL 54 IN J286G

## (undated) DEVICE — ADHESIVE SKN CLSR HISTOACRYL FLEX 0.5ML LF

## (undated) DEVICE — GLOVE SRG BIOGEL ORTHOPEDIC 7.5

## (undated) DEVICE — GAUZE SPONGES,16 PLY: Brand: CURITY

## (undated) DEVICE — LIGHT HANDLE COVER SLEEVE DISP BLUE STELLAR

## (undated) DEVICE — CHLORAPREP HI-LITE 26ML ORANGE

## (undated) DEVICE — SUT VICRYL 2-0 SH 27 IN UNDYED J417H

## (undated) DEVICE — MINOR PROCEDURE DRAPE: Brand: CONVERTORS

## (undated) DEVICE — SYRINGE CATH TIP 50ML

## (undated) DEVICE — LIGHT GLOVE GREEN

## (undated) DEVICE — DRAPE TOWEL: Brand: CONVERTORS

## (undated) DEVICE — SUT CHROMIC 3-0 SH 27 IN G122H

## (undated) DEVICE — GLOVE INDICATOR PI UNDERGLOVE SZ 7.5 BLUE

## (undated) DEVICE — Device: Brand: DEFENDO AIR/WATER/SUCTION AND BIOPSY VALVE

## (undated) DEVICE — SUT SILK 3-0 SH 30 IN K832H

## (undated) DEVICE — SUT VICRYL 3-0 SH 27 IN J416H

## (undated) DEVICE — BETHLEHEM UNIVERSAL OUTPATIENT: Brand: CARDINAL HEALTH

## (undated) DEVICE — TUBING SUCTION 5MM X 12 FT

## (undated) DEVICE — PLUMEPEN PRO 10FT

## (undated) DEVICE — GLOVE INDICATOR PI UNDERGLOVE SZ 8 BLUE

## (undated) DEVICE — SUT PROLENE 3-0 RB-1 30 IN 8872H

## (undated) DEVICE — BETHLEHEM UNIVERSAL MINOR GEN: Brand: CARDINAL HEALTH

## (undated) DEVICE — SYRINGE 10ML LL

## (undated) DEVICE — DRAIN SPONGES,6 PLY: Brand: EXCILON

## (undated) DEVICE — ANTIBACTERIAL UNDYED BRAIDED (POLYGLACTIN 910), SYNTHETIC ABSORBABLE SUTURE: Brand: COATED VICRYL

## (undated) DEVICE — ROSEBUD DISSECTORS: Brand: DEROYAL

## (undated) DEVICE — MEDI-VAC YANK SUCT HNDL W/TPRD BULBOUS TIP: Brand: CARDINAL HEALTH

## (undated) DEVICE — 3M™ TEGADERM™ TRANSPARENT FILM DRESSING FRAME STYLE, 1626W, 4 IN X 4-3/4 IN (10 CM X 12 CM), 50/CT 4CT/CASE: Brand: 3M™ TEGADERM™

## (undated) DEVICE — CHLORAPREP HI-LITE 10.5ML ORANGE

## (undated) DEVICE — GLOVE INDICATOR PI UNDERGLOVE SZ 7 BLUE

## (undated) DEVICE — GLOVE SRG BIOGEL 7

## (undated) DEVICE — INTENDED FOR TISSUE SEPARATION, AND OTHER PROCEDURES THAT REQUIRE A SHARP SURGICAL BLADE TO PUNCTURE OR CUT.: Brand: BARD-PARKER SAFETY BLADES SIZE 15, STERILE

## (undated) DEVICE — AIR AND WATER TUBING/CAP SET FOR OLYMPUS® SCOPES: Brand: ERBE

## (undated) DEVICE — REM POLYHESIVE ADULT PATIENT RETURN ELECTRODE: Brand: VALLEYLAB

## (undated) DEVICE — TRACH TUBE HOLDER

## (undated) DEVICE — GAUZE SPONGES,USP TYPE VII GAUZE, 12 PLY: Brand: CURITY

## (undated) DEVICE — LUBRICANT JELLY SURGILUBE TUBE 2OZ FLIP TOP